# Patient Record
Sex: FEMALE | Race: WHITE | NOT HISPANIC OR LATINO
[De-identification: names, ages, dates, MRNs, and addresses within clinical notes are randomized per-mention and may not be internally consistent; named-entity substitution may affect disease eponyms.]

---

## 2017-12-26 ENCOUNTER — APPOINTMENT (OUTPATIENT)
Dept: RADIOLOGY | Facility: CLINIC | Age: 62
End: 2017-12-26

## 2017-12-26 ENCOUNTER — OUTPATIENT (OUTPATIENT)
Dept: OUTPATIENT SERVICES | Facility: HOSPITAL | Age: 62
LOS: 1 days | End: 2017-12-26
Payer: COMMERCIAL

## 2017-12-26 DIAGNOSIS — Z00.8 ENCOUNTER FOR OTHER GENERAL EXAMINATION: ICD-10-CM

## 2017-12-26 PROCEDURE — 77080 DXA BONE DENSITY AXIAL: CPT

## 2017-12-26 PROCEDURE — 77080 DXA BONE DENSITY AXIAL: CPT | Mod: 26

## 2017-12-28 ENCOUNTER — OUTPATIENT (OUTPATIENT)
Dept: OUTPATIENT SERVICES | Facility: HOSPITAL | Age: 62
LOS: 1 days | End: 2017-12-28
Payer: COMMERCIAL

## 2017-12-28 ENCOUNTER — APPOINTMENT (OUTPATIENT)
Dept: MAMMOGRAPHY | Facility: CLINIC | Age: 62
End: 2017-12-28

## 2017-12-28 DIAGNOSIS — Z00.8 ENCOUNTER FOR OTHER GENERAL EXAMINATION: ICD-10-CM

## 2017-12-28 PROCEDURE — 77067 SCR MAMMO BI INCL CAD: CPT

## 2017-12-28 PROCEDURE — 77063 BREAST TOMOSYNTHESIS BI: CPT

## 2017-12-28 PROCEDURE — 77063 BREAST TOMOSYNTHESIS BI: CPT | Mod: 26

## 2017-12-28 PROCEDURE — G0202: CPT | Mod: 26

## 2021-01-10 ENCOUNTER — TRANSCRIPTION ENCOUNTER (OUTPATIENT)
Age: 66
End: 2021-01-10

## 2021-02-07 ENCOUNTER — TRANSCRIPTION ENCOUNTER (OUTPATIENT)
Age: 66
End: 2021-02-07

## 2024-12-21 VITALS
OXYGEN SATURATION: 100 % | DIASTOLIC BLOOD PRESSURE: 99 MMHG | RESPIRATION RATE: 18 BRPM | SYSTOLIC BLOOD PRESSURE: 144 MMHG | TEMPERATURE: 98 F | WEIGHT: 161.82 LBS | HEART RATE: 99 BPM

## 2024-12-21 LAB
ALBUMIN SERPL ELPH-MCNC: 3.3 G/DL — SIGNIFICANT CHANGE UP (ref 3.3–5)
ALBUMIN SERPL ELPH-MCNC: 3.8 G/DL — SIGNIFICANT CHANGE UP (ref 3.3–5)
ALP SERPL-CCNC: 56 U/L — SIGNIFICANT CHANGE UP (ref 40–120)
ALP SERPL-CCNC: 68 U/L — SIGNIFICANT CHANGE UP (ref 40–120)
ALT FLD-CCNC: 33 U/L — SIGNIFICANT CHANGE UP (ref 12–78)
ALT FLD-CCNC: 35 U/L — SIGNIFICANT CHANGE UP (ref 12–78)
ANION GAP SERPL CALC-SCNC: 4 MMOL/L — LOW (ref 5–17)
ANION GAP SERPL CALC-SCNC: 6 MMOL/L — SIGNIFICANT CHANGE UP (ref 5–17)
APPEARANCE UR: CLEAR — SIGNIFICANT CHANGE UP
APTT BLD: 30.2 SEC — SIGNIFICANT CHANGE UP (ref 24.5–35.6)
AST SERPL-CCNC: 23 U/L — SIGNIFICANT CHANGE UP (ref 15–37)
AST SERPL-CCNC: 24 U/L — SIGNIFICANT CHANGE UP (ref 15–37)
BACTERIA # UR AUTO: NEGATIVE /HPF — SIGNIFICANT CHANGE UP
BASOPHILS # BLD AUTO: 0.04 K/UL — SIGNIFICANT CHANGE UP (ref 0–0.2)
BASOPHILS NFR BLD AUTO: 0.6 % — SIGNIFICANT CHANGE UP (ref 0–2)
BILIRUB SERPL-MCNC: 0.4 MG/DL — SIGNIFICANT CHANGE UP (ref 0.2–1.2)
BILIRUB SERPL-MCNC: 0.4 MG/DL — SIGNIFICANT CHANGE UP (ref 0.2–1.2)
BILIRUB UR-MCNC: NEGATIVE — SIGNIFICANT CHANGE UP
BUN SERPL-MCNC: 38 MG/DL — HIGH (ref 7–23)
BUN SERPL-MCNC: 40 MG/DL — HIGH (ref 7–23)
CALCIUM SERPL-MCNC: 10.6 MG/DL — HIGH (ref 8.5–10.1)
CALCIUM SERPL-MCNC: 11.9 MG/DL — HIGH (ref 8.5–10.1)
CAST: 1 /LPF — SIGNIFICANT CHANGE UP (ref 0–4)
CHLORIDE SERPL-SCNC: 101 MMOL/L — SIGNIFICANT CHANGE UP (ref 96–108)
CHLORIDE SERPL-SCNC: 109 MMOL/L — HIGH (ref 96–108)
CO2 SERPL-SCNC: 26 MMOL/L — SIGNIFICANT CHANGE UP (ref 22–31)
CO2 SERPL-SCNC: 31 MMOL/L — SIGNIFICANT CHANGE UP (ref 22–31)
COLOR SPEC: YELLOW — SIGNIFICANT CHANGE UP
CREAT SERPL-MCNC: 2.25 MG/DL — HIGH (ref 0.5–1.3)
CREAT SERPL-MCNC: 2.61 MG/DL — HIGH (ref 0.5–1.3)
DIFF PNL FLD: NEGATIVE — SIGNIFICANT CHANGE UP
EGFR: 19 ML/MIN/1.73M2 — LOW
EGFR: 23 ML/MIN/1.73M2 — LOW
EOSINOPHIL # BLD AUTO: 0.22 K/UL — SIGNIFICANT CHANGE UP (ref 0–0.5)
EOSINOPHIL NFR BLD AUTO: 3 % — SIGNIFICANT CHANGE UP (ref 0–6)
GLUCOSE SERPL-MCNC: 104 MG/DL — HIGH (ref 70–99)
GLUCOSE SERPL-MCNC: 128 MG/DL — HIGH (ref 70–99)
GLUCOSE UR QL: NEGATIVE MG/DL — SIGNIFICANT CHANGE UP
HCT VFR BLD CALC: 32.3 % — LOW (ref 34.5–45)
HGB BLD-MCNC: 10.5 G/DL — LOW (ref 11.5–15.5)
IMM GRANULOCYTES NFR BLD AUTO: 0.3 % — SIGNIFICANT CHANGE UP (ref 0–0.9)
INR BLD: 0.99 RATIO — SIGNIFICANT CHANGE UP (ref 0.85–1.16)
KETONES UR-MCNC: NEGATIVE MG/DL — SIGNIFICANT CHANGE UP
LACTATE SERPL-SCNC: 1.2 MMOL/L — SIGNIFICANT CHANGE UP (ref 0.7–2)
LEUKOCYTE ESTERASE UR-ACNC: ABNORMAL
LIDOCAIN IGE QN: 49 U/L — SIGNIFICANT CHANGE UP (ref 13–75)
LYMPHOCYTES # BLD AUTO: 1.03 K/UL — SIGNIFICANT CHANGE UP (ref 1–3.3)
LYMPHOCYTES # BLD AUTO: 14.2 % — SIGNIFICANT CHANGE UP (ref 13–44)
MCHC RBC-ENTMCNC: 30.6 PG — SIGNIFICANT CHANGE UP (ref 27–34)
MCHC RBC-ENTMCNC: 32.5 G/DL — SIGNIFICANT CHANGE UP (ref 32–36)
MCV RBC AUTO: 94.2 FL — SIGNIFICANT CHANGE UP (ref 80–100)
MONOCYTES # BLD AUTO: 0.4 K/UL — SIGNIFICANT CHANGE UP (ref 0–0.9)
MONOCYTES NFR BLD AUTO: 5.5 % — SIGNIFICANT CHANGE UP (ref 2–14)
NEUTROPHILS # BLD AUTO: 5.56 K/UL — SIGNIFICANT CHANGE UP (ref 1.8–7.4)
NEUTROPHILS NFR BLD AUTO: 76.4 % — SIGNIFICANT CHANGE UP (ref 43–77)
NITRITE UR-MCNC: NEGATIVE — SIGNIFICANT CHANGE UP
PH UR: 8 — SIGNIFICANT CHANGE UP (ref 5–8)
PLATELET # BLD AUTO: 243 K/UL — SIGNIFICANT CHANGE UP (ref 150–400)
POTASSIUM SERPL-MCNC: 3.5 MMOL/L — SIGNIFICANT CHANGE UP (ref 3.5–5.3)
POTASSIUM SERPL-MCNC: 3.9 MMOL/L — SIGNIFICANT CHANGE UP (ref 3.5–5.3)
POTASSIUM SERPL-SCNC: 3.5 MMOL/L — SIGNIFICANT CHANGE UP (ref 3.5–5.3)
POTASSIUM SERPL-SCNC: 3.9 MMOL/L — SIGNIFICANT CHANGE UP (ref 3.5–5.3)
PROT SERPL-MCNC: 6.7 GM/DL — SIGNIFICANT CHANGE UP (ref 6–8.3)
PROT SERPL-MCNC: 7.9 GM/DL — SIGNIFICANT CHANGE UP (ref 6–8.3)
PROT UR-MCNC: NEGATIVE MG/DL — SIGNIFICANT CHANGE UP
PROTHROM AB SERPL-ACNC: 11.4 SEC — SIGNIFICANT CHANGE UP (ref 9.9–13.4)
RBC # BLD: 3.43 M/UL — LOW (ref 3.8–5.2)
RBC # FLD: 14.2 % — SIGNIFICANT CHANGE UP (ref 10.3–14.5)
RBC CASTS # UR COMP ASSIST: 0 /HPF — SIGNIFICANT CHANGE UP (ref 0–4)
SODIUM SERPL-SCNC: 136 MMOL/L — SIGNIFICANT CHANGE UP (ref 135–145)
SODIUM SERPL-SCNC: 141 MMOL/L — SIGNIFICANT CHANGE UP (ref 135–145)
SP GR SPEC: 1.01 — SIGNIFICANT CHANGE UP (ref 1–1.03)
SQUAMOUS # UR AUTO: 1 /HPF — SIGNIFICANT CHANGE UP (ref 0–5)
UROBILINOGEN FLD QL: 0.2 MG/DL — SIGNIFICANT CHANGE UP (ref 0.2–1)
WBC # BLD: 7.27 K/UL — SIGNIFICANT CHANGE UP (ref 3.8–10.5)
WBC # FLD AUTO: 7.27 K/UL — SIGNIFICANT CHANGE UP (ref 3.8–10.5)
WBC UR QL: 3 /HPF — SIGNIFICANT CHANGE UP (ref 0–5)

## 2024-12-21 PROCEDURE — 74176 CT ABD & PELVIS W/O CONTRAST: CPT | Mod: 26,MC

## 2024-12-21 PROCEDURE — 71045 X-RAY EXAM CHEST 1 VIEW: CPT | Mod: 26

## 2024-12-21 PROCEDURE — 93010 ELECTROCARDIOGRAM REPORT: CPT

## 2024-12-21 PROCEDURE — 99285 EMERGENCY DEPT VISIT HI MDM: CPT | Mod: FS

## 2024-12-21 RX ORDER — SODIUM CHLORIDE 9 MG/ML
1000 INJECTION, SOLUTION INTRAMUSCULAR; INTRAVENOUS; SUBCUTANEOUS ONCE
Refills: 0 | Status: COMPLETED | OUTPATIENT
Start: 2024-12-21 | End: 2024-12-21

## 2024-12-21 RX ADMIN — SODIUM CHLORIDE 1000 MILLILITER(S): 9 INJECTION, SOLUTION INTRAMUSCULAR; INTRAVENOUS; SUBCUTANEOUS at 19:51

## 2024-12-21 RX ADMIN — SODIUM CHLORIDE 1000 MILLILITER(S): 9 INJECTION, SOLUTION INTRAMUSCULAR; INTRAVENOUS; SUBCUTANEOUS at 20:18

## 2024-12-21 NOTE — ED PROVIDER NOTE - CCCP TRG CHIEF CMPLNT
Notified provider that pt became hypotensive and tachy upon standing during orthostatic vital signs  abdominal pain

## 2024-12-21 NOTE — ED PROVIDER NOTE - NSFOLLOWUPINSTRUCTIONS_ED_ALL_ED_FT
PLEASE HOLD PLAVIX FOR 5 DAYS, FOLLOW UP OUTPATIENT WITH DR. HARTMANN.    Hernia    A hernia is when fat or the intestines push through a weak area in the abdominal wall. This can occur around the belly button (umbilical hernia) or where the leg meets the lower abdomen (inguinal hernia). This creates a rounded lump (bulge). Hernias that can be pushed back into the belly are called reducible and those that cannot are called incarcerated. Hernias that are not reducible and lose their blood supply are called strangulated and require emergency surgery. Hernias can be caused or worsened when straining during bowel movements or lifting heavy objects as well as coughing or sneezing. Surgery may be helpful in treating this condition so follow up with a surgeon.    SEEK IMMEDIATE MEDICAL CARE IF YOU HAVE ANY OF THE FOLLOWING SYMPTOMS: worsening abdominal pain, change in color over the hernia, nausea/vomiting, or inability to have a bowel movement or pass gas.

## 2024-12-21 NOTE — CONSULT NOTE ADULT - SUBJECTIVE AND OBJECTIVE BOX
Pt is a 69y female w/ a PMHx of squamous cell CA of the lip, CVA on Plavix and ASA, gout, and a chronic umbilical hernia , apparent in immediate post op period for a laparoscopic hysterectomy for a uterine prolapse 10 years ago, who presents to the ED c/o hernia pain. Patient states the hernia did not bother until noon today when she gently bumped it. Notes there is redness, swelling, and pain. Denies nausea, vomiting, diarrhea, fever, CP, or SOB.  at bedside is a gastroenterologist who looked at her, called Robbie prior to arrival and sent pictures. There was area of discoloration on skin, so pt was sent to ER for further evaluation.  PMH- CVA 10 years ago, HTN, chronic umbilical hernia, gout  PSH- laparoscopic hysterectomy for prolapse, excision of squamous cell ca on lower lip- [facility]  Meds- Plavix, baby aspirin, vasotec, allopurinol  All- NKDA  Soc- neg tobb ETOH  FH- non contributory    Physical exam    VSS afeb  68 yo female WDWN in NAD  skin- warm,dry  HEENT- pupils equal, sclerae anicteric  Neck- trachea midline, no JVD  Lungs- clear bilat  Cor- RRR  Abd- + BS soft non tender. Firm incarcerated umbilical hernia with bruising,echymosis of overlying skin. There is no significant tenderness. There is no warmth to palpation  Ext- no edema  Neuro- A and O X 3, no deficits    CT scan-  No IV contrast due to elevated Cr 2.61 official report pending. Wide mouth umbilical hernia containing fatty tissue. No stranding to suggest strangulation. There appears to be some fluid toward surface of skin within hernia sac.      lactate 1.2, Cr- 2.61, WBC 7.3

## 2024-12-21 NOTE — ED ADULT NURSE NOTE - OBJECTIVE STATEMENT
Pt presents to the ED c/o abdominal tenderness due to an umbilical hernia. Pain worsened tonight. Dr. Avalos aware of pt. A&Ox4 and ambulatory.

## 2024-12-21 NOTE — ED PROVIDER NOTE - PROGRESS NOTE DETAILS
Claudia Barrios for Dr. Hayes: Spoke to Robbie who will come in to see the patient. Requests a CT w/o contrast to view abdominal wall. Patient does not want anything for pain. Informed patient of a creatinine over 2.61 which is new for her. Will give an additional 1L of IVF. Spoke to DR Avalos who reviewed ct scan. Believes pt has hematoma but not ischemia of hernia. Rec await ct official results. If reading is ok from surgical standpoint pt may d/c plavix for five days and return for durgery. Kaylene TAY Spoke to Dr. Avalos. Report states that the umbilical hernia may be inflamed or strangulated. Alis Ramirez is in [place] and suggests calling the on-call general surgeon, but maintains his original recommendations. Claudia Hayes: Spoke to Dr. Avalos. Report states that the umbilical hernia may be inflamed or strangulated. Notes James is in [place] and suggests calling the on-call general surgeon, but maintains his original recommendations. Claudia Hayes: Spoke to Dr. Bailon who recommends to apply an ice pack to the umbilicus and will be down in about an hour. Claudia Hayes: Spoke to Dr. Bailon who recommends to apply an ice pack to the umbilicus and will be down in about an hour. Ice pack placed on the patient's abdomen.

## 2024-12-21 NOTE — ED ADULT NURSE NOTE - NSFALLUNIVINTERV_ED_ALL_ED
Bed/Stretcher in lowest position, wheels locked, appropriate side rails in place/Call bell, personal items and telephone in reach/Instruct patient to call for assistance before getting out of bed/chair/stretcher/Non-slip footwear applied when patient is off stretcher/[place] to call system/Physically safe environment - no spills, clutter or unnecessary equipment/Purposeful proactive rounding/Room/bathroom lighting operational, light cord in reach

## 2024-12-21 NOTE — ED PROVIDER NOTE - OBJECTIVE STATEMENT
Pt is a 69y female w/ a PMHx of squamous cell CA of the lip, CVA on Plavix and ASA, gout, and a chronic umbilical hernia s/p hysterectomy for a uterine prolapse in the distant past who presents to the ED c/o hernia pain. Patient states the hernia did not bother until noon today when she gently bumped it. Notes there is redness, swelling, and pain. Denies nausea, vomiting, diarrhea, fever, CP, or SOB.  at bedside is a gastroenterologist who looked at her, called Robbie prior to arrival and sent pictures, spurring an ED visit. Patient seen in intake, labs and CTs ordered.

## 2024-12-21 NOTE — ED PROVIDER NOTE - CARE PROVIDER_API CALL
James Avalos  Surgery  [address] [place] [id number]  Phone: [phone]  Fax: [phone]  Follow Up Time:

## 2024-12-21 NOTE — ED ADULT TRIAGE NOTE - CHIEF COMPLAINT QUOTE
Patient presents to the ER with complaints of abdominal tenderness. Per spouse, patient has an umbilical hernia.

## 2024-12-21 NOTE — ED PROVIDER NOTE - CLINICAL SUMMARY MEDICAL DECISION MAKING FREE TEXT BOX
69y female w/ incarcerated, ? gangrenous hernia. Plan for labs, CTs, surgical consult, IVF and admission.

## 2024-12-22 ENCOUNTER — OUTPATIENT (OUTPATIENT)
Dept: EMERGENCY DEPT | Facility: HOSPITAL | Age: 69
LOS: 1 days | Discharge: HOME | DRG: 395 | End: 2024-12-22
Payer: MEDICARE

## 2024-12-22 DIAGNOSIS — L90.5 SCAR CONDITIONS AND FIBROSIS OF SKIN: ICD-10-CM

## 2024-12-22 DIAGNOSIS — K43.0 INCISIONAL HERNIA WITH OBSTRUCTION, WITHOUT GANGRENE: ICD-10-CM

## 2024-12-22 DIAGNOSIS — Z90.710 ACQUIRED ABSENCE OF BOTH CERVIX AND UTERUS: Chronic | ICD-10-CM

## 2024-12-22 DIAGNOSIS — M10.9 GOUT, UNSPECIFIED: ICD-10-CM

## 2024-12-22 DIAGNOSIS — Z79.01 LONG TERM (CURRENT) USE OF ANTICOAGULANTS: ICD-10-CM

## 2024-12-22 DIAGNOSIS — Z86.73 PERSONAL HISTORY OF TRANSIENT ISCHEMIC ATTACK (TIA), AND CEREBRAL INFARCTION WITHOUT RESIDUAL DEFICITS: ICD-10-CM

## 2024-12-22 DIAGNOSIS — Z85.828 PERSONAL HISTORY OF OTHER MALIGNANT NEOPLASM OF SKIN: ICD-10-CM

## 2024-12-22 DIAGNOSIS — K42.9 UMBILICAL HERNIA WITHOUT OBSTRUCTION OR GANGRENE: ICD-10-CM

## 2024-12-22 LAB
24R-OH-CALCIDIOL SERPL-MCNC: 63.7 NG/ML — SIGNIFICANT CHANGE UP (ref 30–80)
ADD ON TEST-SPECIMEN IN LAB: SIGNIFICANT CHANGE UP
ANION GAP SERPL CALC-SCNC: 5 MMOL/L — SIGNIFICANT CHANGE UP (ref 5–17)
BASOPHILS # BLD AUTO: 0.03 K/UL — SIGNIFICANT CHANGE UP (ref 0–0.2)
BASOPHILS NFR BLD AUTO: 0.4 % — SIGNIFICANT CHANGE UP (ref 0–2)
BUN SERPL-MCNC: 35 MG/DL — HIGH (ref 7–23)
CALCIUM SERPL-MCNC: 10.8 MG/DL — HIGH (ref 8.5–10.1)
CALCIUM SERPL-MCNC: 10.9 MG/DL — HIGH (ref 8.4–10.5)
CHLORIDE SERPL-SCNC: 107 MMOL/L — SIGNIFICANT CHANGE UP (ref 96–108)
CO2 SERPL-SCNC: 27 MMOL/L — SIGNIFICANT CHANGE UP (ref 22–31)
CREAT SERPL-MCNC: 2.15 MG/DL — HIGH (ref 0.5–1.3)
EGFR: 24 ML/MIN/1.73M2 — LOW
EOSINOPHIL # BLD AUTO: 0.04 K/UL — SIGNIFICANT CHANGE UP (ref 0–0.5)
EOSINOPHIL NFR BLD AUTO: 0.5 % — SIGNIFICANT CHANGE UP (ref 0–6)
FERRITIN SERPL-MCNC: 159 NG/ML — SIGNIFICANT CHANGE UP (ref 13–330)
FOLATE SERPL-MCNC: >20 NG/ML — SIGNIFICANT CHANGE UP
GLUCOSE SERPL-MCNC: 155 MG/DL — HIGH (ref 70–99)
HCT VFR BLD CALC: 28.6 % — LOW (ref 34.5–45)
HGB BLD-MCNC: 9.3 G/DL — LOW (ref 11.5–15.5)
IMM GRANULOCYTES NFR BLD AUTO: 0.6 % — SIGNIFICANT CHANGE UP (ref 0–0.9)
IRON SATN MFR SERPL: 23 % — SIGNIFICANT CHANGE UP (ref 14–50)
IRON SATN MFR SERPL: 62 UG/DL — SIGNIFICANT CHANGE UP (ref 30–160)
LYMPHOCYTES # BLD AUTO: 0.45 K/UL — LOW (ref 1–3.3)
LYMPHOCYTES # BLD AUTO: 5.4 % — LOW (ref 13–44)
MCHC RBC-ENTMCNC: 30.4 PG — SIGNIFICANT CHANGE UP (ref 27–34)
MCHC RBC-ENTMCNC: 32.5 G/DL — SIGNIFICANT CHANGE UP (ref 32–36)
MCV RBC AUTO: 93.5 FL — SIGNIFICANT CHANGE UP (ref 80–100)
MONOCYTES # BLD AUTO: 0.07 K/UL — SIGNIFICANT CHANGE UP (ref 0–0.9)
MONOCYTES NFR BLD AUTO: 0.8 % — LOW (ref 2–14)
NEUTROPHILS # BLD AUTO: 7.62 K/UL — HIGH (ref 1.8–7.4)
NEUTROPHILS NFR BLD AUTO: 92.3 % — HIGH (ref 43–77)
PLATELET # BLD AUTO: 267 K/UL — SIGNIFICANT CHANGE UP (ref 150–400)
POTASSIUM SERPL-MCNC: 3.5 MMOL/L — SIGNIFICANT CHANGE UP (ref 3.5–5.3)
POTASSIUM SERPL-SCNC: 3.5 MMOL/L — SIGNIFICANT CHANGE UP (ref 3.5–5.3)
PTH-INTACT FLD-MCNC: 9 PG/ML — LOW (ref 15–65)
RBC # BLD: 3.06 M/UL — LOW (ref 3.8–5.2)
RBC # FLD: 14.1 % — SIGNIFICANT CHANGE UP (ref 10.3–14.5)
SODIUM SERPL-SCNC: 139 MMOL/L — SIGNIFICANT CHANGE UP (ref 135–145)
TIBC SERPL-MCNC: 267 UG/DL — SIGNIFICANT CHANGE UP (ref 220–430)
UIBC SERPL-MCNC: 206 UG/DL — SIGNIFICANT CHANGE UP (ref 110–370)
VIT B12 SERPL-MCNC: 752 PG/ML — SIGNIFICANT CHANGE UP (ref 232–1245)
WBC # BLD: 8.26 K/UL — SIGNIFICANT CHANGE UP (ref 3.8–10.5)
WBC # FLD AUTO: 8.26 K/UL — SIGNIFICANT CHANGE UP (ref 3.8–10.5)

## 2024-12-22 PROCEDURE — 99223 1ST HOSP IP/OBS HIGH 75: CPT | Mod: 57

## 2024-12-22 PROCEDURE — 15734 MUSCLE-SKIN GRAFT TRUNK: CPT

## 2024-12-22 PROCEDURE — 88305 TISSUE EXAM BY PATHOLOGIST: CPT | Mod: 26

## 2024-12-22 PROCEDURE — 88341 IMHCHEM/IMCYTCHM EA ADD ANTB: CPT | Mod: 26

## 2024-12-22 PROCEDURE — 88342 IMHCHEM/IMCYTCHM 1ST ANTB: CPT | Mod: 26

## 2024-12-22 PROCEDURE — 99222 1ST HOSP IP/OBS MODERATE 55: CPT

## 2024-12-22 RX ORDER — AMPICILLIN AND SULBACTAM 1; .5 G/1; G/1
INJECTION, POWDER, FOR SOLUTION INTRAVENOUS
Refills: 0 | Status: DISCONTINUED | OUTPATIENT
Start: 2024-12-22 | End: 2024-12-23

## 2024-12-22 RX ORDER — ACETAMINOPHEN 500MG 500 MG/1
1000 TABLET, COATED ORAL ONCE
Refills: 0 | Status: COMPLETED | OUTPATIENT
Start: 2024-12-22 | End: 2024-12-22

## 2024-12-22 RX ORDER — AMPICILLIN AND SULBACTAM 1; .5 G/1; G/1
3 INJECTION, POWDER, FOR SOLUTION INTRAVENOUS EVERY 12 HOURS
Refills: 0 | Status: DISCONTINUED | OUTPATIENT
Start: 2024-12-22 | End: 2024-12-23

## 2024-12-22 RX ORDER — AMPICILLIN AND SULBACTAM 1; .5 G/1; G/1
3 INJECTION, POWDER, FOR SOLUTION INTRAVENOUS ONCE
Refills: 0 | Status: COMPLETED | OUTPATIENT
Start: 2024-12-22 | End: 2024-12-22

## 2024-12-22 RX ORDER — SODIUM CHLORIDE 9 MG/ML
1000 INJECTION, SOLUTION INTRAMUSCULAR; INTRAVENOUS; SUBCUTANEOUS
Refills: 0 | Status: DISCONTINUED | OUTPATIENT
Start: 2024-12-22 | End: 2024-12-23

## 2024-12-22 RX ORDER — HYDROMORPHONE HYDROCHLORIDE 2 MG/1
0.2 TABLET ORAL
Refills: 0 | Status: DISCONTINUED | OUTPATIENT
Start: 2024-12-22 | End: 2024-12-22

## 2024-12-22 RX ORDER — ACETAMINOPHEN 500MG 500 MG/1
1000 TABLET, COATED ORAL ONCE
Refills: 0 | Status: DISCONTINUED | OUTPATIENT
Start: 2024-12-22 | End: 2024-12-23

## 2024-12-22 RX ORDER — CEFAZOLIN SODIUM 10 G
VIAL (EA) INJECTION
Refills: 0 | Status: DISCONTINUED | OUTPATIENT
Start: 2024-12-22 | End: 2024-12-22

## 2024-12-22 RX ORDER — ONDANSETRON HYDROCHLORIDE 4 MG/1
4 TABLET, FILM COATED ORAL EVERY 6 HOURS
Refills: 0 | Status: DISCONTINUED | OUTPATIENT
Start: 2024-12-22 | End: 2024-12-23

## 2024-12-22 RX ORDER — ONDANSETRON HYDROCHLORIDE 4 MG/1
4 TABLET, FILM COATED ORAL ONCE
Refills: 0 | Status: DISCONTINUED | OUTPATIENT
Start: 2024-12-22 | End: 2024-12-22

## 2024-12-22 RX ORDER — 0.9 % SODIUM CHLORIDE 0.9 %
1000 INTRAVENOUS SOLUTION INTRAVENOUS
Refills: 0 | Status: DISCONTINUED | OUTPATIENT
Start: 2024-12-22 | End: 2024-12-22

## 2024-12-22 RX ORDER — AMLODIPINE BESYLATE 10 MG/1
5 TABLET ORAL DAILY
Refills: 0 | Status: DISCONTINUED | OUTPATIENT
Start: 2024-12-22 | End: 2024-12-23

## 2024-12-22 RX ORDER — AMITRIPTYLINE HYDROCHLORIDE 50 MG/1
5 TABLET ORAL AT BEDTIME
Refills: 0 | Status: DISCONTINUED | OUTPATIENT
Start: 2024-12-22 | End: 2024-12-23

## 2024-12-22 RX ADMIN — SODIUM CHLORIDE 110 MILLILITER(S): 9 INJECTION, SOLUTION INTRAMUSCULAR; INTRAVENOUS; SUBCUTANEOUS at 03:01

## 2024-12-22 RX ADMIN — ACETAMINOPHEN 500MG 1000 MILLIGRAM(S): 500 TABLET, COATED ORAL at 22:16

## 2024-12-22 RX ADMIN — Medication 40 MILLIGRAM(S): at 21:42

## 2024-12-22 RX ADMIN — AMPICILLIN AND SULBACTAM 200 GRAM(S): 1; .5 INJECTION, POWDER, FOR SOLUTION INTRAVENOUS at 17:58

## 2024-12-22 RX ADMIN — Medication 81 MILLIGRAM(S): at 13:22

## 2024-12-22 RX ADMIN — AMITRIPTYLINE HYDROCHLORIDE 5 MILLIGRAM(S): 50 TABLET ORAL at 21:42

## 2024-12-22 RX ADMIN — ACETAMINOPHEN 500MG 400 MILLIGRAM(S): 500 TABLET, COATED ORAL at 21:42

## 2024-12-22 RX ADMIN — AMPICILLIN AND SULBACTAM 200 GRAM(S): 1; .5 INJECTION, POWDER, FOR SOLUTION INTRAVENOUS at 02:47

## 2024-12-22 RX ADMIN — ACETAMINOPHEN 500MG 400 MILLIGRAM(S): 500 TABLET, COATED ORAL at 13:22

## 2024-12-22 RX ADMIN — ACETAMINOPHEN 500MG 1000 MILLIGRAM(S): 500 TABLET, COATED ORAL at 13:52

## 2024-12-22 RX ADMIN — ACETAMINOPHEN 500MG 400 MILLIGRAM(S): 500 TABLET, COATED ORAL at 05:28

## 2024-12-22 RX ADMIN — SODIUM CHLORIDE 110 MILLILITER(S): 9 INJECTION, SOLUTION INTRAMUSCULAR; INTRAVENOUS; SUBCUTANEOUS at 11:51

## 2024-12-22 RX ADMIN — AMLODIPINE BESYLATE 5 MILLIGRAM(S): 10 TABLET ORAL at 13:22

## 2024-12-22 NOTE — CONSULT NOTE ADULT - SUBJECTIVE AND OBJECTIVE BOX
69y female w/ a PMHx of squamous cell CA of the lip, CVA on Plavix and ASA, gout, and a chronic umbilical hernia s/p hysterectomy for a uterine prolapse in the distant past who presents to the ED c/o hernia pain. Patient states the hernia did not bother until noon today when she gently bumped it. Notes there is redness, swelling, and pain. Denies nausea, vomiting, diarrhea, fever, CP, or SOB.  at bedside is a gastroenterologist who looked at her,

## 2024-12-22 NOTE — H&P ADULT - HISTORY OF PRESENT ILLNESS
69y female w/ a PMHx of squamous cell CA of the lip, CVA on Plavix and ASA, gout, and a chronic umbilical hernia s/p hysterectomy for a uterine prolapse in the distant past who presents to the ED c/o hernia pain. Patient states the hernia did not bother until noon today when she gently bumped it. Notes there is redness, swelling, and pain. Patient not sure how long the hernia became hard, remembers it was soft last week. Denies fever or chills, denies N/V. Patient denies any bowel movement changes, passing gas. Denies other complains at this time.

## 2024-12-22 NOTE — H&P ADULT - ASSESSMENT
69F w/ incarcerated umbilical hernia, no bowel involvement    Mild overlying skin discoloration & induration  Denies bowel habit changes, no n/v/f/c    CT: 3.7 cm sized umbilical hernia containing soft tissue and fat with minimal   stranding concerning for inflamed or strangulated hernia.    on plavix    Plan:  - Admit under Dr. Moreland's service  - Urgent umbilical hernia repair today  - Platelets  - Pain control PRN  - Monitor vitals  - NPO   - Nausea control PRN  - IV abx: Unasyn  - IVF:   - replete electrolytes  - daily labs  - OOB/PT  - Hospitalist consult    Plan will be discussed with Trauma & Surgical critical care surgery attending, Dr. Moreland

## 2024-12-22 NOTE — CONSULT NOTE ADULT - SUBJECTIVE AND OBJECTIVE BOX
Chief Complaint: abdominal pain.    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 Bailon with diagnosis of strangulated umbilical hernia. Patient's  gastroenterologist Dr. Ayad Pleitez sitting at bedside.  Hospitalist consult has been called for co-medical management of her co-morbid medical conditions while in the hospital.  Patient is going to OR tonight for urgent        Patient states the hernia did not bother until noon today when she gently bumped it. Notes there is redness, swelling, and pain. Denies nausea, vomiting, diarrhea, fever, CP, or SOB.  at bedside is a gastroenterologist who looked at her, called Robbie prior to arrival and sent pictures, spurring an ED visit. Patient seen in intake, labs and CTs ordered.   Chief Complaint: abdominal pain.    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 Bailon with diagnosis of strangulated umbilical hernia. Patient's  gastroenterologist Dr. Ayad Pleitez sitting at bedside.  Hospitalist consult has been called for co-medical management of her co-morbid medical conditions while in the hospital.  Patient is going to OR tonight for urgent surgical intervention. Patient was doing well until yesterday morning, but latter on she noted more pain to her umbilical hernia region associated with more swelling with redness and erythema. She denies vomiting, abd distention, fever, chills, chest pain, sob, diarrhea or dysuria. She had her last ASA and Plavix yesterday, and her last diet was around 2.00 pm yesterday.    Sig labs: WBC 7.27, Hb 10.5, Platelets 243k, BUN 40, Cr 2.62 (no prior level for comparison, but was normal before per her ), Ca 11.9, Lactate 1.2, Lipase 49.  UA negative.    Has received NS 2 L bolus and Unasyn IV in ED.    CT abd/pelvis: IMPRESSION: 3.7 cm sized umbilical hernia containing soft tissue and fat with minimal stranding concerning for inflamed or strangulated hernia. No bowel obstruction.        HOME MEDICATIONS/PRESCRIPTIONS:  Include: ASA, Plavix, Amlodipine, Telmisartan, Atorvastatin and Allopurinol per her .      MEDICATIONS  (STANDING):  ampicillin/sulbactam  IVPB      ampicillin/sulbactam  IVPB 3 Gram(s) IV Intermittent every 12 hours  sodium chloride 0.9%. 1000 milliLiter(s) (110 mL/Hr) IV Continuous <Continuous>    MEDICATIONS  (PRN):  acetaminophen   IVPB .. 1000 milliGRAM(s) IV Intermittent Once PRN Temp greater or equal to 38C (100.4F), Mild Pain (1 - 3)  ondansetron Injectable 4 milliGRAM(s) IV Push every 6 hours PRN Nausea      REVIEW OF SYSTEMS:  10 points ROS have been reviewed and are grossly unremarkable except mentioned in HPI above otherwise negative.    PAST MEDICAL & SURGICAL HISTORY:  CVA (cerebrovascular accident) without residual deficits.  HTN  HPL  Gout.  Chronic umbilical hernia.    H/O: Lap hysterectomy for uterine prolapse.  Colonoscopy with polypectomy.      PHYSICAL EXAM:  GENERAL: NAD, lying in bed comfortably  HEAD:  Atraumatic, Normocephalic  EYES: EOMI, PERRLA, conjunctiva and sclera clear  ENT: Moist mucous membranes  NECK: Supple, No JVD  CHEST/LUNG: Clear to auscultation bilaterally; No rales, rhonchi, wheezing, or rubs. Unlabored respirations  HEART: Regular rate and rhythm; No murmurs, rubs, or gallops  ABDOMEN: Bowel sounds present; Soft, about 4cm tender umbilical hernia, ecchymotic and very indurated. No rigidity or distention.  EXTREMITIES:  2+ Peripheral Pulses, brisk capillary refill. No clubbing, cyanosis, or edema  NERVOUS SYSTEM:  Alert & Oriented X3, speech clear. No deficits   MSK: FROM all 4 extremities, full and equal strength  SKIN: No rashes or lesions.         LABS:                        10.5   7.27  )-----------( 243      ( 21 Dec 2024 19:39 )             32.3     12-21    141  |  109[H]  |  38[H]  ----------------------------<  104[H]  3.5   |  26  |  2.25[H]    Ca    10.6[H]      21 Dec 2024 22:43    TPro  6.7  /  Alb  3.3  /  TBili  0.4  /  DBili  x   /  AST  23  /  ALT  33  /  AlkPhos  56  12-21    PT/INR - ( 21 Dec 2024 19:39 )   PT: 11.4 sec;   INR: 0.99 ratio         PTT - ( 21 Dec 2024 19:39 )  PTT:30.2 sec      < from: CT Abdomen and Pelvis w/ Oral Cont (12.21.24 @ 21:12) >  PROCEDURE:  CT of the Abdomen and Pelvis was performed.  Sagittal and coronal reformats were performed.    FINDINGS:  LOWER CHEST: Within normal limits.    LIVER: Within normal limits.  BILE DUCTS: Normal caliber.  GALLBLADDER: Within normal limits.  SPLEEN: Within normal limits.  PANCREAS: Within normal limits.  ADRENALS: Within normal limits.  KIDNEYS/URETERS: Within normal limits.    BLADDER: Within normal limits.  REPRODUCTIVE ORGANS: Hysterectomy.    BOWEL:No bowel obstruction. Appendix is not visualized.  PERITONEUM/RETROPERITONEUM: Within normal limits.  VESSELS: Atherosclerotic changes.  LYMPH NODES: No lymphadenopathy.  ABDOMINAL WALL: 3.7 cm sized umbilical hernia containing soft tissue and   fat.No bowel loops are seen within the hernia sac. There is minimal   stranding.  BONES: Degenerative changes.    IMPRESSION:  3.7 cm sized umbilical hernia containing soft tissue and fat with minimal   stranding concerning for inflamed or strangulated hernia.    No bowel obstruction    --- End of Report ---    < end of copied text >

## 2024-12-22 NOTE — H&P ADULT - NSHPPHYSICALEXAM_GEN_ALL_CORE
ROS: Negative except written above    PHYSICAL EXAM:  - GENERAL: No acute distress.  - EYES: EOMI. Anicteric.  - HENT: Moist mucous membranes. No scleral icterus. No cervical lymphadenopathy.  - LUNGS:  No accessory muscle use.  - CARDIOVASCULAR: Regular rate and rhythm.   - ABDOMEN: Soft, non-tender and non-distended. Umbilical hernia noticed. Skin mildly indurated, mild brownish skin color changes, not tender to touch. incarcerated.   - EXTREMITIES: No edema. Non-tender.  - SKIN: No rashes or lesions. Warm.  - NEUROLOGIC: No focal neurological deficits. CN II-XII grossly intact, but not individually tested.  - PSYCHIATRIC: Cooperative. Appropriate mood and affect. ROS: Negative except written above    PHYSICAL EXAM:  - GENERAL: No acute distress.  - EYES: EOMI. Anicteric.  - HENT: Moist mucous membranes. No scleral icterus. No cervical lymphadenopathy.  - LUNGS:  No accessory muscle use.  - CARDIOVASCULAR: Regular rate and rhythm.   - ABDOMEN: Soft, non-tender and non-distended. Umbilical hernia noticed. Skin mildly indurated, mild brownish skin color changes, not tender to touch. incarcerated ventral hernia.   - EXTREMITIES: No edema. Non-tender.  - SKIN: No rashes or lesions. Warm.  - NEUROLOGIC: No focal neurological deficits. CN II-XII grossly intact, but not individually tested.  - PSYCHIATRIC: Cooperative. Appropriate mood and affect.

## 2024-12-22 NOTE — BRIEF OPERATIVE NOTE - NSICDXBRIEFPROCEDURE_GEN_ALL_CORE_FT
PROCEDURES:  Open repair of incisional hernia of anterior abdominal wall using synthetic mesh and posterior component separation technique 22-Dec-2024 04:46:07  Trevin Moreland

## 2024-12-22 NOTE — PATIENT PROFILE ADULT - FALL HARM RISK - HARM RISK INTERVENTIONS

## 2024-12-22 NOTE — H&P ADULT - ATTENDING COMMENTS
A/P:  Incarcerated r/o strangulated ventral/incisional hernia with fat  IV antibiotics  Platelet transfusion for h/o plavix use for anticoagulation  NPO, IV hydration  GI/DVT prophylaxis  Pain control  Incentive spirometry  Serial abd exams  F/U labs  Pt for surgical intervention   Pt aware of and agrees with all of the above    Pt seen and examined at bedside with chaperone. Pt is AAOx3, pt in no acute distress. Pt has incarcerated r/o strangulated ventral/incisional hernia. Pt explained the surgical procedures in both medical terminology and in lay terms that the patient understood, ventral incisional hernia repair , possible mesh placement. Pt explained in both medical terminology and in lay terms that the patient understood, the benefits and alternatives of surgery including non-operative management. Pt explained at length in both medical terminology and in lay terms that the patient understood, the associated risks of surgery including but not limited to infection, bleeding, nerve/organ injury, post operative pain, poor wound healing, scar formation both internally and externally, risk of seroma/hematoma/abcess formation, risk of hernia development/recurrence, risk of need for further GI/IR/Surgery intervention as required, risk of mesh infection necessitating surgical removal of mesh. Pt explained in both medical terminology and in lay terms that the patient understood, the associated laura and post operative risks of cardiac/cns/respiratory insult or injury, risk of death. Pt understood all of the above. All questions were answered. Pt gave informed consent for surgery.    75 minutes of time spent on pt examination, review of relevant labs and radiologic studies, discussion with relevant services/providers for coordination of pt care and services, time is exclusive of resident and/or physician assistant teaching or supervision time

## 2024-12-22 NOTE — BRIEF OPERATIVE NOTE - NSICDXBRIEFPOSTOP_GEN_ALL_CORE_FT
POST-OP DIAGNOSIS:  Incarcerated incisional hernia 22-Dec-2024 04:46:52  Trevin Moreland  Scar tissue 22-Dec-2024 04:47:02  Trevin Moreland

## 2024-12-22 NOTE — CONSULT NOTE ADULT - SUBJECTIVE AND OBJECTIVE BOX
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 Bailon with diagnosis of strangulated umbilical hernia. Patient's  gastroenterologist Dr. Ayad Pleitez  w renal evaluation of [place]. Patient denies history of renal disease, denies any nsaid use. Maintained on IVF.       PAST MEDICAL & SURGICAL HISTORY:  CVA (cerebrovascular accident)      H/O: hysterectomy          MEDICATIONS  (STANDING):  amLODIPine   Tablet 5 milliGRAM(s) Oral daily  ampicillin/sulbactam  IVPB      ampicillin/sulbactam  IVPB 3 Gram(s) IV Intermittent every 12 hours  atorvastatin 40 milliGRAM(s) Oral at bedtime  sodium chloride 0.9%. 1000 milliLiter(s) (110 mL/Hr) IV Continuous <Continuous>    MEDICATIONS  (PRN):  acetaminophen   IVPB .. 1000 milliGRAM(s) IV Intermittent Once PRN Temp greater or equal to 38C (100.4F), Mild Pain (1 - 3)  ondansetron Injectable 4 milliGRAM(s) IV Push every 6 hours PRN Nausea      Allergies    No Known Allergies    Intolerances        SOCIAL HISTORY:    FAMILY HISTORY:  No pertinent family history in first degree relatives        REVIEW OF SYSTEMS:    CONSTITUTIONAL: stable weakness, no fevers or chills  EYES/ENT: No visual changes;  No vertigo or throat pain   NECK: No pain or stiffness  RESPIRATORY: No cough, wheezing, hemoptysis; No shortness of breath  CARDIOVASCULAR: No chest pain or palpitations  GASTROINTESTINAL: No abdominal or epigastric pain. No nausea, vomiting, or hematemesis; No diarrhea or constipation. No melena or hematochezia.  GENITOURINARY: No dysuria, frequency or hematuria  NEUROLOGICAL: No numbness or weakness  SKIN: No itching, burning, rashes, or lesions   All other review of systems is negative unless indicated above.      T(C): , Max: 37.6 (12-22-24 @ 04:53)  T(F): , Max: 99.7 (12-22-24 @ 04:53)  HR: 87 (12-22-24 @ 08:00)  BP: 138/76 (12-22-24 @ 08:00)  BP(mean): 103 (12-22-24 @ 02:58)  RR: 16 (12-22-24 @ 08:00)  SpO2: 97% (12-22-24 @ 08:00)  Wt(kg): --    12-21 @ 07:01  -  12-22 @ 07:00  --------------------------------------------------------  IN: 1250 mL / OUT: 0 mL / NET: 1250 mL        Weight (kg): 73.4 (12-21 @ 18:50)    PHYSICAL EXAM:    Constitutional: NAD, frail  HEENT: MM  dist  Cardiovascular: S1 and S2   Extremities: No peripheral edema  Neurological: A/O x 3   : No Julian  Skin: No rashes  Access: Not applicable        LABS:                        9.3    8.26  )-----------( 267      ( 22 Dec 2024 07:08 )             28.6     22 Dec 2024 07:08    139    |  107    |  35     ----------------------------<  155    3.5     |  27     |  2.15   21 Dec 2024 22:43    141    |  109    |  38     ----------------------------<  104    3.5     |  26     |  2.25   21 Dec 2024 19:39    136    |  101    |  40     ----------------------------<  128    3.9     |  31     |  2.61     Ca    10.8       22 Dec 2024 07:08  Ca    10.6       21 Dec 2024 22:43  Ca    11.9       21 Dec 2024 19:39    TPro  6.7    /  Alb  3.3    /  TBili  0.4    /  DBili  x      /  AST  23     /  ALT  33     /  AlkPhos  56     21 Dec 2024 22:43  TPro  7.9    /  Alb  3.8    /  TBili  0.4    /  DBili  x      /  AST  24     /  ALT  35     /  AlkPhos  68     21 Dec 2024 19:39          Urine Studies:  Urinalysis Basic - ( 22 Dec 2024 07:08 )    Color: x / Appearance: x / SG: x / pH: x  Gluc: 155 mg/dL / Ketone: x  / Bili: x / Urobili: x   Blood: x / Protein: x / Nitrite: x   Leuk Esterase: x / RBC: x / WBC x   Sq Epi: x / Non Sq Epi: x / Bacteria: x            RADIOLOGY & ADDITIONAL STUDIES:

## 2024-12-22 NOTE — BRIEF OPERATIVE NOTE - OPERATION/FINDINGS
extensive scar tissue noted to periumbilical region, removed ans sent to pathology as hernia contents, large mouth fascial defect approx 4 x 3 cm noted.  Synthethic mesh utilized for hernia repair as fascial defect was too large for primary repair.   Hemostasis assured   Umbilical reattached to fascial plane and empty space from hernia defect obliterated with repair and dressings

## 2024-12-22 NOTE — H&P ADULT - NSHPLABSRESULTS_GEN_ALL_CORE
Chief complaint:      PMHx:      PSHx:      FHx:      Vitals:  T(C): 36.6 (12-21 @ 23:32), Max: 36.8 (12-21 @ 18:50)  HR: 103 (12-21 @ 23:32) (99 - 103)  BP: 155/92 (12-21 @ 23:32) (144/99 - 155/92)  RR: 18 (12-21 @ 18:50) (18 - 18)  SpO2: 96% (12-21 @ 23:32) (96% - 100%)      I&Os    .    Labs:  12-21 @ 22:43                    -  CBC: ->)-------(<-                     -                 141 | 109 | 38    CMP:  ----------------------< 104               3.5 | 26 | 2.25                      Ca:10.6  Phos:-  Mg:-               0.4|      |23        LFTs:  ------|56|-----             -|      |-  12-21 @ 19:39                    10.5  CBC: 7.27>)-------(<243                     32.3                 136 | 101 | 40    CMP:  ----------------------< 128               3.9 | 31 | 2.61                      Ca:11.9  Phos:-  Mg:-               0.4|      |24        LFTs:  ------|68|-----             -|      |-        Cultures:    Urinalysis with Rflx Culture (collected 12-21-24 @ 20:53)          Current Inpatient Medications:  acetaminophen   IVPB .. 1000 milliGRAM(s) IV Intermittent Once PRN  ondansetron Injectable 4 milliGRAM(s) IV Push every 6 hours PRN  sodium chloride 0.9%. 1000 milliLiter(s) (110 mL/Hr) IV Continuous <Continuous>      < from: CT Abdomen and Pelvis w/ Oral Cont (12.21.24 @ 21:12) >    BOWEL:No bowel obstruction. Appendix is not visualized.  PERITONEUM/RETROPERITONEUM: Within normal limits.  VESSELS: Atherosclerotic changes.  LYMPH NODES: No lymphadenopathy.  ABDOMINAL WALL: 3.7 cm sized umbilical hernia containing soft tissue and   fat.No bowel loops are seen within the hernia sac. There is minimal   stranding.  BONES: Degenerative changes.    IMPRESSION:  3.7 cm sized umbilical hernia containing soft tissue and fat with minimal   stranding concerning for inflamed or strangulated hernia.    No bowel obstruction    < end of copied text >

## 2024-12-23 ENCOUNTER — TRANSCRIPTION ENCOUNTER (OUTPATIENT)
Age: 69
End: 2024-12-23

## 2024-12-23 VITALS
TEMPERATURE: 98 F | OXYGEN SATURATION: 96 % | DIASTOLIC BLOOD PRESSURE: 77 MMHG | SYSTOLIC BLOOD PRESSURE: 143 MMHG | HEART RATE: 91 BPM | RESPIRATION RATE: 16 BRPM

## 2024-12-23 LAB
ANION GAP SERPL CALC-SCNC: 6 MMOL/L — SIGNIFICANT CHANGE UP (ref 5–17)
BUN SERPL-MCNC: 35 MG/DL — HIGH (ref 7–23)
CALCIUM SERPL-MCNC: 10 MG/DL — SIGNIFICANT CHANGE UP (ref 8.5–10.1)
CALCIUM SERPL-MCNC: 10.2 MG/DL — SIGNIFICANT CHANGE UP (ref 8.4–10.5)
CHLORIDE SERPL-SCNC: 111 MMOL/L — HIGH (ref 96–108)
CO2 SERPL-SCNC: 26 MMOL/L — SIGNIFICANT CHANGE UP (ref 22–31)
CREAT SERPL-MCNC: 1.73 MG/DL — HIGH (ref 0.5–1.3)
EGFR: 31 ML/MIN/1.73M2 — LOW
GLUCOSE SERPL-MCNC: 118 MG/DL — HIGH (ref 70–99)
HCT VFR BLD CALC: 27.3 % — LOW (ref 34.5–45)
HGB BLD-MCNC: 8.8 G/DL — LOW (ref 11.5–15.5)
KAPPA LC SER QL IFE: 3.41 MG/DL — HIGH (ref 0.33–1.94)
KAPPA/LAMBDA FREE LIGHT CHAIN RATIO, SERUM: 1.46 RATIO — SIGNIFICANT CHANGE UP (ref 0.26–1.65)
LAMBDA LC SER QL IFE: 2.34 MG/DL — SIGNIFICANT CHANGE UP (ref 0.57–2.63)
MAGNESIUM SERPL-MCNC: 2 MG/DL — SIGNIFICANT CHANGE UP (ref 1.6–2.6)
MCHC RBC-ENTMCNC: 30.3 PG — SIGNIFICANT CHANGE UP (ref 27–34)
MCHC RBC-ENTMCNC: 32.2 G/DL — SIGNIFICANT CHANGE UP (ref 32–36)
MCV RBC AUTO: 94.1 FL — SIGNIFICANT CHANGE UP (ref 80–100)
PHOSPHATE SERPL-MCNC: 2.3 MG/DL — LOW (ref 2.5–4.5)
PLATELET # BLD AUTO: 259 K/UL — SIGNIFICANT CHANGE UP (ref 150–400)
POTASSIUM SERPL-MCNC: 3.4 MMOL/L — LOW (ref 3.5–5.3)
POTASSIUM SERPL-SCNC: 3.4 MMOL/L — LOW (ref 3.5–5.3)
PROT ?TM UR-MCNC: 9 MG/DL — SIGNIFICANT CHANGE UP (ref 0–12)
PTH-INTACT FLD-MCNC: 33 PG/ML — SIGNIFICANT CHANGE UP (ref 15–65)
RBC # BLD: 2.9 M/UL — LOW (ref 3.8–5.2)
RBC # FLD: 14.3 % — SIGNIFICANT CHANGE UP (ref 10.3–14.5)
SODIUM SERPL-SCNC: 143 MMOL/L — SIGNIFICANT CHANGE UP (ref 135–145)
WBC # BLD: 7.36 K/UL — SIGNIFICANT CHANGE UP (ref 3.8–10.5)
WBC # FLD AUTO: 7.36 K/UL — SIGNIFICANT CHANGE UP (ref 3.8–10.5)

## 2024-12-23 PROCEDURE — 99232 SBSQ HOSP IP/OBS MODERATE 35: CPT

## 2024-12-23 RX ORDER — AMLODIPINE BESYLATE 10 MG/1
1 TABLET ORAL
Qty: 0 | Refills: 0 | DISCHARGE
Start: 2024-12-23

## 2024-12-23 RX ORDER — AMITRIPTYLINE HYDROCHLORIDE 50 MG/1
1 TABLET ORAL
Refills: 0 | DISCHARGE

## 2024-12-23 RX ORDER — ALLOPURINOL 300 MG/1
1 TABLET ORAL
Refills: 0 | DISCHARGE

## 2024-12-23 RX ORDER — AMITRIPTYLINE HYDROCHLORIDE 50 MG/1
10 TABLET ORAL
Qty: 0 | Refills: 0 | DISCHARGE
Start: 2024-12-23

## 2024-12-23 RX ORDER — TELMISARTAN 20 MG/1
1 TABLET ORAL
Refills: 0 | DISCHARGE

## 2024-12-23 RX ORDER — FAMOTIDINE 20 MG/1
1 TABLET, FILM COATED ORAL
Refills: 0 | DISCHARGE

## 2024-12-23 RX ORDER — CEPHALEXIN 500 MG
1 CAPSULE ORAL
Qty: 20 | Refills: 0
Start: 2024-12-23 | End: 2024-12-27

## 2024-12-23 RX ORDER — SODIUM,POTASSIUM PHOSPHATES 278-250MG
1 POWDER IN PACKET (EA) ORAL ONCE
Refills: 0 | Status: COMPLETED | OUTPATIENT
Start: 2024-12-23 | End: 2024-12-23

## 2024-12-23 RX ORDER — AMLODIPINE BESYLATE 10 MG/1
1 TABLET ORAL
Refills: 0 | DISCHARGE

## 2024-12-23 RX ORDER — FAMOTIDINE 20 MG/1
40 TABLET, FILM COATED ORAL
Qty: 0 | Refills: 0 | DISCHARGE
Start: 2024-12-23

## 2024-12-23 RX ORDER — ACETAMINOPHEN 500MG 500 MG/1
1000 TABLET, COATED ORAL ONCE
Refills: 0 | Status: COMPLETED | OUTPATIENT
Start: 2024-12-23 | End: 2024-12-23

## 2024-12-23 RX ADMIN — ACETAMINOPHEN 500MG 1000 MILLIGRAM(S): 500 TABLET, COATED ORAL at 08:36

## 2024-12-23 RX ADMIN — AMPICILLIN AND SULBACTAM 200 GRAM(S): 1; .5 INJECTION, POWDER, FOR SOLUTION INTRAVENOUS at 06:30

## 2024-12-23 RX ADMIN — Medication 81 MILLIGRAM(S): at 09:47

## 2024-12-23 RX ADMIN — Medication 1 PACKET(S): at 16:05

## 2024-12-23 RX ADMIN — AMLODIPINE BESYLATE 5 MILLIGRAM(S): 10 TABLET ORAL at 09:47

## 2024-12-23 RX ADMIN — ACETAMINOPHEN 500MG 400 MILLIGRAM(S): 500 TABLET, COATED ORAL at 08:06

## 2024-12-23 NOTE — CONSULT NOTE ADULT - SUBJECTIVE AND OBJECTIVE BOX
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 She admitted that the hernia did not bother her until she accidentally bumped it. She admitted that hernia area became firm to touch and painful.     12/13/24: To be seen      PAST MEDICAL & SURGICAL HISTORY:    CVA (cerebrovascular accident)    H/O: hysterectomy        MEDICATIONS  (STANDING):    amitriptyline 5 milliGRAM(s) Oral at bedtime  amLODIPine   Tablet 5 milliGRAM(s) Oral daily  ampicillin/sulbactam  IVPB      ampicillin/sulbactam  IVPB 3 Gram(s) IV Intermittent every 12 hours  aspirin  chewable 81 milliGRAM(s) Oral daily  atorvastatin 40 milliGRAM(s) Oral at bedtime  sodium chloride 0.9%. 1000 milliLiter(s) (110 mL/Hr) IV Continuous <Continuous>      MEDICATIONS  (PRN):    acetaminophen   IVPB .. 1000 milliGRAM(s) IV Intermittent Once PRN Temp greater or equal to 38C (100.4F), Mild Pain (1 - 3)  ondansetron Injectable 4 milliGRAM(s) IV Push every 6 hours PRN Nausea      ALLERGIES:    No Known Allergies        FAMILY HISTORY:    No pertinent family history in first degree relatives      REVIEW OF SYSTEMS:    Constitutional, Eyes, ENT, Cardiovascular, Respiratory, Gastrointestinal, Genitourinary, Musculoskeletal, Integumentary, Neurological, Psychiatric, Endocrine, Heme/Lymph and Allergic/Immunologic review of systems are otherwise negative except as noted in HPI.       VITALS:    T(C): 36.7 (23 Dec 2024 04:00), Max: 36.8 (22 Dec 2024 16:00)  T(F): 98.1 (23 Dec 2024 04:00), Max: 98.2 (22 Dec 2024 16:00)  HR: 87 (23 Dec 2024 04:00) (87 - 91)  BP: 132/69 (23 Dec 2024 04:00) (131/71 - 136/82)  BP(mean): --  RR: 16 (23 Dec 2024 04:00) (16 - 16)  SpO2: 93% (23 Dec 2024 04:00) (92% - 94%)    Parameters below as of 23 Dec 2024 04:00  Patient On (Oxygen Delivery Method): room air      PHYSICAL:    Constitutional:   Eyes: no conjunctival infection, anicteric  ENT: pharynx is unremarkable  Neck: supple without JVD  Pulmonary: clear to auscultation bilaterally  Cardiac: RRR   Vascular: no calf tenderness, venous stasis changes  Abdomen: normoactive bowel sounds, soft and nontender, no hepatosplenomegaly or masses appreciated.   Lymphatic: no peripheral adenopathy appreciated.   Musculoskeletal: full range of motion and no deformities appreciated.   Skin: normal appearance, no rash  Neurology:       LABS:    CBC Full  -  ( 23 Dec 2024 07:53 )  WBC Count : 7.36 K/uL  RBC Count : 2.90 M/uL  Hemoglobin : 8.8 g/dL  Hematocrit : 27.3 %  Platelet Count - Automated : 259 K/uL  Mean Cell Volume : 94.1 fl  Mean Cell Hemoglobin : 30.3 pg  Mean Cell Hemoglobin Concentration : 32.2 g/dL  Auto Neutrophil # : x  Auto Lymphocyte # : x  Auto Monocyte # : x  Auto Eosinophil # : x  Auto Basophil # : x  Auto Neutrophil % : x  Auto Lymphocyte % : x  Auto Monocyte % : x  Auto Eosinophil % : x  Auto Basophil % : x    12-23    143  |  111[H]  |  35[H]  ----------------------------<  118[H]  3.4[L]   |  26  |  1.73[H]    Ca    10.0      23 Dec 2024 07:53  Phos  2.3     12-23  Mg     2.0     12-23    TPro  6.7  /  Alb  3.3  /  TBili  0.4  /  DBili  x   /  AST  23  /  ALT  33  /  AlkPhos  56  12-21    PT/INR - ( 21 Dec 2024 19:39 )   PT: 11.4 sec;   INR: 0.99 ratio         PTT - ( 21 Dec 2024 19:39 )  PTT:30.2 sec  Urinalysis Basic - ( 23 Dec 2024 07:53 )    Color: x / Appearance: x / SG: x / pH: x  Gluc: 118 mg/dL / Ketone: x  / Bili: x / Urobili: x   Blood: x / Protein: x / Nitrite: x   Leuk Esterase: x / RBC: x / WBC x   Sq Epi: x / Non Sq Epi: x / Bacteria: x        RADIOLOGY & ADDITIONAL STUDIES:      CT Abdomen and Pelvis w/ Oral Cont (12.21.24 @ 21:12)  FINDINGS:  LOWER CHEST: Within normal limits.    LIVER: Within normal limits.  BILE DUCTS: Normal caliber.  GALLBLADDER: Within normal limits.  SPLEEN: Within normal limits.  PANCREAS: Within normal limits.  ADRENALS: Within normal limits.  KIDNEYS/URETERS: Within normal limits.    BLADDER: Within normal limits.  REPRODUCTIVE ORGANS: Hysterectomy.    BOWEL:No bowel obstruction. Appendix is not visualized.  PERITONEUM/RETROPERITONEUM: Within normal limits.  VESSELS: Atherosclerotic changes.  LYMPH NODES: No lymphadenopathy.  ABDOMINAL WALL: 3.7 cm sized umbilical hernia containing soft tissue and   fat.No bowel loops are seen within the hernia sac. There is minimal   stranding.  BONES: Degenerative changes.    IMPRESSION:  3.7 cm sized umbilical hernia containing soft tissue and fat with minimal   stranding concerning for inflamed or strangulated hernia.    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 She admitted that the hernia did not bother her until she accidentally bumped it. She admitted that hernia area became firm to touch and painful.     12/23/24: Seen at bedside, no acute distress, has not seen outpatient PCP in a few years with no recent lab work      PAST MEDICAL & SURGICAL HISTORY:    CVA (cerebrovascular accident)    H/O: hysterectomy        MEDICATIONS  (STANDING):    amitriptyline 5 milliGRAM(s) Oral at bedtime  amLODIPine   Tablet 5 milliGRAM(s) Oral daily  ampicillin/sulbactam  IVPB      ampicillin/sulbactam  IVPB 3 Gram(s) IV Intermittent every 12 hours  aspirin  chewable 81 milliGRAM(s) Oral daily  atorvastatin 40 milliGRAM(s) Oral at bedtime  sodium chloride 0.9%. 1000 milliLiter(s) (110 mL/Hr) IV Continuous <Continuous>      MEDICATIONS  (PRN):    acetaminophen   IVPB .. 1000 milliGRAM(s) IV Intermittent Once PRN Temp greater or equal to 38C (100.4F), Mild Pain (1 - 3)  ondansetron Injectable 4 milliGRAM(s) IV Push every 6 hours PRN Nausea      ALLERGIES:    No Known Allergies        FAMILY HISTORY:    No pertinent family history in first degree relatives      REVIEW OF SYSTEMS:    Constitutional, Eyes, ENT, Cardiovascular, Respiratory, Gastrointestinal, Genitourinary, Musculoskeletal, Integumentary, Neurological, Psychiatric, Endocrine, Heme/Lymph and Allergic/Immunologic review of systems are otherwise negative except as noted in HPI.       VITALS:    T(C): 36.7 (23 Dec 2024 04:00), Max: 36.8 (22 Dec 2024 16:00)  T(F): 98.1 (23 Dec 2024 04:00), Max: 98.2 (22 Dec 2024 16:00)  HR: 87 (23 Dec 2024 04:00) (87 - 91)  BP: 132/69 (23 Dec 2024 04:00) (131/71 - 136/82)  BP(mean): --  RR: 16 (23 Dec 2024 04:00) (16 - 16)  SpO2: 93% (23 Dec 2024 04:00) (92% - 94%)    Parameters below as of 23 Dec 2024 04:00  Patient On (Oxygen Delivery Method): room air      PHYSICAL:    Constitutional: no acute distress   Eyes: no conjunctival infection, anicteric  ENT: pharynx is unremarkable  Neck: supple without JVD  Pulmonary: clear to auscultation bilaterally  Cardiac: RRR   Vascular: no calf tenderness, venous stasis changes  Abdomen: normoactive bowel sounds, soft and nontender, no hepatosplenomegaly or masses appreciated.   Lymphatic: no peripheral adenopathy appreciated.   Musculoskeletal: full range of motion and no deformities appreciated.   Skin: normal appearance, no rash  Neurology: awake, alert, oriented       LABS:    CBC Full  -  ( 23 Dec 2024 07:53 )  WBC Count : 7.36 K/uL  RBC Count : 2.90 M/uL  Hemoglobin : 8.8 g/dL  Hematocrit : 27.3 %  Platelet Count - Automated : 259 K/uL  Mean Cell Volume : 94.1 fl  Mean Cell Hemoglobin : 30.3 pg  Mean Cell Hemoglobin Concentration : 32.2 g/dL  Auto Neutrophil # : x  Auto Lymphocyte # : x  Auto Monocyte # : x  Auto Eosinophil # : x  Auto Basophil # : x  Auto Neutrophil % : x  Auto Lymphocyte % : x  Auto Monocyte % : x  Auto Eosinophil % : x  Auto Basophil % : x    12-23    143  |  111[H]  |  35[H]  ----------------------------<  118[H]  3.4[L]   |  26  |  1.73[H]    Ca    10.0      23 Dec 2024 07:53  Phos  2.3     12-23  Mg     2.0     12-23    TPro  6.7  /  Alb  3.3  /  TBili  0.4  /  DBili  x   /  AST  23  /  ALT  33  /  AlkPhos  56  12-21    PT/INR - ( 21 Dec 2024 19:39 )   PT: 11.4 sec;   INR: 0.99 ratio         PTT - ( 21 Dec 2024 19:39 )  PTT:30.2 sec  Urinalysis Basic - ( 23 Dec 2024 07:53 )    Color: x / Appearance: x / SG: x / pH: x  Gluc: 118 mg/dL / Ketone: x  / Bili: x / Urobili: x   Blood: x / Protein: x / Nitrite: x   Leuk Esterase: x / RBC: x / WBC x   Sq Epi: x / Non Sq Epi: x / Bacteria: x        RADIOLOGY & ADDITIONAL STUDIES:      CT Abdomen and Pelvis w/ Oral Cont (12.21.24 @ 21:12)  FINDINGS:  LOWER CHEST: Within normal limits.    LIVER: Within normal limits.  BILE DUCTS: Normal caliber.  GALLBLADDER: Within normal limits.  SPLEEN: Within normal limits.  PANCREAS: Within normal limits.  ADRENALS: Within normal limits.  KIDNEYS/URETERS: Within normal limits.    BLADDER: Within normal limits.  REPRODUCTIVE ORGANS: Hysterectomy.    BOWEL:No bowel obstruction. Appendix is not visualized.  PERITONEUM/RETROPERITONEUM: Within normal limits.  VESSELS: Atherosclerotic changes.  LYMPH NODES: No lymphadenopathy.  ABDOMINAL WALL: 3.7 cm sized umbilical hernia containing soft tissue and   fat.No bowel loops are seen within the hernia sac. There is minimal   stranding.  BONES: Degenerative changes.    IMPRESSION:  3.7 cm sized umbilical hernia containing soft tissue and fat with minimal   stranding concerning for inflamed or strangulated hernia.    No bowel obstruction

## 2024-12-23 NOTE — DISCHARGE NOTE PROVIDER - NSDCCPTREATMENT_GEN_ALL_CORE_FT
PRINCIPAL PROCEDURE  Procedure: Open repair of incisional hernia of anterior abdominal wall using synthetic mesh and posterior component separation technique  Findings and Treatment:

## 2024-12-23 NOTE — PROGRESS NOTE ADULT - SUBJECTIVE AND OBJECTIVE BOX
HOSPITALIST PROGRESS NOTE:  SUBJECTIVE:  PCP:  Chief Complaint: Patient is a 69y old  Female who presents with a chief complaint of Strangulated umbilical hernia. 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 Bailon with diagnosis of strangulated umbilical hernia. Patient's  gastroenterologist Dr. Ayad Pleitez sitting at bedside.  Hospitalist consult has been called for co-medical management of her co-morbid medical conditions while in the hospital.  Patient is going to OR tonight for urgent surgical intervention. Patient was doing well until yesterday morning, but latter on she noted more pain to her umbilical hernia region associated with more swelling with redness and erythema. She denies vomiting, abd distention, fever, chills, chest pain, sob, diarrhea or dysuria. She had her last ASA and Plavix yesterday, and her last diet was around 2.00 pm yesterday.  Sig labs: WBC 7.27, Hb 10.5, Platelets 243k, BUN 40, Cr 2.62 (no prior level for comparison, but was normal before per her ), Ca 11.9, Lactate 1.2, Lipase 49.  UA negative.  Has received NS 2 L bolus and Unasyn IV in ED.    12/22: Above reviewed; Patient has no complaints. passing gas no bowel movement; No CP or dyspnea       Allergies:  No Known Allergies    REVIEW OF SYSTEMS:  See HPI. All other review of systems is negative unless indicated above.     OBJECTIVE  Physical Exam:  Vital Signs:    Weight (kg): 73.4 (12-21 @ 18:50)  Vital Signs Last 24 Hrs  T(C): 36.8 (22 Dec 2024 08:00), Max: 37.6 (22 Dec 2024 04:53)  T(F): 98.2 (22 Dec 2024 08:00), Max: 99.7 (22 Dec 2024 04:53)  HR: 87 (22 Dec 2024 08:00) (87 - 103)  BP: 138/76 (22 Dec 2024 08:00) (116/75 - 163/90)  BP(mean): 103 (22 Dec 2024 02:58) (103 - 119)  RR: 16 (22 Dec 2024 08:00) (14 - 20)  SpO2: 97% (22 Dec 2024 08:00) (96% - 100%)    Parameters below as of 22 Dec 2024 08:00  Patient On (Oxygen Delivery Method): room air      I&O's Summary    21 Dec 2024 07:01  -  22 Dec 2024 07:00  --------------------------------------------------------  IN: 1250 mL / OUT: 0 mL / NET: 1250 mL    Constitutional: NAD, awake and alert  Neurological: AAO x 3, no focal deficits  HEENT: PERRLA, EOMI, MMM  Neck: Soft and supple, No LAD, No JVD  Respiratory: Breath sounds are clear bilaterally, No wheezing, rales or rhonchi  Cardiovascular: S1 and S2, regular rate and rhythm; no Murmurs, gallops or rubs  Gastrointestinal: Bowel Sounds present, soft, nontender, nondistended, no guarding, no rebound tenderness +dressing intact  Back: No CVA tenderness   Extremities: No peripheral edema  Vascular: 2+ peripheral pulses  Musculoskeletal: 5/5 strength b/l upper and lower extremities  Skin: No rashes  Breast: Deferred  Rectal: Deferred    MEDICATIONS  (STANDING):  amLODIPine   Tablet 5 milliGRAM(s) Oral daily  ampicillin/sulbactam  IVPB      ampicillin/sulbactam  IVPB 3 Gram(s) IV Intermittent every 12 hours  aspirin  chewable 81 milliGRAM(s) Oral daily  atorvastatin 40 milliGRAM(s) Oral at bedtime  sodium chloride 0.9%. 1000 milliLiter(s) (110 mL/Hr) IV Continuous <Continuous>      LABS: All Labs Reviewed:                        9.3    8.26  )-----------( 267      ( 22 Dec 2024 07:08 )             28.6     12-22    139  |  107  |  35[H]  ----------------------------<  155[H]  3.5   |  27  |  2.15[H]    Ca    10.8[H]      22 Dec 2024 07:08    TPro  6.7  /  Alb  3.3  /  TBili  0.4  /  DBili  x   /  AST  23  /  ALT  33  /  AlkPhos  56  12-21    PT/INR - ( 21 Dec 2024 19:39 )   PT: 11.4 sec;   INR: 0.99 ratio         PTT - ( 21 Dec 2024 19:39 )  PTT:30.2 sec        Urinalysis Basic - ( 22 Dec 2024 07:08 )    Color: x / Appearance: x / SG: x / pH: x  Gluc: 155 mg/dL / Ketone: x  / Bili: x / Urobili: x   Blood: x / Protein: x / Nitrite: x   Leuk Esterase: x / RBC: x / WBC x   Sq Epi: x / Non Sq Epi: x / Bacteria: x        Blood Culture:       RADIOLOGY/EKG:            
   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 Bailon with diagnosis of strangulated umbilical hernia. Patient's  gastroenterologist Dr. Ayad Pleitez  w renal evaluation of MALLORY. Patient denies history of renal disease, denies any nsaid use. Maintained on IVF.     12/23  Pt feels well no new events  eating drinking  Creat improving, surgical team questioning dc  home    PAST MEDICAL & SURGICAL HISTORY:  CVA (cerebrovascular accident)      H/O: hysterectomy          MEDICATIONS  (STANDING):  amLODIPine   Tablet 5 milliGRAM(s) Oral daily  ampicillin/sulbactam  IVPB      ampicillin/sulbactam  IVPB 3 Gram(s) IV Intermittent every 12 hours  atorvastatin 40 milliGRAM(s) Oral at bedtime  sodium chloride 0.9%. 1000 milliLiter(s) (110 mL/Hr) IV Continuous <Continuous>    MEDICATIONS  (PRN):  acetaminophen   IVPB .. 1000 milliGRAM(s) IV Intermittent Once PRN Temp greater or equal to 38C (100.4F), Mild Pain (1 - 3)  ondansetron Injectable 4 milliGRAM(s) IV Push every 6 hours PRN Nausea      Allergies    No Known Allergies    Intolerances        SOCIAL HISTORY:    FAMILY HISTORY:  No pertinent family history in first degree relatives        REVIEW OF SYSTEMS:    CONSTITUTIONAL: stable weakness, no fevers or chills  EYES/ENT: No visual changes;  No vertigo or throat pain   NECK: No pain or stiffness  RESPIRATORY: No cough, wheezing, hemoptysis; No shortness of breath  CARDIOVASCULAR: No chest pain or palpitations  GASTROINTESTINAL: No abdominal or epigastric pain. No nausea, vomiting, or hematemesis; No diarrhea or constipation. No melena or hematochezia.  GENITOURINARY: No dysuria, frequency or hematuria  NEUROLOGICAL: No numbness or weakness  SKIN: No itching, burning, rashes, or lesions   All other review of systems is negative unless indicated above.      I&O's Detail        Weight (kg): 73.4 (12-21 @ 18:50)    PHYSICAL EXAM:    Constitutional: NAD, frail  HEENT: MM  dist  Cardiovascular: S1 and S2   Extremities: No peripheral edema  Neurological: A/O x 3   : No Julian  Skin: No rashes  Access: Not applicable        LABS:                          8.8    7.36  )-----------( 259      ( 23 Dec 2024 07:53 )             27.3                           9.3    8.26  )-----------( 267      ( 22 Dec 2024 07:08 )             28.6     12-23    143  |  111[H]  |  35[H]  ----------------------------<  118[H]  3.4[L]   |  26  |  1.73[H]    Ca    10.0      23 Dec 2024 07:53  Phos  2.3     12-23  Mg     2.0     12-23    TPro  6.7  /  Alb  3.3  /  TBili  0.4  /  DBili  x   /  AST  23  /  ALT  33  /  AlkPhos  56  12-21      22 Dec 2024 07:08    139    |  107    |  35     ----------------------------<  155    3.5     |  27     |  2.15   21 Dec 2024 22:43    141    |  109    |  38     ----------------------------<  104    3.5     |  26     |  2.25   21 Dec 2024 19:39    136    |  101    |  40     ----------------------------<  128    3.9     |  31     |  2.61     Ca    10.8       22 Dec 2024 07:08  Ca    10.6       21 Dec 2024 22:43  Ca    11.9       21 Dec 2024 19:39    TPro  6.7    /  Alb  3.3    /  TBili  0.4    /  DBili  x      /  AST  23     /  ALT  33     /  AlkPhos  56     21 Dec 2024 22:43  TPro  7.9    /  Alb  3.8    /  TBili  0.4    /  DBili  x      /  AST  24     /  ALT  35     /  AlkPhos  68     21 Dec 2024 19:39          Urine Studies:  Urinalysis Basic - ( 22 Dec 2024 07:08 )    Color: x / Appearance: x / SG: x / pH: x  Gluc: 155 mg/dL / Ketone: x  / Bili: x / Urobili: x   Blood: x / Protein: x / Nitrite: x   Leuk Esterase: x / RBC: x / WBC x   Sq Epi: x / Non Sq Epi: x / Bacteria: x            RADIOLOGY & ADDITIONAL STUDIES:                  
CC:Patient is a 69y old  Female who presents with a chief complaint of abd pain (22 Dec 2024 14:27)      Subjective:  Pt seen and examined at bedside with chaperone. Pt is AAOx3, pt in no acute distress. Pt denied c/o fever, chills, chest pain, SOB, abd pain, N/V/D, extremity pain or dysfunction, hemoptysis, hematemesis, hematuria, hematochexia, headache, diplopia, vertigo, dizzyness. Pt tolerating diet, (+) void, (+) ambulation, (+) bowel function    ROS:  otherwise as abovementioned ROS    Vital Signs Last 24 Hrs  T(C): 36.7 (23 Dec 2024 04:00), Max: 36.8 (22 Dec 2024 16:00)  T(F): 98.1 (23 Dec 2024 04:00), Max: 98.2 (22 Dec 2024 16:00)  HR: 87 (23 Dec 2024 04:00) (87 - 91)  BP: 132/69 (23 Dec 2024 04:00) (131/71 - 136/82)  BP(mean): --  RR: 16 (23 Dec 2024 04:00) (16 - 16)  SpO2: 93% (23 Dec 2024 04:00) (92% - 94%)    Parameters below as of 23 Dec 2024 04:00  Patient On (Oxygen Delivery Method): room air        Labs:                                8.8    7.36  )-----------( 259      ( 23 Dec 2024 07:53 )             27.3     CBC Full  -  ( 23 Dec 2024 07:53 )  WBC Count : 7.36 K/uL  RBC Count : 2.90 M/uL  Hemoglobin : 8.8 g/dL  Hematocrit : 27.3 %  Platelet Count - Automated : 259 K/uL  Mean Cell Volume : 94.1 fl  Mean Cell Hemoglobin : 30.3 pg  Mean Cell Hemoglobin Concentration : 32.2 g/dL  Auto Neutrophil # : x  Auto Lymphocyte # : x  Auto Monocyte # : x  Auto Eosinophil # : x  Auto Basophil # : x  Auto Neutrophil % : x  Auto Lymphocyte % : x  Auto Monocyte % : x  Auto Eosinophil % : x  Auto Basophil % : x    12-23    143  |  111[H]  |  35[H]  ----------------------------<  118[H]  3.4[L]   |  26  |  1.73[H]    Ca    10.0      23 Dec 2024 07:53  Phos  2.3     12-23  Mg     2.0     12-23    TPro  6.7  /  Alb  3.3  /  TBili  0.4  /  DBili  x   /  AST  23  /  ALT  33  /  AlkPhos  56  12-21    LIVER FUNCTIONS - ( 21 Dec 2024 22:43 )  Alb: 3.3 g/dL / Pro: 6.7 gm/dL / ALK PHOS: 56 U/L / ALT: 33 U/L / AST: 23 U/L / GGT: x           PT/INR - ( 21 Dec 2024 19:39 )   PT: 11.4 sec;   INR: 0.99 ratio         PTT - ( 21 Dec 2024 19:39 )  PTT:30.2 sec      Meds:  acetaminophen   IVPB .. 1000 milliGRAM(s) IV Intermittent Once PRN  amitriptyline 5 milliGRAM(s) Oral at bedtime  amLODIPine   Tablet 5 milliGRAM(s) Oral daily  ampicillin/sulbactam  IVPB      ampicillin/sulbactam  IVPB 3 Gram(s) IV Intermittent every 12 hours  aspirin  chewable 81 milliGRAM(s) Oral daily  atorvastatin 40 milliGRAM(s) Oral at bedtime  ondansetron Injectable 4 milliGRAM(s) IV Push every 6 hours PRN  sodium chloride 0.9%. 1000 milliLiter(s) IV Continuous <Continuous>      Radiology:      Physical exam:  Pt is aaox3  Pt in no acute distress  Psych: normal affect  Resp: CTAB  CVS: S1S2(+)  ABD: bowel sounds (+), soft, non distended, no rebound, no guarding, no rigidity, no skin changes to exam. Incision site is clean, dry, intact, no cellulitis, no d/c, no purulence, no fluctuance. (+) appropriate incisional tenderness to exam  EXT: no calf tenderness or edema to exam b/l, on VTE prophylaxis  Skin: no adverse skin changes to exam      
HOSPITALIST PROGRESS NOTE:  SUBJECTIVE:  PCP:  Chief Complaint: Patient is a 69y old  Female who presents with a chief complaint of Strangulated umbilical hernia. 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 Bailon with diagnosis of strangulated umbilical hernia. Patient's  gastroenterologist Dr. Ayad Pleitez sitting at bedside.  Hospitalist consult has been called for co-medical management of her co-morbid medical conditions while in the hospital.  Patient is going to OR tonight for urgent surgical intervention. Patient was doing well until yesterday morning, but latter on she noted more pain to her umbilical hernia region associated with more swelling with redness and erythema. She denies vomiting, abd distention, fever, chills, chest pain, sob, diarrhea or dysuria. She had her last ASA and Plavix yesterday, and her last diet was around 2.00 pm yesterday.  Sig labs: WBC 7.27, Hb 10.5, Platelets 243k, BUN 40, Cr 2.62 (no prior level for comparison, but was normal before per her ), Ca 11.9, Lactate 1.2, Lipase 49.  UA negative.  Has received NS 2 L bolus and Unasyn IV in ED.    12/22: Above reviewed; Patient has no complaints. passing gas no bowel movement; No CP or dyspnea   12/23:  patient eager to go home; denies any CP, dyspnea or abd pain; Drop in H/H ;       Allergies:  No Known Allergies    REVIEW OF SYSTEMS:  See HPI. All other review of systems is negative unless indicated above.     OBJECTIVE  Physical Exam:  Vital Signs Last 24 Hrs  T(C): 36.7 (23 Dec 2024 04:00), Max: 36.8 (22 Dec 2024 16:00)  T(F): 98.1 (23 Dec 2024 04:00), Max: 98.2 (22 Dec 2024 16:00)  HR: 87 (23 Dec 2024 04:00) (87 - 91)  BP: 132/69 (23 Dec 2024 04:00) (131/71 - 136/82)  BP(mean): --  RR: 16 (23 Dec 2024 04:00) (16 - 16)  SpO2: 93% (23 Dec 2024 04:00) (92% - 94%)    Parameters below as of 23 Dec 2024 04:00  Patient On (Oxygen Delivery Method): room air      Constitutional: NAD, awake and alert  Neurological: AAO x 3, no focal deficits  HEENT: PERRLA, EOMI, MMM  Neck: Soft and supple, No LAD, No JVD  Respiratory: Breath sounds are clear bilaterally, No wheezing, rales or rhonchi  Cardiovascular: S1 and S2, regular rate and rhythm; no Murmurs, gallops or rubs  Gastrointestinal: Bowel Sounds present, soft, nontender, nondistended, no guarding, no rebound tenderness +dressing intact  Back: No CVA tenderness   Extremities: No peripheral edema  Vascular: 2+ peripheral pulses  Musculoskeletal: 5/5 strength b/l upper and lower extremities  Skin: No rashes  Breast: Deferred  Rectal: Deferred    MEDICATIONS  (STANDING):  amLODIPine   Tablet 5 milliGRAM(s) Oral daily  ampicillin/sulbactam  IVPB      ampicillin/sulbactam  IVPB 3 Gram(s) IV Intermittent every 12 hours  aspirin  chewable 81 milliGRAM(s) Oral daily  atorvastatin 40 milliGRAM(s) Oral at bedtime  sodium chloride 0.9%. 1000 milliLiter(s) (110 mL/Hr) IV Continuous <Continuous>    Lab Results:  CBC  CBC Full  -  ( 23 Dec 2024 07:53 )  WBC Count : 7.36 K/uL  RBC Count : 2.90 M/uL  Hemoglobin : 8.8 g/dL  Hematocrit : 27.3 %  Platelet Count - Automated : 259 K/uL  Mean Cell Volume : 94.1 fl  Mean Cell Hemoglobin : 30.3 pg  Mean Cell Hemoglobin Concentration : 32.2 g/dL  Auto Neutrophil # : x  Auto Lymphocyte # : x  Auto Monocyte # : x  Auto Eosinophil # : x  Auto Basophil # : x  Auto Neutrophil % : x  Auto Lymphocyte % : x  Auto Monocyte % : x  Auto Eosinophil % : x  Auto Basophil % : x    .		Differential:	[] Automated		[] Manual  Chemistry                        8.8    7.36  )-----------( 259      ( 23 Dec 2024 07:53 )             27.3     12-23    143  |  111[H]  |  35[H]  ----------------------------<  118[H]  3.4[L]   |  26  |  1.73[H]    Ca    10.0      23 Dec 2024 07:53  Phos  2.3     12-23  Mg     2.0     12-23    TPro  6.7  /  Alb  3.3  /  TBili  0.4  /  DBili  x   /  AST  23  /  ALT  33  /  AlkPhos  56  12-21    LIVER FUNCTIONS - ( 21 Dec 2024 22:43 )  Alb: 3.3 g/dL / Pro: 6.7 gm/dL / ALK PHOS: 56 U/L / ALT: 33 U/L / AST: 23 U/L / GGT: x           PT/INR - ( 21 Dec 2024 19:39 )   PT: 11.4 sec;   INR: 0.99 ratio         PTT - ( 21 Dec 2024 19:39 )  PTT:30.2 sec  Urinalysis Basic - ( 23 Dec 2024 07:53 )    Color: x / Appearance: x / SG: x / pH: x  Gluc: 118 mg/dL / Ketone: x  / Bili: x / Urobili: x   Blood: x / Protein: x / Nitrite: x   Leuk Esterase: x / RBC: x / WBC x   Sq Epi: x / Non Sq Epi: x / Bacteria: x      RADIOLOGY/EKG:    < from: CT Abdomen and Pelvis w/ Oral Cont (12.21.24 @ 21:12) >    IMPRESSION:  3.7 cm sized umbilical hernia containing soft tissue and fat with minimal   stranding concerning for inflamed or strangulated hernia.    No bowel obstruction      < end of copied text >  < from: Xray Chest 1 View- PORTABLE-Urgent (Xray Chest 1 View- PORTABLE-Urgent .) (12.21.24 @ 20:54) >    IMPRESSION: No acute parenchymal disease    < end of copied text >

## 2024-12-23 NOTE — DISCHARGE NOTE PROVIDER - CARE PROVIDERS DIRECT ADDRESSES
[email or web address]

## 2024-12-23 NOTE — DISCHARGE NOTE PROVIDER - PROVIDER TOKENS
PROVIDER:[TOKEN:[[id number]],FOLLOWUP:[1 week]],PROVIDER:[TOKEN:[[id number]],FOLLOWUP:[2 weeks]],PROVIDER:[TOKEN:[[id number]],FOLLOWUP:[1 week]]

## 2024-12-23 NOTE — DISCHARGE NOTE NURSING/CASE MANAGEMENT/SOCIAL WORK - NSCORESITESY/N_GEN_A_CORE_RD
Yes
Implemented All Universal Safety Interventions:  [place] to call system. Call bell, personal items and telephone within reach. Instruct patient to call for assistance. Room bathroom lighting operational. Non-slip footwear when patient is off stretcher. Physically safe environment: no spills, clutter or unnecessary equipment. Stretcher in lowest position, wheels locked, appropriate side rails in place.

## 2024-12-23 NOTE — CONSULT NOTE ADULT - ASSESSMENT
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 Bailon with diagnosis of strangulated umbilical hernia. Patient's  gastroenterologist Dr. Ayad Pleitez  w renal evaluation of MALLORY. Patient denies history of renal disease, denies any nsaid use. Maintained on IVF now POD 0 hernia.     MALLORY  -Obtain outpatient labs/baseline  -No nsaids, avoid contrast  -IVF with limited intake  -Trend labs  -No renal obs on imaging    Hypercalcemia  -Check PTH  -CHAPIN/LC for completeness  -IVF    dc with staff  thanks, will follow
68 y/o F with limited PMHx currently admitted s/p developing acute abd / hernia pain, found to have strangulated / incarcerated hernia on CT imaging / physical exam, s/p surgical repair, now with worsening anemia      # Normocytic Anemia, Hypercalcemia, Renal Dysfunction    - Hgb currently <9.0 g/dL  - MCV normal  - Additional labs normal / inconsistent with hemolysis, supplemental deficiency, however, her creatinine is elevated with hypercalcemia as well, patient meeting 3/4 CRAB criteria for possible myeloma  - Available imaging with no obvious evidence of bony lesions  - Patient did have strangulated / incarcerated hernia that required surgical repair 12/22/24 which could be contributory to clinical presentation on admission   - Only available records in [facility] from 2015 were normal; consider retrieving PCP records from more recent lab draws to compare   - Nephro following  - In this setting, suspect acute reactive response to strangulated / incarcerated hernia as renal function and calcium did improve, but would recommend at least checking complete myeloma labs to have documented  - Continue to trend serial CBCs while admitted  - Would only transfuse PRBCs as clinically necessary   - Recommend close outpatient Heme Onc follow up to review labs         Bo Jackson PA-C  Hematology Oncology  [facility]  [phone]
70 yo female with chronically incarcerated umbilical hernia and contusion or echymosis of overlying skin. Hernia has wide mouth and unlikely to have strangulation. Skin changes appear more consistent with hematoma, echymosis. possibly due to inadvertant trauma while on plavix. There is NO evidence of bowel within the hernia sac. There is no evidence of obstruction. Pt appears very comfortable. Her elevated Cr is reportedly new. It may be related to her use of ACE inhibitor. She is not acidotic and her lactate is normal.     Await official reading on CT scan. Pt should hold plavix. Renal evaluation with Dr Sweet regarding new elevation of Cr. Pending official CT read, favor scheduling surgery at end of week once she has been off her plavix for 5 days and has been evaluated by renal. 
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 Bailon with diagnosis of strangulated umbilical hernia. Patient's  gastroenterologist Dr. Ayad Pleitez sitting at bedside.  Hospitalist consult has been called for co-medical management of her co-morbid medical conditions while in the hospital.  Patient is going to OR tonight for urgent surgical intervention. Patient was doing well until yesterday morning, but latter on she noted more pain to her umbilical hernia region associated with more swelling with redness and erythema. She denies vomiting, abd distention, fever, chills, chest pain, sob, diarrhea or dysuria. She had her last ASA and Plavix yesterday, and her last diet was around 2.00 pm yesterday.    # Strangulated umbilical hernia containing soft tissue and fat.  -CT abd/pelvis as mentioned above.  -Lactate normal.  -On Unasyn IV antibiotics.  -Hold ASA and Plavix for now.  -Plan for urgent surgical intervention tonight per primary team.  -Further management per surgical team.    # MALLORY likely due to dehydration.  -Improving after hydration.  -Cr 2.61->2.25.  -Continue IV fluids NS at 125 ml/hr.  -BMP in am.  -Hold her Telmisartan and Allopurinol for now.  -Nephrology consult placed.    # Acute hypercalcemia, again likely due to dehydration.  -She is not on any calcium or Vit D supplements.  -Ca level trending down after hydration, but still elevated.   -Ca 11.9->10.6.  -Will check PTH, PTHrP and Vit D levels.    # Normochromic normocytic anemia.  -Hb 10.5, MCV 94 and RDW 14 (no prior levels for comparison).  -Denies hemoptysis, hematemesis, hematochezia or hematuria.  -Had recent colonoscopy with few polypectomy without bleeding.  -Will get Iron panel, Ferritin, B12 and Folate levels.    # HTN and HPL.  -Current BP is 155/82.  -Continue her Amlodipine 5 mg and Atorvastatin 40 mg daily.  -Hold her Telmisartan for now due to MALLORY.    # H/o CVA without residual deficits.  -Resume her Plavix and ASA when ok with surgery.    # H/o Gout.  -Hold her Allopurinol due to MALLORY.    DVT prophylaxis: SCDs.    Hospitalist service will follow along while in the hospital.    Thanks for allowing the hospitalist service to participate in the care of Ms Lucie Pleitez.

## 2024-12-23 NOTE — DISCHARGE NOTE NURSING/CASE MANAGEMENT/SOCIAL WORK - FINANCIAL ASSISTANCE
[facility] provides services at a reduced cost to those who are determined to be eligible through [facility]’s financial assistance program. Information regarding [facility]’s financial assistance program can be found by going to [email or web address] or by calling [phone].

## 2024-12-23 NOTE — DISCHARGE NOTE PROVIDER - NSDCFUADDINST_GEN_ALL_CORE_FT
May shower once dressing is down; do NOT scrub at operative site ; pat dry   No straining/lifting/pushing pulling or any strenuous activity until stated per MD  Remove dressing 12/24, May shower once dressing is down; do NOT scrub at operative site ; pat dry   No straining/lifting/pushing pulling or any strenuous activity until stated per MD   Take tylenol every 6 hours 975mg ; do NOT exceed 4000 mg daily; avoid NSAIDs until told so by nephrologist

## 2024-12-23 NOTE — DISCHARGE NOTE PROVIDER - HOSPITAL COURSE
70 y/o F with PMHx CVA is POD 1 incarcerated umbilical hernia repair with mesh c/b improving MALLORY, nephrology consulted, and asymptomatic anemia hematology consulted. PAtient is tolerating diet, ambulating, having BM and voiding without difficulty. PATient will be discharge home with Keflex 500mg QID x 5 days. Patient will restart her Plavix POD 2 and remove her dressing POD2. 70 y/o F with PMHx CVA is POD 1 incarcerated umbilical hernia repair with mesh c/b improving MALLORY, nephrology consulted, and asymptomatic anemia hematology consulted. PAtient is tolerating diet, ambulating, having BM and voiding without difficulty. Patient will be discharge home with Keflex 500mg QID x 5 days. Patient will restart her Plavix POD 2 and remove her dressing POD2. Will follow-up with nephrology in 2 weeks ( continue to hold ARB and allopurinol until stated per MD) and follow-up hematology labs work outpatient in 1-2 weeks.

## 2024-12-23 NOTE — CONSULT NOTE ADULT - CONSULT REASON
umbilical hernia
Anemia
incarcerated umbilical hernia
MALLORY
Medical management of medical co-morbidities.

## 2024-12-23 NOTE — DISCHARGE NOTE PROVIDER - NSDCMRMEDTOKEN_GEN_ALL_CORE_FT
allopurinol 300 mg oral tablet: 1 tab(s) orally once a day  clopidogrel 75 mg oral tablet: 1 tab(s) orally once a day  telmisartan 40 mg oral tablet: 1 tab(s) orally once a day   cephalexin 500 mg oral tablet: 1 tab(s) orally every 6 hours  clopidogrel 75 mg oral tablet: 1 tab(s) orally once a day   amitriptyline: 10 milligram(s) orally once a day  amLODIPine 5 mg oral tablet: 1 tab(s) orally once a day  aspirin 81 mg oral tablet, chewable: 1 tab(s) orally once a day  atorvastatin 40 mg oral tablet: 1 tab(s) orally once a day (at bedtime)  cephalexin 500 mg oral tablet: 1 tab(s) orally every 6 hours  clopidogrel 75 mg oral tablet: 1 tab(s) orally once a day  Pepcid 40 mg oral tablet: 40 milligram(s) orally once a day as needed for dyspepsia

## 2024-12-23 NOTE — PROGRESS NOTE ADULT - ASSESSMENT
68 y/o F PMHX CVA POD 1 incarcerated umbilical hernia repair with mesh c/b MALLORY. PAtient doing well overall.    #anemia   # MALLORY  # incarcerated umbilical hernia     #asymptomatic anemia   - hematology consulted  - restart plavix 12/24    # MALLORY  - nephrology following   - MALLORY improving 1.73 ( 2.15)  - continue IVF   - encourage PO fluids intake  - avoid nephrotoxic drugs     # incarcerated umbilical hernia   -pain regimen prn  - continue to monitor vitals   - dressing removed tomorrow AM  - Keflex 500mg QID x 5 days @ dc  - restart plavix POD 2 ( 12/24)  - regular diet  - bowel regimen   - OOBAT  - no heavy lifting/straining/strenuous activity     
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 Bailon with diagnosis of strangulated umbilical hernia. Patient's  gastroenterologist Dr. Ayad Pleitez  w renal evaluation of MALLORY. Patient denies history of renal disease, denies any nsaid use. Maintained on IVF now POD 0 hernia.     MALLORY  -Obtain outpatient labs/baseline  -No nsaids, avoid contrast  -IVF with limited intake  -Trend labs  -No renal obs on imaging    Hypercalcemia  -Check PTH  -CHAPIN/LC for completeness  -IVF    dc with staff  thanks, will follow    12/23  Pt s/p MALLORY ATN due to strangulated umbilical hernia  Creat improving down to 1.73  Correction most likely hindered by ARB still  D/W surgical team may dc patient home and labs in am Called in to [facility] labs  for Renal panel and CBC   D/w Dr YOUSUF Pleitez  Will double up on amlodipine to 10 mg, hold arb abd allopurinol until labs improve  Will check BP at home 
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 Bailon with diagnosis of strangulated umbilical hernia. Patient's  gastroenterologist Dr. Ayad Pleitez sitting at bedside.  Hospitalist consult has been called for co-medical management of her co-morbid medical conditions while in the hospital.  Patient is going to OR tonight for urgent surgical intervention. Patient was doing well until yesterday morning, but latter on she noted more pain to her umbilical hernia region associated with more swelling with redness and erythema. She denies vomiting, abd distention, fever, chills, chest pain, sob, diarrhea or dysuria. She had her last ASA and Plavix yesterday, and her last diet was around 2.00 pm yesterday.    # Incarcerated incisional hernia S/P Open repair of incisional hernia  #Post op Anemia of Blood Loss   -management as per surgery  -CT abd/pelvis as mentioned above.  -Lactate normal.  -On Unasyn IV antibiotics.  -continue ASA and Plavix on hold - discussed with surgical resident to resume when cleared by surgery as patient has hx of stroke x 2  -monitor CBC as outpatient     # MALLORY likely due to dehydration.-Improving after hydration.  -Continue IV fluids   -BMP in am.  -Hold her Telmisartan and Allopurinol for now   -Nephrology consult appreciated     # Acute hypercalcemia, again likely due to dehydration.  -She is not on any calcium or Vit D supplements.  -Ca level trending down after hydration, but still elevated.   -Will check PTH, PTHrP and Vit D levels.  -FU renal as outpatient to monitor cr     # Normochromic normocytic anemia.  -Hb 10.5, MCV 94 and RDW 14 (no prior levels for comparison).  -Denies hemoptysis, hematemesis, hematochezia or hematuria.  -Had recent colonoscopy with few polypectomy without bleeding.  -Will get Iron panel, Ferritin, B12 and Folate levels.    # HTN and HPL.  -Continue her Amlodipine 5 mg and Atorvastatin 40 mg daily.  -Hold her Telmisartan for now due to MALLORY.    # H/o CVA without residual deficits.  -Resume her Plavix and ASA when ok with surgery - discussed with surgical residents     # H/o Gout.  -Hold her Allopurinol due to MALLORY.    DVT prophylaxis: SCDs.    Hospitalist service will follow. patient medically cleared for D/C.  Thanks for allowing the hospitalist service to participate in the care of Ms Lucie Pleitez.  
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 Bailon with diagnosis of strangulated umbilical hernia. Patient's  gastroenterologist Dr. Ayad Pleitez sitting at bedside.  Hospitalist consult has been called for co-medical management of her co-morbid medical conditions while in the hospital.  Patient is going to OR tonight for urgent surgical intervention. Patient was doing well until yesterday morning, but latter on she noted more pain to her umbilical hernia region associated with more swelling with redness and erythema. She denies vomiting, abd distention, fever, chills, chest pain, sob, diarrhea or dysuria. She had her last ASA and Plavix yesterday, and her last diet was around 2.00 pm yesterday.    # Incarcerated incisional hernia S/P Open repair of incisional hernia  -management as per surgery  -CT abd/pelvis as mentioned above.  -Lactate normal.  -On Unasyn IV antibiotics.  -Hold ASA and Plavix - discussed with surgical resident to resume when cleared by surgery as patient has hx of stroke x 2    # MALLORY likely due to dehydration.  -Improving after hydration.  -Continue IV fluids   -BMP in am.  -Hold her Telmisartan and Allopurinol for now.  -Nephrology consult appreciated     # Acute hypercalcemia, again likely due to dehydration.  -She is not on any calcium or Vit D supplements.  -Ca level trending down after hydration, but still elevated.   -Will check PTH, PTHrP and Vit D levels.    # Normochromic normocytic anemia.  -Hb 10.5, MCV 94 and RDW 14 (no prior levels for comparison).  -Denies hemoptysis, hematemesis, hematochezia or hematuria.  -Had recent colonoscopy with few polypectomy without bleeding.  -Will get Iron panel, Ferritin, B12 and Folate levels.    # HTN and HPL.  -Continue her Amlodipine 5 mg and Atorvastatin 40 mg daily.  -Hold her Telmisartan for now due to MALLORY.    # H/o CVA without residual deficits.  -Resume her Plavix and ASA when ok with surgery - discussed with surgical residents     # H/o Gout.  -Hold her Allopurinol due to MALLORY.    DVT prophylaxis: SCDs.    Hospitalist service will follow. Thanks for allowing the hospitalist service to participate in the care of Ms Lucie Pleitez.

## 2024-12-23 NOTE — DISCHARGE NOTE NURSING/CASE MANAGEMENT/SOCIAL WORK - FLU SEASON?
FYI- Requested records from [facility] are ready to be reviewed by Dr. Moctezuma Friday at [facility].  
Yes...

## 2024-12-23 NOTE — DISCHARGE NOTE PROVIDER - NSDCCPCAREPLAN_GEN_ALL_CORE_FT
PRINCIPAL DISCHARGE DIAGNOSIS  Diagnosis: Umbilical hernia  Assessment and Plan of Treatment:       SECONDARY DISCHARGE DIAGNOSES  Diagnosis: MALLORY (acute kidney injury)  Assessment and Plan of Treatment:     Diagnosis: Anemia  Assessment and Plan of Treatment:

## 2024-12-23 NOTE — DISCHARGE NOTE NURSING/CASE MANAGEMENT/SOCIAL WORK - PATIENT PORTAL LINK FT
You can access the FollowMyHealth Patient Portal offered by [facility] by registering at the following website: [email or web address]. By joining [employer]’s FollowMyHealth portal, you will also be able to view your health information using other applications (apps) compatible with our system.

## 2024-12-23 NOTE — DISCHARGE NOTE PROVIDER - CARE PROVIDER_API CALL
Judith Madison  Hematology  [address], [place] [address]  Phone: [phone]  Fax: [phone]  Follow Up Time: 1 week    Ezekiel Sweet  Nephrology  [address], [address] [place] [address]  Phone: [phone]  Fax: [phone]  Follow Up Time: 2 weeks    Trevin Moreland  Surgery  [address] [place] [address]  Phone: [phone]  Fax: [phone]  Follow Up Time: 1 week

## 2024-12-24 PROBLEM — I63.9 CEREBRAL INFARCTION, UNSPECIFIED: Chronic | Status: ACTIVE | Noted: 2024-12-22

## 2024-12-24 LAB
B2 MICROGLOB SERPL-MCNC: 4.9 MG/L — HIGH (ref 0.8–2.2)
IGA FLD-MCNC: 129 MG/DL — SIGNIFICANT CHANGE UP (ref 84–499)
IGG FLD-MCNC: 923 MG/DL — SIGNIFICANT CHANGE UP (ref 610–1660)
IGM SERPL-MCNC: 94 MG/DL — SIGNIFICANT CHANGE UP (ref 35–242)
KAPPA LC SER QL IFE: 3.45 MG/DL — HIGH (ref 0.33–1.94)
KAPPA/LAMBDA FREE LIGHT CHAIN RATIO, SERUM: 1.46 RATIO — SIGNIFICANT CHANGE UP (ref 0.26–1.65)
LAMBDA LC SER QL IFE: 2.37 MG/DL — SIGNIFICANT CHANGE UP (ref 0.57–2.63)
PROT SERPL-MCNC: 6.1 G/DL — SIGNIFICANT CHANGE UP (ref 6–8.3)

## 2024-12-24 RX ORDER — CLOPIDOGREL 75 MG/1
1 TABLET, FILM COATED ORAL
Qty: 0 | Refills: 0 | DISCHARGE
Start: 2024-12-24

## 2024-12-25 LAB
ALBUMIN SERPL ELPH-MCNC: 4.1 G/DL
ANION GAP SERPL CALC-SCNC: 14 MMOL/L
BUN SERPL-MCNC: 28 MG/DL
CALCIUM SERPL-MCNC: 10 MG/DL
CHLORIDE SERPL-SCNC: 101 MMOL/L
CO2 SERPL-SCNC: 25 MMOL/L
CREAT SERPL-MCNC: 1.33 MG/DL
EGFR: 43 ML/MIN/1.73M2
GLUCOSE SERPL-MCNC: 133 MG/DL
HCT VFR BLD CALC: 29.8 %
HGB BLD-MCNC: 10 G/DL
MCHC RBC-ENTMCNC: 31.7 PG
MCHC RBC-ENTMCNC: 33.6 G/DL
MCV RBC AUTO: 94.6 FL
PHOSPHATE SERPL-MCNC: 1.8 MG/DL
PLATELET # BLD AUTO: 293 K/UL
POTASSIUM SERPL-SCNC: 3.4 MMOL/L
RBC # BLD: 3.15 M/UL
RBC # FLD: 14.5 %
SODIUM SERPL-SCNC: 140 MMOL/L
WBC # FLD AUTO: 9.09 K/UL

## 2024-12-26 LAB — M PROTEIN 24H UR ELPH-MRATE: SIGNIFICANT CHANGE UP

## 2024-12-27 LAB — SURGICAL PATHOLOGY STUDY: SIGNIFICANT CHANGE UP

## 2024-12-28 LAB
% ALBUMIN: 53.5 % — SIGNIFICANT CHANGE UP
% ALPHA 1: 5.8 % — SIGNIFICANT CHANGE UP
% ALPHA 2: 14.4 % — SIGNIFICANT CHANGE UP
% BETA: 11.9 % — SIGNIFICANT CHANGE UP
% GAMMA: 14.4 % — SIGNIFICANT CHANGE UP
ALBUMIN SERPL ELPH-MCNC: 3.3 G/DL — LOW (ref 3.6–5.5)
ALBUMIN/GLOB SERPL ELPH: 1.2 RATIO — SIGNIFICANT CHANGE UP
ALPHA1 GLOB SERPL ELPH-MCNC: 0.4 G/DL — SIGNIFICANT CHANGE UP (ref 0.1–0.4)
ALPHA2 GLOB SERPL ELPH-MCNC: 0.9 G/DL — SIGNIFICANT CHANGE UP (ref 0.5–1)
B-GLOBULIN SERPL ELPH-MCNC: 0.7 G/DL — SIGNIFICANT CHANGE UP (ref 0.5–1)
GAMMA GLOBULIN: 0.9 G/DL — SIGNIFICANT CHANGE UP (ref 0.6–1.6)
INTERPRETATION SERPL IFE-IMP: SIGNIFICANT CHANGE UP
PROT PATTERN SERPL ELPH-IMP: SIGNIFICANT CHANGE UP
PROT SERPL-MCNC: 6.1 G/DL — SIGNIFICANT CHANGE UP (ref 6–8.3)

## 2024-12-29 LAB — PTH RELATED PROT SERPL-MCNC: <2 PMOL/L — SIGNIFICANT CHANGE UP

## 2024-12-30 ENCOUNTER — APPOINTMENT (OUTPATIENT)
Dept: SURGERY | Facility: CLINIC | Age: 69
End: 2024-12-30
Payer: MEDICARE

## 2024-12-30 VITALS
DIASTOLIC BLOOD PRESSURE: 80 MMHG | TEMPERATURE: 98.3 F | HEART RATE: 113 BPM | SYSTOLIC BLOOD PRESSURE: 124 MMHG | WEIGHT: 159 LBS | OXYGEN SATURATION: 99 % | BODY MASS INDEX: 28.17 KG/M2 | HEIGHT: 63 IN

## 2024-12-30 DIAGNOSIS — C80.1 MALIGNANT (PRIMARY) NEOPLASM, UNSPECIFIED: ICD-10-CM

## 2024-12-30 DIAGNOSIS — I10 ESSENTIAL (PRIMARY) HYPERTENSION: ICD-10-CM

## 2024-12-30 DIAGNOSIS — Z98.890 OTHER SPECIFIED POSTPROCEDURAL STATES: ICD-10-CM

## 2024-12-30 LAB — PTH RELATED PROT SERPL-MCNC: <2 PMOL/L — SIGNIFICANT CHANGE UP

## 2024-12-30 PROCEDURE — 99024 POSTOP FOLLOW-UP VISIT: CPT

## 2025-01-01 ENCOUNTER — APPOINTMENT (OUTPATIENT)
Dept: THORACIC SURGERY | Facility: HOSPITAL | Age: 70
End: 2025-01-01

## 2025-01-01 ENCOUNTER — RESULT REVIEW (OUTPATIENT)
Age: 70
End: 2025-01-01

## 2025-01-01 ENCOUNTER — INPATIENT (INPATIENT)
Facility: HOSPITAL | Age: 70
LOS: 10 days | DRG: 871 | End: 2025-05-29
Attending: SURGERY | Admitting: SURGERY
Payer: MEDICARE

## 2025-01-01 VITALS
SYSTOLIC BLOOD PRESSURE: 90 MMHG | RESPIRATION RATE: 22 BRPM | OXYGEN SATURATION: 88 % | TEMPERATURE: 98 F | HEART RATE: 66 BPM | DIASTOLIC BLOOD PRESSURE: 75 MMHG

## 2025-01-01 DIAGNOSIS — J18.9 PNEUMONIA, UNSPECIFIED ORGANISM: ICD-10-CM

## 2025-01-01 DIAGNOSIS — C25.9 MALIGNANT NEOPLASM OF PANCREAS, UNSPECIFIED: ICD-10-CM

## 2025-01-01 DIAGNOSIS — Z90.710 ACQUIRED ABSENCE OF BOTH CERVIX AND UTERUS: Chronic | ICD-10-CM

## 2025-01-01 DIAGNOSIS — J90 PLEURAL EFFUSION, NOT ELSEWHERE CLASSIFIED: ICD-10-CM

## 2025-01-01 DIAGNOSIS — J98.11 ATELECTASIS: ICD-10-CM

## 2025-01-01 DIAGNOSIS — Z98.890 OTHER SPECIFIED POSTPROCEDURAL STATES: Chronic | ICD-10-CM

## 2025-01-01 DIAGNOSIS — J96.01 ACUTE RESPIRATORY FAILURE WITH HYPOXIA: ICD-10-CM

## 2025-01-01 LAB
-  AMIKACIN: SIGNIFICANT CHANGE UP
-  AMPICILLIN: SIGNIFICANT CHANGE UP
-  AZTREONAM: SIGNIFICANT CHANGE UP
-  CEFEPIME: SIGNIFICANT CHANGE UP
-  CEFTAZIDIME/AVIBACTAM: SIGNIFICANT CHANGE UP
-  CEFTAZIDIME: SIGNIFICANT CHANGE UP
-  CEFTOLOZANE/TAZOBACTAM: SIGNIFICANT CHANGE UP
-  CIPROFLOXACIN: SIGNIFICANT CHANGE UP
-  GENTAMICIN SYNERGY: SIGNIFICANT CHANGE UP
-  IMIPENEM: SIGNIFICANT CHANGE UP
-  LEVOFLOXACIN: SIGNIFICANT CHANGE UP
-  MEROPENEM: SIGNIFICANT CHANGE UP
-  PIPERACILLIN/TAZOBACTAM: SIGNIFICANT CHANGE UP
-  STREPTOMYCIN SYNERGY: SIGNIFICANT CHANGE UP
-  VANCOMYCIN: SIGNIFICANT CHANGE UP
ADD ON TEST-SPECIMEN IN LAB: SIGNIFICANT CHANGE UP
ALBUMIN FLD-MCNC: 1.3 G/DL — SIGNIFICANT CHANGE UP
ALBUMIN SERPL ELPH-MCNC: 1.4 G/DL — LOW (ref 3.3–5)
ALBUMIN SERPL ELPH-MCNC: 1.5 G/DL — LOW (ref 3.3–5)
ALBUMIN SERPL ELPH-MCNC: 1.7 G/DL — LOW (ref 3.3–5)
ALBUMIN SERPL ELPH-MCNC: 1.7 G/DL — LOW (ref 3.3–5)
ALBUMIN SERPL ELPH-MCNC: 2 G/DL — LOW (ref 3.3–5)
ALBUMIN SERPL ELPH-MCNC: 2.2 G/DL — LOW (ref 3.3–5)
ALBUMIN SERPL ELPH-MCNC: 2.3 G/DL — LOW (ref 3.3–5)
ALBUMIN SERPL ELPH-MCNC: 2.5 G/DL — LOW (ref 3.3–5)
ALBUMIN SERPL ELPH-MCNC: 2.7 G/DL — LOW (ref 3.3–5)
ALP SERPL-CCNC: 102 U/L — SIGNIFICANT CHANGE UP (ref 40–120)
ALP SERPL-CCNC: 105 U/L — SIGNIFICANT CHANGE UP (ref 40–120)
ALP SERPL-CCNC: 107 U/L — SIGNIFICANT CHANGE UP (ref 40–120)
ALP SERPL-CCNC: 57 U/L — SIGNIFICANT CHANGE UP (ref 40–120)
ALP SERPL-CCNC: 74 U/L — SIGNIFICANT CHANGE UP (ref 40–120)
ALP SERPL-CCNC: 74 U/L — SIGNIFICANT CHANGE UP (ref 40–120)
ALP SERPL-CCNC: 86 U/L — SIGNIFICANT CHANGE UP (ref 40–120)
ALP SERPL-CCNC: 92 U/L — SIGNIFICANT CHANGE UP (ref 40–120)
ALP SERPL-CCNC: 93 U/L — SIGNIFICANT CHANGE UP (ref 40–120)
ALT FLD-CCNC: 10 U/L — LOW (ref 12–78)
ALT FLD-CCNC: 11 U/L — LOW (ref 12–78)
ALT FLD-CCNC: 13 U/L — SIGNIFICANT CHANGE UP (ref 12–78)
ALT FLD-CCNC: 8 U/L — LOW (ref 12–78)
ALT FLD-CCNC: 9 U/L — LOW (ref 12–78)
AMMONIA BLD-MCNC: 33 UMOL/L — HIGH (ref 11–32)
ANION GAP SERPL CALC-SCNC: 10 MMOL/L — SIGNIFICANT CHANGE UP (ref 5–17)
ANION GAP SERPL CALC-SCNC: 12 MMOL/L — SIGNIFICANT CHANGE UP (ref 5–17)
ANION GAP SERPL CALC-SCNC: 12 MMOL/L — SIGNIFICANT CHANGE UP (ref 5–17)
ANION GAP SERPL CALC-SCNC: 13 MMOL/L — SIGNIFICANT CHANGE UP (ref 5–17)
ANION GAP SERPL CALC-SCNC: 14 MMOL/L — SIGNIFICANT CHANGE UP (ref 5–17)
ANION GAP SERPL CALC-SCNC: 14 MMOL/L — SIGNIFICANT CHANGE UP (ref 5–17)
ANION GAP SERPL CALC-SCNC: 15 MMOL/L — SIGNIFICANT CHANGE UP (ref 5–17)
ANION GAP SERPL CALC-SCNC: 17 MMOL/L — SIGNIFICANT CHANGE UP (ref 5–17)
ANION GAP SERPL CALC-SCNC: 20 MMOL/L — HIGH (ref 5–17)
APPEARANCE UR: ABNORMAL
APTT BLD: 19.2 SEC — LOW (ref 26.1–36.8)
AST SERPL-CCNC: 10 U/L — LOW (ref 15–37)
AST SERPL-CCNC: 13 U/L — LOW (ref 15–37)
AST SERPL-CCNC: 14 U/L — LOW (ref 15–37)
AST SERPL-CCNC: 15 U/L — SIGNIFICANT CHANGE UP (ref 15–37)
AST SERPL-CCNC: 16 U/L — SIGNIFICANT CHANGE UP (ref 15–37)
AST SERPL-CCNC: 17 U/L — SIGNIFICANT CHANGE UP (ref 15–37)
AST SERPL-CCNC: 22 U/L — SIGNIFICANT CHANGE UP (ref 15–37)
AST SERPL-CCNC: 25 U/L — SIGNIFICANT CHANGE UP (ref 15–37)
AST SERPL-CCNC: 32 U/L — SIGNIFICANT CHANGE UP (ref 15–37)
B PERT IGG+IGM PNL SER: CLEAR — SIGNIFICANT CHANGE UP
BACTERIA # UR AUTO: NEGATIVE /HPF — SIGNIFICANT CHANGE UP
BASE EXCESS BLDA CALC-SCNC: -13.9 MMOL/L — LOW (ref -2–3)
BASE EXCESS BLDA CALC-SCNC: -3.3 MMOL/L — LOW (ref -2–3)
BASE EXCESS BLDA CALC-SCNC: -4.8 MMOL/L — LOW (ref -2–3)
BASE EXCESS BLDA CALC-SCNC: -9.6 MMOL/L — LOW (ref -2–3)
BASE EXCESS BLDA CALC-SCNC: 0.3 MMOL/L — SIGNIFICANT CHANGE UP (ref -2–3)
BASE EXCESS BLDV CALC-SCNC: -19.7 MMOL/L — LOW (ref -2–3)
BASOPHILS # BLD AUTO: 0.03 K/UL — SIGNIFICANT CHANGE UP (ref 0–0.2)
BASOPHILS # BLD AUTO: SIGNIFICANT CHANGE UP (ref 0–0.2)
BASOPHILS NFR BLD AUTO: 0.4 % — SIGNIFICANT CHANGE UP (ref 0–2)
BASOPHILS NFR BLD AUTO: SIGNIFICANT CHANGE UP (ref 0–2)
BILIRUB SERPL-MCNC: 0.2 MG/DL — SIGNIFICANT CHANGE UP (ref 0.2–1.2)
BILIRUB SERPL-MCNC: 0.3 MG/DL — SIGNIFICANT CHANGE UP (ref 0.2–1.2)
BILIRUB SERPL-MCNC: 0.4 MG/DL — SIGNIFICANT CHANGE UP (ref 0.2–1.2)
BILIRUB UR-MCNC: NEGATIVE — SIGNIFICANT CHANGE UP
BLANDM BLD POS QL PROBE: SIGNIFICANT CHANGE UP
BLD GP AB SCN SERPL QL: SIGNIFICANT CHANGE UP
BLOOD GAS COMMENTS ARTERIAL: SIGNIFICANT CHANGE UP
BUN SERPL-MCNC: 55 MG/DL — HIGH (ref 7–23)
BUN SERPL-MCNC: 56 MG/DL — HIGH (ref 7–23)
BUN SERPL-MCNC: 56 MG/DL — HIGH (ref 7–23)
BUN SERPL-MCNC: 57 MG/DL — HIGH (ref 7–23)
BUN SERPL-MCNC: 58 MG/DL — HIGH (ref 7–23)
BUN SERPL-MCNC: 58 MG/DL — HIGH (ref 7–23)
BUN SERPL-MCNC: 60 MG/DL — HIGH (ref 7–23)
BUN SERPL-MCNC: 62 MG/DL — HIGH (ref 7–23)
BUN SERPL-MCNC: 62 MG/DL — HIGH (ref 7–23)
BUN SERPL-MCNC: 64 MG/DL — HIGH (ref 7–23)
CALCIUM SERPL-MCNC: 10.1 MG/DL — SIGNIFICANT CHANGE UP (ref 8.5–10.1)
CALCIUM SERPL-MCNC: 10.9 MG/DL — HIGH (ref 8.5–10.1)
CALCIUM SERPL-MCNC: 8.7 MG/DL — SIGNIFICANT CHANGE UP (ref 8.5–10.1)
CALCIUM SERPL-MCNC: 8.7 MG/DL — SIGNIFICANT CHANGE UP (ref 8.5–10.1)
CALCIUM SERPL-MCNC: 8.8 MG/DL — SIGNIFICANT CHANGE UP (ref 8.5–10.1)
CALCIUM SERPL-MCNC: 8.9 MG/DL — SIGNIFICANT CHANGE UP (ref 8.5–10.1)
CALCIUM SERPL-MCNC: 8.9 MG/DL — SIGNIFICANT CHANGE UP (ref 8.5–10.1)
CALCIUM SERPL-MCNC: 9 MG/DL — SIGNIFICANT CHANGE UP (ref 8.5–10.1)
CALCIUM SERPL-MCNC: 9.1 MG/DL — SIGNIFICANT CHANGE UP (ref 8.5–10.1)
CALCIUM SERPL-MCNC: 9.1 MG/DL — SIGNIFICANT CHANGE UP (ref 8.5–10.1)
CALCIUM SERPL-MCNC: 9.3 MG/DL — SIGNIFICANT CHANGE UP (ref 8.5–10.1)
CALCIUM SERPL-MCNC: 9.6 MG/DL — SIGNIFICANT CHANGE UP (ref 8.5–10.1)
CAST: 5 /LPF — HIGH (ref 0–4)
CHLORIDE SERPL-SCNC: 103 MMOL/L — SIGNIFICANT CHANGE UP (ref 96–108)
CHLORIDE SERPL-SCNC: 106 MMOL/L — SIGNIFICANT CHANGE UP (ref 96–108)
CHLORIDE SERPL-SCNC: 107 MMOL/L — SIGNIFICANT CHANGE UP (ref 96–108)
CHLORIDE SERPL-SCNC: 108 MMOL/L — SIGNIFICANT CHANGE UP (ref 96–108)
CHLORIDE SERPL-SCNC: 109 MMOL/L — HIGH (ref 96–108)
CHLORIDE SERPL-SCNC: 109 MMOL/L — HIGH (ref 96–108)
CHLORIDE SERPL-SCNC: 110 MMOL/L — HIGH (ref 96–108)
CHLORIDE SERPL-SCNC: 112 MMOL/L — HIGH (ref 96–108)
CHLORIDE SERPL-SCNC: 113 MMOL/L — HIGH (ref 96–108)
CO2 SERPL-SCNC: 12 MMOL/L — LOW (ref 22–31)
CO2 SERPL-SCNC: 13 MMOL/L — LOW (ref 22–31)
CO2 SERPL-SCNC: 15 MMOL/L — LOW (ref 22–31)
CO2 SERPL-SCNC: 18 MMOL/L — LOW (ref 22–31)
CO2 SERPL-SCNC: 21 MMOL/L — LOW (ref 22–31)
CO2 SERPL-SCNC: 23 MMOL/L — SIGNIFICANT CHANGE UP (ref 22–31)
CO2 SERPL-SCNC: 23 MMOL/L — SIGNIFICANT CHANGE UP (ref 22–31)
CO2 SERPL-SCNC: 26 MMOL/L — SIGNIFICANT CHANGE UP (ref 22–31)
COLOR FLD: YELLOW — SIGNIFICANT CHANGE UP
COLOR SPEC: YELLOW — SIGNIFICANT CHANGE UP
COMMENT - FLUIDS: SIGNIFICANT CHANGE UP
CREAT SERPL-MCNC: 1.75 MG/DL — HIGH (ref 0.5–1.3)
CREAT SERPL-MCNC: 1.8 MG/DL — HIGH (ref 0.5–1.3)
CREAT SERPL-MCNC: 1.94 MG/DL — HIGH (ref 0.5–1.3)
CREAT SERPL-MCNC: 1.99 MG/DL — HIGH (ref 0.5–1.3)
CREAT SERPL-MCNC: 2.01 MG/DL — HIGH (ref 0.5–1.3)
CREAT SERPL-MCNC: 2.07 MG/DL — HIGH (ref 0.5–1.3)
CREAT SERPL-MCNC: 2.3 MG/DL — HIGH (ref 0.5–1.3)
CREAT SERPL-MCNC: 2.39 MG/DL — HIGH (ref 0.5–1.3)
CREAT SERPL-MCNC: 2.41 MG/DL — HIGH (ref 0.5–1.3)
CREAT SERPL-MCNC: 2.41 MG/DL — HIGH (ref 0.5–1.3)
CREAT SERPL-MCNC: 2.47 MG/DL — HIGH (ref 0.5–1.3)
CREAT SERPL-MCNC: 2.48 MG/DL — HIGH (ref 0.5–1.3)
CULTURE RESULTS: ABNORMAL
CULTURE RESULTS: ABNORMAL
CULTURE RESULTS: SIGNIFICANT CHANGE UP
CULTURE RESULTS: SIGNIFICANT CHANGE UP
DIFF PNL FLD: ABNORMAL
EGFR: 20 ML/MIN/1.73M2 — LOW
EGFR: 21 ML/MIN/1.73M2 — LOW
EGFR: 22 ML/MIN/1.73M2 — LOW
EGFR: 22 ML/MIN/1.73M2 — LOW
EGFR: 25 ML/MIN/1.73M2 — LOW
EGFR: 25 ML/MIN/1.73M2 — LOW
EGFR: 26 ML/MIN/1.73M2 — LOW
EGFR: 26 ML/MIN/1.73M2 — LOW
EGFR: 27 ML/MIN/1.73M2 — LOW
EGFR: 30 ML/MIN/1.73M2 — LOW
EGFR: 30 ML/MIN/1.73M2 — LOW
EGFR: 31 ML/MIN/1.73M2 — LOW
EGFR: 31 ML/MIN/1.73M2 — LOW
EOSINOPHIL # BLD AUTO: 0.01 K/UL — SIGNIFICANT CHANGE UP (ref 0–0.5)
EOSINOPHIL # BLD AUTO: SIGNIFICANT CHANGE UP (ref 0–0.5)
EOSINOPHIL # FLD: 0 % — SIGNIFICANT CHANGE UP
EOSINOPHIL NFR BLD AUTO: 0.1 % — SIGNIFICANT CHANGE UP (ref 0–6)
EOSINOPHIL NFR BLD AUTO: SIGNIFICANT CHANGE UP (ref 0–6)
FLUAV AG NPH QL: SIGNIFICANT CHANGE UP
FLUBV AG NPH QL: SIGNIFICANT CHANGE UP
FLUID INTAKE SUBSTANCE CLASS: SIGNIFICANT CHANGE UP
FOLATE+VIT B12 SERBLD-IMP: 0 % — SIGNIFICANT CHANGE UP
GAS PNL BLDA: SIGNIFICANT CHANGE UP
GAS PNL BLDV: SIGNIFICANT CHANGE UP
GLUCOSE FLD-MCNC: 143 MG/DL — SIGNIFICANT CHANGE UP
GLUCOSE SERPL-MCNC: 102 MG/DL — HIGH (ref 70–99)
GLUCOSE SERPL-MCNC: 105 MG/DL — HIGH (ref 70–99)
GLUCOSE SERPL-MCNC: 111 MG/DL — HIGH (ref 70–99)
GLUCOSE SERPL-MCNC: 117 MG/DL — HIGH (ref 70–99)
GLUCOSE SERPL-MCNC: 123 MG/DL — HIGH (ref 70–99)
GLUCOSE SERPL-MCNC: 129 MG/DL — HIGH (ref 70–99)
GLUCOSE SERPL-MCNC: 137 MG/DL — HIGH (ref 70–99)
GLUCOSE SERPL-MCNC: 143 MG/DL — HIGH (ref 70–99)
GLUCOSE SERPL-MCNC: 151 MG/DL — HIGH (ref 70–99)
GLUCOSE SERPL-MCNC: 166 MG/DL — HIGH (ref 70–99)
GLUCOSE SERPL-MCNC: 170 MG/DL — HIGH (ref 70–99)
GLUCOSE SERPL-MCNC: 92 MG/DL — SIGNIFICANT CHANGE UP (ref 70–99)
GLUCOSE UR QL: NEGATIVE MG/DL — SIGNIFICANT CHANGE UP
GRAM STN FLD: SIGNIFICANT CHANGE UP
HAPTOGLOB SERPL-MCNC: 233 MG/DL — HIGH (ref 34–200)
HCO3 BLDA-SCNC: 13 MMOL/L — LOW (ref 21–28)
HCO3 BLDA-SCNC: 15 MMOL/L — LOW (ref 21–28)
HCO3 BLDA-SCNC: 22 MMOL/L — SIGNIFICANT CHANGE UP (ref 21–28)
HCO3 BLDA-SCNC: 24 MMOL/L — SIGNIFICANT CHANGE UP (ref 21–28)
HCO3 BLDA-SCNC: 26 MMOL/L — SIGNIFICANT CHANGE UP (ref 21–28)
HCO3 BLDV-SCNC: 10 MMOL/L — CRITICAL LOW (ref 22–29)
HCT VFR BLD CALC: 25.7 % — LOW (ref 34.5–45)
HCT VFR BLD CALC: 26.3 % — LOW (ref 34.5–45)
HCT VFR BLD CALC: 26.3 % — LOW (ref 34.5–45)
HCT VFR BLD CALC: 27.3 % — LOW (ref 34.5–45)
HCT VFR BLD CALC: 28 % — LOW (ref 34.5–45)
HCT VFR BLD CALC: 28 % — LOW (ref 34.5–45)
HCT VFR BLD CALC: 31 % — LOW (ref 34.5–45)
HCT VFR BLD CALC: 32.6 % — LOW (ref 34.5–45)
HCT VFR BLD CALC: SIGNIFICANT CHANGE UP (ref 34.5–45)
HEPARIN-PF4 AB RESULT: <0.6 U/ML — SIGNIFICANT CHANGE UP (ref 0–0.9)
HGB BLD-MCNC: 7.4 G/DL — LOW (ref 11.5–15.5)
HGB BLD-MCNC: 7.6 G/DL — LOW (ref 11.5–15.5)
HGB BLD-MCNC: 7.7 G/DL — LOW (ref 11.5–15.5)
HGB BLD-MCNC: 7.8 G/DL — LOW (ref 11.5–15.5)
HGB BLD-MCNC: 8 G/DL — LOW (ref 11.5–15.5)
HGB BLD-MCNC: 8.2 G/DL — LOW (ref 11.5–15.5)
HGB BLD-MCNC: 8.8 G/DL — LOW (ref 11.5–15.5)
HGB BLD-MCNC: 9.5 G/DL — LOW (ref 11.5–15.5)
HGB BLD-MCNC: SIGNIFICANT CHANGE UP (ref 11.5–15.5)
HYALINE CASTS # UR AUTO: PRESENT
IMM GRANULOCYTES # BLD AUTO: 0.08 K/UL — HIGH (ref 0–0.07)
IMM GRANULOCYTES # BLD AUTO: SIGNIFICANT CHANGE UP (ref 0–0.07)
IMM GRANULOCYTES NFR BLD AUTO: 1.1 % — HIGH (ref 0–0.9)
IMM GRANULOCYTES NFR BLD AUTO: SIGNIFICANT CHANGE UP (ref 0–0.9)
IMMATURE PLATELET FRACTION #: 1.8 K/UL — LOW (ref 4.7–11.1)
IMMATURE PLATELET FRACTION #: 2.2 K/UL — LOW (ref 4.7–11.1)
IMMATURE PLATELET FRACTION #: 2.3 K/UL — LOW (ref 4.7–11.1)
IMMATURE PLATELET FRACTION #: 2.6 K/UL — LOW (ref 4.7–11.1)
IMMATURE PLATELET FRACTION #: 2.8 K/UL — LOW (ref 4.7–11.1)
IMMATURE PLATELET FRACTION #: 3.3 K/UL — LOW (ref 4.7–11.1)
IMMATURE PLATELET FRACTION #: 3.4 K/UL — LOW (ref 4.7–11.1)
IMMATURE PLATELET FRACTION %: 3.2 % — SIGNIFICANT CHANGE UP (ref 1.6–4.9)
IMMATURE PLATELET FRACTION %: 5.4 % — HIGH (ref 1.6–4.9)
IMMATURE PLATELET FRACTION %: 5.7 % — HIGH (ref 1.6–4.9)
IMMATURE PLATELET FRACTION %: 6.7 % — HIGH (ref 1.6–4.9)
IMMATURE PLATELET FRACTION %: 7.6 % — HIGH (ref 1.6–4.9)
IMMATURE PLATELET FRACTION %: 7.7 % — HIGH (ref 1.6–4.9)
IMMATURE PLATELET FRACTION %: 9 % — HIGH (ref 1.6–4.9)
IMMATURE RETICULOCYTE FRACTION %: 26.8 % — SIGNIFICANT CHANGE UP
INR BLD: 0.85 RATIO — SIGNIFICANT CHANGE UP (ref 0.85–1.16)
KETONES UR QL: ABNORMAL MG/DL
LACTATE SERPL-SCNC: 2.9 MMOL/L — HIGH (ref 0.7–2)
LACTATE SERPL-SCNC: 3.1 MMOL/L — HIGH (ref 0.7–2)
LACTATE SERPL-SCNC: 4.5 MMOL/L — CRITICAL HIGH (ref 0.7–2)
LACTATE SERPL-SCNC: 6.5 MMOL/L — CRITICAL HIGH (ref 0.7–2)
LDH SERPL L TO P-CCNC: 152 U/L — SIGNIFICANT CHANGE UP
LDH SERPL L TO P-CCNC: 196 U/L — SIGNIFICANT CHANGE UP (ref 84–241)
LDH SERPL L TO P-CCNC: 245 U/L — HIGH (ref 84–241)
LEUKOCYTE ESTERASE UR-ACNC: NEGATIVE — SIGNIFICANT CHANGE UP
LIDOCAIN IGE QN: 22 U/L — SIGNIFICANT CHANGE UP (ref 13–75)
LYMPHOCYTES # BLD AUTO: 0 K/UL — LOW (ref 1–3.3)
LYMPHOCYTES # BLD AUTO: SIGNIFICANT CHANGE UP (ref 1–3.3)
LYMPHOCYTES # FLD: 0 % — SIGNIFICANT CHANGE UP
LYMPHOCYTES NFR BLD AUTO: 0 % — LOW (ref 13–44)
LYMPHOCYTES NFR BLD AUTO: SIGNIFICANT CHANGE UP (ref 13–44)
MAGNESIUM SERPL-MCNC: 1.6 MG/DL — SIGNIFICANT CHANGE UP (ref 1.6–2.6)
MAGNESIUM SERPL-MCNC: 1.7 MG/DL — SIGNIFICANT CHANGE UP (ref 1.6–2.6)
MAGNESIUM SERPL-MCNC: 1.8 MG/DL — SIGNIFICANT CHANGE UP (ref 1.6–2.6)
MAGNESIUM SERPL-MCNC: 2.1 MG/DL — SIGNIFICANT CHANGE UP (ref 1.6–2.6)
MAGNESIUM SERPL-MCNC: 2.2 MG/DL — SIGNIFICANT CHANGE UP (ref 1.6–2.6)
MAGNESIUM SERPL-MCNC: 2.6 MG/DL — SIGNIFICANT CHANGE UP (ref 1.6–2.6)
MAGNESIUM SERPL-MCNC: 2.8 MG/DL — HIGH (ref 1.6–2.6)
MCHC RBC-ENTMCNC: 27.8 PG — SIGNIFICANT CHANGE UP (ref 27–34)
MCHC RBC-ENTMCNC: 27.9 PG — SIGNIFICANT CHANGE UP (ref 27–34)
MCHC RBC-ENTMCNC: 28 PG — SIGNIFICANT CHANGE UP (ref 27–34)
MCHC RBC-ENTMCNC: 28.2 PG — SIGNIFICANT CHANGE UP (ref 27–34)
MCHC RBC-ENTMCNC: 28.3 PG — SIGNIFICANT CHANGE UP (ref 27–34)
MCHC RBC-ENTMCNC: 28.3 PG — SIGNIFICANT CHANGE UP (ref 27–34)
MCHC RBC-ENTMCNC: 28.4 G/DL — LOW (ref 32–36)
MCHC RBC-ENTMCNC: 28.4 PG — SIGNIFICANT CHANGE UP (ref 27–34)
MCHC RBC-ENTMCNC: 28.6 G/DL — LOW (ref 32–36)
MCHC RBC-ENTMCNC: 28.6 G/DL — LOW (ref 32–36)
MCHC RBC-ENTMCNC: 28.8 G/DL — LOW (ref 32–36)
MCHC RBC-ENTMCNC: 28.8 PG — SIGNIFICANT CHANGE UP (ref 27–34)
MCHC RBC-ENTMCNC: 28.9 G/DL — LOW (ref 32–36)
MCHC RBC-ENTMCNC: 29.1 G/DL — LOW (ref 32–36)
MCHC RBC-ENTMCNC: 29.3 G/DL — LOW (ref 32–36)
MCHC RBC-ENTMCNC: 29.3 G/DL — LOW (ref 32–36)
MCHC RBC-ENTMCNC: SIGNIFICANT CHANGE UP (ref 27–34)
MCHC RBC-ENTMCNC: SIGNIFICANT CHANGE UP G/DL (ref 32–36)
MCV RBC AUTO: 96.6 FL — SIGNIFICANT CHANGE UP (ref 80–100)
MCV RBC AUTO: 96.6 FL — SIGNIFICANT CHANGE UP (ref 80–100)
MCV RBC AUTO: 97 FL — SIGNIFICANT CHANGE UP (ref 80–100)
MCV RBC AUTO: 97.5 FL — SIGNIFICANT CHANGE UP (ref 80–100)
MCV RBC AUTO: 97.6 FL — SIGNIFICANT CHANGE UP (ref 80–100)
MCV RBC AUTO: 98.5 FL — SIGNIFICANT CHANGE UP (ref 80–100)
MCV RBC AUTO: 98.6 FL — SIGNIFICANT CHANGE UP (ref 80–100)
MCV RBC AUTO: 99.7 FL — SIGNIFICANT CHANGE UP (ref 80–100)
MCV RBC AUTO: SIGNIFICANT CHANGE UP (ref 80–100)
MESOTHL CELL # FLD: SIGNIFICANT CHANGE UP
METHOD TYPE: SIGNIFICANT CHANGE UP
MONOCYTES # BLD AUTO: 0.13 K/UL — SIGNIFICANT CHANGE UP (ref 0–0.9)
MONOCYTES # BLD AUTO: SIGNIFICANT CHANGE UP (ref 0–0.9)
MONOCYTES NFR BLD AUTO: 1.8 % — LOW (ref 2–14)
MONOCYTES NFR BLD AUTO: SIGNIFICANT CHANGE UP (ref 2–14)
MONOS+MACROS # FLD: 31 % — SIGNIFICANT CHANGE UP
MRSA PCR RESULT.: SIGNIFICANT CHANGE UP
NEUTROPHILS # BLD AUTO: 7.09 K/UL — SIGNIFICANT CHANGE UP (ref 1.8–7.4)
NEUTROPHILS # BLD AUTO: SIGNIFICANT CHANGE UP (ref 1.8–7.4)
NEUTROPHILS NFR BLD AUTO: 96.6 % — HIGH (ref 43–77)
NEUTROPHILS NFR BLD AUTO: SIGNIFICANT CHANGE UP (ref 43–77)
NEUTROPHILS-BODY FLUID: 67 % — SIGNIFICANT CHANGE UP
NITRITE UR-MCNC: NEGATIVE — SIGNIFICANT CHANGE UP
NON-GYNECOLOGICAL CYTOLOGY STUDY: SIGNIFICANT CHANGE UP
NRBC # BLD AUTO: 0.02 K/UL — HIGH (ref 0–0)
NRBC # BLD AUTO: 0.03 K/UL — HIGH (ref 0–0)
NRBC # BLD AUTO: 0.05 K/UL — HIGH (ref 0–0)
NRBC # BLD AUTO: 0.06 K/UL — HIGH (ref 0–0)
NRBC # BLD AUTO: 0.09 K/UL — HIGH (ref 0–0)
NRBC # BLD AUTO: 0.1 K/UL — HIGH (ref 0–0)
NRBC # BLD AUTO: 0.11 K/UL — HIGH (ref 0–0)
NRBC # BLD AUTO: 0.28 K/UL — HIGH (ref 0–0)
NRBC # BLD AUTO: SIGNIFICANT CHANGE UP (ref 0–0)
NRBC # FLD: 0 % — SIGNIFICANT CHANGE UP
NRBC # FLD: 0.02 K/UL — HIGH (ref 0–0)
NRBC # FLD: 0.03 K/UL — HIGH (ref 0–0)
NRBC # FLD: 0.05 K/UL — HIGH (ref 0–0)
NRBC # FLD: 0.06 K/UL — HIGH (ref 0–0)
NRBC # FLD: 0.09 K/UL — HIGH (ref 0–0)
NRBC # FLD: 0.1 K/UL — HIGH (ref 0–0)
NRBC # FLD: 0.11 K/UL — HIGH (ref 0–0)
NRBC # FLD: 0.28 K/UL — HIGH (ref 0–0)
NRBC # FLD: SIGNIFICANT CHANGE UP (ref 0–0)
NRBC BLD AUTO-RTO: 0 /100 WBCS — SIGNIFICANT CHANGE UP (ref 0–0)
NRBC BLD AUTO-RTO: 1 /100 WBCS — HIGH (ref 0–0)
NRBC BLD AUTO-RTO: 4 /100 WBCS — HIGH (ref 0–0)
NRBC BLD AUTO-RTO: SIGNIFICANT CHANGE UP (ref 0–0)
NT-PROBNP SERPL-SCNC: 2436 PG/ML — HIGH (ref 0–125)
ORGANISM # SPEC MICROSCOPIC CNT: ABNORMAL
ORGANISM # SPEC MICROSCOPIC CNT: SIGNIFICANT CHANGE UP
OTHER CELLS FLD MANUAL: 2 % — SIGNIFICANT CHANGE UP
PCO2 BLDA: 27 MMHG — LOW (ref 32–45)
PCO2 BLDA: 32 MMHG — SIGNIFICANT CHANGE UP (ref 32–45)
PCO2 BLDA: 40 MMHG — SIGNIFICANT CHANGE UP (ref 32–45)
PCO2 BLDA: 45 MMHG — SIGNIFICANT CHANGE UP (ref 32–45)
PCO2 BLDA: 58 MMHG — HIGH (ref 32–45)
PCO2 BLDV: 38 MMHG — LOW (ref 39–42)
PF4 HEPARIN CMPLX AB SER-ACNC: NEGATIVE — SIGNIFICANT CHANGE UP
PH BLDA: 7.21 — LOW (ref 7.35–7.45)
PH BLDA: 7.22 — LOW (ref 7.35–7.45)
PH BLDA: 7.34 — LOW (ref 7.35–7.45)
PH BLDA: 7.35 — SIGNIFICANT CHANGE UP (ref 7.35–7.45)
PH BLDA: 7.37 — SIGNIFICANT CHANGE UP (ref 7.35–7.45)
PH BLDV: 7.04 — CRITICAL LOW (ref 7.32–7.43)
PH FLD: 7.8 — SIGNIFICANT CHANGE UP
PH UR: 5.5 — SIGNIFICANT CHANGE UP (ref 5–8)
PHOSPHATE SERPL-MCNC: 4 MG/DL — SIGNIFICANT CHANGE UP (ref 2.5–4.5)
PHOSPHATE SERPL-MCNC: 4.2 MG/DL — SIGNIFICANT CHANGE UP (ref 2.5–4.5)
PHOSPHATE SERPL-MCNC: 4.4 MG/DL — SIGNIFICANT CHANGE UP (ref 2.5–4.5)
PHOSPHATE SERPL-MCNC: 4.4 MG/DL — SIGNIFICANT CHANGE UP (ref 2.5–4.5)
PHOSPHATE SERPL-MCNC: 4.8 MG/DL — HIGH (ref 2.5–4.5)
PHOSPHATE SERPL-MCNC: 4.9 MG/DL — HIGH (ref 2.5–4.5)
PHOSPHATE SERPL-MCNC: 4.9 MG/DL — HIGH (ref 2.5–4.5)
PLATELET # BLD AUTO: 160 K/UL — SIGNIFICANT CHANGE UP (ref 150–400)
PLATELET # BLD AUTO: 24 K/UL — LOW (ref 150–400)
PLATELET # BLD AUTO: 25 K/UL — LOW (ref 150–400)
PLATELET # BLD AUTO: 29 K/UL — LOW (ref 150–400)
PLATELET # BLD AUTO: 46 K/UL — LOW (ref 150–400)
PLATELET # BLD AUTO: 50 K/UL — LOW (ref 150–400)
PLATELET # BLD AUTO: 61 K/UL — LOW (ref 150–400)
PLATELET # BLD AUTO: 89 K/UL — LOW (ref 150–400)
PLATELET # BLD AUTO: SIGNIFICANT CHANGE UP K/UL (ref 150–400)
PMV BLD: 11.2 FL — SIGNIFICANT CHANGE UP (ref 7–13)
PMV BLD: 11.3 FL — SIGNIFICANT CHANGE UP (ref 7–13)
PMV BLD: 11.4 FL — SIGNIFICANT CHANGE UP (ref 7–13)
PMV BLD: 11.7 FL — SIGNIFICANT CHANGE UP (ref 7–13)
PMV BLD: 12.3 FL — SIGNIFICANT CHANGE UP (ref 7–13)
PMV BLD: SIGNIFICANT CHANGE UP FL (ref 7–13)
PO2 BLDA: 112 MMHG — HIGH (ref 83–108)
PO2 BLDA: 114 MMHG — HIGH (ref 83–108)
PO2 BLDA: 200 MMHG — HIGH (ref 83–108)
PO2 BLDA: 253 MMHG — HIGH (ref 83–108)
PO2 BLDA: 96 MMHG — SIGNIFICANT CHANGE UP (ref 83–108)
PO2 BLDV: 51 MMHG — HIGH (ref 25–45)
POTASSIUM SERPL-MCNC: 2.9 MMOL/L — CRITICAL LOW (ref 3.5–5.3)
POTASSIUM SERPL-MCNC: 3.1 MMOL/L — LOW (ref 3.5–5.3)
POTASSIUM SERPL-MCNC: 3.1 MMOL/L — LOW (ref 3.5–5.3)
POTASSIUM SERPL-MCNC: 3.3 MMOL/L — LOW (ref 3.5–5.3)
POTASSIUM SERPL-MCNC: 3.3 MMOL/L — LOW (ref 3.5–5.3)
POTASSIUM SERPL-MCNC: 3.4 MMOL/L — LOW (ref 3.5–5.3)
POTASSIUM SERPL-MCNC: 3.5 MMOL/L — SIGNIFICANT CHANGE UP (ref 3.5–5.3)
POTASSIUM SERPL-MCNC: 3.5 MMOL/L — SIGNIFICANT CHANGE UP (ref 3.5–5.3)
POTASSIUM SERPL-MCNC: 3.6 MMOL/L — SIGNIFICANT CHANGE UP (ref 3.5–5.3)
POTASSIUM SERPL-MCNC: 3.8 MMOL/L — SIGNIFICANT CHANGE UP (ref 3.5–5.3)
POTASSIUM SERPL-MCNC: 4.1 MMOL/L — SIGNIFICANT CHANGE UP (ref 3.5–5.3)
POTASSIUM SERPL-MCNC: 4.5 MMOL/L — SIGNIFICANT CHANGE UP (ref 3.5–5.3)
POTASSIUM SERPL-SCNC: 2.9 MMOL/L — CRITICAL LOW (ref 3.5–5.3)
POTASSIUM SERPL-SCNC: 3.1 MMOL/L — LOW (ref 3.5–5.3)
POTASSIUM SERPL-SCNC: 3.1 MMOL/L — LOW (ref 3.5–5.3)
POTASSIUM SERPL-SCNC: 3.3 MMOL/L — LOW (ref 3.5–5.3)
POTASSIUM SERPL-SCNC: 3.3 MMOL/L — LOW (ref 3.5–5.3)
POTASSIUM SERPL-SCNC: 3.4 MMOL/L — LOW (ref 3.5–5.3)
POTASSIUM SERPL-SCNC: 3.5 MMOL/L — SIGNIFICANT CHANGE UP (ref 3.5–5.3)
POTASSIUM SERPL-SCNC: 3.5 MMOL/L — SIGNIFICANT CHANGE UP (ref 3.5–5.3)
POTASSIUM SERPL-SCNC: 3.6 MMOL/L — SIGNIFICANT CHANGE UP (ref 3.5–5.3)
POTASSIUM SERPL-SCNC: 3.8 MMOL/L — SIGNIFICANT CHANGE UP (ref 3.5–5.3)
POTASSIUM SERPL-SCNC: 4.1 MMOL/L — SIGNIFICANT CHANGE UP (ref 3.5–5.3)
POTASSIUM SERPL-SCNC: 4.5 MMOL/L — SIGNIFICANT CHANGE UP (ref 3.5–5.3)
PROCALCITONIN SERPL-MCNC: 3.26 NG/ML — HIGH (ref 0.02–0.1)
PROT FLD-MCNC: 2.2 G/DL — SIGNIFICANT CHANGE UP
PROT SERPL-MCNC: 4.7 GM/DL — LOW (ref 6–8.3)
PROT SERPL-MCNC: 4.8 GM/DL — LOW (ref 6–8.3)
PROT SERPL-MCNC: 4.8 GM/DL — LOW (ref 6–8.3)
PROT SERPL-MCNC: 4.9 GM/DL — LOW (ref 6–8.3)
PROT SERPL-MCNC: 5.1 GM/DL — LOW (ref 6–8.3)
PROT SERPL-MCNC: 5.2 GM/DL — LOW (ref 6–8.3)
PROT SERPL-MCNC: 5.2 GM/DL — LOW (ref 6–8.3)
PROT SERPL-MCNC: 5.5 GM/DL — LOW (ref 6–8.3)
PROT SERPL-MCNC: 6.5 GM/DL — SIGNIFICANT CHANGE UP (ref 6–8.3)
PROT UR-MCNC: 100 MG/DL
PROTHROM AB SERPL-ACNC: 10 SEC — SIGNIFICANT CHANGE UP (ref 9.9–13.4)
RBC # BLD: 2.66 M/UL — LOW (ref 3.8–5.2)
RBC # BLD: 2.67 M/UL — LOW (ref 3.8–5.2)
RBC # BLD: 2.71 M/UL — LOW (ref 3.8–5.2)
RBC # BLD: 2.8 M/UL — LOW (ref 3.8–5.2)
RBC # BLD: 2.8 M/UL — LOW (ref 3.8–5.2)
RBC # BLD: 2.84 M/UL — LOW (ref 3.8–5.2)
RBC # BLD: 2.9 M/UL — LOW (ref 3.8–5.2)
RBC # BLD: 3.11 M/UL — LOW (ref 3.8–5.2)
RBC # BLD: 3.34 M/UL — LOW (ref 3.8–5.2)
RBC # BLD: SIGNIFICANT CHANGE UP (ref 3.8–5.2)
RBC # FLD: 20.8 % — HIGH (ref 10.3–14.5)
RBC # FLD: 21 % — HIGH (ref 10.3–14.5)
RBC # FLD: 21.3 % — HIGH (ref 10.3–14.5)
RBC # FLD: 21.4 % — HIGH (ref 10.3–14.5)
RBC # FLD: 21.5 % — HIGH (ref 10.3–14.5)
RBC # FLD: 22 % — HIGH (ref 10.3–14.5)
RBC # FLD: 22.1 % — HIGH (ref 10.3–14.5)
RBC # FLD: 22.2 % — HIGH (ref 10.3–14.5)
RBC # FLD: SIGNIFICANT CHANGE UP % (ref 10.3–14.5)
RBC CASTS # UR COMP ASSIST: 1 /HPF — SIGNIFICANT CHANGE UP (ref 0–4)
RCV VOL RI: 1000 CELLS/UL — SIGNIFICANT CHANGE UP
RETICS #: 54 K/UL — SIGNIFICANT CHANGE UP (ref 25–125)
RETICS/RBC NFR: 1.9 % — SIGNIFICANT CHANGE UP (ref 0.5–2.5)
RETICULOCYTE HEMOGLOBIN EQUIVALENT: 29 PG — LOW (ref 30.6–40.7)
RSV RNA NPH QL NAA+NON-PROBE: SIGNIFICANT CHANGE UP
S AUREUS DNA NOSE QL NAA+PROBE: SIGNIFICANT CHANGE UP
SAO2 % BLDA: 100 % — HIGH (ref 94–98)
SAO2 % BLDA: 99 % — HIGH (ref 94–98)
SAO2 % BLDV: 75 % — SIGNIFICANT CHANGE UP (ref 67–88)
SARS-COV-2 RNA SPEC QL NAA+PROBE: SIGNIFICANT CHANGE UP
SODIUM SERPL-SCNC: 139 MMOL/L — SIGNIFICANT CHANGE UP (ref 135–145)
SODIUM SERPL-SCNC: 139 MMOL/L — SIGNIFICANT CHANGE UP (ref 135–145)
SODIUM SERPL-SCNC: 140 MMOL/L — SIGNIFICANT CHANGE UP (ref 135–145)
SODIUM SERPL-SCNC: 140 MMOL/L — SIGNIFICANT CHANGE UP (ref 135–145)
SODIUM SERPL-SCNC: 142 MMOL/L — SIGNIFICANT CHANGE UP (ref 135–145)
SODIUM SERPL-SCNC: 142 MMOL/L — SIGNIFICANT CHANGE UP (ref 135–145)
SODIUM SERPL-SCNC: 143 MMOL/L — SIGNIFICANT CHANGE UP (ref 135–145)
SODIUM SERPL-SCNC: 144 MMOL/L — SIGNIFICANT CHANGE UP (ref 135–145)
SODIUM SERPL-SCNC: 144 MMOL/L — SIGNIFICANT CHANGE UP (ref 135–145)
SOURCE RESPIRATORY: SIGNIFICANT CHANGE UP
SP GR SPEC: 1.02 — SIGNIFICANT CHANGE UP (ref 1–1.03)
SPECIMEN SOURCE: SIGNIFICANT CHANGE UP
SQUAMOUS # UR AUTO: 0 /HPF — SIGNIFICANT CHANGE UP (ref 0–5)
TOTAL NUCLEATED CELL COUNT, BODY FLUID: 432 CELLS/UL — SIGNIFICANT CHANGE UP
TROPONIN I, HIGH SENSITIVITY RESULT: 112.29 NG/L — HIGH
TROPONIN I, HIGH SENSITIVITY RESULT: 79.05 NG/L — HIGH
TROPONIN I, HIGH SENSITIVITY RESULT: 86.9 NG/L — HIGH
TSH SERPL-MCNC: 10.9 UU/ML — HIGH (ref 0.34–4.82)
TUBE TYPE: SIGNIFICANT CHANGE UP
UNFRACTIONATED HEPARIN INTERPRETATION: SIGNIFICANT CHANGE UP
UNFRACTIONATED HEPARIN RESULT: NEGATIVE — SIGNIFICANT CHANGE UP
UNFRACTIONATED HEPARIN-HIGH DOSE: 0 % — SIGNIFICANT CHANGE UP
UNFRACTIONATED HEPARIN-LOW DOSE: 0 % — SIGNIFICANT CHANGE UP
UROBILINOGEN FLD QL: 0.2 MG/DL — SIGNIFICANT CHANGE UP (ref 0.2–1)
WBC # BLD: 4.93 K/UL — SIGNIFICANT CHANGE UP (ref 3.8–10.5)
WBC # BLD: 5.08 K/UL — SIGNIFICANT CHANGE UP (ref 3.8–10.5)
WBC # BLD: 6.76 K/UL — SIGNIFICANT CHANGE UP (ref 3.8–10.5)
WBC # BLD: 7.34 K/UL — SIGNIFICANT CHANGE UP (ref 3.8–10.5)
WBC # BLD: 7.57 K/UL — SIGNIFICANT CHANGE UP (ref 3.8–10.5)
WBC # BLD: 7.74 K/UL — SIGNIFICANT CHANGE UP (ref 3.8–10.5)
WBC # BLD: 8.66 K/UL — SIGNIFICANT CHANGE UP (ref 3.8–10.5)
WBC # BLD: 8.86 K/UL — SIGNIFICANT CHANGE UP (ref 3.8–10.5)
WBC # BLD: SIGNIFICANT CHANGE UP K/UL (ref 3.8–10.5)
WBC # FLD AUTO: 4.93 K/UL — SIGNIFICANT CHANGE UP (ref 3.8–10.5)
WBC # FLD AUTO: 5.08 K/UL — SIGNIFICANT CHANGE UP (ref 3.8–10.5)
WBC # FLD AUTO: 6.76 K/UL — SIGNIFICANT CHANGE UP (ref 3.8–10.5)
WBC # FLD AUTO: 7.34 K/UL — SIGNIFICANT CHANGE UP (ref 3.8–10.5)
WBC # FLD AUTO: 7.57 K/UL — SIGNIFICANT CHANGE UP (ref 3.8–10.5)
WBC # FLD AUTO: 7.74 K/UL — SIGNIFICANT CHANGE UP (ref 3.8–10.5)
WBC # FLD AUTO: 8.66 K/UL — SIGNIFICANT CHANGE UP (ref 3.8–10.5)
WBC # FLD AUTO: 8.86 K/UL — SIGNIFICANT CHANGE UP (ref 3.8–10.5)
WBC # FLD AUTO: SIGNIFICANT CHANGE UP K/UL (ref 3.8–10.5)
WBC COUNT.: 396 CELLS/UL — SIGNIFICANT CHANGE UP
WBC UR QL: 3 /HPF — SIGNIFICANT CHANGE UP (ref 0–5)

## 2025-01-01 PROCEDURE — 87070 CULTURE OTHR SPECIMN AEROBIC: CPT

## 2025-01-01 PROCEDURE — 84145 PROCALCITONIN (PCT): CPT

## 2025-01-01 PROCEDURE — 99232 SBSQ HOSP IP/OBS MODERATE 35: CPT

## 2025-01-01 PROCEDURE — 85025 COMPLETE CBC W/AUTO DIFF WBC: CPT

## 2025-01-01 PROCEDURE — 83986 ASSAY PH BODY FLUID NOS: CPT

## 2025-01-01 PROCEDURE — 83605 ASSAY OF LACTIC ACID: CPT

## 2025-01-01 PROCEDURE — 99231 SBSQ HOSP IP/OBS SF/LOW 25: CPT

## 2025-01-01 PROCEDURE — 36415 COLL VENOUS BLD VENIPUNCTURE: CPT

## 2025-01-01 PROCEDURE — 84484 ASSAY OF TROPONIN QUANT: CPT

## 2025-01-01 PROCEDURE — 71250 CT THORAX DX C-: CPT | Mod: 26

## 2025-01-01 PROCEDURE — 88305 TISSUE EXAM BY PATHOLOGIST: CPT | Mod: 26

## 2025-01-01 PROCEDURE — 74176 CT ABD & PELVIS W/O CONTRAST: CPT

## 2025-01-01 PROCEDURE — 93931 UPPER EXTREMITY STUDY: CPT | Mod: 26,LT

## 2025-01-01 PROCEDURE — 71045 X-RAY EXAM CHEST 1 VIEW: CPT | Mod: 26

## 2025-01-01 PROCEDURE — 99291 CRITICAL CARE FIRST HOUR: CPT

## 2025-01-01 PROCEDURE — P9045: CPT | Mod: JZ

## 2025-01-01 PROCEDURE — 87640 STAPH A DNA AMP PROBE: CPT

## 2025-01-01 PROCEDURE — 36600 WITHDRAWAL OF ARTERIAL BLOOD: CPT

## 2025-01-01 PROCEDURE — 83615 LACTATE (LD) (LDH) ENZYME: CPT

## 2025-01-01 PROCEDURE — 76604 US EXAM CHEST: CPT | Mod: 26

## 2025-01-01 PROCEDURE — 87186 SC STD MICRODIL/AGAR DIL: CPT

## 2025-01-01 PROCEDURE — 87102 FUNGUS ISOLATION CULTURE: CPT

## 2025-01-01 PROCEDURE — 88108 CYTOPATH CONCENTRATE TECH: CPT | Mod: 26

## 2025-01-01 PROCEDURE — 84100 ASSAY OF PHOSPHORUS: CPT

## 2025-01-01 PROCEDURE — 83735 ASSAY OF MAGNESIUM: CPT

## 2025-01-01 PROCEDURE — 94660 CPAP INITIATION&MGMT: CPT

## 2025-01-01 PROCEDURE — 93931 UPPER EXTREMITY STUDY: CPT | Mod: LT

## 2025-01-01 PROCEDURE — 94760 N-INVAS EAR/PLS OXIMETRY 1: CPT

## 2025-01-01 PROCEDURE — 82042 OTHER SOURCE ALBUMIN QUAN EA: CPT

## 2025-01-01 PROCEDURE — 71250 CT THORAX DX C-: CPT

## 2025-01-01 PROCEDURE — 84157 ASSAY OF PROTEIN OTHER: CPT

## 2025-01-01 PROCEDURE — 32555 ASPIRATE PLEURA W/ IMAGING: CPT | Mod: RT

## 2025-01-01 PROCEDURE — 80069 RENAL FUNCTION PANEL: CPT

## 2025-01-01 PROCEDURE — 88108 CYTOPATH CONCENTRATE TECH: CPT | Mod: 26,59

## 2025-01-01 PROCEDURE — 87641 MR-STAPH DNA AMP PROBE: CPT

## 2025-01-01 PROCEDURE — 93306 TTE W/DOPPLER COMPLETE: CPT | Mod: 26

## 2025-01-01 PROCEDURE — 80053 COMPREHEN METABOLIC PANEL: CPT

## 2025-01-01 PROCEDURE — 83010 ASSAY OF HAPTOGLOBIN QUANT: CPT

## 2025-01-01 PROCEDURE — 99222 1ST HOSP IP/OBS MODERATE 55: CPT | Mod: FS

## 2025-01-01 PROCEDURE — 85045 AUTOMATED RETICULOCYTE COUNT: CPT

## 2025-01-01 PROCEDURE — 71045 X-RAY EXAM CHEST 1 VIEW: CPT

## 2025-01-01 PROCEDURE — 85027 COMPLETE CBC AUTOMATED: CPT

## 2025-01-01 PROCEDURE — 88305 TISSUE EXAM BY PATHOLOGIST: CPT

## 2025-01-01 PROCEDURE — 99223 1ST HOSP IP/OBS HIGH 75: CPT | Mod: FS

## 2025-01-01 PROCEDURE — 86022 PLATELET ANTIBODIES: CPT

## 2025-01-01 PROCEDURE — 88108 CYTOPATH CONCENTRATE TECH: CPT

## 2025-01-01 PROCEDURE — 76376 3D RENDER W/INTRP POSTPROCES: CPT | Mod: 26

## 2025-01-01 PROCEDURE — 87086 URINE CULTURE/COLONY COUNT: CPT

## 2025-01-01 PROCEDURE — 82140 ASSAY OF AMMONIA: CPT

## 2025-01-01 PROCEDURE — 74176 CT ABD & PELVIS W/O CONTRAST: CPT | Mod: 26

## 2025-01-01 PROCEDURE — 82803 BLOOD GASES ANY COMBINATION: CPT

## 2025-01-01 PROCEDURE — 87077 CULTURE AEROBIC IDENTIFY: CPT

## 2025-01-01 PROCEDURE — 87075 CULTR BACTERIA EXCEPT BLOOD: CPT

## 2025-01-01 PROCEDURE — 82945 GLUCOSE OTHER FLUID: CPT

## 2025-01-01 PROCEDURE — 87015 SPECIMEN INFECT AGNT CONCNTJ: CPT

## 2025-01-01 PROCEDURE — 99233 SBSQ HOSP IP/OBS HIGH 50: CPT | Mod: FS,25

## 2025-01-01 PROCEDURE — 80048 BASIC METABOLIC PNL TOTAL CA: CPT

## 2025-01-01 PROCEDURE — P9047: CPT | Mod: JZ

## 2025-01-01 PROCEDURE — 89051 BODY FLUID CELL COUNT: CPT

## 2025-01-01 PROCEDURE — 99233 SBSQ HOSP IP/OBS HIGH 50: CPT

## 2025-01-01 RX ORDER — NOREPINEPHRINE BITARTRATE 8 MG
0.05 SOLUTION INTRAVENOUS
Qty: 8 | Refills: 0 | Status: DISCONTINUED | OUTPATIENT
Start: 2025-01-01 | End: 2025-01-01

## 2025-01-01 RX ORDER — TRAMADOL HYDROCHLORIDE 50 MG/1
1 TABLET, FILM COATED ORAL
Refills: 0 | DISCHARGE

## 2025-01-01 RX ORDER — ALBUMIN (HUMAN) 12.5 G/50ML
100 INJECTION, SOLUTION INTRAVENOUS EVERY 6 HOURS
Refills: 0 | Status: COMPLETED | OUTPATIENT
Start: 2025-01-01 | End: 2025-01-01

## 2025-01-01 RX ORDER — ACETAMINOPHEN 500 MG/5ML
1000 LIQUID (ML) ORAL ONCE
Refills: 0 | Status: COMPLETED | OUTPATIENT
Start: 2025-01-01 | End: 2025-01-01

## 2025-01-01 RX ORDER — LOPERAMIDE HCL 1 MG/7.5ML
2 SOLUTION ORAL DAILY
Refills: 0 | Status: DISCONTINUED | OUTPATIENT
Start: 2025-01-01 | End: 2025-01-01

## 2025-01-01 RX ORDER — LOPERAMIDE HCL 1 MG/7.5ML
4 SOLUTION ORAL EVERY 6 HOURS
Refills: 0 | Status: DISCONTINUED | OUTPATIENT
Start: 2025-01-01 | End: 2025-01-01

## 2025-01-01 RX ORDER — LORAZEPAM 4 MG/ML
0.25 VIAL (ML) INJECTION EVERY 4 HOURS
Refills: 0 | Status: DISCONTINUED | OUTPATIENT
Start: 2025-01-01 | End: 2025-01-01

## 2025-01-01 RX ORDER — SODIUM CHLORIDE 9 G/1000ML
250 INJECTION, SOLUTION INTRAVENOUS ONCE
Refills: 0 | Status: COMPLETED | OUTPATIENT
Start: 2025-01-01 | End: 2025-01-01

## 2025-01-01 RX ORDER — VANCOMYCIN HCL IN 5 % DEXTROSE 1.5G/250ML
1000 PLASTIC BAG, INJECTION (ML) INTRAVENOUS ONCE
Refills: 0 | Status: COMPLETED | OUTPATIENT
Start: 2025-01-01 | End: 2025-01-01

## 2025-01-01 RX ORDER — SODIUM CHLORIDE 9 G/1000ML
1000 INJECTION, SOLUTION INTRAVENOUS
Refills: 0 | Status: DISCONTINUED | OUTPATIENT
Start: 2025-01-01 | End: 2025-01-01

## 2025-01-01 RX ORDER — OMEPRAZOLE 20 MG/1
2 CAPSULE, DELAYED RELEASE ORAL
Refills: 0 | DISCHARGE

## 2025-01-01 RX ORDER — HYDROMORPHONE/SOD CHLOR,ISO/PF 2 MG/10 ML
0.5 SYRINGE (ML) INJECTION ONCE
Refills: 0 | Status: DISCONTINUED | OUTPATIENT
Start: 2025-01-01 | End: 2025-01-01

## 2025-01-01 RX ORDER — MEROPENEM 1 G/30ML
500 INJECTION INTRAVENOUS EVERY 12 HOURS
Refills: 0 | Status: DISCONTINUED | OUTPATIENT
Start: 2025-01-01 | End: 2025-01-01

## 2025-01-01 RX ORDER — SODIUM CHLORIDE 9 G/1000ML
1000 INJECTION, SOLUTION INTRAVENOUS ONCE
Refills: 0 | Status: COMPLETED | OUTPATIENT
Start: 2025-01-01 | End: 2025-01-01

## 2025-01-01 RX ORDER — SODIUM BICARBONATE 1 MEQ/ML
3 SYRINGE (ML) INTRAVENOUS
Refills: 0 | DISCHARGE

## 2025-01-01 RX ORDER — PIPERACILLIN-TAZO-DEXTROSE,ISO 3.375G/5
3.38 IV SOLUTION, PIGGYBACK PREMIX FROZEN(ML) INTRAVENOUS EVERY 8 HOURS
Refills: 0 | Status: DISCONTINUED | OUTPATIENT
Start: 2025-01-01 | End: 2025-01-01

## 2025-01-01 RX ORDER — COLLAGENASE CLOSTRIDIUM HIST. 250 UNIT/G
0 OINTMENT (GRAM) TOPICAL
Refills: 0 | DISCHARGE

## 2025-01-01 RX ORDER — SODIUM BICARBONATE 1 MEQ/ML
50 SYRINGE (ML) INTRAVENOUS ONCE
Refills: 0 | Status: COMPLETED | OUTPATIENT
Start: 2025-01-01 | End: 2025-01-01

## 2025-01-01 RX ORDER — LORAZEPAM 4 MG/ML
0.25 VIAL (ML) INJECTION ONCE
Refills: 0 | Status: DISCONTINUED | OUTPATIENT
Start: 2025-01-01 | End: 2025-01-01

## 2025-01-01 RX ORDER — MIDODRINE HYDROCHLORIDE 5 MG/1
10 TABLET ORAL EVERY 8 HOURS
Refills: 0 | Status: DISCONTINUED | OUTPATIENT
Start: 2025-01-01 | End: 2025-01-01

## 2025-01-01 RX ORDER — LOPERAMIDE HCL 1 MG/7.5ML
5 SOLUTION ORAL EVERY 6 HOURS
Refills: 0 | Status: DISCONTINUED | OUTPATIENT
Start: 2025-01-01 | End: 2025-01-01

## 2025-01-01 RX ORDER — LOPERAMIDE HCL 1 MG/7.5ML
2 SOLUTION ORAL
Refills: 0 | DISCHARGE

## 2025-01-01 RX ORDER — LIDOCAINE HYDROCHLORIDE 20 MG/ML
1 JELLY TOPICAL
Refills: 0 | DISCHARGE
Start: 2025-01-01 | End: 2025-01-01

## 2025-01-01 RX ORDER — DEXMEDETOMIDINE HYDROCHLORIDE IN SODIUM CHLORIDE 4 UG/ML
0.5 INJECTION INTRAVENOUS
Qty: 400 | Refills: 0 | Status: DISCONTINUED | OUTPATIENT
Start: 2025-01-01 | End: 2025-01-01

## 2025-01-01 RX ORDER — METOPROLOL SUCCINATE 50 MG/1
1 TABLET, EXTENDED RELEASE ORAL
Refills: 0 | DISCHARGE

## 2025-01-01 RX ORDER — FUROSEMIDE 10 MG/ML
80 INJECTION INTRAMUSCULAR; INTRAVENOUS ONCE
Refills: 0 | Status: COMPLETED | OUTPATIENT
Start: 2025-01-01 | End: 2025-01-01

## 2025-01-01 RX ORDER — ALBUMIN (HUMAN) 12.5 G/50ML
250 INJECTION, SOLUTION INTRAVENOUS EVERY 8 HOURS
Refills: 0 | Status: COMPLETED | OUTPATIENT
Start: 2025-01-01 | End: 2025-01-01

## 2025-01-01 RX ORDER — ONDANSETRON HCL/PF 4 MG/2 ML
4 VIAL (ML) INJECTION ONCE
Refills: 0 | Status: COMPLETED | OUTPATIENT
Start: 2025-01-01 | End: 2025-01-01

## 2025-01-01 RX ORDER — MAGNESIUM SULFATE 500 MG/ML
2 SYRINGE (ML) INJECTION
Refills: 0 | Status: COMPLETED | OUTPATIENT
Start: 2025-01-01 | End: 2025-01-01

## 2025-01-01 RX ORDER — BISACODYL 5 MG
1 TABLET, DELAYED RELEASE (ENTERIC COATED) ORAL
Refills: 0 | DISCHARGE

## 2025-01-01 RX ORDER — ALBUMIN (HUMAN) 12.5 G/50ML
250 INJECTION, SOLUTION INTRAVENOUS EVERY 12 HOURS
Refills: 0 | Status: DISCONTINUED | OUTPATIENT
Start: 2025-01-01 | End: 2025-01-01

## 2025-01-01 RX ORDER — METOCLOPRAMIDE HCL 10 MG
5 TABLET ORAL THREE TIMES A DAY
Refills: 0 | Status: DISCONTINUED | OUTPATIENT
Start: 2025-01-01 | End: 2025-01-01

## 2025-01-01 RX ORDER — ONDANSETRON HCL/PF 4 MG/2 ML
1 VIAL (ML) INJECTION
Refills: 0 | DISCHARGE

## 2025-01-01 RX ORDER — LEVALBUTEROL HYDROCHLORIDE 1.25 MG/3ML
3 SOLUTION RESPIRATORY (INHALATION)
Refills: 0 | DISCHARGE

## 2025-01-01 RX ORDER — CEFEPIME 2 G/20ML
2000 INJECTION, POWDER, FOR SOLUTION INTRAVENOUS ONCE
Refills: 0 | Status: COMPLETED | OUTPATIENT
Start: 2025-01-01 | End: 2025-01-01

## 2025-01-01 RX ORDER — SODIUM BICARBONATE 1 MEQ/ML
0.17 SYRINGE (ML) INTRAVENOUS
Qty: 150 | Refills: 0 | Status: DISCONTINUED | OUTPATIENT
Start: 2025-01-01 | End: 2025-01-01

## 2025-01-01 RX ORDER — LORAZEPAM 4 MG/ML
0.5 VIAL (ML) INJECTION EVERY 6 HOURS
Refills: 0 | Status: DISCONTINUED | OUTPATIENT
Start: 2025-01-01 | End: 2025-01-01

## 2025-01-01 RX ORDER — OMEPRAZOLE 20 MG/1
1 CAPSULE, DELAYED RELEASE ORAL
Refills: 0 | DISCHARGE

## 2025-01-01 RX ORDER — NYSTATIN 100000 [USP'U]/G
1 CREAM TOPICAL
Refills: 0 | Status: DISCONTINUED | OUTPATIENT
Start: 2025-01-01 | End: 2025-01-01

## 2025-01-01 RX ORDER — LOPERAMIDE HCL 1 MG/7.5ML
2 SOLUTION ORAL EVERY 6 HOURS
Refills: 0 | Status: DISCONTINUED | OUTPATIENT
Start: 2025-01-01 | End: 2025-01-01

## 2025-01-01 RX ORDER — FUROSEMIDE 10 MG/ML
40 INJECTION INTRAMUSCULAR; INTRAVENOUS ONCE
Refills: 0 | Status: COMPLETED | OUTPATIENT
Start: 2025-01-01 | End: 2025-01-01

## 2025-01-01 RX ORDER — HEPARIN SODIUM 1000 [USP'U]/ML
5000 INJECTION INTRAVENOUS; SUBCUTANEOUS EVERY 8 HOURS
Refills: 0 | Status: DISCONTINUED | OUTPATIENT
Start: 2025-01-01 | End: 2025-01-01

## 2025-01-01 RX ORDER — CALCIUM CARBONATE 750 MG/1
2 TABLET ORAL
Refills: 0 | DISCHARGE

## 2025-01-01 RX ADMIN — Medication 1 APPLICATION(S): at 05:53

## 2025-01-01 RX ADMIN — Medication 25 GRAM(S): at 14:29

## 2025-01-01 RX ADMIN — NOREPINEPHRINE BITARTRATE 6.32 MICROGRAM(S)/KG/MIN: 8 SOLUTION at 22:23

## 2025-01-01 RX ADMIN — Medication 5 MILLIGRAM(S): at 21:35

## 2025-01-01 RX ADMIN — Medication 250 MILLIGRAM(S): at 11:05

## 2025-01-01 RX ADMIN — Medication 100 MILLIEQUIVALENT(S): at 22:45

## 2025-01-01 RX ADMIN — Medication 400 MILLIGRAM(S): at 17:18

## 2025-01-01 RX ADMIN — ALBUMIN (HUMAN) 50 MILLILITER(S): 12.5 INJECTION, SOLUTION INTRAVENOUS at 23:51

## 2025-01-01 RX ADMIN — Medication 5 MILLIGRAM(S): at 05:50

## 2025-01-01 RX ADMIN — Medication 75 MEQ/KG/HR: at 14:14

## 2025-01-01 RX ADMIN — SODIUM CHLORIDE 75 MILLILITER(S): 9 INJECTION, SOLUTION INTRAVENOUS at 22:07

## 2025-01-01 RX ADMIN — Medication 75 MILLILITER(S): at 23:04

## 2025-01-01 RX ADMIN — MIDODRINE HYDROCHLORIDE 10 MILLIGRAM(S): 5 TABLET ORAL at 05:38

## 2025-01-01 RX ADMIN — Medication 40 MILLIGRAM(S): at 10:17

## 2025-01-01 RX ADMIN — HEPARIN SODIUM 5000 UNIT(S): 1000 INJECTION INTRAVENOUS; SUBCUTANEOUS at 21:34

## 2025-01-01 RX ADMIN — Medication 0.25 MILLIGRAM(S): at 06:10

## 2025-01-01 RX ADMIN — SODIUM CHLORIDE 75 MILLILITER(S): 9 INJECTION, SOLUTION INTRAVENOUS at 21:35

## 2025-01-01 RX ADMIN — Medication 400 MILLIGRAM(S): at 21:13

## 2025-01-01 RX ADMIN — NYSTATIN 1 APPLICATION(S): 100000 CREAM TOPICAL at 22:25

## 2025-01-01 RX ADMIN — Medication 100 MILLIEQUIVALENT(S): at 09:56

## 2025-01-01 RX ADMIN — Medication 25 GRAM(S): at 05:53

## 2025-01-01 RX ADMIN — Medication 1 APPLICATION(S): at 06:18

## 2025-01-01 RX ADMIN — NOREPINEPHRINE BITARTRATE 6.32 MICROGRAM(S)/KG/MIN: 8 SOLUTION at 11:30

## 2025-01-01 RX ADMIN — Medication 100 MILLIEQUIVALENT(S): at 11:24

## 2025-01-01 RX ADMIN — MIDODRINE HYDROCHLORIDE 10 MILLIGRAM(S): 5 TABLET ORAL at 22:03

## 2025-01-01 RX ADMIN — NYSTATIN 1 APPLICATION(S): 100000 CREAM TOPICAL at 09:10

## 2025-01-01 RX ADMIN — Medication 40 MILLIGRAM(S): at 08:50

## 2025-01-01 RX ADMIN — ALBUMIN (HUMAN) 50 MILLILITER(S): 12.5 INJECTION, SOLUTION INTRAVENOUS at 06:13

## 2025-01-01 RX ADMIN — Medication 40 MILLIGRAM(S): at 10:00

## 2025-01-01 RX ADMIN — HEPARIN SODIUM 5000 UNIT(S): 1000 INJECTION INTRAVENOUS; SUBCUTANEOUS at 05:37

## 2025-01-01 RX ADMIN — MEROPENEM 500 MILLIGRAM(S): 1 INJECTION INTRAVENOUS at 08:50

## 2025-01-01 RX ADMIN — Medication 25 GRAM(S): at 05:50

## 2025-01-01 RX ADMIN — Medication 400 MILLIGRAM(S): at 05:36

## 2025-01-01 RX ADMIN — Medication 5 MILLIGRAM(S): at 05:31

## 2025-01-01 RX ADMIN — HEPARIN SODIUM 5000 UNIT(S): 1000 INJECTION INTRAVENOUS; SUBCUTANEOUS at 21:50

## 2025-01-01 RX ADMIN — Medication 0.25 MILLIGRAM(S): at 06:26

## 2025-01-01 RX ADMIN — Medication 1000 MILLIGRAM(S): at 00:25

## 2025-01-01 RX ADMIN — FUROSEMIDE 40 MILLIGRAM(S): 10 INJECTION INTRAMUSCULAR; INTRAVENOUS at 05:36

## 2025-01-01 RX ADMIN — HEPARIN SODIUM 5000 UNIT(S): 1000 INJECTION INTRAVENOUS; SUBCUTANEOUS at 17:43

## 2025-01-01 RX ADMIN — Medication 40 MILLIGRAM(S): at 09:05

## 2025-01-01 RX ADMIN — SODIUM CHLORIDE 125 MILLILITER(S): 9 INJECTION, SOLUTION INTRAVENOUS at 18:51

## 2025-01-01 RX ADMIN — MEROPENEM 500 MILLIGRAM(S): 1 INJECTION INTRAVENOUS at 10:17

## 2025-01-01 RX ADMIN — Medication 400 MILLIGRAM(S): at 21:33

## 2025-01-01 RX ADMIN — Medication 25 GRAM(S): at 06:22

## 2025-01-01 RX ADMIN — Medication 100 MILLIEQUIVALENT(S): at 12:37

## 2025-01-01 RX ADMIN — HEPARIN SODIUM 5000 UNIT(S): 1000 INJECTION INTRAVENOUS; SUBCUTANEOUS at 21:39

## 2025-01-01 RX ADMIN — MEROPENEM 500 MILLIGRAM(S): 1 INJECTION INTRAVENOUS at 09:09

## 2025-01-01 RX ADMIN — Medication 40 MILLIGRAM(S): at 10:43

## 2025-01-01 RX ADMIN — Medication 40 MILLIGRAM(S): at 11:06

## 2025-01-01 RX ADMIN — Medication 4 MILLIGRAM(S): at 14:53

## 2025-01-01 RX ADMIN — Medication 1 APPLICATION(S): at 05:52

## 2025-01-01 RX ADMIN — Medication 1 APPLICATION(S): at 05:38

## 2025-01-01 RX ADMIN — NOREPINEPHRINE BITARTRATE 6.32 MICROGRAM(S)/KG/MIN: 8 SOLUTION at 05:22

## 2025-01-01 RX ADMIN — ALBUMIN (HUMAN) 125 MILLILITER(S): 12.5 INJECTION, SOLUTION INTRAVENOUS at 05:38

## 2025-01-01 RX ADMIN — ALBUMIN (HUMAN) 125 MILLILITER(S): 12.5 INJECTION, SOLUTION INTRAVENOUS at 21:50

## 2025-01-01 RX ADMIN — NYSTATIN 1 APPLICATION(S): 100000 CREAM TOPICAL at 09:00

## 2025-01-01 RX ADMIN — Medication 1000 MILLIGRAM(S): at 04:20

## 2025-01-01 RX ADMIN — Medication 400 MILLIGRAM(S): at 06:31

## 2025-01-01 RX ADMIN — SODIUM CHLORIDE 75 MILLILITER(S): 9 INJECTION, SOLUTION INTRAVENOUS at 11:09

## 2025-01-01 RX ADMIN — SODIUM CHLORIDE 125 MILLILITER(S): 9 INJECTION, SOLUTION INTRAVENOUS at 11:10

## 2025-01-01 RX ADMIN — Medication 1000 MILLIGRAM(S): at 12:35

## 2025-01-01 RX ADMIN — Medication 25 GRAM(S): at 21:50

## 2025-01-01 RX ADMIN — LOPERAMIDE HCL 2 MILLIGRAM(S): 1 SOLUTION ORAL at 05:38

## 2025-01-01 RX ADMIN — Medication 1000 MILLILITER(S): at 11:20

## 2025-01-01 RX ADMIN — Medication 5 MILLIGRAM(S): at 21:24

## 2025-01-01 RX ADMIN — Medication 400 MILLIGRAM(S): at 06:22

## 2025-01-01 RX ADMIN — SODIUM CHLORIDE 75 MILLILITER(S): 9 INJECTION, SOLUTION INTRAVENOUS at 14:31

## 2025-01-01 RX ADMIN — Medication 1 APPLICATION(S): at 05:10

## 2025-01-01 RX ADMIN — Medication 100 MILLIEQUIVALENT(S): at 10:43

## 2025-01-01 RX ADMIN — ALBUMIN (HUMAN) 125 MILLILITER(S): 12.5 INJECTION, SOLUTION INTRAVENOUS at 06:27

## 2025-01-01 RX ADMIN — Medication 40 MILLIGRAM(S): at 17:37

## 2025-01-01 RX ADMIN — Medication 25 GRAM(S): at 21:34

## 2025-01-01 RX ADMIN — NYSTATIN 1 APPLICATION(S): 100000 CREAM TOPICAL at 21:23

## 2025-01-01 RX ADMIN — LOPERAMIDE HCL 4 MILLIGRAM(S): 1 SOLUTION ORAL at 14:32

## 2025-01-01 RX ADMIN — Medication 40 MILLIGRAM(S): at 09:36

## 2025-01-01 RX ADMIN — NYSTATIN 1 APPLICATION(S): 100000 CREAM TOPICAL at 10:18

## 2025-01-01 RX ADMIN — SODIUM CHLORIDE 500 MILLILITER(S): 9 INJECTION, SOLUTION INTRAVENOUS at 18:51

## 2025-01-01 RX ADMIN — Medication 75 MEQ/KG/HR: at 09:52

## 2025-01-01 RX ADMIN — Medication 100 MILLIEQUIVALENT(S): at 21:37

## 2025-01-01 RX ADMIN — NYSTATIN 1 APPLICATION(S): 100000 CREAM TOPICAL at 09:35

## 2025-01-01 RX ADMIN — CEFEPIME 2000 MILLIGRAM(S): 2 INJECTION, POWDER, FOR SOLUTION INTRAVENOUS at 11:00

## 2025-01-01 RX ADMIN — Medication 1000 MILLIGRAM(S): at 09:00

## 2025-01-01 RX ADMIN — Medication 100 MILLIEQUIVALENT(S): at 11:39

## 2025-01-01 RX ADMIN — Medication 400 MILLIGRAM(S): at 22:55

## 2025-01-01 RX ADMIN — FUROSEMIDE 80 MILLIGRAM(S): 10 INJECTION INTRAMUSCULAR; INTRAVENOUS at 06:15

## 2025-01-01 RX ADMIN — Medication 1000 MILLIGRAM(S): at 06:10

## 2025-01-01 RX ADMIN — Medication 400 MILLIGRAM(S): at 23:39

## 2025-01-01 RX ADMIN — Medication 1 APPLICATION(S): at 05:20

## 2025-01-01 RX ADMIN — Medication 0.5 MILLIGRAM(S): at 23:03

## 2025-01-01 RX ADMIN — Medication 40 MILLIGRAM(S): at 09:54

## 2025-01-01 RX ADMIN — Medication 400 MILLIGRAM(S): at 10:41

## 2025-01-01 RX ADMIN — NOREPINEPHRINE BITARTRATE 6.32 MICROGRAM(S)/KG/MIN: 8 SOLUTION at 23:36

## 2025-01-01 RX ADMIN — Medication 25 GRAM(S): at 23:10

## 2025-01-01 RX ADMIN — Medication 1000 MILLIGRAM(S): at 22:00

## 2025-01-01 RX ADMIN — NOREPINEPHRINE BITARTRATE 6.32 MICROGRAM(S)/KG/MIN: 8 SOLUTION at 08:29

## 2025-01-01 RX ADMIN — Medication 1 APPLICATION(S): at 06:17

## 2025-01-01 RX ADMIN — MIDODRINE HYDROCHLORIDE 10 MILLIGRAM(S): 5 TABLET ORAL at 22:56

## 2025-01-01 RX ADMIN — Medication 50 MILLIEQUIVALENT(S): at 11:45

## 2025-01-01 RX ADMIN — SODIUM CHLORIDE 75 MILLILITER(S): 9 INJECTION, SOLUTION INTRAVENOUS at 11:19

## 2025-01-01 RX ADMIN — Medication 25 GRAM(S): at 06:19

## 2025-01-01 RX ADMIN — Medication 0.5 MILLIGRAM(S): at 16:21

## 2025-01-01 RX ADMIN — HEPARIN SODIUM 5000 UNIT(S): 1000 INJECTION INTRAVENOUS; SUBCUTANEOUS at 15:33

## 2025-01-01 RX ADMIN — Medication 25 GRAM(S): at 21:39

## 2025-01-01 RX ADMIN — MEROPENEM 500 MILLIGRAM(S): 1 INJECTION INTRAVENOUS at 09:55

## 2025-01-01 RX ADMIN — Medication 25 GRAM(S): at 05:36

## 2025-01-01 RX ADMIN — SODIUM CHLORIDE 75 MILLILITER(S): 9 INJECTION, SOLUTION INTRAVENOUS at 22:03

## 2025-01-01 RX ADMIN — MEROPENEM 500 MILLIGRAM(S): 1 INJECTION INTRAVENOUS at 21:35

## 2025-01-01 RX ADMIN — SODIUM CHLORIDE 75 MILLILITER(S): 9 INJECTION, SOLUTION INTRAVENOUS at 11:37

## 2025-01-01 RX ADMIN — Medication 100 MILLIEQUIVALENT(S): at 09:05

## 2025-01-01 RX ADMIN — ALBUMIN (HUMAN) 125 MILLILITER(S): 12.5 INJECTION, SOLUTION INTRAVENOUS at 14:53

## 2025-01-01 RX ADMIN — Medication 5 MILLIGRAM(S): at 11:42

## 2025-01-01 RX ADMIN — SODIUM CHLORIDE 1000 MILLILITER(S): 9 INJECTION, SOLUTION INTRAVENOUS at 22:13

## 2025-01-01 RX ADMIN — Medication 400 MILLIGRAM(S): at 12:03

## 2025-01-01 RX ADMIN — Medication 75 MILLILITER(S): at 11:20

## 2025-01-01 RX ADMIN — Medication 100 MILLIEQUIVALENT(S): at 10:50

## 2025-01-01 RX ADMIN — Medication 40 MILLIGRAM(S): at 11:04

## 2025-01-01 RX ADMIN — DEXMEDETOMIDINE HYDROCHLORIDE IN SODIUM CHLORIDE 8.43 MICROGRAM(S)/KG/HR: 4 INJECTION INTRAVENOUS at 16:53

## 2025-01-01 RX ADMIN — ALBUMIN (HUMAN) 125 MILLILITER(S): 12.5 INJECTION, SOLUTION INTRAVENOUS at 11:05

## 2025-01-01 RX ADMIN — HEPARIN SODIUM 5000 UNIT(S): 1000 INJECTION INTRAVENOUS; SUBCUTANEOUS at 14:32

## 2025-01-01 RX ADMIN — LOPERAMIDE HCL 4 MILLIGRAM(S): 1 SOLUTION ORAL at 05:53

## 2025-01-01 RX ADMIN — MEROPENEM 500 MILLIGRAM(S): 1 INJECTION INTRAVENOUS at 22:07

## 2025-01-01 RX ADMIN — NYSTATIN 1 APPLICATION(S): 100000 CREAM TOPICAL at 22:03

## 2025-01-01 RX ADMIN — ALBUMIN (HUMAN) 125 MILLILITER(S): 12.5 INJECTION, SOLUTION INTRAVENOUS at 13:20

## 2025-01-01 RX ADMIN — Medication 1 APPLICATION(S): at 05:39

## 2025-01-01 RX ADMIN — Medication 0.25 MILLIGRAM(S): at 16:50

## 2025-01-01 RX ADMIN — Medication 400 MILLIGRAM(S): at 03:50

## 2025-01-01 RX ADMIN — MEROPENEM 500 MILLIGRAM(S): 1 INJECTION INTRAVENOUS at 13:20

## 2025-01-01 RX ADMIN — Medication 0.5 MILLIGRAM(S): at 11:06

## 2025-01-01 RX ADMIN — MEROPENEM 500 MILLIGRAM(S): 1 INJECTION INTRAVENOUS at 22:56

## 2025-01-01 RX ADMIN — Medication 100 MILLIEQUIVALENT(S): at 10:49

## 2025-01-01 RX ADMIN — Medication 400 MILLIGRAM(S): at 13:50

## 2025-01-01 RX ADMIN — SODIUM CHLORIDE 75 MILLILITER(S): 9 INJECTION, SOLUTION INTRAVENOUS at 09:59

## 2025-01-01 RX ADMIN — MEROPENEM 500 MILLIGRAM(S): 1 INJECTION INTRAVENOUS at 22:03

## 2025-01-01 RX ADMIN — Medication 1 APPLICATION(S): at 05:31

## 2025-01-01 RX ADMIN — MEROPENEM 500 MILLIGRAM(S): 1 INJECTION INTRAVENOUS at 09:36

## 2025-01-01 RX ADMIN — ALBUMIN (HUMAN) 125 MILLILITER(S): 12.5 INJECTION, SOLUTION INTRAVENOUS at 22:02

## 2025-01-01 RX ADMIN — NOREPINEPHRINE BITARTRATE 6.32 MICROGRAM(S)/KG/MIN: 8 SOLUTION at 23:15

## 2025-01-01 RX ADMIN — HEPARIN SODIUM 5000 UNIT(S): 1000 INJECTION INTRAVENOUS; SUBCUTANEOUS at 06:22

## 2025-01-01 RX ADMIN — Medication 100 MILLIEQUIVALENT(S): at 23:45

## 2025-01-01 RX ADMIN — MIDODRINE HYDROCHLORIDE 10 MILLIGRAM(S): 5 TABLET ORAL at 06:25

## 2025-01-01 RX ADMIN — Medication 75 MEQ/KG/HR: at 22:29

## 2025-01-01 RX ADMIN — MEROPENEM 500 MILLIGRAM(S): 1 INJECTION INTRAVENOUS at 10:18

## 2025-01-01 RX ADMIN — Medication 25 GRAM(S): at 16:16

## 2025-01-01 RX ADMIN — NYSTATIN 1 APPLICATION(S): 100000 CREAM TOPICAL at 21:36

## 2025-01-01 RX ADMIN — Medication 1000 MILLIGRAM(S): at 18:00

## 2025-01-01 RX ADMIN — Medication 400 MILLIGRAM(S): at 08:12

## 2025-01-01 RX ADMIN — ALBUMIN (HUMAN) 125 MILLILITER(S): 12.5 INJECTION, SOLUTION INTRAVENOUS at 23:13

## 2025-01-01 RX ADMIN — Medication 400 MILLIGRAM(S): at 13:04

## 2025-01-01 RX ADMIN — Medication 1 APPLICATION(S): at 17:41

## 2025-01-01 RX ADMIN — SODIUM CHLORIDE 150 MILLILITER(S): 9 INJECTION, SOLUTION INTRAVENOUS at 02:37

## 2025-01-01 RX ADMIN — Medication 1 APPLICATION(S): at 06:25

## 2025-01-01 RX ADMIN — ALBUMIN (HUMAN) 125 MILLILITER(S): 12.5 INJECTION, SOLUTION INTRAVENOUS at 00:57

## 2025-01-01 RX ADMIN — ALBUMIN (HUMAN) 50 MILLILITER(S): 12.5 INJECTION, SOLUTION INTRAVENOUS at 13:40

## 2025-01-01 RX ADMIN — Medication 1 APPLICATION(S): at 06:23

## 2025-01-01 RX ADMIN — Medication 0.25 MILLIGRAM(S): at 01:56

## 2025-01-01 RX ADMIN — Medication 25 GRAM(S): at 22:05

## 2025-01-01 RX ADMIN — Medication 400 MILLIGRAM(S): at 23:55

## 2025-01-01 RX ADMIN — Medication 25 GRAM(S): at 22:40

## 2025-01-01 RX ADMIN — MEROPENEM 500 MILLIGRAM(S): 1 INJECTION INTRAVENOUS at 21:24

## 2025-01-01 RX ADMIN — HEPARIN SODIUM 5000 UNIT(S): 1000 INJECTION INTRAVENOUS; SUBCUTANEOUS at 16:06

## 2025-01-01 RX ADMIN — Medication 25 GRAM(S): at 15:32

## 2025-01-01 RX ADMIN — Medication 0.25 MILLIGRAM(S): at 14:17

## 2025-01-01 RX ADMIN — Medication 1000 MILLIGRAM(S): at 07:01

## 2025-01-01 RX ADMIN — Medication 400 MILLIGRAM(S): at 11:21

## 2025-01-01 RX ADMIN — Medication 40 MILLIGRAM(S): at 09:09

## 2025-01-01 RX ADMIN — Medication 100 MILLIEQUIVALENT(S): at 09:31

## 2025-01-01 RX ADMIN — Medication 25 GRAM(S): at 16:07

## 2025-01-01 RX ADMIN — Medication 1000 MILLIGRAM(S): at 11:50

## 2025-01-01 RX ADMIN — MEROPENEM 500 MILLIGRAM(S): 1 INJECTION INTRAVENOUS at 22:25

## 2025-01-01 RX ADMIN — Medication 40 MILLIGRAM(S): at 10:18

## 2025-01-01 RX ADMIN — LOPERAMIDE HCL 4 MILLIGRAM(S): 1 SOLUTION ORAL at 23:03

## 2025-01-01 RX ADMIN — MIDODRINE HYDROCHLORIDE 10 MILLIGRAM(S): 5 TABLET ORAL at 14:30

## 2025-01-01 RX ADMIN — Medication 25 GRAM(S): at 21:35

## 2025-01-03 ENCOUNTER — APPOINTMENT (OUTPATIENT)
Dept: OBGYN | Facility: CLINIC | Age: 70
End: 2025-01-03

## 2025-01-03 VITALS
BODY MASS INDEX: 25.61 KG/M2 | HEART RATE: 102 BPM | HEIGHT: 64 IN | WEIGHT: 150 LBS | SYSTOLIC BLOOD PRESSURE: 145 MMHG | DIASTOLIC BLOOD PRESSURE: 87 MMHG

## 2025-01-03 DIAGNOSIS — Z01.419 ENCOUNTER FOR GYNECOLOGICAL EXAMINATION (GENERAL) (ROUTINE) W/OUT ABNORMAL FINDINGS: ICD-10-CM

## 2025-01-03 DIAGNOSIS — Z12.4 ENCOUNTER FOR SCREENING FOR MALIGNANT NEOPLASM OF CERVIX: ICD-10-CM

## 2025-01-03 DIAGNOSIS — Z12.31 ENCOUNTER FOR SCREENING MAMMOGRAM FOR MALIGNANT NEOPLASM OF BREAST: ICD-10-CM

## 2025-01-03 DIAGNOSIS — Z12.11 ENCOUNTER FOR SCREENING FOR MALIGNANT NEOPLASM OF COLON: ICD-10-CM

## 2025-01-03 DIAGNOSIS — N95.2 POSTMENOPAUSAL ATROPHIC VAGINITIS: ICD-10-CM

## 2025-01-03 PROCEDURE — 82270 OCCULT BLOOD FECES: CPT

## 2025-01-03 PROCEDURE — G0101: CPT

## 2025-01-04 LAB
ALBUMIN SERPL ELPH-MCNC: 4 G/DL
ALP BLD-CCNC: 76 U/L
ALT SERPL-CCNC: 27 U/L
ANION GAP SERPL CALC-SCNC: 13 MMOL/L
APPEARANCE: CLEAR
AST SERPL-CCNC: 25 U/L
BACTERIA: NEGATIVE /HPF
BASOPHILS # BLD AUTO: 0.04 K/UL
BASOPHILS NFR BLD AUTO: 0.6 %
BILIRUB SERPL-MCNC: 0.3 MG/DL
BILIRUBIN URINE: NEGATIVE
BLOOD URINE: NEGATIVE
BUN SERPL-MCNC: 31 MG/DL
CALCIUM SERPL-MCNC: 10.4 MG/DL
CAST: 1 /LPF
CHLORIDE SERPL-SCNC: 105 MMOL/L
CO2 SERPL-SCNC: 23 MMOL/L
COLOR: YELLOW
CREAT SERPL-MCNC: 1.22 MG/DL
EGFR: 48 ML/MIN/1.73M2
EOSINOPHIL # BLD AUTO: 0.25 K/UL
EOSINOPHIL NFR BLD AUTO: 3.5 %
EPITHELIAL CELLS: 3 /HPF
GLUCOSE QUALITATIVE U: NEGATIVE MG/DL
GLUCOSE SERPL-MCNC: 106 MG/DL
HCT VFR BLD CALC: 29.5 %
HGB BLD-MCNC: 9.9 G/DL
IMM GRANULOCYTES NFR BLD AUTO: 0.7 %
KETONES URINE: NEGATIVE MG/DL
LEUKOCYTE ESTERASE URINE: ABNORMAL
LYMPHOCYTES # BLD AUTO: 1.05 K/UL
LYMPHOCYTES NFR BLD AUTO: 14.6 %
MAN DIFF?: NORMAL
MCHC RBC-ENTMCNC: 31.9 PG
MCHC RBC-ENTMCNC: 33.6 G/DL
MCV RBC AUTO: 95.2 FL
MICROSCOPIC-UA: NORMAL
MONOCYTES # BLD AUTO: 0.54 K/UL
MONOCYTES NFR BLD AUTO: 7.5 %
NEUTROPHILS # BLD AUTO: 5.26 K/UL
NEUTROPHILS NFR BLD AUTO: 73.1 %
NITRITE URINE: NEGATIVE
PH URINE: 5.5
PLATELET # BLD AUTO: 275 K/UL
POTASSIUM SERPL-SCNC: 4.3 MMOL/L
PROT SERPL-MCNC: 6.9 G/DL
PROTEIN URINE: 30 MG/DL
RBC # BLD: 3.1 M/UL
RBC # FLD: 14.6 %
RED BLOOD CELLS URINE: 0 /HPF
REVIEW: NORMAL
SODIUM SERPL-SCNC: 141 MMOL/L
SPECIFIC GRAVITY URINE: 1.02
UROBILINOGEN URINE: 0.2 MG/DL
WBC # FLD AUTO: 7.19 K/UL
WHITE BLOOD CELLS URINE: 2 /HPF

## 2025-01-08 LAB — CYTOLOGY CVX/VAG DOC THIN PREP: ABNORMAL

## 2025-01-15 PROBLEM — N81.10 CYSTOCELE, UNSPECIFIED: Status: ACTIVE | Noted: 2025-01-15

## 2025-02-04 NOTE — ED ADULT NURSE NOTE - DOES PATIENT HAVE ADVANCE DIRECTIVE
Arterial Line      Patient reassessed immediately prior to procedure    Start time: 2/4/2025 7:10 AM  Stop Time:2/4/2025 7:14 AM       Line placed for hemodynamic monitoring and ABGs/Labs/ISTAT.  Performed By   Anesthesiologist: Jaye Ny MD   Preanesthetic Checklist  Completed: patient identified, IV checked, site marked, risks and benefits discussed, surgical consent, monitors and equipment checked, pre-op evaluation and timeout performed  Arterial Line Prep    Sterile Tech: gloves, mask and cap  Prep: ChloraPrep  Patient monitoring: blood pressure monitoring, continuous pulse oximetry and EKG  Arterial Line Procedure   Laterality:left  Location:  radial artery  Catheter size: 20 G   Guidance: palpation technique  Number of attempts: 1  Successful placement: yes   Post Assessment   Dressing Type: secured with tape and occlusive dressing applied.   Complications no  Circ/Move/Sens Assessment: normal and unchanged.   Patient Tolerance: patient tolerated the procedure well with no apparent complications             No

## 2025-02-08 ENCOUNTER — INPATIENT (INPATIENT)
Facility: HOSPITAL | Age: 70
LOS: 3 days | Discharge: NURSING, REHAB OR LONG-TERM CARE | DRG: 872 | End: 2025-02-12
Attending: FAMILY MEDICINE | Admitting: STUDENT IN AN ORGANIZED HEALTH CARE EDUCATION/TRAINING PROGRAM
Payer: MEDICARE

## 2025-02-08 VITALS
OXYGEN SATURATION: 98 % | DIASTOLIC BLOOD PRESSURE: 75 MMHG | RESPIRATION RATE: 16 BRPM | SYSTOLIC BLOOD PRESSURE: 123 MMHG | TEMPERATURE: 100 F | HEART RATE: 98 BPM | HEIGHT: 62 IN | WEIGHT: 134.92 LBS

## 2025-02-08 DIAGNOSIS — Z90.710 ACQUIRED ABSENCE OF BOTH CERVIX AND UTERUS: Chronic | ICD-10-CM

## 2025-02-08 LAB
ALBUMIN SERPL ELPH-MCNC: 1.7 G/DL — LOW (ref 3.3–5)
ALP SERPL-CCNC: 62 U/L — SIGNIFICANT CHANGE UP (ref 40–120)
ALT FLD-CCNC: 33 U/L — SIGNIFICANT CHANGE UP (ref 12–78)
ANION GAP SERPL CALC-SCNC: 10 MMOL/L — SIGNIFICANT CHANGE UP (ref 5–17)
AST SERPL-CCNC: 27 U/L — SIGNIFICANT CHANGE UP (ref 15–37)
BILIRUB SERPL-MCNC: 0.3 MG/DL — SIGNIFICANT CHANGE UP (ref 0.2–1.2)
BUN SERPL-MCNC: 32 MG/DL — HIGH (ref 7–23)
CALCIUM SERPL-MCNC: 8.5 MG/DL — SIGNIFICANT CHANGE UP (ref 8.5–10.1)
CHLORIDE SERPL-SCNC: 118 MMOL/L — HIGH (ref 96–108)
CK MB CFR SERPL CALC: <1 NG/ML — SIGNIFICANT CHANGE UP (ref 0–3.6)
CO2 SERPL-SCNC: 16 MMOL/L — LOW (ref 22–31)
CREAT SERPL-MCNC: 1.5 MG/DL — HIGH (ref 0.5–1.3)
EGFR: 37 ML/MIN/1.73M2 — LOW
GLUCOSE SERPL-MCNC: 167 MG/DL — HIGH (ref 70–99)
POTASSIUM SERPL-MCNC: 4.2 MMOL/L — SIGNIFICANT CHANGE UP (ref 3.5–5.3)
POTASSIUM SERPL-SCNC: 4.2 MMOL/L — SIGNIFICANT CHANGE UP (ref 3.5–5.3)
PROT SERPL-MCNC: 4.5 G/DL — LOW (ref 6–8.3)
SODIUM SERPL-SCNC: 144 MMOL/L — SIGNIFICANT CHANGE UP (ref 135–145)
TROPONIN I, HIGH SENSITIVITY RESULT: 41.5 NG/L — SIGNIFICANT CHANGE UP

## 2025-02-08 PROCEDURE — 99285 EMERGENCY DEPT VISIT HI MDM: CPT | Mod: FS

## 2025-02-08 PROCEDURE — 71045 X-RAY EXAM CHEST 1 VIEW: CPT | Mod: 26

## 2025-02-08 PROCEDURE — 93010 ELECTROCARDIOGRAM REPORT: CPT

## 2025-02-08 RX ORDER — ACETAMINOPHEN 160 MG/5ML
1000 SUSPENSION ORAL ONCE
Refills: 0 | Status: COMPLETED | OUTPATIENT
Start: 2025-02-08 | End: 2025-02-08

## 2025-02-08 RX ORDER — BACTERIOSTATIC SODIUM CHLORIDE 0.9 %
1550 VIAL (ML) INJECTION ONCE
Refills: 0 | Status: COMPLETED | OUTPATIENT
Start: 2025-02-08 | End: 2025-02-08

## 2025-02-08 RX ORDER — ONDANSETRON 4 MG/1
4 TABLET, ORALLY DISINTEGRATING ORAL ONCE
Refills: 0 | Status: COMPLETED | OUTPATIENT
Start: 2025-02-08 | End: 2025-02-08

## 2025-02-08 RX ORDER — PIPERACILLIN SODIUM AND TAZOBACTAM SODIUM 2; 250 G/50ML; MG/50ML
3.38 INJECTION, POWDER, FOR SOLUTION INTRAVENOUS ONCE
Refills: 0 | Status: COMPLETED | OUTPATIENT
Start: 2025-02-08 | End: 2025-02-08

## 2025-02-08 RX ADMIN — Medication 1550 MILLILITER(S): at 23:04

## 2025-02-08 RX ADMIN — ONDANSETRON 4 MILLIGRAM(S): 4 TABLET, ORALLY DISINTEGRATING ORAL at 23:05

## 2025-02-08 RX ADMIN — ACETAMINOPHEN 400 MILLIGRAM(S): 160 SUSPENSION ORAL at 23:04

## 2025-02-08 NOTE — ED ADULT TRIAGE NOTE - MODE OF ARRIVAL
Pt informed of uacs;  Awaiting urine culture.  Pt denies any urinary symptoms.  Pt would prefer not to have to take any further antibiotics.  Advised will contact when results available.    Pt asking if ok to have the wine at [facility].  Advised ok to do.  Advised on avoiding any other type of alcohol consumption during the pregnancy.  Pt aware and agrees.   EMS Ambulance

## 2025-02-08 NOTE — ED ADULT TRIAGE NOTE - BMI (KG/M2)
Emergency Department Telephone Encounter    Attempted to contact patient as a matter of routine follow-up to their recent ED visit where she was seen and evaluated for pelvic pain, diagnosed with PID. Cultures have returned positive for chlamydia. She is currently on doxycycline and flagyl BID x14 days, was given IM rocephin in department.  They were unreachable by phone at this time, a voicemail message was left instructing she call back for results.    Signed,  Lisa Springer PA-C     24.7

## 2025-02-08 NOTE — ED ADULT TRIAGE NOTE - CHIEF COMPLAINT QUOTE
Patient brought by ambulance from home as reported having vomiting, diarrhea on chemotherapy IV deena to left AC IV fluids infusing NS as per EMS BP was low at home noat at 123/75 Temp in .4

## 2025-02-08 NOTE — ED PROVIDER NOTE - CONSTITUTIONAL, MLM
ill and pale appearing, awake, alert, oriented to person, place, time/situation and in no apparent distress. dry mucous  membranes lips with lesions normal...

## 2025-02-08 NOTE — ED PROVIDER NOTE - OBJECTIVE STATEMENT
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   states patient goes for IV hydration every couple days last hydration was 2 days ago.  Patient has been receiving around-the-clock nausea meds Zofran and Reglan without relief.  Patient also receiving Imodium without any relief.  Patient denies any worsening pain noted to have a low-grade temperature in triage.  No upper respiratory symptoms no chest pain or shortness of breath no urinary symptoms.    Oncologist [facility]   PCP [place]
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
## 2025-02-08 NOTE — ED PROVIDER NOTE - CLINICAL SUMMARY MEDICAL DECISION MAKING FREE TEXT BOX
69-year-old female with recent diagnosis of pancreatic CA on chemo presents to the ED with complaints of vomiting and diarrhea, febrile in the ED.  Patient's  is a GI declining CT at this time.  Septic labs rule out viral versus bacterial etiology for symptoms.  IV fluids, antiemetics.  Patient ill-appearing in mild distress secondary to nausea and vomiting, dry mucous membranes.  Diffuse abdominal tenderness.  No abdominal distention.  Positive bowel sounds.

## 2025-02-09 DIAGNOSIS — A41.9 SEPSIS, UNSPECIFIED ORGANISM: ICD-10-CM

## 2025-02-09 DIAGNOSIS — Z29.9 ENCOUNTER FOR PROPHYLACTIC MEASURES, UNSPECIFIED: ICD-10-CM

## 2025-02-09 DIAGNOSIS — I63.9 CEREBRAL INFARCTION, UNSPECIFIED: ICD-10-CM

## 2025-02-09 DIAGNOSIS — K21.9 GASTRO-ESOPHAGEAL REFLUX DISEASE WITHOUT ESOPHAGITIS: ICD-10-CM

## 2025-02-09 DIAGNOSIS — Z98.890 OTHER SPECIFIED POSTPROCEDURAL STATES: Chronic | ICD-10-CM

## 2025-02-09 DIAGNOSIS — N17.9 ACUTE KIDNEY FAILURE, UNSPECIFIED: ICD-10-CM

## 2025-02-09 DIAGNOSIS — M10.9 GOUT, UNSPECIFIED: ICD-10-CM

## 2025-02-09 DIAGNOSIS — E78.5 HYPERLIPIDEMIA, UNSPECIFIED: ICD-10-CM

## 2025-02-09 DIAGNOSIS — I10 ESSENTIAL (PRIMARY) HYPERTENSION: ICD-10-CM

## 2025-02-09 DIAGNOSIS — D61.818 OTHER PANCYTOPENIA: ICD-10-CM

## 2025-02-09 DIAGNOSIS — C25.9 MALIGNANT NEOPLASM OF PANCREAS, UNSPECIFIED: ICD-10-CM

## 2025-02-09 DIAGNOSIS — R11.15 CYCLICAL VOMITING SYNDROME UNRELATED TO MIGRAINE: ICD-10-CM

## 2025-02-09 LAB
ALBUMIN SERPL ELPH-MCNC: 1.8 G/DL — LOW (ref 3.3–5)
ALP SERPL-CCNC: 69 U/L — SIGNIFICANT CHANGE UP (ref 40–120)
ALT FLD-CCNC: 33 U/L — SIGNIFICANT CHANGE UP (ref 12–78)
ANION GAP SERPL CALC-SCNC: 9 MMOL/L — SIGNIFICANT CHANGE UP (ref 5–17)
APPEARANCE UR: CLEAR — SIGNIFICANT CHANGE UP
APTT BLD: 20.8 SEC — LOW (ref 24.5–35.6)
AST SERPL-CCNC: 23 U/L — SIGNIFICANT CHANGE UP (ref 15–37)
BASOPHILS # BLD AUTO: 0 K/UL — SIGNIFICANT CHANGE UP (ref 0–0.2)
BASOPHILS # BLD AUTO: 0 K/UL — SIGNIFICANT CHANGE UP (ref 0–0.2)
BASOPHILS NFR BLD AUTO: 0 % — SIGNIFICANT CHANGE UP (ref 0–2)
BASOPHILS NFR BLD AUTO: 0 % — SIGNIFICANT CHANGE UP (ref 0–2)
BILIRUB SERPL-MCNC: 0.4 MG/DL — SIGNIFICANT CHANGE UP (ref 0.2–1.2)
BILIRUB UR-MCNC: NEGATIVE — SIGNIFICANT CHANGE UP
BUN SERPL-MCNC: 29 MG/DL — HIGH (ref 7–23)
C DIFF GDH STL QL: NEGATIVE — SIGNIFICANT CHANGE UP
C DIFF GDH STL QL: SIGNIFICANT CHANGE UP
CALCIUM SERPL-MCNC: 9.2 MG/DL — SIGNIFICANT CHANGE UP (ref 8.5–10.1)
CHLORIDE SERPL-SCNC: 120 MMOL/L — HIGH (ref 96–108)
CO2 SERPL-SCNC: 17 MMOL/L — LOW (ref 22–31)
COLOR SPEC: YELLOW — SIGNIFICANT CHANGE UP
CREAT SERPL-MCNC: 1.4 MG/DL — HIGH (ref 0.5–1.3)
DIFF PNL FLD: NEGATIVE — SIGNIFICANT CHANGE UP
EGFR: 40 ML/MIN/1.73M2 — LOW
EOSINOPHIL # BLD AUTO: 0 K/UL — SIGNIFICANT CHANGE UP (ref 0–0.5)
EOSINOPHIL # BLD AUTO: 0.01 K/UL — SIGNIFICANT CHANGE UP (ref 0–0.5)
EOSINOPHIL NFR BLD AUTO: 0 % — SIGNIFICANT CHANGE UP (ref 0–6)
EOSINOPHIL NFR BLD AUTO: 5.3 % — SIGNIFICANT CHANGE UP (ref 0–6)
FLUAV AG NPH QL: SIGNIFICANT CHANGE UP
FLUBV AG NPH QL: SIGNIFICANT CHANGE UP
GI PCR PANEL: SIGNIFICANT CHANGE UP
GLUCOSE SERPL-MCNC: 125 MG/DL — HIGH (ref 70–99)
GLUCOSE UR QL: 100 MG/DL
GRAM STN FLD: ABNORMAL
GRAM STN FLD: ABNORMAL
HCT VFR BLD CALC: 28.4 % — LOW (ref 34.5–45)
HCT VFR BLD CALC: 31.5 % — LOW (ref 34.5–45)
HGB BLD-MCNC: 10.1 G/DL — LOW (ref 11.5–15.5)
HGB BLD-MCNC: 8.9 G/DL — LOW (ref 11.5–15.5)
IMM GRANULOCYTES NFR BLD AUTO: 0 % — SIGNIFICANT CHANGE UP (ref 0–0.9)
IMM GRANULOCYTES NFR BLD AUTO: 5.3 % — HIGH (ref 0–0.9)
INR BLD: 1.63 RATIO — HIGH (ref 0.85–1.16)
KETONES UR-MCNC: NEGATIVE MG/DL — SIGNIFICANT CHANGE UP
LACTATE SERPL-SCNC: 2.8 MMOL/L — HIGH (ref 0.7–2)
LACTATE SERPL-SCNC: 3.6 MMOL/L — HIGH (ref 0.7–2)
LEUKOCYTE ESTERASE UR-ACNC: ABNORMAL
LYMPHOCYTES # BLD AUTO: 0.13 K/UL — LOW (ref 1–3.3)
LYMPHOCYTES # BLD AUTO: 0.16 K/UL — LOW (ref 1–3.3)
LYMPHOCYTES # BLD AUTO: 72.2 % — HIGH (ref 13–44)
LYMPHOCYTES # BLD AUTO: 84.2 % — HIGH (ref 13–44)
MAGNESIUM SERPL-MCNC: 2.1 MG/DL — SIGNIFICANT CHANGE UP (ref 1.6–2.6)
MCHC RBC-ENTMCNC: 29.4 PG — SIGNIFICANT CHANGE UP (ref 27–34)
MCHC RBC-ENTMCNC: 29.7 PG — SIGNIFICANT CHANGE UP (ref 27–34)
MCHC RBC-ENTMCNC: 31.3 G/DL — LOW (ref 32–36)
MCHC RBC-ENTMCNC: 32.1 G/DL — SIGNIFICANT CHANGE UP (ref 32–36)
MCV RBC AUTO: 92.6 FL — SIGNIFICANT CHANGE UP (ref 80–100)
MCV RBC AUTO: 93.7 FL — SIGNIFICANT CHANGE UP (ref 80–100)
METHOD TYPE: SIGNIFICANT CHANGE UP
MONOCYTES # BLD AUTO: 0 K/UL — SIGNIFICANT CHANGE UP (ref 0–0.9)
MONOCYTES # BLD AUTO: 0 K/UL — SIGNIFICANT CHANGE UP (ref 0–0.9)
MONOCYTES NFR BLD AUTO: 0 % — LOW (ref 2–14)
MONOCYTES NFR BLD AUTO: 0 % — LOW (ref 2–14)
MRSA PCR RESULT.: SIGNIFICANT CHANGE UP
NEUTROPHILS # BLD AUTO: 0.01 K/UL — LOW (ref 1.8–7.4)
NEUTROPHILS # BLD AUTO: 0.05 K/UL — LOW (ref 1.8–7.4)
NEUTROPHILS NFR BLD AUTO: 27.8 % — LOW (ref 43–77)
NEUTROPHILS NFR BLD AUTO: 5.2 % — LOW (ref 43–77)
NITRITE UR-MCNC: NEGATIVE — SIGNIFICANT CHANGE UP
NRBC # BLD: 0 /100 WBCS — SIGNIFICANT CHANGE UP (ref 0–0)
NRBC # BLD: 0 /100 WBCS — SIGNIFICANT CHANGE UP (ref 0–0)
NRBC BLD-RTO: 0 /100 WBCS — SIGNIFICANT CHANGE UP (ref 0–0)
NRBC BLD-RTO: 0 /100 WBCS — SIGNIFICANT CHANGE UP (ref 0–0)
P AERUGINOSA DNA BLD POS NAA+NON-PROBE: SIGNIFICANT CHANGE UP
PH UR: 5 — SIGNIFICANT CHANGE UP (ref 5–8)
PHOSPHATE SERPL-MCNC: 2.9 MG/DL — SIGNIFICANT CHANGE UP (ref 2.5–4.5)
PLATELET # BLD AUTO: 60 K/UL — LOW (ref 150–400)
PLATELET # BLD AUTO: 64 K/UL — LOW (ref 150–400)
POTASSIUM SERPL-MCNC: 4.3 MMOL/L — SIGNIFICANT CHANGE UP (ref 3.5–5.3)
POTASSIUM SERPL-SCNC: 4.3 MMOL/L — SIGNIFICANT CHANGE UP (ref 3.5–5.3)
PROCALCITONIN SERPL-MCNC: 5.99 NG/ML — HIGH
PROT SERPL-MCNC: 5 G/DL — LOW (ref 6–8.3)
PROT UR-MCNC: 30 MG/DL
PROTHROM AB SERPL-ACNC: 19 SEC — HIGH (ref 9.9–13.4)
RBC # BLD: 3.03 M/UL — LOW (ref 3.8–5.2)
RBC # BLD: 3.4 M/UL — LOW (ref 3.8–5.2)
RBC # FLD: 13.1 % — SIGNIFICANT CHANGE UP (ref 10.3–14.5)
RBC # FLD: 13.2 % — SIGNIFICANT CHANGE UP (ref 10.3–14.5)
RSV RNA NPH QL NAA+NON-PROBE: SIGNIFICANT CHANGE UP
S AUREUS DNA NOSE QL NAA+PROBE: SIGNIFICANT CHANGE UP
SARS-COV-2 RNA SPEC QL NAA+PROBE: SIGNIFICANT CHANGE UP
SODIUM SERPL-SCNC: 146 MMOL/L — HIGH (ref 135–145)
SP GR SPEC: 1.02 — SIGNIFICANT CHANGE UP (ref 1–1.03)
SPECIMEN SOURCE: SIGNIFICANT CHANGE UP
SPECIMEN SOURCE: SIGNIFICANT CHANGE UP
UROBILINOGEN FLD QL: 0.2 MG/DL — SIGNIFICANT CHANGE UP (ref 0.2–1)
VANCOMYCIN TROUGH SERPL-MCNC: 20.3 UG/ML — HIGH (ref 10–20)
WBC # BLD: 0.18 K/UL — CRITICAL LOW (ref 3.8–10.5)
WBC # BLD: 0.19 K/UL — CRITICAL LOW (ref 3.8–10.5)
WBC # FLD AUTO: 0.18 K/UL — CRITICAL LOW (ref 3.8–10.5)
WBC # FLD AUTO: 0.19 K/UL — CRITICAL LOW (ref 3.8–10.5)

## 2025-02-09 PROCEDURE — 93010 ELECTROCARDIOGRAM REPORT: CPT

## 2025-02-09 PROCEDURE — 71250 CT THORAX DX C-: CPT | Mod: 26

## 2025-02-09 PROCEDURE — 93306 TTE W/DOPPLER COMPLETE: CPT | Mod: 26

## 2025-02-09 PROCEDURE — 76604 US EXAM CHEST: CPT | Mod: 26

## 2025-02-09 PROCEDURE — 99222 1ST HOSP IP/OBS MODERATE 55: CPT

## 2025-02-09 PROCEDURE — 76705 ECHO EXAM OF ABDOMEN: CPT | Mod: 26

## 2025-02-09 PROCEDURE — 74176 CT ABD & PELVIS W/O CONTRAST: CPT | Mod: 26

## 2025-02-09 PROCEDURE — 99291 CRITICAL CARE FIRST HOUR: CPT

## 2025-02-09 PROCEDURE — 99223 1ST HOSP IP/OBS HIGH 75: CPT | Mod: GC

## 2025-02-09 PROCEDURE — G0545: CPT

## 2025-02-09 PROCEDURE — 93308 TTE F-UP OR LMTD: CPT | Mod: 26

## 2025-02-09 RX ORDER — SODIUM CHLORIDE 9 G/ML
1000 INJECTION, SOLUTION INTRAVENOUS
Refills: 0 | Status: DISCONTINUED | OUTPATIENT
Start: 2025-02-09 | End: 2025-02-09

## 2025-02-09 RX ORDER — SUCRALFATE 1 G/10ML
1 SUSPENSION ORAL
Refills: 0 | Status: DISCONTINUED | OUTPATIENT
Start: 2025-02-09 | End: 2025-02-10

## 2025-02-09 RX ORDER — BACTERIOSTATIC SODIUM CHLORIDE 0.9 %
1000 VIAL (ML) INJECTION ONCE
Refills: 0 | Status: COMPLETED | OUTPATIENT
Start: 2025-02-09 | End: 2025-02-09

## 2025-02-09 RX ORDER — ACETAMINOPHEN 160 MG/5ML
1000 SUSPENSION ORAL ONCE
Refills: 0 | Status: COMPLETED | OUTPATIENT
Start: 2025-02-09 | End: 2025-02-09

## 2025-02-09 RX ORDER — VANCOMYCIN HYDROCHLORIDE 50 MG/ML
750 KIT ORAL EVERY 24 HOURS
Refills: 0 | Status: DISCONTINUED | OUTPATIENT
Start: 2025-02-10 | End: 2025-02-10

## 2025-02-09 RX ORDER — PIPERACILLIN SODIUM AND TAZOBACTAM SODIUM 2; 250 G/50ML; MG/50ML
3.38 INJECTION, POWDER, FOR SOLUTION INTRAVENOUS EVERY 8 HOURS
Refills: 0 | Status: DISCONTINUED | OUTPATIENT
Start: 2025-02-09 | End: 2025-02-09

## 2025-02-09 RX ORDER — ANTISEPTIC SURGICAL SCRUB 0.04 MG/ML
1 SOLUTION TOPICAL
Refills: 0 | Status: DISCONTINUED | OUTPATIENT
Start: 2025-02-09 | End: 2025-02-12

## 2025-02-09 RX ORDER — VANCOMYCIN HYDROCHLORIDE 50 MG/ML
1000 KIT ORAL ONCE
Refills: 0 | Status: COMPLETED | OUTPATIENT
Start: 2025-02-09 | End: 2025-02-09

## 2025-02-09 RX ORDER — SODIUM CHLORIDE 9 G/ML
1000 INJECTION, SOLUTION INTRAVENOUS
Refills: 0 | Status: DISCONTINUED | OUTPATIENT
Start: 2025-02-09 | End: 2025-02-12

## 2025-02-09 RX ORDER — DIPHENOXYLATE HYDROCHLORIDE AND ATROPINE SULFATE 2.5; .025 MG/1; MG/1
2 TABLET ORAL
Refills: 0 | Status: DISCONTINUED | OUTPATIENT
Start: 2025-02-09 | End: 2025-02-11

## 2025-02-09 RX ORDER — SODIUM CHLORIDE 9 G/ML
1000 INJECTION, SOLUTION INTRAVENOUS ONCE
Refills: 0 | Status: COMPLETED | OUTPATIENT
Start: 2025-02-09 | End: 2025-02-09

## 2025-02-09 RX ORDER — FILGRASTIM-SNDZ 300 UG/.5ML
300 INJECTION, SOLUTION INTRAVENOUS; SUBCUTANEOUS DAILY
Refills: 0 | Status: DISCONTINUED | OUTPATIENT
Start: 2025-02-09 | End: 2025-02-09

## 2025-02-09 RX ORDER — FILGRASTIM-SNDZ 300 UG/.5ML
480 INJECTION, SOLUTION INTRAVENOUS; SUBCUTANEOUS DAILY
Refills: 0 | Status: DISCONTINUED | OUTPATIENT
Start: 2025-02-10 | End: 2025-02-12

## 2025-02-09 RX ORDER — NOREPINEPHRINE BITARTRATE 1 MG/ML
0.05 INJECTION, SOLUTION, CONCENTRATE INTRAVENOUS
Qty: 8 | Refills: 0 | Status: DISCONTINUED | OUTPATIENT
Start: 2025-02-09 | End: 2025-02-11

## 2025-02-09 RX ORDER — CEFEPIME HCL 1 G
2000 IV SOLUTION, PIGGYBACK, BOTTLE (EA) INTRAVENOUS EVERY 12 HOURS
Refills: 0 | Status: DISCONTINUED | OUTPATIENT
Start: 2025-02-09 | End: 2025-02-12

## 2025-02-09 RX ORDER — PANTOPRAZOLE 20 MG/1
20 TABLET, DELAYED RELEASE ORAL DAILY
Refills: 0 | Status: DISCONTINUED | OUTPATIENT
Start: 2025-02-09 | End: 2025-02-11

## 2025-02-09 RX ORDER — ASPIRIN AND EXTENDED - RELEASE DIPYRIDAMOLE 25; 200 MG/1; MG/1
1 CAPSULE ORAL
Refills: 0 | Status: DISCONTINUED | OUTPATIENT
Start: 2025-02-09 | End: 2025-02-11

## 2025-02-09 RX ORDER — TRIMETHOBENZAMIDE HCL 250 MG
200 CAPSULE ORAL EVERY 8 HOURS
Refills: 0 | Status: DISCONTINUED | OUTPATIENT
Start: 2025-02-09 | End: 2025-02-12

## 2025-02-09 RX ORDER — ACETAMINOPHEN 160 MG/5ML
650 SUSPENSION ORAL EVERY 6 HOURS
Refills: 0 | Status: DISCONTINUED | OUTPATIENT
Start: 2025-02-09 | End: 2025-02-12

## 2025-02-09 RX ADMIN — DIPHENOXYLATE HYDROCHLORIDE AND ATROPINE SULFATE 2 TABLET(S): 2.5; .025 TABLET ORAL at 17:27

## 2025-02-09 RX ADMIN — PANTOPRAZOLE 20 MILLIGRAM(S): 20 TABLET, DELAYED RELEASE ORAL at 14:41

## 2025-02-09 RX ADMIN — ACETAMINOPHEN 1000 MILLIGRAM(S): 160 SUSPENSION ORAL at 18:30

## 2025-02-09 RX ADMIN — PIPERACILLIN SODIUM AND TAZOBACTAM SODIUM 25 GRAM(S): 2; 250 INJECTION, POWDER, FOR SOLUTION INTRAVENOUS at 05:25

## 2025-02-09 RX ADMIN — SUCRALFATE 1 GRAM(S): 1 SUSPENSION ORAL at 23:59

## 2025-02-09 RX ADMIN — SODIUM CHLORIDE 100 MILLILITER(S): 9 INJECTION, SOLUTION INTRAVENOUS at 13:51

## 2025-02-09 RX ADMIN — NOREPINEPHRINE BITARTRATE 5.74 MICROGRAM(S)/KG/MIN: 1 INJECTION, SOLUTION, CONCENTRATE INTRAVENOUS at 00:45

## 2025-02-09 RX ADMIN — VANCOMYCIN HYDROCHLORIDE 250 MILLIGRAM(S): KIT at 01:44

## 2025-02-09 RX ADMIN — Medication 100 MILLIGRAM(S): at 17:27

## 2025-02-09 RX ADMIN — ASPIRIN AND EXTENDED - RELEASE DIPYRIDAMOLE 1 CAPSULE(S): 25; 200 CAPSULE ORAL at 17:28

## 2025-02-09 RX ADMIN — Medication 1000 MILLILITER(S): at 00:21

## 2025-02-09 RX ADMIN — PIPERACILLIN SODIUM AND TAZOBACTAM SODIUM 200 GRAM(S): 2; 250 INJECTION, POWDER, FOR SOLUTION INTRAVENOUS at 00:08

## 2025-02-09 RX ADMIN — Medication 1000 MILLILITER(S): at 00:46

## 2025-02-09 RX ADMIN — ACETAMINOPHEN 400 MILLIGRAM(S): 160 SUSPENSION ORAL at 17:27

## 2025-02-09 RX ADMIN — ANTISEPTIC SURGICAL SCRUB 1 APPLICATION(S): 0.04 SOLUTION TOPICAL at 06:00

## 2025-02-09 RX ADMIN — SODIUM CHLORIDE 1000 MILLILITER(S): 9 INJECTION, SOLUTION INTRAVENOUS at 11:38

## 2025-02-09 RX ADMIN — FILGRASTIM-SNDZ 300 MICROGRAM(S): 300 INJECTION, SOLUTION INTRAVENOUS; SUBCUTANEOUS at 14:41

## 2025-02-09 RX ADMIN — SODIUM CHLORIDE 1000 MILLILITER(S): 9 INJECTION, SOLUTION INTRAVENOUS at 13:51

## 2025-02-09 NOTE — PROGRESS NOTE ADULT - PROBLEM SELECTOR PLAN 9
Patient with CVA hx (per  patient was originally on plavix but because is now taking [facility] 6236, was told to start Aggrenox instead to avoid DDIs)  - Continue aggrenox  - Plan per ICU

## 2025-02-09 NOTE — DISCHARGE NOTE PROVIDER - HOSPITAL COURSE
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 Per  at bedside, patient had abdominal pain when her abdomen rubbed against a surface, was seen at [facility] which was operated on about 6 weeks ago, had a biopsy showing primary pancreatic adenocardimoma on IV chemotherapy medication and [id number] daily.  Per ED note - last chemo was 4 days ago brought in by EMS for generalized weakness low blood pressure nausea vomiting and diarrhea.   states patient goes for IV hydration every couple days last hydration was 2 days ago.  Patient has been receiving around-the-clock nausea meds Zofran and compazine without relief.  Patient also receiving compazine, Imodium without any relief.  Patient denies any worsening pain. Currently on levophed gtt for low BP. Denies any chest pain, sob. Still with nausea and abd pain.    In the ED:  Vitals: T 100.4F oral  /75--->70s-80s/50s RR 16 98% RA  Given: 1g ofirmev, zofran 4mg IVP, 3550cc NS bolus   Labs: WBC 0.18 H/H 8.9/28.4 PLT 64 PTT 20.8 PT/INR 19/1.63 Cl 118 CO2 16 BUN/Cr 32/1.5 Glu 167 GFR 37 Lac 3.6  UA small LE many bacteria  CXR no acute pulmonary infiltrates on wet read, f/u official read  EKG Sinus tachycardia with short MD Left axis deviation 127bpm qtc 412 (09 Feb 2025 01:39)      ---  HOSPITAL COURSE: Patient admitted to ICU floor for management of hypotensive and in neutropenic sepsis.     Septic Shock 2/2 to neutropenic sepsis, s/p 3.5L of IVF w/out improvement in BP. Started on levo plan to titrate to maintain MAP >65. TTE ordered, showed:    c-diff neg, RVP neg.   Blood and UCX showed:   Procal=   MRSA swab:   CT chest and abd/pelvis noncon (/wife refused contrast due to MALLORY): small pericardial effusion, bibasilar atelectasis, no consolidations noted. Small distention of gallbladder, and 2 kidney lesions (right kidney 4cm, and left kidney 6cm). Consider nephro f/u outpatient.   S/p 1 dose of vanc, trough level elevated, will reschedule maintenance dose. Switched from IV zosyn to cefepime  Dr. Madrid, heme/onc was consulted.   DVT ppx w/ SCD, no chemical AC at this time given pancytopenia.       Pt seen and examined on day of discharge. Patient is medically optimized for discharge to home with close outpatient followup.    PHYSICAL EXAM ON DAY OF DISCHARGE:  The patient was seen and examined on the day of discharge. Please see progress note from day of discharge for further information.         ---  CONSULTANTS:   Dr. Madrid, Reynaldo/onc    ---  TIME SPENT:  I, the attending physician, was physically present for the key portions of the evaluation and management (E/M) service provided. 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 Per  at bedside, patient had abdominal pain when her abdomen rubbed against a surface, was seen at [facility] which was operated on about 6 weeks ago, had a biopsy showing primary pancreatic adenocardimoma on IV chemotherapy medication and [id number] daily.  Per ED note - last chemo was 4 days ago brought in by EMS for generalized weakness low blood pressure nausea vomiting and diarrhea.   states patient goes for IV hydration every couple days last hydration was 2 days ago.  Patient has been receiving around-the-clock nausea meds Zofran and compazine without relief.  Patient also receiving compazine, Imodium without any relief.  Patient denies any worsening pain. Currently on levophed gtt for low BP. Denies any chest pain, sob. Still with nausea and abd pain.    In the ED:  Vitals: T 100.4F oral  /75--->70s-80s/50s RR 16 98% RA  Given: 1g ofirmev, zofran 4mg IVP, 3550cc NS bolus   Labs: WBC 0.18 H/H 8.9/28.4 PLT 64 PTT 20.8 PT/INR 19/1.63 Cl 118 CO2 16 BUN/Cr 32/1.5 Glu 167 GFR 37 Lac 3.6  UA small LE many bacteria  CXR no acute pulmonary infiltrates on wet read, f/u official read  EKG Sinus tachycardia with short IN Left axis deviation 127bpm qtc 412 (09 Feb 2025 01:39)      ---  HOSPITAL COURSE: Patient admitted to ICU floor for management of hypotensive and in neutropenic sepsis. CT chest and abd/pelvis noncon showed small pericardial effusion, bibasilar atelectasis, no consolidations noted. Small distention of gallbladder, and 2 kidney lesions (right kidney 4cm, and left kidney 6cm). RUQ US  showed distended gallbladder with sludge, hepatomegaly, R renal calculus without hydronephrosis. Patient was s/p 3.5L of IVF w/out improvement in BP. Started on levo maintain MAP >65. Levo was able to be d/c and patient was started on Midodrine 5mg q8hrs for BP control. IVF continued. TTE showed EF 73%, mild LVH, trace pericardial effusion. Patient with 1 episode of SVT, whcih resolved with Valsalva techniques. Patient with persistent fevers on presentation. Blood and urine cx grew P. aeruginosa. She was started on Zosyn but changed to Cefepime. MRSA PCR negative to Vanco d/c. Patient with complaints of diarrhea. C. diff and GI PCR negative. Heme/Onc started Zorxio for neutropenia. Patient with MALLORY likely 2/2 urinary retention, as bladder US with 1300cc retained urine. Julian placed with **improvement in Cr**. DVT ppx w/ SCD given pancytopenia.       Pt seen and examined on day of discharge. Patient is medically optimized for discharge to home with close outpatient followup.    PHYSICAL EXAM ON DAY OF DISCHARGE:  The patient was seen and examined on the day of discharge. Please see progress note from day of discharge for further information.         ---  CONSULTANTS:   Dr. Madrid Heme/onc  Dr. Aparicio ID  Dr. Geiger Cardio     ---  TIME SPENT:  I, the attending physician, was physically present for the key portions of the evaluation and management (E/M) service provided. 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 Per  at bedside, patient had abdominal pain when her abdomen rubbed against a surface, was seen at [facility] which was operated on about 6 weeks ago, had a biopsy showing primary pancreatic adenocardimoma on IV chemotherapy medication and [id number] daily.  Per ED note - last chemo was 4 days ago brought in by EMS for generalized weakness low blood pressure nausea vomiting and diarrhea.   states patient goes for IV hydration every couple days last hydration was 2 days ago.  Patient has been receiving around-the-clock nausea meds Zofran and compazine without relief.  Patient also receiving compazine, Imodium without any relief.  Patient denies any worsening pain. Currently on levophed gtt for low BP. Denies any chest pain, sob. Still with nausea and abd pain.    In the ED:  Vitals: T 100.4F oral  /75--->70s-80s/50s RR 16 98% RA  Given: 1g ofirmev, zofran 4mg IVP, 3550cc NS bolus   Labs: WBC 0.18 H/H 8.9/28.4 PLT 64 PTT 20.8 PT/INR 19/1.63 Cl 118 CO2 16 BUN/Cr 32/1.5 Glu 167 GFR 37 Lac 3.6  UA small LE many bacteria  CXR no acute pulmonary infiltrates on wet read, f/u official read  EKG Sinus tachycardia with short MT Left axis deviation 127bpm qtc 412 (09 Feb 2025 01:39)      ---  HOSPITAL COURSE: Patient admitted to ICU floor for management of hypotensive and in neutropenic sepsis. CT chest and abd/pelvis non con showed small pericardial effusion, bibasilar atelectasis, no consolidations noted. Small distention of gallbladder, and 2 kidney lesions (right kidney 4cm, and left kidney 6cm). RUQ US  showed distended gallbladder with sludge, hepatomegaly, R renal calculus without hydronephrosis. Patient was s/p 3.5L of IVF w/out improvement in BP. Started on levo maintain MAP >65. Levo was able to be d/c and patient was started on Midodrine 5mg q8hrs for BP control. IVF continued.   TTE showed EF 73%, mild LVH, trace pericardial effusion. Patient with 1 episode of SVT, whcih resolved with Valsalva techniques. Patient with persistent fevers on presentation.   Blood and urine cx grew P. aeruginosa. She was started on Zosyn but changed to Cefepime. MRSA PCR negative to Vanco d/c. Patient with complaints of diarrhea. C. diff and GI PCR negative. Heme/Onc started Zorxio for neutropenia. Patient with MALLORY likely 2/2 urinary retention, as bladder US with 1300cc retained urine. Julian placed with improvement in Cr DVT ppx w/ SCD given pancytopenia   dc Aggrenox  sec low platlet  .   post  chemo  Neutropenic sepsis shock. likely from UTI  with bacteremia pseudomonas - chemo induce diarrhea  so far infectious cause ruled out  gi pcr neg , cdiff neg . pt  called want to transfer to [facility]  as pt main oncologist dr donald peterson  [phone]  want pt be there for managing chemo management with side effect diarrhea / bacteremia uti  .  Pt seen and examined on day of discharge. Patient is medically optimized for discharge to home with close outpatient followup.  PHYSICAL EXAM ON DAY OF DISCHARGE:  The patient was seen and examined on the day of discharge. Please see progress note from day of discharge for further information.     CONSULTANTS:   Dr. Mercy Jacobs/onc  Dr. Aparicio ID  Dr. Geiger Cardio     ---  TIME SPENT:  I, the attending physician, was physically present for the key portions of the evaluation and management (E/M) service provided. 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 Per  at bedside, patient had abdominal pain when her abdomen rubbed against a surface, was seen at [facility] which was operated on about 6 weeks ago, had a biopsy showing primary pancreatic adenocardimoma on IV chemotherapy medication and [id number] daily.  Per ED note - last chemo was 4 days ago brought in by EMS for generalized weakness low blood pressure nausea vomiting and diarrhea.   states patient goes for IV hydration every couple days last hydration was 2 days ago.  Patient has been receiving around-the-clock nausea meds Zofran and compazine without relief.  Patient also receiving compazine, Imodium without any relief.  Patient denies any worsening pain. Currently on levophed gtt for low BP. Denies any chest pain, sob. Still with nausea and abd pain.    In the ED:  Vitals: T 100.4F oral  /75--->70s-80s/50s RR 16 98% RA  Given: 1g ofirmev, zofran 4mg IVP, 3550cc NS bolus   Labs: WBC 0.18 H/H 8.9/28.4 PLT 64 PTT 20.8 PT/INR 19/1.63 Cl 118 CO2 16 BUN/Cr 32/1.5 Glu 167 GFR 37 Lac 3.6  UA small LE many bacteria  CXR no acute pulmonary infiltrates on wet read, f/u official read  EKG Sinus tachycardia with short MI Left axis deviation 127bpm qtc 412 (09 Feb 2025 01:39)      ---  HOSPITAL COURSE: Patient admitted to ICU floor for management of hypotensive and in neutropenic sepsis  poa cause of septic shock . CT chest and abd/pelvis non con showed small pericardial effusion, bibasilar atelectasis, no consolidations noted. Small distention of gallbladder, and 2 kidney lesions (right kidney 4cm, and left kidney 6cm). RUQ US  showed distended gallbladder with sludge, hepatomegaly, R renal calculus without hydronephrosis. Patient was s/p 3.5L of IVF w/out improvement in BP. Started on levo maintain MAP >65. Levo was able to be d/c and patient was started on Midodrine 5mg q8hrs for BP control. IVF continued.   TTE showed EF 73%, mild LVH, trace pericardial effusion. Patient with 1 episode of SVT, whcih resolved with Valsalva techniques. Patient with persistent fevers on presentation.   Blood and urine cx grew P. aeruginosa. She was started on Zosyn but changed to Cefepime. MRSA PCR negative to Vanco d/c. Patient with complaints of diarrhea. C. diff and GI PCR negative. Heme/Onc started Zorxio for neutropenia. Patient with MALLORY  on ckd2  likely 2/2 urinary retention, as bladder US with 1300cc retained urine. Julian placed with improvement in Cr DVT ppx w/ SCD given pancytopenia   dc Aggrenox  sec low platlet  .   post  chemo  Neutropenic sepsis shock. likely from UTI  with bacteremia pseudomonas - chemo induce diarrhea  so far infectious cause ruled out  gi pcr neg , cdiff neg . pt  called want to transfer to [facility]  as pt main oncologist dr donald peterson  [phone]  want pt be there for managing chemo management with side effect diarrhea / bacteremia uti  .  Pt seen and examined on day of discharge. Patient is medically optimized for discharge to home with close outpatient followup.  PHYSICAL EXAM ON DAY OF DISCHARGE:  The patient was seen and examined on the day of discharge. Please see progress note from day of discharge for further information.     CONSULTANTS:   Dr. Madrid Heme/onc  Dr. Aparicio ID  Dr. Geiger Cardio     ---  TIME SPENT:  I, the attending physician, was physically present for the key portions of the evaluation and management (E/M) service provided. The total amount of time spent reviewing the hospital notes, laboratory values, imaging findings, assessing/counseling the patient, discussing with consultant physicians, social work, nursing staff was 45-- minutes

## 2025-02-09 NOTE — H&P ADULT - NSICDXPASTMEDICALHX_GEN_ALL_CORE_FT
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

## 2025-02-09 NOTE — CONSULT NOTE ADULT - SUBJECTIVE AND OBJECTIVE BOX
Date of service is equal to the date of entry    HPI: 70 y/o F w/ pmh of SCC of the lip, CVA on DAPT, recently found to have primary pancreatic adenocarcinoma w/ tumor in umbilicus operated at , was started on chemo appx 4 weeks ago at [facility], last dose appx 4 days, now presented to [facility] for ongoing n/v and diarrhea since chemo was started. Today pt became weak and hypotensive and was brought to the hospital. In the ED pt was found to be hypotensive and in neutropenic sepsis. Pt was given appx 3.5L of IVF w/out any improvement in BP and was started on levo. ICU consult was requested for neutropenic sepsis w/ unclear etiology at this time.      Review of Systems:  Constitutional: No fever, chills, fatigue  Neuro: No headache, numbness, weakness  Resp: No cough, wheezing, shortness of breath  CVS: No chest pain, palpitations, leg swelling  GI: No abdominal pain, nausea, vomiting, diarrhea   : No dysuria, frequency, incontinence  Skin: No itching, burning, rashes, or lesions   Msk: No joint pain or swelling  Psych: No depression, anxiety, mood swings    T(F): 98.5 (25 @ 00:25), Max: 103.1 (25 @ 22:51)  HR: 85 (25 @ 01:04) (80 - 98)  BP: 93/58 (25 @ 01:04) (78/55 - 123/75)  RR: 18 (25 @ 00:25) (16 - 18)  SpO2: 98% (25 @ 00:25) (98% - 98%)  Wt(kg): --      25 @ 07:01  -  25 @ 01:43  --------------------------------------------------------  IN: 0.5 mL / OUT: 0 mL / NET: 0.5 mL        CAPILLARY BLOOD GLUCOSE          I&O's Summary    2025 07:01  -  2025 01:43  --------------------------------------------------------  IN: 0.5 mL / OUT: 0 mL / NET: 0.5 mL        Physical Exam:   Gen:  Neuro:  HEENT:  Resp:  CVS:  Abd:  Ext:  Skin:    Meds:  vancomycin  IVPB. IV Intermittent    norepinephrine Infusion IV Continuous                      chlorhexidine 2% Cloths Topical                              8.9    0.18  )-----------( 64       ( 2025 23:27 )             28.4       02-08    144  |  118[H]  |  32[H]  ----------------------------<  167[H]  4.2   |  16[L]  |  1.50[H]    Ca    8.5      2025 23:27    TPro  4.5[L]  /  Alb  1.7[L]  /  TBili  0.3  /  DBili  x   /  AST  27  /  ALT  33  /  AlkPhos  62  02-08    Lactate 3.6           02-08 @ 23:27      CARDIAC MARKERS ( 2025 23:27 )  x     / x     / x     / x     / <1.0 ng/mL      PT/INR - ( 2025 23:27 )   PT: 19.0 sec;   INR: 1.63 ratio         PTT - ( 2025 23:27 )  PTT:20.8 sec  Urinalysis Basic - ( 2025 01:25 )    Color: Yellow / Appearance: Clear / S.019 / pH: x  Gluc: x / Ketone: Negative mg/dL  / Bili: Negative / Urobili: 0.2 mg/dL   Blood: x / Protein: 30 mg/dL / Nitrite: Negative   Leuk Esterase: Small / RBC: x / WBC x   Sq Epi: x / Non Sq Epi: x / Bacteria: x      Radiology: ***  Bedside ultrasound: ***    CENTRAL LINE: N/Y          DATE INSERTED:              REMOVE: Y/N  ALEGRIA: N/Y                       DATE INSERTED:              REMOVE: Y/N  A-LINE: N/Y                       DATE INSERTED:              REMOVE: Y/N    GLOBAL ISSUE/BEST PRACTICE:  Analgesia:  Sedation:  CAM-ICU:   HOB elevation: yes  Stress ulcer prophylaxis:  VTE prophylaxis:  Glycemic control:  Nutrition:    CODE STATUS: ***    CRITICAL CARE TIME SPENT:  (Assessing presenting problems of acute illness, which pose high probability of life threatening deterioration or end organ damage/dysfunction, as well as medical decision making including initiating plan of care, reviewing data, reviewing radiologic exams, discussing with multidisciplinary team,  discussing goals of care with patient/family, and writing this note.  Non-inclusive of procedures performed)     Date of service is equal to the date of entry    HPI: 68 y/o F w/ pmh of SCC of the lip, CVA on DAPT, recently found to have primary pancreatic adenocarcinoma w/ tumor in umbilicus operated at , was started on [place] appx 4 [place] [facility], last dose appx 4 days, now presented to [facility] for ongoing n/v and diarrhea since chemo was started. Today pt became weak and hypotensive and was brought to the hospital. In the ED pt was found to be hypotensive and in neutropenic sepsis. Pt was given appx 3.5L of IVF w/out any improvement in BP and was started on levo. ICU consult was requested for neutropenic sepsis w/ unclear etiology at this time.      Review of Systems:  Constitutional: No fever, chills, fatigue  Neuro: No headache, numbness, weakness  Resp: No cough, wheezing, shortness of breath  CVS: No chest pain, palpitations, leg swelling  GI: No abdominal pain, nausea, vomiting, diarrhea   : No dysuria, frequency, incontinence  Skin: No itching, burning, rashes, or lesions   Msk: No joint pain or swelling  Psych: No depression, anxiety, mood swings    T(F): 98.5 (25 @ 00:25), Max: 103.1 (25 @ 22:51)  HR: 85 (25 @ 01:04) (80 - 98)  BP: 93/58 (25 @ 01:04) (78/55 - 123/75)  RR: 18 (25 @ 00:25) (16 - 18)  SpO2: 98% (25 @ 00:25) (98% - 98%)  Wt(kg): --      25 @ 07:01  -  25 @ 01:43  --------------------------------------------------------  IN: 0.5 mL / OUT: 0 mL / NET: 0.5 mL        CAPILLARY BLOOD GLUCOSE          I&O's Summary    2025 07:01  -  2025 01:43  --------------------------------------------------------  IN: 0.5 mL / OUT: 0 mL / NET: 0.5 mL        Physical Exam:   Gen: Comfortable in bed in NAD  Neuro: AAOx3  HEENT: NC/AT  Resp: good air entry b/l  CVS: +RRR  Abd: BSx4, soft, nt/nd  Ext: no edema   Skin: warm/dry    Meds:  vancomycin  IVPB. IV Intermittent    norepinephrine Infusion IV Continuous    chlorhexidine 2% Cloths Topical                          8.9    0.18  )-----------( 64       ( 2025 23:27 )             28.4       02-    144  |  118[H]  |  32[H]  ----------------------------<  167[H]  4.2   |  16[L]  |  1.50[H]    Ca    8.5      2025 23:27    TPro  4.5[L]  /  Alb  1.7[L]  /  TBili  0.3  /  DBili  x   /  AST  27  /  ALT  33  /  AlkPhos  62      Lactate 3.6           02-08 @ 23:27      CARDIAC MARKERS ( 2025 23:27 )  x     / x     / x     / x     / <1.0 ng/mL      PT/INR - ( 2025 23:27 )   PT: 19.0 sec;   INR: 1.63 ratio         PTT - ( 2025 23:27 )  PTT:20.8 sec  Urinalysis Basic - ( 2025 01:25 )    Color: Yellow / Appearance: Clear / S.019 / pH: x  Gluc: x / Ketone: Negative mg/dL  / Bili: Negative / Urobili: 0.2 mg/dL   Blood: x / Protein: 30 mg/dL / Nitrite: Negative   Leuk Esterase: Small / RBC: x / WBC x   Sq Epi: x / Non Sq Epi: x / Bacteria: x      Radiology:     CT chest/abd/pelvis ordered awaiting to be performed and results    CODE STATUS: FULL CODE    CRITICAL CARE TIME SPENT: 60 mins  (Assessing presenting problems of acute illness, which pose high probability of life threatening deterioration or end organ damage/dysfunction, as well as medical decision making including initiating plan of care, reviewing data, reviewing radiologic exams, discussing with multidisciplinary team,  discussing goals of care with patient/family, and writing this note.  Non-inclusive of procedures performed)

## 2025-02-09 NOTE — PATIENT PROFILE ADULT - SURGICAL SITE DESCRIPTION
HX  umbilical tumor  surgery  done 6 weeks ago  from [facility]  found pancreatic   adenocarcinoma  w/ tumor

## 2025-02-09 NOTE — H&P ADULT - PROBLEM SELECTOR PLAN 1
Patient meets sepsis criteria on admission: WBC 0.18, , T >100.4 oral on admission. Neutropenic sepsis  - Hypotensive to SBP 70s, 80s in the ED  - CXR:no acute pulmonary infiltrates on wet read, f/u official read  - Given 1550cc NS bolus in the ED, [facility]  - Continue levophed for hypotension   - Abx per ICU   - IVF per ICU   - Lactate 3.6 in the ED, trend lactate  - Tylenol PRN for fever.  - F/u UCx,  BCx x2  - Cdif neg, f/u GI PCR  - Recommend ID consult  - Plan per ICU Patient meets sepsis criteria on admission: WBC 0.18, , T >100.4 oral on admission. Neutropenic sepsis  - Hypotensive to SBP 70s, 80s in the ED  - CXR:no acute pulmonary infiltrates on wet read, f/u official read  - Given 3550cc [facility] in the ED[facility]  - Continue levophed for hypotension   - Abx per ICU   - IVF per ICU   - Lactate 3.6 in the ED, trend lactate  - Tylenol PRN for fever.  - F/u UCx,  BCx x2  - Cdif neg, f/u GI PCR  - Recommend ID consult  - Plan per ICU Patient meets sepsis criteria on admission: WBC 0.18, , T >100.4 oral on admission. Neutropenic sepsis  - Hypotensive to SBP 70s, 80s in the ED  - CXR:no acute pulmonary infiltrates on wet read, f/u official read  - Given 3550cc NS bolus in the ED, vanc, zosyn, ofirmev for fever.  - Continue levophed for hypotension   - Abx per ICU - continue zosyn  procal, MRSA swab  - IVF per ICU   - Lactate 3.6 in the ED, trend lactate  - Tylenol PRN for fever.  - F/u UCx,  BCx x2  - Cdif neg, f/u GI PCR  - Recommend ID consult  - Plan per ICU Patient meets sepsis criteria on admission: WBC 0.18, , T >100.4 oral on admission. Neutropenic sepsis, etiology unclear   - Hypotensive to SBP 70s, 80s in the ED  - CXR:no acute pulmonary infiltrates on wet read, f/u official read  - Given 3550cc NS bolus in the ED, vanc, zosyn, ofirmev for fever.  - Continue levophed for hypotension   - continue zosyn  - fu procal, MRSA swab  - Lactate 3.6 in the ED, fu repeat   - Tylenol PRN for fever.  - F/u UCx,  BCx x2  - Cdif neg, f/u GI PCR  -  ID consult  - Plan per ICU

## 2025-02-09 NOTE — PROVIDER CONTACT NOTE (EICU) - RECOMMENDATIONS
- POCUS to evaluate fluid responsiveness  - pressors to maintain MAP  - continue broad spectrum abx for neutropenic sepsis - f/u full infectious workup including GI PCR, C diff toxins negative  - awaiting read of CT C/A/P- on my read GB might be distended-  - admit to ICU  Plan discussed w/ ICU IRINA Arellano

## 2025-02-09 NOTE — H&P ADULT - HISTORY OF PRESENT ILLNESS
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 Per  at bedside, patient had abdominal pain when her abdomen rubbed against a surface, was seen at [facility] which was operated on about 6 weeks ago, had a biopsy showing primary pancreatic adenocardimoma on IV chemotherapy medication and RMC-6236 daily.  Per ED note - last chemo was 4 days ago brought in by EMS for generalized weakness low blood pressure nausea vomiting and diarrhea.   states patient goes for IV hydration every couple days last hydration was 2 days ago.  Patient has been receiving around-the-clock nausea meds Zofran and compazine without relief.  Patient also receiving compazine, Imodium without any relief.  Patient denies any worsening pain. Currently on levophed gtt for low BP. Denies any chest pain, sob. Still with nausea and abd pain.    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 Per  at bedside, patient had abdominal pain when her abdomen rubbed against a surface, was seen at [facility] which was operated on about 6 weeks ago, had a biopsy showing primary pancreatic adenocardimoma on IV chemotherapy medication and RMC-6236 daily.  Per ED note - last chemo was 4 days ago brought in by EMS for generalized weakness low blood pressure nausea vomiting and diarrhea.   states patient goes for IV hydration every couple days last hydration was 2 days ago.  Patient has been receiving around-the-clock nausea meds Zofran and compazine without relief.  Patient also receiving compazine, Imodium without any relief.  Patient denies any worsening pain. Currently on levophed gtt for low BP. Denies any chest pain, sob. Still with nausea and abd pain.    In the ED:  Vitals: T 100.4F oral  /75--->70s-80s/50s RR 16 98% RA  Given: 1g ofirmev, zofran 4mg IVP, 3550cc NS bolus   Labs: WBC 0.18 H/H 8.9/28.4 PLT 64 PTT 20.8 PT/INR 19/1.63 Cl 118 CO2 16 BUN/Cr 32/1.5 Glu 167 GFR 37 Lac 3.6  UA small LE many bacteria  CXR no acute pulmonary infiltrates on wet read, f/u official read  EKG Sinus tachycardia with short LA Left axis deviation 127bpm qtc 412

## 2025-02-09 NOTE — DISCHARGE NOTE PROVIDER - CARE PROVIDER_API CALL
Teri Oakley  Medical Oncology  [address]  [place] [address]  Phone: [phone]  Fax: [phone]  Follow Up Time:

## 2025-02-09 NOTE — CONSULT NOTE ADULT - ASSESSMENT
70 y/o F w/ pmh of SCC of the lip, CVA on DAPT, recently found to have primary pancreatic adenocarcinoma w/ tumor in umbilicus operated at , [place] [facility] [place] dose appx 4 days, now presented to [facility] for ongoing n/v and diarrhea since chemo was started. Today pt became weak and hypotensive and was brought to the hospital. In the ED pt was found to be hypotensive and in neutropenic sepsis.     -Neuro: No acute issues, MS stable  -Cardiac: Septic Shock 2/2 to neutropenic sepsis, pt now s/p 3.5L of IVF w/out improvement in BP started on levo plan to titrate to maintain MAP >65, check TTE  -Resp: Resp status stable, maintain o2 >90%  -GI: No acute issues, NPO for now  -Renal: MALLORY w/ lactic acidosis on IVF and pressors, plan for repeat labs  -ID: Neutropenic septic shock unclear source at this time, check blood and UCX, procal, MRSA swap, c-diff neg, RVP neg, check CT chest and abd/pelvis noncon (/wife refused contracts due to MALLORY), cont IV zosyn, given x1 dose of IV vanco now, check troughs in the AM  -Endo: No acute issues, Pancreatic Ca will need hem/onc consult in the AM  -Heme: DVT ppx w/ SCD, no chemical AC at this time given pancytopenia  -Dispo: Transfer to MICU, pt is full code, case d/w eicu dr. cabrales

## 2025-02-09 NOTE — DISCHARGE NOTE PROVIDER - NSDCCPCAREPLAN_GEN_ALL_CORE_FT
PRINCIPAL DISCHARGE DIAGNOSIS  Diagnosis: Sepsis  Assessment and Plan of Treatment:      PRINCIPAL DISCHARGE DIAGNOSIS  Diagnosis: Sepsis  Assessment and Plan of Treatment: You were seen for sepetic shock that has happened from a urinary tract infection. this occurs when an infection triggers a widespread inflammatory response that leads to dangerously low blood pressure and organ damage. You required IV medication to increase your blood presure with improvement. You will continue midodrine for BP support. Your urine cultures and blood cultures were positive for pseudomonas. You will continue IV cefepime.      SECONDARY DISCHARGE DIAGNOSES  Diagnosis: Diarrhea  Assessment and Plan of Treatment: You were found to have continued diarrhea that is likely from chemotherapy and infection. You will continue ocreotide.    Diagnosis: Pancytopenia  Assessment and Plan of Treatment: You have pancytopenia which is a decrease in all three major types of blood cells: red blood cells (erythrocytes), white blood cells (leukocytes), and platelets. This is likely from chemotherapy and sepsis. You were started on XARXIO to increase your neutrophil count.    Diagnosis: CVA (cerebrovascular accident)  Assessment and Plan of Treatment: You have a history of CVA in the past. You will stop taking aggrenox because of your low platelets.    Diagnosis: Primary pancreatic adenocarcinoma  Assessment and Plan of Treatment: You have known primary pancreatic adenocarcinoma. You did not recieve any chemotherapy at this hospital. Please follow up with your oncologist.    Diagnosis: Cyclical vomiting syndrome  Assessment and Plan of Treatment: You will continue zofran as needed.    Diagnosis: Acute kidney injury superimposed on CKD  Assessment and Plan of Treatment: You kidney function is improving. You were retaining urine and have a coelho in place. You will be transferred to [facility].    Diagnosis: HTN (hypertension)  Assessment and Plan of Treatment: You will stop taking your home norvasc because of your hypotension. Please follow up with your PCP.

## 2025-02-09 NOTE — H&P ADULT - PROBLEM SELECTOR PLAN 3
Hx of cyclical vomiting syndrome on amitriptyline   - s/p zofran in the ED  - continue zofran q8h PRN  - consider AM EKG to monitor QTc  - plan per ICU Hx of cyclical vomiting syndrome on amitriptyline   - s/p zofran in the ED  - continue zofran PRN  - consider AM EKG to monitor QTc  - plan per ICU WBC 0.18 H/H 8.9/28.4 PLT 64 on admission  - likely 2/2 to sepsis  - recommend heme/onc consult  - plan per ICU WBC 0.18 H/H 8.9/28.4 PLT 64 on admission  - likely 2/2 to sepsis and chemotherapy   - heme/onc consult  - plan per ICU

## 2025-02-09 NOTE — ED ADULT NURSE NOTE - NSSUHOSCREENINGYN_ED_ALL_ED
Patient remains in [facility] at this time, will continue to monitor for discharge.   
Yes - the patient is able to be screened

## 2025-02-09 NOTE — PROGRESS NOTE ADULT - PROBLEM SELECTOR PLAN 1
Patient meets sepsis criteria on admission: WBC 0.18, , T >100.4 oral on admission. Neutropenic sepsis, etiology unclear   - Hypotensive to SBP 70s, 80s in the ED  - CXR:no acute pulmonary infiltrates on wet read, f/u official read  - Given 3550cc NS bolus in the ED, vanc, zosyn, ofirmev for fever.  - Continue levophed for hypotension   - switched from IV zosyn to cefepime   - procal 5.99  - Lactate 3.6 in the ED, repeat 2.8   - Tylenol PRN for fever.  - F/u UCx,  BCx x2  - Cdif neg, f/u GI PCR  -  ID consult  - Plan per ICU

## 2025-02-09 NOTE — PROGRESS NOTE ADULT - SUBJECTIVE AND OBJECTIVE BOX
SUBJECTIVE: Seen and examined pt at bedside. Patient endorses nausea since starting her chemotherapy but worse over the past two days. Denies any medication has helped in the past. Denies any pain anywhere, or other acute concerns.     Review of Systems: As per hospitals     ICU Vital Signs Last 24 Hrs  T(C): 37.4 (09 Feb 2025 11:58), Max: 39.5 (08 Feb 2025 22:51)  T(F): 99.3 (09 Feb 2025 11:58), Max: 103.1 (08 Feb 2025 22:51)  HR: 111 (09 Feb 2025 12:15) (80 - 139)  BP: 121/64 (09 Feb 2025 12:15) (74/58 - 123/75)  BP(mean): 80 (09 Feb 2025 12:15) (63 - 100)  ABP: --  ABP(mean): --  RR: 22 (09 Feb 2025 12:15) (13 - 33)  SpO2: 100% (09 Feb 2025 12:15) (73% - 100%)    O2 Parameters below as of 09 Feb 2025 08:00  Patient On (Oxygen Delivery Method): nasal cannula  O2 Flow (L/min): 3            02-08-25 @ 07:01  -  02-09-25 @ 07:00  --------------------------------------------------------  IN: 263.2 mL / OUT: 0 mL / NET: 263.2 mL    02-09-25 @ 07:01  -  02-09-25 @ 12:40  --------------------------------------------------------  IN: 1052.6 mL / OUT: 0 mL / NET: 1052.6 mL        CAPILLARY BLOOD GLUCOSE          I&O's Summary    08 Feb 2025 07:01  -  09 Feb 2025 07:00  --------------------------------------------------------  IN: 263.2 mL / OUT: 0 mL / NET: 263.2 mL    09 Feb 2025 07:01  -  09 Feb 2025 12:40  --------------------------------------------------------  IN: 1052.6 mL / OUT: 0 mL / NET: 1052.6 mL        PHYSICAL EXAM:  General: NAD  Neurology: awake and alert  HEENT: NC/AT  Respiratory: CTA b/l, no rales or rhonchi noted  Cardiovascular: RRR, normal S1S2, no M/R/G  Abdomen: soft, NT/ND, +BS, no palpable masses  Extremities: WWP, no clubbing, cyanosis, or edema  Skin: warm/dry      Meds:  piperacillin/tazobactam IVPB.. IV Intermittent    norepinephrine Infusion IV Continuous                  lactated ringers. IV Continuous      chlorhexidine 2% Cloths Topical                              10.1   0.19  )-----------( 60       ( 09 Feb 2025 05:41 )             31.5       02-09    146[H]  |  120[H]  |  29[H]  ----------------------------<  125[H]  4.3   |  17[L]  |  1.40[H]    Ca    9.2      09 Feb 2025 05:41  Phos  2.9     02-09  Mg     2.1     02-09    TPro  5.0[L]  /  Alb  1.8[L]  /  TBili  0.4  /  DBili  x   /  AST  23  /  ALT  33  /  AlkPhos  69  02-09    Lactate 2.8           02-09 @ 05:41    Lactate 3.6           02-08 @ 23:27      CARDIAC MARKERS ( 08 Feb 2025 23:27 )  x     / x     / x     / x     / <1.0 ng/mL      PT/INR - ( 08 Feb 2025 23:27 )   PT: 19.0 sec;   INR: 1.63 ratio         PTT - ( 08 Feb 2025 23:27 )  PTT:20.8 sec  Urinalysis Basic - ( 09 Feb 2025 05:41 )    Color: x / Appearance: x / SG: x / pH: x  Gluc: 125 mg/dL / Ketone: x  / Bili: x / Urobili: x   Blood: x / Protein: x / Nitrite: x   Leuk Esterase: x / RBC: x / WBC x   Sq Epi: x / Non Sq Epi: x / Bacteria: x        Tubes/Lines: central venous cath (from prior for chemo)       GLOBAL ISSUE/BEST PRACTICE:  Analgesia: x  Sedation: x   HOB elevation: Y  Stress ulcer prophylaxis: x  VTE prophylaxis: SCDs due to pancytopenia   Glycemic control: x   Nutrition: clear liquids     CODE STATUS:        SUBJECTIVE: Seen and examined pt at bedside. Patient endorses nausea since starting her chemotherapy but worse over the past two days. Denies any medication has helped in the past. Denies any pain anywhere, or other acute concerns.     Review of Systems: As per [facility]     ICU Vital Signs Last 24 Hrs  T(C): 37.4 (09 Feb 2025 11:58), Max: 39.5 (08 Feb 2025 22:51)  T(F): 99.3 (09 Feb 2025 11:58), Max: 103.1 (08 Feb 2025 22:51)  HR: 111 (09 Feb 2025 12:15) (80 - 139)  BP: 121/64 (09 Feb 2025 12:15) (74/58 - 123/75)  BP(mean): 80 (09 Feb 2025 12:15) (63 - 100)  ABP: --  ABP(mean): --  RR: 22 (09 Feb 2025 12:15) (13 - 33)  SpO2: 100% (09 Feb 2025 12:15) (73% - 100%)    O2 Parameters below as of 09 Feb 2025 08:00  Patient On (Oxygen Delivery Method): nasal cannula  O2 Flow (L/min): 3            02-08-25 @ 07:01  -  02-09-25 @ 07:00  --------------------------------------------------------  IN: 263.2 mL / OUT: 0 mL / NET: 263.2 mL    02-09-25 @ 07:01  -  02-09-25 @ 12:40  --------------------------------------------------------  IN: 1052.6 mL / OUT: 0 mL / NET: 1052.6 mL        CAPILLARY BLOOD GLUCOSE          I&O's Summary    08 Feb 2025 07:01  -  09 Feb 2025 07:00  --------------------------------------------------------  IN: 263.2 mL / OUT: 0 mL / NET: 263.2 mL    09 Feb 2025 07:01  -  09 Feb 2025 12:40  --------------------------------------------------------  IN: 1052.6 mL / OUT: 0 mL / NET: 1052.6 mL        PHYSICAL EXAM:  General: NAD, appears weak  Neurology: awake and alert  HEENT: NC/AT  Respiratory: CTA b/l, no rales or rhonchi noted  Cardiovascular: irregular, no notable murmurs   Abdomen: soft, ND, slight firmness at area of umbilicus (s/p surgery)   Extremities: no clubbing, cyanosis, or edema  Skin: warm/dry      Meds:  piperacillin/tazobactam IVPB.. IV Intermittent    norepinephrine Infusion IV Continuous                  lactated ringers. IV Continuous      chlorhexidine 2% Cloths Topical                              10.1   0.19  )-----------( 60       ( 09 Feb 2025 05:41 )             31.5       02-09    146[H]  |  120[H]  |  29[H]  ----------------------------<  125[H]  4.3   |  17[L]  |  1.40[H]    Ca    9.2      09 Feb 2025 05:41  Phos  2.9     02-09  Mg     2.1     02-09    TPro  5.0[L]  /  Alb  1.8[L]  /  TBili  0.4  /  DBili  x   /  AST  23  /  ALT  33  /  AlkPhos  69  02-09    Lactate 2.8           02-09 @ 05:41    Lactate 3.6           02-08 @ 23:27      CARDIAC MARKERS ( 08 Feb 2025 23:27 )  x     / x     / x     / x     / <1.0 ng/mL      PT/INR - ( 08 Feb 2025 23:27 )   PT: 19.0 sec;   INR: 1.63 ratio         PTT - ( 08 Feb 2025 23:27 )  PTT:20.8 sec  Urinalysis Basic - ( 09 Feb 2025 05:41 )    Color: x / Appearance: x / SG: x / pH: x  Gluc: 125 mg/dL / Ketone: x  / Bili: x / Urobili: x   Blood: x / Protein: x / Nitrite: x   Leuk Esterase: x / RBC: x / WBC x   Sq Epi: x / Non Sq Epi: x / Bacteria: x        Tubes/Lines: central venous cath (from prior for chemo)       GLOBAL ISSUE/BEST PRACTICE:  Analgesia: x  Sedation: x   HOB elevation: Y  Stress ulcer prophylaxis: x  VTE prophylaxis: SCDs due to pancytopenia   Glycemic control: x   Nutrition: clear liquids     CODE STATUS:

## 2025-02-09 NOTE — PROVIDER CONTACT NOTE (EICU) - BACKGROUND
69F w/ SCC of lip, CVA, recent dx of pancreatic adenocarcinoma. [place] [facility] [place]. Presents w/ nausea, vomiting, diarrhea. In ED, Tmax 103.1, hypotensive to 70s. Labs revealed severe neutropenia, thrombocytopenia, MALLORY, lactate 3.6. Pt received 3.5L IVF, vancomycin and zosyn. Started on pressors for refractory hypotension. Imaging pending.

## 2025-02-09 NOTE — H&P ADULT - PROBLEM SELECTOR PLAN 9
Chronic on pepcid  - Recommend protonix  - Plan per ICU Patient with CVA hx (per  patient was originally on plavix but because is now taking [facility] 6236, was told to start Aggrenox instead to avoid DDIs)  - Continue aggrenox  - Plan per ICU

## 2025-02-09 NOTE — PROVIDER CONTACT NOTE (EICU) - ASSESSMENT
Telehealth evaluation precludes physical exam. On camera assessment reveals nontoxic appearing women in no acute distress,

## 2025-02-09 NOTE — H&P ADULT - PROBLEM SELECTOR PLAN 6
Chronic  - continue lipitor  - AM lipid profile  - plan per ICU Chronic on Norvasc  - hold home norvasc  - currently on levophed for hypotension  - plan per ICU

## 2025-02-09 NOTE — H&P ADULT - ATTENDING COMMENTS
68 y/o F w/ pmh of SCC of the lip, CVA on DAPT, recently found to have primary pancreatic adenocarcinoma w/ tumor in umbilicus operated at , [place] [facility] [place] last dose appx 4 days, now presented to [facility] for ongoing n/v and diarrhea since chemo was started. Today pt became weak and hypotensive and was brought to the hospital. In the ED pt was found to be hypotensive and in neutropenic sepsis. Pt was given appx 3.5L of IVF w/out any improvement in BP and was started on levo. Patient admitted to ICU with neutropenic sepsis w/ septic shock of unclear etiology.      #septic shock 2/2 neutropenic sepsis of unclear etiology   # MALLORY on CKD   # nausea/vomiting/diarrhea  # pancreatic adenocarcinoma   # pancytopenia   # lactic acidosis      pt now s/p 3.5L of IVF w/out improvement in BP started on levo plan to titrate to maintain MAP >65. check blood and UCX, procal, MRSA swab, c-diff neg, RVP neg, fu  CT chest and abd/pelvis noncon cont IV zosyn, given x1 dose of IV vanco    MALLORY w/ lactic acidosis on IVF and pressors, fu am labs   heme onc consult in AM   hold chemical dvt ppx in the setting of pancytopenia       Further plan as above. Edited where appropriate   DW ICU

## 2025-02-09 NOTE — PROGRESS NOTE ADULT - ATTENDING COMMENTS
Mrs. Pleitez is a 69 year old woman with history of SCC of lip, CVA on DAPT, new dx primary pancreatic adenocarcinoma w/tumor in umbilicus operated at . On experimental treatment (will find out from  which one). Presents with neutropenic sepsis and septic shock. Possible source is a granuloma/cellulitis on R upper abdomen.  Neuro: Patient not in pain, appears sleepy.  CV: Sinus tachycardia, reactive iso neutropenic sepsis. Septic shock. On levophed. POCUS with apparently preserved EF. Pending form TTE. IVC is virtual. Will give another bolus and start on standing fluids. Wean pressors as tolerated.  Respiratory: mild hypoxic respiratory failure requiring 2L NC. CT chest and POCUS chest negative.  GI: Pt with nausea and diarrhea. Clear liquids as tolerated. Hx pancreatic cancer.   Renal: Creatinine 1.4, will find out baseline. Monitor UOP. Pt has been retaining.   Endocrine: FS q6hrs while with minimal PO intake.   Ethics: Full code  Heme: Severe neutropenia. Heme/onc has seen patient. Awaiting recs. Will connect oncologist and . Daily filgastrim.  Updated  at bedside, Mrs. Pleitez is a 69 year old woman with history of SCC of lip, CVA on DAPT, new dx primary pancreatic adenocarcinoma w/tumor in umbilicus operated at . On experimental treatment (will find out from  which one). Presents with neutropenic sepsis and septic shock. Possible source is a granuloma/cellulitis on R upper abdomen.  Neuro: Patient not in pain, appears sleepy.  CV: Sinus tachycardia, reactive iso neutropenic sepsis. Septic shock. On levophed. POCUS with apparently preserved EF. Pending form TTE. IVC is virtual. Will give 2 more boluses and start on standing fluids 100cc/hr. Wean pressors as tolerated.  Respiratory: mild hypoxic respiratory failure requiring 2L NC. CT chest and POCUS chest negative.  GI: Pt with nausea and diarrhea. Clear liquids as tolerated. Hx pancreatic cancer.   Renal: Creatinine 1.4, baseline with GFR 60s. Monitor UOP. Pt has been retaining.   Endocrine: FS q6hrs while with minimal PO intake.   Heme: Severe neutropenia. Heme/onc has seen patient. Awaiting recs. Will connect oncologist and . Daily filgastrim.  Ethics: Full code  Updated  at bedside, Mrs. Pleitez is a 69 year old woman with history of SCC of lip, CVA on DAPT, new dx primary pancreatic adenocarcinoma w/tumor in umbilicus operated at . On experimental treatment (will find out from  which one). Presents with neutropenic sepsis and septic shock. Possible source is a granuloma/cellulitis on R upper abdomen.  Neuro: Patient not in pain, appears sleepy.  CV: Sinus tachycardia, reactive iso neutropenic sepsis. Septic shock. On levophed. POCUS with apparently preserved EF. Pending form TTE. IVC is virtual. Will give 2 more boluses and start on standing fluids 100cc/hr. Wean pressors as tolerated.  Respiratory: mild hypoxic respiratory failure requiring 2L NC. CT chest and POCUS chest negative.  GI: Pt with nausea and diarrhea. Clear liquids as tolerated. Hx pancreatic cancer.   Renal: Creatinine 1.4, baseline with GFR 60s. Monitor UOP. Pt has been retaining. baseline creatinine ~1  Endocrine: FS q6hrs while with minimal PO intake.   Heme: Severe neutropenia. Heme/onc has seen patient. Daily filgastrim. [facility] [place]. Barr requesting we give 480, which is more than the 5/kg that we usually order. Discussed with both her and our heme/onc team.   Dr. Hannah Barr [phone]  Ethics: Full code  Updated  at bedside, Mrs. Pleitez is a 69 year old woman with history of SCC of lip, CVA on DAPT, new dx primary pancreatic adenocarcinoma w/Sister Deidre Ramirez lesion in umbilicus operated at . On experimental treatment (will find out from  which one). Presents with neutropenic sepsis and septic shock. Possible source is a granuloma/cellulitis on R upper abdomen.  Neuro: Patient not in pain, appears sleepy.  CV: Sinus tachycardia, reactive iso neutropenic sepsis. Septic shock. On levophed. POCUS with apparently preserved EF. Pending form TTE. IVC is virtual. Will give 2 more boluses and start on standing fluids 100cc/hr. Wean pressors as tolerated. Pt takes Aggrenox at home. Continue.   Respiratory: mild hypoxic respiratory failure requiring 2L NC. CT chest and POCUS chest negative.  GI: Pt with nausea and diarrhea. Clear liquids as tolerated. Hx pancreatic cancer.   Renal: Creatinine 1.4, baseline with GFR 60s. Monitor UOP. Pt has been retaining. baseline creatinine ~1  Endocrine: FS q6hrs while with minimal PO intake.   Heme: Severe neutropenia. Pt on experimental treatment for her cancer. Heme/onc has seen patient. [place]gastrim. [facility] [place] Barr requesting we give 480, which is more than the 5/kg that we usually order. Discussed with both her and our heme/onc team.  Dr. Hannah Barr [phone]  ID: neutropenic fever. Potentially from ulcer/wound on R upper abdomen. Is indurated, but not fluctuant with surrounding erythema. On CT and ultrasound, no discreet collection. Possible source? Continue with vancomycin and cefepime. Discussed with ID.  Ethics: Full code    Updated  at bedside,

## 2025-02-09 NOTE — H&P ADULT - PROBLEM SELECTOR PLAN 2
Pt with primary pancreatic adenocarcinoma with IV chemotherapy every 2 weeks and on daily experimental R[facility]-6236 daily.  - Recommend Heme/Onc consult as patient follows with [facility] for chemotherapy.  - Recommend GI consult  - Advised  bring in RMC-6236 drug in the event patient requires  - Plan per ICU Pt with primary pancreatic adenocarcinoma with IV chemotherapy every 2 weeks and on daily experimental [facility]-6236 daily.  - Recommend Heme/Onc consult as patient follows with [facility] for chemotherapy.  - Recommend GI consult  - Advised  bring in [facility]MC-6236 drug in the event patient requires  - f/u CT chest/abd imaging, pending results.  - Plan per ICU Pt with primary pancreatic adenocarcinoma with IV chemotherapy every 2 weeks and on daily experimental [facility]-6236 daily.  - Recommend Heme/Onc consult as patient follows with [facility] for chemotherapy.  - Advised  bring in RMC-6236 drug in the event patient requires  - f/u CT chest/abd imaging, pending results.  - Plan per ICU Pt with primary pancreatic adenocarcinoma with IV chemotherapy every 2 weeks and on daily experimental RMC-6236 daily.  - Heme/Onc consult   - f/u CT chest/abd imaging, pending results.  - Plan per ICU

## 2025-02-09 NOTE — H&P ADULT - PROBLEM SELECTOR PLAN 8
Patient with CVA hx (per  patient was originally on plavix but because is now taking [facility] 6236, was told to start Aggrenox instead to avoid DDIs)  - Continue aggrenox  - Plan per ICU Chronic - was previously on allopurinol but currently off medications.  - plan per ICU

## 2025-02-09 NOTE — PHARMACOTHERAPY INTERVENTION NOTE - COMMENTS
68 yo female on cefepime+vancomycin empirically. Recommended 750 mg vancomycin q 24 with pre-2nd trough to yield therapeutic AUC/REJI of 487. Discussed with Dr Frank and order entered.

## 2025-02-09 NOTE — PROGRESS NOTE ADULT - SUBJECTIVE AND OBJECTIVE BOX
Patient is a 69y old  Female who presents with a chief complaint of Sepsis (09 Feb 2025 12:39)      INTERVAL HPI/OVERNIGHT EVENTS: Patient seen and examined at bedside. Sleeping prior to exam. States her nausea has been worsening and she feels tired and weak. On IV Cefepime and Levo gtt     MEDICATIONS  (STANDING):  cefepime   IVPB 2000 milliGRAM(s) IV Intermittent every 12 hours  chlorhexidine 2% Cloths 1 Application(s) Topical <User Schedule>  filgrastim-sndz (ZARXIO) Injectable 300 MICROGram(s) SubCutaneous daily  lactated ringers. 1000 milliLiter(s) (75 mL/Hr) IV Continuous <Continuous>  norepinephrine Infusion 0.05 MICROgram(s)/kG/Min (5.74 mL/Hr) IV Continuous <Continuous>    MEDICATIONS  (PRN):      Allergies    No Known Allergies    Intolerances        REVIEW OF SYSTEMS:  CONSTITUTIONAL: +weakness  HEENT:  No headache, no sore throat  RESPIRATORY: No cough, wheezing, or shortness of breath  CARDIOVASCULAR: No chest pain, palpitations  GASTROINTESTINAL: +nausea   GENITOURINARY: No dysuria, frequency, or hematuria  NEUROLOGICAL: no focal weakness or dizziness  MUSCULOSKELETAL: no myalgias     Vital Signs Last 24 Hrs  T(C): 37.4 (09 Feb 2025 11:58), Max: 39.5 (08 Feb 2025 22:51)  T(F): 99.3 (09 Feb 2025 11:58), Max: 103.1 (08 Feb 2025 22:51)  HR: 111 (09 Feb 2025 12:15) (80 - 139)  BP: 121/64 (09 Feb 2025 12:15) (74/58 - 123/75)  BP(mean): 80 (09 Feb 2025 12:15) (63 - 100)  RR: 22 (09 Feb 2025 12:15) (13 - 33)  SpO2: 100% (09 Feb 2025 12:15) (73% - 100%)    Parameters below as of 09 Feb 2025 08:00  Patient On (Oxygen Delivery Method): nasal cannula  O2 Flow (L/min): 3      PHYSICAL EXAM:  GENERAL: NAD  HEENT:  anicteric, moist mucous membranes  CHEST/LUNG:  CTA b/l, no rales, wheezes, or rhonchi  HEART:  RRR, S1, S2  ABDOMEN:  BS+, soft, nontender, nondistended  EXTREMITIES: no edema, cyanosis, or calf tenderness  NERVOUS SYSTEM: answers questions and follows commands appropriately    LABS:                        10.1   0.19  )-----------( 60       ( 09 Feb 2025 05:41 )             31.5     CBC Full  -  ( 09 Feb 2025 05:41 )  WBC Count : 0.19 K/uL  Hemoglobin : 10.1 g/dL  Hematocrit : 31.5 %  Platelet Count - Automated : 60 K/uL  Mean Cell Volume : 92.6 fl  Mean Cell Hemoglobin : 29.7 pg  Mean Cell Hemoglobin Concentration : 32.1 g/dL  Auto Neutrophil # : 0.01 K/uL  Auto Lymphocyte # : 0.16 K/uL  Auto Monocyte # : 0.00 K/uL  Auto Eosinophil # : 0.01 K/uL  Auto Basophil # : 0.00 K/uL  Auto Neutrophil % : 5.2 %  Auto Lymphocyte % : 84.2 %  Auto Monocyte % : 0.0 %  Auto Eosinophil % : 5.3 %  Auto Basophil % : 0.0 %    09 Feb 2025 05:41    146    |  120    |  29     ----------------------------<  125    4.3     |  17     |  1.40     Ca    9.2        09 Feb 2025 05:41  Phos  2.9       09 Feb 2025 05:41  Mg     2.1       09 Feb 2025 05:41    TPro  5.0    /  Alb  1.8    /  TBili  0.4    /  DBili  x      /  AST  23     /  ALT  33     /  AlkPhos  69     09 Feb 2025 05:41    PT/INR - ( 08 Feb 2025 23:27 )   PT: 19.0 sec;   INR: 1.63 ratio         PTT - ( 08 Feb 2025 23:27 )  PTT:20.8 sec  Urinalysis Basic - ( 09 Feb 2025 05:41 )    Color: x / Appearance: x / SG: x / pH: x  Gluc: 125 mg/dL / Ketone: x  / Bili: x / Urobili: x   Blood: x / Protein: x / Nitrite: x   Leuk Esterase: x / RBC: x / WBC x   Sq Epi: x / Non Sq Epi: x / Bacteria: x      CAPILLARY BLOOD GLUCOSE            Urinalysis with Rflx Culture (collected 02-09-25 @ 01:25)        RADIOLOGY & ADDITIONAL TESTS:    Personally reviewed.     Consultant(s) Notes Reviewed:  [x] YES  [ ] NO

## 2025-02-09 NOTE — H&P ADULT - PROBLEM SELECTOR PLAN 7
Chronic - was previously on allopurinol but currently off medications.  - plan per ICU Chronic  - continue lipitor  - AM lipid profile  - plan per ICU

## 2025-02-09 NOTE — PROGRESS NOTE ADULT - ATTENDING COMMENTS
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 Currently in ICU on levophed gtt    patient seen at bedside   Reports feeling weak overall  follows with [facility] on clinical trial   still with occasional nausea.   on levophed     A/P:  Neutropenic fever   septic shock   met pancreatic cancer on chemotherapy    - wbc 0.18, hgb 8.9, plt 64, ANC 50     - hematology following. started on zarxio    - ID consulted: cont broad spectrum abx: vanco/cefepime given neutropenia     - cultures pending     - tylenol prn temp     - zofran prn     - possible secondary to cellulitis RUQ     h/o CVA x 2    - on aggrenox outpatient. defer to ICU     MALLORY     - monitor Cr     - recent admission to [place] reviewed. patient had MALLORY secondary to ATN/dehydration. telmisartan/allopurinol was held.     DVT proph: SCDs  management per ICU

## 2025-02-09 NOTE — CONSULT NOTE ADULT - SUBJECTIVE AND OBJECTIVE BOX
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  Patient said last chemo episode was over the past week. She was febrile to 103.1 in the ED.    Patient denies any SOB, cough, dysuria, headache or dizziness. Denies any chest pain or back pain. She has had crusting around the mouth but was previously told it was from chemo. Only noted noted nodule on RUQ abdomen today.        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 1000 milliLiter(s) (75 mL/Hr) IV Continuous <Continuous>  norepinephrine Infusion 0.05 MICROgram(s)/kG/Min (5.74 mL/Hr) IV Continuous <Continuous>  piperacillin/tazobactam IVPB.. 3.375 Gram(s) IV Intermittent every 8 hours    MEDICATIONS  (PRN):       REVIEW OF SYSTEMS:  CONSTITUTIONAL: + fever  HEENT:  No sore throat or runny nose.  CARDIOVASCULAR:  No chest pain or SOB.  RESPIRATORY:  No cough, shortness of breath  GASTROINTESTINAL:  see history  GENITOURINARY:  No dysuria  MUSCULOSKELETAL:  no joint aches, no muscle pain  NEUROLOGIC:  No headache or dizziness  PSYCHIATRIC:  No disorder of thought or mood.    Physical Exam:  Vital Signs Last 24 Hrs  T(C): 37.4 (09 Feb 2025 11:58), Max: 39.5 (08 Feb 2025 22:51)  T(F): 99.3 (09 Feb 2025 11:58), Max: 103.1 (08 Feb 2025 22:51)  HR: 111 (09 Feb 2025 12:15) (80 - 139)  BP: 121/64 (09 Feb 2025 12:15) (74/58 - 123/75)  BP(mean): 80 (09 Feb 2025 12:15) (63 - 100)  RR: 22 (09 Feb 2025 12:15) (13 - 33)  SpO2: 100% (09 Feb 2025 12:15) (73% - 100%)    Parameters below as of 09 Feb 2025 08:00  Patient On (Oxygen Delivery Method): nasal cannula  O2 Flow (L/min): 3    Height (cm): 162.6 (02-09 @ 02:39)  Weight (kg): 66.8 (02-09 @ 02:39)  BMI (kg/m2): 25.3 (02-09 @ 02:39)  BSA (m2): 1.72 (02-09 @ 02:39)    GEN: Appears weak, not in acute distress  HEENT: normocephalic and atraumatic.   NECK: Supple.   CHEST: R chest port--no surrounding erythmea  LUNGS: Normal respiratory effort  HEART: Regular rate and rhythm   ABDOMEN: Soft, nontender, and nondistended.    EXTREMITIES: No leg edema.  NEUROLOGIC: Answering quedtions appropriately  PSYCHIATRIC: Appropriate affect .  SKIN: + tender nodule on RUQ of abdomen, surrounding erythema noted, no drainage    Labs:  02-09    146[H]  |  120[H]  |  29[H]  ----------------------------<  125[H]  4.3   |  17[L]  |  1.40[H]    Ca    9.2      09 Feb 2025 05:41  Phos  2.9     02-09  Mg     2.1     02-09    TPro  5.0[L]  /  Alb  1.8[L]  /  TBili  0.4  /  DBili  x   /  AST  23  /  ALT  33  /  AlkPhos  69  02-09                          10.1   0.19  )-----------( 60       ( 09 Feb 2025 05:41 )             31.5     PT/INR - ( 08 Feb 2025 23:27 )   PT: 19.0 sec;   INR: 1.63 ratio         PTT - ( 08 Feb 2025 23:27 )  PTT:20.8 sec  Urinalysis Basic - ( 09 Feb 2025 05:41 )    Color: x / Appearance: x / SG: x / pH: x  Gluc: 125 mg/dL / Ketone: x  / Bili: x / Urobili: x   Blood: x / Protein: x / Nitrite: x   Leuk Esterase: x / RBC: x / WBC x   Sq Epi: x / Non Sq Epi: x / Bacteria: x      LIVER FUNCTIONS - ( 09 Feb 2025 05:41 )  Alb: 1.8 g/dL / Pro: 5.0 g/dL / ALK PHOS: 69 U/L / ALT: 33 U/L / AST: 23 U/L / GGT: x           CARDIAC MARKERS ( 08 Feb 2025 23:27 )  x     / x     / x     / x     / <1.0 ng/mL        Procalcitonin: 5.99 ng/mL (02-09-25 @ 05:41)        SARS-CoV-2 Result: NotDetec (02-08-25 @ 23:27)      RECENT CULTURES:        All imaging and other data have been reviewed.    < from: CT Chest No Cont (02.09.25 @ 03:02) >    FINDINGS:  Tubes, catheters and devices: Right-sided central venous catheter with tip  cavoatrial junction.    Lungs: Bibasilar atelectasis.  Pleural spaces: Unremarkable. No pneumothorax. No pleural effusion.  Heart: Heart top normal with small pericardial effusion.  Coronary arteries: Coronary artery calcification.  Lymph nodes: Unremarkable. No enlarged lymph nodes.  Vasculature: No aneurysm thoracic aorta.    Bones/joints: Lucency/deformity is noted involving the posterior aspect   of the  left 10 , possibly 9th ribs; minimal fractures of indeterminate age in   these  areas can not be excluded. Spine nonacute.  Soft tissues: Surrounding soft tissue chest unremarkable.    IMPRESSION:  1.   Study somewhat limited due to absence of contrast.  2.   Central venous catheter on right side with tip in cavoatrial   junction.  3.   Bibasilar atelectasis/scarring right-greater-than-left; can not   exclude  trace pleural effusions. No areas of consolidation. No pneumothorax.  4.   Lucency/deformity is noted involving the posterior aspect of the   left 10 ,  possibly 9th ribs; minimal fractures of indeterminate age in these areas   can  not be excluded.    =========================  PROCEDURE INFORMATION:  Exam: CT Abdomen And Pelvis Without Contrast  Exam date and time: 2/9/2025 2:24 AM  Age: 69 years old  Clinical indication: Fever    TECHNIQUE:  Imaging protocol: Computed tomography ofthe abdomen and pelvis without  contrast.    COMPARISON:  CT ABDOMEN AND PELVIS 2/9/2025 12:54 AM    FINDINGS:  Diaphragm: Moderate hiatal hernia.    Liver: Normal. No mass.  Gallbladder and biliary ducts: Some distension of gallbladder could be   further  evaluated with right upper quadrant sonography. No definite biliary   dilatation.  Pancreas: See "Soft tissues"  Spleen: Spleen unremarkable.  Adrenal glands: No adrenal masses.  Kidneys and ureters: Bilateral low-attenuation lesions involving the   kidneys  with the largest upper pole left kidney measuring up to 6 cm and  low-attenuation lesion noted posterior aspect right kidney measures up to   4 cm  could be further evaluated with sonography and if needed MRI.   Nonobstructing 3  mm calculus upper pole right kidney. No hydronephrosis.  Stomach and bowel:  No bowel obstruction or pneumoperitoneum..  Appendix: No evidence of appendicitis.    Intraperitoneal space: Unremarkable. No free air. No significant fluid  collection.  Vasculature: No aneurysm abdominal aorta.  Lymph nodes: No adenopathy.  Urinary bladder: Unremarkable as visualized.  Reproductive:  Clinical correlation for possible partial hysterectomy..  Bones/joints: Unremarkable. No acute fracture.  Soft tissues: Axial images 71-76 of series  301 and concomitant coronal images demonstrate prominent soft tissue  inseparable from the body of the pancreas consistent with pancreatic   neoplasm. Soft tissue prominence in region of umbilicus may represent   postsurgical change. Clinical correlation. Any clinical evidence of any  inflammatory disease. Otherwise the surrounding soft tissue of the pelvis   and abdomen is nonacute.    IMPRESSION:  1.   Study somewhat limited due to absence of contrast.  2.   Axial images 71-76 of series 301 and  concomitant coronal images demonstrate prominent soft tissue inseparable   from  the body of the pancreas consistent with pancreatic neoplasm.  3.   Some distension of gallbladder could be further evaluated with right   upper  quadrant sonography. No definite biliary dilatation.  4.   Other incidental findings as above.    The results of this examination were discussed with Dr. Dangelo at 4:05   a.m.  central standard time on 02/09/2025 by Pavel Morton MD.  They   discussed the mass in the body of the pancreas which  is compatible with patient's known pancreatic neoplasm.    Distended gallbladder could be further evaluated with right upper quadrant  sonography.    Low-attenuation lesions both kidneys nonobstructing calculus right kidney    --- End of Report ---      < end of copied text >

## 2025-02-09 NOTE — ED ADULT NURSE NOTE - OBJECTIVE STATEMENT
Patient complaining on n/v/d and weakness. states did not want to come to hospital but  called EMS. As per EMS, patient hypotensive.

## 2025-02-09 NOTE — CONSULT NOTE ADULT - SUBJECTIVE AND OBJECTIVE BOX
All records reviewed.    HPI:    70 yo woman w hx squamous cell ca of lip, cyclical vomiting syndrome, HTN, HLD, gout, GERD, CVA on Aggrenox, pancreas adenicarcinoma w umbilicus region involvement(Dx'd ~6 wks ago, under care Dr Pedro TERRY in [facility], post 2 doses QOW IV 48hr  chemotherapy w daily po trial med [id number], last dose IV chemo 4dagok last dose po 2 days ago)  Pt reports had had nausea, vomit, diarrhea since start of chemo, also req IV hydration. has abdomen pain w diarrhea  Stool is s/t liquid, mucous.  In the ER:  Vitals: T 100.4F oral  /75--->70s-80s/50s RR 16 98% RA  Labs: WBC 0.18 H/H 8.9/28.4 PLT 64 PTT 20.8 PT/INR 19/1.63 Cl 118 CO2 16 BUN/Cr 32/1.5 Glu 167 GFR 37 Lac 3.6  UA small Leuk, negative nitrite, 5-10 WBC 02-2 RBCbacteria  CT c/a/p no contrast-left 10th/poss 9th rib fx unknown age, , kuldeep low attentuation renal lesions up to 6cm left and 4cm right, 3mm right renal stone. No sig bowel or pulm abnormality described. 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 1000 milliLiter(s) (75 mL/Hr) IV Continuous <Continuous>  norepinephrine Infusion 0.05 MICROgram(s)/kG/Min (5.74 mL/Hr) IV Continuous <Continuous>    MEDICATIONS  (PRN):      Allergies    No Known Allergies    Intolerances        SOCIAL HISTORY:  denies Etoh or tobacco    FAMILY HISTORY:  denies      Vital Signs Last 24 Hrs  T(C): 37.4 (09 Feb 2025 11:58), Max: 39.5 (08 Feb 2025 22:51)  T(F): 99.3 (09 Feb 2025 11:58), Max: 103.1 (08 Feb 2025 22:51)  HR: 111 (09 Feb 2025 12:15) (80 - 139)  BP: 121/64 (09 Feb 2025 12:15) (74/58 - 123/75)  BP(mean): 80 (09 Feb 2025 12:15) (63 - 100)  RR: 22 (09 Feb 2025 12:15) (13 - 33)  SpO2: 100% (09 Feb 2025 12:15) (73% - 100%)    Parameters below as of 09 Feb 2025 08:00  Patient On (Oxygen Delivery Method): nasal cannula  O2 Flow (L/min): 3      PHYSICAL EXAM:      General:ill appearing woman in bed, in no acute distress  Eyes:sclera anicteric, pupils equal and EOMI  ENMT:buccal mucosa moist  Neck:supple, trachea midline  Lungs:clear, no wheeze/rhonchi  Cardiovascular:regular rate and rhythm, S1 S2  Abdomen:soft, nontender, no organomegaly present, bowel sounds normal. Pouchy  Neurological:alert and oriented x3, Cranial Nerves II-XII grossly intact  Skin:no increased ecchymosis/petechiae/purpura  Lymph Nodes:no palpable cervical/supraclavicular lymph nodes enlargements  Extremities:no cyanosis/clubbing/edema        LABS:                        10.1   0.19  )-----------( 60       ( 09 Feb 2025 05:41 )             31.5     02-09 @ 05:41  WBC0.19  RBC3.40 Hgb10.1 Hct31.5  MCV92.6  Plts60  Auto Neutro5.2 Band-- Auto Lymph84.2 Atypical Lymph-- Reactive Lymph-- Auto Mono0.0 Auto Eos5.3 Auto Baso0.0        02-08 @ 23:27  WBC0.18  RBC3.03 Hgb8.9 Hct28.4  MCV93.7  Plts64  Auto Vivftf32.8 Band-- Auto Lymph72.2 Atypical Lymph-- Reactive Lymph-- Auto Mono0.0 Auto Eos0.0 Auto Baso0.0          02-09    146[H]  |  120[H]  |  29[H]  ----------------------------<  125[H]  4.3   |  17[L]  |  1.40[H]    Ca    9.2      09 Feb 2025 05:41  Phos  2.9     02-09  Mg     2.1     02-09    TPro  5.0[L]  /  Alb  1.8[L]  /  TBili  0.4  /  DBili  x   /  AST  23  /  ALT  33  /  AlkPhos  69  02-09 02-08 @ 23:27  PT19.0 INR1.63  PTT20.8    Urinalysis Basic - ( 09 Feb 2025 05:41 )    Color: x / Appearance: x / SG: x / pH: x  Gluc: 125 mg/dL / Ketone: x  / Bili: x / Urobili: x   Blood: x / Protein: x / Nitrite: x   Leuk Esterase: x / RBC: x / WBC x   Sq Epi: x / Non Sq Epi: x / Bacteria: x        PERIPHERAL BLOOD SMEAR REVIEW:      RADIOLOGY & ADDITIONAL STUDIES:  < from: CT Chest No Cont (02.09.25 @ 03:02) >    ACC: [id number] EXAM:  CT ABDOMEN AND PELVIS   ORDERED BY: ROSE COPE     ACC: [id number] EXAM:  CT CHEST   ORDERED BY: LESTER HERNANDEZ     PROCEDURE DATE:  02/09/2025          INTERPRETATION:  PROCEDURE INFORMATION:  Exam: CT Chest Without Contrast; Diagnostic  Exam date and time: 2/9/2025 2:24 AM  Age: 69 years old  Clinical indication: Fever    TECHNIQUE:  Imaging protocol: Diagnostic computed tomography of the chest without   contrast.  3D rendering (Not supervised by radiologist): MIP and/or 3D reconstructed  images were created by the technologist.    COMPARISON:  DX XR CHEST 2/8/2025 10:14 PM    FINDINGS:  Tubes, catheters and devices: Right-sided central venous catheter with tip  cavoatrial junction.    Lungs: Bibasilar atelectasis.  Pleural spaces: Unremarkable. No pneumothorax. No pleural effusion.  Heart: Heart top normal with small pericardial effusion.  Coronary arteries: Coronary artery calcification.  Lymph nodes: Unremarkable. No enlarged lymph nodes.  Vasculature: No aneurysm thoracic aorta.    Bones/joints: Lucency/deformity is noted involving the posterior aspect   of the  left 10 , possibly 9th ribs; minimal fractures of indeterminate age in   these  areas can not be excluded. Spine nonacute.  Soft tissues: Surrounding soft tissue chest unremarkable.    IMPRESSION:  1.   Study somewhat limited due to absence of contrast.  2.   Central venous catheter on right side with tip in cavoatrial   junction.  3.   Bibasilar atelectasis/scarring right-greater-than-left; can not   exclude  trace pleural effusions. No areas of consolidation. No pneumothorax.  4.   Lucency/deformity is noted involving the posterior aspect of the   left 10 ,  possibly 9th ribs; minimal fractures of indeterminate age in these areas   can  not be excluded.    =========================  PROCEDURE INFORMATION:  Exam: CT Abdomen And Pelvis Without Contrast  Exam date and time: 2/9/2025 2:24 AM  Age: 69 years old  Clinical indication: Fever    TECHNIQUE:  Imaging protocol: Computed tomography ofthe abdomen and pelvis without  contrast.    COMPARISON:  CT ABDOMEN AND PELVIS 2/9/2025 12:54 AM    FINDINGS:  Diaphragm: Moderate hiatal hernia.    Liver: Normal. No mass.  Gallbladder and biliary ducts: Some distension of gallbladder could be   further  evaluated with right upper quadrant sonography. No definite biliary   dilatation.  Pancreas: See "Soft tissues"  Spleen: Spleen unremarkable.  Adrenal glands: No adrenal masses.  Kidneys and ureters: Bilateral low-attenuation lesions involving the   kidneys  with the largest upper pole left kidney measuring up to 6 cm and  low-attenuation lesion noted posterior aspect right kidney measures up to   4 cm  could be further evaluated with sonography and if needed MRI.   Nonobstructing 3  mm calculus upper pole right kidney. No hydronephrosis.  Stomach and bowel:  No bowel obstruction or pneumoperitoneum..  Appendix: No evidence of appendicitis.    Intraperitoneal space: Unremarkable. No free air. No significant fluid  collection.  Vasculature: No aneurysm abdominal aorta.  Lymph nodes: No adenopathy.  Urinary bladder: Unremarkable as visualized.  Reproductive:  Clinical correlation for possible partial hysterectomy..  Bones/joints: Unremarkable. No acute fracture.  Soft tissues: Axial images 71-76 of series  301 and concomitant coronal images demonstrate prominent soft tissue  inseparable from the body of the pancreas consistent with pancreatic   neoplasm. Soft tissue prominence in region of umbilicus may represent   postsurgical change. Clinical correlation. Any clinical evidence of any  inflammatory disease. Otherwise the surrounding soft tissue of the pelvis   and abdomen is nonacute.    IMPRESSION:  1.   Study somewhat limited due to absence of contrast.  2.   Axial images 71-76 of series 301 and  concomitant coronal images demonstrate prominent soft tissue inseparable   from  the body of the pancreas consistent with pancreatic neoplasm.  3.   Some distension of gallbladder could be further evaluated with right   upper  quadrant sonography. No definite biliary dilatation.  4.   Other incidental findings as above.    The results of this examination were discussed with Dr. Dangelo at 4:05   a.m.  central standard time on 02/09/2025 by Pavel Morton MD.  They   discussed the mass in the body of the pancreas which  is compatible with patient's known pancreatic neoplasm.    Distended gallbladder could be further evaluated with right upper quadrant  sonography.    Low-attenuation lesions both kidneys nonobstructing calculus right kidney    --- End of Report ---        < end of copied text >

## 2025-02-09 NOTE — PATIENT PROFILE ADULT - FUNCTIONAL ASSESSMENT - DAILY ACTIVITY 2.
Asc Procedure Text (A): After obtaining clear surgical margins the patient was sent to an ASC for surgical repair.  The patient understands they will receive post-surgical care and follow-up from the ASC physician. 2 = A lot of assistance

## 2025-02-09 NOTE — PROGRESS NOTE ADULT - ASSESSMENT
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 Today pt became weak and hypotensive and was brought to the hospital. In the ED pt was found to be hypotensive and in neutropenic sepsis.     Neuro:   -No acute issues, MS stable    Cardiac:   -Septic Shock 2/2 to neutropenic sepsis, pt now s/p 3.5L of IVF w/out improvement in BP  -started on levo plan to titrate to maintain MAP >65  -f/u TTE  -EKG 02/09: Sinus tachycardia with premature atrial complexes, Left axis deviation, Inferior infarct, Anterolateral infarct     Resp:   -Resp status stable, maintain o2 >90%    GI:   -No acute issues, started on clear liquids     Renal:   -MALLORY w/ lactic acidosis on IVF and pressors  -CT abd:  2 kidney lesions (right kidney 4cm, and left kidney 6cm)   -Improving Cr, unknown baseline     ID:   -Neutropenic septic shock unclear source at this time  -fever of 103.1 in ED   -check blood and UCX, procal, MRSA swap, c-diff neg, RVP neg  -CT chest and abd/pelvis noncon (/wife refused contracts due to MALLORY): small pericardial effusion, bibasilar atelectasis, no consolidations noted. Small distention of gallbladder, and 2 kidney lesions (right kidney 4cm, and left kidney 6cm)   -s/p 1 dose of vanc, trough level elevated, will reschedule maintenance dose   -switched from IV zosyn to cefepime    Endo:   -Known pancreatic Ca   -Receives chemo every other week, and on RMC-6236 daily   -consulted Dr. 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 Today pt became weak and hypotensive and was brought to the hospital. In the ED pt was found to be hypotensive and in neutropenic sepsis.     Neuro:   -No acute issues, MS stable  -past history of CVA (was on DAPT now on Aggrenox due to pancreatic Ca)     Cardiac:   -Septic Shock 2/2 to neutropenic sepsis, pt now s/p 3.5L of IVF w/out improvement in BP  -started on levo plan to titrate to maintain MAP >65  -f/u TTE  -EKG 02/09: Sinus tachycardia with premature atrial complexes, Left axis deviation, Inferior infarct, Anterolateral infarct     Resp:   -Resp status stable, maintain o2 >90%    GI:   -No acute issues, started on clear liquids     Renal:   -MALLORY w/ lactic acidosis on IVF and pressors  -CT abd:  2 kidney lesions (right kidney 4cm, and left kidney 6cm)   -Improving Cr, unknown baseline     ID:   -Neutropenic septic shock unclear source at this time  -fever of 103.1 in ED   -check blood and UCX, procal, MRSA swap, c-diff neg, RVP neg  -CT chest and abd/pelvis noncon (/wife refused contracts due to MALLORY): small pericardial effusion, bibasilar atelectasis, no consolidations noted. Small distention of gallbladder, and 2 kidney lesions (right kidney 4cm, and left kidney 6cm)   -s/p 1 dose of vanc, trough level elevated, will reschedule maintenance dose   -switched from IV zosyn to cefepime    Endo:   -Known pancreatic Ca   -Receives chemo every other week, and on RMC-6236 daily   -consulted Dr. 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 Today pt became weak and hypotensive and was brought to the hospital. In the ED pt was found to be hypotensive and in neutropenic sepsis.     Neuro:   -No acute issues, MS stable  -past history of CVA (was on DAPT now on Aggrenox due to pancreatic Ca)     Cardiac:   -Septic Shock 2/2 to neutropenic sepsis, pt now s/p 3.5L of IVF w/out improvement in BP  -started on levo plan to titrate to maintain MAP >65  -f/u TTE  -EKG 02/09: Sinus tachycardia with premature atrial complexes, Left axis deviation, Inferior infarct, Anterolateral infarct     Resp:   -Resp status stable, maintain o2 >90%    GI:   -No acute issues, started on clear liquids     Renal:   -MALLORY w/ lactic acidosis on IVF and pressors  -CT abd:  2 kidney lesions (right kidney 4cm, and left kidney 6cm)   -Improving Cr, unknown baseline     ID:   -Neutropenic septic shock unclear source at this time  -fever of 103.1 in ED   -check blood and UCX, procal, MRSA swab, c-diff neg, RVP neg  -CT chest and abd/pelvis noncon (/wife refused contracts due to MALLORY): small pericardial effusion, bibasilar atelectasis, no consolidations noted. Small distention of gallbladder, and 2 kidney lesions (right kidney 4cm, and left kidney 6cm)   -s/p 1 dose of vanc, trough level elevated, will reschedule maintenance dose   -switched from IV zosyn to cefepime    Endo:   -Known pancreatic Ca   -Receives chemo every other week, and on RMC-6236 daily   -consulted Dr. Madrid, heme/onc     Heme: DVT ppx w/ SCD, no chemical AC at this time given pancytopenia

## 2025-02-09 NOTE — CONSULT NOTE ADULT - ASSESSMENT
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  Patient said last chemo episode was over the past week. She was febrile to 103.1 in the ED.    Found to have fever and hypotension in the setting of neutropenia. Would continue empiric antibiotics pending cultures. CT chest and A/P showed some distension of gallbladder, but no other obvious sign of infection. Unclear eitology of tender lesion on RUQ abdominal wall. No collection seen on noncontrast CT. COVID/FLU/RSV negative. C diff and GI PCR negative.    #Fever  #Neutropenia  #Hypotension  #Abdominal wall lesion?    -suggest vancomycin and cefepime  -suggest MRSA nasal PCR  -blood and urine cultures pending  -monitor WBC, creatinine  -unclear etiology of lesion on abdomen--Dermatology evaluation if no improvement    Thank you for courtesy of this consult.     Will follow.  Discussed with the primary team.     Mandi Aparicio MD  Division of Infectious Diseases   Cell [phone] between 8am and 6pm   After 6pm and weekends please call ID service at [phone].     55 minutes spent on total encounter assessing patient, examination, chart review, counseling and coordinating care by the attending physician/nurse/care manager.  0 (no pain/absence of nonverbal indicators of pain)

## 2025-02-09 NOTE — H&P ADULT - ASSESSMENT
69-year-old female past medical history of  SCC lip,  cyclical vomiting syndrome, htn, hld, gout, gerd, CVA on Aggrenox recent diagnosis of primary pancreatic adenocarcinoma with tumor  in umbilicus admitted for sepsis

## 2025-02-09 NOTE — DISCHARGE NOTE PROVIDER - DETAILS OF MALNUTRITION DIAGNOSIS/DIAGNOSES
This patient has been assessed with a concern for Malnutrition and was treated during this hospitalization for the following Nutrition diagnosis/diagnoses:     -  02/11/2025: Severe protein-calorie malnutrition

## 2025-02-09 NOTE — DISCHARGE NOTE PROVIDER - NSDCACTIVITY_GEN_ALL_CORE
Do not drive or operate machinery No restrictions/Do not drive or operate machinery No restrictions/Do not drive or operate machinery/Activity as tolerated

## 2025-02-09 NOTE — CONSULT NOTE ADULT - ASSESSMENT
68 yo woman w hx squamous cell ca of lip, cyclical vomiting syndrome, HTN, HLD, gout, GERD, CVA on Aggrenox, pancreas adenicarcinoma w umbilicus region involvement(Dx'd ~6 wks ago, under care Dr Pedro TERRY in [facility], post 2 doses QOW IV 48hr  chemotherapy w daily po trial med RMC-6236, last dose IV chemo 4dagok last dose po 2 days ago)  Pt reports had had nausea, vomit, diarrhea since start of chemo, also req IV hydration. has abdomen pain w diarrhea  Stool is s/t liquid, mucous.  In the ER:  Vitals: T 100.4F oral  /75--->70s-80s/50s RR 16 98% RA  Labs: WBC 0.18 H/H 8.9/28.4 PLT 64 PTT 20.8 PT/INR 19/1.63 Cl 118 CO2 16 BUN/Cr 32/1.5 Glu 167 GFR 37 Lac 3.6  UA small Leuk, negative nitrite, 5-10 WBC 02-2 RBCbacteria  CT c/a/p no contrast-left 10th/poss 9th rib fx unknown age, , kuldeep low attentuation renal lesions up to 6cm left and 4cm right, 3mm right renal stone. No sig bowel or pulm abnormality described. Prom soft tissue insep from body of pancreas and sep prom in region of umbilicus  On exam ?area of abdominal wall cellulitis    started on Levophed, post Vaco, on Zosyn    -recent dx of pancreas ca w umbilical soft tissue met, on IV infusional chemo and oral trial medication at [facility] [place], has had N/V/D since start of chemo, adm w worse sx  -neutropenic fever, pancytopenia-suspect chemo effect  -ID on case, already started on antibiotics, cultures in progress  -will start GCSF in view of extreme neutropenia w fever, hypotension  -continue ICU care  -follow serial CBC, chemistry  -continue supportive care from Heme/Onc standpoint  -hold active chemotherapy  -neutropenic precaution, no rectal temp, no fresh flowers, all food well cooked    thank you, will follow w you

## 2025-02-09 NOTE — CHART NOTE - NSCHARTNOTEFT_GEN_A_CORE
Called to bedside because patient went into SVT. BPs stable. Pt bore down and I applied pressure to carotid. Patient broke on her own. Is mounting fever. Likely inciting factor.

## 2025-02-09 NOTE — DISCHARGE NOTE PROVIDER - NSDCFUADDAPPT_GEN_ALL_CORE_FT
transfer [facility]  under  transfer [facility]  under Dr. donald peterson  [phone]

## 2025-02-09 NOTE — H&P ADULT - NSHPPHYSICALEXAM_GEN_ALL_CORE
T(C): 36.9 (02-09-25 @ 00:25), Max: 39.5 (02-08-25 @ 22:51)  HR: 85 (02-09-25 @ 01:04) (80 - 98)  BP: 93/58 (02-09-25 @ 01:04) (78/55 - 123/75)  RR: 18 (02-09-25 @ 00:25) (16 - 18)  SpO2: 98% (02-09-25 @ 00:25) (98% - 98%)    GENERAL: nad, +elderly  EYES: sclera clear  ENMT: moist mucous membranes  LUNGS: good air entry bilaterally, clear to auscultation, symmetric breath sounds, no wheezing or rhonchi appreciated  HEART: soft S1/S2, regular rate and rhythm, no murmurs noted, no lower extremity edema  GASTROINTESTINAL: abdomen is soft, nondistended, mild ttp  INTEGUMENT:  warm skin  MUSCULOSKELETAL: no clubbing or cyanosis, no obvious deformity  NEUROLOGIC: awake, alert and oriented

## 2025-02-09 NOTE — H&P ADULT - PROBLEM SELECTOR PLAN 5
Chronic on Norvasc  - hold home norvasc  - currently on levophed for hypotension  - plan per ICU BUN/Cr 32/1.5 on admission - per HIE Cr 1.2-2.6 during 12/2024 period.  - monitor AM CMP  - s/p IVF  - Avoid nephrotoxic medications  - plan per ICU Hx of cyclical vomiting syndrome on amitriptyline   - s/p zofran in the ED  - continue zofran PRN  - plan per ICU

## 2025-02-09 NOTE — ED ADULT NURSE NOTE - NSFALLHARMRISKINTERV_ED_ALL_ED
Assistance OOB with selected safe patient handling equipment if applicable/Assistance with ambulation/Communicate risk of Fall with Harm to all staff, patient, and family/Monitor gait and stability/Provide visual cue: red socks, yellow wristband, yellow gown, etc/Reinforce activity limits and safety measures with patient and family/Bed in lowest position, wheels locked, appropriate side rails in place/Call bell, personal items and telephone in reach/Instruct patient to call for assistance before getting out of bed/chair/stretcher/Non-slip footwear applied when patient is off stretcher/[place] to call system/Physically safe environment - no spills, clutter or unnecessary equipment/Purposeful Proactive Rounding/Room/bathroom lighting operational, light cord in reach

## 2025-02-09 NOTE — CHART NOTE - NSCHARTNOTEFT_GEN_A_CORE
: KATHIE Frank    INDICATION: hypotension    PROCEDURE:  [ X] LIMITED ECHO  [X ] LIMITED CHEST  [ ] LIMITED RETROPERITONEAL  [X ] LIMITED ABDOMINAL  [ ] LIMITED DVT  [ ] NEEDLE GUIDANCE VASCULAR  [ ] NEEDLE GUIDANCE THORACENTESIS  [ ] NEEDLE GUIDANCE PARACENTESIS  [ ] NEEDLE GUIDANCE PERICARDIOCENTESIS  [ ] OTHER    FINDINGS:  ENlarged GB with negative stephens's No pericholecystic fluid noted.  A line predominant. No basilar consolidations.  RV smaller than LV. No pericardial effusion. LV appears hyperdynamic. IVC is virtual in RUQ view.    INTERPRETATION:  Patient would likely be fluid responsive.      Images uploaded to qpath.

## 2025-02-09 NOTE — PATIENT PROFILE ADULT - FALL HARM RISK - HARM RISK INTERVENTIONS
Assistance with ambulation/Assistance OOB with selected safe patient handling equipment/Communicate Risk of Fall with Harm to all staff/Discuss with provider need for PT consult/Monitor gait and stability/Reinforce activity limits and safety measures with patient and family/Review medications for side effects contributing to fall risk/Sit up slowly, dangle for a short time, stand at bedside before walking/Tailored Fall Risk Interventions/Toileting schedule using arm’s reach rule for commode and bathroom/Visual Cue: Yellow wristband and red socks/Bed in lowest position, wheels locked, appropriate side rails in place/Call bell, personal items and telephone in reach/Instruct patient to call for assistance before getting out of bed or chair/Non-slip footwear when patient is out of bed/[place] to call system/Physically safe environment - no spills, clutter or unnecessary equipment/Purposeful Proactive Rounding/Room/bathroom lighting operational, light cord in reach

## 2025-02-09 NOTE — H&P ADULT - PROBLEM SELECTOR PLAN 4
BUN/Cr 32/1.5 on admission - per HIE Cr 1.2-2.6 during 12/2024 period.  - monitor AM CMP  - s/p ~1.5L IVF  - Avoid nephrotoxic medications  - plan per ICU BUN/Cr 32/1.5 on admission - per HIE Cr 1.2-2.6 during 12/2024 period.  - monitor AM CMP  - s/p IVF  - Avoid nephrotoxic medications  - plan per ICU Hx of cyclical vomiting syndrome on amitriptyline   - s/p zofran in the ED  - continue zofran PRN  - consider AM EKG to monitor QTc  - plan per ICU

## 2025-02-09 NOTE — H&P ADULT - PROBLEM SELECTOR PLAN 10
DVT ppx: heparin  - plan per ICU Chronic on pepcid  - Recommend protonix  - Plan per ICU Chronic on pepcid  - Plan per ICU

## 2025-02-10 DIAGNOSIS — C25.9 MALIGNANT NEOPLASM OF PANCREAS, UNSPECIFIED: ICD-10-CM

## 2025-02-10 LAB
ALBUMIN SERPL ELPH-MCNC: 1.5 G/DL — LOW (ref 3.3–5)
ALP SERPL-CCNC: 74 U/L — SIGNIFICANT CHANGE UP (ref 40–120)
ALT FLD-CCNC: 25 U/L — SIGNIFICANT CHANGE UP (ref 12–78)
ANION GAP SERPL CALC-SCNC: 6 MMOL/L — SIGNIFICANT CHANGE UP (ref 5–17)
AST SERPL-CCNC: 17 U/L — SIGNIFICANT CHANGE UP (ref 15–37)
BASOPHILS # BLD AUTO: 0 K/UL — SIGNIFICANT CHANGE UP (ref 0–0.2)
BASOPHILS NFR BLD AUTO: 0 % — SIGNIFICANT CHANGE UP (ref 0–2)
BILIRUB SERPL-MCNC: 0.5 MG/DL — SIGNIFICANT CHANGE UP (ref 0.2–1.2)
BUN SERPL-MCNC: 28 MG/DL — HIGH (ref 7–23)
CALCIUM SERPL-MCNC: 9.1 MG/DL — SIGNIFICANT CHANGE UP (ref 8.5–10.1)
CHLORIDE SERPL-SCNC: 115 MMOL/L — HIGH (ref 96–108)
CO2 SERPL-SCNC: 20 MMOL/L — LOW (ref 22–31)
CREAT SERPL-MCNC: 1.4 MG/DL — HIGH (ref 0.5–1.3)
EGFR: 40 ML/MIN/1.73M2 — LOW
EOSINOPHIL # BLD AUTO: 0.04 K/UL — SIGNIFICANT CHANGE UP (ref 0–0.5)
EOSINOPHIL NFR BLD AUTO: 14.8 % — HIGH (ref 0–6)
GLUCOSE SERPL-MCNC: 153 MG/DL — HIGH (ref 70–99)
HCT VFR BLD CALC: 29.6 % — LOW (ref 34.5–45)
HGB BLD-MCNC: 9.6 G/DL — LOW (ref 11.5–15.5)
IMM GRANULOCYTES NFR BLD AUTO: 0 % — SIGNIFICANT CHANGE UP (ref 0–0.9)
LYMPHOCYTES # BLD AUTO: 0.22 K/UL — LOW (ref 1–3.3)
LYMPHOCYTES # BLD AUTO: 81.5 % — HIGH (ref 13–44)
MAGNESIUM SERPL-MCNC: 2.1 MG/DL — SIGNIFICANT CHANGE UP (ref 1.6–2.6)
MCHC RBC-ENTMCNC: 29.7 PG — SIGNIFICANT CHANGE UP (ref 27–34)
MCHC RBC-ENTMCNC: 32.4 G/DL — SIGNIFICANT CHANGE UP (ref 32–36)
MCV RBC AUTO: 91.6 FL — SIGNIFICANT CHANGE UP (ref 80–100)
MONOCYTES # BLD AUTO: 0 K/UL — SIGNIFICANT CHANGE UP (ref 0–0.9)
MONOCYTES NFR BLD AUTO: 0 % — LOW (ref 2–14)
NEUTROPHILS # BLD AUTO: 0.01 K/UL — LOW (ref 1.8–7.4)
NEUTROPHILS NFR BLD AUTO: 3.7 % — LOW (ref 43–77)
NRBC # BLD: 0 /100 WBCS — SIGNIFICANT CHANGE UP (ref 0–0)
NRBC BLD-RTO: 0 /100 WBCS — SIGNIFICANT CHANGE UP (ref 0–0)
PHOSPHATE SERPL-MCNC: 2.9 MG/DL — SIGNIFICANT CHANGE UP (ref 2.5–4.5)
PLATELET # BLD AUTO: 25 K/UL — LOW (ref 150–400)
POTASSIUM SERPL-MCNC: 3.3 MMOL/L — LOW (ref 3.5–5.3)
POTASSIUM SERPL-SCNC: 3.3 MMOL/L — LOW (ref 3.5–5.3)
PROT SERPL-MCNC: 5 G/DL — LOW (ref 6–8.3)
RBC # BLD: 3.23 M/UL — LOW (ref 3.8–5.2)
RBC # FLD: 13.2 % — SIGNIFICANT CHANGE UP (ref 10.3–14.5)
SODIUM SERPL-SCNC: 141 MMOL/L — SIGNIFICANT CHANGE UP (ref 135–145)
WBC # BLD: 0.27 K/UL — CRITICAL LOW (ref 3.8–10.5)
WBC # FLD AUTO: 0.27 K/UL — CRITICAL LOW (ref 3.8–10.5)

## 2025-02-10 PROCEDURE — 99233 SBSQ HOSP IP/OBS HIGH 50: CPT

## 2025-02-10 PROCEDURE — 99291 CRITICAL CARE FIRST HOUR: CPT

## 2025-02-10 PROCEDURE — G0545: CPT

## 2025-02-10 PROCEDURE — 99232 SBSQ HOSP IP/OBS MODERATE 35: CPT

## 2025-02-10 PROCEDURE — 99221 1ST HOSP IP/OBS SF/LOW 40: CPT | Mod: 24

## 2025-02-10 RX ORDER — MIDODRINE HYDROCHLORIDE 5 MG/1
5 TABLET ORAL EVERY 8 HOURS
Refills: 0 | Status: DISCONTINUED | OUTPATIENT
Start: 2025-02-10 | End: 2025-02-12

## 2025-02-10 RX ORDER — POTASSIUM CHLORIDE 750 MG/1
40 TABLET, EXTENDED RELEASE ORAL ONCE
Refills: 0 | Status: COMPLETED | OUTPATIENT
Start: 2025-02-10 | End: 2025-02-10

## 2025-02-10 RX ORDER — SUCRALFATE 1 G/10ML
1 SUSPENSION ORAL
Refills: 0 | Status: DISCONTINUED | OUTPATIENT
Start: 2025-02-10 | End: 2025-02-12

## 2025-02-10 RX ORDER — SODIUM CHLORIDE 9 G/ML
1000 INJECTION, SOLUTION INTRAVENOUS ONCE
Refills: 0 | Status: COMPLETED | OUTPATIENT
Start: 2025-02-10 | End: 2025-02-10

## 2025-02-10 RX ADMIN — SODIUM CHLORIDE 150 MILLILITER(S): 9 INJECTION, SOLUTION INTRAVENOUS at 05:32

## 2025-02-10 RX ADMIN — DIPHENOXYLATE HYDROCHLORIDE AND ATROPINE SULFATE 2 TABLET(S): 2.5; .025 TABLET ORAL at 11:15

## 2025-02-10 RX ADMIN — SODIUM CHLORIDE 150 MILLILITER(S): 9 INJECTION, SOLUTION INTRAVENOUS at 22:53

## 2025-02-10 RX ADMIN — PANTOPRAZOLE 20 MILLIGRAM(S): 20 TABLET, DELAYED RELEASE ORAL at 11:15

## 2025-02-10 RX ADMIN — NOREPINEPHRINE BITARTRATE 5.74 MICROGRAM(S)/KG/MIN: 1 INJECTION, SOLUTION, CONCENTRATE INTRAVENOUS at 06:21

## 2025-02-10 RX ADMIN — FILGRASTIM-SNDZ 480 MICROGRAM(S): 300 INJECTION, SOLUTION INTRAVENOUS; SUBCUTANEOUS at 14:27

## 2025-02-10 RX ADMIN — DIPHENOXYLATE HYDROCHLORIDE AND ATROPINE SULFATE 2 TABLET(S): 2.5; .025 TABLET ORAL at 02:35

## 2025-02-10 RX ADMIN — VANCOMYCIN HYDROCHLORIDE 250 MILLIGRAM(S): KIT at 05:31

## 2025-02-10 RX ADMIN — MIDODRINE HYDROCHLORIDE 5 MILLIGRAM(S): 5 TABLET ORAL at 21:41

## 2025-02-10 RX ADMIN — Medication 100 MILLIGRAM(S): at 17:19

## 2025-02-10 RX ADMIN — DIPHENOXYLATE HYDROCHLORIDE AND ATROPINE SULFATE 2 TABLET(S): 2.5; .025 TABLET ORAL at 06:21

## 2025-02-10 RX ADMIN — SUCRALFATE 1 GRAM(S): 1 SUSPENSION ORAL at 08:49

## 2025-02-10 RX ADMIN — ASPIRIN AND EXTENDED - RELEASE DIPYRIDAMOLE 1 CAPSULE(S): 25; 200 CAPSULE ORAL at 08:47

## 2025-02-10 RX ADMIN — POTASSIUM CHLORIDE 40 MILLIEQUIVALENT(S): 750 TABLET, EXTENDED RELEASE ORAL at 13:13

## 2025-02-10 RX ADMIN — DIPHENOXYLATE HYDROCHLORIDE AND ATROPINE SULFATE 2 TABLET(S): 2.5; .025 TABLET ORAL at 23:43

## 2025-02-10 RX ADMIN — MIDODRINE HYDROCHLORIDE 5 MILLIGRAM(S): 5 TABLET ORAL at 06:21

## 2025-02-10 RX ADMIN — SODIUM CHLORIDE 1000 MILLILITER(S): 9 INJECTION, SOLUTION INTRAVENOUS at 15:30

## 2025-02-10 RX ADMIN — ANTISEPTIC SURGICAL SCRUB 1 APPLICATION(S): 0.04 SOLUTION TOPICAL at 06:22

## 2025-02-10 RX ADMIN — SUCRALFATE 1 GRAM(S): 1 SUSPENSION ORAL at 23:43

## 2025-02-10 RX ADMIN — Medication 100 MILLIGRAM(S): at 05:32

## 2025-02-10 RX ADMIN — SUCRALFATE 1 GRAM(S): 1 SUSPENSION ORAL at 05:31

## 2025-02-10 RX ADMIN — MIDODRINE HYDROCHLORIDE 5 MILLIGRAM(S): 5 TABLET ORAL at 04:57

## 2025-02-10 RX ADMIN — SUCRALFATE 1 GRAM(S): 1 SUSPENSION ORAL at 17:16

## 2025-02-10 RX ADMIN — DIPHENOXYLATE HYDROCHLORIDE AND ATROPINE SULFATE 2 TABLET(S): 2.5; .025 TABLET ORAL at 17:16

## 2025-02-10 RX ADMIN — SODIUM CHLORIDE 150 MILLILITER(S): 9 INJECTION, SOLUTION INTRAVENOUS at 17:18

## 2025-02-10 RX ADMIN — ASPIRIN AND EXTENDED - RELEASE DIPYRIDAMOLE 1 CAPSULE(S): 25; 200 CAPSULE ORAL at 17:16

## 2025-02-10 RX ADMIN — MIDODRINE HYDROCHLORIDE 5 MILLIGRAM(S): 5 TABLET ORAL at 13:14

## 2025-02-10 NOTE — PROGRESS NOTE ADULT - ASSESSMENT
69F PMHx SCC of lip, CVA (on DAPT), recent diagnosis of primary pancreatic adenocarcinoma (Sister Deidre Ramirez lesion in umbilicus, operated at , on experimental treatment) admitted to MICU:  Assessment:  1. Septic Shock  2. Cellulitis  3. MALLORY  4. Neutropenia   5. Dehydration     Plan:  NEURO:   -Monitor mental status closely, avoid neurosuppresants.   -Serial neurologic assessments.     CV: M  -Remains in vasopressor-dependent shock state with Levophed Infusion. Actively titrating to maintain goal MAP >65 monitoring end points of organ perfusion. Midodrine 5mg q8h added tonight to offload IV vasopressor requirements and facilitate weaning off.   -Keep K~4 and Mg>2 for optimal arrhythmia suppression.  -Official TTE with LVEF 67%.     PULM:   -Utilize supplemental O2 PRN to maintain goal SpO2 >88%.   -Incentive spirometry, Chest PT/Pulmonary toilet, HOB >30'.     GI:   -CLD.   -Protonix QD.    RENAL:  -Renal function currently with MALLORY, likely pre-renal iso dehydration versus ATN iso shock state.  -trend lytes/Scr daily with BMP  -I's and O's, goal UOP 0.5 cc/kg/hr  -renal dose meds and avoid nephrotoxins   -Gentle mIVF with LR 100cc/h --> increased to 150cc/h tonight at patient's  request due to concern for dehydration.     ENDO:   -Aggressive glycemic control to limit FS glucose to <180mg/dl.     ID:   -Febrile. Cx with GNR, speciation pending. Vancomycin/Cefepime courses.   -ABX use and/or discontinuation based on discussion with ID in conjunction with clinical features, culture data, and judicious procalcitonin monitoring.    HEME:   -Filgrastim for neutropenia.  -VTE ppx with SCDs.      GOC: Full Code.

## 2025-02-10 NOTE — CARE COORDINATION ASSESSMENT. - NSCAREPROVIDERS_GEN_ALL_CORE_FT
CARE PROVIDERS:  Accepting Physician: Stacey Dangelo  Access Services: Ramon Hutchinson  Administration: Laure Mathew  Administration: Maco Stapleton  Admitting: Stacey Dangelo  Attending: Jenn Wolf  Case Management: Zina Henao  Consultant: Deepak Aguilar  Consultant: Erasto Geiger  Consultant: Minerva Wyatt  Consultant: Damir Olivarez  Consultant: Fernando Joseph  Consultant: Umu Madrid  Consultant: Hon Reese  Consultant: Mandi Aparicio  Covering Team: Carmita Sanchez  ED ACP: Samy Dominguez  ED Attending: Lizette Becker  ED Attending2: Mariluz Saeed  ED Nurse: Gladys Coleman  Food & Nutrition Services: Kasey Michelle  HIM/Billing & Coding: Jannet Britton  Nurse: Crystal Morales  Nurse: Lucie Coffman  Nurse: Justine Fisher  Nurse: Nelli Liz  Nurse: Jared Felton  Ordered: ServiceAccount, SCMMLM  Ordered: Doctor, Unknown  PCA/Nursing Assistant: tiffany Mckeon  PCA/Nursing Assistant: Christin Mann  Primary Team: Rose Oliver  Primary Team: Loi Stallings  Primary Team: Alice Martinez  Primary Team: Madalyn Lopze  Primary Team: Chencho Frazier  Primary Team: Rory Leonard  Primary Team: Gregg Atkins  Primary Team: Aysha Munoz  Primary Team: Drew Archibald  Primary Team: Ede Camejo  Primary Team: Stacey Dangelo  Primary Team: Marilyn Hedrick  Primary Team: Ashley Frank  Primary Team: Brandon Castanon  Primary Team: David Garcia  Primary Team: Aries Bennett  Primary Team: Xena Huertas  Registered Dietitian: Reina Eid  Team: [facility], Team

## 2025-02-10 NOTE — CONSULT NOTE ADULT - ASSESSMENT
Assessment: 69-year-old F PMHx SCC lip, cyclical vomiting syndrome, HTN, HLD, gout, GERD, CVA on Aggrenox, recent diagnosis of primary pancreatic adenocarcinoma with tumor in umbilicus presenting with abdominal pain and nausea, found to be hypotensive and placed on Levophed gtt. Cardiology consulted for episode of SVT yesterday afternoon.    Plan:   - Pt s/p episode of SVT yesterday 2/9 that broke with Valsalva and carotid massage, no medical intervention required  - Patient with fever to 101.7 around this time, likely inciting factor for SVT episode  - Continue to monitor on telemetry  - C/w Tylenol PRN for fevers    - Patient without symptoms of angina or heart failure at this time.  - No clear evidence of acute ischemia, trops negative on admission (41.5)  - Monitor closely for the development of anginal symptoms or clinical signs of ischemia.   - No acute changes on EKG compared to previous.    - No meaningful evidence of volume overload.  - TTE (2/9/25): EF: 73%, mild LVH, trace MR, trace pericardial effusion    - BPs elevated this AM, patient off Levophed this AM ~7:15am, monitor routine hemodynamics.  - Patient on midodrine 5mg TID, NS @ 150mL/hr    - Monitor and replete lytes, keep K>4, Mg>2.    - Other cardiovascular workup will depend on clinical course.  - All other workup per primary team (ICU).  - Will continue to follow.   Assessment: 69-year-old F PMHx SCC lip, cyclical vomiting syndrome, HTN, HLD, gout, GERD, CVA on Aggrenox, recent diagnosis of primary pancreatic adenocarcinoma with tumor in umbilicus presenting with abdominal pain and nausea, found to be hypotensive and placed on Levophed gtt. Cardiology consulted for episode of SVT yesterday afternoon.    Plan:   - Pt s/p episode of SVT yesterday (2/9) that broke with Valsalva and carotid massage, no medical intervention required  - Patient with fever to 101.7 around this time, likely inciting factor for SVT episode  - EKG with frequent PACS, likely AV lilian dependent given response to carotid massage  - Continue to monitor on telemetry  - Continue with management of sepsis as per primary team (ICU)    - Patient without symptoms of angina or heart failure at this time.  - No clear evidence of acute ischemia, trops negative on admission (41.5)  - Monitor closely for the development of anginal symptoms or clinical signs of ischemia.     - No meaningful evidence of volume overload.  - TTE (2/9/25): EF: 73%, mild LVH, trace MR, trace pericardial effusion    - BPs elevated this AM -> patient off Levophed this AM ~7:15am  - Patient on midodrine 5mg TID, NS @ 150mL/hr  - Continue to monitor routine hemodynamics    - Monitor and replete lytes, keep K>4, Mg>2.    - Other cardiovascular workup will depend on clinical course.  - All other workup per primary team (ICU).  - Will continue to follow. 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 Cardiology consulted for episode of SVT yesterday afternoon.    - Pt s/p episode of SVT yesterday (2/9) that broke with Valsalva and carotid massage, no medical intervention required  - Patient with fever to 101.7 around this time, likely inciting factor for SVT episode  - EKG with frequent PACS, likely AV lilian dependent given response to carotid massage  - Continue to monitor on telemetry  - Continue with management of sepsis as per primary team (ICU)    - Patient without symptoms of angina or heart failure at this time.  - No clear evidence of acute ischemia, trops negative on admission (41.5)  - Monitor closely for the development of anginal symptoms or clinical signs of ischemia.     - No meaningful evidence of volume overload.  - TTE (2/9/25): EF: 73%, mild LVH, trace MR, trace pericardial effusion    - BPs elevated this AM -> patient off Levophed this AM ~7:15am  - Patient on midodrine 5mg TID, NS @ 150mL/hr  - Continue to monitor routine hemodynamics    - Monitor and replete lytes, keep K>4, Mg>2.    - Other cardiovascular workup will depend on clinical course.  - All other workup per primary team (ICU).  - Will continue to follow.

## 2025-02-10 NOTE — CONSULT NOTE ADULT - SUBJECTIVE AND OBJECTIVE BOX
Chief Complaint: Abdominal wall tumor.     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 1000 milliLiter(s) (150 mL/Hr) IV Continuous <Continuous>  midodrine 5 milliGRAM(s) Oral every 8 hours  norepinephrine Infusion 0.05 MICROgram(s)/kG/Min (5.74 mL/Hr) IV Continuous <Continuous>  pantoprazole  Injectable 20 milliGRAM(s) IV Push daily  potassium chloride   Powder 40 milliEquivalent(s) Oral once  sucralfate suspension 1 Gram(s) Oral four times a day    MEDICATIONS  (PRN):  acetaminophen   Oral Liquid .. 650 milliGRAM(s) Oral every 6 hours PRN Temp greater or equal to 38C (100.4F)  trimethobenzamide Injectable 200 milliGRAM(s) IntraMuscular every 8 hours PRN Nausea and/or Vomiting      FAMILY HISTORY:          ROS:  CONSTITUTIONAL: No fever, weight loss, or fatigue  EYES: No eye pain, visual disturbances, or discharge  ENMT:  No difficulty hearing, tinnitus, vertigo; No sinus or throat pain  NECK: No pain or stiffness  BREASTS: No pain, masses, or nipple discharge  RESPIRATORY: No cough, wheezing, chills or hemoptysis; No shortness of breath  CARDIOVASCULAR: No chest pain, palpitations, dizziness, or leg swelling  GASTROINTESTINAL: No abdominal or epigastric pain. No nausea, vomiting, or hematemesis; No diarrhea or constipation. No melena or hematochezia.  GENITOURINARY: No dysuria, frequency, hematuria, or incontinence  NEUROLOGICAL: No headaches, memory loss, loss of strength, numbness, or tremors  SKIN: No itching, burning, rashes, or lesions   LYMPH NODES: No enlarged glands  ENDOCRINE: No heat or cold intolerance; No hair loss  MUSCULOSKELETAL: No joint pain or swelling; No muscle, back, or extremity pain  PSYCHIATRIC: No depression, anxiety, mood swings, or difficulty sleeping  HEME/LYMPH: No easy bruising, or bleeding gums  ALLERGY AND IMMUNOLOGIC: No hives or eczema    PHYSICAL EXAM-      Vital Signs Last 24 Hrs  T(C): 37.7 (10 Feb 2025 11:50), Max: 38.7 (09 Feb 2025 17:30)  T(F): 99.9 (10 Feb 2025 11:50), Max: 101.7 (09 Feb 2025 17:30)  HR: 108 (10 Feb 2025 10:00) (73 - 190)  BP: 120/89 (10 Feb 2025 10:00) (77/60 - 140/101)  BP(mean): 100 (10 Feb 2025 10:00) (63 - 115)  RR: 23 (10 Feb 2025 10:00) (12 - 27)  SpO2: 100% (10 Feb 2025 09:00) (98% - 100%)    Parameters below as of 10 Feb 2025 08:00  Patient On (Oxygen Delivery Method): nasal cannula  O2 Flow (L/min): 3      Constitutional: well developed, well nourished, no apparent distress, alert, oriented x 3.  Neck: Supple   Pulmonary: no respiratory distress, normal respiratory rhythm and effort, lungs are clear to auscultation/percussion. No CVA tenderness.  Cardiovascular: heart rate normal, normal sinus rhythm; no murmurs, gallops, rubs, heaves or thrills   Abdomen: soft, non-tender, +BS, no guarding/rebound/rigidity.  Vascular: Lower extremities are well perfused.   Extremities: Bilateral edema  Skin: Hard metastatic tumor through the umbilicus, extending in to subcutaneous tissue, small ulcer in umbilicus, no acute infection.                            9.6    0.27  )-----------( 25       ( 10 Feb 2025 08:46 )             29.6     02-10    141  |  115[H]  |  28[H]  ----------------------------<  153[H]  3.3[L]   |  20[L]  |  1.40[H]    Ca    9.1      10 Feb 2025 08:46  Phos  2.9     02-10  Mg     2.1     02-10    TPro  5.0[L]  /  Alb  1.5[L]  /  TBili  0.5  /  DBili  x   /  AST  17  /  ALT  25  /  AlkPhos  74  02-10      Radiology:

## 2025-02-10 NOTE — PROGRESS NOTE ADULT - SUBJECTIVE AND OBJECTIVE BOX
Patient is a 69y old  Female who presents with a chief complaint of Sepsis (10 Feb 2025 13:09)    Interval events: No overnight events. Patient still complaining of diarrhea. She has had 2 episodes of diarrhea this AM. No other complaints. Of note, patient had an episode of SVT yesterday afternoon likely 2/2 fever. She was able to Valsalva and the SVT broke on its own. HR stable. Patient remains afebrile today.    Review of Systems:  Constitutional: no fever, chills, fatigue  Neuro: no headache, numbness, weakness  Resp: no cough, wheezing, shortness of breath  CVS: no chest pain, palpitations, leg swelling  GI: +diarrhea, no abdominal pain, nausea, vomiting  : no dysuria, frequency, incontinence  Skin: no itching, burning, rashes, or lesions   Msk: no joint pain or swelling  Psych: no depression, anxiety    T(F): 99.9 (02-10-25 @ 11:50), Max: 101.7 (02-09-25 @ 17:30)  HR: 108 (02-10-25 @ 10:00) (73 - 190)  BP: 120/89 (02-10-25 @ 10:00) (77/60 - 140/101)  RR: 23 (02-10-25 @ 10:00) (12 - 27)  SpO2: 100% (02-10-25 @ 09:00) (98% - 100%)  Wt(kg): --        CAPILLARY BLOOD GLUCOSE        I&O's Summary    09 Feb 2025 07:01  -  10 Feb 2025 07:00  --------------------------------------------------------  IN: 4382.8 mL / OUT: 450 mL / NET: 3932.8 mL    10 Feb 2025 07:01  -  10 Feb 2025 13:32  --------------------------------------------------------  IN: 450 mL / OUT: 0 mL / NET: 450 mL        Physical Exam:   General: NAD, appears weak  Neurology: awake and alert  HEENT: NC/AT  Respiratory: CTA b/l, no rales or rhonchi noted  Cardiovascular: irregular, no notable murmurs   Abdomen: soft, ND, slight firmness at area of umbilicus (s/p surgery)   Extremities: no clubbing, cyanosis, or edema  Skin: warm/dry      Meds:  MEDICATIONS  (STANDING):  cefepime   IVPB 2000 milliGRAM(s) IV Intermittent every 12 hours  chlorhexidine 2% Cloths 1 Application(s) Topical <User Schedule>  diphenoxylate/atropine 2 Tablet(s) Oral four times a day  dipyridamole 200 mG/aspirin 25 mG 1 Capsule(s) Oral two times a day  filgrastim-sndz (ZARXIO) Injectable 480 MICROGram(s) SubCutaneous daily  lactated ringers. 1000 milliLiter(s) (150 mL/Hr) IV Continuous <Continuous>  midodrine 5 milliGRAM(s) Oral every 8 hours  norepinephrine Infusion 0.05 MICROgram(s)/kG/Min (5.74 mL/Hr) IV Continuous <Continuous>  pantoprazole  Injectable 20 milliGRAM(s) IV Push daily  sucralfate suspension 1 Gram(s) Oral four times a day    MEDICATIONS  (PRN):  acetaminophen   Oral Liquid .. 650 milliGRAM(s) Oral every 6 hours PRN Temp greater or equal to 38C (100.4F)  trimethobenzamide Injectable 200 milliGRAM(s) IntraMuscular every 8 hours PRN Nausea and/or Vomiting                            9.6    0.27  )-----------( 25       ( 10 Feb 2025 08:46 )             29.6       02-10    141  |  115[H]  |  28[H]  ----------------------------<  153[H]  3.3[L]   |  20[L]  |  1.40[H]    Ca    9.1      10 Feb 2025 08:46  Phos  2.9     02-10  Mg     2.1     02-10    TPro  5.0[L]  /  Alb  1.5[L]  /  TBili  0.5  /  DBili  x   /  AST  17  /  ALT  25  /  AlkPhos  74  02-10      CARDIAC MARKERS ( 08 Feb 2025 23:27 )  x     / x     / x     / x     / <1.0 ng/mL      PT/INR - ( 08 Feb 2025 23:27 )   PT: 19.0 sec;   INR: 1.63 ratio         PTT - ( 08 Feb 2025 23:27 )  PTT:20.8 sec  Urinalysis Basic - ( 10 Feb 2025 08:46 )    Color: x / Appearance: x / SG: x / pH: x  Gluc: 153 mg/dL / Ketone: x  / Bili: x / Urobili: x   Blood: x / Protein: x / Nitrite: x   Leuk Esterase: x / RBC: x / WBC x   Sq Epi: x / Non Sq Epi: x / Bacteria: x      Clean Catch   50,000 - 99,000 CFU/mL Gram Negative Rods -- 02-09 @ 01:25  .Blood BLOOD   Growth in aerobic bottle: Pseudomonas aeruginosa  Direct identification is available within approximately 3-5  hours either by Blood Panel Multiplexed PCR or Direct  MALDI-TOF. Details: [email or web address]   Growth in aerobic bottle: Gram Negative Rods 02-08 @ 22:00  .Blood BLOOD   Growth in aerobic bottle: Pseudomonas aeruginosa  See previous culture [id number]   Growth in aerobic bottle: Gram Negative Rods 02-08 @ 21:55            Tubes/Lines: central venous cath (from prior for chemo)       GLOBAL ISSUE/BEST PRACTICE:  Analgesia: x  Sedation: x   HOB elevation: Y  Stress ulcer prophylaxis: x  VTE prophylaxis: SCDs due to pancytopenia   Glycemic control: x   Nutrition: clear liquids     CODE STATUS: Full Code        Patient is a 69y old  Female who presents with a chief complaint of Sepsis (10 Feb 2025 13:09)    Interval events: No overnight events. Patient still complaining of diarrhea. She has had 2 episodes of diarrhea this AM. No other complaints. Of note, patient had an episode of SVT yesterday afternoon likely 2/2 fever. She was able to Valsalva and the SVT broke on its own. Patient mildly tachy in setting of sepsis. Patient remains afebrile today.    Review of Systems:  Constitutional: no fever, chills, fatigue  Neuro: no headache, numbness, weakness  Resp: no cough, wheezing, shortness of breath  CVS: no chest pain, palpitations, leg swelling  GI: +diarrhea, no abdominal pain, nausea, vomiting  : no dysuria, frequency, incontinence  Skin: no itching, burning, rashes, or lesions   Msk: no joint pain or swelling  Psych: no depression, anxiety    T(F): 99.9 (02-10-25 @ 11:50), Max: 101.7 (02-09-25 @ 17:30)  HR: 108 (02-10-25 @ 10:00) (73 - 190)  BP: 120/89 (02-10-25 @ 10:00) (77/60 - 140/101)  RR: 23 (02-10-25 @ 10:00) (12 - 27)  SpO2: 100% (02-10-25 @ 09:00) (98% - 100%)  Wt(kg): --        CAPILLARY BLOOD GLUCOSE        I&O's Summary    09 Feb 2025 07:01  -  10 Feb 2025 07:00  --------------------------------------------------------  IN: 4382.8 mL / OUT: 450 mL / NET: 3932.8 mL    10 Feb 2025 07:01  -  10 Feb 2025 13:32  --------------------------------------------------------  IN: 450 mL / OUT: 0 mL / NET: 450 mL        Physical Exam:   General: NAD, appears weak  Neurology: awake and alert  HEENT: NC/AT  Respiratory: CTA b/l, no rales or rhonchi noted  Cardiovascular: irregular, no notable murmurs   Abdomen: soft, ND, slight firmness at area of umbilicus (s/p surgery)   Extremities: no clubbing, cyanosis, or edema  Skin: warm/dry      Meds:  MEDICATIONS  (STANDING):  cefepime   IVPB 2000 milliGRAM(s) IV Intermittent every 12 hours  chlorhexidine 2% Cloths 1 Application(s) Topical <User Schedule>  diphenoxylate/atropine 2 Tablet(s) Oral four times a day  dipyridamole 200 mG/aspirin 25 mG 1 Capsule(s) Oral two times a day  filgrastim-sndz (ZARXIO) Injectable 480 MICROGram(s) SubCutaneous daily  lactated ringers. 1000 milliLiter(s) (150 mL/Hr) IV Continuous <Continuous>  midodrine 5 milliGRAM(s) Oral every 8 hours  norepinephrine Infusion 0.05 MICROgram(s)/kG/Min (5.74 mL/Hr) IV Continuous <Continuous>  pantoprazole  Injectable 20 milliGRAM(s) IV Push daily  sucralfate suspension 1 Gram(s) Oral four times a day    MEDICATIONS  (PRN):  acetaminophen   Oral Liquid .. 650 milliGRAM(s) Oral every 6 hours PRN Temp greater or equal to 38C (100.4F)  trimethobenzamide Injectable 200 milliGRAM(s) IntraMuscular every 8 hours PRN Nausea and/or Vomiting                            9.6    0.27  )-----------( 25       ( 10 Feb 2025 08:46 )             29.6       02-10    141  |  115[H]  |  28[H]  ----------------------------<  153[H]  3.3[L]   |  20[L]  |  1.40[H]    Ca    9.1      10 Feb 2025 08:46  Phos  2.9     02-10  Mg     2.1     02-10    TPro  5.0[L]  /  Alb  1.5[L]  /  TBili  0.5  /  DBili  x   /  AST  17  /  ALT  25  /  AlkPhos  74  02-10      CARDIAC MARKERS ( 08 Feb 2025 23:27 )  x     / x     / x     / x     / <1.0 ng/mL      PT/INR - ( 08 Feb 2025 23:27 )   PT: 19.0 sec;   INR: 1.63 ratio         PTT - ( 08 Feb 2025 23:27 )  PTT:20.8 sec  Urinalysis Basic - ( 10 Feb 2025 08:46 )    Color: x / Appearance: x / SG: x / pH: x  Gluc: 153 mg/dL / Ketone: x  / Bili: x / Urobili: x   Blood: x / Protein: x / Nitrite: x   Leuk Esterase: x / RBC: x / WBC x   Sq Epi: x / Non Sq Epi: x / Bacteria: x      Clean Catch   50,000 - 99,000 CFU/mL Gram Negative Rods -- 02-09 @ 01:25  .Blood BLOOD   Growth in aerobic bottle: Pseudomonas aeruginosa  Direct identification is available within approximately 3-5  hours either by Blood Panel Multiplexed PCR or Direct  MALDI-TOF. Details: [email or web address]   Growth in aerobic bottle: Gram Negative Rods 02-08 @ 22:00  .Blood BLOOD   Growth in aerobic bottle: Pseudomonas aeruginosa  See previous culture [id number]   Growth in aerobic bottle: Gram Negative Rods 02-08 @ 21:55            Tubes/Lines: central venous cath (from prior for chemo)       GLOBAL ISSUE/BEST PRACTICE:  Analgesia: x  Sedation: x   HOB elevation: Y  Stress ulcer prophylaxis: x  VTE prophylaxis: SCDs due to pancytopenia   Glycemic control: x   Nutrition: clear liquids     CODE STATUS: Full Code

## 2025-02-10 NOTE — PROGRESS NOTE ADULT - ASSESSMENT
70 y/o F w/ pmh of SCC of the lip, CVA on DAPT, recently found to have primary pancreatic adenocarcinoma w/ tumor in umbilicus operated at , was started on chemo appx 4 weeks ago at [facility], last dose appx 4 days, now presented to [facility] for ongoing n/v and diarrhea since chemo was started. P became weak and hypotensive and was brought to the hospital. In the ED pt was found to be hypotensive and in neutropenic sepsis.     Neuro:   -No acute issues, MS stable  -Past history of CVA (was on DAPT now on Aggrenox due to pancreatic Ca)     Cardiac:   - Septic Shock 2/2 to neutropenic sepsis  - EKG 02/09: Sinus tachycardia with premature atrial complexes, Left axis deviation, Inferior infarct, Anterolateral infarct   - TTE: EF 73%, mild LVH, trace pericardial effusion   - Levo held as patient maintaining MAP >65  - Started on Midodrine 5mg q8hrs   - Bedside POCUS shows intravascular depletion likely in setting of diarrhea --> continue IVF and encouraged increased PO intake     Resp:   - Resp status stable, maintain o2 >90%    GI:   - No acute issues, continue on clear liquids   - Continue Protonix   - Encourage PO intake in setting of MALLORY  - RUQ US: distended gallbladder with sludge, hepatomegaly, R renal calculus without hydronephrosis     Renal:   - MALLORY w/ lactic acidosis   - CT abd:  2 kidney lesions (right kidney 4cm, and left kidney 6cm)   - Continue IVF and encourage PO intake   - Improving Cr, baseline Cr 1.1   - F/u bladder US  to assess for retention as patient with decreased urine output     ID:   - Neutropenic septic shock unclear source at this time  - CT chest and abd/pelvis noncon (/wife refused contracts due to MALLORY): small pericardial effusion, bibasilar atelectasis, no consolidations noted. Small distention of gallbladder, and 2 kidney lesions (right kidney 4cm, and left kidney 6cm)   - Blood cx and Ucx positive for P. aeruginosa   - MRSA PCR, C. diff, GI PCR, RVP negative   - Continue IV Cefepime   - D/c Vanco as MRSA PCR negative     Endo:   -Known pancreatic Ca   -Maintain Glucose 120-180     Heme:   - DVT ppx w/ SCD, no chemical AC at this time given pancytopenia  - Continue Zorxio for neutropenia   - Transfuse for platelets <10 or <25 + fever   - Receives chemo every other week, and on [id number] daily --> hold in setting of active infection   - Consulted Dr. Madrid, chavo/onc  68 y/o F w/ pmh of SCC of the lip, CVA on DAPT, recently found to have primary pancreatic adenocarcinoma w/ tumor in umbilicus operated at , was started on chemo appx 4 weeks ago at [facility], last dose appx 4 days, now presented to [facility] for ongoing n/v and diarrhea since chemo was started. P became weak and hypotensive and was brought to the hospital. In the ED pt was found to be hypotensive and in neutropenic sepsis.     Neuro:   -No acute issues, MS stable  -Past history of CVA (was on DAPT now on Aggrenox due to pancreatic Ca)     Cardiac:   - Septic Shock 2/2 to neutropenic sepsis  - EKG 02/09: Sinus tachycardia with premature atrial complexes, Left axis deviation, Inferior infarct, Anterolateral infarct   - TTE: EF 73%, mild LVH, trace pericardial effusion   - Levo held as patient maintaining MAP >65  - Started on Midodrine 5mg q8hrs   - Bedside POCUS shows intravascular depletion likely in setting of diarrhea --> continue IVF and encouraged increased PO intake     Resp:   - Resp status stable, maintain o2 >90%    GI:   - No acute issues, continue on clear liquids   - Continue Protonix   - 1L LR bolus + Encourage PO intake in setting of MALLORY  - RUQ US: distended gallbladder with sludge, hepatomegaly, R renal calculus without hydronephrosis     Renal:   - MALLORY w/ lactic acidosis   - CT abd:  2 kidney lesions (right kidney 4cm, and left kidney 6cm)   - Continue IVF and encourage PO intake   - Improving Cr, baseline Cr 1.1   - Bladder US with 1300cc retained urine --> coelho in place     ID:   - Neutropenic septic shock unclear source at this time  - CT chest and abd/pelvis noncon (/wife refused contracts due to MALLORY): small pericardial effusion, bibasilar atelectasis, no consolidations noted. Small distention of gallbladder, and 2 kidney lesions (right kidney 4cm, and left kidney 6cm)   - Blood cx and Ucx positive for P. aeruginosa   - MRSA PCR, C. diff, GI PCR, RVP negative   - Continue IV Cefepime   - D/c Vanco as MRSA PCR negative     Endo:   -Known pancreatic Ca   -Maintain Glucose 120-180     Heme:   - DVT ppx w/ SCD, no chemical AC at this time given pancytopenia  - Continue Zorxio for neutropenia   - Transfuse for platelets <10 or <25 + fever   - Receives chemo every other week, and on [id number] daily --> hold in setting of active infection   - Consulted Dr. Madrid, heme/onc

## 2025-02-10 NOTE — PROGRESS NOTE ADULT - SUBJECTIVE AND OBJECTIVE BOX
Patient is a 69y old  Female who presents with a chief complaint of Sepsis (10 Feb 2025 12:57)      INTERVAL HPI/OVERNIGHT EVENTS: Overnight pt had one episode of SVT. Pt broke out of it on her own w/ valsalva maneuver and carotid massage likely due to fever, Pt was seen and examined at bedside. Pt states that she is still not feeling well. She states she is able to drink more liquids. She still feels weak and tired.       MEDICATIONS  (STANDING):  cefepime   IVPB 2000 milliGRAM(s) IV Intermittent every 12 hours  chlorhexidine 2% Cloths 1 Application(s) Topical <User Schedule>  diphenoxylate/atropine 2 Tablet(s) Oral four times a day  dipyridamole 200 mG/aspirin 25 mG 1 Capsule(s) Oral two times a day  filgrastim-sndz (ZARXIO) Injectable 480 MICROGram(s) SubCutaneous daily  lactated ringers. 1000 milliLiter(s) (150 mL/Hr) IV Continuous <Continuous>  midodrine 5 milliGRAM(s) Oral every 8 hours  norepinephrine Infusion 0.05 MICROgram(s)/kG/Min (5.74 mL/Hr) IV Continuous <Continuous>  pantoprazole  Injectable 20 milliGRAM(s) IV Push daily  potassium chloride   Powder 40 milliEquivalent(s) Oral once  sucralfate suspension 1 Gram(s) Oral four times a day    MEDICATIONS  (PRN):  acetaminophen   Oral Liquid .. 650 milliGRAM(s) Oral every 6 hours PRN Temp greater or equal to 38C (100.4F)  trimethobenzamide Injectable 200 milliGRAM(s) IntraMuscular every 8 hours PRN Nausea and/or Vomiting      Allergies    No Known Allergies    Intolerances        REVIEW OF SYSTEMS:  CONSTITUTIONAL: + fever. no chills. +weakness   HEENT:  No headache, no sore throat  RESPIRATORY: No cough, wheezing, or shortness of breath  CARDIOVASCULAR: No chest pain, palpitations  GASTROINTESTINAL: +nausea  GENITOURINARY: No dysuria, frequency, or hematuria  NEUROLOGICAL: no focal weakness or dizziness  MUSCULOSKELETAL: no myalgias     Vital Signs Last 24 Hrs  T(C): 37.7 (10 Feb 2025 11:50), Max: 38.7 (09 Feb 2025 17:30)  T(F): 99.9 (10 Feb 2025 11:50), Max: 101.7 (09 Feb 2025 17:30)  HR: 108 (10 Feb 2025 10:00) (73 - 190)  BP: 120/89 (10 Feb 2025 10:00) (77/60 - 140/101)  BP(mean): 100 (10 Feb 2025 10:00) (63 - 115)  RR: 23 (10 Feb 2025 10:00) (12 - 27)  SpO2: 100% (10 Feb 2025 09:00) (98% - 100%)    Parameters below as of 10 Feb 2025 08:00  Patient On (Oxygen Delivery Method): nasal cannula  O2 Flow (L/min): 3      PHYSICAL EXAM:  GENERAL: NAD. chronically ill appearing   HEENT:  anicteric, moist mucous membranes  CHEST/LUNG:  CTA b/l, no rales, wheezes, or rhonchi  HEART:  RRR, S1, S2  ABDOMEN:  BS+, soft, nontender, nondistended  EXTREMITIES: no edema, cyanosis, or calf tenderness  NERVOUS SYSTEM: answers questions and follows commands appropriately    LABS:                        9.6    0.27  )-----------( 25       ( 10 Feb 2025 08:46 )             29.6     CBC Full  -  ( 10 Feb 2025 08:46 )  WBC Count : 0.27 K/uL  Hemoglobin : 9.6 g/dL  Hematocrit : 29.6 %  Platelet Count - Automated : 25 K/uL  Mean Cell Volume : 91.6 fl  Mean Cell Hemoglobin : 29.7 pg  Mean Cell Hemoglobin Concentration : 32.4 g/dL  Auto Neutrophil # : 0.01 K/uL  Auto Lymphocyte # : 0.22 K/uL  Auto Monocyte # : 0.00 K/uL  Auto Eosinophil # : 0.04 K/uL  Auto Basophil # : 0.00 K/uL  Auto Neutrophil % : 3.7 %  Auto Lymphocyte % : 81.5 %  Auto Monocyte % : 0.0 %  Auto Eosinophil % : 14.8 %  Auto Basophil % : 0.0 %    10 Feb 2025 08:46    141    |  115    |  28     ----------------------------<  153    3.3     |  20     |  1.40     Ca    9.1        10 Feb 2025 08:46  Phos  2.9       10 Feb 2025 08:46  Mg     2.1       10 Feb 2025 08:46    TPro  5.0    /  Alb  1.5    /  TBili  0.5    /  DBili  x      /  AST  17     /  ALT  25     /  AlkPhos  74     10 Feb 2025 08:46    PT/INR - ( 08 Feb 2025 23:27 )   PT: 19.0 sec;   INR: 1.63 ratio         PTT - ( 08 Feb 2025 23:27 )  PTT:20.8 sec  Urinalysis Basic - ( 10 Feb 2025 08:46 )    Color: x / Appearance: x / SG: x / pH: x  Gluc: 153 mg/dL / Ketone: x  / Bili: x / Urobili: x   Blood: x / Protein: x / Nitrite: x   Leuk Esterase: x / RBC: x / WBC x   Sq Epi: x / Non Sq Epi: x / Bacteria: x      CAPILLARY BLOOD GLUCOSE            Urinalysis with Rflx Culture (collected 02-09-25 @ 01:25)    Culture - Urine (collected 02-09-25 @ 01:25)  Source: Clean Catch  Preliminary Report (02-10-25 @ 08:01):    50,000 - 99,000 CFU/mL Gram Negative Rods    Culture - Blood (collected 02-08-25 @ 22:00)  Source: .Blood BLOOD  Gram Stain (02-09-25 @ 19:04):    Growth in aerobic bottle: Gram Negative Rods  Preliminary Report (02-10-25 @ 11:53):    Growth in aerobic bottle: Pseudomonas aeruginosa    Direct identification is available within approximately 3-5    hours either by Blood Panel Multiplexed PCR or Direct    MALDI-TOF. Details: [email or web address]  Organism: Blood Culture PCR (02-09-25 @ 20:56)  Organism: Blood Culture PCR (02-09-25 @ 20:56)      Method Type: PCR      -  Pseudomonas aeruginosa: Detec    Culture - Blood (collected 02-08-25 @ 21:55)  Source: .Blood BLOOD  Gram Stain (02-09-25 @ 19:32):    Growth in aerobic bottle: Gram Negative Rods  Preliminary Report (02-10-25 @ 11:54):    Growth in aerobic bottle: Pseudomonas aeruginosa    See previous culture [id number]        RADIOLOGY & ADDITIONAL TESTS: ____    Personally reviewed.     Consultant(s) Notes Reviewed:  [x] YES  [ ] NO

## 2025-02-10 NOTE — PROGRESS NOTE ADULT - ASSESSMENT
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  Patient said last chemo episode was over the past week. She was febrile to 103.1 in the ED.    Found to have fever and sepsis 2/2 pseudomonas bacteremia. Could be urinary source although also came in with GI symptoms. Urinec ulture growing gram negative rods. US abdomen showed distended gallbladder. Unclear etiology of tender lesion on RUQ abdominal wall. No collection seen on noncontrast CT. COVID/FLU/RSV negative. C diff and GI PCR negative.    #Pseudomonas bacteremia  #Sepsis  #Fever  #Neutropenia  #Hypotension  #Abdominal wall lesion?    -continue cefepime  -can discontinue vancomycin  -follow cultures to completion  -monitor WBC  -wound care  -unclear etiology of lesion on abdomen--Dermatology evaluation if no improvement  -discussed with Dr. Maryann Aparicio MD  Division of Infectious Diseases   Cell [phone] between 8am and 6pm   After 6pm and weekends please call ID service at [phone].     35 minutes spent on total encounter assessing patient, examination, chart review, counseling and coordinating care by the attending physician/nurse/care manager.

## 2025-02-10 NOTE — CONSULT NOTE ADULT - SUBJECTIVE AND OBJECTIVE BOX
[facility]         Beatrice Barrios Patel, Keshav, Anival      [phone] (office)    Reason for Consult: SVT    Interval HPI: Patient seen and examined at bedside, resting comfortably eating breakfast. No acute events overnight. Patient endorses symptoms of reflux at this time Patient denies any chest pain, palpitations, shortness of breath at this time. Reported episode of SVT yesterday afternoon with HRs initially hovering in 110s with increase to max recorded: 190. BPs stable at the time. Pt bore down and ICU attending applied pressure to carotid with patient breaking on her own with no medical intervention. Patient noted to have fever to 101.7 around this time.    Cardiologist: PCP Dr. Escobedo  TTE (2/9/25):    1. Technically difficult image quality.   2. Left ventricular systolic function is hyperdynamic with an ejection fraction of 73 % by Hernandez's method of disks.   3. Mild left ventricular hypertrophy.   4. The right ventricle is not well visualized.   5. Tricuspid aortic valve with normal leaflet excursion. There is mild calcification of the aortic valve leaflets.   6. Mitral valve leaflets have focal calcification.   7. Trace mitral regurgitation.   8. Tricuspid valve was not well visualized.   9. The inferior vena cava is normal in size measuring 1.17 cm in diameter, (normal <2.1cm) with normal inspiratory collapse (normal >50%) consistent with normal right atrial pressure (~3, range 0-5mmHg).  10. Trace pericardial effusion.      HPI from admission:  69-year-old female past medical history of  SCC lip,  cyclical vomiting syndrome, htn, hld, gout, gerd, CVA on Aggrenox recent diagnosis of primary pancreatic adenocarcinoma with tumor  in umbilicus. Per  at bedside, patient had abdominal pain when her abdomen rubbed against a surface, was seen at [facility] which was operated on about 6 weeks ago, had a biopsy showing primary pancreatic adenocardimoma on IV chemotherapy medication and [id number] daily.  Per ED note - last chemo was 4 days ago brought in by EMS for generalized weakness low blood pressure nausea vomiting and diarrhea.   states patient goes for IV hydration every couple days last hydration was 2 days ago.  Patient has been receiving around-the-clock nausea meds Zofran and compazine without relief.  Patient also receiving compazine, Imodium without any relief.  Patient denies any worsening pain. Currently on levophed gtt for low BP. Denies any chest pain, sob. Still with nausea and abd pain.    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      GERD (gastroesophageal reflux disease)      Cyclical vomiting syndrome      CVA (cerebrovascular accident)      H/O squamous cell carcinoma excision          SOCIAL HISTORY: No active tobacco, alcohol or illicit drug use    FAMILY HISTORY:      Home Medications:      MEDICATIONS  (STANDING):  cefepime   IVPB 2000 milliGRAM(s) IV Intermittent every 12 hours  chlorhexidine 2% Cloths 1 Application(s) Topical <User Schedule>  diphenoxylate/atropine 2 Tablet(s) Oral four times a day  dipyridamole 200 mG/aspirin 25 mG 1 Capsule(s) Oral two times a day  filgrastim-sndz (ZARXIO) Injectable 480 MICROGram(s) SubCutaneous daily  lactated ringers. 1000 milliLiter(s) (150 mL/Hr) IV Continuous <Continuous>  midodrine 5 milliGRAM(s) Oral every 8 hours  norepinephrine Infusion 0.05 MICROgram(s)/kG/Min (5.74 mL/Hr) IV Continuous <Continuous>  pantoprazole  Injectable 20 milliGRAM(s) IV Push daily  sucralfate suspension 1 Gram(s) Oral four times a day  vancomycin  IVPB 750 milliGRAM(s) IV Intermittent every 24 hours    MEDICATIONS  (PRN):  acetaminophen   Oral Liquid .. 650 milliGRAM(s) Oral every 6 hours PRN Temp greater or equal to 38C (100.4F)  trimethobenzamide Injectable 200 milliGRAM(s) IntraMuscular every 8 hours PRN Nausea and/or Vomiting      Allergies    No Known Allergies    Intolerances        REVIEW OF SYSTEMS: Negative except as per HPI.    VITAL SIGNS:   Vital Signs Last 24 Hrs  T(C): 37 (10 Feb 2025 07:44), Max: 38.7 (09 Feb 2025 17:30)  T(F): 98.6 (10 Feb 2025 07:44), Max: 101.7 (09 Feb 2025 17:30)  HR: 100 (10 Feb 2025 09:00) (73 - 190)  BP: 140/101 (10 Feb 2025 09:00) (77/60 - 140/101)  BP(mean): 115 (10 Feb 2025 09:00) (63 - 115)  RR: 17 (10 Feb 2025 09:00) (13 - 28)  SpO2: 100% (10 Feb 2025 09:00) (98% - 100%)    Parameters below as of 10 Feb 2025 08:00  Patient On (Oxygen Delivery Method): nasal cannula  O2 Flow (L/min): 3      I&O's Summary    09 Feb 2025 07:01  -  10 Feb 2025 07:00  --------------------------------------------------------  IN: 4382.8 mL / OUT: 450 mL / NET: 3932.8 mL        PHYSICAL EXAM:  Constitutional: elderly female in NAD  HEENT: NC/AT, moist mucous membranes  Pulmonary: Non-labored, decreased breath sounds b/l  Cardiovascular: +S1, S2, RRR, no murmur  Gastrointestinal: Soft, nontender, nondistended, normoactive bowel sounds  Extremities: No peripheral edema   Neurological: Alert, strength and sensitivity are grossly intact  Skin: No obvious lesions/rashes  Psych: Mood & affect appropriate    LABS: All Labs Reviewed:                        9.6    0.27  )-----------( x        ( 10 Feb 2025 08:46 )             29.6                         10.1   0.19  )-----------( 60       ( 09 Feb 2025 05:41 )             31.5                         8.9    0.18  )-----------( 64       ( 08 Feb 2025 23:27 )             28.4     10 Feb 2025 08:46    141    |  115    |  28     ----------------------------<  153    3.3     |  20     |  1.40   09 Feb 2025 05:41    146    |  120    |  29     ----------------------------<  125    4.3     |  17     |  1.40   08 Feb 2025 23:27    144    |  118    |  32     ----------------------------<  167    4.2     |  16     |  1.50     Ca    9.1        10 Feb 2025 08:46  Ca    9.2        09 Feb 2025 05:41  Ca    8.5        08 Feb 2025 23:27  Phos  2.9       10 Feb 2025 08:46  Phos  2.9       09 Feb 2025 05:41  Mg     2.1       10 Feb 2025 08:46  Mg     2.1       09 Feb 2025 05:41    TPro  5.0    /  Alb  1.5    /  TBili  0.5    /  DBili  x      /  AST  17     /  ALT  25     /  AlkPhos  74     10 Feb 2025 08:46  TPro  5.0    /  Alb  1.8    /  TBili  0.4    /  DBili  x      /  AST  23     /  ALT  33     /  AlkPhos  69     09 Feb 2025 05:41  TPro  4.5    /  Alb  1.7    /  TBili  0.3    /  DBili  x      /  AST  27     /  ALT  33     /  AlkPhos  62     08 Feb 2025 23:27    PT/INR - ( 08 Feb 2025 23:27 )   PT: 19.0 sec;   INR: 1.63 ratio         PTT - ( 08 Feb 2025 23:27 )  PTT:20.8 sec  CARDIAC MARKERS ( 08 Feb 2025 23:27 )  x     / x     / x     / x     / <1.0 ng/mL      Blood Culture: Organism --  Gram Stain Blood -- Gram Stain --  Specimen Source Clean Catch  Culture-Blood --    Organism Blood Culture PCR  Gram Stain Blood -- Gram Stain   Growth in aerobic bottle: Gram Negative Rods  Specimen Source .Blood BLOOD  Culture-Blood --    Organism --  Gram Stain Blood -- Gram Stain   Growth in aerobic bottle: Gram Negative Rods  Specimen Source .Blood BLOOD  Culture-Blood --            EKG:    RADIOLOGY:  < from: CT Chest No Cont (02.09.25 @ 03:02) >  COMPARISON:  DX XR CHEST 2/8/2025 10:14 PM    FINDINGS:  Tubes, catheters and devices: Right-sided central venous catheter with tip  cavoatrial junction.    Lungs: Bibasilar atelectasis.  Pleural spaces: Unremarkable. No pneumothorax. No pleural effusion.  Heart: Heart top normal with small pericardial effusion.  Coronary arteries: Coronary artery calcification.  Lymph nodes: Unremarkable. No enlarged lymph nodes.  Vasculature: No aneurysm thoracic aorta.    Bones/joints: Lucency/deformity is noted involving the posterior aspect   of the left 10 , possibly 9th ribs; minimal fractures of indeterminate age in   these areas can not be excluded. Spine nonacute.  Soft tissues: Surrounding soft tissue chest unremarkable.    IMPRESSION:  1.   Study somewhat limited due to absence of contrast.  2.   Central venous catheter on right side with tip in cavoatrial   junction.  3.   Bibasilar atelectasis/scarring right-greater-than-left; can not   exclude trace pleural effusions. No areas of consolidation. No pneumothorax.  4.   Lucency/deformity is noted involving the posterior aspect of the   left 10, possibly 9th ribs; minimal fractures of indeterminate age in these areas   cannot be excluded.      CXR:  < from: Xray Chest 1 View AP/PA (02.08.25 @ 22:22) >  COMPARISON: None    Patient is rotated towards the left.    Right chest wall implanted IJ port with catheter tip at the SVC/RA   junction.    The cardiomediastinal silhouette is normal and the maurene are not enlarged.   Eventration right hemidiaphragm. There is no focal lung consolidation or   sizable pleural effusion. No significant osseous abnormality.    IMPRESSION:    Unremarkable frontal chest x ray

## 2025-02-10 NOTE — PROGRESS NOTE ADULT - SUBJECTIVE AND OBJECTIVE BOX
Patient is a 69y old  Female who presents with a chief complaint of Sepsis (09 Feb 2025 15:48)    BRIEF HOSPITAL COURSE:   69F PMHx SCC of lip, CVA (on DAPT), recent diagnosis of primary pancreatic adenocarcinoma (Sister Deidre Ramirez lesion in umbilicus, operated at , on experimental treatment) admitted to MICU with septic shock, neutropenic sepsis, cellulitis, MALLORY, dehydration.     Events last 24 hours:   Remains in vasopressor-dependent shock state with Levophed infusion. Renal indices slightly improved. Febrile Tmax 101.7'F.     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 History was obtained, to the extent possible, from review of the chart and collateral sources of information.     Medications:  cefepime   IVPB 2000 milliGRAM(s) IV Intermittent every 12 hours  norepinephrine Infusion 0.05 MICROgram(s)/kG/Min IV Continuous <Continuous>  acetaminophen   Oral Liquid .. 650 milliGRAM(s) Oral every 6 hours PRN  trimethobenzamide Injectable 200 milliGRAM(s) IntraMuscular every 8 hours PRN  dipyridamole 200 mG/aspirin 25 mG 1 Capsule(s) Oral two times a day  diphenoxylate/atropine 2 Tablet(s) Oral four times a day  pantoprazole  Injectable 20 milliGRAM(s) IV Push daily  sucralfate 1 Gram(s) Oral four times a day  lactated ringers. 1000 milliLiter(s) IV Continuous <Continuous>  chlorhexidine 2% Cloths 1 Application(s) Topical <User Schedule>    ICU Vital Signs Last 24 Hrs  T(C): 36.9 (09 Feb 2025 23:28), Max: 38.7 (09 Feb 2025 17:30)  T(F): 98.4 (09 Feb 2025 23:28), Max: 101.7 (09 Feb 2025 17:30)  HR: 77 (09 Feb 2025 22:30) (77 - 190)  BP: 113/70 (09 Feb 2025 22:30) (74/58 - 128/88)  BP(mean): 83 (09 Feb 2025 22:30) (63 - 101)  ABP: --  ABP(mean): --  RR: 14 (09 Feb 2025 22:30) (13 - 33)  SpO2: 100% (09 Feb 2025 22:30) (73% - 100%)  O2 Parameters below as of 09 Feb 2025 19:00  Patient On (Oxygen Delivery Method): nasal cannula    I&O's Detail  08 Feb 2025 07:01  -  09 Feb 2025 07:00  --------------------------------------------------------  IN:    IV PiggyBack: 250 mL    Norepinephrine: 13.2 mL  Total IN: 263.2 mL  OUT:  Total OUT: 0 mL  Total NET: 263.2 mL    09 Feb 2025 07:01  -  09 Feb 2025 23:54  --------------------------------------------------------  IN:    IV PiggyBack: 100 mL    IV PiggyBack: 50 mL    Lactated Ringers: 600 mL    Lactated Ringers Bolus: 1000 mL    Norepinephrine: 92.8 mL    Oral Fluid: 120 mL  Total IN: 1962.8 mL  OUT:  Total OUT: 0 mL  Total NET: 1962.8 mL    LABS:                      10.1   0.19  )-----------( 60       ( 09 Feb 2025 05:41 )             31.5     02-09  146[H]  |  120[H]  |  29[H]  ----------------------------<  125[H]  4.3   |  17[L]  |  1.40[H]  Ca    9.2      09 Feb 2025 05:41  Phos  2.9     02-09  Mg     2.1     02-09  TPro  5.0[L]  /  Alb  1.8[L]  /  TBili  0.4  /  DBili  x   /  AST  23  /  ALT  33  /  AlkPhos  69  02-09  CARDIAC MARKERS ( 08 Feb 2025 23:27 )  x     / x     / x     / x     / <1.0 ng/mL    CAPILLARY BLOOD GLUCOSE    PT/INR - ( 08 Feb 2025 23:27 )   PT: 19.0 sec;   INR: 1.63 ratio    PTT - ( 08 Feb 2025 23:27 )  PTT:20.8 sec    Urinalysis Basic - ( 09 Feb 2025 05:41 )  Color: x / Appearance: x / SG: x / pH: x  Gluc: 125 mg/dL / Ketone: x  / Bili: x / Urobili: x   Blood: x / Protein: x / Nitrite: x   Leuk Esterase: x / RBC: x / WBC x   Sq Epi: x / Non Sq Epi: x / Bacteria: x    CULTURES:  Culture Results:   Growth in aerobic bottle: Gram Negative Rods  Direct identification is available within approximately 3-5  hours either by Blood Panel Multiplexed PCR or Direct  MALDI-TOF. Details: [email or web address] (02-08-25 @ 22:00)  Culture Results:   Growth in aerobic bottle: Gram Negative Rods (02-08-25 @ 21:55)    Physical Examination:  General: Chronically ill-appearing adult female.   HEENT: PERRL.  NECK: Supple.   PULM: Clear to auscultation bilaterally.  CVS: s1/s2.  ABD: Soft, nondistended, nontender, normoactive bowel sounds.  EXT: No edema, nontender.  SKIN: Warm.    RADIOLOGY:   < from: CT Chest No Cont (02.09.25 @ 03:02) >  ACC: [id number] EXAM:  CT ABDOMEN AND PELVIS   ORDERED BY: ROSE COPE   ACC: [id number] EXAM:  CT CHEST   ORDERED BY: LESTER HERNANDEZ   PROCEDURE DATE:  02/09/2025    IMPRESSION:  1.   Study somewhat limited due to absence of contrast.  2.   Axial images 71-76 of series 301 and  concomitant coronal images demonstrate prominent soft tissue inseparable   from  the body of the pancreas consistent with pancreatic neoplasm.  3.   Some distension of gallbladder could be further evaluated with right   upper  quadrant sonography. No definite biliary dilatation.  4.   Other incidental findings as above.  The results of this examination were discussed with Dr. Dangelo at 4:05   a.m.  central standard time on 02/09/2025 by Pavel Morton MD.  They   discussed the mass in the body of the pancreas which  is compatible with patient's known pancreatic neoplasm.  Distended gallbladder could be further evaluated with right upper quadrant  sonography.  Low-attenuation lesions both kidneys nonobstructing calculus right kidney    CRITICAL CARE TIME SPENT:  38 minutes of critical care time spent providing medical care for patient's acute illness/conditions that impairs at least one vital organ system and/or poses a high risk of imminent or life threatening deterioration in the patient's condition. It includes time spent evaluating and treating the patient's acute illness as well as time spent reviewing labs, radiology, discussing goals of care with patient and/or patient's family, and discussing the case with a multidisciplinary team, in an effort to prevent further life threatening deterioration or end organ damage. This time is independent of any procedures performed.    Date of entry of this note is equal to the date of services rendered.

## 2025-02-10 NOTE — CONSULT NOTE ADULT - CRITICAL CARE ATTENDING COMMENT
Upon my evaluation, this patient is at high risk for imminent or life threatening deterioration due to PSVT hypotension and other active medical issues which require my direct attention, intervention, and personal management.  I have personally spent >30 minutes  of critical care time exclusive of time spent on separate billing procedures. This includes review of laboratory data, radiology results, discussion with primary team\patient, and monitoring for potential decompensation. Interventions were performed as documented above.

## 2025-02-10 NOTE — PROGRESS NOTE ADULT - ASSESSMENT
IMPRESSION    68 yo woman w hx squamous cell ca of lip, cyclical vomiting syndrome, HTN, HLD, gout, GERD, CVA on Aggrenox, pancreas adenicarcinoma w umbilicus region involvement(Dx'd ~6 wks ago, under care Dr Pedro TERRY in [facility], post 2 doses QOW IV 48hr  chemotherapy w daily po trial med [id number], last dose IV chemo 4dagok last dose po 2 days ago)  Pt reports had had nausea, vomit, diarrhea since start of chemo, also req IV hydration. has abdomen pain w diarrhea  Stool is s/t liquid, mucous.  In the ER:  Vitals: T 100.4F oral  /75--->70s-80s/50s RR 16 98% RA  Labs: WBC 0.18 H/H 8.9/28.4 PLT 64 PTT 20.8 PT/INR 19/1.63 Cl 118 CO2 16 BUN/Cr 32/1.5 Glu 167 GFR 37 Lac 3.6  UA small Leuk, negative nitrite, 5-10 WBC 02-2 RBCbacteria  CT c/a/p no contrast-left 10th/poss 9th rib fx unknown age, , kuldeep low attentuation renal lesions up to 6cm left and 4cm right, 3mm right renal stone. No sig bowel or pulm abnormality described. Prom soft tissue insep from body of pancreas and sep prom in region of umbilicus  On exam ?area of abdominal wall cellulitis    S/p Levophed, post Vaco, on Zosyn    Recent dx of pancreas ca w umbilical soft tissue met, on IV infusional chemo and oral trial medication at [place], has had N/V/D since start of chemo, adm w worse sx  -neutropenic fever, pancytopenia-suspect chemo effect  -ID on case, already started on antibiotics, cultures in progress  started on GCSF 300mcg, increased to 480mcg per [facility]    RECOMMENDATION    Neutropenia  Start GCSF in view of extreme neutropenia w fever, hypotension  Continue ICU care  -neutropenic precaution, no rectal temp, no fresh flowers, all food well cooked  Follow oral exam, no thrush    Fever / pseudomonas on cx  Cont cefepime    Follow serial CBC, chemistry  Continue supportive care from Heme/Onc standpoint    pancreatic Cancer  Hold active chemotherapy    d/w Dr Aparicio.     thank you, will follow w you

## 2025-02-10 NOTE — PROVIDER CONTACT NOTE (CRITICAL VALUE NOTIFICATION) - RECOMMENDATIONS
Adan AREVALO notified
continue the same tx and  antibiotics
none to contineu the same tx and antibiotics.
Adan AREVALO notified

## 2025-02-10 NOTE — CONSULT NOTE ADULT - ATTENDING COMMENTS
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 Cardiology consulted for episode of SVT yesterday afternoon.    - Pt now with fever and gram negative sepsis  - cont abx    - noted to go into a PSVT   - on tele appears that had started with APCs.   - broke with carotid massage. likely AV lilian dependent.   - cont to monitor tele closely  - EKG with frequent PACS  - No clear evidence of acute ischemia, trops negative on admission (41.5)  - Monitor closely for the development of anginal symptoms or clinical signs of ischemia.   - TTE (2/9/25): EF: 73%, mild LVH, trace MR, trace pericardial effusion  - No meaningful evidence of volume overload.    - now off of pressors.   - Patient on midodrine 5mg TID   - Continue to monitor routine hemodynamics

## 2025-02-10 NOTE — CARE COORDINATION ASSESSMENT. - OTHER PERTINENT DISCHARGE PLANNING INFORMATION:
CM met with patient at bedside to explain role and transitions of care. Patient lives with spouse in a private house with 1 steps to get in and 2 to the second floor.  Patient was fully independent prior to admission.  No caregiver identified, no home care or DMEs.  Spouse will transport patient home when ready to be discharge. CM explained  home care expectation, process, insurance provision and home safety with good understanding.  CM to make referral as needed. Patient  verbalized understanding of plans after discharge and are in agreement.  Resource folder left at bedside.  All questions answered to the best of my abilities.  CM remains available throughout the hospital stay.

## 2025-02-10 NOTE — CARE COORDINATION ASSESSMENT. - NSPASTMEDSURGHISTORY_GEN_ALL_CORE_FT
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

## 2025-02-10 NOTE — PROGRESS NOTE ADULT - ASSESSMENT
69-year-old female past medical history of  SCC lip,  cyclical vomiting syndrome, htn, hld, gout, gerd, CVA on Aggrenox recent diagnosis of primary pancreatic adenocarcinoma with tumor  in umbilicus admitted for sepsis shock.

## 2025-02-10 NOTE — PROGRESS NOTE ADULT - ATTENDING COMMENTS
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     Neuro: Patient not in pain, appears sleepyNEURO: no aciu  CV: Sinus tachycardia, reactive iso neutropenic sepsis. Septic shock. On levophed. POCUS with apparently preserved EF. Pending form TTE. IVC is virtual. Will give 2 more boluses and start on standing fluids 100cc/hr. Wean pressors as tolerated. Pt takes Aggrenox at home. Continue.   Respiratory: mild hypoxic respiratory failure requiring 2L NC. CT chest and POCUS chest negative.  GI: Pt with nausea and diarrhea. Clear liquids as tolerated. Hx pancreatic cancer.   Renal: Creatinine 1.4, baseline with GFR 60s. Monitor UOP. Pt has been retaining. baseline creatinine ~1  Endocrine: FS q6hrs while with minimal PO intake.   Heme: Severe neutropenia. Pt on experimental treatment for her cancer. Heme/onc has seen patient. Daily filgastrim. [facility] [place] Dr. Barr requesting we give 480, which is more than the 5/kg that we usually order. Discussed with both her and our heme/onc team.  Dr. Hannah Barr [phone]  ID: neutropenic fever. Potentially from ulcer/wound on R upper abdomen. Is indurated, but not fluctuant with surrounding erythema. On CT and ultrasound, no discreet collection. Possible source? Continue with vancomycin and cefepime. Discussed with ID.  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     NEURO: no acute issues, mental status at baseline  CV: reactive sinus tachycardia due to neutropenic septic shock. now improvied. Weaned off levophed today. IVC difficult to visualize on POCUS. Continue IVF @ 150cc/hr until inmproved PO intake and diarrhea resolved. Continue home aggrenox   PULM: supplemental O2 via nasal canula as needed  GI: nausea and diarrhea, continue diet as tolerated    Renal: MALLORY likely from shock/sepsis, possible component of urinary retention as well - coelho placed for retention, can monitor strict I/Os  ID: neutropenic fever with blood cultures positive Pseudomonas, can dc Vanc but continue Cefepime  Endocrine: FS q6hrs while with minimal PO intake.   Heme: Severe neutropenia on chemo, following at [facility]. Continue daily filgastrim.

## 2025-02-10 NOTE — PROGRESS NOTE ADULT - SUBJECTIVE AND OBJECTIVE BOX
[facility]  INFECTIOUS DISEASES - Jaime Smith, [place] [address]  Tel: [phone]     Fax: [phone]  =======================================================    ASHLEE CHAHAL [id number]    Follow up: Tmax of 101.7 overnight. Denies any pain or SOB. Nausea improved.    Allergies:  No Known Allergies      Antibiotics:  acetaminophen   Oral Liquid .. 650 milliGRAM(s) Oral every 6 hours PRN  cefepime   IVPB 2000 milliGRAM(s) IV Intermittent every 12 hours  chlorhexidine 2% Cloths 1 Application(s) Topical <User Schedule>  diphenoxylate/atropine 2 Tablet(s) Oral four times a day  dipyridamole 200 mG/aspirin 25 mG 1 Capsule(s) Oral two times a day  filgrastim-sndz (ZARXIO) Injectable 480 MICROGram(s) SubCutaneous daily  lactated ringers. 1000 milliLiter(s) IV Continuous <Continuous>  midodrine 5 milliGRAM(s) Oral every 8 hours  norepinephrine Infusion 0.05 MICROgram(s)/kG/Min IV Continuous <Continuous>  pantoprazole  Injectable 20 milliGRAM(s) IV Push daily  potassium chloride   Powder 40 milliEquivalent(s) Oral once  sucralfate suspension 1 Gram(s) Oral four times a day  trimethobenzamide Injectable 200 milliGRAM(s) IntraMuscular every 8 hours PRN       REVIEW OF SYSTEMS:  CONSTITUTIONAL: + fever  HEENT:  No sore throat or runny nose.  CARDIOVASCULAR:  No chest pain or SOB.  RESPIRATORY:  No cough, shortness of breath  GASTROINTESTINAL:  see history  GENITOURINARY:  No dysuria  MUSCULOSKELETAL:  no joint aches, no muscle pain  NEUROLOGIC:  No headache or dizziness  PSYCHIATRIC:  No disorder of thought or mood.       Physical Exam:  ICU Vital Signs Last 24 Hrs  T(C): 37.7 (10 Feb 2025 11:50), Max: 38.7 (09 Feb 2025 17:30)  T(F): 99.9 (10 Feb 2025 11:50), Max: 101.7 (09 Feb 2025 17:30)  HR: 108 (10 Feb 2025 10:00) (73 - 190)  BP: 120/89 (10 Feb 2025 10:00) (77/60 - 140/101)  BP(mean): 100 (10 Feb 2025 10:00) (63 - 115)  ABP: --  ABP(mean): --  RR: 23 (10 Feb 2025 10:00) (12 - 27)  SpO2: 100% (10 Feb 2025 09:00) (98% - 100%)    O2 Parameters below as of 10 Feb 2025 08:00  Patient On (Oxygen Delivery Method): nasal cannula  O2 Flow (L/min): 3      GEN: Appears weak, not in acute distress  HEENT: normocephalic and atraumatic.   NECK: Supple.   CHEST: R chest port--no surrounding erythema  LUNGS: Normal respiratory effort  HEART: Regular rate and rhythm   ABDOMEN: Soft, nontender, and nondistended.    EXTREMITIES: No leg edema.  NEUROLOGIC: Answering quedtions appropriately  PSYCHIATRIC: Appropriate affect .  SKIN: + tender lesion on RUQ of abdomen, surrounding erythema noted, no drainage    Labs:  02-10    141  |  115[H]  |  28[H]  ----------------------------<  153[H]  3.3[L]   |  20[L]  |  1.40[H]    Ca    9.1      10 Feb 2025 08:46  Phos  2.9     02-10  Mg     2.1     02-10    TPro  5.0[L]  /  Alb  1.5[L]  /  TBili  0.5  /  DBili  x   /  AST  17  /  ALT  25  /  AlkPhos  74  02-10                          9.6    0.27  )-----------( 25       ( 10 Feb 2025 08:46 )             29.6     PT/INR - ( 08 Feb 2025 23:27 )   PT: 19.0 sec;   INR: 1.63 ratio         PTT - ( 08 Feb 2025 23:27 )  PTT:20.8 sec  Urinalysis Basic - ( 10 Feb 2025 08:46 )    Color: x / Appearance: x / SG: x / pH: x  Gluc: 153 mg/dL / Ketone: x  / Bili: x / Urobili: x   Blood: x / Protein: x / Nitrite: x   Leuk Esterase: x / RBC: x / WBC x   Sq Epi: x / Non Sq Epi: x / Bacteria: x      LIVER FUNCTIONS - ( 10 Feb 2025 08:46 )  Alb: 1.5 g/dL / Pro: 5.0 g/dL / ALK PHOS: 74 U/L / ALT: 25 U/L / AST: 17 U/L / GGT: x             RECENT CULTURES:  02-09 @ 01:25 Clean Catch     50,000 - 99,000 CFU/mL Gram Negative Rods        02-08 @ 22:00 .Blood BLOOD Blood Culture PCR    Growth in aerobic bottle: Pseudomonas aeruginosa  Direct identification is available within approximately 3-5  hours either by Blood Panel Multiplexed PCR or Direct  MALDI-TOF. Details: [email or web address]    Growth in aerobic bottle: Gram Negative Rods      02-08 @ 21:55 .Blood BLOOD     Growth in aerobic bottle: Pseudomonas aeruginosa  See previous culture [id number]    Growth in aerobic bottle: Gram Negative Rods            All imaging and data are reviewed.     < from: US Abdomen Upper Quadrant Right (02.09.25 @ 14:55) >  FINDINGS:  Liver: Hepatomegaly measuring up to 18.3 cm.  Bile ducts: Normal caliber. Common bile duct measures 3 mm.  Gallbladder: Distended gallbladder measuring up to 12.4 cm without   shadowingcalculus. Gallbladder sludge noted. No gallbladder wall   thickening.  Pancreas: Visualized portions are within normal limits.  Right kidney: 9.5 cm. No hydronephrosis. 3.9 x 3.0 x 3.2 cm upper pole   cyst. 8 x 4 x 8 mm midpole shadowing calculus.  Ascites: None.  IVC: Visualized portions are within normal limits.    IMPRESSION:  Distended gallbladder with layering sludge.  8 mm right renal calculus without hydronephrosis.  Hepatomegaly.  Right renal cyst.      < end of copied text >

## 2025-02-10 NOTE — PROGRESS NOTE ADULT - ATTENDING COMMENTS
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 Currently in ICU on levophed gtt    patient seen at bedside   Reports feeling weak overall  follows with [facility]   still with occasional nausea.   off levophed, on midodrine   urine retention noted > 1 L  on bladder scan    episodes of NSVT overnight     A/P:  Neutropenic fever   septic shock   met pancreatic cancer on chemotherapy  Pseudomonas bacteremia/UTI     - wbc 0.27, hgb 9.6, plt 25, ANC 10     - hematology following. on zarxio    - ID following: on cefepime. vanco discontinued.     - cultures +pseudomonas     - tylenol prn temp     - zofran prn     - possible secondary to cellulitis RUQ     - started on midodrine 5mg TID     - cdiff neg. GI PCR neg. MRSA swab neg     h/o CVA x 2    - on aggrenox     MALLORY     - monitor Cr     - recent admission to [facility] reviewed. patient had MALLORY secondary to ATN/dehydration. telmisartan/allopurinol was held.     DVT proph: SCDs  management per ICU .

## 2025-02-10 NOTE — PROGRESS NOTE ADULT - PROBLEM SELECTOR PLAN 1
Patient meets sepsis criteria on admission: WBC 0.18, , T >100.4 oral on admission. Neutropenic sepsis shock. etiology unclear   - Hypotensive to SBP 70s, 80s in the ED  - CXR:no acute pulmonary infiltrates on wet read, f/u official read  - Given 3550cc NS bolus in the ED, vanc, zosyn, ofirmev for fever.  - s/p levophed for hypotension. Now off since AM   - on midodrine    - on IV cefepime and vanco   - procal 5.99 Lactate 3.6 in the ED, repeat 2.8   - Tylenol PRN for fever.  - UCx and BCx pos for gram neg rods   - Cdif neg, f/u GI PCR  -  ID consult  - Plan per ICU

## 2025-02-10 NOTE — PROGRESS NOTE ADULT - SUBJECTIVE AND OBJECTIVE BOX
[INTERVAL HX: ]  Patient seen and examined;  Chart reviewed and events noted;     no CP, no SOB  c/o mouth ulcer.     [MEDICATIONS]  MEDICATIONS  (STANDING):  cefepime   IVPB 2000 milliGRAM(s) IV Intermittent every 12 hours  chlorhexidine 2% Cloths 1 Application(s) Topical <User Schedule>  diphenoxylate/atropine 2 Tablet(s) Oral four times a day  dipyridamole 200 mG/aspirin 25 mG 1 Capsule(s) Oral two times a day  filgrastim-sndz (ZARXIO) Injectable 480 MICROGram(s) SubCutaneous daily  lactated ringers. 1000 milliLiter(s) (150 mL/Hr) IV Continuous <Continuous>  midodrine 5 milliGRAM(s) Oral every 8 hours  norepinephrine Infusion 0.05 MICROgram(s)/kG/Min (5.74 mL/Hr) IV Continuous <Continuous>  pantoprazole  Injectable 20 milliGRAM(s) IV Push daily  sucralfate suspension 1 Gram(s) Oral four times a day    MEDICATIONS  (PRN):  acetaminophen   Oral Liquid .. 650 milliGRAM(s) Oral every 6 hours PRN Temp greater or equal to 38C (100.4F)  trimethobenzamide Injectable 200 milliGRAM(s) IntraMuscular every 8 hours PRN Nausea and/or Vomiting      [VITALS]  Vital Signs Last 24 Hrs  T(C): 37.6 (10 Feb 2025 15:50), Max: 38.7 (2025 17:30)  T(F): 99.6 (10 Feb 2025 15:50), Max: 101.7 (2025 17:30)  HR: 96 (10 Feb 2025 15:00) (73 - 190)  BP: 111/77 (10 Feb 2025 15:00) (77/60 - 140/101)  BP(mean): 88 (10 Feb 2025 15:00) (63 - 115)  RR: 22 (10 Feb 2025 15:00) (12 - 27)  SpO2: 100% (10 Feb 2025 09:00) (98% - 100%)    Parameters below as of 10 Feb 2025 08:00  Patient On (Oxygen Delivery Method): nasal cannula  O2 Flow (L/min): 3    [WT/HT]  Daily     Daily Weight in k.3 (10 Feb 2025 06:51)  [VENT]      [PHYSICAL EXAM]  GEN: NAD  HEENT: normocephalic and atraumatic. EOMI. PERRL.    NECK: Supple.  No lymphadenopathy   LUNGS: Clear to auscultation.  HEART: Regular rate and rhythm,  no MRG  ABDOMEN: Soft, nontender, and nondistended.  Positive bowel sounds.    : No CVA tenderness  EXTREMITIES: Without edema.  NEUROLOGIC: grossly intact.  PSYCHIATRIC: Appropriate affect .  SKIN: No rash     [LABS:]                        9.6    0.27  )-----------( 25       ( 10 Feb 2025 08:46 )             29.6     02-10  141  |  115[H]  |  28[H]  ----------------------------<  153[H]  3.3[L]   |  20[L]  |  1.40[H]  Ca    9.1      10 Feb 2025 08:46  Phos  2.9     02-10  Mg     2.1     02-10    TPro  5.0[L]  /  Alb  1.5[L]  /  TBili  0.5  /  DBili  x   /  AST  17  /  ALT  25  /  AlkPhos  74  02-10  PT/INR - ( 2025 23:27 )   PT: 19.0 sec;   INR: 1.63 ratio    PTT - ( 2025 23:27 )  PTT:20.8 sec      Urinalysis Basic - ( 10 Feb 2025 08:46 )  Color: x / Appearance: x / SG: x / pH: x  Gluc: 153 mg/dL / Ketone: x  / Bili: x / Urobili: x   Blood: x / Protein: x / Nitrite: x   Leuk Esterase: x / RBC: x / WBC x   Sq Epi: x / Non Sq Epi: x / Bacteria: x      Urinalysis with Rflx Culture (collected 2025 01:25)  Culture - Urine (collected 2025 01:25)  Source: Clean Catch  Preliminary Report (10 Feb 2025 14:48):    50,000 - 99,000 CFU/mL Pseudomonas aeruginosa    Culture - Blood (collected 2025 22:00)  Source: .Blood BLOOD  Gram Stain (2025 19:04):    Growth in aerobic bottle: Gram Negative Rods  Preliminary Report (10 Feb 2025 11:53):    Growth in aerobic bottle: Pseudomonas aeruginosa    Direct identification is available within approximately 3-5    hours either by Blood Panel Multiplexed PCR or Direct    MALDI-TOF. Details: [email or web address]  Organism: Blood Culture PCR (2025 20:56)  Organism: Blood Culture PCR (2025 20:56)    Culture - Blood (collected 2025 21:55)  Source: .Blood BLOOD  Gram Stain (2025 19:32):    Growth in aerobic bottle: Gram Negative Rods  Preliminary Report (10 Feb 2025 11:54):    Growth in aerobic bottle: Pseudomonas aeruginosa    See previous culture [id number]            Urinalysis with Rflx Culture (collected 2025 01:25)    Culture - Urine (collected 2025 01:25)  Source: Clean Catch  Preliminary Report (10 Feb 2025 14:48):    50,000 - 99,000 CFU/mL Pseudomonas aeruginosa    Culture - Blood (collected 2025 22:00)  Source: .Blood BLOOD  Gram Stain (2025 19:04):    Growth in aerobic bottle: Gram Negative Rods  Preliminary Report (10 Feb 2025 11:53):    Growth in aerobic bottle: Pseudomonas aeruginosa    Direct identification is available within approximately 3-5    hours either by Blood Panel Multiplexed PCR or Direct    MALDI-TOF. Details: [email or web address]  Organism: Blood Culture PCR (2025 20:56)  Organism: Blood Culture PCR (2025 20:56)    Culture - Blood (collected 2025 21:55)  Source: .Blood BLOOD  Gram Stain (2025 19:32):    Growth in aerobic bottle: Gram Negative Rods  Preliminary Report (10 Feb 2025 11:54):    Growth in aerobic bottle: Pseudomonas aeruginosa    See previous culture [id number]        [RADIOLOGY STUDIES:]

## 2025-02-11 LAB
-  AZTREONAM: SIGNIFICANT CHANGE UP
-  CEFEPIME: SIGNIFICANT CHANGE UP
-  CEFTAZIDIME: SIGNIFICANT CHANGE UP
-  CIPROFLOXACIN: SIGNIFICANT CHANGE UP
-  IMIPENEM: SIGNIFICANT CHANGE UP
-  LEVOFLOXACIN: SIGNIFICANT CHANGE UP
-  MEROPENEM: SIGNIFICANT CHANGE UP
-  PIPERACILLIN/TAZOBACTAM: SIGNIFICANT CHANGE UP
ALBUMIN SERPL ELPH-MCNC: 1.1 G/DL — LOW (ref 3.3–5)
ALP SERPL-CCNC: 68 U/L — SIGNIFICANT CHANGE UP (ref 40–120)
ALT FLD-CCNC: 18 U/L — SIGNIFICANT CHANGE UP (ref 12–78)
ANION GAP SERPL CALC-SCNC: 6 MMOL/L — SIGNIFICANT CHANGE UP (ref 5–17)
AST SERPL-CCNC: 13 U/L — LOW (ref 15–37)
BILIRUB SERPL-MCNC: 0.2 MG/DL — SIGNIFICANT CHANGE UP (ref 0.2–1.2)
BUN SERPL-MCNC: 23 MG/DL — SIGNIFICANT CHANGE UP (ref 7–23)
CALCIUM SERPL-MCNC: 8.7 MG/DL — SIGNIFICANT CHANGE UP (ref 8.5–10.1)
CHLORIDE SERPL-SCNC: 113 MMOL/L — HIGH (ref 96–108)
CO2 SERPL-SCNC: 21 MMOL/L — LOW (ref 22–31)
CREAT SERPL-MCNC: 1.1 MG/DL — SIGNIFICANT CHANGE UP (ref 0.5–1.3)
CULTURE RESULTS: ABNORMAL
CULTURE RESULTS: ABNORMAL
EGFR: 54 ML/MIN/1.73M2 — LOW
GLUCOSE SERPL-MCNC: 108 MG/DL — HIGH (ref 70–99)
MAGNESIUM SERPL-MCNC: 1.8 MG/DL — SIGNIFICANT CHANGE UP (ref 1.6–2.6)
METHOD TYPE: SIGNIFICANT CHANGE UP
ORGANISM # SPEC MICROSCOPIC CNT: ABNORMAL
ORGANISM # SPEC MICROSCOPIC CNT: ABNORMAL
ORGANISM # SPEC MICROSCOPIC CNT: SIGNIFICANT CHANGE UP
PHOSPHATE SERPL-MCNC: 2.3 MG/DL — LOW (ref 2.5–4.5)
POTASSIUM SERPL-MCNC: 3.4 MMOL/L — LOW (ref 3.5–5.3)
POTASSIUM SERPL-SCNC: 3.4 MMOL/L — LOW (ref 3.5–5.3)
PROT SERPL-MCNC: 4.6 G/DL — LOW (ref 6–8.3)
SODIUM SERPL-SCNC: 140 MMOL/L — SIGNIFICANT CHANGE UP (ref 135–145)
SPECIMEN SOURCE: SIGNIFICANT CHANGE UP
SPECIMEN SOURCE: SIGNIFICANT CHANGE UP

## 2025-02-11 PROCEDURE — 99233 SBSQ HOSP IP/OBS HIGH 50: CPT

## 2025-02-11 PROCEDURE — G0545: CPT

## 2025-02-11 PROCEDURE — 99233 SBSQ HOSP IP/OBS HIGH 50: CPT | Mod: GC

## 2025-02-11 PROCEDURE — 99232 SBSQ HOSP IP/OBS MODERATE 35: CPT

## 2025-02-11 RX ORDER — SODIUM PHOSPHATE, DIBASIC, ANHYDROUS, POTASSIUM PHOSPHATE, MONOBASIC, AND SODIUM PHOSPHATE, MONOBASIC, MONOHYDRATE 852; 155; 130 MG/1; MG/1; MG/1
1 TABLET, COATED ORAL ONCE
Refills: 0 | Status: COMPLETED | OUTPATIENT
Start: 2025-02-11 | End: 2025-02-11

## 2025-02-11 RX ORDER — FAMOTIDINE 10 MG/ML
20 INJECTION INTRAVENOUS ONCE
Refills: 0 | Status: DISCONTINUED | OUTPATIENT
Start: 2025-02-11 | End: 2025-02-11

## 2025-02-11 RX ORDER — POTASSIUM CHLORIDE 750 MG/1
40 TABLET, EXTENDED RELEASE ORAL ONCE
Refills: 0 | Status: COMPLETED | OUTPATIENT
Start: 2025-02-11 | End: 2025-02-11

## 2025-02-11 RX ORDER — IRON/FOLIC ACID/C/B6/B12/ZINC 150-1.25MG
15 TABLET ORAL DAILY
Refills: 0 | Status: CANCELLED | OUTPATIENT
Start: 2025-02-11 | End: 2025-02-12

## 2025-02-11 RX ORDER — POTASSIUM PHOSPHATE, MONOBASIC POTASSIUM PHOSPHATE, DIBASIC 236; 224 MG/ML; MG/ML
15 INJECTION, SOLUTION INTRAVENOUS ONCE
Refills: 0 | Status: COMPLETED | OUTPATIENT
Start: 2025-02-11 | End: 2025-02-11

## 2025-02-11 RX ORDER — FAMOTIDINE 10 MG/ML
20 INJECTION INTRAVENOUS ONCE
Refills: 0 | Status: COMPLETED | OUTPATIENT
Start: 2025-02-11 | End: 2025-02-11

## 2025-02-11 RX ORDER — OCTREOTIDE ACETATE 1000 UG/ML
100 INJECTION INTRAVENOUS; SUBCUTANEOUS THREE TIMES A DAY
Refills: 0 | Status: DISCONTINUED | OUTPATIENT
Start: 2025-02-11 | End: 2025-02-12

## 2025-02-11 RX ADMIN — Medication 100 MILLIGRAM(S): at 05:34

## 2025-02-11 RX ADMIN — Medication 100 MILLIGRAM(S): at 18:19

## 2025-02-11 RX ADMIN — MIDODRINE HYDROCHLORIDE 5 MILLIGRAM(S): 5 TABLET ORAL at 22:33

## 2025-02-11 RX ADMIN — ASPIRIN AND EXTENDED - RELEASE DIPYRIDAMOLE 1 CAPSULE(S): 25; 200 CAPSULE ORAL at 18:20

## 2025-02-11 RX ADMIN — OCTREOTIDE ACETATE 100 MICROGRAM(S): 1000 INJECTION INTRAVENOUS; SUBCUTANEOUS at 22:33

## 2025-02-11 RX ADMIN — MIDODRINE HYDROCHLORIDE 5 MILLIGRAM(S): 5 TABLET ORAL at 13:18

## 2025-02-11 RX ADMIN — POTASSIUM PHOSPHATE, MONOBASIC POTASSIUM PHOSPHATE, DIBASIC 62.5 MILLIMOLE(S): 236; 224 INJECTION, SOLUTION INTRAVENOUS at 08:53

## 2025-02-11 RX ADMIN — SODIUM CHLORIDE 150 MILLILITER(S): 9 INJECTION, SOLUTION INTRAVENOUS at 05:30

## 2025-02-11 RX ADMIN — SUCRALFATE 1 GRAM(S): 1 SUSPENSION ORAL at 11:33

## 2025-02-11 RX ADMIN — DIPHENOXYLATE HYDROCHLORIDE AND ATROPINE SULFATE 2 TABLET(S): 2.5; .025 TABLET ORAL at 06:08

## 2025-02-11 RX ADMIN — ANTISEPTIC SURGICAL SCRUB 1 APPLICATION(S): 0.04 SOLUTION TOPICAL at 05:27

## 2025-02-11 RX ADMIN — MIDODRINE HYDROCHLORIDE 5 MILLIGRAM(S): 5 TABLET ORAL at 05:26

## 2025-02-11 RX ADMIN — POTASSIUM CHLORIDE 40 MILLIEQUIVALENT(S): 750 TABLET, EXTENDED RELEASE ORAL at 08:55

## 2025-02-11 RX ADMIN — SUCRALFATE 1 GRAM(S): 1 SUSPENSION ORAL at 18:20

## 2025-02-11 RX ADMIN — ASPIRIN AND EXTENDED - RELEASE DIPYRIDAMOLE 1 CAPSULE(S): 25; 200 CAPSULE ORAL at 05:26

## 2025-02-11 RX ADMIN — SODIUM CHLORIDE 150 MILLILITER(S): 9 INJECTION, SOLUTION INTRAVENOUS at 12:39

## 2025-02-11 RX ADMIN — SUCRALFATE 1 GRAM(S): 1 SUSPENSION ORAL at 05:26

## 2025-02-11 RX ADMIN — OCTREOTIDE ACETATE 100 MICROGRAM(S): 1000 INJECTION INTRAVENOUS; SUBCUTANEOUS at 13:18

## 2025-02-11 RX ADMIN — FILGRASTIM-SNDZ 480 MICROGRAM(S): 300 INJECTION, SOLUTION INTRAVENOUS; SUBCUTANEOUS at 14:12

## 2025-02-11 RX ADMIN — FAMOTIDINE 20 MILLIGRAM(S): 10 INJECTION INTRAVENOUS at 12:24

## 2025-02-11 NOTE — PROGRESS NOTE ADULT - SUBJECTIVE AND OBJECTIVE BOX
[facility] - Sophie Miller, Beatrice, Juanjo, Keshav, Anival Vasquez  Office Number:  [phone]    Patient resting comfortably in bed in NAD.  Laying flat with no respiratory distress.  No complaints of chest pain, dyspnea, palpitations, PND, or orthopnea.    F/U for:  SVT    Telemetry:    SR  MEDICATIONS  (STANDING):  cefepime   IVPB 2000 milliGRAM(s) IV Intermittent every 12 hours  chlorhexidine 2% Cloths 1 Application(s) Topical <User Schedule>  dipyridamole 200 mG/aspirin 25 mG 1 Capsule(s) Oral two times a day  filgrastim-sndz (ZARXIO) Injectable 480 MICROGram(s) SubCutaneous daily  lactated ringers. 1000 milliLiter(s) (150 mL/Hr) IV Continuous <Continuous>  midodrine 5 milliGRAM(s) Oral every 8 hours  octreotide  Injectable 100 MICROGram(s) SubCutaneous three times a day  sucralfate suspension 1 Gram(s) Oral four times a day    MEDICATIONS  (PRN):  acetaminophen   Oral Liquid .. 650 milliGRAM(s) Oral every 6 hours PRN Temp greater or equal to 38C (100.4F)  trimethobenzamide Injectable 200 milliGRAM(s) IntraMuscular every 8 hours PRN Nausea and/or Vomiting      Allergies    No Known Allergies        Vital Signs Last 24 Hrs  T(C): 36.9 (11 Feb 2025 11:54), Max: 37.7 (10 Feb 2025 19:39)  T(F): 98.5 (11 Feb 2025 11:54), Max: 99.8 (10 Feb 2025 19:39)  HR: 82 (11 Feb 2025 14:00) (75 - 109)  BP: 111/97 (11 Feb 2025 14:00) (96/65 - 133/93)  BP(mean): 103 (11 Feb 2025 14:00) (73 - 108)  RR: 13 (11 Feb 2025 14:00) (13 - 38)  SpO2: 100% (11 Feb 2025 14:00) (71% - 100%)    Parameters below as of 11 Feb 2025 14:00  Patient On (Oxygen Delivery Method): room air        I&O's Summary    10 Feb 2025 07:01  -  11 Feb 2025 07:00  --------------------------------------------------------  IN: 5300 mL / OUT: 2300 mL / NET: 3000 mL    11 Feb 2025 07:01  -  11 Feb 2025 14:44  --------------------------------------------------------  IN: 1300 mL / OUT: 305 mL / NET: 995 mL        ON EXAM:    Constitutional: elderly female in NAD  HEENT: NC/AT, moist mucous membranes  Pulmonary: Non-labored, decreased breath sounds b/l  Cardiovascular: +S1, S2, RRR, no murmur  Gastrointestinal: Soft, nontender, nondistended, normoactive bowel sounds  Extremities: No peripheral edema   Neurological: Alert, strength and sensitivity are grossly intact  Skin: No obvious lesions/rashes  Psych: Mood & affect appropriate    LABS: All Labs Reviewed:                        8.9    0.29  )-----------( 13       ( 11 Feb 2025 08:45 )             26.7                         9.6    0.27  )-----------( 25       ( 10 Feb 2025 08:46 )             29.6                         10.1   0.19  )-----------( 60       ( 09 Feb 2025 05:41 )             31.5     11 Feb 2025 06:39    140    |  113    |  23     ----------------------------<  108    3.4     |  21     |  1.10   10 Feb 2025 08:46    141    |  115    |  28     ----------------------------<  153    3.3     |  20     |  1.40   09 Feb 2025 05:41    146    |  120    |  29     ----------------------------<  125    4.3     |  17     |  1.40     Ca    8.7        11 Feb 2025 06:39  Ca    9.1        10 Feb 2025 08:46  Ca    9.2        09 Feb 2025 05:41  Phos  2.3       11 Feb 2025 06:39  Phos  2.9       10 Feb 2025 08:46  Phos  2.9       09 Feb 2025 05:41  Mg     1.8       11 Feb 2025 06:39  Mg     2.1       10 Feb 2025 08:46  Mg     2.1       09 Feb 2025 05:41    TPro  4.6    /  Alb  1.1    /  TBili  0.2    /  DBili  x      /  AST  13     /  ALT  18     /  AlkPhos  68     11 Feb 2025 06:39  TPro  5.0    /  Alb  1.5    /  TBili  0.5    /  DBili  x      /  AST  17     /  ALT  25     /  AlkPhos  74     10 Feb 2025 08:46  TPro  5.0    /  Alb  1.8    /  TBili  0.4    /  DBili  x      /  AST  23     /  ALT  33     /  AlkPhos  69     09 Feb 2025 05:41          Blood Culture: Organism Pseudomonas aeruginosa  Gram Stain Blood -- Gram Stain --  Specimen Source Clean Catch  Culture-Blood --    Organism Blood Culture PCR  Gram Stain Blood -- Gram Stain   Growth in aerobic bottle: Gram Negative Rods  Specimen Source .Blood BLOOD  Culture-Blood --    Organism --  Gram Stain Blood -- Gram Stain   Growth in aerobic bottle: Gram Negative Rods  Specimen Source .Blood BLOOD  Culture-Blood --

## 2025-02-11 NOTE — PROGRESS NOTE ADULT - SUBJECTIVE AND OBJECTIVE BOX
Patient is a 69y old  Female who presents with a chief complaint of Sepsis (10 Feb 2025 16:03)    Interval events:     Review of Systems:  Constitutional: no fever, chills, fatigue  Neuro: no headache, numbness, weakness  Resp: no cough, wheezing, shortness of breath  CVS: no chest pain, palpitations, leg swelling  GI: no abdominal pain, nausea, vomiting, diarrhea   : no dysuria, frequency, incontinence  Skin: no itching, burning, rashes, or lesions   Msk: no joint pain or swelling  Psych: no depression, anxiety    T(F): 98.5 (02-11-25 @ 11:54), Max: 99.8 (02-10-25 @ 19:39)  HR: 80 (02-11-25 @ 12:00) (75 - 109)  BP: 123/80 (02-11-25 @ 12:00) (96/65 - 133/93)  RR: 19 (02-11-25 @ 12:00) (14 - 38)  SpO2: 99% (02-11-25 @ 12:00) (71% - 100%)  Wt(kg): --        CAPILLARY BLOOD GLUCOSE        I&O's Summary    10 Feb 2025 07:01  -  11 Feb 2025 07:00  --------------------------------------------------------  IN: 5300 mL / OUT: 2300 mL / NET: 3000 mL    11 Feb 2025 07:01  -  11 Feb 2025 12:34  --------------------------------------------------------  IN: 1000 mL / OUT: 130 mL / NET: 870 mL        Physical Exam:     Gen:  Neuro:  HEENT:  CV:  Pulm:  GI:  Ext:  Skin:    Meds:  MEDICATIONS  (STANDING):  cefepime   IVPB 2000 milliGRAM(s) IV Intermittent every 12 hours  chlorhexidine 2% Cloths 1 Application(s) Topical <User Schedule>  dipyridamole 200 mG/aspirin 25 mG 1 Capsule(s) Oral two times a day  filgrastim-sndz (ZARXIO) Injectable 480 MICROGram(s) SubCutaneous daily  lactated ringers. 1000 milliLiter(s) (150 mL/Hr) IV Continuous <Continuous>  midodrine 5 milliGRAM(s) Oral every 8 hours  octreotide  Injectable 100 MICROGram(s) SubCutaneous three times a day  sucralfate suspension 1 Gram(s) Oral four times a day    MEDICATIONS  (PRN):  acetaminophen   Oral Liquid .. 650 milliGRAM(s) Oral every 6 hours PRN Temp greater or equal to 38C (100.4F)  trimethobenzamide Injectable 200 milliGRAM(s) IntraMuscular every 8 hours PRN Nausea and/or Vomiting                            8.9    0.29  )-----------( 13       ( 11 Feb 2025 08:45 )             26.7       02-11    140  |  113[H]  |  23  ----------------------------<  108[H]  3.4[L]   |  21[L]  |  1.10    Ca    8.7      11 Feb 2025 06:39  Phos  2.3     02-11  Mg     1.8     02-11    TPro  4.6[L]  /  Alb  1.1[L]  /  TBili  0.2  /  DBili  x   /  AST  13[L]  /  ALT  18  /  AlkPhos  68  02-11            Urinalysis Basic - ( 11 Feb 2025 06:39 )    Color: x / Appearance: x / SG: x / pH: x  Gluc: 108 mg/dL / Ketone: x  / Bili: x / Urobili: x   Blood: x / Protein: x / Nitrite: x   Leuk Esterase: x / RBC: x / WBC x   Sq Epi: x / Non Sq Epi: x / Bacteria: x      Clean Catch   50,000 - 99,000 CFU/mL Pseudomonas aeruginosa -- 02-09 @ 01:25  .Blood BLOOD   Growth in aerobic bottle: Pseudomonas aeruginosa  Direct identification is available within approximately 3-5  hours either by Blood Panel Multiplexed PCR or Direct  MALDI-TOF. Details: [email or web address]   Growth in aerobic bottle: Gram Negative Rods 02-08 @ 22:00  .Blood BLOOD   Growth in aerobic bottle: Pseudomonas aeruginosa  See previous culture [id number]   Growth in aerobic bottle: Gram Negative Rods 02-08 @ 21:55            Radiology: ***  Bedside Lung U/S: ***  Bedside Cardiac U/S: ***    CENTRAL LINE: Y/N   DATE INSERTED:   REMOVE: Y/N  ALEGRIA: Y/N      DATE INSERTED:        REMOVE: Y/N  A-LINE: Y/N     DATE INSERTED:              REMOVE: Y/N    GLOBAL ISSUE/BEST PRACTICE:  Analgesia:  Sedation:  HOB elevation: yes  Stress ulcer prophylaxis:  VTE prophylaxis:  Glycemic control:  Nutrition:    CODE STATUS: ***  [facility] discussion: Y       Patient is a 69y old  Female who presents with a chief complaint of Sepsis (10 Feb 2025 16:03)    Interval events: No overnight events. Patient with persistent diarrhea. Started on Octreotide to help with nausea and diarrhea symptoms.     Review of Systems:  Constitutional: no fever, chills, fatigue  Neuro: no headache, numbness, weakness  Resp: no cough, wheezing, shortness of breath  CVS: no chest pain, palpitations, leg swelling  GI: +diarrhea, no abdominal pain, nausea, vomiting  : no dysuria, frequency, incontinence  Skin: no itching, burning, rashes, or lesions   Msk: no joint pain or swelling  Psych: no depression, anxiety    T(F): 98.5 (02-11-25 @ 11:54), Max: 99.8 (02-10-25 @ 19:39)  HR: 80 (02-11-25 @ 12:00) (75 - 109)  BP: 123/80 (02-11-25 @ 12:00) (96/65 - 133/93)  RR: 19 (02-11-25 @ 12:00) (14 - 38)  SpO2: 99% (02-11-25 @ 12:00) (71% - 100%)  Wt(kg): --        CAPILLARY BLOOD GLUCOSE        I&O's Summary    10 Feb 2025 07:01  -  11 Feb 2025 07:00  --------------------------------------------------------  IN: 5300 mL / OUT: 2300 mL / NET: 3000 mL    11 Feb 2025 07:01  -  11 Feb 2025 12:34  --------------------------------------------------------  IN: 1000 mL / OUT: 130 mL / NET: 870 mL        Physical Exam:   General: NAD, appears weak  Neurology: awake and alert  HEENT: NC/AT  Respiratory: CTA b/l, no rales or rhonchi noted  Cardiovascular: irregular, no notable murmurs   Abdomen: soft, ND, slight firmness at area of umbilicus (s/p surgery)   Extremities: no clubbing, cyanosis, or edema  Skin: warm/dry    Meds:  MEDICATIONS  (STANDING):  cefepime   IVPB 2000 milliGRAM(s) IV Intermittent every 12 hours  chlorhexidine 2% Cloths 1 Application(s) Topical <User Schedule>  dipyridamole 200 mG/aspirin 25 mG 1 Capsule(s) Oral two times a day  filgrastim-sndz (ZARXIO) Injectable 480 MICROGram(s) SubCutaneous daily  lactated ringers. 1000 milliLiter(s) (150 mL/Hr) IV Continuous <Continuous>  midodrine 5 milliGRAM(s) Oral every 8 hours  octreotide  Injectable 100 MICROGram(s) SubCutaneous three times a day  sucralfate suspension 1 Gram(s) Oral four times a day    MEDICATIONS  (PRN):  acetaminophen   Oral Liquid .. 650 milliGRAM(s) Oral every 6 hours PRN Temp greater or equal to 38C (100.4F)  trimethobenzamide Injectable 200 milliGRAM(s) IntraMuscular every 8 hours PRN Nausea and/or Vomiting                            8.9    0.29  )-----------( 13       ( 11 Feb 2025 08:45 )             26.7       02-11    140  |  113[H]  |  23  ----------------------------<  108[H]  3.4[L]   |  21[L]  |  1.10    Ca    8.7      11 Feb 2025 06:39  Phos  2.3     02-11  Mg     1.8     02-11    TPro  4.6[L]  /  Alb  1.1[L]  /  TBili  0.2  /  DBili  x   /  AST  13[L]  /  ALT  18  /  AlkPhos  68  02-11            Urinalysis Basic - ( 11 Feb 2025 06:39 )    Color: x / Appearance: x / SG: x / pH: x  Gluc: 108 mg/dL / Ketone: x  / Bili: x / Urobili: x   Blood: x / Protein: x / Nitrite: x   Leuk Esterase: x / RBC: x / WBC x   Sq Epi: x / Non Sq Epi: x / Bacteria: x      Clean Catch   50,000 - 99,000 CFU/mL Pseudomonas aeruginosa -- 02-09 @ 01:25  .Blood BLOOD   Growth in aerobic bottle: Pseudomonas aeruginosa  Direct identification is available within approximately 3-5  hours either by Blood Panel Multiplexed PCR or Direct  MALDI-TOF. Details: [email or web address]   Growth in aerobic bottle: Gram Negative Rods 02-08 @ 22:00  .Blood BLOOD   Growth in aerobic bottle: Pseudomonas aeruginosa  See previous culture [id number]   Growth in aerobic bottle: Gram Negative Rods 02-08 @ 21:55        Tubes/Lines: central venous cath (from prior for chemo)       GLOBAL ISSUE/BEST PRACTICE:  Analgesia: x  Sedation: x   HOB elevation: Y  Stress ulcer prophylaxis: Y  VTE prophylaxis: SCDs due to pancytopenia   Glycemic control: x   Nutrition: clear liquids     CODE STATUS: Full Code

## 2025-02-11 NOTE — DIETITIAN INITIAL EVALUATION ADULT - SIGNS/SYMPTOMS
as evidenced by consuming <50% est energy needs > 5 days, 8lb(5.16%) weight loss x ~3 weeks as evidenced by inability to tolerate po, persistent nausea/vomitting/diarrhea

## 2025-02-11 NOTE — DIETITIAN INITIAL EVALUATION ADULT - NS FNS REASON FOR WEIGHT CHANG
Pancreatic cancer on chemotherapy; nausea/vomitting/diarrhea/decreased po intake/altered GI function (specify)/other (specify)

## 2025-02-11 NOTE — DIETITIAN INITIAL EVALUATION ADULT - PROBLEM SELECTOR PLAN 2
Pt with primary pancreatic adenocarcinoma with IV chemotherapy every 2 weeks and on daily experimental [id number] daily.  - Heme/Onc consult   - f/u CT chest/abd imaging, pending results.  - Plan per ICU

## 2025-02-11 NOTE — DIETITIAN INITIAL EVALUATION ADULT - PERTINENT LABORATORY DATA
02-11    140  |  113[H]  |  23  ----------------------------<  108[H]  3.4[L]   |  21[L]  |  1.10    Ca    8.7      11 Feb 2025 06:39  Phos  2.3     02-11  Mg     1.8     02-11    TPro  4.6[L]  /  Alb  1.1[L]  /  TBili  0.2  /  DBili  x   /  AST  13[L]  /  ALT  18  /  AlkPhos  68  02-11

## 2025-02-11 NOTE — DIETITIAN INITIAL EVALUATION ADULT - PROBLEM SELECTOR PLAN 4
BUN/Cr 32/1.5 on admission - per HIE Cr 1.2-2.6 during 12/2024 period.  - monitor AM CMP  - s/p IVF  - Avoid nephrotoxic medications  - plan per ICU

## 2025-02-11 NOTE — CHART NOTE - NSCHARTNOTEFT_GEN_A_CORE
Called by RN to speak with , Dr. Ayad Pleitez about the patient current care. Spoke to Dr. Ayad Pleitez (gastroenterologist) extensively about the patients current care via chart review, and about his concern of the patients low platelet count.  requesting to stop aggrenox currently and to place patient on a minced and moist diet.  understands the risks of stopping medications and switching the diet.    - discontinued aggrenox as per  request  - advanced diet to minced and moist as per  request  - RN to call if changes Called by RN to speak with , Dr. Ayad Pleitez about the patient current care. Spoke to Dr. Ayad Pleitez (gastroenterologist) extensively about the patients current care via chart review, and about his concern of the patients low platelet count.  requesting to stop aggrenox currently and to place patient on a minced and moist diet.  understands the risks of stopping medications and switching the diet.    - discontinued aggrenox as per  request  - advanced diet to minced and moist as per  request  - AM team to discuss next steps of patient care with    - RN to call if changes Called by RN to speak with , Dr. Ayad Pleitez about the patient current care. Spoke to Dr. Ayad Pleitez (gastroenterologist) extensively about the patients current care via chart review, and about his concern of the patients low platelet count.  requesting to stop aggrenox currently and to place patient on a minced and moist diet.  understands the risks of stopping medications and switching the diet.      - 1 u of platelet ordered  - Discussed with Dr. Leigh (heme/onc) and is in agreement with giving a unit of platelets  - discontinued aggrenox as per  request  - advanced diet to minced and moist as per  request  - AM team to discuss next steps of patient care with    - RN to call if changes

## 2025-02-11 NOTE — DIETITIAN INITIAL EVALUATION ADULT - ADD RECOMMEND
Recommend advance diet as tolerated from clear liquid to full liquid to low fiber with the emphasis on HBV protein sources at all meals. Recommend banatrol 1 packet TID. Recommend trial of ensure max 8oz po BID upon diet advancement. Recommend MVI with minerals/Fe daily (centrum liquid). Recommend electrolyte replacement prn. Food preferences obtained/honored.

## 2025-02-11 NOTE — PROGRESS NOTE ADULT - ATTENDING COMMENTS
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     NEURO: no acute issues, mental status at baseline  CV: sinus tachycardia resolved, continue to monitor. Continue home aggrenox   PULM: supplemental O2 via nasal canula as needed  GI: nausea and diarrhea, little improvement with lomitil, can trial on octreotide TID   - continue diet as tolerated    Renal: MALLORY likely from shock/sepsis, possible component of urinary retention as well, improved today  - continue coelho for urinary retention until more mobile   - continue IVF @ 150cc/hr until taking better PO  ID: Pseudomonas UTI/bacteremia with surveillance cultures for clearance pending - continue Cefepime for now pending sensitivities  Endocrine: FS q6hrs while with minimal PO intake.   Heme: severe pancytopenia likely from chemo maninder - continue daily filgastrim  - transfuse plt < 10 or < 15 if febrile  - Hb goal > 7    Stable for transfer to floor   updated in person and by phone

## 2025-02-11 NOTE — DIETITIAN INITIAL EVALUATION ADULT - FACTORS AFF FOOD INTAKE
change in sense of smell or taste/persistent diarrhea/persistent lack of appetite/persistent nausea/vomiting

## 2025-02-11 NOTE — PROGRESS NOTE ADULT - PROBLEM SELECTOR PLAN 1
Patient meets sepsis criteria on admission: WBC 0.18, , T >100.4 oral on admission. Neutropenic sepsis shock. likely from UTI   - Hypotensive to SBP 70s, 80s in the ED  - CXR:no acute pulmonary infiltrates on wet read, f/u official read  - Given 3550cc NS bolus in the ED, vanc, zosyn, ofirmev for fever.  - s/p levophed for hypotension. continues to remain off levo   - on midodrine    - on IV cefepime - D/c Vanco as MRSA PCR negative   - procal 5.99 Lactate 3.6 in the ED, repeat 2.8   - Tylenol PRN for fever.  - UCx and BCx pos for pseudomonas   - Cdif neg, GI PCR neg  -  ID following   - Plan per ICU

## 2025-02-11 NOTE — PROGRESS NOTE ADULT - SUBJECTIVE AND OBJECTIVE BOX
[facility]  INFECTIOUS DISEASES - [address], [place] [address]  Tel: [phone]     Fax: [phone]  =======================================================    ASHLEE CHAHAL [id number]    Follow up: No fevers. Denies any pain or SOB. Still with loose stools.     Allergies:  No Known Allergies      Antibiotics:  acetaminophen   Oral Liquid .. 650 milliGRAM(s) Oral every 6 hours PRN  cefepime   IVPB 2000 milliGRAM(s) IV Intermittent every 12 hours  chlorhexidine 2% Cloths 1 Application(s) Topical <User Schedule>  dipyridamole 200 mG/aspirin 25 mG 1 Capsule(s) Oral two times a day  filgrastim-sndz (ZARXIO) Injectable 480 MICROGram(s) SubCutaneous daily  lactated ringers. 1000 milliLiter(s) IV Continuous <Continuous>  midodrine 5 milliGRAM(s) Oral every 8 hours  octreotide  Injectable 100 MICROGram(s) SubCutaneous three times a day  sucralfate suspension 1 Gram(s) Oral four times a day  trimethobenzamide Injectable 200 milliGRAM(s) IntraMuscular every 8 hours PRN       REVIEW OF SYSTEMS:  CONSTITUTIONAL: + fever  HEENT:  No sore throat or runny nose.  CARDIOVASCULAR:  No chest pain or SOB.  RESPIRATORY:  No cough, shortness of breath  GASTROINTESTINAL:  see history  GENITOURINARY:  No dysuria  MUSCULOSKELETAL:  no joint aches, no muscle pain  NEUROLOGIC:  No headache or dizziness  PSYCHIATRIC:  No disorder of thought or mood.       Physical Exam:  ICU Vital Signs Last 24 Hrs  T(C): 36.9 (11 Feb 2025 11:54), Max: 37.7 (10 Feb 2025 19:39)  T(F): 98.5 (11 Feb 2025 11:54), Max: 99.8 (10 Feb 2025 19:39)  HR: 95 (11 Feb 2025 13:00) (75 - 109)  BP: 116/56 (11 Feb 2025 13:00) (96/65 - 133/93)  BP(mean): 74 (11 Feb 2025 13:00) (73 - 108)  ABP: --  ABP(mean): --  RR: 22 (11 Feb 2025 13:00) (14 - 38)  SpO2: 99% (11 Feb 2025 13:00) (71% - 100%)    O2 Parameters below as of 11 Feb 2025 13:00  Patient On (Oxygen Delivery Method): room air        GEN: Appears weak, not in acute distress  HEENT: normocephalic and atraumatic.   NECK: Supple.   CHEST: R chest port--no surrounding erythema  LUNGS: Normal respiratory effort  HEART: Regular rate and rhythm   ABDOMEN: Soft, nontender, and nondistended.    EXTREMITIES: No leg edema.  NEUROLOGIC: Answering quedtions appropriately  PSYCHIATRIC: Appropriate affect .  SKIN: + tender lesion on RUQ of abdomen, surrounding erythema noted, no drainage--appears similar      Labs:  02-11    140  |  113[H]  |  23  ----------------------------<  108[H]  3.4[L]   |  21[L]  |  1.10    Ca    8.7      11 Feb 2025 06:39  Phos  2.3     02-11  Mg     1.8     02-11    TPro  4.6[L]  /  Alb  1.1[L]  /  TBili  0.2  /  DBili  x   /  AST  13[L]  /  ALT  18  /  AlkPhos  68  02-11                          8.9    0.29  )-----------( 13       ( 11 Feb 2025 08:45 )             26.7       Urinalysis Basic - ( 11 Feb 2025 06:39 )    Color: x / Appearance: x / SG: x / pH: x  Gluc: 108 mg/dL / Ketone: x  / Bili: x / Urobili: x   Blood: x / Protein: x / Nitrite: x   Leuk Esterase: x / RBC: x / WBC x   Sq Epi: x / Non Sq Epi: x / Bacteria: x      LIVER FUNCTIONS - ( 11 Feb 2025 06:39 )  Alb: 1.1 g/dL / Pro: 4.6 g/dL / ALK PHOS: 68 U/L / ALT: 18 U/L / AST: 13 U/L / GGT: x             RECENT CULTURES:  02-09 @ 01:25 Clean Catch Pseudomonas aeruginosa    50,000 - 99,000 CFU/mL Pseudomonas aeruginosa        02-08 @ 22:00 .Blood BLOOD Blood Culture PCR  Pseudomonas aeruginosa    Growth in aerobic bottle: Pseudomonas aeruginosa  Direct identification is available within approximately 3-5  hours either by Blood Panel Multiplexed PCR or Direct  MALDI-TOF. Details: [email or web address]    Growth in aerobic bottle: Gram Negative Rods      02-08 @ 21:55 .Blood BLOOD     Growth in aerobic bottle: Pseudomonas aeruginosa  See previous culture [id number]    Growth in aerobic bottle: Gram Negative Rods            All imaging and data are reviewed.

## 2025-02-11 NOTE — PROGRESS NOTE ADULT - ASSESSMENT
st69 yo woman w hx squamous cell ca of lip, cyclical vomiting syndrome, HTN, HLD, gout, GERD, CVA on Aggrenox, pancreas adenicarcinoma w umbilicus region involvement(Dx'd ~6 wks ago, under care Dr Pedro TERRY in [facility], post 2 doses QOW FOLFIRINOX  w daily po trial med [id number], last dose IV chemo 4dagok last dose po 2 days ago)  Pt reports had had nausea, vomit, diarrhea since start of chemo, also req IV hydration. has abdomen pain w diarrhea  Stool is s/t liquid, mucous.  In the ER:  Vitals: T 100.4F oral  /75--->70s-80s/50s RR 16 98% RA  Labs: WBC 0.18 H/H 8.9/28.4 PLT 64 PTT 20.8 PT/INR 19/1.63 Cl 118 CO2 16 BUN/Cr 32/1.5 Glu 167 GFR 37 Lac 3.6  UA small Leuk, negative nitrite, 5-10 WBC 02-2 RBCbacteria  CT c/a/p no contrast-left 10th/poss 9th rib fx unknown age, , kuldeep low attentuation renal lesions up to 6cm left and 4cm right, 3mm right renal stone. No sig bowel or pulm abnormality described. Prom soft tissue insep from body of pancreas and sep prom in region of umbilicus  On exam ?area of abdominal wall/chest cellulitis  started on Levophed, post Vanco, on Zosyn on adm    -recent dx of pancreas ca w umbilical soft tissue met, on [place] [facility] has had N/V/D since start of chemo, adm w worse sx  -found w neutropenic fever, pancytopenia-suspect chemo effect  -started on GCSF since admission  -no sig change in WBC. Anemia and thrombocytopenia continue to worsen. Reflect continued maninder from chemo, +/- exacerbation by acute illness  -continue present care  -follow serial, CBC  -transfuse plts if <10k or symptomatic/as clinically indicated  -transfuse PRBC if Hgb <7 or symptomatic/as clnically indicated  -supportive care from Heme/Onc standpoint    discussed w pt  discussed w ICU team

## 2025-02-11 NOTE — DIETITIAN INITIAL EVALUATION ADULT - OTHER INFO
70 y/o F w/ pmh of SCC of the lip, CVA on DAPT, recently found to have primary pancreatic adenocarcinoma w/ tumor in umbilicus operated at , was started on chemo appx 4 weeks ago at [facility], last dose appx 4 days, now presented to [facility] for ongoing n/v and diarrhea since chemo was started. P became weak and hypotensive and was brought to the hospital. In the ED pt was found to be hypotensive and in neutropenic sepsis.     Pt A+Ox 4 at visit; fatigued/weak. Clear liquid diet rx. Minimal po intake since admission with persistent nausea/diarrhea. Pt recently dx primary pancreatic adenocarcinoma w/ tumor in umbilicus and started on chemo ~4 weeks ago. Per pt initial chemo treatment tolerated well however past 2-3 week have not been tolerated. Persistent N/V and diarrhea. Per pt consuming very little food over past couple week. Ensure trialed however most food/supplements consumed pt vomitted shortly after consuming. Poor protein intake pta. At this time consume few sips clear including ensure clear. Still with intermittent nausea however no vomitting reported since admission. Diarrhea persist. Recommend Banatrol 1 packet TID to aid in management of diarrhea. Pt agreeable. Recommend advancing diet as tolerated with emphasis on HBV protein sources at all meals. Pt agreeable to trial ensure max once diet advanced to full liquids. Pt unsure of UBW; approximately 150-155lbs. Pt reports weight loss over past couple weeks however unable to quantify. Pt with kuldeep hands 1+edema. Fluid retention likely masking some weight loss. Food preferences obtained/honored. Neutropenic precautoins honored- no raw fruits/vegetables,no salad, no garnishes.

## 2025-02-11 NOTE — DIETITIAN INITIAL EVALUATION ADULT - PROBLEM SELECTOR PLAN 9
Occupational Therapy    Visit Type: treatment  SUBJECTIVE  Patient agreed to participate in therapy this date.      Patient participated in all scheduled therapy time this session.  \" I would like to go home tomorrow. I don't want to spend the weekend here \"    OBJECTIVE          Bed Mobility  Functional transfers in ADL apt with CG for safety and balance. Pt with one LOB needing max assist to correct. Education on how to avoid at home   Transfers  - Sit to stand: contact guard/touching/steadying assist  - Stand to sit: contact guard/touching/steadying assist      Activities of Daily Living (ADLs)  Grooming/Oral Hygiene:   - Grooming assist: modified independent       - Oral hygiene assist: modified independent  - Position: sitting at sink  Tub Transfer:   - Assist: minimal assist  - Equipment: grab bar and shower transfer bench  Education on use of leg  or belt to lift right LE into tub. Education on safety and independence with tub transfer with use of ETB           ASSESSMENT  Impairments: abnormal tone, balance, coordination/proprioception, range of motion, activity tolerance, bed mobility, safety awareness, strength, pain and sensation  Functional Limitations: functional mobility, grooming, toileting, community reintegration, dressing, wheelchair mobility, eating, bathing, functional transfers, participating in meaningful/purposeful activities, showering, IADLs and job performance    See second daily note for detailed assessment.            Discharge Recommendations:  Recommendation for Discharge Location: OT WI: Home with outpatient therapy  Recommendation for Discharge Support: OT WI: Intermittent assist daily, Assistance with medication, Assistance with IADLs  PT/OT Mobility Equipment for Discharge: continue to assess; spouse/pt have recently purchased manual WC, transport chair, 2WW, and ramped entry through garage  PT/OT ADL Equipment for Discharge: none  OT Identified Barriers to Discharge:  none      Progress: progressing toward goals    Therapy Participation: This patient participated in all scheduled occupational therapy time this session.    Education:   - Present and ready to learn: patient  Education provided during session:  - See body of note.  - Results of above outlined education: Needs reinforcement    Patient at End of Session:   Location: in wheelchair and in bed (seated edge of bed. family present. )  Safety measures: call light within reach  Handoff to: family/caregiver    PLAN  Suggestions for next session as indicated: Treatment plan for next session, Use of Bioness [place] 2.5 lbs and pulleys 1kg, daily, bilateral UE coordination,  Try wringing out wash cloth.   Additional treatment options:standing balance/tolerance.  velcro weights effective. 1# for left, 1/2# right.  See details for 09/9 session.  velcro mitt for right hand also helpful.   Plan considerations: Last shower 9/8  Outcome measures: Dennis completed 8/27  Family/Care partner training details: [facility] equipment in room:     Interventions: activity tolerance training, bed mobility training, caregiver training, continued evaluation, functional transfer training, neuromuscular reeducation, patient/family training, upper extremity strengthening/ROM, therapeutic activity, ADL retraining, compensatory technique education, coordination, equipment eval/education, sensory reintegration, therapeutic exercise, balance, compensatory techniques, fine motor coordination activities, transfer training, patient education, HEP training, manual techniques, safety training, body mechanics, IADL and use of adaptive equipment Frequency: 5-7 days per week  Frequency Comments: 90 min per day at least 5 days per week   Duration: 9/19/2023      Agreement to plan and goals: patient agrees with goals and treatment plan      GOALS  Review Date: 9/20/2024  Short Term Goals (STGs): to be met 7 days from date established, unless otherwise  stated.  - Patient will complete self-feeding at supervision level with adaptive equipment as deemed appropriate.met  - Patient will complete grooming at supervision level with adaptive equipment as deemed appropriate.met  - Patient will complete bathing at minimum assist level with adaptive equipment as deemed appropriate.met  - Patient will complete upper body dressing at set-up level assist with adaptive equipment as deemed appropriate.met  - Patient will complete lower body dressing at contact guard assist level with adaptive equipment as deemed appropriate.met  - Patient will complete toileting at contact guard assist level with adaptive equipment as deemed appropriate.met  - Patient will complete toilet transfer at minimal assist level with adaptive equipment as deemed appropriate.met  - Patient will complete walk-in tub transfer at minimum assist level with adaptive equipment as deemed appropriate.met  Status: all STGs met  Long Term Goals (LTGs): to be met by discharge from rehab program.  - Patient will complete self-feeding at modified Independent level with adaptive equipment as deemed appropriate.  - Patient will complete grooming at modifed independent with adaptive equipment as deemed appropriate.  - Patient will complete bathing at min assist  level / contact guard assist level with adaptive equipment as deemed appropriate.  - Patient will complete upper body dressing at min assist  with adaptive equipment as deemed appropriate.  - Patient will complete lower body dressing at mod assist level with adaptive equipment as deemed appropriate.  - Patient will complete toileting at modified Independent level with adaptive equipment as deemed appropriate.  - Patient will complete toilet transfer at min assist  level with adaptive equipment as deemed appropriate.  - Patient will complete walk-in shower transfer at min assist level with adaptive equipment as deemed appropriate.  Status: all LTGs  progressing          Therapy procedure time and total treatment time can be found documented on the Time Entry flowsheet   Patient with CVA hx (per  patient was originally on plavix but because is now taking [facility] 6236, was told to start Aggrenox instead to avoid DDIs)  - Continue aggrenox  - Plan per ICU

## 2025-02-11 NOTE — DIETITIAN INITIAL EVALUATION ADULT - PROBLEM SELECTOR PLAN 5
Hx of cyclical vomiting syndrome on amitriptyline   - s/p zofran in the ED  - continue zofran PRN  - plan per ICU

## 2025-02-11 NOTE — PROGRESS NOTE ADULT - ASSESSMENT
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 P became weak and hypotensive and was brought to the hospital. In the ED pt was found to be hypotensive and in neutropenic sepsis.     Neuro:   -No acute issues, MS stable  -Past history of CVA (was on DAPT now on Aggrenox due to pancreatic Ca)     Cardiac:   - Septic Shock 2/2 to neutropenic sepsis  - EKG 02/09: Sinus tachycardia with premature atrial complexes, Left axis deviation, Inferior infarct, Anterolateral infarct   - TTE: EF 73%, mild LVH, trace pericardial effusion   - Levo held as patient maintaining MAP >65  - Started on Midodrine 5mg q8hrs   - Bedside POCUS shows intravascular depletion likely in setting of diarrhea --> continue IVF and encouraged increased PO intake     Resp:   - Resp status stable, maintain o2 >90%    GI:   - No acute issues, continue on clear liquids   - Continue Protonix   - 1L LR bolus + Encourage PO intake in setting of MALLORY  - RUQ US: distended gallbladder with sludge, hepatomegaly, R renal calculus without hydronephrosis     Renal:   - MALLORY w/ lactic acidosis   - CT abd:  2 kidney lesions (right kidney 4cm, and left kidney 6cm)   - Continue IVF and encourage PO intake   - Improving Cr, baseline Cr 1.1   - Bladder US with 1300cc retained urine --> coelho in place     ID:   - Neutropenic septic shock unclear source at this time  - CT chest and abd/pelvis noncon (/wife refused contracts due to MALLORY): small pericardial effusion, bibasilar atelectasis, no consolidations noted. Small distention of gallbladder, and 2 kidney lesions (right kidney 4cm, and left kidney 6cm)   - Blood cx and Ucx positive for P. aeruginosa   - MRSA PCR, C. diff, GI PCR, RVP negative   - Continue IV Cefepime   - D/c Vanco as MRSA PCR negative     Endo:   -Known pancreatic Ca   -Maintain Glucose 120-180     Heme:   - DVT ppx w/ SCD, no chemical AC at this time given pancytopenia  - Continue Zorxio for neutropenia   - Transfuse for platelets <10 or <25 + fever   - Receives chemo every other week, and on RMC-6236 daily --> hold in setting of active infection   - Consulted Dr. 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 for ongoing n/v and diarrhea since chemo was started. P became weak and hypotensive and was brought to the hospital. In the ED pt was found to be hypotensive and in neutropenic sepsis.     Neuro:   -No acute issues, MS stable  -Past history of CVA (was on DAPT now on Aggrenox due to pancreatic Ca)     Cardiac:   - Septic Shock 2/2 to neutropenic sepsis  - EKG 02/09: Sinus tachycardia with premature atrial complexes, Left axis deviation, Inferior infarct, Anterolateral infarct   - TTE: EF 73%, mild LVH, trace pericardial effusion   - Continue Midodrine 5mg q8hrs   - Continue IVF and encouraged increased PO intake     Resp:   - Resp status stable, maintain o2 >90%    GI:   - RUQ US: distended gallbladder with sludge, hepatomegaly, R renal calculus without hydronephrosis   - Continue on clear liquid diet  - Continue Protonix   - Start Octreotide 100mg TID x3 da for persistent diarrhea   - D/c Lomotil     Renal:   - MALLORY w/ lactic acidosis   - CT abd:  2 kidney lesions (right kidney 4cm, and left kidney 6cm)   - Bladder US (2/10) with 1300cc retained urine --> coelho in place   - Continue IVF and encourage PO intake   - Improving Cr, baseline Cr 1.1   - Replete lytes PRN     ID:   - Neutropenic septic shock, likely 2/2 UTI   - CT chest and abd/pelvis noncon (/wife refused contracts due to MALLORY): small pericardial effusion, bibasilar atelectasis, no consolidations noted. Small distention of gallbladder, and 2 kidney lesions (right kidney 4cm, and left kidney 6cm)   - Blood cx and Ucx positive for P. aeruginosa   - MRSA PCR, C. diff, GI PCR, RVP negative   - Continue IV Cefepime   - D/c Vanco as MRSA PCR negative     Endo:   -Known pancreatic Ca   -Maintain Glucose 120-180     Heme:   - DVT ppx w/ SCD, no chemical AC at this time given pancytopenia  - Continue neutropenia precautions   - No sig change in WBC --> continue Zorxio for neutropenia   - Anemia and thrombocytopenia continue to worsen, which reflect continued maninder from chemo  - Transfuse for platelets <10, or <25 + fever   - Transfuse for Hgb <7  - Receives chemo every other week, and on RMC-6236 daily --> hold in setting of active infection   - Consulted Dr. Madrid, heme/onc

## 2025-02-11 NOTE — DIETITIAN INITIAL EVALUATION ADULT - PROBLEM SELECTOR PLAN 1
Patient meets sepsis criteria on admission: WBC 0.18, , T >100.4 oral on admission. Neutropenic sepsis, etiology unclear   - Hypotensive to SBP 70s, 80s in the ED  - CXR:no acute pulmonary infiltrates on wet read, f/u official read  - Given 3550cc NS bolus in the ED, vanc, zosyn, ofirmev for fever.  - Continue levophed for hypotension   - continue zosyn  - fu procal, MRSA swab  - Lactate 3.6 in the ED, fu repeat   - Tylenol PRN for fever.  - F/u UCx,  BCx x2  - Cdif neg, f/u GI PCR  -  ID consult  - Plan per ICU

## 2025-02-11 NOTE — PROGRESS NOTE ADULT - ASSESSMENT
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 Cardiology consulted for episode of SVT    - Pt s/p episode of SVT (2/9) that broke with Valsalva and carotid massage, no medical intervention required  - Patient with fever to 101.7 around this time, likely inciting factor for SVT episode  - EKG with frequent PACS, likely AV lilian dependent given response to carotid massage  - Continue to monitor on telemetry  - Continue with management of sepsis as per primary team (ICU)    - Patient without symptoms of angina or heart failure at this time.  - No clear evidence of acute ischemia, trops negative on admission (41.5)  - Monitor closely for the development of anginal symptoms or clinical signs of ischemia.     - No meaningful evidence of volume overload.  - TTE (2/9/25): EF: 73%, mild LVH, trace MR, trace pericardial effusion    - Remains off of Levo and on midodrine.   - Continue to monitor routine hemodynamics    - Monitor and replete lytes, keep K>4, Mg>2.    - Other cardiovascular workup will depend on clinical course.  - All other workup per primary team (ICU).  - Will continue to follow.

## 2025-02-11 NOTE — DIETITIAN INITIAL EVALUATION ADULT - PROBLEM/PLAN-9
Emilia Pablo is a 75 y.o. female with PMH of osteoporosis- on Prolia, HTN, and Abreu-Stickler Syndrome. She has a surgical hx of 2001 cervical fusion and 2008 lumbosacral fusion - EJ - did well from surgery, chronic foot drop on left since surgery. She was evaluated by Dr. Aminta FARRIS one month ago for weakness, gait imbalance and LLE weakness and the patient was referred to Dr. Chand for further evaluation. She was not able to complete the outpatient workup due to new neuro deficits. Reports a fall trying to transfer from the chair to the toilet. She denies hitting her head/back or LOC. Reports since then she noticed worsening weakness RLE>LLE and some bowel incontinence. She feels the urge to have a BM but reports episodes of incontinence. Also notes urinary urgency x 2 weeks. Prakash placed in ED due to retention, bladder scan 660cc without the urge to void. She has nondermatomal patchy numbness in the BLE in the feet and thighs. Denies saddle anesthesia, UE weakness or numbness. NSGY consulted for evaluation.    DISPLAY PLAN FREE TEXT

## 2025-02-11 NOTE — PROGRESS NOTE ADULT - SUBJECTIVE AND OBJECTIVE BOX
All interim records and events noted.    nausea better  remains weak      MEDICATIONS  (STANDING):  cefepime   IVPB 2000 milliGRAM(s) IV Intermittent every 12 hours  chlorhexidine 2% Cloths 1 Application(s) Topical <User Schedule>  dipyridamole 200 mG/aspirin 25 mG 1 Capsule(s) Oral two times a day  famotidine Injectable 20 milliGRAM(s) IV Push once  filgrastim-sndz (ZARXIO) Injectable 480 MICROGram(s) SubCutaneous daily  lactated ringers. 1000 milliLiter(s) (150 mL/Hr) IV Continuous <Continuous>  midodrine 5 milliGRAM(s) Oral every 8 hours  octreotide  Injectable 100 MICROGram(s) SubCutaneous three times a day  sucralfate suspension 1 Gram(s) Oral four times a day    MEDICATIONS  (PRN):  acetaminophen   Oral Liquid .. 650 milliGRAM(s) Oral every 6 hours PRN Temp greater or equal to 38C (100.4F)  trimethobenzamide Injectable 200 milliGRAM(s) IntraMuscular every 8 hours PRN Nausea and/or Vomiting      Vital Signs Last 24 Hrs  T(C): 36.9 (11 Feb 2025 11:54), Max: 37.7 (10 Feb 2025 19:39)  T(F): 98.5 (11 Feb 2025 11:54), Max: 99.8 (10 Feb 2025 19:39)  HR: 80 (11 Feb 2025 12:00) (75 - 109)  BP: 123/80 (11 Feb 2025 12:00) (96/65 - 133/93)  BP(mean): 91 (11 Feb 2025 12:00) (73 - 108)  RR: 19 (11 Feb 2025 12:00) (14 - 38)  SpO2: 99% (11 Feb 2025 12:00) (71% - 100%)    Parameters below as of 11 Feb 2025 12:00  Patient On (Oxygen Delivery Method): room air        PHYSICAL EXAM  General:frail woman awake in bed in no acute distress  Head: atraumatic, normocephalic  ENT: sclera anicteric, buccal mucosa moist  Neck: supple, trachea midline  CV: S1 S2, regular rate and rhythm  Lungs: clear to auscultation, no wheezes/rhonchi  Abdomen: soft, nontender, bowel sounds present, no palpable masses. Pouchy  Extrem: no clubbing/cyanosis/edema  Skin: no significant increased ecchymosis/petechiae  Neuro: alert and oriented X3,  no focal deficits      LABS:             8.9    0.29  )-----------( 13       ( 02-11 @ 08:45 )             26.7                9.6    0.27  )-----------( 25       ( 02-10 @ 08:46 )             29.6                10.1   0.19  )-----------( 60       ( 02-09 @ 05:41 )             31.5                8.9    0.18  )-----------( 64       ( 02-08 @ 23:27 )             28.4       02-11    140  |  113[H]  |  23  ----------------------------<  108[H]  3.4[L]   |  21[L]  |  1.10    Ca    8.7      11 Feb 2025 06:39  Phos  2.3     02-11  Mg     1.8     02-11    TPro  4.6[L]  /  Alb  1.1[L]  /  TBili  0.2  /  DBili  x   /  AST  13[L]  /  ALT  18  /  AlkPhos  68  02-11 02-08 @ 23:27  PT19.0 INR1.63  PTT20.8      RADIOLOGY & ADDITIONAL STUDIES:    IMPRESSION/RECOMMENDATIONS:

## 2025-02-11 NOTE — DIETITIAN INITIAL EVALUATION ADULT - PERTINENT MEDS FT
MEDICATIONS  (STANDING):  cefepime   IVPB 2000 milliGRAM(s) IV Intermittent every 12 hours  chlorhexidine 2% Cloths 1 Application(s) Topical <User Schedule>  dipyridamole 200 mG/aspirin 25 mG 1 Capsule(s) Oral two times a day  filgrastim-sndz (ZARXIO) Injectable 480 MICROGram(s) SubCutaneous daily  lactated ringers. 1000 milliLiter(s) (150 mL/Hr) IV Continuous <Continuous>  midodrine 5 milliGRAM(s) Oral every 8 hours  octreotide  Injectable 100 MICROGram(s) SubCutaneous three times a day  sucralfate suspension 1 Gram(s) Oral four times a day    MEDICATIONS  (PRN):  acetaminophen   Oral Liquid .. 650 milliGRAM(s) Oral every 6 hours PRN Temp greater or equal to 38C (100.4F)  trimethobenzamide Injectable 200 milliGRAM(s) IntraMuscular every 8 hours PRN Nausea and/or Vomiting

## 2025-02-11 NOTE — DIETITIAN INITIAL EVALUATION ADULT - PROBLEM SELECTOR PLAN 3
WBC 0.18 H/H 8.9/28.4 PLT 64 on admission  - likely 2/2 to sepsis and chemotherapy   - heme/onc consult  - plan per ICU

## 2025-02-11 NOTE — PROGRESS NOTE ADULT - ASSESSMENT
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  Patient said last chemo episode was over the past week. She was febrile to 103.1 in the ED.    Found to have fever and sepsis 2/2 pseudomonas bacteremia. Sensitivities reviewed. Suspect urinary source, urine culture also grew pseudomonas. Unclear etiology of tender lesion on RUQ abdominal wall--possibly ecythema gangrenosum given underlying pseudomonas infection. No collection seen on noncontrast CT. COVID/FLU/RSV negative. C diff and GI PCR negative. Still neutropenic but fever curve improved.    #Pseudomonas bacteremia  #Sepsis  #Fever  #Neutropenia  #Hypotension  #Abdominal wall lesion?    -continue cefepime 2g IV q12h (renally dosed)  -repeat blood cultures pending  -monitor WBC  -wound care  -unclear etiology of lesion on abdomen--? ecthyema gangrenosum, if worse suggest Surgical evaluation  -discussed with Dr. Maryann Aparicio MD  Division of Infectious Diseases   Cell [phone] between 8am and 6pm   After 6pm and weekends please call ID service at [phone].     35 minutes spent on total encounter assessing patient, examination, chart review, counseling and coordinating care by the attending physician/nurse/care manager.

## 2025-02-11 NOTE — PROGRESS NOTE ADULT - SUBJECTIVE AND OBJECTIVE BOX
Patient is a 69y old  Female who presents with a chief complaint of Sepsis (11 Feb 2025 13:14)      INTERVAL HPI/OVERNIGHT EVENTS: No acute overnight events. Pt was seen and examined at bedside. Pt states that she feels weak. She states she still has some nausea but no vomiting.   Pt denies headache, dizziness, lightheadedness, fever, chills, body aches, CP, SOB, palpitations, abdominal pain, numbness/tingling.  No other complaints at this time.     MEDICATIONS  (STANDING):  cefepime   IVPB 2000 milliGRAM(s) IV Intermittent every 12 hours  chlorhexidine 2% Cloths 1 Application(s) Topical <User Schedule>  dipyridamole 200 mG/aspirin 25 mG 1 Capsule(s) Oral two times a day  filgrastim-sndz (ZARXIO) Injectable 480 MICROGram(s) SubCutaneous daily  lactated ringers. 1000 milliLiter(s) (150 mL/Hr) IV Continuous <Continuous>  midodrine 5 milliGRAM(s) Oral every 8 hours  octreotide  Injectable 100 MICROGram(s) SubCutaneous three times a day  sucralfate suspension 1 Gram(s) Oral four times a day    MEDICATIONS  (PRN):  acetaminophen   Oral Liquid .. 650 milliGRAM(s) Oral every 6 hours PRN Temp greater or equal to 38C (100.4F)  trimethobenzamide Injectable 200 milliGRAM(s) IntraMuscular every 8 hours PRN Nausea and/or Vomiting      Allergies    No Known Allergies    Intolerances        REVIEW OF SYSTEMS:  CONSTITUTIONAL: No fever or chills  HEENT:  No headache, no sore throat  RESPIRATORY: No cough, wheezing, or shortness of breath  CARDIOVASCULAR: No chest pain, palpitations  GASTROINTESTINAL: No abd pain, +nausea, vomiting, or diarrhea  GENITOURINARY: No dysuria, frequency, or hematuria  NEUROLOGICAL: no focal weakness or dizziness  MUSCULOSKELETAL: no myalgias     Vital Signs Last 24 Hrs  T(C): 36.9 (11 Feb 2025 11:54), Max: 37.7 (10 Feb 2025 19:39)  T(F): 98.5 (11 Feb 2025 11:54), Max: 99.8 (10 Feb 2025 19:39)  HR: 95 (11 Feb 2025 13:00) (75 - 109)  BP: 116/56 (11 Feb 2025 13:00) (96/65 - 133/93)  BP(mean): 74 (11 Feb 2025 13:00) (73 - 108)  RR: 22 (11 Feb 2025 13:00) (14 - 38)  SpO2: 99% (11 Feb 2025 13:00) (71% - 100%)    Parameters below as of 11 Feb 2025 13:00  Patient On (Oxygen Delivery Method): room air        PHYSICAL EXAM:  GENERAL: NAD. chronically ill appearing   HEENT:  anicteric, moist mucous membranes  CHEST/LUNG:  CTA b/l, no rales, wheezes, or rhonchi  HEART:  RRR, S1, S2  ABDOMEN:  BS+, soft, nontender, nondistended  EXTREMITIES: no edema, cyanosis, or calf tenderness  NERVOUS SYSTEM: answers questions and follows commands appropriately    LABS:                        8.9    0.29  )-----------( 13       ( 11 Feb 2025 08:45 )             26.7     CBC Full  -  ( 11 Feb 2025 08:45 )  WBC Count : 0.29 K/uL  Hemoglobin : 8.9 g/dL  Hematocrit : 26.7 %  Platelet Count - Automated : 13 K/uL  Mean Cell Volume : 89.9 fl  Mean Cell Hemoglobin : 30.0 pg  Mean Cell Hemoglobin Concentration : 33.3 g/dL  Auto Neutrophil # : x  Auto Lymphocyte # : x  Auto Monocyte # : x  Auto Eosinophil # : x  Auto Basophil # : x  Auto Neutrophil % : x  Auto Lymphocyte % : x  Auto Monocyte % : x  Auto Eosinophil % : x  Auto Basophil % : x    11 Feb 2025 06:39    140    |  113    |  23     ----------------------------<  108    3.4     |  21     |  1.10     Ca    8.7        11 Feb 2025 06:39  Phos  2.3       11 Feb 2025 06:39  Mg     1.8       11 Feb 2025 06:39    TPro  4.6    /  Alb  1.1    /  TBili  0.2    /  DBili  x      /  AST  13     /  ALT  18     /  AlkPhos  68     11 Feb 2025 06:39      Urinalysis Basic - ( 11 Feb 2025 06:39 )    Color: x / Appearance: x / SG: x / pH: x  Gluc: 108 mg/dL / Ketone: x  / Bili: x / Urobili: x   Blood: x / Protein: x / Nitrite: x   Leuk Esterase: x / RBC: x / WBC x   Sq Epi: x / Non Sq Epi: x / Bacteria: x      CAPILLARY BLOOD GLUCOSE            Urinalysis with Rflx Culture (collected 02-09-25 @ 01:25)    Culture - Urine (collected 02-09-25 @ 01:25)  Source: Clean Catch  Final Report (02-11-25 @ 08:39):    50,000 - 99,000 CFU/mL Pseudomonas aeruginosa  Organism: Pseudomonas aeruginosa (02-11-25 @ 08:39)  Organism: Pseudomonas aeruginosa (02-11-25 @ 08:39)      Method Type: REJI      -  Amikacin: S <=16      -  Aztreonam: S <=4      -  Cefepime: S <=2      -  Ceftazidime: S <=1      -  Ciprofloxacin: S <=0.25      -  Imipenem: S 2      -  Levofloxacin: S <=0.5      -  Meropenem: S <=1      -  Piperacillin/Tazobactam: S <=8    Culture - Blood (collected 02-08-25 @ 22:00)  Source: .Blood BLOOD  Gram Stain (02-09-25 @ 19:04):    Growth in aerobic bottle: Gram Negative Rods  Final Report (02-11-25 @ 07:53):    Growth in aerobic bottle: Pseudomonas aeruginosa    Direct identification is available within approximately 3-5    hours either by Blood Panel Multiplexed PCR or Direct    MALDI-TOF. Details: [email or web address]  Organism: Blood Culture PCR  Pseudomonas aeruginosa (02-11-25 @ 07:53)  Organism: Pseudomonas aeruginosa (02-11-25 @ 07:53)      Method Type: REJI      -  Aztreonam: S <=4      -  Cefepime: S <=2      -  Ceftazidime: S <=1      -  Ciprofloxacin: S <=0.25      -  Imipenem: S <=1      -  Levofloxacin: S <=0.5      -  Meropenem: S <=1      -  Piperacillin/Tazobactam: S <=8  Organism: Blood Culture PCR (02-11-25 @ 07:53)      Method Type: PCR      -  Pseudomonas aeruginosa: Detec    Culture - Blood (collected 02-08-25 @ 21:55)  Source: .Blood BLOOD  Gram Stain (02-09-25 @ 19:32):    Growth in aerobic bottle: Gram Negative Rods  Final Report (02-11-25 @ 07:54):    Growth in aerobic bottle: Pseudomonas aeruginosa    See previous culture [id number]        RADIOLOGY & ADDITIONAL TESTS: ____    Personally reviewed.     Consultant(s) Notes Reviewed:  [x] YES  [ ] NO

## 2025-02-11 NOTE — PROGRESS NOTE ADULT - ATTENDING COMMENTS
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 Currently in ICU on levophed gtt    transferred out of ICU    A/P:  Neutropenic fever   septic shock   met pancreatic cancer on chemotherapy  Pseudomonas bacteremia/UTI     - wbc 0.29, hgb 8.9, plt 13, ANC 10     - hematology following. on zarxio    - ID following: on cefepime. vanco discontinued.     - cultures +pseudomonas. repeat cultures pending     - tylenol prn temp     - zofran prn     - possible secondary to cellulitis RUQ     - on midodrine 5mg TID. wean as tolerated.     - cdiff neg. GI PCR neg. MRSA swab neg     - transfuse if plts <10k, hgb <7    h/o CVA x 2    - on aggrenox     MALLORY   urinary retention    - monitor Cr     - recent admission to [place] reviewed. patient had MALLORY secondary to ATN/dehydration. telmisartan/allopurinol was held.     - bladder scan with > 1 L retention. 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 Currently in ICU on levophed gtt    transferred out of ICU  + coelho    A/P:  Neutropenic fever   septic shock   met pancreatic cancer on chemotherapy  Pseudomonas bacteremia/UTI     - wbc 0.29, hgb 8.9, plt 13, ANC 10     - hematology following. on zarxio    - ID following: on cefepime. vanco discontinued.     - cultures +pseudomonas. repeat cultures pending     - tylenol prn temp     - zofran prn     - possible secondary to cellulitis RUQ     - on midodrine 5mg TID. wean as tolerated.     - cdiff neg. GI PCR neg. MRSA swab neg     - transfuse if plts <10k, hgb <7    h/o CVA x 2    - on aggrenox     MALLORY   urinary retention    - monitor Cr     - recent admission to [place] reviewed. patient had MALLORY secondary to ATN/dehydration. telmisartan/allopurinol was held.     - bladder scan with > 1 L retention. Coelho inserted 2/10    DVT proph: SCDs

## 2025-02-12 ENCOUNTER — TRANSCRIPTION ENCOUNTER (OUTPATIENT)
Age: 70
End: 2025-02-12

## 2025-02-12 ENCOUNTER — INPATIENT (INPATIENT)
Facility: HOSPITAL | Age: 70
LOS: 34 days | Discharge: NURSING, REHAB OR LONG-TERM CARE | DRG: 437 | End: 2025-03-19
Attending: FAMILY MEDICINE | Admitting: INTERNAL MEDICINE
Payer: MEDICARE

## 2025-02-12 VITALS
RESPIRATION RATE: 16 BRPM | HEART RATE: 92 BPM | DIASTOLIC BLOOD PRESSURE: 77 MMHG | TEMPERATURE: 98 F | OXYGEN SATURATION: 91 % | SYSTOLIC BLOOD PRESSURE: 124 MMHG

## 2025-02-12 VITALS
DIASTOLIC BLOOD PRESSURE: 71 MMHG | TEMPERATURE: 98 F | SYSTOLIC BLOOD PRESSURE: 132 MMHG | OXYGEN SATURATION: 96 % | RESPIRATION RATE: 17 BRPM | HEART RATE: 96 BPM

## 2025-02-12 DIAGNOSIS — C25.9 MALIGNANT NEOPLASM OF PANCREAS, UNSPECIFIED: ICD-10-CM

## 2025-02-12 DIAGNOSIS — C25.0 MALIGNANT NEOPLASM OF HEAD OF PANCREAS: ICD-10-CM

## 2025-02-12 DIAGNOSIS — Z90.710 ACQUIRED ABSENCE OF BOTH CERVIX AND UTERUS: Chronic | ICD-10-CM

## 2025-02-12 DIAGNOSIS — Z98.890 OTHER SPECIFIED POSTPROCEDURAL STATES: Chronic | ICD-10-CM

## 2025-02-12 LAB
ALBUMIN SERPL ELPH-MCNC: 1.2 G/DL — LOW (ref 3.3–5)
ALP SERPL-CCNC: 78 U/L — SIGNIFICANT CHANGE UP (ref 40–120)
ALT FLD-CCNC: 17 U/L — SIGNIFICANT CHANGE UP (ref 12–78)
ANION GAP SERPL CALC-SCNC: 9 MMOL/L — SIGNIFICANT CHANGE UP (ref 5–17)
AST SERPL-CCNC: 8 U/L — LOW (ref 15–37)
BASOPHILS # BLD AUTO: 0 K/UL — SIGNIFICANT CHANGE UP (ref 0–0.2)
BASOPHILS NFR BLD AUTO: 0 % — SIGNIFICANT CHANGE UP (ref 0–2)
BILIRUB SERPL-MCNC: 0.7 MG/DL — SIGNIFICANT CHANGE UP (ref 0.2–1.2)
BUN SERPL-MCNC: 22 MG/DL — SIGNIFICANT CHANGE UP (ref 7–23)
CALCIUM SERPL-MCNC: 8.9 MG/DL — SIGNIFICANT CHANGE UP (ref 8.5–10.1)
CHLORIDE SERPL-SCNC: 112 MMOL/L — HIGH (ref 96–108)
CO2 SERPL-SCNC: 19 MMOL/L — LOW (ref 22–31)
CREAT SERPL-MCNC: 0.99 MG/DL — SIGNIFICANT CHANGE UP (ref 0.5–1.3)
EGFR: 61 ML/MIN/1.73M2 — SIGNIFICANT CHANGE UP
EOSINOPHIL # BLD AUTO: 0.05 K/UL — SIGNIFICANT CHANGE UP (ref 0–0.5)
EOSINOPHIL NFR BLD AUTO: 12 % — HIGH (ref 0–6)
GLUCOSE SERPL-MCNC: 137 MG/DL — HIGH (ref 70–99)
HCT VFR BLD CALC: 23.6 % — LOW (ref 34.5–45)
HGB BLD-MCNC: 7.8 G/DL — LOW (ref 11.5–15.5)
LYMPHOCYTES # BLD AUTO: 0.37 K/UL — LOW (ref 1–3.3)
LYMPHOCYTES # BLD AUTO: 82 % — HIGH (ref 13–44)
MAGNESIUM SERPL-MCNC: 1.9 MG/DL — SIGNIFICANT CHANGE UP (ref 1.6–2.6)
MCHC RBC-ENTMCNC: 29.9 PG — SIGNIFICANT CHANGE UP (ref 27–34)
MCHC RBC-ENTMCNC: 33.1 G/DL — SIGNIFICANT CHANGE UP (ref 32–36)
MCV RBC AUTO: 90.4 FL — SIGNIFICANT CHANGE UP (ref 80–100)
MONOCYTES # BLD AUTO: 0.02 K/UL — SIGNIFICANT CHANGE UP (ref 0–0.9)
MONOCYTES NFR BLD AUTO: 4 % — SIGNIFICANT CHANGE UP (ref 2–14)
NEUTROPHILS # BLD AUTO: 0 K/UL — LOW (ref 1.8–7.4)
NEUTROPHILS NFR BLD AUTO: 0 % — LOW (ref 43–77)
NRBC BLD AUTO-RTO: SIGNIFICANT CHANGE UP /100 WBCS (ref 0–0)
PHOSPHATE SERPL-MCNC: 2.4 MG/DL — LOW (ref 2.5–4.5)
PLATELET # BLD AUTO: 59 K/UL — LOW (ref 150–400)
POTASSIUM SERPL-MCNC: 3.5 MMOL/L — SIGNIFICANT CHANGE UP (ref 3.5–5.3)
POTASSIUM SERPL-SCNC: 3.5 MMOL/L — SIGNIFICANT CHANGE UP (ref 3.5–5.3)
PROT SERPL-MCNC: 4.6 G/DL — LOW (ref 6–8.3)
RBC # BLD: 2.61 M/UL — LOW (ref 3.8–5.2)
RBC # FLD: 13.3 % — SIGNIFICANT CHANGE UP (ref 10.3–14.5)
SODIUM SERPL-SCNC: 140 MMOL/L — SIGNIFICANT CHANGE UP (ref 135–145)
WBC # BLD: 0.45 K/UL — CRITICAL LOW (ref 3.8–10.5)
WBC # FLD AUTO: 0.45 K/UL — CRITICAL LOW (ref 3.8–10.5)

## 2025-02-12 PROCEDURE — 85730 THROMBOPLASTIN TIME PARTIAL: CPT

## 2025-02-12 PROCEDURE — 84550 ASSAY OF BLOOD/URIC ACID: CPT

## 2025-02-12 PROCEDURE — 86923 COMPATIBILITY TEST ELECTRIC: CPT

## 2025-02-12 PROCEDURE — 96374 THER/PROPH/DIAG INJ IV PUSH: CPT

## 2025-02-12 PROCEDURE — 83735 ASSAY OF MAGNESIUM: CPT

## 2025-02-12 PROCEDURE — P9037: CPT

## 2025-02-12 PROCEDURE — 87040 BLOOD CULTURE FOR BACTERIA: CPT

## 2025-02-12 PROCEDURE — 83540 ASSAY OF IRON: CPT

## 2025-02-12 PROCEDURE — 80053 COMPREHEN METABOLIC PANEL: CPT

## 2025-02-12 PROCEDURE — 87086 URINE CULTURE/COLONY COUNT: CPT

## 2025-02-12 PROCEDURE — 81232 DPYD GENE COMMON VARIANTS: CPT

## 2025-02-12 PROCEDURE — 87070 CULTURE OTHR SPECIMN AEROBIC: CPT

## 2025-02-12 PROCEDURE — 97530 THERAPEUTIC ACTIVITIES: CPT | Mod: GP

## 2025-02-12 PROCEDURE — 86850 RBC ANTIBODY SCREEN: CPT

## 2025-02-12 PROCEDURE — 83615 LACTATE (LD) (LDH) ENZYME: CPT

## 2025-02-12 PROCEDURE — 36415 COLL VENOUS BLD VENIPUNCTURE: CPT

## 2025-02-12 PROCEDURE — 87529 HSV DNA AMP PROBE: CPT

## 2025-02-12 PROCEDURE — 86901 BLOOD TYPING SEROLOGIC RH(D): CPT

## 2025-02-12 PROCEDURE — 87150 DNA/RNA AMPLIFIED PROBE: CPT

## 2025-02-12 PROCEDURE — 76705 ECHO EXAM OF ABDOMEN: CPT

## 2025-02-12 PROCEDURE — P9100: CPT

## 2025-02-12 PROCEDURE — 84481 FREE ASSAY (FT-3): CPT

## 2025-02-12 PROCEDURE — 86900 BLOOD TYPING SEROLOGIC ABO: CPT

## 2025-02-12 PROCEDURE — 82553 CREATINE MB FRACTION: CPT

## 2025-02-12 PROCEDURE — 80076 HEPATIC FUNCTION PANEL: CPT

## 2025-02-12 PROCEDURE — C1769: CPT

## 2025-02-12 PROCEDURE — 84100 ASSAY OF PHOSPHORUS: CPT

## 2025-02-12 PROCEDURE — 80048 BASIC METABOLIC PNL TOTAL CA: CPT

## 2025-02-12 PROCEDURE — 85610 PROTHROMBIN TIME: CPT

## 2025-02-12 PROCEDURE — 71045 X-RAY EXAM CHEST 1 VIEW: CPT

## 2025-02-12 PROCEDURE — 84484 ASSAY OF TROPONIN QUANT: CPT

## 2025-02-12 PROCEDURE — 84478 ASSAY OF TRIGLYCERIDES: CPT

## 2025-02-12 PROCEDURE — G0545: CPT

## 2025-02-12 PROCEDURE — C1880: CPT

## 2025-02-12 PROCEDURE — 85027 COMPLETE CBC AUTOMATED: CPT

## 2025-02-12 PROCEDURE — 82728 ASSAY OF FERRITIN: CPT

## 2025-02-12 PROCEDURE — 82550 ASSAY OF CK (CPK): CPT

## 2025-02-12 PROCEDURE — 93306 TTE W/DOPPLER COMPLETE: CPT

## 2025-02-12 PROCEDURE — 74176 CT ABD & PELVIS W/O CONTRAST: CPT | Mod: MC

## 2025-02-12 PROCEDURE — C1887: CPT

## 2025-02-12 PROCEDURE — 0241U: CPT

## 2025-02-12 PROCEDURE — 86645 CMV ANTIBODY IGM: CPT

## 2025-02-12 PROCEDURE — 87077 CULTURE AEROBIC IDENTIFY: CPT

## 2025-02-12 PROCEDURE — 87640 STAPH A DNA AMP PROBE: CPT

## 2025-02-12 PROCEDURE — 87186 SC STD MICRODIL/AGAR DIL: CPT

## 2025-02-12 PROCEDURE — 83935 ASSAY OF URINE OSMOLALITY: CPT

## 2025-02-12 PROCEDURE — 81001 URINALYSIS AUTO W/SCOPE: CPT

## 2025-02-12 PROCEDURE — 86644 CMV ANTIBODY: CPT

## 2025-02-12 PROCEDURE — 93005 ELECTROCARDIOGRAM TRACING: CPT

## 2025-02-12 PROCEDURE — 82746 ASSAY OF FOLIC ACID SERUM: CPT

## 2025-02-12 PROCEDURE — 83550 IRON BINDING TEST: CPT

## 2025-02-12 PROCEDURE — 84145 PROCALCITONIN (PCT): CPT

## 2025-02-12 PROCEDURE — 88305 TISSUE EXAM BY PATHOLOGIST: CPT

## 2025-02-12 PROCEDURE — 82803 BLOOD GASES ANY COMBINATION: CPT

## 2025-02-12 PROCEDURE — 94640 AIRWAY INHALATION TREATMENT: CPT

## 2025-02-12 PROCEDURE — 82653 EL-1 FECAL QUANTITATIVE: CPT

## 2025-02-12 PROCEDURE — 82607 VITAMIN B-12: CPT

## 2025-02-12 PROCEDURE — 93970 EXTREMITY STUDY: CPT

## 2025-02-12 PROCEDURE — 87637 SARSCOV2&INF A&B&RSV AMP PRB: CPT

## 2025-02-12 PROCEDURE — 83880 ASSAY OF NATRIURETIC PEPTIDE: CPT

## 2025-02-12 PROCEDURE — 87449 NOS EACH ORGANISM AG IA: CPT

## 2025-02-12 PROCEDURE — C1894: CPT

## 2025-02-12 PROCEDURE — 84134 ASSAY OF PREALBUMIN: CPT

## 2025-02-12 PROCEDURE — 99233 SBSQ HOSP IP/OBS HIGH 50: CPT | Mod: GC

## 2025-02-12 PROCEDURE — 71250 CT THORAX DX C-: CPT | Mod: MC

## 2025-02-12 PROCEDURE — 87324 CLOSTRIDIUM AG IA: CPT

## 2025-02-12 PROCEDURE — 82962 GLUCOSE BLOOD TEST: CPT

## 2025-02-12 PROCEDURE — 82239 BILE ACIDS TOTAL: CPT

## 2025-02-12 PROCEDURE — 94660 CPAP INITIATION&MGMT: CPT

## 2025-02-12 PROCEDURE — 86695 HERPES SIMPLEX TYPE 1 TEST: CPT

## 2025-02-12 PROCEDURE — 82570 ASSAY OF URINE CREATININE: CPT

## 2025-02-12 PROCEDURE — 99232 SBSQ HOSP IP/OBS MODERATE 35: CPT

## 2025-02-12 PROCEDURE — 97162 PT EVAL MOD COMPLEX 30 MIN: CPT | Mod: GP

## 2025-02-12 PROCEDURE — 71275 CT ANGIOGRAPHY CHEST: CPT | Mod: MC

## 2025-02-12 PROCEDURE — 99285 EMERGENCY DEPT VISIT HI MDM: CPT

## 2025-02-12 PROCEDURE — P9047: CPT | Mod: JZ

## 2025-02-12 PROCEDURE — 99223 1ST HOSP IP/OBS HIGH 75: CPT

## 2025-02-12 PROCEDURE — 86696 HERPES SIMPLEX TYPE 2 TEST: CPT

## 2025-02-12 PROCEDURE — 88312 SPECIAL STAINS GROUP 1: CPT

## 2025-02-12 PROCEDURE — 74018 RADEX ABDOMEN 1 VIEW: CPT

## 2025-02-12 PROCEDURE — 83036 HEMOGLOBIN GLYCOSYLATED A1C: CPT

## 2025-02-12 PROCEDURE — 97110 THERAPEUTIC EXERCISES: CPT | Mod: GP

## 2025-02-12 PROCEDURE — 83605 ASSAY OF LACTIC ACID: CPT

## 2025-02-12 PROCEDURE — 83010 ASSAY OF HAPTOGLOBIN QUANT: CPT

## 2025-02-12 PROCEDURE — P9016: CPT

## 2025-02-12 PROCEDURE — 87641 MR-STAPH DNA AMP PROBE: CPT

## 2025-02-12 PROCEDURE — 74177 CT ABD & PELVIS W/CONTRAST: CPT | Mod: MC

## 2025-02-12 PROCEDURE — 82306 VITAMIN D 25 HYDROXY: CPT

## 2025-02-12 PROCEDURE — 82150 ASSAY OF AMYLASE: CPT

## 2025-02-12 PROCEDURE — 93308 TTE F-UP OR LMTD: CPT

## 2025-02-12 PROCEDURE — 97116 GAIT TRAINING THERAPY: CPT | Mod: GP

## 2025-02-12 PROCEDURE — 86140 C-REACTIVE PROTEIN: CPT

## 2025-02-12 PROCEDURE — 36430 TRANSFUSION BLD/BLD COMPNT: CPT

## 2025-02-12 PROCEDURE — 78580 LUNG PERFUSION IMAGING: CPT | Mod: MC

## 2025-02-12 PROCEDURE — 85025 COMPLETE CBC W/AUTO DIFF WBC: CPT

## 2025-02-12 PROCEDURE — 81404 MOPATH PROCEDURE LEVEL 5: CPT

## 2025-02-12 PROCEDURE — 87507 IADNA-DNA/RNA PROBE TQ 12-25: CPT

## 2025-02-12 PROCEDURE — 80074 ACUTE HEPATITIS PANEL: CPT

## 2025-02-12 PROCEDURE — 80202 ASSAY OF VANCOMYCIN: CPT

## 2025-02-12 PROCEDURE — 84105 ASSAY OF URINE PHOSPHORUS: CPT

## 2025-02-12 PROCEDURE — 84443 ASSAY THYROID STIM HORMONE: CPT

## 2025-02-12 PROCEDURE — 84300 ASSAY OF URINE SODIUM: CPT

## 2025-02-12 PROCEDURE — 83690 ASSAY OF LIPASE: CPT

## 2025-02-12 PROCEDURE — 84439 ASSAY OF FREE THYROXINE: CPT

## 2025-02-12 PROCEDURE — 96375 TX/PRO/DX INJ NEW DRUG ADDON: CPT

## 2025-02-12 PROCEDURE — 82977 ASSAY OF GGT: CPT

## 2025-02-12 PROCEDURE — A9540: CPT

## 2025-02-12 PROCEDURE — 36600 WITHDRAWAL OF ARTERIAL BLOOD: CPT

## 2025-02-12 PROCEDURE — 70450 CT HEAD/BRAIN W/O DYE: CPT | Mod: MC

## 2025-02-12 PROCEDURE — 37191 INS ENDOVAS VENA CAVA FILTR: CPT

## 2025-02-12 RX ORDER — LOPERAMIDE HYDROCHLORIDE 2 MG/1
1 CAPSULE ORAL
Qty: 0 | Refills: 0 | DISCHARGE
Start: 2025-02-12

## 2025-02-12 RX ORDER — FILGRASTIM-SNDZ 300 UG/.5ML
480 INJECTION, SOLUTION INTRAVENOUS; SUBCUTANEOUS
Qty: 0 | Refills: 0 | DISCHARGE
Start: 2025-02-12

## 2025-02-12 RX ORDER — OCTREOTIDE ACETATE 1000 UG/ML
0.04 INJECTION INTRAVENOUS; SUBCUTANEOUS
Qty: 0 | Refills: 0 | DISCHARGE
Start: 2025-02-12

## 2025-02-12 RX ORDER — LOPERAMIDE HYDROCHLORIDE 2 MG/1
2 CAPSULE ORAL ONCE
Refills: 0 | Status: COMPLETED | OUTPATIENT
Start: 2025-02-12 | End: 2025-02-12

## 2025-02-12 RX ORDER — OCTREOTIDE ACETATE 500 UG/ML
150 INJECTION, SOLUTION INTRAVENOUS; SUBCUTANEOUS EVERY 8 HOURS
Refills: 0 | Status: DISCONTINUED | OUTPATIENT
Start: 2025-02-12 | End: 2025-02-15

## 2025-02-12 RX ORDER — DIPHENHYDRAMINE HCL 25 MG
25 CAPSULE ORAL ONCE
Refills: 0 | Status: COMPLETED | OUTPATIENT
Start: 2025-02-12 | End: 2025-02-12

## 2025-02-12 RX ORDER — MIDODRINE HYDROCHLORIDE 5 MG/1
1 TABLET ORAL
Qty: 0 | Refills: 0 | DISCHARGE
Start: 2025-02-12

## 2025-02-12 RX ORDER — MIDODRINE HYDROCHLORIDE 5 MG/1
5 TABLET ORAL EVERY 8 HOURS
Refills: 0 | Status: DISCONTINUED | OUTPATIENT
Start: 2025-02-12 | End: 2025-02-16

## 2025-02-12 RX ORDER — ONDANSETRON HCL/PF 4 MG/2 ML
4 VIAL (ML) INJECTION EVERY 8 HOURS
Refills: 0 | Status: DISCONTINUED | OUTPATIENT
Start: 2025-02-12 | End: 2025-03-19

## 2025-02-12 RX ORDER — ACETAMINOPHEN 160 MG/5ML
325 SUSPENSION ORAL ONCE
Refills: 0 | Status: COMPLETED | OUTPATIENT
Start: 2025-02-12 | End: 2025-02-12

## 2025-02-12 RX ORDER — CLOTRIMAZOLE 10 MG/G
1 CREAM TOPICAL
Refills: 0 | Status: DISCONTINUED | OUTPATIENT
Start: 2025-02-12 | End: 2025-02-12

## 2025-02-12 RX ORDER — SUCRALFATE 1 G/10ML
10 SUSPENSION ORAL
Qty: 0 | Refills: 0 | DISCHARGE
Start: 2025-02-12

## 2025-02-12 RX ORDER — SODIUM CHLORIDE 9 G/ML
150 INJECTION, SOLUTION INTRAVENOUS
Qty: 0 | Refills: 0 | DISCHARGE
Start: 2025-02-12

## 2025-02-12 RX ORDER — ACETAMINOPHEN 500 MG/5ML
650 LIQUID (ML) ORAL EVERY 6 HOURS
Refills: 0 | Status: DISCONTINUED | OUTPATIENT
Start: 2025-02-12 | End: 2025-03-19

## 2025-02-12 RX ORDER — FILGRASTIM 300 UG/.5ML
480 INJECTION, SOLUTION INTRAVENOUS; SUBCUTANEOUS DAILY
Refills: 0 | Status: DISCONTINUED | OUTPATIENT
Start: 2025-02-13 | End: 2025-02-13

## 2025-02-12 RX ORDER — MAGNESIUM, ALUMINUM HYDROXIDE 200-200 MG
30 TABLET,CHEWABLE ORAL EVERY 4 HOURS
Refills: 0 | Status: DISCONTINUED | OUTPATIENT
Start: 2025-02-12 | End: 2025-03-19

## 2025-02-12 RX ORDER — MELATONIN 5 MG
3 TABLET ORAL AT BEDTIME
Refills: 0 | Status: DISCONTINUED | OUTPATIENT
Start: 2025-02-12 | End: 2025-03-01

## 2025-02-12 RX ORDER — TRIMETHOBENZAMIDE HCL 250 MG
2 CAPSULE ORAL
Qty: 0 | Refills: 0 | DISCHARGE
Start: 2025-02-12

## 2025-02-12 RX ORDER — IRON/FOLIC ACID/C/B6/B12/ZINC 150-1.25MG
1 TABLET ORAL
Qty: 0 | Refills: 0 | DISCHARGE
Start: 2025-02-12

## 2025-02-12 RX ORDER — CYST/ALA/Q10/PHOS.SER/DHA/BROC 100-20-50
15 POWDER (GRAM) ORAL DAILY
Refills: 0 | Status: DISCONTINUED | OUTPATIENT
Start: 2025-02-13 | End: 2025-02-13

## 2025-02-12 RX ORDER — METRONIDAZOLE 500 MG
500 TABLET ORAL ONCE
Refills: 0 | Status: COMPLETED | OUTPATIENT
Start: 2025-02-12 | End: 2025-02-12

## 2025-02-12 RX ORDER — LOPERAMIDE HCL 1 MG/7.5ML
2 SOLUTION ORAL EVERY 8 HOURS
Refills: 0 | Status: DISCONTINUED | OUTPATIENT
Start: 2025-02-12 | End: 2025-02-12

## 2025-02-12 RX ORDER — SODIUM CHLORIDE 9 G/1000ML
1000 INJECTION, SOLUTION INTRAVENOUS
Refills: 0 | Status: DISCONTINUED | OUTPATIENT
Start: 2025-02-12 | End: 2025-02-13

## 2025-02-12 RX ORDER — SODIUM CHLORIDE 9 G/1000ML
1000 INJECTION, SOLUTION INTRAVENOUS
Refills: 0 | Status: DISCONTINUED | OUTPATIENT
Start: 2025-02-12 | End: 2025-02-12

## 2025-02-12 RX ORDER — CEFEPIME 2 G/20ML
2000 INJECTION, POWDER, FOR SOLUTION INTRAVENOUS EVERY 12 HOURS
Refills: 0 | Status: DISCONTINUED | OUTPATIENT
Start: 2025-02-12 | End: 2025-02-12

## 2025-02-12 RX ORDER — METRONIDAZOLE 500 MG
TABLET ORAL
Refills: 0 | Status: DISCONTINUED | OUTPATIENT
Start: 2025-02-12 | End: 2025-02-12

## 2025-02-12 RX ORDER — LOPERAMIDE HCL 1 MG/7.5ML
2 SOLUTION ORAL EVERY 6 HOURS
Refills: 0 | Status: COMPLETED | OUTPATIENT
Start: 2025-02-12 | End: 2025-02-14

## 2025-02-12 RX ORDER — SUCRALFATE 1 G
1 TABLET ORAL
Refills: 0 | Status: DISCONTINUED | OUTPATIENT
Start: 2025-02-12 | End: 2025-03-10

## 2025-02-12 RX ORDER — OCTREOTIDE ACETATE 500 UG/ML
100 INJECTION, SOLUTION INTRAVENOUS; SUBCUTANEOUS THREE TIMES A DAY
Refills: 0 | Status: DISCONTINUED | OUTPATIENT
Start: 2025-02-12 | End: 2025-02-12

## 2025-02-12 RX ORDER — CEFEPIME HCL 1 G
2 IV SOLUTION, PIGGYBACK, BOTTLE (EA) INTRAVENOUS
Qty: 0 | Refills: 0 | DISCHARGE
Start: 2025-02-12

## 2025-02-12 RX ORDER — CEFEPIME 2 G/20ML
2000 INJECTION, POWDER, FOR SOLUTION INTRAVENOUS EVERY 12 HOURS
Refills: 0 | Status: DISCONTINUED | OUTPATIENT
Start: 2025-02-12 | End: 2025-02-13

## 2025-02-12 RX ORDER — METRONIDAZOLE 500 MG
500 TABLET ORAL EVERY 12 HOURS
Refills: 0 | Status: DISCONTINUED | OUTPATIENT
Start: 2025-02-13 | End: 2025-02-12

## 2025-02-12 RX ORDER — SODIUM PHOSPHATE, DIBASIC, ANHYDROUS, POTASSIUM PHOSPHATE, MONOBASIC, AND SODIUM PHOSPHATE, MONOBASIC, MONOHYDRATE 852; 155; 130 MG/1; MG/1; MG/1
1 TABLET, COATED ORAL ONCE
Refills: 0 | Status: COMPLETED | OUTPATIENT
Start: 2025-02-12 | End: 2025-02-12

## 2025-02-12 RX ADMIN — SUCRALFATE 1 GRAM(S): 1 SUSPENSION ORAL at 11:56

## 2025-02-12 RX ADMIN — MIDODRINE HYDROCHLORIDE 5 MILLIGRAM(S): 5 TABLET ORAL at 22:50

## 2025-02-12 RX ADMIN — Medication 100 MILLIGRAM(S): at 05:37

## 2025-02-12 RX ADMIN — MIDODRINE HYDROCHLORIDE 5 MILLIGRAM(S): 5 TABLET ORAL at 05:38

## 2025-02-12 RX ADMIN — Medication 1 GRAM(S): at 22:51

## 2025-02-12 RX ADMIN — FILGRASTIM-SNDZ 480 MICROGRAM(S): 300 INJECTION, SOLUTION INTRAVENOUS; SUBCUTANEOUS at 15:17

## 2025-02-12 RX ADMIN — OCTREOTIDE ACETATE 150 MICROGRAM(S): 500 INJECTION, SOLUTION INTRAVENOUS; SUBCUTANEOUS at 22:51

## 2025-02-12 RX ADMIN — LOPERAMIDE HYDROCHLORIDE 2 MILLIGRAM(S): 2 CAPSULE ORAL at 15:18

## 2025-02-12 RX ADMIN — SODIUM CHLORIDE 125 MILLILITER(S): 9 INJECTION, SOLUTION INTRAVENOUS at 21:30

## 2025-02-12 RX ADMIN — SODIUM CHLORIDE 150 MILLILITER(S): 9 INJECTION, SOLUTION INTRAVENOUS at 03:48

## 2025-02-12 RX ADMIN — OCTREOTIDE ACETATE 100 MICROGRAM(S): 1000 INJECTION INTRAVENOUS; SUBCUTANEOUS at 05:38

## 2025-02-12 RX ADMIN — ANTISEPTIC SURGICAL SCRUB 1 APPLICATION(S): 0.04 SOLUTION TOPICAL at 05:37

## 2025-02-12 RX ADMIN — LOPERAMIDE HYDROCHLORIDE 2 MILLIGRAM(S): 2 CAPSULE ORAL at 13:29

## 2025-02-12 RX ADMIN — CEFEPIME 2000 MILLIGRAM(S): 2 INJECTION, POWDER, FOR SOLUTION INTRAVENOUS at 22:59

## 2025-02-12 RX ADMIN — MIDODRINE HYDROCHLORIDE 5 MILLIGRAM(S): 5 TABLET ORAL at 13:35

## 2025-02-12 RX ADMIN — SUCRALFATE 1 GRAM(S): 1 SUSPENSION ORAL at 05:38

## 2025-02-12 RX ADMIN — LOPERAMIDE HCL 2 MILLIGRAM(S): 1 SOLUTION ORAL at 22:50

## 2025-02-12 RX ADMIN — OCTREOTIDE ACETATE 100 MICROGRAM(S): 1000 INJECTION INTRAVENOUS; SUBCUTANEOUS at 14:58

## 2025-02-12 NOTE — PROVIDER CONTACT NOTE (CRITICAL VALUE NOTIFICATION) - NAME OF MD/NP/PA/DO NOTIFIED:
CCPA Aries Bennett   [facility]
IRINA Geiger
CCPA Adan
Dr Griffin resident
CATARINA Liang
Adan AREVALO
Adan AREVALO

## 2025-02-12 NOTE — PROGRESS NOTE ADULT - ASSESSMENT
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 Cardiology consulted for episode of SVT    - Pt s/p episode of SVT (2/9) that broke with Valsalva and carotid massage, no medical intervention required  - Patient with fever to 101.7 around this time, likely inciting factor for SVT episode  - EKG with frequent PACS, likely AV lilian dependent given response to carotid massage  - Continue to monitor on telemetry  - Continue with management of sepsis as per primary team (ICU)    - Patient without symptoms of angina or heart failure at this time.  - No clear evidence of acute ischemia, trops negative on admission (41.5)  - Monitor closely for the development of anginal symptoms or clinical signs of ischemia.     - No meaningful evidence of volume overload.  - TTE (2/9/25): EF: 73%, mild LVH, trace MR, trace pericardial effusion    - Remains off of Levo and on midodrine now downgraded from ICU   - Continue to monitor routine hemodynamics    Anemia per primary     - Monitor and replete lytes, keep K>4, Mg>2.    - Other cardiovascular workup will depend on clinical course.  - All other workup per primary team (ICU).  - Will continue to follow. 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 Cardiology consulted for episode of SVT    - Pt s/p episode of SVT (2/9) that broke with Valsalva and carotid massage, no medical intervention required  - Patient with fever to 101.7 around this time, likely inciting factor for SVT episode  - EKG with frequent PACS, likely AV lilian dependent given response to carotid massage  - Continue to monitor on telemetry, currently SR   - Continue with management of sepsis as per primary team     - Patient without symptoms of angina or heart failure at this time.  - No clear evidence of acute ischemia, trops negative on admission (41.5)  - Monitor closely for the development of anginal symptoms or clinical signs of ischemia.     - No meaningful evidence of volume overload remains on room air   - TTE (2/9/25): EF: 73%, mild LVH, trace MR, trace pericardial effusion    - Remains off of Levo and on midodrine now downgraded from ICU   - Continue to monitor routine hemodynamics    Anemia per primary sp PRBC     - Monitor and replete lytes, keep K>4, Mg>2.    - Other cardiovascular workup will depend on clinical course.  - All other workup per primary team (ICU).  - Will continue to follow.

## 2025-02-12 NOTE — PROGRESS NOTE ADULT - PROBLEM SELECTOR PLAN 6
Chronic on Norvasc  - hold home norvasc  - off levophed now on oral midodrine   - plan per ICU
Chronic on Norvasc  - hold home norvasc  - off levophed now on oral midodrine
Chronic on Norvasc  - hold home norvasc  - currently on levophed for hypotension  - plan per ICU
Chronic on Norvasc  - hold home norvasc  - off levophed now on oral midodrine   - plan per ICU

## 2025-02-12 NOTE — PROGRESS NOTE ADULT - PROBLEM SELECTOR PROBLEM 2
Primary pancreatic adenocarcinoma

## 2025-02-12 NOTE — PROGRESS NOTE ADULT - PROBLEM SELECTOR PLAN 9
Patient with CVA hx (per  patient was originally on plavix but because is now taking [facility] 6236, was told to start Aggrenox instead to avoid DDIs)  - overnight off aggrenox as per  request Patient with CVA hx (per  patient was originally on plavix but because is now taking [facility] 6236, was told to start Aggrenox instead to avoid DDIs)  - overnight off aggrenox as per  request will stop as kateryna low

## 2025-02-12 NOTE — CHART NOTE - NSCHARTNOTEFT_GEN_A_CORE
Called by RN to get consent for transfusion    Risks, benefits, and alternatives of blood transfusion provided including:    Risks:  •	Infections including: HIV, Hepatitis C and Hepatitis B, and West Nile. Risks are about 1/800,000 of a percent  •	Bacterial Infections  •	Transfusion Associated Circulation Overload   •	Transfusion Associated Lung Injury due to foreign antibodies attacking the patients lung causing possibility of long term intubation.  •	Fever, seizures, and worsening anemia and jaundice including anaphylaxis and death.    Benefits:  •	Improved blood supply to the major organs of the body including:, brain, heart, kidney and lungs.  •	Risk of not receiving blood including:  worsening fatigue, shortness of breath, stroke, cardiac arrest  •	Also explained that each blood product is triple screened to avoid such risks.    After providing the above, patient has consented to a blood transfusion. Form signed, witnessed by RN and placed in chart.

## 2025-02-12 NOTE — PROGRESS NOTE ADULT - SUBJECTIVE AND OBJECTIVE BOX
[INTERVAL HX: ]  Patient seen and examined;  Chart reviewed and events noted;     no CP, no SOB  no HA  has face rash    s/p SDP yest night  d/w  overnight          [MEDICATIONS]  MEDICATIONS  (STANDING):  cefepime   IVPB 2000 milliGRAM(s) IV Intermittent every 12 hours  chlorhexidine 2% Cloths 1 Application(s) Topical <User Schedule>  filgrastim-sndz (ZARXIO) Injectable 480 MICROGram(s) SubCutaneous daily  lactated ringers. 1000 milliLiter(s) (150 mL/Hr) IV Continuous <Continuous>  midodrine 5 milliGRAM(s) Oral every 8 hours  octreotide  Injectable 100 MICROGram(s) SubCutaneous three times a day  sucralfate suspension 1 Gram(s) Oral four times a day    MEDICATIONS  (PRN):  acetaminophen   Oral Liquid .. 650 milliGRAM(s) Oral every 6 hours PRN Temp greater or equal to 38C (100.4F)  trimethobenzamide Injectable 200 milliGRAM(s) IntraMuscular every 8 hours PRN Nausea and/or Vomiting      [VITALS]  Vital Signs Last 24 Hrs  T(C): 36.7 (2025 12:05), Max: 36.9 (2025 00:43)  T(F): 98.1 (2025 12:05), Max: 98.5 (2025 00:43)  HR: 92 (2025 12:05) (92 - 96)  BP: 124/77 (2025 12:05) (108/68 - 124/77)  BP(mean): --  RR: 16 (2025 12:05) (16 - 18)  SpO2: 91% (2025 12:05) (91% - 95%)    Parameters below as of 2025 12:05  Patient On (Oxygen Delivery Method): room air      [WT/HT]  Daily     Daily Weight in k.3 (2025 05:33)  [VENT]      [PHYSICAL EXAM]  GEN: NAD  HEENT: normocephalic and atraumatic. EOMI. PERRL.    NECK: Supple.  No lymphadenopathy   LUNGS: Clear to auscultation.  HEART: Regular rate and rhythm,  no MRG  ABDOMEN: Soft, nontender, and nondistended.  Positive bowel sounds.    : No CVA tenderness  EXTREMITIES: Without edema.  NEUROLOGIC: grossly intact.  PSYCHIATRIC: Appropriate affect .  SKIN: No rash     [LABS:]                        7.8    0.45  )-----------( 59       ( 2025 09:15 )             23.6     02-12    140  |  112[H]  |  22  ----------------------------<  137[H]  3.5   |  19[L]  |  0.99    Ca    8.9      2025 09:15  Phos  2.4       Mg     1.9         TPro  4.6[L]  /  Alb  1.2[L]  /  TBili  0.7  /  DBili  x   /  AST  8[L]  /  ALT  17  /  AlkPhos  78  12            Urinalysis Basic - ( 2025 09:15 )    Color: x / Appearance: x / SG: x / pH: x  Gluc: 137 mg/dL / Ketone: x  / Bili: x / Urobili: x   Blood: x / Protein: x / Nitrite: x   Leuk Esterase: x / RBC: x / WBC x   Sq Epi: x / Non Sq Epi: x / Bacteria: x        Culture - Blood (collected 2025 09:00)  Source: .Blood BLOOD  Preliminary Report (2025 13:01):    No growth at 24 hours    Culture - Blood (collected 2025 08:50)  Source: .Blood BLOOD  Preliminary Report (:):    No growth at 24 hours            Culture - Blood (collected 2025 09:00)  Source: .Blood BLOOD  Preliminary Report (2025 13:):    No growth at 24 hours    Culture - Blood (collected 2025 08:50)  Source: .Blood BLOOD  Preliminary Report (2025 13:):    No growth at 24 hours        [RADIOLOGY STUDIES:]

## 2025-02-12 NOTE — H&P ADULT - HISTORY OF PRESENT ILLNESS
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  History of Present Illness:    HPI: Patient is a 69-year-old female recent diagnosis of primary pancreatic adenocarcinoma of head with tumor in umbilicus, chemo-induced pancytopenia and significant PMH of SCC lip, cyclical vomiting syndrome, HTN, HPL, Gout, GERD, CVA on Aggrenox and others had been admitted to [facility] with septic shock on 02/09/2025, nausea, vomiting and diarrhea requiring Levophed drip, was transferred out of ICU to regular floor last night.  Patient was being followed by GI, ID, Cardiology and Onc there.  Patient has been transferred to  today on patient's  (GI Dr. Pleitez) request.  At this time, patient c/o non-bloody diarrhea x 3 in last 24 hours.  She denies any abdominal pain, nausea, but has some dry heaves.  She denies any fever, chills, chest pain, palpitation, constipation or dysuria. Initially, she was started on Vancomycin and Zosyn, but now she has been on Cefepime as her blood culture and urine cultures are positive for Pseudomonas aeruginosa. Currently, patient is on Cefepime.  ID had also recommended Flagyl, but patient had refused it, as it causes nausea to her.    Sig labs: WBC 0.45k, N 82%, Hb 7.8, Platelets 59k, Bicarb 19, AG 9, , Cr 0.99, TP 4.6, Alb 1.2, LFTs wnl.  Mg 1.9, PO4 2.4.  Prior C.diff negative.

## 2025-02-12 NOTE — CONSULT NOTE ADULT - SUBJECTIVE AND OBJECTIVE BOX
[facility]    Basil Farfan NP    [address]   [place], [place] [id number] [phone]      Chief Complaint:  Patient is a 69y old  Female who presents with a chief complaint of Sepsis     HPI:  68 y/o F w/ pmh of SCC of the lip, CVA on DAPT, recently found to have primary pancreatic adenocarcinoma w/ tumor in umbilicus operated at , was started on chemo appx 4 weeks ago at [facility], last dose appx 4 days, now presented to [facility] for ongoing n/v and diarrhea since chemo was started.  Per ED note - last chemo was 4 days ago brought in by EMS for generalized weakness low blood pressure nausea vomiting and diarrhea.   states patient goes for IV hydration every couple days last hydration was 2 days ago.  Patient has been receiving around-the-clock nausea meds Zofran and compazine without relief. Patient also receiving compazine, Imodium without any relief. Today pt became weak and hypotensive and was brought to the hospital. In the ED pt was found to be hypotensive and in neutropenic sepsis. Pt was given appx 3.5L of IVF w/out any improvement in BP and was started on levo. ICU consult was requested for neutropenic sepsis w/ unclear etiology at this time.     Interval HPI:  Patient seen and examined at bedside  Discussed and confirmed history above  She states that diarrhea started immediately after initiating chemotherapy about 3 weeks ago. She reports having multiple loose stools daily. She was recommended imodium by oncologist @ [facility], was taking 6-8 pills/daily, however, it did not help. Today, she had one large yellow/light brown liquid/mucous stool. During admission received Lomotil 2 tabs Q.I.D. x2 days 2/9-2/11, now on octreotide 100mcg subQ TID. Endorses lack of appetite but tolerating food. Denies fever, chills, hematochezia or abdominal pain. No recent travel or sick contacts. Had colonoscopies in the past which were all reportedly normal.       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      GERD (gastroesophageal reflux disease)      Cyclical vomiting syndrome      CVA (cerebrovascular accident)      H/O squamous cell carcinoma excision          REVIEW OF SYSTEMS:   General: Negative  HEENT: Negative  CV: Negative  Respiratory: Negative  GI: See HPI  : Negative  MSK: Negative  Hematologic: Negative  Skin: Negative    MEDICATIONS:   MEDICATIONS  (STANDING):  cefepime   IVPB 2000 milliGRAM(s) IV Intermittent every 12 hours  chlorhexidine 2% Cloths 1 Application(s) Topical <User Schedule>  filgrastim-sndz (ZARXIO) Injectable 480 MICROGram(s) SubCutaneous daily  lactated ringers. 1000 milliLiter(s) (150 mL/Hr) IV Continuous <Continuous>  midodrine 5 milliGRAM(s) Oral every 8 hours  octreotide  Injectable 100 MICROGram(s) SubCutaneous three times a day  sucralfate suspension 1 Gram(s) Oral four times a day    MEDICATIONS  (PRN):  acetaminophen   Oral Liquid .. 650 milliGRAM(s) Oral every 6 hours PRN Temp greater or equal to 38C (100.4F)  trimethobenzamide Injectable 200 milliGRAM(s) IntraMuscular every 8 hours PRN Nausea and/or Vomiting          DIET:  Diet, DASH/TLC:   Sodium & Cholesterol Restricted  Minced and Moist (MINCEDMOIST) (02-11-25 @ 21:32) [Active]          ALLERGIES:   Allergies    No Known Allergies    Intolerances        VITAL SIGNS:   Vital Signs Last 24 Hrs  T(C): 36.7 (12 Feb 2025 12:05), Max: 36.9 (12 Feb 2025 00:43)  T(F): 98.1 (12 Feb 2025 12:05), Max: 98.5 (12 Feb 2025 00:43)  HR: 92 (12 Feb 2025 12:05) (82 - 96)  BP: 124/77 (12 Feb 2025 12:05) (108/68 - 124/77)  BP(mean): 103 (11 Feb 2025 14:00) (74 - 103)  RR: 16 (12 Feb 2025 12:05) (13 - 22)  SpO2: 91% (12 Feb 2025 12:05) (91% - 100%)    Parameters below as of 12 Feb 2025 12:05  Patient On (Oxygen Delivery Method): room air      I&O's Summary    11 Feb 2025 07:01  -  12 Feb 2025 07:00  --------------------------------------------------------  IN: 1900 mL / OUT: 1265 mL / NET: 635 mL    12 Feb 2025 07:01  -  12 Feb 2025 12:45  --------------------------------------------------------  IN: 320 mL / OUT: 0 mL / NET: 320 mL        PHYSICAL EXAM:   GENERAL:  No acute distress  HEENT:  NC/AT  CHEST:  No increased effort  HEART:  Regular rate  ABDOMEN:  Soft, non-tender, non-distended  EXTREMITIES: No cyanosis  SKIN:  Warm, dry  NEURO:  Calm, cooperative    LABS:                        7.8    0.45  )-----------( 59       ( 12 Feb 2025 09:15 )             23.6     Hemoglobin: 7.8 g/dL (02-12-25 @ 09:15)  Hemoglobin: 8.9 g/dL (02-11-25 @ 08:45)  Hemoglobin: 9.6 g/dL (02-10-25 @ 08:46)    02-12    140  |  112[H]  |  22  ----------------------------<  137[H]  3.5   |  19[L]  |  0.99    Ca    8.9      12 Feb 2025 09:15  Phos  2.4     02-12  Mg     1.9     02-12    TPro  4.6[L]  /  Alb  1.2[L]  /  TBili  0.7  /  DBili  x   /  AST  8[L]  /  ALT  17  /  AlkPhos  78  02-12    LIVER FUNCTIONS - ( 12 Feb 2025 09:15 )  Alb: 1.2 g/dL / Pro: 4.6 g/dL / ALK PHOS: 78 U/L / ALT: 17 U/L / AST: 8 U/L / GGT: x                                                   RADIOLOGY & ADDITIONAL STUDIES:      -- -- --   ACC: [id number] EXAM:  US ABDOMEN RT UPR QUADRANT   ORDERED BY:  BEENA CHRISTOPHER     PROCEDURE DATE:  02/09/2025          INTERPRETATION:  CLINICAL INFORMATION: 2/9/2025 CT.    COMPARISON: Right upper quadrant pain. Enlarged gallbladder.    TECHNIQUE: Sonography of the right upper quadrant.    FINDINGS:  Liver: Hepatomegaly measuring up to 18.3 cm.  Bile ducts: Normal caliber. Common bile duct measures 3 mm.  Gallbladder: Distended gallbladder measuring up to 12.4 cm without   shadowingcalculus. Gallbladder sludge noted. No gallbladder wall   thickening.  Pancreas: Visualized portions are within normal limits.  Right kidney: 9.5 cm. No hydronephrosis. 3.9 x 3.0 x 3.2 cm upper pole   cyst. 8 x 4 x 8 mm midpole shadowing calculus.  Ascites: None.  IVC: Visualized portions are within normal limits.    IMPRESSION:  Distended gallbladder with layering sludge.  8 mm right renal calculus without hydronephrosis.  Hepatomegaly.  Right renal cyst.        --- End of Report ---            DELISA KEMP MD; Attending Radiologist  This document has been electronically signed. Feb 10 2025  7:57AM

## 2025-02-12 NOTE — CONSULT NOTE ADULT - ASSESSMENT
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 Admitted for neutropenic sepsis. Since initiating chemotherapy 3 weeks ago, patient with multiple loose stools daily despite anti-diarrheals.     #Diarrhea  #Nausea and vomiting  #Neutropenic sepsis    CT AP (2/9): prominent soft tissue inseparable from the body of the pancreas consistent with pancreatic neoplasm. Some distension of gallbladder. No definite biliary dilatation.    RUQ US (2/9): Hepatomegaly. Distended gallbladder with layering sludge. CBD 3mm.     Recommendations:  - CT AP & RUQ US noted & d/w patient   - GI PCR & C. Diff negative   - LFTs WNL  - Pain management  - Tigan IM PRN for nausea, is not responding to Zofran   - Diet as tolerated, would add Banatrol flakes  - UCx and BCx +pseudomonas aeruginosa   - Abx as per ID   - Continue octreotide 100mcg subQ TID for now, will discuss with attending regarding adding other anti-diarrheal agents   - To follow     I reviewed the overnight course of events on the unit, re-confirming the patient history. I discussed the care with the patient  Differential diagnosis and plan of care discussed with patient after the evaluation  35 minutes spent on total encounter of which more than fifty percent of the encounter was spent counseling and/or coordinating care by the attending physician. 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 Admitted for neutropenic sepsis. Since initiating chemotherapy 3 weeks ago, patient with multiple loose stools daily despite anti-diarrheals.     #Diarrhea  #Nausea and vomiting  #Neutropenic sepsis    CT AP (2/9): prominent soft tissue inseparable from the body of the pancreas consistent with pancreatic neoplasm. Some distension of gallbladder. No definite biliary dilatation.    RUQ US (2/9): Hepatomegaly. Distended gallbladder with layering sludge. CBD 3mm.     Recommendations:  - CT AP & RUQ US noted & d/w patient   - GI PCR & C. Diff negative   - LFTs WNL and denies abdominal pain currently  - Pain management  - Tigan IM PRN for nausea, is not responding to Zofran   - Diet as tolerated, would add Banatrol flakes  - UCx and BCx +pseudomonas aeruginosa   - Abx as per ID   - Continue octreotide 100mcg subQ TID for now, will discuss with attending regarding adding other anti-diarrheal agents   - To follow     I reviewed the overnight course of events on the unit, re-confirming the patient history. I discussed the care with the patient  Differential diagnosis and plan of care discussed with patient after the evaluation  35 minutes spent on total encounter of which more than fifty percent of the encounter was spent counseling and/or coordinating care by the attending physician.

## 2025-02-12 NOTE — PROGRESS NOTE ADULT - PROBLEM SELECTOR PLAN 11
DVT ppx: scds given pancytopenia
DVT ppx: scds given pancytopenia   - plan per ICU

## 2025-02-12 NOTE — CONSULT NOTE ADULT - NSCONSULTADDITIONALINFOA_GEN_ALL_CORE
r/o neutropenic enterocolitis  cont cefepime  add iv flagyl  repeat CT with IV contrast discussed with , would like to defer given resolving MALLORY and improving wbc ct  pending transfer to

## 2025-02-12 NOTE — PROGRESS NOTE ADULT - NS ATTEND AMEND GEN_ALL_CORE FT
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 Cardiology consulted for episode of SVT    - Pt s/p episode of SVT (2/9) that broke with Valsalva and carotid massage, no medical intervention required  - Patient with fever to 101.7 around this time, likely inciting factor for SVT episode  - Continue to monitor on telemetry, currently SR   - Remains off of Levo and on midodrine now downgraded from ICU   - Anemia per primary sp PRBC

## 2025-02-12 NOTE — DISCHARGE NOTE NURSING/CASE MANAGEMENT/SOCIAL WORK - FINANCIAL ASSISTANCE
[facility] provides services at a reduced cost to those who are determined to be eligible through [facility]’s financial assistance program. Information regarding [facility]’s financial assistance program can be found by going to [email or web address] or by calling [phone].

## 2025-02-12 NOTE — H&P ADULT - ASSESSMENT
HPI: Patient is a 69-year-old female recent diagnosis of primary pancreatic adenocarcinoma of head with tumor in umbilicus, chemo-induced pancytopenia and significant PMH of SCC lip, cyclical vomiting syndrome, HTN, HPL, Gout, GERD, CVA on Aggrenox and others had been admitted to [facility] with septic shock on 02/09/2025, nausea, vomiting and diarrhea requiring Levophed drip, was transferred out of ICU to regular floor last night.  Patient was being followed by GI, ID, Cardiology and Onc there.  Patient has been transferred to  today on patient's  (GI Dr. Pleitez) request.  At this time, patient c/o non-bloody diarrhea x 3 in last 24 hours.  She denies any abdominal pain, nausea, but has some dry heaves.  She denies any fever, chills, chest pain, palpitation, constipation or dysuria. Initially, she was started on Vancomycin and Zosyn, but now she has been on Cefepime as her blood culture and urine cultures are positive for Pseudomonas aeruginosa. Currently, patient is on Cefepime.  ID had also recommended Flagyl, but patient had refused it, as it causes nausea to her.    # Pseudomonas bacteremia and UTI.  -Blood culture and urine cultures are positive for Pseudomonas aeruginosa. Repeat blood culture (02/11), negative so far.  -Continue Cefepime for now.  Patient had refused Flagyl, and refusing now also.  -Tylenol prn.  -ID consult placed.    # Nausea, vomiting and diarrhea.  # Metabolic acidosis with NAG due to GI loss.  -Likely chemo-induced diarrhea and vomiting. Better than before.  -Prior C. diff neg, GI PCR neg.  Bicarb 19 and AG 9  -C. diff PCR re-ordered per her .  -Continue Zofran.  -Octreotide increased from 100 mcg to 150 mcg SC q8h per her  Dr Pleitez's request.  -Imodium 2 mg q6h for 2 days and hold for constipation.  -GI consult placed.    # Cutaneous lesion on RUQ area below right breast, unclear etiology, ? Ecthyma gangrenosum.  -If worse/no improvement, then consider evaluation for biopsy per ID.    # Primary pancreatic adenocarcinoma with ?mets to umbilicus.  # Chemo-induced pancytopenia.  -Last chemo about 10 days ago, and on daily experimental RMC-6236 daily: off these medications for now.  -WBC 0.45k, N 82%, Hb 7.8, Platelets 59k,  -C/w ZARXIO 480 mcg SC daily.  -Transfuse PRBC if Hb < 7 gm% and Platelet if Platelet <10k.  -CT chest/abd shows small pericardial effusion, bibasilar atelectasis, no consolidations noted. Small distention of gallbladder, and 2 kidney lesions (right kidney 4cm, and left kidney 6cm)  -Hem/Onc consult placed.     # B/L renal lesions 4 cm right kidney and 6 cm left kidney.  -Incidental finding on CT abd/pelvis.  -Consult urology in am or it can be done as outpatient.    # MALLORY due to dehydration and sepsis.  -Resolved. BUN/Cr 32/1.5 on admission, and now Cr 0.99.  -C/w IV fluid LR at 125 cc/hr.    # HTN and HPL.  # SVT on 02/09/2025: resolved after Valsalva and Carotid massage.  -Norvasc and Lipitor on hold to septic shock.  -BP stable.  137/71.  -Off levophed; now on oral midodrine.  -Cardiology consult placed with Dr. Clement (per ).    # Gout.  -Was previously on allopurinol but currently off her Allopurinol.    # H/o CVA (cerebrovascular accident).   -Was originally on Plavix per  but because is now taking RMC 6236, was told to start Aggrenox instead to avoid DDIs)    # GERD.  -On Sucralfate.    # DVT prophylaxis: SCDs due to severe thrombocytopenia.    Plan of care d/w the patient at bedside and  Dr. Pleitez over phone in detail, and they verbalized understanding.

## 2025-02-12 NOTE — H&P ADULT - NSHPLABSRESULTS_GEN_ALL_CORE
LABS:                        7.8    0.45  )-----------( 59       ( 12 Feb 2025 09:15 )             23.6     02-12    140  |  112[H]  |  22  ----------------------------<  137[H]  3.5   |  19[L]  |  0.99    Ca    8.9      12 Feb 2025 09:15  Phos  2.4     02-12  Mg     1.9     02-12    TPro  4.6[L]  /  Alb  1.2[L]  /  TBili  0.7  /  DBili  x   /  AST  8[L]  /  ALT  17  /  AlkPhos  78  02-12        < from: US Abdomen Upper Quadrant Right (02.09.25 @ 14:55) >      IMPRESSION:  Distended gallbladder with layering sludge.  8 mm right renal calculus without hydronephrosis.  Hepatomegaly.  Right renal cyst.        --- End of Report ---    < end of copied text >          < from: CT Chest No Cont (02.09.25 @ 03:02) >      IMPRESSION:  1.   Study somewhat limited due to absence of contrast.  2.   Central venous catheter on right side with tip in cavoatrial   junction.  3.   Bibasilar atelectasis/scarring right-greater-than-left; can not   exclude  trace pleural effusions. No areas of consolidation. No pneumothorax.  4.   Lucency/deformity is noted involving the posterior aspect of the   left 10 ,  possibly 9th ribs; minimal fractures of indeterminate age in these areas   can  not be excluded.    < end of copied text >        < from: CT Abdomen and Pelvis No Cont (02.09.25 @ 03:01) >        IMPRESSION:  1.   Study somewhat limited due to absence of contrast.  2.   Central venous catheter on right side with tip in cavoatrial   junction.  3.   Bibasilar atelectasis/scarring right-greater-than-left; can not   exclude  trace pleural effusions. No areas of consolidation. No pneumothorax.  4.   Lucency/deformity is noted involving the posterior aspect of the   left 10 ,  possibly 9th ribs; minimal fractures of indeterminate age in these areas   can  not be excluded.      < end of copied text >

## 2025-02-12 NOTE — PROVIDER CONTACT NOTE (CRITICAL VALUE NOTIFICATION) - TEST AND RESULT REPORTED:
blood c/s x 2 growth in anaerobic bottle gram negative  rods
lactate 2.8
wbc- 0.27
Lactate 2.8 and WBC 0.19
WBC 0.29
WBC 0.45
WBC 0.19

## 2025-02-12 NOTE — PROGRESS NOTE ADULT - PROBLEM SELECTOR PLAN 8
Chronic - was previously on allopurinol but currently off medications.  - plan per ICU
Chronic - was previously on allopurinol but currently off medications.  - plan per ICU
Chronic - was previously on allopurinol but currently off medications.
Chronic - was previously on allopurinol but currently off medications.  - plan per ICU

## 2025-02-12 NOTE — PROGRESS NOTE ADULT - PROVIDER SPECIALTY LIST ADULT
Critical Care
Heme/Onc
Infectious Disease
Cardiology
Critical Care
Heme/Onc
Cardiology
Critical Care
Hospitalist
Infectious Disease
Critical Care
Heme/Onc
Infectious Disease
Hospitalist

## 2025-02-12 NOTE — PROGRESS NOTE ADULT - PROBLEM SELECTOR PLAN 3
WBC 0.18 H/H 8.9/28.4 PLT 64 on admission  - likely 2/2 to sepsis and chemotherapy   - heme/onc consult  - plan per ICU
WBC 0.18 H/H 8.9/28.4 PLT 64 on admission  - likely 2/2 to sepsis and chemotherapy   - overnight s/p 1U plts   - heme/onc consult
WBC 0.18 H/H 8.9/28.4 PLT 64 on admission  - likely 2/2 to sepsis and chemotherapy   - heme/onc consult  - plan per ICU
WBC 0.18 H/H 8.9/28.4 PLT 64 on admission  - likely 2/2 to sepsis and chemotherapy   - heme/onc consult  - plan per ICU

## 2025-02-12 NOTE — PROGRESS NOTE ADULT - PROBLEM SELECTOR PLAN 4
BUN/Cr 32/1.5 on admission - per HIE Cr 1.2-2.6 during 12/2024 period.  - monitor AM CMP  - s/p IVF  - Avoid nephrotoxic medications  - plan per ICU
BUN/Cr 32/1.5 on admission - per HIE Cr 1.2-2.6 during 12/2024 period.  - monitor AM CMP  - s/p IVF  - Avoid nephrotoxic medications  - plan per ICU
BUN/Cr 32/1.5 on admission - per HIE Cr 1.2-2.6 during 12/2024 period.  - Improving Cr, baseline Cr 1.1   - Bladder US (2/10) with 1300cc retained urine --> coelho in place   - on IVF and encourage PO intake   - Replete lytes PRN   - Avoid nephrotoxic medications
BUN/Cr 32/1.5 on admission - per HIE Cr 1.2-2.6 during 12/2024 period.  - Improving Cr, baseline Cr 1.1   - Bladder US (2/10) with 1300cc retained urine --> coelho in place   - on IVF and encourage PO intake   - Replete lytes PRN   - Avoid nephrotoxic medications  - plan per ICU

## 2025-02-12 NOTE — PROGRESS NOTE ADULT - PROBLEM SELECTOR PLAN 2
Pt with primary pancreatic adenocarcinoma with IV chemotherapy every 2 weeks and on daily experimental RMC-6236 daily.  - Dr. Mercy Jacobs/Onc consult - started on zarxio to increase neutrophil count   - CT chest/abd shows small pericardial effusion, bibasilar atelectasis, no consolidations noted. Small distention of gallbladder, and 2 kidney lesions (right kidney 4cm, and left kidney 6cm) ; family refused IV contrast
Pt with primary pancreatic adenocarcinoma with IV chemotherapy every 2 weeks and on daily experimental RMC-6236 daily.  - Dr. Mercy Jacobs/Onc consult - started on zarxio increase neutrophil count   - CT chest/abd shows small pericardial effusion, bibasilar atelectasis, no consolidations noted. Small distention of gallbladder, and 2 kidney lesions (right kidney 4cm, and left kidney 6cm) ; family refused IV contrast   - Plan per ICU
Pt with primary pancreatic adenocarcinoma with IV chemotherapy every 2 weeks and on daily experimental RMC-6236 daily.  - Dr. Mercy Jacobs/Onc consult - started on zarxio to increase neutrophil count   - CT chest/abd shows small pericardial effusion, bibasilar atelectasis, no consolidations noted. Small distention of gallbladder, and 2 kidney lesions (right kidney 4cm, and left kidney 6cm) ; family refused IV contrast   - Plan per ICU
Pt with primary pancreatic adenocarcinoma with IV chemotherapy every 2 weeks and on daily experimental RMC-6236 daily.  - Dr. Madrid Heme/Onc consult   - CT chest/abd shows small pericardial effusion, bibasilar atelectasis, no consolidations noted. Small distention of gallbladder, and 2 kidney lesions (right kidney 4cm, and left kidney 6cm) ; family refused IV contrast   - Plan per ICU

## 2025-02-12 NOTE — PROGRESS NOTE ADULT - TIME-BASED
C/o new onset fever of 104 0 tympanic, at 0240, with no additional symptoms  Directed to administer Tylenol per recommended dose  Mother reports patient spitting up come of the tylenol along with milk  Informed of not re-administering tylenol  Care advice given r/t fever  Mother reports  is COVID positive  No changes in appetite, urination/BM  Baseline behavior, alert and reactive  >6 wet diapers/day  Informed to call back if worsening symptoms  Verbalized understanding and agreeable with disposition  No further questions  45

## 2025-02-12 NOTE — PROGRESS NOTE ADULT - PROBLEM SELECTOR PLAN 1
Patient meets sepsis criteria on admission: WBC 0.18, , T >100.4 oral on admission. Neutropenic sepsis shock. likely from UTI   - Hypotensive to SBP 70s, 80s in the ED  - CXR:no acute pulmonary infiltrates on wet read, f/u official read  - Given 3550cc NS bolus in the ED, vanc, zosyn, ofirmev for fever.  - s/p levophed for hypotension. continues to remain off levo   - on midodrine    - on IV cefepime - D/c Vanco as MRSA PCR negative   - procal 5.99 Lactate 3.6 in the ED, repeat 2.8   - Tylenol PRN for fever.  - UCx and BCx pos for pseudomonas   - Cdif neg, GI PCR neg  -  ID following Patient meets sepsis criteria on admission: WBC 0.18, , T >100.4 oral on admission. Neutropenic sepsis shock. likely from UTI  / pseudomonas bacteremia   - Hypotensive to SBP 70s, 80s in the ED  - CXR:no acute pulmonary infiltrates on wet read, f/u official read  - Given 3550cc NS bolus in the ED, vanc, zosyn, ofirmev for fever.  - s/p levophed for hypotension. continues to remain off levo   - on midodrine    - on IV cefepime - D/c Vanco as MRSA PCR negative   - procal 5.99 Lactate 3.6 in the ED, repeat 2.8   - Tylenol PRN for fever.  - UCx and BCx pos for pseudomonas   - Cdif neg, GI PCR neg  -  ID following cho

## 2025-02-12 NOTE — PROGRESS NOTE ADULT - PROBLEM SELECTOR PROBLEM 5
Cyclical vomiting syndrome

## 2025-02-12 NOTE — PROVIDER CONTACT NOTE (CRITICAL VALUE NOTIFICATION) - ACTION/TREATMENT ORDERED:
No intervention at this time
continue plan of care
provider notified
no new orders
continue plan of care

## 2025-02-12 NOTE — PROGRESS NOTE ADULT - PROBLEM SELECTOR PLAN 5
Hx of cyclical vomiting syndrome on amitriptyline   - s/p zofran in the ED  - continue zofran PRN  - plan per ICU
Hx of cyclical vomiting syndrome on amitriptyline   - s/p zofran in the ED  - continue zofran PRN

## 2025-02-12 NOTE — H&P ADULT - NSHPPHYSICALEXAM_GEN_ALL_CORE
PHYSICAL EXAM:  GENERAL: NAD, lying in bed comfortably  HEAD:  Atraumatic, Normocephalic  EYES: EOMI, PERRLA, conjunctiva and sclera clear  ENT: Moist mucous membranes  NECK: Supple, No JVD  CHEST/LUNG: Clear to auscultation bilaterally; No rales, rhonchi, wheezing, or rubs. Unlabored respirations  HEART: Regular rate and rhythm; No murmurs, rubs, or gallops  ABDOMEN: Bowel sounds present; Soft, Nontender, Nondistended. No hepatomegaly  EXTREMITIES:  2+ Peripheral Pulses, brisk capillary refill. No clubbing, cyanosis.  LE edema present.  NERVOUS SYSTEM:  Alert & Oriented X3, speech clear. No deficits   MSK: FROM all 4 extremities, full and equal strength  SKIN: No rashes or lesions

## 2025-02-12 NOTE — CASE MANAGEMENT PROGRESS NOTE - NSCMPROGRESSNOTE_GEN_ALL_CORE
Discussed pt on rounds, pt remains acute,  iv Cefepime, sp 1 u platelets, coelho. CM will continue to collaborate with interdisciplinary team and remain available to assist.

## 2025-02-12 NOTE — PROGRESS NOTE ADULT - NUTRITIONAL ASSESSMENT
This patient has been assessed with a concern for Malnutrition and has been determined to have a diagnosis/diagnoses of Severe protein-calorie malnutrition.    This patient is being managed with:   Diet DASH/TLC-  Sodium & Cholesterol Restricted  Minced and Moist (YONMOIST)  Entered: Feb 11 2025  9:32PM

## 2025-02-12 NOTE — PROGRESS NOTE ADULT - PROBLEM SELECTOR PLAN 10
Chronic on pepcid  -on Clear liquid diet   -on Octreotide 100mg TID x3 da for persistent diarrhea  - Plan per ICU
Chronic on pepcid  -on moist minced diet as per  request   -on Octreotide 100mg TID x3 da for persistent diarrhea
Chronic on pepcid  - Plan per ICU
Chronic on pepcid  - Plan per ICU

## 2025-02-12 NOTE — PROGRESS NOTE ADULT - SUBJECTIVE AND OBJECTIVE BOX
[facility]  INFECTIOUS DISEASES - Jaime Smith, [address], [place] [address]  Tel: [phone]     Fax: [phone]  =======================================================    ASHLEE CHAHAL [id number]    Follow up: No fevers.  Dr. Chahal at bedside, says patient with increased diarrhea last night. Has some abdominal discomfort when she has BM, but denies any pain elsewhere.    Allergies:  No Known Allergies      Antibiotics:  acetaminophen   Oral Liquid .. 650 milliGRAM(s) Oral every 6 hours PRN  cefepime   IVPB 2000 milliGRAM(s) IV Intermittent every 12 hours  chlorhexidine 2% Cloths 1 Application(s) Topical <User Schedule>  filgrastim-sndz (ZARXIO) Injectable 480 MICROGram(s) SubCutaneous daily  lactated ringers. 1000 milliLiter(s) IV Continuous <Continuous>  midodrine 5 milliGRAM(s) Oral every 8 hours  octreotide  Injectable 100 MICROGram(s) SubCutaneous three times a day  sucralfate suspension 1 Gram(s) Oral four times a day  trimethobenzamide Injectable 200 milliGRAM(s) IntraMuscular every 8 hours PRN       REVIEW OF SYSTEMS:  CONSTITUTIONAL: No fever  HEENT:  No sore throat or runny nose.  CARDIOVASCULAR:  No chest pain or SOB.  RESPIRATORY:  No cough, shortness of breath  GASTROINTESTINAL:  see history  GENITOURINARY:  + urinary retention  MUSCULOSKELETAL:  no joint aches, no muscle pain  NEUROLOGIC:  No headache or dizziness  PSYCHIATRIC:  No disorder of thought or mood.       Physical Exam:  ICU Vital Signs Last 24 Hrs  T(C): 36.7 (12 Feb 2025 12:05), Max: 36.9 (12 Feb 2025 00:43)  T(F): 98.1 (12 Feb 2025 12:05), Max: 98.5 (12 Feb 2025 00:43)  HR: 92 (12 Feb 2025 12:05) (92 - 96)  BP: 124/77 (12 Feb 2025 12:05) (108/68 - 124/77)  BP(mean): --  ABP: --  ABP(mean): --  RR: 16 (12 Feb 2025 12:05) (16 - 18)  SpO2: 91% (12 Feb 2025 12:05) (91% - 95%)    O2 Parameters below as of 12 Feb 2025 12:05  Patient On (Oxygen Delivery Method): room air           GEN: Appears weak, not in acute distress  HEENT: normocephalic and atraumatic.   NECK: Supple.   CHEST: R chest port  LUNGS: Normal respiratory effort  HEART: Regular rate and rhythm   ABDOMEN: Soft, nontender, and nondistended.    EXTREMITIES: No leg edema.  NEUROLOGIC: Answering quedtions appropriately  PSYCHIATRIC: Appropriate affect .  SKIN: + tender lesion on RUQ of abdomen, surrounding erythema noted, no drainage--appears similar    Labs:  02-12    140  |  112[H]  |  22  ----------------------------<  137[H]  3.5   |  19[L]  |  0.99    Ca    8.9      12 Feb 2025 09:15  Phos  2.4     02-12  Mg     1.9     02-12    TPro  4.6[L]  /  Alb  1.2[L]  /  TBili  0.7  /  DBili  x   /  AST  8[L]  /  ALT  17  /  AlkPhos  78  02-12                          7.8    0.45  )-----------( 59       ( 12 Feb 2025 09:15 )             23.6       Urinalysis Basic - ( 12 Feb 2025 09:15 )    Color: x / Appearance: x / SG: x / pH: x  Gluc: 137 mg/dL / Ketone: x  / Bili: x / Urobili: x   Blood: x / Protein: x / Nitrite: x   Leuk Esterase: x / RBC: x / WBC x   Sq Epi: x / Non Sq Epi: x / Bacteria: x      LIVER FUNCTIONS - ( 12 Feb 2025 09:15 )  Alb: 1.2 g/dL / Pro: 4.6 g/dL / ALK PHOS: 78 U/L / ALT: 17 U/L / AST: 8 U/L / GGT: x             RECENT CULTURES:  02-11 @ 09:00 .Blood BLOOD     No growth at 24 hours        02-11 @ 08:50 .Blood BLOOD     No growth at 24 hours        02-09 @ 01:25 Clean Catch Pseudomonas aeruginosa    50,000 - 99,000 CFU/mL Pseudomonas aeruginosa        02-08 @ 22:00 .Blood BLOOD Blood Culture PCR  Pseudomonas aeruginosa    Growth in aerobic bottle: Pseudomonas aeruginosa  Direct identification is available within approximately 3-5  hours either by Blood Panel Multiplexed PCR or Direct  MALDI-TOF. Details: [email or web address]    Growth in aerobic bottle: Gram Negative Rods      02-08 @ 21:55 .Blood BLOOD     Growth in aerobic bottle: Pseudomonas aeruginosa  See previous culture [id number]    Growth in aerobic bottle: Gram Negative Rods            All imaging and data are reviewed.      [facility]  INFECTIOUS DISEASES - Jaime Smith, [address], [place] [address]  Tel: [phone]     Fax: [phone]  =======================================================    ASHLEE CHAHAL [id number]    Follow up: No fevers.  Dr. Chahal at bedside, says patient with increased diarrhea last night. Has some abdominal discomfort when she has BM, but denies any pain elsewhere.    Allergies:  No Known Allergies      Antibiotics:  acetaminophen   Oral Liquid .. 650 milliGRAM(s) Oral every 6 hours PRN  cefepime   IVPB 2000 milliGRAM(s) IV Intermittent every 12 hours  chlorhexidine 2% Cloths 1 Application(s) Topical <User Schedule>  filgrastim-sndz (ZARXIO) Injectable 480 MICROGram(s) SubCutaneous daily  lactated ringers. 1000 milliLiter(s) IV Continuous <Continuous>  midodrine 5 milliGRAM(s) Oral every 8 hours  octreotide  Injectable 100 MICROGram(s) SubCutaneous three times a day  sucralfate suspension 1 Gram(s) Oral four times a day  trimethobenzamide Injectable 200 milliGRAM(s) IntraMuscular every 8 hours PRN       REVIEW OF SYSTEMS:  CONSTITUTIONAL: No fever  HEENT:  No sore throat or runny nose.  CARDIOVASCULAR:  No chest pain or SOB.  RESPIRATORY:  No cough, shortness of breath  GASTROINTESTINAL:  see history  GENITOURINARY:  + urinary retention  MUSCULOSKELETAL:  no joint aches, no muscle pain  NEUROLOGIC:  No headache or dizziness  PSYCHIATRIC:  No disorder of thought or mood.       Physical Exam:  ICU Vital Signs Last 24 Hrs  T(C): 36.7 (12 Feb 2025 12:05), Max: 36.9 (12 Feb 2025 00:43)  T(F): 98.1 (12 Feb 2025 12:05), Max: 98.5 (12 Feb 2025 00:43)  HR: 92 (12 Feb 2025 12:05) (92 - 96)  BP: 124/77 (12 Feb 2025 12:05) (108/68 - 124/77)  BP(mean): --  ABP: --  ABP(mean): --  RR: 16 (12 Feb 2025 12:05) (16 - 18)  SpO2: 91% (12 Feb 2025 12:05) (91% - 95%)    O2 Parameters below as of 12 Feb 2025 12:05  Patient On (Oxygen Delivery Method): room air           GEN: Appears weak, not in acute distress  HEENT: normocephalic and atraumatic.   NECK: Supple.   CHEST: R chest port  LUNGS: Normal respiratory effort  HEART: Regular rate and rhythm   ABDOMEN: Soft, nontender, and nondistended.    EXTREMITIES: + generalized edema  NEUROLOGIC: Answering quedtions appropriately  PSYCHIATRIC: Appropriate affect .  SKIN: + tender lesion on RUQ of abdomen, surrounding erythema noted, no drainage--appears similar    Labs:  02-12    140  |  112[H]  |  22  ----------------------------<  137[H]  3.5   |  19[L]  |  0.99    Ca    8.9      12 Feb 2025 09:15  Phos  2.4     02-12  Mg     1.9     02-12    TPro  4.6[L]  /  Alb  1.2[L]  /  TBili  0.7  /  DBili  x   /  AST  8[L]  /  ALT  17  /  AlkPhos  78  02-12                          7.8    0.45  )-----------( 59       ( 12 Feb 2025 09:15 )             23.6       Urinalysis Basic - ( 12 Feb 2025 09:15 )    Color: x / Appearance: x / SG: x / pH: x  Gluc: 137 mg/dL / Ketone: x  / Bili: x / Urobili: x   Blood: x / Protein: x / Nitrite: x   Leuk Esterase: x / RBC: x / WBC x   Sq Epi: x / Non Sq Epi: x / Bacteria: x      LIVER FUNCTIONS - ( 12 Feb 2025 09:15 )  Alb: 1.2 g/dL / Pro: 4.6 g/dL / ALK PHOS: 78 U/L / ALT: 17 U/L / AST: 8 U/L / GGT: x             RECENT CULTURES:  02-11 @ 09:00 .Blood BLOOD     No growth at 24 hours        02-11 @ 08:50 .Blood BLOOD     No growth at 24 hours        02-09 @ 01:25 Clean Catch Pseudomonas aeruginosa    50,000 - 99,000 CFU/mL Pseudomonas aeruginosa        02-08 @ 22:00 .Blood BLOOD Blood Culture PCR  Pseudomonas aeruginosa    Growth in aerobic bottle: Pseudomonas aeruginosa  Direct identification is available within approximately 3-5  hours either by Blood Panel Multiplexed PCR or Direct  MALDI-TOF. Details: [email or web address]    Growth in aerobic bottle: Gram Negative Rods      02-08 @ 21:55 .Blood BLOOD     Growth in aerobic bottle: Pseudomonas aeruginosa  See previous culture [id number]    Growth in aerobic bottle: Gram Negative Rods            All imaging and data are reviewed.

## 2025-02-12 NOTE — DISCHARGE NOTE NURSING/CASE MANAGEMENT/SOCIAL WORK - NSDCPEFALRISK_GEN_ALL_CORE
For information on Fall & Injury Prevention, visit: [email or web address] OR  [email or web address] OR  https://www.cdc.gov/steadi/patient.html

## 2025-02-12 NOTE — PROGRESS NOTE ADULT - SUBJECTIVE AND OBJECTIVE BOX
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 1000 milliLiter(s) (150 mL/Hr) IV Continuous <Continuous>  midodrine 5 milliGRAM(s) Oral every 8 hours  octreotide  Injectable 100 MICROGram(s) SubCutaneous three times a day  sucralfate suspension 1 Gram(s) Oral four times a day    MEDICATIONS  (PRN):  acetaminophen   Oral Liquid .. 650 milliGRAM(s) Oral every 6 hours PRN Temp greater or equal to 38C (100.4F)  trimethobenzamide Injectable 200 milliGRAM(s) IntraMuscular every 8 hours PRN Nausea and/or Vomiting      Allergies    No Known Allergies    Intolerances        Vital Signs Last 24 Hrs  T(C): 36.8 (2025 05:33), Max: 36.9 (2025 07:58)  T(F): 98.3 (2025 05:33), Max: 98.5 (2025 11:54)  HR: 96 (2025 05:33) (75 - 97)  BP: 120/79 (2025 05:33) (108/68 - 133/93)  BP(mean): 103 (2025 14:00) (74 - 107)  RR: 18 (2025 05:33) (13 - 25)  SpO2: 92% (2025 05:33) (92% - 100%)    Parameters below as of 2025 05:33  Patient On (Oxygen Delivery Method): room air        I&O's Summary    10 Feb 2025 07:01  -  2025 07:00  --------------------------------------------------------  IN: 5300 mL / OUT: 2300 mL / NET: 3000 mL    2025 07:01  -  2025 05:51  --------------------------------------------------------  IN: 1900 mL / OUT: 665 mL / NET: 1235 mL          PHYSICAL EXAM:  TELE:   Constitutional: NAD, awake and alert, well-developed  HEENT: Moist Mucous Membranes, Anicteric  Pulmonary: Non-labored, breath sounds are clear bilaterally, No wheezing, crackles or rhonchi  Cardiovascular: Regular, S1 and S2 nl, No murmurs, rubs, gallops or clicks  Gastrointestinal: Bowel Sounds present, soft, nontender.   Lymph: No lymphadenopathy. No peripheral edema.  Skin: No visible rashes or ulcers.  Psych:  Mood & affect appropriate    LABS: All Labs Reviewed:                        8.9    0.29  )-----------( 13       ( 2025 08:45 )             26.7                         9.6    0.27  )-----------( 25       ( 10 Feb 2025 08:46 )             29.6     2025 06:39    140    |  113    |  23     ----------------------------<  108    3.4     |  21     |  1.10   10 Feb 2025 08:46    141    |  115    |  28     ----------------------------<  153    3.3     |  20     |  1.40     Ca    8.7        2025 06:39  Ca    9.1        10 Feb 2025 08:46  Phos  2.3       2025 06:39  Phos  2.9       10 2025 08:46  Mg     1.8       2025 06:39  Mg     2.1       10 2025 08:46    TPro  4.6    /  Alb  1.1    /  TBili  0.2    /  DBili  x      /  AST  13     /  ALT  18     /  AlkPhos  68     2025 06:39  TPro  5.0    /  Alb  1.5    /  TBili  0.5    /  DBili  x      /  AST  17     /  ALT  25     /  AlkPhos  74     10 2025 08:46             EC Lead ECG:   Ventricular Rate 116 BPM    Atrial Rate 116 BPM    P-R Interval 132 ms    QRS Duration 68 ms    Q-T Interval 286 ms    QTC Calculation(Bazett) 397 ms    P Axis 17 degrees    R Axis -38 degrees    T Axis 112 degrees    Diagnosis Line *** Poor data quality, interpretation may be adversely affected  Sinus tachycardia with premature atrial complexes  Left axis deviation  Low voltage QRS  Inferior infarct (cited on or before 2025)  Anterolateral infarct (cited on or before 2025)  Abnormal ECG  When compared with ECG of 2025 16:51, (Unconfirmed)  premature atrial complexes are now present  Confirmed by HAYLEY GEIGER ([id number]) on 2/10/2025 10:25:52 PM (25 @ 16:52)      TRANSTHORACIC ECHOCARDIOGRAM REPORT  ________________________________________________________________________________                                      _______       Pt. Name:       ASHLEE CHAHAL Study Date:    2025  MRN:            [id number]       YOB: 1955  Accession #:    [id number]       Age:           69 years  Account#:       [id number]      Gender:        F  Heart Rate:                     Height:        62.20 in (158.00 cm)  Rhythm:                         Weight:       147.71 lb (67.00 kg)  Blood Pressure: 110/73 mmHg     BSA/BMI:       1.68 m² / 26.84 kg/m²  ________________________________________________________________________________________  Referring Physician:    Adan Geiger  Interpreting Physician: aMndi Vasquez MD  Primary Sonographer:    Sanket Atkinson    CPT:               ECHO TTE WO CON COMP W DOPP - 52251.m  Indication(s):     Cardiomyopathy, unspecified - I42.9  Procedure:         Transthoracic echocardiogram with 2-D, M-mode and complete                     spectral and color flow Doppler.  Ordering Location: [facility]  Admission Status:  Inpatient  Study Information: Image quality for this study is technically difficult.                     Technically challenging study due to less than ideal echo                     windows.    _______________________________________________________________________________________     CONCLUSIONS:      1. Technically difficult image quality.   2. Left ventricular systolic function is hyperdynamicwith an ejection fraction of 73 % by Hernandez's method of disks.   3. Mild left ventricular hypertrophy.   4. The right ventricle is not well visualized.   5. Tricuspid aortic valve with normal leaflet excursion. There is mild calcification of the aortic valve leaflets.   6. Mitral valve leaflets have focal calcification.   7. Trace mitral regurgitation.   8. Tricuspid valve was not well visualized.   9. The inferior vena cava is normal in size measuring 1.17 cm in diameter, (normal <2.1cm) with normal inspiratory collapse (normal >50%) consistent with normal right atrial pressure (~3, range 0-5mmHg).  10. Trace pericardial effusion.    ________________________________________________________________________________________  FINDINGS:     LeftVentricle:  Left ventricular systolic function is hyperdynamic with a calculated ejection fraction of 73 % by the Hernandez's biplane method of disks. Mild left ventricular hypertrophy.     Right Ventricle:  The right ventricle is not well visualized.     Left Atrium:  The left atrium is normal in size with an indexed volume of 17.45 ml/m².     Right Atrium:  The right atrium was not well visualized.     Interatrial Septum:  The interatrial septum was not well visualized.     Aortic Valve:  The aortic valve is tricuspid with normal leaflet excursion. There is mild calcification of the aortic valve leaflets.     Mitral Valve:  Mitral valve leaflets have focal calcification. There is trace mitral regurgitation.     Tricuspid Valve:  The tricuspidvalve was not well visualized.     Pulmonic Valve:  The pulmonic valve was not well visualized. There is trace pulmonic regurgitation.     Aorta:  The aortic root at the sinuses of Valsalva is normal in size, measuring 3.30 cm (indexed 1.96 cm/m²). The ascending aorta is normal in size, measuring 2.80 cm (indexed 1.66 cm/m²).     Pericardium:  There is a trace pericardial effusion.     Systemic Veins:  The inferior vena cava is normal in size measuring 1.17 cm in diameter, (normal <2.1cm) with normal inspiratory collapse (normal >50%) consistent with normal right atrial pressure (~3, range 0-5mmHg).  ____________________________________________________________________  QUANTITATIVE DATA:  Left Ventricle Measurements: (Indexed to BSA)     IVSd(2D):   1.3 cm  LVPWd (2D):  1.2 cm  LVIDd (2D):  4.0 cm  LVIDs (2D):  2.5 cm  LV Mass:     173 g  102.9 g/m²  LV Vol d, MOD A2C: 35.5 ml 21.07 ml/m²  LV Vol d, MOD A4C: 47.3 ml 28.08 ml/m²  LV Vol d, MOD BP:  44.7 ml 26.52 ml/m²  LV Vol s, MOD A2C: 10.5 ml 6.23 ml/m²  LV Vol s, MOD A4C: 13.8 ml 8.19 ml/m²  LV Vol s, MOD BP:  12.1 ml 7.18 ml/m²  LVOT SV MOD BP:    32.6 ml  LV EF% MOD BP:     73 %     MV E Vmax:    0.61 m/s  MV A Vmax:    1.09 m/s  MV E/A:       0.56  e' lateral:   14.60 cm/s  e' medial:    8.16 cm/s  E/e' lateral: 4.16  E/e' medial:  7.45  E/e' Average: 5.34  MV DT:        175 msec    Aorta Measurements: (Normal range) (Indexed to BSA)     Ao Root d     3.30 cm (2.7 - 3.3 cm) 1.96 cm/m²  Ao Asc d, 2D: 2.80  Ao Asc prox:  2.80cm                1.66 cm/m²            Left Atrium Measurements: (Indexed to BSA)  LA Diam 2D: 3.60 cm            LVOT / RVOT/ Qp/Qs Data: (Indexed to BSA)  LVOT Diameter,s: 2.10 cm  LVOT Area:       3.46 cm²    Mitral Valve Measurements:     MV E Vmax: 0.6 m/s  MV A Vmax: 1.1 m/s  MV E/A:    0.6       Tricuspid Valve Measurements:     RA Pressure: 3 mmHg    ________________________________________________________________________________________  Electronically signed on 2025 at 11:35:17 PM by Mandi Vasquez MD         *** Final ***      Radiology:         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 1000 milliLiter(s) (150 mL/Hr) IV Continuous <Continuous>  midodrine 5 milliGRAM(s) Oral every 8 hours  octreotide  Injectable 100 MICROGram(s) SubCutaneous three times a day  sucralfate suspension 1 Gram(s) Oral four times a day    MEDICATIONS  (PRN):  acetaminophen   Oral Liquid .. 650 milliGRAM(s) Oral every 6 hours PRN Temp greater or equal to 38C (100.4F)  trimethobenzamide Injectable 200 milliGRAM(s) IntraMuscular every 8 hours PRN Nausea and/or Vomiting      Allergies    No Known Allergies    Intolerances        Vital Signs Last 24 Hrs  T(C): 36.8 (2025 05:33), Max: 36.9 (2025 07:58)  T(F): 98.3 (2025 05:33), Max: 98.5 (2025 11:54)  HR: 96 (2025 05:33) (75 - 97)  BP: 120/79 (2025 05:33) (108/68 - 133/93)  BP(mean): 103 (2025 14:00) (74 - 107)  RR: 18 (2025 05:33) (13 - 25)  SpO2: 92% (2025 05:33) (92% - 100%)    Parameters below as of 2025 05:33  Patient On (Oxygen Delivery Method): room air        I&O's Summary    10 Feb 2025 07:01  -  2025 07:00  --------------------------------------------------------  IN: 5300 mL / OUT: 2300 mL / NET: 3000 mL    2025 07:01  -  2025 05:51  --------------------------------------------------------  IN: 1900 mL / OUT: 665 mL / NET: 1235 mL          PHYSICAL EXAM:  TELE:   Constitutional: NAD, awake and alert, well-developed  HEENT: Moist Mucous Membranes, Anicteric, mouth sores   Pulmonary: Non-labored, breath sounds are DIM  Cardiovascular: Regular, S1 and S2 nl, No murmurs, rubs, gallops or clicks  Gastrointestinal: Bowel Sounds present, soft, nontender.   Lymph+ peripheral edema.  Skin: No visible rashes or ulcers.  Psych:  Mood & affect appropriate    LABS: All Labs Reviewed:                        8.9    0.29  )-----------( 13       ( 2025 08:45 )             26.7                         9.6    0.27  )-----------( 25       ( 10 Feb 2025 08:46 )             29.6     2025 06:39    140    |  113    |  23     ----------------------------<  108    3.4     |  21     |  1.10   10 Feb 2025 08:46    141    |  115    |  28     ----------------------------<  153    3.3     |  20     |  1.40     Ca    8.7        2025 06:39  Ca    9.1        10 Feb 2025 08:46  Phos  2.3       2025 06:39  Phos  2.9       10 2025 08:46  Mg     1.8       2025 06:39  Mg     2.1       10 2025 08:46    TPro  4.6    /  Alb  1.1    /  TBili  0.2    /  DBili  x      /  AST  13     /  ALT  18     /  AlkPhos  68     2025 06:39  TPro  5.0    /  Alb  1.5    /  TBili  0.5    /  DBili  x      /  AST  17     /  ALT  25     /  AlkPhos  74     10 2025 08:46             EC Lead ECG:   Ventricular Rate 116 BPM    Atrial Rate 116 BPM    P-R Interval 132 ms    QRS Duration 68 ms    Q-T Interval 286 ms    QTC Calculation(Bazett) 397 ms    P Axis 17 degrees    R Axis -38 degrees    T Axis 112 degrees    Diagnosis Line *** Poor data quality, interpretation may be adversely affected  Sinus tachycardia with premature atrial complexes  Left axis deviation  Low voltage QRS  Inferior infarct (cited on or before 2025)  Anterolateral infarct (cited on or before 2025)  Abnormal ECG  When compared with ECG of 2025 16:51, (Unconfirmed)  premature atrial complexes are now present  Confirmed by HAYLEY GEIGER ([id number]) on 2/10/2025 10:25:52 PM (25 @ 16:52)      TRANSTHORACIC ECHOCARDIOGRAM REPORT  ________________________________________________________________________________                                      _______       Pt. Name:       ASHLEE CHAHAL Study Date:    2025  MRN:            [id number]       YOB: 1955  Accession #:    [id number]       Age:           69 years  Account#:       [id number]      Gender:        F  Heart Rate:                     Height:        62.20 in (158.00 cm)  Rhythm:                         Weight:       147.71 lb (67.00 kg)  Blood Pressure: 110/73 mmHg     BSA/BMI:       1.68 m² / 26.84 kg/m²  ________________________________________________________________________________________  Referring Physician:    Adan Geiger  Interpreting Physician: Mandi Vasquez MD  Primary Sonographer:    Sanket Atkinson    CPT:               ECHO TTE WO CON COMP W DOPP - 65563.m  Indication(s):     Cardiomyopathy, unspecified - I42.9  Procedure:         Transthoracic echocardiogram with 2-D, M-mode and complete                     spectral and color flow Doppler.  Ordering Location: [facility]  Admission Status:  Inpatient  Study Information: Image quality for this study is technically difficult.                     Technically challenging study due to less than ideal echo                     windows.    _______________________________________________________________________________________     CONCLUSIONS:      1. Technically difficult image quality.   2. Left ventricular systolic function is hyperdynamicwith an ejection fraction of 73 % by Hernandez's method of disks.   3. Mild left ventricular hypertrophy.   4. The right ventricle is not well visualized.   5. Tricuspid aortic valve with normal leaflet excursion. There is mild calcification of the aortic valve leaflets.   6. Mitral valve leaflets have focal calcification.   7. Trace mitral regurgitation.   8. Tricuspid valve was not well visualized.   9. The inferior vena cava is normal in size measuring 1.17 cm in diameter, (normal <2.1cm) with normal inspiratory collapse (normal >50%) consistent with normal right atrial pressure (~3, range 0-5mmHg).  10. Trace pericardial effusion.    ________________________________________________________________________________________  FINDINGS:     LeftVentricle:  Left ventricular systolic function is hyperdynamic with a calculated ejection fraction of 73 % by the Hernandez's biplane method of disks. Mild left ventricular hypertrophy.     Right Ventricle:  The right ventricle is not well visualized.     Left Atrium:  The left atrium is normal in size with an indexed volume of 17.45 ml/m².     Right Atrium:  The right atrium was not well visualized.     Interatrial Septum:  The interatrial septum was not well visualized.     Aortic Valve:  The aortic valve is tricuspid with normal leaflet excursion. There is mild calcification of the aortic valve leaflets.     Mitral Valve:  Mitral valve leaflets have focal calcification. There is trace mitral regurgitation.     Tricuspid Valve:  The tricuspidvalve was not well visualized.     Pulmonic Valve:  The pulmonic valve was not well visualized. There is trace pulmonic regurgitation.     Aorta:  The aortic root at the sinuses of Valsalva is normal in size, measuring 3.30 cm (indexed 1.96 cm/m²). The ascending aorta is normal in size, measuring 2.80 cm (indexed 1.66 cm/m²).     Pericardium:  There is a trace pericardial effusion.     Systemic Veins:  The inferior vena cava is normal in size measuring 1.17 cm in diameter, (normal <2.1cm) with normal inspiratory collapse (normal >50%) consistent with normal right atrial pressure (~3, range 0-5mmHg).  ____________________________________________________________________  QUANTITATIVE DATA:  Left Ventricle Measurements: (Indexed to BSA)     IVSd(2D):   1.3 cm  LVPWd (2D):  1.2 cm  LVIDd (2D):  4.0 cm  LVIDs (2D):  2.5 cm  LV Mass:     173 g  102.9 g/m²  LV Vol d, MOD A2C: 35.5 ml 21.07 ml/m²  LV Vol d, MOD A4C: 47.3 ml 28.08 ml/m²  LV Vol d, MOD BP:  44.7 ml 26.52 ml/m²  LV Vol s, MOD A2C: 10.5 ml 6.23 ml/m²  LV Vol s, MOD A4C: 13.8 ml 8.19 ml/m²  LV Vol s, MOD BP:  12.1 ml 7.18 ml/m²  LVOT SV MOD BP:    32.6 ml  LV EF% MOD BP:     73 %     MV E Vmax:    0.61 m/s  MV A Vmax:    1.09 m/s  MV E/A:       0.56  e' lateral:   14.60 cm/s  e' medial:    8.16 cm/s  E/e' lateral: 4.16  E/e' medial:  7.45  E/e' Average: 5.34  MV DT:        175 msec    Aorta Measurements: (Normal range) (Indexed to BSA)     Ao Root d     3.30 cm (2.7 - 3.3 cm) 1.96 cm/m²  Ao Asc d, 2D: 2.80  Ao Asc prox:  2.80cm                1.66 cm/m²            Left Atrium Measurements: (Indexed to BSA)  LA Diam 2D: 3.60 cm            LVOT / RVOT/ Qp/Qs Data: (Indexed to BSA)  LVOT Diameter,s: 2.10 cm  LVOT Area:       3.46 cm²    Mitral Valve Measurements:     MV E Vmax: 0.6 m/s  MV A Vmax: 1.1 m/s  MV E/A:    0.6       Tricuspid Valve Measurements:     RA Pressure: 3 mmHg    ________________________________________________________________________________________  Electronically signed on 2025 at 11:35:17 PM by Mandi Vasquez MD         *** Final ***      Radiology:

## 2025-02-12 NOTE — PROGRESS NOTE ADULT - TIME BILLING
pt seen and examine today see above plan-  post  chemo  Neutropenic sepsis shock. likely from UTI  with bacteremia pseudomonas - chemo induce diarrhea  so far infectious cause ruled out  gi pcr neg , cdiff neg . pt  called want to transfer to [facility]  as pt main oncologist dr donald peterson  [phone]  want pt be there for managing chemo management with side effect diarrhea / bacteremia uti  .

## 2025-02-12 NOTE — PROVIDER CONTACT NOTE (CRITICAL VALUE NOTIFICATION) - SITUATION
critical blood c/s x 2  bottle growth in anaerobic  bottle gram negative rods
critical lactate level 2.8 and WBC 0.19
lactate 2.8
WBC 0.19
 called with reults of WBC 0.45

## 2025-02-12 NOTE — DISCHARGE NOTE NURSING/CASE MANAGEMENT/SOCIAL WORK - PATIENT PORTAL LINK FT
You can access the FollowMyHealth Patient Portal offered by [facility] by registering at the following website: [email or web address]. By joining [employer]’s FollowMyHealth portal, you will also be able to view your health information using other applications (apps) compatible with our system.

## 2025-02-12 NOTE — PROGRESS NOTE ADULT - ASSESSMENT
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  Patient said last chemo episode was over the past week. She was febrile to 103.1 in the ED.    Found to have fever and sepsis 2/2 pseudomonas bacteremia. Sensitivities reviewed. Suspect urinary source, she had urinary retention and urine culture also grew pseudomonas. C diff and GI PCR negative. Will add anaerobic coverage if cannot rule out neutropenic enteorocolitis given initial CT was non contrast. Neutropenic but otherwise fever resolved. Repeat blood cultures currentlly no growth.    Unclear etiology of tender lesion on RUQ abdominal wall--possibly ecythema gangrenosum given underlying pseudomonas infection but would suggest biopsy if no improvement. No collection seen on noncontrast CT.     #Pseudomonas bacteremia  #? Colitis  #Sepsis  #Fever  #Neutropenia  #Hypotension  #Abdominal wall lesion    -continue cefepime 2g IV q12h (renally dosed)  -add Flagyl IV  -follow cultures to completion  -monitor WBC  -wound care  -unclear etiology of lesion on abdomen--? ecthyema gangrenosum, if worse/no improvement consider evaluation for biopsy  -discussed with  Dr. Pleitez at bedside  -discussed with Dr. Griffin  -I will be away from 2/13-2/23/25. I will be covered by Dr. Smith and Dr. David Aparicio MD  Division of Infectious Diseases   Cell [phone] between 8am and 6pm   After 6pm and weekends please call ID service at [phone].     35 minutes spent on total encounter assessing patient, examination, chart review, counseling and coordinating care by the attending physician/nurse/care manager.      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  Patient said last chemo episode was over the past week. She was febrile to 103.1 in the ED.    Found to have fever and sepsis 2/2 pseudomonas bacteremia. Sensitivities reviewed. Suspect urinary source, she had urinary retention and urine culture also grew pseudomonas. C diff and GI PCR negative. Will add anaerobic coverage if cannot rule out neutropenic enteorocolitis. Remains neutropenic but otherwise fever resolved. Repeat blood cultures currently no growth.    Unclear etiology of tender lesion on RUQ abdominal wall--possibly ecythema gangrenosum given underlying pseudomonas infection but would suggest biopsy if no improvement. No collection seen on noncontrast CT.     #Pseudomonas bacteremia  #? Colitis  #Sepsis  #Fever  #Neutropenia  #Hypotension  #Abdominal wall lesion    -continue cefepime 2g IV q12h (renally dosed)  -add Flagyl IV  -follow cultures to completion  -monitor WBC  -wound care  -unclear etiology of lesion on abdomen--? ecthyema gangrenosum, if worse/no improvement consider evaluation for biopsy  -discussed with  Dr. Pleitez at bedside  -discussed with Dr. Griffin and Dr. Miramontes  -I will be away from 2/13-2/23/25. I will be covered by Dr. Smith and Dr. David Aparicio MD  Division of Infectious Diseases   Cell [phone] between 8am and 6pm   After 6pm and weekends please call ID service at [phone].     35 minutes spent on total encounter assessing patient, examination, chart review, counseling and coordinating care by the attending physician/nurse/care manager.      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  Patient said last chemo episode was over the past week. She was febrile to 103.1 in the ED.    Found to have fever and sepsis 2/2 pseudomonas bacteremia. Sensitivities reviewed. Suspect urinary source, she had urinary retention and urine culture also grew pseudomonas. C diff and GI PCR negative. Will add anaerobic coverage if cannot rule out neutropenic enteorocolitis. Remains neutropenic but otherwise fever resolved. Creatinine improving. Repeat blood cultures currently no growth.    Unclear etiology of tender lesion on RUQ abdominal wall--possibly ecythema gangrenosum given underlying pseudomonas infection but would suggest biopsy if no improvement. No collection seen on noncontrast CT.     #Pseudomonas bacteremia  #? Colitis  #Sepsis  #Fever  #Neutropenia  #Hypotension  #Abdominal wall lesion    -continue cefepime 2g IV q12h (renally dosed)  -add Flagyl IV  -follow cultures to completion  -monitor WBC  -wound care  -unclear etiology of lesion on abdomen--? ecthyema gangrenosum, if worse/no improvement consider evaluation for biopsy  -discussed with  Dr. Pleitez at bedside  -discussed with Dr. Griffin and Dr. Miramontes  -I will be away from 2/13-2/23/25. I will be covered by Dr. Smith and Dr. David Aparicio MD  Division of Infectious Diseases   Cell [phone] between 8am and 6pm   After 6pm and weekends please call ID service at [phone].     35 minutes spent on total encounter assessing patient, examination, chart review, counseling and coordinating care by the attending physician/nurse/care manager.

## 2025-02-12 NOTE — PROGRESS NOTE ADULT - SUBJECTIVE AND OBJECTIVE BOX
Patient is a 69y old  Female who presents with a chief complaint of Sepsis (12 Feb 2025 05:50)      INTERVAL HPI/OVERNIGHT EVENTS: No acute overnight events. Pt was seen and examined at bedside. Pt states that  Pt denies headache, dizziness, lightheadedness, fever, chills, body aches, CP, SOB, palpitations, abdominal pain, n/v, numbness/tingling.  No other complaints at this time.     MEDICATIONS  (STANDING):  cefepime   IVPB 2000 milliGRAM(s) IV Intermittent every 12 hours  chlorhexidine 2% Cloths 1 Application(s) Topical <User Schedule>  filgrastim-sndz (ZARXIO) Injectable 480 MICROGram(s) SubCutaneous daily  lactated ringers. 1000 milliLiter(s) (150 mL/Hr) IV Continuous <Continuous>  midodrine 5 milliGRAM(s) Oral every 8 hours  octreotide  Injectable 100 MICROGram(s) SubCutaneous three times a day  sucralfate suspension 1 Gram(s) Oral four times a day    MEDICATIONS  (PRN):  acetaminophen   Oral Liquid .. 650 milliGRAM(s) Oral every 6 hours PRN Temp greater or equal to 38C (100.4F)  trimethobenzamide Injectable 200 milliGRAM(s) IntraMuscular every 8 hours PRN Nausea and/or Vomiting      Allergies    No Known Allergies    Intolerances        REVIEW OF SYSTEMS:  CONSTITUTIONAL: No fever or chills  HEENT:  No headache, no sore throat  RESPIRATORY: No cough, wheezing, or shortness of breath  CARDIOVASCULAR: No chest pain, palpitations  GASTROINTESTINAL: No abd pain, nausea, vomiting, or diarrhea  GENITOURINARY: No dysuria, frequency, or hematuria  NEUROLOGICAL: no focal weakness or dizziness  MUSCULOSKELETAL: no myalgias     Vital Signs Last 24 Hrs  T(C): 36.8 (12 Feb 2025 05:33), Max: 36.9 (11 Feb 2025 07:58)  T(F): 98.3 (12 Feb 2025 05:33), Max: 98.5 (11 Feb 2025 11:54)  HR: 96 (12 Feb 2025 05:33) (75 - 97)  BP: 120/79 (12 Feb 2025 05:33) (108/68 - 125/86)  BP(mean): 103 (11 Feb 2025 14:00) (74 - 103)  RR: 18 (12 Feb 2025 05:33) (13 - 25)  SpO2: 92% (12 Feb 2025 05:33) (92% - 100%)    Parameters below as of 12 Feb 2025 05:33  Patient On (Oxygen Delivery Method): room air        PHYSICAL EXAM:  GENERAL: NAD  HEENT:  anicteric, moist mucous membranes  CHEST/LUNG:  CTA b/l, no rales, wheezes, or rhonchi  HEART:  RRR, S1, S2  ABDOMEN:  BS+, soft, nontender, nondistended  EXTREMITIES: no edema, cyanosis, or calf tenderness  NERVOUS SYSTEM: answers questions and follows commands appropriately    LABS:                        8.9    0.29  )-----------( 13       ( 11 Feb 2025 08:45 )             26.7     CBC Full  -  ( 11 Feb 2025 08:45 )  WBC Count : 0.29 K/uL  Hemoglobin : 8.9 g/dL  Hematocrit : 26.7 %  Platelet Count - Automated : 13 K/uL  Mean Cell Volume : 89.9 fl  Mean Cell Hemoglobin : 30.0 pg  Mean Cell Hemoglobin Concentration : 33.3 g/dL  Auto Neutrophil # : x  Auto Lymphocyte # : x  Auto Monocyte # : x  Auto Eosinophil # : x  Auto Basophil # : x  Auto Neutrophil % : x  Auto Lymphocyte % : x  Auto Monocyte % : x  Auto Eosinophil % : x  Auto Basophil % : x      Ca    8.7        11 Feb 2025 06:39        Urinalysis Basic - ( 11 Feb 2025 06:39 )    Color: x / Appearance: x / SG: x / pH: x  Gluc: 108 mg/dL / Ketone: x  / Bili: x / Urobili: x   Blood: x / Protein: x / Nitrite: x   Leuk Esterase: x / RBC: x / WBC x   Sq Epi: x / Non Sq Epi: x / Bacteria: x      CAPILLARY BLOOD GLUCOSE            Urinalysis with Rflx Culture (collected 02-09-25 @ 01:25)    Culture - Urine (collected 02-09-25 @ 01:25)  Source: Clean Catch  Final Report (02-11-25 @ 08:39):    50,000 - 99,000 CFU/mL Pseudomonas aeruginosa  Organism: Pseudomonas aeruginosa (02-11-25 @ 08:39)  Organism: Pseudomonas aeruginosa (02-11-25 @ 08:39)      Method Type: REJI      -  Amikacin: S <=16      -  Aztreonam: S <=4      -  Cefepime: S <=2      -  Ceftazidime: S <=1      -  Ciprofloxacin: S <=0.25      -  Imipenem: S 2      -  Levofloxacin: S <=0.5      -  Meropenem: S <=1      -  Piperacillin/Tazobactam: S <=8    Culture - Blood (collected 02-08-25 @ 22:00)  Source: .Blood BLOOD  Gram Stain (02-09-25 @ 19:04):    Growth in aerobic bottle: Gram Negative Rods  Final Report (02-11-25 @ 07:53):    Growth in aerobic bottle: Pseudomonas aeruginosa    Direct identification is available within approximately 3-5    hours either by Blood Panel Multiplexed PCR or Direct    MALDI-TOF. Details: [email or web address]  Organism: Blood Culture PCR  Pseudomonas aeruginosa (02-11-25 @ 07:53)  Organism: Pseudomonas aeruginosa (02-11-25 @ 07:53)      Method Type: REJI      -  Aztreonam: S <=4      -  Cefepime: S <=2      -  Ceftazidime: S <=1      -  Ciprofloxacin: S <=0.25      -  Imipenem: S <=1      -  Levofloxacin: S <=0.5      -  Meropenem: S <=1      -  Piperacillin/Tazobactam: S <=8  Organism: Blood Culture PCR (02-11-25 @ 07:53)      Method Type: PCR      -  Pseudomonas aeruginosa: Detec    Culture - Blood (collected 02-08-25 @ 21:55)  Source: .Blood BLOOD  Gram Stain (02-09-25 @ 19:32):    Growth in aerobic bottle: Gram Negative Rods  Final Report (02-11-25 @ 07:54):    Growth in aerobic bottle: Pseudomonas aeruginosa    See previous culture [id number]        RADIOLOGY & ADDITIONAL TESTS: ____    Personally reviewed.     Consultant(s) Notes Reviewed:  [x] YES  [ ] NO     Patient is a 69y old  Female who presents with a chief complaint of Sepsis (12 Feb 2025 05:50)      INTERVAL HPI/OVERNIGHT EVENTS: Overnight pt was transferred to floors from ICU.  requesting to stop aggrenox currently and to place patient on a minced and moist diet. Pt received one 1 u of platelet ordered. Pt was seen and examined at bedside. Pt states that she still feels weak. She states shes not sure if she is going to be able to tolerate the food since she continues to have diarrhea    Pt denies headache, dizziness, lightheadedness, fever, chills, body aches, CP, SOB, palpitations, abdominal pain, n/v, numbness/tingling.  No other complaints at this time.     MEDICATIONS  (STANDING):  cefepime   IVPB 2000 milliGRAM(s) IV Intermittent every 12 hours  chlorhexidine 2% Cloths 1 Application(s) Topical <User Schedule>  filgrastim-sndz (ZARXIO) Injectable 480 MICROGram(s) SubCutaneous daily  lactated ringers. 1000 milliLiter(s) (150 mL/Hr) IV Continuous <Continuous>  midodrine 5 milliGRAM(s) Oral every 8 hours  octreotide  Injectable 100 MICROGram(s) SubCutaneous three times a day  sucralfate suspension 1 Gram(s) Oral four times a day    MEDICATIONS  (PRN):  acetaminophen   Oral Liquid .. 650 milliGRAM(s) Oral every 6 hours PRN Temp greater or equal to 38C (100.4F)  trimethobenzamide Injectable 200 milliGRAM(s) IntraMuscular every 8 hours PRN Nausea and/or Vomiting      Allergies    No Known Allergies    Intolerances        REVIEW OF SYSTEMS:  CONSTITUTIONAL: No fever or chills  HEENT:  No headache, no sore throat  RESPIRATORY: No cough, wheezing, or shortness of breath  CARDIOVASCULAR: No chest pain, palpitations  GASTROINTESTINAL: No abd pain, nausea, vomiting, +diarrhea  GENITOURINARY: No dysuria, frequency, or hematuria  NEUROLOGICAL: no focal weakness or dizziness  MUSCULOSKELETAL: no myalgias     Vital Signs Last 24 Hrs  T(C): 36.8 (12 Feb 2025 05:33), Max: 36.9 (11 Feb 2025 07:58)  T(F): 98.3 (12 Feb 2025 05:33), Max: 98.5 (11 Feb 2025 11:54)  HR: 96 (12 Feb 2025 05:33) (75 - 97)  BP: 120/79 (12 Feb 2025 05:33) (108/68 - 125/86)  BP(mean): 103 (11 Feb 2025 14:00) (74 - 103)  RR: 18 (12 Feb 2025 05:33) (13 - 25)  SpO2: 92% (12 Feb 2025 05:33) (92% - 100%)    Parameters below as of 12 Feb 2025 05:33  Patient On (Oxygen Delivery Method): room air        PHYSICAL EXAM:  GENERAL: NAD  HEENT:  anicteric, moist mucous membranes  CHEST/LUNG:  CTA b/l, no rales, wheezes, or rhonchi  HEART:  RRR, S1, S2  ABDOMEN:  BS+, soft, nontender, nondistended  EXTREMITIES: no edema, cyanosis, or calf tenderness  NERVOUS SYSTEM: answers questions and follows commands appropriately    LABS:                        8.9    0.29  )-----------( 13       ( 11 Feb 2025 08:45 )             26.7     CBC Full  -  ( 11 Feb 2025 08:45 )  WBC Count : 0.29 K/uL  Hemoglobin : 8.9 g/dL  Hematocrit : 26.7 %  Platelet Count - Automated : 13 K/uL  Mean Cell Volume : 89.9 fl  Mean Cell Hemoglobin : 30.0 pg  Mean Cell Hemoglobin Concentration : 33.3 g/dL  Auto Neutrophil # : x  Auto Lymphocyte # : x  Auto Monocyte # : x  Auto Eosinophil # : x  Auto Basophil # : x  Auto Neutrophil % : x  Auto Lymphocyte % : x  Auto Monocyte % : x  Auto Eosinophil % : x  Auto Basophil % : x      Ca    8.7        11 Feb 2025 06:39        Urinalysis Basic - ( 11 Feb 2025 06:39 )    Color: x / Appearance: x / SG: x / pH: x  Gluc: 108 mg/dL / Ketone: x  / Bili: x / Urobili: x   Blood: x / Protein: x / Nitrite: x   Leuk Esterase: x / RBC: x / WBC x   Sq Epi: x / Non Sq Epi: x / Bacteria: x      CAPILLARY BLOOD GLUCOSE            Urinalysis with Rflx Culture (collected 02-09-25 @ 01:25)    Culture - Urine (collected 02-09-25 @ 01:25)  Source: Clean Catch  Final Report (02-11-25 @ 08:39):    50,000 - 99,000 CFU/mL Pseudomonas aeruginosa  Organism: Pseudomonas aeruginosa (02-11-25 @ 08:39)  Organism: Pseudomonas aeruginosa (02-11-25 @ 08:39)      Method Type: REJI      -  Amikacin: S <=16      -  Aztreonam: S <=4      -  Cefepime: S <=2      -  Ceftazidime: S <=1      -  Ciprofloxacin: S <=0.25      -  Imipenem: S 2      -  Levofloxacin: S <=0.5      -  Meropenem: S <=1      -  Piperacillin/Tazobactam: S <=8    Culture - Blood (collected 02-08-25 @ 22:00)  Source: .Blood BLOOD  Gram Stain (02-09-25 @ 19:04):    Growth in aerobic bottle: Gram Negative Rods  Final Report (02-11-25 @ 07:53):    Growth in aerobic bottle: Pseudomonas aeruginosa    Direct identification is available within approximately 3-5    hours either by Blood Panel Multiplexed PCR or Direct    MALDI-TOF. Details: [email or web address]  Organism: Blood Culture PCR  Pseudomonas aeruginosa (02-11-25 @ 07:53)  Organism: Pseudomonas aeruginosa (02-11-25 @ 07:53)      Method Type: REJI      -  Aztreonam: S <=4      -  Cefepime: S <=2      -  Ceftazidime: S <=1      -  Ciprofloxacin: S <=0.25      -  Imipenem: S <=1      -  Levofloxacin: S <=0.5      -  Meropenem: S <=1      -  Piperacillin/Tazobactam: S <=8  Organism: Blood Culture PCR (02-11-25 @ 07:53)      Method Type: PCR      -  Pseudomonas aeruginosa: Detec    Culture - Blood (collected 02-08-25 @ 21:55)  Source: .Blood BLOOD  Gram Stain (02-09-25 @ 19:32):    Growth in aerobic bottle: Gram Negative Rods  Final Report (02-11-25 @ 07:54):    Growth in aerobic bottle: Pseudomonas aeruginosa    See previous culture [id number]        RADIOLOGY & ADDITIONAL TESTS: ____    Personally reviewed.     Consultant(s) Notes Reviewed:  [x] YES  [ ] NO     Patient is a 69y old  Female who presents with a chief complaint of Sepsis (12 Feb 2025 05:50)      INTERVAL HPI/OVERNIGHT EVENTS: Overnight pt was transferred to floors from ICU.  requesting to stop aggrenox currently and to place patient on a minced and moist diet. Pt received one 1 u of platelet ordered. Pt was seen and examined at bedside. Pt states that she still feels weak. She states shes not sure if she is going to be able to tolerate the food since she continues to have diarrhea    Pt denies headache, dizziness, lightheadedness, fever, chills, body aches, CP, SOB, palpitations, abdominal pain, n/v, numbness/tingling.  No other complaints at this time.     MEDICATIONS  (STANDING):  cefepime   IVPB 2000 milliGRAM(s) IV Intermittent every 12 hours  chlorhexidine 2% Cloths 1 Application(s) Topical <User Schedule>  filgrastim-sndz (ZARXIO) Injectable 480 MICROGram(s) SubCutaneous daily  lactated ringers. 1000 milliLiter(s) (150 mL/Hr) IV Continuous <Continuous>  midodrine 5 milliGRAM(s) Oral every 8 hours  octreotide  Injectable 100 MICROGram(s) SubCutaneous three times a day  sucralfate suspension 1 Gram(s) Oral four times a day    MEDICATIONS  (PRN):  acetaminophen   Oral Liquid .. 650 milliGRAM(s) Oral every 6 hours PRN Temp greater or equal to 38C (100.4F)  trimethobenzamide Injectable 200 milliGRAM(s) IntraMuscular every 8 hours PRN Nausea and/or Vomiting      Allergies    No Known Allergies    Intolerances        REVIEW OF SYSTEMS:  CONSTITUTIONAL: No fever or chills  HEENT:  No headache, no sore throat  RESPIRATORY: No cough, wheezing, or shortness of breath  CARDIOVASCULAR: No chest pain, palpitations  GASTROINTESTINAL: No abd pain, nausea, vomiting, +diarrhea  GENITOURINARY: No dysuria, frequency, or hematuria  NEUROLOGICAL: no focal weakness or dizziness  MUSCULOSKELETAL: no myalgias     Vital Signs Last 24 Hrs  T(C): 36.8 (12 Feb 2025 05:33), Max: 36.9 (11 Feb 2025 07:58)  T(F): 98.3 (12 Feb 2025 05:33), Max: 98.5 (11 Feb 2025 11:54)  HR: 96 (12 Feb 2025 05:33) (75 - 97)  BP: 120/79 (12 Feb 2025 05:33) (108/68 - 125/86)  BP(mean): 103 (11 Feb 2025 14:00) (74 - 103)  RR: 18 (12 Feb 2025 05:33) (13 - 25)  SpO2: 92% (12 Feb 2025 05:33) (92% - 100%)    Parameters below as of 12 Feb 2025 05:33  Patient On (Oxygen Delivery Method): room air        PHYSICAL EXAM:  GENERAL: NAD  HEENT:  anicteric, moist mucous membranes  CHEST/LUNG:  CTA b/l, no rales, wheezes, or rhonchi  HEART:  RRR, S1, S2 , no tachy   ABDOMEN:  BS+, soft, nontender, nondistended  EXTREMITIES: no edema, cyanosis, or calf tenderness  NERVOUS SYSTEM: answers questions and follows commands appropriately  gu intact   LABS:                        8.9    0.29  )-----------( 13       ( 11 Feb 2025 08:45 )             26.7     CBC Full  -  ( 11 Feb 2025 08:45 )  WBC Count : 0.29 K/uL  Hemoglobin : 8.9 g/dL  Hematocrit : 26.7 %  Platelet Count - Automated : 13 K/uL  Mean Cell Volume : 89.9 fl  Mean Cell Hemoglobin : 30.0 pg  Mean Cell Hemoglobin Concentration : 33.3 g/dL  Auto Neutrophil # : x  Auto Lymphocyte # : x  Auto Monocyte # : x  Auto Eosinophil # : x  Auto Basophil # : x  Auto Neutrophil % : x  Auto Lymphocyte % : x  Auto Monocyte % : x  Auto Eosinophil % : x  Auto Basophil % : x      Ca    8.7        11 Feb 2025 06:39        Urinalysis Basic - ( 11 Feb 2025 06:39 )    Color: x / Appearance: x / SG: x / pH: x  Gluc: 108 mg/dL / Ketone: x  / Bili: x / Urobili: x   Blood: x / Protein: x / Nitrite: x   Leuk Esterase: x / RBC: x / WBC x   Sq Epi: x / Non Sq Epi: x / Bacteria: x      CAPILLARY BLOOD GLUCOSE            Urinalysis with Rflx Culture (collected 02-09-25 @ 01:25)    Culture - Urine (collected 02-09-25 @ 01:25)  Source: Clean Catch  Final Report (02-11-25 @ 08:39):    50,000 - 99,000 CFU/mL Pseudomonas aeruginosa  Organism: Pseudomonas aeruginosa (02-11-25 @ 08:39)  Organism: Pseudomonas aeruginosa (02-11-25 @ 08:39)      Method Type: REJI      -  Amikacin: S <=16      -  Aztreonam: S <=4      -  Cefepime: S <=2      -  Ceftazidime: S <=1      -  Ciprofloxacin: S <=0.25      -  Imipenem: S 2      -  Levofloxacin: S <=0.5      -  Meropenem: S <=1      -  Piperacillin/Tazobactam: S <=8    Culture - Blood (collected 02-08-25 @ 22:00)  Source: .Blood BLOOD  Gram Stain (02-09-25 @ 19:04):    Growth in aerobic bottle: Gram Negative Rods  Final Report (02-11-25 @ 07:53):    Growth in aerobic bottle: Pseudomonas aeruginosa    Direct identification is available within approximately 3-5    hours either by Blood Panel Multiplexed PCR or Direct    MALDI-TOF. Details: [email or web address]  Organism: Blood Culture PCR  Pseudomonas aeruginosa (02-11-25 @ 07:53)  Organism: Pseudomonas aeruginosa (02-11-25 @ 07:53)      Method Type: REJI      -  Aztreonam: S <=4      -  Cefepime: S <=2      -  Ceftazidime: S <=1      -  Ciprofloxacin: S <=0.25      -  Imipenem: S <=1      -  Levofloxacin: S <=0.5      -  Meropenem: S <=1      -  Piperacillin/Tazobactam: S <=8  Organism: Blood Culture PCR (02-11-25 @ 07:53)      Method Type: PCR      -  Pseudomonas aeruginosa: Detec    Culture - Blood (collected 02-08-25 @ 21:55)  Source: .Blood BLOOD  Gram Stain (02-09-25 @ 19:32):    Growth in aerobic bottle: Gram Negative Rods  Final Report (02-11-25 @ 07:54):    Growth in aerobic bottle: Pseudomonas aeruginosa    See previous culture [id number]        RADIOLOGY & ADDITIONAL TESTS: ____    Personally reviewed.     Consultant(s) Notes Reviewed:  [x] YES  [ ] NO

## 2025-02-12 NOTE — CONSULT NOTE ADULT - CONSULT REQUESTED DATE/TIME
09-Feb-2025 12:55
10-Feb-2025 12:31
09-Feb-2025 01:43
09-Feb-2025 13:12
10-Feb-2025 09:24
12-Feb-2025 09:32

## 2025-02-12 NOTE — CONSULT NOTE ADULT - CONSULT REASON
SVT
Septic Shock
diarrhea
fever
pancreas ca, on chemo, pancytopenia, neutropenic fever
Abdominal wall tumor.

## 2025-02-12 NOTE — PROGRESS NOTE ADULT - ASSESSMENT
IMPRESSION  68 yo woman w hx squamous cell ca of lip, cyclical vomiting syndrome, HTN, HLD, gout, GERD, CVA on Aggrenox, pancreas adenocarcinoma w umbilicus region involvement (Dx'd ~6 wks ago, under care Dr Pedro TERRY in [facility], post 2 doses QOW FOLFIRINOX  w daily po trial med [facility] [id number], last dose IV chemo 4dagok last dose po 2 days ago)  Pt reports had had nausea, vomit, diarrhea since start of chemo, also req IV hydration. has abdomen pain w diarrhea  Stool is s/t liquid, mucous.  In the ER:  Vitals: T 100.4F oral  /75--->70s-80s/50s RR 16 98% RA  Labs: WBC 0.18 H/H 8.9/28.4 PLT 64 PTT 20.8 PT/INR 19/1.63 Cl 118 CO2 16 BUN/Cr 32/1.5 Glu 167 GFR 37 Lac 3.6  UA small Leuk, negative nitrite, 5-10 WBC 02-2 RBC, bacteria  CT c/a/p no contrast-left 10th/poss 9th rib fx unknown age, , kuldeep low attenuation renal lesions up to 6cm left and 4cm right, 3mm right renal stone. No sig bowel or pulm abnormality described. Prom soft tissue insep from body of pancreas and sep prom in region of umbilicus  On exam ?area of abdominal wall/chest cellulitis  started on Levophed, post Vanco, on Zosyn on adm    -recent dx of pancreas ca w umbilical soft tissue met, on IV FOLFIRNIOX w [place] [facility] [place], has had N/V/D since start of chemo, adm w worse sx    -found w neutropenic fever, pancytopenia-suspect chemo effect  -started on GCSF since admission  -no sig change in WBC. Anemia and thrombocytopenia continue to worsen. Reflect continued maninder from chemo, +/- exacerbation by acute illness  -continue present care  -follow serial, CBC    -transfuse plts if <10k or symptomatic/as clinically indicated  -transfuse PRBC if Hgb <7 or symptomatic/as clnically indicated  -supportive care from Heme/Onc standpoint      RECOMEMNDATIONS    Pancreatic Cancer  chemo on hold    Neutropenic Fever  Cont Zarxio  follow cultures  on IV abx per infectious diseases    Thrombocytopenia  s/p SDP txfusion overnight  follow plts  transfuse as indicated    Face Rash  trial clotrimazole topically    Hx CVA  hold aggrenox.  Resume when plts better.     risk and benefits per medicine    discussed w pt  discussed w ICU team IMPRESSION  68 yo woman w hx squamous cell ca of lip, cyclical vomiting syndrome, HTN, HLD, gout, GERD, CVA on Aggrenox, pancreas adenocarcinoma w umbilicus region involvement (Dx'd ~6 wks ago, under care Dr Pedro TERRY in [facility], post 2 doses QOW FOLFIRINOX  w daily po trial med [facility] [place]-[id number], last dose IV chemo 4dagok last dose po 2 days ago)  Pt reports had had nausea, vomit, diarrhea since start of chemo, also req IV hydration. has abdomen pain w diarrhea  Stool is s/t liquid, mucous.  In the ER:  Vitals: T 100.4F oral  /75--->70s-80s/50s RR 16 98% RA  Labs: WBC 0.18 H/H 8.9/28.4 PLT 64 PTT 20.8 PT/INR 19/1.63 Cl 118 CO2 16 BUN/Cr 32/1.5 Glu 167 GFR 37 Lac 3.6  UA small Leuk, negative nitrite, 5-10 WBC 02-2 RBC, bacteria  CT c/a/p no contrast-left 10th/poss 9th rib fx unknown age, , kuldeep low attenuation renal lesions up to 6cm left and 4cm right, 3mm right renal stone. No sig bowel or pulm abnormality described. Prom soft tissue insep from body of pancreas and sep prom in region of umbilicus  On exam ?area of abdominal wall/chest cellulitis  started on Levophed, post Vanco, on Zosyn on adm    -recent dx of pancreas ca w umbilical soft tissue met, [place] [facility] [place] N/V/D since start of chemo, adm w worse sx    -found w neutropenic fever, pancytopenia-suspect chemo effect  -started on GCSF since admission  -no sig change in WBC. Anemia and thrombocytopenia continue to worsen. Reflect continued maninder from chemo, +/- exacerbation by acute illness  -continue present care  -follow serial, CBC    -transfuse plts if <10k or symptomatic/as clinically indicated  -transfuse PRBC if Hgb <7 or symptomatic/as clnically indicated  -supportive care from Heme/Onc standpoint      RECOMEMNDATIONS    Pancreatic Cancer  chemo on hold    Neutropenic Fever / Pseudomonas  Cont Zarxio  follow cultures  on IV abx per infectious diseases    Thrombocytopenia  s/p SDP txfusion overnight  follow plts  transfuse as indicated    Face Rash  trial clotrimazole topically    Hx CVA  hold aggrenox.  Resume when plts better.     risk and benefits per medicine    discussed w pt  d/w Dr Aparicio, ID IMPRESSION  70 yo woman w hx squamous cell ca of lip, cyclical vomiting syndrome, HTN, HLD, gout, GERD, CVA on Aggrenox, pancreas adenocarcinoma w umbilicus region involvement (Dx'd ~6 wks ago, under care Dr Pedro TERRY in [facility], post 2 doses QOW FOLFIRINOX  w daily po trial med [facility] [id number], last dose IV chemo 4dagok last dose po 2 days ago)  Pt reports had had nausea, vomit, diarrhea since start of chemo, also req IV hydration. has abdomen pain w diarrhea  Stool is s/t liquid, mucous.  In the ER:  Vitals: T 100.4F oral  /75--->70s-80s/50s RR 16 98% RA  Labs: WBC 0.18 H/H 8.9/28.4 PLT 64 PTT 20.8 PT/INR 19/1.63 Cl 118 CO2 16 BUN/Cr 32/1.5 Glu 167 GFR 37 Lac 3.6  UA small Leuk, negative nitrite, 5-10 WBC 02-2 RBC, bacteria  CT c/a/p no contrast-left 10th/poss 9th rib fx unknown age, , kuldeep low attenuation renal lesions up to 6cm left and 4cm right, 3mm right renal stone. No sig bowel or pulm abnormality described. Prom soft tissue insep from body of pancreas and sep prom in region of umbilicus  On exam ?area of abdominal wall/chest cellulitis  started on Levophed, post Vanco, on Zosyn on adm    -recent dx of pancreas ca w umbilical soft tissue met, [place] [facility] [place] had N/V/D since start of chemo, adm w worse sx    -found w neutropenic fever, pancytopenia-suspect chemo effect  -started on GCSF since admission  -no sig change in WBC. Anemia and thrombocytopenia continue to worsen. Reflect continued maninder from chemo, +/- exacerbation by acute illness  -continue present care  -follow serial, CBC    -transfuse plts if <10k or symptomatic/as clinically indicated  -transfuse PRBC if Hgb <7 or symptomatic/as clnically indicated  -supportive care from Heme/Onc standpoint      RECOMEMNDATIONS    Pancreatic Cancer  chemo on hold    Neutropenic Fever / Pseudomonas  Cont Zarxio  follow cultures  on IV abx per infectious diseases    Thrombocytopenia  s/p SDP txfusion overnight  follow plts  transfuse as indicated    Face Rash  trial clotrimazole topically    Hx CVA  hold aggrenox.  Resume when plts better.     risk and benefits per medicine    discussed w pt  d/w Dr Aparicio, ID    d/w Dr Sheikh LYLES    d/w pt  over tele Dr Pleitez  agreed with txfusion PRBC

## 2025-02-13 LAB
ADD ON TEST-SPECIMEN IN LAB: SIGNIFICANT CHANGE UP
ALBUMIN SERPL ELPH-MCNC: 1.1 G/DL — LOW (ref 3.3–5)
ALP SERPL-CCNC: 94 U/L — SIGNIFICANT CHANGE UP (ref 40–120)
ALT FLD-CCNC: 16 U/L — SIGNIFICANT CHANGE UP (ref 12–78)
ANION GAP SERPL CALC-SCNC: 10 MMOL/L — SIGNIFICANT CHANGE UP (ref 5–17)
ANION GAP SERPL CALC-SCNC: 8 MMOL/L — SIGNIFICANT CHANGE UP (ref 5–17)
AST SERPL-CCNC: 11 U/L — LOW (ref 15–37)
BILIRUB SERPL-MCNC: 0.7 MG/DL — SIGNIFICANT CHANGE UP (ref 0.2–1.2)
BUN SERPL-MCNC: 20 MG/DL — SIGNIFICANT CHANGE UP (ref 7–23)
BUN SERPL-MCNC: 21 MG/DL — SIGNIFICANT CHANGE UP (ref 7–23)
C DIFF GDH STL QL: NEGATIVE — SIGNIFICANT CHANGE UP
C DIFF GDH STL QL: SIGNIFICANT CHANGE UP
CALCIUM SERPL-MCNC: 8.4 MG/DL — LOW (ref 8.5–10.1)
CALCIUM SERPL-MCNC: 8.7 MG/DL — SIGNIFICANT CHANGE UP (ref 8.5–10.1)
CHLORIDE SERPL-SCNC: 108 MMOL/L — SIGNIFICANT CHANGE UP (ref 96–108)
CHLORIDE SERPL-SCNC: 109 MMOL/L — HIGH (ref 96–108)
CO2 SERPL-SCNC: 18 MMOL/L — LOW (ref 22–31)
CO2 SERPL-SCNC: 19 MMOL/L — LOW (ref 22–31)
CREAT SERPL-MCNC: 1.02 MG/DL — SIGNIFICANT CHANGE UP (ref 0.5–1.3)
CREAT SERPL-MCNC: 1.04 MG/DL — SIGNIFICANT CHANGE UP (ref 0.5–1.3)
EGFR: 58 ML/MIN/1.73M2 — LOW
EGFR: 58 ML/MIN/1.73M2 — LOW
EGFR: 59 ML/MIN/1.73M2 — LOW
EGFR: 59 ML/MIN/1.73M2 — LOW
GLUCOSE SERPL-MCNC: 117 MG/DL — HIGH (ref 70–99)
GLUCOSE SERPL-MCNC: 144 MG/DL — HIGH (ref 70–99)
HCT VFR BLD CALC: 25.7 % — LOW (ref 34.5–45)
HGB BLD-MCNC: 8.7 G/DL — LOW (ref 11.5–15.5)
IMMATURE PLATELET FRACTION #: 0.7 K/UL — LOW (ref 4.7–11.1)
IMMATURE PLATELET FRACTION %: 2.1 % — SIGNIFICANT CHANGE UP (ref 1.6–4.9)
LACTATE SERPL-SCNC: 1.3 MMOL/L — SIGNIFICANT CHANGE UP (ref 0.7–2)
LDH SERPL L TO P-CCNC: 151 U/L — SIGNIFICANT CHANGE UP (ref 84–241)
MAGNESIUM SERPL-MCNC: 1.6 MG/DL — SIGNIFICANT CHANGE UP (ref 1.6–2.6)
MAGNESIUM SERPL-MCNC: 2.1 MG/DL — SIGNIFICANT CHANGE UP (ref 1.6–2.6)
MCHC RBC-ENTMCNC: 29.3 PG — SIGNIFICANT CHANGE UP (ref 27–34)
MCHC RBC-ENTMCNC: 33.9 G/DL — SIGNIFICANT CHANGE UP (ref 32–36)
MCV RBC AUTO: 86.5 FL — SIGNIFICANT CHANGE UP (ref 80–100)
NRBC # BLD AUTO: 0 K/UL — SIGNIFICANT CHANGE UP (ref 0–0)
NRBC # FLD: 0 K/UL — SIGNIFICANT CHANGE UP (ref 0–0)
PHOSPHATE SERPL-MCNC: 2.3 MG/DL — LOW (ref 2.5–4.5)
PHOSPHATE SERPL-MCNC: 3.5 MG/DL — SIGNIFICANT CHANGE UP (ref 2.5–4.5)
PLATELET # BLD AUTO: 33 K/UL — LOW (ref 150–400)
PMV BLD: 10.9 FL — SIGNIFICANT CHANGE UP (ref 7–13)
POTASSIUM SERPL-MCNC: 2.6 MMOL/L — CRITICAL LOW (ref 3.5–5.3)
POTASSIUM SERPL-MCNC: 2.9 MMOL/L — CRITICAL LOW (ref 3.5–5.3)
POTASSIUM SERPL-SCNC: 2.6 MMOL/L — CRITICAL LOW (ref 3.5–5.3)
POTASSIUM SERPL-SCNC: 2.9 MMOL/L — CRITICAL LOW (ref 3.5–5.3)
PROT SERPL-MCNC: 4.4 GM/DL — LOW (ref 6–8.3)
RBC # BLD: 2.97 M/UL — LOW (ref 3.8–5.2)
RBC # FLD: 13.1 % — SIGNIFICANT CHANGE UP (ref 10.3–14.5)
SODIUM SERPL-SCNC: 137 MMOL/L — SIGNIFICANT CHANGE UP (ref 135–145)
WBC # FLD AUTO: 0.19 K/UL — CRITICAL LOW (ref 3.8–10.5)

## 2025-02-13 PROCEDURE — 99233 SBSQ HOSP IP/OBS HIGH 50: CPT

## 2025-02-13 PROCEDURE — 93970 EXTREMITY STUDY: CPT | Mod: 26

## 2025-02-13 RX ORDER — POTASSIUM CHLORIDE, DEXTROSE MONOHYDRATE AND SODIUM CHLORIDE 150; 5; 900 MG/100ML; G/100ML; MG/100ML
1000 INJECTION, SOLUTION INTRAVENOUS
Refills: 0 | Status: DISCONTINUED | OUTPATIENT
Start: 2025-02-13 | End: 2025-02-15

## 2025-02-13 RX ORDER — MAGNESIUM SULFATE 500 MG/ML
1 SYRINGE (ML) INJECTION ONCE
Refills: 0 | Status: COMPLETED | OUTPATIENT
Start: 2025-02-13 | End: 2025-02-13

## 2025-02-13 RX ORDER — DIPHENHYDRAMINE HYDROCHLORIDE AND LIDOCAINE HYDROCHLORIDE AND ALUMINUM HYDROXIDE AND MAGNESIUM HYDRO
5 KIT
Refills: 0 | Status: DISCONTINUED | OUTPATIENT
Start: 2025-02-13 | End: 2025-02-14

## 2025-02-13 RX ORDER — POTASSIUM PHOSPHATE, MONOBASIC POTASSIUM PHOSPHATE, DIBASIC INJECTION, 236; 224 MG/ML; MG/ML
15 SOLUTION, CONCENTRATE INTRAVENOUS ONCE
Refills: 0 | Status: COMPLETED | OUTPATIENT
Start: 2025-02-13 | End: 2025-02-13

## 2025-02-13 RX ORDER — SODIUM CHLORIDE 9 G/1000ML
1000 INJECTION, SOLUTION INTRAVENOUS
Refills: 0 | Status: COMPLETED | OUTPATIENT
Start: 2025-02-13 | End: 2025-02-13

## 2025-02-13 RX ORDER — DIPHENOXYLATE HYDROCHLORIDE AND ATROPINE SULFATE .025; 2.5 MG/1; MG/1
1 TABLET ORAL
Refills: 0 | Status: DISCONTINUED | OUTPATIENT
Start: 2025-02-13 | End: 2025-02-16

## 2025-02-13 RX ORDER — FILGRASTIM 300 UG/.5ML
480 INJECTION, SOLUTION INTRAVENOUS; SUBCUTANEOUS DAILY
Refills: 0 | Status: COMPLETED | OUTPATIENT
Start: 2025-02-13 | End: 2025-02-15

## 2025-02-13 RX ORDER — ALBUMIN (HUMAN) 12.5 G/50ML
100 INJECTION, SOLUTION INTRAVENOUS EVERY 12 HOURS
Refills: 0 | Status: COMPLETED | OUTPATIENT
Start: 2025-02-13 | End: 2025-02-15

## 2025-02-13 RX ORDER — CEFEPIME 2 G/20ML
2000 INJECTION, POWDER, FOR SOLUTION INTRAVENOUS EVERY 8 HOURS
Refills: 0 | Status: DISCONTINUED | OUTPATIENT
Start: 2025-02-13 | End: 2025-02-15

## 2025-02-13 RX ADMIN — MIDODRINE HYDROCHLORIDE 5 MILLIGRAM(S): 5 TABLET ORAL at 20:59

## 2025-02-13 RX ADMIN — Medication 30 MILLILITER(S): at 09:51

## 2025-02-13 RX ADMIN — OCTREOTIDE ACETATE 150 MICROGRAM(S): 500 INJECTION, SOLUTION INTRAVENOUS; SUBCUTANEOUS at 06:34

## 2025-02-13 RX ADMIN — SODIUM CHLORIDE 125 MILLILITER(S): 9 INJECTION, SOLUTION INTRAVENOUS at 06:39

## 2025-02-13 RX ADMIN — LOPERAMIDE HCL 2 MILLIGRAM(S): 1 SOLUTION ORAL at 12:30

## 2025-02-13 RX ADMIN — LOPERAMIDE HCL 2 MILLIGRAM(S): 1 SOLUTION ORAL at 18:50

## 2025-02-13 RX ADMIN — MIDODRINE HYDROCHLORIDE 5 MILLIGRAM(S): 5 TABLET ORAL at 15:25

## 2025-02-13 RX ADMIN — CEFEPIME 2000 MILLIGRAM(S): 2 INJECTION, POWDER, FOR SOLUTION INTRAVENOUS at 15:25

## 2025-02-13 RX ADMIN — Medication 10 MILLIEQUIVALENT(S): at 12:30

## 2025-02-13 RX ADMIN — Medication 10 MILLIEQUIVALENT(S): at 20:55

## 2025-02-13 RX ADMIN — OCTREOTIDE ACETATE 150 MICROGRAM(S): 500 INJECTION, SOLUTION INTRAVENOUS; SUBCUTANEOUS at 15:25

## 2025-02-13 RX ADMIN — Medication 10 MILLIEQUIVALENT(S): at 09:22

## 2025-02-13 RX ADMIN — Medication 20 MILLIGRAM(S): at 20:55

## 2025-02-13 RX ADMIN — Medication 10 MILLIEQUIVALENT(S): at 15:25

## 2025-02-13 RX ADMIN — Medication 40 MILLIGRAM(S): at 09:22

## 2025-02-13 RX ADMIN — DIPHENOXYLATE HYDROCHLORIDE AND ATROPINE SULFATE 1 TABLET(S): .025; 2.5 TABLET ORAL at 18:50

## 2025-02-13 RX ADMIN — Medication 1 GRAM(S): at 06:35

## 2025-02-13 RX ADMIN — CEFEPIME 2000 MILLIGRAM(S): 2 INJECTION, POWDER, FOR SOLUTION INTRAVENOUS at 20:54

## 2025-02-13 RX ADMIN — Medication 100 GRAM(S): at 09:22

## 2025-02-13 RX ADMIN — Medication 10 MILLIEQUIVALENT(S): at 18:50

## 2025-02-13 RX ADMIN — Medication 10 MILLIEQUIVALENT(S): at 22:52

## 2025-02-13 RX ADMIN — DIPHENOXYLATE HYDROCHLORIDE AND ATROPINE SULFATE 1 TABLET(S): .025; 2.5 TABLET ORAL at 12:30

## 2025-02-13 RX ADMIN — Medication 15 MILLILITER(S): at 09:22

## 2025-02-13 RX ADMIN — POTASSIUM PHOSPHATE, MONOBASIC POTASSIUM PHOSPHATE, DIBASIC INJECTION, 62.5 MILLIMOLE(S): 236; 224 SOLUTION, CONCENTRATE INTRAVENOUS at 15:25

## 2025-02-13 RX ADMIN — OCTREOTIDE ACETATE 150 MICROGRAM(S): 500 INJECTION, SOLUTION INTRAVENOUS; SUBCUTANEOUS at 20:56

## 2025-02-13 RX ADMIN — SODIUM CHLORIDE 100 MILLILITER(S): 9 INJECTION, SOLUTION INTRAVENOUS at 12:30

## 2025-02-13 RX ADMIN — FILGRASTIM 480 MICROGRAM(S): 300 INJECTION, SOLUTION INTRAVENOUS; SUBCUTANEOUS at 12:30

## 2025-02-13 RX ADMIN — Medication 100 MILLIEQUIVALENT(S): at 22:51

## 2025-02-13 RX ADMIN — MIDODRINE HYDROCHLORIDE 5 MILLIGRAM(S): 5 TABLET ORAL at 06:35

## 2025-02-13 RX ADMIN — DIPHENHYDRAMINE HYDROCHLORIDE AND LIDOCAINE HYDROCHLORIDE AND ALUMINUM HYDROXIDE AND MAGNESIUM HYDRO 5 MILLILITER(S): KIT at 18:53

## 2025-02-13 RX ADMIN — POTASSIUM CHLORIDE, DEXTROSE MONOHYDRATE AND SODIUM CHLORIDE 100 MILLILITER(S): 150; 5; 900 INJECTION, SOLUTION INTRAVENOUS at 09:22

## 2025-02-13 RX ADMIN — POTASSIUM CHLORIDE, DEXTROSE MONOHYDRATE AND SODIUM CHLORIDE 100 MILLILITER(S): 150; 5; 900 INJECTION, SOLUTION INTRAVENOUS at 21:36

## 2025-02-13 RX ADMIN — Medication 100 MILLIEQUIVALENT(S): at 21:29

## 2025-02-13 RX ADMIN — LOPERAMIDE HCL 2 MILLIGRAM(S): 1 SOLUTION ORAL at 06:35

## 2025-02-13 RX ADMIN — Medication 1 GRAM(S): at 18:50

## 2025-02-13 RX ADMIN — Medication 1 GRAM(S): at 12:30

## 2025-02-13 NOTE — INPATIENT CERTIFICATION FOR MEDICARE PATIENTS - NS ICMP TWO DAYS INPATIENT
January 9, 2019        Manish Anderson MD  [address] [facility] [place] [address]             [facility] - Neurology  [address]  [place] [address]  Phone: [phone]  Fax: [phone]   Patient: Louisa Quarles   MR Number: [id number]   YOB: 1957   Date of Visit: 1/8/2019       Dear Dr. Anderson:     Louisa Quarles was seen in Neurology clinic for evaluation. Attached you will find relevant portions of my assessment and plan of care.    If you have questions, please do not hesitate to call me. I look forward to following Louisa Quarles along with you.    Sincerely,      Marleni Chappell MD        CC  No Recipients    Enclosure         
Yes
Admission

## 2025-02-13 NOTE — CONSULT NOTE ADULT - SUBJECTIVE AND OBJECTIVE BOX
Patient is a 69y old  Female who presents with a chief complaint of sepsis    HPI:  69-year-old female with h/o recent diagnosis of primary head of pancreatic adenocarcinoma with tumor in umbilicus, chemo-induced pancytopenia, SCC lip, cyclical vomiting syndrome, HTN, HPL, Gout, GERD, CVA on Aggrenox was transferred on 2/12 from [facility] for further care. She was admitted to [facility] with septic shock on 02/09/2025, nausea, vomiting and diarrhea requiring Levophed drip. Patient was being followed by GI, ID, Cardiology and Onc there. Patient has been transferred to  on patient's  (GI Dr. Pleitez) request. She was reported with non-bloody diarrhea x 3 in last 24 hours PTA. She denied abdominal pain, nausea, but has dry heaves. At [place] she was started on Vancomycin and Zosyn, then changed to Cefepime as her blood culture and urine cultures showed Pseudomonas aeruginosa.     PMH: as above  PSH: as above  Meds: per reconciliation sheet, noted below  MEDICATIONS  (STANDING):  cefepime  Injectable. 2000 milliGRAM(s) IV Push every 12 hours  filgrastim-sndz (ZARXIO) Injectable 480 MICROGram(s) SubCutaneous daily  loperamide 2 milliGRAM(s) Oral every 6 hours  magnesium sulfate  IVPB 1 Gram(s) IV Intermittent once  midodrine 5 milliGRAM(s) Oral every 8 hours  multivitamin/minerals/iron Oral Solution (CENTRUM) 15 milliLiter(s) Oral daily  octreotide  Injectable 150 MICROGram(s) SubCutaneous every 8 hours  potassium chloride    Tablet ER 10 milliEquivalent(s) Oral every 2 hours  sodium chloride 0.45% with potassium chloride 20 mEq/L 1000 milliLiter(s) (100 mL/Hr) IV Continuous <Continuous>  sucralfate suspension 1 Gram(s) Oral four times a day    MEDICATIONS  (PRN):  acetaminophen     Tablet .. 650 milliGRAM(s) Oral every 6 hours PRN Temp greater or equal to 38C (100.4F), Mild Pain (1 - 3)  aluminum hydroxide/magnesium hydroxide/simethicone Suspension 30 milliLiter(s) Oral every 4 hours PRN Dyspepsia  melatonin 3 milliGRAM(s) Oral at bedtime PRN Insomnia  ondansetron Injectable 4 milliGRAM(s) IV Push every 8 hours PRN Nausea and/or Vomiting    Allergies    No Known Allergies    Intolerances      Social: no smoking, no alcohol, no illegal drugs; no recent travel, no exposure to TB  FAMILY HISTORY:    no history of premature cardiovascular disease in first degree relatives    ROS: the patient denies fever, no chills, no HA, no seizures, no dizziness, no sore throat, no nasal congestion, no blurry vision, no CP, no palpitations, no SOB, no cough, no abdominal pain, has diarrhea, had N/V, no dysuria, no leg pain, no claudication, no rash, no joint aches, no rectal pain or bleeding, no night sweats  All other systems reviewed and are negative    Vital Signs Last 24 Hrs  T(C): 36.9 (13 Feb 2025 07:47), Max: 37.7 (13 Feb 2025 00:02)  T(F): 98.4 (13 Feb 2025 07:47), Max: 99.8 (13 Feb 2025 00:02)  HR: 98 (13 Feb 2025 07:47) (90 - 108)  BP: 117/68 (13 Feb 2025 07:47) (115/67 - 132/71)  BP(mean): 87 (12 Feb 2025 21:33) (87 - 87)  RR: 16 (13 Feb 2025 07:47) (16 - 18)  SpO2: 100% (13 Feb 2025 07:47) (91% - 100%)    Parameters below as of 13 Feb 2025 07:47  Patient On (Oxygen Delivery Method): room air    PE:    Constitutional:  No acute distress  HEENT: NC/AT, EOMI, PERRLA, conjunctivae clear; ears and nose atraumatic; pharynx benign  Neck: supple; thyroid not palpable  Back: no tenderness  Respiratory: respiratory effort normal; clear to auscultation  Cardiovascular: S1S2 regular, no murmurs  Abdomen: soft, mild periombilical tender, not distended, positive BS; no liver or spleen organomegaly  Genitourinary: no suprapubic tenderness  Lymphatic: no LN palpable  Musculoskeletal: no muscle tenderness, no joint swelling or tenderness  Extremities: no pedal edema  Neurological/ Psychiatric: AxOx3, judgement and insight normal; moving all extremities  Skin: no rashes; no palpable lesions    Labs: all available labs reviewed                        8.7    0.19  )-----------( 33       ( 13 Feb 2025 06:52 )             25.7     02-13    137  |  109[H]  |  20  ----------------------------<  117[H]  2.6[LL]   |  18[L]  |  1.02    Ca    8.7      13 Feb 2025 06:52  Phos  2.3     02-13  Mg     1.6     02-13    TPro  4.4[L]  /  Alb  1.1[L]  /  TBili  0.7  /  DBili  x   /  AST  11[L]  /  ALT  16  /  AlkPhos  94  02-13     LIVER FUNCTIONS - ( 13 Feb 2025 06:52 )  Alb: 1.1 g/dL / Pro: 4.4 gm/dL / ALK PHOS: 94 U/L / ALT: 16 U/L / AST: 11 U/L / GGT: x           Culture - Blood (collected 11 Feb 2025 09:00)  Source: .Blood BLOOD  Preliminary Report (12 Feb 2025 13:01):    No growth at 24 hours    Culture - Blood (collected 11 Feb 2025 08:50)  Source: .Blood BLOOD  Preliminary Report (12 Feb 2025 13:01):    No growth at 24 hours    Urinalysis with Rflx Culture (collected 09 Feb 2025 01:25)    Culture - Urine (collected 09 Feb 2025 01:25)  Source: Clean Catch  Final Report (11 Feb 2025 08:39):    50,000 - 99,000 CFU/mL Pseudomonas aeruginosa  Organism: Pseudomonas aeruginosa (11 Feb 2025 08:39)  Organism: Pseudomonas aeruginosa (11 Feb 2025 08:39)      Method Type: REJI      -  Amikacin: S <=16      -  Aztreonam: S <=4      -  Cefepime: S <=2      -  Ceftazidime: S <=1      -  Ciprofloxacin: S <=0.25      -  Imipenem: S 2      -  Levofloxacin: S <=0.5      -  Meropenem: S <=1      -  Piperacillin/Tazobactam: S <=8    Culture - Blood (collected 08 Feb 2025 22:00)  Source: .Blood BLOOD  Gram Stain (09 Feb 2025 19:04):    Growth in aerobic bottle: Gram Negative Rods  Final Report (11 Feb 2025 07:53):    Growth in aerobic bottle: Pseudomonas aeruginosa    Direct identification is available within approximately 3-5    hours either by Blood Panel Multiplexed PCR or Direct    MALDI-TOF. Details: [email or web address]  Organism: Blood Culture PCR  Pseudomonas aeruginosa (11 Feb 2025 07:53)  Organism: Pseudomonas aeruginosa (11 Feb 2025 07:53)      Method Type: REJI      -  Aztreonam: S <=4      -  Cefepime: S <=2      -  Ceftazidime: S <=1      -  Ciprofloxacin: S <=0.25      -  Imipenem: S <=1      -  Levofloxacin: S <=0.5      -  Meropenem: S <=1      -  Piperacillin/Tazobactam: S <=8  Organism: Blood Culture PCR (11 Feb 2025 07:53)      Method Type: PCR      -  Pseudomonas aeruginosa: Detec    Culture - Blood (collected 08 Feb 2025 21:55)  Source: .Blood BLOOD  Gram Stain (09 Feb 2025 19:32):    Growth in aerobic bottle: Gram Negative Rods  Final Report (11 Feb 2025 07:54):    Growth in aerobic bottle: Pseudomonas aeruginosa    See previous culture [id number]    Radiology: all available radiological tests reviewed    < from: US Abdomen Upper Quadrant Right (02.09.25 @ 14:55) >  Distended gallbladder with layering sludge.  8 mm right renal calculus without hydronephrosis.  Hepatomegaly.  Right renal cyst.  < end of copied text >    < from: CT Abdomen and Pelvis No Cont (02.09.25 @ 03:01) >  1.   Study somewhat limited due to absence of contrast.  2.   Axial images 71-76 of series 301 and concomitant coronal images demonstrate prominent soft tissue inseparable from the body of the pancreas consistent with pancreatic neoplasm.  3.   Some distension of gallbladder could be further evaluated with right upper quadrant sonography. No definite biliary dilatation.  < end of copied text >    Advanced directives addressed: full resuscitation Patient is a 69y old  Female who presents with a chief complaint of sepsis    HPI:  69-year-old female with h/o recent diagnosis of primary head of pancreatic adenocarcinoma with tumor in umbilicus, chemo-induced pancytopenia, SCC lip, cyclical vomiting syndrome, HTN, HPL, Gout, GERD, CVA on Aggrenox was transferred on 2/12 from [facility] for further care. She was admitted to [facility] with septic shock on 02/09/2025, nausea, vomiting and diarrhea requiring Levophed drip. Patient was being followed by GI, ID, Cardiology and Onc there. Patient has been transferred to  on patient's  (GI Dr. Pleitez) request. She was reported with non-bloody diarrhea x 3 in last 24 hours PTA. She denied abdominal pain, nausea, but has dry heaves. At [place] she was started on Vancomycin and Zosyn, then changed to Cefepime as her blood culture and urine cultures showed Pseudomonas aeruginosa.     PMH: as above  PSH: as above  Meds: per reconciliation sheet, noted below  MEDICATIONS  (STANDING):  cefepime  Injectable. 2000 milliGRAM(s) IV Push every 12 hours  filgrastim-sndz (ZARXIO) Injectable 480 MICROGram(s) SubCutaneous daily  loperamide 2 milliGRAM(s) Oral every 6 hours  magnesium sulfate  IVPB 1 Gram(s) IV Intermittent once  midodrine 5 milliGRAM(s) Oral every 8 hours  multivitamin/minerals/iron Oral Solution (CENTRUM) 15 milliLiter(s) Oral daily  octreotide  Injectable 150 MICROGram(s) SubCutaneous every 8 hours  potassium chloride    Tablet ER 10 milliEquivalent(s) Oral every 2 hours  sodium chloride 0.45% with potassium chloride 20 mEq/L 1000 milliLiter(s) (100 mL/Hr) IV Continuous <Continuous>  sucralfate suspension 1 Gram(s) Oral four times a day    MEDICATIONS  (PRN):  acetaminophen     Tablet .. 650 milliGRAM(s) Oral every 6 hours PRN Temp greater or equal to 38C (100.4F), Mild Pain (1 - 3)  aluminum hydroxide/magnesium hydroxide/simethicone Suspension 30 milliLiter(s) Oral every 4 hours PRN Dyspepsia  melatonin 3 milliGRAM(s) Oral at bedtime PRN Insomnia  ondansetron Injectable 4 milliGRAM(s) IV Push every 8 hours PRN Nausea and/or Vomiting    Allergies    No Known Allergies    Intolerances      Social: no smoking, no alcohol, no illegal drugs; no recent travel, no exposure to TB  FAMILY HISTORY:    no history of premature cardiovascular disease in first degree relatives    ROS: the patient denies fever, no chills, no HA, no seizures, no dizziness, no sore throat, no nasal congestion, no blurry vision, no CP, no palpitations, no SOB, no cough, no abdominal pain, has diarrhea, had N/V, no dysuria, no leg pain, no claudication, no rash, no joint aches, no rectal pain or bleeding, no night sweats  All other systems reviewed and are negative    Vital Signs Last 24 Hrs  T(C): 36.9 (13 Feb 2025 07:47), Max: 37.7 (13 Feb 2025 00:02)  T(F): 98.4 (13 Feb 2025 07:47), Max: 99.8 (13 Feb 2025 00:02)  HR: 98 (13 Feb 2025 07:47) (90 - 108)  BP: 117/68 (13 Feb 2025 07:47) (115/67 - 132/71)  BP(mean): 87 (12 Feb 2025 21:33) (87 - 87)  RR: 16 (13 Feb 2025 07:47) (16 - 18)  SpO2: 100% (13 Feb 2025 07:47) (91% - 100%)    Parameters below as of 13 Feb 2025 07:47  Patient On (Oxygen Delivery Method): room air    PE:    Constitutional:  No acute distress  HEENT: NC/AT, EOMI, PERRLA, conjunctivae clear; ears and nose atraumatic; pharynx benign  Neck: supple; thyroid not palpable  Back: no tenderness  Respiratory: respiratory effort normal; clear to auscultation  Cardiovascular: S1S2 regular, no murmurs  Abdomen: soft, mild periombilical tender, not distended, positive BS; no liver or spleen organomegaly  Genitourinary: no suprapubic tenderness  Lymphatic: no LN palpable  Musculoskeletal: no muscle tenderness, no joint swelling or tenderness  Extremities: no pedal edema  Neurological/ Psychiatric: AxOx3, judgement and insight normal; moving all extremities  Skin: no rashes; right lower chest wall dry, scabbed ulcer; no discharge     Labs: all available labs reviewed                        8.7    0.19  )-----------( 33       ( 13 Feb 2025 06:52 )             25.7     02-13    137  |  109[H]  |  20  ----------------------------<  117[H]  2.6[LL]   |  18[L]  |  1.02    Ca    8.7      13 Feb 2025 06:52  Phos  2.3     02-13  Mg     1.6     02-13    TPro  4.4[L]  /  Alb  1.1[L]  /  TBili  0.7  /  DBili  x   /  AST  11[L]  /  ALT  16  /  AlkPhos  94  02-13     LIVER FUNCTIONS - ( 13 Feb 2025 06:52 )  Alb: 1.1 g/dL / Pro: 4.4 gm/dL / ALK PHOS: 94 U/L / ALT: 16 U/L / AST: 11 U/L / GGT: x           Culture - Blood (collected 11 Feb 2025 09:00)  Source: .Blood BLOOD  Preliminary Report (12 Feb 2025 13:01):    No growth at 24 hours    Culture - Blood (collected 11 Feb 2025 08:50)  Source: .Blood BLOOD  Preliminary Report (12 Feb 2025 13:01):    No growth at 24 hours    Urinalysis with Rflx Culture (collected 09 Feb 2025 01:25)    Culture - Urine (collected 09 Feb 2025 01:25)  Source: Clean Catch  Final Report (11 Feb 2025 08:39):    50,000 - 99,000 CFU/mL Pseudomonas aeruginosa  Organism: Pseudomonas aeruginosa (11 Feb 2025 08:39)  Organism: Pseudomonas aeruginosa (11 Feb 2025 08:39)      Method Type: REJI      -  Amikacin: S <=16      -  Aztreonam: S <=4      -  Cefepime: S <=2      -  Ceftazidime: S <=1      -  Ciprofloxacin: S <=0.25      -  Imipenem: S 2      -  Levofloxacin: S <=0.5      -  Meropenem: S <=1      -  Piperacillin/Tazobactam: S <=8    Culture - Blood (collected 08 Feb 2025 22:00)  Source: .Blood BLOOD  Gram Stain (09 Feb 2025 19:04):    Growth in aerobic bottle: Gram Negative Rods  Final Report (11 Feb 2025 07:53):    Growth in aerobic bottle: Pseudomonas aeruginosa    Direct identification is available within approximately 3-5    hours either by Blood Panel Multiplexed PCR or Direct    MALDI-TOF. Details: [email or web address]  Organism: Blood Culture PCR  Pseudomonas aeruginosa (11 Feb 2025 07:53)  Organism: Pseudomonas aeruginosa (11 Feb 2025 07:53)      Method Type: REJI      -  Aztreonam: S <=4      -  Cefepime: S <=2      -  Ceftazidime: S <=1      -  Ciprofloxacin: S <=0.25      -  Imipenem: S <=1      -  Levofloxacin: S <=0.5      -  Meropenem: S <=1      -  Piperacillin/Tazobactam: S <=8  Organism: Blood Culture PCR (11 Feb 2025 07:53)      Method Type: PCR      -  Pseudomonas aeruginosa: Detec    Culture - Blood (collected 08 Feb 2025 21:55)  Source: .Blood BLOOD  Gram Stain (09 Feb 2025 19:32):    Growth in aerobic bottle: Gram Negative Rods  Final Report (11 Feb 2025 07:54):    Growth in aerobic bottle: Pseudomonas aeruginosa    See previous culture [id number]    Radiology: all available radiological tests reviewed    < from: US Abdomen Upper Quadrant Right (02.09.25 @ 14:55) >  Distended gallbladder with layering sludge.  8 mm right renal calculus without hydronephrosis.  Hepatomegaly.  Right renal cyst.  < end of copied text >    < from: CT Abdomen and Pelvis No Cont (02.09.25 @ 03:01) >  1.   Study somewhat limited due to absence of contrast.  2.   Axial images 71-76 of series 301 and concomitant coronal images demonstrate prominent soft tissue inseparable from the body of the pancreas consistent with pancreatic neoplasm.  3.   Some distension of gallbladder could be further evaluated with right upper quadrant sonography. No definite biliary dilatation.  < end of copied text >    Advanced directives addressed: full resuscitation

## 2025-02-13 NOTE — CONSULT NOTE ADULT - SUBJECTIVE AND OBJECTIVE BOX
HPI:   History of Present Illness:    HPI: Patient is a 69-year-old female recent diagnosis of primary pancreatic adenocarcinoma of head with tumor in umbilicus, chemo-induced pancytopenia and significant PMH of SCC lip, cyclical vomiting syndrome, HTN, HPL, Gout, GERD, CVA on Aggrenox and others had been admitted to [facility] with septic shock on 02/09/2025, nausea, vomiting and diarrhea requiring Levophed drip, was transferred out of ICU to regular floor last night.  Patient was being followed by GI, ID, Cardiology and Onc there.  Patient has been transferred to  today on patient's  (GI Dr. Pleitez) request.  At this time, patient c/o non-bloody diarrhea x 3 in last 24 hours.  She denies any abdominal pain, nausea, but has some dry heaves.  She denies any fever, chills, chest pain, palpitation, constipation or dysuria. Initially, she was started on Vancomycin and Zosyn, but now she has been on Cefepime as her blood culture and urine cultures are positive for Pseudomonas aeruginosa. Currently, patient is on Cefepime.  ID had also recommended Flagyl, but patient had refused it, as it causes nausea to her.    Sig labs: WBC 0.45k, N 82%, Hb 7.8, Platelets 59k, Bicarb 19, AG 9, , Cr 0.99, TP 4.6, Alb 1.2, LFTs wnl.  Mg 1.9, PO4 2.4.  Prior C.diff negative.     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      GERD (gastroesophageal reflux disease)      Cyclical vomiting syndrome      CVA (cerebrovascular accident)      H/O squamous cell carcinoma excision          Home Medications:  cefepime 2 g intravenous injection: 2 gram(s) intravenous every 12 hours (12 Feb 2025 12:59)  filgrastim: 480 microgram(s) subcutaneous once a day (12 Feb 2025 12:59)  Lactated Ringers Injection intravenous solution: 150 milligram(s) intravenous every 1 to 2 hours (12 Feb 2025 12:59)  loperamide 2 mg oral capsule: 1 cap(s) orally once (12 Feb 2025 12:59)  midodrine 5 mg oral tablet: 1 tab(s) orally every 8 hours (12 Feb 2025 12:59)  Multiple Vitamins with Minerals oral liquid: 1 orally once a day (12 Feb 2025 12:59)  octreotide 2500 mcg/mL subcutaneous solution: 0.04 milliliter(s) subcutaneous 3 times a day (12 Feb 2025 12:59)  sucralfate 1 g/10 mL oral suspension: 10 milliliter(s) orally 4 times a day (12 Feb 2025 12:59)  trimethobenzamide 100 mg/mL intramuscular solution: 2 milliliter(s) intramuscular every 8 hours As needed Nausea and/or Vomiting (12 Feb 2025 12:59)      MEDICATIONS  (STANDING):  cefepime  Injectable. 2000 milliGRAM(s) IV Push every 8 hours  diphenoxylate/atropine 1 Tablet(s) Oral four times a day  filgrastim-sndz (ZARXIO) Injectable 480 MICROGram(s) SubCutaneous daily  loperamide 2 milliGRAM(s) Oral every 6 hours  magnesium sulfate  IVPB 1 Gram(s) IV Intermittent once  midodrine 5 milliGRAM(s) Oral every 8 hours  multivitamin/minerals/iron Oral Solution (CENTRUM) 15 milliLiter(s) Oral daily  octreotide  Injectable 150 MICROGram(s) SubCutaneous every 8 hours  potassium chloride    Tablet ER 10 milliEquivalent(s) Oral every 2 hours  sodium chloride 0.45% with potassium chloride 20 mEq/L 1000 milliLiter(s) (100 mL/Hr) IV Continuous <Continuous>  sucralfate suspension 1 Gram(s) Oral four times a day    MEDICATIONS  (PRN):  acetaminophen     Tablet .. 650 milliGRAM(s) Oral every 6 hours PRN Temp greater or equal to 38C (100.4F), Mild Pain (1 - 3)  aluminum hydroxide/magnesium hydroxide/simethicone Suspension 30 milliLiter(s) Oral every 4 hours PRN Dyspepsia  melatonin 3 milliGRAM(s) Oral at bedtime PRN Insomnia  ondansetron Injectable 4 milliGRAM(s) IV Push every 8 hours PRN Nausea and/or Vomiting      Allergies    No Known Allergies    Intolerances        SOCIAL HISTORY:    FAMILY HISTORY:      ROS  As above  Otherwise unremarkable    Vital Signs Last 24 Hrs  T(C): 36.9 (13 Feb 2025 07:47), Max: 37.7 (13 Feb 2025 00:02)  T(F): 98.4 (13 Feb 2025 07:47), Max: 99.8 (13 Feb 2025 00:02)  HR: 98 (13 Feb 2025 07:47) (90 - 108)  BP: 117/68 (13 Feb 2025 07:47) (115/67 - 132/71)  BP(mean): 87 (12 Feb 2025 21:33) (87 - 87)  RR: 16 (13 Feb 2025 07:47) (16 - 18)  SpO2: 100% (13 Feb 2025 07:47) (91% - 100%)    Parameters below as of 13 Feb 2025 07:47  Patient On (Oxygen Delivery Method): room air        Constitutional: NAD, well-developed  Respiratory: CTAB  Cardiovascular: S1 and S2, RRR  Gastrointestinal: BS+, soft, NT/ND  Extremities: No peripheral edema  Psychiatric: Normal mood, normal affect  Skin: No rashes    LABS:                        8.7    0.19  )-----------( 33       ( 13 Feb 2025 06:52 )             25.7     02-13    137  |  109[H]  |  20  ----------------------------<  117[H]  2.6[LL]   |  18[L]  |  1.02    Ca    8.7      13 Feb 2025 06:52  Phos  2.3     02-13  Mg     1.6     02-13    TPro  4.4[L]  /  Alb  1.1[L]  /  TBili  0.7  /  DBili  x   /  AST  11[L]  /  ALT  16  /  AlkPhos  94  02-13      LIVER FUNCTIONS - ( 13 Feb 2025 06:52 )  Alb: 1.1 g/dL / Pro: 4.4 gm/dL / ALK PHOS: 94 U/L / ALT: 16 U/L / AST: 11 U/L / GGT: x             RADIOLOGY & ADDITIONAL STUDIES:

## 2025-02-13 NOTE — CONSULT NOTE ADULT - ASSESSMENT
69 year old woman with the above PMH including a recent diagnosis of pancreatic cancer on chemotherapy admitted with pseudomonas sepsis, N&V, diarrhea.  At the present time her cardiac status is stable on midodrine.  Will continue.  To be seen by Dr Arias.  Antibiotics as per ID.  Continue medications for the above GI symptomatology.  Will follow

## 2025-02-13 NOTE — CONSULT NOTE ADULT - ASSESSMENT
Imp:  Diarrhea in setting of chemo and pseudomonas UTI/bacteremia  N/V has resolved  Favor the diarrhea is primarily just chemo induced    Rec:  Will review chemo regimen with Dr. Oakley  Cont octreotide and imodium  Add lomotil 1 qid  Will review plan with Dr. Pleitez as well

## 2025-02-13 NOTE — PROGRESS NOTE ADULT - SUBJECTIVE AND OBJECTIVE BOX
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  Patient was being followed by GI, ID, Cardiology and Onc there.  Patient has been transferred to  today on patient's  (GI Dr. Pleitez) request on 2/12/25   At this time, patient c/o non-bloody diarrhea x 3 in last 24 hours.  She denies any abdominal pain, nausea, but has some dry heaves.  She denies any fever, chills, chest pain, palpitation, constipation or dysuria. Initially, she was started on Vancomycin and Zosyn, but now she has been on Cefepime as her blood culture and urine cultures are positive for Pseudomonas aeruginosa. Currently, patient is on Cefepime.  ID had also recommended Flagyl, but patient had refused it, as it causes nausea to her.  Sig labs: WBC 0.45k, N 82%, Hb 7.8, Platelets 59k, Bicarb 19, AG 9, , Cr 0.99, TP 4.6, Alb 1.2, LFTs wnl. Mg 1.9, PO4 2.4. Prior C.diff negative.    2/13 -  Patient seen and examined at bedside earlier today, poor po intake, + upper extremities edema, + mucosal oral irritation, denies abdominal pain, afebrile, plan discussed    Review of system- Rest of the review of system are negative except mentioned in HPI     Vital sings reviewed for last 24 h  T(C): 37 (02-13-25 @ 15:17), Max: 37.7 (02-13-25 @ 00:02)  HR: 65 (02-13-25 @ 15:17) (65 - 108)  BP: 111/67 (02-13-25 @ 15:17) (111/67 - 132/71)  RR: 18 (02-13-25 @ 15:17) (16 - 18)  SpO2: 98% (02-13-25 @ 15:17) (95% - 100%)      Physical exam:   General : NAD, appear to be of stated age , well groomed   NERVOUS SYSTEM:  Alert & Oriented X3, non- focal exam, Motor Strength 5/5 B/L upper and lower extremities; DTRs 2+ intact and symmetric  HEAD:  Atraumatic, Normocephalic  EYES: EOMI, PERRLA, conjunctiva and sclera clear  HEENT: Moist mucous membranes, Supple neck , No JVD  CHEST: Clear to auscultation bilaterally; No rales, no rhonchi, no wheezing  HEART: Regular rate and rhythm; No murmurs, no rubs or gallops  ABDOMEN: Soft, Non-tender, Non-distended; Bowel sounds present, no guarding , no peritoneal irritation , + periumbilical scan healing  GENITOURINARY- Voiding, no suprapubic tenderness  EXTREMITIES:  2+ Peripheral Pulses, No clubbing, cyanosis,  + LE   edema  MUSCULOSKELETAL:- No muscle tenderness, Muscle tone normal, No joint tenderness, no Joint swelling,  Joint ROM –normal   SKIN-no rash,  + right chest wall wound around 6-7 cm in diameter with erythema around     Labs radiologic and other test : all reviewed and interpreted :                         8.7    0.19  )-----------( 33       ( 13 Feb 2025 06:52 )             25.7     02-13    137  |  109[H]  |  20  ----------------------------<  117[H]  2.6[LL]   |  18[L]  |  1.02    Ca    8.7      13 Feb 2025 06:52  Phos  2.3     02-13  Mg     1.6     02-13    TPro  4.4[L]  /  Alb  1.1[L]  /  TBili  0.7  /  DBili  x   /  AST  11[L]  /  ALT  16  /  AlkPhos  94  02-13        < from: US Duplex Venous Upper Ext Complete, Bilateral (02.13.25 @ 17:35) >  RIGHT:  The right internal jugular, subclavian, axillary and brachial veins are   patent and compressible where applicable.  The basilic vein (superficial   vein) is patent and without thrombus.  The cephalic vein (superficial   vein) is patent and without thrombus.    LEFT:  The left internal jugular, subclavian, axillary and brachial veins are   patent and compressible where applicable.  The basilic vein (superficial   vein) is patent and without thrombus.  The cephalic vein (superficial   vein) is thrombosed at the antecubital fossa.    Doppler examination shows normal spontaneous and phasic flow.    IMPRESSION:  No evidence of deep venous thrombosis in either upper extremity.  Left cephalic vein thrombosis.    < end of copied text >      < from: US Abdomen Upper Quadrant Right (02.09.25 @ 14:55) >  FINDINGS:  Liver: Hepatomegaly measuring up to 18.3 cm.  Bile ducts: Normal caliber. Common bile duct measures 3 mm.  Gallbladder: Distended gallbladder measuring up to 12.4 cm without   shadowingcalculus. Gallbladder sludge noted. No gallbladder wall   thickening.  Pancreas: Visualized portions are within normal limits.  Right kidney: 9.5 cm. No hydronephrosis. 3.9 x 3.0 x 3.2 cm upper pole   cyst. 8 x 4 x 8 mm midpole shadowing calculus.  Ascites: None.  IVC: Visualized portions are within normal limits.    IMPRESSION:  Distended gallbladder with layering sludge.  8 mm right renal calculus without hydronephrosis.  Hepatomegaly.  Right renal cyst.    < end of copied text >      < from: CT Chest No Cont (02.09.25 @ 03:02) >    IMPRESSION:  1.   Study somewhat limited due to absence of contrast.  2.   Central venous catheter on right side with tip in cavoatrial   junction.  3.   Bibasilar atelectasis/scarring right-greater-than-left; can not   exclude  trace pleural effusions. No areas of consolidation. No pneumothorax.  4.   Lucency/deformity is noted involving the posterior aspect of the   left 10 ,  possibly 9th ribs; minimal fractures of indeterminate age in these areas   can  not be excluded.    < end of copied text >  < from: Xray Chest 1 View AP/PA (02.08.25 @ 22:22) >  Patient is rotated towards the left.    Right chest wall implanted IJ port with catheter tip at the SVC/RA   junction.    The cardiomediastinal silhouette is normal and the maureen are not enlarged.   Eventration right hemidiaphragm. There is no focal lung consolidation or   sizable pleural effusion. No significant osseous abnormality.    IMPRESSION:    Unremarkable frontal chest x ray  < from: CT Abdomen and Pelvis No Cont (02.09.25 @ 03:01) >  IMPRESSION:  1.   Study somewhat limited due to absence of contrast.  2.   Axial images 71-76 of series 301 and  concomitant coronal images demonstrate prominent soft tissue inseparable   from  the body of the pancreas consistent with pancreatic neoplasm.  3.   Some distension of gallbladder could be further evaluated with right   upper  quadrant sonography. No definite biliary dilatation.  4.   Other incidental findings as above.    The results of this examination were discussed with Dr. Dangelo at 4:05   a.m.  central standard time on 02/09/2025 by Pavel Morton MD.  They   discussed the mass in the body of the pancreas which  is compatible with patient's known pancreatic neoplasm.    Distended gallbladder could be further evaluated with right upper quadrant  sonography.    Low-attenuation lesions both kidneys nonobstructing calculus right kidney    < end of copied text >    < end of copied text >    RECENT CULTURES:      Cardiac testing : reviewed   EKG <     12 Lead ECG (02.09.25 @ 16:52) >  Ventricular Rate 116 BPM  QTC Calculation(Bazett) 397 ms  * Poor data quality, interpretation may be adversely affected  Sinus tachycardia with premature atrial complexes  Left axis deviation  Low voltage QRS  Inferior infarct (cited on or before 08-FEB-2025)  Anterolateral infarct (cited on or before 08-FEB-2025)  Abnormal ECG  When compared with ECG of 09-FEB-2025 16:51, (Unconfirmed)  premature atrial complexes are now present    < end of copied text >      < from: TTE W or WO Ultrasound Enhancing Agent (02.09.25 @ 16:07) >   1. Technically difficult image quality.   2. Left ventricular systolic function is hyperdynamicwith an ejection fraction of 73 % by Hernandez's method of disks.   3. Mild left ventricular hypertrophy.   4. The right ventricle is not well visualized.   5. Tricuspid aortic valve with normal leaflet excursion. There is mild calcification of the aortic valve leaflets.   6. Mitral valve leaflets have focal calcification.   7. Trace mitral regurgitation.   8. Tricuspid valve was not well visualized.   9. The inferior vena cava is normal in size measuring 1.17 cm in diameter, (normal <2.1cm) with normal inspiratory collapse (normal >50%) consistent with normal right atrial pressure (~3, range 0-5mmHg).  10. Trace pericardial effusion.      < end of copied text >  Procedures :     Devices:     Current medications:  acetaminophen     Tablet .. 650 milliGRAM(s) Oral every 6 hours PRN  aluminum hydroxide/magnesium hydroxide/simethicone Suspension 30 milliLiter(s) Oral every 4 hours PRN  cefepime  Injectable. 2000 milliGRAM(s) IV Push every 8 hours  diphenoxylate/atropine 1 Tablet(s) Oral four times a day  famotidine Injectable 20 milliGRAM(s) IV Push every 12 hours  filgrastim-sndz (ZARXIO) Injectable 480 MICROGram(s) SubCutaneous daily  FIRST- Mouthwash  BLM 5 milliLiter(s) Swish and Spit four times a day PRN  loperamide 2 milliGRAM(s) Oral every 6 hours  melatonin 3 milliGRAM(s) Oral at bedtime PRN  midodrine 5 milliGRAM(s) Oral every 8 hours  octreotide  Injectable 150 MICROGram(s) SubCutaneous every 8 hours  ondansetron Injectable 4 milliGRAM(s) IV Push every 8 hours PRN  pantoprazole  Injectable 40 milliGRAM(s) IV Push daily  potassium chloride    Tablet ER 10 milliEquivalent(s) Oral every 2 hours  sodium chloride 0.45% with potassium chloride 20 mEq/L 1000 milliLiter(s) IV Continuous <Continuous>  sucralfate suspension 1 Gram(s) Oral four times a day

## 2025-02-13 NOTE — PATIENT PROFILE ADULT - FALL HARM RISK - HARM RISK INTERVENTIONS

## 2025-02-13 NOTE — CONSULT NOTE ADULT - ASSESSMENT
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 She [place] Sauced[place] [facility] [place] mFOLFIRINOX. She states her last treatment was approx 1-2 weeks ago. Initially, she was started on Vancomycin and Zosyn, but now she has been on Cefepime as her blood culture and urine cultures are positive for Pseudomonas aeruginosa. Currently, patient is on Cefepime.  She is now admitted to [facility] with continued diarrhea, decreased PO intake, and electrolyte abnormalities.    1) Stage IV pancreatic adenocarcinoma  on trial drug + mFOLFIRINOX [place] [facility] [place] Sauceda  I discussed case with Dr Gerber and Zane, they will consult on patient starting tomorrow  [place] [facility] [place], chemotherapy currently on hold    2) Severe neutropenia  Will start neupogen 480 mcg daily today    3) Electrolyte abnormalities  Will need aggressive electrolyte repletion  ICU consult for closer monitoring given severity of electrolyte depletion    4) Continued diarrhea  consult both GI and ID given recent + urine/blood cx      Thank you for the courtesy of this consultation and we will continue to follow.    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 She [place]ced[place] [facility] [place] plus mFOLFIRINOX. She states her last treatment was approx 1-2 weeks ago. Initially, she was started on Vancomycin and Zosyn, but now she has been on Cefepime as her blood culture and urine cultures are positive for Pseudomonas aeruginosa. Currently, patient is on Cefepime.  She is now admitted to [facility] with continued diarrhea, decreased PO intake, and electrolyte abnormalities.    1) Stage IV pancreatic adenocarcinoma  on [place] drug [place] [facility] [place]  I discussed case with Dr Gerber and Zane, they will consult on patient starting tomorrow  [place] [facility] [place], chemotherapy currently on hold    2) Severe neutropenia  Will start neupogen 480 mcg daily today    3) Electrolyte abnormalities  Will need aggressive electrolyte repletion  ICU consult for closer monitoring given severity of electrolyte depletion    4) Continued diarrhea  consult both GI and ID given recent + urine/blood cx  ? DPD deficiency or ? UGT1A1 deficiency- will need testing for this after dc      Thank you for the courtesy of this consultation and we will continue to follow.    Matty Hernandez MD  [facility]  Cell: [phone]

## 2025-02-13 NOTE — PROGRESS NOTE ADULT - ASSESSMENT
68 y/o female with PMH of CVA on Aggrenox ,  primary pancreatic adenocarcinoma of head with tumor in umbilicus, chemo-induced pancytopenia and significant PMH of SCC lip, cyclical vomiting syndrome, HTN, HPL, Gout, GERD, and others had been admitted to [facility] with septic shock on 02/09/2025, nausea, vomiting and diarrhea requiring Levophed drip, was transferred out of ICU to regular floor last night.  Patient was being followed by GI, ID, Cardiology and Onc there.  Patient has been transferred to  today on patient's  (GI Dr. Pleitez) request on 2/12/25 .  At this time, patient c/o non-bloody diarrhea x 3 in last 24 hours.  She denies any abdominal pain, nausea, but has some dry heaves.  She denies any fever, chills, chest pain, palpitation, constipation or dysuria. Initially, she was started on Vancomycin and Zosyn, but now she has been on Cefepime as her blood culture and urine cultures are positive for Pseudomonas aeruginosa. Currently, patient is on Cefepime.  ID had also recommended Flagyl, but patient had refused it, as it causes nausea to her.    # Recent septic shock due to Pseudomonas bacteremia and UTI.   -Blood culture and urine cultures are positive for Pseudomonas aeruginosa.   - Repeat blood culture (02/11), negative so far.   -Continue Cefepime for now.  Patient had refused Flagyl, and refusing now also.  -Tylenol prn.  -ID consult placed.    # Nausea, vomiting and diarrhea.  # Metabolic acidosis with NAG due to GI loss.  -Likely chemo-induced diarrhea and vomiting. Better than before.  -Prior C. diff neg, GI PCR neg.  Bicarb 19 and AG 9  -C. diff PCR re-ordered per her .  -Continue Zofran.  -Octreotide increased from 100 mcg to 150 mcg SC q8h per her  Dr Pleitez's request.  -Imodium 2 mg q6h for 2 days and hold for constipation.  -GI consult placed.  - 2/13 - added lomotil     # Mucositis, GERD  - magic mouth wash  - carafate   - c/w PPI , add pepcid IV bid    # Cutaneous lesion on RUQ area below right breast, unclear etiology, ? Ecthyma gangrenosum.  -If worse/no improvement, then consider evaluation for biopsy per ID.  - Ecthyma gangrenosum. life-threatening skin infection caused by Pseudomonas aeruginosa bacteria.   - dermatology consult   - wound care     # Primary pancreatic adenocarcinoma with ?mets to umbilicus.  # Chemo-induced pancytopenia.  # Normocytic anemia  # Severe thrombocytopenia  # Neutropenia  -Last chemo about 10 days ago, and on daily experimental RMC-6236 daily: off these medications for now.  -WBC 0.45k, N 82%, Hb 7.8, Platelets 59k,  -C/w ZARXIO 480 mcg SC daily.  -Transfuse PRBC if Hb < 7 gm% and Platelet if Platelet <10k.  -CT chest/abd shows small pericardial effusion, bibasilar atelectasis, no consolidations noted. Small distention of gallbladder, and 2 kidney lesions (right kidney 4cm, and left kidney 6cm)  -Hem/Onc consult placed.     # B/L renal lesions 4 cm right kidney and 6 cm left kidney.  -Incidental finding on CT abd/pelvis.  -Consult urology in am or it can be done as outpatient.    # MALLORY due to dehydration and sepsis.  -Resolved. BUN/Cr 32/1.5 on admission, and now Cr 0.99.  -C/w IV fluid  - Creatinine Trend: 1.02<--, 0.99<--, 1.10<--, 1.40<--, 1.40<--, 1.50<--    # HTN and HPL.  # SVT on 02/09/2025: resolved after Valsalva and Carotid massage.  -Norvasc and Lipitor on hold to septic shock.  -BP stable.  137/71.  -Off levophed; now on oral midodrine.  -Cardiology consult placed with Dr. Clement (per ).    # Gout.  -Was previously on allopurinol but currently off her Allopurinol.    # H/o CVA (cerebrovascular accident).   - Was originally on Plavix per  but because is now taking RMC 6236,  - was told to start Aggrenox instead to avoid DDIs)  - platelets 33     # GERD. -On Sucralfate.    # DVT prophylaxis: SCDs due to severe thrombocytopenia.    Plan of care d/w the patient at bedside and  Dr. Pleitez over phone in detail, and they verbalized understanding.    Dispo - IV abx, IV fluids, C diff, dermatology, wound care consult, PT     68 y/o female with PMH of CVA on Aggrenox ,  primary pancreatic adenocarcinoma of head with tumor in umbilicus, chemo-induced pancytopenia and significant PMH of SCC lip, cyclical vomiting syndrome, HTN, HPL, Gout, GERD, and others had been admitted to [facility] with septic shock on 02/09/2025, nausea, vomiting and diarrhea requiring Levophed drip, was transferred out of ICU to regular floor last night.  Patient was being followed by GI, ID, Cardiology and Onc there.  Patient has been transferred to  today on patient's  (GI Dr. Pleitez) request on 2/12/25 .  At this time, patient c/o non-bloody diarrhea x 3 in last 24 hours.  She denies any abdominal pain, nausea, but has some dry heaves.  She denies any fever, chills, chest pain, palpitation, constipation or dysuria. Initially, she was started on Vancomycin and Zosyn, but now she has been on Cefepime as her blood culture and urine cultures are positive for Pseudomonas aeruginosa. Currently, patient is on Cefepime.  ID had also recommended Flagyl, but patient had refused it, as it causes nausea to her.    # Recent septic shock due to Pseudomonas bacteremia and UTI.   -Blood culture and urine cultures are positive for Pseudomonas aeruginosa.   - Repeat blood culture (02/11), negative so far.   -Continue Cefepime for now.  Patient had refused Flagyl, and refusing now also.  -Tylenol prn.  -ID consult placed.    # Nausea, vomiting and diarrhea.  # Metabolic acidosis with NAG due to GI loss.  -Likely chemo-induced diarrhea and vomiting. Better than before.  -Prior C. diff neg, GI PCR neg.  Bicarb 19 and AG 9  -C. diff PCR re-ordered per her .  -Continue Zofran.  -Octreotide increased from 100 mcg to 150 mcg SC q8h per her  Dr Pleitez's request.  -Imodium 2 mg q6h for 2 days and hold for constipation.  -GI consult placed.  - 2/13 - added lomotil     # Mucositis, GERD  # Severe malnutrition, Hypoalbuminemia 1.1  - magic mouth wash  - carafate   - c/w PPI , add pepcid IV bid  - nutritional consult to start TPN via port     # Cutaneous lesion on RUQ area below right breast, unclear etiology, ? Ecthyma gangrenosum.  -If worse/no improvement, then consider evaluation for biopsy per ID.  - Ecthyma gangrenosum. life-threatening skin infection caused by Pseudomonas aeruginosa bacteria.   - dermatology consult   - wound care     # Primary pancreatic adenocarcinoma with ?mets to umbilicus.  # Chemo-induced pancytopenia.  # Normocytic anemia  # Severe thrombocytopenia  # Neutropenia  -Last chemo about 10 days ago, and on daily experimental [id number] daily: off these medications for now.  -WBC 0.45k, N 82%, Hb 7.8, Platelets 59k,  -C/w ZARXIO 480 mcg SC daily.  -Transfuse PRBC if Hb < 7 gm% and Platelet if Platelet <10k.  -CT chest/abd shows small pericardial effusion, bibasilar atelectasis, no consolidations noted. Small distention of gallbladder, and 2 kidney lesions (right kidney 4cm, and left kidney 6cm)  -Hem/Onc consult placed.     # B/L renal lesions 4 cm right kidney and 6 cm left kidney.  -Incidental finding on CT abd/pelvis.  -Consult urology in am or it can be done as outpatient.    # MALLORY due to dehydration and sepsis.  -Resolved. BUN/Cr 32/1.5 on admission, and now Cr 0.99.  -C/w IV fluid  - Creatinine Trend: 1.02<--, 0.99<--, 1.10<--, 1.40<--, 1.40<--, 1.50<--    # HTN and HPL.  # SVT on 02/09/2025: resolved after Valsalva and Carotid massage.  -Norvasc and Lipitor on hold to septic shock.  -BP stable.  137/71.  -Off levophed; now on oral midodrine.  -Cardiology consult placed with Dr. Clement (per ).    # Gout.  -Was previously on allopurinol but currently off her Allopurinol.    # H/o CVA (cerebrovascular accident).   - Was originally on Plavix per  but because is now taking RMC 6236,  - was told to start Aggrenox instead to avoid DDIs)  - platelets 33     # GERD. -On Sucralfate.    # DVT prophylaxis: SCDs due to severe thrombocytopenia.    Plan of care d/w the patient at bedside and  Dr. Pleitez over phone in detail, and they verbalized understanding.    Dispo - IV abx, IV fluids, C diff, dermatology, wound care consult, PT,  nutritional consult to start TPN via port

## 2025-02-13 NOTE — CONSULT NOTE ADULT - SUBJECTIVE AND OBJECTIVE BOX
Reason for consult: Pancreatic cancer    HPI:       HPI: Patient is a 69-year-old female pmhx of GOUT, HPL, previous CVA, HTN, lip cancer 2021 s/p resection, recent dx of stage 4 pancreas adenocarcinoma presents with diarrhea. Her last PET from 12/31/24 showed hypermetabolic pancreatic body mass abutting/inseparable from posterior wall of belkis stomach and residual hypermetabolic peritoneal mets extending in to the umbilicus. She [place] Sauceda [place] [facility] [place] mFOLFIRINOX. She states her last treatment was approx 1-2 weeks ago. SHe has had uncontrollable diarrhea over the last few days but wrosened in the last 24 hrs. She has also been unable to take anything by PO as it has caused burning in her stomach.  Initially, she was started on Vancomycin and Zosyn, but now she has been on Cefepime as her blood culture and urine cultures are positive for Pseudomonas aeruginosa. Currently, patient is on Cefepime.      She is now admitted to [facility] with continued diarrhea, decreased PO intake, and electrolyte abnormalities.    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 2000 milliGRAM(s) IV Push every 8 hours  diphenoxylate/atropine 1 Tablet(s) Oral four times a day  filgrastim-sndz (ZARXIO) Injectable 480 MICROGram(s) SubCutaneous daily  filgrastim-sndz (ZARXIO) Injectable 480 MICROGram(s) SubCutaneous daily  loperamide 2 milliGRAM(s) Oral every 6 hours  midodrine 5 milliGRAM(s) Oral every 8 hours  octreotide  Injectable 150 MICROGram(s) SubCutaneous every 8 hours  pantoprazole  Injectable 40 milliGRAM(s) IV Push daily  potassium chloride    Tablet ER 10 milliEquivalent(s) Oral every 2 hours  sodium chloride 0.45% with potassium chloride 20 mEq/L 1000 milliLiter(s) (100 mL/Hr) IV Continuous <Continuous>  sodium chloride 0.9% 1000 milliLiter(s) (100 mL/Hr) IV Continuous <Continuous>  sucralfate suspension 1 Gram(s) Oral four times a day    MEDICATIONS  (PRN):  acetaminophen     Tablet .. 650 milliGRAM(s) Oral every 6 hours PRN Temp greater or equal to 38C (100.4F), Mild Pain (1 - 3)  aluminum hydroxide/magnesium hydroxide/simethicone Suspension 30 milliLiter(s) Oral every 4 hours PRN Dyspepsia  FIRST- Mouthwash  BLM 5 milliLiter(s) Swish and Spit four times a day PRN Mouth Care  melatonin 3 milliGRAM(s) Oral at bedtime PRN Insomnia  ondansetron Injectable 4 milliGRAM(s) IV Push every 8 hours PRN Nausea and/or Vomiting      ROS      + nausea  + diarrhea    T(C): 36.9 (02-13-25 @ 07:47), Max: 37.7 (02-13-25 @ 00:02)  HR: 98 (02-13-25 @ 07:47) (90 - 108)  BP: 117/68 (02-13-25 @ 07:47) (115/67 - 132/71)  RR: 16 (02-13-25 @ 07:47) (16 - 18)  SpO2: 100% (02-13-25 @ 07:47) (95% - 100%)  Wt(kg): --    PE  NAD  awake, + lip lesion  abd + umbilical lesion  No rash grossly  FROM                          8.7    0.19  )-----------( 33       ( 13 Feb 2025 06:52 )             25.7       02-13    137  |  109[H]  |  20  ----------------------------<  117[H]  2.6[LL]   |  18[L]  |  1.02    Ca    8.7      13 Feb 2025 06:52  Phos  2.3     02-13  Mg     1.6     02-13    TPro  4.4[L]  /  Alb  1.1[L]  /  TBili  0.7  /  DBili  x   /  AST  11[L]  /  ALT  16  /  AlkPhos  94  02-13

## 2025-02-13 NOTE — CONSULT NOTE ADULT - SUBJECTIVE AND OBJECTIVE BOX
CHIEF COMPLAINT: Patient is a 69y old  Female who presents with a chief complaint of     HPI: HPI:   History of Present Illness:    HPI: Patient is a 69-year-old female recent diagnosis of primary pancreatic adenocarcinoma of head with tumor in umbilicus, chemo-induced pancytopenia and significant PMH of SCC lip, cyclical vomiting syndrome, HTN, HPL, Gout, GERD, CVA on Aggrenox and others had been admitted to [facility] with septic shock on 02/09/2025, nausea, vomiting and diarrhea requiring Levophed drip, was transferred out of ICU to regular floor last night.  Patient was being followed by GI, ID, Cardiology and Onc there.  Patient has been transferred to  today on patient's  (GI Dr. Pleitez) request.  At this time, patient c/o non-bloody diarrhea x 3 in last 24 hours.  She denies any abdominal pain, nausea, but has some dry heaves.  She denies any fever, chills, chest pain, palpitation, constipation or dysuria. Initially, she was started on Vancomycin and Zosyn, but now she has been on Cefepime as her blood culture and urine cultures are positive for Pseudomonas aeruginosa. Currently, patient is on Cefepime.  ID had also recommended Flagyl, but patient had refused it, as it causes nausea to her.    Sig labs: WBC 0.45k, N 82%, Hb 7.8, Platelets 59k, Bicarb 19, AG 9, , Cr 0.99, TP 4.6, Alb 1.2, LFTs wnl.  Mg 1.9, PO4 2.4.  Prior C.diff negative.     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      GERD (gastroesophageal reflux disease)      Cyclical vomiting syndrome      CVA (cerebrovascular accident)      H/O squamous cell carcinoma excision            Soc Hx:     FAMILY HISTORY:      Allergies: Allergies    No Known Allergies    Intolerances          REVIEW OF SYSTEMS:    As above  No chest pain  No lightheadeness or syncope  No leg swelling  No palpitations  No claudication-like symptoms    Vital Signs Last 24 Hrs  T(C): 36.9 (13 Feb 2025 05:40), Max: 37.7 (13 Feb 2025 00:02)  T(F): 98.4 (13 Feb 2025 05:40), Max: 99.8 (13 Feb 2025 00:02)  HR: 93 (13 Feb 2025 05:40) (90 - 108)  BP: 126/69 (13 Feb 2025 05:40) (115/67 - 132/71)  BP(mean): 87 (12 Feb 2025 21:33) (87 - 87)  RR: 18 (12 Feb 2025 21:33) (16 - 18)  SpO2: 95% (13 Feb 2025 05:40) (91% - 97%)    Parameters below as of 13 Feb 2025 05:40  Patient On (Oxygen Delivery Method): room air        I&O's Summary    12 Feb 2025 07:01  -  13 Feb 2025 07:00  --------------------------------------------------------  IN: 0 mL / OUT: 900 mL / NET: -900 mL          PHYSICAL EXAM:   Patient in NAD  Neck: No JVD; Carotids:  2+ without bruits  Respiratory:  Clear to A&P  Cardiovascular: S1 and S2, regular rate and rhythm, no Murmurs, gallops or rubs        MEDICATIONS:  MEDICATIONS  (STANDING):  cefepime  Injectable. 2000 milliGRAM(s) IV Push every 12 hours  filgrastim-sndz (ZARXIO) Injectable 480 MICROGram(s) SubCutaneous daily  lactated ringers. 1000 milliLiter(s) (125 mL/Hr) IV Continuous <Continuous>  loperamide 2 milliGRAM(s) Oral every 6 hours  midodrine 5 milliGRAM(s) Oral every 8 hours  multivitamin/minerals/iron Oral Solution (CENTRUM) 15 milliLiter(s) Oral daily  octreotide  Injectable 150 MICROGram(s) SubCutaneous every 8 hours  sucralfate suspension 1 Gram(s) Oral four times a day      LABS: All Labs Reviewed:  Blood Culture: Organism --  Gram Stain Blood -- Gram Stain --  Specimen Source .Blood BLOOD  Culture-Blood --    Organism --  Gram Stain Blood -- Gram Stain --  Specimen Source .Blood BLOOD  Culture-Blood --    Organism Pseudomonas aeruginosa  Gram Stain Blood -- Gram Stain --  Specimen Source Clean Catch  Culture-Blood --    Organism Blood Culture PCR  Gram Stain Blood -- Gram Stain   Growth in aerobic bottle: Gram Negative Rods  Specimen Source .Blood BLOOD  Culture-Blood --    Organism --  Gram Stain Blood -- Gram Stain   Growth in aerobic bottle: Gram Negative Rods  Specimen Source .Blood BLOOD  Culture-Blood --      BNP   CBC             WBC Count: 0.45 K/uL (02-12 @ 09:15)              Hemoglobin: 7.8 g/dL (02-12 @ 09:15)  Hemoglobin: 8.9 g/dL (02-11 @ 08:45)  Hemoglobin: 9.6 g/dL (02-10 @ 08:46)  Hemoglobin: 10.1 g/dL (02-09 @ 05:41)  Hemoglobin: 8.9 g/dL (02-08 @ 23:27)              Hematocrit: 23.6 % (02-12 @ 09:15)  Hematocrit: 26.7 % (02-11 @ 08:45)  Hematocrit: 29.6 % (02-10 @ 08:46)  Hematocrit: 31.5 % (02-09 @ 05:41)  Hematocrit: 28.4 % (02-08 @ 23:27)              Mean Cell Volume: 90.4 fl (02-12 @ 09:15)  Mean Cell Volume: 89.9 fl (02-11 @ 08:45)  Mean Cell Volume: 91.6 fl (02-10 @ 08:46)  Mean Cell Volume: 92.6 fl (02-09 @ 05:41)  Mean Cell Volume: 93.7 fl (02-08 @ 23:27)              Platelet Count - Automated: 59 K/uL (02-12 @ 09:15)  Platelet Count - Automated: 13 K/uL (02-11 @ 08:45)  Platelet Count - Automated: 25 K/uL (02-10 @ 08:46)  Platelet Count - Automated: 60 K/uL (02-09 @ 05:41)  Platelet Count - Automated: 64 K/uL (02-08 @ 23:27)                            Cardiac markers   Troponin I, High Sensitivity (02.08.25 @ 23:27) Troponin I, High Sensitivity Result: 41.5                                       Chems        Sodium: 140 mmol/L (02-12 @ 09:15)  Sodium: 140 mmol/L (02-11 @ 06:39)  Sodium: 141 mmol/L (02-10 @ 08:46)  Sodium: 146 mmol/L (02-09 @ 05:41)  Sodium: 144 mmol/L (02-08 @ 23:27)          Potassium: 3.5 mmol/L (02-12 @ 09:15)  Potassium: 3.4 mmol/L (02-11 @ 06:39)  Potassium: 3.3 mmol/L (02-10 @ 08:46)  Potassium: 4.3 mmol/L (02-09 @ 05:41)  Potassium: 4.2 mmol/L (02-08 @ 23:27)          Blood Urea Nitrogen: 22 mg/dL (02-12 @ 09:15)  Blood Urea Nitrogen: 23 mg/dL (02-11 @ 06:39)  Blood Urea Nitrogen: 28 mg/dL (02-10 @ 08:46)  Blood Urea Nitrogen: 29 mg/dL (02-09 @ 05:41)  Blood Urea Nitrogen: 32 mg/dL (02-08 @ 23:27)          Creatinine 0.99  Creatinine 1.10  Creatinine 1.40  Creatinine 1.40  Creatinine 1.50          Magnesium: 1.9 mg/dL (02-12 @ 09:15)  Magnesium: 1.8 mg/dL (02-11 @ 06:39)  Magnesium: 2.1 mg/dL (02-10 @ 08:46)  Magnesium: 2.1 mg/dL (02-09 @ 05:41)          Protein Total: 4.6 g/dL (02-12 @ 09:15)  Protein Total: 4.6 g/dL (02-11 @ 06:39)  Protein Total: 5.0 g/dL (02-10 @ 08:46)  Protein Total: 5.0 g/dL (02-09 @ 05:41)  Protein Total: 4.5 g/dL (02-08 @ 23:27)                  Calcium: 8.9 mg/dL (02-12 @ 09:15)  Calcium: 8.7 mg/dL (02-11 @ 06:39)  Calcium: 9.1 mg/dL (02-10 @ 08:46)  Calcium: 9.2 mg/dL (02-09 @ 05:41)  Calcium: 8.5 mg/dL (02-08 @ 23:27)          Phosphorus: 2.4 mg/dL (02-12 @ 09:15)  Phosphorus: 2.3 mg/dL (02-11 @ 06:39)  Phosphorus: 2.9 mg/dL (02-10 @ 08:46)  Phosphorus: 2.9 mg/dL (02-09 @ 05:41)          Bilirubin Total: 0.7 mg/dL (02-12 @ 09:15)  Bilirubin Total: 0.2 mg/dL (02-11 @ 06:39)  Bilirubin Total: 0.5 mg/dL (02-10 @ 08:46)  Bilirubin Total: 0.4 mg/dL (02-09 @ 05:41)  Bilirubin Total: 0.3 mg/dL (02-08 @ 23:27)          Alanine Aminotransferase (ALT/SGPT): 17 U/L (02-12 @ 09:15)  Alanine Aminotransferase (ALT/SGPT): 18 U/L (02-11 @ 06:39)  Alanine Aminotransferase (ALT/SGPT): 25 U/L (02-10 @ 08:46)  Alanine Aminotransferase (ALT/SGPT): 33 U/L (02-09 @ 05:41)  Alanine Aminotransferase (ALT/SGPT): 33 U/L (02-08 @ 23:27)          Aspartate Aminotransferase (AST/SGOT): 8 U/L (02-12 @ 09:15)  Aspartate Aminotransferase (AST/SGOT): 13 U/L (02-11 @ 06:39)  Aspartate Aminotransferase (AST/SGOT): 17 U/L (02-10 @ 08:46)  Aspartate Aminotransferase (AST/SGOT): 23 U/L (02-09 @ 05:41)  Aspartate Aminotransferase (AST/SGOT): 27 U/L (02-08 @ 23:27)                 INR: 1.63 ratio (02-08 @ 23:27)             RADIOLOGY:< from: CT Abdomen and Pelvis No Cont (02.09.25 @ 03:01) >  IMPRESSION:  1.   Study somewhat limited due to absence of contrast.  2.   Central venous catheter on right side with tip in cavoatrial   junction.  3.   Bibasilar atelectasis/scarring right-greater-than-left; can not   exclude  trace pleural effusions. No areas of consolidation. No pneumothorax.  4.   Lucency/deformity is noted involving the posterior aspect of the   left 10 ,  possibly 9th ribs; minimal fractures of indeterminate age in these areas   can  not be excluded.    < end of copied text >  < from: CT Abdomen and Pelvis No Cont (02.09.25 @ 03:01) >  IMPRESSION:  1.   Study somewhat limited due to absence of contrast.  2.   Axial images 71-76 of series 301 and  concomitant coronal images demonstrate prominent soft tissue inseparable   from  the body of the pancreas consistent with pancreatic neoplasm.  3.   Some distension of gallbladder could be further evaluated with right   upper  quadrant sonography. No definite biliary dilatation.  4.   Other incidental findings as above.    < end of copied text >      EKG:  < from: 12 Lead ECG (02.09.25 @ 16:52) >  Diagnosis Line *** Poor data quality, interpretation may be adversely affected  Sinus tachycardia with premature atrial complexes  Left axis deviation  Low voltage QRS  Inferior infarct (cited on or before 08-FEB-2025)  Anterolateral infarct (cited on or before 08-FEB-2025)  Abnormal ECG  When compared with ECG of 09-FEB-2025 16:51, (Unconfirmed)  premature atrial complexes are now present  Confirmed by HAYLEY LANTIGUA ([id number]) on 2/10/2025 10:25:52 PM    < end of copied text >          ECHO:

## 2025-02-13 NOTE — CONSULT NOTE ADULT - ASSESSMENT
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 She was admitted to [facility] with septic shock on 02/09/2025, nausea, vomiting and diarrhea requiring Levophed drip. Patient was being followed by GI, ID, Cardiology and Onc there. Patient has been transferred to  on patient's  (GI Dr. Pleitez) request. She was reported with non-bloody diarrhea x 3 in last 24 hours PTA. She denied abdominal pain, nausea, but has dry heaves. At [place] she was started on Vancomycin and Zosyn, then changed to Cefepime as her blood culture and urine cultures showed Pseudomonas aeruginosa.    #Sepsis with PSAE with initial hypotension.  #Head of pancreas adenocarcinoma on chemotherapy  #Low grade fever  #Absolute neutropenia  #Immunocompromised host  #UTI with PSAE  #Kidney stones  -cultures reviewed  -source of sepsis is likely intraabdominal and/or urinary  -start cefepime 2 gm IV q8h  -reason for abx use and side effects reviewed with patient; monitor BMP   -repeat BC x 2  -oncology evaluation appreciated  -monitor abdomen  -old chart reviewed to assess prior cultures  -monitor temps  -f/u CBC  -supportive care  2. 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 She was admitted to [facility] with septic shock on 02/09/2025, nausea, vomiting and diarrhea requiring Levophed drip. Patient was being followed by GI, ID, Cardiology and Onc there. Patient has been transferred to  on patient's  (GI Dr. Pleitez) request. She was reported with non-bloody diarrhea x 3 in last 24 hours PTA. She denied abdominal pain, nausea, but has dry heaves. At [place] she was started on Vancomycin and Zosyn, then changed to Cefepime as her blood culture and urine cultures showed Pseudomonas aeruginosa.    #Sepsis with PSAE with initial hypotension.  #Head of pancreas adenocarcinoma on chemotherapy  #Low grade fever  #Absolute neutropenia  #Immunocompromised host  #UTI with PSAE  #Kidney stones  #Right lower chest wall dry superficial ulcer ?cause  -cultures reviewed  -stool for CDT is negative   -source of sepsis is likely intraabdominal and/or urinary  -start cefepime 2 gm IV q8h  -reason for abx use and side effects reviewed with patient; monitor BMP   -repeat BC x 2  -oncology evaluation appreciated  -monitor abdomen  -old chart reviewed to assess prior cultures  -monitor temps  -f/u CBC  -supportive care  2. 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 She was admitted to [facility] with septic shock on 02/09/2025, nausea, vomiting and diarrhea requiring Levophed drip. Patient was being followed by GI, ID, Cardiology and Onc there. Patient has been transferred to  on patient's  (GI Dr. Pleitez) request. She was reported with non-bloody diarrhea x 3 in last 24 hours PTA. She denied abdominal pain, nausea, but has dry heaves. At [place] she was started on Vancomycin and Zosyn, then changed to Cefepime as her blood culture and urine cultures showed Pseudomonas aeruginosa.    #Sepsis with PSAE with initial hypotension.  #Head of pancreas adenocarcinoma on chemotherapy  #Low grade fever  #Absolute neutropenia  #Immunocompromised host  #UTI with PSAE  #Kidney stones  #Right lower chest wall dry superficial ulcer ?cause  -cultures reviewed  -has loose stools  -stool testing performed on 2/8 for CDT is negative   -source of sepsis is likely intraabdominal and/or urinary  -concern for infection with multiresistant organisms was raised. Prior cultures reviewed. An epidemiologic assessment was performed. There is a significant risk for resistant microorganisms to spread to family members, and/or healthcare staff. The patient will be placed on neutropenic isolation according to infection control policy. Will reconsider isolation measures based on new culture results and other clinical data as appropriate. Appropriate cultures collected and an appropriate broad spectrum abx therapy was started.  -start cefepime 2 gm IV q8h  -reason for abx use and side effects reviewed with patient; monitor BMP   -GI evaluation  -repeat BC x 2  -oncology evaluation appreciated  -monitor abdomen  -old chart reviewed to assess prior cultures  -monitor temps  -f/u CBC  -supportive care  2. Other issues:   -care per medicine

## 2025-02-14 DIAGNOSIS — R21 RASH AND OTHER NONSPECIFIC SKIN ERUPTION: ICD-10-CM

## 2025-02-14 LAB
ALBUMIN SERPL ELPH-MCNC: 1.5 G/DL — LOW (ref 3.3–5)
ALP SERPL-CCNC: 104 U/L — SIGNIFICANT CHANGE UP (ref 40–120)
ALT FLD-CCNC: 14 U/L — SIGNIFICANT CHANGE UP (ref 12–78)
ANION GAP SERPL CALC-SCNC: 10 MMOL/L — SIGNIFICANT CHANGE UP (ref 5–17)
AST SERPL-CCNC: 8 U/L — LOW (ref 15–37)
BASOPHILS # BLD AUTO: 0 K/UL — SIGNIFICANT CHANGE UP (ref 0–0.2)
BASOPHILS NFR BLD AUTO: 0 % — SIGNIFICANT CHANGE UP (ref 0–2)
BILIRUB DIRECT SERPL-MCNC: 0.4 MG/DL — HIGH (ref 0–0.3)
BILIRUB INDIRECT FLD-MCNC: 0.4 MG/DL — SIGNIFICANT CHANGE UP (ref 0.2–1)
BILIRUB SERPL-MCNC: 0.8 MG/DL — SIGNIFICANT CHANGE UP (ref 0.2–1.2)
BUN SERPL-MCNC: 21 MG/DL — SIGNIFICANT CHANGE UP (ref 7–23)
BUN SERPL-MCNC: 21 MG/DL — SIGNIFICANT CHANGE UP (ref 7–23)
CALCIUM SERPL-MCNC: 8 MG/DL — LOW (ref 8.5–10.1)
CALCIUM SERPL-MCNC: 8.5 MG/DL — SIGNIFICANT CHANGE UP (ref 8.5–10.1)
CHLORIDE SERPL-SCNC: 108 MMOL/L — SIGNIFICANT CHANGE UP (ref 96–108)
CHLORIDE SERPL-SCNC: 111 MMOL/L — HIGH (ref 96–108)
CK SERPL-CCNC: 22 U/L — LOW (ref 26–192)
CO2 SERPL-SCNC: 16 MMOL/L — LOW (ref 22–31)
CO2 SERPL-SCNC: 16 MMOL/L — LOW (ref 22–31)
CREAT SERPL-MCNC: 1.03 MG/DL — SIGNIFICANT CHANGE UP (ref 0.5–1.3)
CREAT SERPL-MCNC: 1.16 MG/DL — SIGNIFICANT CHANGE UP (ref 0.5–1.3)
CRP SERPL-MCNC: 233 MG/ML — HIGH (ref 0–5)
EGFR: 51 ML/MIN/1.73M2 — LOW
EGFR: 51 ML/MIN/1.73M2 — LOW
EGFR: 58 ML/MIN/1.73M2 — LOW
EGFR: 58 ML/MIN/1.73M2 — LOW
EOSINOPHIL # BLD AUTO: 0.04 K/UL — SIGNIFICANT CHANGE UP (ref 0–0.5)
EOSINOPHIL NFR BLD AUTO: 17.4 % — HIGH (ref 0–6)
FERRITIN SERPL-MCNC: 1798 NG/ML — HIGH (ref 13–330)
FOLATE SERPL-MCNC: >20 NG/ML — SIGNIFICANT CHANGE UP
GLUCOSE BLDC GLUCOMTR-MCNC: 133 MG/DL — HIGH (ref 70–99)
GLUCOSE SERPL-MCNC: 134 MG/DL — HIGH (ref 70–99)
GLUCOSE SERPL-MCNC: 141 MG/DL — HIGH (ref 70–99)
HAPTOGLOB SERPL-MCNC: 477 MG/DL — HIGH (ref 34–200)
HCT VFR BLD CALC: 24.2 % — LOW (ref 34.5–45)
HGB BLD-MCNC: 8 G/DL — LOW (ref 11.5–15.5)
IMM GRANULOCYTES # BLD AUTO: 0.01 K/UL — SIGNIFICANT CHANGE UP (ref 0–0.07)
IMM GRANULOCYTES NFR BLD AUTO: 4.3 % — HIGH (ref 0–0.9)
IMMATURE PLATELET FRACTION #: 0.9 K/UL — LOW (ref 4.7–11.1)
IMMATURE PLATELET FRACTION %: 4.5 % — SIGNIFICANT CHANGE UP (ref 1.6–4.9)
IRON SATN MFR SERPL: 29 % — SIGNIFICANT CHANGE UP (ref 14–50)
IRON SATN MFR SERPL: 34 UG/DL — SIGNIFICANT CHANGE UP (ref 30–160)
LACTATE SERPL-SCNC: 1.3 MMOL/L — SIGNIFICANT CHANGE UP (ref 0.7–2)
LG PLATELETS BLD QL AUTO: SLIGHT — SIGNIFICANT CHANGE UP
LYMPHOCYTES # BLD AUTO: 0.15 K/UL — LOW (ref 1–3.3)
LYMPHOCYTES NFR BLD AUTO: 65.2 % — HIGH (ref 13–44)
MAGNESIUM SERPL-MCNC: 2.6 MG/DL — SIGNIFICANT CHANGE UP (ref 1.6–2.6)
MANUAL SMEAR VERIFICATION: SIGNIFICANT CHANGE UP
MCHC RBC-ENTMCNC: 29.2 PG — SIGNIFICANT CHANGE UP (ref 27–34)
MCV RBC AUTO: 88.3 FL — SIGNIFICANT CHANGE UP (ref 80–100)
MONOCYTES # BLD AUTO: 0.01 K/UL — SIGNIFICANT CHANGE UP (ref 0–0.9)
MONOCYTES NFR BLD AUTO: 4.3 % — SIGNIFICANT CHANGE UP (ref 2–14)
NEUTROPHILS # BLD AUTO: 0.02 K/UL — LOW (ref 1.8–7.4)
NEUTROPHILS NFR BLD AUTO: 8.8 % — LOW (ref 43–77)
NRBC # BLD AUTO: 0 K/UL — SIGNIFICANT CHANGE UP (ref 0–0)
NRBC # FLD: 0 K/UL — SIGNIFICANT CHANGE UP (ref 0–0)
NRBC BLD AUTO-RTO: 0 /100 WBCS — SIGNIFICANT CHANGE UP (ref 0–0)
NT-PROBNP SERPL-SCNC: 200 PG/ML — HIGH (ref 0–125)
PHOSPHATE SERPL-MCNC: 3.1 MG/DL — SIGNIFICANT CHANGE UP (ref 2.5–4.5)
PLAT MORPH BLD: ABNORMAL
PLATELET # BLD AUTO: 20 K/UL — CRITICAL LOW (ref 150–400)
PMV BLD: 11.6 FL — SIGNIFICANT CHANGE UP (ref 7–13)
POTASSIUM SERPL-MCNC: 3.3 MMOL/L — LOW (ref 3.5–5.3)
POTASSIUM SERPL-MCNC: 3.4 MMOL/L — LOW (ref 3.5–5.3)
POTASSIUM SERPL-SCNC: 3.3 MMOL/L — LOW (ref 3.5–5.3)
POTASSIUM SERPL-SCNC: 3.4 MMOL/L — LOW (ref 3.5–5.3)
PREALB SERPL-MCNC: 4 MG/DL — LOW (ref 20–40)
PROCALCITONIN SERPL-MCNC: 3.22 NG/ML — HIGH (ref 0.02–0.1)
RBC # BLD: 2.74 M/UL — LOW (ref 3.8–5.2)
RBC # FLD: 13.4 % — SIGNIFICANT CHANGE UP (ref 10.3–14.5)
RBC BLD AUTO: NORMAL — SIGNIFICANT CHANGE UP
SODIUM SERPL-SCNC: 134 MMOL/L — LOW (ref 135–145)
TIBC SERPL-MCNC: 117 UG/DL — LOW (ref 220–430)
UIBC SERPL-MCNC: 83 UG/DL — LOW (ref 110–370)
URATE SERPL-MCNC: 4 MG/DL — SIGNIFICANT CHANGE UP (ref 2.5–7)
VIT B12 SERPL-MCNC: 1457 PG/ML — HIGH (ref 232–1245)
WBC # FLD AUTO: 0.23 K/UL — CRITICAL LOW (ref 3.8–10.5)
WBC MORPHOLOGY: NORMAL — SIGNIFICANT CHANGE UP

## 2025-02-14 PROCEDURE — 99233 SBSQ HOSP IP/OBS HIGH 50: CPT

## 2025-02-14 PROCEDURE — 99222 1ST HOSP IP/OBS MODERATE 55: CPT

## 2025-02-14 RX ORDER — SODIUM/POT/MAG/CALC/CHLOR/ACET 35-20-5MEQ
1 VIAL (ML) INTRAVENOUS
Refills: 0 | Status: DISCONTINUED | OUTPATIENT
Start: 2025-02-14 | End: 2025-02-14

## 2025-02-14 RX ORDER — DIPHENHYDRAMINE HYDROCHLORIDE AND LIDOCAINE HYDROCHLORIDE AND ALUMINUM HYDROXIDE AND MAGNESIUM HYDRO
10 KIT
Refills: 0 | Status: DISCONTINUED | OUTPATIENT
Start: 2025-02-14 | End: 2025-02-17

## 2025-02-14 RX ADMIN — DIPHENOXYLATE HYDROCHLORIDE AND ATROPINE SULFATE 1 TABLET(S): .025; 2.5 TABLET ORAL at 00:15

## 2025-02-14 RX ADMIN — DIPHENOXYLATE HYDROCHLORIDE AND ATROPINE SULFATE 1 TABLET(S): .025; 2.5 TABLET ORAL at 05:59

## 2025-02-14 RX ADMIN — LOPERAMIDE HCL 2 MILLIGRAM(S): 1 SOLUTION ORAL at 11:59

## 2025-02-14 RX ADMIN — Medication 1 EACH: at 23:38

## 2025-02-14 RX ADMIN — LOPERAMIDE HCL 2 MILLIGRAM(S): 1 SOLUTION ORAL at 18:43

## 2025-02-14 RX ADMIN — Medication 20 MILLIGRAM(S): at 22:54

## 2025-02-14 RX ADMIN — DIPHENOXYLATE HYDROCHLORIDE AND ATROPINE SULFATE 1 TABLET(S): .025; 2.5 TABLET ORAL at 18:43

## 2025-02-14 RX ADMIN — CEFEPIME 2000 MILLIGRAM(S): 2 INJECTION, POWDER, FOR SOLUTION INTRAVENOUS at 16:03

## 2025-02-14 RX ADMIN — ALBUMIN (HUMAN) 50 MILLILITER(S): 12.5 INJECTION, SOLUTION INTRAVENOUS at 23:35

## 2025-02-14 RX ADMIN — MIDODRINE HYDROCHLORIDE 5 MILLIGRAM(S): 5 TABLET ORAL at 22:43

## 2025-02-14 RX ADMIN — CEFEPIME 2000 MILLIGRAM(S): 2 INJECTION, POWDER, FOR SOLUTION INTRAVENOUS at 22:43

## 2025-02-14 RX ADMIN — Medication 1 GRAM(S): at 11:59

## 2025-02-14 RX ADMIN — Medication 1 GRAM(S): at 18:43

## 2025-02-14 RX ADMIN — DIPHENOXYLATE HYDROCHLORIDE AND ATROPINE SULFATE 1 TABLET(S): .025; 2.5 TABLET ORAL at 11:59

## 2025-02-14 RX ADMIN — FILGRASTIM 480 MICROGRAM(S): 300 INJECTION, SOLUTION INTRAVENOUS; SUBCUTANEOUS at 11:59

## 2025-02-14 RX ADMIN — OCTREOTIDE ACETATE 150 MICROGRAM(S): 500 INJECTION, SOLUTION INTRAVENOUS; SUBCUTANEOUS at 05:59

## 2025-02-14 RX ADMIN — ALBUMIN (HUMAN) 50 MILLILITER(S): 12.5 INJECTION, SOLUTION INTRAVENOUS at 00:44

## 2025-02-14 RX ADMIN — Medication 40 MILLIGRAM(S): at 11:58

## 2025-02-14 RX ADMIN — Medication 1 GRAM(S): at 23:33

## 2025-02-14 RX ADMIN — OCTREOTIDE ACETATE 150 MICROGRAM(S): 500 INJECTION, SOLUTION INTRAVENOUS; SUBCUTANEOUS at 22:43

## 2025-02-14 RX ADMIN — Medication 20 MILLIGRAM(S): at 11:58

## 2025-02-14 RX ADMIN — Medication 100 MILLIEQUIVALENT(S): at 00:16

## 2025-02-14 RX ADMIN — POTASSIUM CHLORIDE, DEXTROSE MONOHYDRATE AND SODIUM CHLORIDE 100 MILLILITER(S): 150; 5; 900 INJECTION, SOLUTION INTRAVENOUS at 06:11

## 2025-02-14 RX ADMIN — LOPERAMIDE HCL 2 MILLIGRAM(S): 1 SOLUTION ORAL at 06:00

## 2025-02-14 RX ADMIN — LOPERAMIDE HCL 2 MILLIGRAM(S): 1 SOLUTION ORAL at 00:15

## 2025-02-14 RX ADMIN — Medication 100 MILLIEQUIVALENT(S): at 12:05

## 2025-02-14 RX ADMIN — CEFEPIME 2000 MILLIGRAM(S): 2 INJECTION, POWDER, FOR SOLUTION INTRAVENOUS at 05:59

## 2025-02-14 RX ADMIN — OCTREOTIDE ACETATE 150 MICROGRAM(S): 500 INJECTION, SOLUTION INTRAVENOUS; SUBCUTANEOUS at 16:03

## 2025-02-14 RX ADMIN — Medication 100 MILLIEQUIVALENT(S): at 12:42

## 2025-02-14 RX ADMIN — Medication 1 GRAM(S): at 05:59

## 2025-02-14 RX ADMIN — Medication 100 MILLIEQUIVALENT(S): at 15:49

## 2025-02-14 RX ADMIN — ALBUMIN (HUMAN) 50 MILLILITER(S): 12.5 INJECTION, SOLUTION INTRAVENOUS at 12:14

## 2025-02-14 RX ADMIN — MIDODRINE HYDROCHLORIDE 5 MILLIGRAM(S): 5 TABLET ORAL at 06:00

## 2025-02-14 RX ADMIN — Medication 4 GRAM(S): at 18:43

## 2025-02-14 RX ADMIN — DIPHENOXYLATE HYDROCHLORIDE AND ATROPINE SULFATE 1 TABLET(S): .025; 2.5 TABLET ORAL at 23:33

## 2025-02-14 RX ADMIN — Medication 1 GRAM(S): at 00:15

## 2025-02-14 NOTE — PHYSICAL THERAPY INITIAL EVALUATION ADULT - PERTINENT HX OF CURRENT PROBLEM, REHAB EVAL
70 y/o female with PMH of CVA on Aggrenox ,  primary pancreatic adenocarcinoma of head with tumor in umbilicus, chemo-induced pancytopenia and significant PMH of SCC lip, cyclical vomiting syndrome, HTN, HPL, Gout, GERD, and others had been admitted to [facility] with septic shock on 02/09/2025, nausea, vomiting and diarrhea requiring Levophed drip, was transferred out of ICU to regular floor last night.  Patient was being followed by GI, ID, Cardiology and Onc there.  Patient has been transferred to  today on patient's  (GI Dr. Pleitez) request on 2/12/25 .  At this time, patient c/o non-bloody diarrhea x 3 in last 24 hours.  She denies any abdominal pain, nausea, but has some dry heaves.  She denies any fever, chills, chest pain, palpitation, constipation or dysuria. Initially, she was started on Vancomycin and Zosyn, but now she has been on Cefepime as her blood culture and urine cultures are positive for Pseudomonas aeruginosa. Currently, patient is on Cefepime.  ID had also recommended Flagyl, but patient had refused it, as it causes nausea to her.

## 2025-02-14 NOTE — DIETITIAN INITIAL EVALUATION ADULT - ALTERNATE MEANS OF NUTRITION
TPN Recommendations: via implanted infusion port  Total Volume: 1800 mL x 24 hours  125 g  Amino Acids  100 g Dextrose  0 g Lipids 20% pending lipid panel  125 mEq Sodium Chloride  14 mEq Sodium Acetate  0 mmol Sodium Phosphate  11 mEq Potassium Chloride  0 mEq Potassium Acetate  20 mmol Potassium Phosphate  0 mEq Calcium Gluconate (CAPS out of PN solution)   0 mEq Magnesium Sulfate  200 mg Thiamine  1 ml Trace Elements  10 ml MVI    Total Calories   840     (Meets   42 %  of  Estimated Energy needs  and   100 %  Protein needs)

## 2025-02-14 NOTE — DIETITIAN INITIAL EVALUATION ADULT - OTHER INFO
70 y/o F w/ recent diagnosis of primary pancreatic adenocarcinoma of head with tumor in umbilicus, chemo-induced pancytopenia and significant PMH of SCC lip, cyclical vomiting syndrome, HTN, HPL, Gout, GERD, CVA on Aggrenox and others had been admitted to [facility] with septic shock on 02/09/2025, nausea, vomiting and diarrhea requiring Levophed drip, was transferred out of ICU to regular floor last night.  Patient was being followed by GI, ID, Cardiology and Onc there.  Patient has been transferred to  today on patient's  (GI Dr. Pleitez) request.  At this time, patient c/o non-bloody diarrhea x 3 in last 24 hours.  She denies any abdominal pain, nausea, but has some dry heaves.  She denies any fever, chills, chest pain, palpitation, constipation or dysuria. Initially, she was started on Vancomycin and Zosyn, but now she has been on Cefepime as her blood culture and urine cultures are positive for Pseudomonas aeruginosa. Currently, patient is on Cefepime.  ID had also recommended Flagyl, but patient had refused it, as it causes nausea to her. Noted with Metabolic acidosis with NAG due to GI loss. Likely chemo-induced diarrhea and vomiting. Better than before. Cutaneous lesion on RUQ area below right breast, unclear etiology, ? Ecthyma gangrenosum.    Pt transfer from [facility]; seen by RD and met criteria for PCM. Pt reports only being able to tolerate some sips of water and tea at this time. Reports tried eating small bite of mashed banana and felt burning sensation down her throat. Reports #; RD unable to obtain bedscale wt d/t bedscale not working. Admit wt per # note with 1+ and 2+ edema skewing wt. Pt appears overweight, NFPE reveals mild- mod muscle/fat wasting at this time. Recommend to continue with PO diet and encourage intake as tolerated. RD consulted to initiate PN d/t PO intolerance. Will initiate TPN through port. Hyponateremia, hypokalemia noted. Phos WNL. Will start Mg on high end of normal.   147 lb 70 y/o F w/ recent diagnosis of primary pancreatic adenocarcinoma of head with tumor in umbilicus, chemo-induced pancytopenia and significant PMH of SCC lip, cyclical vomiting syndrome, HTN, HPL, Gout, GERD, CVA on Aggrenox and others had been admitted to [facility] with septic shock on 02/09/2025, nausea, vomiting and diarrhea requiring Levophed drip, was transferred out of ICU to regular floor last night.  Patient was being followed by GI, ID, Cardiology and Onc there.  Patient has been transferred to  today on patient's  (GI Dr. Pleitez) request.  At this time, patient c/o non-bloody diarrhea x 3 in last 24 hours.  She denies any abdominal pain, nausea, but has some dry heaves.  She denies any fever, chills, chest pain, palpitation, constipation or dysuria. Initially, she was started on Vancomycin and Zosyn, but now she has been on Cefepime as her blood culture and urine cultures are positive for Pseudomonas aeruginosa. Currently, patient is on Cefepime.  ID had also recommended Flagyl, but patient had refused it, as it causes nausea to her. Noted with Metabolic acidosis with NAG due to GI loss. Likely chemo-induced diarrhea and vomiting. Better than before. Cutaneous lesion on RUQ area below right breast, unclear etiology, ? Ecthyma gangrenosum.    Pt transfer from [facility]; seen by RD and met criteria for PCM. Pt reports only being able to tolerate some sips of water and tea at this time. Reports tried eating small bite of mashed banana and felt burning sensation down her throat. Reports #; RD unable to obtain bedscale wt d/t bedscale not working. Admit wt per # note with 1+ and 2+ edema skewing wt. Pt appears overweight, NFPE reveals mild- mod muscle/fat wasting at this time. Recommend to continue with PO diet and encourage intake as tolerated. RD consulted to initiate PN d/t PO intolerance. Will initiate TPN through port. Hyponateremia, hypokalemia noted. Phos WNL. Will start lytes at standard values and adjust based on tomorrows labs. Mg on high end of normal limits, will keep out of PN bag today and if down trends can add back into PN bag tomorrow. Monitor closely for refeeding syndrome - please obtain BMP, Mg, and Phos levels. Monitor and replete K, Phos, Mg prn if begins to downtrend, especially if IV dextrose started. When nutrition support is initiated, recommend to check refeeding labs BID x 2-3 days upon initiation and then will reevaluate. Consider KPhos suppl BID in effort to supplement low lytes prior to initiating nutrition support. T bili WNL will add trace elements to PN bag. corrected Ca elevated, DO NOT supplement calcium outside of PN bag at this time. Will add MVI and thiamine to the PN bag. No POCT noted, as per PN protocol, POCT required q6 hours to monitor glycemic control; should be maintained between 140- 180 mg/dL. If TG WNL (<400) will add 50g of lipids daily. 125g amino acids, will start at 100g dextrose and increase 50-75g daily until goal of 300g dextrose (GIR 2.85 g/kg/min). Confirm goals of care regarding long term nutrition support.

## 2025-02-14 NOTE — DIETITIAN INITIAL EVALUATION ADULT - NSFNSGIIOFT_GEN_A_CORE
I&O's Detail    13 Feb 2025 07:01  -  14 Feb 2025 07:00  --------------------------------------------------------  IN:  Total IN: 0 mL    OUT:    Indwelling Catheter - Urethral (mL): 550 mL  Total OUT: 550 mL    Total NET: -550 mL

## 2025-02-14 NOTE — PROGRESS NOTE ADULT - SUBJECTIVE AND OBJECTIVE BOX
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 2000 milliGRAM(s) IV Push every 8 hours  diphenoxylate/atropine 1 Tablet(s) Oral four times a day  famotidine Injectable 20 milliGRAM(s) IV Push every 12 hours  filgrastim-sndz (ZARXIO) Injectable 480 MICROGram(s) SubCutaneous daily  loperamide 2 milliGRAM(s) Oral every 6 hours  midodrine 5 milliGRAM(s) Oral every 8 hours  octreotide  Injectable 150 MICROGram(s) SubCutaneous every 8 hours  pantoprazole  Injectable 40 milliGRAM(s) IV Push daily  sodium chloride 0.45% with potassium chloride 20 mEq/L 1000 milliLiter(s) (100 mL/Hr) IV Continuous <Continuous>  sucralfate suspension 1 Gram(s) Oral four times a day    MEDICATIONS  (PRN):  acetaminophen     Tablet .. 650 milliGRAM(s) Oral every 6 hours PRN Temp greater or equal to 38C (100.4F), Mild Pain (1 - 3)  aluminum hydroxide/magnesium hydroxide/simethicone Suspension 30 milliLiter(s) Oral every 4 hours PRN Dyspepsia  FIRST- Mouthwash  BLM 5 milliLiter(s) Swish and Spit four times a day PRN Mouth Care  melatonin 3 milliGRAM(s) Oral at bedtime PRN Insomnia  ondansetron Injectable 4 milliGRAM(s) IV Push every 8 hours PRN Nausea and/or Vomiting      REVIEW OF SYSTEMS:    RESPIRATORY: No shortness of breath  CARDIOVASCULAR: No chest pain  All other review of systems is negative unless indicated above.    Vital Signs Last 24 Hrs  T(C): 36.8 (14 Feb 2025 05:46), Max: 37 (13 Feb 2025 15:17)  T(F): 98.3 (14 Feb 2025 05:46), Max: 98.6 (13 Feb 2025 15:17)  HR: 78 (14 Feb 2025 05:46) (65 - 99)  BP: 121/60 (14 Feb 2025 05:46) (111/67 - 125/72)  BP(mean): --  RR: 17 (14 Feb 2025 05:46) (16 - 18)  SpO2: 95% (14 Feb 2025 05:46) (95% - 100%)    Parameters below as of 14 Feb 2025 05:46  Patient On (Oxygen Delivery Method): room air        PHYSICAL EXAM:    Constitutional: NAD, no thrush  Respiratory: CTAB  Cardiovascular: S1 and S2, RRR  Gastrointestinal: BS+, soft, NT/ND  Extremities: No peripheral edema  Psychiatric: Normal mood, normal affect    LABS:                        8.7    0.19  )-----------( 33       ( 13 Feb 2025 06:52 )             25.7     02-13    135  |  108  |  21  ----------------------------<  144[H]  2.9[LL]   |  19[L]  |  1.04    Ca    8.4[L]      13 Feb 2025 18:46  Phos  3.5     02-13  Mg     2.1     02-13    TPro  4.4[L]  /  Alb  1.1[L]  /  TBili  0.7  /  DBili  x   /  AST  11[L]  /  ALT  16  /  AlkPhos  94  02-13      LIVER FUNCTIONS - ( 13 Feb 2025 06:52 )  Alb: 1.1 g/dL / Pro: 4.4 gm/dL / ALK PHOS: 94 U/L / ALT: 16 U/L / AST: 11 U/L / GGT: x             RADIOLOGY & ADDITIONAL STUDIES:

## 2025-02-14 NOTE — PROGRESS NOTE ADULT - ASSESSMENT
Imp:  I think her symptoms are all primarily related to chemo scott with neutropenia  Suspect she'll feel much better as counts recover    Rec:  Cont current antidiarrheal regimen  TPN to start tonight

## 2025-02-14 NOTE — PROGRESS NOTE ADULT - SUBJECTIVE AND OBJECTIVE BOX
Date of service: 02-14-25 @ 08:28    Lying in bed in NAD  Weak looking  Fever is down  Poor PO intake  Has loose stools    ROS: no fever or chills; denies dizziness, no HA, no SOB or cough, no abdominal pain, no dysuria, no legs pain, no rashes    MEDICATIONS  (STANDING):  albumin human 25% IVPB 100 milliLiter(s) IV Intermittent every 12 hours  cefepime  Injectable. 2000 milliGRAM(s) IV Push every 8 hours  diphenoxylate/atropine 1 Tablet(s) Oral four times a day  famotidine Injectable 20 milliGRAM(s) IV Push every 12 hours  filgrastim-sndz (ZARXIO) Injectable 480 MICROGram(s) SubCutaneous daily  loperamide 2 milliGRAM(s) Oral every 6 hours  midodrine 5 milliGRAM(s) Oral every 8 hours  octreotide  Injectable 150 MICROGram(s) SubCutaneous every 8 hours  pantoprazole  Injectable 40 milliGRAM(s) IV Push daily  potassium chloride  10 mEq/100 mL IVPB 10 milliEquivalent(s) IV Intermittent every 1 hour  sodium chloride 0.45% with potassium chloride 20 mEq/L 1000 milliLiter(s) (100 mL/Hr) IV Continuous <Continuous>  sucralfate suspension 1 Gram(s) Oral four times a day    Vital Signs Last 24 Hrs  T(C): 36.8 (14 Feb 2025 05:46), Max: 37 (13 Feb 2025 15:17)  T(F): 98.3 (14 Feb 2025 05:46), Max: 98.6 (13 Feb 2025 15:17)  HR: 78 (14 Feb 2025 05:46) (65 - 99)  BP: 121/60 (14 Feb 2025 05:46) (111/67 - 125/72)  BP(mean): --  RR: 17 (14 Feb 2025 05:46) (16 - 18)  SpO2: 95% (14 Feb 2025 05:46) (95% - 98%)    Parameters below as of 14 Feb 2025 05:46  Patient On (Oxygen Delivery Method): room air     Physical exam:    Constitutional:  No acute distress  HEENT: NC/AT, EOMI, PERRLA, conjunctivae clear; ears and nose atraumatic; pharynx benign  Neck: supple; thyroid not palpable  Back: no tenderness  Respiratory: respiratory effort normal; clear to auscultation  Cardiovascular: S1S2 regular, no murmurs  Abdomen: soft, mild periombilical tender, not distended, positive BS; no liver or spleen organomegaly  Genitourinary: no suprapubic tenderness  Lymphatic: no LN palpable  Musculoskeletal: no muscle tenderness, no joint swelling or tenderness  Extremities: no pedal edema  Neurological/ Psychiatric: AxOx3, judgement and insight normal; moving all extremities  Skin: no rashes; right lower chest wall dry, scabbed ulcer; no discharge     Labs: reviewed                        8.0    0.23  )-----------( 20       ( 14 Feb 2025 06:50 )             24.2     02-14    134[L]  |  108  |  21  ----------------------------<  141[H]  3.3[L]   |  16[L]  |  1.03    Ca    8.5      14 Feb 2025 06:50  Phos  3.1     02-14  Mg     2.6     02-14    TPro  4.5[L]  /  Alb  1.5[L]  /  TBili  0.8  /  DBili  0.4[H]  /  AST  8[L]  /  ALT  14  /  AlkPhos  104  02-14    C-Reactive Protein: 233.0 mg/mL (02-14-25 @ 06:50)  Ferritin: 1798 ng/mL (02-13-25 @ 18:46)                        8.7    0.19  )-----------( 33       ( 13 Feb 2025 06:52 )             25.7     02-13    137  |  109[H]  |  20  ----------------------------<  117[H]  2.6[LL]   |  18[L]  |  1.02    Ca    8.7      13 Feb 2025 06:52  Phos  2.3     02-13  Mg     1.6     02-13    TPro  4.4[L]  /  Alb  1.1[L]  /  TBili  0.7  /  DBili  x   /  AST  11[L]  /  ALT  16  /  AlkPhos  94  02-13     LIVER FUNCTIONS - ( 13 Feb 2025 06:52 )  Alb: 1.1 g/dL / Pro: 4.4 gm/dL / ALK PHOS: 94 U/L / ALT: 16 U/L / AST: 11 U/L / GGT: x           Culture - Blood (collected 11 Feb 2025 09:00)  Source: .Blood BLOOD  Preliminary Report (12 Feb 2025 13:01):    No growth at 24 hours    Culture - Blood (collected 11 Feb 2025 08:50)  Source: .Blood BLOOD  Preliminary Report (12 Feb 2025 13:01):    No growth at 24 hours    Urinalysis with Rflx Culture (collected 09 Feb 2025 01:25)    Culture - Urine (collected 09 Feb 2025 01:25)  Source: Clean Catch  Final Report (11 Feb 2025 08:39):    50,000 - 99,000 CFU/mL Pseudomonas aeruginosa  Organism: Pseudomonas aeruginosa (11 Feb 2025 08:39)  Organism: Pseudomonas aeruginosa (11 Feb 2025 08:39)      Method Type: REJI      -  Amikacin: S <=16      -  Aztreonam: S <=4      -  Cefepime: S <=2      -  Ceftazidime: S <=1      -  Ciprofloxacin: S <=0.25      -  Imipenem: S 2      -  Levofloxacin: S <=0.5      -  Meropenem: S <=1      -  Piperacillin/Tazobactam: S <=8    Culture - Blood (collected 08 Feb 2025 22:00)  Source: .Blood BLOOD  Gram Stain (09 Feb 2025 19:04):    Growth in aerobic bottle: Gram Negative Rods  Final Report (11 Feb 2025 07:53):    Growth in aerobic bottle: Pseudomonas aeruginosa    Direct identification is available within approximately 3-5    hours either by Blood Panel Multiplexed PCR or Direct    MALDI-TOF. Details: [email or web address]  Organism: Blood Culture PCR  Pseudomonas aeruginosa (11 Feb 2025 07:53)  Organism: Pseudomonas aeruginosa (11 Feb 2025 07:53)      Method Type: REJI      -  Aztreonam: S <=4      -  Cefepime: S <=2      -  Ceftazidime: S <=1      -  Ciprofloxacin: S <=0.25      -  Imipenem: S <=1      -  Levofloxacin: S <=0.5      -  Meropenem: S <=1      -  Piperacillin/Tazobactam: S <=8  Organism: Blood Culture PCR (11 Feb 2025 07:53)      Method Type: PCR      -  Pseudomonas aeruginosa: Detec    Culture - Blood (collected 08 Feb 2025 21:55)  Source: .Blood BLOOD  Gram Stain (09 Feb 2025 19:32):    Growth in aerobic bottle: Gram Negative Rods  Final Report (11 Feb 2025 07:54):    Growth in aerobic bottle: Pseudomonas aeruginosa    See previous culture [id number]    Radiology: all available radiological tests reviewed    < from: US Abdomen Upper Quadrant Right (02.09.25 @ 14:55) >  Distended gallbladder with layering sludge.  8 mm right renal calculus without hydronephrosis.  Hepatomegaly.  Right renal cyst.  < end of copied text >    < from: CT Abdomen and Pelvis No Cont (02.09.25 @ 03:01) >  1.   Study somewhat limited due to absence of contrast.  2.   Axial images 71-76 of series 301 and concomitant coronal images demonstrate prominent soft tissue inseparable from the body of the pancreas consistent with pancreatic neoplasm.  3.   Some distension of gallbladder could be further evaluated with right upper quadrant sonography. No definite biliary dilatation.  < end of copied text >    Advanced directives addressed: full resuscitation

## 2025-02-14 NOTE — DIETITIAN INITIAL EVALUATION ADULT - PERTINENT LABORATORY DATA
02-14    134[L]  |  108  |  21  ----------------------------<  141[H]  3.3[L]   |  16[L]  |  1.03    Ca    8.5      14 Feb 2025 06:50  Phos  3.1     02-14  Mg     2.6     02-14    TPro  4.5[L]  /  Alb  1.5[L]  /  TBili  0.8  /  DBili  0.4[H]  /  AST  8[L]  /  ALT  14  /  AlkPhos  104  02-14

## 2025-02-14 NOTE — DIETITIAN INITIAL EVALUATION ADULT - PERTINENT MEDS FT
MEDICATIONS  (STANDING):  albumin human 25% IVPB 100 milliLiter(s) IV Intermittent every 12 hours  cefepime  Injectable. 2000 milliGRAM(s) IV Push every 8 hours  diphenoxylate/atropine 1 Tablet(s) Oral four times a day  famotidine Injectable 20 milliGRAM(s) IV Push every 12 hours  filgrastim-sndz (ZARXIO) Injectable 480 MICROGram(s) SubCutaneous daily  loperamide 2 milliGRAM(s) Oral every 6 hours  midodrine 5 milliGRAM(s) Oral every 8 hours  octreotide  Injectable 150 MICROGram(s) SubCutaneous every 8 hours  pantoprazole  Injectable 40 milliGRAM(s) IV Push daily  potassium chloride  10 mEq/100 mL IVPB 10 milliEquivalent(s) IV Intermittent every 1 hour  sodium chloride 0.45% with potassium chloride 20 mEq/L 1000 milliLiter(s) (100 mL/Hr) IV Continuous <Continuous>  sucralfate suspension 1 Gram(s) Oral four times a day    MEDICATIONS  (PRN):  acetaminophen     Tablet .. 650 milliGRAM(s) Oral every 6 hours PRN Temp greater or equal to 38C (100.4F), Mild Pain (1 - 3)  aluminum hydroxide/magnesium hydroxide/simethicone Suspension 30 milliLiter(s) Oral every 4 hours PRN Dyspepsia  FIRST- Mouthwash  BLM 5 milliLiter(s) Swish and Spit four times a day PRN Mouth Care  melatonin 3 milliGRAM(s) Oral at bedtime PRN Insomnia  ondansetron Injectable 4 milliGRAM(s) IV Push every 8 hours PRN Nausea and/or Vomiting

## 2025-02-14 NOTE — CONSULT NOTE ADULT - ASSESSMENT
cutaneous lesion- apparently new according to scans;  clinically consistent with ecthyma gangrenosum, which typically is caused by pseudomonas sepsis in immunocompromised patients.    This lesion appears to be resolving, most skin changes appear post inflammatory.  As long as the clinical course continues, treatment with iv antibiotics will be sufficient to treat this lesion.    If the surface ulcerates, cover with telfa/ vaseline/ paper tape;    Please recall if the lesion does not continue to slowly resolve over the next 2-3 weeks.

## 2025-02-14 NOTE — PHYSICAL THERAPY INITIAL EVALUATION ADULT - ACTIVE RANGE OF MOTION EXAMINATION, REHAB EVAL
LE AROM Limited due to weakness in LEs/bilateral upper extremity Active ROM was WFL (within functional limits)

## 2025-02-14 NOTE — PROGRESS NOTE ADULT - ASSESSMENT
68 y/o female with PMH of CVA on Aggrenox ,  primary pancreatic adenocarcinoma of head with tumor in umbilicus, chemo-induced pancytopenia and significant PMH of SCC lip, cyclical vomiting syndrome, HTN, HPL, Gout, GERD, and others had been admitted to [facility] with septic shock on 02/09/2025, nausea, vomiting and diarrhea requiring Levophed drip, was transferred out of ICU to regular floor last night.  Patient was being followed by GI, ID, Cardiology and Onc there.  Patient has been transferred to  today on patient's  (GI Dr. Pleitez) request on 2/12/25 .  At this time, patient c/o non-bloody diarrhea x 3 in last 24 hours.  She denies any abdominal pain, nausea, but has some dry heaves.  She denies any fever, chills, chest pain, palpitation, constipation or dysuria. Initially, she was started on Vancomycin and Zosyn, but now she has been on Cefepime as her blood culture and urine cultures are positive for Pseudomonas aeruginosa. Currently, patient is on Cefepime.  ID had also recommended Flagyl, but patient had refused it, as it causes nausea to her.    # Recent septic shock due to Pseudomonas bacteremia and UTI.   -Blood culture and urine cultures are positive for Pseudomonas aeruginosa.   - Repeat blood culture (02/11), negative so far.   -Continue Cefepime for now.  Patient had refused Flagyl, and refusing now also.  -Tylenol prn.  -ID consult placed.    # Nausea, vomiting and diarrhea.  # Metabolic acidosis with NAG due to GI loss.  -Likely chemo-induced diarrhea and vomiting. Better than before.  -Prior C. diff neg, GI PCR neg.  Bicarb 19 and AG 9  -C. diff PCR re-ordered per her .  -Continue Zofran.  -Octreotide increased from 100 mcg to 150 mcg SC q8h per her  Dr Pleitez's request.  -Imodium 2 mg q6h for 2 days and hold for constipation.  -GI consult placed.  - 2/13 - added lomotil     # Mucositis, GERD  # Severe malnutrition, Hypoalbuminemia 1.1  - magic mouth wash  - carafate   - c/w PPI , add pepcid IV bid  - 2/14 start  TPN via port   - 2/14 - stop magic mouth wash ,  start biotene prn    # Cutaneous lesion on RUQ area below right breast, unclear etiology  # Ecthyma gangrenosum.  -If worse/no improvement, then consider evaluation for biopsy per ID.  - Ecthyma gangrenosum. life-threatening skin infection caused by Pseudomonas aeruginosa bacteria.   - dermatology consult  -  appears to be resolving, most skin changes appear post inflammatory.  - Please recall if the lesion does not continue to slowly resolve over the next 2-3 weeks.   - wound care - If the surface ulcerates, cover with telfa/ vaseline/ paper tape;    # Primary pancreatic adenocarcinoma with ?mets to umbilicus.  # Chemo-induced pancytopenia.  # Normocytic anemia  # Severe thrombocytopenia  # Neutropenia  -Last chemo about 10 days ago, and on daily experimental [id number] daily: off these medications for now.  -WBC 0.45k, N 82%, Hb 7.8, Platelets 59k,  -C/w ZARXIO 480 mcg SC daily.  -Transfuse PRBC if Hb < 7 gm% and Platelet if Platelet <10k.  -CT chest/abd shows small pericardial effusion, bibasilar atelectasis, no consolidations noted. Small distention of gallbladder, and 2 kidney lesions (right kidney 4cm, and left kidney 6cm)   -Hem/Onc consult placed.     # B/L renal lesions 4 cm right kidney and 6 cm left kidney.  -Incidental finding on CT abd/pelvis.  -Consult urology in am or it can be done as outpatient.    # MALLORY due to dehydration and sepsis.  # Hypokalemia   - Resolved. BUN/Cr 32/1.5 on admission, and now Cr 0.99.  - C/w IV fluid  - Creatinine Trend: 1.02<--, 0.99<--, 1.10<--, 1.40<--, 1.40<--, 1.50<--  - replace K     # HTN and HPL.  # SVT on 02/09/2025: resolved after Valsalva and Carotid massage.  -Norvasc and Lipitor on hold to septic shock.  -BP stable.  137/71.  -Off levophed; now on oral midodrine.  -Cardiology consult placed with Dr. Clement (per ).    # Gout.  -Was previously on allopurinol but currently off her Allopurinol.    # H/o CVA (cerebrovascular accident).   - Was originally on Plavix per  but because is now taking [facility] 6236,  - was told to start Aggrenox instead to avoid DDIs)  - platelets 33     # GERD. -On Sucralfate.    # DVT prophylaxis: SCDs due to severe thrombocytopenia.    Plan of care d/w the patient at bedside and  Dr. Pleitez over phone in detail, and they verbalized understanding.    Dispo - IV abx, IV fluids, daily PT,  nutritional consult to start TPN via port

## 2025-02-14 NOTE — DIETITIAN INITIAL EVALUATION ADULT - ORAL INTAKE PTA/DIET HISTORY
Pt lives at home with her ; would shop and cook for the household before she got sick and started chemo. Minimal PO intake x 1 week d/t N/V and diarrhea. Reports was drinking ensure shakes at home but has not be able to tolerate them x ~1 wk. Pt meeting <50% ENN x 1 week

## 2025-02-14 NOTE — DIETITIAN INITIAL EVALUATION ADULT - ADD RECOMMEND
1) Daily weights  2) Strict I & O's - consider dc'ing IVF when PN initiated  3) Daily lyte checks including magnesium and phos - monitor for RFS  4) Weekly triglycerides/LFT checks  5) POCT q6hrs; maintain 140-180mg/dL  6) if on PN > 6 days, consider placing PICC line to meet 100% of nutr needs  7) C/w regular diet and encourage PO intake as tolerated  8) Confirm goals of care regarding long-term nutrition support. Would recommend PEG placement if long-term nutrition support is required as GI is functional.   *will continue to monitor and adjust PN prn*

## 2025-02-14 NOTE — CONSULT NOTE ADULT - SUBJECTIVE AND OBJECTIVE BOX
69-year-old female with h/o recent diagnosis of primary head of pancreatic adenocarcinoma with tumor in umbilicus, chemo-induced pancytopenia, SCC lip, cyclical vomiting syndrome, HTN, HPL, Gout, GERD, CVA on Aggrenox was transferred on 2/12 from [facility] for further care. She was admitted to [facility] with septic shock on 02/09/2025, nausea, vomiting and diarrhea requiring Levophed drip. Patient was being followed by GI, ID, Cardiology and Onc there. Patient has been transferred to  on patient's  (GI Dr. Pleitez) request. She was reported with non-bloody diarrhea x 3 in last 24 hours PTA. She denied abdominal pain, nausea, but has dry heaves. At [place] she was started on Vancomycin and Zosyn, then changed to Cefepime as her blood culture and urine cultures showed Pseudomonas aeruginosa.  Now with new skin lesion under right breast;  according to the patient, mildly painful, not limiting mobility;  lesion is new,         Patient in bed, alert, cooperative with exam;   Right inframammary:  + 4.0 cm subcutaneous nodule, mildly tender, with drying ulceration of skin surface;  not acutely inflamed    Reviewed labs showing pancytopenia, WBC 0.2, blood culture ++ for pseudomonas    CT and sono of abdomen from 2/9 do not show lesion in this area

## 2025-02-14 NOTE — PROGRESS NOTE ADULT - ASSESSMENT
69-year-old female with h/o recent diagnosis of primary head of pancreatic adenocarcinoma with tumor in umbilicus, chemo-induced pancytopenia, SCC lip, cyclical vomiting syndrome, HTN, HPL, Gout, GERD, CVA on Aggrenox was transferred on 2/12 from [facility] for further care. She was admitted to [facility] with septic shock on 02/09/2025, nausea, vomiting and diarrhea requiring Levophed drip. Patient was being followed by GI, ID, Cardiology and Onc there. Patient has been transferred to  on patient's  (GI Dr. Pleitez) request. She was reported with non-bloody diarrhea x 3 in last 24 hours PTA. She denied abdominal pain, nausea, but has dry heaves. At [place] she was started on Vancomycin and Zosyn, then changed to Cefepime as her blood culture and urine cultures showed Pseudomonas aeruginosa.    #Sepsis with PSAE.  #Head of pancreas adenocarcinoma on chemotherapy  #Low grade fever  #Absolute neutropenia  #Immunocompromised host  #UTI with PSAE  #Kidney stones  #Right lower chest wall dry superficial ulcer ?cause  -cultures reviewed  -has loose stools  -stool testing performed on 2/8 for CDT is negative   -repeat stool for CDT is negative   -source of sepsis is likely intraabdominal and/or urinary  -on cefepime 2 gm IV q8h # 2  -tolerating abx well so far; no side effects noted  -GI evaluation appreciated  -repeat BC x 2 collected  -oncology evaluation appreciated  -monitor abdomen  -continue abx coverage  -f/u cultures  -monitor temps  -f/u CBC  -supportive care  2. Other issues:   -care per medicine

## 2025-02-14 NOTE — PROGRESS NOTE ADULT - SUBJECTIVE AND OBJECTIVE BOX
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 with septic shock on 02/09/2025, nausea, vomiting and diarrhea requiring Levophed drip, was transferred out of ICU to regular floor last night.  Patient was being followed by GI, ID, Cardiology and Onc there.  Patient has been transferred to  today on patient's  (GI Dr. Pleitez) request on 2/12/25   At this time, patient c/o non-bloody diarrhea x 3 in last 24 hours.  She denies any abdominal pain, nausea, but has some dry heaves.  She denies any fever, chills, chest pain, palpitation, constipation or dysuria. Initially, she was started on Vancomycin and Zosyn, but now she has been on Cefepime as her blood culture and urine cultures are positive for Pseudomonas aeruginosa. Currently, patient is on Cefepime.  ID had also recommended Flagyl, but patient had refused it, as it causes nausea to her.  Sig labs: WBC 0.45k, N 82%, Hb 7.8, Platelets 59k, Bicarb 19, AG 9, , Cr 0.99, TP 4.6, Alb 1.2, LFTs wnl. Mg 1.9, PO4 2.4. Prior C.diff negative.    2/13 -  Patient seen and examined at bedside earlier today, poor po intake, + upper extremities edema, + mucosal oral irritation, denies abdominal pain, afebrile, plan discussed  2/14 - pt seen and examined in am, denies cp, dyspnea, abdominal pain, + arm swelling persist, + mucosal pain better, diarrhea 5 times in 24 h , afebrile, plan discussed     Review of system- Rest of the review of system are negative except mentioned in HPI    Vital sings reviewed for last 24 h  T(C): 36.4 (02-14-25 @ 15:56), Max: 36.9 (02-13-25 @ 18:57)  T(F): 97.5 (02-14-25 @ 15:56), Max: 98.5 (02-13-25 @ 18:57)  HR: 96 (02-14-25 @ 15:56) (78 - 99)  BP: 137/72 (02-14-25 @ 15:56) (121/60 - 137/72)  RR: 18 (02-14-25 @ 15:56) (17 - 18)  SpO2: 98% (02-14-25 @ 15:56) (95% - 98%)      Physical exam:   General : NAD, appear to be of stated age , well groomed   NERVOUS SYSTEM:  Alert & Oriented X3, non- focal exam, Motor Strength 5/5 B/L upper and lower extremities; DTRs 2+ intact and symmetric  HEAD:  Atraumatic, Normocephalic  EYES: EOMI, PERRLA, conjunctiva and sclera clear  HEENT: Moist mucous membranes, Supple neck , No JVD  CHEST: Clear to auscultation bilaterally; No rales, no rhonchi, no wheezing  HEART: Regular rate and rhythm; No murmurs, no rubs or gallops  ABDOMEN: Soft, Non-tender, Non-distended; Bowel sounds present, no guarding , no peritoneal irritation , + periumbilical scan healing  GENITOURINARY- Voiding, no suprapubic tenderness  EXTREMITIES:  2+ Peripheral Pulses, No clubbing, cyanosis,  + LE   edema  MUSCULOSKELETAL:- No muscle tenderness, Muscle tone normal, No joint tenderness, no Joint swelling,  Joint ROM –normal   SKIN-no rash,  + right chest wall wound around 6-7 cm in diameter with erythema around     Labs radiologic and other test : all reviewed and interpreted :                           8.0    0.23  )-----------( 20       ( 14 Feb 2025 06:50 )             24.2     02-14    134[L]  |  108  |  21  ----------------------------<  141[H]  3.3[L]   |  16[L]  |  1.03    Ca    8.5      14 Feb 2025 06:50  Phos  3.1     02-14  Mg     2.6     02-14    TPro  4.5[L]  /  Alb  1.5[L]  /  TBili  0.8  /  DBili  0.4[H]  /  AST  8[L]  /  ALT  14  /  AlkPhos  104  02-14        LIVER FUNCTIONS - ( 14 Feb 2025 06:50 )  Alb: 1.5 g/dL / Pro: 4.5 gm/dL / ALK PHOS: 104 U/L / ALT: 14 U/L / AST: 8 U/L / GGT: x                    US Duplex Venous Upper Ext Complete, Bilateral (02.13.25 @ 17:35) >  RIGHT:  The right internal jugular, subclavian, axillary and brachial veins are   patent and compressible where applicable.  The basilic vein (superficial   vein) is patent and without thrombus.  The cephalic vein (superficial   vein) is patent and without thrombus.    LEFT:  The left internal jugular, subclavian, axillary and brachial veins are   patent and compressible where applicable.  The basilic vein (superficial   vein) is patent and without thrombus.  The cephalic vein (superficial   vein) is thrombosed at the antecubital fossa.    Doppler examination shows normal spontaneous and phasic flow.    IMPRESSION:  No evidence of deep venous thrombosis in either upper extremity.  Left cephalic vein thrombosis.    < end of copied text >      < from: US Abdomen Upper Quadrant Right (02.09.25 @ 14:55) >  FINDINGS:  Liver: Hepatomegaly measuring up to 18.3 cm.  Bile ducts: Normal caliber. Common bile duct measures 3 mm.  Gallbladder: Distended gallbladder measuring up to 12.4 cm without   shadowingcalculus. Gallbladder sludge noted. No gallbladder wall   thickening.  Pancreas: Visualized portions are within normal limits.  Right kidney: 9.5 cm. No hydronephrosis. 3.9 x 3.0 x 3.2 cm upper pole   cyst. 8 x 4 x 8 mm midpole shadowing calculus.  Ascites: None.  IVC: Visualized portions are within normal limits.    IMPRESSION:  Distended gallbladder with layering sludge.  8 mm right renal calculus without hydronephrosis.  Hepatomegaly.  Right renal cyst.    < end of copied text >      < from: CT Chest No Cont (02.09.25 @ 03:02) >    IMPRESSION:  1.   Study somewhat limited due to absence of contrast.  2.   Central venous catheter on right side with tip in cavoatrial   junction.  3.   Bibasilar atelectasis/scarring right-greater-than-left; can not   exclude  trace pleural effusions. No areas of consolidation. No pneumothorax.  4.   Lucency/deformity is noted involving the posterior aspect of the   left 10 ,  possibly 9th ribs; minimal fractures of indeterminate age in these areas   can  not be excluded.    < end of copied text >  < from: Xray Chest 1 View AP/PA (02.08.25 @ 22:22) >  Patient is rotated towards the left.    Right chest wall implanted IJ port with catheter tip at the SVC/RA   junction.    The cardiomediastinal silhouette is normal and the maureen are not enlarged.   Eventration right hemidiaphragm. There is no focal lung consolidation or   sizable pleural effusion. No significant osseous abnormality.    IMPRESSION:    Unremarkable frontal chest x ray  < from: CT Abdomen and Pelvis No Cont (02.09.25 @ 03:01) >  IMPRESSION:  1.   Study somewhat limited due to absence of contrast.  2.   Axial images 71-76 of series 301 and  concomitant coronal images demonstrate prominent soft tissue inseparable   from  the body of the pancreas consistent with pancreatic neoplasm.  3.   Some distension of gallbladder could be further evaluated with right   upper  quadrant sonography. No definite biliary dilatation.  4.   Other incidental findings as above.    The results of this examination were discussed with Dr. Dangelo at 4:05   a.m.  central standard time on 02/09/2025 by Pavel Morton MD.  They   discussed the mass in the body of the pancreas which  is compatible with patient's known pancreatic neoplasm.    Distended gallbladder could be further evaluated with right upper quadrant  sonography.    Low-attenuation lesions both kidneys nonobstructing calculus right kidney    < end of copied text >    < end of copied text >    RECENT CULTURES:    Culture - Blood (02.11.25 @ 09:00)    Specimen Source: .Blood BLOOD   Culture Results:   No growth at 72 Hours    Culture - Urine (02.09.25 @ 01:25)    -  Levofloxacin: S <=0.5   -  Meropenem: S <=1   -  Piperacillin/Tazobactam: S <=8   -  Amikacin: S <=16   -  Aztreonam: S <=4   -  Cefepime: S <=2   -  Ceftazidime: S <=1   -  Ciprofloxacin: S <=0.25   -  Imipenem: S 2   Specimen Source: Clean Catch   Culture Results:   50,000 - 99,000 CFU/mL Pseudomonas aeruginosa   Organism Identification: Pseudomonas aeruginosa   Organism: Pseudomonas aeruginosa   Method Type: REJI          Cardiac testing : reviewed   EKG <     12 Lead ECG (02.09.25 @ 16:52) >  Ventricular Rate 116 BPM  QTC Calculation(Bazett) 397 ms  * Poor data quality, interpretation may be adversely affected  Sinus tachycardia with premature atrial complexes  Left axis deviation  Low voltage QRS  Inferior infarct (cited on or before 08-FEB-2025)  Anterolateral infarct (cited on or before 08-FEB-2025)  Abnormal ECG  When compared with ECG of 09-FEB-2025 16:51, (Unconfirmed)  premature atrial complexes are now present    < end of copied text >      < from: TTE W or WO Ultrasound Enhancing Agent (02.09.25 @ 16:07) >   1. Technically difficult image quality.   2. Left ventricular systolic function is hyperdynamicwith an ejection fraction of 73 % by Hernandez's method of disks.   3. Mild left ventricular hypertrophy.   4. The right ventricle is not well visualized.   5. Tricuspid aortic valve with normal leaflet excursion. There is mild calcification of the aortic valve leaflets.   6. Mitral valve leaflets have focal calcification.   7. Trace mitral regurgitation.   8. Tricuspid valve was not well visualized.   9. The inferior vena cava is normal in size measuring 1.17 cm in diameter, (normal <2.1cm) with normal inspiratory collapse (normal >50%) consistent with normal right atrial pressure (~3, range 0-5mmHg).  10. Trace pericardial effusion.      < end of copied text >  Procedures :     Devices:     Current medications:  acetaminophen     Tablet .. 650 milliGRAM(s) Oral every 6 hours PRN  aluminum hydroxide/magnesium hydroxide/simethicone Suspension 30 milliLiter(s) Oral every 4 hours PRN  cefepime  Injectable. 2000 milliGRAM(s) IV Push every 8 hours  diphenoxylate/atropine 1 Tablet(s) Oral four times a day  famotidine Injectable 20 milliGRAM(s) IV Push every 12 hours  filgrastim-sndz (ZARXIO) Injectable 480 MICROGram(s) SubCutaneous daily  FIRST- Mouthwash  BLM 5 milliLiter(s) Swish and Spit four times a day PRN  loperamide 2 milliGRAM(s) Oral every 6 hours  melatonin 3 milliGRAM(s) Oral at bedtime PRN  midodrine 5 milliGRAM(s) Oral every 8 hours  octreotide  Injectable 150 MICROGram(s) SubCutaneous every 8 hours  ondansetron Injectable 4 milliGRAM(s) IV Push every 8 hours PRN  pantoprazole  Injectable 40 milliGRAM(s) IV Push daily  potassium chloride    Tablet ER 10 milliEquivalent(s) Oral every 2 hours  sodium chloride 0.45% with potassium chloride 20 mEq/L 1000 milliLiter(s) IV Continuous <Continuous>  sucralfate suspension 1 Gram(s) Oral four times a day

## 2025-02-14 NOTE — DIETITIAN INITIAL EVALUATION ADULT - NSFNSPHYEXAMSKINFT_GEN_A_CORE
Pressure Injury 1: Left:, buttoclk, Stage I  Pressure Injury 2: Right:, Stage I  Pressure Injury 3: none, none  Pressure Injury 4: none, none  Pressure Injury 5: none, none  Pressure Injury 6: none, none  Pressure Injury 7: none, none  Pressure Injury 8: none, none  Pressure Injury 9: none, none  Pressure Injury 10: none, none  Pressure Injury 11: none, none Brian score = 14; ecchymotic/dry   Pressure Injury 1: Left:, buttock, Stage I  Pressure Injury 2: Right:, Stage I

## 2025-02-14 NOTE — PHYSICAL THERAPY INITIAL EVALUATION ADULT - GENERAL OBSERVATIONS, REHAB EVAL
Pt found in supine in bed on 1N in NAD, +IV, +Julian, +HM, +Pulse Ox, agreeable to participate in PT evaluation. Pt able to complete bedside therex. Pt denied OOB due to fatigue despite encouragement from PT and MD. Pt left in bed with call bell and phone in reach, bed alarm on, RN Evelina is aware. Pt found in supine in bed on 1N in NAD, +IV, +Julian, +HM, +Pulse Ox, agreeable to participate in PT evaluation. Pt able to complete bedside therex. Pt out of bed or functional assessment  OOB due to c/o weakness and  fatigue despite encouragement from PT and MD. Pt left in bed with call bell and phone in reach, bed alarm on, RN Evelina is aware.

## 2025-02-14 NOTE — DIETITIAN INITIAL EVALUATION ADULT - ETIOLOGY
r/t decreased ability to meet increased nutrient needs 2/2 PO intolerance, N/V and diarrhea, on chemo for pancreatic ca

## 2025-02-15 LAB
ALBUMIN SERPL ELPH-MCNC: 1.7 G/DL — LOW (ref 3.3–5)
ALBUMIN SERPL ELPH-MCNC: 2 G/DL — LOW (ref 3.3–5)
ALP SERPL-CCNC: 105 U/L — SIGNIFICANT CHANGE UP (ref 40–120)
ALT FLD-CCNC: 12 U/L — SIGNIFICANT CHANGE UP (ref 12–78)
ALT FLD-CCNC: 17 U/L — SIGNIFICANT CHANGE UP (ref 12–78)
ANION GAP SERPL CALC-SCNC: 7 MMOL/L — SIGNIFICANT CHANGE UP (ref 5–17)
ANION GAP SERPL CALC-SCNC: 8 MMOL/L — SIGNIFICANT CHANGE UP (ref 5–17)
AST SERPL-CCNC: 11 U/L — LOW (ref 15–37)
AST SERPL-CCNC: 6 U/L — LOW (ref 15–37)
BASE EXCESS BLDV CALC-SCNC: -7.2 MMOL/L — LOW (ref -2–3)
BASOPHILS NFR BLD AUTO: 0 % — SIGNIFICANT CHANGE UP (ref 0–2)
BILIRUB DIRECT SERPL-MCNC: 0.4 MG/DL — HIGH (ref 0–0.3)
BILIRUB INDIRECT FLD-MCNC: 0.2 MG/DL — SIGNIFICANT CHANGE UP (ref 0.2–1)
BILIRUB SERPL-MCNC: 0.6 MG/DL — SIGNIFICANT CHANGE UP (ref 0.2–1.2)
BLD GP AB SCN SERPL QL: SIGNIFICANT CHANGE UP
BUN SERPL-MCNC: 27 MG/DL — HIGH (ref 7–23)
BUN SERPL-MCNC: 40 MG/DL — HIGH (ref 7–23)
CALCIUM SERPL-MCNC: 8.1 MG/DL — LOW (ref 8.5–10.1)
CHLORIDE SERPL-SCNC: 108 MMOL/L — SIGNIFICANT CHANGE UP (ref 96–108)
CHLORIDE SERPL-SCNC: 108 MMOL/L — SIGNIFICANT CHANGE UP (ref 96–108)
CO2 SERPL-SCNC: 17 MMOL/L — LOW (ref 22–31)
CO2 SERPL-SCNC: 18 MMOL/L — LOW (ref 22–31)
CREAT SERPL-MCNC: 1.04 MG/DL — SIGNIFICANT CHANGE UP (ref 0.5–1.3)
CREAT SERPL-MCNC: 1.26 MG/DL — SIGNIFICANT CHANGE UP (ref 0.5–1.3)
CRP SERPL-MCNC: 209 MG/ML — HIGH (ref 0–5)
EGFR: 46 ML/MIN/1.73M2 — LOW
EGFR: 46 ML/MIN/1.73M2 — LOW
EGFR: 58 ML/MIN/1.73M2 — LOW
EGFR: 58 ML/MIN/1.73M2 — LOW
ELASTASE PANC STL-MCNT: <1 CD:794062645 — LOW
EOSINOPHIL NFR BLD AUTO: 10 % — HIGH (ref 0–6)
GLUCOSE BLDC GLUCOMTR-MCNC: 170 MG/DL — HIGH (ref 70–99)
GLUCOSE BLDC GLUCOMTR-MCNC: 171 MG/DL — HIGH (ref 70–99)
GLUCOSE BLDC GLUCOMTR-MCNC: 179 MG/DL — HIGH (ref 70–99)
GLUCOSE BLDC GLUCOMTR-MCNC: 186 MG/DL — HIGH (ref 70–99)
GLUCOSE SERPL-MCNC: 172 MG/DL — HIGH (ref 70–99)
GLUCOSE SERPL-MCNC: 183 MG/DL — HIGH (ref 70–99)
HCO3 BLDV-SCNC: 15 MMOL/L — LOW (ref 22–29)
HCT VFR BLD CALC: 20.3 % — CRITICAL LOW (ref 34.5–45)
HCT VFR BLD CALC: 22.1 % — LOW (ref 34.5–45)
HGB BLD-MCNC: 6.8 G/DL — CRITICAL LOW (ref 11.5–15.5)
HGB BLD-MCNC: 7.5 G/DL — LOW (ref 11.5–15.5)
IMM GRANULOCYTES # BLD AUTO: 0 K/UL — SIGNIFICANT CHANGE UP (ref 0–0.07)
IMM GRANULOCYTES NFR BLD AUTO: 0 % — SIGNIFICANT CHANGE UP (ref 0–0.9)
IMMATURE PLATELET FRACTION #: 1.2 K/UL — LOW (ref 4.7–11.1)
IMMATURE PLATELET FRACTION #: 1.3 K/UL — LOW (ref 4.7–11.1)
IMMATURE PLATELET FRACTION %: 4.2 % — SIGNIFICANT CHANGE UP (ref 1.6–4.9)
IMMATURE PLATELET FRACTION %: 7.5 % — HIGH (ref 1.6–4.9)
LACTATE SERPL-SCNC: 3 MMOL/L — HIGH (ref 0.7–2)
LYMPHOCYTES NFR BLD AUTO: 32.5 % — SIGNIFICANT CHANGE UP (ref 13–44)
MAGNESIUM SERPL-MCNC: 2.1 MG/DL — SIGNIFICANT CHANGE UP (ref 1.6–2.6)
MAGNESIUM SERPL-MCNC: 2.3 MG/DL — SIGNIFICANT CHANGE UP (ref 1.6–2.6)
MCHC RBC-ENTMCNC: 29.2 PG — SIGNIFICANT CHANGE UP (ref 27–34)
MCHC RBC-ENTMCNC: 29.8 PG — SIGNIFICANT CHANGE UP (ref 27–34)
MCHC RBC-ENTMCNC: 33.5 G/DL — SIGNIFICANT CHANGE UP (ref 32–36)
MCHC RBC-ENTMCNC: 33.9 G/DL — SIGNIFICANT CHANGE UP (ref 32–36)
MCV RBC AUTO: 87.1 FL — SIGNIFICANT CHANGE UP (ref 80–100)
MCV RBC AUTO: 87.7 FL — SIGNIFICANT CHANGE UP (ref 80–100)
MONOCYTES # BLD AUTO: 0.02 K/UL — SIGNIFICANT CHANGE UP (ref 0–0.9)
MONOCYTES NFR BLD AUTO: 5 % — SIGNIFICANT CHANGE UP (ref 2–14)
NEUTROPHILS NFR BLD AUTO: 52.5 % — SIGNIFICANT CHANGE UP (ref 43–77)
NRBC # BLD AUTO: 0 K/UL — SIGNIFICANT CHANGE UP (ref 0–0)
NRBC # BLD AUTO: 0 K/UL — SIGNIFICANT CHANGE UP (ref 0–0)
NRBC # FLD: 0 K/UL — SIGNIFICANT CHANGE UP (ref 0–0)
NRBC # FLD: 0 K/UL — SIGNIFICANT CHANGE UP (ref 0–0)
NRBC BLD AUTO-RTO: 0 /100 WBCS — SIGNIFICANT CHANGE UP (ref 0–0)
NT-PROBNP SERPL-SCNC: 226 PG/ML — HIGH (ref 0–125)
PCO2 BLDV: 21 MMHG — LOW (ref 39–42)
PHOSPHATE SERPL-MCNC: 1.2 MG/DL — LOW (ref 2.5–4.5)
PHOSPHATE SERPL-MCNC: 2 MG/DL — LOW (ref 2.5–4.5)
PLAT MORPH BLD: SIGNIFICANT CHANGE UP
PLATELET # BLD AUTO: 17 K/UL — CRITICAL LOW (ref 150–400)
PLATELET # BLD AUTO: 28 K/UL — LOW (ref 150–400)
PMV BLD: SIGNIFICANT CHANGE UP (ref 7–13)
PO2 BLDV: 106 MMHG — HIGH (ref 25–45)
POTASSIUM SERPL-MCNC: 2.9 MMOL/L — CRITICAL LOW (ref 3.5–5.3)
POTASSIUM SERPL-MCNC: 3.3 MMOL/L — LOW (ref 3.5–5.3)
POTASSIUM SERPL-SCNC: 2.9 MMOL/L — CRITICAL LOW (ref 3.5–5.3)
PROT SERPL-MCNC: 4.2 GM/DL — LOW (ref 6–8.3)
PROT SERPL-MCNC: 4.6 GM/DL — LOW (ref 6–8.3)
RBC # BLD: 2.33 M/UL — LOW (ref 3.8–5.2)
RBC # BLD: 2.52 M/UL — LOW (ref 3.8–5.2)
RBC # FLD: 13.5 % — SIGNIFICANT CHANGE UP (ref 10.3–14.5)
RBC # FLD: 13.7 % — SIGNIFICANT CHANGE UP (ref 10.3–14.5)
RBC BLD AUTO: SIGNIFICANT CHANGE UP
SAO2 % BLDV: 99 % — HIGH (ref 67–88)
SODIUM SERPL-SCNC: 133 MMOL/L — LOW (ref 135–145)
SODIUM SERPL-SCNC: 133 MMOL/L — LOW (ref 135–145)
TRIGL SERPL-MCNC: 120 MG/DL — SIGNIFICANT CHANGE UP
TROPONIN I, HIGH SENSITIVITY RESULT: 17.26 NG/L — SIGNIFICANT CHANGE UP
WBC # BLD: 0.4 K/UL — CRITICAL LOW (ref 3.8–10.5)
WBC # BLD: 0.42 K/UL — CRITICAL LOW (ref 3.8–10.5)
WBC # FLD AUTO: 0.4 K/UL — CRITICAL LOW (ref 3.8–10.5)
WBC # FLD AUTO: 0.42 K/UL — CRITICAL LOW (ref 3.8–10.5)

## 2025-02-15 PROCEDURE — 71250 CT THORAX DX C-: CPT | Mod: 26

## 2025-02-15 PROCEDURE — 71045 X-RAY EXAM CHEST 1 VIEW: CPT | Mod: 26

## 2025-02-15 PROCEDURE — 93010 ELECTROCARDIOGRAM REPORT: CPT

## 2025-02-15 PROCEDURE — 99233 SBSQ HOSP IP/OBS HIGH 50: CPT

## 2025-02-15 RX ORDER — FUROSEMIDE 10 MG/ML
40 INJECTION INTRAMUSCULAR; INTRAVENOUS ONCE
Refills: 0 | Status: COMPLETED | OUTPATIENT
Start: 2025-02-15 | End: 2025-02-15

## 2025-02-15 RX ORDER — FLUCONAZOLE 150 MG
TABLET ORAL
Refills: 0 | Status: DISCONTINUED | OUTPATIENT
Start: 2025-02-15 | End: 2025-03-02

## 2025-02-15 RX ORDER — DIPHENHYDRAMINE HCL 12.5MG/5ML
25 ELIXIR ORAL ONCE
Refills: 0 | Status: COMPLETED | OUTPATIENT
Start: 2025-02-15 | End: 2025-02-15

## 2025-02-15 RX ORDER — POTASSIUM PHOSPHATE, MONOBASIC POTASSIUM PHOSPHATE, DIBASIC INJECTION, 236; 224 MG/ML; MG/ML
30 SOLUTION, CONCENTRATE INTRAVENOUS ONCE
Refills: 0 | Status: COMPLETED | OUTPATIENT
Start: 2025-02-15 | End: 2025-02-15

## 2025-02-15 RX ORDER — ACETAMINOPHEN 500 MG/5ML
1000 LIQUID (ML) ORAL ONCE
Refills: 0 | Status: COMPLETED | OUTPATIENT
Start: 2025-02-15 | End: 2025-02-15

## 2025-02-15 RX ORDER — FLUCONAZOLE 150 MG
200 TABLET ORAL EVERY 24 HOURS
Refills: 0 | Status: DISCONTINUED | OUTPATIENT
Start: 2025-02-16 | End: 2025-03-02

## 2025-02-15 RX ORDER — SODIUM/POT/MAG/CALC/CHLOR/ACET 35-20-5MEQ
1 VIAL (ML) INTRAVENOUS
Refills: 0 | Status: DISCONTINUED | OUTPATIENT
Start: 2025-02-15 | End: 2025-02-15

## 2025-02-15 RX ORDER — MEROPENEM 1 G/30ML
INJECTION INTRAVENOUS
Refills: 0 | Status: DISCONTINUED | OUTPATIENT
Start: 2025-02-15 | End: 2025-02-17

## 2025-02-15 RX ORDER — VANCOMYCIN HCL IN 5 % DEXTROSE 1.5G/250ML
1000 PLASTIC BAG, INJECTION (ML) INTRAVENOUS EVERY 24 HOURS
Refills: 0 | Status: DISCONTINUED | OUTPATIENT
Start: 2025-02-15 | End: 2025-02-15

## 2025-02-15 RX ORDER — MEROPENEM 1 G/30ML
1000 INJECTION INTRAVENOUS EVERY 8 HOURS
Refills: 0 | Status: DISCONTINUED | OUTPATIENT
Start: 2025-02-16 | End: 2025-02-17

## 2025-02-15 RX ORDER — ALBUMIN (HUMAN) 12.5 G/50ML
100 INJECTION, SOLUTION INTRAVENOUS EVERY 6 HOURS
Refills: 0 | Status: COMPLETED | OUTPATIENT
Start: 2025-02-15 | End: 2025-02-16

## 2025-02-15 RX ORDER — OCTREOTIDE ACETATE 500 UG/ML
100 INJECTION, SOLUTION INTRAVENOUS; SUBCUTANEOUS EVERY 8 HOURS
Refills: 0 | Status: DISCONTINUED | OUTPATIENT
Start: 2025-02-15 | End: 2025-02-16

## 2025-02-15 RX ORDER — MEROPENEM 1 G/30ML
INJECTION INTRAVENOUS
Refills: 0 | Status: DISCONTINUED | OUTPATIENT
Start: 2025-02-15 | End: 2025-02-15

## 2025-02-15 RX ORDER — FLUCONAZOLE 150 MG
200 TABLET ORAL ONCE
Refills: 0 | Status: COMPLETED | OUTPATIENT
Start: 2025-02-15 | End: 2025-02-15

## 2025-02-15 RX ORDER — ACETAMINOPHEN 500 MG/5ML
650 LIQUID (ML) ORAL ONCE
Refills: 0 | Status: COMPLETED | OUTPATIENT
Start: 2025-02-15 | End: 2025-02-15

## 2025-02-15 RX ORDER — FUROSEMIDE 10 MG/ML
10 INJECTION INTRAMUSCULAR; INTRAVENOUS ONCE
Refills: 0 | Status: COMPLETED | OUTPATIENT
Start: 2025-02-15 | End: 2025-02-15

## 2025-02-15 RX ORDER — VANCOMYCIN HCL IN 5 % DEXTROSE 1.5G/250ML
1000 PLASTIC BAG, INJECTION (ML) INTRAVENOUS EVERY 24 HOURS
Refills: 0 | Status: DISCONTINUED | OUTPATIENT
Start: 2025-02-16 | End: 2025-02-16

## 2025-02-15 RX ORDER — VANCOMYCIN HCL IN 5 % DEXTROSE 1.5G/250ML
1000 PLASTIC BAG, INJECTION (ML) INTRAVENOUS ONCE
Refills: 0 | Status: COMPLETED | OUTPATIENT
Start: 2025-02-15 | End: 2025-02-15

## 2025-02-15 RX ORDER — MEROPENEM 1 G/30ML
1000 INJECTION INTRAVENOUS ONCE
Refills: 0 | Status: COMPLETED | OUTPATIENT
Start: 2025-02-15 | End: 2025-02-15

## 2025-02-15 RX ADMIN — Medication 400 MILLIGRAM(S): at 23:31

## 2025-02-15 RX ADMIN — Medication 1 GRAM(S): at 06:30

## 2025-02-15 RX ADMIN — Medication 200 MILLIGRAM(S): at 16:55

## 2025-02-15 RX ADMIN — Medication 1 GRAM(S): at 18:06

## 2025-02-15 RX ADMIN — FILGRASTIM 480 MICROGRAM(S): 300 INJECTION, SOLUTION INTRAVENOUS; SUBCUTANEOUS at 09:48

## 2025-02-15 RX ADMIN — DIPHENOXYLATE HYDROCHLORIDE AND ATROPINE SULFATE 1 TABLET(S): .025; 2.5 TABLET ORAL at 13:38

## 2025-02-15 RX ADMIN — ALBUMIN (HUMAN) 50 MILLILITER(S): 12.5 INJECTION, SOLUTION INTRAVENOUS at 22:53

## 2025-02-15 RX ADMIN — Medication 1 GRAM(S): at 13:38

## 2025-02-15 RX ADMIN — POTASSIUM CHLORIDE, DEXTROSE MONOHYDRATE AND SODIUM CHLORIDE 100 MILLILITER(S): 150; 5; 900 INJECTION, SOLUTION INTRAVENOUS at 09:51

## 2025-02-15 RX ADMIN — Medication 2000 UNIT(S): at 21:13

## 2025-02-15 RX ADMIN — Medication 650 MILLIGRAM(S): at 19:35

## 2025-02-15 RX ADMIN — Medication 40 MILLIGRAM(S): at 09:48

## 2025-02-15 RX ADMIN — Medication 250 MILLIGRAM(S): at 23:36

## 2025-02-15 RX ADMIN — OCTREOTIDE ACETATE 100 MICROGRAM(S): 500 INJECTION, SOLUTION INTRAVENOUS; SUBCUTANEOUS at 14:58

## 2025-02-15 RX ADMIN — OCTREOTIDE ACETATE 100 MICROGRAM(S): 500 INJECTION, SOLUTION INTRAVENOUS; SUBCUTANEOUS at 21:12

## 2025-02-15 RX ADMIN — MIDODRINE HYDROCHLORIDE 5 MILLIGRAM(S): 5 TABLET ORAL at 06:30

## 2025-02-15 RX ADMIN — FUROSEMIDE 40 MILLIGRAM(S): 10 INJECTION INTRAMUSCULAR; INTRAVENOUS at 20:33

## 2025-02-15 RX ADMIN — CEFEPIME 2000 MILLIGRAM(S): 2 INJECTION, POWDER, FOR SOLUTION INTRAVENOUS at 13:37

## 2025-02-15 RX ADMIN — CEFEPIME 2000 MILLIGRAM(S): 2 INJECTION, POWDER, FOR SOLUTION INTRAVENOUS at 06:30

## 2025-02-15 RX ADMIN — Medication 4 GRAM(S): at 09:48

## 2025-02-15 RX ADMIN — OCTREOTIDE ACETATE 150 MICROGRAM(S): 500 INJECTION, SOLUTION INTRAVENOUS; SUBCUTANEOUS at 06:30

## 2025-02-15 RX ADMIN — Medication 1 EACH: at 23:08

## 2025-02-15 RX ADMIN — Medication 20 MILLIGRAM(S): at 09:48

## 2025-02-15 RX ADMIN — MEROPENEM 1000 MILLIGRAM(S): 1 INJECTION INTRAVENOUS at 23:35

## 2025-02-15 RX ADMIN — MIDODRINE HYDROCHLORIDE 5 MILLIGRAM(S): 5 TABLET ORAL at 21:13

## 2025-02-15 RX ADMIN — FUROSEMIDE 10 MILLIGRAM(S): 10 INJECTION INTRAMUSCULAR; INTRAVENOUS at 18:48

## 2025-02-15 RX ADMIN — Medication 25 MILLIGRAM(S): at 19:34

## 2025-02-15 RX ADMIN — FUROSEMIDE 40 MILLIGRAM(S): 10 INJECTION INTRAMUSCULAR; INTRAVENOUS at 20:21

## 2025-02-15 RX ADMIN — MIDODRINE HYDROCHLORIDE 5 MILLIGRAM(S): 5 TABLET ORAL at 13:39

## 2025-02-15 RX ADMIN — ALBUMIN (HUMAN) 50 MILLILITER(S): 12.5 INJECTION, SOLUTION INTRAVENOUS at 13:38

## 2025-02-15 RX ADMIN — DIPHENOXYLATE HYDROCHLORIDE AND ATROPINE SULFATE 1 TABLET(S): .025; 2.5 TABLET ORAL at 06:26

## 2025-02-15 RX ADMIN — Medication 20 MILLIGRAM(S): at 21:12

## 2025-02-15 NOTE — CONSULT NOTE ADULT - ASSESSMENT
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, chemo-induced pancytopenia, SCC lip, cyclical vomiting syndrome, HTN, HLD, Gout, GERD, CVA on Aggrenox, with:    # Severe sepsis  # Lactic acidosis  # Acute hypoxemic respiratory failure  # Bilateral pleural effusions/atelectasis  # Pancytopenia  # Pseudomonas bacteremia/UTI  # Hypokalemia  # Hypophosphatemia    Transfer to SICU.    Concern for new infection, less suspicious for acute blood product reaction. Possible pneumonia within new bibasilar lung consolidations? Repeat blood cultures from 2/14 are negative for growth x 24 hours. Check serum procalcitonin, RVP, MRSA PCR, sputum culture. Escalate antibiotics to meropenem and vancomycin for now. Continue fluconazole.    Lactate 3.0. Will consider giving back crystalloid, however will start with PRBC as the Hgb has drifted down to 6.8. Continue midodrine for added vascular tone. Monitor hemodynamics closely, maintain a MAP > 65.    SBP plummeted s/p IV diuresis. Clinical picture more representative of malignancy/protein-calorie malnutrition/hypoalbuminemia causing diffuse third spacing rather than acute heart failure/volume overload. Pro-BNP only 222. Will start intermittent colloid resuscitation with albumin.    Rapid shallow breathing secondary to pleural effusions and atelectasis. Probable component of compensatory effort for metabolic acidosis. Minimal O2 requirement (goal SpO2 > 92%). VBG indicates respiratory alkalosis with underlying metabolic acidosis (7.45/21/106/15). Start BiPAP to ease work of breathing and promote deeper breathing.    Platelets 17K --> 28K s/p 1u. Maintain counts > 10K or > 50K if there is evidence of active bleeding.    Electrolytes derangements s/p IV loop diuretic. Will replete potassium and phosphorous aggressively.      Case discussed with the patient and her , Dr. Pleitez, at the bedside. Diagnosis, prognosis, and management plan outlined. All questions answered and concerns addressed.    Case discussed with ICU physician, Dr. Fortune.

## 2025-02-15 NOTE — PROGRESS NOTE ADULT - ASSESSMENT
69-year-old female with h/o recent diagnosis of primary head of pancreatic adenocarcinoma with tumor in umbilicus, chemo-induced pancytopenia, SCC lip, cyclical vomiting syndrome, HTN, HPL, Gout, GERD, CVA on Aggrenox was transferred on 2/12 from [facility] for further care. She was admitted to [facility] with septic shock on 02/09/2025, nausea, vomiting and diarrhea requiring Levophed drip. Patient was being followed by GI, ID, Cardiology and Onc there. Patient has been transferred to  on patient's  (GI Dr. Pleitez) request. She was reported with non-bloody diarrhea x 3 in last 24 hours PTA. She denied abdominal pain, nausea, but has dry heaves. At [place] she was started on Vancomycin and Zosyn, then changed to Cefepime as her blood culture and urine cultures showed Pseudomonas aeruginosa.    #Sepsis with PSAE improving  #Head of pancreas adenocarcinoma on chemotherapy  #Low grade fever  #Absolute neutropenia  #Immunocompromised host  #UTI with PSAE  #Kidney stones  #Right lower chest wall dry superficial ulcer ?cause  -neutropenia is persistent   -cultures reviewed  -has loose stools  -stool testing performed on 2/8 for CDT is negative   -repeat stool for CDT is negative   -source of sepsis is likely intraabdominal and/or urinary  -on cefepime 2 gm IV q8h # 3  -tolerating abx well so far; no side effects noted  -GI evaluation appreciated  -neutropenic precautions  -repeat BC x 2 noted  -oncology evaluation appreciated  -monitor abdomen  -continue abx coverage  -f/u cultures  -monitor temps  -f/u CBC  -supportive care  2. Other issues:   -care per medicine    d/w medicine team

## 2025-02-15 NOTE — CONSULT NOTE ADULT - ASSESSMENT
HPI:  69-year-old female with recently diagnosed stage IV pancreatic adenocarcinoma, currently under the care of Dr. Tapia and Dr. Lasha Gerber at [facility]. Patient was enrolled in a clinical trial combining modified FOLFIRINOX with MC-6236, an oral EMILY protein inhibitor.  Disease was initially diagnosed in December 2024 with imaging showing pancreatic mass invading into the stomach, pelvic nodule, and concerning peritoneal disease in the umbilical site sac.  Patient initiated treatment approximately 5 weeks ago and has completed two cycles of modified FOLFIRINOX. The oral investigational agent MC-6236 was discontinued one week ago due to significant toxicities including severe weakness and diarrhea. admitted to [facility] with septic shock on 02/09/2025, nausea, vomiting and diarrhea requiring Levophed drip, was transferred out of ICU to regular floor Patient presented to hospital on February 13, 2025 as a transfer from [facility], with neutropenic fever. Initial labs showed severe pancytopenia with WBC 0.19, Hgb 8.7, Hct 25.7, and platelets 33, 000. Most recent labs show continued severe neutropenia with WBC 0.42, Hgb 7.5, and platelets 17, 000. Additional labs show Cr 1.04, Ca 8.1, iron saturation 29%, ferritin 1798, and TIBC 117.  Since admission, patient has been maintained on cefepime and periclid, and has remained afebrile. Patient reports significant GI toxicities including severe diarrhea and vomiting 'around the clock' since starting therapy. Currently on TPN due to inability to tolerate oral intake, with significant oral pain and burning sensation with attempted feeding.    Past Medical History :  1. Stage IV pancreatic adenocarcinoma  2. Left cephalic vein thrombosis    Past Surgical history :  Port placement      Review of Systems :  Constitutional: Significant weakness, fatigue  GI: Severe diarrhea, nausea, vomiting, oral pain with attempted feeding  MSK: Generalized weakness  Skin: No bleeding or bruising noted  All other systems negative or not documented    Physical exam :  Vital Signs: T 98.1, /69, HR 87, RR 17, O2 sat 96% on RA  General: Appears weak  HEENT: Dry mucous membranes  Chest: Clear to auscultation  CVS: Regular rate and rhythm  Abdomen: Non-tender on examination  Extremities: Trace edema in legs, right hand swelling noted  Port site: Clean, no signs of infection    Relevant pathology results:  C. difficile testing: Negative  Pancreatic elastase: Previously assessed (results not specified)  Labs 2/15/25:  - WBC: 0.42  - Hgb: 7.5  - Plt: 17, 000  - Cr: 1.04  - Ca: 8.1  Iron studies:  - Iron saturation: 29%  - Ferritin: 1798  - TIBC: 117    Relevant imaging results:  Initial CT A/P (December 2024):  - Pancreatic mass with gastric invasion  - Small pelvic nodule  - Peritoneal disease in umbilical site sac  - No intrathoracic disease  PET/CT:  - FDG-avid pancreatic mass  - FDG-avid periumbilical mass  - No additional distant metastases  Bilateral Upper Extremity US:  - No evidence of DVT    Assessment:  69-year-old female with stage IV pancreatic adenocarcinoma presenting with neutropenic fever and severe treatment-related toxicities from modified FOLFIRINOX and investigational EMILY inhibitor [id number].  1. Neutropenic Fever:  Patient presents with grade 4 neutropenia (ANC < 500) with initial febrile episode. Currently on appropriate broad-spectrum coverage with cefepime. Has remained afebrile on current regimen with slight improvement in WBC from 0.19 to 0.42. Started on filgrastim 480mcg daily for count recovery.  2. Severe Thrombocytopenia:  Platelet count critically low at 17, 000 and trending down from admission value of 33, 000. High risk for bleeding complications, especially in setting of ongoing GI symptoms. Will require platelet transfusion support.  3. Treatment-Related GI Toxicity:  Severe diarrhea and inability to tolerate PO intake, likely multifactorial from both FOLFIRINOX (particularly irinotecan component) and study drug [id number]. Consider DPD deficiency given severity of symptoms. Currently managed with cholestyramine and lomotil with some improvement. Requiring TPN support.  4. Nutritional Status:  Significant decline in oral intake due to severe mucositis and GI symptoms. Currently on TPN support. Will require careful nutritional monitoring and support during recovery.  5. DVT Prophylaxis:  History of left cephalic vein thrombosis. Currently on appropriate DVT prophylaxis given high-risk status (active malignancy, hospitalization).  Overall, patient experiencing significant toxicities from current treatment regimen requiring dose modifications and possibly alternative treatment strategies moving forward. [place] close [place] [facility] [place].    Plan:  #Neutropenic Fever  - Continue cefepime and periclid  - Continue filgrastim 480mcg daily  - Daily CBC monitoring  - Maintain strict neutropenic precautions    #Thrombocytopenia  - Transfuse 1 unit platelets  - Obtain consent for blood products -   - Monitor daily platelet counts    #GI Toxicity  - Continue cholestyramine and lomotil  - Consider DPD testing, and risk of irinotecan toxicity increases with genetic variants associated with reduced UGT enzyme activity, such as [id number]  - Continue TPN support  - Oral care protocol    #Care Coordination  -will continue [place]  [facility] [place]  - Review clinical trial protocol regarding toxicity management  - Consider dose modifications for future therapy

## 2025-02-15 NOTE — PROGRESS NOTE ADULT - SUBJECTIVE AND OBJECTIVE BOX
Subjective:  Chief complain : diarrhea , mouth pain     HPI: 70 y/o female  with PMH of CVA on Aggrenox,   primary pancreatic adenocarcinoma of head with tumor in umbilicus, chemo-induced pancytopenia and significant PMH of SCC lip, cyclical vomiting syndrome, HTN, HPL, Gout, GERD,   had been admitted to [facility] with septic shock on 2025, nausea, vomiting and diarrhea requiring Levophed drip, was transferred out of ICU to regular floor last night.  Patient was being followed by GI, ID, Cardiology and Onc there.  Patient has been transferred to  today on patient's  (GI Dr. Pleitez) request on 25   At this time, patient c/o non-bloody diarrhea x 3 in last 24 hours.  She denies any abdominal pain, nausea, but has some dry heaves.  She denies any fever, chills, chest pain, palpitation, constipation or dysuria. Initially, she was started on Vancomycin and Zosyn, but now she has been on Cefepime as her blood culture and urine cultures are positive for Pseudomonas aeruginosa. Currently, patient is on Cefepime.  ID had also recommended Flagyl, but patient had refused it, as it causes nausea to her.  Sig labs: WBC 0.45k, N 82%, Hb 7.8, Platelets 59k, Bicarb 19, AG 9, , Cr 0.99, TP 4.6, Alb 1.2, LFTs wnl. Mg 1.9, PO4 2.4. Prior C.diff negative.     -  Patient seen and examined at bedside earlier today, poor po intake, + upper extremities edema, + mucosal oral irritation, denies abdominal pain, afebrile, plan discussed   - pt seen and examined in am, denies cp, dyspnea, abdominal pain, + arm swelling persist, + mucosal pain better, diarrhea 5 times in 24 h , afebrile, plan discussed   2/15 - + mouth pain, drinks only water and tea, denies cp, dyspnea, abdominal pain, diarrhea slowing down, plan discussed     Review of system- Rest of the review of system are negative except mentioned in HPI    Vital sings reviewed for last 24 h  T(C): 36.4 (02-15-25 @ 11:02), Max: 36.7 (02-15-25 @ 06:28)  T(F): 97.6 (02-15-25 @ 11:02), Max: 98.1 (02-15-25 @ 06:28)  HR: 96 (02-15-25 @ 11:02) (87 - 96)  BP: 120/65 (02-15-25 @ 11:02) (111/65 - 137/72)  RR: 16 (02-15-25 @ 11:02) (16 - 18)  SpO2: 96% (02-15-25 @ 11:02) (96% - 98%)  Wt(kg): --  Daily     Daily Weight in k.6 (15 Feb 2025 06:17)  CAPILLARY BLOOD GLUCOSE      POCT Blood Glucose.: 170 mg/dL (15 Feb 2025 12:08)  POCT Blood Glucose.: 171 mg/dL (15 Feb 2025 07:01)  POCT Blood Glucose.: 133 mg/dL (2025 22:14)        Physical exam:   General : NAD, appear to be of stated age , well groomed   NERVOUS SYSTEM:  Alert & Oriented X3, non- focal exam, Motor Strength 5/5 B/L upper and lower extremities; DTRs 2+ intact and symmetric  HEAD:  Atraumatic, Normocephalic  EYES: EOMI, PERRLA, conjunctiva and sclera clear  HEENT: Moist mucous membranes, Supple neck , No JVD  CHEST: Clear to auscultation bilaterally; No rales, no rhonchi, no wheezing  HEART: Regular rate and rhythm; No murmurs, no rubs or gallops  ABDOMEN: Soft, Non-tender, Non-distended; Bowel sounds present, no guarding , no peritoneal irritation , + periumbilical scan healing  GENITOURINARY- Voiding, no suprapubic tenderness  EXTREMITIES:  2+ Peripheral Pulses, No clubbing, cyanosis,  + LE   edema  MUSCULOSKELETAL:- No muscle tenderness, Muscle tone normal, No joint tenderness, no Joint swelling,  Joint ROM –normal   SKIN-no rash,  + right chest wall wound around 6-7 cm in diameter with erythema around     Labs radiologic and other test : all reviewed and interpreted :                           7.5    0.42  )-----------( 17       ( 15 Feb 2025 05:17 )             22.1     02-15    133[L]  |  108  |  27[H]  ----------------------------<  183[H]  3.3[L]   |  18[L]  |  1.04    Ca    8.1[L]      15 Feb 2025 05:17  Phos  2.0     02-15  Mg     2.3     02-15    TPro  4.2[L]  /  Alb  1.7[L]  /  TBili  0.6  /  DBili  0.4[H]  /  AST  6[L]  /  ALT  12  /  AlkPhos  98  02-15        LIVER FUNCTIONS - ( 15 Feb 2025 05:17 )  Alb: 1.7 g/dL / Pro: 4.2 gm/dL / ALK PHOS: 98 U/L / ALT: 12 U/L / AST: 6 U/L / GGT: x                              US Duplex Venous Upper Ext Complete, Bilateral (25 @ 17:35) >  RIGHT:  The right internal jugular, subclavian, axillary and brachial veins are   patent and compressible where applicable.  The basilic vein (superficial   vein) is patent and without thrombus.  The cephalic vein (superficial   vein) is patent and without thrombus.    LEFT:  The left internal jugular, subclavian, axillary and brachial veins are   patent and compressible where applicable.  The basilic vein (superficial   vein) is patent and without thrombus.  The cephalic vein (superficial   vein) is thrombosed at the antecubital fossa.    Doppler examination shows normal spontaneous and phasic flow.    IMPRESSION:  No evidence of deep venous thrombosis in either upper extremity.  Left cephalic vein thrombosis.    < end of copied text >      < from: US Abdomen Upper Quadrant Right (25 @ 14:55) >  FINDINGS:  Liver: Hepatomegaly measuring up to 18.3 cm.  Bile ducts: Normal caliber. Common bile duct measures 3 mm.  Gallbladder: Distended gallbladder measuring up to 12.4 cm without   shadowingcalculus. Gallbladder sludge noted. No gallbladder wall   thickening.  Pancreas: Visualized portions are within normal limits.  Right kidney: 9.5 cm. No hydronephrosis. 3.9 x 3.0 x 3.2 cm upper pole   cyst. 8 x 4 x 8 mm midpole shadowing calculus.  Ascites: None.  IVC: Visualized portions are within normal limits.    IMPRESSION:  Distended gallbladder with layering sludge.  8 mm right renal calculus without hydronephrosis.  Hepatomegaly.  Right renal cyst.    < end of copied text >      < from: CT Chest No Cont (25 @ 03:02) >    IMPRESSION:  1.   Study somewhat limited due to absence of contrast.  2.   Central venous catheter on right side with tip in cavoatrial   junction.  3.   Bibasilar atelectasis/scarring right-greater-than-left; can not   exclude  trace pleural effusions. No areas of consolidation. No pneumothorax.  4.   Lucency/deformity is noted involving the posterior aspect of the   left 10 ,  possibly 9th ribs; minimal fractures of indeterminate age in these areas   can  not be excluded.    < end of copied text >  < from: Xray Chest 1 View AP/PA (25 @ 22:22) >  Patient is rotated towards the left.    Right chest wall implanted IJ port with catheter tip at the SVC/RA   junction.    The cardiomediastinal silhouette is normal and the maureen are not enlarged.   Eventration right hemidiaphragm. There is no focal lung consolidation or   sizable pleural effusion. No significant osseous abnormality.    IMPRESSION:    Unremarkable frontal chest x ray  < from: CT Abdomen and Pelvis No Cont (25 @ 03:01) >  IMPRESSION:  1.   Study somewhat limited due to absence of contrast.  2.   Axial images 71-76 of series 301 and  concomitant coronal images demonstrate prominent soft tissue inseparable   from  the body of the pancreas consistent with pancreatic neoplasm.  3.   Some distension of gallbladder could be further evaluated with right   upper  quadrant sonography. No definite biliary dilatation.  4.   Other incidental findings as above.    The results of this examination were discussed with Dr. Dangelo at 4:05   a.m.  central standard time on 2025 by Pavel Morton MD.  They   discussed the mass in the body of the pancreas which  is compatible with patient's known pancreatic neoplasm.    Distended gallbladder could be further evaluated with right upper quadrant  sonography.    Low-attenuation lesions both kidneys nonobstructing calculus right kidney    < end of copied text >    < end of copied text >    RECENT CULTURES:    Culture - Blood (25 @ 09:00)    Specimen Source: .Blood BLOOD   Culture Results:   No growth at 72 Hours    Culture - Urine (25 @ 01:25)    -  Levofloxacin: S <=0.5   -  Meropenem: S <=1   -  Piperacillin/Tazobactam: S <=8   -  Amikacin: S <=16   -  Aztreonam: S <=4   -  Cefepime: S <=2   -  Ceftazidime: S <=1   -  Ciprofloxacin: S <=0.25   -  Imipenem: S 2   Specimen Source: Clean Catch   Culture Results:   50,000 - 99,000 CFU/mL Pseudomonas aeruginosa   Organism Identification: Pseudomonas aeruginosa   Organism: Pseudomonas aeruginosa   Method Type: REJI          Cardiac testing : reviewed   EKG <     12 Lead ECG (25 @ 16:52) >  Ventricular Rate 116 BPM  QTC Calculation(Bazett) 397 ms  * Poor data quality, interpretation may be adversely affected  Sinus tachycardia with premature atrial complexes  Left axis deviation  Low voltage QRS  Inferior infarct (cited on or before 2025)  Anterolateral infarct (cited on or before 2025)  Abnormal ECG  When compared with ECG of 2025 16:51, (Unconfirmed)  premature atrial complexes are now present    < end of copied text >      < from: TTE W or WO Ultrasound Enhancing Agent (25 @ 16:07) >   1. Technically difficult image quality.   2. Left ventricular systolic function is hyperdynamicwith an ejection fraction of 73 % by Hernandez's method of disks.   3. Mild left ventricular hypertrophy.   4. The right ventricle is not well visualized.   5. Tricuspid aortic valve with normal leaflet excursion. There is mild calcification of the aortic valve leaflets.   6. Mitral valve leaflets have focal calcification.   7. Trace mitral regurgitation.   8. Tricuspid valve was not well visualized.   9. The inferior vena cava is normal in size measuring 1.17 cm in diameter, (normal <2.1cm) with normal inspiratory collapse (normal >50%) consistent with normal right atrial pressure (~3, range 0-5mmHg).  10. Trace pericardial effusion.      < end of copied text >  Procedures :     Devices:     Current medications:  acetaminophen     Tablet .. 650 milliGRAM(s) Oral every 6 hours PRN  aluminum hydroxide/magnesium hydroxide/simethicone Suspension 30 milliLiter(s) Oral every 4 hours PRN  cefepime  Injectable. 2000 milliGRAM(s) IV Push every 8 hours  diphenoxylate/atropine 1 Tablet(s) Oral four times a day  famotidine Injectable 20 milliGRAM(s) IV Push every 12 hours  filgrastim-sndz (ZARXIO) Injectable 480 MICROGram(s) SubCutaneous daily  FIRST- Mouthwash  BLM 5 milliLiter(s) Swish and Spit four times a day PRN  loperamide 2 milliGRAM(s) Oral every 6 hours  melatonin 3 milliGRAM(s) Oral at bedtime PRN  midodrine 5 milliGRAM(s) Oral every 8 hours  octreotide  Injectable 150 MICROGram(s) SubCutaneous every 8 hours  ondansetron Injectable 4 milliGRAM(s) IV Push every 8 hours PRN  pantoprazole  Injectable 40 milliGRAM(s) IV Push daily  potassium chloride    Tablet ER 10 milliEquivalent(s) Oral every 2 hours  sodium chloride 0.45% with potassium chloride 20 mEq/L 1000 milliLiter(s) IV Continuous <Continuous>  sucralfate suspension 1 Gram(s) Oral four times a day             Subjective:  Chief complain : diarrhea , mouth pain     HPI: 68 y/o female  with PMH of CVA on Aggrenox,   primary pancreatic adenocarcinoma of head with 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  Patient was being followed by GI, ID, Cardiology and Onc there.  Patient has been transferred to  today on patient's  (GI Dr. Pleitez) request on 25   At this time, patient c/o non-bloody diarrhea x 3 in last 24 hours.  She denies any abdominal pain, nausea, but has some dry heaves.  She denies any fever, chills, chest pain, palpitation, constipation or dysuria. Initially, she was started on Vancomycin and Zosyn, but now she has been on Cefepime as her blood culture and urine cultures are positive for Pseudomonas aeruginosa. Currently, patient is on Cefepime.  ID had also recommended Flagyl, but patient had refused it, as it causes nausea to her.  Sig labs: WBC 0.45k, N 82%, Hb 7.8, Platelets 59k, Bicarb 19, AG 9, , Cr 0.99, TP 4.6, Alb 1.2, LFTs wnl. Mg 1.9, PO4 2.4. Prior C.diff negative.     -  Patient seen and examined at bedside earlier today, poor po intake, + upper extremities edema, + mucosal oral irritation, denies abdominal pain, afebrile, plan discussed   - pt seen and examined in am, denies cp, dyspnea, abdominal pain, + arm swelling persist, + mucosal pain better, diarrhea 5 times in 24 h , afebrile, plan discussed   2/15 - + mouth pain, drinks only water and tea, denies cp, dyspnea, abdominal pain, diarrhea slowing down, plan discussed     Review of system- Rest of the review of system are negative except mentioned in HPI    Vital sings reviewed for last 24 h  T(C): 36.4 (02-15-25 @ 11:02), Max: 36.7 (02-15-25 @ 06:28)  T(F): 97.6 (02-15-25 @ 11:02), Max: 98.1 (02-15-25 @ 06:28)  HR: 96 (02-15-25 @ 11:02) (87 - 96)  BP: 120/65 (02-15-25 @ 11:02) (111/65 - 137/72)  RR: 16 (02-15-25 @ 11:02) (16 - 18)  SpO2: 96% (02-15-25 @ 11:02) (96% - 98%)  Wt(kg): --  Daily     Daily Weight in k.6 (15 Feb 2025 06:17)  CAPILLARY BLOOD GLUCOSE      POCT Blood Glucose.: 170 mg/dL (15 Feb 2025 12:08)  POCT Blood Glucose.: 171 mg/dL (15 Feb 2025 07:01)  POCT Blood Glucose.: 133 mg/dL (2025 22:14)        Physical exam:   General : NAD, appear to be of stated age , well groomed   NERVOUS SYSTEM:  Alert & Oriented X3, non- focal exam, Motor Strength 5/5 B/L upper and lower extremities; DTRs 2+ intact and symmetric  HEAD:  Atraumatic, Normocephalic  EYES: EOMI, PERRLA, conjunctiva and sclera clear  HEENT: Moist mucous membranes, Supple neck , No JVD  CHEST: Clear to auscultation bilaterally; No rales, no rhonchi, no wheezing  HEART: Regular rate and rhythm; No murmurs, no rubs or gallops  ABDOMEN: Soft, Non-tender, Non-distended; Bowel sounds present, no guarding , no peritoneal irritation , + periumbilical scan healing, + Julian with clear urine  GENITOURINARY- Voiding, no suprapubic tenderness  EXTREMITIES:  2+ Peripheral Pulses, No clubbing, cyanosis,  + LE   edema  MUSCULOSKELETAL:- No muscle tenderness, Muscle tone normal, No joint tenderness, no Joint swelling,  Joint ROM –normal   SKIN-no rash,  + right chest wall wound around 6-7 cm in diameter with erythema around     Labs radiologic and other test : all reviewed and interpreted :                           7.5    0.42  )-----------( 17       ( 15 Feb 2025 05:17 )             22.1     02-15    133[L]  |  108  |  27[H]  ----------------------------<  183[H]  3.3[L]   |  18[L]  |  1.04    Ca    8.1[L]      15 Feb 2025 05:17  Phos  2.0     02-15  Mg     2.3     02-15    TPro  4.2[L]  /  Alb  1.7[L]  /  TBili  0.6  /  DBili  0.4[H]  /  AST  6[L]  /  ALT  12  /  AlkPhos  98  -15        LIVER FUNCTIONS - ( 15 Feb 2025 05:17 )  Alb: 1.7 g/dL / Pro: 4.2 gm/dL / ALK PHOS: 98 U/L / ALT: 12 U/L / AST: 6 U/L / GGT: x                              US Duplex Venous Upper Ext Complete, Bilateral (25 @ 17:35) >  RIGHT:  The right internal jugular, subclavian, axillary and brachial veins are   patent and compressible where applicable.  The basilic vein (superficial   vein) is patent and without thrombus.  The cephalic vein (superficial   vein) is patent and without thrombus.    LEFT:  The left internal jugular, subclavian, axillary and brachial veins are   patent and compressible where applicable.  The basilic vein (superficial   vein) is patent and without thrombus.  The cephalic vein (superficial   vein) is thrombosed at the antecubital fossa.    Doppler examination shows normal spontaneous and phasic flow.    IMPRESSION:  No evidence of deep venous thrombosis in either upper extremity.  Left cephalic vein thrombosis.    < end of copied text >      < from: US Abdomen Upper Quadrant Right (25 @ 14:55) >  FINDINGS:  Liver: Hepatomegaly measuring up to 18.3 cm.  Bile ducts: Normal caliber. Common bile duct measures 3 mm.  Gallbladder: Distended gallbladder measuring up to 12.4 cm without   shadowingcalculus. Gallbladder sludge noted. No gallbladder wall   thickening.  Pancreas: Visualized portions are within normal limits.  Right kidney: 9.5 cm. No hydronephrosis. 3.9 x 3.0 x 3.2 cm upper pole   cyst. 8 x 4 x 8 mm midpole shadowing calculus.  Ascites: None.  IVC: Visualized portions are within normal limits.    IMPRESSION:  Distended gallbladder with layering sludge.  8 mm right renal calculus without hydronephrosis.  Hepatomegaly.  Right renal cyst.    < end of copied text >      < from: CT Chest No Cont (25 @ 03:02) >    IMPRESSION:  1.   Study somewhat limited due to absence of contrast.  2.   Central venous catheter on right side with tip in cavoatrial   junction.  3.   Bibasilar atelectasis/scarring right-greater-than-left; can not   exclude  trace pleural effusions. No areas of consolidation. No pneumothorax.  4.   Lucency/deformity is noted involving the posterior aspect of the   left 10 ,  possibly 9th ribs; minimal fractures of indeterminate age in these areas   can  not be excluded.    < end of copied text >  < from: Xray Chest 1 View AP/PA (25 @ 22:22) >  Patient is rotated towards the left.    Right chest wall implanted IJ port with catheter tip at the SVC/RA   junction.    The cardiomediastinal silhouette is normal and the maureen are not enlarged.   Eventration right hemidiaphragm. There is no focal lung consolidation or   sizable pleural effusion. No significant osseous abnormality.    IMPRESSION:    Unremarkable frontal chest x ray  < from: CT Abdomen and Pelvis No Cont (25 @ 03:01) >  IMPRESSION:  1.   Study somewhat limited due to absence of contrast.  2.   Axial images 71-76 of series 301 and  concomitant coronal images demonstrate prominent soft tissue inseparable   from  the body of the pancreas consistent with pancreatic neoplasm.  3.   Some distension of gallbladder could be further evaluated with right   upper  quadrant sonography. No definite biliary dilatation.  4.   Other incidental findings as above.    The results of this examination were discussed with Dr. Dangelo at 4:05   a.m.  central standard time on 2025 by Pavel Morton MD.  They   discussed the mass in the body of the pancreas which  is compatible with patient's known pancreatic neoplasm.    Distended gallbladder could be further evaluated with right upper quadrant  sonography.    Low-attenuation lesions both kidneys nonobstructing calculus right kidney    < end of copied text >    < end of copied text >    RECENT CULTURES:    Culture - Blood (25 @ 09:00)    Specimen Source: .Blood BLOOD   Culture Results:   No growth at 72 Hours    Culture - Urine (25 @ 01:25)    -  Levofloxacin: S <=0.5   -  Meropenem: S <=1   -  Piperacillin/Tazobactam: S <=8   -  Amikacin: S <=16   -  Aztreonam: S <=4   -  Cefepime: S <=2   -  Ceftazidime: S <=1   -  Ciprofloxacin: S <=0.25   -  Imipenem: S 2   Specimen Source: Clean Catch   Culture Results:   50,000 - 99,000 CFU/mL Pseudomonas aeruginosa   Organism Identification: Pseudomonas aeruginosa   Organism: Pseudomonas aeruginosa   Method Type: REJI          Cardiac testing : reviewed   EKG <     12 Lead ECG (25 @ 16:52) >  Ventricular Rate 116 BPM  QTC Calculation(Bazett) 397 ms  * Poor data quality, interpretation may be adversely affected  Sinus tachycardia with premature atrial complexes  Left axis deviation  Low voltage QRS  Inferior infarct (cited on or before 2025)  Anterolateral infarct (cited on or before 2025)  Abnormal ECG  When compared with ECG of 2025 16:51, (Unconfirmed)  premature atrial complexes are now present    < end of copied text >      < from: TTE W or WO Ultrasound Enhancing Agent (25 @ 16:07) >   1. Technically difficult image quality.   2. Left ventricular systolic function is hyperdynamicwith an ejection fraction of 73 % by Hernandez's method of disks.   3. Mild left ventricular hypertrophy.   4. The right ventricle is not well visualized.   5. Tricuspid aortic valve with normal leaflet excursion. There is mild calcification of the aortic valve leaflets.   6. Mitral valve leaflets have focal calcification.   7. Trace mitral regurgitation.   8. Tricuspid valve was not well visualized.   9. The inferior vena cava is normal in size measuring 1.17 cm in diameter, (normal <2.1cm) with normal inspiratory collapse (normal >50%) consistent with normal right atrial pressure (~3, range 0-5mmHg).  10. Trace pericardial effusion.      < end of copied text >  Procedures :     Devices:     Current medications:  acetaminophen     Tablet .. 650 milliGRAM(s) Oral every 6 hours PRN  aluminum hydroxide/magnesium hydroxide/simethicone Suspension 30 milliLiter(s) Oral every 4 hours PRN  cefepime  Injectable. 2000 milliGRAM(s) IV Push every 8 hours  diphenoxylate/atropine 1 Tablet(s) Oral four times a day  famotidine Injectable 20 milliGRAM(s) IV Push every 12 hours  filgrastim-sndz (ZARXIO) Injectable 480 MICROGram(s) SubCutaneous daily  FIRST- Mouthwash  BLM 5 milliLiter(s) Swish and Spit four times a day PRN  loperamide 2 milliGRAM(s) Oral every 6 hours  melatonin 3 milliGRAM(s) Oral at bedtime PRN  midodrine 5 milliGRAM(s) Oral every 8 hours  octreotide  Injectable 150 MICROGram(s) SubCutaneous every 8 hours  ondansetron Injectable 4 milliGRAM(s) IV Push every 8 hours PRN  pantoprazole  Injectable 40 milliGRAM(s) IV Push daily  potassium chloride    Tablet ER 10 milliEquivalent(s) Oral every 2 hours  sodium chloride 0.45% with potassium chloride 20 mEq/L 1000 milliLiter(s) IV Continuous <Continuous>  sucralfate suspension 1 Gram(s) Oral four times a day

## 2025-02-15 NOTE — CHART NOTE - NSCHARTNOTEFT_GEN_A_CORE
Patient had a rapid response called  around 7.30 pm  she started to have rigors and became tachycardiac 10 minutes after platelet infusion, s/p bendryl 25 mg  ICU evaluated and did not take patient to ICU  When I evaluated patient patient  and BP systolic was 177/89, she was tacypenic and sat 93, NC put on  a stat CXR showed worsening of pleural effusion, a stat dose of lasix 40 mg given    Dr Glanzmann at bedside , called Dr Jeyson harding is patient's cardiologist who recommended another 40 of lasix given and echo  Patient's tacypnea improved and HR came to around 112, but BP dropped with systolic to around 90, she did have urine out put of > 500 ml in 30 minuties  ICU PA came and patient's oncologist Dr Degroot also called, we discussed if CTA needed to r/o PE was needed, both oncology and ICU felt at time time CTA was not needed and doppler LE would be  more appropriated and if respiratory status worsen a CTA woyld be considered  Patient was accepted by ICU step down and patient transferred to ICU Stepdown

## 2025-02-15 NOTE — CONSULT NOTE ADULT - SUBJECTIVE AND OBJECTIVE BOX
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 Pleitez) request. Hospital course complicated by recurrent diarrhea.      Events last 24 hours: ICU evaluation requested as patient developed rigors with tachypnea/rapid shallow breathing and tachycardia s/p 1u platelet transfusion. CT chest from earlier in the day revealed new bilateral pleural effusions with bibasilar consolidated lung and ascites. CXR now shows low lung volumes and bibasilar opacities. She was given 80mg IV furosemide by the medicine team with significant diuresis, however she experienced precipitous drop in her BP. Started on BiPAP for increased work of breathing. Hgb has drifted down to 6.8. Now spiking low grade fever approx 4 hours later.        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 No nausea, vomiting, or hematemesis; No diarrhea or constipation. No melena or hematochezia.  GENITOURINARY: No dysuria, frequency, hematuria, or incontinence  NEUROLOGICAL: No headaches, memory loss, loss of strength, numbness, or tremors  SKIN: No itching, burning, rashes, or lesions   MUSCULOSKELETAL: No joint pain or swelling; No muscle, back, or extremity pain  PSYCHIATRIC: No depression, anxiety, mood swings, or difficulty sleeping      Medications:  fluconAZOLE IVPB      meropenem Injectable      meropenem Injectable 1000 milliGRAM(s) IV Push once  vancomycin  IVPB 1000 milliGRAM(s) IV Intermittent once    midodrine 5 milliGRAM(s) Oral every 8 hours      acetaminophen     Tablet .. 650 milliGRAM(s) Oral every 6 hours PRN  melatonin 3 milliGRAM(s) Oral at bedtime PRN  ondansetron Injectable 4 milliGRAM(s) IV Push every 8 hours PRN        aluminum hydroxide/magnesium hydroxide/simethicone Suspension 30 milliLiter(s) Oral every 4 hours PRN  diphenoxylate/atropine 1 Tablet(s) Oral four times a day  famotidine Injectable 20 milliGRAM(s) IV Push every 12 hours  pantoprazole  Injectable 40 milliGRAM(s) IV Push daily  sucralfate suspension 1 Gram(s) Oral four times a day      octreotide  Injectable 100 MICROGram(s) SubCutaneous every 8 hours    albumin human 25% IVPB 100 milliLiter(s) IV Intermittent every 6 hours  cholecalciferol 2000 Unit(s) Oral at bedtime  Parenteral Nutrition - Adult 1 Each TPN Continuous <Continuous>  Parenteral Nutrition - Adult 1 Each TPN Continuous <Continuous>  potassium chloride  10 mEq/100 mL IVPB 10 milliEquivalent(s) IV Intermittent every 1 hour  potassium phosphate IVPB 30 milliMole(s) IV Intermittent once      Biotene Dry Mouth Oral Rinse 15 milliLiter(s) Swish and Spit five times a day PRN  chlorhexidine 2% Cloths 1 Application(s) Topical <User Schedule>  FIRST- Mouthwash  BLM 10 milliLiter(s) Swish and Swallow five times a day PRN            ICU Vital Signs Last 24 Hrs  T(C): 37 (15 Feb 2025 21:11), Max: 37.1 (15 Feb 2025 18:30)  T(F): 98.6 (15 Feb 2025 21:11), Max: 98.7 (15 Feb 2025 18:30)  HR: 137 (15 Feb 2025 22:30) (87 - 137)  BP: 104/60 (15 Feb 2025 21:11) (96/64 - 177/89)  BP(mean): --  ABP: --  ABP(mean): --  RR: 18 (15 Feb 2025 21:11) (16 - 20)  SpO2: 95% (15 Feb 2025 22:30) (92% - 96%)    O2 Parameters below as of 15 Feb 2025 21:11  Patient On (Oxygen Delivery Method): mask, Venturi                I&O's Detail    14 Feb 2025 07:01  -  15 Feb 2025 07:00  --------------------------------------------------------  IN:  Total IN: 0 mL    OUT:    Indwelling Catheter - Urethral (mL): 1200 mL  Total OUT: 1200 mL    Total NET: -1200 mL      15 Feb 2025 07:01  -  15 Feb 2025 22:56  --------------------------------------------------------  IN:    Platelets - Single Donor: 212 mL  Total IN: 212 mL    OUT:    Indwelling Catheter - Urethral (mL): 1950 mL  Total OUT: 1950 mL    Total NET: -1738 mL            LABS:                        6.8    0.40  )-----------( 28       ( 15 Feb 2025 21:25 )             20.3     02-15    133[L]  |  108  |  40[H]  ----------------------------<  172[H]  2.9[LL]   |  17[L]  |  1.26    Ca    8.0[L]      15 Feb 2025 21:25  Phos  1.2     02-15  Mg     2.1     02-15    TPro  4.6[L]  /  Alb  2.0[L]  /  TBili  1.0  /  DBili  x   /  AST  11[L]  /  ALT  17  /  AlkPhos  105  02-15          CAPILLARY BLOOD GLUCOSE      POCT Blood Glucose.: 186 mg/dL (15 Feb 2025 19:37)      Urinalysis Basic - ( 15 Feb 2025 21:25 )    Color: x / Appearance: x / SG: x / pH: x  Gluc: 172 mg/dL / Ketone: x  / Bili: x / Urobili: x   Blood: x / Protein: x / Nitrite: x   Leuk Esterase: x / RBC: x / WBC x   Sq Epi: x / Non Sq Epi: x / Bacteria: x      CULTURES:  Culture Results:   No growth at 24 hours (02-14-25 @ 07:33)  Culture Results:   No growth at 24 hours (02-14-25 @ 06:50)  Culture Results:   No growth at 4 days (02-11-25 @ 09:00)  Culture Results:   No growth at 4 days (02-11-25 @ 08:50)  Culture Results:   50,000 - 99,000 CFU/mL Pseudomonas aeruginosa (02-09-25 @ 01:25)        Physical Examination:    General: moderate acute resp distress, tachypneic,  Alert, oriented, interactive, nonfocal    HEENT: Pupils equal, reactive to light.  Symmetric. small scabbing perioral wounds    PULM: Clear to auscultation bilaterally, however diminished in bilateral lung bases, no significant sputum production    CVS: tachycardic, reg rhythm, no murmurs, rubs, or gallops    ABD: Soft, nondistended, nontender, normoactive bowel sounds, no masses    EXT: mild lower extremity edema, nontender    SKIN: Warm and well perfused, RUQ wound with scab    NEURO: A&Ox3, generalized weakness, cranial nerves grossly intact, no focal deficits        RADIOLOGY:     < from: CT Chest No Cont (02.15.25 @ 15:55) >  FINDINGS:    Support devices:. There is a right chest wall Port-A-Cath, with the   catheter tip in the distal SVC region.    LUNGS : There are new bilateral pleural effusions with adjacent   compressive atelectasis, right greater than left. No pneumothorax.  VESSELS: Aortic calcifications.  HEART: Normal size. Small pericardial effusion is seen. There are   coronary artery calcifications.  MEDIASTINUM AND MARTA: esophagus is dilated and fluid-filled.  CHEST WALL AND LOWER NECK: Within normal limits.  VISUALIZED UPPER ABDOMEN: Gallbladder is distended, similar to the prior.   There is new onset ascites around the liver and spleen. 4.1 cm pancreatic   tail mass, unchanged..  BONES: Nondisplaced fractures of the ninth and 10th ribs posteriorly on   the left.    IMPRESSION:  Nondisplaced fractures of the left ninth and 10th ribs, similar to the   prior study.    Pancreatic mass, unchanged    No onset ascites and bilateral pleural effusions with compressive   atelectasis, right greater than left.    Dilated gallbladder, similar to the prior study.    < end of copied text >          CRITICAL CARE TIME SPENT: 95 mins  Time spent evaluating/treating patient with medical issues that pose a high risk for life threatening deterioration and/or end-organ damage, reviewing data/labs/imaging, discussing case with multidisciplinary team, discussing plan/goals of care with patient/family. Non-inclusive of procedure time. The date of entry of this note reflects the date of services rendered.

## 2025-02-15 NOTE — PROVIDER CONTACT NOTE (OTHER) - SITUATION
Raised area noted to mid sternum next to port. RCW port intact.- no swelling noted, no redness. Port flushed without resistance. + blood return noted. Assessed with IV team Amy.

## 2025-02-15 NOTE — CHART NOTE - NSCHARTNOTEFT_GEN_A_CORE
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  Patient was being followed by GI, ID, Cardiology and Onc there.  Patient has been transferred to  today on patient's  (GI Dr. Pleitez) request.  At this time, patient c/o non-bloody diarrhea x 3 in last 24 hours.  She denies any abdominal pain, nausea, but has some dry heaves.  She denies any fever, chills, chest pain, palpitation, constipation or dysuria. Initially, she was started on Vancomycin and Zosyn, but now she has been on Cefepime as her blood culture and urine cultures are positive for Pseudomonas aeruginosa. Currently, patient is on Cefepime.  ID had also recommended Flagyl, but patient had refused it, as it causes nausea to her. Noted with Metabolic acidosis with NAG due to GI loss. Likely chemo-induced diarrhea and vomiting. Better than before. Cutaneous lesion on RUQ area below right breast, unclear etiology, ? Ecthyma gangrenosum. Admitting diagnosis:      *current status: Remains on low fiber diet with minimal PO intake. Still w/ multiple episodes of diarrhea, if persists can consider adding 5mg zinc into PN bag. No other acute events overnight. D/w Dr. Joyner     *new pertinent meds: Abx, lomotil, pepcid, imodium, proamatine, statin, protonix, KCl (30 mEq x 24 hours), Albumin 25% IVPB, NaCl 0.45% w/ KCl 20 mEq IVF    *labs reviewed; Hyponatremia, hypokalemia, and hypophosphatemia noted. Will increase all in PN bag today. Pt likely in RFS, continue to monitor and replete lytes outside of PN bag. Mg downtrending; if continues to downtrend can add 5 mEq into PN bag tomorrow. T bili WNL will c/w trace elements. C/w MVI w/ minerals and thiamine in PN bag. corrected Ca high end of normal limits, DO NOT supplement calcium outside of PN bag at this time. Only TWO POCT noted WNL, as per PN protocol, POCT required q6 hours to monitor glycemic control; should be maintained between 140- 180 mg/dL. If TG WNL (<400) will add 50g of lipids daily.  Titrate dextrose up 50-75g/day until goal rate of  300g dextrose reached (GIR 2.85 g/kg/min).    02-15    133[L]  |  108  |  27[H]  ----------------------------<  183[H]  3.3[L]   |  18[L]  |  1.04    Ca    8.1[L]      15 Feb 2025 05:17  Phos  2.0     02-15  Mg     2.3     02-15    TPro  4.2[L]  /  Alb  1.7[L]  /  TBili  0.6  /  DBili  0.4[H]  /  AST  6[L]  /  ALT  12  /  AlkPhos  98  02-15    BMI: BMI (kg/m2): 27.8 (25 @ 05:46)  Glucose: POCT Blood Glucose.: 171 mg/dL (02-15-25 @ 07:01)    BP: 123/69 (02-15-25 @ 06:28) (111/65 - 137/72)Vital Signs Last 24 Hrs  T(C): 36.7 (02-15-25 @ 06:28), Max: 36.7 (02-15-25 @ 06:28)  T(F): 98.1 (02-15-25 @ 06:28), Max: 98.1 (02-15-25 @ 06:28)  HR: 87 (02-15-25 @ :28) (87 - 96)  BP: 123/69 (02-15-25 @ 06:28) (111/65 - 137/72)  RR: 17 (02-15-25 @ :28) (17 - 18)  SpO2: 96% (02-15-25 @ 06:28) (96% - 98%)    Lipid Panel:   POCT Blood Glucose.: 171 mg/dL (02-15-25 @ 07:01)  POCT Blood Glucose.: 133 mg/dL (25 @ 22:14)    Iron Total: 34 ug/dL (25 @ 18:46)  Folate, Serum: >20.0 ng/mL (25 @ 18:46)  Vitamin B12, Serum: 1457 pg/mL (25 @ 18:46)  Vitamin D, 25-Hydroxy: 23.0 ng/mL (25 @ 18:46)    *I&O's Detail    2025 07:01  -  15 Feb 2025 07:00  --------------------------------------------------------  IN:  Total IN: 0 mL    OUT:    Indwelling Catheter - Urethral (mL): 1200 mL  Total OUT: 1200 mL    Total NET: -1200 mL  * fluid status: negative; however no INPUT (PPN/IVF) doc'd **Strict I&O's are rec'd when pt is on PN as per protocol   *diarrhea x2 .  pt on bowel regimen.  *edema: 2+ generalized, 3+ L/R arm and leg  *PI: L Buttock S1, R Buttock S1    *malnutrition: Pt continues to meet criteria for severe protein calorie malnutrition in context of acute on chronic illness r/t decreased ability to meet increased nutrient needs 2/2 PO intolerance, N/V and diarrhea, on chemo for pancreatic ca AEB meeting <50% ENN > 5 days, mild-mod muscle/fat wasting    Estimated Needs: based on 73.4 Kg (wt from EMR admit wt )  Calories: 1812 - 2175 Kcal (25 - 30 Kcal/Kg)  Protein: 108 - 145 g (1.5 - 2.0 g/Kg)  Fluids: 1450 - 1812 mL (20 - 25 mL/Kg)    Diet, Low Fiber:   Kosher (25 @ 09:00) [Active]  Parenteral Nutrition - Adult 1 Each (75 mL/Hr) TPN Continuous <Continuous>, 25 @ 22:00, 22:00, Stop order after: 1 Days    Weight History:  Daily Weight in k.6 (15 Feb 2025 06:17)  Height (cm): 162.6 (25 @ 02:39)  Weight (kg): 73.4 (25 @ 05:46), 66.8 (25 @ 02:39)  BMI (kg/m2): 27.8 (25 @ 05:46), 25.3 (25 @ 02:39)  BSA (m2): 1.79 (25 @ 05:46), 1.72 (25 @ 02:39)    TPN Recommendations: via implanted infusion port  Total Volume: 1800 mL x 24 hours  125 g  Amino Acids  150 g Dextrose (dex goal 300g)  0 g Lipids 20% pending lipid panel  125 mEq Sodium Chloride  16 mEq Sodium Acetate  10 mmol Sodium Phosphate  21 mEq Potassium Chloride  0 mEq Potassium Acetate  20 mmol Potassium Phosphate  0 mEq Calcium Gluconate (CAPS out of PN solution)   0 mEq Magnesium Sulfate  200 mg Thiamine  1 ml Trace Elements  10 ml MVI    Total Calories    1010     (Meets  51 %  of  Estimated Energy needs  and  100  %  Protein needs)      Additional Recommendations:    1) Daily weights  2) Strict I & O's - consider dc'ing IVF when PN initiated  3) Daily lyte checks including magnesium and phos - monitor for RFS  4) Weekly triglycerides/LFT checks  5) POCT q6hrs; maintain 140-180mg/dL  6) C/w regular diet and encourage PO intake as tolerated  7) Confirm goals of care regarding long-term nutrition support. Would recommend PEG placement if long-term nutrition support is required as GI is functional and would be the preferred route for nutrition.     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  Patient was being followed by GI, ID, Cardiology and Onc there.  Patient has been transferred to  today on patient's  (GI Dr. Pleitez) request.  At this time, patient c/o non-bloody diarrhea x 3 in last 24 hours.  She denies any abdominal pain, nausea, but has some dry heaves.  She denies any fever, chills, chest pain, palpitation, constipation or dysuria. Initially, she was started on Vancomycin and Zosyn, but now she has been on Cefepime as her blood culture and urine cultures are positive for Pseudomonas aeruginosa. Currently, patient is on Cefepime.  ID had also recommended Flagyl, but patient had refused it, as it causes nausea to her. Noted with Metabolic acidosis with NAG due to GI loss. Likely chemo-induced diarrhea and vomiting. Better than before. Cutaneous lesion on RUQ area below right breast, unclear etiology, ? Ecthyma gangrenosum. Admitting diagnosis:      *current status: Remains on low fiber diet with minimal PO intake. Still w/ multiple episodes of diarrhea, if persists can consider adding 5mg zinc into PN bag. No other acute events overnight. D/w Dr. Joyner will c/w TPN today.     *new pertinent meds: Abx, lomotil, pepcid, imodium, proamatine, statin, protonix, KCl (30 mEq x 24 hours), Albumin 25% IVPB, NaCl 0.45% w/ KCl 20 mEq IVF    *labs reviewed; Hyponatremia, hypokalemia, and hypophosphatemia noted. Will increase all in PN bag today. May need to consider decreasing total volume if hypernatremia persists. Pt likely in RFS, continue to monitor and replete lytes outside of PN bag. Mg downtrending; if continues to downtrend can add 5 mEq into PN bag tomorrow. T bili WNL will c/w trace elements. C/w MVI w/ minerals and thiamine in PN bag. corrected Ca high end of normal limits, DO NOT supplement calcium outside of PN bag at this time. Only TWO POCT noted WNL, as per PN protocol, POCT required q6 hours to monitor glycemic control; should be maintained between 140- 180 mg/dL. If TG WNL (<400) will add 50g of lipids daily.  Titrate dextrose up 50-75g/day until goal rate of 325g dextrose reached (GIR 3.07 g/kg/min).    02-15    133[L]  |  108  |  27[H]  ----------------------------<  183[H]  3.3[L]   |  18[L]  |  1.04    Ca    8.1[L]      15 Feb 2025 05:17  Phos  2.0     02-15  Mg     2.3     02-15    TPro  4.2[L]  /  Alb  1.7[L]  /  TBili  0.6  /  DBili  0.4[H]  /  AST  6[L]  /  ALT  12  /  AlkPhos  98  02-15    BMI: BMI (kg/m2): 27.8 (25 @ 05:46)  Glucose: POCT Blood Glucose.: 171 mg/dL (02-15-25 @ 07:01)    BP: 123/69 (02-15-25 @ 06:28) (111/65 - 137/72)Vital Signs Last 24 Hrs  T(C): 36.7 (02-15-25 @ 06:28), Max: 36.7 (02-15-25 @ 06:28)  T(F): 98.1 (02-15-25 @ 06:28), Max: 98.1 (02-15-25 @ 06:28)  HR: 87 (02-15-25 @ :28) (87 - 96)  BP: 123/69 (02-15-25 @ 06:28) (111/65 - 137/72)  RR: 17 (02-15-25 @ 06:28) (17 - 18)  SpO2: 96% (02-15-25 @ 06:28) (96% - 98%)    Lipid Panel:   POCT Blood Glucose.: 171 mg/dL (02-15-25 @ 07:01)  POCT Blood Glucose.: 133 mg/dL (25 @ 22:14)    Iron Total: 34 ug/dL (25 @ 18:46)  Folate, Serum: >20.0 ng/mL (25 @ 18:46)  Vitamin B12, Serum: 1457 pg/mL (25 @ 18:46)  Vitamin D, 25-Hydroxy: 23.0 ng/mL (25 @ 18:46) ** c/w deficiency    *I&O's Detail    2025 07:01  -  15 Feb 2025 07:00  --------------------------------------------------------  IN:  Total IN: 0 mL    OUT:    Indwelling Catheter - Urethral (mL): 1200 mL  Total OUT: 1200 mL    Total NET: -1200 mL  * fluid status: negative; however no INPUT (PPN/IVF) doc'd **Strict I&O's are rec'd when pt is on PN as per protocol   *diarrhea x2 .  pt on bowel regimen.  *edema: 2+ generalized, 3+ L/R arm and leg  *PI: L Buttock S1, R Buttock S1    *malnutrition: Pt continues to meet criteria for severe protein calorie malnutrition in context of acute on chronic illness r/t decreased ability to meet increased nutrient needs 2/2 PO intolerance, N/V and diarrhea, on chemo for pancreatic ca AEB meeting <50% ENN > 5 days, mild-mod muscle/fat wasting    Estimated Needs: based on 73.4 Kg (wt from EMR admit wt )  Calories: 1812 - 2175 Kcal (25 - 30 Kcal/Kg)  Protein: 108 - 145 g (1.5 - 2.0 g/Kg)  Fluids: 1450 - 1812 mL (20 - 25 mL/Kg)    Diet, Low Fiber:   Kosher (25 @ 09:00) [Active]  Parenteral Nutrition - Adult 1 Each (75 mL/Hr) TPN Continuous <Continuous>, 25 @ 22:00, 22:00, Stop order after: 1 Days    Weight History:  Daily Weight in k.6 (15 Feb 2025 06:17)  Height (cm): 162.6 (25 @ 02:39)  Weight (kg): 73.4 (25 @ 05:46), 66.8 (25 @ 02:39)  BMI (kg/m2): 27.8 (25 @ 05:46), 25.3 (25 @ 02:39)  BSA (m2): 1.79 (25 @ 05:46), 1.72 (25 @ 02:39)    TPN Recommendations: via implanted infusion port  Total Volume: 1800 mL x 24 hours  125 g  Amino Acids  150 g Dextrose (dex goal 325g)  0 g Lipids 20% pending lipid panel  125 mEq Sodium Chloride  16 mEq Sodium Acetate  10 mmol Sodium Phosphate  21 mEq Potassium Chloride  0 mEq Potassium Acetate  20 mmol Potassium Phosphate  0 mEq Calcium Gluconate (CAPS out of PN solution)   0 mEq Magnesium Sulfate  200 mg Thiamine  1 ml Trace Elements  10 ml MVI    Total Calories    1010     (Meets  51 %  of  Estimated Energy needs  and  100  %  Protein needs)      Additional Recommendations:    1) Daily weights  2) Strict I & O's - consider dc'ing IVF when PN initiated  3) Daily lyte checks including magnesium and phos - monitor for RFS  4) Weekly triglycerides/LFT checks  5) POCT q6hrs; maintain 140-180mg/dL  6) C/w low fiber diet and encourage PO intake as tolerated  7) Continue to titrate dextrose 50-75g per day until goal of 325g reached  7) Confirm goals of care regarding long-term nutrition support. Would recommend PEG placement if long-term nutrition support is required as GI is functional and would be the preferred route for nutrition.     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  Patient was being followed by GI, ID, Cardiology and Onc there.  Patient has been transferred to  today on patient's  (GI Dr. Pleitez) request.  At this time, patient c/o non-bloody diarrhea x 3 in last 24 hours.  She denies any abdominal pain, nausea, but has some dry heaves.  She denies any fever, chills, chest pain, palpitation, constipation or dysuria. Initially, she was started on Vancomycin and Zosyn, but now she has been on Cefepime as her blood culture and urine cultures are positive for Pseudomonas aeruginosa. Currently, patient is on Cefepime.  ID had also recommended Flagyl, but patient had refused it, as it causes nausea to her. Noted with Metabolic acidosis with NAG due to GI loss. Likely chemo-induced diarrhea and vomiting. Better than before. Cutaneous lesion on RUQ area below right breast, unclear etiology, ? Ecthyma gangrenosum. Admitting diagnosis: pancreatic ca    *TPN initiated 2/2 PO intolerance, + Mucositis, possible candidal esophagitis    *current status: Remains on low fiber diet with minimal PO intake. Still w/ multiple episodes of diarrhea, if persists can consider adding 5mg zinc into PN bag. No other acute events overnight. D/w Dr. Joyner will c/w TPN today.     *new pertinent meds: Abx, lomotil, pepcid, imodium, proamatine, statin, protonix, KCl (30 mEq x 24 hours), Albumin 25% IVPB, NaCl 0.45% w/ KCl 20 mEq IVF    *labs reviewed; Hyponatremia, hypokalemia, and hypophosphatemia noted. Will increase all in PN bag today. May need to consider decreasing total volume if hypernatremia persists. Pt likely in RFS, continue to monitor and replete lytes outside of PN bag. Mg downtrending; if continues to downtrend can add 5 mEq into PN bag tomorrow. T bili WNL will c/w trace elements. C/w MVI w/ minerals and thiamine in PN bag. corrected Ca high end of normal limits, DO NOT supplement calcium outside of PN bag at this time. Only TWO POCT noted WNL, as per PN protocol, POCT required q6 hours to monitor glycemic control; should be maintained between 140- 180 mg/dL. If TG WNL (<400) will add 50g of lipids daily.  Titrate dextrose up 50-75g/day until goal rate of 325g dextrose reached (GIR 3.07 g/kg/min).    02-15    133[L]  |  108  |  27[H]  ----------------------------<  183[H]  3.3[L]   |  18[L]  |  1.04    Ca    8.1[L]      15 Feb 2025 05:17  Phos  2.0     02-15  Mg     2.3     02-15    TPro  4.2[L]  /  Alb  1.7[L]  /  TBili  0.6  /  DBili  0.4[H]  /  AST  6[L]  /  ALT  12  /  AlkPhos  98  02-15    BMI: BMI (kg/m2): 27.8 (25 @ 05:46)  Glucose: POCT Blood Glucose.: 171 mg/dL (02-15-25 @ 07:01)    BP: 123/69 (02-15-25 @ 06:28) (111/65 - 137/72)Vital Signs Last 24 Hrs  T(C): 36.7 (02-15-25 @ 06:28), Max: 36.7 (02-15-25 @ 06:28)  T(F): 98.1 (02-15-25 @ 06:28), Max: 98.1 (02-15-25 @ 06:28)  HR: 87 (02-15-25 @ :28) (87 - 96)  BP: 123/69 (02-15-25 @ 06:28) (111/65 - 137/72)  RR: 17 (02-15-25 @ 06:28) (17 - 18)  SpO2: 96% (02-15-25 @ 06:28) (96% - 98%)    Lipid Panel:   POCT Blood Glucose.: 171 mg/dL (02-15-25 @ 07:01)  POCT Blood Glucose.: 133 mg/dL (25 @ 22:14)    Iron Total: 34 ug/dL (25 @ 18:46)  Folate, Serum: >20.0 ng/mL (25 @ 18:46)  Vitamin B12, Serum: 1457 pg/mL (25 @ 18:46)  Vitamin D, 25-Hydroxy: 23.0 ng/mL (25 @ 18:46) ** c/w deficiency    *I&O's Detail    2025 07:01  -  15 Feb 2025 07:00  --------------------------------------------------------  IN:  Total IN: 0 mL    OUT:    Indwelling Catheter - Urethral (mL): 1200 mL  Total OUT: 1200 mL    Total NET: -1200 mL  * fluid status: negative; however no INPUT (PPN/IVF) doc'd **Strict I&O's are rec'd when pt is on PN as per protocol   *diarrhea x2 .  pt on bowel regimen.  *edema: 2+ generalized, 3+ L/R arm and leg  *PI: L Buttock S1, R Buttock S1    *malnutrition: Pt continues to meet criteria for severe protein calorie malnutrition in context of acute on chronic illness r/t decreased ability to meet increased nutrient needs 2/2 PO intolerance, N/V and diarrhea, on chemo for pancreatic ca AEB meeting <50% ENN > 5 days, mild-mod muscle/fat wasting    Estimated Needs: based on 73.4 Kg (wt from EMR admit wt )  Calories: 1812 - 2175 Kcal (25 - 30 Kcal/Kg)  Protein: 108 - 145 g (1.5 - 2.0 g/Kg)  Fluids: 1450 - 1812 mL (20 - 25 mL/Kg)    Diet, Low Fiber:   Kosher (25 @ 09:00) [Active]  Parenteral Nutrition - Adult 1 Each (75 mL/Hr) TPN Continuous <Continuous>, 25 @ 22:00, 22:00, Stop order after: 1 Days    Weight History:  Daily Weight in k.6 (15 Feb 2025 06:17)  Height (cm): 162.6 (25 @ 02:39)  Weight (kg): 73.4 (25 @ 05:46), 66.8 (25 @ 02:39)  BMI (kg/m2): 27.8 (25 @ 05:46), 25.3 (25 @ 02:39)  BSA (m2): 1.79 (25 @ 05:46), 1.72 (25 @ 02:39)    TPN Recommendations: via implanted infusion port  Total Volume: 1800 mL x 24 hours  125 g  Amino Acids  150 g Dextrose (dex goal 325g)  0 g Lipids 20% pending lipid panel  125 mEq Sodium Chloride  16 mEq Sodium Acetate  10 mmol Sodium Phosphate  21 mEq Potassium Chloride  0 mEq Potassium Acetate  20 mmol Potassium Phosphate  0 mEq Calcium Gluconate (CAPS out of PN solution)   0 mEq Magnesium Sulfate  200 mg Thiamine  1 ml Trace Elements  10 ml MVI    Total Calories    1010     (Meets  51 %  of  Estimated Energy needs  and  100  %  Protein needs)      Additional Recommendations:    1) Daily weights  2) Strict I & O's - consider dc'ing IVF when PN initiated  3) Daily lyte checks including magnesium and phos - monitor for RFS  4) Weekly triglycerides/LFT checks  5) POCT q6hrs; maintain 140-180mg/dL  6) C/w low fiber diet and encourage PO intake as tolerated  7) Continue to titrate dextrose 50-75g per day until goal of 325g reached  7) Confirm goals of care regarding long-term nutrition support. Would recommend PEG placement if long-term nutrition support is required as GI is functional and would be the preferred route for nutrition.     *will continue to monitor and adjust PN prn*  Abida Gonsales, PhD, MS, RDN, [phone]

## 2025-02-15 NOTE — PROVIDER CONTACT NOTE (CRITICAL VALUE NOTIFICATION) - SITUATION
pt w/ critical WBC 0.4 and h/h 6.8/20.3 pt w/ critical   WBC 0.4 and h/h 6.8/20.3  K 2.9  Lactate 2.9

## 2025-02-15 NOTE — PROGRESS NOTE ADULT - SUBJECTIVE AND OBJECTIVE BOX
Date of service: 02-15-25 @ 08:38    Lying in bed in NAD  Weak looking  Has loose BM's  Dose not seem in pain    ROS: no fever or chills; denies dizziness, no HA, no SOB or cough, no abdominal pain, no dysuria, no legs pain, no rashes    MEDICATIONS  (STANDING):  albumin human 25% IVPB 100 milliLiter(s) IV Intermittent every 12 hours  cefepime  Injectable. 2000 milliGRAM(s) IV Push every 8 hours  cholestyramine Powder (Sugar-Free) 4 Gram(s) Oral daily  diphenoxylate/atropine 1 Tablet(s) Oral four times a day  famotidine Injectable 20 milliGRAM(s) IV Push every 12 hours  filgrastim-sndz (ZARXIO) Injectable 480 MICROGram(s) SubCutaneous daily  midodrine 5 milliGRAM(s) Oral every 8 hours  octreotide  Injectable 150 MICROGram(s) SubCutaneous every 8 hours  pantoprazole  Injectable 40 milliGRAM(s) IV Push daily  Parenteral Nutrition - Adult 1 Each (75 mL/Hr) TPN Continuous <Continuous>  sodium chloride 0.45% with potassium chloride 20 mEq/L 1000 milliLiter(s) (100 mL/Hr) IV Continuous <Continuous>  sucralfate suspension 1 Gram(s) Oral four times a day    Vital Signs Last 24 Hrs  T(C): 36.7 (15 Feb 2025 06:28), Max: 36.7 (15 Feb 2025 06:28)  T(F): 98.1 (15 Feb 2025 06:28), Max: 98.1 (15 Feb 2025 06:28)  HR: 87 (15 Feb 2025 06:28) (87 - 96)  BP: 123/69 (15 Feb 2025 06:28) (111/65 - 137/72)  BP(mean): --  RR: 17 (15 Feb 2025 06:28) (17 - 18)  SpO2: 96% (15 Feb 2025 06:28) (96% - 98%)    Parameters below as of 15 Feb 2025 06:28  Patient On (Oxygen Delivery Method): room air     Physical exam:    Constitutional:  No acute distress  HEENT: NC/AT, EOMI, PERRLA, conjunctivae clear; ears and nose atraumatic; pharynx benign  Neck: supple; thyroid not palpable  Back: no tenderness  Respiratory: respiratory effort normal; clear to auscultation  Cardiovascular: S1S2 regular, no murmurs  Abdomen: soft, mild periombilical tender, not distended, positive BS; no liver or spleen organomegaly  Genitourinary: no suprapubic tenderness  Lymphatic: no LN palpable  Musculoskeletal: no muscle tenderness, no joint swelling or tenderness  Extremities: no pedal edema  Neurological/ Psychiatric: AxOx3, judgement and insight normal; moving all extremities  Skin: no rashes; right lower chest wall dry, scabbed ulcer; no discharge     Labs: reviewed                        7.5    0.42  )-----------( 17       ( 15 Feb 2025 05:17 )             22.1     02-15    133[L]  |  108  |  27[H]  ----------------------------<  183[H]  3.3[L]   |  18[L]  |  1.04    Ca    8.1[L]      15 Feb 2025 05:17  Phos  2.0     02-15  Mg     2.3     02-15    TPro  4.2[L]  /  Alb  1.7[L]  /  TBili  0.6  /  DBili  0.4[H]  /  AST  6[L]  /  ALT  12  /  AlkPhos  98  02-15    C-Reactive Protein: 209.0 mg/mL (02-15-25 @ 05:17)  C-Reactive Protein: 233.0 mg/mL (02-14-25 @ 06:50)  Ferritin: 1798 ng/mL (02-13-25 @ 18:46)                        8.0    0.23  )-----------( 20       ( 14 Feb 2025 06:50 )             24.2     02-14    134[L]  |  108  |  21  ----------------------------<  141[H]  3.3[L]   |  16[L]  |  1.03    Ca    8.5      14 Feb 2025 06:50  Phos  3.1     02-14  Mg     2.6     02-14    TPro  4.5[L]  /  Alb  1.5[L]  /  TBili  0.8  /  DBili  0.4[H]  /  AST  8[L]  /  ALT  14  /  AlkPhos  104  02-14    C-Reactive Protein: 233.0 mg/mL (02-14-25 @ 06:50)  Ferritin: 1798 ng/mL (02-13-25 @ 18:46)                        8.7    0.19  )-----------( 33       ( 13 Feb 2025 06:52 )             25.7     02-13    137  |  109[H]  |  20  ----------------------------<  117[H]  2.6[LL]   |  18[L]  |  1.02    Ca    8.7      13 Feb 2025 06:52  Phos  2.3     02-13  Mg     1.6     02-13    TPro  4.4[L]  /  Alb  1.1[L]  /  TBili  0.7  /  DBili  x   /  AST  11[L]  /  ALT  16  /  AlkPhos  94  02-13     LIVER FUNCTIONS - ( 13 Feb 2025 06:52 )  Alb: 1.1 g/dL / Pro: 4.4 gm/dL / ALK PHOS: 94 U/L / ALT: 16 U/L / AST: 11 U/L / GGT: x           Culture - Blood (collected 11 Feb 2025 09:00)  Source: .Blood BLOOD  Preliminary Report (12 Feb 2025 13:01):    No growth at 24 hours    Culture - Blood (collected 11 Feb 2025 08:50)  Source: .Blood BLOOD  Preliminary Report (12 Feb 2025 13:01):    No growth at 24 hours    Urinalysis with Rflx Culture (collected 09 Feb 2025 01:25)    Culture - Urine (collected 09 Feb 2025 01:25)  Source: Clean Catch  Final Report (11 Feb 2025 08:39):    50,000 - 99,000 CFU/mL Pseudomonas aeruginosa  Organism: Pseudomonas aeruginosa (11 Feb 2025 08:39)  Organism: Pseudomonas aeruginosa (11 Feb 2025 08:39)      Method Type: REJI      -  Amikacin: S <=16      -  Aztreonam: S <=4      -  Cefepime: S <=2      -  Ceftazidime: S <=1      -  Ciprofloxacin: S <=0.25      -  Imipenem: S 2      -  Levofloxacin: S <=0.5      -  Meropenem: S <=1      -  Piperacillin/Tazobactam: S <=8    Culture - Blood (collected 08 Feb 2025 22:00)  Source: .Blood BLOOD  Gram Stain (09 Feb 2025 19:04):    Growth in aerobic bottle: Gram Negative Rods  Final Report (11 Feb 2025 07:53):    Growth in aerobic bottle: Pseudomonas aeruginosa    Direct identification is available within approximately 3-5    hours either by Blood Panel Multiplexed PCR or Direct    MALDI-TOF. Details: [email or web address]  Organism: Blood Culture PCR  Pseudomonas aeruginosa (11 Feb 2025 07:53)  Organism: Pseudomonas aeruginosa (11 Feb 2025 07:53)      Method Type: REJI      -  Aztreonam: S <=4      -  Cefepime: S <=2      -  Ceftazidime: S <=1      -  Ciprofloxacin: S <=0.25      -  Imipenem: S <=1      -  Levofloxacin: S <=0.5      -  Meropenem: S <=1      -  Piperacillin/Tazobactam: S <=8  Organism: Blood Culture PCR (11 Feb 2025 07:53)      Method Type: PCR      -  Pseudomonas aeruginosa: Detec    Culture - Blood (collected 08 Feb 2025 21:55)  Source: .Blood BLOOD  Gram Stain (09 Feb 2025 19:32):    Growth in aerobic bottle: Gram Negative Rods  Final Report (11 Feb 2025 07:54):    Growth in aerobic bottle: Pseudomonas aeruginosa    See previous culture [id number]    Radiology: all available radiological tests reviewed    < from: US Abdomen Upper Quadrant Right (02.09.25 @ 14:55) >  Distended gallbladder with layering sludge.  8 mm right renal calculus without hydronephrosis.  Hepatomegaly.  Right renal cyst.  < end of copied text >    < from: CT Abdomen and Pelvis No Cont (02.09.25 @ 03:01) >  1.   Study somewhat limited due to absence of contrast.  2.   Axial images 71-76 of series 301 and concomitant coronal images demonstrate prominent soft tissue inseparable from the body of the pancreas consistent with pancreatic neoplasm.  3.   Some distension of gallbladder could be further evaluated with right upper quadrant sonography. No definite biliary dilatation.  < end of copied text >    Advanced directives addressed: full resuscitation

## 2025-02-15 NOTE — CONSULT NOTE ADULT - SUBJECTIVE AND OBJECTIVE BOX
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 with septic shock on 02/09/2025, nausea, vomiting and diarrhea requiring Levophed drip, was transferred out of ICU to regular floor last night.  Patient was being followed by GI, ID, Cardiology and Onc there.  Patient has been transferred to  today on patient's  (GI Dr. Pleitez) request.  At this time, patient c/o non-bloody diarrhea x 3 in last 24 hours.  She denies any abdominal pain, nausea, but has some dry heaves.  She denies any fever, chills, chest pain, palpitation, constipation or dysuria. Initially, she was started on Vancomycin and Zosyn, but now she has been on Cefepime as her blood culture and urine cultures are positive for Pseudomonas aeruginosa. Currently, patient is on Cefepime.  ID had also recommended Flagyl, but patient had refused it, as it causes nausea to her.    Sig labs: WBC 0.45k, N 82%, Hb 7.8, Platelets 59k, Bicarb 19, AG 9, , Cr 0.99, TP 4.6, Alb 1.2, LFTs wnl.  Mg 1.9, PO4 2.4.  Prior C.diff negative.     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 2000 milliGRAM(s) IV Push every 8 hours  cholestyramine Powder (Sugar-Free) 4 Gram(s) Oral daily  diphenoxylate/atropine 1 Tablet(s) Oral four times a day  famotidine Injectable 20 milliGRAM(s) IV Push every 12 hours  filgrastim-sndz (ZARXIO) Injectable 480 MICROGram(s) SubCutaneous daily  midodrine 5 milliGRAM(s) Oral every 8 hours  octreotide  Injectable 150 MICROGram(s) SubCutaneous every 8 hours  pantoprazole  Injectable 40 milliGRAM(s) IV Push daily  Parenteral Nutrition - Adult 1 Each (75 mL/Hr) TPN Continuous <Continuous>  sodium chloride 0.45% with potassium chloride 20 mEq/L 1000 milliLiter(s) (100 mL/Hr) IV Continuous <Continuous>  sucralfate suspension 1 Gram(s) Oral four times a day    MEDICATIONS  (PRN):  acetaminophen     Tablet .. 650 milliGRAM(s) Oral every 6 hours PRN Temp greater or equal to 38C (100.4F), Mild Pain (1 - 3)  aluminum hydroxide/magnesium hydroxide/simethicone Suspension 30 milliLiter(s) Oral every 4 hours PRN Dyspepsia  Biotene Dry Mouth Oral Rinse 15 milliLiter(s) Swish and Spit five times a day PRN Mouth Care  FIRST- Mouthwash  BLM 10 milliLiter(s) Swish and Swallow five times a day PRN Mouth Care  melatonin 3 milliGRAM(s) Oral at bedtime PRN Insomnia  ondansetron Injectable 4 milliGRAM(s) IV Push every 8 hours PRN Nausea and/or Vomiting      FAMILY HISTORY:      SOCIAL HISTORY: No EtOH, no tobacco    REVIEW OF SYSTEMS:    CONSTITUTIONAL: No weakness, fevers or chills  EYES/ENT: No visual changes;  No vertigo or throat pain   NECK: No pain or stiffness  RESPIRATORY: No cough, wheezing, hemoptysis; No shortness of breath  CARDIOVASCULAR: No chest pain or palpitations  GASTROINTESTINAL: No abdominal or epigastric pain. No nausea, vomiting, or hematemesis; No diarrhea or constipation. No melena or hematochezia.  GENITOURINARY: No dysuria, frequency or hematuria  NEUROLOGICAL: No numbness or weakness  SKIN: No itching, burning, rashes, or lesions   All other review of systems is negative unless indicated above.        T(F): 98.1 (02-15-25 @ 06:28), Max: 98.1 (02-15-25 @ 06:28)  HR: 87 (02-15-25 @ 06:28)  BP: 123/69 (02-15-25 @ 06:28)  RR: 17 (02-15-25 @ 06:28)  SpO2: 96% (02-15-25 @ 06:28)  Wt(kg): --    GENERAL: NAD, well-developed  HEAD:  Atraumatic, Normocephalic  EYES: EOMI, PERRLA, conjunctiva and sclera clear  NECK: Supple, No JVD  CHEST/LUNG: Clear to auscultation bilaterally; No wheeze  HEART: Regular rate and rhythm; No murmurs, rubs, or gallops  ABDOMEN: Soft, Nontender, Nondistended; Bowel sounds present  EXTREMITIES:  2+ Peripheral Pulses, No clubbing, cyanosis, or edema  NEUROLOGY: non-focal  SKIN: No rashes or lesions                          7.5    0.42  )-----------( 17       ( 15 Feb 2025 05:17 )             22.1       02-15    133[L]  |  108  |  27[H]  ----------------------------<  183[H]  3.3[L]   |  18[L]  |  1.04    Ca    8.1[L]      15 Feb 2025 05:17  Phos  2.0     02-15  Mg     2.3     02-15    TPro  4.2[L]  /  Alb  1.7[L]  /  TBili  0.6  /  DBili  0.4[H]  /  AST  6[L]  /  ALT  12  /  AlkPhos  98  02-15      Magnesium: 2.3 mg/dL (02-15 @ 05:17)  Phosphorus: 2.0 mg/dL (02-15 @ 05:17)          .Blood BLOOD  02-11 @ 09:00   No growth at 72 Hours  --  --      .Blood BLOOD  02-11 @ 08:50   No growth at 72 Hours  --  --      Clean Catch  02-09 @ 01:25   50,000 - 99,000 CFU/mL Pseudomonas aeruginosa  --  Pseudomonas aeruginosa      .Blood BLOOD  02-08 @ 22:00   Growth in aerobic bottle: Pseudomonas aeruginosa  Direct identification is available within approximately 3-5  hours either by Blood Panel Multiplexed PCR or Direct  MALDI-TOF. Details: [email or web address]  --  Blood Culture PCR  Pseudomonas aeruginosa      .Blood BLOOD  02-08 @ 21:55   Growth in aerobic bottle: Pseudomonas aeruginosa  See previous culture [id number]  --    Growth in aerobic bottle: Gram Negative Rods

## 2025-02-15 NOTE — PROGRESS NOTE ADULT - ASSESSMENT
68 y/o female with PMH of CVA on Aggrenox ,  primary pancreatic adenocarcinoma of head with tumor in umbilicus, chemo-induced pancytopenia and significant PMH of SCC lip, cyclical vomiting syndrome, HTN, HPL, Gout, GERD, and others had been admitted to [facility] with septic shock on 02/09/2025, nausea, vomiting and diarrhea requiring Levophed drip, was transferred out of ICU to regular floor last night.  Patient was being followed by GI, ID, Cardiology and Onc there.  Patient has been transferred to  today on patient's  (GI Dr. Pleitez) request on 2/12/25 .  At this time, patient c/o non-bloody diarrhea x 3 in last 24 hours.  She denies any abdominal pain, nausea, but has some dry heaves.  She denies any fever, chills, chest pain, palpitation, constipation or dysuria. Initially, she was started on Vancomycin and Zosyn, but now she has been on Cefepime as her blood culture and urine cultures are positive for Pseudomonas aeruginosa. Currently, patient is on Cefepime.  ID had also recommended Flagyl, but patient had refused it, as it causes nausea to her.    # Recent septic shock due to Pseudomonas bacteremia and UTI.   -Blood culture and urine cultures are positive for Pseudomonas aeruginosa.   - Repeat blood culture (02/11), negative so far.   -Continue Cefepime for now.  Patient had refused Flagyl, and refusing now also.  -Tylenol prn.  -ID consult placed.    # Nausea, vomiting and diarrhea.  # Metabolic acidosis with NAG due to GI loss.  -Likely chemo-induced diarrhea and vomiting. Better than before.  -Prior C. diff neg, GI PCR neg.  Bicarb 19 and AG 9  -C. diff PCR re-ordered per her .  -Continue Zofran.  -Octreotide increased from 100 mcg to 150 mcg SC q8h per her  Dr Pleitez's request.  -Imodium 2 mg q6h for 2 days and hold for constipation.  -GI consult placed.  - 2/13 - added lomotil     # Mucositis, GERD, possible candidal esophagitis  # Severe malnutrition, Hypoalbuminemia 1.1  - magic mouth wash  - carafate   - c/w PPI , add pepcid IV bid  - 2/14 start  TPN via port   - 2/14 - stop magic mouth wash ,  start biotene prn  - 2/15 start fluconasole 200 qd for 21 days d/w Dr. Degroot oncology    # Cutaneous lesion on RUQ area below right breast, unclear etiology  # Ecthyma gangrenosum.  -If worse/no improvement, then consider evaluation for biopsy per ID.  - Ecthyma gangrenosum. life-threatening skin infection caused by Pseudomonas aeruginosa bacteria.   - dermatology consult  -  appears to be resolving, most skin changes appear post inflammatory.  - Please recall if the lesion does not continue to slowly resolve over the next 2-3 weeks.   - wound care - If the surface ulcerates, cover with telfa/ vaseline/ paper tape;    # Primary pancreatic adenocarcinoma with ?mets to umbilicus.  # Chemo-induced pancytopenia.  # Normocytic anemia  # Severe thrombocytopenia  # Neutropenia  -Last chemo about 10 days ago, and on daily experimental RMC-6236 daily: off these medications for now.  -WBC 0.45k, N 82%, Hb 7.8, Platelets 59k,  -C/w ZARXIO 480 mcg SC daily.  -Transfuse PRBC if Hb < 7 gm% and Platelet if Platelet <10k.  -CT chest/abd shows small pericardial effusion, bibasilar atelectasis, no consolidations noted. Small distention of gallbladder, and 2 kidney lesions (right kidney 4cm, and left kidney 6cm)   -Hem/Onc consult placed.     # B/L renal lesions 4 cm right kidney and 6 cm left kidney.  -Incidental finding on CT abd/pelvis.  -Consult urology in am or it can be done as outpatient.    # MALLORY due to dehydration and sepsis.  # Hypokalemia   # Hypocalcemia due to vit D deficiency   - Resolved. BUN/Cr 32/1.5 on admission, and now Cr 0.99.  - s/p  IV fluid will stop   - Creatinine Trend: 1.0<--1.02<--, 0.99<--, 1.10<--, 1.40<--, 1.40<--, 1.50<--  - replace K   - replace vitamin D   - adjust electrolytes in TPN    # HTN and HPL.  # SVT on 02/09/2025: resolved after Valsalva and Carotid massage.  -Norvasc and Lipitor on hold to septic shock.  -BP stable.  137/71.  -Off levophed; now on oral midodrine.  -Cardiology consult placed with Dr. Clement (per ).    # Gout.  -Was previously on allopurinol but currently off her Allopurinol.    # H/o CVA (cerebrovascular accident).   - Was originally on Plavix per  but because is now taking RMC 6236,  - was told to start Aggrenox instead to avoid DDIs)  - platelets 33     # GERD. -On Sucralfate.    # DVT prophylaxis: SCDs due to severe thrombocytopenia.    Plan of care d/w the patient at bedside and  Dr. Pleitez over phone in detail, and they verbalized understanding.    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  Patient was being followed by GI, ID, Cardiology and Onc there.  Patient has been transferred to  today on patient's  (GI Dr. Pleitez) request on 2/12/25 .  At this time, patient c/o non-bloody diarrhea x 3 in last 24 hours.  She denies any abdominal pain, nausea, but has some dry heaves.  She denies any fever, chills, chest pain, palpitation, constipation or dysuria. Initially, she was started on Vancomycin and Zosyn, but now she has been on Cefepime as her blood culture and urine cultures are positive for Pseudomonas aeruginosa. Currently, patient is on Cefepime.  ID had also recommended Flagyl, but patient had refused it, as it causes nausea to her.    # Recent septic shock due to Pseudomonas bacteremia and UTI.   -Blood culture and urine cultures are positive for Pseudomonas aeruginosa.   - Repeat blood culture (02/11), negative so far.   -Continue Cefepime for now.  Patient had refused Flagyl, and refusing now also.  -Tylenol prn.  -ID consult placed.    # Nausea, vomiting and diarrhea.  # Metabolic acidosis with NAG due to GI loss.  -Likely chemo-induced diarrhea and vomiting. Better than before.  -Prior C. diff neg, GI PCR neg.  Bicarb 19 and AG 9  -C. diff PCR re-ordered per her .  -Continue Zofran.  -Octreotide increased from 100 mcg to 150 mcg SC q8h per her  Dr Pleitez's request.  -Imodium 2 mg q6h for 2 days and hold for constipation.  -GI consult placed.  - 2/13 - added lomotil     # Mucositis, GERD, possible candidal esophagitis  # Severe malnutrition, Hypoalbuminemia 1.1  - magic mouth wash ,  carafate   - c/w PPI , add pepcid IV bid  - 2/14 start  TPN via port   - 2/14 -  start biotene prn  - 2/15 start fluconazole 200 qd for 21 days d/w Dr. Degroot oncology  - check Fungitell    # Cutaneous lesion on RUQ area below right breast, unclear etiology  # Ecthyma gangrenosum.  -If worse/no improvement, then consider evaluation for biopsy per ID.  - Ecthyma gangrenosum. life-threatening skin infection caused by Pseudomonas aeruginosa bacteria.   - dermatology consult  -  appears to be resolving, most skin changes appear post inflammatory.  - Please recall if the lesion does not continue to slowly resolve over the next 2-3 weeks.   - wound care - If the surface ulcerates, cover with telfa/ vaseline/ paper tape;    # Primary pancreatic adenocarcinoma with ?mets to umbilicus.  # Chemo-induced pancytopenia.  # Normocytic anemia  # Severe thrombocytopenia  # Neutropenia  -Last chemo about 10 days ago, and on daily experimental [id number] daily: off these medications for now.  -WBC 0.45k, N 82%, Hb 7.8, Platelets 59k,  -C/w ZARXIO 480 mcg SC daily.  -Transfuse PRBC if Hb < 7 gm% and Platelet if Platelet <10k.  -CT chest/abd shows small pericardial effusion, bibasilar atelectasis, no consolidations noted. Small distention of gallbladder, and 2 kidney lesions (right kidney 4cm, and left kidney 6cm)   -Hem/Onc consult placed.     # B/L renal lesions 4 cm right kidney and 6 cm left kidney.  -Incidental finding on CT abd/pelvis.  -Consult urology in am or it can be done as outpatient.    # MALLORY due to dehydration and sepsis.  # Hypokalemia   # Hypocalcemia due to vit D deficiency   - Resolved. BUN/Cr 32/1.5 on admission, and now Cr 0.99.  - s/p  IV fluid will stop   - Creatinine Trend: 1.0<--1.02<--, 0.99<--, 1.10<--, 1.40<--, 1.40<--, 1.50<--  - replace K   - replace vitamin D   - adjust electrolytes in TPN    # HTN and HPL.  # SVT on 02/09/2025: resolved after Valsalva and Carotid massage.  -Norvasc and Lipitor on hold to septic shock.  -BP stable.  137/71.  -Off levophed; now on oral midodrine.  -Cardiology consult placed with Dr. Clement (per ).    # Gout.  -Was previously on allopurinol but currently off her Allopurinol.    # H/o CVA (cerebrovascular accident).   - Was originally on Plavix per  but because is now taking RMC 6236,  - was told to start Aggrenox instead to avoid DDIs)  - platelets 33     # GERD. -On Sucralfate.    # DVT prophylaxis: SCDs due to severe thrombocytopenia.    Plan of care d/w the patient at bedside and  Dr. Pleitez over phone in detail, and they verbalized understanding.    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  Patient was being followed by GI, ID, Cardiology and Onc there.  Patient has been transferred to  today on patient's  (GI Dr. Pleitez) request on 2/12/25 .  At this time, patient c/o non-bloody diarrhea x 3 in last 24 hours.  She denies any abdominal pain, nausea, but has some dry heaves.  She denies any fever, chills, chest pain, palpitation, constipation or dysuria. Initially, she was started on Vancomycin and Zosyn, but now she has been on Cefepime as her blood culture and urine cultures are positive for Pseudomonas aeruginosa. Currently, patient is on Cefepime.  ID had also recommended Flagyl, but patient had refused it, as it causes nausea to her.    # Recent septic shock due to Pseudomonas bacteremia and UTI.   # Urinary retention s/p Julian in [facility]   -Blood culture and urine cultures are positive for Pseudomonas aeruginosa.   - Repeat blood culture (02/11), negative so far.   -Continue Cefepime for now.  Patient had refused Flagyl, and refusing now also.  -Tylenol prn.  -ID consult placed.  - Julian care, consider voiding trial once more ambulatory    # Nausea, vomiting and diarrhea.  # Metabolic acidosis with NAG due to GI loss.  -Likely chemo-induced diarrhea and vomiting. Better than before.  -Prior C. diff neg, GI PCR neg.  Bicarb 19 and AG 9  -C. diff PCR re-ordered per her .  -Continue Zofran.  -Octreotide increased from 100 mcg to 150 mcg SC q8h per her  Dr Pleitez's request.  -Imodium 2 mg q6h for 2 days and hold for constipation.  -GI consult placed.  - 2/13 - added lomotil     # Mucositis, GERD, possible candidal esophagitis  # Severe malnutrition, Hypoalbuminemia 1.1  - magic mouth wash ,  carafate   - c/w PPI , add pepcid IV bid  - 2/14 start  TPN via port   - 2/14 -  start biotene prn  - 2/15 start fluconazole 200 qd for 21 days d/w Dr. Degroot oncology  - check Fungitell    # Cutaneous lesion on RUQ area below right breast, unclear etiology  # Ecthyma gangrenosum.  -If worse/no improvement, then consider evaluation for biopsy per ID.  - Ecthyma gangrenosum. life-threatening skin infection caused by Pseudomonas aeruginosa bacteria.   - dermatology consult  -  appears to be resolving, most skin changes appear post inflammatory.  - Please recall if the lesion does not continue to slowly resolve over the next 2-3 weeks.   - wound care - If the surface ulcerates, cover with telfa/ vaseline/ paper tape;    # Primary pancreatic adenocarcinoma with ?mets to umbilicus.  # Chemo-induced pancytopenia.  # Normocytic anemia  # Severe thrombocytopenia  # Neutropenia  -Last chemo about 10 days ago, and on daily experimental RMC-6236 daily: off these medications for now.  -WBC 0.45k, N 82%, Hb 7.8, Platelets 59k,  -C/w ZARXIO 480 mcg SC daily.  -Transfuse PRBC if Hb < 7 gm% and Platelet if Platelet <10k.  -CT chest/abd shows small pericardial effusion, bibasilar atelectasis, no consolidations noted. Small distention of gallbladder, and 2 kidney lesions (right kidney 4cm, and left kidney 6cm)   -Hem/Onc consult placed.     # B/L renal lesions 4 cm right kidney and 6 cm left kidney.  -Incidental finding on CT abd/pelvis.  -Consult urology in am or it can be done as outpatient.    # MALLORY due to dehydration and sepsis.  # Hypokalemia   # Hypocalcemia due to vit D deficiency   - Resolved. BUN/Cr 32/1.5 on admission, and now Cr 0.99.  - s/p  IV fluid will stop   - Creatinine Trend: 1.0<--1.02<--, 0.99<--, 1.10<--, 1.40<--, 1.40<--, 1.50<--  - replace K   - replace vitamin D   - adjust electrolytes in TPN    # HTN and HPL.  # SVT on 02/09/2025: resolved after Valsalva and Carotid massage.  -Norvasc and Lipitor on hold to septic shock.  -BP stable.  137/71.  -Off levophed; now on oral midodrine.  -Cardiology consult placed with Dr. Clement (per ).    # Gout.  -Was previously on allopurinol but currently off her Allopurinol.    # H/o CVA (cerebrovascular accident).   - Was originally on Plavix per  but because is now taking RMC 6236,  - was told to start Aggrenox instead to avoid DDIs)  - platelets 33     # GERD. -On Sucralfate.    # DVT prophylaxis: SCDs due to severe thrombocytopenia.    Plan of care d/w the patient at bedside and  Dr. Pleitez over phone in detail, and they verbalized understanding.    Dispo - IV abx, IV  Fluconazole , TPN , daily PT,   monitor for improvement

## 2025-02-16 LAB
ALBUMIN SERPL ELPH-MCNC: 2.9 G/DL — LOW (ref 3.3–5)
ALP SERPL-CCNC: 91 U/L — SIGNIFICANT CHANGE UP (ref 40–120)
ALT FLD-CCNC: 19 U/L — SIGNIFICANT CHANGE UP (ref 12–78)
ANION GAP SERPL CALC-SCNC: 11 MMOL/L — SIGNIFICANT CHANGE UP (ref 5–17)
AST SERPL-CCNC: 5 U/L — LOW (ref 15–37)
BILIRUB SERPL-MCNC: 1.1 MG/DL — SIGNIFICANT CHANGE UP (ref 0.2–1.2)
BUN SERPL-MCNC: 47 MG/DL — HIGH (ref 7–23)
CHLORIDE SERPL-SCNC: 108 MMOL/L — SIGNIFICANT CHANGE UP (ref 96–108)
CO2 SERPL-SCNC: 17 MMOL/L — LOW (ref 22–31)
CULTURE RESULTS: SIGNIFICANT CHANGE UP
CULTURE RESULTS: SIGNIFICANT CHANGE UP
EGFR: 36 ML/MIN/1.73M2 — LOW
EGFR: 36 ML/MIN/1.73M2 — LOW
FLUAV AG NPH QL: SIGNIFICANT CHANGE UP
GLUCOSE SERPL-MCNC: 172 MG/DL — HIGH (ref 70–99)
HCT VFR BLD CALC: 26.8 % — LOW (ref 34.5–45)
HGB BLD-MCNC: 9.2 G/DL — LOW (ref 11.5–15.5)
IMMATURE PLATELET FRACTION #: 1.7 K/UL — LOW (ref 4.7–11.1)
IMMATURE PLATELET FRACTION %: 6.7 % — HIGH (ref 1.6–4.9)
LACTATE SERPL-SCNC: 2.2 MMOL/L — HIGH (ref 0.7–2)
MAGNESIUM SERPL-MCNC: 1.8 MG/DL — SIGNIFICANT CHANGE UP (ref 1.6–2.6)
MANUAL SMEAR VERIFICATION: SIGNIFICANT CHANGE UP
MCHC RBC-ENTMCNC: 29.1 PG — SIGNIFICANT CHANGE UP (ref 27–34)
MCHC RBC-ENTMCNC: 34.3 G/DL — SIGNIFICANT CHANGE UP (ref 32–36)
MCV RBC AUTO: 84.8 FL — SIGNIFICANT CHANGE UP (ref 80–100)
NRBC # BLD AUTO: 0.02 K/UL — HIGH (ref 0–0)
NRBC # FLD: 0.02 K/UL — HIGH (ref 0–0)
NRBC BLD AUTO-RTO: 3 /100 WBCS — HIGH (ref 0–0)
NT-PROBNP SERPL-SCNC: 417 PG/ML — HIGH (ref 0–125)
PHOSPHATE SERPL-MCNC: 3.1 MG/DL — SIGNIFICANT CHANGE UP (ref 2.5–4.5)
PLATELET # BLD AUTO: 26 K/UL — LOW (ref 150–400)
PMV BLD: SIGNIFICANT CHANGE UP (ref 7–13)
POTASSIUM SERPL-MCNC: 3.4 MMOL/L — LOW (ref 3.5–5.3)
POTASSIUM SERPL-SCNC: 3.4 MMOL/L — LOW (ref 3.5–5.3)
PROCALCITONIN SERPL-MCNC: 5.45 NG/ML — HIGH (ref 0.02–0.1)
PROT SERPL-MCNC: 5.1 GM/DL — LOW (ref 6–8.3)
RBC # BLD: 3.16 M/UL — LOW (ref 3.8–5.2)
RBC # FLD: 13.5 % — SIGNIFICANT CHANGE UP (ref 10.3–14.5)
RSV RNA NPH QL NAA+NON-PROBE: SIGNIFICANT CHANGE UP
S AUREUS DNA NOSE QL NAA+PROBE: SIGNIFICANT CHANGE UP
SARS-COV-2 RNA SPEC QL NAA+PROBE: SIGNIFICANT CHANGE UP
SODIUM SERPL-SCNC: 136 MMOL/L — SIGNIFICANT CHANGE UP (ref 135–145)
SPECIMEN SOURCE: SIGNIFICANT CHANGE UP
SPECIMEN SOURCE: SIGNIFICANT CHANGE UP
WBC # BLD: 0.71 K/UL — CRITICAL LOW (ref 3.8–10.5)
WBC # FLD AUTO: 0.71 K/UL — CRITICAL LOW (ref 3.8–10.5)
WBC MORPHOLOGY: NORMAL — SIGNIFICANT CHANGE UP

## 2025-02-16 PROCEDURE — 93970 EXTREMITY STUDY: CPT | Mod: 26

## 2025-02-16 PROCEDURE — 99291 CRITICAL CARE FIRST HOUR: CPT

## 2025-02-16 RX ORDER — ALBUMIN (HUMAN) 12.5 G/50ML
100 INJECTION, SOLUTION INTRAVENOUS EVERY 8 HOURS
Refills: 0 | Status: COMPLETED | OUTPATIENT
Start: 2025-02-16 | End: 2025-02-17

## 2025-02-16 RX ORDER — SODIUM CHLORIDE 9 G/1000ML
1000 INJECTION, SOLUTION INTRAVENOUS
Refills: 0 | Status: DISCONTINUED | OUTPATIENT
Start: 2025-02-16 | End: 2025-02-20

## 2025-02-16 RX ORDER — SODIUM/POT/MAG/CALC/CHLOR/ACET 35-20-5MEQ
1 VIAL (ML) INTRAVENOUS
Refills: 0 | Status: DISCONTINUED | OUTPATIENT
Start: 2025-02-16 | End: 2025-02-16

## 2025-02-16 RX ORDER — FILGRASTIM 300 UG/.5ML
480 INJECTION, SOLUTION INTRAVENOUS; SUBCUTANEOUS DAILY
Refills: 0 | Status: DISCONTINUED | OUTPATIENT
Start: 2025-02-16 | End: 2025-02-18

## 2025-02-16 RX ORDER — SODIUM CHLORIDE 0.65 %
1 AEROSOL, SPRAY (ML) NASAL
Refills: 0 | Status: DISCONTINUED | OUTPATIENT
Start: 2025-02-16 | End: 2025-03-19

## 2025-02-16 RX ORDER — I.V. FAT EMULSION 20 G/100ML
0.68 EMULSION INTRAVENOUS
Qty: 50 | Refills: 0 | Status: DISCONTINUED | OUTPATIENT
Start: 2025-02-16 | End: 2025-02-16

## 2025-02-16 RX ORDER — SODIUM CHLORIDE 9 G/1000ML
1000 INJECTION, SOLUTION INTRAVENOUS
Refills: 0 | Status: DISCONTINUED | OUTPATIENT
Start: 2025-02-16 | End: 2025-02-16

## 2025-02-16 RX ADMIN — OCTREOTIDE ACETATE 100 MICROGRAM(S): 500 INJECTION, SOLUTION INTRAVENOUS; SUBCUTANEOUS at 06:49

## 2025-02-16 RX ADMIN — ALBUMIN (HUMAN) 50 MILLILITER(S): 12.5 INJECTION, SOLUTION INTRAVENOUS at 05:11

## 2025-02-16 RX ADMIN — MEROPENEM 1000 MILLIGRAM(S): 1 INJECTION INTRAVENOUS at 14:38

## 2025-02-16 RX ADMIN — Medication 100 MILLIEQUIVALENT(S): at 04:50

## 2025-02-16 RX ADMIN — Medication 20 MILLIGRAM(S): at 10:29

## 2025-02-16 RX ADMIN — Medication 100 MILLIEQUIVALENT(S): at 02:20

## 2025-02-16 RX ADMIN — Medication 1 GRAM(S): at 12:22

## 2025-02-16 RX ADMIN — Medication 1 GRAM(S): at 19:01

## 2025-02-16 RX ADMIN — Medication 2000 UNIT(S): at 21:21

## 2025-02-16 RX ADMIN — Medication 650 MILLIGRAM(S): at 19:39

## 2025-02-16 RX ADMIN — Medication 650 MILLIGRAM(S): at 19:01

## 2025-02-16 RX ADMIN — I.V. FAT EMULSION 20.8 GM/KG/DAY: 20 EMULSION INTRAVENOUS at 22:44

## 2025-02-16 RX ADMIN — Medication 40 MILLIGRAM(S): at 10:29

## 2025-02-16 RX ADMIN — Medication 20 MILLIGRAM(S): at 21:20

## 2025-02-16 RX ADMIN — Medication 1 EACH: at 22:43

## 2025-02-16 RX ADMIN — ALBUMIN (HUMAN) 50 MILLILITER(S): 12.5 INJECTION, SOLUTION INTRAVENOUS at 21:19

## 2025-02-16 RX ADMIN — ALBUMIN (HUMAN) 50 MILLILITER(S): 12.5 INJECTION, SOLUTION INTRAVENOUS at 12:29

## 2025-02-16 RX ADMIN — Medication 1 GRAM(S): at 06:49

## 2025-02-16 RX ADMIN — Medication 100 MILLIEQUIVALENT(S): at 03:57

## 2025-02-16 RX ADMIN — MIDODRINE HYDROCHLORIDE 5 MILLIGRAM(S): 5 TABLET ORAL at 06:49

## 2025-02-16 RX ADMIN — Medication 1 GRAM(S): at 23:49

## 2025-02-16 RX ADMIN — Medication 100 MILLIGRAM(S): at 10:34

## 2025-02-16 RX ADMIN — MEROPENEM 1000 MILLIGRAM(S): 1 INJECTION INTRAVENOUS at 21:21

## 2025-02-16 RX ADMIN — FILGRASTIM 480 MICROGRAM(S): 300 INJECTION, SOLUTION INTRAVENOUS; SUBCUTANEOUS at 14:39

## 2025-02-16 RX ADMIN — POTASSIUM PHOSPHATE, MONOBASIC POTASSIUM PHOSPHATE, DIBASIC INJECTION, 83.33 MILLIMOLE(S): 236; 224 SOLUTION, CONCENTRATE INTRAVENOUS at 04:20

## 2025-02-16 RX ADMIN — Medication 1000 MILLIGRAM(S): at 00:01

## 2025-02-16 RX ADMIN — DIPHENOXYLATE HYDROCHLORIDE AND ATROPINE SULFATE 1 TABLET(S): .025; 2.5 TABLET ORAL at 07:03

## 2025-02-16 RX ADMIN — OCTREOTIDE ACETATE 100 MICROGRAM(S): 500 INJECTION, SOLUTION INTRAVENOUS; SUBCUTANEOUS at 14:39

## 2025-02-16 RX ADMIN — ALBUMIN (HUMAN) 50 MILLILITER(S): 12.5 INJECTION, SOLUTION INTRAVENOUS at 18:09

## 2025-02-16 RX ADMIN — DIPHENOXYLATE HYDROCHLORIDE AND ATROPINE SULFATE 1 TABLET(S): .025; 2.5 TABLET ORAL at 12:21

## 2025-02-16 RX ADMIN — MEROPENEM 1000 MILLIGRAM(S): 1 INJECTION INTRAVENOUS at 05:13

## 2025-02-16 RX ADMIN — SODIUM CHLORIDE 50 MILLILITER(S): 9 INJECTION, SOLUTION INTRAVENOUS at 22:39

## 2025-02-16 NOTE — PROGRESS NOTE ADULT - SUBJECTIVE AND OBJECTIVE BOX
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 on Aggrenox She  had been admitted to [facility] with septic shock on 02/09/2025 bacteremia with pseudomonas, was transferred  to  at request of family.   Patient was on floor on PM of 2/15 was getting a platelet transfusion developed rigors and sob,    was transferred to ICU  CT chest showed bibailar effusions  last blood culture 2/11, 2/14 are negative blood culture from [place] positive for pseudomonas  was diuresed overnight breathing improved  patient ahs been unable to eat due to mucositis and is on TPN  has port     PMH: as above     MEDICATIONS  (STANDING):  albumin human 25% IVPB 100 milliLiter(s) IV Intermittent every 6 hours  chlorhexidine 2% Cloths 1 Application(s) Topical <User Schedule>  cholecalciferol 2000 Unit(s) Oral at bedtime  diphenoxylate/atropine 1 Tablet(s) Oral four times a day  famotidine Injectable 20 milliGRAM(s) IV Push every 12 hours  filgrastim-sndz (ZARXIO) Injectable 480 MICROGram(s) SubCutaneous daily  fluconAZOLE IVPB      fluconAZOLE IVPB 200 milliGRAM(s) IV Intermittent every 24 hours  lipid, fat emulsion (Fish Oil and Plant Based) 20% Infusion 0.6812 Gm/kG/Day (20.8 mL/Hr) IV Continuous <Continuous>  meropenem Injectable      meropenem Injectable 1000 milliGRAM(s) IV Push every 8 hours  midodrine 5 milliGRAM(s) Oral every 8 hours  octreotide  Injectable 100 MICROGram(s) SubCutaneous every 8 hours  pantoprazole  Injectable 40 milliGRAM(s) IV Push daily  Parenteral Nutrition - Adult 1 Each (75 mL/Hr) TPN Continuous <Continuous>  Parenteral Nutrition - Adult 1 Each (75 mL/Hr) TPN Continuous <Continuous>  sucralfate suspension 1 Gram(s) Oral four times a day    MEDICATIONS  (PRN):  acetaminophen     Tablet .. 650 milliGRAM(s) Oral every 6 hours PRN Temp greater or equal to 38C (100.4F), Mild Pain (1 - 3)  aluminum hydroxide/magnesium hydroxide/simethicone Suspension 30 milliLiter(s) Oral every 4 hours PRN Dyspepsia  Biotene Dry Mouth Oral Rinse 15 milliLiter(s) Swish and Spit five times a day PRN Mouth Care  FIRST- Mouthwash  BLM 10 milliLiter(s) Swish and Swallow five times a day PRN Mouth Care  melatonin 3 milliGRAM(s) Oral at bedtime PRN Insomnia  ondansetron Injectable 4 milliGRAM(s) IV Push every 8 hours PRN Nausea and/or Vomiting    ICU Vital Signs Last 24 Hrs  T(C): 36.4 (16 Feb 2025 09:29), Max: 38.1 (15 Feb 2025 23:00)  T(F): 97.5 (16 Feb 2025 09:29), Max: 100.5 (15 Feb 2025 23:00)  HR: 78 (16 Feb 2025 12:00) (70 - 137)  BP: 127/70 (16 Feb 2025 12:00) (96/64 - 177/89)  BP(mean): 86 (16 Feb 2025 12:00) (70 - 93)  ABP: --  ABP(mean): --  RR: 25 (16 Feb 2025 12:00) (17 - 29)  SpO2: 94% (16 Feb 2025 12:00) (92% - 100%)    O2 Parameters below as of 16 Feb 2025 12:00  Patient On (Oxygen Delivery Method): room air    Physical exam  general - frail weak  HEENT sores on lips and in mouth  Neck supple  chest right sided port  lungs dec bs at bases on room air  abdomen soft, non tender  gu normal u/0 4500 last 24 hours  neuro awake alert appropriate    I&O's Summary    15 Feb 2025 07:01  -  16 Feb 2025 07:00  --------------------------------------------------------  IN: 2640 mL / OUT: 4600 mL / NET: -1960 mL                        9.2    0.71  )-----------( 26       ( 16 Feb 2025 06:12 )             26.8       02-16    136  |  108  |  47[H]  ----------------------------<  172[H]  3.4[L]   |  17[L]  |  1.56[H]    Ca    8.0[L]      16 Feb 2025 06:12  Phos  3.1     02-16  Mg     1.8     02-16    TPro  5.1[L]  /  Alb  2.9[L]  /  TBili  1.1  /  DBili  x   /  AST  5[L]  /  ALT  19  /  AlkPhos  91  02-16    Urinalysis Basic - ( 16 Feb 2025 06:12 )    Color: x / Appearance: x / SG: x / pH: x  Gluc: 172 mg/dL / Ketone: x  / Bili: x / Urobili: x   Blood: x / Protein: x / Nitrite: x   Leuk Esterase: x / RBC: x / WBC x   Sq Epi: x / Non Sq Epi: x / Bacteria: x    I personally reviewed the CXR: bibasilar effusion right > left    DVT Prophylaxis:  held for thrombocytopenia                                                               Advanced Directives: Full Code     Sepsis Criteria Met -     Labs, Notes, Orders, radiologic studies reviewed and care coordinated with multidisciplinary team

## 2025-02-16 NOTE — PROGRESS NOTE ADULT - ASSESSMENT
69-year-old female with stage IV pancreatic adenocarcinoma presenting with neutropenic fever and severe treatment-related toxicities from modified FOLFIRINOX and investigational EMILY inhibitor MC-6236.    1. Neutropenic Fever:  Patient presents with grade 4 neutropenia (ANC < 500) with initial febrile episode. Currently on appropriate broad-spectrum coverage with cefepime. Has remained afebrile on current regimen with slight improvement in WBC from 0.19 to 0.71. Started on filgrastim 480mcg daily for count recovery.  2. Severe Thrombocytopenia:  Platelet count critically low at 26k.  High risk for bleeding complications, especially in setting of ongoing GI symptoms. Will require platelet transfusion support.  3. Treatment-Related GI Toxicity:  Severe diarrhea and inability to tolerate PO intake, likely multifactorial from both FOLFIRINOX (particularly irinotecan component) and study drug MC-6236. Consider DPD deficiency given severity of symptoms. Currently managed with cholestyramine and lomotil with some improvement. Requiring TPN support.  4. Nutritional Status:  Significant decline in oral intake due to severe mucositis and GI symptoms. Currently on TPN support. Will require careful nutritional monitoring and support during recovery.  5. DVT Prophylaxis:  History of left cephalic vein thrombosis. Currently on appropriate DVT prophylaxis given high-risk status (active malignancy, hospitalization).  Overall, patient experiencing significant toxicities from current treatment regimen requiring dose modifications and possibly alternative treatment strategies moving forward. Will require close coordination with primary oncology team at [facility] regarding future treatment plans.  - will need to continue with SCDs   - severe thrombocytopenia prohibits chemical prophylaxis   6. Acute hypoxic episode - 2/15 - likely secondary to volume overload - responsive to diuretics 2/16    Plan:  #Neutropenic Septic shock   - hemodynamically stable   - Continue meropenem started on 2/15- previously on CEFEPIME   - Continue filgrastim 480mcg daily - new orders placed today   - Daily CBC monitoring  - Maintain strict neutropenic precautions    #Acute hypoxic respiratory failure likely secondary to hypervolemia   - response to diuretics   - now on RA doing well   - will need to monitor I/Os especially while on PPN     # EMPIRIC TREATMENT FOR ESOPHAGEAL CANDIDIASIS   - continue with DIFLUCAN     # Volume status   - recommmend I?/Os     # possible transfusion reaction   - would favor against transfusion reaction as it appeared to more closely be secondary to volume status and given response to diuresis    #Anemia   - received two units of pRBCs last evening     #Thrombocytopenia  -Transfuse for plts < 15   - Monitor daily platelet counts    #GI Toxicity- acute diarrhea   - largely improved.   - Continue cholestyramine and lomotil  - Consider DPD testing, and risk of irinotecan toxicity increases with genetic variants associated with reduced UGT enzyme activity, such as [id number]  - Continue TPN support  - Oral care protocol    #Care Coordination  -will continue with ongoing communication with  [facility]  - Review clinical trial protocol regarding toxicity management  - Consider dose modifications for future therapy  - discussed with Spouse

## 2025-02-16 NOTE — CHART NOTE - NSCHARTNOTEFT_GEN_A_CORE
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  Patient was being followed by GI, ID, Cardiology and Onc there.  Patient has been transferred to  today on patient's  (GI Dr. Pleitez) request.  At this time, patient c/o non-bloody diarrhea x 3 in last 24 hours.  She denies any abdominal pain, nausea, but has some dry heaves.  She denies any fever, chills, chest pain, palpitation, constipation or dysuria. Initially, she was started on Vancomycin and Zosyn, but now she has been on Cefepime as her blood culture and urine cultures are positive for Pseudomonas aeruginosa. Currently, patient is on Cefepime.  ID had also recommended Flagyl, but patient had refused it, as it causes nausea to her. Noted with Metabolic acidosis with NAG due to GI loss. Likely chemo-induced diarrhea and vomiting. Better than before. Cutaneous lesion on RUQ area below right breast, unclear etiology, ? Ecthyma gangrenosum. Admitting diagnosis: pancreatic ca  *: Pt transfer from [facility]; seen by RD and met criteria for PCM. Pt reports only being able to tolerate some sips of water and tea at this time. Reports tried eating small bite of mashed banana and felt burning sensation down her throat. Reports #; RD unable to obtain bedscale wt d/t bedscale not working. Admit wt per # note with 1+ and 2+ edema skewing wt. Pt appears overweight, NFPE reveals mild- mod muscle/fat wasting at this time. Recommend to continue with PO diet and encourage intake as tolerated. RD consulted to initiate PN d/t PO intolerance. Will initiate TPN through port.  *2/15: Remains on low fiber diet with minimal PO intake. Still w/ multiple episodes of diarrhea, if persists can consider adding 5mg zinc into PN bag. No other acute events overnight. D/w Dr. Joyner will c/w TPN today.     *TPN initiated 2/2 PO intolerance, + Mucositis, possible candidal esophagitis    *current status: Raised area noted to mid sternum. RCW port intact.- no swelling noted, no redness. Port flushed without resistance. + blood return noted. Assessed with IV team Amy. Per pt no Pain on palpation. CT Chest. TPN on hold until CT read. CT chest from earlier in the day revealed new bilateral pleural effusions with bibasilar consolidated lung and ascites. CXR now shows low lung volumes and bibasilar opacities. Rigors post transfusion - Rapid response called;  Dr. Pleitez requesting ICU consult- concerned for pt.  requesting assessment of patient STAT. Pt transferred to SICU. ? third spacing, and possible PNA. Per hospitalist pt drinking water and tea as well. D/w Dr. Fortune    *new pertinent meds: Abx, acetaminophen, benadryl, pepcid, cholecalciferol, proamatine, statin, protonix, KCl (30 mEq x 24 hours), lasix, KPhos (30 mmol x 24 hours), Albumin 25% IVPB, PBRCs, platelets    *labs reviewed; Na WNL, however on low end; possible third spacing - will lower total volume in PN bag today (1600mL). Hypokalemia noted will increase in PN bag today. Mg WNL continued with downtrending will add 5 mEq into PN bag today. Phos WNL. Pt likely in RFS continue to monitor and replete lytes outside of PN bag.   T bili WNL will c/w trace elements. C/w MVI w/ minerals and thiamine in PN bag. corrected Ca high end of normal limits, DO NOT supplement calcium outside of PN bag at this time. POCT variable (one slightly elevated), as per PN protocol, POCT required q6 hours to monitor glycemic control; should be maintained between 140- 180 mg/dL. TG WNL (120) will add 50g of lipids daily.  Will continue to titrate dextrose up 50-75g/day until goal rate of 325g dextrose reached (GIR 3.07 g/kg/min).        136  |  108  |  47[H]  ----------------------------<  172[H]  3.4[L]   |  17[L]  |  1.56[H]    Ca    8.0[L]      2025 06:12  Phos  3.1       Mg     1.8         TPro  5.1[L]  /  Alb  2.9[L]  /  TBili  1.1  /  DBili  x   /  AST  5[L]  /  ALT  19  /  AlkPhos  91      BMI: BMI (kg/m2): 27.8 (25 @ 05:46)    BP: 122/71 (25 @ 05:00) (96/64 - 177/89)Vital Signs Last 24 Hrs  T(C): 36.7 (25 @ 05:00), Max: 38.1 (02-15-25 @ 23:00)  T(F): 98 (25 @ 05:00), Max: 100.5 (02-15-25 @ 23:00)  HR: 76 (25 @ 05:00) (76 - 137)  BP: 122/71 (25 @ 05:00) (96/64 - 177/89)  BP(mean): 87 (25 @ 05:00) (70 - 93)  RR: 29 (25 @ 05:00) (16 - 29)  SpO2: 95% (25 @ 05:00) (92% - 100%)    Lipid Panel: Date/Time: 02-15-25 @ 05:17  Triglycerides, Serum: 120    POCT Blood Glucose.: 186 mg/dL (15 Feb 2025 19:37)  POCT Blood Glucose.: 179 mg/dL (15 Feb 2025 17:59)  POCT Blood Glucose.: 170 mg/dL (15 Feb 2025 12:08)    Iron Total: 34 ug/dL (25 @ 18:46)  Folate, Serum: >20.0 ng/mL (25 @ 18:46)  Vitamin B12, Serum: 1457 pg/mL (25 @ 18:46)  Vitamin D, 25-Hydroxy: 23.0 ng/mL (25 @ 18:46) ** c/w deficiency    *I&O's Detail    15 Feb 2025 07:01  -  2025 07:00  --------------------------------------------------------  IN:    IV PiggyBack: 1250 mL    Platelets - Single Donor: 212 mL    PRBCs (Packed Red Blood Cells): 653 mL  Total IN: 2115 mL    OUT:    Indwelling Catheter - Urethral (mL): 4600 mL  Total OUT: 4600 mL    Total NET: -2485 mL  * fluid status: negative; large UO, however PPN not doc'd (was on hold) **Strict I&O's are rec'd when pt is on PN as per protocol   *diarrhea x4 .  pt on bowel regimen (did not receive x 24 hours).  *edema: 2+ generalized, 3+ L/R ankle  *PI: L Buttock S1, R Buttock S1    *malnutrition: Pt continues to meet criteria for severe protein calorie malnutrition in context of acute on chronic illness r/t decreased ability to meet increased nutrient needs 2/2 PO intolerance, N/V and diarrhea, on chemo for pancreatic ca AEB meeting <50% ENN > 5 days, mild-mod muscle/fat wasting    Estimated Needs: based on 73.4 Kg (wt from EMR admit wt )  Calories: 1812 - 2175 Kcal (25 - 30 Kcal/Kg)  Protein: 108 - 145 g (1.5 - 2.0 g/Kg)  Fluids: 1450 - 1812 mL (20 - 25 mL/Kg)    Diet, Low Fiber:   1500mL Fluid Restriction ([id number])  Kosher  Supplement Feeding Modality:  Oral  Ensure Plant-Based Cans or Servings Per Day:  3       Frequency:  Daily (02-15-25 @ 20:36) [Active]  Parenteral Nutrition - Adult 1 Each (75 mL/Hr) TPN Continuous <Continuous>, 02-15-25 @ 22:00, 22:00, Stop order after: 1 Days    Weight History:  Daily Weight in k.6 (15 Feb 2025 06:17)  Height (cm): 162.6 (25 @ 02:39)  Weight (kg): 73.4 (25 @ 05:46), 66.8 (25 @ 02:39)  BMI (kg/m2): 27.8 (25 @ 05:46), 25.3 (25 @ 02:39)  BSA (m2): 1.79 (25 @ 05:46), 1.72 (25 @ 02:39)    TPN Recommendations: via implanted infusion port  Total Volume:  mL x 24 hours  125 g  Amino Acids  200 g Dextrose (dex goal 325g)  50 g Lipids 20% pending lipid panel  125 mEq Sodium Chloride  16 mEq Sodium Acetate  10 mmol Sodium Phosphate  21 mEq Potassium Chloride  0 mEq Potassium Acetate  20 mmol Potassium Phosphate  0 mEq Calcium Gluconate (CAPS out of PN solution)   5 mEq Magnesium Sulfate  200 mg Thiamine  1 ml Trace Elements  10 ml MVI    Total Calories    1680     (Meets  84 %  of  Estimated Energy needs  and  100  %  Protein needs)      Additional Recommendations:    1) Daily weights  2) Strict I & O's - consider dc'ing IVF while on PN - monitor PO fluid intake  3) Daily lyte checks including magnesium and phos - MONITOR FOR RFS  4) Weekly triglycerides/LFT checks  5) POCT q6hrs; maintain 140-180mg/dL  6) C/w low fiber diet and encourage PO intake as tolerated  7) Continue to titrate dextrose 50-75g per day until goal of 325g reached  8) Confirm goals of care regarding long-term nutrition support. Would recommend PEG placement if long-term nutrition support is required as GI is functional and would be the preferred route for nutrition.     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  Patient was being followed by GI, ID, Cardiology and Onc there.  Patient has been transferred to  today on patient's  (GI Dr. Pleitez) request.  At this time, patient c/o non-bloody diarrhea x 3 in last 24 hours.  She denies any abdominal pain, nausea, but has some dry heaves.  She denies any fever, chills, chest pain, palpitation, constipation or dysuria. Initially, she was started on Vancomycin and Zosyn, but now she has been on Cefepime as her blood culture and urine cultures are positive for Pseudomonas aeruginosa. Currently, patient is on Cefepime.  ID had also recommended Flagyl, but patient had refused it, as it causes nausea to her. Noted with Metabolic acidosis with NAG due to GI loss. Likely chemo-induced diarrhea and vomiting. Better than before. Cutaneous lesion on RUQ area below right breast, unclear etiology, ? Ecthyma gangrenosum. Admitting diagnosis: pancreatic ca  *: Pt transfer from [facility]; seen by RD and met criteria for PCM. Pt reports only being able to tolerate some sips of water and tea at this time. Reports tried eating small bite of mashed banana and felt burning sensation down her throat. Reports #; RD unable to obtain bedscale wt d/t bedscale not working. Admit wt per # note with 1+ and 2+ edema skewing wt. Pt appears overweight, NFPE reveals mild- mod muscle/fat wasting at this time. Recommend to continue with PO diet and encourage intake as tolerated. RD consulted to initiate PN d/t PO intolerance. Will initiate TPN through port.  *2/15: Remains on low fiber diet with minimal PO intake. Still w/ multiple episodes of diarrhea, if persists can consider adding 5mg zinc into PN bag. No other acute events overnight. D/w Dr. Joyner will c/w TPN today.     *TPN initiated 2/2 PO intolerance, + Mucositis, possible candidal esophagitis    *current status: Raised area noted to mid sternum. RCW port intact.- no swelling noted, no redness. Port flushed without resistance. + blood return noted. Assessed with IV team Amy. Per pt no Pain on palpation. CT Chest. TPN on hold until CT read. CT chest from earlier in the day revealed new bilateral pleural effusions with bibasilar consolidated lung and ascites. CXR now shows low lung volumes and bibasilar opacities. Rigors post transfusion - Rapid response called;  Dr. Pleitez requesting ICU consult- concerned for pt.  requesting assessment of patient STAT. Pt transferred to SICU. ? third spacing, and possible PNA. Per pt still drinking small sips of water and tea. D/w Dr. Fortune will keep total volume same in PN bag today, and will c/w TPN.     *new pertinent meds: Abx, acetaminophen, benadryl, pepcid, cholecalciferol, proamatine, statin, protonix, KCl (30 mEq x 24 hours), lasix, KPhos (30 mmol x 24 hours), Albumin 25% IVPB, PBRCs, platelets    *labs reviewed; Na WNL, however on low end of normal limits; may need to consider decreasing total volume in PN bag tomorrow. Hypokalemia noted (received 30 mEq repletion) will increase in PN bag today. Mg WNL continued with downtrending will add 5 mEq back into PN bag today. Phos WNL (received 30 mmol repletion). Pt likely in RFS continue to monitor and replete lytes outside of PN bag. T bili WNL will c/w trace elements. C/w MVI w/ minerals and thiamine in PN bag. corrected Ca high end of normal limits, DO NOT supplement calcium outside of PN bag at this time. POCT WNL; as per PN protocol, POCT required q6 hours to monitor glycemic control; should be maintained between 140- 180 mg/dL. TG WNL (120) will add 50g of lipids daily.  Will continue to titrate dextrose up 50-75g/day until goal rate of 325g dextrose reached (GIR 3.07 g/kg/min).        136  |  108  |  47[H]  ----------------------------<  172[H]  3.4[L]   |  17[L]  |  1.56[H]    Ca    8.0[L]      2025 06:12  Phos  3.1       Mg     1.8         TPro  5.1[L]  /  Alb  2.9[L]  /  TBili  1.1  /  DBili  x   /  AST  5[L]  /  ALT  19  /  AlkPhos  91      BMI: BMI (kg/m2): 27.8 (25 @ 05:46)    BP: 122/71 (25 @ 05:00) (96/64 - 177/89)Vital Signs Last 24 Hrs  T(C): 36.7 (25 @ 05:00), Max: 38.1 (02-15-25 @ 23:00)  T(F): 98 (25 @ 05:00), Max: 100.5 (02-15-25 @ 23:00)  HR: 76 (25 @ 05:00) (76 - 137)  BP: 122/71 (25 @ 05:00) (96/64 - 177/89)  BP(mean): 87 (25 @ 05:00) (70 - 93)  RR: 29 (25 @ 05:00) (16 - 29)  SpO2: 95% (25 @ 05:00) (92% - 100%)    Lipid Panel: Date/Time: 02-15-25 @ 05:17  Triglycerides, Serum: 120    POCT Blood Glucose.: 186 mg/dL (15 Feb 2025 19:37)  POCT Blood Glucose.: 179 mg/dL (15 Feb 2025 17:59)  POCT Blood Glucose.: 170 mg/dL (15 Feb 2025 12:08)    Iron Total: 34 ug/dL (25 @ 18:46)  Folate, Serum: >20.0 ng/mL (25 @ 18:46)  Vitamin B12, Serum: 1457 pg/mL (25 @ 18:46)  Vitamin D, 25-Hydroxy: 23.0 ng/mL (25 @ 18:46) ** c/w deficiency    *I&O's Detail    15 Feb 2025 07:01  -  2025 07:00  --------------------------------------------------------  IN:    IV PiggyBack: 1250 mL    Platelets - Single Donor: 212 mL    PRBCs (Packed Red Blood Cells): 653 mL  Total IN: 2115 mL    OUT:    Indwelling Catheter - Urethral (mL): 4600 mL  Total OUT: 4600 mL    Total NET: -2485 mL  * fluid status: negative; large UO, however PPN not doc'd (was on hold) **Strict I&O's are rec'd when pt is on PN as per protocol   *diarrhea x4 .  pt on bowel regimen (did not receive x 24 hours).  *edema: 2+ generalized, 3+ L/R ankle  *PI: L Buttock S1, R Buttock S1    *malnutrition: Pt continues to meet criteria for severe protein calorie malnutrition in context of acute on chronic illness r/t decreased ability to meet increased nutrient needs 2/2 PO intolerance, N/V and diarrhea, on chemo for pancreatic ca AEB meeting <50% ENN > 5 days, mild-mod muscle/fat wasting    Estimated Needs: based on 73.4 Kg (wt from EMR admit wt )  Calories: 1812 - 2175 Kcal (25 - 30 Kcal/Kg)  Protein: 108 - 145 g (1.5 - 2.0 g/Kg)  Fluids: 1450 - 1812 mL (20 - 25 mL/Kg)    Diet, Low Fiber:   1500mL Fluid Restriction ([id number])  Kosher  Supplement Feeding Modality:  Oral  Ensure Plant-Based Cans or Servings Per Day:  3       Frequency:  Daily (02-15-25 @ 20:36) [Active]  Parenteral Nutrition - Adult 1 Each (75 mL/Hr) TPN Continuous <Continuous>, 02-15-25 @ 22:00, 22:00, Stop order after: 1 Days    Weight History:  Daily Weight in k.6 (15 Feb 2025 06:17)  Height (cm): 162.6 (25 @ 02:39)  Weight (kg): 73.4 (25 @ 05:46), 66.8 (25 @ 02:39)  BMI (kg/m2): 27.8 (25 @ 05:46), 25.3 (25 @ 02:39)  BSA (m2): 1.79 (25 @ 05:46), 1.72 (25 @ 02:39)    TPN Recommendations: via implanted infusion port  Total Volume: 1800 mL x 24 hours  125 g  Amino Acids  200 g Dextrose (dex goal 325g)  50 g Lipids 20% pending lipid panel  125 mEq Sodium Chloride  16 mEq Sodium Acetate  10 mmol Sodium Phosphate  21 mEq Potassium Chloride  10 mEq Potassium Acetate  20 mmol Potassium Phosphate  0 mEq Calcium Gluconate (CAPS out of PN solution)   5 mEq Magnesium Sulfate  200 mg Thiamine  1 ml Trace Elements  10 ml MVI    Total Calories    1680     (Meets  84 %  of  Estimated Energy needs  and  100  %  Protein needs)      Additional Recommendations:    1) Daily weights  2) Strict I & O's - consider dc'ing IVF while on PN - monitor PO fluid intake  3) Daily lyte checks including magnesium and phos - MONITOR FOR RFS  4) Weekly triglycerides/LFT checks  5) POCT q6hrs; maintain 140-180mg/dL  6) C/w low fiber diet and encourage PO intake as tolerated  7) Continue to titrate dextrose 50-75g per day until goal of 325g reached  8) Confirm goals of care regarding long-term nutrition support. Would recommend PEG placement if long-term nutrition support is required as GI is functional and would be the preferred route for nutrition.     *will continue to monitor and adjust PN prn*  Abida Gonsales, PhD, MS, RDN, [phone]

## 2025-02-16 NOTE — PROGRESS NOTE ADULT - SUBJECTIVE AND OBJECTIVE BOX
INTERVAL HPI/OVERNIGHT EVENTS:  Patient S&E at bedside.  Following platelet administration, the patient developed acute dyspnea and volume overload, requiring aggressive diuretic therapy and escalation of care to the step-down unit. Her respiratory status has since improved, transitioning from Venti mask to now  room air with oxygen saturation of 95%.    The patient is currently NPO and receiving PPN due to poor oral intake. There is notable concern regarding excessive diarrhea, raising consideration for DPD polymorphism/mutation vs. UGT1A gene polymorphism, which may explain increased sensitivity to irinotecan toxicity.    Of note, the patient has remained afebrile over the past 24 hours, with antibiotic coverage broadened to include vancomycin. Diarrhea has improved with only one episode in the past 24 hours.    VITAL SIGNS:  T(F): 97.5 (02-16-25 @ 09:29)  HR: 78 (02-16-25 @ 12:00)  BP: 127/70 (02-16-25 @ 12:00)  RR: 25 (02-16-25 @ 12:00)  SpO2: 94% (02-16-25 @ 12:00)  Wt(kg): --    PHYSICAL EXAM:    Constitutional: NAD  Eyes: EOMI, sclera non-icteric  ORAL cavity- Dry   Respiratory: CTA b/l, good air entry b/l  Cardiovascular: RRR, no M/R/G  Gastrointestinal: umbilical palpable mass   Extremities: no c/c/e  Neurological: AAOx3      MEDICATIONS  (STANDING):  albumin human 25% IVPB 100 milliLiter(s) IV Intermittent every 6 hours  chlorhexidine 2% Cloths 1 Application(s) Topical <User Schedule>  cholecalciferol 2000 Unit(s) Oral at bedtime  diphenoxylate/atropine 1 Tablet(s) Oral four times a day  famotidine Injectable 20 milliGRAM(s) IV Push every 12 hours  fluconAZOLE IVPB      fluconAZOLE IVPB 200 milliGRAM(s) IV Intermittent every 24 hours  lipid, fat emulsion (Fish Oil and Plant Based) 20% Infusion 0.6812 Gm/kG/Day (20.8 mL/Hr) IV Continuous <Continuous>  meropenem Injectable 1000 milliGRAM(s) IV Push every 8 hours  meropenem Injectable      midodrine 5 milliGRAM(s) Oral every 8 hours  octreotide  Injectable 100 MICROGram(s) SubCutaneous every 8 hours  pantoprazole  Injectable 40 milliGRAM(s) IV Push daily  Parenteral Nutrition - Adult 1 Each (75 mL/Hr) TPN Continuous <Continuous>  Parenteral Nutrition - Adult 1 Each (75 mL/Hr) TPN Continuous <Continuous>  sucralfate suspension 1 Gram(s) Oral four times a day    MEDICATIONS  (PRN):  acetaminophen     Tablet .. 650 milliGRAM(s) Oral every 6 hours PRN Temp greater or equal to 38C (100.4F), Mild Pain (1 - 3)  aluminum hydroxide/magnesium hydroxide/simethicone Suspension 30 milliLiter(s) Oral every 4 hours PRN Dyspepsia  Biotene Dry Mouth Oral Rinse 15 milliLiter(s) Swish and Spit five times a day PRN Mouth Care  FIRST- Mouthwash  BLM 10 milliLiter(s) Swish and Swallow five times a day PRN Mouth Care  melatonin 3 milliGRAM(s) Oral at bedtime PRN Insomnia  ondansetron Injectable 4 milliGRAM(s) IV Push every 8 hours PRN Nausea and/or Vomiting      Allergies    No Known Allergies    Intolerances        LABS:                        9.2    0.71  )-----------( 26       ( 16 Feb 2025 06:12 )             26.8     02-16    136  |  108  |  47[H]  ----------------------------<  172[H]  3.4[L]   |  17[L]  |  1.56[H]    Ca    8.0[L]      16 Feb 2025 06:12  Phos  3.1     02-16  Mg     1.8     02-16    TPro  5.1[L]  /  Alb  2.9[L]  /  TBili  1.1  /  DBili  x   /  AST  5[L]  /  ALT  19  /  AlkPhos  91  02-16      Urinalysis Basic - ( 16 Feb 2025 06:12 )    Color: x / Appearance: x / SG: x / pH: x  Gluc: 172 mg/dL / Ketone: x  / Bili: x / Urobili: x   Blood: x / Protein: x / Nitrite: x   Leuk Esterase: x / RBC: x / WBC x   Sq Epi: x / Non Sq Epi: x / Bacteria: x        RADIOLOGY & ADDITIONAL TESTS:  Studies reviewed.    ASSESSMENT & PLAN:

## 2025-02-16 NOTE — PROGRESS NOTE ADULT - ASSESSMENT
70 y/o F with a h/o recent diagnosis of primary pancreatic head adenocarcinoma with tumor in umbilicus, chemo-induced pancytopenia, SCC lip, cyclical vomiting syndrome, HTN, HLD, Gout, GERD, CVA on Aggrenox, with:    # Severe sepsis  # Lactic acidosis  # Acute hypoxemic respiratory failure  # Bilateral pleural effusions/atelectasis  # Pancytopenia  # Pseudomonas bacteremia/UTI  # MALLORY    - will add LR @ 50cc/hr on top of TPN @ 75cc/hr as she experienced robust diuresis and now has new pre-renal MALLORY + tachycardia  - malignancy/severe protein-calorie malnutrition/hypoalbuminemia causing diffuse third spacing, caution with further diuresis as this is not truly a volume overload/acute CHF issue  - start 25g albumin Q 8 hours x 6 doses   - Hgb 6.8 --> 9.2 s/p 2u PRBC  - repeat blood cultures negative for growth thus far  - serum procalcitonin has increased to 5.45  - continue empiric meropenem  - vancomycin discontinued as per ID  - continue fluconazole  - monitor hemodynamics closely, maintain a MAP > 65  - rapid shallow breathing secondary to pleural effusions and atelectasis, restart BiPAP to ease work of breathing and promote deeper breathing  - consider therapeutic thoracentesis  - f/u CXR      Case discussed with the patient, her  and son. Diagnosis, prognosis, and management plan outlined. All questions answered and concerns addressed.    55 minutes accounts for the total time spent on this patient encounter, which includes assessing and addressing the problems and their associated risks as mentioned above, reviewing the medical record/laboratory data/imaging studies, interviewing and physically examining the patient, as well as coordinating care with the multidisciplinary team. The date of entry of this note reflects the date of services rendered.

## 2025-02-16 NOTE — PROGRESS NOTE ADULT - SUBJECTIVE AND OBJECTIVE BOX
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 Pleitez) request. Hospital course complicated by recurrent diarrhea. Transfused 1u platelets on 2/15. Transferred to SICU after developing rigors with tachypnea/rapid shallow breathing and tachycardia. CT chest from earlier in the day revealed new bilateral pleural effusions with bibasilar consolidated lung and ascites. Became hypotensive s/p IV diuresis. Started on BiPAP for increased work of breathing. Transfused 2u PRBC after Hgb has drifted down to 6.8.      Events last 24 hours: She remains tachypneic. Placed back on BiPAP to ease work of breathing. Afebrile.        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 No nausea, vomiting, or hematemesis; No diarrhea or constipation. No melena or hematochezia.  GENITOURINARY: No dysuria, frequency, hematuria, or incontinence  NEUROLOGICAL: No headaches, memory loss, loss of strength, numbness, or tremors  SKIN: No itching, burning, rashes, or lesions   MUSCULOSKELETAL: No joint pain or swelling; No muscle, back, or extremity pain  PSYCHIATRIC: No depression, anxiety, mood swings, or difficulty sleeping      Medications:  fluconAZOLE IVPB      fluconAZOLE IVPB 200 milliGRAM(s) IV Intermittent every 24 hours  meropenem Injectable      meropenem Injectable 1000 milliGRAM(s) IV Push every 8 hours        acetaminophen     Tablet .. 650 milliGRAM(s) Oral every 6 hours PRN  melatonin 3 milliGRAM(s) Oral at bedtime PRN  ondansetron Injectable 4 milliGRAM(s) IV Push every 8 hours PRN        aluminum hydroxide/magnesium hydroxide/simethicone Suspension 30 milliLiter(s) Oral every 4 hours PRN  famotidine Injectable 20 milliGRAM(s) IV Push every 12 hours  pantoprazole  Injectable 40 milliGRAM(s) IV Push daily  sucralfate suspension 1 Gram(s) Oral four times a day        albumin human 25% IVPB 100 milliLiter(s) IV Intermittent every 8 hours  cholecalciferol 2000 Unit(s) Oral at bedtime  lactated ringers. 1000 milliLiter(s) IV Continuous <Continuous>  lipid, fat emulsion (Fish Oil and Plant Based) 20% Infusion 0.6812 Gm/kG/Day IV Continuous <Continuous>  Parenteral Nutrition - Adult 1 Each TPN Continuous <Continuous>    filgrastim-sndz (ZARXIO) Injectable 480 MICROGram(s) SubCutaneous daily    Biotene Dry Mouth Oral Rinse 15 milliLiter(s) Swish and Spit five times a day PRN  chlorhexidine 2% Cloths 1 Application(s) Topical <User Schedule>  FIRST- Mouthwash  BLM 10 milliLiter(s) Swish and Swallow five times a day PRN  sodium chloride 0.65% Nasal 1 Spray(s) Both Nostrils two times a day PRN            ICU Vital Signs Last 24 Hrs  T(C): 36.7 (16 Feb 2025 18:00), Max: 36.7 (16 Feb 2025 18:00)  T(F): 98 (16 Feb 2025 18:00), Max: 98 (16 Feb 2025 18:00)  HR: 90 (17 Feb 2025 02:00) (70 - 103)  BP: 131/82 (17 Feb 2025 02:00) (122/70 - 141/82)  BP(mean): 97 (17 Feb 2025 02:00) (80 - 97)  ABP: --  ABP(mean): --  RR: 22 (17 Feb 2025 02:00) (19 - 29)  SpO2: 99% (17 Feb 2025 02:00) (92% - 99%)    O2 Parameters below as of 16 Feb 2025 18:00  Patient On (Oxygen Delivery Method): room air                I&O's Detail    15 Feb 2025 07:01  -  16 Feb 2025 07:00  --------------------------------------------------------  IN:    IV PiggyBack: 1250 mL    Platelets - Single Donor: 212 mL    PRBCs (Packed Red Blood Cells): 653 mL    TPN (Total Parenteral Nutrition): 525 mL  Total IN: 2640 mL    OUT:    Indwelling Catheter - Urethral (mL): 4600 mL  Total OUT: 4600 mL    Total NET: -1960 mL      16 Feb 2025 07:01  -  17 Feb 2025 05:52  --------------------------------------------------------  IN:  Total IN: 0 mL    OUT:    Indwelling Catheter - Urethral (mL): 2200 mL  Total OUT: 2200 mL    Total NET: -2200 mL            LABS:                        9.2    0.71  )-----------( 26       ( 16 Feb 2025 06:12 )             26.8     02-16    136  |  108  |  47[H]  ----------------------------<  172[H]  3.4[L]   |  17[L]  |  1.56[H]    Ca    8.0[L]      16 Feb 2025 06:12  Phos  3.1     02-16  Mg     1.8     02-16    TPro  5.1[L]  /  Alb  2.9[L]  /  TBili  1.1  /  DBili  x   /  AST  5[L]  /  ALT  19  /  AlkPhos  91  02-16          CAPILLARY BLOOD GLUCOSE      POCT Blood Glucose.: 186 mg/dL (15 Feb 2025 19:37)      Urinalysis Basic - ( 16 Feb 2025 06:12 )    Color: x / Appearance: x / SG: x / pH: x  Gluc: 172 mg/dL / Ketone: x  / Bili: x / Urobili: x   Blood: x / Protein: x / Nitrite: x   Leuk Esterase: x / RBC: x / WBC x   Sq Epi: x / Non Sq Epi: x / Bacteria: x      CULTURES:  Culture Results:   No growth at 48 Hours (02-14-25 @ 07:33)  Culture Results:   No growth at 48 Hours (02-14-25 @ 06:50)  Culture Results:   No growth at 5 days (02-11-25 @ 09:00)  Culture Results:   No growth at 5 days (02-11-25 @ 08:50)        Physical Examination:    General: no distress, tachypneic,  Alert, oriented, interactive, nonfocal    HEENT: Pupils equal, reactive to light.  Symmetric. small scabbing perioral wounds    PULM: Clear to auscultation bilaterally, however diminished in bilateral lung bases, no significant sputum production    CVS: tachycardic, reg rhythm, no murmurs, rubs, or gallops    ABD: Soft, nondistended, nontender, normoactive bowel sounds, no masses    EXT: mild lower extremity edema, nontender    SKIN: Warm and well perfused, RUQ wound with scab    NEURO: A&Ox3, generalized weakness, cranial nerves grossly intact, no focal deficits        RADIOLOGY:     < from: Xray Chest 1 View-PORTABLE IMMEDIATE (Xray Chest 1 View-PORTABLE IMMEDIATE .) (02.15.25 @ 19:40) >  Findings:    Right port in place with the tip projecting over the cavoatrial junction.   Poor inspiratory effort. The lungs are grossly clear. There are no   pleural effusions. The cardiomediastinal silhouette is normal. Bones are   grossly normal.    IMPRESSION: No acute cardiopulmonary disease.    < end of copied text >      < from: CT Chest No Cont (02.15.25 @ 15:55) >  FINDINGS:    Support devices:. There is a right chest wall Port-A-Cath, with the   catheter tip in the distal SVC region.    LUNGS : There are new bilateral pleural effusions with adjacent   compressive atelectasis, right greater than left. No pneumothorax.  VESSELS: Aortic calcifications.  HEART: Normal size. Small pericardial effusion is seen. There are   coronary artery calcifications.  MEDIASTINUM AND MARTA: esophagus is dilated and fluid-filled.  CHEST WALL AND LOWER NECK: Within normal limits.  VISUALIZED UPPER ABDOMEN: Gallbladder is distended, similar to the prior.   There is new onset ascites around the liver and spleen. 4.1 cm pancreatic   tail mass, unchanged..  BONES: Nondisplaced fractures of the ninth and 10th ribs posteriorly on   the left.    IMPRESSION:  Nondisplaced fractures of the left ninth and 10th ribs, similar to the   prior study.    Pancreatic mass, unchanged    No onset ascites and bilateral pleural effusions with compressive   atelectasis, right greater than left.    Dilated gallbladder, similar to the prior study.    < end of copied text >

## 2025-02-16 NOTE — PROGRESS NOTE ADULT - ASSESSMENT
Patient presented with umbilical mass of metastatic pancreatic CA few weeks ago,  found to have pancreatic body mass   Got Chemotherapy and developed Pseudomonas sepsis    1. septic shock/neutropenic sepsis  2. pseudomonas bacteremia  3. has episode of sob from platelet transfusion  4. pleural effusion  5. MALLORY  6. Pancytopenia  7. Mucositis    Plan  1. continue abx - meropenem for pseudomonas, bacteremia, also on diflucan for mucositis and possible esophageal candidiasis      ID input appreciated  2. TPN cant take PO due to mucositis  3. octreatide for diarrhea  4. filgrastin for neutropenia  5. octreotide for diarrhea likley from chemo  6. MALLORY may be from over diuresis, has  normal ef, fluid from TPN should be enough , monitor had 4500 cc of urine from diuresis overnight  7. on room air would not touch pleural effusions at this time, monitor  8. acute hypoxic respiratory failure likley from TRALI vs TACO      Patient in SICU critically ill with neutropenic sepsis and severe leukopenia

## 2025-02-16 NOTE — PROGRESS NOTE ADULT - ASSESSMENT
69-year-old female with h/o recent diagnosis of primary head of pancreatic adenocarcinoma with tumor in umbilicus, chemo-induced pancytopenia, SCC lip, cyclical vomiting syndrome, HTN, HPL, Gout, GERD, CVA on Aggrenox was transferred on 2/12 from [facility] for further care. She was admitted to [facility] with septic shock on 02/09/2025, nausea, vomiting and diarrhea requiring Levophed drip. Patient was being followed by GI, ID, Cardiology and Onc there. Patient has been transferred to  on patient's  (GI Dr. Pleitez) request. She was reported with non-bloody diarrhea x 3 in last 24 hours PTA. She denied abdominal pain, nausea, but has dry heaves. At [place] she was started on Vancomycin and Zosyn, then changed to Cefepime as her blood culture and urine cultures showed Pseudomonas aeruginosa.    #Sepsis with PSAE resolving  #Episode of hypotension/ chills s/p Platelets ?cause ?new sepsis ?transfusion reaction.  #Head of pancreas adenocarcinoma on chemotherapy  #Low grade fever  #Absolute neutropenia  #Immunocompromised host  #UTI with PSAE  #Kidney stones  #Right lower chest wall dry superficial ulcer   #ARF   -neutropenia is persistent   -renal function is worse  -cultures reviewed  -repeat stool for CDT is negative   -source of sepsis is likely intraabdominal and/or urinary  -s/p cefepime 2 gm IV q8h # 3  -concern for infection with multiresistant organisms was raised. Prior cultures reviewed. An epidemiologic assessment was performed. There is a significant risk for resistant microorganisms to spread to family members, and/or healthcare staff. The patient will be placed on neutropenic isolation according to infection control policy. Will reconsider isolation measures based on new culture results and other clinical data as appropriate. Appropriate cultures collected and an appropriate broad spectrum abx therapy was started.  -abx upgraded to meropenem 1 gm IV q8h  -also started on vancomycin IV, but her renal function is worse; will hold off further vancomycin IV at this time  -GI evaluation appreciated  -neutropenic precautions  -repeat BC x 2 noted  -oncology evaluation appreciated  -monitor abdomen  -continue abx coverage  -f/u cultures  -monitor temps  -f/u CBC and BP  -supportive care  2. Other issues:   -care per medicine    d/w medicine team and Dr. Fortune

## 2025-02-16 NOTE — PROGRESS NOTE ADULT - SUBJECTIVE AND OBJECTIVE BOX
Date of service: 02-16-25 @ 12:21    Lying in bed in NAD  Events noted  Had platelets transfusion - felt chilly and became hypotensive   Concern for septic episode was raised  Has low grade fever  Transferred to ICU    ROS: denies dizziness, no HA, no SOB or cough, no abdominal pain, no diarrhea or constipation; no dysuria, no legs pain, no rashes    MEDICATIONS  (STANDING):  albumin human 25% IVPB 100 milliLiter(s) IV Intermittent every 6 hours  chlorhexidine 2% Cloths 1 Application(s) Topical <User Schedule>  cholecalciferol 2000 Unit(s) Oral at bedtime  diphenoxylate/atropine 1 Tablet(s) Oral four times a day  famotidine Injectable 20 milliGRAM(s) IV Push every 12 hours  fluconAZOLE IVPB      fluconAZOLE IVPB 200 milliGRAM(s) IV Intermittent every 24 hours  lipid, fat emulsion (Fish Oil and Plant Based) 20% Infusion 0.6812 Gm/kG/Day (20.8 mL/Hr) IV Continuous <Continuous>  meropenem Injectable 1000 milliGRAM(s) IV Push every 8 hours  meropenem Injectable      midodrine 5 milliGRAM(s) Oral every 8 hours  octreotide  Injectable 100 MICROGram(s) SubCutaneous every 8 hours  pantoprazole  Injectable 40 milliGRAM(s) IV Push daily  Parenteral Nutrition - Adult 1 Each (75 mL/Hr) TPN Continuous <Continuous>  Parenteral Nutrition - Adult 1 Each (75 mL/Hr) TPN Continuous <Continuous>  sucralfate suspension 1 Gram(s) Oral four times a day    Vital Signs Last 24 Hrs  T(C): 36.4 (16 Feb 2025 09:29), Max: 38.1 (15 Feb 2025 23:00)  T(F): 97.5 (16 Feb 2025 09:29), Max: 100.5 (15 Feb 2025 23:00)  HR: 70 (16 Feb 2025 11:30) (70 - 137)  BP: 128/70 (16 Feb 2025 11:30) (96/64 - 177/89)  BP(mean): 87 (16 Feb 2025 11:30) (70 - 93)  RR: 24 (16 Feb 2025 11:30) (17 - 29)  SpO2: 95% (16 Feb 2025 11:30) (92% - 100%)    Parameters below as of 16 Feb 2025 11:30  Patient On (Oxygen Delivery Method): room air     Physical exam:    Constitutional:  No acute distress  HEENT: NC/AT, EOMI, PERRLA, conjunctivae clear; ears and nose atraumatic; pharynx benign  Neck: supple; thyroid not palpable  Back: no tenderness  Respiratory: respiratory effort normal; clear to auscultation  Cardiovascular: S1S2 regular, no murmurs  Abdomen: soft, mild periombilical tender, not distended, positive BS; no liver or spleen organomegaly  Genitourinary: no suprapubic tenderness  Lymphatic: no LN palpable  Musculoskeletal: no muscle tenderness, no joint swelling or tenderness  Extremities: no pedal edema  Neurological/ Psychiatric: AxOx3, judgement and insight normal; moving all extremities  Skin: no rashes; right lower chest wall dry, scabbed ulcer; no discharge     Labs: reviewed                        9.2    0.71  )-----------( 26       ( 16 Feb 2025 06:12 )             26.8     02-16    136  |  108  |  47[H]  ----------------------------<  172[H]  3.4[L]   |  17[L]  |  1.56[H]    Ca    8.0[L]      16 Feb 2025 06:12  Phos  3.1     02-16  Mg     1.8     02-16    TPro  5.1[L]  /  Alb  2.9[L]  /  TBili  1.1  /  DBili  x   /  AST  5[L]  /  ALT  19  /  AlkPhos  91  02-16    C-Reactive Protein: 209.0 mg/mL (02-15-25 @ 05:17)  C-Reactive Protein: 233.0 mg/mL (02-14-25 @ 06:50)  Ferritin: 1798 ng/mL (02-13-25 @ 18:46)                        7.5    0.42  )-----------( 17       ( 15 Feb 2025 05:17 )             22.1     02-15    133[L]  |  108  |  27[H]  ----------------------------<  183[H]  3.3[L]   |  18[L]  |  1.04    Ca    8.1[L]      15 Feb 2025 05:17  Phos  2.0     02-15  Mg     2.3     02-15    TPro  4.2[L]  /  Alb  1.7[L]  /  TBili  0.6  /  DBili  0.4[H]  /  AST  6[L]  /  ALT  12  /  AlkPhos  98  02-15    C-Reactive Protein: 209.0 mg/mL (02-15-25 @ 05:17)  C-Reactive Protein: 233.0 mg/mL (02-14-25 @ 06:50)  Ferritin: 1798 ng/mL (02-13-25 @ 18:46)                        8.0    0.23  )-----------( 20       ( 14 Feb 2025 06:50 )             24.2     02-14    134[L]  |  108  |  21  ----------------------------<  141[H]  3.3[L]   |  16[L]  |  1.03    Ca    8.5      14 Feb 2025 06:50  Phos  3.1     02-14  Mg     2.6     02-14    TPro  4.5[L]  /  Alb  1.5[L]  /  TBili  0.8  /  DBili  0.4[H]  /  AST  8[L]  /  ALT  14  /  AlkPhos  104  02-14    C-Reactive Protein: 233.0 mg/mL (02-14-25 @ 06:50)  Ferritin: 1798 ng/mL (02-13-25 @ 18:46)                        8.7    0.19  )-----------( 33       ( 13 Feb 2025 06:52 )             25.7     02-13    137  |  109[H]  |  20  ----------------------------<  117[H]  2.6[LL]   |  18[L]  |  1.02    Ca    8.7      13 Feb 2025 06:52  Phos  2.3     02-13  Mg     1.6     02-13    TPro  4.4[L]  /  Alb  1.1[L]  /  TBili  0.7  /  DBili  x   /  AST  11[L]  /  ALT  16  /  AlkPhos  94  02-13     LIVER FUNCTIONS - ( 13 Feb 2025 06:52 )  Alb: 1.1 g/dL / Pro: 4.4 gm/dL / ALK PHOS: 94 U/L / ALT: 16 U/L / AST: 11 U/L / GGT: x           Culture - Blood (collected 11 Feb 2025 09:00)  Source: .Blood BLOOD  Preliminary Report (12 Feb 2025 13:01):    No growth at 24 hours    Culture - Blood (collected 11 Feb 2025 08:50)  Source: .Blood BLOOD  Preliminary Report (12 Feb 2025 13:01):    No growth at 24 hours    Urinalysis with Rflx Culture (collected 09 Feb 2025 01:25)    Culture - Urine (collected 09 Feb 2025 01:25)  Source: Clean Catch  Final Report (11 Feb 2025 08:39):    50,000 - 99,000 CFU/mL Pseudomonas aeruginosa  Organism: Pseudomonas aeruginosa (11 Feb 2025 08:39)  Organism: Pseudomonas aeruginosa (11 Feb 2025 08:39)      Method Type: REJI      -  Amikacin: S <=16      -  Aztreonam: S <=4      -  Cefepime: S <=2      -  Ceftazidime: S <=1      -  Ciprofloxacin: S <=0.25      -  Imipenem: S 2      -  Levofloxacin: S <=0.5      -  Meropenem: S <=1      -  Piperacillin/Tazobactam: S <=8    Culture - Blood (collected 08 Feb 2025 22:00)  Source: .Blood BLOOD  Gram Stain (09 Feb 2025 19:04):    Growth in aerobic bottle: Gram Negative Rods  Final Report (11 Feb 2025 07:53):    Growth in aerobic bottle: Pseudomonas aeruginosa    Direct identification is available within approximately 3-5    hours either by Blood Panel Multiplexed PCR or Direct    MALDI-TOF. Details: [email or web address]  Organism: Blood Culture PCR  Pseudomonas aeruginosa (11 Feb 2025 07:53)  Organism: Pseudomonas aeruginosa (11 Feb 2025 07:53)      Method Type: REJI      -  Aztreonam: S <=4      -  Cefepime: S <=2      -  Ceftazidime: S <=1      -  Ciprofloxacin: S <=0.25      -  Imipenem: S <=1      -  Levofloxacin: S <=0.5      -  Meropenem: S <=1      -  Piperacillin/Tazobactam: S <=8  Organism: Blood Culture PCR (11 Feb 2025 07:53)      Method Type: PCR      -  Pseudomonas aeruginosa: Detec    Culture - Blood (collected 08 Feb 2025 21:55)  Source: .Blood BLOOD  Gram Stain (09 Feb 2025 19:32):    Growth in aerobic bottle: Gram Negative Rods  Final Report (11 Feb 2025 07:54):    Growth in aerobic bottle: Pseudomonas aeruginosa    See previous culture [id number]    Radiology: all available radiological tests reviewed    < from: US Abdomen Upper Quadrant Right (02.09.25 @ 14:55) >  Distended gallbladder with layering sludge.  8 mm right renal calculus without hydronephrosis.  Hepatomegaly.  Right renal cyst.  < end of copied text >    < from: CT Abdomen and Pelvis No Cont (02.09.25 @ 03:01) >  1.   Study somewhat limited due to absence of contrast.  2.   Axial images 71-76 of series 301 and concomitant coronal images demonstrate prominent soft tissue inseparable from the body of the pancreas consistent with pancreatic neoplasm.  3.   Some distension of gallbladder could be further evaluated with right upper quadrant sonography. No definite biliary dilatation.  < end of copied text >    Advanced directives addressed: full resuscitation

## 2025-02-17 ENCOUNTER — RESULT REVIEW (OUTPATIENT)
Age: 70
End: 2025-02-17

## 2025-02-17 LAB
ALBUMIN SERPL ELPH-MCNC: 2.7 G/DL — LOW (ref 3.3–5)
ALBUMIN SERPL ELPH-MCNC: 2.7 G/DL — LOW (ref 3.3–5)
ALP SERPL-CCNC: 101 U/L — SIGNIFICANT CHANGE UP (ref 40–120)
ALP SERPL-CCNC: 114 U/L — SIGNIFICANT CHANGE UP (ref 40–120)
ALT FLD-CCNC: 18 U/L — SIGNIFICANT CHANGE UP (ref 12–78)
ALT FLD-CCNC: 19 U/L — SIGNIFICANT CHANGE UP (ref 12–78)
ANION GAP SERPL CALC-SCNC: 10 MMOL/L — SIGNIFICANT CHANGE UP (ref 5–17)
ANION GAP SERPL CALC-SCNC: 13 MMOL/L — SIGNIFICANT CHANGE UP (ref 5–17)
ANION GAP SERPL CALC-SCNC: 8 MMOL/L — SIGNIFICANT CHANGE UP (ref 5–17)
AST SERPL-CCNC: 15 U/L — SIGNIFICANT CHANGE UP (ref 15–37)
AST SERPL-CCNC: 24 U/L — SIGNIFICANT CHANGE UP (ref 15–37)
BILIRUB SERPL-MCNC: 0.7 MG/DL — SIGNIFICANT CHANGE UP (ref 0.2–1.2)
BILIRUB SERPL-MCNC: 0.8 MG/DL — SIGNIFICANT CHANGE UP (ref 0.2–1.2)
BUN SERPL-MCNC: 68 MG/DL — HIGH (ref 7–23)
BUN SERPL-MCNC: 70 MG/DL — HIGH (ref 7–23)
BUN SERPL-MCNC: 74 MG/DL — HIGH (ref 7–23)
CALCIUM SERPL-MCNC: 8.3 MG/DL — LOW (ref 8.5–10.1)
CALCIUM SERPL-MCNC: 8.4 MG/DL — LOW (ref 8.5–10.1)
CALCIUM SERPL-MCNC: 8.5 MG/DL — SIGNIFICANT CHANGE UP (ref 8.5–10.1)
CHLORIDE SERPL-SCNC: 109 MMOL/L — HIGH (ref 96–108)
CHLORIDE SERPL-SCNC: 110 MMOL/L — HIGH (ref 96–108)
CHLORIDE SERPL-SCNC: 113 MMOL/L — HIGH (ref 96–108)
CO2 SERPL-SCNC: 17 MMOL/L — LOW (ref 22–31)
CO2 SERPL-SCNC: 18 MMOL/L — LOW (ref 22–31)
CO2 SERPL-SCNC: 20 MMOL/L — LOW (ref 22–31)
CREAT SERPL-MCNC: 1.55 MG/DL — HIGH (ref 0.5–1.3)
CREAT SERPL-MCNC: 1.57 MG/DL — HIGH (ref 0.5–1.3)
EGFR: 31 ML/MIN/1.73M2 — LOW
EGFR: 31 ML/MIN/1.73M2 — LOW
EGFR: 35 ML/MIN/1.73M2 — LOW
EGFR: 35 ML/MIN/1.73M2 — LOW
EGFR: 36 ML/MIN/1.73M2 — LOW
EGFR: 36 ML/MIN/1.73M2 — LOW
GLUCOSE SERPL-MCNC: 223 MG/DL — HIGH (ref 70–99)
GLUCOSE SERPL-MCNC: 227 MG/DL — HIGH (ref 70–99)
GLUCOSE SERPL-MCNC: 234 MG/DL — HIGH (ref 70–99)
IMMATURE PLATELET FRACTION #: 3.8 K/UL — LOW (ref 4.7–11.1)
IMMATURE PLATELET FRACTION %: 10.4 % — HIGH (ref 1.6–4.9)
MAGNESIUM SERPL-MCNC: 1.5 MG/DL — LOW (ref 1.6–2.6)
MAGNESIUM SERPL-MCNC: 2.4 MG/DL — SIGNIFICANT CHANGE UP (ref 1.6–2.6)
MAGNESIUM SERPL-MCNC: 2.9 MG/DL — HIGH (ref 1.6–2.6)
MCHC RBC-ENTMCNC: 29.8 PG — SIGNIFICANT CHANGE UP (ref 27–34)
MCHC RBC-ENTMCNC: 35.4 G/DL — SIGNIFICANT CHANGE UP (ref 32–36)
MCV RBC AUTO: 84.1 FL — SIGNIFICANT CHANGE UP (ref 80–100)
NRBC # BLD AUTO: 0 K/UL — SIGNIFICANT CHANGE UP (ref 0–0)
NRBC # FLD: 0 K/UL — SIGNIFICANT CHANGE UP (ref 0–0)
NRBC BLD AUTO-RTO: 0 /100 WBCS — SIGNIFICANT CHANGE UP (ref 0–0)
PHOSPHATE SERPL-MCNC: 2.3 MG/DL — LOW (ref 2.5–4.5)
PHOSPHATE SERPL-MCNC: 2.5 MG/DL — SIGNIFICANT CHANGE UP (ref 2.5–4.5)
PHOSPHATE SERPL-MCNC: 3.2 MG/DL — SIGNIFICANT CHANGE UP (ref 2.5–4.5)
PLATELET # BLD AUTO: 37 K/UL — LOW (ref 150–400)
PMV BLD: 13.4 FL — HIGH (ref 7–13)
POTASSIUM SERPL-MCNC: 2.3 MMOL/L — CRITICAL LOW (ref 3.5–5.3)
POTASSIUM SERPL-MCNC: 3.4 MMOL/L — LOW (ref 3.5–5.3)
POTASSIUM SERPL-MCNC: 3.4 MMOL/L — LOW (ref 3.5–5.3)
POTASSIUM SERPL-SCNC: 2.3 MMOL/L — CRITICAL LOW (ref 3.5–5.3)
POTASSIUM SERPL-SCNC: 3.4 MMOL/L — LOW (ref 3.5–5.3)
POTASSIUM SERPL-SCNC: 3.4 MMOL/L — LOW (ref 3.5–5.3)
PROT SERPL-MCNC: 5.1 GM/DL — LOW (ref 6–8.3)
PROT SERPL-MCNC: 5.2 GM/DL — LOW (ref 6–8.3)
RBC # FLD: 14.5 % — SIGNIFICANT CHANGE UP (ref 10.3–14.5)
SODIUM SERPL-SCNC: 137 MMOL/L — SIGNIFICANT CHANGE UP (ref 135–145)
SODIUM SERPL-SCNC: 140 MMOL/L — SIGNIFICANT CHANGE UP (ref 135–145)
SODIUM SERPL-SCNC: 141 MMOL/L — SIGNIFICANT CHANGE UP (ref 135–145)
WBC # BLD: 1.3 K/UL — LOW (ref 3.8–10.5)
WBC # FLD AUTO: 1.3 K/UL — LOW (ref 3.8–10.5)

## 2025-02-17 PROCEDURE — 93308 TTE F-UP OR LMTD: CPT | Mod: 26

## 2025-02-17 PROCEDURE — 71045 X-RAY EXAM CHEST 1 VIEW: CPT | Mod: 26

## 2025-02-17 PROCEDURE — 93010 ELECTROCARDIOGRAM REPORT: CPT

## 2025-02-17 PROCEDURE — 99233 SBSQ HOSP IP/OBS HIGH 50: CPT

## 2025-02-17 RX ORDER — MAGNESIUM SULFATE 500 MG/ML
2 SYRINGE (ML) INJECTION
Refills: 0 | Status: COMPLETED | OUTPATIENT
Start: 2025-02-17 | End: 2025-02-17

## 2025-02-17 RX ORDER — SODIUM/POT/MAG/CALC/CHLOR/ACET 35-20-5MEQ
1 VIAL (ML) INTRAVENOUS
Refills: 0 | Status: DISCONTINUED | OUTPATIENT
Start: 2025-02-17 | End: 2025-02-17

## 2025-02-17 RX ORDER — MEROPENEM 1 G/30ML
1000 INJECTION INTRAVENOUS EVERY 12 HOURS
Refills: 0 | Status: COMPLETED | OUTPATIENT
Start: 2025-02-17 | End: 2025-02-24

## 2025-02-17 RX ORDER — SOD PHOS DI, MONO/K PHOS MONO 250 MG
1 TABLET ORAL ONCE
Refills: 0 | Status: COMPLETED | OUTPATIENT
Start: 2025-02-17 | End: 2025-02-17

## 2025-02-17 RX ORDER — MEROPENEM 1 G/30ML
1000 INJECTION INTRAVENOUS EVERY 12 HOURS
Refills: 0 | Status: DISCONTINUED | OUTPATIENT
Start: 2025-02-17 | End: 2025-02-17

## 2025-02-17 RX ORDER — AMLODIPINE BESYLATE 10 MG/1
10 TABLET ORAL DAILY
Refills: 0 | Status: DISCONTINUED | OUTPATIENT
Start: 2025-02-17 | End: 2025-02-23

## 2025-02-17 RX ORDER — I.V. FAT EMULSION 20 G/100ML
0.68 EMULSION INTRAVENOUS
Qty: 50 | Refills: 0 | Status: DISCONTINUED | OUTPATIENT
Start: 2025-02-17 | End: 2025-02-17

## 2025-02-17 RX ORDER — VANCOMYCIN HCL IN 5 % DEXTROSE 1.5G/250ML
125 PLASTIC BAG, INJECTION (ML) INTRAVENOUS EVERY 6 HOURS
Refills: 0 | Status: DISCONTINUED | OUTPATIENT
Start: 2025-02-17 | End: 2025-03-16

## 2025-02-17 RX ORDER — DIPHENOXYLATE HYDROCHLORIDE AND ATROPINE SULFATE .025; 2.5 MG/1; MG/1
1 TABLET ORAL EVERY 6 HOURS
Refills: 0 | Status: DISCONTINUED | OUTPATIENT
Start: 2025-02-17 | End: 2025-02-19

## 2025-02-17 RX ORDER — SODIUM CHLORIDE 9 G/1000ML
1000 INJECTION, SOLUTION INTRAVENOUS ONCE
Refills: 0 | Status: COMPLETED | OUTPATIENT
Start: 2025-02-17 | End: 2025-02-17

## 2025-02-17 RX ADMIN — Medication 40 MILLIEQUIVALENT(S): at 12:52

## 2025-02-17 RX ADMIN — Medication 12.5 GRAM(S): at 08:38

## 2025-02-17 RX ADMIN — Medication 100 MILLIEQUIVALENT(S): at 14:57

## 2025-02-17 RX ADMIN — Medication 40 MILLIGRAM(S): at 10:39

## 2025-02-17 RX ADMIN — AMLODIPINE BESYLATE 10 MILLIGRAM(S): 10 TABLET ORAL at 16:49

## 2025-02-17 RX ADMIN — DIPHENOXYLATE HYDROCHLORIDE AND ATROPINE SULFATE 1 TABLET(S): .025; 2.5 TABLET ORAL at 13:25

## 2025-02-17 RX ADMIN — Medication 100 MILLIEQUIVALENT(S): at 08:11

## 2025-02-17 RX ADMIN — ALBUMIN (HUMAN) 50 MILLILITER(S): 12.5 INJECTION, SOLUTION INTRAVENOUS at 05:15

## 2025-02-17 RX ADMIN — MEROPENEM 1000 MILLIGRAM(S): 1 INJECTION INTRAVENOUS at 05:15

## 2025-02-17 RX ADMIN — Medication 1 EACH: at 22:07

## 2025-02-17 RX ADMIN — Medication 1 GRAM(S): at 12:52

## 2025-02-17 RX ADMIN — SODIUM CHLORIDE 50 MILLILITER(S): 9 INJECTION, SOLUTION INTRAVENOUS at 08:38

## 2025-02-17 RX ADMIN — Medication 20 MILLIGRAM(S): at 21:17

## 2025-02-17 RX ADMIN — Medication 100 MILLIEQUIVALENT(S): at 09:22

## 2025-02-17 RX ADMIN — Medication 125 MILLIGRAM(S): at 18:32

## 2025-02-17 RX ADMIN — FILGRASTIM 480 MICROGRAM(S): 300 INJECTION, SOLUTION INTRAVENOUS; SUBCUTANEOUS at 10:39

## 2025-02-17 RX ADMIN — Medication 100 MILLIGRAM(S): at 11:39

## 2025-02-17 RX ADMIN — Medication 1 APPLICATION(S): at 10:02

## 2025-02-17 RX ADMIN — SODIUM CHLORIDE 1000 MILLILITER(S): 9 INJECTION, SOLUTION INTRAVENOUS at 12:52

## 2025-02-17 RX ADMIN — Medication 1 GRAM(S): at 05:19

## 2025-02-17 RX ADMIN — Medication 40 MILLIEQUIVALENT(S): at 16:49

## 2025-02-17 RX ADMIN — Medication 20 MILLIGRAM(S): at 10:39

## 2025-02-17 RX ADMIN — Medication 100 MILLIEQUIVALENT(S): at 14:04

## 2025-02-17 RX ADMIN — Medication 40 MILLIEQUIVALENT(S): at 08:11

## 2025-02-17 RX ADMIN — MEROPENEM 1000 MILLIGRAM(S): 1 INJECTION INTRAVENOUS at 21:17

## 2025-02-17 RX ADMIN — Medication 2000 UNIT(S): at 21:18

## 2025-02-17 RX ADMIN — I.V. FAT EMULSION 20.8 GM/KG/DAY: 20 EMULSION INTRAVENOUS at 22:09

## 2025-02-17 RX ADMIN — Medication 100 MILLIEQUIVALENT(S): at 13:17

## 2025-02-17 RX ADMIN — Medication 1 GRAM(S): at 23:14

## 2025-02-17 RX ADMIN — SODIUM CHLORIDE 125 MILLILITER(S): 9 INJECTION, SOLUTION INTRAVENOUS at 12:52

## 2025-02-17 RX ADMIN — Medication 125 MILLIGRAM(S): at 23:16

## 2025-02-17 RX ADMIN — Medication 1 PACKET(S): at 16:49

## 2025-02-17 RX ADMIN — Medication 12.5 GRAM(S): at 10:38

## 2025-02-17 RX ADMIN — Medication 100 MILLIEQUIVALENT(S): at 11:39

## 2025-02-17 RX ADMIN — Medication 100 MILLIEQUIVALENT(S): at 23:14

## 2025-02-17 RX ADMIN — ALBUMIN (HUMAN) 50 MILLILITER(S): 12.5 INJECTION, SOLUTION INTRAVENOUS at 14:48

## 2025-02-17 RX ADMIN — Medication 1 GRAM(S): at 18:32

## 2025-02-17 NOTE — PROGRESS NOTE ADULT - ASSESSMENT
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, chemo-induced pancytopenia, SCC lip, cyclical vomiting syndrome, HTN, HLD, Gout, GERD, CVA on Aggrenox, with:    # Severe sepsis  # Lactic acidosis  # Acute hypoxemic respiratory failure  # Bilateral pleural effusions/atelectasis  # Pancytopenia  # Pseudomonas bacteremia/UTI  # MALLORY    - robust UOP continues, 9+ liters overall net negative with pre-renal MALLORY  - crystalloid resuscitation ongoing (LR @ 125cc/hr)  - malignancy/severe protein-calorie malnutrition/hypoalbuminemia causing diffuse third spacing  - serum albumin up to 2.7, continue to supplement as indicated to improve intravascular oncotic pressure  - Hgb stable without further need for PRBC transfusions  - repeat blood cultures negative for growth thus far  - continue empiric meropenem  - continue fluconazole  - monitor hemodynamics closely, maintain a MAP > 65  - rapid shallow breathing secondary to pleural effusions and atelectasis, restart BiPAP to ease work of breathing and promote deeper breathing  - consider therapeutic thoracentesis if platelet counts improve      Case discussed with the patient at the bedside. Diagnosis, prognosis, and management plan outlined. All questions answered and concerns addressed.    52 minutes accounts for the total time spent on this patient encounter, which includes assessing and addressing the problems and their associated risks as mentioned above, reviewing the medical record/laboratory data/imaging studies, interviewing and physically examining the patient, as well as coordinating care with the multidisciplinary team. The date of entry of this note reflects the date of services rendered.

## 2025-02-17 NOTE — PROGRESS NOTE ADULT - SUBJECTIVE AND OBJECTIVE BOX
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 No nausea, vomiting, or hematemesis; No diarrhea or constipation. No melena or hematochezia.  GENITOURINARY: No dysuria, frequency, hematuria, or incontinence  NEUROLOGICAL: No headaches, memory loss, loss of strength, numbness, or tremors  SKIN: No itching, burning, rashes, or lesions   MUSCULOSKELETAL: No joint pain or swelling; No muscle, back, or extremity pain  PSYCHIATRIC: No depression, anxiety, mood swings, or difficulty sleeping      Medications:  fluconAZOLE IVPB      fluconAZOLE IVPB 200 milliGRAM(s) IV Intermittent every 24 hours  meropenem Injectable 1000 milliGRAM(s) IV Push every 12 hours  vancomycin    Solution 125 milliGRAM(s) Oral every 6 hours    amLODIPine   Tablet 10 milliGRAM(s) Oral daily      acetaminophen     Tablet .. 650 milliGRAM(s) Oral every 6 hours PRN  melatonin 3 milliGRAM(s) Oral at bedtime PRN  ondansetron Injectable 4 milliGRAM(s) IV Push every 8 hours PRN        aluminum hydroxide/magnesium hydroxide/simethicone Suspension 30 milliLiter(s) Oral every 4 hours PRN  diphenoxylate/atropine 1 Tablet(s) Oral every 6 hours PRN  famotidine Injectable 20 milliGRAM(s) IV Push every 12 hours  pantoprazole  Injectable 40 milliGRAM(s) IV Push daily  sucralfate suspension 1 Gram(s) Oral four times a day        cholecalciferol 2000 Unit(s) Oral at bedtime  lactated ringers. 1000 milliLiter(s) IV Continuous <Continuous>  lipid, fat emulsion (Fish Oil and Plant Based) 20% Infusion 0.681 Gm/kG/Day IV Continuous <Continuous>  Parenteral Nutrition - Adult 1 Each TPN Continuous <Continuous>  Parenteral Nutrition - Adult 1 Each TPN Continuous <Continuous>  potassium chloride  10 mEq/100 mL IVPB 10 milliEquivalent(s) IV Intermittent every 1 hour    filgrastim-sndz (ZARXIO) Injectable 480 MICROGram(s) SubCutaneous daily    Biotene Dry Mouth Oral Rinse 15 milliLiter(s) Swish and Spit five times a day PRN  chlorhexidine 2% Cloths 1 Application(s) Topical <User Schedule>  sodium chloride 0.65% Nasal 1 Spray(s) Both Nostrils two times a day PRN            ICU Vital Signs Last 24 Hrs  T(C): 36.7 (17 Feb 2025 21:08), Max: 37.1 (17 Feb 2025 12:42)  T(F): 98.1 (17 Feb 2025 21:08), Max: 98.7 (17 Feb 2025 12:42)  HR: 100 (17 Feb 2025 22:00) (86 - 124)  BP: 129/81 (17 Feb 2025 22:00) (122/71 - 152/140)  BP(mean): 95 (17 Feb 2025 22:00) (82 - 146)  ABP: --  ABP(mean): --  RR: 28 (17 Feb 2025 22:00) (19 - 30)  SpO2: 100% (17 Feb 2025 22:00) (95% - 100%)    O2 Parameters below as of 17 Feb 2025 18:46  Patient On (Oxygen Delivery Method): nasal cannula  O2 Flow (L/min): 2              I&O's Detail    16 Feb 2025 07:01  -  17 Feb 2025 07:00  --------------------------------------------------------  IN:    Fat Emulsion (Fish Oil &amp; Plant Based) 20% Infusion: 175 mL    IV PiggyBack: 100 mL    Lactated Ringers: 500 mL    TPN (Total Parenteral Nutrition): 865 mL  Total IN: 1640 mL    OUT:    Indwelling Catheter - Urethral (mL): 4600 mL  Total OUT: 4600 mL    Total NET: -2960 mL      17 Feb 2025 07:01  -  17 Feb 2025 23:40  --------------------------------------------------------  IN:  Total IN: 0 mL    OUT:    Indwelling Catheter - Urethral (mL): 2125 mL  Total OUT: 2125 mL    Total NET: -2125 mL            LABS:                        10.1   1.30  )-----------( 37       ( 17 Feb 2025 05:59 )             28.5     02-17    141  |  113[H]  |  68[H]  ----------------------------<  223[H]  3.4[L]   |  18[L]  |  1.55[H]    Ca    8.3[L]      17 Feb 2025 21:16  Phos  2.5     02-17  Mg     2.4     02-17    TPro  5.1[L]  /  Alb  2.7[L]  /  TBili  0.7  /  DBili  x   /  AST  24  /  ALT  18  /  AlkPhos  114  02-17          CAPILLARY BLOOD GLUCOSE          Urinalysis Basic - ( 17 Feb 2025 21:16 )    Color: x / Appearance: x / SG: x / pH: x  Gluc: 223 mg/dL / Ketone: x  / Bili: x / Urobili: x   Blood: x / Protein: x / Nitrite: x   Leuk Esterase: x / RBC: x / WBC x   Sq Epi: x / Non Sq Epi: x / Bacteria: x      CULTURES:  Culture Results:   No growth at 72 Hours (02-14-25 @ 07:33)  Culture Results:   No growth at 72 Hours (02-14-25 @ 06:50)  Culture Results:   No growth at 5 days (02-11-25 @ 09:00)  Culture Results:   No growth at 5 days (02-11-25 @ 08:50)        Physical Examination:    General: No acute distress.  Alert, oriented, interactive, nonfocal    HEENT: Pupils equal, reactive to light.  Symmetric.    PULM: Clear to auscultation bilaterally, no significant sputum production    CVS: Regular rate and rhythm, no murmurs, rubs, or gallops    ABD: Soft, nondistended, nontender, normoactive bowel sounds, no masses    EXT: No edema, nontender    SKIN: Warm and well perfused, no rashes noted.    NEURO: A&Ox3, strength 5/5 all extremities, cranial nerves grossly intact, no focal deficits        RADIOLOGY: ***        CRITICAL CARE TIME SPENT: ***  Time spent evaluating/treating patient with medical issues that pose a high risk for life threatening deterioration and/or end-organ damage, reviewing data/labs/imaging, discussing case with multidisciplinary team, discussing plan/goals of care with patient/family. Non-inclusive of procedure time. The date of entry of this note reflects the date of services rendered.   Patient is a 69y old  Female who presents with a chief complaint of pancreatic CA (17 Feb 2025 19:03)      BRIEF HOSPITAL COURSE: 70 y/o F with a h/o recent diagnosis of primary pancreatic head adenocarcinoma with tumor in umbilicus, chemo-induced pancytopenia, SCC lip, cyclical vomiting syndrome, HTN, HLD, Gout, GERD, CVA on Aggrenox, initially admitted to [facility] on 02/09/2025 with septic shock secondary to pseudomonas urosepsis/septic shock, subsequently transferred to  on 2/12 at patient's 's (GI Dr. Pleitez) request. Hospital course complicated by recurrent diarrhea. Transfused 1u platelets on 2/15. Transferred to SICU after developing rigors with tachypnea/rapid shallow breathing and tachycardia. CT chest from earlier in the day revealed new bilateral pleural effusions with bibasilar consolidated lung and ascites. Became hypotensive s/p IV diuresis. Started on BiPAP for increased work of breathing. Transfused 2u PRBC after Hgb has drifted down to 6.8.      Events last 24 hours: Back on BiPAP tonight for persistent rapid shallow breathing. Afebrile.        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 No nausea, vomiting, or hematemesis; No diarrhea or constipation. No melena or hematochezia.  GENITOURINARY: No dysuria, frequency, hematuria, or incontinence  NEUROLOGICAL: No headaches, memory loss, loss of strength, numbness, or tremors  SKIN: No itching, burning, rashes, or lesions   MUSCULOSKELETAL: No joint pain or swelling; No muscle, back, or extremity pain  PSYCHIATRIC: No depression, anxiety, mood swings, or difficulty sleeping      Medications:  fluconAZOLE IVPB      fluconAZOLE IVPB 200 milliGRAM(s) IV Intermittent every 24 hours  meropenem Injectable 1000 milliGRAM(s) IV Push every 12 hours  vancomycin    Solution 125 milliGRAM(s) Oral every 6 hours    amLODIPine   Tablet 10 milliGRAM(s) Oral daily      acetaminophen     Tablet .. 650 milliGRAM(s) Oral every 6 hours PRN  melatonin 3 milliGRAM(s) Oral at bedtime PRN  ondansetron Injectable 4 milliGRAM(s) IV Push every 8 hours PRN        aluminum hydroxide/magnesium hydroxide/simethicone Suspension 30 milliLiter(s) Oral every 4 hours PRN  diphenoxylate/atropine 1 Tablet(s) Oral every 6 hours PRN  famotidine Injectable 20 milliGRAM(s) IV Push every 12 hours  pantoprazole  Injectable 40 milliGRAM(s) IV Push daily  sucralfate suspension 1 Gram(s) Oral four times a day        cholecalciferol 2000 Unit(s) Oral at bedtime  lactated ringers. 1000 milliLiter(s) IV Continuous <Continuous>  lipid, fat emulsion (Fish Oil and Plant Based) 20% Infusion 0.681 Gm/kG/Day IV Continuous <Continuous>  Parenteral Nutrition - Adult 1 Each TPN Continuous <Continuous>  Parenteral Nutrition - Adult 1 Each TPN Continuous <Continuous>  potassium chloride  10 mEq/100 mL IVPB 10 milliEquivalent(s) IV Intermittent every 1 hour    filgrastim-sndz (ZARXIO) Injectable 480 MICROGram(s) SubCutaneous daily    Biotene Dry Mouth Oral Rinse 15 milliLiter(s) Swish and Spit five times a day PRN  chlorhexidine 2% Cloths 1 Application(s) Topical <User Schedule>  sodium chloride 0.65% Nasal 1 Spray(s) Both Nostrils two times a day PRN            ICU Vital Signs Last 24 Hrs  T(C): 36.7 (17 Feb 2025 21:08), Max: 37.1 (17 Feb 2025 12:42)  T(F): 98.1 (17 Feb 2025 21:08), Max: 98.7 (17 Feb 2025 12:42)  HR: 100 (17 Feb 2025 22:00) (86 - 124)  BP: 129/81 (17 Feb 2025 22:00) (122/71 - 152/140)  BP(mean): 95 (17 Feb 2025 22:00) (82 - 146)  ABP: --  ABP(mean): --  RR: 28 (17 Feb 2025 22:00) (19 - 30)  SpO2: 100% (17 Feb 2025 22:00) (95% - 100%)    O2 Parameters below as of 17 Feb 2025 18:46  Patient On (Oxygen Delivery Method): nasal cannula  O2 Flow (L/min): 2              I&O's Detail    16 Feb 2025 07:01  -  17 Feb 2025 07:00  --------------------------------------------------------  IN:    Fat Emulsion (Fish Oil &amp; Plant Based) 20% Infusion: 175 mL    IV PiggyBack: 100 mL    Lactated Ringers: 500 mL    TPN (Total Parenteral Nutrition): 865 mL  Total IN: 1640 mL    OUT:    Indwelling Catheter - Urethral (mL): 4600 mL  Total OUT: 4600 mL    Total NET: -2960 mL      17 Feb 2025 07:01  -  17 Feb 2025 23:40  --------------------------------------------------------  IN:  Total IN: 0 mL    OUT:    Indwelling Catheter - Urethral (mL): 2125 mL  Total OUT: 2125 mL    Total NET: -2125 mL            LABS:                        10.1   1.30  )-----------( 37       ( 17 Feb 2025 05:59 )             28.5     02-17    141  |  113[H]  |  68[H]  ----------------------------<  223[H]  3.4[L]   |  18[L]  |  1.55[H]    Ca    8.3[L]      17 Feb 2025 21:16  Phos  2.5     02-17  Mg     2.4     02-17    TPro  5.1[L]  /  Alb  2.7[L]  /  TBili  0.7  /  DBili  x   /  AST  24  /  ALT  18  /  AlkPhos  114  02-17          CAPILLARY BLOOD GLUCOSE          Urinalysis Basic - ( 17 Feb 2025 21:16 )    Color: x / Appearance: x / SG: x / pH: x  Gluc: 223 mg/dL / Ketone: x  / Bili: x / Urobili: x   Blood: x / Protein: x / Nitrite: x   Leuk Esterase: x / RBC: x / WBC x   Sq Epi: x / Non Sq Epi: x / Bacteria: x      CULTURES:  Culture Results:   No growth at 72 Hours (02-14-25 @ 07:33)  Culture Results:   No growth at 72 Hours (02-14-25 @ 06:50)  Culture Results:   No growth at 5 days (02-11-25 @ 09:00)  Culture Results:   No growth at 5 days (02-11-25 @ 08:50)        Physical Examination:    General: no distress, tachypneic,  Alert, oriented, interactive, nonfocal    HEENT: Pupils equal, reactive to light.  Symmetric. small scabbing perioral wounds    PULM: Clear to auscultation bilaterally, however diminished in bilateral lung bases, no significant sputum production    CVS: tachycardic, reg rhythm, no murmurs, rubs, or gallops    ABD: Soft, nondistended, nontender, normoactive bowel sounds, no masses    EXT: mild lower extremity edema, nontender    SKIN: Warm and well perfused, RUQ wound with scab    NEURO: A&Ox3, generalized weakness, cranial nerves grossly intact, no focal deficits        RADIOLOGY:     < from: Xray Chest 1 View-PORTABLE IMMEDIATE (Xray Chest 1 View-PORTABLE IMMEDIATE .) (02.15.25 @ 19:40) >  Findings:    Right port in place with the tip projecting over the cavoatrial junction.   Poor inspiratory effort. The lungs are grossly clear. There are no   pleural effusions. The cardiomediastinal silhouette is normal. Bones are   grossly normal.    IMPRESSION: No acute cardiopulmonary disease.    < end of copied text >      < from: CT Chest No Cont (02.15.25 @ 15:55) >  FINDINGS:    Support devices:. There is a right chest wall Port-A-Cath, with the   catheter tip in the distal SVC region.    LUNGS : There are new bilateral pleural effusions with adjacent   compressive atelectasis, right greater than left. No pneumothorax.  VESSELS: Aortic calcifications.  HEART: Normal size. Small pericardial effusion is seen. There are   coronary artery calcifications.  MEDIASTINUM AND MARTA: esophagus is dilated and fluid-filled.  CHEST WALL AND LOWER NECK: Within normal limits.  VISUALIZED UPPER ABDOMEN: Gallbladder is distended, similar to the prior.   There is new onset ascites around the liver and spleen. 4.1 cm pancreatic   tail mass, unchanged..  BONES: Nondisplaced fractures of the ninth and 10th ribs posteriorly on   the left.    IMPRESSION:  Nondisplaced fractures of the left ninth and 10th ribs, similar to the   prior study.    Pancreatic mass, unchanged    No onset ascites and bilateral pleural effusions with compressive   atelectasis, right greater than left.    Dilated gallbladder, similar to the prior study.    < end of copied text >

## 2025-02-17 NOTE — PROGRESS NOTE ADULT - ASSESSMENT
69 year old woman with the above PMH including a recent diagnosis of pancreatic cancer on chemotherapy admitted with pseudomonas sepsis, N&V, diarrhea.  At the present time her cardiac status is stable on midodrine.  Will continue.  To be seen by Dr Arias.  Antibiotics as per ID.  Continue medications for the above GI symptomatology.  Will follow     02/18/25-Patient appears improved.  She is still showing signs of intravascular depletion and hypokalemia-needs fluids and potassium.  Cardiac status stable with stable BP.  Heart rates still variable but I suspect this will also stabilize as we treat her sepsis and fluid/electrolyte status

## 2025-02-17 NOTE — PROGRESS NOTE ADULT - SUBJECTIVE AND OBJECTIVE BOX
CHIEF COMPLAINT: Patient is a 69y old  Female who presents with a chief complaint of     HPI: HPI:   History of Present Illness:    HPI: Patient is a 69-year-old female recent diagnosis of primary pancreatic adenocarcinoma of head with tumor in umbilicus, chemo-induced pancytopenia and significant PMH of SCC lip, cyclical vomiting syndrome, HTN, HPL, Gout, GERD, CVA on Aggrenox and others had been admitted to [facility] with septic shock on 02/09/2025, nausea, vomiting and diarrhea requiring Levophed drip, was transferred out of ICU to regular floor last night.  Patient was being followed by GI, ID, Cardiology and Onc there.  Patient has been transferred to  today on patient's  (GI Dr. Pleitez) request.  At this time, patient c/o non-bloody diarrhea x 3 in last 24 hours.  She denies any abdominal pain, nausea, but has some dry heaves.  She denies any fever, chills, chest pain, palpitation, constipation or dysuria. Initially, she was started on Vancomycin and Zosyn, but now she has been on Cefepime as her blood culture and urine cultures are positive for Pseudomonas aeruginosa. Currently, patient is on Cefepime.  ID had also recommended Flagyl, but patient had refused it, as it causes nausea to her.    Sig labs: WBC 0.45k, N 82%, Hb 7.8, Platelets 59k, Bicarb 19, AG 9, , Cr 0.99, TP 4.6, Alb 1.2, LFTs wnl.  Mg 1.9, PO4 2.4.  Prior C.diff negative.     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  Her antibiotics were changes, electrolytes repleted and was given 3 units of blood.  She was on bipap overnight but is now comfortable on nasal O2 with good sats.            Soc Hx:     FAMILY HISTORY:      Allergies: Allergies    No Known Allergies    Intolerances          REVIEW OF SYSTEMS:    Patient feels better    ICU Vital Signs Last 24 Hrs  T(C): 36.8 (17 Feb 2025 05:00), Max: 36.8 (17 Feb 2025 05:00)  T(F): 98.3 (17 Feb 2025 05:00), Max: 98.3 (17 Feb 2025 05:00)  HR: 124 (17 Feb 2025 06:00) (70 - 124)  BP: 144/82 (17 Feb 2025 06:00) (122/70 - 146/80)  BP(mean): 96 (17 Feb 2025 06:00) (80 - 98)  ABP: --  ABP(mean): --  RR: 23 (17 Feb 2025 06:00) (19 - 29)  SpO2: 97% (17 Feb 2025 05:00) (92% - 100%)    O2 Parameters below as of 16 Feb 2025 18:00  Patient On (Oxygen Delivery Method): room air            12 Feb 2025 07:01  -  13 Feb 2025 07:00  --------------------------------------------------------  IN: 0 mL / OUT: 900 mL / NET: -900 mL          PHYSICAL EXAM:   Patient in NAD  Neck: No JVD; Carotids:  2+ without bruits  Respiratory:  Clear to A&P  Cardiovascular: S1 and S2, regular rate and rhythm, no Murmurs, gallops or rubs        MEDICATIONS  (STANDING):  albumin human 25% IVPB 100 milliLiter(s) IV Intermittent every 8 hours  chlorhexidine 2% Cloths 1 Application(s) Topical <User Schedule>  cholecalciferol 2000 Unit(s) Oral at bedtime  famotidine Injectable 20 milliGRAM(s) IV Push every 12 hours  filgrastim-sndz (ZARXIO) Injectable 480 MICROGram(s) SubCutaneous daily  fluconAZOLE IVPB      fluconAZOLE IVPB 200 milliGRAM(s) IV Intermittent every 24 hours  lactated ringers. 1000 milliLiter(s) (50 mL/Hr) IV Continuous <Continuous>  lipid, fat emulsion (Fish Oil and Plant Based) 20% Infusion 0.6812 Gm/kG/Day (20.8 mL/Hr) IV Continuous <Continuous>  magnesium sulfate  IVPB 2 Gram(s) IV Intermittent every 1 hour  meropenem Injectable      meropenem Injectable 1000 milliGRAM(s) IV Push every 8 hours  pantoprazole  Injectable 40 milliGRAM(s) IV Push daily  Parenteral Nutrition - Adult 1 Each (75 mL/Hr) TPN Continuous <Continuous>  potassium chloride    Tablet ER 40 milliEquivalent(s) Oral every 4 hours  potassium chloride  10 mEq/100 mL IVPB 10 milliEquivalent(s) IV Intermittent every 1 hour  sucralfate suspension 1 Gram(s) Oral four times a day      LABS: All Labs Reviewed:  Blood Culture: Organism --  Gram Stain Blood -- Gram Stain --  Specimen Source .Blood BLOOD  Culture-Blood --    Organism --  Gram Stain Blood -- Gram Stain --  Specimen Source .Blood BLOOD  Culture-Blood --    Organism Pseudomonas aeruginosa  Gram Stain Blood -- Gram Stain --  Specimen Source Clean Catch  Culture-Blood --    Organism Blood Culture PCR  Gram Stain Blood -- Gram Stain   Growth in aerobic bottle: Gram Negative Rods  Specimen Source .Blood BLOOD  Culture-Blood --    Organism --  Gram Stain Blood -- Gram Stain   Growth in aerobic bottle: Gram Negative Rods  Specimen Source .Blood BLOOD  Culture-Blood --      BNP   CBC             WBC Count: 0.45 K/uL (02-12 @ 09:15)              Hemoglobin: 7.8 g/dL (02-12 @ 09:15)  Hemoglobin: 8.9 g/dL (02-11 @ 08:45)  Hemoglobin: 9.6 g/dL (02-10 @ 08:46)  Hemoglobin: 10.1 g/dL (02-09 @ 05:41)  Hemoglobin: 8.9 g/dL (02-08 @ 23:27)              Hematocrit: 23.6 % (02-12 @ 09:15)  Hematocrit: 26.7 % (02-11 @ 08:45)  Hematocrit: 29.6 % (02-10 @ 08:46)  Hematocrit: 31.5 % (02-09 @ 05:41)  Hematocrit: 28.4 % (02-08 @ 23:27)              Mean Cell Volume: 90.4 fl (02-12 @ 09:15)  Mean Cell Volume: 89.9 fl (02-11 @ 08:45)  Mean Cell Volume: 91.6 fl (02-10 @ 08:46)  Mean Cell Volume: 92.6 fl (02-09 @ 05:41)  Mean Cell Volume: 93.7 fl (02-08 @ 23:27)              Platelet Count - Automated: 59 K/uL (02-12 @ 09:15)  Platelet Count - Automated: 13 K/uL (02-11 @ 08:45)  Platelet Count - Automated: 25 K/uL (02-10 @ 08:46)  Platelet Count - Automated: 60 K/uL (02-09 @ 05:41)  Platelet Count - Automated: 64 K/uL (02-08 @ 23:27)                        10.1   1.30  )-----------( 37       ( 17 Feb 2025 05:59 )             28.5                               Cardiac markers   Troponin I, High Sensitivity (02.08.25 @ 23:27) Troponin I, High Sensitivity Result: 41.5                                       Chems        Sodium: 140 mmol/L (02-12 @ 09:15)  Sodium: 140 mmol/L (02-11 @ 06:39)  Sodium: 141 mmol/L (02-10 @ 08:46)  Sodium: 146 mmol/L (02-09 @ 05:41)  Sodium: 144 mmol/L (02-08 @ 23:27)          Potassium: 3.5 mmol/L (02-12 @ 09:15)  Potassium: 3.4 mmol/L (02-11 @ 06:39)  Potassium: 3.3 mmol/L (02-10 @ 08:46)  Potassium: 4.3 mmol/L (02-09 @ 05:41)  Potassium: 4.2 mmol/L (02-08 @ 23:27)          Blood Urea Nitrogen: 22 mg/dL (02-12 @ 09:15)  Blood Urea Nitrogen: 23 mg/dL (02-11 @ 06:39)  Blood Urea Nitrogen: 28 mg/dL (02-10 @ 08:46)  Blood Urea Nitrogen: 29 mg/dL (02-09 @ 05:41)  Blood Urea Nitrogen: 32 mg/dL (02-08 @ 23:27)          Creatinine 0.99  Creatinine 1.10  Creatinine 1.40  Creatinine 1.40  Creatinine 1.50          Magnesium: 1.9 mg/dL (02-12 @ 09:15)  Magnesium: 1.8 mg/dL (02-11 @ 06:39)  Magnesium: 2.1 mg/dL (02-10 @ 08:46)  Magnesium: 2.1 mg/dL (02-09 @ 05:41)          Protein Total: 4.6 g/dL (02-12 @ 09:15)  Protein Total: 4.6 g/dL (02-11 @ 06:39)  Protein Total: 5.0 g/dL (02-10 @ 08:46)  Protein Total: 5.0 g/dL (02-09 @ 05:41)  Protein Total: 4.5 g/dL (02-08 @ 23:27)                  Calcium: 8.9 mg/dL (02-12 @ 09:15)  Calcium: 8.7 mg/dL (02-11 @ 06:39)  Calcium: 9.1 mg/dL (02-10 @ 08:46)  Calcium: 9.2 mg/dL (02-09 @ 05:41)  Calcium: 8.5 mg/dL (02-08 @ 23:27)          Phosphorus: 2.4 mg/dL (02-12 @ 09:15)  Phosphorus: 2.3 mg/dL (02-11 @ 06:39)  Phosphorus: 2.9 mg/dL (02-10 @ 08:46)  Phosphorus: 2.9 mg/dL (02-09 @ 05:41)          Bilirubin Total: 0.7 mg/dL (02-12 @ 09:15)  Bilirubin Total: 0.2 mg/dL (02-11 @ 06:39)  Bilirubin Total: 0.5 mg/dL (02-10 @ 08:46)  Bilirubin Total: 0.4 mg/dL (02-09 @ 05:41)  Bilirubin Total: 0.3 mg/dL (02-08 @ 23:27)          Alanine Aminotransferase (ALT/SGPT): 17 U/L (02-12 @ 09:15)  Alanine Aminotransferase (ALT/SGPT): 18 U/L (02-11 @ 06:39)  Alanine Aminotransferase (ALT/SGPT): 25 U/L (02-10 @ 08:46)  Alanine Aminotransferase (ALT/SGPT): 33 U/L (02-09 @ 05:41)  Alanine Aminotransferase (ALT/SGPT): 33 U/L (02-08 @ 23:27)          Aspartate Aminotransferase (AST/SGOT): 8 U/L (02-12 @ 09:15)  Aspartate Aminotransferase (AST/SGOT): 13 U/L (02-11 @ 06:39)  Aspartate Aminotransferase (AST/SGOT): 17 U/L (02-10 @ 08:46)  Aspartate Aminotransferase (AST/SGOT): 23 U/L (02-09 @ 05:41)  Aspartate Aminotransferase (AST/SGOT): 27 U/L (02-08 @ 23:27)    02-17    140  |  110[H]  |  70[H]  ----------------------------<  227[H]  2.3[LL]   |  17[L]  |  1.73[H]    Ca    8.4[L]      17 Feb 2025 05:59  Phos  3.2     02-17  Mg     1.5     02-17    TPro  5.2[L]  /  Alb  2.7[L]  /  TBili  0.8  /  DBili  x   /  AST  15  /  ALT  19  /  AlkPhos  101  02-17                               RADIOLOGY:< from: CT Abdomen and Pelvis No Cont (02.09.25 @ 03:01) >  IMPRESSION:  1.   Study somewhat limited due to absence of contrast.  2.   Central venous catheter on right side with tip in cavoatrial   junction.  3.   Bibasilar atelectasis/scarring right-greater-than-left; can not   exclude  trace pleural effusions. No areas of consolidation. No pneumothorax.  4.   Lucency/deformity is noted involving the posterior aspect of the   left 10 ,  possibly 9th ribs; minimal fractures of indeterminate age in these areas   can  not be excluded.    < end of copied text >  < from: CT Abdomen and Pelvis No Cont (02.09.25 @ 03:01) >  IMPRESSION:  1.   Study somewhat limited due to absence of contrast.  2.   Axial images 71-76 of series 301 and  concomitant coronal images demonstrate prominent soft tissue inseparable   from  the body of the pancreas consistent with pancreatic neoplasm.  3.   Some distension of gallbladder could be further evaluated with right   upper  quadrant sonography. No definite biliary dilatation.  4.   Other incidental findings as above.    < end of copied text >      EKG:  < from: 12 Lead ECG (02.09.25 @ 16:52) >  Diagnosis Line *** Poor data quality, interpretation may be adversely affected  Sinus tachycardia with premature atrial complexes  Left axis deviation  Low voltage QRS  Inferior infarct (cited on or before 08-FEB-2025)  Anterolateral infarct (cited on or before 08-FEB-2025)  Abnormal ECG  When compared with ECG of 09-FEB-2025 16:51, (Unconfirmed)  premature atrial complexes are now present  Confirmed by HAYLEY LANTIGUA ([id number]) on 2/10/2025 10:25:52 PM    < end of copied text >          ECHO:  My reading-Normal LV systolic function; trace MR/mild MAC; mild aortic sclerosis; no significant pulmonary hypertension; trace pericardial effusion

## 2025-02-17 NOTE — PROGRESS NOTE ADULT - ASSESSMENT
69-year-old female with stage IV pancreatic adenocarcinoma presenting with neutropenic fever and severe treatment-related toxicities from modified FOLFIRINOX and investigational EMILY inhibitor MC-6236.    1. Neutropenic Fever:  Patient presents with grade 4 neutropenia (ANC < 500) with initial febrile episode. Currently on appropriate broad-spectrum coverage with cefepime. Has remained afebrile on current regimen with slight improvement in WBC  Started on filgrastim 480mcg daily for count recovery  2. Severe Thrombocytopenia:  Platelet count critically low at 26k.  High risk for bleeding complications, especially in setting of ongoing GI symptoms. Will require platelet transfusion support.  3. Treatment-Related GI Toxicity:  Severe diarrhea and inability to tolerate PO intake, likely multifactorial from both FOLFIRINOX (particularly irinotecan component) and study drug [id number]. Consider DPD deficiency given severity of symptoms. Currently managed with cholestyramine and lomotil with some improvement. Requiring TPN support.  4. Nutritional Status:  Significant decline in oral intake due to severe mucositis and GI symptoms. Currently on TPN support. Will require careful nutritional monitoring and support during recovery.  5. DVT Prophylaxis:  History of left cephalic vein thrombosis. Currently on appropriate DVT prophylaxis given high-risk status (active malignancy, hospitalization).  Overall, patient experiencing significant toxicities from current treatment regimen requiring dose modifications and possibly alternative treatment strategies moving forward. Will require close coordination with primary [place]ology [place] at [facility] [place] future treatment plans.  - will need to continue with SCDs   - severe thrombocytopenia prohibits chemical prophylaxis   6. Acute hypoxic episode - 2/15 - likely secondary to volume overload - responsive to diuretics 2/16    Plan:  #Neutropenic Septic shock   - hemodynamically stable   - Continue meropenem started on 2/15- previously on CEFEPIME   - Continue filgrastim 480mcg daily - Today w/ improvement in counts- WBC 1.3, Hb 10.1, plt 37, cmp with k 2.3 and repleted, Cr 1.73.   - Daily CBC monitoring  - Maintain strict neutropenic precautions    #Acute hypoxic respiratory failure likely secondary to hypervolemia   - response to diuretics - will need to monitor renal function given slight rise in CR to 1.73 today   - now on RA doing well   - will need to monitor I/Os especially while on PPN     # EMPIRIC TREATMENT FOR ESOPHAGEAL CANDIDIASIS   - continue with DIFLUCAN     # Volume status   - recommmend I/Os     #Anemia   - received two units of pRBCs t date   - Hb 10.1 today - will continue to monitor, keep type and screen active     #Thrombocytopenia  -Transfuse for plts < 15   - Monitor daily platelet counts    #GI Toxicity- acute diarrhea   - largely improved.   - Continue cholestyramine and lomotil  - Consider DPD testing, and risk of irinotecan toxicity increases with genetic variants associated with reduced UGT enzyme activity, such as [id number]  - Continue TPN support  - Oral care protocol    #Care Coordination  -will continue with ongoing communication with  [facility]  - Review clinical trial protocol regarding toxicity management  - Consider dose modifications for future therapy    will follow and communicate with [facility]

## 2025-02-17 NOTE — PROGRESS NOTE ADULT - ASSESSMENT
69-year-old female with h/o recent diagnosis of primary head of pancreatic adenocarcinoma with tumor in umbilicus, chemo-induced pancytopenia, SCC lip, cyclical vomiting syndrome, HTN, HPL, Gout, GERD, CVA on Aggrenox was transferred on 2/12 from [facility] for further care. She was admitted to [facility] with septic shock on 02/09/2025, nausea, vomiting and diarrhea requiring Levophed drip. Patient was being followed by GI, ID, Cardiology and Onc there. Patient has been transferred to  on patient's  (GI Dr. Pleitez) request. She was reported with non-bloody diarrhea x 3 in last 24 hours PTA. She denied abdominal pain, nausea, but has dry heaves. At [place] she was started on Vancomycin and Zosyn, then changed to Cefepime as her blood culture and urine cultures showed Pseudomonas aeruginosa.    #Sepsis with PSAE resolving  #Episode of hypotension/ chills s/p Platelets ?cause ?new sepsis ?transfusion reaction.  #Head of pancreas adenocarcinoma on chemotherapy  #Low grade fever  #Absolute neutropenia  #Immunocompromised host  #UTI with PSAE  #Kidney stones  #Right lower chest wall dry superficial ulcer   #ARF   -neutropenia is persistent   -renal function is worse  -cultures reviewed  -repeat stool for CDT is negative   -source of sepsis is likely intraabdominal and/or urinary  -s/p cefepime 2 gm IV q8h # 3  -on meropenem 1 gm IV q8h # 2 and fluconazole 200 mg IV qd  -tolerating abx well so far; no side effects noted  -add vancomycin 125 mg PO q6h for CDAD prophylaxis  -GI evaluation appreciated  -neutropenic precautions  -repeat BC x 2 noted  -oncology evaluation appreciated  -monitor abdomen  -continue abx coverage  -f/u cultures  -monitor temps  -f/u CBC and BP  -supportive care  2. Other issues:   -care per medicine    d/w medicine team and Dr. Pleitez

## 2025-02-17 NOTE — CONSULT NOTE ADULT - ASSESSMENT
69-year-old female with stage IV pancreatic adenocarcinoma presenting with neutropenic fever and severe treatment-related toxicities from modified FOLFIRINOX and investigational EMILY inhibitor [id number].    PROBLEMS:    Acute hypoxic respiratory failure likely secondary to hypervolemia   Bilateral pleural effusions with compressive atelectasis, right greater than left  Neutropenic Fever/Neutropenic Septic shock  Severe Thrombocytopenia-Platelet count critically low at 26k.   Severe diarrhea and inability to tolerate PO intake, likely multifactorial from both FOLFIRINOX (particularly irinotecan component) and study drug [id number].   History of left cephalic vein thrombosis.  MALLORY      PLAN:    Neutropenic Septic shock- IV meropenem started on 2/15- previously on CEFEPIME/Continue filgrastim 480mcg daily - Today w/ improvement in counts- WBC 1.3, Hb 10.1, plt 37, cmp with k 2.3 and repleted, Cr 1.73.   Daily CBC monitoring  Maintain strict neutropenic precautions  IV diuretics-now on RA doing well   EMPIRIC TREATMENT FOR ESOPHAGEAL CANDIDIASIS-continue with DIFLUCAN   Anemia-received two units of pRBCs Hb 10.1 today   Thrombocytopenia-Transfuse for plts < 15

## 2025-02-17 NOTE — PROGRESS NOTE ADULT - SUBJECTIVE AND OBJECTIVE BOX
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 1000 milliLiter(s) (125 mL/Hr) IV Continuous <Continuous>  lipid, fat emulsion (Fish Oil and Plant Based) 20% Infusion 0.681 Gm/kG/Day (20.8 mL/Hr) IV Continuous <Continuous>  meropenem Injectable 1000 milliGRAM(s) IV Push every 12 hours  pantoprazole  Injectable 40 milliGRAM(s) IV Push daily  Parenteral Nutrition - Adult 1 Each (75 mL/Hr) TPN Continuous <Continuous>  Parenteral Nutrition - Adult 1 Each (75 mL/Hr) TPN Continuous <Continuous>  potassium chloride    Tablet ER 40 milliEquivalent(s) Oral every 4 hours  potassium phosphate / sodium phosphate Powder (PHOS-NaK) 1 Packet(s) Oral once  sucralfate suspension 1 Gram(s) Oral four times a day  vancomycin    Solution 125 milliGRAM(s) Oral every 6 hours    MEDICATIONS  (PRN):  acetaminophen     Tablet .. 650 milliGRAM(s) Oral every 6 hours PRN Temp greater or equal to 38C (100.4F), Mild Pain (1 - 3)  aluminum hydroxide/magnesium hydroxide/simethicone Suspension 30 milliLiter(s) Oral every 4 hours PRN Dyspepsia  Biotene Dry Mouth Oral Rinse 15 milliLiter(s) Swish and Spit five times a day PRN Mouth Care  diphenoxylate/atropine 1 Tablet(s) Oral every 6 hours PRN Diarrhea  melatonin 3 milliGRAM(s) Oral at bedtime PRN Insomnia  ondansetron Injectable 4 milliGRAM(s) IV Push every 8 hours PRN Nausea and/or Vomiting  sodium chloride 0.65% Nasal 1 Spray(s) Both Nostrils two times a day PRN Nasal Congestion      REVIEW OF SYSTEMS:    RESPIRATORY: No shortness of breath  CARDIOVASCULAR: No chest pain  All other review of systems is negative unless indicated above.    Vital Signs Last 24 Hrs  T(C): 37.1 (17 Feb 2025 12:42), Max: 37.1 (17 Feb 2025 12:42)  T(F): 98.7 (17 Feb 2025 12:42), Max: 98.7 (17 Feb 2025 12:42)  HR: 101 (17 Feb 2025 15:39) (89 - 124)  BP: 140/69 (17 Feb 2025 15:39) (122/71 - 146/80)  BP(mean): 89 (17 Feb 2025 15:39) (82 - 98)  RR: 26 (17 Feb 2025 15:39) (19 - 29)  SpO2: 97% (17 Feb 2025 15:39) (93% - 100%)    Parameters below as of 17 Feb 2025 15:39  Patient On (Oxygen Delivery Method): room air        PHYSICAL EXAM:    Constitutional: NAD,  Respiratory: CTAB  Cardiovascular: S1 and S2, RRR  Gastrointestinal: BS+, soft, NT/ND  Psychiatric: Normal mood, normal affect    LABS:                        10.1   1.30  )-----------( 37       ( 17 Feb 2025 05:59 )             28.5     02-17    137  |  109[H]  |  74[H]  ----------------------------<  234[H]  3.4[L]   |  20[L]  |  1.57[H]    Ca    8.5      17 Feb 2025 14:46  Phos  2.3     02-17  Mg     2.9     02-17    TPro  5.2[L]  /  Alb  2.7[L]  /  TBili  0.8  /  DBili  x   /  AST  15  /  ALT  19  /  AlkPhos  101  02-17      LIVER FUNCTIONS - ( 17 Feb 2025 05:59 )  Alb: 2.7 g/dL / Pro: 5.2 gm/dL / ALK PHOS: 101 U/L / ALT: 19 U/L / AST: 15 U/L / GGT: x             RADIOLOGY & ADDITIONAL STUDIES:

## 2025-02-17 NOTE — CONSULT NOTE ADULT - ASSESSMENT
69-year-old female recent diagnosis of primary pancreatic adenocarcinoma , pancytopenia and significant PMH of SCC lip, cyclical vomiting syndrome, HTN, HPL, Gout, GERD, CVA w renal evaluation of MALLORY and hypokalemia. Per documents was recently at  [facility] with septic shock on 02/09/2025, nausea, vomiting and diarrhea requiring Levophed drip. Per documents, patient  transferred to  for continued care. Patient with + diuresis w lasix 80 IV, increaserd UOP > 4L.   Treated w IV and po k repletion.     MALLORY  -LIkely pre renal w insensible losses and diuresis, signficant UOP  -IVF to maintain even to positive today  -Trend labs, lytes. Repeat this afternoon  -Monitor UOP closely  -Avoid nsaids/contrast  -Renally dosed abx    Hypokalemia  -K repletion iv/po  -TPN    Sepsis/Pseduomonas  -ID/abx  -FU cultures    d/c with RN staff, Dr Tong, Dr Henry, Dr Maik Sweet to resume care in AM

## 2025-02-17 NOTE — CONSULT NOTE ADULT - SUBJECTIVE AND OBJECTIVE BOX
69-year-old female recent diagnosis of primary pancreatic adenocarcinoma , pancytopenia and significant PMH of SCC lip, cyclical vomiting syndrome, HTN, HPL, Gout, GERD, CVA w renal evaluation of MALLORY and hypokalemia. Per documents was recently at  [facility] with septic shock on 02/09/2025, nausea, vomiting and diarrhea requiring Levophed drip. Per documents, patient  transferred to  for continued care. Patient with + diuresis w lasix 80 IV, increaserd UOP > 4L.   Treated w IV and po k repletion.       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 1000 milliLiter(s) (125 mL/Hr) IV Continuous <Continuous>  lipid, fat emulsion (Fish Oil and Plant Based) 20% Infusion 0.681 Gm/kG/Day (20.8 mL/Hr) IV Continuous <Continuous>  meropenem Injectable 1000 milliGRAM(s) IV Push every 12 hours  pantoprazole  Injectable 40 milliGRAM(s) IV Push daily  Parenteral Nutrition - Adult 1 Each (75 mL/Hr) TPN Continuous <Continuous>  Parenteral Nutrition - Adult 1 Each (75 mL/Hr) TPN Continuous <Continuous>  sucralfate suspension 1 Gram(s) Oral four times a day  vancomycin    Solution 125 milliGRAM(s) Oral every 6 hours    MEDICATIONS  (PRN):  acetaminophen     Tablet .. 650 milliGRAM(s) Oral every 6 hours PRN Temp greater or equal to 38C (100.4F), Mild Pain (1 - 3)  aluminum hydroxide/magnesium hydroxide/simethicone Suspension 30 milliLiter(s) Oral every 4 hours PRN Dyspepsia  Biotene Dry Mouth Oral Rinse 15 milliLiter(s) Swish and Spit five times a day PRN Mouth Care  diphenoxylate/atropine 1 Tablet(s) Oral every 6 hours PRN Diarrhea  melatonin 3 milliGRAM(s) Oral at bedtime PRN Insomnia  ondansetron Injectable 4 milliGRAM(s) IV Push every 8 hours PRN Nausea and/or Vomiting  sodium chloride 0.65% Nasal 1 Spray(s) Both Nostrils two times a day PRN Nasal Congestion      Allergies    No Known Allergies    Intolerances        SOCIAL HISTORY:    FAMILY HISTORY:      REVIEW OF SYSTEMS:      T(C): , Max: 37.1 (02-17-25 @ 12:42)  T(F): , Max: 98.7 (02-17-25 @ 12:42)  HR: 101 (02-17-25 @ 15:39)  BP: 140/69 (02-17-25 @ 15:39)  BP(mean): 89 (02-17-25 @ 15:39)  RR: 26 (02-17-25 @ 15:39)  SpO2: 97% (02-17-25 @ 15:39)  Wt(kg): --    02-16 @ 07:01  -  02-17 @ 07:00  --------------------------------------------------------  IN: 1640 mL / OUT: 4600 mL / NET: -2960 mL    02-17 @ 07:01  -  02-17 @ 17:17  --------------------------------------------------------  IN: 0 mL / OUT: 1200 mL / NET: -1200 mL          PHYSICAL EXAM:    Constitutional: NAD, well-groomed, well-developed  HEENT: PERRLA, EOMI,  MMM  Neck: No LAD, No JVD  Respiratory: CTAB  Cardiovascular: S1 and S2, RRR  Gastrointestinal: BS+, soft, NT/ND  Extremities: No peripheral edema  Neurological: A/O x 3, no focal deficits  Psychiatric: Normal mood, normal affect  : No Julian  Skin: No rashes  Access: Not applicable        LABS:                        10.1   1.30  )-----------( 37       ( 17 Feb 2025 05:59 )             28.5     17 Feb 2025 14:46    137    |  109    |  74     ----------------------------<  234    3.4     |  20     |  1.57   17 Feb 2025 05:59    140    |  110    |  70     ----------------------------<  227    2.3     |  17     |  1.73   16 Feb 2025 06:12    136    |  108    |  47     ----------------------------<  172    3.4     |  17     |  1.56   15 Feb 2025 21:25    133    |  108    |  40     ----------------------------<  172    2.9     |  17     |  1.26   15 Feb 2025 05:17    133    |  108    |  27     ----------------------------<  183    3.3     |  18     |  1.04     Ca    8.5        17 Feb 2025 14:46  Ca    8.4        17 Feb 2025 05:59  Ca    8.0        16 Feb 2025 06:12  Ca    8.0        15 Feb 2025 21:25  Ca    8.1        15 Feb 2025 05:17  Phos  2.3       17 Feb 2025 14:46  Phos  3.2       17 Feb 2025 05:59  Phos  3.1       16 Feb 2025 06:12  Phos  1.2       15 Feb 2025 21:25  Phos  2.0       15 Feb 2025 05:17  Mg     2.9       17 Feb 2025 14:46  Mg     1.5       17 Feb 2025 05:59  Mg     1.8       16 Feb 2025 06:12  Mg     2.1       15 Feb 2025 21:25  Mg     2.3       15 Feb 2025 05:17    TPro  5.2    /  Alb  2.7    /  TBili  0.8    /  DBili  x      /  AST  15     /  ALT  19     /  AlkPhos  101    17 Feb 2025 05:59  TPro  5.1    /  Alb  2.9    /  TBili  1.1    /  DBili  x      /  AST  5      /  ALT  19     /  AlkPhos  91     16 Feb 2025 06:12  TPro  4.6    /  Alb  2.0    /  TBili  1.0    /  DBili  x      /  AST  11     /  ALT  17     /  AlkPhos  105    15 Feb 2025 21:25  TPro  4.2    /  Alb  1.7    /  TBili  0.6    /  DBili  0.4    /  AST  6      /  ALT  12     /  AlkPhos  98     15 Feb 2025 05:17  TPro  4.5    /  Alb  1.5    /  TBili  0.8    /  DBili  0.4    /  AST  8      /  ALT  14     /  AlkPhos  104    14 Feb 2025 06:50          Urine Studies:  Urinalysis Basic - ( 17 Feb 2025 14:46 )    Color: x / Appearance: x / SG: x / pH: x  Gluc: 234 mg/dL / Ketone: x  / Bili: x / Urobili: x   Blood: x / Protein: x / Nitrite: x   Leuk Esterase: x / RBC: x / WBC x   Sq Epi: x / Non Sq Epi: x / Bacteria: x            RADIOLOGY & ADDITIONAL STUDIES:

## 2025-02-17 NOTE — PROGRESS NOTE ADULT - SUBJECTIVE AND OBJECTIVE BOX
OVERNIGHT EVENTS / SUBJECTIVE: Patient seen and examined at bedside.     OBJECTIVE:    VITAL SIGNS:  ICU Vital Signs Last 24 Hrs  T(C): 36.8 (17 Feb 2025 05:00), Max: 36.8 (17 Feb 2025 05:00)  T(F): 98.3 (17 Feb 2025 05:00), Max: 98.3 (17 Feb 2025 05:00)  HR: 124 (17 Feb 2025 06:00) (70 - 124)  BP: 144/82 (17 Feb 2025 06:00) (122/70 - 146/80)  BP(mean): 96 (17 Feb 2025 06:00) (80 - 98)  ABP: --  ABP(mean): --  RR: 23 (17 Feb 2025 06:00) (19 - 29)  SpO2: 97% (17 Feb 2025 05:00) (92% - 100%)    O2 Parameters below as of 16 Feb 2025 18:00  Patient On (Oxygen Delivery Method): room air              02-16 @ 07:01  -  02-17 @ 07:00  --------------------------------------------------------  IN: 1640 mL / OUT: 4600 mL / NET: -2960 mL      CAPILLARY BLOOD GLUCOSE      POCT Blood Glucose.: 186 mg/dL (15 Feb 2025 19:37)      PHYSICAL EXAM:    General: NAD  HEENT: NC/AT; PERRL, clear conjunctiva  Neck: supple  Respiratory: CTA b/l  Cardiovascular: +S1/S2; RRR  Abdomen: soft, NT/ND; +BS x4  Extremities: WWP, 2+ peripheral pulses b/l; no LE edema  Skin: normal color and turgor; no rash  Neurological:    MEDICATIONS:  MEDICATIONS  (STANDING):  albumin human 25% IVPB 100 milliLiter(s) IV Intermittent every 8 hours  chlorhexidine 2% Cloths 1 Application(s) Topical <User Schedule>  cholecalciferol 2000 Unit(s) Oral at bedtime  famotidine Injectable 20 milliGRAM(s) IV Push every 12 hours  filgrastim-sndz (ZARXIO) Injectable 480 MICROGram(s) SubCutaneous daily  fluconAZOLE IVPB      fluconAZOLE IVPB 200 milliGRAM(s) IV Intermittent every 24 hours  lactated ringers. 1000 milliLiter(s) (50 mL/Hr) IV Continuous <Continuous>  lipid, fat emulsion (Fish Oil and Plant Based) 20% Infusion 0.6812 Gm/kG/Day (20.8 mL/Hr) IV Continuous <Continuous>  magnesium sulfate  IVPB 2 Gram(s) IV Intermittent every 1 hour  meropenem Injectable      meropenem Injectable 1000 milliGRAM(s) IV Push every 8 hours  pantoprazole  Injectable 40 milliGRAM(s) IV Push daily  Parenteral Nutrition - Adult 1 Each (75 mL/Hr) TPN Continuous <Continuous>  potassium chloride    Tablet ER 40 milliEquivalent(s) Oral every 4 hours  potassium chloride  10 mEq/100 mL IVPB 10 milliEquivalent(s) IV Intermittent every 1 hour  sucralfate suspension 1 Gram(s) Oral four times a day    MEDICATIONS  (PRN):  acetaminophen     Tablet .. 650 milliGRAM(s) Oral every 6 hours PRN Temp greater or equal to 38C (100.4F), Mild Pain (1 - 3)  aluminum hydroxide/magnesium hydroxide/simethicone Suspension 30 milliLiter(s) Oral every 4 hours PRN Dyspepsia  Biotene Dry Mouth Oral Rinse 15 milliLiter(s) Swish and Spit five times a day PRN Mouth Care  melatonin 3 milliGRAM(s) Oral at bedtime PRN Insomnia  ondansetron Injectable 4 milliGRAM(s) IV Push every 8 hours PRN Nausea and/or Vomiting  sodium chloride 0.65% Nasal 1 Spray(s) Both Nostrils two times a day PRN Nasal Congestion      ALLERGIES:  Allergies    No Known Allergies    Intolerances        LABS:                        10.1   1.30  )-----------( 37       ( 17 Feb 2025 05:59 )             28.5     Hemoglobin: 10.1 g/dL (02-17 @ 05:59)  Hemoglobin: 9.2 g/dL (02-16 @ 06:12)  Hemoglobin: 6.8 g/dL (02-15 @ 21:25)  Hemoglobin: 7.5 g/dL (02-15 @ 05:17)  Hemoglobin: 8.0 g/dL (02-14 @ 06:50)    CBC Full  -  ( 17 Feb 2025 05:59 )  WBC Count : 1.30 K/uL  RBC Count : 3.39 M/uL  Hemoglobin : 10.1 g/dL  Hematocrit : 28.5 %  Platelet Count - Automated : 37 K/uL  Mean Cell Volume : 84.1 fl  Mean Cell Hemoglobin : 29.8 pg  Mean Cell Hemoglobin Concentration : 35.4 g/dL  Auto Neutrophil # : x  Auto Lymphocyte # : x  Auto Monocyte # : x  Auto Eosinophil # : x  Auto Basophil # : x  Auto Neutrophil % : x  Auto Lymphocyte % : x  Auto Monocyte % : x  Auto Eosinophil % : x  Auto Basophil % : x    02-17    140  |  110[H]  |  70[H]  ----------------------------<  227[H]  2.3[LL]   |  17[L]  |  1.73[H]    Ca    8.4[L]      17 Feb 2025 05:59  Phos  3.2     02-17  Mg     1.5     02-17    TPro  5.2[L]  /  Alb  2.7[L]  /  TBili  0.8  /  DBili  x   /  AST  15  /  ALT  19  /  AlkPhos  101  02-17    Creatinine Trend: 1.73<--, 1.56<--, 1.26<--, 1.04<--, 1.16<--, 1.03<--  LIVER FUNCTIONS - ( 17 Feb 2025 05:59 )  Alb: 2.7 g/dL / Pro: 5.2 gm/dL / ALK PHOS: 101 U/L / ALT: 19 U/L / AST: 15 U/L / GGT: x               hs Troponin:            Urinalysis Basic - ( 17 Feb 2025 05:59 )    Color: x / Appearance: x / SG: x / pH: x  Gluc: 227 mg/dL / Ketone: x  / Bili: x / Urobili: x   Blood: x / Protein: x / Nitrite: x   Leuk Esterase: x / RBC: x / WBC x   Sq Epi: x / Non Sq Epi: x / Bacteria: x      CSF:                      EKG:   MICROBIOLOGY:    IMAGING:      Labs, imaging, EKG personally reviewed    RADIOLOGY & ADDITIONAL TESTS: Reviewed. OVERNIGHT EVENTS / SUBJECTIVE: Patient seen and examined at bedside.     Pt received additional IVF and lyte repletions overnight after significant diuresis with lasix. Bipap restarted overnight for increased WOB able to be titrated off this AM to room air but remains mildly tachypneic. Pt reports SOB feels improved. Hemodynamically stable.    OBJECTIVE:    VITAL SIGNS:  ICU Vital Signs Last 24 Hrs  T(C): 36.8 (17 Feb 2025 05:00), Max: 36.8 (17 Feb 2025 05:00)  T(F): 98.3 (17 Feb 2025 05:00), Max: 98.3 (17 Feb 2025 05:00)  HR: 124 (17 Feb 2025 06:00) (70 - 124)  BP: 144/82 (17 Feb 2025 06:00) (122/70 - 146/80)  BP(mean): 96 (17 Feb 2025 06:00) (80 - 98)  ABP: --  ABP(mean): --  RR: 23 (17 Feb 2025 06:00) (19 - 29)  SpO2: 97% (17 Feb 2025 05:00) (92% - 100%)    O2 Parameters below as of 16 Feb 2025 18:00  Patient On (Oxygen Delivery Method): room air              02-16 @ 07:01  -  02-17 @ 07:00  --------------------------------------------------------  IN: 1640 mL / OUT: 4600 mL / NET: -2960 mL      CAPILLARY BLOOD GLUCOSE      POCT Blood Glucose.: 186 mg/dL (15 Feb 2025 19:37)      PHYSICAL EXAM:    General: NAD, frail-appearing  HEENT: NC/AT; PERRL, clear conjunctiva  Neck: supple  Respiratory: Decreased BS at bases b/l  Cardiovascular: +S1/S2; RRR  Abdomen: soft, NT/ND  Extremities: Warm, +1 LE edema  Skin: normal color and turgor; no rash  Neurological: A&Ox3, no focal deficit    MEDICATIONS:  MEDICATIONS  (STANDING):  albumin human 25% IVPB 100 milliLiter(s) IV Intermittent every 8 hours  chlorhexidine 2% Cloths 1 Application(s) Topical <User Schedule>  cholecalciferol 2000 Unit(s) Oral at bedtime  famotidine Injectable 20 milliGRAM(s) IV Push every 12 hours  filgrastim-sndz (ZARXIO) Injectable 480 MICROGram(s) SubCutaneous daily  fluconAZOLE IVPB      fluconAZOLE IVPB 200 milliGRAM(s) IV Intermittent every 24 hours  lactated ringers. 1000 milliLiter(s) (50 mL/Hr) IV Continuous <Continuous>  lipid, fat emulsion (Fish Oil and Plant Based) 20% Infusion 0.6812 Gm/kG/Day (20.8 mL/Hr) IV Continuous <Continuous>  magnesium sulfate  IVPB 2 Gram(s) IV Intermittent every 1 hour  meropenem Injectable      meropenem Injectable 1000 milliGRAM(s) IV Push every 8 hours  pantoprazole  Injectable 40 milliGRAM(s) IV Push daily  Parenteral Nutrition - Adult 1 Each (75 mL/Hr) TPN Continuous <Continuous>  potassium chloride    Tablet ER 40 milliEquivalent(s) Oral every 4 hours  potassium chloride  10 mEq/100 mL IVPB 10 milliEquivalent(s) IV Intermittent every 1 hour  sucralfate suspension 1 Gram(s) Oral four times a day    MEDICATIONS  (PRN):  acetaminophen     Tablet .. 650 milliGRAM(s) Oral every 6 hours PRN Temp greater or equal to 38C (100.4F), Mild Pain (1 - 3)  aluminum hydroxide/magnesium hydroxide/simethicone Suspension 30 milliLiter(s) Oral every 4 hours PRN Dyspepsia  Biotene Dry Mouth Oral Rinse 15 milliLiter(s) Swish and Spit five times a day PRN Mouth Care  melatonin 3 milliGRAM(s) Oral at bedtime PRN Insomnia  ondansetron Injectable 4 milliGRAM(s) IV Push every 8 hours PRN Nausea and/or Vomiting  sodium chloride 0.65% Nasal 1 Spray(s) Both Nostrils two times a day PRN Nasal Congestion      ALLERGIES:  Allergies    No Known Allergies    Intolerances        LABS:                        10.1   1.30  )-----------( 37       ( 17 Feb 2025 05:59 )             28.5     Hemoglobin: 10.1 g/dL (02-17 @ 05:59)  Hemoglobin: 9.2 g/dL (02-16 @ 06:12)  Hemoglobin: 6.8 g/dL (02-15 @ 21:25)  Hemoglobin: 7.5 g/dL (02-15 @ 05:17)  Hemoglobin: 8.0 g/dL (02-14 @ 06:50)    CBC Full  -  ( 17 Feb 2025 05:59 )  WBC Count : 1.30 K/uL  RBC Count : 3.39 M/uL  Hemoglobin : 10.1 g/dL  Hematocrit : 28.5 %  Platelet Count - Automated : 37 K/uL  Mean Cell Volume : 84.1 fl  Mean Cell Hemoglobin : 29.8 pg  Mean Cell Hemoglobin Concentration : 35.4 g/dL  Auto Neutrophil # : x  Auto Lymphocyte # : x  Auto Monocyte # : x  Auto Eosinophil # : x  Auto Basophil # : x  Auto Neutrophil % : x  Auto Lymphocyte % : x  Auto Monocyte % : x  Auto Eosinophil % : x  Auto Basophil % : x    02-17    140  |  110[H]  |  70[H]  ----------------------------<  227[H]  2.3[LL]   |  17[L]  |  1.73[H]    Ca    8.4[L]      17 Feb 2025 05:59  Phos  3.2     02-17  Mg     1.5     02-17    TPro  5.2[L]  /  Alb  2.7[L]  /  TBili  0.8  /  DBili  x   /  AST  15  /  ALT  19  /  AlkPhos  101  02-17    Creatinine Trend: 1.73<--, 1.56<--, 1.26<--, 1.04<--, 1.16<--, 1.03<--  LIVER FUNCTIONS - ( 17 Feb 2025 05:59 )  Alb: 2.7 g/dL / Pro: 5.2 gm/dL / ALK PHOS: 101 U/L / ALT: 19 U/L / AST: 15 U/L / GGT: x               hs Troponin:            Urinalysis Basic - ( 17 Feb 2025 05:59 )    Color: x / Appearance: x / SG: x / pH: x  Gluc: 227 mg/dL / Ketone: x  / Bili: x / Urobili: x   Blood: x / Protein: x / Nitrite: x   Leuk Esterase: x / RBC: x / WBC x   Sq Epi: x / Non Sq Epi: x / Bacteria: x      CSF:                      EKG:   MICROBIOLOGY:    IMAGING:      Labs, imaging, EKG personally reviewed    RADIOLOGY & ADDITIONAL TESTS: Reviewed.

## 2025-02-17 NOTE — PROGRESS NOTE ADULT - ASSESSMENT
Imp:  There is slow improving as counts recover    Rec:  Cont supportive care  I don't think EGD would be necessary but could be considered to r/o CMV/HSV/candida esophagitis (although on empiric fluc now)

## 2025-02-17 NOTE — CONSULT NOTE ADULT - SUBJECTIVE AND OBJECTIVE BOX
HPI:    69-year-old female recent diagnosis of primary pancreatic adenocarcinoma of head with tumor in umbilicus, chemo-induced pancytopenia and significant PMH of SCC lip, cyclical vomiting syndrome, HTN, HPL, Gout, GERD, CVA on Aggrenox and others had been admitted to [facility] with septic shock on 02/09/2025, nausea, vomiting and diarrhea requiring Levophed drip, was transferred out of ICU to regular floor last night.  Patient was being followed by GI, ID, Cardiology and Onc there.  Patient has been transferred to  today on patient's  (GI Dr. Pleitez) request.  At this time, patient c/o non-bloody diarrhea x 3 in last 24 hours.  She denies any abdominal pain, nausea, but has some dry heaves.  She denies any fever, chills, chest pain, palpitation, constipation or dysuria. Initially, she was started on Vancomycin and Zosyn, but now she has been on Cefepime as her blood culture and urine cultures are positive for Pseudomonas aeruginosa. Currently, patient is on Cefepime.  ID had also recommended Flagyl, but patient had refused it, as it causes nausea to her. Prior C.diff negative. Hospital course complicated by recurrent diarrhea. Transfused 1u platelets on 2/15. Transferred to SICU after developing rigors with tachypnea/rapid shallow breathing and tachycardia. CT chest from earlier in the day revealed new bilateral pleural effusions with bibasilar consolidated lung and ascites. Became hypotensive s/p IV diuresis. Started on BiPAP for increased work of breathing. Transfused 2u PRBC after Hgb was drifted down to 6.8. Events last 24 hours: She remains tachypneic. Placed back on BiPAP to ease work of breathing. Afebrile. pat now on NC 2lpm sat 99%, lying in bed,  @ bedside. pt remains on zarxio. Also given 1u pRBC on 2/16 for Hb 6.8. Yesterday after 1u, WBC 0.71, Hb 9.2, plt 26, Cr trending up 1.56,  pt remains on meropenem, fluconazole, LE doppler negative. Today w/ improvement in counts- WBC 1.3, Hb 10.1, plt 37, cmp with k 2.3 and repleted, Cr 1.73. HR 120s this am. 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      GERD (gastroesophageal reflux disease)      Cyclical vomiting syndrome      CVA (cerebrovascular accident)      H/O squamous cell carcinoma excision          Home Medications:  cefepime 2 g intravenous injection: 2 gram(s) intravenous every 12 hours (12 Feb 2025 12:59)  filgrastim: 480 microgram(s) subcutaneous once a day (12 Feb 2025 12:59)  Lactated Ringers Injection intravenous solution: 150 milligram(s) intravenous every 1 to 2 hours (12 Feb 2025 12:59)  loperamide 2 mg oral capsule: 1 cap(s) orally once (12 Feb 2025 12:59)  midodrine 5 mg oral tablet: 1 tab(s) orally every 8 hours (12 Feb 2025 12:59)  Multiple Vitamins with Minerals oral liquid: 1 orally once a day (12 Feb 2025 12:59)  octreotide 2500 mcg/mL subcutaneous solution: 0.04 milliliter(s) subcutaneous 3 times a day (12 Feb 2025 12:59)  sucralfate 1 g/10 mL oral suspension: 10 milliliter(s) orally 4 times a day (12 Feb 2025 12:59)  trimethobenzamide 100 mg/mL intramuscular solution: 2 milliliter(s) intramuscular every 8 hours As needed Nausea and/or Vomiting (12 Feb 2025 12:59)      MEDICATIONS  (STANDING):  amLODIPine   Tablet 10 milliGRAM(s) Oral daily  chlorhexidine 2% Cloths 1 Application(s) Topical <User Schedule>  cholecalciferol 2000 Unit(s) Oral at bedtime  famotidine Injectable 20 milliGRAM(s) IV Push every 12 hours  filgrastim-sndz (ZARXIO) Injectable 480 MICROGram(s) SubCutaneous daily  fluconAZOLE IVPB      fluconAZOLE IVPB 200 milliGRAM(s) IV Intermittent every 24 hours  lactated ringers. 1000 milliLiter(s) (125 mL/Hr) IV Continuous <Continuous>  lipid, fat emulsion (Fish Oil and Plant Based) 20% Infusion 0.681 Gm/kG/Day (20.8 mL/Hr) IV Continuous <Continuous>  meropenem Injectable 1000 milliGRAM(s) IV Push every 12 hours  pantoprazole  Injectable 40 milliGRAM(s) IV Push daily  Parenteral Nutrition - Adult 1 Each (75 mL/Hr) TPN Continuous <Continuous>  Parenteral Nutrition - Adult 1 Each (75 mL/Hr) TPN Continuous <Continuous>  sucralfate suspension 1 Gram(s) Oral four times a day  vancomycin    Solution 125 milliGRAM(s) Oral every 6 hours    MEDICATIONS  (PRN):  acetaminophen     Tablet .. 650 milliGRAM(s) Oral every 6 hours PRN Temp greater or equal to 38C (100.4F), Mild Pain (1 - 3)  aluminum hydroxide/magnesium hydroxide/simethicone Suspension 30 milliLiter(s) Oral every 4 hours PRN Dyspepsia  Biotene Dry Mouth Oral Rinse 15 milliLiter(s) Swish and Spit five times a day PRN Mouth Care  diphenoxylate/atropine 1 Tablet(s) Oral every 6 hours PRN Diarrhea  melatonin 3 milliGRAM(s) Oral at bedtime PRN Insomnia  ondansetron Injectable 4 milliGRAM(s) IV Push every 8 hours PRN Nausea and/or Vomiting  sodium chloride 0.65% Nasal 1 Spray(s) Both Nostrils two times a day PRN Nasal Congestion      Allergies    No Known Allergies    Intolerances        SOCIAL HISTORY: Denies tobacco, etoh abuse or illicit drug use    FAMILY HISTORY:      Vital Signs Last 24 Hrs  T(C): 36.9 (17 Feb 2025 17:38), Max: 37.1 (17 Feb 2025 12:42)  T(F): 98.4 (17 Feb 2025 17:38), Max: 98.7 (17 Feb 2025 12:42)  HR: 107 (17 Feb 2025 18:46) (89 - 124)  BP: 135/94 (17 Feb 2025 18:46) (122/71 - 152/140)  BP(mean): 106 (17 Feb 2025 18:46) (82 - 146)  RR: 30 (17 Feb 2025 18:46) (19 - 30)  SpO2: 99% (17 Feb 2025 18:46) (93% - 100%)    Parameters below as of 17 Feb 2025 18:46  Patient On (Oxygen Delivery Method): nasal cannula  O2 Flow (L/min): 2          REVIEW OF SYSTEMS:    CONSTITUTIONAL:  As per HPI.  HEENT:  Eyes:  No diplopia or blurred vision. ENT:  No earache, sore throat or runny nose.  CARDIOVASCULAR:  No pressure, squeezing, tightness, heaviness or aching about the chest, neck, axilla or epigastrium.  RESPIRATORY:  No cough, +shortness of breath, PND or orthopnea.  GASTROINTESTINAL:  No nausea, vomiting or diarrhea.  GENITOURINARY:  No dysuria, frequency or urgency.  MUSCULOSKELETAL:  As per HPI.  SKIN:  No change in skin, hair or nails.  NEUROLOGIC:  No paresthesias, fasciculations, seizures or weakness.  PSYCHIATRIC:  No disorder of thought or mood.  ENDOCRINE:  No heat or cold intolerance, polyuria or polydipsia.  HEMATOLOGICAL:  No easy bruising or bleedings:  .     PHYSICAL EXAMINATION:    GENERAL APPEARANCE:  Pt. is not currently dyspneic, in no distress. Pt. is alert, oriented, and pleasant.  HEENT:  Pupils are normal and react normally. No icterus. Mucous membranes well colored.  NECK:  Supple. No lymphadenopathy. Jugular venous pressure not elevated. Carotids equal.   HEART:   The cardiac impulse has a normal quality. Regular. Normal S1 and S2. There are no murmurs, rubs or gallops noted  CHEST:  Chest rhonchi to auscultation. Normal respiratory effort.  ABDOMEN:  Soft and nontender.   EXTREMITIES:  There is no cyanosis, clubbing or edema.   SKIN:  No rash or significant lesions are noted.    LABS:                        10.1   1.30  )-----------( 37       ( 17 Feb 2025 05:59 )             28.5     02-17    137  |  109[H]  |  74[H]  ----------------------------<  234[H]  3.4[L]   |  20[L]  |  1.57[H]    Ca    8.5      17 Feb 2025 14:46  Phos  2.3     02-17  Mg     2.9     02-17    TPro  5.2[L]  /  Alb  2.7[L]  /  TBili  0.8  /  DBili  x   /  AST  15  /  ALT  19  /  AlkPhos  101  02-17    LIVER FUNCTIONS - ( 17 Feb 2025 05:59 )  Alb: 2.7 g/dL / Pro: 5.2 gm/dL / ALK PHOS: 101 U/L / ALT: 19 U/L / AST: 15 U/L / GGT: x                 Urinalysis Basic - ( 17 Feb 2025 14:46 )    Color: x / Appearance: x / SG: x / pH: x  Gluc: 234 mg/dL / Ketone: x  / Bili: x / Urobili: x   Blood: x / Protein: x / Nitrite: x   Leuk Esterase: x / RBC: x / WBC x   Sq Epi: x / Non Sq Epi: x / Bacteria: x            RADIOLOGY & ADDITIONAL STUDIES:     CT Chest No Cont (02.15.25 @ 15:55) >  LUNGS : There are new bilateral pleural effusions with adjacent   compressive atelectasis, right greater than left. No pneumothorax.  VESSELS: Aortic calcifications.  HEART: Normal size. Small pericardial effusion is seen. There are   coronary artery calcifications.  MEDIASTINUM AND MARTA: esophagus is dilated and fluid-filled.  CHEST WALL AND LOWER NECK: Within normal limits.  VISUALIZED UPPER ABDOMEN: Gallbladder is distended, similar to the prior.   There is new onset ascites around the liver and spleen. 4.1 cm pancreatic   tail mass, unchanged..  BONES: Nondisplaced fractures of the ninth and 10th ribs posteriorly on   the left.    IMPRESSION:  Nondisplaced fractures of the left ninth and 10th ribs, similar to the   prior study.    Pancreatic mass, unchanged    No onset ascites and bilateral pleural effusions with compressive   atelectasis, right greater than left.    Dilated gallbladder, similar to the prior study.    US Duplex Venous Lower Ext Complete, Bilateral (02.16.25 @ 11:14) >  IMPRESSION:    Limited evaluation of the left popliteal vein and left calf veins with no   evidence of deep venous thrombosis in the visualized bilateral lower   extremity veins.

## 2025-02-17 NOTE — CHART NOTE - NSCHARTNOTEFT_GEN_A_CORE
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  Patient was being followed by GI, ID, Cardiology and Onc there.  Patient has been transferred to  today on patient's  (GI Dr. Pleitez) request.  At this time, patient c/o non-bloody diarrhea x 3 in last 24 hours.  She denies any abdominal pain, nausea, but has some dry heaves.  She denies any fever, chills, chest pain, palpitation, constipation or dysuria. Initially, she was started on Vancomycin and Zosyn, but now she has been on Cefepime as her blood culture and urine cultures are positive for Pseudomonas aeruginosa. Currently, patient is on Cefepime.  ID had also recommended Flagyl, but patient had refused it, as it causes nausea to her. Noted with Metabolic acidosis with NAG due to GI loss. Likely chemo-induced diarrhea and vomiting. Better than before. Cutaneous lesion on RUQ area below right breast, unclear etiology, ? Ecthyma gangrenosum. Admitting diagnosis: pancreatic ca  *: Pt transfer from [facility]; seen by RD and met criteria for PCM. Pt reports only being able to tolerate some sips of water and tea at this time. Reports tried eating small bite of mashed banana and felt burning sensation down her throat. Reports #; RD unable to obtain bedscale wt d/t bedscale not working. Admit wt per # note with 1+ and 2+ edema skewing wt. Pt appears overweight, NFPE reveals mild- mod muscle/fat wasting at this time. Recommend to continue with PO diet and encourage intake as tolerated. RD consulted to initiate PN d/t PO intolerance. Will initiate TPN through port.  *2/15: Remains on low fiber diet with minimal PO intake. Still w/ multiple episodes of diarrhea, if persists can consider adding 5mg zinc into PN bag. No other acute events overnight. D/w Dr. Joyner will c/w TPN today.   *: Raised area noted to mid sternum. RCW port intact.- no swelling noted, no redness. Port flushed without resistance. + blood return noted. Assessed with IV team Amy. Per pt no Pain on palpation. CT Chest. TPN on hold until CT read. CT chest from earlier in the day revealed new bilateral pleural effusions with bibasilar consolidated lung and ascites. CXR now shows low lung volumes and bibasilar opacities. Rigors post transfusion - Rapid response called;  Dr. Pleitez requesting ICU consult- concerned for pt.  requesting assessment of patient STAT. Pt transferred to SICU. ? third spacing, and possible PNA. Per pt still drinking small sips of water and tea. D/w Dr. Fortune will keep total volume same in PN bag today, and will c/w TPN.     *TPN initiated 2/2 PO intolerance, + Mucositis, possible candidal esophagitis    *current status: tx SICU, po remains minimal. On bipap. On additional LR d/t diuresis per critical care PA: "malignancy/severe protein-calorie malnutrition/hypoalbuminemia causing diffuse third spacing, caution with further diuresis as this is not truly a volume overload/acute CHF issue" also on IV albumin  c/w TPN  STRONGLY suggest to Confirm goals of care regarding LONG-TERM nutrition support - However, LONG-TERM Nutrition support is not recommended with advanced cancer as studies show that there is no improvement of dehydration symptoms, quality of life, or survival. It also increases the risk for peripheral edema, ascites, and pleural effusions. Will continue to provide nutr within C though    *new pertinent IV albumin, cholecalciferol 2000 units, LR, famotidine, 2g Mg Sulfate x 24 hrs, abx, 70 mEq KCl given x 24 hrs (oral and IV)    *labs reviewed; Na WNL, now on additional IVF + TPN, suggest to continue. Persistent Hypokalemia noted despite repletion outside bag, will increase in PN bag again today. Mg LOW - will increase to 8 mEq in bag today. Phos WNL. Pt likely experiencing RFS continue to monitor and replete lytes outside of PN bag prn. T bili WNL will c/w trace elements. C/w MVI and thiamine in PN bag. Last POCT WNL; maintain between 140- 180 mg/DL - none taken x 24 hrs. Per PN policy please obtain q6 hrs while receiving PN.  TG WNL will c/w 50g of lipids daily.  Will continue to titrate dextrose up 50-75g/day until goal rate of 325g dextrose reached (GIR ~3.1 g/kg/min).  Also of note, Cl elevated, CO2 depressed. Will add more acetate vs Cl into TPN bag - 207 vs 100, respectively.         140  |  110[H]  |  70[H]  ----------------------------<  227[H]  2.3[LL]   |  17[L]  |  1.73[H]    Ca    8.4[L]      2025 05:59  Phos  3.2       Mg     1.5         TPro  5.2[L]  /  Alb  2.7[L]  /  TBili  0.8  /  DBili  x   /  AST  15  /  ALT  19  /  AlkPhos  101      BMI: BMI (kg/m2): 27.8 (25 @ 05:46)    BP: 122/71 (25 @ 05:00) (96/64 - 177/89)Vital Signs Last 24 Hrs  T(C): 36.7 (25 @ 05:00), Max: 38.1 (02-15-25 @ 23:00)  T(F): 98 (25 @ 05:00), Max: 100.5 (02-15-25 @ 23:00)  HR: 76 (25 @ 05:00) (76 - 137)  BP: 122/71 (25 @ 05:00) (96/64 - 177/89)  BP(mean): 87 (25 @ 05:00) (70 - 93)  RR: 29 (25 @ 05:00) (16 - 29)  SpO2: 95% (25 @ 05:00) (92% - 100%)    Lipid Panel: Date/Time: 02-15-25 @ 05:17  Triglycerides, Serum: 120    **no POCT taken x 24 hrs    Iron Total: 34 ug/dL (25 @ 18:46)  Folate, Serum: >20.0 ng/mL (25 @ 18:46)  Vitamin B12, Serum: 1457 pg/mL (25 @ 18:46)  Vitamin D, 25-Hydroxy: 23.0 ng/mL (25 @ 18:46) ** c/w deficiency    *I&O's Detail    2025 07:01  -  2025 07:00  --------------------------------------------------------  IN:    Fat Emulsion (Fish Oil &amp; Plant Based) 20% Infusion: 175 mL    IV PiggyBack: 100 mL    Lactated Ringers: 500 mL    TPN (Total Parenteral Nutrition): 865 mL  Total IN: 1640 mL    OUT:    Indwelling Catheter - Urethral (mL): 4600 mL  Total OUT: 4600 mL    Total NET: -2960 mL  * fluid status: negative; large UO, full TPN volume not received x 24 hrs; LR added. Will continue to monitor/adjust prn to maintain fluid balance along with MD   *last (+) BM doc'd 2/14 x 5 - diarrhea.  pt not on standing bowel regimen.  *edema: 2+ generalized, 3+ L/R ankle  *PI: L Buttock S1, R Buttock S1    *malnutrition: Pt continues to meet criteria for severe protein calorie malnutrition in context of acute on chronic illness r/t decreased ability to meet increased nutrient needs 2/2 PO intolerance, N/V and diarrhea, on chemo for pancreatic ca AEB meeting <50% ENN > 5 days, mild-mod muscle/fat wasting    Estimated Needs: based on 73.4 Kg (wt from EMR admit wt )  Calories: 1812 - 2175 Kcal (25 - 30 Kcal/Kg)  Protein: 108 - 145 g (1.5 - 2.0 g/Kg)  Fluids: 1450 - 1812 mL (20 - 25 mL/Kg)    Diet, Low Fiber:   1500mL Fluid Restriction ([id number])  Kosher  Supplement Feeding Modality:  Oral  Ensure Plant-Based Cans or Servings Per Day:  3       Frequency:  Daily (02-15-25 @ 20:36) [Active]  Parenteral Nutrition - Adult 1 Each (75 mL/Hr) TPN Continuous <Continuous>, 02-15-25 @ 22:00, 22:00, Stop order after: 1 Days    Weight History:  Daily Weight in k.6 (15 Feb 2025 06:17)  Height (cm): 162.6 (25 @ 02:39)  Weight (kg): 73.4 (25 @ 05:46), 66.8 (25 @ 02:39)  BMI (kg/m2): 27.8 (25 @ 05:46), 25.3 (25 @ 02:39)  BSA (m2): 1.79 (25 @ 05:46), 1.72 (25 @ 02:39)    TPN Recommendations: via implanted infusion port  Total Volume: 1800 mL x 24 hours  125 g  Amino Acids  250 g Dextrose (dex goal 325g)  50 g Lipids 20% pending lipid panel  100 mEq Sodium Chloride  41 mEq Sodium Acetate  10 mmol Sodium Phosphate  0 mEq Potassium Chloride  41 mEq Potassium Acetate  20 mmol Potassium Phosphate  0 mEq Calcium Gluconate (CAPS out of PN solution)   8 mEq Magnesium Sulfate  200 mg Thiamine  1 ml Trace Elements  10 ml MVI    Total Calories    1850    (Meets  100%  of  Estimated Energy needs  and  100%  Protein needs)      Additional Recommendations:    1) Daily weights  2) Strict I & O's **pt third spacing  3) Daily lyte checks including magnesium and phos - MONITOR FOR RFS and replete K, Phos, Mg outside PN bag prn  4) Weekly triglycerides/LFT checks  5) POCT q6hrs; maintain 140-180mg/dL***  6) C/w low fiber diet and encourage PO intake as tolerated  7) Continue to titrate dextrose 50-75g per day until goal of 325g reached  8) Confirm goals of care regarding long-term nutrition support. Would recommend PEG placement if long-term nutrition support is required as GI is functional and would be the preferred route for nutrition. However, long-term nutrition support is not recommended with advanced cancer as studies show that there is no improvement of dehydration symptoms, quality of life, or survival. It also increases the risk for peripheral edema, ascites, and pleural effusions.     *will continue to monitor and adjust PN prn*  Marce Mitchell MS, RDN, CNSC, [phone]  Certified Nutrition

## 2025-02-17 NOTE — PROGRESS NOTE ADULT - SUBJECTIVE AND OBJECTIVE BOX
Date of service: 02-17-25 @ 12:38    Lying in bed in NAD  Had an episode of loose stools  No fever     ROS: no fever or chills; denies dizziness, no HA, no SOB or cough, no abdominal pain, no dysuria, no legs pain, no rashes    MEDICATIONS  (STANDING):  albumin human 25% IVPB 100 milliLiter(s) IV Intermittent every 8 hours  chlorhexidine 2% Cloths 1 Application(s) Topical <User Schedule>  cholecalciferol 2000 Unit(s) Oral at bedtime  famotidine Injectable 20 milliGRAM(s) IV Push every 12 hours  filgrastim-sndz (ZARXIO) Injectable 480 MICROGram(s) SubCutaneous daily  fluconAZOLE IVPB      fluconAZOLE IVPB 200 milliGRAM(s) IV Intermittent every 24 hours  lactated ringers Bolus 1000 milliLiter(s) IV Bolus once  lactated ringers. 1000 milliLiter(s) (125 mL/Hr) IV Continuous <Continuous>  lipid, fat emulsion (Fish Oil and Plant Based) 20% Infusion 0.6812 Gm/kG/Day (20.8 mL/Hr) IV Continuous <Continuous>  lipid, fat emulsion (Fish Oil and Plant Based) 20% Infusion 0.681 Gm/kG/Day (20.8 mL/Hr) IV Continuous <Continuous>  meropenem Injectable 1000 milliGRAM(s) IV Push every 12 hours  pantoprazole  Injectable 40 milliGRAM(s) IV Push daily  Parenteral Nutrition - Adult 1 Each (75 mL/Hr) TPN Continuous <Continuous>  Parenteral Nutrition - Adult 1 Each (75 mL/Hr) TPN Continuous <Continuous>  potassium chloride    Tablet ER 40 milliEquivalent(s) Oral every 4 hours  potassium chloride  10 mEq/100 mL IVPB 10 milliEquivalent(s) IV Intermittent every 1 hour  sucralfate suspension 1 Gram(s) Oral four times a day    Vital Signs Last 24 Hrs  T(C): 36.8 (17 Feb 2025 05:00), Max: 36.8 (17 Feb 2025 05:00)  T(F): 98.3 (17 Feb 2025 05:00), Max: 98.3 (17 Feb 2025 05:00)  HR: 124 (17 Feb 2025 08:00) (86 - 124)  BP: 139/82 (17 Feb 2025 08:00) (123/69 - 146/80)  BP(mean): 98 (17 Feb 2025 08:00) (80 - 98)  RR: 29 (17 Feb 2025 08:00) (19 - 29)  SpO2: 96% (17 Feb 2025 08:00) (92% - 100%)    Parameters below as of 17 Feb 2025 08:00  Patient On (Oxygen Delivery Method): room air     Physical exam:    Constitutional:  No acute distress  HEENT: NC/AT, EOMI, PERRLA, conjunctivae clear; ears and nose atraumatic; pharynx benign  Neck: supple; thyroid not palpable  Back: no tenderness  Respiratory: respiratory effort normal; clear to auscultation  Cardiovascular: S1S2 regular, no murmurs  Abdomen: soft, mild periombilical tender, not distended, positive BS; no liver or spleen organomegaly  Genitourinary: no suprapubic tenderness  Lymphatic: no LN palpable  Musculoskeletal: no muscle tenderness, no joint swelling or tenderness  Extremities: no pedal edema  Neurological/ Psychiatric: AxOx3, judgement and insight normal; moving all extremities  Skin: no rashes; right lower chest wall dry, scabbed ulcer; no discharge     Labs: reviewed                        10.1   1.30  )-----------( 37       ( 17 Feb 2025 05:59 )             28.5     02-17    140  |  110[H]  |  70[H]  ----------------------------<  227[H]  2.3[LL]   |  17[L]  |  1.73[H]    Ca    8.4[L]      17 Feb 2025 05:59  Phos  3.2     02-17  Mg     1.5     02-17    TPro  5.2[L]  /  Alb  2.7[L]  /  TBili  0.8  /  DBili  x   /  AST  15  /  ALT  19  /  AlkPhos  101  02-17    C-Reactive Protein: 209.0 mg/mL (02-15-25 @ 05:17)  C-Reactive Protein: 233.0 mg/mL (02-14-25 @ 06:50)  Ferritin: 1798 ng/mL (02-13-25 @ 18:46)                        9.2    0.71  )-----------( 26       ( 16 Feb 2025 06:12 )             26.8     02-16    136  |  108  |  47[H]  ----------------------------<  172[H]  3.4[L]   |  17[L]  |  1.56[H]    Ca    8.0[L]      16 Feb 2025 06:12  Phos  3.1     02-16  Mg     1.8     02-16    TPro  5.1[L]  /  Alb  2.9[L]  /  TBili  1.1  /  DBili  x   /  AST  5[L]  /  ALT  19  /  AlkPhos  91  02-16    C-Reactive Protein: 209.0 mg/mL (02-15-25 @ 05:17)  C-Reactive Protein: 233.0 mg/mL (02-14-25 @ 06:50)  Ferritin: 1798 ng/mL (02-13-25 @ 18:46)                        7.5    0.42  )-----------( 17       ( 15 Feb 2025 05:17 )             22.1     02-15    133[L]  |  108  |  27[H]  ----------------------------<  183[H]  3.3[L]   |  18[L]  |  1.04    Ca    8.1[L]      15 Feb 2025 05:17  Phos  2.0     02-15  Mg     2.3     02-15    TPro  4.2[L]  /  Alb  1.7[L]  /  TBili  0.6  /  DBili  0.4[H]  /  AST  6[L]  /  ALT  12  /  AlkPhos  98  02-15    C-Reactive Protein: 209.0 mg/mL (02-15-25 @ 05:17)  C-Reactive Protein: 233.0 mg/mL (02-14-25 @ 06:50)  Ferritin: 1798 ng/mL (02-13-25 @ 18:46)                        8.0    0.23  )-----------( 20       ( 14 Feb 2025 06:50 )             24.2     02-14    134[L]  |  108  |  21  ----------------------------<  141[H]  3.3[L]   |  16[L]  |  1.03    Ca    8.5      14 Feb 2025 06:50  Phos  3.1     02-14  Mg     2.6     02-14    TPro  4.5[L]  /  Alb  1.5[L]  /  TBili  0.8  /  DBili  0.4[H]  /  AST  8[L]  /  ALT  14  /  AlkPhos  104  02-14    C-Reactive Protein: 233.0 mg/mL (02-14-25 @ 06:50)  Ferritin: 1798 ng/mL (02-13-25 @ 18:46)                        8.7    0.19  )-----------( 33       ( 13 Feb 2025 06:52 )             25.7     02-13    137  |  109[H]  |  20  ----------------------------<  117[H]  2.6[LL]   |  18[L]  |  1.02    Ca    8.7      13 Feb 2025 06:52  Phos  2.3     02-13  Mg     1.6     02-13    TPro  4.4[L]  /  Alb  1.1[L]  /  TBili  0.7  /  DBili  x   /  AST  11[L]  /  ALT  16  /  AlkPhos  94  02-13     LIVER FUNCTIONS - ( 13 Feb 2025 06:52 )  Alb: 1.1 g/dL / Pro: 4.4 gm/dL / ALK PHOS: 94 U/L / ALT: 16 U/L / AST: 11 U/L / GGT: x           Culture - Blood (collected 11 Feb 2025 09:00)  Source: .Blood BLOOD  Preliminary Report (12 Feb 2025 13:01):    No growth at 24 hours    Culture - Blood (collected 11 Feb 2025 08:50)  Source: .Blood BLOOD  Preliminary Report (12 Feb 2025 13:01):    No growth at 24 hours    Urinalysis with Rflx Culture (collected 09 Feb 2025 01:25)    Culture - Urine (collected 09 Feb 2025 01:25)  Source: Clean Catch  Final Report (11 Feb 2025 08:39):    50,000 - 99,000 CFU/mL Pseudomonas aeruginosa  Organism: Pseudomonas aeruginosa (11 Feb 2025 08:39)  Organism: Pseudomonas aeruginosa (11 Feb 2025 08:39)      Method Type: REJI      -  Amikacin: S <=16      -  Aztreonam: S <=4      -  Cefepime: S <=2      -  Ceftazidime: S <=1      -  Ciprofloxacin: S <=0.25      -  Imipenem: S 2      -  Levofloxacin: S <=0.5      -  Meropenem: S <=1      -  Piperacillin/Tazobactam: S <=8    Culture - Blood (collected 08 Feb 2025 22:00)  Source: .Blood BLOOD  Gram Stain (09 Feb 2025 19:04):    Growth in aerobic bottle: Gram Negative Rods  Final Report (11 Feb 2025 07:53):    Growth in aerobic bottle: Pseudomonas aeruginosa    Direct identification is available within approximately 3-5    hours either by Blood Panel Multiplexed PCR or Direct    MALDI-TOF. Details: [email or web address]  Organism: Blood Culture PCR  Pseudomonas aeruginosa (11 Feb 2025 07:53)  Organism: Pseudomonas aeruginosa (11 Feb 2025 07:53)      Method Type: REJI      -  Aztreonam: S <=4      -  Cefepime: S <=2      -  Ceftazidime: S <=1      -  Ciprofloxacin: S <=0.25      -  Imipenem: S <=1      -  Levofloxacin: S <=0.5      -  Meropenem: S <=1      -  Piperacillin/Tazobactam: S <=8  Organism: Blood Culture PCR (11 Feb 2025 07:53)      Method Type: PCR      -  Pseudomonas aeruginosa: Detec    Culture - Blood (collected 08 Feb 2025 21:55)  Source: .Blood BLOOD  Gram Stain (09 Feb 2025 19:32):    Growth in aerobic bottle: Gram Negative Rods  Final Report (11 Feb 2025 07:54):    Growth in aerobic bottle: Pseudomonas aeruginosa    See previous culture [id number]    Radiology: all available radiological tests reviewed    < from: US Abdomen Upper Quadrant Right (02.09.25 @ 14:55) >  Distended gallbladder with layering sludge.  8 mm right renal calculus without hydronephrosis.  Hepatomegaly.  Right renal cyst.  < end of copied text >    < from: CT Abdomen and Pelvis No Cont (02.09.25 @ 03:01) >  1.   Study somewhat limited due to absence of contrast.  2.   Axial images 71-76 of series 301 and concomitant coronal images demonstrate prominent soft tissue inseparable from the body of the pancreas consistent with pancreatic neoplasm.  3.   Some distension of gallbladder could be further evaluated with right upper quadrant sonography. No definite biliary dilatation.  < end of copied text >    Advanced directives addressed: full resuscitation

## 2025-02-17 NOTE — PROGRESS NOTE ADULT - SUBJECTIVE AND OBJECTIVE BOX
INTERVAL HPI/OVERNIGHT EVENTS:  Patient S&E at bedside.  pt remains on zarxio.   Also given 1u pRBC on 2/16 for Hb 6.8. Yesterday after 1u, WBC 0.71, Hb 9.2, plt 26, Cr trending up 1.56   - pt remains on meropenem, fluconazole, LE doppler negative.   Today w/ improvement in counts- WBC 1.3, Hb 10.1, plt 37, cmp with k 2.3 and repleted, Cr 1.73.   HR 120s this am. 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      GERD (gastroesophageal reflux disease)      Cyclical vomiting syndrome      CVA (cerebrovascular accident)      H/O squamous cell carcinoma excision          FAMILY HISTORY:      VITAL SIGNS:  T(F): 98.3 (02-17-25 @ 05:00)  HR: 124 (02-17-25 @ 08:00)  BP: 139/82 (02-17-25 @ 08:00)  RR: 29 (02-17-25 @ 08:00)  SpO2: 96% (02-17-25 @ 08:00)  Wt(kg): --    PHYSICAL EXAM:    Constitutional: NAD  Eyes: EOMI,   Respiratory: on nc, mild tachypnea  Cardiovascular: tachycardic   Gastrointestinal: soft, NTND  Extremities: no c/c/e  Neurological: AAOx3      MEDICATIONS  (STANDING):  albumin human 25% IVPB 100 milliLiter(s) IV Intermittent every 8 hours  chlorhexidine 2% Cloths 1 Application(s) Topical <User Schedule>  cholecalciferol 2000 Unit(s) Oral at bedtime  famotidine Injectable 20 milliGRAM(s) IV Push every 12 hours  filgrastim-sndz (ZARXIO) Injectable 480 MICROGram(s) SubCutaneous daily  fluconAZOLE IVPB      fluconAZOLE IVPB 200 milliGRAM(s) IV Intermittent every 24 hours  lactated ringers. 1000 milliLiter(s) (50 mL/Hr) IV Continuous <Continuous>  lipid, fat emulsion (Fish Oil and Plant Based) 20% Infusion 0.6812 Gm/kG/Day (20.8 mL/Hr) IV Continuous <Continuous>  lipid, fat emulsion (Fish Oil and Plant Based) 20% Infusion 0.681 Gm/kG/Day (20.8 mL/Hr) IV Continuous <Continuous>  meropenem Injectable      meropenem Injectable 1000 milliGRAM(s) IV Push every 8 hours  pantoprazole  Injectable 40 milliGRAM(s) IV Push daily  Parenteral Nutrition - Adult 1 Each (75 mL/Hr) TPN Continuous <Continuous>  Parenteral Nutrition - Adult 1 Each (75 mL/Hr) TPN Continuous <Continuous>  potassium chloride    Tablet ER 40 milliEquivalent(s) Oral every 4 hours  potassium chloride  10 mEq/100 mL IVPB 10 milliEquivalent(s) IV Intermittent every 1 hour  sucralfate suspension 1 Gram(s) Oral four times a day    MEDICATIONS  (PRN):  acetaminophen     Tablet .. 650 milliGRAM(s) Oral every 6 hours PRN Temp greater or equal to 38C (100.4F), Mild Pain (1 - 3)  aluminum hydroxide/magnesium hydroxide/simethicone Suspension 30 milliLiter(s) Oral every 4 hours PRN Dyspepsia  Biotene Dry Mouth Oral Rinse 15 milliLiter(s) Swish and Spit five times a day PRN Mouth Care  melatonin 3 milliGRAM(s) Oral at bedtime PRN Insomnia  ondansetron Injectable 4 milliGRAM(s) IV Push every 8 hours PRN Nausea and/or Vomiting  sodium chloride 0.65% Nasal 1 Spray(s) Both Nostrils two times a day PRN Nasal Congestion      Allergies    No Known Allergies    Intolerances        LABS:                        10.1   1.30  )-----------( 37       ( 17 Feb 2025 05:59 )             28.5     02-17    140  |  110[H]  |  70[H]  ----------------------------<  227[H]  2.3[LL]   |  17[L]  |  1.73[H]    Ca    8.4[L]      17 Feb 2025 05:59  Phos  3.2     02-17  Mg     1.5     02-17    TPro  5.2[L]  /  Alb  2.7[L]  /  TBili  0.8  /  DBili  x   /  AST  15  /  ALT  19  /  AlkPhos  101  02-17      Urinalysis Basic - ( 17 Feb 2025 05:59 )    Color: x / Appearance: x / SG: x / pH: x  Gluc: 227 mg/dL / Ketone: x  / Bili: x / Urobili: x   Blood: x / Protein: x / Nitrite: x   Leuk Esterase: x / RBC: x / WBC x   Sq Epi: x / Non Sq Epi: x / Bacteria: x        RADIOLOGY & ADDITIONAL TESTS:  Studies reviewed.

## 2025-02-18 LAB
ADD ON TEST-SPECIMEN IN LAB: SIGNIFICANT CHANGE UP
ALBUMIN SERPL ELPH-MCNC: 2.2 G/DL — LOW (ref 3.3–5)
ALBUMIN SERPL ELPH-MCNC: 2.4 G/DL — LOW (ref 3.3–5)
ALP SERPL-CCNC: 131 U/L — HIGH (ref 40–120)
ALP SERPL-CCNC: 163 U/L — HIGH (ref 40–120)
ALT FLD-CCNC: 19 U/L — SIGNIFICANT CHANGE UP (ref 12–78)
ALT FLD-CCNC: 21 U/L — SIGNIFICANT CHANGE UP (ref 12–78)
ANION GAP SERPL CALC-SCNC: 10 MMOL/L — SIGNIFICANT CHANGE UP (ref 5–17)
ANION GAP SERPL CALC-SCNC: 7 MMOL/L — SIGNIFICANT CHANGE UP (ref 5–17)
ANION GAP SERPL CALC-SCNC: 9 MMOL/L — SIGNIFICANT CHANGE UP (ref 5–17)
APPEARANCE UR: CLEAR — SIGNIFICANT CHANGE UP
AST SERPL-CCNC: 27 U/L — SIGNIFICANT CHANGE UP (ref 15–37)
AST SERPL-CCNC: 33 U/L — SIGNIFICANT CHANGE UP (ref 15–37)
BACTERIA # UR AUTO: NEGATIVE /HPF — SIGNIFICANT CHANGE UP
BASE EXCESS BLDA CALC-SCNC: -6.1 MMOL/L — LOW (ref -2–3)
BASOPHILS # BLD AUTO: 0.06 K/UL — SIGNIFICANT CHANGE UP (ref 0–0.2)
BASOPHILS NFR BLD AUTO: 1.2 % — SIGNIFICANT CHANGE UP (ref 0–2)
BILIRUB SERPL-MCNC: 0.6 MG/DL — SIGNIFICANT CHANGE UP (ref 0.2–1.2)
BILIRUB SERPL-MCNC: 0.6 MG/DL — SIGNIFICANT CHANGE UP (ref 0.2–1.2)
BILIRUB UR-MCNC: NEGATIVE — SIGNIFICANT CHANGE UP
BLOOD GAS COMMENTS ARTERIAL: SIGNIFICANT CHANGE UP
BUN SERPL-MCNC: 68 MG/DL — HIGH (ref 7–23)
BUN SERPL-MCNC: 69 MG/DL — HIGH (ref 7–23)
BUN SERPL-MCNC: 70 MG/DL — HIGH (ref 7–23)
CALCIUM SERPL-MCNC: 8.4 MG/DL — LOW (ref 8.5–10.1)
CALCIUM SERPL-MCNC: 8.4 MG/DL — LOW (ref 8.5–10.1)
CALCIUM SERPL-MCNC: 8.6 MG/DL — SIGNIFICANT CHANGE UP (ref 8.5–10.1)
CAST: 2 /LPF — SIGNIFICANT CHANGE UP (ref 0–4)
CHLORIDE SERPL-SCNC: 112 MMOL/L — HIGH (ref 96–108)
CHLORIDE SERPL-SCNC: 113 MMOL/L — HIGH (ref 96–108)
CHLORIDE SERPL-SCNC: 113 MMOL/L — HIGH (ref 96–108)
CMV IGG FLD QL: <0.2 U/ML — SIGNIFICANT CHANGE UP
CMV IGG SERPL-IMP: NEGATIVE — SIGNIFICANT CHANGE UP
CMV IGM FLD-ACNC: <8 AU/ML — SIGNIFICANT CHANGE UP
CMV IGM SERPL QL: NEGATIVE — SIGNIFICANT CHANGE UP
CO2 SERPL-SCNC: 16 MMOL/L — LOW (ref 22–31)
CO2 SERPL-SCNC: 18 MMOL/L — LOW (ref 22–31)
CO2 SERPL-SCNC: 18 MMOL/L — LOW (ref 22–31)
COLOR SPEC: YELLOW — SIGNIFICANT CHANGE UP
CREAT SERPL-MCNC: 1.42 MG/DL — HIGH (ref 0.5–1.3)
CREAT SERPL-MCNC: 1.44 MG/DL — HIGH (ref 0.5–1.3)
CREAT SERPL-MCNC: 1.54 MG/DL — HIGH (ref 0.5–1.3)
DIFF PNL FLD: ABNORMAL
EGFR: 36 ML/MIN/1.73M2 — LOW
EGFR: 36 ML/MIN/1.73M2 — LOW
EGFR: 39 ML/MIN/1.73M2 — LOW
EGFR: 39 ML/MIN/1.73M2 — LOW
EGFR: 40 ML/MIN/1.73M2 — LOW
EGFR: 40 ML/MIN/1.73M2 — LOW
EOSINOPHIL # BLD AUTO: 0.04 K/UL — SIGNIFICANT CHANGE UP (ref 0–0.5)
EOSINOPHIL NFR BLD AUTO: 0.8 % — SIGNIFICANT CHANGE UP (ref 0–6)
FUNGITELL: 478 PG/ML — HIGH
GAS PNL BLDA: SIGNIFICANT CHANGE UP
GLUCOSE BLDC GLUCOMTR-MCNC: 206 MG/DL — HIGH (ref 70–99)
GLUCOSE SERPL-MCNC: 212 MG/DL — HIGH (ref 70–99)
GLUCOSE SERPL-MCNC: 223 MG/DL — HIGH (ref 70–99)
GLUCOSE SERPL-MCNC: 229 MG/DL — HIGH (ref 70–99)
GLUCOSE UR QL: 250 MG/DL
HCO3 BLDA-SCNC: 17 MMOL/L — LOW (ref 21–28)
HCT VFR BLD CALC: 29.4 % — LOW (ref 34.5–45)
HCT VFR BLD CALC: 32.1 % — LOW (ref 34.5–45)
HCT VFR BLD CALC: 32.3 % — LOW (ref 34.5–45)
HGB BLD-MCNC: 11 G/DL — LOW (ref 11.5–15.5)
HGB BLD-MCNC: 11.3 G/DL — LOW (ref 11.5–15.5)
HSV 1/2 SOURCE: SIGNIFICANT CHANGE UP
HSV1 IGG SER-ACNC: 13.6 INDEX — HIGH
HSV1 IGG SERPL QL IA: POSITIVE
HSV2 DNA BLD QL NAA+PROBE: SIGNIFICANT CHANGE UP
HSV2 IGG FLD-ACNC: 0.24 INDEX — SIGNIFICANT CHANGE UP
HSV2 IGG SERPL QL IA: NEGATIVE — SIGNIFICANT CHANGE UP
IMM GRANULOCYTES # BLD AUTO: 0.36 K/UL — HIGH (ref 0–0.07)
IMM GRANULOCYTES NFR BLD AUTO: 7.2 % — HIGH (ref 0–0.9)
IMMATURE PLATELET FRACTION #: 6.8 K/UL — SIGNIFICANT CHANGE UP (ref 4.7–11.1)
IMMATURE PLATELET FRACTION #: 6.8 K/UL — SIGNIFICANT CHANGE UP (ref 4.7–11.1)
IMMATURE PLATELET FRACTION #: 8.6 K/UL — SIGNIFICANT CHANGE UP (ref 4.7–11.1)
IMMATURE PLATELET FRACTION %: 14.5 % — HIGH (ref 1.6–4.9)
IMMATURE PLATELET FRACTION %: 15.2 % — HIGH (ref 1.6–4.9)
IMMATURE PLATELET FRACTION %: 15.9 % — HIGH (ref 1.6–4.9)
KETONES UR-MCNC: NEGATIVE MG/DL — SIGNIFICANT CHANGE UP
LACTATE SERPL-SCNC: 2.7 MMOL/L — HIGH (ref 0.7–2)
LEUKOCYTE ESTERASE UR-ACNC: NEGATIVE — SIGNIFICANT CHANGE UP
LYMPHOCYTES # BLD AUTO: 0.26 K/UL — LOW (ref 1–3.3)
LYMPHOCYTES NFR BLD AUTO: 5.2 % — LOW (ref 13–44)
MAGNESIUM SERPL-MCNC: 2.3 MG/DL — SIGNIFICANT CHANGE UP (ref 1.6–2.6)
MCHC RBC-ENTMCNC: 29.1 PG — SIGNIFICANT CHANGE UP (ref 27–34)
MCHC RBC-ENTMCNC: 29.5 PG — SIGNIFICANT CHANGE UP (ref 27–34)
MCHC RBC-ENTMCNC: 29.7 PG — SIGNIFICANT CHANGE UP (ref 27–34)
MCHC RBC-ENTMCNC: 34.1 G/DL — SIGNIFICANT CHANGE UP (ref 32–36)
MCHC RBC-ENTMCNC: 35.2 G/DL — SIGNIFICANT CHANGE UP (ref 32–36)
MCHC RBC-ENTMCNC: 35.4 G/DL — SIGNIFICANT CHANGE UP (ref 32–36)
MCV RBC AUTO: 83.8 FL — SIGNIFICANT CHANGE UP (ref 80–100)
MCV RBC AUTO: 84 FL — SIGNIFICANT CHANGE UP (ref 80–100)
MCV RBC AUTO: 85.4 FL — SIGNIFICANT CHANGE UP (ref 80–100)
MONOCYTES # BLD AUTO: 0.54 K/UL — SIGNIFICANT CHANGE UP (ref 0–0.9)
MONOCYTES NFR BLD AUTO: 10.8 % — SIGNIFICANT CHANGE UP (ref 2–14)
NEUTROPHILS # BLD AUTO: 3.76 K/UL — SIGNIFICANT CHANGE UP (ref 1.8–7.4)
NEUTROPHILS NFR BLD AUTO: 74.8 % — SIGNIFICANT CHANGE UP (ref 43–77)
NITRITE UR-MCNC: NEGATIVE — SIGNIFICANT CHANGE UP
NRBC # BLD AUTO: 0.02 K/UL — HIGH (ref 0–0)
NRBC # BLD AUTO: 0.03 K/UL — HIGH (ref 0–0)
NRBC # BLD AUTO: 0.05 K/UL — HIGH (ref 0–0)
NRBC # FLD: 0.02 K/UL — HIGH (ref 0–0)
NRBC # FLD: 0.03 K/UL — HIGH (ref 0–0)
NRBC # FLD: 0.05 K/UL — HIGH (ref 0–0)
NRBC BLD AUTO-RTO: 0 /100 WBCS — SIGNIFICANT CHANGE UP (ref 0–0)
PCO2 BLDA: 28 MMHG — LOW (ref 32–45)
PH BLDA: 7.4 — SIGNIFICANT CHANGE UP (ref 7.35–7.45)
PH UR: 5.5 — SIGNIFICANT CHANGE UP (ref 5–8)
PHOSPHATE SERPL-MCNC: 2 MG/DL — LOW (ref 2.5–4.5)
PHOSPHATE SERPL-MCNC: 2.1 MG/DL — LOW (ref 2.5–4.5)
PHOSPHATE SERPL-MCNC: 2.1 MG/DL — LOW (ref 2.5–4.5)
PLATELET # BLD AUTO: 45 K/UL — LOW (ref 150–400)
PLATELET # BLD AUTO: 47 K/UL — LOW (ref 150–400)
PLATELET # BLD AUTO: 54 K/UL — LOW (ref 150–400)
PMV BLD: SIGNIFICANT CHANGE UP (ref 7–13)
PO2 BLDA: 89 MMHG — SIGNIFICANT CHANGE UP (ref 83–108)
POTASSIUM SERPL-MCNC: 3.4 MMOL/L — LOW (ref 3.5–5.3)
POTASSIUM SERPL-MCNC: 4.2 MMOL/L — SIGNIFICANT CHANGE UP (ref 3.5–5.3)
POTASSIUM SERPL-SCNC: 3.4 MMOL/L — LOW (ref 3.5–5.3)
POTASSIUM SERPL-SCNC: 4.1 MMOL/L — SIGNIFICANT CHANGE UP (ref 3.5–5.3)
POTASSIUM SERPL-SCNC: 4.2 MMOL/L — SIGNIFICANT CHANGE UP (ref 3.5–5.3)
PROT SERPL-MCNC: 5 GM/DL — LOW (ref 6–8.3)
PROT SERPL-MCNC: 5.1 GM/DL — LOW (ref 6–8.3)
PROT UR-MCNC: 100 MG/DL
RBC # BLD: 3.5 M/UL — LOW (ref 3.8–5.2)
RBC # BLD: 3.78 M/UL — LOW (ref 3.8–5.2)
RBC # BLD: 3.83 M/UL — SIGNIFICANT CHANGE UP (ref 3.8–5.2)
RBC # FLD: 14.8 % — HIGH (ref 10.3–14.5)
RBC # FLD: 14.9 % — HIGH (ref 10.3–14.5)
RBC # FLD: 15.3 % — HIGH (ref 10.3–14.5)
RBC CASTS # UR COMP ASSIST: 5 /HPF — HIGH (ref 0–4)
SAO2 % BLDA: 98 % — SIGNIFICANT CHANGE UP (ref 94–98)
SODIUM SERPL-SCNC: 138 MMOL/L — SIGNIFICANT CHANGE UP (ref 135–145)
SODIUM SERPL-SCNC: 138 MMOL/L — SIGNIFICANT CHANGE UP (ref 135–145)
SODIUM SERPL-SCNC: 140 MMOL/L — SIGNIFICANT CHANGE UP (ref 135–145)
SP GR SPEC: 1.01 — SIGNIFICANT CHANGE UP (ref 1–1.03)
SQUAMOUS # UR AUTO: 1 /HPF — SIGNIFICANT CHANGE UP (ref 0–5)
URATE SERPL-MCNC: 3.3 MG/DL — SIGNIFICANT CHANGE UP (ref 2.5–7)
UROBILINOGEN FLD QL: 0.2 MG/DL — SIGNIFICANT CHANGE UP (ref 0.2–1)
WBC # BLD: 5.43 K/UL — SIGNIFICANT CHANGE UP (ref 3.8–10.5)
WBC # BLD: 8.47 K/UL — SIGNIFICANT CHANGE UP (ref 3.8–10.5)
WBC # FLD AUTO: 5.43 K/UL — SIGNIFICANT CHANGE UP (ref 3.8–10.5)
WBC # FLD AUTO: 8.47 K/UL — SIGNIFICANT CHANGE UP (ref 3.8–10.5)
WBC UR QL: 1 /HPF — SIGNIFICANT CHANGE UP (ref 0–5)

## 2025-02-18 PROCEDURE — 76705 ECHO EXAM OF ABDOMEN: CPT | Mod: 26

## 2025-02-18 PROCEDURE — 78580 LUNG PERFUSION IMAGING: CPT | Mod: 26

## 2025-02-18 PROCEDURE — 71045 X-RAY EXAM CHEST 1 VIEW: CPT | Mod: 26

## 2025-02-18 PROCEDURE — 99233 SBSQ HOSP IP/OBS HIGH 50: CPT

## 2025-02-18 RX ORDER — DEXTROSE 50 % IN WATER 50 %
25 SYRINGE (ML) INTRAVENOUS ONCE
Refills: 0 | Status: DISCONTINUED | OUTPATIENT
Start: 2025-02-18 | End: 2025-03-02

## 2025-02-18 RX ORDER — SOD PHOS DI, MONO/K PHOS MONO 250 MG
1 TABLET ORAL ONCE
Refills: 0 | Status: COMPLETED | OUTPATIENT
Start: 2025-02-18 | End: 2025-02-18

## 2025-02-18 RX ORDER — SODIUM BICARBONATE 1 MEQ/ML
325 SYRINGE (ML) INTRAVENOUS EVERY 8 HOURS
Refills: 0 | Status: DISCONTINUED | OUTPATIENT
Start: 2025-02-18 | End: 2025-02-20

## 2025-02-18 RX ORDER — DEXTROSE 50 % IN WATER 50 %
12.5 SYRINGE (ML) INTRAVENOUS ONCE
Refills: 0 | Status: DISCONTINUED | OUTPATIENT
Start: 2025-02-18 | End: 2025-03-02

## 2025-02-18 RX ORDER — DEXTROSE 50 % IN WATER 50 %
15 SYRINGE (ML) INTRAVENOUS ONCE
Refills: 0 | Status: DISCONTINUED | OUTPATIENT
Start: 2025-02-18 | End: 2025-03-02

## 2025-02-18 RX ORDER — LOPERAMIDE HCL 1 MG/7.5ML
2 SOLUTION ORAL
Refills: 0 | Status: DISCONTINUED | OUTPATIENT
Start: 2025-02-18 | End: 2025-02-19

## 2025-02-18 RX ORDER — SODIUM CHLORIDE 9 G/1000ML
1000 INJECTION, SOLUTION INTRAVENOUS
Refills: 0 | Status: DISCONTINUED | OUTPATIENT
Start: 2025-02-18 | End: 2025-03-02

## 2025-02-18 RX ORDER — I.V. FAT EMULSION 20 G/100ML
0.68 EMULSION INTRAVENOUS
Qty: 50 | Refills: 0 | Status: DISCONTINUED | OUTPATIENT
Start: 2025-02-18 | End: 2025-02-18

## 2025-02-18 RX ORDER — INSULIN LISPRO 100 U/ML
INJECTION, SOLUTION INTRAVENOUS; SUBCUTANEOUS EVERY 6 HOURS
Refills: 0 | Status: DISCONTINUED | OUTPATIENT
Start: 2025-02-18 | End: 2025-02-20

## 2025-02-18 RX ORDER — SODIUM/POT/MAG/CALC/CHLOR/ACET 35-20-5MEQ
1 VIAL (ML) INTRAVENOUS
Refills: 0 | Status: DISCONTINUED | OUTPATIENT
Start: 2025-02-18 | End: 2025-02-18

## 2025-02-18 RX ORDER — GLUCAGON 3 MG/1
1 POWDER NASAL ONCE
Refills: 0 | Status: DISCONTINUED | OUTPATIENT
Start: 2025-02-18 | End: 2025-03-02

## 2025-02-18 RX ADMIN — Medication 1 PACKET(S): at 18:22

## 2025-02-18 RX ADMIN — Medication 325 MILLIGRAM(S): at 22:22

## 2025-02-18 RX ADMIN — Medication 125 MILLIGRAM(S): at 11:28

## 2025-02-18 RX ADMIN — SODIUM CHLORIDE 125 MILLILITER(S): 9 INJECTION, SOLUTION INTRAVENOUS at 09:08

## 2025-02-18 RX ADMIN — MEROPENEM 1000 MILLIGRAM(S): 1 INJECTION INTRAVENOUS at 09:09

## 2025-02-18 RX ADMIN — Medication 2000 UNIT(S): at 22:03

## 2025-02-18 RX ADMIN — LOPERAMIDE HCL 2 MILLIGRAM(S): 1 SOLUTION ORAL at 18:23

## 2025-02-18 RX ADMIN — Medication 100 MILLIEQUIVALENT(S): at 01:03

## 2025-02-18 RX ADMIN — Medication 1 APPLICATION(S): at 08:49

## 2025-02-18 RX ADMIN — Medication 100 MILLIGRAM(S): at 11:28

## 2025-02-18 RX ADMIN — DIPHENOXYLATE HYDROCHLORIDE AND ATROPINE SULFATE 1 TABLET(S): .025; 2.5 TABLET ORAL at 09:08

## 2025-02-18 RX ADMIN — Medication 1 GRAM(S): at 11:28

## 2025-02-18 RX ADMIN — INSULIN LISPRO 2: 100 INJECTION, SOLUTION INTRAVENOUS; SUBCUTANEOUS at 18:22

## 2025-02-18 RX ADMIN — Medication 125 MILLIGRAM(S): at 05:40

## 2025-02-18 RX ADMIN — Medication 1 GRAM(S): at 05:40

## 2025-02-18 RX ADMIN — AMLODIPINE BESYLATE 10 MILLIGRAM(S): 10 TABLET ORAL at 09:08

## 2025-02-18 RX ADMIN — FILGRASTIM 480 MICROGRAM(S): 300 INJECTION, SOLUTION INTRAVENOUS; SUBCUTANEOUS at 09:09

## 2025-02-18 RX ADMIN — I.V. FAT EMULSION 20.83 GM/KG/DAY: 20 EMULSION INTRAVENOUS at 22:02

## 2025-02-18 RX ADMIN — Medication 125 MILLIGRAM(S): at 18:23

## 2025-02-18 RX ADMIN — Medication 1 GRAM(S): at 18:23

## 2025-02-18 RX ADMIN — SODIUM CHLORIDE 50 MILLILITER(S): 9 INJECTION, SOLUTION INTRAVENOUS at 11:28

## 2025-02-18 RX ADMIN — Medication 1 GRAM(S): at 22:25

## 2025-02-18 RX ADMIN — Medication 40 MILLIGRAM(S): at 09:08

## 2025-02-18 RX ADMIN — Medication 1 EACH: at 22:02

## 2025-02-18 RX ADMIN — Medication 20 MILLIGRAM(S): at 22:02

## 2025-02-18 RX ADMIN — MEROPENEM 1000 MILLIGRAM(S): 1 INJECTION INTRAVENOUS at 22:03

## 2025-02-18 RX ADMIN — Medication 100 MILLIEQUIVALENT(S): at 00:08

## 2025-02-18 RX ADMIN — Medication 125 MILLIGRAM(S): at 22:25

## 2025-02-18 NOTE — PROGRESS NOTE ADULT - ASSESSMENT
69 year old woman with the above PMH including a recent diagnosis of pancreatic cancer on chemotherapy admitted with pseudomonas sepsis, N&V, diarrhea.  At the present time her cardiac status is stable on midodrine.  Will continue.  To be seen by Dr Arias.  Antibiotics as per ID.  Continue medications for the above GI symptomatology.  Will follow     02/18/25-Patient appears improved.  She is still showing signs of intravascular depletion and hypokalemia-needs fluids and potassium.  Cardiac status stable with stable BP.  Heart rates still variable but I suspect this will also stabilize as we treat her sepsis and fluid/electrolyte status    2/19/25-Cardiac status stable.  Awaiting today's labs.  Continue present management

## 2025-02-18 NOTE — PROGRESS NOTE ADULT - ASSESSMENT
69-year-old female with stage IV pancreatic adenocarcinoma presenting with neutropenic fever and severe treatment-related toxicities from modified FOLFIRINOX and investigational EMILY inhibitor [id number].    1. Neutropenic Fever:  Patient presents with grade 4 neutropenia (ANC < 500) with initial febrile episode. Currently on appropriate broad-spectrum coverage with cefepime. Has remained afebrile on current regimen with slight improvement in WBC  Started on filgrastim 480mcg daily for count recovery- WBC has now recovered and can dc neupogen - today wbc 5.43, Hb 10.4, plt 45k     2. Severe Thrombocytopenia:  Platelet count critically low at 26k.  High risk for bleeding complications, especially in setting of ongoing GI symptoms. Will require platelet transfusion support.  improved to 45k today     3. Treatment-Related GI Toxicity:  Severe diarrhea and inability to tolerate PO intake, likely multifactorial from both FOLFIRINOX (particularly irinotecan component) and study drug [id number]. Consider DPD deficiency given severity of symptoms. Currently managed with cholestyramine and lomotil with some improvement. Requiring TPN support.  ppx po vancomycin started- GI following     4. Nutritional Status:  Significant decline in oral intake due to severe mucositis and GI symptoms. Currently on TPN support. Will require careful nutritional monitoring and support during recovery.    5. DVT Prophylaxis:  History of left cephalic vein thrombosis. Currently on appropriate DVT prophylaxis given high-risk status (active malignancy, hospitalization).    Overall, patient experiencing significant toxicities from current treatment regimen requiring dose modifications and possibly alternative treatment strategies moving forward. Will require close coordination with primary oncology team at [facility] [place] regarding future treatment plans.  - will need to continue with SCDs   - severe thrombocytopenia prohibits chemical prophylaxis     6. Acute hypoxic episode - 2/15 - likely secondary to volume overload - responsive to diuretics 2/16    Plan:  #Neutropenic Septic shock   - hemodynamically stable   - Continue meropenem started on 2/15 as well as fluconazole and po vanc  - Today w/ improvement in counts- WBC has now recovered and can dc neupogen - today wbc 5.43, Hb 10.4, plt 45k   - Daily CBC monitoring    #Acute hypoxic respiratory failure likely secondary to hypervolemia   - response to diuretics - will need to monitor renal function given slight rise in CR to 1.73 today   - now on RA doing well   - will need to monitor I/Os especially while on PPN     # EMPIRIC TREATMENT FOR ESOPHAGEAL CANDIDIASIS   - continue with DIFLUCAN     # Volume status   - recommmend I/Os     #Anemia   - received two units of pRBCs t date   - Hb 10.4 today - will continue to monitor, keep type and screen active     #Thrombocytopenia  -Transfuse for plts < 15   - Monitor daily platelet counts    #GI Toxicity- acute diarrhea   - largely improved.   - Continue cholestyramine and lomotil  - Consider DPD testing, and risk of irinotecan toxicity increases with genetic variants associated with reduced UGT enzyme activity, such as [id number]  - Continue TPN support  - Oral care protocol  - GI following     #Care Coordination  -will continue with ongoing communication with  [facility]  - Review clinical trial protocol regarding toxicity management  - Consider dose modifications for future therapy    will follow and communicate with [facility]

## 2025-02-18 NOTE — PROGRESS NOTE ADULT - SUBJECTIVE AND OBJECTIVE BOX
Date of service: 02-18-25 @ 11:37    Sitting in bed in NAD  Has dysphagia --> concern for CMV esophagitis was raised  No further fever    ROS: no fever or chills; denies dizziness, no HA, no SOB or cough, no abdominal pain, no diarrhea or constipation; no dysuria, no legs pain, no rashes    MEDICATIONS  (STANDING):  amLODIPine   Tablet 10 milliGRAM(s) Oral daily  chlorhexidine 2% Cloths 1 Application(s) Topical <User Schedule>  cholecalciferol 2000 Unit(s) Oral at bedtime  dextrose 5%. 1000 milliLiter(s) (100 mL/Hr) IV Continuous <Continuous>  dextrose 5%. 1000 milliLiter(s) (50 mL/Hr) IV Continuous <Continuous>  dextrose 50% Injectable 25 Gram(s) IV Push once  dextrose 50% Injectable 12.5 Gram(s) IV Push once  dextrose 50% Injectable 25 Gram(s) IV Push once  famotidine Injectable 20 milliGRAM(s) IV Push daily  fluconAZOLE IVPB      fluconAZOLE IVPB 200 milliGRAM(s) IV Intermittent every 24 hours  glucagon  Injectable 1 milliGRAM(s) IntraMuscular once  insulin lispro (ADMELOG) corrective regimen sliding scale   SubCutaneous every 6 hours  lactated ringers. 1000 milliLiter(s) (50 mL/Hr) IV Continuous <Continuous>  lipid, fat emulsion (Fish Oil and Plant Based) 20% Infusion 0.681 Gm/kG/Day (20.8 mL/Hr) IV Continuous <Continuous>  loperamide 2 milliGRAM(s) Oral four times a day  meropenem Injectable 1000 milliGRAM(s) IV Push every 12 hours  pantoprazole  Injectable 40 milliGRAM(s) IV Push daily  Parenteral Nutrition - Adult 1 Each (75 mL/Hr) TPN Continuous <Continuous>  sucralfate suspension 1 Gram(s) Oral four times a day  vancomycin    Solution 125 milliGRAM(s) Oral every 6 hours    Vital Signs Last 24 Hrs  T(C): 36.7 (18 Feb 2025 09:07), Max: 37.1 (17 Feb 2025 12:42)  T(F): 98.1 (18 Feb 2025 09:07), Max: 98.7 (17 Feb 2025 12:42)  HR: 109 (18 Feb 2025 10:00) (86 - 116)  BP: 131/77 (18 Feb 2025 10:00) (122/71 - 152/140)  BP(mean): 93 (18 Feb 2025 10:00) (85 - 146)  RR: 23 (18 Feb 2025 10:00) (23 - 30)  SpO2: 97% (18 Feb 2025 09:00) (97% - 100%)    Parameters below as of 18 Feb 2025 09:00  Patient On (Oxygen Delivery Method): nasal cannula  O2 Flow (L/min): 3     Physical exam:    Constitutional:  No acute distress  HEENT: NC/AT, EOMI, PERRLA, conjunctivae clear; ears and nose atraumatic; pharynx benign  Neck: supple; thyroid not palpable  Back: no tenderness  Respiratory: respiratory effort normal; clear to auscultation  Cardiovascular: S1S2 regular, no murmurs  Abdomen: soft, mild periombilical tender, not distended, positive BS; no liver or spleen organomegaly  Genitourinary: no suprapubic tenderness  Lymphatic: no LN palpable  Musculoskeletal: no muscle tenderness, no joint swelling or tenderness  Extremities: no pedal edema  Neurological/ Psychiatric: AxOx3, judgement and insight normal; moving all extremities  Skin: no rashes; right lower chest wall dry, scabbed ulcer; no discharge     Labs: reviewed                        10.4   5.43  )-----------( 45       ( 18 Feb 2025 08:42 )             29.4     02-18    138  |  112[H]  |  69[H]  ----------------------------<  223[H]  4.1   |  16[L]  |  1.44[H]    Ca    8.6      18 Feb 2025 08:42  Phos  2.0     02-18  Mg     2.2     02-18    TPro  5.1[L]  /  Alb  2.4[L]  /  TBili  0.6  /  DBili  x   /  AST  27  /  ALT  21  /  AlkPhos  131[H]  02-18    C-Reactive Protein: 209.0 mg/mL (02-15-25 @ 05:17)  C-Reactive Protein: 233.0 mg/mL (02-14-25 @ 06:50)  Ferritin: 1798 ng/mL (02-13-25 @ 18:46)                        10.1   1.30  )-----------( 37       ( 17 Feb 2025 05:59 )             28.5     02-17    140  |  110[H]  |  70[H]  ----------------------------<  227[H]  2.3[LL]   |  17[L]  |  1.73[H]    Ca    8.4[L]      17 Feb 2025 05:59  Phos  3.2     02-17  Mg     1.5     02-17    TPro  5.2[L]  /  Alb  2.7[L]  /  TBili  0.8  /  DBili  x   /  AST  15  /  ALT  19  /  AlkPhos  101  02-17    C-Reactive Protein: 209.0 mg/mL (02-15-25 @ 05:17)  C-Reactive Protein: 233.0 mg/mL (02-14-25 @ 06:50)  Ferritin: 1798 ng/mL (02-13-25 @ 18:46)                        9.2    0.71  )-----------( 26       ( 16 Feb 2025 06:12 )             26.8     02-16    136  |  108  |  47[H]  ----------------------------<  172[H]  3.4[L]   |  17[L]  |  1.56[H]    Ca    8.0[L]      16 Feb 2025 06:12  Phos  3.1     02-16  Mg     1.8     02-16    TPro  5.1[L]  /  Alb  2.9[L]  /  TBili  1.1  /  DBili  x   /  AST  5[L]  /  ALT  19  /  AlkPhos  91  02-16    C-Reactive Protein: 209.0 mg/mL (02-15-25 @ 05:17)  C-Reactive Protein: 233.0 mg/mL (02-14-25 @ 06:50)  Ferritin: 1798 ng/mL (02-13-25 @ 18:46)                        8.0    0.23  )-----------( 20       ( 14 Feb 2025 06:50 )             24.2     02-14    134[L]  |  108  |  21  ----------------------------<  141[H]  3.3[L]   |  16[L]  |  1.03    Ca    8.5      14 Feb 2025 06:50  Phos  3.1     02-14  Mg     2.6     02-14    TPro  4.5[L]  /  Alb  1.5[L]  /  TBili  0.8  /  DBili  0.4[H]  /  AST  8[L]  /  ALT  14  /  AlkPhos  104  02-14    C-Reactive Protein: 233.0 mg/mL (02-14-25 @ 06:50)  Ferritin: 1798 ng/mL (02-13-25 @ 18:46)                        8.7    0.19  )-----------( 33       ( 13 Feb 2025 06:52 )             25.7     02-13    137  |  109[H]  |  20  ----------------------------<  117[H]  2.6[LL]   |  18[L]  |  1.02    Ca    8.7      13 Feb 2025 06:52  Phos  2.3     02-13  Mg     1.6     02-13    TPro  4.4[L]  /  Alb  1.1[L]  /  TBili  0.7  /  DBili  x   /  AST  11[L]  /  ALT  16  /  AlkPhos  94  02-13     LIVER FUNCTIONS - ( 13 Feb 2025 06:52 )  Alb: 1.1 g/dL / Pro: 4.4 gm/dL / ALK PHOS: 94 U/L / ALT: 16 U/L / AST: 11 U/L / GGT: x           Culture - Blood (collected 11 Feb 2025 09:00)  Source: .Blood BLOOD  Preliminary Report (12 Feb 2025 13:01):    No growth at 24 hours    Culture - Blood (collected 11 Feb 2025 08:50)  Source: .Blood BLOOD  Preliminary Report (12 Feb 2025 13:01):    No growth at 24 hours    Urinalysis with Rflx Culture (collected 09 Feb 2025 01:25)    Culture - Urine (collected 09 Feb 2025 01:25)  Source: Clean Catch  Final Report (11 Feb 2025 08:39):    50,000 - 99,000 CFU/mL Pseudomonas aeruginosa  Organism: Pseudomonas aeruginosa (11 Feb 2025 08:39)  Organism: Pseudomonas aeruginosa (11 Feb 2025 08:39)      Method Type: REJI      -  Amikacin: S <=16      -  Aztreonam: S <=4      -  Cefepime: S <=2      -  Ceftazidime: S <=1      -  Ciprofloxacin: S <=0.25      -  Imipenem: S 2      -  Levofloxacin: S <=0.5      -  Meropenem: S <=1      -  Piperacillin/Tazobactam: S <=8    Culture - Blood (collected 08 Feb 2025 22:00)  Source: .Blood BLOOD  Gram Stain (09 Feb 2025 19:04):    Growth in aerobic bottle: Gram Negative Rods  Final Report (11 Feb 2025 07:53):    Growth in aerobic bottle: Pseudomonas aeruginosa    Direct identification is available within approximately 3-5    hours either by Blood Panel Multiplexed PCR or Direct    MALDI-TOF. Details: [email or web address]  Organism: Blood Culture PCR  Pseudomonas aeruginosa (11 Feb 2025 07:53)  Organism: Pseudomonas aeruginosa (11 Feb 2025 07:53)      Method Type: REJI      -  Aztreonam: S <=4      -  Cefepime: S <=2      -  Ceftazidime: S <=1      -  Ciprofloxacin: S <=0.25      -  Imipenem: S <=1      -  Levofloxacin: S <=0.5      -  Meropenem: S <=1      -  Piperacillin/Tazobactam: S <=8  Organism: Blood Culture PCR (11 Feb 2025 07:53)      Method Type: PCR      -  Pseudomonas aeruginosa: Detec    Culture - Blood (collected 08 Feb 2025 21:55)  Source: .Blood BLOOD  Gram Stain (09 Feb 2025 19:32):    Growth in aerobic bottle: Gram Negative Rods  Final Report (11 Feb 2025 07:54):    Growth in aerobic bottle: Pseudomonas aeruginosa    See previous culture [id number]    Radiology: all available radiological tests reviewed    < from: US Abdomen Upper Quadrant Right (02.09.25 @ 14:55) >  Distended gallbladder with layering sludge.  8 mm right renal calculus without hydronephrosis.  Hepatomegaly.  Right renal cyst.  < end of copied text >    < from: CT Abdomen and Pelvis No Cont (02.09.25 @ 03:01) >  1.   Study somewhat limited due to absence of contrast.  2.   Axial images 71-76 of series 301 and concomitant coronal images demonstrate prominent soft tissue inseparable from the body of the pancreas consistent with pancreatic neoplasm.  3.   Some distension of gallbladder could be further evaluated with right upper quadrant sonography. No definite biliary dilatation.  < end of copied text >    Advanced directives addressed: full resuscitation

## 2025-02-18 NOTE — CHART NOTE - NSCHARTNOTEFT_GEN_A_CORE
Clinical Nutrition PN Follow Up Note    *70 y/o F w/ recent diagnosis of primary pancreatic adenocarcinoma of head with tumor in umbilicus, chemo-induced pancytopenia and significant PMH of SCC lip, cyclical vomiting syndrome, HTN, HPL, Gout, GERD, CVA on Aggrenox and others had been admitted to [facility] with septic shock on 2025, nausea, vomiting and diarrhea requiring Levophed drip, was transferred out of ICU to regular floor last night.  Patient was being followed by GI, ID, Cardiology and Onc there.  Patient has been transferred to  today on patient's  (GI Dr. Pleitez) request.  At this time, patient c/o non-bloody diarrhea x 3 in last 24 hours.  She denies any abdominal pain, nausea, but has some dry heaves.  She denies any fever, chills, chest pain, palpitation, constipation or dysuria. Initially, she was started on Vancomycin and Zosyn, but now she has been on Cefepime as her blood culture and urine cultures are positive for Pseudomonas aeruginosa. Currently, patient is on Cefepime.  ID had also recommended Flagyl, but patient had refused it, as it causes nausea to her. Noted with Metabolic acidosis with NAG due to GI loss. Likely chemo-induced diarrhea and vomiting. Better than before. Cutaneous lesion on RUQ area below right breast, unclear etiology, ? Ecthyma gangrenosum. Admitting diagnosis: pancreatic ca  *: Pt transfer from [facility]; seen by RD and met criteria for PCM. Pt reports only being able to tolerate some sips of water and tea at this time. Reports tried eating small bite of mashed banana and felt burning sensation down her throat. Reports #; RD unable to obtain bedscale wt d/t bedscale not working. Admit wt per # note with 1+ and 2+ edema skewing wt. Pt appears overweight, NFPE reveals mild- mod muscle/fat wasting at this time. Recommend to continue with PO diet and encourage intake as tolerated. RD consulted to initiate PN d/t PO intolerance. Will initiate TPN through port.  *2/15: Remains on low fiber diet with minimal PO intake. Still w/ multiple episodes of diarrhea, if persists can consider adding 5mg zinc into PN bag. No other acute events overnight. D/w Dr. Joyner will c/w TPN today.   *: Raised area noted to mid sternum. RCW port intact.- no swelling noted, no redness. Port flushed without resistance. + blood return noted. Assessed with IV team Amy. Per pt no Pain on palpation. CT Chest. TPN on hold until CT read. CT chest from earlier in the day revealed new bilateral pleural effusions with bibasilar consolidated lung and ascites. CXR now shows low lung volumes and bibasilar opacities. Rigors post transfusion - Rapid response called;  Dr. Pleitez requesting ICU consult- concerned for pt.  requesting assessment of patient STAT. Pt transferred to SICU. ? third spacing, and possible PNA. Per pt still drinking small sips of water and tea. D/w Dr. Fortune will keep total volume same in PN bag today, and will c/w TPN.     *TPN initiated 2/2 PO intolerance, + Mucositis, possible candidal esophagitis    *current status: tx SICU, po remains minimal. On bipap. On additional LR d/t diuresis per critical care PA: "malignancy/severe protein-calorie malnutrition/hypoalbuminemia causing diffuse third spacing, caution with further diuresis as this is not truly a volume overload/acute CHF issue" also on IV albumin  c/w TPN  STRONGLY suggest to Confirm goals of care regarding LONG-TERM nutrition support - However, LONG-TERM Nutrition support is not recommended with advanced cancer as studies show that there is no improvement of dehydration symptoms, quality of life, or survival. It also increases the risk for peripheral edema, ascites, and pleural effusions. Will continue to provide nutr within C though    *new pertinent IV albumin, cholecalciferol 2000 units, LR, famotidine, 2g Mg Sulfate x 24 hrs, abx, 70 mEq KCl given x 24 hrs (oral and IV)    *labs reviewed; Na WNL, now on additional IVF + TPN, suggest to continue. Persistent Hypokalemia noted despite repletion outside bag, will increase in PN bag again today. Mg LOW - will increase to 8 mEq in bag today. Phos WNL. Pt likely experiencing RFS continue to monitor and replete lytes outside of PN bag prn. T bili WNL will c/w trace elements. C/w MVI and thiamine in PN bag. Last POCT WNL; maintain between 140- 180 mg/DL - none taken x 24 hrs. Per PN policy please obtain q6 hrs while receiving PN.  TG WNL will c/w 50g of lipids daily.  Will continue to titrate dextrose up 50-75g/day until goal rate of 325g dextrose reached (GIR ~3.1 g/kg/min).  Also of note, Cl elevated, CO2 depressed. Will add more acetate vs Cl into TPN bag - 207 vs 100, respectively.         140  |  110[H]  |  70[H]  ----------------------------<  227[H]  2.3[LL]   |  17[L]  |  1.73[H]    Ca    8.4[L]      2025 05:59  Phos  3.2       Mg     1.5         TPro  5.2[L]  /  Alb  2.7[L]  /  TBili  0.8  /  DBili  x   /  AST  15  /  ALT  19  /  AlkPhos  101      BMI: BMI (kg/m2): 27.8 (25 @ 05:46)    BP: 122/71 (25 @ 05:00) (96/64 - 177/89)Vital Signs Last 24 Hrs  T(C): 36.7 (25 @ 05:00), Max: 38.1 (02-15-25 @ 23:00)  T(F): 98 (25 @ 05:00), Max: 100.5 (02-15-25 @ 23:00)  HR: 76 (25 @ 05:00) (76 - 137)  BP: 122/71 (25 @ 05:00) (96/64 - 177/89)  BP(mean): 87 (25 @ 05:00) (70 - 93)  RR: 29 (25 @ 05:00) (16 - 29)  SpO2: 95% (25 @ 05:00) (92% - 100%)    Lipid Panel: Date/Time: 02-15-25 @ 05:17  Triglycerides, Serum: 120    **no POCT taken x 24 hrs    Iron Total: 34 ug/dL (25 @ 18:46)  Folate, Serum: >20.0 ng/mL (25 @ 18:46)  Vitamin B12, Serum: 1457 pg/mL (25 @ 18:46)  Vitamin D, 25-Hydroxy: 23.0 ng/mL (25 @ 18:46) ** c/w deficiency    *I&O's Detail    2025 07:01  -  2025 07:00  --------------------------------------------------------  IN:    Fat Emulsion (Fish Oil &amp; Plant Based) 20% Infusion: 175 mL    IV PiggyBack: 100 mL    Lactated Ringers: 500 mL    TPN (Total Parenteral Nutrition): 865 mL  Total IN: 1640 mL    OUT:    Indwelling Catheter - Urethral (mL): 4600 mL  Total OUT: 4600 mL    Total NET: -2960 mL  * fluid status: negative; large UO, full TPN volume not received x 24 hrs; LR added. Will continue to monitor/adjust prn to maintain fluid balance along with MD   *last (+) BM doc'd 2/14 x 5 - diarrhea.  pt not on standing bowel regimen.  *edema: 2+ generalized, 3+ L/R ankle  *PI: L Buttock S1, R Buttock S1    *malnutrition: Pt continues to meet criteria for severe protein calorie malnutrition in context of acute on chronic illness r/t decreased ability to meet increased nutrient needs 2/2 PO intolerance, N/V and diarrhea, on chemo for pancreatic ca AEB meeting <50% ENN > 5 days, mild-mod muscle/fat wasting    Estimated Needs: based on 73.4 Kg (wt from EMR admit wt )  Calories: 1812 - 2175 Kcal (25 - 30 Kcal/Kg)  Protein: 108 - 145 g (1.5 - 2.0 g/Kg)  Fluids: 1450 - 1812 mL (20 - 25 mL/Kg)    Diet, Low Fiber:   1500mL Fluid Restriction ([id number])  Kosher  Supplement Feeding Modality:  Oral  Ensure Plant-Based Cans or Servings Per Day:  3       Frequency:  Daily (02-15-25 @ 20:36) [Active]  Parenteral Nutrition - Adult 1 Each (75 mL/Hr) TPN Continuous <Continuous>, 02-15-25 @ 22:00, 22:00, Stop order after: 1 Days    Weight History:  Daily Weight in k.6 (15 Feb 2025 06:17)  Height (cm): 162.6 (25 @ 02:39)  Weight (kg): 73.4 (25 @ 05:46), 66.8 (25 @ 02:39)  BMI (kg/m2): 27.8 (25 @ 05:46), 25.3 (25 @ 02:39)  BSA (m2): 1.79 (25 @ 05:46), 1.72 (25 @ 02:39)    TPN Recommendations: via implanted infusion port  Total Volume: 1800 mL x 24 hours  125 g  Amino Acids  300 g Dextrose (dex goal 325g)  50 g Lipids 20% pending lipid panel  100 mEq Sodium Chloride  41 mEq Sodium Acetate  10 mmol Sodium Phosphate  0 mEq Potassium Chloride  26 mEq Potassium Acetate  30 mmol Potassium Phosphate  0 mEq Calcium Gluconate (CAPS out of PN solution)   8 mEq Magnesium Sulfate  200 mg Thiamine  1 ml Trace Elements  10 ml MVI    Total Calories        (Meets  100%  of  Estimated Energy needs  and  100%  Protein needs)      Additional Recommendations:    1) Daily weights  2) Strict I & O's **pt third spacing  3) Daily lyte checks including magnesium and phos - MONITOR FOR RFS and replete K, Phos, Mg outside PN bag prn  4) Weekly triglycerides/LFT checks  5) POCT q6hrs; maintain 140-180mg/dL***  6) C/w low fiber diet and encourage PO intake as tolerated  7) Continue to titrate dextrose 50-75g per day until goal of 325g reached  8) Confirm goals of care regarding long-term nutrition support. Would recommend PEG placement if long-term nutrition support is required as GI is functional and would be the preferred route for nutrition. However, long-term nutrition support is not recommended with advanced cancer as studies show that there is no improvement of dehydration symptoms, quality of life, or survival. It also increases the risk for peripheral edema, ascites, and pleural effusions.     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  Patient was being followed by GI, ID, Cardiology and Onc there.  Patient has been transferred to  today on patient's  (GI Dr. Pleitez) request.  At this time, patient c/o non-bloody diarrhea x 3 in last 24 hours.  She denies any abdominal pain, nausea, but has some dry heaves.  She denies any fever, chills, chest pain, palpitation, constipation or dysuria. Initially, she was started on Vancomycin and Zosyn, but now she has been on Cefepime as her blood culture and urine cultures are positive for Pseudomonas aeruginosa. Currently, patient is on Cefepime.  ID had also recommended Flagyl, but patient had refused it, as it causes nausea to her. Noted with Metabolic acidosis with NAG due to GI loss. Likely chemo-induced diarrhea and vomiting. Better than before. Cutaneous lesion on RUQ area below right breast, unclear etiology, ? Ecthyma gangrenosum. Admitting diagnosis: pancreatic ca  *: Pt transfer from [facility]; seen by RD and met criteria for PCM. Pt reports only being able to tolerate some sips of water and tea at this time. Reports tried eating small bite of mashed banana and felt burning sensation down her throat. Reports #; RD unable to obtain bedscale wt d/t bedscale not working. Admit wt per # note with 1+ and 2+ edema skewing wt. Pt appears overweight, NFPE reveals mild- mod muscle/fat wasting at this time. Recommend to continue with PO diet and encourage intake as tolerated. RD consulted to initiate PN d/t PO intolerance. Will initiate TPN through port.  *2/15: Remains on low fiber diet with minimal PO intake. Still w/ multiple episodes of diarrhea, if persists can consider adding 5mg zinc into PN bag. No other acute events overnight. D/w Dr. Joyner will c/w TPN today.   *: Raised area noted to mid sternum. RCW port intact.- no swelling noted, no redness. Port flushed without resistance. + blood return noted. Assessed with IV team Amy. Per pt no Pain on palpation. CT Chest. TPN on hold until CT read. CT chest from earlier in the day revealed new bilateral pleural effusions with bibasilar consolidated lung and ascites. CXR now shows low lung volumes and bibasilar opacities. Rigors post transfusion - Rapid response called;  Dr. Pleitez requesting ICU consult- concerned for pt.  requesting assessment of patient STAT. Pt transferred to SICU. ? third spacing, and possible PNA. Per pt still drinking small sips of water and tea. D/w Dr. Fortune will keep total volume same in PN bag today, and will c/w TPN.   *:  tx SICU, po remains minimal. On bipap. On additional LR d/t diuresis per critical care PA: "malignancy/severe protein-calorie malnutrition/hypoalbuminemia causing diffuse third spacing, caution with further diuresis as this is not truly a volume overload/acute CHF issue" also on IV albumin  c/w TPN  STRONGLY suggest to Confirm goals of care regarding LONG-TERM nutrition support - However, LONG-TERM Nutrition support is not recommended with advanced cancer as studies show that there is no improvement of dehydration symptoms, quality of life, or survival. It also increases the risk for peripheral edema, ascites, and pleural effusions. Will continue to provide nutr within GOC though    *TPN initiated 2/2 PO intolerance, + Mucositis, possible candidal esophagitis    *current status: plan to c/w TPN for now. RD discussed w/ MD Alycia and SICU PA Ethel Jurado need to confirm long term GOC regarding nutr support. Per GI - Esophageal pain is 2/2 esophagitis but unclear what type -- reflux vs. candida vs CMV or HSV etc. Plan to add imodium standing. ? EGD     *new pertinent meds: famotidine, cholecalciferol, abx, ADMELOG (0 units given x 24 hrs), LR, loperamide, Protonix; biotene oral rinse, diphenoxylate    *labs reviewed; Na WNL, on additional IVF + TPN, suggest to continue. K and Mg WNL, s/p repletion. Phos LOW - will increase in bag. Pt likely experiencing RFS continue to monitor and replete lytes outside of PN bag prn. T bili WNL will c/w trace elements. C/w MVI and thiamine in PN bag. No POCT taken x 24 hrs. Per PN policy please obtain q6 hrs while receiving PN.  TG WNL will c/w 50g of lipids daily.  Will continue to titrate dextrose up 50-75g/day until goal rate of 325g dextrose reached (GIR ~3.1 g/kg/min).  Also of note, Cl elevated, CO2 depressed. Will add more acetate vs Cl into TPN bag - 207 vs 100, respectively.         138  |  112[H]  |  69[H]  ----------------------------<  223[H]  4.1   |  16[L]  |  1.44[H]    Ca    8.6      2025 08:42  Phos  2.0       Mg     2.2         TPro  5.1[L]  /  Alb  2.4[L]  /  TBili  0.6  /  DBili  x   /  AST  27  /  ALT  21  /  AlkPhos  131[H]      BMI: BMI (kg/m2): 27.8 (25 @ 05:46)    BP: 122/71 (25 @ 05:00) (96/64 - 177/89)Vital Signs Last 24 Hrs  T(C): 36.7 (25 @ 05:00), Max: 38.1 (02-15-25 @ 23:00)  T(F): 98 (25 @ 05:00), Max: 100.5 (02-15-25 @ 23:00)  HR: 76 (25 @ 05:00) (76 - 137)  BP: 122/71 (25 @ 05:00) (96/64 - 177/89)  BP(mean): 87 (25 @ 05:00) (70 - 93)  RR: 29 (25 @ 05:00) (16 - 29)  SpO2: 95% (25 @ 05:00) (92% - 100%)    Lipid Panel: Date/Time: 02-15-25 @ 05:17  Triglycerides, Serum: 120    **no POCT taken x 24 hrs    Iron Total: 34 ug/dL (25 @ 18:46)  Folate, Serum: >20.0 ng/mL (25 @ 18:46)  Vitamin B12, Serum: 1457 pg/mL (25 @ 18:46)  Vitamin D, 25-Hydroxy: 23.0 ng/mL (25 @ 18:46) ** c/w deficiency    *I&O's Detail    2025 07:01  -  2025 07:00  --------------------------------------------------------  IN:    Fat Emulsion (Fish Oil &amp; Plant Based) 20% Infusion: 153 mL    IV PiggyBack: 300 mL    Lactated Ringers: 1354 mL    TPN (Total Parenteral Nutrition): 814 mL  Total IN: 2621 mL    OUT:    Indwelling Catheter - Urethral (mL): 3500 mL  Total OUT: 3500 mL    Total NET: -879 mL  * fluid status: negative; large UO, full TPN volume not received x 24 hrs; LR added. Will continue to monitor/adjust prn to maintain fluid balance along with MD   *last (+) BM doc'd 2/14 x 5 - diarrhea.  pt not on standing bowel regimen.  *edema: 1+ generalized, 2+ L/R ankle  *PI: L Buttock S1, R Buttock S1    *malnutrition: Pt continues to meet criteria for severe protein calorie malnutrition in context of acute on chronic illness r/t decreased ability to meet increased nutrient needs 2/2 PO intolerance, N/V and diarrhea, on chemo for pancreatic ca AEB meeting <50% ENN > 5 days, mild-mod muscle/fat wasting    Estimated Needs: based on 73.4 Kg (wt from EMR admit wt )  Calories: 1812 - 2175 Kcal (25 - 30 Kcal/Kg)  Protein: 108 - 145 g (1.5 - 2.0 g/Kg)  Fluids: 1450 - 1812 mL (20 - 25 mL/Kg)    Diet, Low Fiber:   1500mL Fluid Restriction ([id number])  Kosher  Supplement Feeding Modality:  Oral  Ensure Plant-Based Cans or Servings Per Day:  3       Frequency:  Daily (02-15-25 @ 20:36) [Active]  Parenteral Nutrition - Adult 1 Each (75 mL/Hr) TPN Continuous <Continuous>, 02-15-25 @ 22:00, 22:00, Stop order after: 1 Days    Weight History:  Daily Weight in k.4 (2025 05:00)  Daily Weight in k.6 (15 Feb 2025 06:17)  Height (cm): 162.6 (25 @ 02:39)  Weight (kg): 73.4 (25 @ 05:46), 66.8 (25 @ 02:39)  BMI (kg/m2): 27.8 (25 @ 05:46), 25.3 (25 @ 02:39)  BSA (m2): 1.79 (25 @ 05:46), 1.72 (25 @ 02:39)    TPN Recommendations: via implanted infusion port  Total Volume: 1800 mL x 24 hours  125 g  Amino Acids  300 g Dextrose (dex goal 325g)  50 g Lipids 20% pending lipid panel  100 mEq Sodium Chloride  41 mEq Sodium Acetate  10 mmol Sodium Phosphate  0 mEq Potassium Chloride  26 mEq Potassium Acetate  30 mmol Potassium Phosphate  0 mEq Calcium Gluconate (CAPS out of PN solution)   8 mEq Magnesium Sulfate  200 mg Thiamine  1 ml Trace Elements  10 ml MVI    Total Calories        (Meets  100%  of  Estimated Energy needs  and  100%  Protein needs)      Additional Recommendations:    1) Daily weights  2) Strict I & O's **pt third spacing  3) Daily lyte checks including magnesium and phos - MONITOR FOR RFS and replete K, Phos, Mg outside PN bag prn  4) Weekly triglycerides/LFT checks  5) POCT q6hrs; maintain 140-180mg/dL***  6) C/w low fiber diet and encourage PO intake as tolerated  7) Continue to titrate dextrose 50-75g per day until goal of 325g reached  8) Confirm goals of care regarding long-term nutrition support. Would recommend PEG placement if long-term nutrition support is required as GI is functional and would be the preferred route for nutrition. However, long-term nutrition support is not recommended with advanced cancer as studies show that there is no improvement of dehydration symptoms, quality of life, or survival. It also increases the risk for peripheral edema, ascites, and pleural effusions.     *will continue to monitor and adjust PN prn*  Marce Mitchell, MS, RDN, CNSC, [phone]  Certified Nutrition

## 2025-02-18 NOTE — PROGRESS NOTE ADULT - SUBJECTIVE AND OBJECTIVE BOX
CHIEF COMPLAINT: Patient is a 69y old  Female who presents with a chief complaint of     HPI: HPI:   History of Present Illness:    HPI: Patient is a 69-year-old female recent diagnosis of primary pancreatic adenocarcinoma of head with tumor in umbilicus, chemo-induced pancytopenia and significant PMH of SCC lip, cyclical vomiting syndrome, HTN, HPL, Gout, GERD, CVA on Aggrenox and others had been admitted to [facility] with septic shock on 02/09/2025, nausea, vomiting and diarrhea requiring Levophed drip, was transferred out of ICU to regular floor last night.  Patient was being followed by GI, ID, Cardiology and Onc there.  Patient has been transferred to  today on patient's  (GI Dr. Pleitez) request.  At this time, patient c/o non-bloody diarrhea x 3 in last 24 hours.  She denies any abdominal pain, nausea, but has some dry heaves.  She denies any fever, chills, chest pain, palpitation, constipation or dysuria. Initially, she was started on Vancomycin and Zosyn, but now she has been on Cefepime as her blood culture and urine cultures are positive for Pseudomonas aeruginosa. Currently, patient is on Cefepime.  ID had also recommended Flagyl, but patient had refused it, as it causes nausea to her.    Sig labs: WBC 0.45k, N 82%, Hb 7.8, Platelets 59k, Bicarb 19, AG 9, , Cr 0.99, TP 4.6, Alb 1.2, LFTs wnl.  Mg 1.9, PO4 2.4.  Prior C.diff negative.     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  Her antibiotics were changes, electrolytes repleted and was given 3 units of blood.  She was on bipap overnight but is now comfortable on nasal O2 with good sats.    2/19/25-no significant events over night.  Patient was on Bipap.  Now off.            Soc Hx:     FAMILY HISTORY:      Allergies: Allergies    No Known Allergies    Intolerances          REVIEW OF SYSTEMS:    Patient feels better    ICU Vital Signs Last 24 Hrs  T(C): 36.8 (18 Feb 2025 05:00), Max: 37.1 (17 Feb 2025 12:42)  T(F): 98.2 (18 Feb 2025 05:00), Max: 98.7 (17 Feb 2025 12:42)  HR: 106 (18 Feb 2025 06:00) (86 - 110)  BP: 139/82 (18 Feb 2025 06:00) (122/71 - 152/140)  BP(mean): 99 (18 Feb 2025 06:00) (85 - 146)  ABP: --  ABP(mean): --  RR: 24 (18 Feb 2025 06:00) (24 - 30)  SpO2: 99% (18 Feb 2025 06:00) (95% - 100%)    O2 Parameters below as of 18 Feb 2025 06:00  Patient On (Oxygen Delivery Method): nasal cannula                    12 Feb 2025 07:01  -  13 Feb 2025 07:00  --------------------------------------------------------  IN: 0 mL / OUT: 900 mL / NET: -900 mL          PHYSICAL EXAM:   Patient in NAD  Neck: No JVD; Carotids:  2+ without bruits  Respiratory:  Clear to A&P  Cardiovascular: S1 and S2, regular rate and rhythm, no Murmurs, gallops or rubs        MEDICATIONS  (STANDING):  amLODIPine   Tablet 10 milliGRAM(s) Oral daily  chlorhexidine 2% Cloths 1 Application(s) Topical <User Schedule>  cholecalciferol 2000 Unit(s) Oral at bedtime  famotidine Injectable 20 milliGRAM(s) IV Push every 12 hours  filgrastim-sndz (ZARXIO) Injectable 480 MICROGram(s) SubCutaneous daily  fluconAZOLE IVPB      fluconAZOLE IVPB 200 milliGRAM(s) IV Intermittent every 24 hours  lactated ringers. 1000 milliLiter(s) (125 mL/Hr) IV Continuous <Continuous>  lipid, fat emulsion (Fish Oil and Plant Based) 20% Infusion 0.681 Gm/kG/Day (20.8 mL/Hr) IV Continuous <Continuous>  loperamide 2 milliGRAM(s) Oral four times a day  meropenem Injectable 1000 milliGRAM(s) IV Push every 12 hours  pantoprazole  Injectable 40 milliGRAM(s) IV Push daily  Parenteral Nutrition - Adult 1 Each (75 mL/Hr) TPN Continuous <Continuous>  sucralfate suspension 1 Gram(s) Oral four times a day  vancomycin    Solution 125 milliGRAM(s) Oral every 6 hours        LABS: All Labs Reviewed:  Blood Culture: Organism --  Gram Stain Blood -- Gram Stain --  Specimen Source .Blood BLOOD  Culture-Blood --    Organism --  Gram Stain Blood -- Gram Stain --  Specimen Source .Blood BLOOD  Culture-Blood --    Organism Pseudomonas aeruginosa  Gram Stain Blood -- Gram Stain --  Specimen Source Clean Catch  Culture-Blood --    Organism Blood Culture PCR  Gram Stain Blood -- Gram Stain   Growth in aerobic bottle: Gram Negative Rods  Specimen Source .Blood BLOOD  Culture-Blood --    Organism --  Gram Stain Blood -- Gram Stain   Growth in aerobic bottle: Gram Negative Rods  Specimen Source .Blood BLOOD  Culture-Blood --      BNP   CBC             WBC Count: 0.45 K/uL (02-12 @ 09:15)              Hemoglobin: 7.8 g/dL (02-12 @ 09:15)  Hemoglobin: 8.9 g/dL (02-11 @ 08:45)  Hemoglobin: 9.6 g/dL (02-10 @ 08:46)  Hemoglobin: 10.1 g/dL (02-09 @ 05:41)  Hemoglobin: 8.9 g/dL (02-08 @ 23:27)              Hematocrit: 23.6 % (02-12 @ 09:15)  Hematocrit: 26.7 % (02-11 @ 08:45)  Hematocrit: 29.6 % (02-10 @ 08:46)  Hematocrit: 31.5 % (02-09 @ 05:41)  Hematocrit: 28.4 % (02-08 @ 23:27)              Mean Cell Volume: 90.4 fl (02-12 @ 09:15)  Mean Cell Volume: 89.9 fl (02-11 @ 08:45)  Mean Cell Volume: 91.6 fl (02-10 @ 08:46)  Mean Cell Volume: 92.6 fl (02-09 @ 05:41)  Mean Cell Volume: 93.7 fl (02-08 @ 23:27)              Platelet Count - Automated: 59 K/uL (02-12 @ 09:15)  Platelet Count - Automated: 13 K/uL (02-11 @ 08:45)  Platelet Count - Automated: 25 K/uL (02-10 @ 08:46)  Platelet Count - Automated: 60 K/uL (02-09 @ 05:41)  Platelet Count - Automated: 64 K/uL (02-08 @ 23:27)                        10.1   1.30  )-----------( 37       ( 17 Feb 2025 05:59 )             28.5                                      Cardiac markers   Troponin I, High Sensitivity (02.08.25 @ 23:27) Troponin I, High Sensitivity Result: 41.5                                       Chems        Sodium: 140 mmol/L (02-12 @ 09:15)  Sodium: 140 mmol/L (02-11 @ 06:39)  Sodium: 141 mmol/L (02-10 @ 08:46)  Sodium: 146 mmol/L (02-09 @ 05:41)  Sodium: 144 mmol/L (02-08 @ 23:27)          Potassium: 3.5 mmol/L (02-12 @ 09:15)  Potassium: 3.4 mmol/L (02-11 @ 06:39)  Potassium: 3.3 mmol/L (02-10 @ 08:46)  Potassium: 4.3 mmol/L (02-09 @ 05:41)  Potassium: 4.2 mmol/L (02-08 @ 23:27)          Blood Urea Nitrogen: 22 mg/dL (02-12 @ 09:15)  Blood Urea Nitrogen: 23 mg/dL (02-11 @ 06:39)  Blood Urea Nitrogen: 28 mg/dL (02-10 @ 08:46)  Blood Urea Nitrogen: 29 mg/dL (02-09 @ 05:41)  Blood Urea Nitrogen: 32 mg/dL (02-08 @ 23:27)          Creatinine 0.99  Creatinine 1.10  Creatinine 1.40  Creatinine 1.40  Creatinine 1.50          Magnesium: 1.9 mg/dL (02-12 @ 09:15)  Magnesium: 1.8 mg/dL (02-11 @ 06:39)  Magnesium: 2.1 mg/dL (02-10 @ 08:46)  Magnesium: 2.1 mg/dL (02-09 @ 05:41)          Protein Total: 4.6 g/dL (02-12 @ 09:15)  Protein Total: 4.6 g/dL (02-11 @ 06:39)  Protein Total: 5.0 g/dL (02-10 @ 08:46)  Protein Total: 5.0 g/dL (02-09 @ 05:41)  Protein Total: 4.5 g/dL (02-08 @ 23:27)                  Calcium: 8.9 mg/dL (02-12 @ 09:15)  Calcium: 8.7 mg/dL (02-11 @ 06:39)  Calcium: 9.1 mg/dL (02-10 @ 08:46)  Calcium: 9.2 mg/dL (02-09 @ 05:41)  Calcium: 8.5 mg/dL (02-08 @ 23:27)          Phosphorus: 2.4 mg/dL (02-12 @ 09:15)  Phosphorus: 2.3 mg/dL (02-11 @ 06:39)  Phosphorus: 2.9 mg/dL (02-10 @ 08:46)  Phosphorus: 2.9 mg/dL (02-09 @ 05:41)          Bilirubin Total: 0.7 mg/dL (02-12 @ 09:15)  Bilirubin Total: 0.2 mg/dL (02-11 @ 06:39)  Bilirubin Total: 0.5 mg/dL (02-10 @ 08:46)  Bilirubin Total: 0.4 mg/dL (02-09 @ 05:41)  Bilirubin Total: 0.3 mg/dL (02-08 @ 23:27)          Alanine Aminotransferase (ALT/SGPT): 17 U/L (02-12 @ 09:15)  Alanine Aminotransferase (ALT/SGPT): 18 U/L (02-11 @ 06:39)  Alanine Aminotransferase (ALT/SGPT): 25 U/L (02-10 @ 08:46)  Alanine Aminotransferase (ALT/SGPT): 33 U/L (02-09 @ 05:41)  Alanine Aminotransferase (ALT/SGPT): 33 U/L (02-08 @ 23:27)          Aspartate Aminotransferase (AST/SGOT): 8 U/L (02-12 @ 09:15)  Aspartate Aminotransferase (AST/SGOT): 13 U/L (02-11 @ 06:39)  Aspartate Aminotransferase (AST/SGOT): 17 U/L (02-10 @ 08:46)  Aspartate Aminotransferase (AST/SGOT): 23 U/L (02-09 @ 05:41)  Aspartate Aminotransferase (AST/SGOT): 27 U/L (02-08 @ 23:27)    02-17    140  |  110[H]  |  70[H]  ----------------------------<  227[H]  2.3[LL]   |  17[L]  |  1.73[H]    Ca    8.4[L]      17 Feb 2025 05:59  Phos  3.2     02-17  Mg     1.5     02-17    TPro  5.2[L]  /  Alb  2.7[L]  /  TBili  0.8  /  DBili  x   /  AST  15  /  ALT  19  /  AlkPhos  101  02-17                               RADIOLOGY:< from: CT Abdomen and Pelvis No Cont (02.09.25 @ 03:01) >  IMPRESSION:  1.   Study somewhat limited due to absence of contrast.  2.   Central venous catheter on right side with tip in cavoatrial   junction.  3.   Bibasilar atelectasis/scarring right-greater-than-left; can not   exclude  trace pleural effusions. No areas of consolidation. No pneumothorax.  4.   Lucency/deformity is noted involving the posterior aspect of the   left 10 ,  possibly 9th ribs; minimal fractures of indeterminate age in these areas   can  not be excluded.    < end of copied text >  < from: CT Abdomen and Pelvis No Cont (02.09.25 @ 03:01) >  IMPRESSION:  1.   Study somewhat limited due to absence of contrast.  2.   Axial images 71-76 of series 301 and  concomitant coronal images demonstrate prominent soft tissue inseparable   from  the body of the pancreas consistent with pancreatic neoplasm.  3.   Some distension of gallbladder could be further evaluated with right   upper  quadrant sonography. No definite biliary dilatation.  4.   Other incidental findings as above.    < end of copied text >      EKG:  < from: 12 Lead ECG (02.09.25 @ 16:52) >  Diagnosis Line *** Poor data quality, interpretation may be adversely affected  Sinus tachycardia with premature atrial complexes  Left axis deviation  Low voltage QRS  Inferior infarct (cited on or before 08-FEB-2025)  Anterolateral infarct (cited on or before 08-FEB-2025)  Abnormal ECG  When compared with ECG of 09-FEB-2025 16:51, (Unconfirmed)  premature atrial complexes are now present  Confirmed by HAYLEY LANTIGUA ([id number]) on 2/10/2025 10:25:52 PM    < end of copied text >          ECHO:  My reading-Normal LV systolic function; trace MR/mild MAC; mild aortic sclerosis; no significant pulmonary hypertension; trace pericardial effusion  Telem:  ST

## 2025-02-18 NOTE — PROGRESS NOTE ADULT - ASSESSMENT
69-year-old female with h/o recent diagnosis of primary head of pancreatic adenocarcinoma with tumor in umbilicus, chemo-induced pancytopenia, SCC lip, cyclical vomiting syndrome, HTN, HPL, Gout, GERD, CVA on Aggrenox was transferred on 2/12 from [facility] for further care. She was admitted to [facility] with septic shock on 02/09/2025, nausea, vomiting and diarrhea requiring Levophed drip. Patient was being followed by GI, ID, Cardiology and Onc there. Patient has been transferred to  on patient's  (GI Dr. Pleitez) request. She was reported with non-bloody diarrhea x 3 in last 24 hours PTA. She denied abdominal pain, nausea, but has dry heaves. At [place] she was started on Vancomycin and Zosyn, then changed to Cefepime as her blood culture and urine cultures showed Pseudomonas aeruginosa.    #Dysphagia  Possible ?Candida esophagitis ?CMV esophagitis  #Sepsis with PSAE resolving  #Episode of hypotension/ chills resolved  #Head of pancreas adenocarcinoma on chemotherapy  #Absolute neutropenia improving  #Immunocompromised host  #UTI with PSAE  #Kidney stones  #Right lower chest wall dry superficial ulcer   #ARF   -neutropenia is improving  -renal function is poor  -dysphagia -- obtain CMV serology and PCR  -cultures reviewed  -repeat stool for CDT is negative   -source of sepsis is likely intraabdominal and/or urinary  -s/p cefepime 2 gm IV q8h # 3  -on meropenem 1 gm IV q8h # 3 and fluconazole 200 mg IV qd # 2  -on vancomycin 125 mg PO q6h for CDAD prophylaxis  -tolerating abx well so far; no side effects noted  -GI evaluation appreciated - concern for CMV was raised due to her immunocompromised state   -neutropenic precautions  -repeat BC x 2 noted  -oncology evaluation appreciated  -monitor abdomen  -continue abx coverage  -f/u cultures  -monitor temps  -f/u CBC and BNP  -supportive care  2. Other issues:   -care per medicine    d/w medicine team and Dr. Hernandez

## 2025-02-18 NOTE — PROGRESS NOTE ADULT - SUBJECTIVE AND OBJECTIVE BOX
INTERVAL HPI/OVERNIGHT EVENTS:  Patient S&E at bedside. 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      GERD (gastroesophageal reflux disease)      Cyclical vomiting syndrome      CVA (cerebrovascular accident)      H/O squamous cell carcinoma excision          FAMILY HISTORY:      VITAL SIGNS:  T(F): 98.1 (02-18-25 @ 09:07)  HR: 116 (02-18-25 @ 09:00)  BP: 137/87 (02-18-25 @ 09:00)  RR: 27 (02-18-25 @ 09:00)  SpO2: 97% (02-18-25 @ 09:00)  Wt(kg): --    PHYSICAL EXAM:    Constitutional: NAD  Eyes: EOMI,   Respiratory: on nc, mild tachypnea  Cardiovascular: tachycardic   Gastrointestinal: soft, NTND  Extremities: no c/c/e  Neurological: AAOx3      MEDICATIONS  (STANDING):  amLODIPine   Tablet 10 milliGRAM(s) Oral daily  chlorhexidine 2% Cloths 1 Application(s) Topical <User Schedule>  cholecalciferol 2000 Unit(s) Oral at bedtime  dextrose 5%. 1000 milliLiter(s) (50 mL/Hr) IV Continuous <Continuous>  dextrose 5%. 1000 milliLiter(s) (100 mL/Hr) IV Continuous <Continuous>  dextrose 50% Injectable 25 Gram(s) IV Push once  dextrose 50% Injectable 12.5 Gram(s) IV Push once  dextrose 50% Injectable 25 Gram(s) IV Push once  famotidine Injectable 20 milliGRAM(s) IV Push every 12 hours  filgrastim-sndz (ZARXIO) Injectable 480 MICROGram(s) SubCutaneous daily  fluconAZOLE IVPB      fluconAZOLE IVPB 200 milliGRAM(s) IV Intermittent every 24 hours  glucagon  Injectable 1 milliGRAM(s) IntraMuscular once  insulin lispro (ADMELOG) corrective regimen sliding scale   SubCutaneous every 6 hours  lactated ringers. 1000 milliLiter(s) (50 mL/Hr) IV Continuous <Continuous>  lipid, fat emulsion (Fish Oil and Plant Based) 20% Infusion 0.681 Gm/kG/Day (20.8 mL/Hr) IV Continuous <Continuous>  loperamide 2 milliGRAM(s) Oral four times a day  meropenem Injectable 1000 milliGRAM(s) IV Push every 12 hours  pantoprazole  Injectable 40 milliGRAM(s) IV Push daily  Parenteral Nutrition - Adult 1 Each (75 mL/Hr) TPN Continuous <Continuous>  sucralfate suspension 1 Gram(s) Oral four times a day  vancomycin    Solution 125 milliGRAM(s) Oral every 6 hours    MEDICATIONS  (PRN):  acetaminophen     Tablet .. 650 milliGRAM(s) Oral every 6 hours PRN Temp greater or equal to 38C (100.4F), Mild Pain (1 - 3)  aluminum hydroxide/magnesium hydroxide/simethicone Suspension 30 milliLiter(s) Oral every 4 hours PRN Dyspepsia  Biotene Dry Mouth Oral Rinse 15 milliLiter(s) Swish and Spit five times a day PRN Mouth Care  dextrose Oral Gel 15 Gram(s) Oral once PRN Blood Glucose LESS THAN 70 milliGRAM(s)/deciliter  diphenoxylate/atropine 1 Tablet(s) Oral every 6 hours PRN Diarrhea  melatonin 3 milliGRAM(s) Oral at bedtime PRN Insomnia  ondansetron Injectable 4 milliGRAM(s) IV Push every 8 hours PRN Nausea and/or Vomiting  sodium chloride 0.65% Nasal 1 Spray(s) Both Nostrils two times a day PRN Nasal Congestion      Allergies    No Known Allergies    Intolerances        LABS:                        10.4   5.43  )-----------( 45       ( 18 Feb 2025 08:42 )             29.4     02-18    138  |  112[H]  |  69[H]  ----------------------------<  223[H]  4.1   |  16[L]  |  1.44[H]    Ca    8.6      18 Feb 2025 08:42  Phos  2.0     02-18  Mg     2.2     02-18    TPro  5.1[L]  /  Alb  2.4[L]  /  TBili  0.6  /  DBili  x   /  AST  27  /  ALT  21  /  AlkPhos  131[H]  02-18      Urinalysis Basic - ( 18 Feb 2025 08:42 )    Color: x / Appearance: x / SG: x / pH: x  Gluc: 223 mg/dL / Ketone: x  / Bili: x / Urobili: x   Blood: x / Protein: x / Nitrite: x   Leuk Esterase: x / RBC: x / WBC x   Sq Epi: x / Non Sq Epi: x / Bacteria: x        RADIOLOGY & ADDITIONAL TESTS:  Studies reviewed.

## 2025-02-18 NOTE — PROGRESS NOTE ADULT - SUBJECTIVE AND OBJECTIVE BOX
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 1000 milliLiter(s) (125 mL/Hr) IV Continuous <Continuous>  lipid, fat emulsion (Fish Oil and Plant Based) 20% Infusion 0.681 Gm/kG/Day (20.8 mL/Hr) IV Continuous <Continuous>  loperamide 2 milliGRAM(s) Oral four times a day  meropenem Injectable 1000 milliGRAM(s) IV Push every 12 hours  pantoprazole  Injectable 40 milliGRAM(s) IV Push daily  Parenteral Nutrition - Adult 1 Each (75 mL/Hr) TPN Continuous <Continuous>  sucralfate suspension 1 Gram(s) Oral four times a day  vancomycin    Solution 125 milliGRAM(s) Oral every 6 hours    MEDICATIONS  (PRN):  acetaminophen     Tablet .. 650 milliGRAM(s) Oral every 6 hours PRN Temp greater or equal to 38C (100.4F), Mild Pain (1 - 3)  aluminum hydroxide/magnesium hydroxide/simethicone Suspension 30 milliLiter(s) Oral every 4 hours PRN Dyspepsia  Biotene Dry Mouth Oral Rinse 15 milliLiter(s) Swish and Spit five times a day PRN Mouth Care  diphenoxylate/atropine 1 Tablet(s) Oral every 6 hours PRN Diarrhea  melatonin 3 milliGRAM(s) Oral at bedtime PRN Insomnia  ondansetron Injectable 4 milliGRAM(s) IV Push every 8 hours PRN Nausea and/or Vomiting  sodium chloride 0.65% Nasal 1 Spray(s) Both Nostrils two times a day PRN Nasal Congestion      REVIEW OF SYSTEMS:    RESPIRATORY: No shortness of breath  CARDIOVASCULAR: No chest pain  All other review of systems is negative unless indicated above.    Vital Signs Last 24 Hrs  T(C): 36.8 (18 Feb 2025 05:00), Max: 37.1 (17 Feb 2025 12:42)  T(F): 98.2 (18 Feb 2025 05:00), Max: 98.7 (17 Feb 2025 12:42)  HR: 106 (18 Feb 2025 06:00) (86 - 124)  BP: 139/82 (18 Feb 2025 06:00) (122/71 - 152/140)  BP(mean): 99 (18 Feb 2025 06:00) (85 - 146)  RR: 24 (18 Feb 2025 06:00) (24 - 30)  SpO2: 99% (18 Feb 2025 06:00) (95% - 100%)    Parameters below as of 18 Feb 2025 06:00  Patient On (Oxygen Delivery Method): nasal cannula        PHYSICAL EXAM:    Constitutional: NAD  Respiratory: CTAB  Cardiovascular: S1 and S2, RRR  Gastrointestinal: BS+, soft, NT/ND  Psychiatric: Normal mood, normal affect    LABS:                        10.1   1.30  )-----------( 37       ( 17 Feb 2025 05:59 )             28.5     02-18    138  |  113[H]  |  70[H]  ----------------------------<  229[H]  4.2   |  18[L]  |  1.54[H]    Ca    8.4[L]      18 Feb 2025 06:05  Phos  2.1     02-18  Mg     2.3     02-18    TPro  5.1[L]  /  Alb  2.4[L]  /  TBili  0.6  /  DBili  x   /  AST  27  /  ALT  21  /  AlkPhos  131[H]  02-18      LIVER FUNCTIONS - ( 18 Feb 2025 06:05 )  Alb: 2.4 g/dL / Pro: 5.1 gm/dL / ALK PHOS: 131 U/L / ALT: 21 U/L / AST: 27 U/L / GGT: x             RADIOLOGY & ADDITIONAL STUDIES:

## 2025-02-18 NOTE — PROGRESS NOTE ADULT - ASSESSMENT
ASSESSMENT  68yo female with PMHx recent diagnosis of primary pancreatic adenocarcinoma of head with tumor in umbilicus, chemo-induced pancytopenia, SCC lip, cyclical vomiting syndrome, HTN, HPL, Gout, GERD, CVA on Aggrenox    Initially admitted to [facility] with septic shock 2/2 + pseudomonas bacteremia likely due to UTI vs colitis on 02/09/2025 Was having nausea, vomiting and diarrhea requiring Levophed drip, was transferred out of ICU to regular floor.    Then transferred to  as per request of .  Was getting platelet transfusion and developed tachypnea hypoxia, rigors  Was diuresed and became hypotensive  Upgraded to SICU     Here with   1. Sepsis (POA)  2. Pseudomonal bacteremia suspect from UTI   3. Mucositis suspected esophageal candidiasis? r/o CMV  4. Acute hypoxic respiratory failure 2/2 pulm edema   5. MALLORY     PLAN    Neuro: AAOx 3. pain control prn. IV tylenol .avoid nsaids  CV: BP stable. in sinus rhythm. cont amlodipine 10mg qd.   Pulm: on bipap overnight, now on 3-4L nc sating 96-97%. CXR today with inc pulm vasc congestion and low lung volume. encourage incentive spirometer.  GI: PO diet as tolerated. on TPN. IV PPI.   Nephro: MALLORY improving slightly. Cr 1.55 -> 1.44. monitor I & Os. Trend renal fxn. will lower LR to 50cc/hr due to edema. suspect 3rd spacing. receiving TPN @ 75/hr. hypophosphatemia - replete Phos  Endo: add BGMs q6hr.  ISS. while on PN  ID: cont IV meropenem 1gm q8hr #4 cont IV diflucan 200mg qd #2. s/p IV cefepime.  on PO vanco qhr for Cdiff ppx as per ID. trend WBC. monitor temps. blood cultures here neg to date. f/u CMV, HSV serum PCR.  Heme: hgb stable. held DVT ppx due to thrombocytopenia . leukopenia improving. pt was on filgastrim, now dcd as per hemeonc.   PT eval if tolerated.     Dispo: SICU. lower IV fluids. TPN. IV abx. Trend Cr.     Will discuss with Dr. Tong, d/w ID, d/w GI

## 2025-02-18 NOTE — PROGRESS NOTE ADULT - ASSESSMENT
69-year-old female with stage IV pancreatic adenocarcinoma presenting with neutropenic fever and severe treatment-related toxicities from modified FOLFIRINOX and investigational EMILY inhibitor [id number].    PROBLEMS:    Acute hypoxic respiratory failure likely secondary to hypervolemia   Bilateral pleural effusions with compressive atelectasis, right greater than left  Neutropenic Fever/Neutropenic Septic shock  Severe Thrombocytopenia-Platelet count critically low at 26k.   Severe diarrhea and inability to tolerate PO intake, likely multifactorial from both FOLFIRINOX (particularly irinotecan component) and study drug [id number].   History of left cephalic vein thrombosis.  MALLORY      PLAN:    Pulmonary tachpnea may be RTA with low bicarbonate because of GI cause with diarrhea but improving renal fx  Neutropenic Septic shock- IV meropenem started on 2/15- previously on CEFEPIME/Continue filgrastim 480mcg daily - Today w/ improvement in counts- WBC 1.3, Hb 10.1, plt 37, cmp with k 2.3 and Repleted, Cr 1.44.   Daily CBC monitoring  Maintain strict neutropenic precautions  IV diuretics-now on RA doing well   EMPIRIC TREATMENT FOR ESOPHAGEAL CANDIDIASIS-continue with DIFLUCAN   Anemia-received two units of pRBCs Hb 10.1 today   Thrombocytopenia-Transfuse for plts < 15   D/w staff &

## 2025-02-18 NOTE — PROGRESS NOTE ADULT - SUBJECTIVE AND OBJECTIVE BOX
69-year-old female recent diagnosis of primary pancreatic adenocarcinoma , pancytopenia and significant PMH of SCC lip, cyclical vomiting syndrome, HTN, HPL, Gout, GERD, CVA w renal evaluation of MALLORY and hypokalemia. Per documents was recently at  [facility] with septic shock on 02/09/2025, nausea, vomiting and diarrhea requiring Levophed drip. Per documents, patient  transferred to  for continued care. Patient with + diuresis w lasix 80 IV, increaserd UOP > 4L.   Treated w IV and po k repletion.     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 1000 milliLiter(s) (100 mL/Hr) IV Continuous <Continuous>  dextrose 5%. 1000 milliLiter(s) (50 mL/Hr) IV Continuous <Continuous>  dextrose 50% Injectable 25 Gram(s) IV Push once  dextrose 50% Injectable 12.5 Gram(s) IV Push once  dextrose 50% Injectable 25 Gram(s) IV Push once  famotidine Injectable 20 milliGRAM(s) IV Push daily  fluconAZOLE IVPB      fluconAZOLE IVPB 200 milliGRAM(s) IV Intermittent every 24 hours  glucagon  Injectable 1 milliGRAM(s) IntraMuscular once  insulin lispro (ADMELOG) corrective regimen sliding scale   SubCutaneous every 6 hours  lactated ringers. 1000 milliLiter(s) (50 mL/Hr) IV Continuous <Continuous>  lipid, fat emulsion (Fish Oil and Plant Based) 20% Infusion 0.681 Gm/kG/Day (20.83 mL/Hr) IV Continuous <Continuous>  loperamide 2 milliGRAM(s) Oral four times a day  meropenem Injectable 1000 milliGRAM(s) IV Push every 12 hours  pantoprazole  Injectable 40 milliGRAM(s) IV Push daily  Parenteral Nutrition - Adult 1 Each (75 mL/Hr) TPN Continuous <Continuous>  Parenteral Nutrition - Adult 1 Each (75 mL/Hr) TPN Continuous <Continuous>  potassium phosphate / sodium phosphate Powder (PHOS-NaK) 1 Packet(s) Oral once  sodium bicarbonate 325 milliGRAM(s) Oral every 8 hours  sucralfate suspension 1 Gram(s) Oral four times a day  vancomycin    Solution 125 milliGRAM(s) Oral every 6 hours    MEDICATIONS  (PRN):  acetaminophen     Tablet .. 650 milliGRAM(s) Oral every 6 hours PRN Temp greater or equal to 38C (100.4F), Mild Pain (1 - 3)  aluminum hydroxide/magnesium hydroxide/simethicone Suspension 30 milliLiter(s) Oral every 4 hours PRN Dyspepsia  Biotene Dry Mouth Oral Rinse 15 milliLiter(s) Swish and Spit five times a day PRN Mouth Care  dextrose Oral Gel 15 Gram(s) Oral once PRN Blood Glucose LESS THAN 70 milliGRAM(s)/deciliter  diphenoxylate/atropine 1 Tablet(s) Oral every 6 hours PRN Diarrhea  melatonin 3 milliGRAM(s) Oral at bedtime PRN Insomnia  ondansetron Injectable 4 milliGRAM(s) IV Push every 8 hours PRN Nausea and/or Vomiting  sodium chloride 0.65% Nasal 1 Spray(s) Both Nostrils two times a day PRN Nasal Congestion    Allergies    No Known Allergies    Intolerances        SOCIAL HISTORY:    FAMILY HISTORY:      REVIEW OF SYSTEMS:      Vital Signs Last 24 Hrs  T(C): 36.5 (18 Feb 2025 12:53), Max: 36.9 (17 Feb 2025 17:38)  T(F): 97.7 (18 Feb 2025 12:53), Max: 98.4 (17 Feb 2025 17:38)  HR: 117 (18 Feb 2025 14:03) (86 - 117)  BP: 136/87 (18 Feb 2025 14:03) (129/81 - 139/91)  BP(mean): 102 (18 Feb 2025 14:03) (93 - 106)  RR: 35 (18 Feb 2025 14:03) (18 - 35)  SpO2: 98% (18 Feb 2025 14:03) (97% - 100%)    Parameters below as of 18 Feb 2025 14:03  Patient On (Oxygen Delivery Method): nasal cannula  O2 Flow (L/min): 2        PHYSICAL EXAM:    Constitutional: NAD, well-groomed, well-developed  HEENT: PERRLA, EOMI,  MMM  Neck: No LAD, No JVD  Respiratory: CTAB  Cardiovascular: S1 and S2, RRR  Gastrointestinal: ditant bowel sounds, non tender  Extremities: No peripheral edema  Neurological: A/O x 3, no focal deficits  Psychiatric: Normal mood, normal affect  : Julian clear yellow urine  Access: Not applicable        LABS:                          10.4   5.43  )-----------( 45       ( 18 Feb 2025 08:42 )             29.4                           10.1   1.30  )-----------( 37       ( 17 Feb 2025 05:59 )             28.5     02-18    138  |  112[H]  |  69[H]  ----------------------------<  223[H]  4.1   |  16[L]  |  1.44[H]    Ca    8.6      18 Feb 2025 08:42  Phos  2.0     02-18  Mg     2.2     02-18    TPro  5.1[L]  /  Alb  2.4[L]  /  TBili  0.6  /  DBili  x   /  AST  27  /  ALT  21  /  AlkPhos  131[H]  02-18      17 Feb 2025 14:46    137    |  109    |  74     ----------------------------<  234    3.4     |  20     |  1.57   17 Feb 2025 05:59    140    |  110    |  70     ----------------------------<  227    2.3     |  17     |  1.73   16 Feb 2025 06:12    136    |  108    |  47     ----------------------------<  172    3.4     |  17     |  1.56   15 Feb 2025 21:25    133    |  108    |  40     ----------------------------<  172    2.9     |  17     |  1.26   15 Feb 2025 05:17    133    |  108    |  27     ----------------------------<  183    3.3     |  18     |  1.04     Ca    8.5        17 Feb 2025 14:46  Ca    8.4        17 Feb 2025 05:59  Ca    8.0        16 Feb 2025 06:12  Ca    8.0        15 Feb 2025 21:25  Ca    8.1        15 Feb 2025 05:17  Phos  2.3       17 Feb 2025 14:46  Phos  3.2       17 Feb 2025 05:59  Phos  3.1       16 Feb 2025 06:12  Phos  1.2       15 Feb 2025 21:25  Phos  2.0       15 Feb 2025 05:17  Mg     2.9       17 Feb 2025 14:46  Mg     1.5       17 Feb 2025 05:59  Mg     1.8       16 Feb 2025 06:12  Mg     2.1       15 Feb 2025 21:25  Mg     2.3       15 Feb 2025 05:17    TPro  5.2    /  Alb  2.7    /  TBili  0.8    /  DBili  x      /  AST  15     /  ALT  19     /  AlkPhos  101    17 Feb 2025 05:59  TPro  5.1    /  Alb  2.9    /  TBili  1.1    /  DBili  x      /  AST  5      /  ALT  19     /  AlkPhos  91     16 Feb 2025 06:12  TPro  4.6    /  Alb  2.0    /  TBili  1.0    /  DBili  x      /  AST  11     /  ALT  17     /  AlkPhos  105    15 Feb 2025 21:25  TPro  4.2    /  Alb  1.7    /  TBili  0.6    /  DBili  0.4    /  AST  6      /  ALT  12     /  AlkPhos  98     15 Feb 2025 05:17  TPro  4.5    /  Alb  1.5    /  TBili  0.8    /  DBili  0.4    /  AST  8      /  ALT  14     /  AlkPhos  104    14 Feb 2025 06:50          Urine Studies:  Urinalysis Basic - ( 17 Feb 2025 14:46 )    Color: x / Appearance: x / SG: x / pH: x  Gluc: 234 mg/dL / Ketone: x  / Bili: x / Urobili: x   Blood: x / Protein: x / Nitrite: x   Leuk Esterase: x / RBC: x / WBC x   Sq Epi: x / Non Sq Epi: x / Bacteria: x            RADIOLOGY & ADDITIONAL STUDIES:                    69-year-old female recent diagnosis of primary pancreatic adenocarcinoma , pancytopenia and significant PMH of SCC lip, cyclical vomiting syndrome, HTN, HPL, Gout, GERD, CVA w renal evaluation of MALLORY and hypokalemia. Per documents was recently at  [facility] with septic shock on 02/09/2025, nausea, vomiting and diarrhea requiring Levophed drip. Per documents, patient  transferred to  for continued care. Patient with + diuresis w lasix 80 IV, increased UOP > 4L.   Treated w IV and po k repletion.     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 1000 milliLiter(s) (100 mL/Hr) IV Continuous <Continuous>  dextrose 5%. 1000 milliLiter(s) (50 mL/Hr) IV Continuous <Continuous>  dextrose 50% Injectable 25 Gram(s) IV Push once  dextrose 50% Injectable 12.5 Gram(s) IV Push once  dextrose 50% Injectable 25 Gram(s) IV Push once  famotidine Injectable 20 milliGRAM(s) IV Push daily  fluconAZOLE IVPB      fluconAZOLE IVPB 200 milliGRAM(s) IV Intermittent every 24 hours  glucagon  Injectable 1 milliGRAM(s) IntraMuscular once  insulin lispro (ADMELOG) corrective regimen sliding scale   SubCutaneous every 6 hours  lactated ringers. 1000 milliLiter(s) (50 mL/Hr) IV Continuous <Continuous>  lipid, fat emulsion (Fish Oil and Plant Based) 20% Infusion 0.681 Gm/kG/Day (20.83 mL/Hr) IV Continuous <Continuous>  loperamide 2 milliGRAM(s) Oral four times a day  meropenem Injectable 1000 milliGRAM(s) IV Push every 12 hours  pantoprazole  Injectable 40 milliGRAM(s) IV Push daily  Parenteral Nutrition - Adult 1 Each (75 mL/Hr) TPN Continuous <Continuous>  Parenteral Nutrition - Adult 1 Each (75 mL/Hr) TPN Continuous <Continuous>  potassium phosphate / sodium phosphate Powder (PHOS-NaK) 1 Packet(s) Oral once  sodium bicarbonate 325 milliGRAM(s) Oral every 8 hours  sucralfate suspension 1 Gram(s) Oral four times a day  vancomycin    Solution 125 milliGRAM(s) Oral every 6 hours    MEDICATIONS  (PRN):  acetaminophen     Tablet .. 650 milliGRAM(s) Oral every 6 hours PRN Temp greater or equal to 38C (100.4F), Mild Pain (1 - 3)  aluminum hydroxide/magnesium hydroxide/simethicone Suspension 30 milliLiter(s) Oral every 4 hours PRN Dyspepsia  Biotene Dry Mouth Oral Rinse 15 milliLiter(s) Swish and Spit five times a day PRN Mouth Care  dextrose Oral Gel 15 Gram(s) Oral once PRN Blood Glucose LESS THAN 70 milliGRAM(s)/deciliter  diphenoxylate/atropine 1 Tablet(s) Oral every 6 hours PRN Diarrhea  melatonin 3 milliGRAM(s) Oral at bedtime PRN Insomnia  ondansetron Injectable 4 milliGRAM(s) IV Push every 8 hours PRN Nausea and/or Vomiting  sodium chloride 0.65% Nasal 1 Spray(s) Both Nostrils two times a day PRN Nasal Congestion    Allergies    No Known Allergies    Intolerances        SOCIAL HISTORY:    FAMILY HISTORY:      REVIEW OF SYSTEMS:      Vital Signs Last 24 Hrs  T(C): 36.5 (18 Feb 2025 12:53), Max: 36.9 (17 Feb 2025 17:38)  T(F): 97.7 (18 Feb 2025 12:53), Max: 98.4 (17 Feb 2025 17:38)  HR: 117 (18 Feb 2025 14:03) (86 - 117)  BP: 136/87 (18 Feb 2025 14:03) (129/81 - 139/91)  BP(mean): 102 (18 Feb 2025 14:03) (93 - 106)  RR: 35 (18 Feb 2025 14:03) (18 - 35)  SpO2: 98% (18 Feb 2025 14:03) (97% - 100%)    Parameters below as of 18 Feb 2025 14:03  Patient On (Oxygen Delivery Method): nasal cannula  O2 Flow (L/min): 2        PHYSICAL EXAM:    Constitutional: NAD, well-groomed, well-developed  HEENT: PERRLA, EOMI,  MMM  Neck: No LAD, No JVD  Respiratory: CTAB  Cardiovascular: S1 and S2, RRR  Gastrointestinal: ditant bowel sounds, non tender  Extremities: No peripheral edema  Neurological: A/O x 3, no focal deficits  Psychiatric: Normal mood, normal affect  : Julian clear yellow urine  Access: Not applicable        LABS:                          10.4   5.43  )-----------( 45       ( 18 Feb 2025 08:42 )             29.4                           10.1   1.30  )-----------( 37       ( 17 Feb 2025 05:59 )             28.5     02-18    138  |  112[H]  |  69[H]  ----------------------------<  223[H]  4.1   |  16[L]  |  1.44[H]    Ca    8.6      18 Feb 2025 08:42  Phos  2.0     02-18  Mg     2.2     02-18    TPro  5.1[L]  /  Alb  2.4[L]  /  TBili  0.6  /  DBili  x   /  AST  27  /  ALT  21  /  AlkPhos  131[H]  02-18      17 Feb 2025 14:46    137    |  109    |  74     ----------------------------<  234    3.4     |  20     |  1.57   17 Feb 2025 05:59    140    |  110    |  70     ----------------------------<  227    2.3     |  17     |  1.73   16 Feb 2025 06:12    136    |  108    |  47     ----------------------------<  172    3.4     |  17     |  1.56   15 Feb 2025 21:25    133    |  108    |  40     ----------------------------<  172    2.9     |  17     |  1.26   15 Feb 2025 05:17    133    |  108    |  27     ----------------------------<  183    3.3     |  18     |  1.04     Ca    8.5        17 Feb 2025 14:46  Ca    8.4        17 Feb 2025 05:59  Ca    8.0        16 Feb 2025 06:12  Ca    8.0        15 Feb 2025 21:25  Ca    8.1        15 Feb 2025 05:17  Phos  2.3       17 Feb 2025 14:46  Phos  3.2       17 Feb 2025 05:59  Phos  3.1       16 Feb 2025 06:12  Phos  1.2       15 Feb 2025 21:25  Phos  2.0       15 Feb 2025 05:17  Mg     2.9       17 Feb 2025 14:46  Mg     1.5       17 Feb 2025 05:59  Mg     1.8       16 Feb 2025 06:12  Mg     2.1       15 Feb 2025 21:25  Mg     2.3       15 Feb 2025 05:17    TPro  5.2    /  Alb  2.7    /  TBili  0.8    /  DBili  x      /  AST  15     /  ALT  19     /  AlkPhos  101    17 Feb 2025 05:59  TPro  5.1    /  Alb  2.9    /  TBili  1.1    /  DBili  x      /  AST  5      /  ALT  19     /  AlkPhos  91     16 Feb 2025 06:12  TPro  4.6    /  Alb  2.0    /  TBili  1.0    /  DBili  x      /  AST  11     /  ALT  17     /  AlkPhos  105    15 Feb 2025 21:25  TPro  4.2    /  Alb  1.7    /  TBili  0.6    /  DBili  0.4    /  AST  6      /  ALT  12     /  AlkPhos  98     15 Feb 2025 05:17  TPro  4.5    /  Alb  1.5    /  TBili  0.8    /  DBili  0.4    /  AST  8      /  ALT  14     /  AlkPhos  104    14 Feb 2025 06:50          Urine Studies:  Urinalysis Basic - ( 17 Feb 2025 14:46 )    Color: x / Appearance: x / SG: x / pH: x  Gluc: 234 mg/dL / Ketone: x  / Bili: x / Urobili: x   Blood: x / Protein: x / Nitrite: x   Leuk Esterase: x / RBC: x / WBC x   Sq Epi: x / Non Sq Epi: x / Bacteria: x            RADIOLOGY & ADDITIONAL STUDIES:

## 2025-02-18 NOTE — PROGRESS NOTE ADULT - ASSESSMENT
Imp:  There is slow improving as counts recover. Esophageal pain is 2/2 esophagitsi but unclear what type -- reflux vs. candida vs CMV or HSV etc.    Rec:  Cont supportive care  Add imodium standing back  Check CMV/HSV -- if pos would encourage EGD  Cont Fluc

## 2025-02-18 NOTE — PROGRESS NOTE ADULT - SUBJECTIVE AND OBJECTIVE BOX
Subjective:    pat lying in bed, still somewhat tachpneic, & some diarrhea with odynophagia, sat 2lpm nc 97%, no respiratory distress, lying in bed. CXR poor inspiratory distress.    Home Medications:  cefepime 2 g intravenous injection: 2 gram(s) intravenous every 12 hours (12 Feb 2025 12:59)  filgrastim: 480 microgram(s) subcutaneous once a day (12 Feb 2025 12:59)  Lactated Ringers Injection intravenous solution: 150 milligram(s) intravenous every 1 to 2 hours (12 Feb 2025 12:59)  loperamide 2 mg oral capsule: 1 cap(s) orally once (12 Feb 2025 12:59)  midodrine 5 mg oral tablet: 1 tab(s) orally every 8 hours (12 Feb 2025 12:59)  Multiple Vitamins with Minerals oral liquid: 1 orally once a day (12 Feb 2025 12:59)  octreotide 2500 mcg/mL subcutaneous solution: 0.04 milliliter(s) subcutaneous 3 times a day (12 Feb 2025 12:59)  sucralfate 1 g/10 mL oral suspension: 10 milliliter(s) orally 4 times a day (12 Feb 2025 12:59)  trimethobenzamide 100 mg/mL intramuscular solution: 2 milliliter(s) intramuscular every 8 hours As needed Nausea and/or Vomiting (12 Feb 2025 12:59)    MEDICATIONS  (STANDING):  amLODIPine   Tablet 10 milliGRAM(s) Oral daily  chlorhexidine 2% Cloths 1 Application(s) Topical <User Schedule>  cholecalciferol 2000 Unit(s) Oral at bedtime  dextrose 5%. 1000 milliLiter(s) (100 mL/Hr) IV Continuous <Continuous>  dextrose 5%. 1000 milliLiter(s) (50 mL/Hr) IV Continuous <Continuous>  dextrose 50% Injectable 25 Gram(s) IV Push once  dextrose 50% Injectable 12.5 Gram(s) IV Push once  dextrose 50% Injectable 25 Gram(s) IV Push once  famotidine Injectable 20 milliGRAM(s) IV Push daily  fluconAZOLE IVPB      fluconAZOLE IVPB 200 milliGRAM(s) IV Intermittent every 24 hours  glucagon  Injectable 1 milliGRAM(s) IntraMuscular once  insulin lispro (ADMELOG) corrective regimen sliding scale   SubCutaneous every 6 hours  lactated ringers. 1000 milliLiter(s) (50 mL/Hr) IV Continuous <Continuous>  lipid, fat emulsion (Fish Oil and Plant Based) 20% Infusion 0.681 Gm/kG/Day (20.83 mL/Hr) IV Continuous <Continuous>  lipid, fat emulsion (Fish Oil and Plant Based) 20% Infusion 0.681 Gm/kG/Day (20.8 mL/Hr) IV Continuous <Continuous>  loperamide 2 milliGRAM(s) Oral four times a day  meropenem Injectable 1000 milliGRAM(s) IV Push every 12 hours  pantoprazole  Injectable 40 milliGRAM(s) IV Push daily  Parenteral Nutrition - Adult 1 Each (75 mL/Hr) TPN Continuous <Continuous>  Parenteral Nutrition - Adult 1 Each (75 mL/Hr) TPN Continuous <Continuous>  sucralfate suspension 1 Gram(s) Oral four times a day  vancomycin    Solution 125 milliGRAM(s) Oral every 6 hours    MEDICATIONS  (PRN):  acetaminophen     Tablet .. 650 milliGRAM(s) Oral every 6 hours PRN Temp greater or equal to 38C (100.4F), Mild Pain (1 - 3)  aluminum hydroxide/magnesium hydroxide/simethicone Suspension 30 milliLiter(s) Oral every 4 hours PRN Dyspepsia  Biotene Dry Mouth Oral Rinse 15 milliLiter(s) Swish and Spit five times a day PRN Mouth Care  dextrose Oral Gel 15 Gram(s) Oral once PRN Blood Glucose LESS THAN 70 milliGRAM(s)/deciliter  diphenoxylate/atropine 1 Tablet(s) Oral every 6 hours PRN Diarrhea  melatonin 3 milliGRAM(s) Oral at bedtime PRN Insomnia  ondansetron Injectable 4 milliGRAM(s) IV Push every 8 hours PRN Nausea and/or Vomiting  sodium chloride 0.65% Nasal 1 Spray(s) Both Nostrils two times a day PRN Nasal Congestion      Allergies    No Known Allergies    Intolerances        Vital Signs Last 24 Hrs  T(C): 36.7 (18 Feb 2025 09:07), Max: 37.1 (17 Feb 2025 12:42)  T(F): 98.1 (18 Feb 2025 09:07), Max: 98.7 (17 Feb 2025 12:42)  HR: 105 (18 Feb 2025 12:00) (86 - 116)  BP: 132/81 (18 Feb 2025 12:00) (129/81 - 152/140)  BP(mean): 95 (18 Feb 2025 12:00) (89 - 146)  RR: 33 (18 Feb 2025 12:00) (23 - 33)  SpO2: 97% (18 Feb 2025 12:00) (97% - 100%)    Parameters below as of 18 Feb 2025 12:00  Patient On (Oxygen Delivery Method): nasal cannula  O2 Flow (L/min): 3        PHYSICAL EXAMINATION:    NECK:  Supple. No lymphadenopathy. Jugular venous pressure not elevated. Carotids equal.   HEART:   The cardiac impulse has a normal quality. Reg., Nl S1 and S2.  There are no murmurs, rubs or gallops noted  CHEST:  Chest crackles to auscultation. Normal respiratory effort.  ABDOMEN:  Soft and nontender.   EXTREMITIES:  There is no edema.       LABS:                        10.4   5.43  )-----------( 45       ( 18 Feb 2025 08:42 )             29.4     02-18    138  |  112[H]  |  69[H]  ----------------------------<  223[H]  4.1   |  16[L]  |  1.44[H]    Ca    8.6      18 Feb 2025 08:42  Phos  2.0     02-18  Mg     2.2     02-18    TPro  5.1[L]  /  Alb  2.4[L]  /  TBili  0.6  /  DBili  x   /  AST  27  /  ALT  21  /  AlkPhos  131[H]  02-18      Urinalysis Basic - ( 18 Feb 2025 08:42 )    Color: x / Appearance: x / SG: x / pH: x  Gluc: 223 mg/dL / Ketone: x  / Bili: x / Urobili: x   Blood: x / Protein: x / Nitrite: x   Leuk Esterase: x / RBC: x / WBC x   Sq Epi: x / Non Sq Epi: x / Bacteria: x

## 2025-02-18 NOTE — PROGRESS NOTE ADULT - ASSESSMENT
69-year-old female recent diagnosis of primary pancreatic adenocarcinoma , pancytopenia and significant PMH of SCC lip, cyclical vomiting syndrome, HTN, HPL, Gout, GERD, CVA w renal evaluation of MALLORY and hypokalemia. Per documents was recently at  [facility] with septic shock on 02/09/2025, nausea, vomiting and diarrhea requiring Levophed drip. Per documents, patient  transferred to  for continued care. Patient with + diuresis w lasix 80 IV, increaserd UOP > 4L.   Treated w IV and po k repletion.     MALLORY  -LIkely pre renal w insensible losses and diuresis, signficant UOP  -IVF to maintain even to positive today  -Trend labs, lytes. Repeat this afternoon  -Monitor UOP closely  -Avoid nsaids/contrast  -Renally dosed abx    Hypokalemia  -K repletion iv/po  -TPN    Sepsis/Pseduomonas  -ID/abx  -FU cultures    d/c with RN staff, Dr Tong, Dr Henry, Dr Maik Sweet to resume care in AM    2/18  MALLORY resolving  Azotemia, responded to IVF resuscitation  Urine output improved  hypoalbuminemia- on spa q 8 h for oncotic pressure and glomerular filtration  NAGMA- hyperchloremia may be due to diarrhea and GI losses  On Meropenem- very rarely associated w Fanconi syndrome  - will check urinary PO4, serum uric acid, repeat PO4 level, low this am  ABG  Lactate  CBC  VQ results pending  d/w Dr YOUSUF Pleitez   69-year-old female recent diagnosis of primary pancreatic adenocarcinoma , pancytopenia and significant PMH of SCC lip, cyclical vomiting syndrome, HTN, HPL, Gout, GERD, CVA w renal evaluation of MALLORY and hypokalemia. Per documents was recently at  [facility] with septic shock on 02/09/2025, nausea, vomiting and diarrhea requiring Levophed drip. Per documents, patient  transferred to  for continued care. Patient with + diuresis w lasix 80 IV, increaserd UOP > 4L.   Treated w IV and po k repletion.     MALLORY  -LIkely pre renal w insensible losses and diuresis, signficant UOP  -IVF to maintain even to positive today  -Trend labs, lytes. Repeat this afternoon  -Monitor UOP closely  -Avoid nsaids/contrast  -Renally dosed abx    Hypokalemia  -K repletion iv/po  -TPN    Sepsis/Pseduomonas  -ID/abx  -FU cultures    d/c with RN staff, Dr Tong, Dr Henry, Dr Maik Sweet to resume care in AM    2/18  MALLORY resolving  Azotemia, responded to IVF resuscitation  Urine output improved  hypoalbuminemia- on spa q 8 h for oncotic pressure and glomerular filtration  NAGMA- hyperchloremia may be due to diarrhea and GI losses  On Meropenem- very rarely associated w Fanconi syndrome  - will check urinary PO4, serum uric acid, repeat PO4 level, low this am  ABG  Lactate  CBC  VQ results pending  d/w Dr YOUSUF Pleitez    Addendum  Repeat labs reviewed d/w intensivist  Lactate 2.7  ABG c/w primary respiratory alkalosis with metabolic acidosis and NAGMA

## 2025-02-18 NOTE — PROGRESS NOTE ADULT - SUBJECTIVE AND OBJECTIVE BOX
Patient is a 69y old  Female who presents with a chief complaint of sepsis (18 Feb 2025 11:37)    BRIEF HOSPITAL COURSE: 68yo female with PMHx recent diagnosis of primary pancreatic adenocarcinoma of head with tumor in umbilicus, chemo-induced pancytopenia, SCC lip, cyclical vomiting syndrome, HTN, HPL, Gout, GERD, CVA on Aggrenox and others had been admitted to [facility] with septic shock 2/2 + pseudomonas bacteremia on 02/09/2025, nausea, vomiting and diarrhea requiring Levophed drip, was transferred out of ICU to regular floor last night.  Patient was being followed by GI, ID, Cardiology and Onc there.  Patient has been transferred to  on patient's  (GI Dr. Pleitez) request.      2/18 pt seen this am, denies chest pain, denies feeling sob but appears slightly tachypneic. states she stills has difficulty swallowing due to pain. feeling weak overall. 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 650 milliGRAM(s) Oral every 6 hours PRN  melatonin 3 milliGRAM(s) Oral at bedtime PRN  ondansetron Injectable 4 milliGRAM(s) IV Push every 8 hours PRN  aluminum hydroxide/magnesium hydroxide/simethicone Suspension 30 milliLiter(s) Oral every 4 hours PRN  diphenoxylate/atropine 1 Tablet(s) Oral every 6 hours PRN  famotidine Injectable 20 milliGRAM(s) IV Push daily  loperamide 2 milliGRAM(s) Oral four times a day  pantoprazole  Injectable 40 milliGRAM(s) IV Push daily  sucralfate suspension 1 Gram(s) Oral four times a day  dextrose 50% Injectable 25 Gram(s) IV Push once  dextrose 50% Injectable 12.5 Gram(s) IV Push once  dextrose 50% Injectable 25 Gram(s) IV Push once  dextrose Oral Gel 15 Gram(s) Oral once PRN  glucagon  Injectable 1 milliGRAM(s) IntraMuscular once  insulin lispro (ADMELOG) corrective regimen sliding scale   SubCutaneous every 6 hours  cholecalciferol 2000 Unit(s) Oral at bedtime  dextrose 5%. 1000 milliLiter(s) IV Continuous <Continuous>  dextrose 5%. 1000 milliLiter(s) IV Continuous <Continuous>  lactated ringers. 1000 milliLiter(s) IV Continuous <Continuous>  lipid, fat emulsion (Fish Oil and Plant Based) 20% Infusion 0.681 Gm/kG/Day IV Continuous <Continuous>  lipid, fat emulsion (Fish Oil and Plant Based) 20% Infusion 0.681 Gm/kG/Day IV Continuous <Continuous>  Parenteral Nutrition - Adult 1 Each TPN Continuous <Continuous>  Parenteral Nutrition - Adult 1 Each TPN Continuous <Continuous>  potassium phosphate / sodium phosphate Powder (PHOS-NaK) 1 Packet(s) Oral once  Biotene Dry Mouth Oral Rinse 15 milliLiter(s) Swish and Spit five times a day PRN  chlorhexidine 2% Cloths 1 Application(s) Topical <User Schedule>  sodium chloride 0.65% Nasal 1 Spray(s) Both Nostrils two times a day PRN    ICU Vital Signs Last 24 Hrs  T(C): 36.5 (18 Feb 2025 12:53), Max: 36.9 (17 Feb 2025 17:38)  T(F): 97.7 (18 Feb 2025 12:53), Max: 98.4 (17 Feb 2025 17:38)  HR: 117 (18 Feb 2025 14:03) (86 - 117)  BP: 136/87 (18 Feb 2025 14:03) (129/81 - 152/140)  BP(mean): 102 (18 Feb 2025 14:03) (89 - 146)  ABP: --  ABP(mean): --  RR: 35 (18 Feb 2025 14:03) (23 - 35)  SpO2: 98% (18 Feb 2025 14:03) (97% - 100%)    O2 Parameters below as of 18 Feb 2025 14:03  Patient On (Oxygen Delivery Method): nasal cannula  O2 Flow (L/min): 2    I&O's Detail    17 Feb 2025 07:01  -  18 Feb 2025 07:00  --------------------------------------------------------  IN:    Fat Emulsion (Fish Oil &amp; Plant Based) 20% Infusion: 153 mL    IV PiggyBack: 300 mL    Lactated Ringers: 1354 mL    TPN (Total Parenteral Nutrition): 814 mL  Total IN: 2621 mL    OUT:    Indwelling Catheter - Urethral (mL): 3500 mL  Total OUT: 3500 mL    Total NET: -879 mL      18 Feb 2025 07:01  -  18 Feb 2025 14:10  --------------------------------------------------------  IN:  Total IN: 0 mL    OUT:    Indwelling Catheter - Urethral (mL): 1400 mL  Total OUT: 1400 mL    Total NET: -1400 mL    LABS:                        10.4   5.43  )-----------( 45       ( 18 Feb 2025 08:42 )             29.4     02-18    138  |  112[H]  |  69[H]  ----------------------------<  223[H]  4.1   |  16[L]  |  1.44[H]    Ca    8.6      18 Feb 2025 08:42  Phos  2.0     02-18  Mg     2.2     02-18    TPro  5.1[L]  /  Alb  2.4[L]  /  TBili  0.6  /  DBili  x   /  AST  27  /  ALT  21  /  AlkPhos  131[H]  02-18    CAPILLARY BLOOD GLUCOSE    Urinalysis Basic - ( 18 Feb 2025 08:42 )    Color: x / Appearance: x / SG: x / pH: x  Gluc: 223 mg/dL / Ketone: x  / Bili: x / Urobili: x   Blood: x / Protein: x / Nitrite: x   Leuk Esterase: x / RBC: x / WBC x   Sq Epi: x / Non Sq Epi: x / Bacteria: x    CULTURES:  Culture Results:   No growth at 4 days (02-14 @ 07:33)  Culture Results:   No growth at 4 days (02-14 @ 06:50)    Physical Examination:    General: No acute distress.    HEENT: Pupils equal, reactive to light.  Symmetric.  PULM: decreased at bases b/l.   CVS: Regular rate and rhythm, no murmurs  ABD: Soft, nondistended, nontender,   EXT: 2+ pitting edema in LE b/l extending to knees.   SKIN: Warm and well perfused, no rashes noted.  NEURO: Alert, oriented, interactive    DEVICES:     RADIOLOGY:   < from: CT Chest No Cont (02.15.25 @ 15:55) >  IMPRESSION:  Nondisplaced fractures of the left ninth and 10th ribs, similar to the   prior study.    Pancreatic mass, unchanged    No onset ascites and bilateral pleural effusions with compressive   atelectasis, right greater than left.    Dilated gallbladder, similar to the prior study.    < end of copied text >

## 2025-02-19 ENCOUNTER — RESULT REVIEW (OUTPATIENT)
Age: 70
End: 2025-02-19

## 2025-02-19 LAB
A1C WITH ESTIMATED AVERAGE GLUCOSE RESULT: 6.6 % — HIGH (ref 4–5.6)
ALBUMIN SERPL ELPH-MCNC: 2 G/DL — LOW (ref 3.3–5)
ALP SERPL-CCNC: 196 U/L — HIGH (ref 40–120)
ALT FLD-CCNC: 26 U/L — SIGNIFICANT CHANGE UP (ref 12–78)
APTT BLD: 103.6 SEC — HIGH (ref 24.5–35.6)
APTT BLD: 34.2 SEC — SIGNIFICANT CHANGE UP (ref 24.5–35.6)
AST SERPL-CCNC: 40 U/L — HIGH (ref 15–37)
BILIRUB SERPL-MCNC: 0.6 MG/DL — SIGNIFICANT CHANGE UP (ref 0.2–1.2)
BUN SERPL-MCNC: 66 MG/DL — HIGH (ref 7–23)
CALCIUM SERPL-MCNC: 8 MG/DL — LOW (ref 8.5–10.1)
CHLORIDE SERPL-SCNC: 113 MMOL/L — HIGH (ref 96–108)
CMV DNA CSF QL NAA+PROBE: SIGNIFICANT CHANGE UP IU/ML
CMV DNA SPEC NAA+PROBE-LOG#: SIGNIFICANT CHANGE UP LOG10IU/ML
CO2 SERPL-SCNC: 21 MMOL/L — LOW (ref 22–31)
CREAT ?TM UR-MCNC: 15 MG/DL — SIGNIFICANT CHANGE UP
CREAT SERPL-MCNC: 1.42 MG/DL — HIGH (ref 0.5–1.3)
CULTURE RESULTS: SIGNIFICANT CHANGE UP
CULTURE RESULTS: SIGNIFICANT CHANGE UP
EGFR: 40 ML/MIN/1.73M2 — LOW
EGFR: 40 ML/MIN/1.73M2 — LOW
ESTIMATED AVERAGE GLUCOSE: 143 MG/DL — HIGH (ref 68–114)
GLUCOSE BLDC GLUCOMTR-MCNC: 176 MG/DL — HIGH (ref 70–99)
GLUCOSE BLDC GLUCOMTR-MCNC: 194 MG/DL — HIGH (ref 70–99)
GLUCOSE BLDC GLUCOMTR-MCNC: 201 MG/DL — HIGH (ref 70–99)
GLUCOSE BLDC GLUCOMTR-MCNC: 219 MG/DL — HIGH (ref 70–99)
GLUCOSE SERPL-MCNC: 205 MG/DL — HIGH (ref 70–99)
HCT VFR BLD CALC: 30.5 % — LOW (ref 34.5–45)
HCT VFR BLD CALC: 30.8 % — LOW (ref 34.5–45)
HGB BLD-MCNC: 10.6 G/DL — LOW (ref 11.5–15.5)
HGB BLD-MCNC: 10.7 G/DL — LOW (ref 11.5–15.5)
IMMATURE PLATELET FRACTION #: 11.4 K/UL — HIGH (ref 4.7–11.1)
IMMATURE PLATELET FRACTION #: 12.7 K/UL — HIGH (ref 4.7–11.1)
IMMATURE PLATELET FRACTION %: 16.1 % — HIGH (ref 1.6–4.9)
IMMATURE PLATELET FRACTION %: 16.7 % — HIGH (ref 1.6–4.9)
LACTATE SERPL-SCNC: 2.6 MMOL/L — HIGH (ref 0.7–2)
MAGNESIUM SERPL-MCNC: 1.7 MG/DL — SIGNIFICANT CHANGE UP (ref 1.6–2.6)
MCHC RBC-ENTMCNC: 29 PG — SIGNIFICANT CHANGE UP (ref 27–34)
MCHC RBC-ENTMCNC: 29.8 PG — SIGNIFICANT CHANGE UP (ref 27–34)
MCHC RBC-ENTMCNC: 34.4 G/DL — SIGNIFICANT CHANGE UP (ref 32–36)
MCHC RBC-ENTMCNC: 35.1 G/DL — SIGNIFICANT CHANGE UP (ref 32–36)
MCV RBC AUTO: 84.2 FL — SIGNIFICANT CHANGE UP (ref 80–100)
MCV RBC AUTO: 85 FL — SIGNIFICANT CHANGE UP (ref 80–100)
NRBC # BLD AUTO: 0.05 K/UL — HIGH (ref 0–0)
NRBC # BLD AUTO: 0.05 K/UL — HIGH (ref 0–0)
NRBC # FLD: 0.05 K/UL — HIGH (ref 0–0)
NRBC # FLD: 0.05 K/UL — HIGH (ref 0–0)
NRBC BLD AUTO-RTO: 0 /100 WBCS — SIGNIFICANT CHANGE UP (ref 0–0)
NRBC BLD AUTO-RTO: 0 /100 WBCS — SIGNIFICANT CHANGE UP (ref 0–0)
OSMOLALITY UR: 395 MOSM/KG — SIGNIFICANT CHANGE UP (ref 50–1200)
PHOSPHATE 24H UR-MCNC: 12.5 MG/DL — SIGNIFICANT CHANGE UP
PHOSPHATE SERPL-MCNC: 2.1 MG/DL — LOW (ref 2.5–4.5)
PMV BLD: SIGNIFICANT CHANGE UP (ref 7–13)
PMV BLD: SIGNIFICANT CHANGE UP (ref 7–13)
POTASSIUM SERPL-MCNC: 3.5 MMOL/L — SIGNIFICANT CHANGE UP (ref 3.5–5.3)
POTASSIUM SERPL-SCNC: 3.5 MMOL/L — SIGNIFICANT CHANGE UP (ref 3.5–5.3)
PROT SERPL-MCNC: 4.7 GM/DL — LOW (ref 6–8.3)
RBC # BLD: 3.59 M/UL — LOW (ref 3.8–5.2)
RBC # BLD: 3.66 M/UL — LOW (ref 3.8–5.2)
RBC # FLD: 15.2 % — HIGH (ref 10.3–14.5)
RBC # FLD: 15.5 % — HIGH (ref 10.3–14.5)
SODIUM SERPL-SCNC: 143 MMOL/L — SIGNIFICANT CHANGE UP (ref 135–145)
SODIUM UR-SCNC: 77 MMOL/L — SIGNIFICANT CHANGE UP
SPECIMEN SOURCE: SIGNIFICANT CHANGE UP
SPECIMEN SOURCE: SIGNIFICANT CHANGE UP
WBC # BLD: 13.14 K/UL — HIGH (ref 3.8–10.5)
WBC # BLD: 22.26 K/UL — HIGH (ref 3.8–10.5)
WBC # FLD AUTO: 13.14 K/UL — HIGH (ref 3.8–10.5)
WBC # FLD AUTO: 22.26 K/UL — HIGH (ref 3.8–10.5)

## 2025-02-19 PROCEDURE — 71275 CT ANGIOGRAPHY CHEST: CPT | Mod: 26

## 2025-02-19 PROCEDURE — 93306 TTE W/DOPPLER COMPLETE: CPT | Mod: 26

## 2025-02-19 PROCEDURE — 74177 CT ABD & PELVIS W/CONTRAST: CPT | Mod: 26

## 2025-02-19 PROCEDURE — 71045 X-RAY EXAM CHEST 1 VIEW: CPT | Mod: 26

## 2025-02-19 PROCEDURE — 99233 SBSQ HOSP IP/OBS HIGH 50: CPT

## 2025-02-19 RX ORDER — SODIUM/POT/MAG/CALC/CHLOR/ACET 35-20-5MEQ
1 VIAL (ML) INTRAVENOUS
Refills: 0 | Status: DISCONTINUED | OUTPATIENT
Start: 2025-02-19 | End: 2025-02-19

## 2025-02-19 RX ORDER — SOD PHOS DI, MONO/K PHOS MONO 250 MG
2 TABLET ORAL
Refills: 0 | Status: COMPLETED | OUTPATIENT
Start: 2025-02-19 | End: 2025-02-19

## 2025-02-19 RX ORDER — HEPARIN SODIUM 1000 [USP'U]/ML
INJECTION INTRAVENOUS; SUBCUTANEOUS
Qty: 25000 | Refills: 0 | Status: DISCONTINUED | OUTPATIENT
Start: 2025-02-19 | End: 2025-02-23

## 2025-02-19 RX ORDER — METHYLPREDNISOLONE ACETATE 80 MG/ML
40 INJECTION, SUSPENSION INTRA-ARTICULAR; INTRALESIONAL; INTRAMUSCULAR; SOFT TISSUE ONCE
Refills: 0 | Status: DISCONTINUED | OUTPATIENT
Start: 2025-02-19 | End: 2025-02-19

## 2025-02-19 RX ORDER — HEPARIN SODIUM 1000 [USP'U]/ML
6000 INJECTION INTRAVENOUS; SUBCUTANEOUS EVERY 6 HOURS
Refills: 0 | Status: DISCONTINUED | OUTPATIENT
Start: 2025-02-19 | End: 2025-02-23

## 2025-02-19 RX ORDER — METOPROLOL SUCCINATE 50 MG/1
2.5 TABLET, EXTENDED RELEASE ORAL EVERY 6 HOURS
Refills: 0 | Status: DISCONTINUED | OUTPATIENT
Start: 2025-02-19 | End: 2025-02-24

## 2025-02-19 RX ORDER — HEPARIN SODIUM 1000 [USP'U]/ML
3000 INJECTION INTRAVENOUS; SUBCUTANEOUS EVERY 6 HOURS
Refills: 0 | Status: DISCONTINUED | OUTPATIENT
Start: 2025-02-19 | End: 2025-02-23

## 2025-02-19 RX ORDER — I.V. FAT EMULSION 20 G/100ML
0.68 EMULSION INTRAVENOUS
Qty: 50 | Refills: 0 | Status: DISCONTINUED | OUTPATIENT
Start: 2025-02-19 | End: 2025-02-19

## 2025-02-19 RX ORDER — METHYLPREDNISOLONE ACETATE 80 MG/ML
60 INJECTION, SUSPENSION INTRA-ARTICULAR; INTRALESIONAL; INTRAMUSCULAR; SOFT TISSUE EVERY 12 HOURS
Refills: 0 | Status: DISCONTINUED | OUTPATIENT
Start: 2025-02-19 | End: 2025-02-20

## 2025-02-19 RX ADMIN — INSULIN LISPRO 2: 100 INJECTION, SOLUTION INTRAVENOUS; SUBCUTANEOUS at 17:52

## 2025-02-19 RX ADMIN — Medication 125 MILLIGRAM(S): at 13:59

## 2025-02-19 RX ADMIN — Medication 1 EACH: at 22:26

## 2025-02-19 RX ADMIN — Medication 1 GRAM(S): at 17:49

## 2025-02-19 RX ADMIN — SODIUM CHLORIDE 50 MILLILITER(S): 9 INJECTION, SOLUTION INTRAVENOUS at 10:13

## 2025-02-19 RX ADMIN — METOPROLOL SUCCINATE 2.5 MILLIGRAM(S): 50 TABLET, EXTENDED RELEASE ORAL at 23:28

## 2025-02-19 RX ADMIN — HEPARIN SODIUM 1100 UNIT(S)/HR: 1000 INJECTION INTRAVENOUS; SUBCUTANEOUS at 23:50

## 2025-02-19 RX ADMIN — I.V. FAT EMULSION 20.8 GM/KG/DAY: 20 EMULSION INTRAVENOUS at 22:26

## 2025-02-19 RX ADMIN — Medication 325 MILLIGRAM(S): at 14:14

## 2025-02-19 RX ADMIN — Medication 2 PACKET(S): at 10:03

## 2025-02-19 RX ADMIN — METHYLPREDNISOLONE ACETATE 60 MILLIGRAM(S): 80 INJECTION, SUSPENSION INTRA-ARTICULAR; INTRALESIONAL; INTRAMUSCULAR; SOFT TISSUE at 21:17

## 2025-02-19 RX ADMIN — SODIUM CHLORIDE 50 MILLILITER(S): 9 INJECTION, SOLUTION INTRAVENOUS at 17:48

## 2025-02-19 RX ADMIN — MEROPENEM 1000 MILLIGRAM(S): 1 INJECTION INTRAVENOUS at 10:03

## 2025-02-19 RX ADMIN — Medication 2000 UNIT(S): at 21:02

## 2025-02-19 RX ADMIN — SODIUM CHLORIDE 50 MILLILITER(S): 9 INJECTION, SOLUTION INTRAVENOUS at 05:24

## 2025-02-19 RX ADMIN — METOPROLOL SUCCINATE 2.5 MILLIGRAM(S): 50 TABLET, EXTENDED RELEASE ORAL at 17:50

## 2025-02-19 RX ADMIN — AMLODIPINE BESYLATE 10 MILLIGRAM(S): 10 TABLET ORAL at 10:04

## 2025-02-19 RX ADMIN — Medication 100 MILLIGRAM(S): at 10:13

## 2025-02-19 RX ADMIN — Medication 125 MILLIGRAM(S): at 05:24

## 2025-02-19 RX ADMIN — Medication 40 MILLIGRAM(S): at 10:03

## 2025-02-19 RX ADMIN — Medication 325 MILLIGRAM(S): at 21:03

## 2025-02-19 RX ADMIN — Medication 20 MILLIGRAM(S): at 22:27

## 2025-02-19 RX ADMIN — Medication 1 GRAM(S): at 13:58

## 2025-02-19 RX ADMIN — INSULIN LISPRO 1: 100 INJECTION, SOLUTION INTRAVENOUS; SUBCUTANEOUS at 06:53

## 2025-02-19 RX ADMIN — Medication 1 GRAM(S): at 23:26

## 2025-02-19 RX ADMIN — HEPARIN SODIUM 1300 UNIT(S)/HR: 1000 INJECTION INTRAVENOUS; SUBCUTANEOUS at 19:10

## 2025-02-19 RX ADMIN — Medication 1 APPLICATION(S): at 06:00

## 2025-02-19 RX ADMIN — INSULIN LISPRO 2: 100 INJECTION, SOLUTION INTRAVENOUS; SUBCUTANEOUS at 00:46

## 2025-02-19 RX ADMIN — MEROPENEM 1000 MILLIGRAM(S): 1 INJECTION INTRAVENOUS at 21:03

## 2025-02-19 RX ADMIN — Medication 125 MILLIGRAM(S): at 23:26

## 2025-02-19 RX ADMIN — Medication 325 MILLIGRAM(S): at 05:25

## 2025-02-19 RX ADMIN — LOPERAMIDE HCL 2 MILLIGRAM(S): 1 SOLUTION ORAL at 05:25

## 2025-02-19 RX ADMIN — Medication 1 GRAM(S): at 05:25

## 2025-02-19 RX ADMIN — Medication 125 MILLIGRAM(S): at 17:48

## 2025-02-19 RX ADMIN — INSULIN LISPRO 2: 100 INJECTION, SOLUTION INTRAVENOUS; SUBCUTANEOUS at 23:49

## 2025-02-19 RX ADMIN — LOPERAMIDE HCL 2 MILLIGRAM(S): 1 SOLUTION ORAL at 13:58

## 2025-02-19 RX ADMIN — HEPARIN SODIUM 1300 UNIT(S)/HR: 1000 INJECTION INTRAVENOUS; SUBCUTANEOUS at 16:23

## 2025-02-19 RX ADMIN — METHYLPREDNISOLONE ACETATE 60 MILLIGRAM(S): 80 INJECTION, SUSPENSION INTRA-ARTICULAR; INTRALESIONAL; INTRAMUSCULAR; SOFT TISSUE at 15:43

## 2025-02-19 NOTE — PROGRESS NOTE ADULT - ASSESSMENT
69-year-old female with stage IV pancreatic adenocarcinoma presenting with neutropenic fever and severe treatment-related toxicities from modified FOLFIRINOX and investigational EMILY inhibitor [id number].    1. Neutropenic Fever:  Patient presents with grade 4 neutropenia (ANC < 500) with initial febrile episode. Currently on appropriate broad-spectrum coverage with cefepime. Has remained afebrile on current regimen with slight improvement in WBC  Started on filgrastim 480mcg daily for count recovery- WBC has now recovered and can dc neupogen - today wbc 5.43, Hb 10.4, plt 45k     2. Severe Thrombocytopenia:  Platelet count critically low at 26k.  High risk for bleeding complications, especially in setting of ongoing GI symptoms. Will require platelet transfusion support.  improved to 45k today     3. Treatment-Related GI Toxicity:  Severe diarrhea and inability to tolerate PO intake, likely multifactorial from both FOLFIRINOX (particularly irinotecan component) and study drug [id number]. Consider DPD deficiency given severity of symptoms. Currently managed with cholestyramine and lomotil with some improvement. Requiring TPN support.  ppx po vancomycin started- GI following     4. Nutritional Status:  Significant decline in oral intake due to severe mucositis and GI symptoms. Currently on TPN support. Will require careful nutritional monitoring and support during recovery.    5. DVT Prophylaxis:  History of left cephalic vein thrombosis. Currently on appropriate DVT prophylaxis given high-risk status (active malignancy, hospitalization).    Overall, patient experiencing significant toxicities from current treatment regimen requiring dose modifications and possibly alternative treatment strategies moving forward. [place] close [place] [facility] [place].  - will need to continue with SCDs   - severe thrombocytopenia prohibits chemical prophylaxis     6. Acute hypoxic episode - 2/15 - likely secondary to volume overload - responsive to diuretics 2/16    Plan:  #Neutropenic Septic shock   - hemodynamically stable   - Continue meropenem started on 2/15 as well as fluconazole and po vanc  - Today w/ improvement in counts- WBC has now recovered and s/p neupogen - today wbc 13k wtih neutrophilia 2/2 neupogen     #Acute hypoxic respiratory failure likely secondary to hypervolemia   - response to diuretics - will need to monitor renal function given slight rise in CR to 1.42 today and trending down   - now on NC and bipap at night  - v/q scan w indeterminate prob of PE, linear defect in RUL posterior segment   - cxr with basilar haziness    # EMPIRIC TREATMENT FOR ESOPHAGEAL CANDIDIASIS   - continue with DIFLUCAN     #Anemia   - received two units of pRBCs t date   - Hb 10.7 today - will continue to monitor, keep type and screen active     #Thrombocytopenia  -Transfuse for plts < 15   - Monitor daily platelet counts- plt 68k today     #GI Toxicity- acute diarrhea   - largely improved.   - Continue cholestyramine and lomotil  - Consider DPD testing, and risk of irinotecan toxicity increases with genetic variants associated with reduced UGT enzyme activity, such as [id number]  - Continue TPN support  - Oral care protocol  - GI following     #Care Coordination  -will continue with ongoing [place]  [facility] [place]  - Review clinical trial protocol regarding toxicity management  - Consider dose modifications for future therapy    will follow and communicate with [facility]

## 2025-02-19 NOTE — PROGRESS NOTE ADULT - SUBJECTIVE AND OBJECTIVE BOX
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 1000 milliLiter(s) (50 mL/Hr) IV Continuous <Continuous>  dextrose 5%. 1000 milliLiter(s) (100 mL/Hr) IV Continuous <Continuous>  dextrose 50% Injectable 25 Gram(s) IV Push once  dextrose 50% Injectable 12.5 Gram(s) IV Push once  dextrose 50% Injectable 25 Gram(s) IV Push once  famotidine Injectable 20 milliGRAM(s) IV Push daily  fluconAZOLE IVPB      fluconAZOLE IVPB 200 milliGRAM(s) IV Intermittent every 24 hours  glucagon  Injectable 1 milliGRAM(s) IntraMuscular once  insulin lispro (ADMELOG) corrective regimen sliding scale   SubCutaneous every 6 hours  lactated ringers. 1000 milliLiter(s) (50 mL/Hr) IV Continuous <Continuous>  lipid, fat emulsion (Fish Oil and Plant Based) 20% Infusion 0.681 Gm/kG/Day (20.83 mL/Hr) IV Continuous <Continuous>  loperamide 2 milliGRAM(s) Oral four times a day  meropenem Injectable 1000 milliGRAM(s) IV Push every 12 hours  pantoprazole  Injectable 40 milliGRAM(s) IV Push daily  Parenteral Nutrition - Adult 1 Each (75 mL/Hr) TPN Continuous <Continuous>  sodium bicarbonate 325 milliGRAM(s) Oral every 8 hours  sucralfate suspension 1 Gram(s) Oral four times a day  vancomycin    Solution 125 milliGRAM(s) Oral every 6 hours    MEDICATIONS  (PRN):  acetaminophen     Tablet .. 650 milliGRAM(s) Oral every 6 hours PRN Temp greater or equal to 38C (100.4F), Mild Pain (1 - 3)  aluminum hydroxide/magnesium hydroxide/simethicone Suspension 30 milliLiter(s) Oral every 4 hours PRN Dyspepsia  Biotene Dry Mouth Oral Rinse 15 milliLiter(s) Swish and Spit five times a day PRN Mouth Care  dextrose Oral Gel 15 Gram(s) Oral once PRN Blood Glucose LESS THAN 70 milliGRAM(s)/deciliter  diphenoxylate/atropine 1 Tablet(s) Oral every 6 hours PRN Diarrhea  melatonin 3 milliGRAM(s) Oral at bedtime PRN Insomnia  ondansetron Injectable 4 milliGRAM(s) IV Push every 8 hours PRN Nausea and/or Vomiting  sodium chloride 0.65% Nasal 1 Spray(s) Both Nostrils two times a day PRN Nasal Congestion      REVIEW OF SYSTEMS:    RESPIRATORY: No shortness of breath  CARDIOVASCULAR: No chest pain  All other review of systems is negative unless indicated above.    Vital Signs Last 24 Hrs  T(C): 37.4 (19 Feb 2025 06:00), Max: 37.4 (19 Feb 2025 06:00)  T(F): 99.4 (19 Feb 2025 06:00), Max: 99.4 (19 Feb 2025 06:00)  HR: 111 (19 Feb 2025 06:00) (97 - 119)  BP: 135/90 (19 Feb 2025 06:00) (125/70 - 137/87)  BP(mean): 103 (19 Feb 2025 06:00) (84 - 107)  RR: 25 (19 Feb 2025 06:00) (18 - 35)  SpO2: 97% (19 Feb 2025 06:00) (97% - 100%)    Parameters below as of 19 Feb 2025 06:00  Patient On (Oxygen Delivery Method): nasal cannula  O2 Flow (L/min): 4      PHYSICAL EXAM:    Constitutional: NAD  Respiratory: CTAB  Cardiovascular: S1 and S2, RRR  Gastrointestinal: BS+, soft, NT/ND      LABS:                        10.7   13.14 )-----------( 68       ( 19 Feb 2025 05:35 )             30.5     02-19    143  |  113[H]  |  66[H]  ----------------------------<  205[H]  3.5   |  21[L]  |  1.42[H]    Ca    8.0[L]      19 Feb 2025 05:35  Phos  2.1     02-19  Mg     1.7     02-19    TPro  4.7[L]  /  Alb  2.0[L]  /  TBili  0.6  /  DBili  x   /  AST  40[H]  /  ALT  26  /  AlkPhos  196[H]  02-19      LIVER FUNCTIONS - ( 19 Feb 2025 05:35 )  Alb: 2.0 g/dL / Pro: 4.7 gm/dL / ALK PHOS: 196 U/L / ALT: 26 U/L / AST: 40 U/L / GGT: x             RADIOLOGY & ADDITIONAL STUDIES:

## 2025-02-19 NOTE — PROGRESS NOTE ADULT - ASSESSMENT
Imp:  It looks like CVM and HSV will be negative which argues that esophagitis is reflux or fungal related    Rec:  Cont current medications  I can arrange an EGD if there is no improvement but I think risk-benefit still favors waiting for today

## 2025-02-19 NOTE — PROGRESS NOTE ADULT - SUBJECTIVE AND OBJECTIVE BOX
69-year-old female recent diagnosis of primary pancreatic adenocarcinoma , pancytopenia and significant PMH of SCC lip, cyclical vomiting syndrome, HTN, HPL, Gout, GERD, CVA w renal evaluation of MALLORY and hypokalemia. Per documents was recently at  [facility] with septic shock on 02/09/2025, nausea, vomiting and diarrhea requiring Levophed drip. Per documents, patient  transferred to  for continued care. Patient with + diuresis w lasix 80 IV, increased UOP > 4L.   Treated w IV and po k repletion.     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 1000 milliLiter(s) (100 mL/Hr) IV Continuous <Continuous>  dextrose 5%. 1000 milliLiter(s) (50 mL/Hr) IV Continuous <Continuous>  dextrose 50% Injectable 25 Gram(s) IV Push once  dextrose 50% Injectable 12.5 Gram(s) IV Push once  dextrose 50% Injectable 25 Gram(s) IV Push once  famotidine Injectable 20 milliGRAM(s) IV Push daily  fluconAZOLE IVPB      fluconAZOLE IVPB 200 milliGRAM(s) IV Intermittent every 24 hours  glucagon  Injectable 1 milliGRAM(s) IntraMuscular once  insulin lispro (ADMELOG) corrective regimen sliding scale   SubCutaneous every 6 hours  lactated ringers. 1000 milliLiter(s) (50 mL/Hr) IV Continuous <Continuous>  lipid, fat emulsion (Fish Oil and Plant Based) 20% Infusion 0.681 Gm/kG/Day (20.83 mL/Hr) IV Continuous <Continuous>  loperamide 2 milliGRAM(s) Oral four times a day  meropenem Injectable 1000 milliGRAM(s) IV Push every 12 hours  pantoprazole  Injectable 40 milliGRAM(s) IV Push daily  Parenteral Nutrition - Adult 1 Each (75 mL/Hr) TPN Continuous <Continuous>  Parenteral Nutrition - Adult 1 Each (75 mL/Hr) TPN Continuous <Continuous>  potassium phosphate / sodium phosphate Powder (PHOS-NaK) 1 Packet(s) Oral once  sodium bicarbonate 325 milliGRAM(s) Oral every 8 hours  sucralfate suspension 1 Gram(s) Oral four times a day  vancomycin    Solution 125 milliGRAM(s) Oral every 6 hours    MEDICATIONS  (PRN):  acetaminophen     Tablet .. 650 milliGRAM(s) Oral every 6 hours PRN Temp greater or equal to 38C (100.4F), Mild Pain (1 - 3)  aluminum hydroxide/magnesium hydroxide/simethicone Suspension 30 milliLiter(s) Oral every 4 hours PRN Dyspepsia  Biotene Dry Mouth Oral Rinse 15 milliLiter(s) Swish and Spit five times a day PRN Mouth Care  dextrose Oral Gel 15 Gram(s) Oral once PRN Blood Glucose LESS THAN 70 milliGRAM(s)/deciliter  diphenoxylate/atropine 1 Tablet(s) Oral every 6 hours PRN Diarrhea  melatonin 3 milliGRAM(s) Oral at bedtime PRN Insomnia  ondansetron Injectable 4 milliGRAM(s) IV Push every 8 hours PRN Nausea and/or Vomiting  sodium chloride 0.65% Nasal 1 Spray(s) Both Nostrils two times a day PRN Nasal Congestion    Allergies    No Known Allergies    Intolerances        SOCIAL HISTORY:    FAMILY HISTORY:      REVIEW OF SYSTEMS:      Vital Signs Last 24 Hrs  T(C): 36.5 (18 Feb 2025 12:53), Max: 36.9 (17 Feb 2025 17:38)  T(F): 97.7 (18 Feb 2025 12:53), Max: 98.4 (17 Feb 2025 17:38)  HR: 117 (18 Feb 2025 14:03) (86 - 117)  BP: 136/87 (18 Feb 2025 14:03) (129/81 - 139/91)  BP(mean): 102 (18 Feb 2025 14:03) (93 - 106)  RR: 35 (18 Feb 2025 14:03) (18 - 35)  SpO2: 98% (18 Feb 2025 14:03) (97% - 100%)    Parameters below as of 18 Feb 2025 14:03  Patient On (Oxygen Delivery Method): nasal cannula  O2 Flow (L/min): 2        PHYSICAL EXAM:    Constitutional: NAD, well-groomed, well-developed  HEENT: PERRLA, EOMI,  MMM  Neck: No LAD, No JVD  Respiratory: CTAB  Cardiovascular: S1 and S2, RRR  Gastrointestinal: ditant bowel sounds, non tender  Extremities: No peripheral edema  Neurological: A/O x 3, no focal deficits  Psychiatric: Normal mood, normal affect  : Julian clear yellow urine  Access: Not applicable        LABS:                          10.4   5.43  )-----------( 45       ( 18 Feb 2025 08:42 )             29.4                           10.1   1.30  )-----------( 37       ( 17 Feb 2025 05:59 )             28.5     02-18    138  |  112[H]  |  69[H]  ----------------------------<  223[H]  4.1   |  16[L]  |  1.44[H]    Ca    8.6      18 Feb 2025 08:42  Phos  2.0     02-18  Mg     2.2     02-18    TPro  5.1[L]  /  Alb  2.4[L]  /  TBili  0.6  /  DBili  x   /  AST  27  /  ALT  21  /  AlkPhos  131[H]  02-18      17 Feb 2025 14:46    137    |  109    |  74     ----------------------------<  234    3.4     |  20     |  1.57   17 Feb 2025 05:59    140    |  110    |  70     ----------------------------<  227    2.3     |  17     |  1.73   16 Feb 2025 06:12    136    |  108    |  47     ----------------------------<  172    3.4     |  17     |  1.56   15 Feb 2025 21:25    133    |  108    |  40     ----------------------------<  172    2.9     |  17     |  1.26   15 Feb 2025 05:17    133    |  108    |  27     ----------------------------<  183    3.3     |  18     |  1.04     Ca    8.5        17 Feb 2025 14:46  Ca    8.4        17 Feb 2025 05:59  Ca    8.0        16 Feb 2025 06:12  Ca    8.0        15 Feb 2025 21:25  Ca    8.1        15 Feb 2025 05:17  Phos  2.3       17 Feb 2025 14:46  Phos  3.2       17 Feb 2025 05:59  Phos  3.1       16 Feb 2025 06:12  Phos  1.2       15 Feb 2025 21:25  Phos  2.0       15 Feb 2025 05:17  Mg     2.9       17 Feb 2025 14:46  Mg     1.5       17 Feb 2025 05:59  Mg     1.8       16 Feb 2025 06:12  Mg     2.1       15 Feb 2025 21:25  Mg     2.3       15 Feb 2025 05:17    TPro  5.2    /  Alb  2.7    /  TBili  0.8    /  DBili  x      /  AST  15     /  ALT  19     /  AlkPhos  101    17 Feb 2025 05:59  TPro  5.1    /  Alb  2.9    /  TBili  1.1    /  DBili  x      /  AST  5      /  ALT  19     /  AlkPhos  91     16 Feb 2025 06:12  TPro  4.6    /  Alb  2.0    /  TBili  1.0    /  DBili  x      /  AST  11     /  ALT  17     /  AlkPhos  105    15 Feb 2025 21:25  TPro  4.2    /  Alb  1.7    /  TBili  0.6    /  DBili  0.4    /  AST  6      /  ALT  12     /  AlkPhos  98     15 Feb 2025 05:17  TPro  4.5    /  Alb  1.5    /  TBili  0.8    /  DBili  0.4    /  AST  8      /  ALT  14     /  AlkPhos  104    14 Feb 2025 06:50          Urine Studies:  Urinalysis Basic - ( 17 Feb 2025 14:46 )    Color: x / Appearance: x / SG: x / pH: x  Gluc: 234 mg/dL / Ketone: x  / Bili: x / Urobili: x   Blood: x / Protein: x / Nitrite: x   Leuk Esterase: x / RBC: x / WBC x   Sq Epi: x / Non Sq Epi: x / Bacteria: x            RADIOLOGY & ADDITIONAL STUDIES:                    69-year-old female recent diagnosis of primary pancreatic adenocarcinoma , pancytopenia and significant PMH of SCC lip, cyclical vomiting syndrome, HTN, HPL, Gout, GERD, CVA w renal evaluation of MALLORY and hypokalemia. Per documents was recently at  [facility] with septic shock on 02/09/2025, nausea, vomiting and diarrhea requiring Levophed drip. Per documents, patient  transferred to  for continued care. Patient with + diuresis w lasix 80 IV, increased UOP > 4L.   Treated w IV and po k repletion.     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 1000 milliLiter(s) (100 mL/Hr) IV Continuous <Continuous>  dextrose 5%. 1000 milliLiter(s) (50 mL/Hr) IV Continuous <Continuous>  dextrose 50% Injectable 25 Gram(s) IV Push once  dextrose 50% Injectable 12.5 Gram(s) IV Push once  dextrose 50% Injectable 25 Gram(s) IV Push once  famotidine Injectable 20 milliGRAM(s) IV Push daily  fluconAZOLE IVPB      fluconAZOLE IVPB 200 milliGRAM(s) IV Intermittent every 24 hours  glucagon  Injectable 1 milliGRAM(s) IntraMuscular once  insulin lispro (ADMELOG) corrective regimen sliding scale   SubCutaneous every 6 hours  lactated ringers. 1000 milliLiter(s) (50 mL/Hr) IV Continuous <Continuous>  lipid, fat emulsion (Fish Oil and Plant Based) 20% Infusion 0.681 Gm/kG/Day (20.83 mL/Hr) IV Continuous <Continuous>  lipid, fat emulsion (Fish Oil and Plant Based) 20% Infusion 0.681 Gm/kG/Day (20.8 mL/Hr) IV Continuous <Continuous>  loperamide 2 milliGRAM(s) Oral four times a day  meropenem Injectable 1000 milliGRAM(s) IV Push every 12 hours  metoprolol tartrate Injectable 2.5 milliGRAM(s) IV Push every 6 hours  pantoprazole  Injectable 40 milliGRAM(s) IV Push daily  Parenteral Nutrition - Adult 1 Each (75 mL/Hr) TPN Continuous <Continuous>  Parenteral Nutrition - Adult 1 Each (75 mL/Hr) TPN Continuous <Continuous>  potassium phosphate / sodium phosphate Powder (PHOS-NaK) 2 Packet(s) Oral two times a day  sodium bicarbonate 325 milliGRAM(s) Oral every 8 hours  sucralfate suspension 1 Gram(s) Oral four times a day  vancomycin    Solution 125 milliGRAM(s) Oral every 6 hours    MEDICATIONS  (PRN):  acetaminophen     Tablet .. 650 milliGRAM(s) Oral every 6 hours PRN Temp greater or equal to 38C (100.4F), Mild Pain (1 - 3)  aluminum hydroxide/magnesium hydroxide/simethicone Suspension 30 milliLiter(s) Oral every 4 hours PRN Dyspepsia  Biotene Dry Mouth Oral Rinse 15 milliLiter(s) Swish and Spit five times a day PRN Mouth Care  dextrose Oral Gel 15 Gram(s) Oral once PRN Blood Glucose LESS THAN 70 milliGRAM(s)/deciliter  diphenoxylate/atropine 1 Tablet(s) Oral every 6 hours PRN Diarrhea  melatonin 3 milliGRAM(s) Oral at bedtime PRN Insomnia  ondansetron Injectable 4 milliGRAM(s) IV Push every 8 hours PRN Nausea and/or Vomiting  sodium chloride 0.65% Nasal 1 Spray(s) Both Nostrils two times a day PRN Nasal Congestion    Allergies    No Known Allergies    Intolerances      I&O's Detail    18 Feb 2025 07:01  -  19 Feb 2025 07:00  --------------------------------------------------------  IN:    Fat Emulsion (Fish Oil &amp; Plant Based) 20% Infusion: 160 mL    Fat Emulsion (Fish Oil &amp; Plant Based) 20% Infusion: 200 mL    Lactated Ringers: 1492 mL    Oral Fluid: 150 mL    TPN (Total Parenteral Nutrition): 1952 mL  Total IN: 3954 mL    OUT:    Indwelling Catheter - Urethral (mL): 3600 mL  Total OUT: 3600 mL    Total NET: 354 mL            Vital Signs Last 24 Hrs  T(C): 36.5 (18 Feb 2025 12:53), Max: 36.9 (17 Feb 2025 17:38)  T(F): 97.7 (18 Feb 2025 12:53), Max: 98.4 (17 Feb 2025 17:38)  HR: 117 (18 Feb 2025 14:03) (86 - 117)  BP: 136/87 (18 Feb 2025 14:03) (129/81 - 139/91)  BP(mean): 102 (18 Feb 2025 14:03) (93 - 106)  RR: 35 (18 Feb 2025 14:03) (18 - 35)  SpO2: 98% (18 Feb 2025 14:03) (97% - 100%)    Parameters below as of 18 Feb 2025 14:03  Patient On (Oxygen Delivery Method): nasal cannula  O2 Flow (L/min): 2        PHYSICAL EXAM:    Constitutional: NAD, well-groomed, well-developed  HEENT: PERRLA, EOMI,  MMM  Neck: No LAD, No JVD  Respiratory: CTAB  Cardiovascular: S1 and S2, RRR  Gastrointestinal: ditant bowel sounds, non tender  Extremities: No peripheral edema  Neurological: A/O x 3, no focal deficits  Psychiatric: Normal mood, normal affect  Access: Not applicable        LABS:                                   10.7   13.14 )-----------( 68       ( 19 Feb 2025 05:35 )             30.5                10.4   5.43  )-----------( 45       ( 18 Feb 2025 08:42 )             29.4                           10.1   1.30  )-----------( 37       ( 17 Feb 2025 05:59 )             28.5     02-19    143  |  113[H]  |  66[H]  ----------------------------<  205[H]  3.5   |  21[L]  |  1.42[H]    Ca    8.0[L]      19 Feb 2025 05:35  Phos  2.1     02-19  Mg     1.7     02-19    TPro  4.7[L]  /  Alb  2.0[L]  /  TBili  0.6  /  DBili  x   /  AST  40[H]  /  ALT  26  /  AlkPhos  196[H]  02-19 02-18    138  |  112[H]  |  69[H]  ----------------------------<  223[H]  4.1   |  16[L]  |  1.44[H]    Ca    8.6      18 Feb 2025 08:42  Phos  2.0     02-18  Mg     2.2     02-18    TPro  5.1[L]  /  Alb  2.4[L]  /  TBili  0.6  /  DBili  x   /  AST  27  /  ALT  21  /  AlkPhos  131[H]  02-18      17 Feb 2025 14:46    137    |  109    |  74     ----------------------------<  234    3.4     |  20     |  1.57   17 Feb 2025 05:59    140    |  110    |  70     ----------------------------<  227    2.3     |  17     |  1.73   16 Feb 2025 06:12    136    |  108    |  47     ----------------------------<  172    3.4     |  17     |  1.56   15 Feb 2025 21:25    133    |  108    |  40     ----------------------------<  172    2.9     |  17     |  1.26   15 Feb 2025 05:17    133    |  108    |  27     ----------------------------<  183    3.3     |  18     |  1.04     Ca    8.5        17 Feb 2025 14:46  Ca    8.4        17 Feb 2025 05:59  Ca    8.0        16 Feb 2025 06:12  Ca    8.0        15 Feb 2025 21:25  Ca    8.1        15 Feb 2025 05:17  Phos  2.3       17 Feb 2025 14:46  Phos  3.2       17 Feb 2025 05:59  Phos  3.1       16 Feb 2025 06:12  Phos  1.2       15 Feb 2025 21:25  Phos  2.0       15 Feb 2025 05:17  Mg     2.9       17 Feb 2025 14:46  Mg     1.5       17 Feb 2025 05:59  Mg     1.8       16 Feb 2025 06:12  Mg     2.1       15 Feb 2025 21:25  Mg     2.3       15 Feb 2025 05:17    TPro  5.2    /  Alb  2.7    /  TBili  0.8    /  DBili  x      /  AST  15     /  ALT  19     /  AlkPhos  101    17 Feb 2025 05:59  TPro  5.1    /  Alb  2.9    /  TBili  1.1    /  DBili  x      /  AST  5      /  ALT  19     /  AlkPhos  91     16 Feb 2025 06:12  TPro  4.6    /  Alb  2.0    /  TBili  1.0    /  DBili  x      /  AST  11     /  ALT  17     /  AlkPhos  105    15 Feb 2025 21:25  TPro  4.2    /  Alb  1.7    /  TBili  0.6    /  DBili  0.4    /  AST  6      /  ALT  12     /  AlkPhos  98     15 Feb 2025 05:17  TPro  4.5    /  Alb  1.5    /  TBili  0.8    /  DBili  0.4    /  AST  8      /  ALT  14     /  AlkPhos  104    14 Feb 2025 06:50          Urine Studies:  Urinalysis Basic - ( 17 Feb 2025 14:46 )    Color: x / Appearance: x / SG: x / pH: x  Gluc: 234 mg/dL / Ketone: x  / Bili: x / Urobili: x   Blood: x / Protein: x / Nitrite: x   Leuk Esterase: x / RBC: x / WBC x   Sq Epi: x / Non Sq Epi: x / Bacteria: x            RADIOLOGY & ADDITIONAL STUDIES:

## 2025-02-19 NOTE — CHART NOTE - NSCHARTNOTEFT_GEN_A_CORE
Clinical Nutrition PN Follow Up Note    *70 y/o F w/ recent diagnosis of primary pancreatic adenocarcinoma of head with tumor in umbilicus, chemo-induced pancytopenia and significant PMH of SCC lip, cyclical vomiting syndrome, HTN, HPL, Gout, GERD, CVA on Aggrenox and others had been admitted to [facility] with septic shock on 2025, nausea, vomiting and diarrhea requiring Levophed drip, was transferred out of ICU to regular floor last night.  Patient was being followed by GI, ID, Cardiology and Onc there.  Patient has been transferred to  today on patient's  (GI Dr. Pleitez) request.  At this time, patient c/o non-bloody diarrhea x 3 in last 24 hours.  She denies any abdominal pain, nausea, but has some dry heaves.  She denies any fever, chills, chest pain, palpitation, constipation or dysuria. Initially, she was started on Vancomycin and Zosyn, but now she has been on Cefepime as her blood culture and urine cultures are positive for Pseudomonas aeruginosa. Currently, patient is on Cefepime.  ID had also recommended Flagyl, but patient had refused it, as it causes nausea to her. Noted with Metabolic acidosis with NAG due to GI loss. Likely chemo-induced diarrhea and vomiting. Better than before. Cutaneous lesion on RUQ area below right breast, unclear etiology, ? Ecthyma gangrenosum. Admitting diagnosis: pancreatic ca  *: Pt transfer from [facility]; seen by RD and met criteria for PCM. Pt reports only being able to tolerate some sips of water and tea at this time. Reports tried eating small bite of mashed banana and felt burning sensation down her throat. Reports #; RD unable to obtain bedscale wt d/t bedscale not working. Admit wt per # note with 1+ and 2+ edema skewing wt. Pt appears overweight, NFPE reveals mild- mod muscle/fat wasting at this time. Recommend to continue with PO diet and encourage intake as tolerated. RD consulted to initiate PN d/t PO intolerance. Will initiate TPN through port.  *2/15: Remains on low fiber diet with minimal PO intake. Still w/ multiple episodes of diarrhea, if persists can consider adding 5mg zinc into PN bag. No other acute events overnight. D/w Dr. Joyner will c/w TPN today.   *: Raised area noted to mid sternum. RCW port intact.- no swelling noted, no redness. Port flushed without resistance. + blood return noted. Assessed with IV team Amy. Per pt no Pain on palpation. CT Chest. TPN on hold until CT read. CT chest from earlier in the day revealed new bilateral pleural effusions with bibasilar consolidated lung and ascites. CXR now shows low lung volumes and bibasilar opacities. Rigors post transfusion - Rapid response called;  Dr. Pleitez requesting ICU consult- concerned for pt.  requesting assessment of patient STAT. Pt transferred to SICU. ? third spacing, and possible PNA. Per pt still drinking small sips of water and tea. D/w Dr. Fortune will keep total volume same in PN bag today, and will c/w TPN.   *:  tx SICU, po remains minimal. On bipap. On additional LR d/t diuresis per critical care PA: "malignancy/severe protein-calorie malnutrition/hypoalbuminemia causing diffuse third spacing, caution with further diuresis as this is not truly a volume overload/acute CHF issue" also on IV albumin  c/w TPN  STRONGLY suggest to Confirm goals of care regarding LONG-TERM nutrition support - However, LONG-TERM Nutrition support is not recommended with advanced cancer as studies show that there is no improvement of dehydration symptoms, quality of life, or survival. It also increases the risk for peripheral edema, ascites, and pleural effusions. Will continue to provide nutr within GOC though  *: plan to c/w TPN for now. RD discussed w/ MD Tong and SICU PA Ethel Jurado need to confirm long term GOC regarding nutr support. Per GI - Esophageal pain is 2/2 esophagitis but unclear what type -- reflux vs. candida vs CMV or HSV etc. Plan to add imodium standing. ? EGD     *TPN initiated 2/2 PO intolerance, + Mucositis, possible candidal esophagitis    *current status: reports she stills has difficulty swallowing due to pain. feeling weak overall. still having multiple episodes of diarrhea, poor PO intake. Plan to c/w TPN    *new pertinent meds: cholecalciferol, famotidine, LR, loperamide, abx, protonix, PHOS-NaK packet, ADMELOG (5 units given x 24 hrs); biotene oral rinse prn    *labs reviewed; Na WNL, on additional IVF + TPN, suggest to continue. K, Phos, and Mg all LOW or low-end of normal - will increase all in bag. Pt likely experiencing RFS continue to monitor and replete lytes outside of PN bag prn. T bili WNL will c/w trace elements. C/w MVI and thiamine in PN bag. No POCT slightly elevated, will hold dextrose and titrate up tomorrow to goal (325g daily; GIR ~3.1 WNL) - consider additional RISS, may need insulin added into PN bag if uptrends and continues to be elevated.  TG WNL will c/w 50g of lipids daily.  Also of note, Cl elevated, CO2 depressed. Will add more acetate vs Cl into TPN bag - 207 vs 100, respectively.         143  |  113[H]  |  66[H]  ----------------------------<  205[H]  3.5   |  21[L]  |  1.42[H]    Ca    8.0[L]      2025 05:35  Phos  2.1       Mg     1.7         TPro  4.7[L]  /  Alb  2.0[L]  /  TBili  0.6  /  DBili  x   /  AST  40[H]  /  ALT  26  /  AlkPhos  196[H]      BMI: BMI (kg/m2): 27.8 (25 @ 05:46)    BP: 122/71 (25 @ 05:00) (96/64 - 177/89)Vital Signs Last 24 Hrs  T(C): 36.7 (25 @ 05:00), Max: 38.1 (02-15-25 @ 23:00)  T(F): 98 (25 @ 05:00), Max: 100.5 (02-15-25 @ 23:00)  HR: 76 (25 @ 05:00) (76 - 137)  BP: 122/71 (25 @ 05:00) (96/64 - 177/89)  BP(mean): 87 (25 @ 05:00) (70 - 93)  RR: 29 (25 @ 05:00) (16 - 29)  SpO2: 95% (25 @ 05:00) (92% - 100%)    Lipid Panel: Date/Time: 02-15-25 @ 05:17  Triglycerides, Serum: 120    POCT Blood Glucose.: 194 mg/dL (2025 06:49)  POCT Blood Glucose.: 201 mg/dL (2025 00:44)  POCT Blood Glucose.: 206 mg/dL (2025 18:19)    Iron Total: 34 ug/dL (25 @ 18:46)  Folate, Serum: >20.0 ng/mL (25 @ 18:46)  Vitamin B12, Serum: 1457 pg/mL (25 @ 18:46)  Vitamin D, 25-Hydroxy: 23.0 ng/mL (25 @ 18:46) ** c/w deficiency    *I&O's Detail    2025 07:01  -  2025 07:00  --------------------------------------------------------  IN:    Fat Emulsion (Fish Oil &amp; Plant Based) 20% Infusion: 160 mL    Fat Emulsion (Fish Oil &amp; Plant Based) 20% Infusion: 200 mL    Lactated Ringers: 1492 mL    Oral Fluid: 150 mL    TPN (Total Parenteral Nutrition): 1952 mL  Total IN: 3954 mL    OUT:    Indwelling Catheter - Urethral (mL): 3600 mL  Total OUT: 3600 mL    Total NET: 354 mL  * fluid status: positive; large UO noted. Will continue to monitor/adjust prn to maintain fluid balance along with MD   *last (+) BM doc'd 2/18 x 5 - liquid, diarrhea.  pt on standing bowel regimen (imodium).  *edema: 2+ generalized  *PI: L Buttock S1, R Buttock S1    *malnutrition: Pt continues to meet criteria for severe protein calorie malnutrition in context of acute on chronic illness r/t decreased ability to meet increased nutrient needs 2/2 PO intolerance, N/V and diarrhea, on chemo for pancreatic ca AEB meeting <50% ENN > 5 days, mild-mod muscle/fat wasting    Estimated Needs: based on 73.4 Kg (wt from EMR admit wt )  Calories: 1812 - 2175 Kcal (25 - 30 Kcal/Kg)  Protein: 108 - 145 g (1.5 - 2.0 g/Kg)  Fluids: 1450 - 1812 mL (20 - 25 mL/Kg)    Diet, Low Fiber:   1500mL Fluid Restriction ([id number])  Kosher  Supplement Feeding Modality:  Oral  Ensure Plant-Based Cans or Servings Per Day:  3       Frequency:  Daily (02-15-25 @ 20:36) [Active]  Parenteral Nutrition - Adult 1 Each (75 mL/Hr) TPN Continuous <Continuous>, 02-15-25 @ 22:00, 22:00, Stop order after: 1 Days    Weight History:  Daily Weight in k.4 (2025 05:00)  Daily Weight in k.6 (15 Feb 2025 06:17)  Height (cm): 162.6 (25 @ 02:39)  Weight (kg): 73.4 (25 @ 05:46), 66.8 (25 @ 02:39)  BMI (kg/m2): 27.8 (25 @ 05:46), 25.3 (25 @ 02:39)  BSA (m2): 1.79 (25 @ 05:46), 1.72 (25 @ 02:39)    TPN Recommendations: via implanted infusion port  Total Volume: 1800 mL x 24 hours  125 g  Amino Acids  300 g Dextrose (goal 325g)  50 g Lipids 20% pending lipid panel  100 mEq Sodium Chloride  41 mEq Sodium Acetate  10 mmol Sodium Phosphate  0 mEq Potassium Chloride  21 mEq Potassium Acetate  40 mmol Potassium Phosphate  0 mEq Calcium Gluconate (CAPS out of PN solution)   10 mEq Magnesium Sulfate  200 mg Thiamine  1 ml Trace Elements  10 ml MVI    Total Calories        (Meets  100%  of  Estimated Energy needs  and  100%  Protein needs)      Additional Recommendations:    1) Daily weights  2) Strict I & O's **pt third spacing  3) Daily lyte checks including magnesium and phos - MONITOR FOR RFS and replete K, Phos, Mg outside PN bag prn  4) Weekly triglycerides/LFT checks  5) POCT q6hrs; maintain 140-180mg/dL***  6) C/w low fiber diet and encourage PO intake as tolerated  7) Continue to titrate dextrose 50-75g per day until goal of 325g reached  8) Confirm goals of care regarding long-term nutrition support. Would recommend PEG placement if long-term nutrition support is required as GI is functional and would be the preferred route for nutrition. However, long-term nutrition support is not recommended with advanced cancer as studies show that there is no improvement of dehydration symptoms, quality of life, or survival. It also increases the risk for peripheral edema, ascites, and pleural effusions.     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  Patient was being followed by GI, ID, Cardiology and Onc there.  Patient has been transferred to  today on patient's  (GI Dr. Pleitez) request.  At this time, patient c/o non-bloody diarrhea x 3 in last 24 hours.  She denies any abdominal pain, nausea, but has some dry heaves.  She denies any fever, chills, chest pain, palpitation, constipation or dysuria. Initially, she was started on Vancomycin and Zosyn, but now she has been on Cefepime as her blood culture and urine cultures are positive for Pseudomonas aeruginosa. Currently, patient is on Cefepime.  ID had also recommended Flagyl, but patient had refused it, as it causes nausea to her. Noted with Metabolic acidosis with NAG due to GI loss. Likely chemo-induced diarrhea and vomiting. Better than before. Cutaneous lesion on RUQ area below right breast, unclear etiology, ? Ecthyma gangrenosum. Admitting diagnosis: pancreatic ca  *: Pt transfer from [facility]; seen by RD and met criteria for PCM. Pt reports only being able to tolerate some sips of water and tea at this time. Reports tried eating small bite of mashed banana and felt burning sensation down her throat. Reports #; RD unable to obtain bedscale wt d/t bedscale not working. Admit wt per # note with 1+ and 2+ edema skewing wt. Pt appears overweight, NFPE reveals mild- mod muscle/fat wasting at this time. Recommend to continue with PO diet and encourage intake as tolerated. RD consulted to initiate PN d/t PO intolerance. Will initiate TPN through port.  *2/15: Remains on low fiber diet with minimal PO intake. Still w/ multiple episodes of diarrhea, if persists can consider adding 5mg zinc into PN bag. No other acute events overnight. D/w Dr. Joyner will c/w TPN today.   *: Raised area noted to mid sternum. RCW port intact.- no swelling noted, no redness. Port flushed without resistance. + blood return noted. Assessed with IV team Amy. Per pt no Pain on palpation. CT Chest. TPN on hold until CT read. CT chest from earlier in the day revealed new bilateral pleural effusions with bibasilar consolidated lung and ascites. CXR now shows low lung volumes and bibasilar opacities. Rigors post transfusion - Rapid response called;  Dr. Pleitez requesting ICU consult- concerned for pt.  requesting assessment of patient STAT. Pt transferred to SICU. ? third spacing, and possible PNA. Per pt still drinking small sips of water and tea. D/w Dr. Fortune will keep total volume same in PN bag today, and will c/w TPN.   *:  tx SICU, po remains minimal. On bipap. On additional LR d/t diuresis per critical care PA: "malignancy/severe protein-calorie malnutrition/hypoalbuminemia causing diffuse third spacing, caution with further diuresis as this is not truly a volume overload/acute CHF issue" also on IV albumin  c/w TPN  STRONGLY suggest to Confirm goals of care regarding LONG-TERM nutrition support - However, LONG-TERM Nutrition support is not recommended with advanced cancer as studies show that there is no improvement of dehydration symptoms, quality of life, or survival. It also increases the risk for peripheral edema, ascites, and pleural effusions. Will continue to provide nutr within GOC though  *: plan to c/w TPN for now. RD discussed w/ MD Tong and SICU IRINA Jurado need to confirm long term GOC regarding nutr support. Per GI - Esophageal pain is 2/2 esophagitis but unclear what type -- reflux vs. candida vs CMV or HSV etc. Plan to add imodium standing. ? EGD     *TPN initiated 2/2 PO intolerance, + Mucositis, possible candidal esophagitis    *current status: reports she stills has difficulty swallowing due to pain. feeling weak overall. still having multiple episodes of diarrhea, poor PO intake. Plan to c/w TPN - per GI: "CVM and HSV will be negative which argues that esophagitis is reflux or fungal related"  d/w IRINA Jurado long-term plan regarding nutr support TBD, pending discussion w/ family. ? fluid overload which TPN may be contributing to as some studies show with advanced cancer that nutrition support increases the risk for peripheral edema, ascites, and pleural effusions.     *new pertinent meds: cholecalciferol, famotidine, LR, loperamide, abx, protonix, PHOS-NaK packet, ADMELOG (5 units given x 24 hrs); biotene oral rinse prn    *labs reviewed; Na WNL, on additional IVF + TPN, suggest to continue. K, Phos, and Mg all LOW or low-end of normal - will increase all in bag. Pt likely experiencing RFS continue to monitor and replete lytes outside of PN bag prn. T bili WNL will c/w trace elements. C/w MVI and thiamine in PN bag. No POCT slightly elevated, will hold dextrose and titrate up tomorrow to goal (325g daily; GIR ~3.1 WNL) - consider additional RISS, may need insulin added into PN bag if uptrends and continues to be elevated.  TG WNL will c/w 50g of lipids daily.  Also of note, Cl elevated, CO2 depressed. Will add more acetate vs Cl into TPN bag - 207 vs 100, respectively.         143  |  113[H]  |  66[H]  ----------------------------<  205[H]  3.5   |  21[L]  |  1.42[H]    Ca    8.0[L]      2025 05:35  Phos  2.1       Mg     1.7         TPro  4.7[L]  /  Alb  2.0[L]  /  TBili  0.6  /  DBili  x   /  AST  40[H]  /  ALT  26  /  AlkPhos  196[H]      BMI: BMI (kg/m2): 27.8 (25 @ 05:46)    BP: 122/71 (25 @ 05:00) (96/64 - 177/89)Vital Signs Last 24 Hrs  T(C): 36.7 (25 @ 05:00), Max: 38.1 (02-15-25 @ 23:00)  T(F): 98 (25 @ 05:00), Max: 100.5 (02-15-25 @ 23:00)  HR: 76 (25 @ 05:00) (76 - 137)  BP: 122/71 (25 @ 05:00) (96/64 - 177/89)  BP(mean): 87 (25 @ 05:00) (70 - 93)  RR: 29 (25 @ 05:00) (16 - 29)  SpO2: 95% (25 @ 05:00) (92% - 100%)    Lipid Panel: Date/Time: 02-15-25 @ 05:17  Triglycerides, Serum: 120    POCT Blood Glucose.: 194 mg/dL (2025 06:49)  POCT Blood Glucose.: 201 mg/dL (2025 00:44)  POCT Blood Glucose.: 206 mg/dL (2025 18:19)    Iron Total: 34 ug/dL (25 @ 18:46)  Folate, Serum: >20.0 ng/mL (25 @ 18:46)  Vitamin B12, Serum: 1457 pg/mL (25 @ 18:46)  Vitamin D, 25-Hydroxy: 23.0 ng/mL (25 @ 18:46) ** c/w deficiency    *I&O's Detail    2025 07:01  -  2025 07:00  --------------------------------------------------------  IN:    Fat Emulsion (Fish Oil &amp; Plant Based) 20% Infusion: 160 mL    Fat Emulsion (Fish Oil &amp; Plant Based) 20% Infusion: 200 mL    Lactated Ringers: 1492 mL    Oral Fluid: 150 mL    TPN (Total Parenteral Nutrition): 1952 mL  Total IN: 3954 mL    OUT:    Indwelling Catheter - Urethral (mL): 3600 mL  Total OUT: 3600 mL    Total NET: 354 mL  * fluid status: positive; large UO noted. Will continue to monitor/adjust prn to maintain fluid balance along with MD   *last (+) BM doc'd 2/18 x 5 - liquid, diarrhea.  pt on standing bowel regimen (imodium).  *edema: 2+ generalized  *PI: L Buttock S1, R Buttock S1    *malnutrition: Pt continues to meet criteria for severe protein calorie malnutrition in context of acute on chronic illness r/t decreased ability to meet increased nutrient needs 2/2 PO intolerance, N/V and diarrhea, on chemo for pancreatic ca AEB meeting <50% ENN > 5 days, mild-mod muscle/fat wasting    Estimated Needs: based on 73.4 Kg (wt from EMR admit wt )  Calories: 1812 - 2175 Kcal (25 - 30 Kcal/Kg)  Protein: 108 - 145 g (1.5 - 2.0 g/Kg)  Fluids: 1450 - 1812 mL (20 - 25 mL/Kg)    Diet, Low Fiber:   1500mL Fluid Restriction ([id number])  Kosher  Supplement Feeding Modality:  Oral  Ensure Plant-Based Cans or Servings Per Day:  3       Frequency:  Daily (02-15-25 @ 20:36) [Active]  Parenteral Nutrition - Adult 1 Each (75 mL/Hr) TPN Continuous <Continuous>, 02-15-25 @ 22:00, 22:00, Stop order after: 1 Days    Weight History:  Daily Weight in k.4 (2025 05:00)  Daily Weight in k.6 (15 Feb 2025 06:17)  Height (cm): 162.6 (25 @ 02:39)  Weight (kg): 73.4 (25 @ 05:46), 66.8 (25 @ 02:39)  BMI (kg/m2): 27.8 (25 @ 05:46), 25.3 (25 @ 02:39)  BSA (m2): 1.79 (25 @ 05:46), 1.72 (25 @ 02:39)    TPN Recommendations: via implanted infusion port  Total Volume: 1800 mL x 24 hours  125 g  Amino Acids  300 g Dextrose (goal 325g)  50 g Lipids 20% pending lipid panel  100 mEq Sodium Chloride  41 mEq Sodium Acetate  10 mmol Sodium Phosphate  0 mEq Potassium Chloride  21 mEq Potassium Acetate  40 mmol Potassium Phosphate  0 mEq Calcium Gluconate (CAPS out of PN solution)   10 mEq Magnesium Sulfate  200 mg Thiamine  1 ml Trace Elements  10 ml MVI    Total Calories        (Meets  100%  of  Estimated Energy needs  and  100%  Protein needs)      Additional Recommendations:    1) Daily weights  2) Strict I & O's **pt third spacing  3) Daily lyte checks including magnesium and phos - MONITOR FOR RFS and replete K, Phos, Mg outside PN bag prn  4) Weekly triglycerides/LFT checks  5) POCT q6hrs; maintain 140-180mg/dL***  6) C/w low fiber diet and encourage PO intake as tolerated  7) Continue to titrate dextrose 50-75g per day until goal of 325g reached  8) Confirm goals of care regarding long-term nutrition support. Would recommend PEG placement if long-term nutrition support is required as GI is functional and would be the preferred route for nutrition. However, long-term nutrition support is not recommended with advanced cancer as studies show that there is no improvement of dehydration symptoms, quality of life, or survival. It also increases the risk for peripheral edema, ascites, and pleural effusions.     *will continue to monitor and adjust PN prn*  Marce Mitchell, MS, RDN, CNSC, [phone]  Certified Nutrition

## 2025-02-19 NOTE — PROGRESS NOTE ADULT - ASSESSMENT
69-year-old female with stage IV pancreatic adenocarcinoma presenting with neutropenic fever and severe treatment-related toxicities from modified FOLFIRINOX and investigational EMILY inhibitor [id number].    PROBLEMS:    Acute hypoxic respiratory failure likely secondary to hypervolemia   Bilateral pleural effusions with compressive atelectasis, right greater than left  Neutropenic Fever/Neutropenic Septic shock  Severe Thrombocytopenia-Platelet count critically low at 26k.   Severe diarrhea and inability to tolerate PO intake, likely multifactorial from both FOLFIRINOX (particularly irinotecan component) and study drug [id number].   History of left cephalic vein thrombosis.  MALLORY  69-year-old female with stage IV pancreatic adenocarcinoma presenting with neutropenic fever and severe treatment-related toxicities from modified FOLFIRINOX and investigational EIMLY inhibitor [id number].  CT Angio Chest PE Protocol w/ IV Cont (02.19.25 @ 14:16) >  CHEST:  *  Multiple pulmonary emboli within the segmental branches of the left   lower and right lower lobe pulmonary arteries.  *  There are two right middle lobe nodules, new since prior. Exact   etiology is unclear. Primary differential diagnostic consideration   includes infection.      PLAN:    CTA chest + small PEs in RLL and LLL- IV heparin, no bolus   IV solumedrol 60mg q12hr for suspected TRALI from plt transfusion  Pulmonary tachpnea may be RTA with low bicarbonate because of GI cause with diarrhea but improving renal fx  Neutropenic Septic shock- IV meropenem started on 2/15- previously on CEFEPIME/Continue filgrastim 480mcg daily - Today w/ improvement in counts- WBC 1.3, Hb 10.1, plt 37, cmp with k 2.3 and Repleted, Cr 1.44.   Daily CBC monitoring  Maintain strict neutropenic precautions  IV diuretics-now on RA doing well   EMPIRIC TREATMENT FOR ESOPHAGEAL CANDIDIASIS-continue with DIFLUCAN   Anemia-received two units of pRBCs Hb 10.1 today   Thrombocytopenia-Transfuse for plts < 15   D/w staff & /haematology & ICU in detail

## 2025-02-19 NOTE — PROGRESS NOTE ADULT - ASSESSMENT
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    Then transferred to  as per request of .  Was getting platelet transfusion and developed tachypnea hypoxia, rigors  Was diuresed and became hypotensive  Upgraded to SICU     Here with   1. Sepsis (POA)  2. Pseudomonal bacteremia suspect from UTI   3. Mucositis suspected esophageal candidiasis? r/o CMV  4. Pancytopenia  5. Acute hypoxic respiratory failure 2/2 pulm edema   6. MALLORY     PLAN    Neuro: AAOx 3. pain control prn. IV tylenol .avoid nsaids  CV: BP stable. persistently sinus tachycardia 110s-120s.. cont amlodipine 10mg qd. lopressor added by cardio  Pulm: on bipap overnight, now on 4L nc sating 96-97%. CXR today appears to have inc pulm vasc congestion and low lung volumes. V/Q scan indeterminant. CTA chest to r/o PE  GI: PO diet as tolerated. on TPN. IV PPI. CTAP with IV contrast  Nephro: MALLORY improving slightly improved Cr 1.42.. monitor I & Os. Trend renal fxn. cont LR@ 50cc/hr, suspect 3rd spacing. receiving TPN @ 75/hr. hypophosphatemia - replete Phos again  Endo: BGMs q6hr.  ISS. while on PN  ID: cont IV meropenem 1gm q8hr #5 cont IV diflucan 200mg qd #3. s/p IV cefepime.  on PO vanco qhr for Cdiff ppx as per ID. trend WBC. monitor temps. blood cultures here neg to date. CMV neg. HSV pcr neg. check lactate  Heme: hgb stable. Leukopenia and thrombocytopenia improving. held DVT ppx due to thrombocytopenia. pt was on filgastrim, now dcd on 2/18 as per hemeonc.   PT eval if tolerated.     Dispo: SICU. gentle IV fluids. TPN. IV abx. Trend Cr. CTA chest abd pelvis    Will discuss with Dr. Tong, d/w nephrology, d/w pulm, d/w  Dr. 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    Then transferred to  as per request of .  Was getting platelet transfusion and developed tachypnea hypoxia, rigors  Was diuresed and became hypotensive  Upgraded to SICU     Here with   1. Sepsis (POA)  2. Pseudomonal bacteremia suspect from UTI   3. Mucositis suspected esophageal candidiasis? r/o CMV  4. Pancytopenia  5. Acute hypoxic respiratory failure 2/2 pulm edema   6. MALLORY     PLAN    Neuro: AAOx 3. pain control prn. IV tylenol .avoid nsaids  CV: BP stable. persistently sinus tachycardia 110s-120s.. cont amlodipine 10mg qd. lopressor added by cardio  Pulm: on bipap overnight, now on 4L nc sating 96-97%. CXR today appears to have inc pulm vasc congestion and low lung volumes. V/Q scan indeterminant. CTA chest to r/o PE  GI: PO diet as tolerated. on TPN. IV PPI. CTAP with IV contrast  Nephro: MALLORY improving slightly improved Cr 1.42.. monitor I & Os. Trend renal fxn. cont LR@ 50cc/hr, suspect 3rd spacing. receiving TPN @ 75/hr. hypophosphatemia - replete Phos again  Endo: BGMs q6hr.  ISS. while on PN  ID: cont IV meropenem 1gm q8hr #5 cont IV diflucan 200mg qd #3. s/p IV cefepime.  on PO vanco qhr for Cdiff ppx as per ID. trend WBC. monitor temps. blood cultures here neg to date. CMV neg. HSV pcr neg. check lactate  Heme: hgb stable. Leukopenia and thrombocytopenia improving. held DVT ppx due to thrombocytopenia. pt was on filgastrim, now dcd on 2/18 as per hemeonc.   PT eval if tolerated.     Dispo: SICU. gentle IV fluids. TPN. IV abx. Trend Cr. CTA chest abd pelvis    Will discuss with Dr. Tong, d/w nephrology, d/w pulm, d/w Dr. Hannah Morrison (outpatient [facility] onc), d/w  Dr. 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    Then transferred to  as per request of .  Was getting platelet transfusion and developed tachypnea hypoxia, rigors  Was diuresed and became hypotensive  Upgraded to SICU     Here with   1. Sepsis (POA)  2. Pseudomonal bacteremia suspect from UTI   3. Mucositis suspected esophageal candidiasis? r/o CMV  4. Pancytopenia  5. Acute hypoxic respiratory failure 2/2 pulm edema   6. MALLORY     PLAN    Neuro: AAOx 3. pain control prn. IV tylenol .avoid nsaids  CV: BP stable. persistently sinus tachycardia 110s-120s.. cont amlodipine 10mg qd. lopressor added by cardio  Pulm: on bipap overnight, now on 4L nc sating 96-97%. CXR today appears to have inc pulm vasc congestion and low lung volumes. V/Q scan indeterminant. CTA chest to r/o PE  GI: PO diet as tolerated. on TPN. IV PPI. CTAP with IV contrast  Nephro: MALLORY improving slightly improved Cr 1.42.. monitor I & Os. Trend renal fxn. cont LR@ 50cc/hr, suspect 3rd spacing. receiving TPN @ 75/hr. hypophosphatemia - replete Phos again  Endo: BGMs q6hr.  ISS. while on PN  ID: cont IV meropenem 1gm q8hr #5 cont IV diflucan 200mg qd #3. s/p IV cefepime.  on PO vanco qhr for Cdiff ppx as per ID. trend WBC. monitor temps. blood cultures here neg to date. CMV neg. HSV pcr neg. check lactate 2.6 - suspect due to work of breathing. unable to give further  IV fluids due to pulmonary edema.  Heme: hgb stable. Leukopenia and thrombocytopenia improving. held DVT ppx due to thrombocytopenia. pt was on filgastrim, now dcd on 2/18 as per hemeonc.   PT eval if tolerated.     Dispo: SICU. gentle IV fluids. TPN. IV abx. Trend Cr. CTA chest abd pelvis    Will discuss with Dr. Tong, d/w nephrology, d/w pulm, d/w Dr. Hannah Morrison (outpatient [facility] onc), d/w  Dr. Pleitez    ADDENDUM  CTA chest + small PEs in RLL and LLL  Will start IV heparin, no bolus and IV solumedrol 60mg q12hr for suspected TRALI from plt transfusion    d/w with Dr. Tong, Dr. Cohen and Dr. Ivy and  at bedside

## 2025-02-19 NOTE — PROGRESS NOTE ADULT - SUBJECTIVE AND OBJECTIVE BOX
Subjective:    pat still tachpnea with RR 33 & tachycardic,   CT Angio Chest PE Protocol w/ IV Cont (02.19.25 @ 14:16) >  IMPRESSION:    CHEST:  *  Multiple pulmonary emboli within the segmental branches of the left   lower and right lower lobe pulmonary arteries.  *  There are two right middle lobe nodules, new since prior. Exact   etiology is unclear. Primary differential diagnostic consideration   includes infection.    ABDOMEN:  *  Large bowel obstruction with transition point at the distal transverse   colon. There is also narrowing at the level of the terminal ileum.  *  Diffuse ascites.  *  Redemonstrated pancreatic lesion, consistent with history of   pancreatic adenocarcinoma.  *  There is an umbilical lesion with adjacent peritoneal implants.      Home Medications:  cefepime 2 g intravenous injection: 2 gram(s) intravenous every 12 hours (12 Feb 2025 12:59)  filgrastim: 480 microgram(s) subcutaneous once a day (12 Feb 2025 12:59)  Lactated Ringers Injection intravenous solution: 150 milligram(s) intravenous every 1 to 2 hours (12 Feb 2025 12:59)  loperamide 2 mg oral capsule: 1 cap(s) orally once (12 Feb 2025 12:59)  midodrine 5 mg oral tablet: 1 tab(s) orally every 8 hours (12 Feb 2025 12:59)  Multiple Vitamins with Minerals oral liquid: 1 orally once a day (12 Feb 2025 12:59)  octreotide 2500 mcg/mL subcutaneous solution: 0.04 milliliter(s) subcutaneous 3 times a day (12 Feb 2025 12:59)  sucralfate 1 g/10 mL oral suspension: 10 milliliter(s) orally 4 times a day (12 Feb 2025 12:59)  trimethobenzamide 100 mg/mL intramuscular solution: 2 milliliter(s) intramuscular every 8 hours As needed Nausea and/or Vomiting (12 Feb 2025 12:59)    MEDICATIONS  (STANDING):  amLODIPine   Tablet 10 milliGRAM(s) Oral daily  chlorhexidine 2% Cloths 1 Application(s) Topical <User Schedule>  cholecalciferol 2000 Unit(s) Oral at bedtime  dextrose 5%. 1000 milliLiter(s) (100 mL/Hr) IV Continuous <Continuous>  dextrose 5%. 1000 milliLiter(s) (50 mL/Hr) IV Continuous <Continuous>  dextrose 50% Injectable 25 Gram(s) IV Push once  dextrose 50% Injectable 12.5 Gram(s) IV Push once  dextrose 50% Injectable 25 Gram(s) IV Push once  famotidine Injectable 20 milliGRAM(s) IV Push daily  fluconAZOLE IVPB      fluconAZOLE IVPB 200 milliGRAM(s) IV Intermittent every 24 hours  glucagon  Injectable 1 milliGRAM(s) IntraMuscular once  heparin  Infusion.  Unit(s)/Hr (13 mL/Hr) IV Continuous <Continuous>  insulin lispro (ADMELOG) corrective regimen sliding scale   SubCutaneous every 6 hours  lactated ringers. 1000 milliLiter(s) (50 mL/Hr) IV Continuous <Continuous>  lipid, fat emulsion (Fish Oil and Plant Based) 20% Infusion 0.681 Gm/kG/Day (20.8 mL/Hr) IV Continuous <Continuous>  meropenem Injectable 1000 milliGRAM(s) IV Push every 12 hours  methylPREDNISolone sodium succinate Injectable 60 milliGRAM(s) IV Push every 12 hours  metoprolol tartrate Injectable 2.5 milliGRAM(s) IV Push every 6 hours  pantoprazole  Injectable 40 milliGRAM(s) IV Push daily  Parenteral Nutrition - Adult 1 Each (75 mL/Hr) TPN Continuous <Continuous>  Parenteral Nutrition - Adult 1 Each (75 mL/Hr) TPN Continuous <Continuous>  potassium phosphate / sodium phosphate Powder (PHOS-NaK) 2 Packet(s) Oral two times a day  sodium bicarbonate 325 milliGRAM(s) Oral every 8 hours  sucralfate suspension 1 Gram(s) Oral four times a day  vancomycin    Solution 125 milliGRAM(s) Oral every 6 hours    MEDICATIONS  (PRN):  acetaminophen     Tablet .. 650 milliGRAM(s) Oral every 6 hours PRN Temp greater or equal to 38C (100.4F), Mild Pain (1 - 3)  aluminum hydroxide/magnesium hydroxide/simethicone Suspension 30 milliLiter(s) Oral every 4 hours PRN Dyspepsia  Biotene Dry Mouth Oral Rinse 15 milliLiter(s) Swish and Spit five times a day PRN Mouth Care  dextrose Oral Gel 15 Gram(s) Oral once PRN Blood Glucose LESS THAN 70 milliGRAM(s)/deciliter  heparin   Injectable 6000 Unit(s) IV Push every 6 hours PRN For aPTT less than 40  heparin   Injectable 3000 Unit(s) IV Push every 6 hours PRN For aPTT between 40 - 57  melatonin 3 milliGRAM(s) Oral at bedtime PRN Insomnia  ondansetron Injectable 4 milliGRAM(s) IV Push every 8 hours PRN Nausea and/or Vomiting  sodium chloride 0.65% Nasal 1 Spray(s) Both Nostrils two times a day PRN Nasal Congestion      Allergies    No Known Allergies    Intolerances        Vital Signs Last 24 Hrs  T(C): 37 (19 Feb 2025 12:18), Max: 37.4 (19 Feb 2025 06:00)  T(F): 98.6 (19 Feb 2025 12:18), Max: 99.4 (19 Feb 2025 06:00)  HR: 110 (19 Feb 2025 19:00) (97 - 119)  BP: 140/91 (19 Feb 2025 19:00) (117/94 - 142/92)  BP(mean): 103 (19 Feb 2025 19:00) (84 - 107)  RR: 34 (19 Feb 2025 19:00) (22 - 46)  SpO2: 98% (19 Feb 2025 19:00) (92% - 100%)    Parameters below as of 19 Feb 2025 19:00  Patient On (Oxygen Delivery Method): nasal cannula, high flow  O2 Flow (L/min): 30  O2 Concentration (%): 60      PHYSICAL EXAMINATION:    NECK:  Supple. No lymphadenopathy. Jugular venous pressure not elevated. Carotids equal.   HEART:   The cardiac impulse has a normal quality. Reg., Nl S1 and S2.  There are no murmurs, rubs or gallops noted  CHEST:  Chest is clear to auscultation. Normal respiratory effort.  ABDOMEN:  Soft and nontender.   EXTREMITIES:  There is no edema.       LABS:                        10.7   13.14 )-----------( 68       ( 19 Feb 2025 05:35 )             30.5     02-19    143  |  113[H]  |  66[H]  ----------------------------<  205[H]  3.5   |  21[L]  |  1.42[H]    Ca    8.0[L]      19 Feb 2025 05:35  Phos  2.1     02-19  Mg     1.7     02-19    TPro  4.7[L]  /  Alb  2.0[L]  /  TBili  0.6  /  DBili  x   /  AST  40[H]  /  ALT  26  /  AlkPhos  196[H]  02-19    PTT - ( 19 Feb 2025 12:13 )  PTT:34.2 sec  Urinalysis Basic - ( 19 Feb 2025 05:35 )    Color: x / Appearance: x / SG: x / pH: x  Gluc: 205 mg/dL / Ketone: x  / Bili: x / Urobili: x   Blood: x / Protein: x / Nitrite: x   Leuk Esterase: x / RBC: x / WBC x   Sq Epi: x / Non Sq Epi: x / Bacteria: x

## 2025-02-19 NOTE — PROGRESS NOTE ADULT - ASSESSMENT
69-year-old female recent diagnosis of primary pancreatic adenocarcinoma , pancytopenia and significant PMH of SCC lip, cyclical vomiting syndrome, HTN, HPL, Gout, GERD, CVA w renal evaluation of MALLORY and hypokalemia. Per documents was recently at  [facility] with septic shock on 02/09/2025, nausea, vomiting and diarrhea requiring Levophed drip. Per documents, patient  transferred to  for continued care. Patient with + diuresis w lasix 80 IV, increaserd UOP > 4L.   Treated w IV and po k repletion.     MALLORY  -LIkely pre renal w insensible losses and diuresis, signficant UOP  -IVF to maintain even to positive today  -Trend labs, lytes. Repeat this afternoon  -Monitor UOP closely  -Avoid nsaids/contrast  -Renally dosed abx    Hypokalemia  -K repletion iv/po  -TPN    Sepsis/Pseduomonas  -ID/abx  -FU cultures    d/c with RN staff, Dr Tong, Dr Henry, Dr Maik Sweet to resume care in AM    2/18  MALLORY resolving  Azotemia, responded to IVF resuscitation  Urine output improved  hypoalbuminemia- on spa q 8 h for oncotic pressure and glomerular filtration  NAGMA- hyperchloremia may be due to diarrhea and GI losses  On Meropenem- very rarely associated w Fanconi syndrome  - will check urinary PO4, serum uric acid, repeat PO4 level, low this am  ABG  Lactate  CBC  VQ results pending  d/w Dr YOUSUF Pleitez    Addendum  Repeat labs reviewed d/w intensivist  Lactate 2.7  ABG c/w primary respiratory alkalosis with metabolic acidosis and NAGMA   69-year-old female recent diagnosis of primary pancreatic adenocarcinoma , pancytopenia and significant PMH of SCC lip, cyclical vomiting syndrome, HTN, HPL, Gout, GERD, CVA w renal evaluation of MALLORY and hypokalemia. Per documents was recently at  [facility] with septic shock on 02/09/2025, nausea, vomiting and diarrhea requiring Levophed drip. Per documents, patient  transferred to  for continued care. Patient with + diuresis w lasix 80 IV, increaserd UOP > 4L.   Treated w IV and po k repletion.     MALLORY  -LIkely pre renal w insensible losses and diuresis, signficant UOP  -IVF to maintain even to positive today  -Trend labs, lytes. Repeat this afternoon  -Monitor UOP closely  -Avoid nsaids/contrast  -Renally dosed abx    Hypokalemia  -K repletion iv/po  -TPN    Sepsis/Pseduomonas  -ID/abx  -FU cultures    d/c with RN staff, Dr Tong, Dr Hnery, Dr Maik Sweet to resume care in AM    2/18  MALLORY resolving  Azotemia, responded to IVF resuscitation  Urine output improved  hypoalbuminemia- on spa q 8 h for oncotic pressure and glomerular filtration  NAGMA- hyperchloremia may be due to diarrhea and GI losses  On Meropenem- very rarely associated w Fanconi syndrome  - will check urinary PO4, serum uric acid, repeat PO4 level, low this am  ABG  Lactate  CBC  VQ results pending  d/w Dr YOUSUF Pleitez    Addendum  Repeat labs reviewed d/w intensivist  Lactate 2.7  ABG c/w primary respiratory alkalosis with metabolic acidosis and NAGMA    2/19  Pancreatic Adenoca  MALLORY prerenal, correcting  Primary Hyperventilation, metab acidosis and non anion acidosis  VQ indeterminate  on 2/18 for PE  For CTA today, low risk MAXIMO due to adequate hydration status, UO 3600 ml / last 24 hrs  D/W Dr Tong and Dr Pleitez

## 2025-02-19 NOTE — PROGRESS NOTE ADULT - SUBJECTIVE AND OBJECTIVE BOX
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  Patient was being followed by GI, ID, Cardiology and Onc there.  Patient has been transferred to  on patient's  (GI Dr. Pleitez) request.      2/18 pt seen this am, denies chest pain, denies feeling sob but appears slightly tachypneic. states she stills has difficulty swallowing due to pain. feeling weak overall. still having multiple episodes of diarrhea  2/19 pt seen this am, still tachypneic, speaks in a whisper, denies having any pain. states she still has trouble swallowing so appetite is poor. denies pain in throat. 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 1000 milliLiter(s) (100 mL/Hr) IV Continuous <Continuous>  dextrose 5%. 1000 milliLiter(s) (50 mL/Hr) IV Continuous <Continuous>  dextrose 50% Injectable 25 Gram(s) IV Push once  dextrose 50% Injectable 12.5 Gram(s) IV Push once  dextrose 50% Injectable 25 Gram(s) IV Push once  famotidine Injectable 20 milliGRAM(s) IV Push daily  fluconAZOLE IVPB      fluconAZOLE IVPB 200 milliGRAM(s) IV Intermittent every 24 hours  glucagon  Injectable 1 milliGRAM(s) IntraMuscular once  insulin lispro (ADMELOG) corrective regimen sliding scale   SubCutaneous every 6 hours  lactated ringers. 1000 milliLiter(s) (50 mL/Hr) IV Continuous <Continuous>  lipid, fat emulsion (Fish Oil and Plant Based) 20% Infusion 0.681 Gm/kG/Day (20.83 mL/Hr) IV Continuous <Continuous>  lipid, fat emulsion (Fish Oil and Plant Based) 20% Infusion 0.681 Gm/kG/Day (20.8 mL/Hr) IV Continuous <Continuous>  loperamide 2 milliGRAM(s) Oral four times a day  meropenem Injectable 1000 milliGRAM(s) IV Push every 12 hours  metoprolol tartrate Injectable 2.5 milliGRAM(s) IV Push every 6 hours  pantoprazole  Injectable 40 milliGRAM(s) IV Push daily  Parenteral Nutrition - Adult 1 Each (75 mL/Hr) TPN Continuous <Continuous>  Parenteral Nutrition - Adult 1 Each (75 mL/Hr) TPN Continuous <Continuous>  potassium phosphate / sodium phosphate Powder (PHOS-NaK) 2 Packet(s) Oral two times a day  sodium bicarbonate 325 milliGRAM(s) Oral every 8 hours  sucralfate suspension 1 Gram(s) Oral four times a day  vancomycin    Solution 125 milliGRAM(s) Oral every 6 hours    Vital Signs Last 24 Hrs  T(C): 37 (19 Feb 2025 12:18), Max: 37.4 (19 Feb 2025 06:00)  T(F): 98.6 (19 Feb 2025 12:18), Max: 99.4 (19 Feb 2025 06:00)  HR: 119 (19 Feb 2025 12:00) (97 - 119)  BP: 142/92 (19 Feb 2025 12:00) (125/70 - 142/92)  BP(mean): 107 (19 Feb 2025 12:00) (84 - 107)  RR: 46 (19 Feb 2025 12:00) (25 - 46)  SpO2: 92% (19 Feb 2025 12:00) (92% - 100%)    Parameters below as of 19 Feb 2025 11:00  Patient On (Oxygen Delivery Method): nasal cannula  O2 Flow (L/min): 4      I&O's Detail    18 Feb 2025 07:01  -  19 Feb 2025 07:00  --------------------------------------------------------  IN:    Fat Emulsion (Fish Oil &amp; Plant Based) 20% Infusion: 160 mL    Fat Emulsion (Fish Oil &amp; Plant Based) 20% Infusion: 200 mL    Lactated Ringers: 1492 mL    Oral Fluid: 150 mL    TPN (Total Parenteral Nutrition): 1952 mL  Total IN: 3954 mL    OUT:    Indwelling Catheter - Urethral (mL): 3600 mL  Total OUT: 3600 mL    Total NET: 354 mL    LABS:                                  10.7   13.14 )-----------( 68       ( 19 Feb 2025 05:35 )             30.5     02-19    143  |  113[H]  |  66[H]  ----------------------------<  205[H]  3.5   |  21[L]  |  1.42[H]    Ca    8.0[L]      19 Feb 2025 05:35  Phos  2.1     02-19  Mg     1.7     02-19    TPro  4.7[L]  /  Alb  2.0[L]  /  TBili  0.6  /  DBili  x   /  AST  40[H]  /  ALT  26  /  AlkPhos  196[H]  02-19    LIVER FUNCTIONS - ( 19 Feb 2025 05:35 )  Alb: 2.0 g/dL / Pro: 4.7 gm/dL / ALK PHOS: 196 U/L / ALT: 26 U/L / AST: 40 U/L / GGT: x           PTT - ( 19 Feb 2025 12:13 )  PTT:34.2 sec  Urinalysis Basic - ( 19 Feb 2025 05:35 )    Color: x / Appearance: x / SG: x / pH: x  Gluc: 205 mg/dL / Ketone: x  / Bili: x / Urobili: x   Blood: x / Protein: x / Nitrite: x   Leuk Esterase: x / RBC: x / WBC x   Sq Epi: x / Non Sq Epi: x / Bacteria: x    ABG - ( 18 Feb 2025 18:35 )  pH, Arterial: 7.40  pH, Blood: x     /  pCO2: 28    /  pO2: 89    / HCO3: 17    / Base Excess: -6.1  /  SaO2: 98        Lactate, Blood: 2.6 mmol/L (02-19 @ 12:13)  Lactate, Blood: 2.7 mmol/L (02-18 @ 19:18)      CULTURES:  Culture Results:   No growth at 4 days (02-14 @ 07:33)  Culture Results:   No growth at 4 days (02-14 @ 06:50)    Physical Examination:    General: Mod respiratory distress  HEENT: Pupils equal, reactive to light.  Symmetric.   PULM: decreased at bases b/l.   CVS: Regular rate and rhythm, no murmurs  ABD: Soft, nondistended, nontender, redness by umbilical area, non tender  EXT: 2+ pitting edema in LE b/l extending to knees.   SKIN: Warm and well perfused, no rashes noted.  NEURO: Alert, oriented, interactive    DEVICES:   coelho    RADIOLOGY:   < from: CT Chest No Cont (02.15.25 @ 15:55) >  IMPRESSION:  Nondisplaced fractures of the left ninth and 10th ribs, similar to the   prior study.    Pancreatic mass, unchanged    No onset ascites and bilateral pleural effusions with compressive   atelectasis, right greater than left.    Dilated gallbladder, similar to the prior study.    < end of copied text >

## 2025-02-19 NOTE — PROGRESS NOTE ADULT - SUBJECTIVE AND OBJECTIVE BOX
INTERVAL HPI/OVERNIGHT EVENTS:  Patient S&E at bedside.   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      GERD (gastroesophageal reflux disease)      Cyclical vomiting syndrome      CVA (cerebrovascular accident)      H/O squamous cell carcinoma excision          FAMILY HISTORY:      VITAL SIGNS:  T(F): 98.2 (02-19-25 @ 09:24)  HR: 106 (02-19-25 @ 08:00)  BP: 131/86 (02-19-25 @ 08:00)  RR: 29 (02-19-25 @ 08:00)  SpO2: 97% (02-19-25 @ 08:00)  Wt(kg): --    PHYSICAL EXAM:    Constitutional: NAD  Ent: EOMI, dry mouth   Respiratory: on nc, tachypnea  Cardiovascular: tachycardic   Gastrointestinal: soft, NTND  Extremities: no c/c/e  Neurological: AAOx3      MEDICATIONS  (STANDING):  amLODIPine   Tablet 10 milliGRAM(s) Oral daily  chlorhexidine 2% Cloths 1 Application(s) Topical <User Schedule>  cholecalciferol 2000 Unit(s) Oral at bedtime  dextrose 5%. 1000 milliLiter(s) (50 mL/Hr) IV Continuous <Continuous>  dextrose 5%. 1000 milliLiter(s) (100 mL/Hr) IV Continuous <Continuous>  dextrose 50% Injectable 25 Gram(s) IV Push once  dextrose 50% Injectable 12.5 Gram(s) IV Push once  dextrose 50% Injectable 25 Gram(s) IV Push once  famotidine Injectable 20 milliGRAM(s) IV Push daily  fluconAZOLE IVPB      fluconAZOLE IVPB 200 milliGRAM(s) IV Intermittent every 24 hours  glucagon  Injectable 1 milliGRAM(s) IntraMuscular once  insulin lispro (ADMELOG) corrective regimen sliding scale   SubCutaneous every 6 hours  lactated ringers. 1000 milliLiter(s) (50 mL/Hr) IV Continuous <Continuous>  lipid, fat emulsion (Fish Oil and Plant Based) 20% Infusion 0.681 Gm/kG/Day (20.83 mL/Hr) IV Continuous <Continuous>  loperamide 2 milliGRAM(s) Oral four times a day  meropenem Injectable 1000 milliGRAM(s) IV Push every 12 hours  pantoprazole  Injectable 40 milliGRAM(s) IV Push daily  Parenteral Nutrition - Adult 1 Each (75 mL/Hr) TPN Continuous <Continuous>  potassium phosphate / sodium phosphate Powder (PHOS-NaK) 2 Packet(s) Oral two times a day  sodium bicarbonate 325 milliGRAM(s) Oral every 8 hours  sucralfate suspension 1 Gram(s) Oral four times a day  vancomycin    Solution 125 milliGRAM(s) Oral every 6 hours    MEDICATIONS  (PRN):  acetaminophen     Tablet .. 650 milliGRAM(s) Oral every 6 hours PRN Temp greater or equal to 38C (100.4F), Mild Pain (1 - 3)  aluminum hydroxide/magnesium hydroxide/simethicone Suspension 30 milliLiter(s) Oral every 4 hours PRN Dyspepsia  Biotene Dry Mouth Oral Rinse 15 milliLiter(s) Swish and Spit five times a day PRN Mouth Care  dextrose Oral Gel 15 Gram(s) Oral once PRN Blood Glucose LESS THAN 70 milliGRAM(s)/deciliter  diphenoxylate/atropine 1 Tablet(s) Oral every 6 hours PRN Diarrhea  melatonin 3 milliGRAM(s) Oral at bedtime PRN Insomnia  ondansetron Injectable 4 milliGRAM(s) IV Push every 8 hours PRN Nausea and/or Vomiting  sodium chloride 0.65% Nasal 1 Spray(s) Both Nostrils two times a day PRN Nasal Congestion      Allergies    No Known Allergies    Intolerances        LABS:                        10.7   13.14 )-----------( 68       ( 19 Feb 2025 05:35 )             30.5     02-19    143  |  113[H]  |  66[H]  ----------------------------<  205[H]  3.5   |  21[L]  |  1.42[H]    Ca    8.0[L]      19 Feb 2025 05:35  Phos  2.1     02-19  Mg     1.7     02-19    TPro  4.7[L]  /  Alb  2.0[L]  /  TBili  0.6  /  DBili  x   /  AST  40[H]  /  ALT  26  /  AlkPhos  196[H]  02-19      Urinalysis Basic - ( 19 Feb 2025 05:35 )    Color: x / Appearance: x / SG: x / pH: x  Gluc: 205 mg/dL / Ketone: x  / Bili: x / Urobili: x   Blood: x / Protein: x / Nitrite: x   Leuk Esterase: x / RBC: x / WBC x   Sq Epi: x / Non Sq Epi: x / Bacteria: x        RADIOLOGY & ADDITIONAL TESTS:  Studies reviewed.

## 2025-02-20 DIAGNOSIS — N17.9 ACUTE KIDNEY FAILURE, UNSPECIFIED: ICD-10-CM

## 2025-02-20 DIAGNOSIS — C25.9 MALIGNANT NEOPLASM OF PANCREAS, UNSPECIFIED: ICD-10-CM

## 2025-02-20 DIAGNOSIS — E88.09 OTHER DISORDERS OF PLASMA-PROTEIN METABOLISM, NOT ELSEWHERE CLASSIFIED: ICD-10-CM

## 2025-02-20 DIAGNOSIS — A41.52 SEPSIS DUE TO PSEUDOMONAS: ICD-10-CM

## 2025-02-20 DIAGNOSIS — J96.01 ACUTE RESPIRATORY FAILURE WITH HYPOXIA: ICD-10-CM

## 2025-02-20 DIAGNOSIS — J90 PLEURAL EFFUSION, NOT ELSEWHERE CLASSIFIED: ICD-10-CM

## 2025-02-20 DIAGNOSIS — K12.30 ORAL MUCOSITIS (ULCERATIVE), UNSPECIFIED: ICD-10-CM

## 2025-02-20 DIAGNOSIS — I26.99 OTHER PULMONARY EMBOLISM WITHOUT ACUTE COR PULMONALE: ICD-10-CM

## 2025-02-20 DIAGNOSIS — J98.11 ATELECTASIS: ICD-10-CM

## 2025-02-20 LAB
ALBUMIN SERPL ELPH-MCNC: 2 G/DL — LOW (ref 3.3–5)
ALP SERPL-CCNC: 311 U/L — HIGH (ref 40–120)
ALT FLD-CCNC: 29 U/L — SIGNIFICANT CHANGE UP (ref 12–78)
ANION GAP SERPL CALC-SCNC: 10 MMOL/L — SIGNIFICANT CHANGE UP (ref 5–17)
ANION GAP SERPL CALC-SCNC: 8 MMOL/L — SIGNIFICANT CHANGE UP (ref 5–17)
APTT BLD: 91.8 SEC — HIGH (ref 24.5–35.6)
APTT BLD: 92.4 SEC — HIGH (ref 24.5–35.6)
AST SERPL-CCNC: 51 U/L — HIGH (ref 15–37)
BILIRUB SERPL-MCNC: 0.6 MG/DL — SIGNIFICANT CHANGE UP (ref 0.2–1.2)
BUN SERPL-MCNC: 64 MG/DL — HIGH (ref 7–23)
BUN SERPL-MCNC: 64 MG/DL — HIGH (ref 7–23)
CALCIUM SERPL-MCNC: 8.1 MG/DL — LOW (ref 8.5–10.1)
CHLORIDE SERPL-SCNC: 109 MMOL/L — HIGH (ref 96–108)
CHLORIDE SERPL-SCNC: 111 MMOL/L — HIGH (ref 96–108)
CO2 SERPL-SCNC: 23 MMOL/L — SIGNIFICANT CHANGE UP (ref 22–31)
CO2 SERPL-SCNC: 23 MMOL/L — SIGNIFICANT CHANGE UP (ref 22–31)
CREAT SERPL-MCNC: 1.4 MG/DL — HIGH (ref 0.5–1.3)
CREAT SERPL-MCNC: 1.43 MG/DL — HIGH (ref 0.5–1.3)
EGFR: 39 ML/MIN/1.73M2 — LOW
EGFR: 39 ML/MIN/1.73M2 — LOW
EGFR: 40 ML/MIN/1.73M2 — LOW
EGFR: 40 ML/MIN/1.73M2 — LOW
FULL GENE SEQUENCE RESULT: SIGNIFICANT CHANGE UP
GLUCOSE BLDC GLUCOMTR-MCNC: 238 MG/DL — HIGH (ref 70–99)
GLUCOSE BLDC GLUCOMTR-MCNC: 267 MG/DL — HIGH (ref 70–99)
GLUCOSE BLDC GLUCOMTR-MCNC: 268 MG/DL — HIGH (ref 70–99)
GLUCOSE BLDC GLUCOMTR-MCNC: 290 MG/DL — HIGH (ref 70–99)
GLUCOSE SERPL-MCNC: 326 MG/DL — HIGH (ref 70–99)
HCT VFR BLD CALC: 31.4 % — LOW (ref 34.5–45)
HGB BLD-MCNC: 10.8 G/DL — LOW (ref 11.5–15.5)
LACTATE SERPL-SCNC: 2.9 MMOL/L — HIGH (ref 0.7–2)
MAGNESIUM SERPL-MCNC: 1.6 MG/DL — SIGNIFICANT CHANGE UP (ref 1.6–2.6)
MCHC RBC-ENTMCNC: 29.1 PG — SIGNIFICANT CHANGE UP (ref 27–34)
MCHC RBC-ENTMCNC: 34.4 G/DL — SIGNIFICANT CHANGE UP (ref 32–36)
MCV RBC AUTO: 84.6 FL — SIGNIFICANT CHANGE UP (ref 80–100)
METHOD:: SIGNIFICANT CHANGE UP
MOL DX INTERP BLD/T QL: SIGNIFICANT CHANGE UP
NRBC # BLD AUTO: 0.05 K/UL — HIGH (ref 0–0)
NRBC # FLD: 0.05 K/UL — HIGH (ref 0–0)
NRBC BLD AUTO-RTO: 0 /100 WBCS — SIGNIFICANT CHANGE UP (ref 0–0)
PHOSPHATE SERPL-MCNC: 2.8 MG/DL — SIGNIFICANT CHANGE UP (ref 2.5–4.5)
PHOSPHATE SERPL-MCNC: 3.1 MG/DL — SIGNIFICANT CHANGE UP (ref 2.5–4.5)
PLATELET # BLD AUTO: 90 K/UL — LOW (ref 150–400)
PMV BLD: SIGNIFICANT CHANGE UP (ref 7–13)
POTASSIUM SERPL-MCNC: 3.5 MMOL/L — SIGNIFICANT CHANGE UP (ref 3.5–5.3)
POTASSIUM SERPL-MCNC: 3.6 MMOL/L — SIGNIFICANT CHANGE UP (ref 3.5–5.3)
POTASSIUM SERPL-SCNC: 3.5 MMOL/L — SIGNIFICANT CHANGE UP (ref 3.5–5.3)
POTASSIUM SERPL-SCNC: 3.6 MMOL/L — SIGNIFICANT CHANGE UP (ref 3.5–5.3)
PROT SERPL-MCNC: 5.3 GM/DL — LOW (ref 6–8.3)
RBC # BLD: 3.71 M/UL — LOW (ref 3.8–5.2)
RBC # FLD: 15.6 % — HIGH (ref 10.3–14.5)
REVIEWED BY: SIGNIFICANT CHANGE UP
SODIUM SERPL-SCNC: 140 MMOL/L — SIGNIFICANT CHANGE UP (ref 135–145)
SODIUM SERPL-SCNC: 144 MMOL/L — SIGNIFICANT CHANGE UP (ref 135–145)
TA REPEAT RESULT: ABNORMAL
TEST PERFORMANCE INFO SPEC: SIGNIFICANT CHANGE UP
TRANSFUSION REACTION PATHOLOGIST COMMENTS: SIGNIFICANT CHANGE UP
TRANSFUSION REACTION PATHOLOGIST INTERPRETATION: SIGNIFICANT CHANGE UP
TRIGL SERPL-MCNC: 281 MG/DL — HIGH
UGT1A1 GENE MUT ANL BLD/T: SIGNIFICANT CHANGE UP
WBC # BLD: 26.65 K/UL — HIGH (ref 3.8–10.5)
WBC # FLD AUTO: 26.65 K/UL — HIGH (ref 3.8–10.5)

## 2025-02-20 PROCEDURE — 93010 ELECTROCARDIOGRAM REPORT: CPT

## 2025-02-20 PROCEDURE — 99233 SBSQ HOSP IP/OBS HIGH 50: CPT

## 2025-02-20 PROCEDURE — 99223 1ST HOSP IP/OBS HIGH 75: CPT

## 2025-02-20 PROCEDURE — 71045 X-RAY EXAM CHEST 1 VIEW: CPT | Mod: 26

## 2025-02-20 PROCEDURE — 99232 SBSQ HOSP IP/OBS MODERATE 35: CPT

## 2025-02-20 RX ORDER — METHYLPREDNISOLONE ACETATE 80 MG/ML
40 INJECTION, SUSPENSION INTRA-ARTICULAR; INTRALESIONAL; INTRAMUSCULAR; SOFT TISSUE DAILY
Refills: 0 | Status: DISCONTINUED | OUTPATIENT
Start: 2025-02-20 | End: 2025-02-22

## 2025-02-20 RX ORDER — INSULIN LISPRO 100 U/ML
INJECTION, SOLUTION INTRAVENOUS; SUBCUTANEOUS EVERY 6 HOURS
Refills: 0 | Status: DISCONTINUED | OUTPATIENT
Start: 2025-02-20 | End: 2025-03-02

## 2025-02-20 RX ORDER — ACETAMINOPHEN 500 MG/5ML
1000 LIQUID (ML) ORAL ONCE
Refills: 0 | Status: COMPLETED | OUTPATIENT
Start: 2025-02-20 | End: 2025-02-25

## 2025-02-20 RX ORDER — I.V. FAT EMULSION 20 G/100ML
0.68 EMULSION INTRAVENOUS
Qty: 50 | Refills: 0 | Status: DISCONTINUED | OUTPATIENT
Start: 2025-02-20 | End: 2025-02-20

## 2025-02-20 RX ORDER — INSULIN GLARGINE-YFGN 100 [IU]/ML
10 INJECTION, SOLUTION SUBCUTANEOUS ONCE
Refills: 0 | Status: DISCONTINUED | OUTPATIENT
Start: 2025-02-20 | End: 2025-02-20

## 2025-02-20 RX ORDER — MAGNESIUM SULFATE 500 MG/ML
2 SYRINGE (ML) INJECTION ONCE
Refills: 0 | Status: COMPLETED | OUTPATIENT
Start: 2025-02-20 | End: 2025-02-20

## 2025-02-20 RX ORDER — IPRATROPIUM BROMIDE AND ALBUTEROL SULFATE .5; 2.5 MG/3ML; MG/3ML
3 SOLUTION RESPIRATORY (INHALATION) ONCE
Refills: 0 | Status: COMPLETED | OUTPATIENT
Start: 2025-02-20 | End: 2025-02-20

## 2025-02-20 RX ORDER — ACETAMINOPHEN 500 MG/5ML
1000 LIQUID (ML) ORAL ONCE
Refills: 0 | Status: COMPLETED | OUTPATIENT
Start: 2025-02-20 | End: 2026-01-19

## 2025-02-20 RX ORDER — SODIUM/POT/MAG/CALC/CHLOR/ACET 35-20-5MEQ
1 VIAL (ML) INTRAVENOUS
Refills: 0 | Status: DISCONTINUED | OUTPATIENT
Start: 2025-02-20 | End: 2025-02-20

## 2025-02-20 RX ORDER — INSULIN GLARGINE-YFGN 100 [IU]/ML
5 INJECTION, SOLUTION SUBCUTANEOUS AT BEDTIME
Refills: 0 | Status: DISCONTINUED | OUTPATIENT
Start: 2025-02-20 | End: 2025-02-21

## 2025-02-20 RX ORDER — METOPROLOL SUCCINATE 50 MG/1
2.5 TABLET, EXTENDED RELEASE ORAL ONCE
Refills: 0 | Status: COMPLETED | OUTPATIENT
Start: 2025-02-20 | End: 2025-02-20

## 2025-02-20 RX ORDER — INSULIN GLARGINE-YFGN 100 [IU]/ML
8 INJECTION, SOLUTION SUBCUTANEOUS ONCE
Refills: 0 | Status: COMPLETED | OUTPATIENT
Start: 2025-02-20 | End: 2025-02-20

## 2025-02-20 RX ADMIN — INSULIN LISPRO 3: 100 INJECTION, SOLUTION INTRAVENOUS; SUBCUTANEOUS at 05:39

## 2025-02-20 RX ADMIN — METOPROLOL SUCCINATE 2.5 MILLIGRAM(S): 50 TABLET, EXTENDED RELEASE ORAL at 23:05

## 2025-02-20 RX ADMIN — Medication 1 APPLICATION(S): at 06:12

## 2025-02-20 RX ADMIN — Medication 100 MILLIEQUIVALENT(S): at 17:22

## 2025-02-20 RX ADMIN — Medication 1 GRAM(S): at 19:06

## 2025-02-20 RX ADMIN — METOPROLOL SUCCINATE 2.5 MILLIGRAM(S): 50 TABLET, EXTENDED RELEASE ORAL at 05:27

## 2025-02-20 RX ADMIN — Medication 25 GRAM(S): at 01:56

## 2025-02-20 RX ADMIN — METOPROLOL SUCCINATE 2.5 MILLIGRAM(S): 50 TABLET, EXTENDED RELEASE ORAL at 12:58

## 2025-02-20 RX ADMIN — Medication 20 MILLIGRAM(S): at 22:27

## 2025-02-20 RX ADMIN — Medication 125 MILLIGRAM(S): at 19:05

## 2025-02-20 RX ADMIN — Medication 1 EACH: at 22:28

## 2025-02-20 RX ADMIN — MEROPENEM 1000 MILLIGRAM(S): 1 INJECTION INTRAVENOUS at 11:03

## 2025-02-20 RX ADMIN — HEPARIN SODIUM 0 UNIT(S)/HR: 1000 INJECTION INTRAVENOUS; SUBCUTANEOUS at 06:31

## 2025-02-20 RX ADMIN — INSULIN GLARGINE-YFGN 5 UNIT(S): 100 INJECTION, SOLUTION SUBCUTANEOUS at 22:33

## 2025-02-20 RX ADMIN — METHYLPREDNISOLONE ACETATE 40 MILLIGRAM(S): 80 INJECTION, SUSPENSION INTRA-ARTICULAR; INTRALESIONAL; INTRAMUSCULAR; SOFT TISSUE at 11:07

## 2025-02-20 RX ADMIN — INSULIN LISPRO 4: 100 INJECTION, SOLUTION INTRAVENOUS; SUBCUTANEOUS at 23:04

## 2025-02-20 RX ADMIN — Medication 125 MILLIGRAM(S): at 05:27

## 2025-02-20 RX ADMIN — INSULIN LISPRO 6: 100 INJECTION, SOLUTION INTRAVENOUS; SUBCUTANEOUS at 13:01

## 2025-02-20 RX ADMIN — Medication 100 MILLIEQUIVALENT(S): at 15:23

## 2025-02-20 RX ADMIN — Medication 325 MILLIGRAM(S): at 05:27

## 2025-02-20 RX ADMIN — Medication 100 MILLIEQUIVALENT(S): at 14:02

## 2025-02-20 RX ADMIN — HEPARIN SODIUM 900 UNIT(S)/HR: 1000 INJECTION INTRAVENOUS; SUBCUTANEOUS at 22:30

## 2025-02-20 RX ADMIN — Medication 1 GRAM(S): at 23:04

## 2025-02-20 RX ADMIN — I.V. FAT EMULSION 20.8 GM/KG/DAY: 20 EMULSION INTRAVENOUS at 22:31

## 2025-02-20 RX ADMIN — HEPARIN SODIUM 900 UNIT(S)/HR: 1000 INJECTION INTRAVENOUS; SUBCUTANEOUS at 08:10

## 2025-02-20 RX ADMIN — METOPROLOL SUCCINATE 2.5 MILLIGRAM(S): 50 TABLET, EXTENDED RELEASE ORAL at 19:08

## 2025-02-20 RX ADMIN — METOPROLOL SUCCINATE 2.5 MILLIGRAM(S): 50 TABLET, EXTENDED RELEASE ORAL at 01:42

## 2025-02-20 RX ADMIN — Medication 325 MILLIGRAM(S): at 14:02

## 2025-02-20 RX ADMIN — Medication 125 MILLIGRAM(S): at 23:05

## 2025-02-20 RX ADMIN — Medication 2000 UNIT(S): at 22:26

## 2025-02-20 RX ADMIN — Medication 1 GRAM(S): at 05:26

## 2025-02-20 RX ADMIN — HEPARIN SODIUM 900 UNIT(S)/HR: 1000 INJECTION INTRAVENOUS; SUBCUTANEOUS at 15:24

## 2025-02-20 RX ADMIN — INSULIN GLARGINE-YFGN 8 UNIT(S): 100 INJECTION, SOLUTION SUBCUTANEOUS at 08:41

## 2025-02-20 RX ADMIN — Medication 40 MILLIGRAM(S): at 11:07

## 2025-02-20 RX ADMIN — Medication 100 MILLIGRAM(S): at 11:58

## 2025-02-20 RX ADMIN — HEPARIN SODIUM 900 UNIT(S)/HR: 1000 INJECTION INTRAVENOUS; SUBCUTANEOUS at 20:02

## 2025-02-20 RX ADMIN — INSULIN LISPRO 6: 100 INJECTION, SOLUTION INTRAVENOUS; SUBCUTANEOUS at 17:52

## 2025-02-20 RX ADMIN — MEROPENEM 1000 MILLIGRAM(S): 1 INJECTION INTRAVENOUS at 22:27

## 2025-02-20 RX ADMIN — HEPARIN SODIUM 900 UNIT(S)/HR: 1000 INJECTION INTRAVENOUS; SUBCUTANEOUS at 15:01

## 2025-02-20 RX ADMIN — HEPARIN SODIUM 0 UNIT(S)/HR: 1000 INJECTION INTRAVENOUS; SUBCUTANEOUS at 07:25

## 2025-02-20 NOTE — PROGRESS NOTE ADULT - SUBJECTIVE AND OBJECTIVE BOX
Patient is a 69y old  Female who presents with a chief complaint of sepsis (18 Feb 2025 11:37)    BRIEF HOSPITAL COURSE: 68yo female with PMHx recent diagnosis of primary pancreatic adenocarcinoma of head with tumor in umbilicus, chemo-induced pancytopenia, SCC lip, cyclical vomiting syndrome, HTN, HPL, Gout, GERD, CVA on Aggrenox and others had been admitted to [facility] with septic shock 2/2 + pseudomonas bacteremia on 02/09/2025, nausea, vomiting and diarrhea requiring Levophed drip, was transferred out of ICU to regular floor last night.  Patient was being followed by GI, ID, Cardiology and Onc there.  Patient has been transferred to  on patient's  (GI Dr. Pleitez) request.      2/18 pt seen this am, denies chest pain, denies feeling sob but appears slightly tachypneic. states she stills has difficulty swallowing due to pain. feeling weak overall. still having multiple episodes of diarrhea  2/19 pt seen this am, still tachypneic, speaks in a whisper, denies having any pain. states she still has trouble swallowing so appetite is poor. denies pain in throat. wearing bipap overnight  2/20 pt seen this am, appears to be breathing more comfortably but states she still feels SOB, tried to eat soft foods but states its too painful and now with abd pain. states shes hasnt had a BM overnight or this am.    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 1000 milliGRAM(s) IV Intermittent once  acetaminophen   IVPB .. 1000 milliGRAM(s) IV Intermittent once  amLODIPine   Tablet 10 milliGRAM(s) Oral daily  chlorhexidine 2% Cloths 1 Application(s) Topical <User Schedule>  cholecalciferol 2000 Unit(s) Oral at bedtime  dextrose 5%. 1000 milliLiter(s) (50 mL/Hr) IV Continuous <Continuous>  dextrose 5%. 1000 milliLiter(s) (100 mL/Hr) IV Continuous <Continuous>  dextrose 50% Injectable 25 Gram(s) IV Push once  dextrose 50% Injectable 12.5 Gram(s) IV Push once  dextrose 50% Injectable 25 Gram(s) IV Push once  famotidine Injectable 20 milliGRAM(s) IV Push daily  fluconAZOLE IVPB      fluconAZOLE IVPB 200 milliGRAM(s) IV Intermittent every 24 hours  glucagon  Injectable 1 milliGRAM(s) IntraMuscular once  heparin  Infusion.  Unit(s)/Hr (13 mL/Hr) IV Continuous <Continuous>  insulin glargine Injectable (LANTUS) 5 Unit(s) SubCutaneous at bedtime  insulin lispro (ADMELOG) corrective regimen sliding scale   SubCutaneous every 6 hours  lipid, fat emulsion (Fish Oil and Plant Based) 20% Infusion 0.681 Gm/kG/Day (20.8 mL/Hr) IV Continuous <Continuous>  lipid, fat emulsion (Fish Oil and Plant Based) 20% Infusion 0.681 Gm/kG/Day (20.8 mL/Hr) IV Continuous <Continuous>  meropenem Injectable 1000 milliGRAM(s) IV Push every 12 hours  methylPREDNISolone sodium succinate Injectable 40 milliGRAM(s) IV Push daily  metoprolol tartrate Injectable 2.5 milliGRAM(s) IV Push every 6 hours  pantoprazole  Injectable 40 milliGRAM(s) IV Push daily  Parenteral Nutrition - Adult 1 Each (75 mL/Hr) TPN Continuous <Continuous>  Parenteral Nutrition - Adult 1 Each (75 mL/Hr) TPN Continuous <Continuous>  potassium chloride  10 mEq/100 mL IVPB 10 milliEquivalent(s) IV Intermittent every 1 hour  sodium bicarbonate 325 milliGRAM(s) Oral every 8 hours  sucralfate suspension 1 Gram(s) Oral four times a day  vancomycin    Solution 125 milliGRAM(s) Oral every 6 hours    Vital Signs Last 24 Hrs  T(C): 36.7 (20 Feb 2025 13:30), Max: 36.8 (19 Feb 2025 20:45)  T(F): 98.1 (20 Feb 2025 13:30), Max: 98.2 (19 Feb 2025 20:45)  HR: 104 (20 Feb 2025 13:00) (96 - 117)  BP: 145/78 (20 Feb 2025 13:00) (114/82 - 145/78)  BP(mean): 98 (20 Feb 2025 13:00) (88 - 103)  RR: 34 (20 Feb 2025 13:00) (22 - 40)  SpO2: 95% (20 Feb 2025 13:00) (91% - 99%)    Parameters below as of 20 Feb 2025 09:48  Patient On (Oxygen Delivery Method): nasal cannula, high flow  O2 Flow (L/min): 35  O2 Concentration (%): 50    I&O's Detail    19 Feb 2025 07:01  -  20 Feb 2025 07:00  --------------------------------------------------------  IN:    Fat Emulsion (Fish Oil &amp; Plant Based) 20% Infusion: 252 mL    Heparin Infusion: 195 mL    IV PiggyBack: 50 mL    Lactated Ringers: 1059 mL    TPN (Total Parenteral Nutrition): 1559 mL  Total IN: 3115 mL    OUT:    Indwelling Catheter - Urethral (mL): 925 mL  Total OUT: 925 mL    Total NET: 2190 mL      LABS:                            10.8   26.65 )-----------( 90       ( 20 Feb 2025 05:31 )             31.4     02-20    144  |  111[H]  |  64[H]  ----------------------------<  326[H]  3.5   |  23  |  1.40[H]    Ca    8.1[L]      20 Feb 2025 05:31  Phos  3.1     02-20  Mg     2.1     02-20    TPro  5.3[L]  /  Alb  2.0[L]  /  TBili  0.6  /  DBili  x   /  AST  51[H]  /  ALT  29  /  AlkPhos  311[H]  02-20        LIVER FUNCTIONS - ( 20 Feb 2025 05:31 )  Alb: 2.0 g/dL / Pro: 5.3 gm/dL / ALK PHOS: 311 U/L / ALT: 29 U/L / AST: 51 U/L / GGT: x           PTT - ( 20 Feb 2025 05:31 )  PTT:137.9 sec  Urinalysis Basic - ( 20 Feb 2025 05:31 )    Color: x / Appearance: x / SG: x / pH: x  Gluc: 326 mg/dL / Ketone: x  / Bili: x / Urobili: x   Blood: x / Protein: x / Nitrite: x   Leuk Esterase: x / RBC: x / WBC x   Sq Epi: x / Non Sq Epi: x / Bacteria: x      ABG - ( 18 Feb 2025 18:35 )  pH, Arterial: 7.40  pH, Blood: x     /  pCO2: 28    /  pO2: 89    / HCO3: 17    / Base Excess: -6.1  /  SaO2: 98        Lactate, Blood: 2.9 mmol/L (02-20 @ 05:31)    CULTURES:  Culture Results:   No growth at 4 days (02-14 @ 07:33)  Culture Results:   No growth at 4 days (02-14 @ 06:50)    Physical Examination:    General: Mod respiratory distress  HEENT: Pupils equal, reactive to light.  Symmetric.   PULM: decreased at bases b/l.   CVS: Regular rate and rhythm, no murmurs  ABD: Soft, nondistended, mild diffuse tenderness, redness by umbilical area,  EXT: 2+ pitting edema in LE b/l extending to knees.   SKIN: Warm and well perfused, no rashes noted.  NEURO: Alert, oriented, interactive    DEVICES:   coelho    RADIOLOGY:   < from: CT Chest No Cont (02.15.25 @ 15:55) >  IMPRESSION:  Nondisplaced fractures of the left ninth and 10th ribs, similar to the   prior study.    Pancreatic mass, unchanged    No onset ascites and bilateral pleural effusions with compressive   atelectasis, right greater than left.    Dilated gallbladder, similar to the prior study.    < end of copied text >

## 2025-02-20 NOTE — PROGRESS NOTE ADULT - ASSESSMENT
69-year-old female with h/o recent diagnosis of primary head of pancreatic adenocarcinoma with tumor in umbilicus, chemo-induced pancytopenia, SCC lip, cyclical vomiting syndrome, HTN, HPL, Gout, GERD, CVA on Aggrenox was transferred on 2/12 from [facility] for further care. She was admitted to [facility] with septic shock on 02/09/2025, nausea, vomiting and diarrhea requiring Levophed drip. Patient was being followed by GI, ID, Cardiology and Onc there. Patient has been transferred to  on patient's  (GI Dr. Pleitez) request. She was reported with non-bloody diarrhea x 3 in last 24 hours PTA. She denied abdominal pain, nausea, but has dry heaves. At [place] she was started on Vancomycin and Zosyn, then changed to Cefepime as her blood culture and urine cultures showed Pseudomonas aeruginosa.    #Dysphagia  Possible ?Candida esophagitis ?CMV esophagitis  #Sepsis with PSAE resolved  #Episode of hypotension/ chills resolved  #Head of pancreas adenocarcinoma on chemotherapy  #Absolute neutropenia improving  #Immunocompromised host  #UTI with PSAE  #Kidney stones  #Right lower chest wall dry superficial ulcer   #ARF   -neutropenia resolved, leukocytosis at present  -CMV serology shows no evidence of CMV disease  -renal function is slightly improving  -dysphagia -- obtain CMV serology and PCR  -cultures reviewed  -repeat stool for CDT is negative   -source of sepsis is likely intraabdominal and/or urinary  -s/p cefepime 2 gm IV q8h # 3  -on meropenem 1 gm IV q8h # 5 and fluconazole 200 mg IV qd # 4  -on vancomycin 125 mg PO q6h for CDAD prophylaxis  -tolerating abx well so far; no side effects noted  -GI evaluation appreciated   -discontinue neutropenic precautions   -repeat BC x 2 noted  -oncology evaluation appreciated  -monitor abdomen  -continue abx coverage  -f/u cultures  -monitor temps  -f/u CBC and BNP  -supportive care  2. Other issues:   -care per medicine    d/w medicine team and Dr. Pleitez

## 2025-02-20 NOTE — CHART NOTE - NSCHARTNOTEFT_GEN_A_CORE
Clinical Nutrition PN Follow Up Note    *68 y/o F w/ recent diagnosis of primary pancreatic adenocarcinoma of head with tumor in umbilicus, chemo-induced pancytopenia and significant PMH of SCC lip, cyclical vomiting syndrome, HTN, HPL, Gout, GERD, CVA on Aggrenox and others had been admitted to [facility] with septic shock on 2025, nausea, vomiting and diarrhea requiring Levophed drip, was transferred out of ICU to regular floor last night.  Patient was being followed by GI, ID, Cardiology and Onc there.  Patient has been transferred to  today on patient's  (GI Dr. Pleitez) request.  At this time, patient c/o non-bloody diarrhea x 3 in last 24 hours.  She denies any abdominal pain, nausea, but has some dry heaves.  She denies any fever, chills, chest pain, palpitation, constipation or dysuria. Initially, she was started on Vancomycin and Zosyn, but now she has been on Cefepime as her blood culture and urine cultures are positive for Pseudomonas aeruginosa. Currently, patient is on Cefepime.  ID had also recommended Flagyl, but patient had refused it, as it causes nausea to her. Noted with Metabolic acidosis with NAG due to GI loss. Likely chemo-induced diarrhea and vomiting. Better than before. Cutaneous lesion on RUQ area below right breast, unclear etiology, ? Ecthyma gangrenosum. Admitting diagnosis: pancreatic ca  *: Pt transfer from [facility]; seen by RD and met criteria for PCM. Pt reports only being able to tolerate some sips of water and tea at this time. Reports tried eating small bite of mashed banana and felt burning sensation down her throat. Reports #; RD unable to obtain bedscale wt d/t bedscale not working. Admit wt per # note with 1+ and 2+ edema skewing wt. Pt appears overweight, NFPE reveals mild- mod muscle/fat wasting at this time. Recommend to continue with PO diet and encourage intake as tolerated. RD consulted to initiate PN d/t PO intolerance. Will initiate TPN through port.  *2/15: Remains on low fiber diet with minimal PO intake. Still w/ multiple episodes of diarrhea, if persists can consider adding 5mg zinc into PN bag. No other acute events overnight. D/w Dr. Joyner will c/w TPN today.   *: Raised area noted to mid sternum. RCW port intact.- no swelling noted, no redness. Port flushed without resistance. + blood return noted. Assessed with IV team Amy. Per pt no Pain on palpation. CT Chest. TPN on hold until CT read. CT chest from earlier in the day revealed new bilateral pleural effusions with bibasilar consolidated lung and ascites. CXR now shows low lung volumes and bibasilar opacities. Rigors post transfusion - Rapid response called;  Dr. Pleitez requesting ICU consult- concerned for pt.  requesting assessment of patient STAT. Pt transferred to SICU. ? third spacing, and possible PNA. Per pt still drinking small sips of water and tea. D/w Dr. Fortune will keep total volume same in PN bag today, and will c/w TPN.   *:  tx SICU, po remains minimal. On bipap. On additional LR d/t diuresis per critical care PA: "malignancy/severe protein-calorie malnutrition/hypoalbuminemia causing diffuse third spacing, caution with further diuresis as this is not truly a volume overload/acute CHF issue" also on IV albumin  c/w TPN  STRONGLY suggest to Confirm goals of care regarding LONG-TERM nutrition support - However, LONG-TERM Nutrition support is not recommended with advanced cancer as studies show that there is no improvement of dehydration symptoms, quality of life, or survival. It also increases the risk for peripheral edema, ascites, and pleural effusions. Will continue to provide nutr within GOC though  *: plan to c/w TPN for now. RD discussed w/ MD Tong and SICU IRINA Jurado need to confirm long term GOC regarding nutr support. Per GI - Esophageal pain is 2/2 esophagitis but unclear what type -- reflux vs. candida vs CMV or HSV etc. Plan to add imodium standing. ? EGD   *: reports she stills has difficulty swallowing due to pain. feeling weak overall. still having multiple episodes of diarrhea, poor PO intake. Plan to c/w TPN - per GI: "CVM and HSV will be negative which argues that esophagitis is reflux or fungal related"  d/w IRINA Jurado long-term plan regarding nutr support TBD, pending discussion w/ family. ? fluid overload which TPN may be contributing to as some studies show with advanced cancer that nutrition support increases the risk for peripheral edema, ascites, and pleural effusions.     *TPN initiated 2/2 PO intolerance, + Mucositis, possible candidal esophagitis    *current status: PO slighly improving, plan to c/w TPN.  POCT uptrending, d/w PA Ethel Jurado, steroids to be dc and will hold off in insulin PN bag - will manage w/ insulin outside bag.  Per GI: "Imaging suggests ileus but I personally think that putting the imaging together with history and clinical findings that she just has severe diarrhea illness 2/2 chemo, given that both large and small bowel loops are fluid filled and she has no abd pain or vomiting"    *new pertinent meds: famotidine, cholecalciferol, abx, ADMELOG (8 units given x 24 hrs), methylprednisolone, metoprolol, protonix, 40 mEq KCl x 24 hrs, sucralfate, lantus 8 units + lantus 5 units HS, 2g MgSuflate x 24 hrs, 2 pckt PHOS-NaK x 24 hrs    *labs reviewed; Lytes WNL except K at low-end of normal. With significant lyte repletion outside of bag yesterday. As per [facility] Guidelines, maintenance potassium concentration in PN is 1 – 2 mEq/kg/day. However, [facility] PN Policy indicates a maximum of 120 mEq should be added into the PN bag. Currently at 90mEq in PN bag.  T bili WNL will c/w trace elements. C/w MVI and thiamine in PN bag. POCT still elevated, will hold dextrose and titrate up tomorrow to goal if POCT improved (325g daily; GIR ~3.1 WNL) - may need insulin added into PN bag if uptrends and continues to be elevated. D/w MD/ PA.  TG WNL will c/w 50g of lipids daily.  Also of note, Cl still elevated, will add more acetate vs Cl into TPN bag - 197 vs 100, respectively.         144  |  111[H]  |  64[H]  ----------------------------<  326[H]  3.5   |  23  |  1.40[H]    Ca    8.1[L]      2025 05:31  Phos  3.1       Mg     2.1         TPro  5.3[L]  /  Alb  2.0[L]  /  TBili  0.6  /  DBili  x   /  AST  51[H]  /  ALT  29  /  AlkPhos  311[H]      BMI: BMI (kg/m2): 27.8 (25 @ 05:46)    BP: 122/71 (25 @ 05:00) (96/64 - 177/89)Vital Signs Last 24 Hrs  T(C): 36.7 (25 @ 05:00), Max: 38.1 (02-15-25 @ 23:00)  T(F): 98 (25 @ 05:00), Max: 100.5 (02-15-25 @ 23:00)  HR: 76 (25 05:00) (76 - 137)  BP: 122/71 (25 @ 05:00) (96/64 - 177/89)  BP(mean): 87 (25 @ 05:00) (70 - 93)  RR: 29 (25 @ 05:00) (16 - 29)  SpO2: 95% (25 @ 05:00) (92% - 100%)    Lipid Panel: Date/Time: 02-15-25 @ 05:17  Triglycerides, Serum: 120    POCT Blood Glucose.: 268 mg/dL (2025 05:24)  POCT Blood Glucose.: 242 mg/dL (2025 23:36)  POCT Blood Glucose.: 219 mg/dL (2025 17:46)  POCT Blood Glucose.: 176 mg/dL (2025 13:28)    Iron Total: 34 ug/dL (25 @ 18:46)  Folate, Serum: >20.0 ng/mL (25 @ 18:46)  Vitamin B12, Serum: 1457 pg/mL (25 @ 18:46)  Vitamin D, 25-Hydroxy: 23.0 ng/mL (25 @ 18:46) ** c/w deficiency    *I&O's Detail    2025 07:01  -  2025 07:00  --------------------------------------------------------  IN:    Fat Emulsion (Fish Oil &amp; Plant Based) 20% Infusion: 252 mL    Heparin Infusion: 195 mL    IV PiggyBack: 50 mL    Lactated Ringers: 1059 mL    TPN (Total Parenteral Nutrition): 1559 mL  Total IN: 3115 mL    OUT:    Indwelling Catheter - Urethral (mL): 925 mL  Total OUT: 925 mL    Total NET: 2190 mL  * fluid status: positive. Will continue to monitor/adjust prn to maintain fluid balance   *last (+) BM doc'd 2/18 x 5 - liquid, diarrhea.  pt on standing bowel regimen (imodium).  *edema: 2+ generalized  *PI: L Buttock S1, R Buttock S1, R ischium S2    *malnutrition: Pt continues to meet criteria for severe protein calorie malnutrition in context of acute on chronic illness r/t decreased ability to meet increased nutrient needs 2/2 PO intolerance, N/V and diarrhea, on chemo for pancreatic ca AEB meeting <50% ENN > 5 days, mild-mod muscle/fat wasting    Estimated Needs: based on 73.4 Kg (wt from EMR admit wt )  Calories: 1812 - 2175 Kcal (25 - 30 Kcal/Kg)  Protein: 108 - 145 g (1.5 - 2.0 g/Kg)  Fluids: 1450 - 1812 mL (20 - 25 mL/Kg)    Diet, Low Fiber:   1500mL Fluid Restriction ([id number])  Kosher  Supplement Feeding Modality:  Oral  Ensure Plant-Based Cans or Servings Per Day:  3       Frequency:  Daily (02-15-25 @ 20:36) [Active]  Parenteral Nutrition - Adult 1 Each (75 mL/Hr) TPN Continuous <Continuous>, 02-15-25 @ 22:00, 22:00, Stop order after: 1 Days    Weight History:  Daily Weight in k.4 (2025 05:00)  Daily Weight in k.6 (15 Feb 2025 06:17)  Height (cm): 162.6 (25 @ 02:39)  Weight (kg): 73.4 (25 @ 05:46), 66.8 (25 @ 02:39)  BMI (kg/m2): 27.8 (25 @ 05:46), 25.3 (25 @ 02:39)  BSA (m2): 1.79 (25 @ 05:46), 1.72 (25 @ 02:39)    TPN Recommendations: via implanted infusion port  Total Volume: 1800 mL x 24 hours  125 g  Amino Acids  300 g Dextrose (goal 325g)  50 g Lipids 20% pending lipid panel  100 mEq Sodium Chloride  41 mEq Sodium Acetate  10 mmol Sodium Phosphate  0 mEq Potassium Chloride  31 mEq Potassium Acetate  40 mmol Potassium Phosphate  0 mEq Calcium Gluconate (CAPS out of PN solution)   10 mEq Magnesium Sulfate  200 mg Thiamine  1 ml Trace Elements  10 ml MVI    Total Calories        (Meets  100%  of  Estimated Energy needs  and  100%  Protein needs)      Additional Recommendations:    1) Daily weights  2) Strict I & O's **pt third spacing  3) Daily lyte checks including magnesium and phos - MONITOR FOR RFS and replete K, Phos, Mg outside PN bag prn  4) Weekly triglycerides/LFT checks  5) POCT q6hrs; maintain 140-180mg/dL***  6) C/w low fiber diet and encourage PO intake as tolerated  7) Continue to titrate dextrose 50-75g per day until goal of 325g reached  8) Confirm goals of care regarding long-term nutrition support. Would recommend PEG placement if long-term nutrition support is required as GI is functional and would be the preferred route for nutrition. However, long-term nutrition support is not recommended with advanced cancer as studies show that there is no improvement of dehydration symptoms, quality of life, or survival. It also increases the risk for peripheral edema, ascites, and pleural effusions.     *will continue to monitor and adjust PN prn*  Marce Mitchell, MS, RDN, CNSC, [phone]  Certified Nutrition

## 2025-02-20 NOTE — PROGRESS NOTE ADULT - SUBJECTIVE AND OBJECTIVE BOX
69-year-old female recent diagnosis of primary pancreatic adenocarcinoma , pancytopenia and significant PMH of SCC lip, cyclical vomiting syndrome, HTN, HPL, Gout, GERD, CVA w renal evaluation of MALLORY and hypokalemia. Per documents was recently at  [facility] with septic shock on 02/09/2025, nausea, vomiting and diarrhea requiring Levophed drip. Per documents, patient  transferred to  for continued care. Patient with + diuresis w lasix 80 IV, increased UOP > 4L.   Treated w IV and po k repletion.     2/18  Case d/e Dr Pleitez  Pt returned from VQ scan r/o PE, results pending  Pt is tachypneic RR 30 BPM and etiologies are investigated  NO specific complaints  Labs reviewed  Chart reviewed  Recent reemergence of diarrhea/ metabolic acidosis  now on parenteral nutrition and LR  D/W Ethel Jurado    2/19  Evaluated at bedside , d/w Dr otng and Dr Pleitez  Pt still tachypneic and tachycardic    2/20  Case d/w Dr Maik Tong and Ethel Jurado  pt overall feels better  more engaging    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 1000 milliGRAM(s) IV Intermittent once  acetaminophen   IVPB .. 1000 milliGRAM(s) IV Intermittent once  amLODIPine   Tablet 10 milliGRAM(s) Oral daily  chlorhexidine 2% Cloths 1 Application(s) Topical <User Schedule>  cholecalciferol 2000 Unit(s) Oral at bedtime  dextrose 5%. 1000 milliLiter(s) (50 mL/Hr) IV Continuous <Continuous>  dextrose 5%. 1000 milliLiter(s) (100 mL/Hr) IV Continuous <Continuous>  dextrose 50% Injectable 25 Gram(s) IV Push once  dextrose 50% Injectable 12.5 Gram(s) IV Push once  dextrose 50% Injectable 25 Gram(s) IV Push once  famotidine Injectable 20 milliGRAM(s) IV Push daily  fluconAZOLE IVPB      fluconAZOLE IVPB 200 milliGRAM(s) IV Intermittent every 24 hours  glucagon  Injectable 1 milliGRAM(s) IntraMuscular once  heparin  Infusion.  Unit(s)/Hr (13 mL/Hr) IV Continuous <Continuous>  insulin glargine Injectable (LANTUS) 5 Unit(s) SubCutaneous at bedtime  insulin lispro (ADMELOG) corrective regimen sliding scale   SubCutaneous every 6 hours  lipid, fat emulsion (Fish Oil and Plant Based) 20% Infusion 0.681 Gm/kG/Day (20.8 mL/Hr) IV Continuous <Continuous>  meropenem Injectable 1000 milliGRAM(s) IV Push every 12 hours  methylPREDNISolone sodium succinate Injectable 40 milliGRAM(s) IV Push daily  metoprolol tartrate Injectable 2.5 milliGRAM(s) IV Push every 6 hours  pantoprazole  Injectable 40 milliGRAM(s) IV Push daily  Parenteral Nutrition - Adult 1 Each (75 mL/Hr) TPN Continuous <Continuous>  Parenteral Nutrition - Adult 1 Each (75 mL/Hr) TPN Continuous <Continuous>  sucralfate suspension 1 Gram(s) Oral four times a day  vancomycin    Solution 125 milliGRAM(s) Oral every 6 hours    MEDICATIONS  (PRN):  acetaminophen     Tablet .. 650 milliGRAM(s) Oral every 6 hours PRN Temp greater or equal to 38C (100.4F), Mild Pain (1 - 3)  aluminum hydroxide/magnesium hydroxide/simethicone Suspension 30 milliLiter(s) Oral every 4 hours PRN Dyspepsia  Biotene Dry Mouth Oral Rinse 15 milliLiter(s) Swish and Spit five times a day PRN Mouth Care  dextrose Oral Gel 15 Gram(s) Oral once PRN Blood Glucose LESS THAN 70 milliGRAM(s)/deciliter  heparin   Injectable 6000 Unit(s) IV Push every 6 hours PRN For aPTT less than 40  heparin   Injectable 3000 Unit(s) IV Push every 6 hours PRN For aPTT between 40 - 57  melatonin 3 milliGRAM(s) Oral at bedtime PRN Insomnia  ondansetron Injectable 4 milliGRAM(s) IV Push every 8 hours PRN Nausea and/or Vomiting  sodium chloride 0.65% Nasal 1 Spray(s) Both Nostrils two times a day PRN Nasal Congestion        Allergies    No Known Allergies    Intolerances      I&O's Detail  I&O's Detail    19 Feb 2025 07:01  -  20 Feb 2025 07:00  --------------------------------------------------------  IN:    Fat Emulsion (Fish Oil &amp; Plant Based) 20% Infusion: 252 mL    Heparin Infusion: 195 mL    IV PiggyBack: 50 mL    Lactated Ringers: 1059 mL    TPN (Total Parenteral Nutrition): 1559 mL  Total IN: 3115 mL    OUT:    Indwelling Catheter - Urethral (mL): 925 mL  Total OUT: 925 mL    Total NET: 2190 mL      20 Feb 2025 07:01  -  20 Feb 2025 20:58  --------------------------------------------------------  IN:    Fat Emulsion (Fish Oil &amp; Plant Based) 20% Infusion: 320 mL    Heparin Infusion: 89 mL    IV PiggyBack: 400 mL    TPN (Total Parenteral Nutrition): 777 mL  Total IN: 1586 mL    OUT:    Indwelling Catheter - Urethral (mL): 975 mL  Total OUT: 975 mL    Total NET: 611 mL  Vital Signs Last 24 Hrs  T(C): 36.6 (20 Feb 2025 20:09), Max: 36.7 (20 Feb 2025 08:20)  T(F): 97.9 (20 Feb 2025 20:09), Max: 98.1 (20 Feb 2025 13:30)  HR: 100 (20 Feb 2025 20:00) (96 - 117)  BP: 138/86 (20 Feb 2025 20:00) (114/82 - 145/78)  BP(mean): 100 (20 Feb 2025 20:00) (91 - 100)  RR: 26 (20 Feb 2025 20:00) (26 - 39)  SpO2: 94% (20 Feb 2025 20:00) (91% - 99%)    Parameters below as of 20 Feb 2025 20:00  Patient On (Oxygen Delivery Method): room air        PHYSICAL EXAM:    Constitutional: NAD,   HEENT: PERRLA, EOMI,  MMM  Neck: No LAD, No JVD  Respiratory: CTAB  Cardiovascular: S1 and S2, RRR  Gastrointestinal: ditant bowel sounds, non tender  Extremities: No peripheral edema  Neurological: A/O x 3, no focal deficits  Psychiatric: Normal mood, normal affect  Access: Not applicable        LABS:                                   10.8   26.65 )-----------( 90       ( 20 Feb 2025 05:31 )             31.4                           10.7   13.14 )-----------( 68       ( 19 Feb 2025 05:35 )             30.5                10.4   5.43  )-----------( 45       ( 18 Feb 2025 08:42 )             29.4                           10.1   1.30  )-----------( 37       ( 17 Feb 2025 05:59 )             28.5     02-20    144  |  111[H]  |  64[H]  ----------------------------<  326[H]  3.5   |  23  |  1.40[H]    Ca    8.1[L]      20 Feb 2025 05:31  Phos  3.1     02-20  Mg     2.1     02-20    TPro  5.3[L]  /  Alb  2.0[L]  /  TBili  0.6  /  DBili  x   /  AST  51[H]  /  ALT  29  /  AlkPhos  311[H]  02-20 02-19    143  |  113[H]  |  66[H]  ----------------------------<  205[H]  3.5   |  21[L]  |  1.42[H]    Ca    8.0[L]      19 Feb 2025 05:35  Phos  2.1     02-19  Mg     1.7     02-19    TPro  4.7[L]  /  Alb  2.0[L]  /  TBili  0.6  /  DBili  x   /  AST  40[H]  /  ALT  26  /  AlkPhos  196[H]  02-19 02-18    138  |  112[H]  |  69[H]  ----------------------------<  223[H]  4.1   |  16[L]  |  1.44[H]    Ca    8.6      18 Feb 2025 08:42  Phos  2.0     02-18  Mg     2.2     02-18    TPro  5.1[L]  /  Alb  2.4[L]  /  TBili  0.6  /  DBili  x   /  AST  27  /  ALT  21  /  AlkPhos  131[H]  02-18      17 Feb 2025 14:46    137    |  109    |  74     ----------------------------<  234    3.4     |  20     |  1.57   17 Feb 2025 05:59    140    |  110    |  70     ----------------------------<  227    2.3     |  17     |  1.73   16 Feb 2025 06:12    136    |  108    |  47     ----------------------------<  172    3.4     |  17     |  1.56   15 Feb 2025 21:25    133    |  108    |  40     ----------------------------<  172    2.9     |  17     |  1.26   15 Feb 2025 05:17    133    |  108    |  27     ----------------------------<  183    3.3     |  18     |  1.04     Ca    8.5        17 Feb 2025 14:46  Ca    8.4        17 Feb 2025 05:59  Ca    8.0        16 Feb 2025 06:12  Ca    8.0        15 Feb 2025 21:25  Ca    8.1        15 Feb 2025 05:17  Phos  2.3       17 Feb 2025 14:46  Phos  3.2       17 Feb 2025 05:59  Phos  3.1       16 Feb 2025 06:12  Phos  1.2       15 Feb 2025 21:25  Phos  2.0       15 Feb 2025 05:17  Mg     2.9       17 Feb 2025 14:46  Mg     1.5       17 Feb 2025 05:59  Mg     1.8       16 Feb 2025 06:12  Mg     2.1       15 Feb 2025 21:25  Mg     2.3       15 Feb 2025 05:17    TPro  5.2    /  Alb  2.7    /  TBili  0.8    /  DBili  x      /  AST  15     /  ALT  19     /  AlkPhos  101    17 Feb 2025 05:59  TPro  5.1    /  Alb  2.9    /  TBili  1.1    /  DBili  x      /  AST  5      /  ALT  19     /  AlkPhos  91     16 Feb 2025 06:12  TPro  4.6    /  Alb  2.0    /  TBili  1.0    /  DBili  x      /  AST  11     /  ALT  17     /  AlkPhos  105    15 Feb 2025 21:25  TPro  4.2    /  Alb  1.7    /  TBili  0.6    /  DBili  0.4    /  AST  6      /  ALT  12     /  AlkPhos  98     15 Feb 2025 05:17  TPro  4.5    /  Alb  1.5    /  TBili  0.8    /  DBili  0.4    /  AST  8      /  ALT  14     /  AlkPhos  104    14 Feb 2025 06:50          Urine Studies:  Urinalysis Basic - ( 17 Feb 2025 14:46 )    Color: x / Appearance: x / SG: x / pH: x  Gluc: 234 mg/dL / Ketone: x  / Bili: x / Urobili: x   Blood: x / Protein: x / Nitrite: x   Leuk Esterase: x / RBC: x / WBC x   Sq Epi: x / Non Sq Epi: x / Bacteria: x            RADIOLOGY & ADDITIONAL STUDIES:

## 2025-02-20 NOTE — PROGRESS NOTE ADULT - SUBJECTIVE AND OBJECTIVE BOX
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 1000 milliLiter(s) (50 mL/Hr) IV Continuous <Continuous>  dextrose 5%. 1000 milliLiter(s) (100 mL/Hr) IV Continuous <Continuous>  dextrose 50% Injectable 25 Gram(s) IV Push once  dextrose 50% Injectable 12.5 Gram(s) IV Push once  dextrose 50% Injectable 25 Gram(s) IV Push once  famotidine Injectable 20 milliGRAM(s) IV Push daily  fluconAZOLE IVPB      fluconAZOLE IVPB 200 milliGRAM(s) IV Intermittent every 24 hours  glucagon  Injectable 1 milliGRAM(s) IntraMuscular once  heparin  Infusion.  Unit(s)/Hr (13 mL/Hr) IV Continuous <Continuous>  insulin glargine Injectable (LANTUS) 5 Unit(s) SubCutaneous at bedtime  insulin glargine Injectable (LANTUS) 8 Unit(s) SubCutaneous once  insulin lispro (ADMELOG) corrective regimen sliding scale   SubCutaneous every 6 hours  lipid, fat emulsion (Fish Oil and Plant Based) 20% Infusion 0.681 Gm/kG/Day (20.8 mL/Hr) IV Continuous <Continuous>  meropenem Injectable 1000 milliGRAM(s) IV Push every 12 hours  methylPREDNISolone sodium succinate Injectable 60 milliGRAM(s) IV Push every 12 hours  metoprolol tartrate Injectable 2.5 milliGRAM(s) IV Push every 6 hours  pantoprazole  Injectable 40 milliGRAM(s) IV Push daily  Parenteral Nutrition - Adult 1 Each (75 mL/Hr) TPN Continuous <Continuous>  sodium bicarbonate 325 milliGRAM(s) Oral every 8 hours  sucralfate suspension 1 Gram(s) Oral four times a day  vancomycin    Solution 125 milliGRAM(s) Oral every 6 hours    MEDICATIONS  (PRN):  acetaminophen     Tablet .. 650 milliGRAM(s) Oral every 6 hours PRN Temp greater or equal to 38C (100.4F), Mild Pain (1 - 3)  aluminum hydroxide/magnesium hydroxide/simethicone Suspension 30 milliLiter(s) Oral every 4 hours PRN Dyspepsia  Biotene Dry Mouth Oral Rinse 15 milliLiter(s) Swish and Spit five times a day PRN Mouth Care  dextrose Oral Gel 15 Gram(s) Oral once PRN Blood Glucose LESS THAN 70 milliGRAM(s)/deciliter  heparin   Injectable 6000 Unit(s) IV Push every 6 hours PRN For aPTT less than 40  heparin   Injectable 3000 Unit(s) IV Push every 6 hours PRN For aPTT between 40 - 57  melatonin 3 milliGRAM(s) Oral at bedtime PRN Insomnia  ondansetron Injectable 4 milliGRAM(s) IV Push every 8 hours PRN Nausea and/or Vomiting  sodium chloride 0.65% Nasal 1 Spray(s) Both Nostrils two times a day PRN Nasal Congestion        Vital Signs Last 24 Hrs  T(C): 36.6 (20 Feb 2025 06:00), Max: 37 (19 Feb 2025 12:18)  T(F): 97.9 (20 Feb 2025 06:00), Max: 98.6 (19 Feb 2025 12:18)  HR: 97 (20 Feb 2025 06:00) (96 - 119)  BP: 134/83 (20 Feb 2025 06:00) (114/82 - 142/92)  BP(mean): 99 (20 Feb 2025 06:00) (88 - 107)  RR: 35 (20 Feb 2025 06:00) (22 - 46)  SpO2: 99% (20 Feb 2025 06:00) (92% - 99%)    Parameters below as of 19 Feb 2025 19:00  Patient On (Oxygen Delivery Method): nasal cannula, high flow  O2 Flow (L/min): 30  O2 Concentration (%): 60    PHYSICAL EXAM:    Constitutional: NAD, chronically ill appearing  Respiratory: CTAB  Cardiovascular: S1 and S2, RRR  Gastrointestinal: BS+, soft,   Psychiatric: Normal mood, normal affect    LABS:                        10.8   26.65 )-----------( 90       ( 20 Feb 2025 05:31 )             31.4     02-20    144  |  111[H]  |  64[H]  ----------------------------<  326[H]  3.5   |  23  |  1.40[H]    Ca    8.1[L]      20 Feb 2025 05:31  Phos  3.1     02-20  Mg     2.1     02-20    TPro  5.3[L]  /  Alb  2.0[L]  /  TBili  0.6  /  DBili  x   /  AST  51[H]  /  ALT  29  /  AlkPhos  311[H]  02-20    PTT - ( 20 Feb 2025 05:31 )  PTT:137.9 sec  LIVER FUNCTIONS - ( 20 Feb 2025 05:31 )  Alb: 2.0 g/dL / Pro: 5.3 gm/dL / ALK PHOS: 311 U/L / ALT: 29 U/L / AST: 51 U/L / GGT: x             RADIOLOGY & ADDITIONAL STUDIES:

## 2025-02-20 NOTE — PROGRESS NOTE ADULT - ASSESSMENT
Imp:  Imaging suggests ileus but I personally think that putting the imaging together with history and clinical findings that she just has severe diarrhea illness 2/2 chemo, given that both large and small bowel loops are fluid filled and she has no abd pain or vomiting    Rec:  Cont supportive care with TPN

## 2025-02-20 NOTE — PROGRESS NOTE ADULT - ASSESSMENT
69-year-old female with stage IV pancreatic adenocarcinoma presenting with neutropenic fever and severe treatment-related toxicities from modified FOLFIRINOX and investigational EMILY inhibitor [id number].    PROBLEMS:    Acute hypoxic respiratory failure likely secondary to hypervolemia   Bilateral pleural effusions with compressive atelectasis, right greater than left  Neutropenic Fever/Neutropenic Septic shock  Severe Thrombocytopenia-Platelet count critically low at 26k.   Severe diarrhea and inability to tolerate PO intake, likely multifactorial from both FOLFIRINOX (particularly irinotecan component) and study drug [id number].   History of left cephalic vein thrombosis.  MALLORY  69-year-old female with stage IV pancreatic adenocarcinoma presenting with neutropenic fever and severe treatment-related toxicities from modified FOLFIRINOX and investigational EMILY inhibitor [id number].  CT Angio Chest PE Protocol w/ IV Cont (02.19.25 @ 14:16) >  CHEST:  Multiple pulmonary emboli within the segmental branches of the left lower and right lower lobe pulmonary arteries.  There are two right middle lobe nodules, new since prior. Exact   etiology is unclear. Primary differential diagnostic consideration   includes infection.      PLAN:    CTA chest + small PEs in RLL and LLL- IV heparin, no bolus   Taper IV solumedrol 40mg qdaily for suspected TRALI from plt transfusion  Pulmonary tachpnea may be RTA with low bicarbonate because of GI cause with diarrhea but improving renal fx  Neutropenic Septic shock- IV meropenem started on 2/15- previously on CEFEPIME/Continue filgrastim 480mcg daily-Today w/ improvement in counts- WBC 1.3, Hb 10.1, plt 37, cmp with k 2.3 and Repleted, Cr 1.40.   Daily CBC monitoring  Maintain strict neutropenic precautions  IV diuretics-now on RA doing well   EMPIRIC TREATMENT FOR ESOPHAGEAL CANDIDIASIS-continue with DIFLUCAN   Anemia-received two units of pRBCs Hb 10.1 today   Thrombocytopenia-Transfuse for plts < 15   D/w staff & /haematology & ICU in detail

## 2025-02-20 NOTE — PROGRESS NOTE ADULT - ASSESSMENT
69-year-old female recent diagnosis of primary pancreatic adenocarcinoma , pancytopenia and significant PMH of SCC lip, cyclical vomiting syndrome, HTN, HPL, Gout, GERD, CVA w renal evaluation of MALLORY and hypokalemia. Per documents was recently at  [facility] with septic shock on 02/09/2025, nausea, vomiting and diarrhea requiring Levophed drip. Per documents, patient  transferred to  for continued care. Patient with + diuresis w lasix 80 IV, increaserd UOP > 4L.   Treated w IV and po k repletion.     MALLORY  -LIkely pre renal w insensible losses and diuresis, signficant UOP  -IVF to maintain even to positive today  -Trend labs, lytes. Repeat this afternoon  -Monitor UOP closely  -Avoid nsaids/contrast  -Renally dosed abx    Hypokalemia  -K repletion iv/po  -TPN    Sepsis/Pseduomonas  -ID/abx  -FU cultures    d/c with RN staff, Dr Tong, Dr Henry, Dr Maik Sweet to resume care in AM    2/18  MALLORY resolving  Azotemia, responded to IVF resuscitation  Urine output improved  hypoalbuminemia- on spa q 8 h for oncotic pressure and glomerular filtration  NAGMA- hyperchloremia may be due to diarrhea and GI losses  On Meropenem- very rarely associated w Fanconi syndrome  - will check urinary PO4, serum uric acid, repeat PO4 level, low this am  ABG  Lactate  CBC  VQ results pending  d/w Dr YOUSUF Pleitez    Addendum  Repeat labs reviewed d/w intensivist  Lactate 2.7  ABG c/w primary respiratory alkalosis with metabolic acidosis and NAGMA    2/19  Pancreatic Adenoca  MALLORY prerenal, correcting  Primary Hyperventilation, metab acidosis and non anion acidosis  VQ indeterminate  on 2/18 for PE  For CTA today, low risk MAXIMO due to adequate hydration status, UO 3600 ml / last 24 hrs  D/W Dr Tnog and Dr Pleitez    2/20  Pancreatic AdenoCa  MALLORY slowly correcting  Bilateral pulm emboli  Creat stable post IVContrast but will monitor for 48 hrs  Abd distension less  hypokalemia being replaced

## 2025-02-20 NOTE — PROGRESS NOTE ADULT - ASSESSMENT
69-year-old female with stage IV pancreatic adenocarcinoma presenting with neutropenic fever and severe treatment-related toxicities from modified FOLFIRINOX and investigational EMILY inhibitor [id number].    1. Neutropenic Fever:  Patient presents with grade 4 neutropenia (ANC < 500) with initial febrile episode. Currently on appropriate broad-spectrum coverage with cefepime. Has remained afebrile on current regimen with slight improvement in WBC   FILGRASTIM DISCONTINUED   WBC elevated now secondary to GCSG     2. Severe Thrombocytopenia:  improved to 90k today     3. Treatment-Related GI Toxicity:  Severe diarrhea and inability to tolerate PO intake, likely multifactorial from both FOLFIRINOX (particularly irinotecan component) and study drug [id number]. Consider DPD deficiency given severity of symptoms. Currently managed with cholestyramine and lomotil with some improvement. Requiring TPN support.  ppx po vancomycin started- GI following     4. Nutritional Status:  Significant decline in oral intake due to severe mucositis and GI symptoms. Currently on TPN support. Will require careful nutritional monitoring and support during recovery.    5. DVT Prophylaxis:  History of left cephalic vein thrombosis. Currently on appropriate DVT prophylaxis given high-risk status (active malignancy, hospitalization).    Overall, patient experiencing significant toxicities from current treatment regimen requiring dose modifications and possibly alternative treatment strategies moving forward. Will require close coordination with primary oncology team at [facility] regarding future treatment plans.  - will need to continue with SCDs   - severe thrombocytopenia prohibits chemical prophylaxis     6. Acute hypoxic episode - 2/15 - likely secondary to volume overload - responsive to diuretics 2/16    Plan:  #Neutropenic Septic shock   - hemodynamically stable   - Continue meropenem started on 2/15 as well as fluconazole and po vanc  - LEUKOCYTOSIS secondary to GCSF     #Acute hypoxic respiratory failure likely secondary to hypervolemia   - response to diuretics - will need to monitor renal function given slight rise in CR to 1.42 today and trending down   - now on NC and bipap at night  - v/q scan w indeterminate prob of PE, linear defect in RUL posterior segment   - cxr with basilar haziness    # EMPIRIC TREATMENT FOR ESOPHAGEAL CANDIDIASIS   - continue with DIFLUCAN     #Anemia   - received two units of pRBCs t date   - Hb 10.8 today - will continue to monitor, keep type and screen active     #Thrombocytopenia  -Transfuse for plts < 15   - Monitor daily platelet counts- plt 90k today     #GI Toxicity- acute diarrhea   - largely improved.   - Continue cholestyramine and lomotil  - Consider DPD testing, and risk of irinotecan toxicity increases with genetic variants associated with reduced UGT enzyme activity, such as [id number]  - Continue TPN support  - Oral care protocol  - GI following     # HYPOXIA   -secondary PE   - patient also on steroids and discussed with pulmonary and planned for tapering of steroids.   now on 50% HIGH FLOW   - now on Heparin ggt - no bolus administered due to bleeding risk       #Care Coordination  -will continue with ongoing communication with  [facility]  - Review clinical trial protocol regarding toxicity management  - Consider dose modifications for future therapy    will follow and communicate with [facility]

## 2025-02-20 NOTE — CONSULT NOTE ADULT - SUBJECTIVE AND OBJECTIVE BOX
HPI:   History of Present Illness:    HPI: Patient is a 69-year-old female recent diagnosis of primary pancreatic adenocarcinoma of head with tumor in umbilicus, chemo-induced pancytopenia and significant PMH of SCC lip, cyclical vomiting syndrome, HTN, HPL, Gout, GERD, CVA on Aggrenox and others had been admitted to [facility] with septic shock on 02/09/2025, nausea, vomiting and diarrhea requiring Levophed drip, was transferred out of ICU to regular floor last night.  Patient was being followed by GI, ID, Cardiology and Onc there.  Patient has been transferred to  today on patient's  (GI Dr. Pleitez) request.  At this time, patient c/o non-bloody diarrhea x 3 in last 24 hours.  She denies any abdominal pain, nausea, but has some dry heaves.  She denies any fever, chills, chest pain, palpitation, constipation or dysuria. Initially, she was started on Vancomycin and Zosyn, but now she has been on Cefepime as her blood culture and urine cultures are positive for Pseudomonas aeruginosa. Currently, patient is on Cefepime.  ID had also recommended Flagyl, but patient had refused it, as it causes nausea to her.    The patient developed acute hypoxemic respiratory failure, which is felt to be multifactorial-pulmonary embolic disease, compressive atelectasis, ?TRALI. She is currently being treated with solumedrol in tapering doses for possible acute lung injury. There was concern about possible metabolic acidosis, which may have been contributing to her tachypnea. CT imaging of the chest does not support durg induced lung injury, as there is a lack of interstitial changes.     The patient's pulmonary history is negative for nicotine abuse, PNA, asthma, TB or TB exposure. She denies toxic or occupational exposure. At present, there is no cough, pleuritic chest pain, hemoptysis or sputum production.         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 1000 milliGRAM(s) IV Intermittent once  acetaminophen   IVPB .. 1000 milliGRAM(s) IV Intermittent once  amLODIPine   Tablet 10 milliGRAM(s) Oral daily  chlorhexidine 2% Cloths 1 Application(s) Topical <User Schedule>  cholecalciferol 2000 Unit(s) Oral at bedtime  dextrose 5%. 1000 milliLiter(s) (50 mL/Hr) IV Continuous <Continuous>  dextrose 5%. 1000 milliLiter(s) (100 mL/Hr) IV Continuous <Continuous>  dextrose 50% Injectable 25 Gram(s) IV Push once  dextrose 50% Injectable 12.5 Gram(s) IV Push once  dextrose 50% Injectable 25 Gram(s) IV Push once  famotidine Injectable 20 milliGRAM(s) IV Push daily  fluconAZOLE IVPB      fluconAZOLE IVPB 200 milliGRAM(s) IV Intermittent every 24 hours  glucagon  Injectable 1 milliGRAM(s) IntraMuscular once  heparin  Infusion.  Unit(s)/Hr (13 mL/Hr) IV Continuous <Continuous>  insulin glargine Injectable (LANTUS) 5 Unit(s) SubCutaneous at bedtime  insulin lispro (ADMELOG) corrective regimen sliding scale   SubCutaneous every 6 hours  lipid, fat emulsion (Fish Oil and Plant Based) 20% Infusion 0.681 Gm/kG/Day (20.8 mL/Hr) IV Continuous <Continuous>  meropenem Injectable 1000 milliGRAM(s) IV Push every 12 hours  methylPREDNISolone sodium succinate Injectable 40 milliGRAM(s) IV Push daily  metoprolol tartrate Injectable 2.5 milliGRAM(s) IV Push every 6 hours  pantoprazole  Injectable 40 milliGRAM(s) IV Push daily  Parenteral Nutrition - Adult 1 Each (75 mL/Hr) TPN Continuous <Continuous>  Parenteral Nutrition - Adult 1 Each (75 mL/Hr) TPN Continuous <Continuous>  sucralfate suspension 1 Gram(s) Oral four times a day  vancomycin    Solution 125 milliGRAM(s) Oral every 6 hours    MEDICATIONS  (PRN):  acetaminophen     Tablet .. 650 milliGRAM(s) Oral every 6 hours PRN Temp greater or equal to 38C (100.4F), Mild Pain (1 - 3)  aluminum hydroxide/magnesium hydroxide/simethicone Suspension 30 milliLiter(s) Oral every 4 hours PRN Dyspepsia  Biotene Dry Mouth Oral Rinse 15 milliLiter(s) Swish and Spit five times a day PRN Mouth Care  dextrose Oral Gel 15 Gram(s) Oral once PRN Blood Glucose LESS THAN 70 milliGRAM(s)/deciliter  heparin   Injectable 6000 Unit(s) IV Push every 6 hours PRN For aPTT less than 40  heparin   Injectable 3000 Unit(s) IV Push every 6 hours PRN For aPTT between 40 - 57  melatonin 3 milliGRAM(s) Oral at bedtime PRN Insomnia  ondansetron Injectable 4 milliGRAM(s) IV Push every 8 hours PRN Nausea and/or Vomiting  sodium chloride 0.65% Nasal 1 Spray(s) Both Nostrils two times a day PRN Nasal Congestion      Allergies    No Known Allergies    Intolerances        SOCIAL HISTORY: Denies tobacco, etoh abuse or illicit drug use    FAMILY HISTORY:      Vital Signs Last 24 Hrs  T(C): 36.6 (20 Feb 2025 20:09), Max: 36.7 (20 Feb 2025 08:20)  T(F): 97.9 (20 Feb 2025 20:09), Max: 98.1 (20 Feb 2025 13:30)  HR: 100 (20 Feb 2025 20:00) (96 - 117)  BP: 138/86 (20 Feb 2025 20:00) (114/82 - 145/78)  BP(mean): 100 (20 Feb 2025 20:00) (91 - 100)  RR: 26 (20 Feb 2025 20:00) (26 - 39)  SpO2: 94% (20 Feb 2025 20:00) (91% - 99%)    Parameters below as of 20 Feb 2025 20:00  Patient On (Oxygen Delivery Method): room air        REVIEW OF SYSTEMS:    CONSTITUTIONAL:  As per HPI.  HEENT:  Eyes:  No diplopia or blurred vision. ENT:  No earache, sore throat or runny nose.  CARDIOVASCULAR:  No pressure, squeezing, tightness, heaviness or aching about the chest, neck, axilla or epigastrium.  RESPIRATORY:  See HPI  GASTROINTESTINAL:  See HPI  GENITOURINARY:  No dysuria, frequency or urgency.  MUSCULOSKELETAL:  As per HPI.  SKIN:  No change in skin, hair or nails.  NEUROLOGIC:  No paresthesias, fasciculations, seizures or weakness.  PSYCHIATRIC:  No disorder of thought or mood.  ENDOCRINE:  No heat or cold intolerance, polyuria or polydipsia.  HEMATOLOGICAL:  See HPI  .     PHYSICAL EXAMINATION:    GENERAL APPEARANCE:  Pt. is ill-appearing female, in mild distress. Pt. is alert, oriented, and pleasant.  HEENT:  Pupils are normal and react normally. No icterus. Mucous membranes inflamed.  NECK:  Supple. No lymphadenopathy. Jugular venous pressure not elevated.   HEART:   The cardiac impulse has a normal quality. Regular. Normal S1 and S2. There are no murmurs, rubs or gallops noted  CHEST:  Chest is clear to auscultation anteriorly. Decreased BS at bases with dullness. Increased respiratory effort.  ABDOMEN:  Soft and nontender.   EXTREMITIES:  There is no cyanosis, clubbing. Tr-1+ edema.   SKIN:  No rash or significant lesions are noted.    LABS:                        10.8   26.65 )-----------( 90       ( 20 Feb 2025 05:31 )             31.4     02-20    144  |  111[H]  |  64[H]  ----------------------------<  326[H]  3.5   |  23  |  1.40[H]    Ca    8.1[L]      20 Feb 2025 05:31  Phos  3.1     02-20  Mg     2.1     02-20    TPro  5.3[L]  /  Alb  2.0[L]  /  TBili  0.6  /  DBili  x   /  AST  51[H]  /  ALT  29  /  AlkPhos  311[H]  02-20    LIVER FUNCTIONS - ( 20 Feb 2025 05:31 )  Alb: 2.0 g/dL / Pro: 5.3 gm/dL / ALK PHOS: 311 U/L / ALT: 29 U/L / AST: 51 U/L / GGT: x           PTT - ( 20 Feb 2025 13:59 )  PTT:92.4 sec      Urinalysis Basic - ( 20 Feb 2025 05:31 )    Color: x / Appearance: x / SG: x / pH: x  Gluc: 326 mg/dL / Ketone: x  / Bili: x / Urobili: x   Blood: x / Protein: x / Nitrite: x   Leuk Esterase: x / RBC: x / WBC x   Sq Epi: x / Non Sq Epi: x / Bacteria: x          RADIOLOGY & ADDITIONAL STUDIES: < from: CT Angio Chest PE Protocol w/ IV Cont (02.19.25 @ 14:16) >  ACC: [id number] EXAM:  CT ABDOMEN AND PELVIS IC   ORDERED BY: NESTOR CARRANZA     ACC: [id number] EXAM:  CT ANGIO CHEST PULM ART [facility]   ORDERED BY: NESTOR CARRANZA     PROCEDURE DATE:  02/19/2025          INTERPRETATION:  CLINICAL INFORMATION: Shortness of breath. Tachycardia.    COMPARISON: CT chest 2/15/2025. CT chest, abdomen and pelvis 2/9/2025.    CONTRAST/COMPLICATIONS:  IV Contrast: Omnipaque 350 90 cc administered   0 cc discarded.  Oral Contrast: NONE.    PROCEDURE:  CT Angiogram of the chest was obtained with intravenous contrast. Three   dimensional maximum intensity projection (MIP) images were generated.    FINDINGS:    CHEST:  AIRWAYS/LUNGS/PLEURA: Patent central airways. Bibasilar atelectasis.   Bilateral small pleural effusions, right greater than left. There are 2   right middle lobe nodules there are new new since prior, infection is   favored.  MEDIASTINUM AND MARTA: No lymphadenopathy.  PULMONARY ANGIOGRAM: Pulmonary arteries are normal in caliber. Filling   defects noted within the segmental branches of both lower lobe pulmonary   arteries. No evidence of heart strain.  VESSELS: Aortic calcifications. Coronary artery calcifications.  HEART: Cardiomegaly. Trace pericardial effusion.  CHEST WALL AND BONES: Right-sided Mediport catheter in place.    ABDOMEN AND PELVIS:    LIVER: Within normal limits.  BILE DUCTS: Normal caliber.  GALLBLADDER: Distended, similar to prior.  SPLEEN: Within normal limits.  PANCREAS: Hypoattenuating mass on the body of the pancreas is again seen.  ADRENALS: Both adrenals are nodular, unchanged.  KIDNEYS/URETERS: No renal stones or hydronephrosis. Bilateral renal   cysts. Multiple bilateral subcentimeter hypodensities too small to   characterize.    BLADDER: Julian catheter.  REPRODUCTIVE ORGANS: Uterus and adnexa within normal limits.    BOWEL: There are dilated loops of small bowel with narrowing at the   terminal ileum. There is also circumferential thickening of the distal   terminal ileum. The large bowel is dilated until the level of the distal   transverse colon where there is long segment narrowing just before the   splenic flexure of the colon followed by collapse of the descending   colon. Appendix is not visualized.  PERITONEUM/RETROPERITONEUM: Diffuse ascites.  LYMPH NODES: No lymphadenopathy.  ABDOMINAL WALL: There is a lesion at the umbilicus with adjacent   peritoneal implants.  BONES: Degenerative changes.    IMPRESSION:    CHEST:  *  Multiple pulmonary emboli within the segmental branches of the left   lower and right lower lobe pulmonary arteries.  *  There are two right middle lobe nodules, new since prior. Exact   etiology is unclear. Primary differential diagnostic consideration   includes infection.    ABDOMEN:  *  Large bowel obstruction with transition point at the distal transverse   colon. There is also narrowing at the level of the terminal ileum.  *  Diffuse ascites.  *  Redemonstrated pancreatic lesion, consistent with history of   pancreatic adenocarcinoma.  *  There is an umbilical lesion with adjacent peritoneal implants.      Findings were discussed with Dr. NESTOR CARRANZA [phone] 2/19/2025 3:51 PM   by Dr. Rajiv Bell with read back confirmation.      RAJIV CHAUDHRY MD; Resident Radiologist  This document has been electronically signed.  HEAVEN OCAMPO MD

## 2025-02-20 NOTE — PROGRESS NOTE ADULT - SUBJECTIVE AND OBJECTIVE BOX
Date of service: 02-20-25 @ 12:06    Sitting in dale n NAD  Has nausea  Loose stools  No fever    ROS: no fever or chills; denies dizziness, no HA, no SOB or cough, no abdominal pain, no dysuria, no legs pain, no rashes    MEDICATIONS  (STANDING):  acetaminophen   IVPB .. 1000 milliGRAM(s) IV Intermittent once  acetaminophen   IVPB .. 1000 milliGRAM(s) IV Intermittent once  amLODIPine   Tablet 10 milliGRAM(s) Oral daily  chlorhexidine 2% Cloths 1 Application(s) Topical <User Schedule>  cholecalciferol 2000 Unit(s) Oral at bedtime  dextrose 5%. 1000 milliLiter(s) (50 mL/Hr) IV Continuous <Continuous>  dextrose 5%. 1000 milliLiter(s) (100 mL/Hr) IV Continuous <Continuous>  dextrose 50% Injectable 25 Gram(s) IV Push once  dextrose 50% Injectable 12.5 Gram(s) IV Push once  dextrose 50% Injectable 25 Gram(s) IV Push once  famotidine Injectable 20 milliGRAM(s) IV Push daily  fluconAZOLE IVPB      fluconAZOLE IVPB 200 milliGRAM(s) IV Intermittent every 24 hours  glucagon  Injectable 1 milliGRAM(s) IntraMuscular once  heparin  Infusion.  Unit(s)/Hr (13 mL/Hr) IV Continuous <Continuous>  insulin glargine Injectable (LANTUS) 5 Unit(s) SubCutaneous at bedtime  insulin lispro (ADMELOG) corrective regimen sliding scale   SubCutaneous every 6 hours  lipid, fat emulsion (Fish Oil and Plant Based) 20% Infusion 0.681 Gm/kG/Day (20.8 mL/Hr) IV Continuous <Continuous>  lipid, fat emulsion (Fish Oil and Plant Based) 20% Infusion 0.681 Gm/kG/Day (20.8 mL/Hr) IV Continuous <Continuous>  meropenem Injectable 1000 milliGRAM(s) IV Push every 12 hours  methylPREDNISolone sodium succinate Injectable 40 milliGRAM(s) IV Push daily  metoprolol tartrate Injectable 2.5 milliGRAM(s) IV Push every 6 hours  pantoprazole  Injectable 40 milliGRAM(s) IV Push daily  Parenteral Nutrition - Adult 1 Each (75 mL/Hr) TPN Continuous <Continuous>  Parenteral Nutrition - Adult 1 Each (75 mL/Hr) TPN Continuous <Continuous>  sodium bicarbonate 325 milliGRAM(s) Oral every 8 hours  sucralfate suspension 1 Gram(s) Oral four times a day  vancomycin    Solution 125 milliGRAM(s) Oral every 6 hours    Vital Signs Last 24 Hrs  T(C): 36.7 (20 Feb 2025 08:20), Max: 37 (19 Feb 2025 12:18)  T(F): 98 (20 Feb 2025 08:20), Max: 98.6 (19 Feb 2025 12:18)  HR: 111 (20 Feb 2025 09:00) (96 - 117)  BP: 130/76 (20 Feb 2025 09:00) (114/82 - 140/91)  BP(mean): 91 (20 Feb 2025 09:00) (88 - 103)  RR: 33 (20 Feb 2025 09:48) (22 - 40)  SpO2: 96% (20 Feb 2025 09:48) (91% - 99%)    Parameters below as of 20 Feb 2025 09:48  Patient On (Oxygen Delivery Method): nasal cannula, high flow  O2 Flow (L/min): 35  O2 Concentration (%): 50     Physical exam:    Constitutional:  No acute distress  HEENT: NC/AT, EOMI, PERRLA, conjunctivae clear; ears and nose atraumatic; pharynx benign  Neck: supple; thyroid not palpable  Back: no tenderness  Respiratory: respiratory effort normal; clear to auscultation  Cardiovascular: S1S2 regular, no murmurs  Abdomen: soft, mild periombilical tender, not distended, positive BS; no liver or spleen organomegaly  Genitourinary: no suprapubic tenderness  Lymphatic: no LN palpable  Musculoskeletal: no muscle tenderness, no joint swelling or tenderness  Extremities: no pedal edema  Neurological/ Psychiatric: AxOx3, judgement and insight normal; moving all extremities  Skin: no rashes; right lower chest wall dry, scabbed ulcer; no discharge     Labs: reviewed                        10.8   26.65 )-----------( 90       ( 20 Feb 2025 05:31 )             31.4     02-20    144  |  111[H]  |  64[H]  ----------------------------<  326[H]  3.5   |  23  |  1.40[H]    Ca    8.1[L]      20 Feb 2025 05:31  Phos  3.1     02-20  Mg     2.1     02-20    TPro  5.3[L]  /  Alb  2.0[L]  /  TBili  0.6  /  DBili  x   /  AST  51[H]  /  ALT  29  /  AlkPhos  311[H]  02-20    C-Reactive Protein: 209.0 mg/mL (02-15-25 @ 05:17)  C-Reactive Protein: 233.0 mg/mL (02-14-25 @ 06:50)  Ferritin: 1798 ng/mL (02-13-25 @ 18:46)                        10.4   5.43  )-----------( 45       ( 18 Feb 2025 08:42 )             29.4     02-18    138  |  112[H]  |  69[H]  ----------------------------<  223[H]  4.1   |  16[L]  |  1.44[H]    Ca    8.6      18 Feb 2025 08:42  Phos  2.0     02-18  Mg     2.2     02-18    TPro  5.1[L]  /  Alb  2.4[L]  /  TBili  0.6  /  DBili  x   /  AST  27  /  ALT  21  /  AlkPhos  131[H]  02-18    C-Reactive Protein: 209.0 mg/mL (02-15-25 @ 05:17)  C-Reactive Protein: 233.0 mg/mL (02-14-25 @ 06:50)  Ferritin: 1798 ng/mL (02-13-25 @ 18:46)                        10.1   1.30  )-----------( 37       ( 17 Feb 2025 05:59 )             28.5     02-17    140  |  110[H]  |  70[H]  ----------------------------<  227[H]  2.3[LL]   |  17[L]  |  1.73[H]    Ca    8.4[L]      17 Feb 2025 05:59  Phos  3.2     02-17  Mg     1.5     02-17    TPro  5.2[L]  /  Alb  2.7[L]  /  TBili  0.8  /  DBili  x   /  AST  15  /  ALT  19  /  AlkPhos  101  02-17    C-Reactive Protein: 209.0 mg/mL (02-15-25 @ 05:17)  C-Reactive Protein: 233.0 mg/mL (02-14-25 @ 06:50)  Ferritin: 1798 ng/mL (02-13-25 @ 18:46)                        9.2    0.71  )-----------( 26       ( 16 Feb 2025 06:12 )             26.8     02-16    136  |  108  |  47[H]  ----------------------------<  172[H]  3.4[L]   |  17[L]  |  1.56[H]    Ca    8.0[L]      16 Feb 2025 06:12  Phos  3.1     02-16  Mg     1.8     02-16    TPro  5.1[L]  /  Alb  2.9[L]  /  TBili  1.1  /  DBili  x   /  AST  5[L]  /  ALT  19  /  AlkPhos  91  02-16    C-Reactive Protein: 209.0 mg/mL (02-15-25 @ 05:17)  C-Reactive Protein: 233.0 mg/mL (02-14-25 @ 06:50)  Ferritin: 1798 ng/mL (02-13-25 @ 18:46)                        8.0    0.23  )-----------( 20       ( 14 Feb 2025 06:50 )             24.2     02-14    134[L]  |  108  |  21  ----------------------------<  141[H]  3.3[L]   |  16[L]  |  1.03    Ca    8.5      14 Feb 2025 06:50  Phos  3.1     02-14  Mg     2.6     02-14    TPro  4.5[L]  /  Alb  1.5[L]  /  TBili  0.8  /  DBili  0.4[H]  /  AST  8[L]  /  ALT  14  /  AlkPhos  104  02-14    C-Reactive Protein: 233.0 mg/mL (02-14-25 @ 06:50)  Ferritin: 1798 ng/mL (02-13-25 @ 18:46)                        8.7    0.19  )-----------( 33       ( 13 Feb 2025 06:52 )             25.7     02-13    137  |  109[H]  |  20  ----------------------------<  117[H]  2.6[LL]   |  18[L]  |  1.02    Ca    8.7      13 Feb 2025 06:52  Phos  2.3     02-13  Mg     1.6     02-13    TPro  4.4[L]  /  Alb  1.1[L]  /  TBili  0.7  /  DBili  x   /  AST  11[L]  /  ALT  16  /  AlkPhos  94  02-13     LIVER FUNCTIONS - ( 13 Feb 2025 06:52 )  Alb: 1.1 g/dL / Pro: 4.4 gm/dL / ALK PHOS: 94 U/L / ALT: 16 U/L / AST: 11 U/L / GGT: x           Culture - Blood (collected 11 Feb 2025 09:00)  Source: .Blood BLOOD  Preliminary Report (12 Feb 2025 13:01):    No growth at 24 hours    Culture - Blood (collected 11 Feb 2025 08:50)  Source: .Blood BLOOD  Preliminary Report (12 Feb 2025 13:01):    No growth at 24 hours    Urinalysis with Rflx Culture (collected 09 Feb 2025 01:25)    Culture - Urine (collected 09 Feb 2025 01:25)  Source: Clean Catch  Final Report (11 Feb 2025 08:39):    50,000 - 99,000 CFU/mL Pseudomonas aeruginosa  Organism: Pseudomonas aeruginosa (11 Feb 2025 08:39)  Organism: Pseudomonas aeruginosa (11 Feb 2025 08:39)      Method Type: REJI      -  Amikacin: S <=16      -  Aztreonam: S <=4      -  Cefepime: S <=2      -  Ceftazidime: S <=1      -  Ciprofloxacin: S <=0.25      -  Imipenem: S 2      -  Levofloxacin: S <=0.5      -  Meropenem: S <=1      -  Piperacillin/Tazobactam: S <=8    Culture - Blood (collected 08 Feb 2025 22:00)  Source: .Blood BLOOD  Gram Stain (09 Feb 2025 19:04):    Growth in aerobic bottle: Gram Negative Rods  Final Report (11 Feb 2025 07:53):    Growth in aerobic bottle: Pseudomonas aeruginosa    Direct identification is available within approximately 3-5    hours either by Blood Panel Multiplexed PCR or Direct    MALDI-TOF. Details: [email or web address]  Organism: Blood Culture PCR  Pseudomonas aeruginosa (11 Feb 2025 07:53)  Organism: Pseudomonas aeruginosa (11 Feb 2025 07:53)      Method Type: REJI      -  Aztreonam: S <=4      -  Cefepime: S <=2      -  Ceftazidime: S <=1      -  Ciprofloxacin: S <=0.25      -  Imipenem: S <=1      -  Levofloxacin: S <=0.5      -  Meropenem: S <=1      -  Piperacillin/Tazobactam: S <=8  Organism: Blood Culture PCR (11 Feb 2025 07:53)      Method Type: PCR      -  Pseudomonas aeruginosa: Detec    Culture - Blood (collected 08 Feb 2025 21:55)  Source: .Blood BLOOD  Gram Stain (09 Feb 2025 19:32):    Growth in aerobic bottle: Gram Negative Rods  Final Report (11 Feb 2025 07:54):    Growth in aerobic bottle: Pseudomonas aeruginosa    See previous culture [id number]    Radiology: all available radiological tests reviewed    < from: US Abdomen Upper Quadrant Right (02.09.25 @ 14:55) >  Distended gallbladder with layering sludge.  8 mm right renal calculus without hydronephrosis.  Hepatomegaly.  Right renal cyst.  < end of copied text >    < from: CT Abdomen and Pelvis No Cont (02.09.25 @ 03:01) >  1.   Study somewhat limited due to absence of contrast.  2.   Axial images 71-76 of series 301 and concomitant coronal images demonstrate prominent soft tissue inseparable from the body of the pancreas consistent with pancreatic neoplasm.  3.   Some distension of gallbladder could be further evaluated with right upper quadrant sonography. No definite biliary dilatation.  < end of copied text >    Advanced directives addressed: full resuscitation

## 2025-02-20 NOTE — PROGRESS NOTE ADULT - SUBJECTIVE AND OBJECTIVE BOX
Subjective:    pat on HFNC 40/55, episode of afib last night, still tachpneic, lying in bed, wbc 22.    Home Medications:  cefepime 2 g intravenous injection: 2 gram(s) intravenous every 12 hours (12 Feb 2025 12:59)  filgrastim: 480 microgram(s) subcutaneous once a day (12 Feb 2025 12:59)  Lactated Ringers Injection intravenous solution: 150 milligram(s) intravenous every 1 to 2 hours (12 Feb 2025 12:59)  loperamide 2 mg oral capsule: 1 cap(s) orally once (12 Feb 2025 12:59)  midodrine 5 mg oral tablet: 1 tab(s) orally every 8 hours (12 Feb 2025 12:59)  Multiple Vitamins with Minerals oral liquid: 1 orally once a day (12 Feb 2025 12:59)  octreotide 2500 mcg/mL subcutaneous solution: 0.04 milliliter(s) subcutaneous 3 times a day (12 Feb 2025 12:59)  sucralfate 1 g/10 mL oral suspension: 10 milliliter(s) orally 4 times a day (12 Feb 2025 12:59)  trimethobenzamide 100 mg/mL intramuscular solution: 2 milliliter(s) intramuscular every 8 hours As needed Nausea and/or Vomiting (12 Feb 2025 12:59)    MEDICATIONS  (STANDING):  acetaminophen   IVPB .. 1000 milliGRAM(s) IV Intermittent once  acetaminophen   IVPB .. 1000 milliGRAM(s) IV Intermittent once  amLODIPine   Tablet 10 milliGRAM(s) Oral daily  chlorhexidine 2% Cloths 1 Application(s) Topical <User Schedule>  cholecalciferol 2000 Unit(s) Oral at bedtime  dextrose 5%. 1000 milliLiter(s) (50 mL/Hr) IV Continuous <Continuous>  dextrose 5%. 1000 milliLiter(s) (100 mL/Hr) IV Continuous <Continuous>  dextrose 50% Injectable 25 Gram(s) IV Push once  dextrose 50% Injectable 12.5 Gram(s) IV Push once  dextrose 50% Injectable 25 Gram(s) IV Push once  famotidine Injectable 20 milliGRAM(s) IV Push daily  fluconAZOLE IVPB      fluconAZOLE IVPB 200 milliGRAM(s) IV Intermittent every 24 hours  glucagon  Injectable 1 milliGRAM(s) IntraMuscular once  heparin  Infusion.  Unit(s)/Hr (13 mL/Hr) IV Continuous <Continuous>  insulin glargine Injectable (LANTUS) 5 Unit(s) SubCutaneous at bedtime  insulin lispro (ADMELOG) corrective regimen sliding scale   SubCutaneous every 6 hours  lipid, fat emulsion (Fish Oil and Plant Based) 20% Infusion 0.681 Gm/kG/Day (20.8 mL/Hr) IV Continuous <Continuous>  meropenem Injectable 1000 milliGRAM(s) IV Push every 12 hours  methylPREDNISolone sodium succinate Injectable 40 milliGRAM(s) IV Push daily  metoprolol tartrate Injectable 2.5 milliGRAM(s) IV Push every 6 hours  pantoprazole  Injectable 40 milliGRAM(s) IV Push daily  Parenteral Nutrition - Adult 1 Each (75 mL/Hr) TPN Continuous <Continuous>  Parenteral Nutrition - Adult 1 Each (75 mL/Hr) TPN Continuous <Continuous>  sodium bicarbonate 325 milliGRAM(s) Oral every 8 hours  sucralfate suspension 1 Gram(s) Oral four times a day  vancomycin    Solution 125 milliGRAM(s) Oral every 6 hours    MEDICATIONS  (PRN):  acetaminophen     Tablet .. 650 milliGRAM(s) Oral every 6 hours PRN Temp greater or equal to 38C (100.4F), Mild Pain (1 - 3)  aluminum hydroxide/magnesium hydroxide/simethicone Suspension 30 milliLiter(s) Oral every 4 hours PRN Dyspepsia  Biotene Dry Mouth Oral Rinse 15 milliLiter(s) Swish and Spit five times a day PRN Mouth Care  dextrose Oral Gel 15 Gram(s) Oral once PRN Blood Glucose LESS THAN 70 milliGRAM(s)/deciliter  heparin   Injectable 6000 Unit(s) IV Push every 6 hours PRN For aPTT less than 40  heparin   Injectable 3000 Unit(s) IV Push every 6 hours PRN For aPTT between 40 - 57  melatonin 3 milliGRAM(s) Oral at bedtime PRN Insomnia  ondansetron Injectable 4 milliGRAM(s) IV Push every 8 hours PRN Nausea and/or Vomiting  sodium chloride 0.65% Nasal 1 Spray(s) Both Nostrils two times a day PRN Nasal Congestion      Allergies    No Known Allergies    Intolerances        Vital Signs Last 24 Hrs  T(C): 36.7 (20 Feb 2025 13:30), Max: 36.8 (19 Feb 2025 20:45)  T(F): 98.1 (20 Feb 2025 13:30), Max: 98.2 (19 Feb 2025 20:45)  HR: 104 (20 Feb 2025 17:00) (96 - 117)  BP: 129/83 (20 Feb 2025 17:00) (114/82 - 145/78)  BP(mean): 96 (20 Feb 2025 17:00) (91 - 103)  RR: 30 (20 Feb 2025 17:00) (26 - 39)  SpO2: 94% (20 Feb 2025 17:00) (91% - 99%)    Parameters below as of 20 Feb 2025 16:00  Patient On (Oxygen Delivery Method): nasal cannula w/ humidification  O2 Flow (L/min): 4        PHYSICAL EXAMINATION:    NECK:  Supple. No lymphadenopathy. Jugular venous pressure not elevated. Carotids equal.   HEART:   The cardiac impulse has a normal quality. Reg., Nl S1 and S2.  There are no murmurs, rubs or gallops noted  CHEST:  Chest is clear to auscultation. Normal respiratory effort.  ABDOMEN:  Soft and nontender.   EXTREMITIES:  There is no edema.       LABS:                        10.8   26.65 )-----------( 90       ( 20 Feb 2025 05:31 )             31.4     02-20    144  |  111[H]  |  64[H]  ----------------------------<  326[H]  3.5   |  23  |  1.40[H]    Ca    8.1[L]      20 Feb 2025 05:31  Phos  3.1     02-20  Mg     2.1     02-20    TPro  5.3[L]  /  Alb  2.0[L]  /  TBili  0.6  /  DBili  x   /  AST  51[H]  /  ALT  29  /  AlkPhos  311[H]  02-20    PTT - ( 20 Feb 2025 13:59 )  PTT:92.4 sec  Urinalysis Basic - ( 20 Feb 2025 05:31 )    Color: x / Appearance: x / SG: x / pH: x  Gluc: 326 mg/dL / Ketone: x  / Bili: x / Urobili: x   Blood: x / Protein: x / Nitrite: x   Leuk Esterase: x / RBC: x / WBC x   Sq Epi: x / Non Sq Epi: x / Bacteria: x

## 2025-02-20 NOTE — CHART NOTE - NSCHARTNOTEFT_GEN_A_CORE
Called by nursing for pts SOB again today. Currently on HFNC.  sats 100% on 50%.  remains with some tachypnea.  CT chest earlier no gross infiltrates, bilateral effusion R>L with atelectasis   CTA bilateral PEs no strain  on IV heparin  stat duoneb  Offered NIPPV to pt for WOB, she is currently refusing to go on NIPPV. Pt AO x4. Called by nursing for pts SOB again today. Currently on HFNC.  sats 100% on 50%  remains with some tachypnea.  CT chest earlier no gross infiltrates, bilateral effusion R>L with atelectasis   CTA bilateral PEs no strain  on IV heparin  stat duoneb  Offered NIPPV to pt for WOB, she is currently refusing to go on NIPPV. Pt AO x4.  increased flow rate on HFNC to 45L for PEEP effect to enhance alveolar recruitment.  Reassess following therapy/current changes.   Dr Pleitez requested consultation/call to Dr Ivy. Called placed to service, message left for call back. Called by nursing for pts SOB again today. Currently on HFNC.  sats 100% on 50%  remains with some tachypnea.  CT chest earlier no gross infiltrates, bilateral effusion R>L with atelectasis   CTA bilateral PEs no strain  on IV heparin  stat duoneb  Offered NIPPV to pt for WOB, she is currently refusing to go on NIPPV. Pt AO x4.  increased flow rate on HFNC to 45L for PEEP effect to enhance alveolar recruitment.  Reassess following therapy/current changes.  as per nursing  Dr Pleitez requested consultation/call to Dr Ivy. Called placed to service by nursing, message left for call back. Called by nursing for pts SOB again today. Currently on HFNC.  sats 100% on 50%  remains with some tachypnea.  CT chest earlier no gross infiltrates, bilateral effusion R>L with atelectasis   CTA small bilateral PEs no strain  on IV heparin  stat duoneb  Offered NIPPV to pt for WOB, she is currently refusing to go on NIPPV. Pt AO x4.  increased flow rate on HFNC to 45L for PEEP effect to enhance alveolar recruitment.  Reassess following therapy/current changes.  as per nursing  Dr Pleitez requested consultation/call to Dr Ivy. Called placed to service by nursing, message left for call back.

## 2025-02-20 NOTE — PROGRESS NOTE ADULT - ASSESSMENT
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    Then transferred to  as per request of .  Was getting platelet transfusion and developed tachypnea hypoxia, rigors  Was diuresed and became hypotensive  Upgraded to SICU     Here with   1. Sepsis (POA)  2. Pseudomonal bacteremia suspect from UTI   3. Mucositis suspected esophageal candidiasis? r/o CMV  4. Pancytopenia  5. Acute hypoxic respiratory failure 2/2 pulm edema   6. MALLORY     CTA chest abd pelvis 2/19 revealing multiple small PE b/l and LBO?    PLAN    Neuro: AAOx 3. pain control prn. IV tylenol .avoid nsaids  CV: BP stable. went into Afib with RVR overnight, s/p lopressor now back in sinus rhythm. cont IV lopressor 2.5mg q6hr  Pulm: on HFNC overnight now on 4L nc. CTA chest + b/l small PEs. on hep gtt  GI: PO diet as tolerated. on TPN. IV PPI.   Nephro: MALLORY improving slightly improved Cr 1.42.. monitor I & Os. Trend renal fxn. cont LR@ 50cc/hr, suspect 3rd spacing. receiving TPN @ 75/hr. hypophosphatemia - replete Phos again  Endo: BGMs q6hr.  ISS. while on PN  ID: cont IV meropenem 1gm q8hr #5 cont IV diflucan 200mg qd #3. s/p IV cefepime.  on PO vanco qhr for Cdiff ppx as per ID. trend WBC. monitor temps. blood cultures here neg to date. CMV neg. HSV pcr neg. check lactate 2.6 - suspect due to work of breathing. unable to give further  IV fluids due to pulmonary edema.  Heme: hgb stable. Leukopenia and thrombocytopenia improving. held DVT ppx due to thrombocytopenia. pt was on filgastrim, now dcd on 2/18 as per hemeonc.   PT eval if tolerated.     Dispo: SICU. gentle IV fluids. TPN. IV abx. Trend Cr. CTA chest abd pelvis    Will discuss with Dr. Tong, d/w nephrology, d/w pulm, d/w Dr. Hannah Morrison (outpatient [facility] onc), d/w  Dr. Pleitez    ADDENDUM  CTA chest + small PEs in RLL and LLL  Will start IV heparin, no bolus and IV solumedrol 60mg q12hr for suspected TRALI from plt transfusion    d/w with Dr. Tong, Dr. Cohen and Dr. 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    Then transferred to  as per request of .  Was getting platelet transfusion and developed tachypnea hypoxia, rigors  Was diuresed and became hypotensive  Upgraded to SICU     Here with   1. Sepsis (POA)  2. Pseudomonal bacteremia suspect from UTI   3. Mucositis suspected esophageal candidiasis? r/o CMV  4. Pancytopenia  5. Acute hypoxic respiratory failure 2/2 pulm edema   6. MALLORY     CTA chest abd pelvis 2/19 revealing multiple small PE b/l and LBO?    PLAN    Neuro: AAOx 3. pain control prn. IV tylenol .avoid nsaids  CV: BP stable. went into Afib with RVR overnight, s/p lopressor now back in sinus rhythm. cont IV lopressor 2.5mg q6hr  Pulm: on HFNC overnight now on 4L nc. CTA chest + b/l small PEs. on hep gtt. TTE no RV strain. taper IV solumedrol [id number] -> 40mg qd for suspected TRALI? pulm consult  GI: PO diet as tolerated. on TPN. IV PPI. LBO on CTAP? scans reviewed with GI and Dr. Ziegler, low suspicion for LBO, abd soft on exam. will trial reglan. pt  wants to defer NGT.  Nephro: MALLORY improving slightly improved Cr 1.42 -> 1.40. monitor I & Os. Trend renal fxn.    Endo: Hyperglyemic suspect due to TPN and steroids. stat lantus 8units this AM, and lantus 5units qHS. BGMs q6hr. ISS. while on PN  ID: cont IV meropenem 1gm q8hr #6 (started 2/15) cont IV diflucan 200mg qd #4 (started 2/17). s/p IV cefepime.  blood cultures here neg to date. CMV neg. HSV pcr neg. elevated lactate suspect due to work of breathing. unable to give further  IV fluids due to pulmonary edema.  Heme: hgb stable. leukocytosis suspect due to steroids. thrombocytopenia improving.  pt was on filgastrim, now dcd on 2/18 as per hemeonc.   PT eval if tolerated.     Dispo: SICU. TPN. IV abx. Trend Cr. Taper IV steroids.     Will discuss with Dr. Tong,, d/w pulm, d/w  Dr. Pleitez

## 2025-02-20 NOTE — CONSULT NOTE ADULT - ASSESSMENT
Assessment/Plan    - Continue IV steroids for possible acute lung injury/TRALI, with taper as tolerated  - Elevate HOB to decrease compressive atelectasis from pleural effusions  - Aspiration precautions  - Incentive spirometry  - Follow "new" lung nodules with serial CT scans-?Infectious vs inflammatory vs Tumor emboli. No radiographic findings that would be c/w lymphangitic spread of tumor  - Broad spectrum Abx as per ID  - Consider diagnostic/therapeutic thorocentesis, if respiratory sx's (tachypnea) persist.   - Nutrition with TPN to improve intravascular oncotic pressure and hopefully minimize third spacing of fluids  - Monitor renal Fxn, and O2 Sats

## 2025-02-20 NOTE — PROGRESS NOTE ADULT - SUBJECTIVE AND OBJECTIVE BOX
INTERVAL HPI/OVERNIGHT EVENTS:  Patient S&E at bedside. No o/n events, patient resting comfortably.  patient resting in bed , seen with spouse at bedside     VITAL SIGNS:  T(F): 97.9 (02-20-25 @ 20:09)  HR: 100 (02-20-25 @ 20:00)  BP: 138/86 (02-20-25 @ 20:00)  RR: 26 (02-20-25 @ 20:00)  SpO2: 94% (02-20-25 @ 20:00)  Wt(kg): --    PHYSICAL EXAM:    Constitutional: NAD  Eyes: EOMI, sclera non-icteric, on high flow   Neck: supple, no masses, no JVD  Respiratory: CTA b/l, good air entry b/l  Cardiovascular: RRR, no M/R/G  Gastrointestinal: soft, NTND, no masses palpable, + BS, no hepatosplenomegaly  Extremities: no c/c/e  Neurological: AAOx3      MEDICATIONS  (STANDING):  acetaminophen   IVPB .. 1000 milliGRAM(s) IV Intermittent once  acetaminophen   IVPB .. 1000 milliGRAM(s) IV Intermittent once  amLODIPine   Tablet 10 milliGRAM(s) Oral daily  chlorhexidine 2% Cloths 1 Application(s) Topical <User Schedule>  cholecalciferol 2000 Unit(s) Oral at bedtime  dextrose 5%. 1000 milliLiter(s) (50 mL/Hr) IV Continuous <Continuous>  dextrose 5%. 1000 milliLiter(s) (100 mL/Hr) IV Continuous <Continuous>  dextrose 50% Injectable 25 Gram(s) IV Push once  dextrose 50% Injectable 12.5 Gram(s) IV Push once  dextrose 50% Injectable 25 Gram(s) IV Push once  famotidine Injectable 20 milliGRAM(s) IV Push daily  fluconAZOLE IVPB      fluconAZOLE IVPB 200 milliGRAM(s) IV Intermittent every 24 hours  glucagon  Injectable 1 milliGRAM(s) IntraMuscular once  heparin  Infusion.  Unit(s)/Hr (13 mL/Hr) IV Continuous <Continuous>  insulin glargine Injectable (LANTUS) 5 Unit(s) SubCutaneous at bedtime  insulin lispro (ADMELOG) corrective regimen sliding scale   SubCutaneous every 6 hours  lipid, fat emulsion (Fish Oil and Plant Based) 20% Infusion 0.681 Gm/kG/Day (20.8 mL/Hr) IV Continuous <Continuous>  meropenem Injectable 1000 milliGRAM(s) IV Push every 12 hours  methylPREDNISolone sodium succinate Injectable 40 milliGRAM(s) IV Push daily  metoprolol tartrate Injectable 2.5 milliGRAM(s) IV Push every 6 hours  pantoprazole  Injectable 40 milliGRAM(s) IV Push daily  Parenteral Nutrition - Adult 1 Each (75 mL/Hr) TPN Continuous <Continuous>  Parenteral Nutrition - Adult 1 Each (75 mL/Hr) TPN Continuous <Continuous>  sucralfate suspension 1 Gram(s) Oral four times a day  vancomycin    Solution 125 milliGRAM(s) Oral every 6 hours    MEDICATIONS  (PRN):  acetaminophen     Tablet .. 650 milliGRAM(s) Oral every 6 hours PRN Temp greater or equal to 38C (100.4F), Mild Pain (1 - 3)  aluminum hydroxide/magnesium hydroxide/simethicone Suspension 30 milliLiter(s) Oral every 4 hours PRN Dyspepsia  Biotene Dry Mouth Oral Rinse 15 milliLiter(s) Swish and Spit five times a day PRN Mouth Care  dextrose Oral Gel 15 Gram(s) Oral once PRN Blood Glucose LESS THAN 70 milliGRAM(s)/deciliter  heparin   Injectable 6000 Unit(s) IV Push every 6 hours PRN For aPTT less than 40  heparin   Injectable 3000 Unit(s) IV Push every 6 hours PRN For aPTT between 40 - 57  melatonin 3 milliGRAM(s) Oral at bedtime PRN Insomnia  ondansetron Injectable 4 milliGRAM(s) IV Push every 8 hours PRN Nausea and/or Vomiting  sodium chloride 0.65% Nasal 1 Spray(s) Both Nostrils two times a day PRN Nasal Congestion      Allergies    No Known Allergies    Intolerances        LABS:                        10.8   26.65 )-----------( 90       ( 20 Feb 2025 05:31 )             31.4     02-20    144  |  111[H]  |  64[H]  ----------------------------<  326[H]  3.5   |  23  |  1.40[H]    Ca    8.1[L]      20 Feb 2025 05:31  Phos  3.1     02-20  Mg     2.1     02-20    TPro  5.3[L]  /  Alb  2.0[L]  /  TBili  0.6  /  DBili  x   /  AST  51[H]  /  ALT  29  /  AlkPhos  311[H]  02-20    PTT - ( 20 Feb 2025 20:41 )  PTT:91.8 sec  Urinalysis Basic - ( 20 Feb 2025 05:31 )    Color: x / Appearance: x / SG: x / pH: x  Gluc: 326 mg/dL / Ketone: x  / Bili: x / Urobili: x   Blood: x / Protein: x / Nitrite: x   Leuk Esterase: x / RBC: x / WBC x   Sq Epi: x / Non Sq Epi: x / Bacteria: x        RADIOLOGY & ADDITIONAL TESTS:  Studies reviewed.    ASSESSMENT & PLAN:

## 2025-02-21 LAB
ALBUMIN SERPL ELPH-MCNC: 2.1 G/DL — LOW (ref 3.3–5)
ALP SERPL-CCNC: 329 U/L — HIGH (ref 40–120)
ALT FLD-CCNC: 33 U/L — SIGNIFICANT CHANGE UP (ref 12–78)
ANION GAP SERPL CALC-SCNC: 10 MMOL/L — SIGNIFICANT CHANGE UP (ref 5–17)
APTT BLD: 96.6 SEC — HIGH (ref 24.5–35.6)
AST SERPL-CCNC: 56 U/L — HIGH (ref 15–37)
BILIRUB SERPL-MCNC: 0.6 MG/DL — SIGNIFICANT CHANGE UP (ref 0.2–1.2)
CALCIUM SERPL-MCNC: 8.2 MG/DL — LOW (ref 8.5–10.1)
CHLORIDE SERPL-SCNC: 105 MMOL/L — SIGNIFICANT CHANGE UP (ref 96–108)
CO2 SERPL-SCNC: 26 MMOL/L — SIGNIFICANT CHANGE UP (ref 22–31)
CREAT SERPL-MCNC: 1.33 MG/DL — HIGH (ref 0.5–1.3)
GLUCOSE BLDC GLUCOMTR-MCNC: 288 MG/DL — HIGH (ref 70–99)
GLUCOSE BLDC GLUCOMTR-MCNC: 291 MG/DL — HIGH (ref 70–99)
GLUCOSE BLDC GLUCOMTR-MCNC: 345 MG/DL — HIGH (ref 70–99)
GLUCOSE BLDC GLUCOMTR-MCNC: 390 MG/DL — HIGH (ref 70–99)
GLUCOSE SERPL-MCNC: 358 MG/DL — HIGH (ref 70–99)
HCT VFR BLD CALC: 31 % — LOW (ref 34.5–45)
MAGNESIUM SERPL-MCNC: 2 MG/DL — SIGNIFICANT CHANGE UP (ref 1.6–2.6)
MCHC RBC-ENTMCNC: 29 PG — SIGNIFICANT CHANGE UP (ref 27–34)
MCHC RBC-ENTMCNC: 33.9 G/DL — SIGNIFICANT CHANGE UP (ref 32–36)
MCV RBC AUTO: 85.6 FL — SIGNIFICANT CHANGE UP (ref 80–100)
NRBC # BLD AUTO: 0.12 K/UL — HIGH (ref 0–0)
NRBC # FLD: 0.12 K/UL — HIGH (ref 0–0)
NRBC BLD AUTO-RTO: 0 /100 WBCS — SIGNIFICANT CHANGE UP (ref 0–0)
PLATELET # BLD AUTO: 132 K/UL — LOW (ref 150–400)
PMV BLD: SIGNIFICANT CHANGE UP (ref 7–13)
POTASSIUM SERPL-MCNC: 3.2 MMOL/L — LOW (ref 3.5–5.3)
POTASSIUM SERPL-SCNC: 3.2 MMOL/L — LOW (ref 3.5–5.3)
PROT SERPL-MCNC: 5.3 GM/DL — LOW (ref 6–8.3)
RBC # BLD: 3.62 M/UL — LOW (ref 3.8–5.2)
RBC # FLD: 15.6 % — HIGH (ref 10.3–14.5)
SODIUM SERPL-SCNC: 141 MMOL/L — SIGNIFICANT CHANGE UP (ref 135–145)
WBC # BLD: 35.65 K/UL — HIGH (ref 3.8–10.5)
WBC # FLD AUTO: 35.65 K/UL — HIGH (ref 3.8–10.5)

## 2025-02-21 PROCEDURE — 74018 RADEX ABDOMEN 1 VIEW: CPT | Mod: 26

## 2025-02-21 PROCEDURE — 99233 SBSQ HOSP IP/OBS HIGH 50: CPT

## 2025-02-21 PROCEDURE — 70450 CT HEAD/BRAIN W/O DYE: CPT | Mod: 26

## 2025-02-21 PROCEDURE — 99232 SBSQ HOSP IP/OBS MODERATE 35: CPT

## 2025-02-21 RX ORDER — SODIUM/POT/MAG/CALC/CHLOR/ACET 35-20-5MEQ
1 VIAL (ML) INTRAVENOUS
Refills: 0 | Status: DISCONTINUED | OUTPATIENT
Start: 2025-02-21 | End: 2025-02-21

## 2025-02-21 RX ORDER — INSULIN GLARGINE-YFGN 100 [IU]/ML
10 INJECTION, SOLUTION SUBCUTANEOUS AT BEDTIME
Refills: 0 | Status: DISCONTINUED | OUTPATIENT
Start: 2025-02-21 | End: 2025-02-22

## 2025-02-21 RX ORDER — I.V. FAT EMULSION 20 G/100ML
0.68 EMULSION INTRAVENOUS
Qty: 50 | Refills: 0 | Status: DISCONTINUED | OUTPATIENT
Start: 2025-02-21 | End: 2025-02-21

## 2025-02-21 RX ORDER — INSULIN GLARGINE-YFGN 100 [IU]/ML
12 INJECTION, SOLUTION SUBCUTANEOUS ONCE
Refills: 0 | Status: COMPLETED | OUTPATIENT
Start: 2025-02-21 | End: 2025-02-21

## 2025-02-21 RX ADMIN — INSULIN GLARGINE-YFGN 12 UNIT(S): 100 INJECTION, SOLUTION SUBCUTANEOUS at 09:44

## 2025-02-21 RX ADMIN — METHYLPREDNISOLONE ACETATE 40 MILLIGRAM(S): 80 INJECTION, SUSPENSION INTRA-ARTICULAR; INTRALESIONAL; INTRAMUSCULAR; SOFT TISSUE at 09:43

## 2025-02-21 RX ADMIN — Medication 2000 UNIT(S): at 23:01

## 2025-02-21 RX ADMIN — Medication 1 GRAM(S): at 18:39

## 2025-02-21 RX ADMIN — Medication 125 MILLIGRAM(S): at 23:01

## 2025-02-21 RX ADMIN — Medication 1 GRAM(S): at 05:20

## 2025-02-21 RX ADMIN — Medication 100 MILLIEQUIVALENT(S): at 15:56

## 2025-02-21 RX ADMIN — Medication 40 MILLIGRAM(S): at 09:42

## 2025-02-21 RX ADMIN — Medication 125 MILLIGRAM(S): at 18:39

## 2025-02-21 RX ADMIN — Medication 100 MILLIEQUIVALENT(S): at 18:11

## 2025-02-21 RX ADMIN — MEROPENEM 1000 MILLIGRAM(S): 1 INJECTION INTRAVENOUS at 23:00

## 2025-02-21 RX ADMIN — Medication 20 MILLIGRAM(S): at 23:01

## 2025-02-21 RX ADMIN — HEPARIN SODIUM 900 UNIT(S)/HR: 1000 INJECTION INTRAVENOUS; SUBCUTANEOUS at 07:32

## 2025-02-21 RX ADMIN — METOPROLOL SUCCINATE 2.5 MILLIGRAM(S): 50 TABLET, EXTENDED RELEASE ORAL at 18:39

## 2025-02-21 RX ADMIN — Medication 1 EACH: at 23:19

## 2025-02-21 RX ADMIN — I.V. FAT EMULSION 20.8 GM/KG/DAY: 20 EMULSION INTRAVENOUS at 23:21

## 2025-02-21 RX ADMIN — Medication 100 MILLIEQUIVALENT(S): at 10:26

## 2025-02-21 RX ADMIN — Medication 100 MILLIGRAM(S): at 12:14

## 2025-02-21 RX ADMIN — AMLODIPINE BESYLATE 10 MILLIGRAM(S): 10 TABLET ORAL at 09:44

## 2025-02-21 RX ADMIN — INSULIN LISPRO 8: 100 INJECTION, SOLUTION INTRAVENOUS; SUBCUTANEOUS at 05:41

## 2025-02-21 RX ADMIN — Medication 1 GRAM(S): at 23:01

## 2025-02-21 RX ADMIN — Medication 1 GRAM(S): at 12:55

## 2025-02-21 RX ADMIN — HEPARIN SODIUM 900 UNIT(S)/HR: 1000 INJECTION INTRAVENOUS; SUBCUTANEOUS at 18:10

## 2025-02-21 RX ADMIN — Medication 100 MILLIEQUIVALENT(S): at 11:23

## 2025-02-21 RX ADMIN — HEPARIN SODIUM 900 UNIT(S)/HR: 1000 INJECTION INTRAVENOUS; SUBCUTANEOUS at 20:20

## 2025-02-21 RX ADMIN — INSULIN GLARGINE-YFGN 10 UNIT(S): 100 INJECTION, SOLUTION SUBCUTANEOUS at 23:06

## 2025-02-21 RX ADMIN — INSULIN LISPRO 6: 100 INJECTION, SOLUTION INTRAVENOUS; SUBCUTANEOUS at 23:07

## 2025-02-21 RX ADMIN — Medication 100 MILLIEQUIVALENT(S): at 09:44

## 2025-02-21 RX ADMIN — METOPROLOL SUCCINATE 2.5 MILLIGRAM(S): 50 TABLET, EXTENDED RELEASE ORAL at 23:01

## 2025-02-21 RX ADMIN — INSULIN LISPRO 6: 100 INJECTION, SOLUTION INTRAVENOUS; SUBCUTANEOUS at 18:39

## 2025-02-21 RX ADMIN — METOPROLOL SUCCINATE 2.5 MILLIGRAM(S): 50 TABLET, EXTENDED RELEASE ORAL at 05:20

## 2025-02-21 RX ADMIN — METOPROLOL SUCCINATE 2.5 MILLIGRAM(S): 50 TABLET, EXTENDED RELEASE ORAL at 12:55

## 2025-02-21 RX ADMIN — Medication 100 MILLIEQUIVALENT(S): at 17:17

## 2025-02-21 RX ADMIN — MEROPENEM 1000 MILLIGRAM(S): 1 INJECTION INTRAVENOUS at 09:43

## 2025-02-21 RX ADMIN — INSULIN LISPRO 10: 100 INJECTION, SOLUTION INTRAVENOUS; SUBCUTANEOUS at 12:55

## 2025-02-21 RX ADMIN — Medication 125 MILLIGRAM(S): at 12:55

## 2025-02-21 RX ADMIN — Medication 125 MILLIGRAM(S): at 05:20

## 2025-02-21 NOTE — PROGRESS NOTE ADULT - SUBJECTIVE AND OBJECTIVE BOX
69-year-old female recent diagnosis of primary pancreatic adenocarcinoma , pancytopenia and significant PMH of SCC lip, cyclical vomiting syndrome, HTN, HPL, Gout, GERD, CVA w renal evaluation of MALLORY and hypokalemia. Per documents was recently at  [facility] with septic shock on 02/09/2025, nausea, vomiting and diarrhea requiring Levophed drip. Per documents, patient  transferred to  for continued care. Patient with + diuresis w lasix 80 IV, increased UOP > 4L.   Treated w IV and po k repletion.     2/18  Case d/e Dr Pleitez  Pt returned from VQ scan r/o PE, results pending  Pt is tachypneic RR 30 BPM and etiologies are investigated  NO specific complaints  Labs reviewed  Chart reviewed  Recent reemergence of diarrhea/ metabolic acidosis  now on parenteral nutrition and LR  D/W Ethel Jurado    2/19  Evaluated at bedside , d/w Dr tong and Dr Pleitez  Pt still tachypneic and tachycardic    2/20  Case d/w Dr Maik Tong and Ethel Jurado  pt overall feels better  more engaging    2/21  Pt in good spirits  Cont to be tachycardic  rate ~ 110  Tachypneic rate ~ 30    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 1000 milliGRAM(s) IV Intermittent once  acetaminophen   IVPB .. 1000 milliGRAM(s) IV Intermittent once  amLODIPine   Tablet 10 milliGRAM(s) Oral daily  chlorhexidine 2% Cloths 1 Application(s) Topical <User Schedule>  cholecalciferol 2000 Unit(s) Oral at bedtime  dextrose 5%. 1000 milliLiter(s) (50 mL/Hr) IV Continuous <Continuous>  dextrose 5%. 1000 milliLiter(s) (100 mL/Hr) IV Continuous <Continuous>  dextrose 50% Injectable 25 Gram(s) IV Push once  dextrose 50% Injectable 12.5 Gram(s) IV Push once  dextrose 50% Injectable 25 Gram(s) IV Push once  famotidine Injectable 20 milliGRAM(s) IV Push daily  fluconAZOLE IVPB      fluconAZOLE IVPB 200 milliGRAM(s) IV Intermittent every 24 hours  glucagon  Injectable 1 milliGRAM(s) IntraMuscular once  heparin  Infusion.  Unit(s)/Hr (13 mL/Hr) IV Continuous <Continuous>  insulin glargine Injectable (LANTUS) 10 Unit(s) SubCutaneous at bedtime  insulin lispro (ADMELOG) corrective regimen sliding scale   SubCutaneous every 6 hours  lipid, fat emulsion (Fish Oil and Plant Based) 20% Infusion 0.681 Gm/kG/Day (20.8 mL/Hr) IV Continuous <Continuous>  lipid, fat emulsion (Fish Oil and Plant Based) 20% Infusion 0.6812 Gm/kG/Day (20.8 mL/Hr) IV Continuous <Continuous>  meropenem Injectable 1000 milliGRAM(s) IV Push every 12 hours  methylPREDNISolone sodium succinate Injectable 40 milliGRAM(s) IV Push daily  metoprolol tartrate Injectable 2.5 milliGRAM(s) IV Push every 6 hours  pantoprazole  Injectable 40 milliGRAM(s) IV Push daily  Parenteral Nutrition - Adult 1 Each (75 mL/Hr) TPN Continuous <Continuous>  Parenteral Nutrition - Adult 1 Each (75 mL/Hr) TPN Continuous <Continuous>  sucralfate suspension 1 Gram(s) Oral four times a day  vancomycin    Solution 125 milliGRAM(s) Oral every 6 hours    MEDICATIONS  (PRN):  acetaminophen     Tablet .. 650 milliGRAM(s) Oral every 6 hours PRN Temp greater or equal to 38C (100.4F), Mild Pain (1 - 3)  aluminum hydroxide/magnesium hydroxide/simethicone Suspension 30 milliLiter(s) Oral every 4 hours PRN Dyspepsia  Biotene Dry Mouth Oral Rinse 15 milliLiter(s) Swish and Spit five times a day PRN Mouth Care  dextrose Oral Gel 15 Gram(s) Oral once PRN Blood Glucose LESS THAN 70 milliGRAM(s)/deciliter  heparin   Injectable 6000 Unit(s) IV Push every 6 hours PRN For aPTT less than 40  heparin   Injectable 3000 Unit(s) IV Push every 6 hours PRN For aPTT between 40 - 57  melatonin 3 milliGRAM(s) Oral at bedtime PRN Insomnia  ondansetron Injectable 4 milliGRAM(s) IV Push every 8 hours PRN Nausea and/or Vomiting  sodium chloride 0.65% Nasal 1 Spray(s) Both Nostrils two times a day PRN Nasal Congestion        Allergies    No Known Allergies    Intolerances      I&O's Detail    20 Feb 2025 07:01  -  21 Feb 2025 07:00  --------------------------------------------------------  IN:    Fat Emulsion (Fish Oil &amp; Plant Based) 20% Infusion: 320 mL    Fat Emulsion (Fish Oil &amp; Plant Based) 20% Infusion: 208 mL    Heparin Infusion: 206 mL    IV PiggyBack: 400 mL    TPN (Total Parenteral Nutrition): 1677 mL  Total IN: 2811 mL    OUT:    Indwelling Catheter - Urethral (mL): 2925 mL  Total OUT: 2925 mL    Total NET: -114 mL    Vital Signs Last 24 Hrs  T(C): 36.7 (21 Feb 2025 10:31), Max: 37.1 (21 Feb 2025 06:00)  T(F): 98.1 (21 Feb 2025 10:31), Max: 98.8 (21 Feb 2025 06:00)  HR: 101 (21 Feb 2025 11:28) (98 - 110)  BP: 134/87 (21 Feb 2025 11:28) (129/83 - 140/86)  BP(mean): 102 (21 Feb 2025 11:28) (93 - 104)  RR: 24 (21 Feb 2025 11:28) (24 - 36)  SpO2: 92% (21 Feb 2025 11:28) (88% - 95%)    Parameters below as of 21 Feb 2025 08:00  Patient On (Oxygen Delivery Method): room air      PHYSICAL EXAM:    Constitutional: NAD,   HEENT: PERRLA, EOMI,  MMM  Neck: No LAD, No JVD  Respiratory: CTAB  Cardiovascular: S1 and S2, RRR  Gastrointestinal: distant bowel sounds, non tender  Extremities: + peripheral edema  Neurological: A/O x 3, no focal deficits  Psychiatric: Normal mood, normal affect  Access: Not applicable        LABS:                                 10.5   35.65 )-----------( 132      ( 21 Feb 2025 05:18 )             31.0                         10.8   26.65 )-----------( 90       ( 20 Feb 2025 05:31 )             31.4                         10.6   22.26 )-----------( 79       ( 19 Feb 2025 22:15 )             30.8       141    |  105    |  66     ----------------------------<  358       21 Feb 2025 05:18  3.2     |  26     |  1.33     144    |  111    |  64     ----------------------------<  326       20 Feb 2025 05:31  3.5     |  23     |  1.40     140    |  109    |  64     ----------------------------<  267       20 Feb 2025 02:16  3.6     |  23     |  1.43     Ca    8.2        21 Feb 2025 05:18  Ca    8.1        20 Feb 2025 05:31  Ca    7.9        20 Feb 2025 02:16    Phos  3.6       21 Feb 2025 05:18  Phos  3.1       20 Feb 2025 05:31  Phos  2.8       20 Feb 2025 02:16    Mg     2.0       21 Feb 2025 05:18  Mg     2.1       20 Feb 2025 05:31  Mg     1.6       20 Feb 2025 02:16               Urine Studies:  Urinalysis Basic - ( 17 Feb 2025 14:46 )    Color: x / Appearance: x / SG: x / pH: x  Gluc: 234 mg/dL / Ketone: x  / Bili: x / Urobili: x   Blood: x / Protein: x / Nitrite: x   Leuk Esterase: x / RBC: x / WBC x   Sq Epi: x / Non Sq Epi: x / Bacteria: x            RADIOLOGY & ADDITIONAL STUDIES:

## 2025-02-21 NOTE — PROGRESS NOTE ADULT - SUBJECTIVE AND OBJECTIVE BOX
Subjective:    pat better, RA 92%, RR 25-30, lying in bed more comfortably.    Home Medications:  cefepime 2 g intravenous injection: 2 gram(s) intravenous every 12 hours (12 Feb 2025 12:59)  filgrastim: 480 microgram(s) subcutaneous once a day (12 Feb 2025 12:59)  Lactated Ringers Injection intravenous solution: 150 milligram(s) intravenous every 1 to 2 hours (12 Feb 2025 12:59)  loperamide 2 mg oral capsule: 1 cap(s) orally once (12 Feb 2025 12:59)  midodrine 5 mg oral tablet: 1 tab(s) orally every 8 hours (12 Feb 2025 12:59)  Multiple Vitamins with Minerals oral liquid: 1 orally once a day (12 Feb 2025 12:59)  octreotide 2500 mcg/mL subcutaneous solution: 0.04 milliliter(s) subcutaneous 3 times a day (12 Feb 2025 12:59)  sucralfate 1 g/10 mL oral suspension: 10 milliliter(s) orally 4 times a day (12 Feb 2025 12:59)  trimethobenzamide 100 mg/mL intramuscular solution: 2 milliliter(s) intramuscular every 8 hours As needed Nausea and/or Vomiting (12 Feb 2025 12:59)    MEDICATIONS  (STANDING):  acetaminophen   IVPB .. 1000 milliGRAM(s) IV Intermittent once  acetaminophen   IVPB .. 1000 milliGRAM(s) IV Intermittent once  amLODIPine   Tablet 10 milliGRAM(s) Oral daily  chlorhexidine 2% Cloths 1 Application(s) Topical <User Schedule>  cholecalciferol 2000 Unit(s) Oral at bedtime  dextrose 5%. 1000 milliLiter(s) (50 mL/Hr) IV Continuous <Continuous>  dextrose 5%. 1000 milliLiter(s) (100 mL/Hr) IV Continuous <Continuous>  dextrose 50% Injectable 25 Gram(s) IV Push once  dextrose 50% Injectable 12.5 Gram(s) IV Push once  dextrose 50% Injectable 25 Gram(s) IV Push once  famotidine Injectable 20 milliGRAM(s) IV Push daily  fluconAZOLE IVPB      fluconAZOLE IVPB 200 milliGRAM(s) IV Intermittent every 24 hours  glucagon  Injectable 1 milliGRAM(s) IntraMuscular once  heparin  Infusion.  Unit(s)/Hr (13 mL/Hr) IV Continuous <Continuous>  insulin glargine Injectable (LANTUS) 10 Unit(s) SubCutaneous at bedtime  insulin lispro (ADMELOG) corrective regimen sliding scale   SubCutaneous every 6 hours  lipid, fat emulsion (Fish Oil and Plant Based) 20% Infusion 0.6812 Gm/kG/Day (20.8 mL/Hr) IV Continuous <Continuous>  meropenem Injectable 1000 milliGRAM(s) IV Push every 12 hours  methylPREDNISolone sodium succinate Injectable 40 milliGRAM(s) IV Push daily  metoprolol tartrate Injectable 2.5 milliGRAM(s) IV Push every 6 hours  pantoprazole  Injectable 40 milliGRAM(s) IV Push daily  Parenteral Nutrition - Adult 1 Each (75 mL/Hr) TPN Continuous <Continuous>  Parenteral Nutrition - Adult 1 Each (75 mL/Hr) TPN Continuous <Continuous>  sucralfate suspension 1 Gram(s) Oral four times a day  vancomycin    Solution 125 milliGRAM(s) Oral every 6 hours    MEDICATIONS  (PRN):  acetaminophen     Tablet .. 650 milliGRAM(s) Oral every 6 hours PRN Temp greater or equal to 38C (100.4F), Mild Pain (1 - 3)  aluminum hydroxide/magnesium hydroxide/simethicone Suspension 30 milliLiter(s) Oral every 4 hours PRN Dyspepsia  Biotene Dry Mouth Oral Rinse 15 milliLiter(s) Swish and Spit five times a day PRN Mouth Care  dextrose Oral Gel 15 Gram(s) Oral once PRN Blood Glucose LESS THAN 70 milliGRAM(s)/deciliter  heparin   Injectable 6000 Unit(s) IV Push every 6 hours PRN For aPTT less than 40  heparin   Injectable 3000 Unit(s) IV Push every 6 hours PRN For aPTT between 40 - 57  melatonin 3 milliGRAM(s) Oral at bedtime PRN Insomnia  ondansetron Injectable 4 milliGRAM(s) IV Push every 8 hours PRN Nausea and/or Vomiting  sodium chloride 0.65% Nasal 1 Spray(s) Both Nostrils two times a day PRN Nasal Congestion      Allergies    No Known Allergies    Intolerances        Vital Signs Last 24 Hrs  T(C): 36.3 (21 Feb 2025 17:03), Max: 37.1 (21 Feb 2025 06:00)  T(F): 97.4 (21 Feb 2025 17:03), Max: 98.8 (21 Feb 2025 06:00)  HR: 108 (21 Feb 2025 18:00) (98 - 111)  BP: 130/85 (21 Feb 2025 18:00) (129/87 - 148/94)  BP(mean): 100 (21 Feb 2025 18:00) (93 - 110)  RR: 27 (21 Feb 2025 18:00) (19 - 36)  SpO2: 90% (21 Feb 2025 18:00) (88% - 96%)    Parameters below as of 21 Feb 2025 18:00  Patient On (Oxygen Delivery Method): room air          PHYSICAL EXAMINATION:    NECK:  Supple. No lymphadenopathy. Jugular venous pressure not elevated. Carotids equal.   HEART:   The cardiac impulse has a normal quality. Reg., Nl S1 and S2.  There are no murmurs, rubs or gallops noted  CHEST:  Chest crackles to auscultation. Normal respiratory effort.  ABDOMEN:  Soft and nontender.   EXTREMITIES:  There is no edema.       LABS:                        10.5   35.65 )-----------( 132      ( 21 Feb 2025 05:18 )             31.0     02-21    141  |  105  |  66[H]  ----------------------------<  358[H]  3.2[L]   |  26  |  1.33[H]    Ca    8.2[L]      21 Feb 2025 05:18  Phos  3.6     02-21  Mg     2.0     02-21    TPro  5.3[L]  /  Alb  2.1[L]  /  TBili  0.6  /  DBili  x   /  AST  56[H]  /  ALT  33  /  AlkPhos  329[H]  02-21    PTT - ( 21 Feb 2025 05:18 )  PTT:96.6 sec  Urinalysis Basic - ( 21 Feb 2025 05:18 )    Color: x / Appearance: x / SG: x / pH: x  Gluc: 358 mg/dL / Ketone: x  / Bili: x / Urobili: x   Blood: x / Protein: x / Nitrite: x   Leuk Esterase: x / RBC: x / WBC x   Sq Epi: x / Non Sq Epi: x / Bacteria: x        CT Head No Cont (02.21.25 @ 15:16) >  IMPRESSION: Age-appropriate involutional and ischemic gliotic changes. No   hemorrhage or mass.

## 2025-02-21 NOTE — PROGRESS NOTE ADULT - SUBJECTIVE AND OBJECTIVE BOX
INTERVAL HPI/OVERNIGHT EVENTS:  Patient S&E at bedside.    CT chest, a/p- multiple PE w/in segmental left lower and right lower, 2 right middle lobe nodules, Large bowel obstruction with transition point at the distal transverse colon. There is also narrowing at the level of the terminal ileum. Diffuse ascites. Redemonstrated pancreatic lesion, consistent with history of pancreatic adenocarcinoma. There is an umbilical lesion with adjacent peritoneal implants.   Pt remains on heparin gtt.   remains on ericka, fluconazole, po vancomycin, solumedrol.   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      GERD (gastroesophageal reflux disease)      Cyclical vomiting syndrome      CVA (cerebrovascular accident)      H/O squamous cell carcinoma excision          FAMILY HISTORY:      VITAL SIGNS:  T(F): 98.8 (02-21-25 @ 06:00)  HR: 108 (02-21-25 @ 09:00)  BP: 138/84 (02-21-25 @ 08:00)  RR: 27 (02-21-25 @ 09:00)  SpO2: 91% (02-21-25 @ 09:00)  Wt(kg): --    PHYSICAL EXAM:    Constitutional: NAD  Ent- bleeding from lower lip   Respiratory: on ra, tachypenic   Cardiovascular: RRR,   Gastrointestinal: mild tenderness, mild distension   Extremities: +LE edema   Neurological: AAOx3      MEDICATIONS  (STANDING):  acetaminophen   IVPB .. 1000 milliGRAM(s) IV Intermittent once  acetaminophen   IVPB .. 1000 milliGRAM(s) IV Intermittent once  amLODIPine   Tablet 10 milliGRAM(s) Oral daily  chlorhexidine 2% Cloths 1 Application(s) Topical <User Schedule>  cholecalciferol 2000 Unit(s) Oral at bedtime  dextrose 5%. 1000 milliLiter(s) (50 mL/Hr) IV Continuous <Continuous>  dextrose 5%. 1000 milliLiter(s) (100 mL/Hr) IV Continuous <Continuous>  dextrose 50% Injectable 25 Gram(s) IV Push once  dextrose 50% Injectable 12.5 Gram(s) IV Push once  dextrose 50% Injectable 25 Gram(s) IV Push once  famotidine Injectable 20 milliGRAM(s) IV Push daily  fluconAZOLE IVPB      fluconAZOLE IVPB 200 milliGRAM(s) IV Intermittent every 24 hours  glucagon  Injectable 1 milliGRAM(s) IntraMuscular once  heparin  Infusion.  Unit(s)/Hr (13 mL/Hr) IV Continuous <Continuous>  insulin glargine Injectable (LANTUS) 10 Unit(s) SubCutaneous at bedtime  insulin glargine Injectable (LANTUS) 12 Unit(s) SubCutaneous once  insulin lispro (ADMELOG) corrective regimen sliding scale   SubCutaneous every 6 hours  lipid, fat emulsion (Fish Oil and Plant Based) 20% Infusion 0.6812 Gm/kG/Day (20.8 mL/Hr) IV Continuous <Continuous>  lipid, fat emulsion (Fish Oil and Plant Based) 20% Infusion 0.681 Gm/kG/Day (20.8 mL/Hr) IV Continuous <Continuous>  meropenem Injectable 1000 milliGRAM(s) IV Push every 12 hours  methylPREDNISolone sodium succinate Injectable 40 milliGRAM(s) IV Push daily  metoprolol tartrate Injectable 2.5 milliGRAM(s) IV Push every 6 hours  pantoprazole  Injectable 40 milliGRAM(s) IV Push daily  Parenteral Nutrition - Adult 1 Each (75 mL/Hr) TPN Continuous <Continuous>  Parenteral Nutrition - Adult 1 Each (75 mL/Hr) TPN Continuous <Continuous>  potassium chloride  10 mEq/100 mL IVPB 10 milliEquivalent(s) IV Intermittent every 1 hour  sucralfate suspension 1 Gram(s) Oral four times a day  vancomycin    Solution 125 milliGRAM(s) Oral every 6 hours    MEDICATIONS  (PRN):  acetaminophen     Tablet .. 650 milliGRAM(s) Oral every 6 hours PRN Temp greater or equal to 38C (100.4F), Mild Pain (1 - 3)  aluminum hydroxide/magnesium hydroxide/simethicone Suspension 30 milliLiter(s) Oral every 4 hours PRN Dyspepsia  Biotene Dry Mouth Oral Rinse 15 milliLiter(s) Swish and Spit five times a day PRN Mouth Care  dextrose Oral Gel 15 Gram(s) Oral once PRN Blood Glucose LESS THAN 70 milliGRAM(s)/deciliter  heparin   Injectable 6000 Unit(s) IV Push every 6 hours PRN For aPTT less than 40  heparin   Injectable 3000 Unit(s) IV Push every 6 hours PRN For aPTT between 40 - 57  melatonin 3 milliGRAM(s) Oral at bedtime PRN Insomnia  ondansetron Injectable 4 milliGRAM(s) IV Push every 8 hours PRN Nausea and/or Vomiting  sodium chloride 0.65% Nasal 1 Spray(s) Both Nostrils two times a day PRN Nasal Congestion      Allergies    No Known Allergies    Intolerances        LABS:                        10.5   35.65 )-----------( 132      ( 21 Feb 2025 05:18 )             31.0     02-21    141  |  105  |  66[H]  ----------------------------<  358[H]  3.2[L]   |  26  |  1.33[H]    Ca    8.2[L]      21 Feb 2025 05:18  Phos  3.6     02-21  Mg     2.0     02-21    TPro  5.3[L]  /  Alb  2.1[L]  /  TBili  0.6  /  DBili  x   /  AST  56[H]  /  ALT  33  /  AlkPhos  329[H]  02-21    PTT - ( 21 Feb 2025 05:18 )  PTT:96.6 sec  Urinalysis Basic - ( 21 Feb 2025 05:18 )    Color: x / Appearance: x / SG: x / pH: x  Gluc: 358 mg/dL / Ketone: x  / Bili: x / Urobili: x   Blood: x / Protein: x / Nitrite: x   Leuk Esterase: x / RBC: x / WBC x   Sq Epi: x / Non Sq Epi: x / Bacteria: x        RADIOLOGY & ADDITIONAL TESTS:  Studies reviewed.

## 2025-02-21 NOTE — PROGRESS NOTE ADULT - ASSESSMENT
69-year-old female with stage IV pancreatic adenocarcinoma presenting with neutropenic fever and severe treatment-related toxicities from modified FOLFIRINOX and investigational EMILY inhibitor [id number].    PROBLEMS:    Acute hypoxic respiratory failure likely secondary to hypervolemia   Bilateral pleural effusions with compressive atelectasis, right greater than left  Neutropenic Fever/Neutropenic Septic shock  Severe Thrombocytopenia-Platelet count critically low at 26k.   Severe diarrhea and inability to tolerate PO intake, likely multifactorial from both FOLFIRINOX (particularly irinotecan component) and study drug [id number].   History of left cephalic vein thrombosis.  MALLORY  69-year-old female with stage IV pancreatic adenocarcinoma presenting with neutropenic fever and severe treatment-related toxicities from modified FOLFIRINOX and investigational EMILY inhibitor [id number].  CT Angio Chest PE Protocol w/ IV Cont (02.19.25 @ 14:16) >  CHEST:  Multiple pulmonary emboli within the segmental branches of the left lower and right lower lobe pulmonary arteries.  There are two right middle lobe nodules, new since prior. Exact   etiology is unclear. Primary differential diagnostic consideration   includes infection.      PLAN:    CTA chest + small PEs in RLL and LLL- IV heparin, no bolus   Taper IV solumedrol 40mg qdaily for suspected TRALI from plt transfusion  Taper steroids causing hyperglycemai & High WBC  Pulmonary tachpnea may be RTA with low bicarbonate because of GI cause with diarrhea but improving renal fx  Neutropenic Septic shock- IV meropenem started on 2/15- previously on CEFEPIME/Continue filgrastim 480mcg daily-Today w/ improvement in counts- WBC 1.3, Hb 10.1, plt 37, cmp with k 2.3 and Repleted, Cr 1.40.   Daily CBC monitoring  Maintain strict neutropenic precautions  IV diuretics-now on RA doing well   EMPIRIC TREATMENT FOR ESOPHAGEAL CANDIDIASIS-continue with DIFLUCAN   Anemia-received two units of pRBCs Hb 10.1 today   Thrombocytopenia-Transfuse for plts < 15   D/w staff & /haematology & ICU in detail

## 2025-02-21 NOTE — PROGRESS NOTE ADULT - SUBJECTIVE AND OBJECTIVE BOX
Date of service: 02-21-25 @ 10:23    Lying in bed in NAD  Abdomen feel distended  Has loose stools  No fever  On PPN    ROS: no fever or chills; denies dizziness, no HA, no SOB or cough, no abdominal pain, no diarrhea or constipation; no dysuria, no legs pain, no rashes    MEDICATIONS  (STANDING):  acetaminophen   IVPB .. 1000 milliGRAM(s) IV Intermittent once  acetaminophen   IVPB .. 1000 milliGRAM(s) IV Intermittent once  amLODIPine   Tablet 10 milliGRAM(s) Oral daily  chlorhexidine 2% Cloths 1 Application(s) Topical <User Schedule>  cholecalciferol 2000 Unit(s) Oral at bedtime  dextrose 5%. 1000 milliLiter(s) (50 mL/Hr) IV Continuous <Continuous>  dextrose 5%. 1000 milliLiter(s) (100 mL/Hr) IV Continuous <Continuous>  dextrose 50% Injectable 25 Gram(s) IV Push once  dextrose 50% Injectable 12.5 Gram(s) IV Push once  dextrose 50% Injectable 25 Gram(s) IV Push once  famotidine Injectable 20 milliGRAM(s) IV Push daily  fluconAZOLE IVPB      fluconAZOLE IVPB 200 milliGRAM(s) IV Intermittent every 24 hours  glucagon  Injectable 1 milliGRAM(s) IntraMuscular once  heparin  Infusion.  Unit(s)/Hr (13 mL/Hr) IV Continuous <Continuous>  insulin glargine Injectable (LANTUS) 10 Unit(s) SubCutaneous at bedtime  insulin lispro (ADMELOG) corrective regimen sliding scale   SubCutaneous every 6 hours  lipid, fat emulsion (Fish Oil and Plant Based) 20% Infusion 0.681 Gm/kG/Day (20.8 mL/Hr) IV Continuous <Continuous>  lipid, fat emulsion (Fish Oil and Plant Based) 20% Infusion 0.6812 Gm/kG/Day (20.8 mL/Hr) IV Continuous <Continuous>  meropenem Injectable 1000 milliGRAM(s) IV Push every 12 hours  methylPREDNISolone sodium succinate Injectable 40 milliGRAM(s) IV Push daily  metoprolol tartrate Injectable 2.5 milliGRAM(s) IV Push every 6 hours  pantoprazole  Injectable 40 milliGRAM(s) IV Push daily  Parenteral Nutrition - Adult 1 Each (75 mL/Hr) TPN Continuous <Continuous>  Parenteral Nutrition - Adult 1 Each (75 mL/Hr) TPN Continuous <Continuous>  potassium chloride  10 mEq/100 mL IVPB 10 milliEquivalent(s) IV Intermittent every 1 hour  sucralfate suspension 1 Gram(s) Oral four times a day  vancomycin    Solution 125 milliGRAM(s) Oral every 6 hours    Vital Signs Last 24 Hrs  T(C): 37.1 (21 Feb 2025 06:00), Max: 37.1 (21 Feb 2025 06:00)  T(F): 98.8 (21 Feb 2025 06:00), Max: 98.8 (21 Feb 2025 06:00)  HR: 105 (21 Feb 2025 10:00) (98 - 110)  BP: 138/84 (21 Feb 2025 08:00) (125/81 - 145/78)  BP(mean): 99 (21 Feb 2025 08:00) (93 - 104)  RR: 36 (21 Feb 2025 10:00) (25 - 36)  SpO2: 88% (21 Feb 2025 10:00) (88% - 95%)    Parameters below as of 21 Feb 2025 08:00  Patient On (Oxygen Delivery Method): room air     Physical exam:    Constitutional:  No acute distress  HEENT: NC/AT, EOMI, PERRLA, conjunctivae clear; ears and nose atraumatic; pharynx benign  Neck: supple; thyroid not palpable  Back: no tenderness  Respiratory: respiratory effort normal; clear to auscultation  Cardiovascular: S1S2 regular, no murmurs  Abdomen: soft, mild periombilical tender, not distended, positive BS; no liver or spleen organomegaly  Genitourinary: no suprapubic tenderness  Lymphatic: no LN palpable  Musculoskeletal: no muscle tenderness, no joint swelling or tenderness  Extremities: no pedal edema  Neurological/ Psychiatric: AxOx3, judgement and insight normal; moving all extremities  Skin: no rashes; right lower chest wall dry, scabbed ulcer; no discharge     Labs: reviewed                        10.5   35.65 )-----------( 132      ( 21 Feb 2025 05:18 )             31.0     02-21    141  |  105  |  66[H]  ----------------------------<  358[H]  3.2[L]   |  26  |  1.33[H]    Ca    8.2[L]      21 Feb 2025 05:18  Phos  3.6     02-21  Mg     2.0     02-21    TPro  5.3[L]  /  Alb  2.1[L]  /  TBili  0.6  /  DBili  x   /  AST  56[H]  /  ALT  33  /  AlkPhos  329[H]  02-21    C-Reactive Protein: 209.0 mg/mL (02-15-25 @ 05:17)  C-Reactive Protein: 233.0 mg/mL (02-14-25 @ 06:50)  Ferritin: 1798 ng/mL (02-13-25 @ 18:46)                        10.8   26.65 )-----------( 90       ( 20 Feb 2025 05:31 )             31.4     02-20    144  |  111[H]  |  64[H]  ----------------------------<  326[H]  3.5   |  23  |  1.40[H]    Ca    8.1[L]      20 Feb 2025 05:31  Phos  3.1     02-20  Mg     2.1     02-20    TPro  5.3[L]  /  Alb  2.0[L]  /  TBili  0.6  /  DBili  x   /  AST  51[H]  /  ALT  29  /  AlkPhos  311[H]  02-20    C-Reactive Protein: 209.0 mg/mL (02-15-25 @ 05:17)  C-Reactive Protein: 233.0 mg/mL (02-14-25 @ 06:50)  Ferritin: 1798 ng/mL (02-13-25 @ 18:46)                        10.4   5.43  )-----------( 45       ( 18 Feb 2025 08:42 )             29.4     02-18    138  |  112[H]  |  69[H]  ----------------------------<  223[H]  4.1   |  16[L]  |  1.44[H]    Ca    8.6      18 Feb 2025 08:42  Phos  2.0     02-18  Mg     2.2     02-18    TPro  5.1[L]  /  Alb  2.4[L]  /  TBili  0.6  /  DBili  x   /  AST  27  /  ALT  21  /  AlkPhos  131[H]  02-18    C-Reactive Protein: 209.0 mg/mL (02-15-25 @ 05:17)  C-Reactive Protein: 233.0 mg/mL (02-14-25 @ 06:50)  Ferritin: 1798 ng/mL (02-13-25 @ 18:46)                        10.1   1.30  )-----------( 37       ( 17 Feb 2025 05:59 )             28.5     02-17    140  |  110[H]  |  70[H]  ----------------------------<  227[H]  2.3[LL]   |  17[L]  |  1.73[H]    Ca    8.4[L]      17 Feb 2025 05:59  Phos  3.2     02-17  Mg     1.5     02-17    TPro  5.2[L]  /  Alb  2.7[L]  /  TBili  0.8  /  DBili  x   /  AST  15  /  ALT  19  /  AlkPhos  101  02-17    C-Reactive Protein: 209.0 mg/mL (02-15-25 @ 05:17)  C-Reactive Protein: 233.0 mg/mL (02-14-25 @ 06:50)  Ferritin: 1798 ng/mL (02-13-25 @ 18:46)                        9.2    0.71  )-----------( 26       ( 16 Feb 2025 06:12 )             26.8     02-16    136  |  108  |  47[H]  ----------------------------<  172[H]  3.4[L]   |  17[L]  |  1.56[H]    Ca    8.0[L]      16 Feb 2025 06:12  Phos  3.1     02-16  Mg     1.8     02-16    TPro  5.1[L]  /  Alb  2.9[L]  /  TBili  1.1  /  DBili  x   /  AST  5[L]  /  ALT  19  /  AlkPhos  91  02-16    C-Reactive Protein: 209.0 mg/mL (02-15-25 @ 05:17)  C-Reactive Protein: 233.0 mg/mL (02-14-25 @ 06:50)  Ferritin: 1798 ng/mL (02-13-25 @ 18:46)                        8.0    0.23  )-----------( 20       ( 14 Feb 2025 06:50 )             24.2     02-14    134[L]  |  108  |  21  ----------------------------<  141[H]  3.3[L]   |  16[L]  |  1.03    Ca    8.5      14 Feb 2025 06:50  Phos  3.1     02-14  Mg     2.6     02-14    TPro  4.5[L]  /  Alb  1.5[L]  /  TBili  0.8  /  DBili  0.4[H]  /  AST  8[L]  /  ALT  14  /  AlkPhos  104  02-14    C-Reactive Protein: 233.0 mg/mL (02-14-25 @ 06:50)  Ferritin: 1798 ng/mL (02-13-25 @ 18:46)                        8.7    0.19  )-----------( 33       ( 13 Feb 2025 06:52 )             25.7     02-13    137  |  109[H]  |  20  ----------------------------<  117[H]  2.6[LL]   |  18[L]  |  1.02    Ca    8.7      13 Feb 2025 06:52  Phos  2.3     02-13  Mg     1.6     02-13    TPro  4.4[L]  /  Alb  1.1[L]  /  TBili  0.7  /  DBili  x   /  AST  11[L]  /  ALT  16  /  AlkPhos  94  02-13     LIVER FUNCTIONS - ( 13 Feb 2025 06:52 )  Alb: 1.1 g/dL / Pro: 4.4 gm/dL / ALK PHOS: 94 U/L / ALT: 16 U/L / AST: 11 U/L / GGT: x           Culture - Blood (collected 11 Feb 2025 09:00)  Source: .Blood BLOOD  Preliminary Report (12 Feb 2025 13:01):    No growth at 24 hours    Culture - Blood (collected 11 Feb 2025 08:50)  Source: .Blood BLOOD  Preliminary Report (12 Feb 2025 13:01):    No growth at 24 hours    Urinalysis with Rflx Culture (collected 09 Feb 2025 01:25)    Culture - Urine (collected 09 Feb 2025 01:25)  Source: Clean Catch  Final Report (11 Feb 2025 08:39):    50,000 - 99,000 CFU/mL Pseudomonas aeruginosa  Organism: Pseudomonas aeruginosa (11 Feb 2025 08:39)  Organism: Pseudomonas aeruginosa (11 Feb 2025 08:39)      Method Type: REJI      -  Amikacin: S <=16      -  Aztreonam: S <=4      -  Cefepime: S <=2      -  Ceftazidime: S <=1      -  Ciprofloxacin: S <=0.25      -  Imipenem: S 2      -  Levofloxacin: S <=0.5      -  Meropenem: S <=1      -  Piperacillin/Tazobactam: S <=8    Culture - Blood (collected 08 Feb 2025 22:00)  Source: .Blood BLOOD  Gram Stain (09 Feb 2025 19:04):    Growth in aerobic bottle: Gram Negative Rods  Final Report (11 Feb 2025 07:53):    Growth in aerobic bottle: Pseudomonas aeruginosa    Direct identification is available within approximately 3-5    hours either by Blood Panel Multiplexed PCR or Direct    MALDI-TOF. Details: [email or web address]  Organism: Blood Culture PCR  Pseudomonas aeruginosa (11 Feb 2025 07:53)  Organism: Pseudomonas aeruginosa (11 Feb 2025 07:53)      Method Type: REJI      -  Aztreonam: S <=4      -  Cefepime: S <=2      -  Ceftazidime: S <=1      -  Ciprofloxacin: S <=0.25      -  Imipenem: S <=1      -  Levofloxacin: S <=0.5      -  Meropenem: S <=1      -  Piperacillin/Tazobactam: S <=8  Organism: Blood Culture PCR (11 Feb 2025 07:53)      Method Type: PCR      -  Pseudomonas aeruginosa: Detec    Culture - Blood (collected 08 Feb 2025 21:55)  Source: .Blood BLOOD  Gram Stain (09 Feb 2025 19:32):    Growth in aerobic bottle: Gram Negative Rods  Final Report (11 Feb 2025 07:54):    Growth in aerobic bottle: Pseudomonas aeruginosa    See previous culture [id number]    Radiology: all available radiological tests reviewed    < from: US Abdomen Upper Quadrant Right (02.09.25 @ 14:55) >  Distended gallbladder with layering sludge.  8 mm right renal calculus without hydronephrosis.  Hepatomegaly.  Right renal cyst.  < end of copied text >    < from: CT Abdomen and Pelvis No Cont (02.09.25 @ 03:01) >  1.   Study somewhat limited due to absence of contrast.  2.   Axial images 71-76 of series 301 and concomitant coronal images demonstrate prominent soft tissue inseparable from the body of the pancreas consistent with pancreatic neoplasm.  3.   Some distension of gallbladder could be further evaluated with right upper quadrant sonography. No definite biliary dilatation.  < end of copied text >    Advanced directives addressed: full resuscitation

## 2025-02-21 NOTE — PROGRESS NOTE ADULT - ASSESSMENT
69-year-old female with stage IV pancreatic adenocarcinoma presenting with neutropenic fever and severe treatment-related toxicities from modified FOLFIRINOX and investigational EMILY inhibitor [id number] found to have PE and LBO.     # Neutropenic Septic shock   - pt previously on neupogen- WBC has now recovered and elevated likely 2/2 GCSF support - wbc 35, Hb 10.5, plt 132  - hemodynamically stable   - Continue meropenem started on 2/15 as well as fluconazole and po vanc    # PE   - v/q scan w indeterminate prob of PE, linear defect in RUL posterior segment   - cxr with basilar haziness  - response to diuretics - will need to monitor renal function - stable CR to 1.33 today  - nowon RA  - CT chest- multiple PE w/in segmental left lower and right lower, 2 right middle lobe nodules,   -  Pt remains on heparin gtt  - breathing improved today     # large bowel obstruction  - CT a/p Large bowel obstruction with transition point at the distal transverse colon. There is also narrowing at the level of the terminal ileum. Diffuse ascites. Redemonstrated pancreatic lesion, consistent with history of pancreatic adenocarcinoma. There is an umbilical lesion with adjacent peritoneal implants.  - previously was having diarrhea  - GI following and reviewed imaging with radiology       # EMPIRIC TREATMENT FOR ESOPHAGEAL CANDIDIASIS   - continue with DIFLUCAN     #Anemia   - received two units of pRBCs to date- Hb 10.5 todya    - will continue to monitor, keep type and screen active     #Care Coordination  -will continue with ongoing communication with  [facility]  - Review clinical trial protocol regarding toxicity management  - Consider dose modifications for future therapy    will follow and communicate with [facility]

## 2025-02-21 NOTE — PROGRESS NOTE ADULT - ASSESSMENT
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 She was admitted to [facility] with septic shock on 02/09/2025, nausea, vomiting and diarrhea requiring Levophed drip. Patient was being followed by GI, ID, Cardiology and Onc there. Patient has been transferred to  on patient's  (GI Dr. Pleitez) request. She was reported with non-bloody diarrhea x 3 in last 24 hours PTA. She denied abdominal pain, nausea, but has dry heaves. At [place] she was started on Vancomycin and Zosyn, then changed to Cefepime as her blood culture and urine cultures showed Pseudomonas aeruginosa.    #Dysphagia  Possible candida esophagitis  #Acute mucositis with diarrhea due to chemotherapy   #Sepsis with PSAE resolved  #Head of pancreas adenocarcinoma on chemotherapy  #Leukocytosis likely due to neupogen  #Immunocompromised host  #UTI with PSAE  #Kidney stones  #Right lower chest wall dry superficial ulcer   #ARF   -neutropenia resolved, leukocytosis at present  -CMV serology shows no evidence of CMV disease  -renal function is slightly improving  -dysphagia with poor PO intake  -cultures reviewed  -repeat stool for CDT is negative   -source of sepsis is likely intraabdominal and/or urinary  -s/p cefepime 2 gm IV q8h # 3  -on meropenem 1 gm IV q8h # 6 and fluconazole 200 mg IV qd # 5  -on vancomycin 125 mg PO q6h for CDAD prophylaxis  -tolerating abx well so far; no side effects noted  -GI evaluation appreciated   -repeat BC x 2 noted  -oncology evaluation appreciated  -monitor abdomen  -continue abx coverage  -f/u cultures  -monitor temps  -f/u CBC and BNP  -supportive care  2. Other issues:   -care per medicine    d/w medicine team

## 2025-02-21 NOTE — PROGRESS NOTE ADULT - ASSESSMENT
ASSESSMENT  68yo female with PMHx recent diagnosis of primary pancreatic adenocarcinoma of head with tumor in umbilicus, chemo-induced pancytopenia, SCC lip, cyclical vomiting syndrome, HTN, HPL, Gout, GERD, CVA on Aggrenox    Initially admitted to [facility] with septic shock 2/2 + pseudomonas bacteremia likely due to UTI vs colitis on 02/09/2025 Was having nausea, vomiting and diarrhea requiring Levophed drip, was transferred out of ICU to regular floor.    Then transferred to  as per request of .  Was getting platelet transfusion and developed tachypnea hypoxia, rigors  Was diuresed and became hypotensive  Upgraded to SICU     Here with   1. Sepsis (POA)  2. Pseudomonal bacteremia suspect from UTI   3. Mucositis suspected esophageal candidiasis? r/o CMV  4. Pancytopenia  5. Acute hypoxic respiratory failure 2/2 pulm edema   6. MALLORY     CTA chest abd pelvis 2/19 revealing multiple small PE b/l and LBO?    PLAN    Neuro: AAOx 3. pain control prn. IV tylenol .avoid nsaids. CTH to eval for mets?  CV: BP stable has been 130s/80s. in sinus rhythm 90-100s. cont IV lopressor 2.5mg q6hr  Pulm: on room air today sating 92-93%, work of breathing improving.  CTA chest + b/l small PEs. on hep gtt. TTE no RV strain. cont IV solumedrol 40mg qd for suspected TRALI?   GI: PO diet as tolerated. on TPN. IV PPI. LBO on CTAP? scans reviewed with GI and Dr. Ziegler, low suspicion for LBO, abd soft on exam. will trial reglan if pt develops abd pain.   Nephro: MALLORY improving Cr 1.40 -> 1.33. monitor I & Os. Trend renal fxn.    Endo: Hyperglycemic still suspect due to TPN and steroids. lantus 12units this am, inc bedtime lantus 5-> 10units. BGMs q6hr. ISS. while on PN  ID: cont IV meropenem 1gm q8hr #7 (started 2/15) cont IV diflucan 200mg qd #5 (started 2/17). s/p IV cefepime.  blood cultures here neg to date. CMV neg. HSV pcr neg. elevated lactate suspect due to work of breathing. unable to give further  IV fluids due to pulmonary edema.  Heme: hgb stable. leukocytosis suspect due to steroids,. thrombocytopenia improving.  pt was on filgastrim, dcd on 2/18 as per hemeonc.   PT eval, OOB to chair as tolerated.    Dispo: SICU. TPN. IV abx. Trend Cr. cont steroids. hep gtt    Will discuss with Dr. Tong,, d/w pulm

## 2025-02-21 NOTE — PROGRESS NOTE ADULT - SUBJECTIVE AND OBJECTIVE BOX
Patient is a 69y old  Female who presents with a chief complaint of sepsis (18 Feb 2025 11:37)    BRIEF HOSPITAL COURSE: 70yo female with PMHx recent diagnosis of primary pancreatic adenocarcinoma of head with tumor in umbilicus, chemo-induced pancytopenia, SCC lip, cyclical vomiting syndrome, HTN, HPL, Gout, GERD, CVA on Aggrenox and others had been admitted to [facility] with septic shock 2/2 + pseudomonas bacteremia on 02/09/2025, nausea, vomiting and diarrhea requiring Levophed drip, was transferred out of ICU to regular floor last night.  Patient was being followed by GI, ID, Cardiology and Onc there.  Patient has been transferred to  on patient's  (GI Dr. Pleitez) request.      2/18 pt seen this am, denies chest pain, denies feeling sob but appears slightly tachypneic. states she stills has difficulty swallowing due to pain. feeling weak overall. still having multiple episodes of diarrhea  2/19 pt seen this am, still tachypneic, speaks in a whisper, denies having any pain. states she still has trouble swallowing so appetite is poor. denies pain in throat. wearing bipap overnight  2/20 pt seen this am, appears to be breathing more comfortably but states she still feels SOB, tried to eat soft foods but states its too painful and now with abd pain. states shes hasnt had a BM overnight or this am.    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 1000 milliGRAM(s) IV Intermittent once  acetaminophen   IVPB .. 1000 milliGRAM(s) IV Intermittent once  amLODIPine   Tablet 10 milliGRAM(s) Oral daily  chlorhexidine 2% Cloths 1 Application(s) Topical <User Schedule>  cholecalciferol 2000 Unit(s) Oral at bedtime  dextrose 5%. 1000 milliLiter(s) (50 mL/Hr) IV Continuous <Continuous>  dextrose 5%. 1000 milliLiter(s) (100 mL/Hr) IV Continuous <Continuous>  dextrose 50% Injectable 25 Gram(s) IV Push once  dextrose 50% Injectable 12.5 Gram(s) IV Push once  dextrose 50% Injectable 25 Gram(s) IV Push once  famotidine Injectable 20 milliGRAM(s) IV Push daily  fluconAZOLE IVPB      fluconAZOLE IVPB 200 milliGRAM(s) IV Intermittent every 24 hours  glucagon  Injectable 1 milliGRAM(s) IntraMuscular once  heparin  Infusion.  Unit(s)/Hr (13 mL/Hr) IV Continuous <Continuous>  insulin glargine Injectable (LANTUS) 5 Unit(s) SubCutaneous at bedtime  insulin lispro (ADMELOG) corrective regimen sliding scale   SubCutaneous every 6 hours  lipid, fat emulsion (Fish Oil and Plant Based) 20% Infusion 0.681 Gm/kG/Day (20.8 mL/Hr) IV Continuous <Continuous>  lipid, fat emulsion (Fish Oil and Plant Based) 20% Infusion 0.681 Gm/kG/Day (20.8 mL/Hr) IV Continuous <Continuous>  meropenem Injectable 1000 milliGRAM(s) IV Push every 12 hours  methylPREDNISolone sodium succinate Injectable 40 milliGRAM(s) IV Push daily  metoprolol tartrate Injectable 2.5 milliGRAM(s) IV Push every 6 hours  pantoprazole  Injectable 40 milliGRAM(s) IV Push daily  Parenteral Nutrition - Adult 1 Each (75 mL/Hr) TPN Continuous <Continuous>  Parenteral Nutrition - Adult 1 Each (75 mL/Hr) TPN Continuous <Continuous>  potassium chloride  10 mEq/100 mL IVPB 10 milliEquivalent(s) IV Intermittent every 1 hour  sodium bicarbonate 325 milliGRAM(s) Oral every 8 hours  sucralfate suspension 1 Gram(s) Oral four times a day  vancomycin    Solution 125 milliGRAM(s) Oral every 6 hours    Vital Signs Last 24 Hrs  T(C): 36.7 (20 Feb 2025 13:30), Max: 36.8 (19 Feb 2025 20:45)  T(F): 98.1 (20 Feb 2025 13:30), Max: 98.2 (19 Feb 2025 20:45)  HR: 104 (20 Feb 2025 13:00) (96 - 117)  BP: 145/78 (20 Feb 2025 13:00) (114/82 - 145/78)  BP(mean): 98 (20 Feb 2025 13:00) (88 - 103)  RR: 34 (20 Feb 2025 13:00) (22 - 40)  SpO2: 95% (20 Feb 2025 13:00) (91% - 99%)    Parameters below as of 20 Feb 2025 09:48  Patient On (Oxygen Delivery Method): nasal cannula, high flow  O2 Flow (L/min): 35  O2 Concentration (%): 50    I&O's Detail    19 Feb 2025 07:01  -  20 Feb 2025 07:00  --------------------------------------------------------  IN:    Fat Emulsion (Fish Oil &amp; Plant Based) 20% Infusion: 252 mL    Heparin Infusion: 195 mL    IV PiggyBack: 50 mL    Lactated Ringers: 1059 mL    TPN (Total Parenteral Nutrition): 1559 mL  Total IN: 3115 mL    OUT:    Indwelling Catheter - Urethral (mL): 925 mL  Total OUT: 925 mL    Total NET: 2190 mL      LABS:                            10.8   26.65 )-----------( 90       ( 20 Feb 2025 05:31 )             31.4     02-20    144  |  111[H]  |  64[H]  ----------------------------<  326[H]  3.5   |  23  |  1.40[H]    Ca    8.1[L]      20 Feb 2025 05:31  Phos  3.1     02-20  Mg     2.1     02-20    TPro  5.3[L]  /  Alb  2.0[L]  /  TBili  0.6  /  DBili  x   /  AST  51[H]  /  ALT  29  /  AlkPhos  311[H]  02-20        LIVER FUNCTIONS - ( 20 Feb 2025 05:31 )  Alb: 2.0 g/dL / Pro: 5.3 gm/dL / ALK PHOS: 311 U/L / ALT: 29 U/L / AST: 51 U/L / GGT: x           PTT - ( 20 Feb 2025 05:31 )  PTT:137.9 sec  Urinalysis Basic - ( 20 Feb 2025 05:31 )    Color: x / Appearance: x / SG: x / pH: x  Gluc: 326 mg/dL / Ketone: x  / Bili: x / Urobili: x   Blood: x / Protein: x / Nitrite: x   Leuk Esterase: x / RBC: x / WBC x   Sq Epi: x / Non Sq Epi: x / Bacteria: x      ABG - ( 18 Feb 2025 18:35 )  pH, Arterial: 7.40  pH, Blood: x     /  pCO2: 28    /  pO2: 89    / HCO3: 17    / Base Excess: -6.1  /  SaO2: 98        Lactate, Blood: 2.9 mmol/L (02-20 @ 05:31)    CULTURES:  Culture Results:   No growth at 4 days (02-14 @ 07:33)  Culture Results:   No growth at 4 days (02-14 @ 06:50)    Physical Examination:    General: Mod respiratory distress  HEENT: Pupils equal, reactive to light.  Symmetric.   PULM: decreased at bases b/l.   CVS: Regular rate and rhythm, no murmurs  ABD: Soft, nondistended, mild diffuse tenderness, redness by umbilical area,  EXT: 2+ pitting edema in LE b/l extending to knees.   SKIN: Warm and well perfused, no rashes noted.  NEURO: Alert, oriented, interactive    DEVICES:   coelho    RADIOLOGY:   < from: CT Chest No Cont (02.15.25 @ 15:55) >  IMPRESSION:  Nondisplaced fractures of the left ninth and 10th ribs, similar to the   prior study.    Pancreatic mass, unchanged    No onset ascites and bilateral pleural effusions with compressive   atelectasis, right greater than left.    Dilated gallbladder, similar to the prior study.    < end of copied text >

## 2025-02-21 NOTE — PROGRESS NOTE ADULT - ASSESSMENT
69-year-old female recent diagnosis of primary pancreatic adenocarcinoma , pancytopenia and significant PMH of SCC lip, cyclical vomiting syndrome, HTN, HPL, Gout, GERD, CVA w renal evaluation of MALLORY and hypokalemia. Per documents was recently at  [facility] with septic shock on 02/09/2025, nausea, vomiting and diarrhea requiring Levophed drip. Per documents, patient  transferred to  for continued care. Patient with + diuresis w lasix 80 IV, increaserd UOP > 4L.   Treated w IV and po k repletion.     MALLORY  -LIkely pre renal w insensible losses and diuresis, signficant UOP  -IVF to maintain even to positive today  -Trend labs, lytes. Repeat this afternoon  -Monitor UOP closely  -Avoid nsaids/contrast  -Renally dosed abx    Hypokalemia  -K repletion iv/po  -TPN    Sepsis/Pseduomonas  -ID/abx  -FU cultures    d/c with RN staff, Dr Tong, Dr Henry, Dr Maik Sweet to resume care in AM    2/18  MALLORY resolving  Azotemia, responded to IVF resuscitation  Urine output improved  hypoalbuminemia- on spa q 8 h for oncotic pressure and glomerular filtration  NAGMA- hyperchloremia may be due to diarrhea and GI losses  On Meropenem- very rarely associated w Fanconi syndrome  - will check urinary PO4, serum uric acid, repeat PO4 level, low this am  ABG  Lactate  CBC  VQ results pending  d/w Dr YOUSUF Pleitez    Addendum  Repeat labs reviewed d/w intensivist  Lactate 2.7  ABG c/w primary respiratory alkalosis with metabolic acidosis and NAGMA    2/19  Pancreatic Adenoca  MALLORY prerenal, correcting  Primary Hyperventilation, metab acidosis and non anion acidosis  VQ indeterminate  on 2/18 for PE  For CTA today, low risk MAXIMO due to adequate hydration status, UO 3600 ml / last 24 hrs  D/W Dr Tong and Dr Pleitez    2/20  Pancreatic AdenoCa  MALLORY slowly correcting  Bilateral pulm emboli  Creat stable post IV Contrast but will monitor for 48 hrs  Abd distension less  hypokalemia being replaced    2/20  Pancreatic AdenoCa  MALLORY slowly correcting, s/p IV Contrast  Bilateral pulm emboli on IV heparin  Creat stable post IV Contrast but will monitor for 48 hrs  Abd distension improved but still diminished BS  hypokalemia being replaced by ICU team

## 2025-02-21 NOTE — CHART NOTE - NSCHARTNOTEFT_GEN_A_CORE
Clinical Nutrition PN Follow Up Note    *68 y/o F w/ recent diagnosis of primary pancreatic adenocarcinoma of head with tumor in umbilicus, chemo-induced pancytopenia and significant PMH of SCC lip, cyclical vomiting syndrome, HTN, HPL, Gout, GERD, CVA on Aggrenox and others had been admitted to [facility] with septic shock on 2025, nausea, vomiting and diarrhea requiring Levophed drip, was transferred out of ICU to regular floor last night.  Patient was being followed by GI, ID, Cardiology and Onc there.  Patient has been transferred to  today on patient's  (GI Dr. Pleitez) request.  At this time, patient c/o non-bloody diarrhea x 3 in last 24 hours.  She denies any abdominal pain, nausea, but has some dry heaves.  She denies any fever, chills, chest pain, palpitation, constipation or dysuria. Initially, she was started on Vancomycin and Zosyn, but now she has been on Cefepime as her blood culture and urine cultures are positive for Pseudomonas aeruginosa. Currently, patient is on Cefepime.  ID had also recommended Flagyl, but patient had refused it, as it causes nausea to her. Noted with Metabolic acidosis with NAG due to GI loss. Likely chemo-induced diarrhea and vomiting. Better than before. Cutaneous lesion on RUQ area below right breast, unclear etiology, ? Ecthyma gangrenosum. Admitting diagnosis: pancreatic ca  *: Pt transfer from [facility]; seen by RD and met criteria for PCM. Pt reports only being able to tolerate some sips of water and tea at this time. Reports tried eating small bite of mashed banana and felt burning sensation down her throat. Reports #; RD unable to obtain bedscale wt d/t bedscale not working. Admit wt per # note with 1+ and 2+ edema skewing wt. Pt appears overweight, NFPE reveals mild- mod muscle/fat wasting at this time. Recommend to continue with PO diet and encourage intake as tolerated. RD consulted to initiate PN d/t PO intolerance. Will initiate TPN through port.  *2/15: Remains on low fiber diet with minimal PO intake. Still w/ multiple episodes of diarrhea, if persists can consider adding 5mg zinc into PN bag. No other acute events overnight. D/w Dr. Joyner will c/w TPN today.   *: Raised area noted to mid sternum. RCW port intact.- no swelling noted, no redness. Port flushed without resistance. + blood return noted. Assessed with IV team Amy. Per pt no Pain on palpation. CT Chest. TPN on hold until CT read. CT chest from earlier in the day revealed new bilateral pleural effusions with bibasilar consolidated lung and ascites. CXR now shows low lung volumes and bibasilar opacities. Rigors post transfusion - Rapid response called;  Dr. Pleitez requesting ICU consult- concerned for pt.  requesting assessment of patient STAT. Pt transferred to SICU. ? third spacing, and possible PNA. Per pt still drinking small sips of water and tea. D/w Dr. Fortune will keep total volume same in PN bag today, and will c/w TPN.   *:  tx SICU, po remains minimal. On bipap. On additional LR d/t diuresis per critical care PA: "malignancy/severe protein-calorie malnutrition/hypoalbuminemia causing diffuse third spacing, caution with further diuresis as this is not truly a volume overload/acute CHF issue" also on IV albumin  c/w TPN  STRONGLY suggest to Confirm goals of care regarding LONG-TERM nutrition support - However, LONG-TERM Nutrition support is not recommended with advanced cancer as studies show that there is no improvement of dehydration symptoms, quality of life, or survival. It also increases the risk for peripheral edema, ascites, and pleural effusions. Will continue to provide nutr within GOC though  *: plan to c/w TPN for now. RD discussed w/ MD Tong and SICU IRINA Jurado need to confirm long term GOC regarding nutr support. Per GI - Esophageal pain is 2/2 esophagitis but unclear what type -- reflux vs. candida vs CMV or HSV etc. Plan to add imodium standing. ? EGD   *: reports she stills has difficulty swallowing due to pain. feeling weak overall. still having multiple episodes of diarrhea, poor PO intake. Plan to c/w TPN - per GI: "CVM and HSV will be negative which argues that esophagitis is reflux or fungal related"  d/w IRINA Jurado long-term plan regarding nutr support TBD, pending discussion w/ family. ? fluid overload which TPN may be contributing to as some studies show with advanced cancer that nutrition support increases the risk for peripheral edema, ascites, and pleural effusions.   *: PO slighly improving, plan to c/w TPN.  POCT uptrending, d/w IRINA Jurado, steroids to be dc and will hold off in insulin PN bag - will manage w/ insulin outside bag.  Per GI: "Imaging suggests ileus but I personally think that putting the imaging together with history and clinical findings that she just has severe diarrhea illness 2/2 chemo, given that both large and small bowel loops are fluid filled and she has no abd pain or vomiting"    *TPN initiated 2/2 PO intolerance, + Mucositis, possible candidal esophagitis    *current status: PO intake continues to slightly improve, plan to c/w TPN. Hyperglycemia worsening, d/w IRINA Jurado, plan to add regular insulin into PN bag. Will also decrease dextrose to 250g to see if this helps w/ hyperglycemia and can increase again when POCTs better controlled.     *new pertinent meds: Lantus (8 units once & 5 units HS), Vitamin D3 (2000IU), Pepcid, Abx, Admelog (4 units x 24 hours), Solu-Medrol, Metoprolol, Protonix, KCl (30 mEq), Sodium Bicarbonate, Carafate    *labs reviewed; Lytes WNL except K at low-end of normal. With significant lyte repletion outside of bag yesterday. As per ASPEN Guidelines, maintenance potassium concentration in PN is 1 – 2 mEq/kg/day. However, [facility] PN Policy indicates a maximum of 120 mEq should be added into the PN bag. Currently at 100mEq in PN bag.  T bili WNL will c/w trace elements. C/w MVI and thiamine in PN bag. POCT still elevated, will decrease dextrose and titrate up tomorrow if POCT improved (325g daily; GIR ~3.1 WNL) - will add insulin to PN bag.  TG WNL, however increasing will c/w 50g of lipids daily.  Also of note, Cl still elevated, will add more acetate vs Cl into TPN bag - 197 vs 100, respectively.       141  |  105  |  66[H]  ----------------------------<  358[H]  3.2[L]   |  26  |  1.33[H]    Ca    8.2[L]      2025 05:18  Phos  3.6       Mg     2.0         TPro  5.3[L]  /  Alb  2.1[L]  /  TBili  0.6  /  DBili  x   /  AST  56[H]  /  ALT  33  /  AlkPhos  329[H]      BMI: BMI (kg/m2): 27.8 (25 @ 05:46)  HbA1c: A1C with Estimated Average Glucose Result: 6.6 % (25 @ 05:35)    Glucose: POCT Blood Glucose.: 345 mg/dL (25 @ 05:34)    BP: 136/90 (25 @ 06:00) (114/82 - 145/78)Vital Signs Last 24 Hrs  T(C): 37.1 (25 @ 06:00), Max: 37.1 (25 @ 06:00)  T(F): 98.8 (25 @ 06:00), Max: 98.8 (25 @ 06:00)  HR: 98 (25 @ 06:00) (98 - 111)  BP: 136/90 (25 @ 06:00) (125/81 - 145/78)  BP(mean): 104 (25 @ 06:00) (91 - 104)  RR: 27 (25 @ 06:00) (25 - 37)  SpO2: 90% (25 @ 06:00) (90% - 99%)    Lipid Panel: Date/Time: 25 @ 05:31  Triglycerides, Serum: 281    POCT Blood Glucose.: 345 mg/dL (2025 05:34)  POCT Blood Glucose.: 238 mg/dL (2025 22:25)  POCT Blood Glucose.: 267 mg/dL (2025 17:45)  POCT Blood Glucose.: 290 mg/dL (2025 12:26)    Iron Total: 34 ug/dL (25 @ 18:46)  Folate, Serum: >20.0 ng/mL (25 @ 18:46)  Vitamin B12, Serum: 1457 pg/mL (25 @ 18:46)  Vitamin D, 25-Hydroxy: 23.0 ng/mL (25 @ 18:46) ** c/w deficiency    *I&O's Detail    2025 07:01  -  2025 07:00  --------------------------------------------------------  IN:    Fat Emulsion (Fish Oil &amp; Plant Based) 20% Infusion: 320 mL    Fat Emulsion (Fish Oil &amp; Plant Based) 20% Infusion: 208 mL    Heparin Infusion: 206 mL    IV PiggyBack: 400 mL    TPN (Total Parenteral Nutrition): 1677 mL  Total IN: 2811 mL    OUT:    Indwelling Catheter - Urethral (mL): 2925 mL  Total OUT: 2925 mL    Total NET: -114 mL  * fluid status: Negative, however full TPN volume not doc'd (1800mL). Will continue to monitor/adjust prn to maintain fluid balance   *last (+) BM doc'd 2/18 x 5 - liquid, diarrhea.  pt on standing bowel regimen (imodium).  *edema: 2+ generalized  *PI: L/R Buttocks (stage 2)    *malnutrition: Pt continues to meet criteria for severe protein calorie malnutrition in context of acute on chronic illness r/t decreased ability to meet increased nutrient needs 2/2 PO intolerance, N/V and diarrhea, on chemo for pancreatic ca AEB meeting <50% ENN > 5 days, mild-mod muscle/fat wasting    Estimated Needs: based on 73.4 Kg (wt from EMR admit wt )  Calories: 1812 - 2175 Kcal (25 - 30 Kcal/Kg)  Protein: 108 - 145 g (1.5 - 2.0 g/Kg)  Fluids: 1450 - 1812 mL (20 - 25 mL/Kg)    Diet, Low Fiber:   Kosher  Supplement Feeding Modality:  Oral  Ensure Plant-Based Cans or Servings Per Day:  3       Frequency:  Daily (25 @ 14:53) [Active]    Weight History:  Daily Weight in k.3 (2025 06:00) *2+ edema  Daily Weight in k.4 (2025 05:00)  Daily Weight in k.6 (15 Feb 2025 06:17)  Height (cm): 162.6 (25 @ 02:39)  Weight (kg): 73.4 (25 @ 05:46), 66.8 (25 @ 02:39)  BMI (kg/m2): 27.8 (25 @ 05:46), 25.3 (25 @ 02:39)  BSA (m2): 1.79 (25 @ 05:46), 1.72 (25 @ 02:39)    TPN Recommendations: via implanted infusion port  Total Volume: 1800 mL x 24 hours  125 g  Amino Acids  250 g Dextrose (goal 325g)  50 g Lipids 20% pending lipid panel  100 mEq Sodium Chloride  41 mEq Sodium Acetate  10 mmol Sodium Phosphate  0 mEq Potassium Chloride  31 mEq Potassium Acetate  40 mmol Potassium Phosphate  0 mEq Calcium Gluconate (CAPS out of PN solution)   10 mEq Magnesium Sulfate  200 mg Thiamine  1 ml Trace Elements  10 ml MVI  25 units Regular Insulin    Total Calories        (Meets  100%  of  Estimated Energy needs  and  100%  Protein needs)      Additional Recommendations:    1) Daily weights  2) Strict I & O's **pt third spacing  3) Daily lyte checks including magnesium and phos - MONITOR FOR RFS and replete K, Phos, Mg outside PN bag prn  4) Weekly triglycerides/LFT checks  5) POCT q6hrs; maintain 140-180mg/dL***  6) C/w low fiber diet and encourage PO intake as tolerated  7) Continue to titrate dextrose 50-75g per day until goal of 325g reached  8) Confirm goals of care regarding long-term nutrition support. Would recommend PEG placement if long-term nutrition support is required as GI is functional and would be the preferred route for nutrition. However, long-term nutrition support is not recommended with advanced cancer as studies show that there is no improvement of dehydration symptoms, quality of life, or survival. It also increases the risk for peripheral edema, ascites, and pleural effusions.     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  Patient was being followed by GI, ID, Cardiology and Onc there.  Patient has been transferred to  today on patient's  (GI Dr. Pleitez) request.  At this time, patient c/o non-bloody diarrhea x 3 in last 24 hours.  She denies any abdominal pain, nausea, but has some dry heaves.  She denies any fever, chills, chest pain, palpitation, constipation or dysuria. Initially, she was started on Vancomycin and Zosyn, but now she has been on Cefepime as her blood culture and urine cultures are positive for Pseudomonas aeruginosa. Currently, patient is on Cefepime.  ID had also recommended Flagyl, but patient had refused it, as it causes nausea to her. Noted with Metabolic acidosis with NAG due to GI loss. Likely chemo-induced diarrhea and vomiting. Better than before. Cutaneous lesion on RUQ area below right breast, unclear etiology, ? Ecthyma gangrenosum. Admitting diagnosis: pancreatic ca  *: Pt transfer from [facility]; seen by RD and met criteria for PCM. Pt reports only being able to tolerate some sips of water and tea at this time. Reports tried eating small bite of mashed banana and felt burning sensation down her throat. Reports #; RD unable to obtain bedscale wt d/t bedscale not working. Admit wt per # note with 1+ and 2+ edema skewing wt. Pt appears overweight, NFPE reveals mild- mod muscle/fat wasting at this time. Recommend to continue with PO diet and encourage intake as tolerated. RD consulted to initiate PN d/t PO intolerance. Will initiate TPN through port.  *2/15: Remains on low fiber diet with minimal PO intake. Still w/ multiple episodes of diarrhea, if persists can consider adding 5mg zinc into PN bag. No other acute events overnight. D/w Dr. Joyner will c/w TPN today.   *: Raised area noted to mid sternum. RCW port intact.- no swelling noted, no redness. Port flushed without resistance. + blood return noted. Assessed with IV team Amy. Per pt no Pain on palpation. CT Chest. TPN on hold until CT read. CT chest from earlier in the day revealed new bilateral pleural effusions with bibasilar consolidated lung and ascites. CXR now shows low lung volumes and bibasilar opacities. Rigors post transfusion - Rapid response called;  Dr. Pleitez requesting ICU consult- concerned for pt.  requesting assessment of patient STAT. Pt transferred to SICU. ? third spacing, and possible PNA. Per pt still drinking small sips of water and tea. D/w Dr. Fortune will keep total volume same in PN bag today, and will c/w TPN.   *:  tx SICU, po remains minimal. On bipap. On additional LR d/t diuresis per critical care PA: "malignancy/severe protein-calorie malnutrition/hypoalbuminemia causing diffuse third spacing, caution with further diuresis as this is not truly a volume overload/acute CHF issue" also on IV albumin  c/w TPN  STRONGLY suggest to Confirm goals of care regarding LONG-TERM nutrition support - However, LONG-TERM Nutrition support is not recommended with advanced cancer as studies show that there is no improvement of dehydration symptoms, quality of life, or survival. It also increases the risk for peripheral edema, ascites, and pleural effusions. Will continue to provide nutr within GOC though  *: plan to c/w TPN for now. RD discussed w/ MD Tong and SICU IRINA Jurado need to confirm long term GOC regarding nutr support. Per GI - Esophageal pain is 2/2 esophagitis but unclear what type -- reflux vs. candida vs CMV or HSV etc. Plan to add imodium standing. ? EGD   *: reports she stills has difficulty swallowing due to pain. feeling weak overall. still having multiple episodes of diarrhea, poor PO intake. Plan to c/w TPN - per GI: "CVM and HSV will be negative which argues that esophagitis is reflux or fungal related"  d/w IRINA Jurado long-term plan regarding nutr support TBD, pending discussion w/ family. ? fluid overload which TPN may be contributing to as some studies show with advanced cancer that nutrition support increases the risk for peripheral edema, ascites, and pleural effusions.   *: PO slighly improving, plan to c/w TPN.  POCT uptrending, d/w IRINA Jurado, steroids to be dc and will hold off in insulin PN bag - will manage w/ insulin outside bag.  Per GI: "Imaging suggests ileus but I personally think that putting the imaging together with history and clinical findings that she just has severe diarrhea illness 2/2 chemo, given that both large and small bowel loops are fluid filled and she has no abd pain or vomiting"    *TPN initiated 2/2 PO intolerance, + Mucositis, possible candidal esophagitis    *current status: PO intake continues to slightly improve, plan to c/w TPN. Hyperglycemia worsening, d/w IRINA Jurado, plan to add regular insulin into PN bag. Will also decrease dextrose to 250g to see if this helps w/ hyperglycemia and can increase again when POCTs better controlled.     *new pertinent meds: Lantus (8 units once & 5 units HS), Vitamin D3 (2000IU), Pepcid, Abx, Admelog (4 units x 24 hours), Solu-Medrol, Metoprolol, Protonix, KCl (30 mEq), Sodium Bicarbonate, Carafate    *labs reviewed; Lytes WNL except K at low-end of normal. With significant lyte repletion outside of bag yesterday. As per ASPEN Guidelines, maintenance potassium concentration in PN is 1 – 2 mEq/kg/day. However, [facility] PN Policy indicates a maximum of 120 mEq should be added into the PN bag. Currently at 100mEq in PN bag.  T bili WNL will c/w trace elements. C/w MVI and thiamine in PN bag. POCT still elevated, will decrease dextrose and titrate up tomorrow if POCT improved (325g daily; GIR ~3.1 WNL) - will add 15 units of regular insulin to PN bag.  TG WNL, however increasing will c/w 50g of lipids daily.  Also of note, Cl still elevated, will add more acetate vs Cl into TPN bag - 197 vs 100, respectively.       141  |  105  |  66[H]  ----------------------------<  358[H]  3.2[L]   |  26  |  1.33[H]    Ca    8.2[L]      2025 05:18  Phos  3.6       Mg     2.0         TPro  5.3[L]  /  Alb  2.1[L]  /  TBili  0.6  /  DBili  x   /  AST  56[H]  /  ALT  33  /  AlkPhos  329[H]      BMI: BMI (kg/m2): 27.8 (25 @ 05:46)  HbA1c: A1C with Estimated Average Glucose Result: 6.6 % (25 @ 05:35)    Glucose: POCT Blood Glucose.: 345 mg/dL (25 @ 05:34)    BP: 136/90 (25 @ 06:00) (114/82 - 145/78)Vital Signs Last 24 Hrs  T(C): 37.1 (25 @ 06:00), Max: 37.1 (25 @ 06:00)  T(F): 98.8 (25 @ 06:00), Max: 98.8 (25 @ 06:00)  HR: 98 (25 @ 06:00) (98 - 111)  BP: 136/90 (25 @ 06:00) (125/81 - 145/78)  BP(mean): 104 (25 @ 06:00) (91 - 104)  RR: 27 (25 @ 06:00) (25 - 37)  SpO2: 90% (25 @ 06:00) (90% - 99%)    Lipid Panel: Date/Time: 25 @ 05:31  Triglycerides, Serum: 281    POCT Blood Glucose.: 345 mg/dL (2025 05:34)  POCT Blood Glucose.: 238 mg/dL (2025 22:25)  POCT Blood Glucose.: 267 mg/dL (2025 17:45)  POCT Blood Glucose.: 290 mg/dL (2025 12:26)    Iron Total: 34 ug/dL (25 @ 18:46)  Folate, Serum: >20.0 ng/mL (25 @ 18:46)  Vitamin B12, Serum: 1457 pg/mL (25 @ 18:46)  Vitamin D, 25-Hydroxy: 23.0 ng/mL (25 @ 18:46) ** c/w deficiency    *I&O's Detail    2025 07:01  -  2025 07:00  --------------------------------------------------------  IN:    Fat Emulsion (Fish Oil &amp; Plant Based) 20% Infusion: 320 mL    Fat Emulsion (Fish Oil &amp; Plant Based) 20% Infusion: 208 mL    Heparin Infusion: 206 mL    IV PiggyBack: 400 mL    TPN (Total Parenteral Nutrition): 1677 mL  Total IN: 2811 mL    OUT:    Indwelling Catheter - Urethral (mL): 2925 mL  Total OUT: 2925 mL    Total NET: -114 mL  * fluid status: Negative, however full TPN volume not doc'd (1800mL). Will continue to monitor/adjust prn to maintain fluid balance   *last (+) BM doc'd 2/18 x 5 - liquid, diarrhea.  pt on standing bowel regimen (imodium).  *edema: 2+ generalized  *PI: L/R Buttocks (stage 2)    *malnutrition: Pt continues to meet criteria for severe protein calorie malnutrition in context of acute on chronic illness r/t decreased ability to meet increased nutrient needs 2/2 PO intolerance, N/V and diarrhea, on chemo for pancreatic ca AEB meeting <50% ENN > 5 days, mild-mod muscle/fat wasting    Estimated Needs: based on 73.4 Kg (wt from EMR admit wt )  Calories: 1812 - 2175 Kcal (25 - 30 Kcal/Kg)  Protein: 108 - 145 g (1.5 - 2.0 g/Kg)  Fluids: 1450 - 1812 mL (20 - 25 mL/Kg)    Diet, Low Fiber:   Kosher  Supplement Feeding Modality:  Oral  Ensure Plant-Based Cans or Servings Per Day:  3       Frequency:  Daily (25 @ 14:53) [Active]    Weight History:  Daily Weight in k.3 (2025 06:00) *2+ edema  Daily Weight in k.4 (2025 05:00)  Daily Weight in k.6 (15 Feb 2025 06:17)  Height (cm): 162.6 (25 @ 02:39)  Weight (kg): 73.4 (25 @ 05:46), 66.8 (25 @ 02:39)  BMI (kg/m2): 27.8 (25 @ 05:46), 25.3 (25 @ 02:39)  BSA (m2): 1.79 (25 @ 05:46), 1.72 (25 @ 02:39)    TPN Recommendations: via implanted infusion port  Total Volume: 1800 mL x 24 hours  125 g  Amino Acids  250 g Dextrose (goal 325g)  50 g Lipids 20% pending lipid panel  100 mEq Sodium Chloride  41 mEq Sodium Acetate  10 mmol Sodium Phosphate  0 mEq Potassium Chloride  41 mEq Potassium Acetate  40 mmol Potassium Phosphate  0 mEq Calcium Gluconate (CAPS out of PN solution)   10 mEq Magnesium Sulfate  200 mg Thiamine  1 ml Trace Elements  10 ml MVI  15 units Regular Insulin    Total Calories        (Meets  100%  of  Estimated Energy needs  and  100%  Protein needs)      Additional Recommendations:    1) Daily weights  2) Strict I & O's **pt third spacing  3) Daily lyte checks including magnesium and phos  4) Weekly triglycerides/LFT checks  5) POCT q6hrs; maintain 140-180mg/dL***  6) C/w low fiber diet and encourage PO intake as tolerated  7) Continue to titrate dextrose 50-75g per day until goal of 325g reached  8) Confirm goals of care regarding long-term nutrition support. Would recommend PEG placement if long-term nutrition support is required as GI is functional and would be the preferred route for nutrition. However, long-term nutrition support is not recommended with advanced cancer as studies show that there is no improvement of dehydration symptoms, quality of life, or survival. It also increases the risk for peripheral edema, ascites, and pleural effusions.     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  Patient was being followed by GI, ID, Cardiology and Onc there.  Patient has been transferred to  today on patient's  (GI Dr. Pleitez) request.  At this time, patient c/o non-bloody diarrhea x 3 in last 24 hours.  She denies any abdominal pain, nausea, but has some dry heaves.  She denies any fever, chills, chest pain, palpitation, constipation or dysuria. Initially, she was started on Vancomycin and Zosyn, but now she has been on Cefepime as her blood culture and urine cultures are positive for Pseudomonas aeruginosa. Currently, patient is on Cefepime.  ID had also recommended Flagyl, but patient had refused it, as it causes nausea to her. Noted with Metabolic acidosis with NAG due to GI loss. Likely chemo-induced diarrhea and vomiting. Better than before. Cutaneous lesion on RUQ area below right breast, unclear etiology, ? Ecthyma gangrenosum. Admitting diagnosis: pancreatic ca  *: Pt transfer from [facility]; seen by RD and met criteria for PCM. Pt reports only being able to tolerate some sips of water and tea at this time. Reports tried eating small bite of mashed banana and felt burning sensation down her throat. Reports #; RD unable to obtain bedscale wt d/t bedscale not working. Admit wt per # note with 1+ and 2+ edema skewing wt. Pt appears overweight, NFPE reveals mild- mod muscle/fat wasting at this time. Recommend to continue with PO diet and encourage intake as tolerated. RD consulted to initiate PN d/t PO intolerance. Will initiate TPN through port.  *2/15: Remains on low fiber diet with minimal PO intake. Still w/ multiple episodes of diarrhea, if persists can consider adding 5mg zinc into PN bag. No other acute events overnight. D/w Dr. Joyner will c/w TPN today.   *: Raised area noted to mid sternum. RCW port intact.- no swelling noted, no redness. Port flushed without resistance. + blood return noted. Assessed with IV team Amy. Per pt no Pain on palpation. CT Chest. TPN on hold until CT read. CT chest from earlier in the day revealed new bilateral pleural effusions with bibasilar consolidated lung and ascites. CXR now shows low lung volumes and bibasilar opacities. Rigors post transfusion - Rapid response called;  Dr. Pleitez requesting ICU consult- concerned for pt.  requesting assessment of patient STAT. Pt transferred to SICU. ? third spacing, and possible PNA. Per pt still drinking small sips of water and tea. D/w Dr. Fortune will keep total volume same in PN bag today, and will c/w TPN.   *:  tx SICU, po remains minimal. On bipap. On additional LR d/t diuresis per critical care PA: "malignancy/severe protein-calorie malnutrition/hypoalbuminemia causing diffuse third spacing, caution with further diuresis as this is not truly a volume overload/acute CHF issue" also on IV albumin  c/w TPN  STRONGLY suggest to Confirm goals of care regarding LONG-TERM nutrition support - However, LONG-TERM Nutrition support is not recommended with advanced cancer as studies show that there is no improvement of dehydration symptoms, quality of life, or survival. It also increases the risk for peripheral edema, ascites, and pleural effusions. Will continue to provide nutr within GOC though  *: plan to c/w TPN for now. RD discussed w/ MD Tong and SICU IRINA Jurado need to confirm long term GOC regarding nutr support. Per GI - Esophageal pain is 2/2 esophagitis but unclear what type -- reflux vs. candida vs CMV or HSV etc. Plan to add imodium standing. ? EGD   *: reports she stills has difficulty swallowing due to pain. feeling weak overall. still having multiple episodes of diarrhea, poor PO intake. Plan to c/w TPN - per GI: "CVM and HSV will be negative which argues that esophagitis is reflux or fungal related"  d/w IRINA Jurado long-term plan regarding nutr support TBD, pending discussion w/ family. ? fluid overload which TPN may be contributing to as some studies show with advanced cancer that nutrition support increases the risk for peripheral edema, ascites, and pleural effusions.   *: PO slighly improving, plan to c/w TPN.  POCT uptrending, d/w IRINA Jurado, steroids to be dc and will hold off in insulin PN bag - will manage w/ insulin outside bag.  Per GI: "Imaging suggests ileus but I personally think that putting the imaging together with history and clinical findings that she just has severe diarrhea illness 2/2 chemo, given that both large and small bowel loops are fluid filled and she has no abd pain or vomiting"    *TPN initiated 2/2 PO intolerance, + Mucositis, possible candidal esophagitis    *current status: PO intake continues to slightly improve, plan to c/w TPN. Hyperglycemia worsening, d/w IRINA Jurado, plan to add regular insulin into PN bag. Will also decrease dextrose to 250g to see if this helps w/ hyperglycemia and can increase again when POCTs better controlled.     *new pertinent meds: Lantus (8 units once & 5 units HS), Vitamin D3 (2000IU), Pepcid, Abx, Admelog (4 units x 24 hours), Solu-Medrol, Metoprolol, Protonix, KCl (30 mEq), Sodium Bicarbonate, Carafate    *labs reviewed; Lytes WNL except K at low-end of normal. With significant lyte repletion outside of bag yesterday. As per [facility] Guidelines, maintenance potassium concentration in PN is 1 – 2 mEq/kg/day. However, [facility] PN Policy indicates a maximum of 120 mEq should be added into the PN bag. Currently at 100mEq in PN bag.  T bili WNL will c/w trace elements. C/w MVI and thiamine in PN bag. POCT still elevated, will decrease dextrose and titrate up tomorrow if POCT improved (325g daily; GIR ~3.1 WNL) - will add 15 units of regular insulin to PN bag.  TG WNL, however increasing will c/w 50g of lipids daily.  Also of note, Cl still elevated, will add more acetate vs Cl into TPN bag - 207 vs 100, respectively.       141  |  105  |  66[H]  ----------------------------<  358[H]  3.2[L]   |  26  |  1.33[H]    Ca    8.2[L]      2025 05:18  Phos  3.6       Mg     2.0         TPro  5.3[L]  /  Alb  2.1[L]  /  TBili  0.6  /  DBili  x   /  AST  56[H]  /  ALT  33  /  AlkPhos  329[H]      BMI: BMI (kg/m2): 27.8 (25 @ 05:46)  HbA1c: A1C with Estimated Average Glucose Result: 6.6 % (25 @ 05:35)    Glucose: POCT Blood Glucose.: 345 mg/dL (25 @ 05:34)    BP: 136/90 (25 @ 06:00) (114/82 - 145/78)Vital Signs Last 24 Hrs  T(C): 37.1 (25 @ 06:00), Max: 37.1 (25 @ 06:00)  T(F): 98.8 (25 @ 06:00), Max: 98.8 (25 @ 06:00)  HR: 98 (25 @ 06:00) (98 - 111)  BP: 136/90 (25 @ 06:00) (125/81 - 145/78)  BP(mean): 104 (25 @ 06:00) (91 - 104)  RR: 27 (25 @ 06:00) (25 - 37)  SpO2: 90% (25 @ 06:00) (90% - 99%)    Lipid Panel: Date/Time: 25 @ 05:31  Triglycerides, Serum: 281    POCT Blood Glucose.: 345 mg/dL (2025 05:34)  POCT Blood Glucose.: 238 mg/dL (2025 22:25)  POCT Blood Glucose.: 267 mg/dL (2025 17:45)  POCT Blood Glucose.: 290 mg/dL (2025 12:26)    Iron Total: 34 ug/dL (25 @ 18:46)  Folate, Serum: >20.0 ng/mL (25 @ 18:46)  Vitamin B12, Serum: 1457 pg/mL (25 @ 18:46)  Vitamin D, 25-Hydroxy: 23.0 ng/mL (25 @ 18:46) ** c/w deficiency    *I&O's Detail    2025 07:01  -  2025 07:00  --------------------------------------------------------  IN:    Fat Emulsion (Fish Oil &amp; Plant Based) 20% Infusion: 320 mL    Fat Emulsion (Fish Oil &amp; Plant Based) 20% Infusion: 208 mL    Heparin Infusion: 206 mL    IV PiggyBack: 400 mL    TPN (Total Parenteral Nutrition): 1677 mL  Total IN: 2811 mL    OUT:    Indwelling Catheter - Urethral (mL): 2925 mL  Total OUT: 2925 mL    Total NET: -114 mL  * fluid status: Negative, however full TPN volume not doc'd (1800mL). Will continue to monitor/adjust prn to maintain fluid balance   *last (+) BM doc'd 2/18 x 5 - liquid, diarrhea.  pt on standing bowel regimen (imodium).  *edema: 2+ generalized  *PI: L/R Buttocks (stage 2)    *malnutrition: Pt continues to meet criteria for severe protein calorie malnutrition in context of acute on chronic illness r/t decreased ability to meet increased nutrient needs 2/2 PO intolerance, N/V and diarrhea, on chemo for pancreatic ca AEB meeting <50% ENN > 5 days, mild-mod muscle/fat wasting    Estimated Needs: based on 73.4 Kg (wt from EMR admit wt )  Calories: 1812 - 2175 Kcal (25 - 30 Kcal/Kg)  Protein: 108 - 145 g (1.5 - 2.0 g/Kg)  Fluids: 1450 - 1812 mL (20 - 25 mL/Kg)    Diet, Low Fiber:   Kosher  Supplement Feeding Modality:  Oral  Ensure Plant-Based Cans or Servings Per Day:  3       Frequency:  Daily (25 @ 14:53) [Active]    Weight History:  Daily Weight in k.3 (2025 06:00) *2+ edema  Daily Weight in k.4 (2025 05:00)  Daily Weight in k.6 (15 Feb 2025 06:17)  Height (cm): 162.6 (25 @ 02:39)  Weight (kg): 73.4 (25 @ 05:46), 66.8 (25 @ 02:39)  BMI (kg/m2): 27.8 (25 @ 05:46), 25.3 (25 @ 02:39)  BSA (m2): 1.79 (25 @ 05:46), 1.72 (25 @ 02:39)    TPN Recommendations: via implanted infusion port  Total Volume: 1800 mL x 24 hours  125 g  Amino Acids  250 g Dextrose (goal 325g)  50 g Lipids 20%   100 mEq Sodium Chloride  41 mEq Sodium Acetate  10 mmol Sodium Phosphate  0 mEq Potassium Chloride  41 mEq Potassium Acetate  40 mmol Potassium Phosphate  0 mEq Calcium Gluconate (CAPS out of PN solution)   10 mEq Magnesium Sulfate  200 mg Thiamine  1 ml Trace Elements  10 ml MVI  15 units Regular Insulin    Total Calories    1850   (Meets  100%  of lower end of Estimated Energy needs  and  100%  Protein needs)      Additional Recommendations:    1) Daily weights  2) Strict I & O's **pt third spacing  3) Daily lyte checks including magnesium and phos  4) Weekly triglycerides/LFT checks  5) POCT q6hrs; maintain 140-180mg/dL***  6) C/w low fiber diet and encourage PO intake as tolerated  7) Continue to titrate dextrose 50-75g per day until goal of 325g reached  8) Confirm goals of care regarding long-term nutrition support. Would recommend PEG placement if long-term nutrition support is required as GI is functional and would be the preferred route for nutrition. However, long-term nutrition support is not recommended with advanced cancer as studies show that there is no improvement of dehydration symptoms, quality of life, or survival. It also increases the risk for peripheral edema, ascites, and pleural effusions.     *will continue to monitor and adjust PN prn*  Sheri Seo, MS, RDN, CDN, [facility] [phone]  Certified Nutrition

## 2025-02-22 LAB
ALBUMIN SERPL ELPH-MCNC: 1.8 G/DL — LOW (ref 3.3–5)
ALP SERPL-CCNC: 326 U/L — HIGH (ref 40–120)
ALT FLD-CCNC: 46 U/L — SIGNIFICANT CHANGE UP (ref 12–78)
ANION GAP SERPL CALC-SCNC: 6 MMOL/L — SIGNIFICANT CHANGE UP (ref 5–17)
APTT BLD: 61.7 SEC — HIGH (ref 24.5–35.6)
AST SERPL-CCNC: 65 U/L — HIGH (ref 15–37)
BUN SERPL-MCNC: 70 MG/DL — HIGH (ref 7–23)
CHLORIDE SERPL-SCNC: 109 MMOL/L — HIGH (ref 96–108)
CREAT SERPL-MCNC: 1.25 MG/DL — SIGNIFICANT CHANGE UP (ref 0.5–1.3)
EGFR: 46 ML/MIN/1.73M2 — LOW
EGFR: 46 ML/MIN/1.73M2 — LOW
GGT SERPL-CCNC: 146 U/L — HIGH (ref 8–40)
GLUCOSE BLDC GLUCOMTR-MCNC: 220 MG/DL — HIGH (ref 70–99)
GLUCOSE BLDC GLUCOMTR-MCNC: 266 MG/DL — HIGH (ref 70–99)
GLUCOSE BLDC GLUCOMTR-MCNC: 267 MG/DL — HIGH (ref 70–99)
GLUCOSE BLDC GLUCOMTR-MCNC: 302 MG/DL — HIGH (ref 70–99)
GLUCOSE SERPL-MCNC: 274 MG/DL — HIGH (ref 70–99)
HCT VFR BLD CALC: 30.7 % — LOW (ref 34.5–45)
HGB BLD-MCNC: 10.4 G/DL — LOW (ref 11.5–15.5)
MAGNESIUM SERPL-MCNC: 1.8 MG/DL — SIGNIFICANT CHANGE UP (ref 1.6–2.6)
MCHC RBC-ENTMCNC: 29.2 PG — SIGNIFICANT CHANGE UP (ref 27–34)
MCHC RBC-ENTMCNC: 33.9 G/DL — SIGNIFICANT CHANGE UP (ref 32–36)
NRBC # BLD AUTO: 0.74 K/UL — HIGH (ref 0–0)
NRBC # FLD: 0.74 K/UL — HIGH (ref 0–0)
NRBC BLD AUTO-RTO: 2 /100 WBCS — HIGH (ref 0–0)
PHOSPHATE SERPL-MCNC: 4.2 MG/DL — SIGNIFICANT CHANGE UP (ref 2.5–4.5)
PLATELET # BLD AUTO: 160 K/UL — SIGNIFICANT CHANGE UP (ref 150–400)
PMV BLD: SIGNIFICANT CHANGE UP (ref 7–13)
POTASSIUM SERPL-MCNC: 4.2 MMOL/L — SIGNIFICANT CHANGE UP (ref 3.5–5.3)
POTASSIUM SERPL-SCNC: 4.2 MMOL/L — SIGNIFICANT CHANGE UP (ref 3.5–5.3)
PROT SERPL-MCNC: 4.8 GM/DL — LOW (ref 6–8.3)
RBC # BLD: 3.56 M/UL — LOW (ref 3.8–5.2)
RBC # FLD: 15.7 % — HIGH (ref 10.3–14.5)
SODIUM SERPL-SCNC: 141 MMOL/L — SIGNIFICANT CHANGE UP (ref 135–145)
WBC # BLD: 38.25 K/UL — HIGH (ref 3.8–10.5)
WBC # FLD AUTO: 38.25 K/UL — HIGH (ref 3.8–10.5)

## 2025-02-22 PROCEDURE — 99232 SBSQ HOSP IP/OBS MODERATE 35: CPT

## 2025-02-22 PROCEDURE — 71045 X-RAY EXAM CHEST 1 VIEW: CPT | Mod: 26

## 2025-02-22 PROCEDURE — 74018 RADEX ABDOMEN 1 VIEW: CPT | Mod: 26

## 2025-02-22 PROCEDURE — 74176 CT ABD & PELVIS W/O CONTRAST: CPT | Mod: 26

## 2025-02-22 PROCEDURE — 99233 SBSQ HOSP IP/OBS HIGH 50: CPT

## 2025-02-22 RX ORDER — INSULIN GLARGINE-YFGN 100 [IU]/ML
15 INJECTION, SOLUTION SUBCUTANEOUS AT BEDTIME
Refills: 0 | Status: DISCONTINUED | OUTPATIENT
Start: 2025-02-22 | End: 2025-02-24

## 2025-02-22 RX ORDER — FUROSEMIDE 10 MG/ML
20 INJECTION INTRAMUSCULAR; INTRAVENOUS ONCE
Refills: 0 | Status: COMPLETED | OUTPATIENT
Start: 2025-02-22 | End: 2025-02-22

## 2025-02-22 RX ORDER — I.V. FAT EMULSION 20 G/100ML
0.68 EMULSION INTRAVENOUS
Qty: 50 | Refills: 0 | Status: DISCONTINUED | OUTPATIENT
Start: 2025-02-22 | End: 2025-02-22

## 2025-02-22 RX ORDER — METHYLPREDNISOLONE ACETATE 80 MG/ML
20 INJECTION, SUSPENSION INTRA-ARTICULAR; INTRALESIONAL; INTRAMUSCULAR; SOFT TISSUE DAILY
Refills: 0 | Status: DISCONTINUED | OUTPATIENT
Start: 2025-02-22 | End: 2025-02-23

## 2025-02-22 RX ORDER — METOCLOPRAMIDE HCL 10 MG
5 TABLET ORAL EVERY 6 HOURS
Refills: 0 | Status: DISCONTINUED | OUTPATIENT
Start: 2025-02-22 | End: 2025-02-24

## 2025-02-22 RX ORDER — SODIUM/POT/MAG/CALC/CHLOR/ACET 35-20-5MEQ
1 VIAL (ML) INTRAVENOUS
Refills: 0 | Status: DISCONTINUED | OUTPATIENT
Start: 2025-02-22 | End: 2025-02-22

## 2025-02-22 RX ORDER — ALBUMIN (HUMAN) 12.5 G/50ML
100 INJECTION, SOLUTION INTRAVENOUS EVERY 6 HOURS
Refills: 0 | Status: COMPLETED | OUTPATIENT
Start: 2025-02-22 | End: 2025-02-23

## 2025-02-22 RX ADMIN — METOPROLOL SUCCINATE 2.5 MILLIGRAM(S): 50 TABLET, EXTENDED RELEASE ORAL at 05:22

## 2025-02-22 RX ADMIN — Medication 5 MILLIGRAM(S): at 17:50

## 2025-02-22 RX ADMIN — HEPARIN SODIUM 700 UNIT(S)/HR: 1000 INJECTION INTRAVENOUS; SUBCUTANEOUS at 14:20

## 2025-02-22 RX ADMIN — HEPARIN SODIUM 700 UNIT(S)/HR: 1000 INJECTION INTRAVENOUS; SUBCUTANEOUS at 19:24

## 2025-02-22 RX ADMIN — INSULIN GLARGINE-YFGN 15 UNIT(S): 100 INJECTION, SOLUTION SUBCUTANEOUS at 21:37

## 2025-02-22 RX ADMIN — INSULIN LISPRO 6: 100 INJECTION, SOLUTION INTRAVENOUS; SUBCUTANEOUS at 05:22

## 2025-02-22 RX ADMIN — Medication 5 MILLIGRAM(S): at 23:21

## 2025-02-22 RX ADMIN — METOPROLOL SUCCINATE 2.5 MILLIGRAM(S): 50 TABLET, EXTENDED RELEASE ORAL at 23:21

## 2025-02-22 RX ADMIN — INSULIN LISPRO 8: 100 INJECTION, SOLUTION INTRAVENOUS; SUBCUTANEOUS at 18:26

## 2025-02-22 RX ADMIN — METHYLPREDNISOLONE ACETATE 40 MILLIGRAM(S): 80 INJECTION, SUSPENSION INTRA-ARTICULAR; INTRALESIONAL; INTRAMUSCULAR; SOFT TISSUE at 10:16

## 2025-02-22 RX ADMIN — Medication 20 MILLIGRAM(S): at 21:22

## 2025-02-22 RX ADMIN — Medication 125 MILLIGRAM(S): at 05:22

## 2025-02-22 RX ADMIN — AMLODIPINE BESYLATE 10 MILLIGRAM(S): 10 TABLET ORAL at 11:06

## 2025-02-22 RX ADMIN — HEPARIN SODIUM 900 UNIT(S)/HR: 1000 INJECTION INTRAVENOUS; SUBCUTANEOUS at 07:08

## 2025-02-22 RX ADMIN — FUROSEMIDE 20 MILLIGRAM(S): 10 INJECTION INTRAMUSCULAR; INTRAVENOUS at 17:49

## 2025-02-22 RX ADMIN — METOPROLOL SUCCINATE 2.5 MILLIGRAM(S): 50 TABLET, EXTENDED RELEASE ORAL at 11:05

## 2025-02-22 RX ADMIN — I.V. FAT EMULSION 20.83 GM/KG/DAY: 20 EMULSION INTRAVENOUS at 22:20

## 2025-02-22 RX ADMIN — Medication 1 EACH: at 22:20

## 2025-02-22 RX ADMIN — Medication 125 MILLIGRAM(S): at 11:05

## 2025-02-22 RX ADMIN — INSULIN LISPRO 6: 100 INJECTION, SOLUTION INTRAVENOUS; SUBCUTANEOUS at 23:20

## 2025-02-22 RX ADMIN — MEROPENEM 1000 MILLIGRAM(S): 1 INJECTION INTRAVENOUS at 10:16

## 2025-02-22 RX ADMIN — Medication 40 MILLIGRAM(S): at 10:15

## 2025-02-22 RX ADMIN — Medication 1 GRAM(S): at 05:23

## 2025-02-22 RX ADMIN — Medication 2000 UNIT(S): at 21:20

## 2025-02-22 RX ADMIN — Medication 1 GRAM(S): at 17:50

## 2025-02-22 RX ADMIN — METOPROLOL SUCCINATE 2.5 MILLIGRAM(S): 50 TABLET, EXTENDED RELEASE ORAL at 17:49

## 2025-02-22 RX ADMIN — Medication 1 GRAM(S): at 11:05

## 2025-02-22 RX ADMIN — ALBUMIN (HUMAN) 50 MILLILITER(S): 12.5 INJECTION, SOLUTION INTRAVENOUS at 21:20

## 2025-02-22 RX ADMIN — Medication 100 MILLIGRAM(S): at 10:15

## 2025-02-22 RX ADMIN — MEROPENEM 1000 MILLIGRAM(S): 1 INJECTION INTRAVENOUS at 21:22

## 2025-02-22 RX ADMIN — Medication 125 MILLIGRAM(S): at 17:49

## 2025-02-22 NOTE — PROGRESS NOTE ADULT - SUBJECTIVE AND OBJECTIVE BOX
OVERNIGHT EVENTS / SUBJECTIVE: Patient seen and examined at bedside.     OBJECTIVE:    VITAL SIGNS:  ICU Vital Signs Last 24 Hrs  T(C): 36.7 (22 Feb 2025 05:53), Max: 36.7 (21 Feb 2025 10:31)  T(F): 98 (22 Feb 2025 05:53), Max: 98.1 (21 Feb 2025 10:31)  HR: 119 (22 Feb 2025 08:34) (101 - 121)  BP: 125/90 (22 Feb 2025 06:00) (118/90 - 148/94)  BP(mean): 100 (22 Feb 2025 06:00) (99 - 110)  ABP: --  ABP(mean): --  RR: 33 (22 Feb 2025 08:34) (19 - 36)  SpO2: 93% (22 Feb 2025 08:34) (88% - 99%)    O2 Parameters below as of 22 Feb 2025 08:34  Patient On (Oxygen Delivery Method): room air              02-21 @ 07:01  -  02-22 @ 07:00  --------------------------------------------------------  IN: 1021 mL / OUT: 3225 mL / NET: -2204 mL      CAPILLARY BLOOD GLUCOSE      POCT Blood Glucose.: 266 mg/dL (22 Feb 2025 05:13)      PHYSICAL EXAM:    General: NAD  HEENT: NC/AT; PERRL, clear conjunctiva  Neck: supple  Respiratory: CTA b/l  Cardiovascular: +S1/S2; RRR  Abdomen: soft, NT/ND; +BS x4  Extremities: WWP, 2+ peripheral pulses b/l; no LE edema  Skin: normal color and turgor; no rash  Neurological:    MEDICATIONS:  MEDICATIONS  (STANDING):  acetaminophen   IVPB .. 1000 milliGRAM(s) IV Intermittent once  acetaminophen   IVPB .. 1000 milliGRAM(s) IV Intermittent once  amLODIPine   Tablet 10 milliGRAM(s) Oral daily  chlorhexidine 2% Cloths 1 Application(s) Topical <User Schedule>  cholecalciferol 2000 Unit(s) Oral at bedtime  dextrose 5%. 1000 milliLiter(s) (50 mL/Hr) IV Continuous <Continuous>  dextrose 5%. 1000 milliLiter(s) (100 mL/Hr) IV Continuous <Continuous>  dextrose 50% Injectable 25 Gram(s) IV Push once  dextrose 50% Injectable 12.5 Gram(s) IV Push once  dextrose 50% Injectable 25 Gram(s) IV Push once  famotidine Injectable 20 milliGRAM(s) IV Push daily  fluconAZOLE IVPB 200 milliGRAM(s) IV Intermittent every 24 hours  fluconAZOLE IVPB      glucagon  Injectable 1 milliGRAM(s) IntraMuscular once  heparin  Infusion.  Unit(s)/Hr (13 mL/Hr) IV Continuous <Continuous>  insulin glargine Injectable (LANTUS) 10 Unit(s) SubCutaneous at bedtime  insulin lispro (ADMELOG) corrective regimen sliding scale   SubCutaneous every 6 hours  lipid, fat emulsion (Fish Oil and Plant Based) 20% Infusion 0.6812 Gm/kG/Day (20.8 mL/Hr) IV Continuous <Continuous>  meropenem Injectable 1000 milliGRAM(s) IV Push every 12 hours  methylPREDNISolone sodium succinate Injectable 40 milliGRAM(s) IV Push daily  metoprolol tartrate Injectable 2.5 milliGRAM(s) IV Push every 6 hours  pantoprazole  Injectable 40 milliGRAM(s) IV Push daily  Parenteral Nutrition - Adult 1 Each (75 mL/Hr) TPN Continuous <Continuous>  sucralfate suspension 1 Gram(s) Oral four times a day  vancomycin    Solution 125 milliGRAM(s) Oral every 6 hours    MEDICATIONS  (PRN):  acetaminophen     Tablet .. 650 milliGRAM(s) Oral every 6 hours PRN Temp greater or equal to 38C (100.4F), Mild Pain (1 - 3)  aluminum hydroxide/magnesium hydroxide/simethicone Suspension 30 milliLiter(s) Oral every 4 hours PRN Dyspepsia  Biotene Dry Mouth Oral Rinse 15 milliLiter(s) Swish and Spit five times a day PRN Mouth Care  dextrose Oral Gel 15 Gram(s) Oral once PRN Blood Glucose LESS THAN 70 milliGRAM(s)/deciliter  heparin   Injectable 6000 Unit(s) IV Push every 6 hours PRN For aPTT less than 40  heparin   Injectable 3000 Unit(s) IV Push every 6 hours PRN For aPTT between 40 - 57  melatonin 3 milliGRAM(s) Oral at bedtime PRN Insomnia  ondansetron Injectable 4 milliGRAM(s) IV Push every 8 hours PRN Nausea and/or Vomiting  sodium chloride 0.65% Nasal 1 Spray(s) Both Nostrils two times a day PRN Nasal Congestion      ALLERGIES:  Allergies    No Known Allergies    Intolerances        LABS:                        10.4   38.25 )-----------( 160      ( 22 Feb 2025 05:34 )             30.7     Hemoglobin: 10.4 g/dL (02-22 @ 05:34)  Hemoglobin: 10.5 g/dL (02-21 @ 05:18)  Hemoglobin: 10.8 g/dL (02-20 @ 05:31)  Hemoglobin: 10.6 g/dL (02-19 @ 22:15)  Hemoglobin: 10.7 g/dL (02-19 @ 05:35)    CBC Full  -  ( 22 Feb 2025 05:34 )  WBC Count : 38.25 K/uL  RBC Count : 3.56 M/uL  Hemoglobin : 10.4 g/dL  Hematocrit : 30.7 %  Platelet Count - Automated : 160 K/uL  Mean Cell Volume : 86.2 fl  Mean Cell Hemoglobin : 29.2 pg  Mean Cell Hemoglobin Concentration : 33.9 g/dL  Auto Neutrophil # : x  Auto Lymphocyte # : x  Auto Monocyte # : x  Auto Eosinophil # : x  Auto Basophil # : x  Auto Neutrophil % : x  Auto Lymphocyte % : x  Auto Monocyte % : x  Auto Eosinophil % : x  Auto Basophil % : x    02-22    141  |  109[H]  |  70[H]  ----------------------------<  274[H]  4.2   |  26  |  1.25    Ca    7.7[L]      22 Feb 2025 05:32  Phos  4.2     02-22  Mg     1.8     02-22    TPro  4.8[L]  /  Alb  1.8[L]  /  TBili  0.6  /  DBili  x   /  AST  65[H]  /  ALT  46  /  AlkPhos  326[H]  02-22    Creatinine Trend: 1.25<--, 1.33<--, 1.40<--, 1.43<--, 1.42<--, 1.42<--  LIVER FUNCTIONS - ( 22 Feb 2025 05:32 )  Alb: 1.8 g/dL / Pro: 4.8 gm/dL / ALK PHOS: 326 U/L / ALT: 46 U/L / AST: 65 U/L / GGT: x           PTT - ( 21 Feb 2025 05:18 )  PTT:96.6 sec    hs Troponin:            Urinalysis Basic - ( 22 Feb 2025 05:32 )    Color: x / Appearance: x / SG: x / pH: x  Gluc: 274 mg/dL / Ketone: x  / Bili: x / Urobili: x   Blood: x / Protein: x / Nitrite: x   Leuk Esterase: x / RBC: x / WBC x   Sq Epi: x / Non Sq Epi: x / Bacteria: x      CSF:                      EKG:   MICROBIOLOGY:    IMAGING:      Labs, imaging, EKG personally reviewed    RADIOLOGY & ADDITIONAL TESTS: Reviewed. OVERNIGHT EVENTS / SUBJECTIVE: Patient seen and examined at bedside.     No acute events overnight. Pt remains tachypneic this AM, O2 sat stable on room air. WBC rising to 38, on steroid, afebrile.    OBJECTIVE:    VITAL SIGNS:  ICU Vital Signs Last 24 Hrs  T(C): 36.7 (22 Feb 2025 05:53), Max: 36.7 (21 Feb 2025 10:31)  T(F): 98 (22 Feb 2025 05:53), Max: 98.1 (21 Feb 2025 10:31)  HR: 119 (22 Feb 2025 08:34) (101 - 121)  BP: 125/90 (22 Feb 2025 06:00) (118/90 - 148/94)  BP(mean): 100 (22 Feb 2025 06:00) (99 - 110)  ABP: --  ABP(mean): --  RR: 33 (22 Feb 2025 08:34) (19 - 36)  SpO2: 93% (22 Feb 2025 08:34) (88% - 99%)    O2 Parameters below as of 22 Feb 2025 08:34  Patient On (Oxygen Delivery Method): room air              02-21 @ 07:01  -  02-22 @ 07:00  --------------------------------------------------------  IN: 1021 mL / OUT: 3225 mL / NET: -2204 mL      CAPILLARY BLOOD GLUCOSE      POCT Blood Glucose.: 266 mg/dL (22 Feb 2025 05:13)      PHYSICAL EXAM:    General: NAD  HEENT: NC/AT; PERRL, clear conjunctiva  Neck: supple  Respiratory: CTA b/l  Cardiovascular: +S1/S2; RRR  Abdomen: soft, NT/ND; +BS x4  Extremities: WWP, 2+ peripheral pulses b/l; no LE edema  Skin: normal color and turgor; no rash  Neurological:    MEDICATIONS:  MEDICATIONS  (STANDING):  acetaminophen   IVPB .. 1000 milliGRAM(s) IV Intermittent once  acetaminophen   IVPB .. 1000 milliGRAM(s) IV Intermittent once  amLODIPine   Tablet 10 milliGRAM(s) Oral daily  chlorhexidine 2% Cloths 1 Application(s) Topical <User Schedule>  cholecalciferol 2000 Unit(s) Oral at bedtime  dextrose 5%. 1000 milliLiter(s) (50 mL/Hr) IV Continuous <Continuous>  dextrose 5%. 1000 milliLiter(s) (100 mL/Hr) IV Continuous <Continuous>  dextrose 50% Injectable 25 Gram(s) IV Push once  dextrose 50% Injectable 12.5 Gram(s) IV Push once  dextrose 50% Injectable 25 Gram(s) IV Push once  famotidine Injectable 20 milliGRAM(s) IV Push daily  fluconAZOLE IVPB 200 milliGRAM(s) IV Intermittent every 24 hours  fluconAZOLE IVPB      glucagon  Injectable 1 milliGRAM(s) IntraMuscular once  heparin  Infusion.  Unit(s)/Hr (13 mL/Hr) IV Continuous <Continuous>  insulin glargine Injectable (LANTUS) 10 Unit(s) SubCutaneous at bedtime  insulin lispro (ADMELOG) corrective regimen sliding scale   SubCutaneous every 6 hours  lipid, fat emulsion (Fish Oil and Plant Based) 20% Infusion 0.6812 Gm/kG/Day (20.8 mL/Hr) IV Continuous <Continuous>  meropenem Injectable 1000 milliGRAM(s) IV Push every 12 hours  methylPREDNISolone sodium succinate Injectable 40 milliGRAM(s) IV Push daily  metoprolol tartrate Injectable 2.5 milliGRAM(s) IV Push every 6 hours  pantoprazole  Injectable 40 milliGRAM(s) IV Push daily  Parenteral Nutrition - Adult 1 Each (75 mL/Hr) TPN Continuous <Continuous>  sucralfate suspension 1 Gram(s) Oral four times a day  vancomycin    Solution 125 milliGRAM(s) Oral every 6 hours    MEDICATIONS  (PRN):  acetaminophen     Tablet .. 650 milliGRAM(s) Oral every 6 hours PRN Temp greater or equal to 38C (100.4F), Mild Pain (1 - 3)  aluminum hydroxide/magnesium hydroxide/simethicone Suspension 30 milliLiter(s) Oral every 4 hours PRN Dyspepsia  Biotene Dry Mouth Oral Rinse 15 milliLiter(s) Swish and Spit five times a day PRN Mouth Care  dextrose Oral Gel 15 Gram(s) Oral once PRN Blood Glucose LESS THAN 70 milliGRAM(s)/deciliter  heparin   Injectable 6000 Unit(s) IV Push every 6 hours PRN For aPTT less than 40  heparin   Injectable 3000 Unit(s) IV Push every 6 hours PRN For aPTT between 40 - 57  melatonin 3 milliGRAM(s) Oral at bedtime PRN Insomnia  ondansetron Injectable 4 milliGRAM(s) IV Push every 8 hours PRN Nausea and/or Vomiting  sodium chloride 0.65% Nasal 1 Spray(s) Both Nostrils two times a day PRN Nasal Congestion      ALLERGIES:  Allergies    No Known Allergies    Intolerances        LABS:                        10.4   38.25 )-----------( 160      ( 22 Feb 2025 05:34 )             30.7     Hemoglobin: 10.4 g/dL (02-22 @ 05:34)  Hemoglobin: 10.5 g/dL (02-21 @ 05:18)  Hemoglobin: 10.8 g/dL (02-20 @ 05:31)  Hemoglobin: 10.6 g/dL (02-19 @ 22:15)  Hemoglobin: 10.7 g/dL (02-19 @ 05:35)    CBC Full  -  ( 22 Feb 2025 05:34 )  WBC Count : 38.25 K/uL  RBC Count : 3.56 M/uL  Hemoglobin : 10.4 g/dL  Hematocrit : 30.7 %  Platelet Count - Automated : 160 K/uL  Mean Cell Volume : 86.2 fl  Mean Cell Hemoglobin : 29.2 pg  Mean Cell Hemoglobin Concentration : 33.9 g/dL  Auto Neutrophil # : x  Auto Lymphocyte # : x  Auto Monocyte # : x  Auto Eosinophil # : x  Auto Basophil # : x  Auto Neutrophil % : x  Auto Lymphocyte % : x  Auto Monocyte % : x  Auto Eosinophil % : x  Auto Basophil % : x    02-22    141  |  109[H]  |  70[H]  ----------------------------<  274[H]  4.2   |  26  |  1.25    Ca    7.7[L]      22 Feb 2025 05:32  Phos  4.2     02-22  Mg     1.8     02-22    TPro  4.8[L]  /  Alb  1.8[L]  /  TBili  0.6  /  DBili  x   /  AST  65[H]  /  ALT  46  /  AlkPhos  326[H]  02-22    Creatinine Trend: 1.25<--, 1.33<--, 1.40<--, 1.43<--, 1.42<--, 1.42<--  LIVER FUNCTIONS - ( 22 Feb 2025 05:32 )  Alb: 1.8 g/dL / Pro: 4.8 gm/dL / ALK PHOS: 326 U/L / ALT: 46 U/L / AST: 65 U/L / GGT: x           PTT - ( 21 Feb 2025 05:18 )  PTT:96.6 sec    hs Troponin:            Urinalysis Basic - ( 22 Feb 2025 05:32 )    Color: x / Appearance: x / SG: x / pH: x  Gluc: 274 mg/dL / Ketone: x  / Bili: x / Urobili: x   Blood: x / Protein: x / Nitrite: x   Leuk Esterase: x / RBC: x / WBC x   Sq Epi: x / Non Sq Epi: x / Bacteria: x      CSF:                      EKG:   MICROBIOLOGY:    IMAGING:      Labs, imaging, EKG personally reviewed    RADIOLOGY & ADDITIONAL TESTS: Reviewed. OVERNIGHT EVENTS / SUBJECTIVE: Patient seen and examined at bedside.     No acute events overnight. Pt remains tachypneic this AM, O2 sat stable on room air. WBC rising to 38, on steroid, afebrile. Cr improving.     OBJECTIVE:    VITAL SIGNS:  ICU Vital Signs Last 24 Hrs  T(C): 36.7 (22 Feb 2025 05:53), Max: 36.7 (21 Feb 2025 10:31)  T(F): 98 (22 Feb 2025 05:53), Max: 98.1 (21 Feb 2025 10:31)  HR: 119 (22 Feb 2025 08:34) (101 - 121)  BP: 125/90 (22 Feb 2025 06:00) (118/90 - 148/94)  BP(mean): 100 (22 Feb 2025 06:00) (99 - 110)  ABP: --  ABP(mean): --  RR: 33 (22 Feb 2025 08:34) (19 - 36)  SpO2: 93% (22 Feb 2025 08:34) (88% - 99%)    O2 Parameters below as of 22 Feb 2025 08:34  Patient On (Oxygen Delivery Method): room air              02-21 @ 07:01  -  02-22 @ 07:00  --------------------------------------------------------  IN: 1021 mL / OUT: 3225 mL / NET: -2204 mL      CAPILLARY BLOOD GLUCOSE      POCT Blood Glucose.: 266 mg/dL (22 Feb 2025 05:13)      PHYSICAL EXAM:    General: Mildly tachpneic, in no pain  HEENT: NC/AT; PERRL, clear conjunctiva  Neck: supple  Respiratory: Shallow breaths, clear lungs, decreased at bases  Cardiovascular: +S1/S2; RRR  Abdomen: soft, distended and firm, nontender  Extremities: Warm, 2+ LE edema  Skin: normal color and turgor; no rash  Neurological: A&Ox3, generalized weakness    MEDICATIONS:  MEDICATIONS  (STANDING):  acetaminophen   IVPB .. 1000 milliGRAM(s) IV Intermittent once  acetaminophen   IVPB .. 1000 milliGRAM(s) IV Intermittent once  amLODIPine   Tablet 10 milliGRAM(s) Oral daily  chlorhexidine 2% Cloths 1 Application(s) Topical <User Schedule>  cholecalciferol 2000 Unit(s) Oral at bedtime  dextrose 5%. 1000 milliLiter(s) (50 mL/Hr) IV Continuous <Continuous>  dextrose 5%. 1000 milliLiter(s) (100 mL/Hr) IV Continuous <Continuous>  dextrose 50% Injectable 25 Gram(s) IV Push once  dextrose 50% Injectable 12.5 Gram(s) IV Push once  dextrose 50% Injectable 25 Gram(s) IV Push once  famotidine Injectable 20 milliGRAM(s) IV Push daily  fluconAZOLE IVPB 200 milliGRAM(s) IV Intermittent every 24 hours  fluconAZOLE IVPB      glucagon  Injectable 1 milliGRAM(s) IntraMuscular once  heparin  Infusion.  Unit(s)/Hr (13 mL/Hr) IV Continuous <Continuous>  insulin glargine Injectable (LANTUS) 10 Unit(s) SubCutaneous at bedtime  insulin lispro (ADMELOG) corrective regimen sliding scale   SubCutaneous every 6 hours  lipid, fat emulsion (Fish Oil and Plant Based) 20% Infusion 0.6812 Gm/kG/Day (20.8 mL/Hr) IV Continuous <Continuous>  meropenem Injectable 1000 milliGRAM(s) IV Push every 12 hours  methylPREDNISolone sodium succinate Injectable 40 milliGRAM(s) IV Push daily  metoprolol tartrate Injectable 2.5 milliGRAM(s) IV Push every 6 hours  pantoprazole  Injectable 40 milliGRAM(s) IV Push daily  Parenteral Nutrition - Adult 1 Each (75 mL/Hr) TPN Continuous <Continuous>  sucralfate suspension 1 Gram(s) Oral four times a day  vancomycin    Solution 125 milliGRAM(s) Oral every 6 hours    MEDICATIONS  (PRN):  acetaminophen     Tablet .. 650 milliGRAM(s) Oral every 6 hours PRN Temp greater or equal to 38C (100.4F), Mild Pain (1 - 3)  aluminum hydroxide/magnesium hydroxide/simethicone Suspension 30 milliLiter(s) Oral every 4 hours PRN Dyspepsia  Biotene Dry Mouth Oral Rinse 15 milliLiter(s) Swish and Spit five times a day PRN Mouth Care  dextrose Oral Gel 15 Gram(s) Oral once PRN Blood Glucose LESS THAN 70 milliGRAM(s)/deciliter  heparin   Injectable 6000 Unit(s) IV Push every 6 hours PRN For aPTT less than 40  heparin   Injectable 3000 Unit(s) IV Push every 6 hours PRN For aPTT between 40 - 57  melatonin 3 milliGRAM(s) Oral at bedtime PRN Insomnia  ondansetron Injectable 4 milliGRAM(s) IV Push every 8 hours PRN Nausea and/or Vomiting  sodium chloride 0.65% Nasal 1 Spray(s) Both Nostrils two times a day PRN Nasal Congestion      ALLERGIES:  Allergies    No Known Allergies    Intolerances        LABS:                        10.4   38.25 )-----------( 160      ( 22 Feb 2025 05:34 )             30.7     Hemoglobin: 10.4 g/dL (02-22 @ 05:34)  Hemoglobin: 10.5 g/dL (02-21 @ 05:18)  Hemoglobin: 10.8 g/dL (02-20 @ 05:31)  Hemoglobin: 10.6 g/dL (02-19 @ 22:15)  Hemoglobin: 10.7 g/dL (02-19 @ 05:35)    CBC Full  -  ( 22 Feb 2025 05:34 )  WBC Count : 38.25 K/uL  RBC Count : 3.56 M/uL  Hemoglobin : 10.4 g/dL  Hematocrit : 30.7 %  Platelet Count - Automated : 160 K/uL  Mean Cell Volume : 86.2 fl  Mean Cell Hemoglobin : 29.2 pg  Mean Cell Hemoglobin Concentration : 33.9 g/dL  Auto Neutrophil # : x  Auto Lymphocyte # : x  Auto Monocyte # : x  Auto Eosinophil # : x  Auto Basophil # : x  Auto Neutrophil % : x  Auto Lymphocyte % : x  Auto Monocyte % : x  Auto Eosinophil % : x  Auto Basophil % : x    02-22    141  |  109[H]  |  70[H]  ----------------------------<  274[H]  4.2   |  26  |  1.25    Ca    7.7[L]      22 Feb 2025 05:32  Phos  4.2     02-22  Mg     1.8     02-22    TPro  4.8[L]  /  Alb  1.8[L]  /  TBili  0.6  /  DBili  x   /  AST  65[H]  /  ALT  46  /  AlkPhos  326[H]  02-22    Creatinine Trend: 1.25<--, 1.33<--, 1.40<--, 1.43<--, 1.42<--, 1.42<--  LIVER FUNCTIONS - ( 22 Feb 2025 05:32 )  Alb: 1.8 g/dL / Pro: 4.8 gm/dL / ALK PHOS: 326 U/L / ALT: 46 U/L / AST: 65 U/L / GGT: x           PTT - ( 21 Feb 2025 05:18 )  PTT:96.6 sec    hs Troponin:            Urinalysis Basic - ( 22 Feb 2025 05:32 )    Color: x / Appearance: x / SG: x / pH: x  Gluc: 274 mg/dL / Ketone: x  / Bili: x / Urobili: x   Blood: x / Protein: x / Nitrite: x   Leuk Esterase: x / RBC: x / WBC x   Sq Epi: x / Non Sq Epi: x / Bacteria: x      CSF:                      EKG:   MICROBIOLOGY:    IMAGING:      Labs, imaging, EKG personally reviewed    RADIOLOGY & ADDITIONAL TESTS: Reviewed.

## 2025-02-22 NOTE — PROGRESS NOTE ADULT - ASSESSMENT
69-year-old female recent diagnosis of primary pancreatic adenocarcinoma , pancytopenia and significant PMH of SCC lip, cyclical vomiting syndrome, HTN, HPL, Gout, GERD, CVA w renal evaluation of MALLORY and hypokalemia. Per documents was recently at  [facility] with septic shock on 02/09/2025, nausea, vomiting and diarrhea requiring Levophed drip. Per documents, patient  transferred to  for continued care. Patient with + diuresis w lasix 80 IV, increaserd UOP > 4L.   Treated w IV and po k repletion.     MALLORY  -LIkely pre renal w insensible losses and diuresis, signficant UOP  -IVF to maintain even to positive today  -Trend labs, lytes. Repeat this afternoon  -Monitor UOP closely  -Avoid nsaids/contrast  -Renally dosed abx    Hypokalemia  -K repletion iv/po  -TPN    Sepsis/Pseduomonas  -ID/abx  -FU cultures    d/c with RN staff, Dr Tong, Dr Henry, Dr Maik Sweet to resume care in AM    2/18  MALLORY resolving  Azotemia, responded to IVF resuscitation  Urine output improved  hypoalbuminemia- on spa q 8 h for oncotic pressure and glomerular filtration  NAGMA- hyperchloremia may be due to diarrhea and GI losses  On Meropenem- very rarely associated w Fanconi syndrome  - will check urinary PO4, serum uric acid, repeat PO4 level, low this am  ABG  Lactate  CBC  VQ results pending  d/w Dr YOUSUF Pleitez    Addendum  Repeat labs reviewed d/w intensivist  Lactate 2.7  ABG c/w primary respiratory alkalosis with metabolic acidosis and NAGMA    2/19  Pancreatic Adenoca  MALLORY prerenal, correcting  Primary Hyperventilation, metab acidosis and non anion acidosis  VQ indeterminate  on 2/18 for PE  For CTA today, low risk MAXIMO due to adequate hydration status, UO 3600 ml / last 24 hrs  D/W Dr Tong and Dr Pleitez    2/20  Pancreatic AdenoCa  MALLORY slowly correcting  Bilateral pulm emboli  Creat stable post IV Contrast but will monitor for 48 hrs  Abd distension less  hypokalemia being replaced    2/21  Pancreatic AdenoCa  MALLORY slowly correcting, s/p IV Contrast  Bilateral pulm emboli on IV heparin  Creat stable post IV Contrast but will monitor for 48 hrs  Abd distension improved but still diminished BS  hypokalemia being replaced by ICU team    2/22  Pancreatic AdenoCa with METS  MALLORY slowly correcting, s/p IV Contrast  Bilateral pulm emboli on IV heparin, small bowel and large bowel distention  Creat stable post IV Contras  Abd distension improved but still diminished BS  hypokalemia being replaced by ICU team- need to keep   potassium in normal levels as it may contribute to ileus  LR dcd   CO2 improved

## 2025-02-22 NOTE — PROGRESS NOTE ADULT - ASSESSMENT
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  Her hospital course has been complicated by episodes of diarrhea and mucositis.  Her diarrhea has been thought to be secondary to her chemotherapy.  Imaging studies did reveal initial concern for large bowel obstruction, however after we review of imaging this was not thought to be the case.  Patient had been tolerating diet along with TPN for nutrition, however later this afternoon when evaluated it appeared to be more distended and now with worsening respiratory status requiring high flow.    #Abdominal distention  #Prior abnormal CT imaging  #Pancreatic cancer   –Recommend stat CT without contrast for further evaluation  –Depending on imaging results can determine further plan of action  –Would hold any p.o. intake at this time  –Continue TPN    Case discussed with patient's  and ICU intensivist

## 2025-02-22 NOTE — PROGRESS NOTE ADULT - SUBJECTIVE AND OBJECTIVE BOX
Patient is a 69y old  Female who presents with a chief complaint of sob (22 Feb 2025 11:04)    Pt seen this morning and was working on eating some yogurt and toast  distended abdomen which was non-tender to palpation  no bowel mvoements  no nausea or vomiting    MEDICATIONS  (STANDING):  acetaminophen   IVPB .. 1000 milliGRAM(s) IV Intermittent once  acetaminophen   IVPB .. 1000 milliGRAM(s) IV Intermittent once  amLODIPine   Tablet 10 milliGRAM(s) Oral daily  chlorhexidine 2% Cloths 1 Application(s) Topical <User Schedule>  cholecalciferol 2000 Unit(s) Oral at bedtime  dextrose 5%. 1000 milliLiter(s) (50 mL/Hr) IV Continuous <Continuous>  dextrose 5%. 1000 milliLiter(s) (100 mL/Hr) IV Continuous <Continuous>  dextrose 50% Injectable 25 Gram(s) IV Push once  dextrose 50% Injectable 12.5 Gram(s) IV Push once  dextrose 50% Injectable 25 Gram(s) IV Push once  famotidine Injectable 20 milliGRAM(s) IV Push daily  fluconAZOLE IVPB 200 milliGRAM(s) IV Intermittent every 24 hours  fluconAZOLE IVPB      glucagon  Injectable 1 milliGRAM(s) IntraMuscular once  heparin  Infusion.  Unit(s)/Hr (13 mL/Hr) IV Continuous <Continuous>  insulin glargine Injectable (LANTUS) 10 Unit(s) SubCutaneous at bedtime  insulin lispro (ADMELOG) corrective regimen sliding scale   SubCutaneous every 6 hours  lipid, fat emulsion (Fish Oil and Plant Based) 20% Infusion 0.6812 Gm/kG/Day (20.8 mL/Hr) IV Continuous <Continuous>  lipid, fat emulsion (Fish Oil and Plant Based) 20% Infusion 0.6812 Gm/kG/Day (20.83 mL/Hr) IV Continuous <Continuous>  meropenem Injectable 1000 milliGRAM(s) IV Push every 12 hours  methylPREDNISolone sodium succinate Injectable 20 milliGRAM(s) IV Push daily  metoprolol tartrate Injectable 2.5 milliGRAM(s) IV Push every 6 hours  pantoprazole  Injectable 40 milliGRAM(s) IV Push daily  Parenteral Nutrition - Adult 1 Each (75 mL/Hr) TPN Continuous <Continuous>  Parenteral Nutrition - Adult 1 Each (75 mL/Hr) TPN Continuous <Continuous>  sucralfate suspension 1 Gram(s) Oral four times a day  vancomycin    Solution 125 milliGRAM(s) Oral every 6 hours    MEDICATIONS  (PRN):  acetaminophen     Tablet .. 650 milliGRAM(s) Oral every 6 hours PRN Temp greater or equal to 38C (100.4F), Mild Pain (1 - 3)  aluminum hydroxide/magnesium hydroxide/simethicone Suspension 30 milliLiter(s) Oral every 4 hours PRN Dyspepsia  Biotene Dry Mouth Oral Rinse 15 milliLiter(s) Swish and Spit five times a day PRN Mouth Care  dextrose Oral Gel 15 Gram(s) Oral once PRN Blood Glucose LESS THAN 70 milliGRAM(s)/deciliter  heparin   Injectable 6000 Unit(s) IV Push every 6 hours PRN For aPTT less than 40  heparin   Injectable 3000 Unit(s) IV Push every 6 hours PRN For aPTT between 40 - 57  melatonin 3 milliGRAM(s) Oral at bedtime PRN Insomnia  ondansetron Injectable 4 milliGRAM(s) IV Push every 8 hours PRN Nausea and/or Vomiting  sodium chloride 0.65% Nasal 1 Spray(s) Both Nostrils two times a day PRN Nasal Congestion      Vital Signs Last 24 Hrs  T(C): 36.7 (22 Feb 2025 05:53), Max: 36.7 (22 Feb 2025 05:53)  T(F): 98 (22 Feb 2025 05:53), Max: 98 (22 Feb 2025 05:53)  HR: 120 (22 Feb 2025 14:00) (104 - 123)  BP: 129/86 (22 Feb 2025 14:00) (118/90 - 136/88)  BP(mean): 100 (22 Feb 2025 14:00) (99 - 105)  RR: 30 (22 Feb 2025 14:00) (24 - 36)  SpO2: 95% (22 Feb 2025 14:00) (90% - 99%)    Parameters below as of 22 Feb 2025 14:00  Patient On (Oxygen Delivery Method): nasal cannula, high flow  O2 Flow (L/min): 35  O2 Concentration (%): 50    PHYSICAL EXAM:    Constitutional: No acute distress, lethargic  HEENT: masked, good phonation, not icteric  Respiratory: course breath sounds bilaterally  Cardiovascular: S1 and S2, regular rate and rhythm   Gastrointestinal: soft, non-tender, distended, tympanic to percussion, diminished bowel sounds  Extremities: +peripheral edema  Neurological: awake and alert, lethargic  Skin: No rashes, not jaundiced    LABS:                        10.4   38.25 )-----------( 160      ( 22 Feb 2025 05:34 )             30.7     02-22    141  |  109[H]  |  70[H]  ----------------------------<  274[H]  4.2   |  26  |  1.25    Ca    7.7[L]      22 Feb 2025 05:32  Phos  4.2     02-22  Mg     1.8     02-22    TPro  4.8[L]  /  Alb  1.8[L]  /  TBili  0.6  /  DBili  x   /  AST  65[H]  /  ALT  46  /  AlkPhos  326[H]  02-22    PTT - ( 22 Feb 2025 11:44 )  PTT:111.3 sec  LIVER FUNCTIONS - ( 22 Feb 2025 05:32 )  Alb: 1.8 g/dL / Pro: 4.8 gm/dL / ALK PHOS: 326 U/L / ALT: 46 U/L / AST: 65 U/L / GGT: 146 U/L         RADIOLOGY & ADDITIONAL STUDIES:

## 2025-02-22 NOTE — PROGRESS NOTE ADULT - ASSESSMENT
70 y/o F with PMHx recent diagnosis of primary pancreatic adenocarcinoma of head with tumor in umbilicus, chemo-induced pancytopenia, SCC lip, cyclical vomiting syndrome, HTN, HPL, Gout, GERD, CVA on Aggrenox    Initially admitted to [facility] with septic shock 2/2 + pseudomonas bacteremia likely due to UTI vs colitis on 02/09/2025 Was having nausea, vomiting and diarrhea requiring Levophed drip, was transferred out of ICU to regular floor.    Then transferred to  as per request of .  Was getting platelet transfusion and developed tachypnea hypoxia, rigors  Was diuresed and became hypotensive  Upgraded to SICU     Here with   1. Sepsis (POA)  2. Pseudomonal bacteremia suspect from UTI   3. Mucositis suspected esophageal candidiasis  4. Pancytopenia  5. Acute hypoxic respiratory failure   6. MALLORY     CTA chest abd pelvis 2/19 revealing multiple small PE b/l and ?LBO    PLAN    Neuro: AAOx 3. pain control prn. IV tylenol .avoid nsaids. CTH 2/21 no acute findings  CV: BP stable, HR 100s-120s sinus tach. cont IV lopressor 2.5mg q6hr  Pulm: on room air today sating 92-93%, work of breathing worse today.  CTA chest 2/19 + b/l small PEs. on hep gtt. TTE no RV strain. cont IV solumedrol 40mg qd for suspected TRALI? Suspect some decreased respiratory capacity 2/2 abdominal distension and may be cause of tachypnea.  GI: PO diet as tolerated. on TPN. IV PPI. LBO on CTAP? scans reviewed with GI and Dr. Ziegler, low suspicion for LBO, abd soft on exam. will trial reglan if pt develops abd pain.   Nephro: MALLORY improving Cr 1.40 -> 1.33. monitor I & Os. Trend renal fxn.    Endo: Hyperglycemic still suspect due to TPN and steroids. lantus 12units this am, inc bedtime lantus 5-> 10units. BGMs q6hr. ISS. while on PN  ID: cont IV meropenem 1gm q8hr #7 (started 2/15) cont IV diflucan 200mg qd #5 (started 2/17). s/p IV cefepime.  blood cultures here neg to date. CMV neg. HSV pcr neg. elevated lactate suspect due to work of breathing. unable to give further  IV fluids due to pulmonary edema.  Heme: hgb stable. leukocytosis suspect due to steroids,. thrombocytopenia improving.  pt was on filgastrim, dcd on 2/18 as per hemeonc.   PT eval, OOB to chair as tolerated.    Dispo: SICU. TPN. IV abx. Trend Cr. cont steroids. 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    Then transferred to  as per request of .  Was getting platelet transfusion and developed tachypnea hypoxia, rigors  Was diuresed and became hypotensive  Upgraded to SICU     Here with   1. Sepsis (POA)  2. Pseudomonal bacteremia suspect from UTI   3. Mucositis suspected esophageal candidiasis  4. Pancytopenia  5. Acute hypoxic respiratory failure   6. MALLORY     CTA chest abd pelvis 2/19 revealing multiple small PE b/l and ?LBO    PLAN    Neuro: AAOx 3. pain control prn. IV tylenol .avoid nsaids. CTH 2/21 no acute findings  CV: BP stable, HR 100s-120s sinus tach. cont IV lopressor 2.5mg q6hr  Pulm: on room air today sating 92-93%, work of breathing worse today.  CTA chest 2/19 + b/l small PEs. on hep gtt. TTE no RV strain. cont IV solumedrol 40mg qd for suspected TRALI? Suspect some decreased respiratory capacity 2/2 abdominal distension and may be cause of tachypnea. Repeat CXR today unchanged  GI: Hold PO intake today abd discomfort worsening. on TPN. IV PPI. LBO on CTAP? scans reviewed with GI and Dr. Ziegler, low suspicion for LBO. Abd more distended and discomfort today will repeat CT A/P w/o contrast and determine further plan based on result  Nephro: MALLORY improving. monitor I & Os. Trend renal fxn. Hold IVF in setting of tenuous respiratory status and hypoalbuminemia  Endo: Hyperglycemia improving, likely due to TPN and steroids. Increase lantus 10 => 15u qhs. BGMs q6hr. ISS. while on PN  ID: cont IV meropenem 1gm q8hr #8 (started 2/15) cont IV diflucan 200mg qd #6 (started 2/17). s/p IV cefepime.  blood cultures here neg to date. CMV neg. HSV pcr neg.  Heme: hgb stable. leukocytosis suspect due to steroids,. thrombocytopenia improving.  pt was on filgastrim, dcd on 2/18 as per hemeonc.   PT eval, OOB to chair as tolerated.    Dispo: SICU. CT A/P today. TPN. IV abx. Trend Cr. cont steroids. hep gtt

## 2025-02-22 NOTE — PROGRESS NOTE ADULT - SUBJECTIVE AND OBJECTIVE BOX
Subjective:    pat better, on RA sat 95%, lying in bed, less tachpneic, no new complaint.    Home Medications:  cefepime 2 g intravenous injection: 2 gram(s) intravenous every 12 hours (12 Feb 2025 12:59)  filgrastim: 480 microgram(s) subcutaneous once a day (12 Feb 2025 12:59)  Lactated Ringers Injection intravenous solution: 150 milligram(s) intravenous every 1 to 2 hours (12 Feb 2025 12:59)  loperamide 2 mg oral capsule: 1 cap(s) orally once (12 Feb 2025 12:59)  midodrine 5 mg oral tablet: 1 tab(s) orally every 8 hours (12 Feb 2025 12:59)  Multiple Vitamins with Minerals oral liquid: 1 orally once a day (12 Feb 2025 12:59)  octreotide 2500 mcg/mL subcutaneous solution: 0.04 milliliter(s) subcutaneous 3 times a day (12 Feb 2025 12:59)  sucralfate 1 g/10 mL oral suspension: 10 milliliter(s) orally 4 times a day (12 Feb 2025 12:59)  trimethobenzamide 100 mg/mL intramuscular solution: 2 milliliter(s) intramuscular every 8 hours As needed Nausea and/or Vomiting (12 Feb 2025 12:59)    MEDICATIONS  (STANDING):  acetaminophen   IVPB .. 1000 milliGRAM(s) IV Intermittent once  acetaminophen   IVPB .. 1000 milliGRAM(s) IV Intermittent once  amLODIPine   Tablet 10 milliGRAM(s) Oral daily  chlorhexidine 2% Cloths 1 Application(s) Topical <User Schedule>  cholecalciferol 2000 Unit(s) Oral at bedtime  dextrose 5%. 1000 milliLiter(s) (50 mL/Hr) IV Continuous <Continuous>  dextrose 5%. 1000 milliLiter(s) (100 mL/Hr) IV Continuous <Continuous>  dextrose 50% Injectable 25 Gram(s) IV Push once  dextrose 50% Injectable 12.5 Gram(s) IV Push once  dextrose 50% Injectable 25 Gram(s) IV Push once  famotidine Injectable 20 milliGRAM(s) IV Push daily  fluconAZOLE IVPB 200 milliGRAM(s) IV Intermittent every 24 hours  fluconAZOLE IVPB      glucagon  Injectable 1 milliGRAM(s) IntraMuscular once  heparin  Infusion.  Unit(s)/Hr (13 mL/Hr) IV Continuous <Continuous>  insulin glargine Injectable (LANTUS) 10 Unit(s) SubCutaneous at bedtime  insulin lispro (ADMELOG) corrective regimen sliding scale   SubCutaneous every 6 hours  lipid, fat emulsion (Fish Oil and Plant Based) 20% Infusion 0.6812 Gm/kG/Day (20.8 mL/Hr) IV Continuous <Continuous>  lipid, fat emulsion (Fish Oil and Plant Based) 20% Infusion 0.6812 Gm/kG/Day (20.83 mL/Hr) IV Continuous <Continuous>  meropenem Injectable 1000 milliGRAM(s) IV Push every 12 hours  methylPREDNISolone sodium succinate Injectable 40 milliGRAM(s) IV Push daily  metoprolol tartrate Injectable 2.5 milliGRAM(s) IV Push every 6 hours  pantoprazole  Injectable 40 milliGRAM(s) IV Push daily  Parenteral Nutrition - Adult 1 Each (75 mL/Hr) TPN Continuous <Continuous>  Parenteral Nutrition - Adult 1 Each (75 mL/Hr) TPN Continuous <Continuous>  sucralfate suspension 1 Gram(s) Oral four times a day  vancomycin    Solution 125 milliGRAM(s) Oral every 6 hours    MEDICATIONS  (PRN):  acetaminophen     Tablet .. 650 milliGRAM(s) Oral every 6 hours PRN Temp greater or equal to 38C (100.4F), Mild Pain (1 - 3)  aluminum hydroxide/magnesium hydroxide/simethicone Suspension 30 milliLiter(s) Oral every 4 hours PRN Dyspepsia  Biotene Dry Mouth Oral Rinse 15 milliLiter(s) Swish and Spit five times a day PRN Mouth Care  dextrose Oral Gel 15 Gram(s) Oral once PRN Blood Glucose LESS THAN 70 milliGRAM(s)/deciliter  heparin   Injectable 6000 Unit(s) IV Push every 6 hours PRN For aPTT less than 40  heparin   Injectable 3000 Unit(s) IV Push every 6 hours PRN For aPTT between 40 - 57  melatonin 3 milliGRAM(s) Oral at bedtime PRN Insomnia  ondansetron Injectable 4 milliGRAM(s) IV Push every 8 hours PRN Nausea and/or Vomiting  sodium chloride 0.65% Nasal 1 Spray(s) Both Nostrils two times a day PRN Nasal Congestion      Allergies    No Known Allergies    Intolerances        Vital Signs Last 24 Hrs  T(C): 36.7 (22 Feb 2025 05:53), Max: 36.7 (22 Feb 2025 05:53)  T(F): 98 (22 Feb 2025 05:53), Max: 98 (22 Feb 2025 05:53)  HR: 119 (22 Feb 2025 12:00) (104 - 123)  BP: 125/91 (22 Feb 2025 12:00) (118/90 - 142/89)  BP(mean): 102 (22 Feb 2025 12:00) (99 - 105)  RR: 24 (22 Feb 2025 12:00) (24 - 36)  SpO2: 93% (22 Feb 2025 12:00) (90% - 99%)    Parameters below as of 22 Feb 2025 11:00  Patient On (Oxygen Delivery Method): room air          PHYSICAL EXAMINATION:    NECK:  Supple. No lymphadenopathy. Jugular venous pressure not elevated. Carotids equal.   HEART:   The cardiac impulse has a normal quality. Reg., Nl S1 and S2.  There are no murmurs, rubs or gallops noted  CHEST:  Chest crackles to auscultation. Normal respiratory effort.  ABDOMEN:  Soft and nontender.   EXTREMITIES:  There is no edema.       LABS:                        10.4   38.25 )-----------( 160      ( 22 Feb 2025 05:34 )             30.7     02-22    141  |  109[H]  |  70[H]  ----------------------------<  274[H]  4.2   |  26  |  1.25    Ca    7.7[L]      22 Feb 2025 05:32  Phos  4.2     02-22  Mg     1.8     02-22    TPro  4.8[L]  /  Alb  1.8[L]  /  TBili  0.6  /  DBili  x   /  AST  65[H]  /  ALT  46  /  AlkPhos  326[H]  02-22    PTT - ( 22 Feb 2025 11:44 )  PTT:111.3 sec  Urinalysis Basic - ( 22 Feb 2025 05:32 )    Color: x / Appearance: x / SG: x / pH: x  Gluc: 274 mg/dL / Ketone: x  / Bili: x / Urobili: x   Blood: x / Protein: x / Nitrite: x   Leuk Esterase: x / RBC: x / WBC x   Sq Epi: x / Non Sq Epi: x / Bacteria: x

## 2025-02-22 NOTE — PROGRESS NOTE ADULT - ASSESSMENT
69-year-old female with stage IV pancreatic adenocarcinoma presenting with neutropenic fever and severe treatment-related toxicities from modified FOLFIRINOX and investigational EMILY inhibitor [id number].    PROBLEMS:    Acute hypoxic respiratory failure likely secondary to hypervolemia   Bilateral pleural effusions with compressive atelectasis, right greater than left  Neutropenic Fever/Neutropenic Septic shock  Severe Thrombocytopenia-Platelet count critically low at 26k.   Severe diarrhea and inability to tolerate PO intake, likely multifactorial from both FOLFIRINOX (particularly irinotecan component) and study drug [id number].   History of left cephalic vein thrombosis.  MALLORY  69-year-old female with stage IV pancreatic adenocarcinoma presenting with neutropenic fever and severe treatment-related toxicities from modified FOLFIRINOX and investigational EMILY inhibitor [id number].  CT Angio Chest PE Protocol w/ IV Cont (02.19.25 @ 14:16) >  CHEST:  Multiple pulmonary emboli within the segmental branches of the left lower and right lower lobe pulmonary arteries.  There are two right middle lobe nodules, new since prior. Exact   etiology is unclear. Primary differential diagnostic consideration   includes infection.      PLAN:    CTA chest + small PEs in RLL and LLL- IV heparin, no bolus   Taper IV solumedrol 20mg qdaily for suspected TRALI from plt transfusion  Taper steroids causing hyperglycemai & High WBC-trend  Pulmonary tachpnea may be RTA with low bicarbonate because of GI cause with diarrhea but improving renal fx  Neutropenic Septic shock- IV meropenem started on 2/15- previously on CEFEPIME/Continue filgrastim 480mcg daily-Today w/ improvement in counts- WBC 1.3, Hb 10.1, plt 37, cmp with k 2.3 and Repleted, Cr 1.40.   Daily CBC monitoring  Maintain strict neutropenic precautions  IV diuretics-now on RA doing well   EMPIRIC TREATMENT FOR ESOPHAGEAL CANDIDIASIS-continue with DIFLUCAN   Anemia-received two units of pRBCs Hb 10.1 today   Thrombocytopenia-Transfuse for plts < 15   D/w staff & /haematology & ICU in detail

## 2025-02-22 NOTE — CHART NOTE - NSCHARTNOTEFT_GEN_A_CORE
Clinical Nutrition PN Follow Up Note    *68 y/o F w/ recent diagnosis of primary pancreatic adenocarcinoma of head with tumor in umbilicus, chemo-induced pancytopenia and significant PMH of SCC lip, cyclical vomiting syndrome, HTN, HPL, Gout, GERD, CVA on Aggrenox and others had been admitted to [facility] with septic shock on 2025, nausea, vomiting and diarrhea requiring Levophed drip, was transferred out of ICU to regular floor last night.  Patient was being followed by GI, ID, Cardiology and Onc there.  Patient has been transferred to  today on patient's  (GI Dr. Pleitez) request.  At this time, patient c/o non-bloody diarrhea x 3 in last 24 hours.  She denies any abdominal pain, nausea, but has some dry heaves.  She denies any fever, chills, chest pain, palpitation, constipation or dysuria. Initially, she was started on Vancomycin and Zosyn, but now she has been on Cefepime as her blood culture and urine cultures are positive for Pseudomonas aeruginosa. Currently, patient is on Cefepime.  ID had also recommended Flagyl, but patient had refused it, as it causes nausea to her. Noted with Metabolic acidosis with NAG due to GI loss. Likely chemo-induced diarrhea and vomiting. Better than before. Cutaneous lesion on RUQ area below right breast, unclear etiology, ? Ecthyma gangrenosum. Admitting diagnosis: pancreatic ca  *: Pt transfer from [facility]; seen by RD and met criteria for PCM. Pt reports only being able to tolerate some sips of water and tea at this time. Reports tried eating small bite of mashed banana and felt burning sensation down her throat. Reports #; RD unable to obtain bedscale wt d/t bedscale not working. Admit wt per # note with 1+ and 2+ edema skewing wt. Pt appears overweight, NFPE reveals mild- mod muscle/fat wasting at this time. Recommend to continue with PO diet and encourage intake as tolerated. RD consulted to initiate PN d/t PO intolerance. Will initiate TPN through port.  *2/15: Remains on low fiber diet with minimal PO intake. Still w/ multiple episodes of diarrhea, if persists can consider adding 5mg zinc into PN bag. No other acute events overnight. D/w Dr. Joyner will c/w TPN today.   *: Raised area noted to mid sternum. RCW port intact.- no swelling noted, no redness. Port flushed without resistance. + blood return noted. Assessed with IV team Amy. Per pt no Pain on palpation. CT Chest. TPN on hold until CT read. CT chest from earlier in the day revealed new bilateral pleural effusions with bibasilar consolidated lung and ascites. CXR now shows low lung volumes and bibasilar opacities. Rigors post transfusion - Rapid response called;  Dr. Pleitez requesting ICU consult- concerned for pt.  requesting assessment of patient STAT. Pt transferred to SICU. ? third spacing, and possible PNA. Per pt still drinking small sips of water and tea. D/w Dr. Fortune will keep total volume same in PN bag today, and will c/w TPN.   *:  tx SICU, po remains minimal. On bipap. On additional LR d/t diuresis per critical care PA: "malignancy/severe protein-calorie malnutrition/hypoalbuminemia causing diffuse third spacing, caution with further diuresis as this is not truly a volume overload/acute CHF issue" also on IV albumin  c/w TPN  STRONGLY suggest to Confirm goals of care regarding LONG-TERM nutrition support - However, LONG-TERM Nutrition support is not recommended with advanced cancer as studies show that there is no improvement of dehydration symptoms, quality of life, or survival. It also increases the risk for peripheral edema, ascites, and pleural effusions. Will continue to provide nutr within GOC though  *: plan to c/w TPN for now. RD discussed w/ MD Tong and SICU IRINA Jurado need to confirm long term GOC regarding nutr support. Per GI - Esophageal pain is 2/2 esophagitis but unclear what type -- reflux vs. candida vs CMV or HSV etc. Plan to add imodium standing. ? EGD   *: reports she stills has difficulty swallowing due to pain. feeling weak overall. still having multiple episodes of diarrhea, poor PO intake. Plan to c/w TPN - per GI: "CVM and HSV will be negative which argues that esophagitis is reflux or fungal related"  d/w IRINA Jurado long-term plan regarding nutr support TBD, pending discussion w/ family. ? fluid overload which TPN may be contributing to as some studies show with advanced cancer that nutrition support increases the risk for peripheral edema, ascites, and pleural effusions.   *: PO slighly improving, plan to c/w TPN.  POCT uptrending, d/w IRINA Jurado, steroids to be dc and will hold off in insulin PN bag - will manage w/ insulin outside bag.  Per GI: "Imaging suggests ileus but I personally think that putting the imaging together with history and clinical findings that she just has severe diarrhea illness 2/2 chemo, given that both large and small bowel loops are fluid filled and she has no abd pain or vomiting"  *: PO intake continues to slightly improve, plan to c/w TPN. Hyperglycemia worsening, d/w IRINA Jurado, plan to add regular insulin into PN bag. Will also decrease dextrose to 250g to see if this helps w/ hyperglycemia and can increase again when POCTs better controlled.     *TPN initiated 2/2 PO intolerance, + Mucositis, possible candidal esophagitis    *current status: C/o pain when eating soft foods. Slight improvement in POCTs however remain elevated, d/w intensivist - plan to c/w current insulin in PN bag and will adjust Lantus dosage, will hold off on increasing dextrose today. C/w TPN.    *pertinent meds: Lantus (12 units once & 10 units HS), Vitamin D3 (2000IU), Pepcid, Abx, Admelog (28 units x 24 hours), Solu-Medrol, Metoprolol, Protonix, KCl IVPB (total 60 mEq), Carafate    *labs reviewed; All Lytes including corrected Na for hyperglycemia WNL. S/p significant K repletion outside of bag yesterday. As per ASPEN Guidelines, maintenance potassium concentration in PN is 1 – 2 mEq/kg/day. However, [facility] PN Policy indicates a maximum of 120 mEq should be added into the PN bag. Currently at 100mEq in PN bag.  T bili WNL will c/w trace elements. C/w MVI and thiamine in PN bag. POCTs w/ slight improvement however still elevated x 24 hrs, will c/w current dextrose (250g) and will titrate up tomorrow if POCT improved (goal 325g daily; GIR ~3.1 WNL) - will c/w 15 units of regular insulin to PN bag as d/w intensivist.  TG WNL, however increasing will c/w 50g of lipids daily.  Also of note, Cl still elevated, will add more acetate vs Cl into TPN bag (207 vs 100, respectively).       141  |  109[H]  |  70[H]  ----------------------------<  274[H]  4.2   |  26  |  1.25    Ca    7.7[L]      2025 05:32  Phos  4.2       Mg     1.8         TPro  4.8[L]  /  Alb  1.8[L]  /  TBili  0.6  /  DBili  x   /  AST  65[H]  /  ALT  46  /  AlkPhos  326[H]      BMI: BMI (kg/m2): 27.8 (25 @ 05:46)  HbA1c: A1C with Estimated Average Glucose Result: 6.6 % (25 @ 05:35)    Glucose: POCT Blood Glucose.: 220 mg/dL (2025 09:22)    BP: 136/90 (25 @ 06:00) (114/82 - 145/78)Vital Signs Last 24 Hrs  T(C): 37.1 (25 @ 06:00), Max: 37.1 (25 @ 06:00)  T(F): 98.8 (25 @ 06:00), Max: 98.8 (25 @ 06:00)  HR: 98 (25 @ 06:00) (98 - 111)  BP: 136/90 (25 @ 06:00) (125/81 - 145/78)  BP(mean): 104 (25 @ 06:00) (91 - 104)  RR: 27 (25 @ 06:00) (25 - 37)  SpO2: 90% (25 @ 06:00) (90% - 99%)    Lipid Panel: Date/Time: 25 @ 05:31  Triglycerides, Serum: 281    POCT Blood Glucose.: 220 mg/dL (2025 09:22)  POCT Blood Glucose.: 266 mg/dL (2025 05:13)  POCT Blood Glucose.: 288 mg/dL (2025 23:04)  POCT Blood Glucose.: 291 mg/dL (2025 18:34)  POCT Blood Glucose.: 390 mg/dL (2025 12:52)    Iron Total: 34 ug/dL (25 @ 18:46)  Folate, Serum: >20.0 ng/mL (25 @ 18:46)  Vitamin B12, Serum: 1457 pg/mL (25 @ 18:46)  Vitamin D, 25-Hydroxy: 23.0 ng/mL (25 @ 18:46) ** c/w deficiency    *I&O's Detail    2025 07:01  -  2025 07:00  --------------------------------------------------------  IN:    Fat Emulsion (Fish Oil &amp; Plant Based) 20% Infusion: 147 mL    Heparin Infusion: 94 mL    TPN (Total Parenteral Nutrition): 780 mL  Total IN: 1021 mL    OUT:    Indwelling Catheter - Urethral (mL): 2475 mL    Intermittent Catheterization - Urethral (mL): 750 mL  Total OUT: 3225 mL    Total NET: -2204 mL  * fluid status: Negative, however full TPN volume not doc'd (1800mL). Will continue to monitor/adjust prn to maintain fluid balance   *last (+) BM doc'd 2/18 x 5 - liquid, diarrhea.  pt on standing bowel regimen (imodium).  *edema: 3+ L/R leg  *PI: L/R Buttocks (stage 2)    *malnutrition: Pt continues to meet criteria for severe protein calorie malnutrition in context of acute on chronic illness r/t decreased ability to meet increased nutrient needs 2/2 PO intolerance, N/V and diarrhea, on chemo for pancreatic ca AEB meeting <50% ENN > 5 days, mild-mod muscle/fat wasting    Estimated Needs: based on 73.4 Kg (wt from EMR admit wt )  Calories: 1812 - 2175 Kcal (25 - 30 Kcal/Kg)  Protein: 108 - 145 g (1.5 - 2.0 g/Kg)  Fluids: 1450 - 1812 mL (20 - 25 mL/Kg)    Diet, Low Fiber:   Kosher  Supplement Feeding Modality:  Oral  Ensure Plant-Based Cans or Servings Per Day:  3       Frequency:  Daily (25 @ 16:47) [Active]  Parenteral Nutrition - Adult 1 Each (75 mL/Hr) TPN Continuous <Continuous>, 25 @ 22:00, 22:00, Stop order after: 1 Days    Weight History:  Daily Weight in k.3 (2025 06:00) *2+ edema  Daily Weight in k.4 (2025 05:00)  Daily Weight in k.6 (15 Feb 2025 06:17)  Height (cm): 162.6 (25 @ 02:39)  Weight (kg): 73.4 (25 @ 05:46), 66.8 (25 @ 02:39)  BMI (kg/m2): 27.8 (25 @ 05:46), 25.3 (25 @ 02:39)  BSA (m2): 1.79 (25 @ 05:46), 1.72 (25 @ 02:39)    TPN Recommendations: via implanted infusion port  Total Volume: 1800 mL x 24 hours  125 g  Amino Acids  250 g Dextrose (goal 325g)  50 g Lipids 20%   100 mEq Sodium Chloride  41 mEq Sodium Acetate  10 mmol Sodium Phosphate  0 mEq Potassium Chloride  41 mEq Potassium Acetate  40 mmol Potassium Phosphate  0 mEq Calcium Gluconate (CAPS out of PN solution)   10 mEq Magnesium Sulfate  200 mg Thiamine  1 ml Trace Elements  10 ml MVI  15 units Regular Insulin    Total Calories    1850   (Meets  100%  of lower end of Estimated Energy needs  and  100%  Protein needs)      Additional Recommendations:    1) Daily weights  2) Strict I & O's **pt third spacing  3) Daily lyte checks including magnesium and phos  4) Weekly triglycerides/LFT checks  5) POCT q6hrs; maintain 140-180mg/dL***  6) C/w low fiber diet and encourage PO intake as tolerated  7) Continue to titrate dextrose 50-75g per day until goal of 325g reached  8) Confirm goals of care regarding long-term nutrition support. Would recommend PEG placement if long-term nutrition support is required as GI is functional and would be the preferred route for nutrition. However, long-term nutrition support is not recommended with advanced cancer as studies show that there is no improvement of dehydration symptoms, quality of life, or survival. It also increases the risk for peripheral edema, ascites, and pleural effusions.     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  Patient was being followed by GI, ID, Cardiology and Onc there.  Patient has been transferred to  today on patient's  (GI Dr. Pleitez) request.  At this time, patient c/o non-bloody diarrhea x 3 in last 24 hours.  She denies any abdominal pain, nausea, but has some dry heaves.  She denies any fever, chills, chest pain, palpitation, constipation or dysuria. Initially, she was started on Vancomycin and Zosyn, but now she has been on Cefepime as her blood culture and urine cultures are positive for Pseudomonas aeruginosa. Currently, patient is on Cefepime.  ID had also recommended Flagyl, but patient had refused it, as it causes nausea to her. Noted with Metabolic acidosis with NAG due to GI loss. Likely chemo-induced diarrhea and vomiting. Better than before. Cutaneous lesion on RUQ area below right breast, unclear etiology, ? Ecthyma gangrenosum. Admitting diagnosis: pancreatic ca  *: Pt transfer from [facility]; seen by RD and met criteria for PCM. Pt reports only being able to tolerate some sips of water and tea at this time. Reports tried eating small bite of mashed banana and felt burning sensation down her throat. Reports #; RD unable to obtain bedscale wt d/t bedscale not working. Admit wt per # note with 1+ and 2+ edema skewing wt. Pt appears overweight, NFPE reveals mild- mod muscle/fat wasting at this time. Recommend to continue with PO diet and encourage intake as tolerated. RD consulted to initiate PN d/t PO intolerance. Will initiate TPN through port.  *2/15: Remains on low fiber diet with minimal PO intake. Still w/ multiple episodes of diarrhea, if persists can consider adding 5mg zinc into PN bag. No other acute events overnight. D/w Dr. Joyner will c/w TPN today.   *: Raised area noted to mid sternum. RCW port intact.- no swelling noted, no redness. Port flushed without resistance. + blood return noted. Assessed with IV team Amy. Per pt no Pain on palpation. CT Chest. TPN on hold until CT read. CT chest from earlier in the day revealed new bilateral pleural effusions with bibasilar consolidated lung and ascites. CXR now shows low lung volumes and bibasilar opacities. Rigors post transfusion - Rapid response called;  Dr. Pleitez requesting ICU consult- concerned for pt.  requesting assessment of patient STAT. Pt transferred to SICU. ? third spacing, and possible PNA. Per pt still drinking small sips of water and tea. D/w Dr. Fortune will keep total volume same in PN bag today, and will c/w TPN.   *:  tx SICU, po remains minimal. On bipap. On additional LR d/t diuresis per critical care PA: "malignancy/severe protein-calorie malnutrition/hypoalbuminemia causing diffuse third spacing, caution with further diuresis as this is not truly a volume overload/acute CHF issue" also on IV albumin  c/w TPN  STRONGLY suggest to Confirm goals of care regarding LONG-TERM nutrition support - However, LONG-TERM Nutrition support is not recommended with advanced cancer as studies show that there is no improvement of dehydration symptoms, quality of life, or survival. It also increases the risk for peripheral edema, ascites, and pleural effusions. Will continue to provide nutr within GOC though  *: plan to c/w TPN for now. RD discussed w/ MD Tong and SICU IRINA Jurado need to confirm long term GOC regarding nutr support. Per GI - Esophageal pain is 2/2 esophagitis but unclear what type -- reflux vs. candida vs CMV or HSV etc. Plan to add imodium standing. ? EGD   *: reports she stills has difficulty swallowing due to pain. feeling weak overall. still having multiple episodes of diarrhea, poor PO intake. Plan to c/w TPN - per GI: "CVM and HSV will be negative which argues that esophagitis is reflux or fungal related"  d/w IRINA Jurado long-term plan regarding nutr support TBD, pending discussion w/ family. ? fluid overload which TPN may be contributing to as some studies show with advanced cancer that nutrition support increases the risk for peripheral edema, ascites, and pleural effusions.   *: PO slighly improving, plan to c/w TPN.  POCT uptrending, d/w IRINA Jurado, steroids to be dc and will hold off in insulin PN bag - will manage w/ insulin outside bag.  Per GI: "Imaging suggests ileus but I personally think that putting the imaging together with history and clinical findings that she just has severe diarrhea illness 2/2 chemo, given that both large and small bowel loops are fluid filled and she has no abd pain or vomiting"  *: PO intake continues to slightly improve, plan to c/w TPN. Hyperglycemia worsening, d/w IRINA Jurado, plan to add regular insulin into PN bag. Will also decrease dextrose to 250g to see if this helps w/ hyperglycemia and can increase again when POCTs better controlled.     *TPN initiated 2/2 PO intolerance, + Mucositis, possible candidal esophagitis    *current status: C/o pain when eating soft foods. Slight improvement in POCTs however remain elevated, d/w intensivist - plan to c/w current insulin in PN bag and will adjust Lantus dosage, will hold off on increasing dextrose today. C/w TPN.    *pertinent meds: Lantus (12 units once & 10 units HS), Vitamin D3 (2000IU), Pepcid, Abx, Admelog (28 units x 24 hours), Solu-Medrol, Metoprolol, Protonix, KCl IVPB (total 60 mEq), Carafate    *labs reviewed; All Lytes including corrected Na for hyperglycemia WNL. Phos WNL however uptrending, will closely monitor and will adjust prn per labs. S/p significant K repletion outside of bag yesterday. As per ASPEN Guidelines, maintenance potassium concentration in PN is 1 – 2 mEq/kg/day. However, [facility] PN Policy indicates a maximum of 120 mEq should be added into the PN bag. Currently at 100mEq in PN bag.  T bili WNL will c/w trace elements. C/w MVI and thiamine in PN bag. POCTs w/ slight improvement however still elevated x 24 hrs, will c/w current dextrose (250g) and will titrate up tomorrow if POCT improved (goal 325g daily; GIR ~3.1 WNL) - will c/w 15 units of regular insulin to PN bag as d/w intensivist.  TG WNL, however increasing will c/w 50g of lipids daily.  Also of note, Cl still elevated, will add more acetate vs Cl into TPN bag (207 vs 100, respectively).       141  |  109[H]  |  70[H]  ----------------------------<  274[H]  4.2   |  26  |  1.25    Ca    7.7[L]      2025 05:32  Phos  4.2       Mg     1.8         TPro  4.8[L]  /  Alb  1.8[L]  /  TBili  0.6  /  DBili  x   /  AST  65[H]  /  ALT  46  /  AlkPhos  326[H]      BMI: BMI (kg/m2): 27.8 (25 @ 05:46)  HbA1c: A1C with Estimated Average Glucose Result: 6.6 % (25 @ 05:35)    Glucose: POCT Blood Glucose.: 220 mg/dL (2025 09:22)    BP: 136/90 (25 @ 06:00) (114/82 - 145/78)Vital Signs Last 24 Hrs  T(C): 37.1 (25 @ 06:00), Max: 37.1 (25 @ 06:00)  T(F): 98.8 (25 @ 06:00), Max: 98.8 (25 @ 06:00)  HR: 98 (25 @ 06:00) (98 - 111)  BP: 136/90 (25 @ 06:00) (125/81 - 145/78)  BP(mean): 104 (25 @ 06:00) (91 - 104)  RR: 27 (25 @ 06:00) (25 - 37)  SpO2: 90% (25 @ 06:00) (90% - 99%)    Lipid Panel: Date/Time: 25 @ 05:31  Triglycerides, Serum: 281    POCT Blood Glucose.: 220 mg/dL (2025 09:22)  POCT Blood Glucose.: 266 mg/dL (2025 05:13)  POCT Blood Glucose.: 288 mg/dL (2025 23:04)  POCT Blood Glucose.: 291 mg/dL (2025 18:34)  POCT Blood Glucose.: 390 mg/dL (2025 12:52)    Iron Total: 34 ug/dL (25 @ 18:46)  Folate, Serum: >20.0 ng/mL (25 @ 18:46)  Vitamin B12, Serum: 1457 pg/mL (25 @ 18:46)  Vitamin D, 25-Hydroxy: 23.0 ng/mL (25 @ 18:46) ** c/w deficiency    *I&O's Detail    2025 07:01  -  2025 07:00  --------------------------------------------------------  IN:    Fat Emulsion (Fish Oil &amp; Plant Based) 20% Infusion: 147 mL    Heparin Infusion: 94 mL    TPN (Total Parenteral Nutrition): 780 mL  Total IN: 1021 mL    OUT:    Indwelling Catheter - Urethral (mL): 2475 mL    Intermittent Catheterization - Urethral (mL): 750 mL  Total OUT: 3225 mL    Total NET: -2204 mL  * fluid status: Negative, however full TPN volume not doc'd (1800mL). Will continue to monitor/adjust prn to maintain fluid balance   *last (+) BM doc'd 2/18 x 5 - liquid, diarrhea.  pt on standing bowel regimen (imodium).  *edema: 3+ L/R leg  *PI: L/R Buttocks (stage 2)    *malnutrition: Pt continues to meet criteria for severe protein calorie malnutrition in context of acute on chronic illness r/t decreased ability to meet increased nutrient needs 2/2 PO intolerance, N/V and diarrhea, on chemo for pancreatic ca AEB meeting <50% ENN > 5 days, mild-mod muscle/fat wasting    Estimated Needs: based on 73.4 Kg (wt from EMR admit wt )  Calories: 1812 - 2175 Kcal (25 - 30 Kcal/Kg)  Protein: 108 - 145 g (1.5 - 2.0 g/Kg)  Fluids: 1450 - 1812 mL (20 - 25 mL/Kg)    Diet, Low Fiber:   Kosher  Supplement Feeding Modality:  Oral  Ensure Plant-Based Cans or Servings Per Day:  3       Frequency:  Daily (25 @ 16:47) [Active]  Parenteral Nutrition - Adult 1 Each (75 mL/Hr) TPN Continuous <Continuous>, 25 @ 22:00, 22:00, Stop order after: 1 Days    Weight History:  Daily Weight in k.3 (2025 06:00) *2+ edema  Daily Weight in k.4 (2025 05:00)  Daily Weight in k.6 (15 Feb 2025 06:17)  Height (cm): 162.6 (25 @ 02:39)  Weight (kg): 73.4 (25 @ 05:46), 66.8 (25 @ 02:39)  BMI (kg/m2): 27.8 (25 @ 05:46), 25.3 (25 @ 02:39)  BSA (m2): 1.79 (25 @ 05:46), 1.72 (25 @ 02:39)    TPN Recommendations: via implanted infusion port  Total Volume: 1800 mL x 24 hours  125 g  Amino Acids  250 g Dextrose (goal 325g)  50 g Lipids 20%   100 mEq Sodium Chloride  41 mEq Sodium Acetate  10 mmol Sodium Phosphate  0 mEq Potassium Chloride  41 mEq Potassium Acetate  40 mmol Potassium Phosphate  0 mEq Calcium Gluconate (CAPS out of PN solution)   10 mEq Magnesium Sulfate  200 mg Thiamine  1 ml Trace Elements  10 ml MVI  15 units Regular Insulin    Total Calories    1850   (Meets  100%  of lower end of Estimated Energy needs  and  100%  Protein needs)      Additional Recommendations:    1) Daily weights  2) Strict I & O's **pt third spacing  3) Daily lyte checks including magnesium and phos  4) Weekly triglycerides/LFT checks  5) POCT q6hrs; maintain 140-180mg/dL***  6) C/w low fiber diet and encourage PO intake as tolerated  7) Continue to titrate dextrose 50-75g per day until goal of 325g reached  8) Confirm goals of care regarding long-term nutrition support. Would recommend PEG placement if long-term nutrition support is required as GI is functional and would be the preferred route for nutrition. However, long-term nutrition support is not recommended with advanced cancer as studies show that there is no improvement of dehydration symptoms, quality of life, or survival. It also increases the risk for peripheral edema, ascites, and pleural effusions.     *will continue to monitor and adjust PN prn*  Johana Perrin MS, RDN, CDN [phone]

## 2025-02-22 NOTE — PROGRESS NOTE ADULT - ASSESSMENT
69-year-old female with stage IV pancreatic adenocarcinoma presenting with neutropenic fever and severe treatment-related toxicities from modified FOLFIRINOX and investigational EMILY inhibitor [id number] found to have PE and LBO.     # pancreatic ca   - remains on clinical trial  - have been in contact with [facility]   - CTH negative and discussed w/ pt   - supportive care while in hospital     # Neutropenic Septic shock   - pt previously on neupogen- WBC has now recovered and elevated likely 2/2 GCSF support   - WBC 38, HB 10.4, plt 160 with recovery  - hemodynamically stable   - remains on meropenem started on 2/15 as well as fluconazole and po vanc    # PE   - v/q scan w indeterminate prob of PE, linear defect in RUL posterior segment   - cxr with basilar haziness  - response to diuretics - will need to monitor renal function - stable CR to 1.33 today  - nowon RA  - CT chest- multiple PE w/in segmental left lower and right lower, 2 right middle lobe nodules,   -  Pt remains on heparin gtt  - breathing improved on RA but remains tachypneic     # large bowel obstruction  - CT a/p Large bowel obstruction with transition point at the distal transverse colon. There is also narrowing at the level of the terminal ileum. Diffuse ascites. Redemonstrated pancreatic lesion, consistent with history of pancreatic adenocarcinoma. There is an umbilical lesion with adjacent peritoneal implants.  - previously was having diarrhea  - GI following and reviewed imaging with radiology       # EMPIRIC TREATMENT FOR ESOPHAGEAL CANDIDIASIS   - continue with DIFLUCAN     #Anemia   - received two units of pRBCs to date- Hb 10.5 todya    - will continue to monitor, keep type and screen active     #Care Coordination  -will continue with ongoing communication with  [facility] [place]  - Review clinical trial protocol regarding toxicity management  - Consider dose modifications for future therapy    will follow and communicate with [facility]

## 2025-02-22 NOTE — PROGRESS NOTE ADULT - SUBJECTIVE AND OBJECTIVE BOX
69-year-old female recent diagnosis of primary pancreatic adenocarcinoma , pancytopenia and significant PMH of SCC lip, cyclical vomiting syndrome, HTN, HPL, Gout, GERD, CVA w renal evaluation of MALLORY and hypokalemia. Per documents was recently at  [facility] with septic shock on 02/09/2025, nausea, vomiting and diarrhea requiring Levophed drip. Per documents, patient  transferred to  for continued care. Patient with + diuresis w lasix 80 IV, increased UOP > 4L.   Treated w IV and po k repletion.     2/18  Case d/e Dr Pleitez  Pt returned from VQ scan r/o PE, results pending  Pt is tachypneic RR 30 BPM and etiologies are investigated  NO specific complaints  Labs reviewed  Chart reviewed  Recent reemergence of diarrhea/ metabolic acidosis  now on parenteral nutrition and LR  D/W Ethel Jurado    2/19  Evaluated at bedside , d/w Dr tong and Dr Pleitez  Pt still tachypneic and tachycardic    2/20  Case d/w Dr Maik Tong and Ethel Jurado  pt overall feels better  more engaging    2/21  Pt in good spirits  Cont to be tachycardic  rate ~ 110  Tachypneic rate ~ 30    2/22  Stii tachypic  Difficulty eating due to ileus    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 1000 milliGRAM(s) IV Intermittent once  acetaminophen   IVPB .. 1000 milliGRAM(s) IV Intermittent once  amLODIPine   Tablet 10 milliGRAM(s) Oral daily  chlorhexidine 2% Cloths 1 Application(s) Topical <User Schedule>  cholecalciferol 2000 Unit(s) Oral at bedtime  dextrose 5%. 1000 milliLiter(s) (50 mL/Hr) IV Continuous <Continuous>  dextrose 5%. 1000 milliLiter(s) (100 mL/Hr) IV Continuous <Continuous>  dextrose 50% Injectable 25 Gram(s) IV Push once  dextrose 50% Injectable 12.5 Gram(s) IV Push once  dextrose 50% Injectable 25 Gram(s) IV Push once  famotidine Injectable 20 milliGRAM(s) IV Push daily  fluconAZOLE IVPB 200 milliGRAM(s) IV Intermittent every 24 hours  fluconAZOLE IVPB      glucagon  Injectable 1 milliGRAM(s) IntraMuscular once  heparin  Infusion.  Unit(s)/Hr (13 mL/Hr) IV Continuous <Continuous>  insulin glargine Injectable (LANTUS) 10 Unit(s) SubCutaneous at bedtime  insulin lispro (ADMELOG) corrective regimen sliding scale   SubCutaneous every 6 hours  lipid, fat emulsion (Fish Oil and Plant Based) 20% Infusion 0.6812 Gm/kG/Day (20.8 mL/Hr) IV Continuous <Continuous>  lipid, fat emulsion (Fish Oil and Plant Based) 20% Infusion 0.6812 Gm/kG/Day (20.83 mL/Hr) IV Continuous <Continuous>  meropenem Injectable 1000 milliGRAM(s) IV Push every 12 hours  methylPREDNISolone sodium succinate Injectable 40 milliGRAM(s) IV Push daily  metoprolol tartrate Injectable 2.5 milliGRAM(s) IV Push every 6 hours  pantoprazole  Injectable 40 milliGRAM(s) IV Push daily  Parenteral Nutrition - Adult 1 Each (75 mL/Hr) TPN Continuous <Continuous>  Parenteral Nutrition - Adult 1 Each (75 mL/Hr) TPN Continuous <Continuous>  sucralfate suspension 1 Gram(s) Oral four times a day  vancomycin    Solution 125 milliGRAM(s) Oral every 6 hours    MEDICATIONS  (PRN):  acetaminophen     Tablet .. 650 milliGRAM(s) Oral every 6 hours PRN Temp greater or equal to 38C (100.4F), Mild Pain (1 - 3)  aluminum hydroxide/magnesium hydroxide/simethicone Suspension 30 milliLiter(s) Oral every 4 hours PRN Dyspepsia  Biotene Dry Mouth Oral Rinse 15 milliLiter(s) Swish and Spit five times a day PRN Mouth Care  dextrose Oral Gel 15 Gram(s) Oral once PRN Blood Glucose LESS THAN 70 milliGRAM(s)/deciliter  heparin   Injectable 6000 Unit(s) IV Push every 6 hours PRN For aPTT less than 40  heparin   Injectable 3000 Unit(s) IV Push every 6 hours PRN For aPTT between 40 - 57  melatonin 3 milliGRAM(s) Oral at bedtime PRN Insomnia  ondansetron Injectable 4 milliGRAM(s) IV Push every 8 hours PRN Nausea and/or Vomiting  sodium chloride 0.65% Nasal 1 Spray(s) Both Nostrils two times a day PRN Nasal Congestion      I&O's Detail    21 Feb 2025 07:01  -  22 Feb 2025 07:00  --------------------------------------------------------  IN:    Fat Emulsion (Fish Oil &amp; Plant Based) 20% Infusion: 147 mL    Heparin Infusion: 94 mL    TPN (Total Parenteral Nutrition): 780 mL  Total IN: 1021 mL    OUT:    Indwelling Catheter - Urethral (mL): 2475 mL    Intermittent Catheterization - Urethral (mL): 750 mL  Total OUT: 3225 mL    Total NET: -2204 mL      Vital Signs Last 24 Hrs  T(C): 36.7 (22 Feb 2025 05:53), Max: 36.7 (22 Feb 2025 05:53)  T(F): 98 (22 Feb 2025 05:53), Max: 98 (22 Feb 2025 05:53)  HR: 123 (22 Feb 2025 11:00) (104 - 123)  BP: 126/96 (22 Feb 2025 10:00) (118/90 - 142/89)  BP(mean): 105 (22 Feb 2025 10:00) (99 - 105)  RR: 35 (22 Feb 2025 11:00) (24 - 36)  SpO2: 94% (22 Feb 2025 11:00) (90% - 99%)    Parameters below as of 22 Feb 2025 11:00  Patient On (Oxygen Delivery Method): room air          PHYSICAL EXAM:    Constitutional: NAD,   HEENT: PERRLA, EOMI,  MMM  Neck: No LAD, No JVD  Respiratory: CTAB  Cardiovascular: S1 and S2, RRR  Gastrointestinal: distant bowel sounds, non tender  Extremities: + peripheral edema  Neurological: A/O x 3, no focal deficits  Psychiatric: Normal mood, normal affect  Access: Not applicable  Iesha bain d/ needed straight cath        LABS:                            10.4   38.25 )-----------( 160      ( 22 Feb 2025 05:34 )             30.7                                  10.5   35.65 )-----------( 132      ( 21 Feb 2025 05:18 )             31.0                         10.8   26.65 )-----------( 90       ( 20 Feb 2025 05:31 )             31.4                         10.6   22.26 )-----------( 79       ( 19 Feb 2025 22:15 )             30.8     02-22    141  |  109[H]  |  70[H]  ----------------------------<  274[H]  4.2   |  26  |  1.25    Ca    7.7[L]      22 Feb 2025 05:32  Phos  4.2     02-22  Mg     1.8     02-22    TPro  4.8[L]  /  Alb  1.8[L]  /  TBili  0.6  /  DBili  x   /  AST  65[H]  /  ALT  46  /  AlkPhos  326[H]  02-22        141    |  105    |  66     ----------------------------<  358       21 Feb 2025 05:18  3.2     |  26     |  1.33     144    |  111    |  64     ----------------------------<  326       20 Feb 2025 05:31  3.5     |  23     |  1.40     140    |  109    |  64     ----------------------------<  267       20 Feb 2025 02:16  3.6     |  23     |  1.43     Ca    8.2        21 Feb 2025 05:18  Ca    8.1        20 Feb 2025 05:31  Ca    7.9        20 Feb 2025 02:16    Phos  3.6       21 Feb 2025 05:18  Phos  3.1       20 Feb 2025 05:31  Phos  2.8       20 Feb 2025 02:16    Mg     2.0       21 Feb 2025 05:18  Mg     2.1       20 Feb 2025 05:31  Mg     1.6       20 Feb 2025 02:16               Urine Studies:  Urinalysis Basic - ( 17 Feb 2025 14:46 )    Color: x / Appearance: x / SG: x / pH: x  Gluc: 234 mg/dL / Ketone: x  / Bili: x / Urobili: x   Blood: x / Protein: x / Nitrite: x   Leuk Esterase: x / RBC: x / WBC x   Sq Epi: x / Non Sq Epi: x / Bacteria: x

## 2025-02-22 NOTE — PROGRESS NOTE ADULT - SUBJECTIVE AND OBJECTIVE BOX
INTERVAL HPI/OVERNIGHT EVENTS:  Patient S&E at bedside.   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      GERD (gastroesophageal reflux disease)      Cyclical vomiting syndrome      CVA (cerebrovascular accident)      H/O squamous cell carcinoma excision          FAMILY HISTORY:      VITAL SIGNS:  T(F): 98 (02-22-25 @ 05:53)  HR: 123 (02-22-25 @ 10:00)  BP: 126/96 (02-22-25 @ 10:00)  RR: 29 (02-22-25 @ 10:00)  SpO2: 93% (02-22-25 @ 10:00)  Wt(kg): --    PHYSICAL EXAM:    Constitutional: NAD  Eyes: EOMI, sclera non-icteric  Neck: supple, no masses, no JVD  Respiratory: CTA b/l, good air entry b/l  Cardiovascular: RRR, no M/R/G  Gastrointestinal: soft, NTND, no masses palpable, + BS, no hepatosplenomegaly  Extremities: no c/c/e  Neurological: AAOx3      MEDICATIONS  (STANDING):  acetaminophen   IVPB .. 1000 milliGRAM(s) IV Intermittent once  acetaminophen   IVPB .. 1000 milliGRAM(s) IV Intermittent once  amLODIPine   Tablet 10 milliGRAM(s) Oral daily  chlorhexidine 2% Cloths 1 Application(s) Topical <User Schedule>  cholecalciferol 2000 Unit(s) Oral at bedtime  dextrose 5%. 1000 milliLiter(s) (50 mL/Hr) IV Continuous <Continuous>  dextrose 5%. 1000 milliLiter(s) (100 mL/Hr) IV Continuous <Continuous>  dextrose 50% Injectable 25 Gram(s) IV Push once  dextrose 50% Injectable 12.5 Gram(s) IV Push once  dextrose 50% Injectable 25 Gram(s) IV Push once  famotidine Injectable 20 milliGRAM(s) IV Push daily  fluconAZOLE IVPB 200 milliGRAM(s) IV Intermittent every 24 hours  fluconAZOLE IVPB      glucagon  Injectable 1 milliGRAM(s) IntraMuscular once  heparin  Infusion.  Unit(s)/Hr (13 mL/Hr) IV Continuous <Continuous>  insulin glargine Injectable (LANTUS) 10 Unit(s) SubCutaneous at bedtime  insulin lispro (ADMELOG) corrective regimen sliding scale   SubCutaneous every 6 hours  lipid, fat emulsion (Fish Oil and Plant Based) 20% Infusion 0.6812 Gm/kG/Day (20.8 mL/Hr) IV Continuous <Continuous>  lipid, fat emulsion (Fish Oil and Plant Based) 20% Infusion 0.6812 Gm/kG/Day (20.83 mL/Hr) IV Continuous <Continuous>  meropenem Injectable 1000 milliGRAM(s) IV Push every 12 hours  methylPREDNISolone sodium succinate Injectable 40 milliGRAM(s) IV Push daily  metoprolol tartrate Injectable 2.5 milliGRAM(s) IV Push every 6 hours  pantoprazole  Injectable 40 milliGRAM(s) IV Push daily  Parenteral Nutrition - Adult 1 Each (75 mL/Hr) TPN Continuous <Continuous>  Parenteral Nutrition - Adult 1 Each (75 mL/Hr) TPN Continuous <Continuous>  sucralfate suspension 1 Gram(s) Oral four times a day  vancomycin    Solution 125 milliGRAM(s) Oral every 6 hours    MEDICATIONS  (PRN):  acetaminophen     Tablet .. 650 milliGRAM(s) Oral every 6 hours PRN Temp greater or equal to 38C (100.4F), Mild Pain (1 - 3)  aluminum hydroxide/magnesium hydroxide/simethicone Suspension 30 milliLiter(s) Oral every 4 hours PRN Dyspepsia  Biotene Dry Mouth Oral Rinse 15 milliLiter(s) Swish and Spit five times a day PRN Mouth Care  dextrose Oral Gel 15 Gram(s) Oral once PRN Blood Glucose LESS THAN 70 milliGRAM(s)/deciliter  heparin   Injectable 6000 Unit(s) IV Push every 6 hours PRN For aPTT less than 40  heparin   Injectable 3000 Unit(s) IV Push every 6 hours PRN For aPTT between 40 - 57  melatonin 3 milliGRAM(s) Oral at bedtime PRN Insomnia  ondansetron Injectable 4 milliGRAM(s) IV Push every 8 hours PRN Nausea and/or Vomiting  sodium chloride 0.65% Nasal 1 Spray(s) Both Nostrils two times a day PRN Nasal Congestion      Allergies    No Known Allergies    Intolerances        LABS:                        10.4   38.25 )-----------( 160      ( 22 Feb 2025 05:34 )             30.7     02-22    141  |  109[H]  |  70[H]  ----------------------------<  274[H]  4.2   |  26  |  1.25    Ca    7.7[L]      22 Feb 2025 05:32  Phos  4.2     02-22  Mg     1.8     02-22    TPro  4.8[L]  /  Alb  1.8[L]  /  TBili  0.6  /  DBili  x   /  AST  65[H]  /  ALT  46  /  AlkPhos  326[H]  02-22    PTT - ( 21 Feb 2025 05:18 )  PTT:96.6 sec  Urinalysis Basic - ( 22 Feb 2025 05:32 )    Color: x / Appearance: x / SG: x / pH: x  Gluc: 274 mg/dL / Ketone: x  / Bili: x / Urobili: x   Blood: x / Protein: x / Nitrite: x   Leuk Esterase: x / RBC: x / WBC x   Sq Epi: x / Non Sq Epi: x / Bacteria: x        RADIOLOGY & ADDITIONAL TESTS:  Studies reviewed.

## 2025-02-23 LAB
ALBUMIN SERPL ELPH-MCNC: 2.9 G/DL — LOW (ref 3.3–5)
ALP SERPL-CCNC: 243 U/L — HIGH (ref 40–120)
ALT FLD-CCNC: 49 U/L — SIGNIFICANT CHANGE UP (ref 12–78)
ANION GAP SERPL CALC-SCNC: 10 MMOL/L — SIGNIFICANT CHANGE UP (ref 5–17)
ANION GAP SERPL CALC-SCNC: 6 MMOL/L — SIGNIFICANT CHANGE UP (ref 5–17)
ANION GAP SERPL CALC-SCNC: 8 MMOL/L — SIGNIFICANT CHANGE UP (ref 5–17)
APTT BLD: 72.7 SEC — HIGH (ref 24.5–35.6)
AST SERPL-CCNC: 61 U/L — HIGH (ref 15–37)
BASOPHILS # BLD AUTO: 0.05 K/UL — SIGNIFICANT CHANGE UP (ref 0–0.2)
BASOPHILS # BLD MANUAL: 0 K/UL — SIGNIFICANT CHANGE UP (ref 0–0.2)
BASOPHILS NFR BLD AUTO: 0.2 % — SIGNIFICANT CHANGE UP (ref 0–2)
BASOPHILS NFR BLD MANUAL: 0 % — SIGNIFICANT CHANGE UP (ref 0–2)
BILIRUB SERPL-MCNC: 0.8 MG/DL — SIGNIFICANT CHANGE UP (ref 0.2–1.2)
BLD GP AB SCN SERPL QL: SIGNIFICANT CHANGE UP
BUN SERPL-MCNC: 76 MG/DL — HIGH (ref 7–23)
BUN SERPL-MCNC: 84 MG/DL — HIGH (ref 7–23)
CALCIUM SERPL-MCNC: 7.8 MG/DL — LOW (ref 8.5–10.1)
CALCIUM SERPL-MCNC: 8.3 MG/DL — LOW (ref 8.5–10.1)
CALCIUM SERPL-MCNC: 8.4 MG/DL — LOW (ref 8.5–10.1)
CHLORIDE SERPL-SCNC: 110 MMOL/L — HIGH (ref 96–108)
CHLORIDE SERPL-SCNC: 110 MMOL/L — HIGH (ref 96–108)
CHLORIDE SERPL-SCNC: 114 MMOL/L — HIGH (ref 96–108)
CO2 SERPL-SCNC: 27 MMOL/L — SIGNIFICANT CHANGE UP (ref 22–31)
CO2 SERPL-SCNC: 28 MMOL/L — SIGNIFICANT CHANGE UP (ref 22–31)
CO2 SERPL-SCNC: 28 MMOL/L — SIGNIFICANT CHANGE UP (ref 22–31)
CREAT SERPL-MCNC: 1.38 MG/DL — HIGH (ref 0.5–1.3)
CREAT SERPL-MCNC: 1.51 MG/DL — HIGH (ref 0.5–1.3)
CREAT SERPL-MCNC: 1.55 MG/DL — HIGH (ref 0.5–1.3)
DACRYOCYTES BLD QL SMEAR: SLIGHT — SIGNIFICANT CHANGE UP
EGFR: 36 ML/MIN/1.73M2 — LOW
EGFR: 36 ML/MIN/1.73M2 — LOW
EGFR: 37 ML/MIN/1.73M2 — LOW
EGFR: 37 ML/MIN/1.73M2 — LOW
EGFR: 41 ML/MIN/1.73M2 — LOW
EGFR: 41 ML/MIN/1.73M2 — LOW
EOSINOPHIL # BLD MANUAL: 0 K/UL — SIGNIFICANT CHANGE UP (ref 0–0.5)
EOSINOPHIL NFR BLD AUTO: 0 % — SIGNIFICANT CHANGE UP (ref 0–6)
GLUCOSE BLDC GLUCOMTR-MCNC: 231 MG/DL — HIGH (ref 70–99)
GLUCOSE BLDC GLUCOMTR-MCNC: 239 MG/DL — HIGH (ref 70–99)
GLUCOSE BLDC GLUCOMTR-MCNC: 348 MG/DL — HIGH (ref 70–99)
GLUCOSE SERPL-MCNC: 220 MG/DL — HIGH (ref 70–99)
GLUCOSE SERPL-MCNC: 231 MG/DL — HIGH (ref 70–99)
GLUCOSE SERPL-MCNC: 276 MG/DL — HIGH (ref 70–99)
HAV IGM SER-ACNC: SIGNIFICANT CHANGE UP
HBV CORE IGM SER-ACNC: SIGNIFICANT CHANGE UP
HBV SURFACE AG SER-ACNC: SIGNIFICANT CHANGE UP
HCT VFR BLD CALC: 18.7 % — CRITICAL LOW (ref 34.5–45)
HCT VFR BLD CALC: 21.8 % — LOW (ref 34.5–45)
HCT VFR BLD CALC: 28.9 % — LOW (ref 34.5–45)
HCV AB S/CO SERPL IA: 0.39 S/CO — SIGNIFICANT CHANGE UP (ref 0–0.79)
HCV AB SERPL-IMP: SIGNIFICANT CHANGE UP
HGB BLD-MCNC: 6.5 G/DL — CRITICAL LOW (ref 11.5–15.5)
HGB BLD-MCNC: 7.5 G/DL — LOW (ref 11.5–15.5)
HGB BLD-MCNC: 9.9 G/DL — LOW (ref 11.5–15.5)
IMM GRANULOCYTES # BLD AUTO: 7.78 K/UL — HIGH (ref 0–0.07)
IMM GRANULOCYTES NFR BLD AUTO: 27.1 % — HIGH (ref 0–0.9)
LYMPHOCYTES # BLD AUTO: 1.26 K/UL — SIGNIFICANT CHANGE UP (ref 1–3.3)
LYMPHOCYTES # BLD MANUAL: 1.15 K/UL — SIGNIFICANT CHANGE UP (ref 1–3.3)
LYMPHOCYTES NFR BLD AUTO: 4.4 % — LOW (ref 13–44)
LYMPHOCYTES NFR BLD MANUAL: 4 % — LOW (ref 13–44)
MAGNESIUM SERPL-MCNC: 1.6 MG/DL — SIGNIFICANT CHANGE UP (ref 1.6–2.6)
MANUAL METAMYELOCYTE #: 2.59 K/UL — HIGH (ref 0–0)
MANUAL MYELOCYTE #: 2.59 K/UL — HIGH (ref 0–0)
MANUAL NEUTROPHIL BANDS #: 2.3 K/UL — HIGH (ref 0–0.84)
MANUAL NRBC #: 1.15 K/UL — HIGH (ref 0–0)
MANUAL PROMYELOCYTE #: 0.58 K/UL — HIGH (ref 0–0)
MANUAL SMEAR VERIFICATION: SIGNIFICANT CHANGE UP
MCHC RBC-ENTMCNC: 30.5 PG — SIGNIFICANT CHANGE UP (ref 27–34)
MCHC RBC-ENTMCNC: 30.5 PG — SIGNIFICANT CHANGE UP (ref 27–34)
MCHC RBC-ENTMCNC: 34.3 G/DL — SIGNIFICANT CHANGE UP (ref 32–36)
MCHC RBC-ENTMCNC: 34.4 G/DL — SIGNIFICANT CHANGE UP (ref 32–36)
MCHC RBC-ENTMCNC: 34.8 G/DL — SIGNIFICANT CHANGE UP (ref 32–36)
MCV RBC AUTO: 84.5 FL — SIGNIFICANT CHANGE UP (ref 80–100)
MCV RBC AUTO: 87.8 FL — SIGNIFICANT CHANGE UP (ref 80–100)
MCV RBC AUTO: 88.6 FL — SIGNIFICANT CHANGE UP (ref 80–100)
METAMYELOCYTES # FLD: 9 % — HIGH (ref 0–0)
METAMYELOCYTES NFR BLD: 9 % — HIGH (ref 0–0)
MONOCYTES # BLD AUTO: 2.7 K/UL — HIGH (ref 0–0.9)
MONOCYTES # BLD MANUAL: 1.44 K/UL — HIGH (ref 0–0.9)
MONOCYTES NFR BLD AUTO: 9.4 % — SIGNIFICANT CHANGE UP (ref 2–14)
MONOCYTES NFR BLD MANUAL: 5 % — SIGNIFICANT CHANGE UP (ref 2–14)
MYELOCYTES NFR BLD: 9 % — HIGH (ref 0–0)
NEUTROPHILS # BLD AUTO: 16.97 K/UL — HIGH (ref 1.8–7.4)
NEUTROPHILS # BLD MANUAL: 18.12 K/UL — HIGH (ref 1.8–7.4)
NEUTROPHILS NFR BLD AUTO: 58.9 % — SIGNIFICANT CHANGE UP (ref 43–77)
NEUTROPHILS NFR BLD MANUAL: 63 % — SIGNIFICANT CHANGE UP (ref 43–77)
NEUTS BAND # BLD: 8 % — SIGNIFICANT CHANGE UP (ref 0–8)
NEUTS BAND NFR BLD: 8 % — SIGNIFICANT CHANGE UP (ref 0–8)
NRBC # BLD AUTO: 1.21 K/UL — HIGH (ref 0–0)
NRBC # BLD AUTO: 1.22 K/UL — HIGH (ref 0–0)
NRBC # BLD AUTO: 1.23 K/UL — HIGH (ref 0–0)
NRBC # BLD: 4 /100 WBCS — HIGH (ref 0–0)
NRBC # FLD: 1.21 K/UL — HIGH (ref 0–0)
NRBC # FLD: 1.22 K/UL — HIGH (ref 0–0)
NRBC # FLD: 1.23 K/UL — HIGH (ref 0–0)
NRBC BLD AUTO-RTO: 4 /100 WBCS — HIGH (ref 0–0)
NRBC BLD AUTO-RTO: 4 /100 WBCS — HIGH (ref 0–0)
NRBC BLD-RTO: 4 /100 WBCS — HIGH (ref 0–0)
OVALOCYTES BLD QL SMEAR: SLIGHT — SIGNIFICANT CHANGE UP
PHOSPHATE SERPL-MCNC: 4.6 MG/DL — HIGH (ref 2.5–4.5)
PHOSPHATE SERPL-MCNC: 4.7 MG/DL — HIGH (ref 2.5–4.5)
PHOSPHATE SERPL-MCNC: 4.8 MG/DL — HIGH (ref 2.5–4.5)
PLAT MORPH BLD: NORMAL — SIGNIFICANT CHANGE UP
PLATELET # BLD AUTO: 132 K/UL — LOW (ref 150–400)
PLATELET # BLD AUTO: 143 K/UL — LOW (ref 150–400)
PMV BLD: SIGNIFICANT CHANGE UP (ref 7–13)
POLYCHROMASIA BLD QL SMEAR: SLIGHT — SIGNIFICANT CHANGE UP
POTASSIUM SERPL-MCNC: 3.9 MMOL/L — SIGNIFICANT CHANGE UP (ref 3.5–5.3)
POTASSIUM SERPL-MCNC: 4.1 MMOL/L — SIGNIFICANT CHANGE UP (ref 3.5–5.3)
POTASSIUM SERPL-MCNC: 4.2 MMOL/L — SIGNIFICANT CHANGE UP (ref 3.5–5.3)
POTASSIUM SERPL-SCNC: 3.9 MMOL/L — SIGNIFICANT CHANGE UP (ref 3.5–5.3)
POTASSIUM SERPL-SCNC: 4.1 MMOL/L — SIGNIFICANT CHANGE UP (ref 3.5–5.3)
POTASSIUM SERPL-SCNC: 4.2 MMOL/L — SIGNIFICANT CHANGE UP (ref 3.5–5.3)
PROMYELOCYTES # FLD: 2 % — HIGH (ref 0–0)
PROMYELOCYTES NFR BLD: 2 % — HIGH (ref 0–0)
PROT SERPL-MCNC: 5.3 GM/DL — LOW (ref 6–8.3)
RBC # BLD: 2.13 M/UL — LOW (ref 3.8–5.2)
RBC # BLD: 2.46 M/UL — LOW (ref 3.8–5.2)
RBC # BLD: 3.42 M/UL — LOW (ref 3.8–5.2)
RBC # FLD: 14.6 % — HIGH (ref 10.3–14.5)
RBC # FLD: 15.8 % — HIGH (ref 10.3–14.5)
RBC # FLD: 15.8 % — HIGH (ref 10.3–14.5)
RBC BLD AUTO: ABNORMAL
SODIUM SERPL-SCNC: 144 MMOL/L — SIGNIFICANT CHANGE UP (ref 135–145)
SODIUM SERPL-SCNC: 147 MMOL/L — HIGH (ref 135–145)
SODIUM SERPL-SCNC: 150 MMOL/L — HIGH (ref 135–145)
WBC # BLD: 24.28 K/UL — HIGH (ref 3.8–10.5)
WBC # BLD: 28.2 K/UL — HIGH (ref 3.8–10.5)
WBC # BLD: 28.76 K/UL — HIGH (ref 3.8–10.5)
WBC # FLD AUTO: 24.28 K/UL — HIGH (ref 3.8–10.5)
WBC # FLD AUTO: 28.2 K/UL — HIGH (ref 3.8–10.5)
WBC # FLD AUTO: 28.76 K/UL — HIGH (ref 3.8–10.5)
WBC MORPHOLOGY: ABNORMAL

## 2025-02-23 PROCEDURE — 71045 X-RAY EXAM CHEST 1 VIEW: CPT | Mod: 26

## 2025-02-23 PROCEDURE — 99233 SBSQ HOSP IP/OBS HIGH 50: CPT

## 2025-02-23 RX ORDER — I.V. FAT EMULSION 20 G/100ML
0.68 EMULSION INTRAVENOUS
Qty: 50 | Refills: 0 | Status: DISCONTINUED | OUTPATIENT
Start: 2025-02-23 | End: 2025-02-23

## 2025-02-23 RX ORDER — SODIUM CHLORIDE 9 G/1000ML
1000 INJECTION, SOLUTION INTRAVENOUS
Refills: 0 | Status: DISCONTINUED | OUTPATIENT
Start: 2025-02-23 | End: 2025-02-25

## 2025-02-23 RX ORDER — FUROSEMIDE 10 MG/ML
20 INJECTION INTRAMUSCULAR; INTRAVENOUS ONCE
Refills: 0 | Status: COMPLETED | OUTPATIENT
Start: 2025-02-23 | End: 2025-02-23

## 2025-02-23 RX ORDER — SODIUM/POT/MAG/CALC/CHLOR/ACET 35-20-5MEQ
1 VIAL (ML) INTRAVENOUS
Refills: 0 | Status: DISCONTINUED | OUTPATIENT
Start: 2025-02-23 | End: 2025-02-23

## 2025-02-23 RX ADMIN — I.V. FAT EMULSION 20.83 GM/KG/DAY: 20 EMULSION INTRAVENOUS at 22:44

## 2025-02-23 RX ADMIN — METOPROLOL SUCCINATE 2.5 MILLIGRAM(S): 50 TABLET, EXTENDED RELEASE ORAL at 05:11

## 2025-02-23 RX ADMIN — Medication 10 MG/HR: at 09:49

## 2025-02-23 RX ADMIN — Medication 1 GRAM(S): at 05:09

## 2025-02-23 RX ADMIN — METHYLPREDNISOLONE ACETATE 20 MILLIGRAM(S): 80 INJECTION, SUSPENSION INTRA-ARTICULAR; INTRALESIONAL; INTRAMUSCULAR; SOFT TISSUE at 09:24

## 2025-02-23 RX ADMIN — Medication 5 MILLIGRAM(S): at 05:11

## 2025-02-23 RX ADMIN — Medication 1 GRAM(S): at 16:02

## 2025-02-23 RX ADMIN — MEROPENEM 1000 MILLIGRAM(S): 1 INJECTION INTRAVENOUS at 21:15

## 2025-02-23 RX ADMIN — Medication 2000 UNIT(S): at 21:16

## 2025-02-23 RX ADMIN — ALBUMIN (HUMAN) 50 MILLILITER(S): 12.5 INJECTION, SOLUTION INTRAVENOUS at 02:36

## 2025-02-23 RX ADMIN — Medication 5 MILLIGRAM(S): at 23:04

## 2025-02-23 RX ADMIN — Medication 125 MILLIGRAM(S): at 03:58

## 2025-02-23 RX ADMIN — Medication 1 GRAM(S): at 23:03

## 2025-02-23 RX ADMIN — INSULIN LISPRO 8: 100 INJECTION, SOLUTION INTRAVENOUS; SUBCUTANEOUS at 16:03

## 2025-02-23 RX ADMIN — HEPARIN SODIUM 700 UNIT(S)/HR: 1000 INJECTION INTRAVENOUS; SUBCUTANEOUS at 07:06

## 2025-02-23 RX ADMIN — INSULIN LISPRO 4: 100 INJECTION, SOLUTION INTRAVENOUS; SUBCUTANEOUS at 07:01

## 2025-02-23 RX ADMIN — Medication 80 MILLIGRAM(S): at 09:14

## 2025-02-23 RX ADMIN — ALBUMIN (HUMAN) 50 MILLILITER(S): 12.5 INJECTION, SOLUTION INTRAVENOUS at 13:53

## 2025-02-23 RX ADMIN — Medication 125 MILLIGRAM(S): at 06:44

## 2025-02-23 RX ADMIN — METOPROLOL SUCCINATE 2.5 MILLIGRAM(S): 50 TABLET, EXTENDED RELEASE ORAL at 23:04

## 2025-02-23 RX ADMIN — Medication 1 GRAM(S): at 03:58

## 2025-02-23 RX ADMIN — Medication 125 MILLIGRAM(S): at 16:02

## 2025-02-23 RX ADMIN — ALBUMIN (HUMAN) 50 MILLILITER(S): 12.5 INJECTION, SOLUTION INTRAVENOUS at 05:10

## 2025-02-23 RX ADMIN — Medication 10 MG/HR: at 16:01

## 2025-02-23 RX ADMIN — HEPARIN SODIUM 700 UNIT(S)/HR: 1000 INJECTION INTRAVENOUS; SUBCUTANEOUS at 00:07

## 2025-02-23 RX ADMIN — Medication 100 MILLIGRAM(S): at 09:24

## 2025-02-23 RX ADMIN — INSULIN LISPRO 4: 100 INJECTION, SOLUTION INTRAVENOUS; SUBCUTANEOUS at 23:12

## 2025-02-23 RX ADMIN — Medication 20 MILLIGRAM(S): at 21:15

## 2025-02-23 RX ADMIN — INSULIN GLARGINE-YFGN 15 UNIT(S): 100 INJECTION, SOLUTION SUBCUTANEOUS at 21:15

## 2025-02-23 RX ADMIN — Medication 125 MILLIGRAM(S): at 23:03

## 2025-02-23 RX ADMIN — FUROSEMIDE 20 MILLIGRAM(S): 10 INJECTION INTRAMUSCULAR; INTRAVENOUS at 10:59

## 2025-02-23 RX ADMIN — MEROPENEM 1000 MILLIGRAM(S): 1 INJECTION INTRAVENOUS at 09:25

## 2025-02-23 RX ADMIN — Medication 1 EACH: at 22:44

## 2025-02-23 NOTE — CHART NOTE - NSCHARTNOTEFT_GEN_A_CORE
Clinical Nutrition PN Follow Up Note    *68 y/o F w/ recent diagnosis of primary pancreatic adenocarcinoma of head with tumor in umbilicus, chemo-induced pancytopenia and significant PMH of SCC lip, cyclical vomiting syndrome, HTN, HPL, Gout, GERD, CVA on Aggrenox and others had been admitted to [facility] with septic shock on 2025, nausea, vomiting and diarrhea requiring Levophed drip, was transferred out of ICU to regular floor last night.  Patient was being followed by GI, ID, Cardiology and Onc there.  Patient has been transferred to  today on patient's  (GI Dr. Pleitez) request.  At this time, patient c/o non-bloody diarrhea x 3 in last 24 hours.  She denies any abdominal pain, nausea, but has some dry heaves.  She denies any fever, chills, chest pain, palpitation, constipation or dysuria. Initially, she was started on Vancomycin and Zosyn, but now she has been on Cefepime as her blood culture and urine cultures are positive for Pseudomonas aeruginosa. Currently, patient is on Cefepime.  ID had also recommended Flagyl, but patient had refused it, as it causes nausea to her. Noted with Metabolic acidosis with NAG due to GI loss. Likely chemo-induced diarrhea and vomiting. Better than before. Cutaneous lesion on RUQ area below right breast, unclear etiology, ? Ecthyma gangrenosum. Admitting diagnosis: pancreatic ca  *: Pt transfer from [facility]; seen by RD and met criteria for PCM. Pt reports only being able to tolerate some sips of water and tea at this time. Reports tried eating small bite of mashed banana and felt burning sensation down her throat. Reports #; RD unable to obtain bedscale wt d/t bedscale not working. Admit wt per # note with 1+ and 2+ edema skewing wt. Pt appears overweight, NFPE reveals mild- mod muscle/fat wasting at this time. Recommend to continue with PO diet and encourage intake as tolerated. RD consulted to initiate PN d/t PO intolerance. Will initiate TPN through port.  *2/15: Remains on low fiber diet with minimal PO intake. Still w/ multiple episodes of diarrhea, if persists can consider adding 5mg zinc into PN bag. No other acute events overnight. D/w Dr. Joyner will c/w TPN today.   *: Raised area noted to mid sternum. RCW port intact.- no swelling noted, no redness. Port flushed without resistance. + blood return noted. Assessed with IV team Amy. Per pt no Pain on palpation. CT Chest. TPN on hold until CT read. CT chest from earlier in the day revealed new bilateral pleural effusions with bibasilar consolidated lung and ascites. CXR now shows low lung volumes and bibasilar opacities. Rigors post transfusion - Rapid response called;  Dr. Pleitez requesting ICU consult- concerned for pt.  requesting assessment of patient STAT. Pt transferred to SICU. ? third spacing, and possible PNA. Per pt still drinking small sips of water and tea. D/w Dr. Fortune will keep total volume same in PN bag today, and will c/w TPN.   *:  tx SICU, po remains minimal. On bipap. On additional LR d/t diuresis per critical care PA: "malignancy/severe protein-calorie malnutrition/hypoalbuminemia causing diffuse third spacing, caution with further diuresis as this is not truly a volume overload/acute CHF issue" also on IV albumin  c/w TPN  STRONGLY suggest to Confirm goals of care regarding LONG-TERM nutrition support - However, LONG-TERM Nutrition support is not recommended with advanced cancer as studies show that there is no improvement of dehydration symptoms, quality of life, or survival. It also increases the risk for peripheral edema, ascites, and pleural effusions. Will continue to provide nutr within GOC though  *: plan to c/w TPN for now. RD discussed w/ MD Tong and SICU IRINA Jurado need to confirm long term GOC regarding nutr support. Per GI - Esophageal pain is 2/2 esophagitis but unclear what type -- reflux vs. candida vs CMV or HSV etc. Plan to add imodium standing. ? EGD   *: reports she stills has difficulty swallowing due to pain. feeling weak overall. still having multiple episodes of diarrhea, poor PO intake. Plan to c/w TPN - per GI: "CVM and HSV will be negative which argues that esophagitis is reflux or fungal related"  d/w IRINA Jurado long-term plan regarding nutr support TBD, pending discussion w/ family. ? fluid overload which TPN may be contributing to as some studies show with advanced cancer that nutrition support increases the risk for peripheral edema, ascites, and pleural effusions.   *: PO slighly improving, plan to c/w TPN.  POCT uptrending, d/w IRINA Jurado, steroids to be dc and will hold off in insulin PN bag - will manage w/ insulin outside bag.  Per GI: "Imaging suggests ileus but I personally think that putting the imaging together with history and clinical findings that she just has severe diarrhea illness 2/2 chemo, given that both large and small bowel loops are fluid filled and she has no abd pain or vomiting"  *: PO intake continues to slightly improve, plan to c/w TPN. Hyperglycemia worsening, d/w IRINA Jurado, plan to add regular insulin into PN bag. Will also decrease dextrose to 250g to see if this helps w/ hyperglycemia and can increase again when POCTs better controlled.     *TPN initiated 2/2 PO intolerance, + Mucositis, possible candidal esophagitis    *current status: Abdomen distended, non-tender to palpation - GI rec stat CT w/o contrast for further evaluation and to hold PO intake at this time. POCT remain elevated, per RN  this morning - Lantus was not given last night by RN since insulin was already in PN bag (?). Will hold off on increasing dextrose and will c/w current insulin in PN bag, d/w intensivist and plan to adjust Lantus outside PN bag.     *pertinent meds: Lasix, Lantus (15 units HS), Vitamin D3 (2000IU), Pepcid, Abx, Admelog (18 units x 24 hours), Solu-Medrol, Reglan, Metoprolol, Protonix, Carafate, Albumin Human 25% IVPB    *labs reviewed; Na WNL; corrected Na for hyperglycemia elevated (146), will closely monitor and will adjust Na and total volume prn per labs. K and Mg WNL; As per [facility] Guidelines, maintenance potassium concentration in PN is 1 – 2 mEq/kg/day. However, [facility] PN Policy indicates a maximum of 120 mEq should be added into the PN bag. Currently at 100mEq in PN bag. Mg remains on lower end, will increase in PN bag. Phos now elevated, will remove from PN bag and will add back once level downtrends. T bili WNL will c/w trace elements. C/w MVI and thiamine in PN bag. POCTs w/ slight improvement however still elevated x 24 hrs, will c/w current dextrose (250g) and will titrate up tomorrow if POCT improved (goal 325g daily; GIR ~3.1 WNL) - will c/w 15 units of regular insulin to PN bag as d/w intensivist.  TG WNL, however increasing will c/w 50g of lipids daily.  Also of note, Cl still elevated, will add more acetate vs Cl into TPN bag (239 vs 140, respectively).       144  |  110[H]  |  76[H]  ----------------------------<  220[H]  4.1   |  28  |  1.38[H]    Ca    7.8[L]      2025 06:16  Phos  4.6       Mg     1.8         TPro  5.3[L]  /  Alb  2.9[L]  /  TBili  0.8  /  DBili  x   /  AST  61[H]  /  ALT  49  /  AlkPhos  243[H]      BMI: BMI (kg/m2): 27.8 (25 @ 05:46)  HbA1c: A1C with Estimated Average Glucose Result: 6.6 % (25 @ 05:35)    Glucose: POCT Blood Glucose.: 220 mg/dL (2025 09:22)    BP: 136/90 (25 @ 06:00) (114/82 - 145/78)Vital Signs Last 24 Hrs  T(C): 37.1 (25 @ 06:00), Max: 37.1 (25 @ 06:00)  T(F): 98.8 (25 @ 06:00), Max: 98.8 (25 @ 06:00)  HR: 98 (25 @ 06:00) (98 - 111)  BP: 136/90 (25 @ 06:00) (125/81 - 145/78)  BP(mean): 104 (25 @ 06:00) (91 - 104)  RR: 27 (25 @ 06:00) (25 - 37)  SpO2: 90% (25 @ 06:00) (90% - 99%)    Lipid Panel: Date/Time: 25 @ 05:31  Triglycerides, Serum: 281    POCT Blood Glucose.: 231 mg/dL (2025 06:48)  POCT Blood Glucose.: 267 mg/dL (2025 23:09)  POCT Blood Glucose.: 302 mg/dL (2025 18:24)  POCT Blood Glucose.: 220 mg/dL (2025 09:22)    Iron Total: 34 ug/dL (25 @ 18:46)  Folate, Serum: >20.0 ng/mL (25 @ 18:46)  Vitamin B12, Serum: 1457 pg/mL (25 @ 18:46)  Vitamin D, 25-Hydroxy: 23.0 ng/mL (25 @ 18:46) ** c/w deficiency    *I&O's Detail    2025 07:01  -  2025 07:00  --------------------------------------------------------  IN:    Fat Emulsion (Fish Oil &amp; Plant Based) 20% Infusion: 168 mL    Heparin Infusion: 180.5 mL    IV PiggyBack: 300 mL    TPN (Total Parenteral Nutrition): 1810 mL  Total IN: 2458.5 mL    OUT:    Indwelling Catheter - Urethral (mL): 4200 mL  Total OUT: 4200 mL    Total NET: -1741.5 mL  * fluid status: Negative; large UOP. Will continue to monitor/adjust prn to maintain fluid balance   *last (+) BM doc'd 2/18 x 5 - liquid, diarrhea.  pt on standing bowel regimen (imodium).  *edema: 3+ left leg, left knee  *PI: L/R Buttocks (stage 2)    *malnutrition: Pt continues to meet criteria for severe protein calorie malnutrition in context of acute on chronic illness r/t decreased ability to meet increased nutrient needs 2/2 PO intolerance, N/V and diarrhea, on chemo for pancreatic ca AEB meeting <50% ENN > 5 days, mild-mod muscle/fat wasting    Estimated Needs: based on 73.4 Kg (wt from EMR admit wt )  Calories: 1812 - 2175 Kcal (25 - 30 Kcal/Kg)  Protein: 108 - 145 g (1.5 - 2.0 g/Kg)  Fluids: 1450 - 1812 mL (20 - 25 mL/Kg)    Diet, Low Fiber:   Kosher  Supplement Feeding Modality:  Oral  Ensure Plant-Based Cans or Servings Per Day:  3       Frequency:  Daily (25 @ 16:47) [Active]  Parenteral Nutrition - Adult 1 Each (75 mL/Hr) TPN Continuous <Continuous>, 25 @ 22:00, 22:00, Stop order after: 1 Days    Weight History:  Daily Weight in k.3 (2025 06:00) *2+ edema  Daily Weight in k.4 (2025 05:00)  Daily Weight in k.6 (15 Feb 2025 06:17)  Height (cm): 162.6 (25 @ 02:39)  Weight (kg): 73.4 (25 @ 05:46), 66.8 (25 @ 02:39)  BMI (kg/m2): 27.8 (25 @ 05:46), 25.3 (25 @ 02:39)  BSA (m2): 1.79 (25 @ 05:46), 1.72 (25 @ 02:39)    TPN Recommendations: via implanted infusion port  Total Volume: 1800 mL x 24 hours  125 g  Amino Acids  250 g Dextrose (goal 325g)  50 g Lipids 20%   100 mEq Sodium Chloride  54 mEq Sodium Acetate  0 mmol Sodium Phosphate  40 mEq Potassium Chloride  60 mEq Potassium Acetate  0 mmol Potassium Phosphate  0 mEq Calcium Gluconate (CAPS out of PN solution)   12 mEq Magnesium Sulfate  200 mg Thiamine  1 ml Trace Elements  10 ml MVI  15 units Regular Insulin    Total Calories    1850   (Meets  100%  of lower end of Estimated Energy needs  and  100%  Protein needs)      Additional Recommendations:    1) Daily weights  2) Strict I & O's **pt third spacing  3) Daily lyte checks including magnesium and phos  4) Weekly triglycerides/LFT checks  5) POCT q6hrs; maintain 140-180mg/dL***  6) C/w low fiber diet and encourage PO intake as tolerated  7) Continue to titrate dextrose 50-75g per day until goal of 325g reached  8) Confirm goals of care regarding long-term nutrition support. Would recommend PEG placement if long-term nutrition support is required as GI is functional and would be the preferred route for nutrition. However, long-term nutrition support is not recommended with advanced cancer as studies show that there is no improvement of dehydration symptoms, quality of life, or survival. It also increases the risk for peripheral edema, ascites, and pleural effusions.     *will continue to monitor and adjust PN prn*  Johana Perrin, MS, RDN, CDN [phone]

## 2025-02-23 NOTE — PROGRESS NOTE ADULT - SUBJECTIVE AND OBJECTIVE BOX
OVERNIGHT EVENTS / SUBJECTIVE: Patient seen and examined at bedside.     OBJECTIVE:    VITAL SIGNS:  ICU Vital Signs Last 24 Hrs  T(C): 36.3 (23 Feb 2025 06:00), Max: 36.7 (22 Feb 2025 20:56)  T(F): 97.4 (23 Feb 2025 06:00), Max: 98.1 (22 Feb 2025 20:56)  HR: 97 (23 Feb 2025 07:00) (80 - 123)  BP: 125/67 (23 Feb 2025 07:00) (115/83 - 136/80)  BP(mean): 84 (23 Feb 2025 07:00) (84 - 105)  ABP: --  ABP(mean): --  RR: 23 (23 Feb 2025 07:00) (19 - 35)  SpO2: 92% (23 Feb 2025 07:00) (92% - 100%)    O2 Parameters below as of 23 Feb 2025 07:00  Patient On (Oxygen Delivery Method): room air              02-22 @ 07:01  -  02-23 @ 07:00  --------------------------------------------------------  IN: 2458.5 mL / OUT: 4200 mL / NET: -1741.5 mL      CAPILLARY BLOOD GLUCOSE      POCT Blood Glucose.: 231 mg/dL (23 Feb 2025 06:48)      PHYSICAL EXAM:    General: NAD  HEENT: NC/AT; PERRL, clear conjunctiva  Neck: supple  Respiratory: CTA b/l  Cardiovascular: +S1/S2; RRR  Abdomen: soft, NT/ND; +BS x4  Extremities: WWP, 2+ peripheral pulses b/l; no LE edema  Skin: normal color and turgor; no rash  Neurological:    MEDICATIONS:  MEDICATIONS  (STANDING):  acetaminophen   IVPB .. 1000 milliGRAM(s) IV Intermittent once  acetaminophen   IVPB .. 1000 milliGRAM(s) IV Intermittent once  albumin human 25% IVPB 100 milliLiter(s) IV Intermittent every 6 hours  amLODIPine   Tablet 10 milliGRAM(s) Oral daily  chlorhexidine 2% Cloths 1 Application(s) Topical <User Schedule>  cholecalciferol 2000 Unit(s) Oral at bedtime  dextrose 5%. 1000 milliLiter(s) (50 mL/Hr) IV Continuous <Continuous>  dextrose 5%. 1000 milliLiter(s) (100 mL/Hr) IV Continuous <Continuous>  dextrose 50% Injectable 25 Gram(s) IV Push once  dextrose 50% Injectable 12.5 Gram(s) IV Push once  dextrose 50% Injectable 25 Gram(s) IV Push once  famotidine Injectable 20 milliGRAM(s) IV Push daily  fluconAZOLE IVPB      fluconAZOLE IVPB 200 milliGRAM(s) IV Intermittent every 24 hours  glucagon  Injectable 1 milliGRAM(s) IntraMuscular once  heparin  Infusion.  Unit(s)/Hr (13 mL/Hr) IV Continuous <Continuous>  insulin glargine Injectable (LANTUS) 15 Unit(s) SubCutaneous at bedtime  insulin lispro (ADMELOG) corrective regimen sliding scale   SubCutaneous every 6 hours  lipid, fat emulsion (Fish Oil and Plant Based) 20% Infusion 0.6812 Gm/kG/Day (20.83 mL/Hr) IV Continuous <Continuous>  meropenem Injectable 1000 milliGRAM(s) IV Push every 12 hours  methylPREDNISolone sodium succinate Injectable 20 milliGRAM(s) IV Push daily  metoclopramide Injectable 5 milliGRAM(s) IV Push every 6 hours  metoprolol tartrate Injectable 2.5 milliGRAM(s) IV Push every 6 hours  pantoprazole  Injectable 40 milliGRAM(s) IV Push daily  Parenteral Nutrition - Adult 1 Each (75 mL/Hr) TPN Continuous <Continuous>  sucralfate suspension 1 Gram(s) Oral four times a day  vancomycin    Solution 125 milliGRAM(s) Oral every 6 hours    MEDICATIONS  (PRN):  acetaminophen     Tablet .. 650 milliGRAM(s) Oral every 6 hours PRN Temp greater or equal to 38C (100.4F), Mild Pain (1 - 3)  aluminum hydroxide/magnesium hydroxide/simethicone Suspension 30 milliLiter(s) Oral every 4 hours PRN Dyspepsia  Biotene Dry Mouth Oral Rinse 15 milliLiter(s) Swish and Spit five times a day PRN Mouth Care  dextrose Oral Gel 15 Gram(s) Oral once PRN Blood Glucose LESS THAN 70 milliGRAM(s)/deciliter  heparin   Injectable 6000 Unit(s) IV Push every 6 hours PRN For aPTT less than 40  heparin   Injectable 3000 Unit(s) IV Push every 6 hours PRN For aPTT between 40 - 57  melatonin 3 milliGRAM(s) Oral at bedtime PRN Insomnia  ondansetron Injectable 4 milliGRAM(s) IV Push every 8 hours PRN Nausea and/or Vomiting  sodium chloride 0.65% Nasal 1 Spray(s) Both Nostrils two times a day PRN Nasal Congestion      ALLERGIES:  Allergies    No Known Allergies    Intolerances        LABS:                        7.5    28.76 )-----------( 143      ( 23 Feb 2025 06:16 )             21.8     Hemoglobin: 7.5 g/dL (02-23 @ 06:16)  Hemoglobin: 10.4 g/dL (02-22 @ 05:34)  Hemoglobin: 10.5 g/dL (02-21 @ 05:18)  Hemoglobin: 10.8 g/dL (02-20 @ 05:31)  Hemoglobin: 10.6 g/dL (02-19 @ 22:15)    CBC Full  -  ( 23 Feb 2025 06:16 )  WBC Count : 28.76 K/uL  RBC Count : 2.46 M/uL  Hemoglobin : 7.5 g/dL  Hematocrit : 21.8 %  Platelet Count - Automated : 143 K/uL  Mean Cell Volume : 88.6 fl  Mean Cell Hemoglobin : 30.5 pg  Mean Cell Hemoglobin Concentration : 34.4 g/dL  Auto Neutrophil # : x  Auto Lymphocyte # : x  Auto Monocyte # : x  Auto Eosinophil # : x  Auto Basophil # : x  Auto Neutrophil % : x  Auto Lymphocyte % : x  Auto Monocyte % : x  Auto Eosinophil % : x  Auto Basophil % : x    02-23    144  |  110[H]  |  76[H]  ----------------------------<  220[H]  4.1   |  28  |  1.38[H]    Ca    7.8[L]      23 Feb 2025 06:16  Phos  4.6     02-23  Mg     1.8     02-23    TPro  5.3[L]  /  Alb  2.9[L]  /  TBili  0.8  /  DBili  x   /  AST  61[H]  /  ALT  49  /  AlkPhos  243[H]  02-23    Creatinine Trend: 1.38<--, 1.25<--, 1.33<--, 1.40<--, 1.43<--, 1.42<--  LIVER FUNCTIONS - ( 23 Feb 2025 06:16 )  Alb: 2.9 g/dL / Pro: 5.3 gm/dL / ALK PHOS: 243 U/L / ALT: 49 U/L / AST: 61 U/L / GGT: x           PTT - ( 23 Feb 2025 06:16 )  PTT:72.7 sec    hs Troponin:            Urinalysis Basic - ( 23 Feb 2025 06:16 )    Color: x / Appearance: x / SG: x / pH: x  Gluc: 220 mg/dL / Ketone: x  / Bili: x / Urobili: x   Blood: x / Protein: x / Nitrite: x   Leuk Esterase: x / RBC: x / WBC x   Sq Epi: x / Non Sq Epi: x / Bacteria: x      CSF:                      EKG:   MICROBIOLOGY:    IMAGING:      Labs, imaging, EKG personally reviewed    RADIOLOGY & ADDITIONAL TESTS: Reviewed. OVERNIGHT EVENTS / SUBJECTIVE: Patient seen and examined at bedside.     Hgb drop to 7.5 this AM, rpt 6.5. BP and HR stable despite this large hgb drop. Overnight RN reported a     OBJECTIVE:    VITAL SIGNS:  ICU Vital Signs Last 24 Hrs  T(C): 36.3 (23 Feb 2025 06:00), Max: 36.7 (22 Feb 2025 20:56)  T(F): 97.4 (23 Feb 2025 06:00), Max: 98.1 (22 Feb 2025 20:56)  HR: 97 (23 Feb 2025 07:00) (80 - 123)  BP: 125/67 (23 Feb 2025 07:00) (115/83 - 136/80)  BP(mean): 84 (23 Feb 2025 07:00) (84 - 105)  ABP: --  ABP(mean): --  RR: 23 (23 Feb 2025 07:00) (19 - 35)  SpO2: 92% (23 Feb 2025 07:00) (92% - 100%)    O2 Parameters below as of 23 Feb 2025 07:00  Patient On (Oxygen Delivery Method): room air              02-22 @ 07:01  -  02-23 @ 07:00  --------------------------------------------------------  IN: 2458.5 mL / OUT: 4200 mL / NET: -1741.5 mL      CAPILLARY BLOOD GLUCOSE      POCT Blood Glucose.: 231 mg/dL (23 Feb 2025 06:48)      PHYSICAL EXAM:    General: NAD  HEENT: NC/AT; PERRL, clear conjunctiva  Neck: supple  Respiratory: CTA b/l  Cardiovascular: +S1/S2; RRR  Abdomen: soft, NT/ND; +BS x4  Extremities: WWP, 2+ peripheral pulses b/l; no LE edema  Skin: normal color and turgor; no rash  Neurological:    MEDICATIONS:  MEDICATIONS  (STANDING):  acetaminophen   IVPB .. 1000 milliGRAM(s) IV Intermittent once  acetaminophen   IVPB .. 1000 milliGRAM(s) IV Intermittent once  albumin human 25% IVPB 100 milliLiter(s) IV Intermittent every 6 hours  amLODIPine   Tablet 10 milliGRAM(s) Oral daily  chlorhexidine 2% Cloths 1 Application(s) Topical <User Schedule>  cholecalciferol 2000 Unit(s) Oral at bedtime  dextrose 5%. 1000 milliLiter(s) (50 mL/Hr) IV Continuous <Continuous>  dextrose 5%. 1000 milliLiter(s) (100 mL/Hr) IV Continuous <Continuous>  dextrose 50% Injectable 25 Gram(s) IV Push once  dextrose 50% Injectable 12.5 Gram(s) IV Push once  dextrose 50% Injectable 25 Gram(s) IV Push once  famotidine Injectable 20 milliGRAM(s) IV Push daily  fluconAZOLE IVPB      fluconAZOLE IVPB 200 milliGRAM(s) IV Intermittent every 24 hours  glucagon  Injectable 1 milliGRAM(s) IntraMuscular once  heparin  Infusion.  Unit(s)/Hr (13 mL/Hr) IV Continuous <Continuous>  insulin glargine Injectable (LANTUS) 15 Unit(s) SubCutaneous at bedtime  insulin lispro (ADMELOG) corrective regimen sliding scale   SubCutaneous every 6 hours  lipid, fat emulsion (Fish Oil and Plant Based) 20% Infusion 0.6812 Gm/kG/Day (20.83 mL/Hr) IV Continuous <Continuous>  meropenem Injectable 1000 milliGRAM(s) IV Push every 12 hours  methylPREDNISolone sodium succinate Injectable 20 milliGRAM(s) IV Push daily  metoclopramide Injectable 5 milliGRAM(s) IV Push every 6 hours  metoprolol tartrate Injectable 2.5 milliGRAM(s) IV Push every 6 hours  pantoprazole  Injectable 40 milliGRAM(s) IV Push daily  Parenteral Nutrition - Adult 1 Each (75 mL/Hr) TPN Continuous <Continuous>  sucralfate suspension 1 Gram(s) Oral four times a day  vancomycin    Solution 125 milliGRAM(s) Oral every 6 hours    MEDICATIONS  (PRN):  acetaminophen     Tablet .. 650 milliGRAM(s) Oral every 6 hours PRN Temp greater or equal to 38C (100.4F), Mild Pain (1 - 3)  aluminum hydroxide/magnesium hydroxide/simethicone Suspension 30 milliLiter(s) Oral every 4 hours PRN Dyspepsia  Biotene Dry Mouth Oral Rinse 15 milliLiter(s) Swish and Spit five times a day PRN Mouth Care  dextrose Oral Gel 15 Gram(s) Oral once PRN Blood Glucose LESS THAN 70 milliGRAM(s)/deciliter  heparin   Injectable 6000 Unit(s) IV Push every 6 hours PRN For aPTT less than 40  heparin   Injectable 3000 Unit(s) IV Push every 6 hours PRN For aPTT between 40 - 57  melatonin 3 milliGRAM(s) Oral at bedtime PRN Insomnia  ondansetron Injectable 4 milliGRAM(s) IV Push every 8 hours PRN Nausea and/or Vomiting  sodium chloride 0.65% Nasal 1 Spray(s) Both Nostrils two times a day PRN Nasal Congestion      ALLERGIES:  Allergies    No Known Allergies    Intolerances        LABS:                        7.5    28.76 )-----------( 143      ( 23 Feb 2025 06:16 )             21.8     Hemoglobin: 7.5 g/dL (02-23 @ 06:16)  Hemoglobin: 10.4 g/dL (02-22 @ 05:34)  Hemoglobin: 10.5 g/dL (02-21 @ 05:18)  Hemoglobin: 10.8 g/dL (02-20 @ 05:31)  Hemoglobin: 10.6 g/dL (02-19 @ 22:15)    CBC Full  -  ( 23 Feb 2025 06:16 )  WBC Count : 28.76 K/uL  RBC Count : 2.46 M/uL  Hemoglobin : 7.5 g/dL  Hematocrit : 21.8 %  Platelet Count - Automated : 143 K/uL  Mean Cell Volume : 88.6 fl  Mean Cell Hemoglobin : 30.5 pg  Mean Cell Hemoglobin Concentration : 34.4 g/dL  Auto Neutrophil # : x  Auto Lymphocyte # : x  Auto Monocyte # : x  Auto Eosinophil # : x  Auto Basophil # : x  Auto Neutrophil % : x  Auto Lymphocyte % : x  Auto Monocyte % : x  Auto Eosinophil % : x  Auto Basophil % : x    02-23    144  |  110[H]  |  76[H]  ----------------------------<  220[H]  4.1   |  28  |  1.38[H]    Ca    7.8[L]      23 Feb 2025 06:16  Phos  4.6     02-23  Mg     1.8     02-23    TPro  5.3[L]  /  Alb  2.9[L]  /  TBili  0.8  /  DBili  x   /  AST  61[H]  /  ALT  49  /  AlkPhos  243[H]  02-23    Creatinine Trend: 1.38<--, 1.25<--, 1.33<--, 1.40<--, 1.43<--, 1.42<--  LIVER FUNCTIONS - ( 23 Feb 2025 06:16 )  Alb: 2.9 g/dL / Pro: 5.3 gm/dL / ALK PHOS: 243 U/L / ALT: 49 U/L / AST: 61 U/L / GGT: x           PTT - ( 23 Feb 2025 06:16 )  PTT:72.7 sec    hs Troponin:            Urinalysis Basic - ( 23 Feb 2025 06:16 )    Color: x / Appearance: x / SG: x / pH: x  Gluc: 220 mg/dL / Ketone: x  / Bili: x / Urobili: x   Blood: x / Protein: x / Nitrite: x   Leuk Esterase: x / RBC: x / WBC x   Sq Epi: x / Non Sq Epi: x / Bacteria: x      CSF:                      EKG:   MICROBIOLOGY:    IMAGING:      Labs, imaging, EKG personally reviewed    RADIOLOGY & ADDITIONAL TESTS: Reviewed. OVERNIGHT EVENTS / SUBJECTIVE: Patient seen and examined at bedside.     Hgb drop to 7.5 this AM, rpt 6.5. BP and HR stable despite this large hgb drop. Overnight RN reported a gelatinous dark red BM. Hep gtt paused, 2u pRBC ordered, PPI gtt, GI called.    OBJECTIVE:    VITAL SIGNS:  ICU Vital Signs Last 24 Hrs  T(C): 36.3 (23 Feb 2025 06:00), Max: 36.7 (22 Feb 2025 20:56)  T(F): 97.4 (23 Feb 2025 06:00), Max: 98.1 (22 Feb 2025 20:56)  HR: 97 (23 Feb 2025 07:00) (80 - 123)  BP: 125/67 (23 Feb 2025 07:00) (115/83 - 136/80)  BP(mean): 84 (23 Feb 2025 07:00) (84 - 105)  ABP: --  ABP(mean): --  RR: 23 (23 Feb 2025 07:00) (19 - 35)  SpO2: 92% (23 Feb 2025 07:00) (92% - 100%)    O2 Parameters below as of 23 Feb 2025 07:00  Patient On (Oxygen Delivery Method): room air              02-22 @ 07:01  -  02-23 @ 07:00  --------------------------------------------------------  IN: 2458.5 mL / OUT: 4200 mL / NET: -1741.5 mL      CAPILLARY BLOOD GLUCOSE      POCT Blood Glucose.: 231 mg/dL (23 Feb 2025 06:48)      PHYSICAL EXAM:    General: Mildly tachpneic, in no pain  HEENT: NC/AT; PERRL, clear conjunctiva  Neck: supple  Respiratory: Shallow breaths, clear lungs, decreased at bases  Cardiovascular: +S1/S2; RRR  Abdomen: soft, distended and firm, nontender  Extremities: Warm, 2+ LE edema  Skin: normal color and turgor; no rash  Neurological: A&Ox3, generalized weakness    MEDICATIONS:  MEDICATIONS  (STANDING):  acetaminophen   IVPB .. 1000 milliGRAM(s) IV Intermittent once  acetaminophen   IVPB .. 1000 milliGRAM(s) IV Intermittent once  albumin human 25% IVPB 100 milliLiter(s) IV Intermittent every 6 hours  amLODIPine   Tablet 10 milliGRAM(s) Oral daily  chlorhexidine 2% Cloths 1 Application(s) Topical <User Schedule>  cholecalciferol 2000 Unit(s) Oral at bedtime  dextrose 5%. 1000 milliLiter(s) (50 mL/Hr) IV Continuous <Continuous>  dextrose 5%. 1000 milliLiter(s) (100 mL/Hr) IV Continuous <Continuous>  dextrose 50% Injectable 25 Gram(s) IV Push once  dextrose 50% Injectable 12.5 Gram(s) IV Push once  dextrose 50% Injectable 25 Gram(s) IV Push once  famotidine Injectable 20 milliGRAM(s) IV Push daily  fluconAZOLE IVPB      fluconAZOLE IVPB 200 milliGRAM(s) IV Intermittent every 24 hours  glucagon  Injectable 1 milliGRAM(s) IntraMuscular once  heparin  Infusion.  Unit(s)/Hr (13 mL/Hr) IV Continuous <Continuous>  insulin glargine Injectable (LANTUS) 15 Unit(s) SubCutaneous at bedtime  insulin lispro (ADMELOG) corrective regimen sliding scale   SubCutaneous every 6 hours  lipid, fat emulsion (Fish Oil and Plant Based) 20% Infusion 0.6812 Gm/kG/Day (20.83 mL/Hr) IV Continuous <Continuous>  meropenem Injectable 1000 milliGRAM(s) IV Push every 12 hours  methylPREDNISolone sodium succinate Injectable 20 milliGRAM(s) IV Push daily  metoclopramide Injectable 5 milliGRAM(s) IV Push every 6 hours  metoprolol tartrate Injectable 2.5 milliGRAM(s) IV Push every 6 hours  pantoprazole  Injectable 40 milliGRAM(s) IV Push daily  Parenteral Nutrition - Adult 1 Each (75 mL/Hr) TPN Continuous <Continuous>  sucralfate suspension 1 Gram(s) Oral four times a day  vancomycin    Solution 125 milliGRAM(s) Oral every 6 hours    MEDICATIONS  (PRN):  acetaminophen     Tablet .. 650 milliGRAM(s) Oral every 6 hours PRN Temp greater or equal to 38C (100.4F), Mild Pain (1 - 3)  aluminum hydroxide/magnesium hydroxide/simethicone Suspension 30 milliLiter(s) Oral every 4 hours PRN Dyspepsia  Biotene Dry Mouth Oral Rinse 15 milliLiter(s) Swish and Spit five times a day PRN Mouth Care  dextrose Oral Gel 15 Gram(s) Oral once PRN Blood Glucose LESS THAN 70 milliGRAM(s)/deciliter  heparin   Injectable 6000 Unit(s) IV Push every 6 hours PRN For aPTT less than 40  heparin   Injectable 3000 Unit(s) IV Push every 6 hours PRN For aPTT between 40 - 57  melatonin 3 milliGRAM(s) Oral at bedtime PRN Insomnia  ondansetron Injectable 4 milliGRAM(s) IV Push every 8 hours PRN Nausea and/or Vomiting  sodium chloride 0.65% Nasal 1 Spray(s) Both Nostrils two times a day PRN Nasal Congestion      ALLERGIES:  Allergies    No Known Allergies    Intolerances        LABS:                        7.5    28.76 )-----------( 143      ( 23 Feb 2025 06:16 )             21.8     Hemoglobin: 7.5 g/dL (02-23 @ 06:16)  Hemoglobin: 10.4 g/dL (02-22 @ 05:34)  Hemoglobin: 10.5 g/dL (02-21 @ 05:18)  Hemoglobin: 10.8 g/dL (02-20 @ 05:31)  Hemoglobin: 10.6 g/dL (02-19 @ 22:15)    CBC Full  -  ( 23 Feb 2025 06:16 )  WBC Count : 28.76 K/uL  RBC Count : 2.46 M/uL  Hemoglobin : 7.5 g/dL  Hematocrit : 21.8 %  Platelet Count - Automated : 143 K/uL  Mean Cell Volume : 88.6 fl  Mean Cell Hemoglobin : 30.5 pg  Mean Cell Hemoglobin Concentration : 34.4 g/dL  Auto Neutrophil # : x  Auto Lymphocyte # : x  Auto Monocyte # : x  Auto Eosinophil # : x  Auto Basophil # : x  Auto Neutrophil % : x  Auto Lymphocyte % : x  Auto Monocyte % : x  Auto Eosinophil % : x  Auto Basophil % : x    02-23    144  |  110[H]  |  76[H]  ----------------------------<  220[H]  4.1   |  28  |  1.38[H]    Ca    7.8[L]      23 Feb 2025 06:16  Phos  4.6     02-23  Mg     1.8     02-23    TPro  5.3[L]  /  Alb  2.9[L]  /  TBili  0.8  /  DBili  x   /  AST  61[H]  /  ALT  49  /  AlkPhos  243[H]  02-23    Creatinine Trend: 1.38<--, 1.25<--, 1.33<--, 1.40<--, 1.43<--, 1.42<--  LIVER FUNCTIONS - ( 23 Feb 2025 06:16 )  Alb: 2.9 g/dL / Pro: 5.3 gm/dL / ALK PHOS: 243 U/L / ALT: 49 U/L / AST: 61 U/L / GGT: x           PTT - ( 23 Feb 2025 06:16 )  PTT:72.7 sec    hs Troponin:            Urinalysis Basic - ( 23 Feb 2025 06:16 )    Color: x / Appearance: x / SG: x / pH: x  Gluc: 220 mg/dL / Ketone: x  / Bili: x / Urobili: x   Blood: x / Protein: x / Nitrite: x   Leuk Esterase: x / RBC: x / WBC x   Sq Epi: x / Non Sq Epi: x / Bacteria: x      CSF:                      EKG:   MICROBIOLOGY:    IMAGING:      Labs, imaging, EKG personally reviewed    RADIOLOGY & ADDITIONAL TESTS: Reviewed.

## 2025-02-23 NOTE — PROGRESS NOTE ADULT - ASSESSMENT
69-year-old female with stage IV pancreatic adenocarcinoma presenting with neutropenic fever and severe treatment-related toxicities from modified FOLFIRINOX and investigational EMILY inhibitor [id number].    PROBLEMS:    Acute hypoxic respiratory failure likely secondary to hypervolemia   Bilateral pleural effusions with compressive atelectasis, right greater than left  Neutropenic Fever/Neutropenic Septic shock  Severe Thrombocytopenia-Platelet count critically low at 26k.   Severe diarrhea and inability to tolerate PO intake, likely multifactorial from both FOLFIRINOX (particularly irinotecan component) and study drug [id number].   History of left cephalic vein thrombosis.  MALLORY  69-year-old female with stage IV pancreatic adenocarcinoma presenting with neutropenic fever and severe treatment-related toxicities from modified FOLFIRINOX and investigational EMILY inhibitor MC-6236.  CT Angio Chest PE Protocol w/ IV Cont (02.19.25 @ 14:16) >  CHEST:  Multiple pulmonary emboli within the segmental branches of the left lower and right lower lobe pulmonary arteries.  There are two right middle lobe nodules, new since prior. Exact   etiology is unclear. Primary differential diagnostic consideration   includes infection.      PLAN:    CTA chest + small PEs in RLL and LLL- IV heparin, no bolus   DECD steroids  High WBC-trend  Pulmonary tachpnea may be RTA with low bicarbonate because of GI cause with diarrhea but improving renal fx  Neutropenic Septic shock- IV meropenem started on 2/15- previously on CEFEPIME/Continue filgrastim 480mcg daily-Today w/ improvement in counts- WBC 1.3, Hb 10.1, plt 37, cmp with k 2.3 and Repleted, Cr 1.40.   Daily CBC monitoring  Maintain strict neutropenic precautions  IV diuretics-now on RA doing well   EMPIRIC TREATMENT FOR ESOPHAGEAL CANDIDIASIS-continue with DIFLUCAN   Anemia-received two units of pRBCs Hb 10.1 today   Thrombocytopenia-Transfuse for plts < 15   D/w staff & /haematology & ICU in detail

## 2025-02-23 NOTE — PROGRESS NOTE ADULT - ASSESSMENT
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    Then transferred to  as per request of .  Was getting platelet transfusion and developed tachypnea hypoxia, rigors  Was diuresed and became hypotensive  Upgraded to SICU     Here with   1. Sepsis (POA)  2. Pseudomonal bacteremia suspect from UTI   3. Mucositis suspected esophageal candidiasis  4. Pancytopenia  5. Acute hypoxic respiratory failure   6. MALLORY     CTA chest abd pelvis 2/19 revealing multiple small PE b/l and ?LBO    PLAN    Neuro: AAOx 3. pain control prn. IV tylenol .avoid nsaids. CTH 2/21 no acute findings  CV: BP stable, HR 100s-120s sinus tach. cont IV lopressor 2.5mg q6hr, dc amlodipine  Pulm: on room air today sating 92-93%, work of breathing mildly improved today.  CTA chest 2/19 + b/l small PEs, hep gtt d/c'd d/t hgb drop. TTE no RV strain. cont IV solumedrol 40mg qd for suspected TRALI? Suspect some decreased respiratory capacity 2/2 abdominal distension and may be cause of tachypnea. CT A/P w/ R mod effusion appears larger than previous, given gentle diuresis yesterday and today (with pRBC)  GI: Hold PO intake today abd discomfort remains. on TPN. IV PPI. LBO on CTAP? scans reviewed with GI and Dr. Ziegler, low suspicion for LBO. Repeat CT A/P w/o contrast 2/22 with mild improvement in ileus, now with acute drop in hgb last this AM with ?bloody BM last night, start PPI gtt, GI called, will c/w conservative management for now and monitor closely  Nephro: Cr uptrend today in setting of blood loss?. monitor I & Os. Trend renal fxn. Hold IVF in setting of tenuous respiratory status and hypoalbuminemia giving pRBC  Endo: Hyperglycemia improving c/w lantus 15u qhs (dose held overnight last night due to confusing with insulin in TPN bag) . BGMs q6hr. ISS. while on PN  ID: cont IV meropenem 1gm q8hr #9 (started 2/15) cont IV diflucan 200mg qd #7 (started 2/17). s/p IV cefepime.  blood cultures here neg to date. CMV neg. HSV pcr neg.  Heme: hgb drop today as above suspect possible GI bleed, hep gtt held leukocytosis suspect due to steroids improving,. thrombocytopenia improving.  pt was on filgastrim, dcd on 2/18 as per hemeonc.   PT eval, OOB to chair as tolerated.    Dispo: SICU. possible GI bleed, d/c hep gtt, 2u pRBC, GI, monitor closely, trend CBC. TPN. IV abx. Trend Cr.

## 2025-02-23 NOTE — PROGRESS NOTE ADULT - ASSESSMENT
Imp:  Unclear what the bleeding is from. Drop H/H seems out of proportion to the amount of blood reported.  She's not in great condition for any endoscopic evaluation, and would not tolerate a bowel prep without an NG tube    Rec:  Supportive care  Clears ok and could advance if tolerated  Hold anticoag for a few days and then consider resuming if no further bleeding

## 2025-02-23 NOTE — PROGRESS NOTE ADULT - ASSESSMENT
69-year-old female with stage IV pancreatic adenocarcinoma presenting with neutropenic fever and severe treatment-related toxicities from modified FOLFIRINOX and investigational EMILY inhibitor [id number] found to have PE and LBO.     # pancreatic ca   - remains on clinical trial  - have been in contact with [facility]   - CT negative and discussed w/ pt   - supportive care while in hospital     # Neutropenic Septic shock   - pt previously on neupogen- WBC has now recovered and elevated likely 2/2 GCSF support   - WBC 38, HB 10.4, plt 160 with recovery  - hemodynamically stable   - remains on meropenem started on 2/15 as well as fluconazole and po vanc    # PE   - v/q scan w indeterminate prob of PE, linear defect in RUL posterior segment   - CT chest- multiple PE w/in segmental left lower and right lower, 2 right middle lobe nodules,   -  Pt now off hep gtt due to bleeding concern     # anemia   - received 2u to date prior to last night events   - Today, WBC 28, Hb 7.5, plt 143, Cr 1.38 at 6 am. Repeat labs at 830 suggest WBC 28, Hb 6.5, plt 151-   - Protonix IV started as concern for GIB.   - Pt given 2upRBCs.   - Had dark BM last night - will need to continue ot monitor   - GI following     # large bowel obstruction  - CT a/p Large bowel obstruction with transition point at the distal transverse colon. There is also narrowing at the level of the terminal ileum. Diffuse ascites. Redemonstrated pancreatic lesion, consistent with history of pancreatic adenocarcinoma. There is an umbilical lesion with adjacent peritoneal implants.  - previously was having diarrhea  - GI following and reviewed imaging with radiology   - 2/22/25  repeat CT a/p with mild interval improvements in small and large bowel ileus. pt given lasix and albumin.     # EMPIRIC TREATMENT FOR ESOPHAGEAL CANDIDIASIS   - continue with DIFLUCAN     #Care Coordination  -will continue with ongoing communication with  [facility] [place]  - Review clinical trial protocol regarding toxicity management  - Consider dose modifications for future therapy    will follow and communicate with [facility]

## 2025-02-23 NOTE — PROGRESS NOTE ADULT - SUBJECTIVE AND OBJECTIVE BOX
Subjective:    pat better, RA sat 96% on RA, pat drop Hb 6 required blood transfusion,  @ bedside    Home Medications:  cefepime 2 g intravenous injection: 2 gram(s) intravenous every 12 hours (12 Feb 2025 12:59)  filgrastim: 480 microgram(s) subcutaneous once a day (12 Feb 2025 12:59)  Lactated Ringers Injection intravenous solution: 150 milligram(s) intravenous every 1 to 2 hours (12 Feb 2025 12:59)  loperamide 2 mg oral capsule: 1 cap(s) orally once (12 Feb 2025 12:59)  midodrine 5 mg oral tablet: 1 tab(s) orally every 8 hours (12 Feb 2025 12:59)  Multiple Vitamins with Minerals oral liquid: 1 orally once a day (12 Feb 2025 12:59)  octreotide 2500 mcg/mL subcutaneous solution: 0.04 milliliter(s) subcutaneous 3 times a day (12 Feb 2025 12:59)  sucralfate 1 g/10 mL oral suspension: 10 milliliter(s) orally 4 times a day (12 Feb 2025 12:59)  trimethobenzamide 100 mg/mL intramuscular solution: 2 milliliter(s) intramuscular every 8 hours As needed Nausea and/or Vomiting (12 Feb 2025 12:59)    MEDICATIONS  (STANDING):  acetaminophen   IVPB .. 1000 milliGRAM(s) IV Intermittent once  acetaminophen   IVPB .. 1000 milliGRAM(s) IV Intermittent once  albumin human 25% IVPB 100 milliLiter(s) IV Intermittent every 6 hours  chlorhexidine 2% Cloths 1 Application(s) Topical <User Schedule>  cholecalciferol 2000 Unit(s) Oral at bedtime  dextrose 5%. 1000 milliLiter(s) (50 mL/Hr) IV Continuous <Continuous>  dextrose 5%. 1000 milliLiter(s) (100 mL/Hr) IV Continuous <Continuous>  dextrose 50% Injectable 25 Gram(s) IV Push once  dextrose 50% Injectable 12.5 Gram(s) IV Push once  dextrose 50% Injectable 25 Gram(s) IV Push once  famotidine Injectable 20 milliGRAM(s) IV Push daily  fluconAZOLE IVPB      fluconAZOLE IVPB 200 milliGRAM(s) IV Intermittent every 24 hours  glucagon  Injectable 1 milliGRAM(s) IntraMuscular once  insulin glargine Injectable (LANTUS) 15 Unit(s) SubCutaneous at bedtime  insulin lispro (ADMELOG) corrective regimen sliding scale   SubCutaneous every 6 hours  lipid, fat emulsion (Fish Oil and Plant Based) 20% Infusion 0.6812 Gm/kG/Day (20.83 mL/Hr) IV Continuous <Continuous>  lipid, fat emulsion (Fish Oil and Plant Based) 20% Infusion 0.6812 Gm/kG/Day (20.83 mL/Hr) IV Continuous <Continuous>  meropenem Injectable 1000 milliGRAM(s) IV Push every 12 hours  methylPREDNISolone sodium succinate Injectable 20 milliGRAM(s) IV Push daily  metoclopramide Injectable 5 milliGRAM(s) IV Push every 6 hours  metoprolol tartrate Injectable 2.5 milliGRAM(s) IV Push every 6 hours  pantoprazole Infusion 8 mG/Hr (10 mL/Hr) IV Continuous <Continuous>  Parenteral Nutrition - Adult 1 Each (75 mL/Hr) TPN Continuous <Continuous>  Parenteral Nutrition - Adult 1 Each (75 mL/Hr) TPN Continuous <Continuous>  sucralfate suspension 1 Gram(s) Oral four times a day  vancomycin    Solution 125 milliGRAM(s) Oral every 6 hours    MEDICATIONS  (PRN):  acetaminophen     Tablet .. 650 milliGRAM(s) Oral every 6 hours PRN Temp greater or equal to 38C (100.4F), Mild Pain (1 - 3)  aluminum hydroxide/magnesium hydroxide/simethicone Suspension 30 milliLiter(s) Oral every 4 hours PRN Dyspepsia  Biotene Dry Mouth Oral Rinse 15 milliLiter(s) Swish and Spit five times a day PRN Mouth Care  dextrose Oral Gel 15 Gram(s) Oral once PRN Blood Glucose LESS THAN 70 milliGRAM(s)/deciliter  melatonin 3 milliGRAM(s) Oral at bedtime PRN Insomnia  ondansetron Injectable 4 milliGRAM(s) IV Push every 8 hours PRN Nausea and/or Vomiting  sodium chloride 0.65% Nasal 1 Spray(s) Both Nostrils two times a day PRN Nasal Congestion      Allergies    No Known Allergies    Intolerances        Vital Signs Last 24 Hrs  T(C): 36.5 (23 Feb 2025 10:40), Max: 36.7 (22 Feb 2025 20:56)  T(F): 97.7 (23 Feb 2025 10:40), Max: 98.1 (22 Feb 2025 20:56)  HR: 119 (23 Feb 2025 12:00) (80 - 120)  BP: 137/86 (23 Feb 2025 12:00) (113/92 - 137/86)  BP(mean): 100 (23 Feb 2025 12:00) (84 - 100)  RR: 32 (23 Feb 2025 12:00) (19 - 32)  SpO2: 97% (23 Feb 2025 12:00) (92% - 100%)    Parameters below as of 23 Feb 2025 12:00  Patient On (Oxygen Delivery Method): nasal cannula  O2 Flow (L/min): 4        PHYSICAL EXAMINATION:    NECK:  Supple. No lymphadenopathy. Jugular venous pressure not elevated. Carotids equal.   HEART:   The cardiac impulse has a normal quality. Reg., Nl S1 and S2.  There are no murmurs, rubs or gallops noted  CHEST:  Chest rhonchi to auscultation. Normal respiratory effort.  ABDOMEN:  Soft and nontender.   EXTREMITIES:  There is no edema.       LABS:                        6.5    28.20 )-----------( 151      ( 23 Feb 2025 08:37 )             18.7     02-23    144  |  110[H]  |  76[H]  ----------------------------<  220[H]  4.1   |  28  |  1.38[H]    Ca    7.8[L]      23 Feb 2025 06:16  Phos  4.6     02-23  Mg     1.8     02-23    TPro  5.3[L]  /  Alb  2.9[L]  /  TBili  0.8  /  DBili  x   /  AST  61[H]  /  ALT  49  /  AlkPhos  243[H]  02-23    PTT - ( 23 Feb 2025 06:16 )  PTT:72.7 sec  Urinalysis Basic - ( 23 Feb 2025 06:16 )    Color: x / Appearance: x / SG: x / pH: x  Gluc: 220 mg/dL / Ketone: x  / Bili: x / Urobili: x   Blood: x / Protein: x / Nitrite: x   Leuk Esterase: x / RBC: x / WBC x   Sq Epi: x / Non Sq Epi: x / Bacteria: x

## 2025-02-23 NOTE — PROGRESS NOTE ADULT - SUBJECTIVE AND OBJECTIVE BOX
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 1000 milliGRAM(s) IV Intermittent once  acetaminophen   IVPB .. 1000 milliGRAM(s) IV Intermittent once  albumin human 25% IVPB 100 milliLiter(s) IV Intermittent every 6 hours  amLODIPine   Tablet 10 milliGRAM(s) Oral daily  chlorhexidine 2% Cloths 1 Application(s) Topical <User Schedule>  cholecalciferol 2000 Unit(s) Oral at bedtime  dextrose 5%. 1000 milliLiter(s) (50 mL/Hr) IV Continuous <Continuous>  dextrose 5%. 1000 milliLiter(s) (100 mL/Hr) IV Continuous <Continuous>  dextrose 50% Injectable 25 Gram(s) IV Push once  dextrose 50% Injectable 12.5 Gram(s) IV Push once  dextrose 50% Injectable 25 Gram(s) IV Push once  famotidine Injectable 20 milliGRAM(s) IV Push daily  fluconAZOLE IVPB      fluconAZOLE IVPB 200 milliGRAM(s) IV Intermittent every 24 hours  glucagon  Injectable 1 milliGRAM(s) IntraMuscular once  heparin  Infusion.  Unit(s)/Hr (13 mL/Hr) IV Continuous <Continuous>  insulin glargine Injectable (LANTUS) 15 Unit(s) SubCutaneous at bedtime  insulin lispro (ADMELOG) corrective regimen sliding scale   SubCutaneous every 6 hours  lipid, fat emulsion (Fish Oil and Plant Based) 20% Infusion 0.6812 Gm/kG/Day (20.83 mL/Hr) IV Continuous <Continuous>  lipid, fat emulsion (Fish Oil and Plant Based) 20% Infusion 0.6812 Gm/kG/Day (20.83 mL/Hr) IV Continuous <Continuous>  meropenem Injectable 1000 milliGRAM(s) IV Push every 12 hours  methylPREDNISolone sodium succinate Injectable 20 milliGRAM(s) IV Push daily  metoclopramide Injectable 5 milliGRAM(s) IV Push every 6 hours  metoprolol tartrate Injectable 2.5 milliGRAM(s) IV Push every 6 hours  pantoprazole Infusion 8 mG/Hr (10 mL/Hr) IV Continuous <Continuous>  Parenteral Nutrition - Adult 1 Each (75 mL/Hr) TPN Continuous <Continuous>  Parenteral Nutrition - Adult 1 Each (75 mL/Hr) TPN Continuous <Continuous>  sucralfate suspension 1 Gram(s) Oral four times a day  vancomycin    Solution 125 milliGRAM(s) Oral every 6 hours    MEDICATIONS  (PRN):  acetaminophen     Tablet .. 650 milliGRAM(s) Oral every 6 hours PRN Temp greater or equal to 38C (100.4F), Mild Pain (1 - 3)  aluminum hydroxide/magnesium hydroxide/simethicone Suspension 30 milliLiter(s) Oral every 4 hours PRN Dyspepsia  Biotene Dry Mouth Oral Rinse 15 milliLiter(s) Swish and Spit five times a day PRN Mouth Care  dextrose Oral Gel 15 Gram(s) Oral once PRN Blood Glucose LESS THAN 70 milliGRAM(s)/deciliter  heparin   Injectable 6000 Unit(s) IV Push every 6 hours PRN For aPTT less than 40  heparin   Injectable 3000 Unit(s) IV Push every 6 hours PRN For aPTT between 40 - 57  melatonin 3 milliGRAM(s) Oral at bedtime PRN Insomnia  ondansetron Injectable 4 milliGRAM(s) IV Push every 8 hours PRN Nausea and/or Vomiting  sodium chloride 0.65% Nasal 1 Spray(s) Both Nostrils two times a day PRN Nasal Congestion      REVIEW OF SYSTEMS:    RESPIRATORY: No shortness of breath  CARDIOVASCULAR: No chest pain  All other review of systems is negative unless indicated above.    Vital Signs Last 24 Hrs  T(C): 36.5 (23 Feb 2025 10:40), Max: 36.7 (22 Feb 2025 20:56)  T(F): 97.7 (23 Feb 2025 10:40), Max: 98.1 (22 Feb 2025 20:56)  HR: 107 (23 Feb 2025 10:40) (80 - 120)  BP: 125/81 (23 Feb 2025 10:40) (113/92 - 136/80)  BP(mean): 95 (23 Feb 2025 10:40) (84 - 102)  RR: 21 (23 Feb 2025 10:40) (19 - 35)  SpO2: 95% (23 Feb 2025 10:40) (92% - 100%)    Parameters below as of 23 Feb 2025 10:40  Patient On (Oxygen Delivery Method): nasal cannula  O2 Flow (L/min): 4      PHYSICAL EXAM:    Constitutional: NAD, chronically ill appearing  Respiratory: CTAB  Cardiovascular: S1 and S2, RRR  Gastrointestinal: BS+, soft, NT  Psychiatric: Normal mood, normal affect    LABS:                        6.5    28.20 )-----------( 151      ( 23 Feb 2025 08:37 )             18.7     02-23    144  |  110[H]  |  76[H]  ----------------------------<  220[H]  4.1   |  28  |  1.38[H]    Ca    7.8[L]      23 Feb 2025 06:16  Phos  4.6     02-23  Mg     1.8     02-23    TPro  5.3[L]  /  Alb  2.9[L]  /  TBili  0.8  /  DBili  x   /  AST  61[H]  /  ALT  49  /  AlkPhos  243[H]  02-23    PTT - ( 23 Feb 2025 06:16 )  PTT:72.7 sec  LIVER FUNCTIONS - ( 23 Feb 2025 06:16 )  Alb: 2.9 g/dL / Pro: 5.3 gm/dL / ALK PHOS: 243 U/L / ALT: 49 U/L / AST: 61 U/L / GGT: x             RADIOLOGY & ADDITIONAL STUDIES:

## 2025-02-23 NOTE — PROGRESS NOTE ADULT - SUBJECTIVE AND OBJECTIVE BOX
INTERVAL HPI/OVERNIGHT EVENTS:  Patient S&E at bedside.   Yest CT a/p with mild interval improvements in small and large bowel ileus. pt given lasix and albumin.   Today, WBC 28, Hb 7.5, plt 143, Cr 1.38 at 6 am. Repeat labs at 830 suggest WBC 28, Hb 6.5, plt 151-   Protonix IV started as concern for GIB. Pt given 2upRBCs. 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      GERD (gastroesophageal reflux disease)      Cyclical vomiting syndrome      CVA (cerebrovascular accident)      H/O squamous cell carcinoma excision          FAMILY HISTORY:      VITAL SIGNS:  T(F): 97.8 (02-23-25 @ 13:47)  HR: 114 (02-23-25 @ 13:47)  BP: 131/87 (02-23-25 @ 13:47)  RR: 35 (02-23-25 @ 13:47)  SpO2: 95% (02-23-25 @ 13:47)  Wt(kg): --    PHYSICAL EXAM:    Constitutional: NAD  ent- bleeding from bottom lip dry mucous membranes   Respiratory: CTA b/l, mild tachypnea on 2l this am   Cardiovascular: RRR,   Gastrointestinal: soft, NT  Neurological: AAOx2    MEDICATIONS  (STANDING):  acetaminophen   IVPB .. 1000 milliGRAM(s) IV Intermittent once  acetaminophen   IVPB .. 1000 milliGRAM(s) IV Intermittent once  albumin human 25% IVPB 100 milliLiter(s) IV Intermittent every 6 hours  chlorhexidine 2% Cloths 1 Application(s) Topical <User Schedule>  cholecalciferol 2000 Unit(s) Oral at bedtime  dextrose 5%. 1000 milliLiter(s) (50 mL/Hr) IV Continuous <Continuous>  dextrose 5%. 1000 milliLiter(s) (100 mL/Hr) IV Continuous <Continuous>  dextrose 50% Injectable 25 Gram(s) IV Push once  dextrose 50% Injectable 12.5 Gram(s) IV Push once  dextrose 50% Injectable 25 Gram(s) IV Push once  famotidine Injectable 20 milliGRAM(s) IV Push daily  fluconAZOLE IVPB      fluconAZOLE IVPB 200 milliGRAM(s) IV Intermittent every 24 hours  glucagon  Injectable 1 milliGRAM(s) IntraMuscular once  insulin glargine Injectable (LANTUS) 15 Unit(s) SubCutaneous at bedtime  insulin lispro (ADMELOG) corrective regimen sliding scale   SubCutaneous every 6 hours  lipid, fat emulsion (Fish Oil and Plant Based) 20% Infusion 0.6812 Gm/kG/Day (20.83 mL/Hr) IV Continuous <Continuous>  lipid, fat emulsion (Fish Oil and Plant Based) 20% Infusion 0.6812 Gm/kG/Day (20.83 mL/Hr) IV Continuous <Continuous>  meropenem Injectable 1000 milliGRAM(s) IV Push every 12 hours  metoclopramide Injectable 5 milliGRAM(s) IV Push every 6 hours  metoprolol tartrate Injectable 2.5 milliGRAM(s) IV Push every 6 hours  pantoprazole Infusion 8 mG/Hr (10 mL/Hr) IV Continuous <Continuous>  Parenteral Nutrition - Adult 1 Each (75 mL/Hr) TPN Continuous <Continuous>  Parenteral Nutrition - Adult 1 Each (75 mL/Hr) TPN Continuous <Continuous>  sucralfate suspension 1 Gram(s) Oral four times a day  vancomycin    Solution 125 milliGRAM(s) Oral every 6 hours    MEDICATIONS  (PRN):  acetaminophen     Tablet .. 650 milliGRAM(s) Oral every 6 hours PRN Temp greater or equal to 38C (100.4F), Mild Pain (1 - 3)  aluminum hydroxide/magnesium hydroxide/simethicone Suspension 30 milliLiter(s) Oral every 4 hours PRN Dyspepsia  Biotene Dry Mouth Oral Rinse 15 milliLiter(s) Swish and Spit five times a day PRN Mouth Care  dextrose Oral Gel 15 Gram(s) Oral once PRN Blood Glucose LESS THAN 70 milliGRAM(s)/deciliter  melatonin 3 milliGRAM(s) Oral at bedtime PRN Insomnia  ondansetron Injectable 4 milliGRAM(s) IV Push every 8 hours PRN Nausea and/or Vomiting  sodium chloride 0.65% Nasal 1 Spray(s) Both Nostrils two times a day PRN Nasal Congestion      Allergies    No Known Allergies    Intolerances        LABS:                        6.5    28.20 )-----------( 151      ( 23 Feb 2025 08:37 )             18.7     02-23    144  |  110[H]  |  76[H]  ----------------------------<  220[H]  4.1   |  28  |  1.38[H]    Ca    7.8[L]      23 Feb 2025 06:16  Phos  4.6     02-23  Mg     1.8     02-23    TPro  5.3[L]  /  Alb  2.9[L]  /  TBili  0.8  /  DBili  x   /  AST  61[H]  /  ALT  49  /  AlkPhos  243[H]  02-23    PTT - ( 23 Feb 2025 06:16 )  PTT:72.7 sec  Urinalysis Basic - ( 23 Feb 2025 06:16 )    Color: x / Appearance: x / SG: x / pH: x  Gluc: 220 mg/dL / Ketone: x  / Bili: x / Urobili: x   Blood: x / Protein: x / Nitrite: x   Leuk Esterase: x / RBC: x / WBC x   Sq Epi: x / Non Sq Epi: x / Bacteria: x        RADIOLOGY & ADDITIONAL TESTS:  Studies reviewed.

## 2025-02-24 LAB
ALBUMIN SERPL ELPH-MCNC: 2.9 G/DL — LOW (ref 3.3–5)
ALP SERPL-CCNC: 225 U/L — HIGH (ref 40–120)
ALT FLD-CCNC: 49 U/L — SIGNIFICANT CHANGE UP (ref 12–78)
ANION GAP SERPL CALC-SCNC: 6 MMOL/L — SIGNIFICANT CHANGE UP (ref 5–17)
ANISOCYTOSIS BLD QL: SLIGHT — SIGNIFICANT CHANGE UP
AST SERPL-CCNC: 75 U/L — HIGH (ref 15–37)
BASOPHILS # BLD AUTO: 0.05 K/UL — SIGNIFICANT CHANGE UP (ref 0–0.2)
BASOPHILS # BLD MANUAL: 0 K/UL — SIGNIFICANT CHANGE UP (ref 0–0.2)
BASOPHILS NFR BLD AUTO: 0.2 % — SIGNIFICANT CHANGE UP (ref 0–2)
BASOPHILS NFR BLD MANUAL: 0 % — SIGNIFICANT CHANGE UP (ref 0–2)
BILIRUB SERPL-MCNC: 1.3 MG/DL — HIGH (ref 0.2–1.2)
BUN SERPL-MCNC: 84 MG/DL — HIGH (ref 7–23)
CALCIUM SERPL-MCNC: 8.2 MG/DL — LOW (ref 8.5–10.1)
CHLORIDE SERPL-SCNC: 111 MMOL/L — HIGH (ref 96–108)
CO2 SERPL-SCNC: 29 MMOL/L — SIGNIFICANT CHANGE UP (ref 22–31)
CREAT SERPL-MCNC: 1.53 MG/DL — HIGH (ref 0.5–1.3)
EGFR: 36 ML/MIN/1.73M2 — LOW
EOSINOPHIL # BLD AUTO: 0.01 K/UL — SIGNIFICANT CHANGE UP (ref 0–0.5)
EOSINOPHIL # BLD MANUAL: 0 K/UL — SIGNIFICANT CHANGE UP (ref 0–0.5)
EOSINOPHIL NFR BLD AUTO: 0 % — SIGNIFICANT CHANGE UP (ref 0–6)
GLUCOSE BLDC GLUCOMTR-MCNC: 207 MG/DL — HIGH (ref 70–99)
GLUCOSE BLDC GLUCOMTR-MCNC: 228 MG/DL — HIGH (ref 70–99)
GLUCOSE BLDC GLUCOMTR-MCNC: 273 MG/DL — HIGH (ref 70–99)
GLUCOSE BLDC GLUCOMTR-MCNC: 329 MG/DL — HIGH (ref 70–99)
GLUCOSE SERPL-MCNC: 233 MG/DL — HIGH (ref 70–99)
HCT VFR BLD CALC: 28.2 % — LOW (ref 34.5–45)
HCT VFR BLD CALC: 29.5 % — LOW (ref 34.5–45)
HGB BLD-MCNC: 10.2 G/DL — LOW (ref 11.5–15.5)
HGB BLD-MCNC: 9.9 G/DL — LOW (ref 11.5–15.5)
IMM GRANULOCYTES # BLD AUTO: 5.42 K/UL — HIGH (ref 0–0.07)
IMM GRANULOCYTES NFR BLD AUTO: 22.6 % — HIGH (ref 0–0.9)
LG PLATELETS BLD QL AUTO: SLIGHT — SIGNIFICANT CHANGE UP
LYMPHOCYTES # BLD AUTO: 0.74 K/UL — LOW (ref 1–3.3)
LYMPHOCYTES NFR BLD AUTO: 3.1 % — LOW (ref 13–44)
LYMPHOCYTES NFR BLD MANUAL: 4 % — LOW (ref 13–44)
MAGNESIUM SERPL-MCNC: 1.7 MG/DL — SIGNIFICANT CHANGE UP (ref 1.6–2.6)
MANUAL METAMYELOCYTE #: 0.96 K/UL — HIGH (ref 0–0)
MANUAL MYELOCYTE #: 0.96 K/UL — HIGH (ref 0–0)
MANUAL NEUTROPHIL BANDS #: 1.93 K/UL — HIGH (ref 0–0.84)
MANUAL NRBC #: 1.2 K/UL — HIGH (ref 0–0)
MANUAL PROMYELOCYTE #: 0.24 K/UL — HIGH (ref 0–0)
MANUAL SMEAR VERIFICATION: SIGNIFICANT CHANGE UP
MCHC RBC-ENTMCNC: 29.3 PG — SIGNIFICANT CHANGE UP (ref 27–34)
MCHC RBC-ENTMCNC: 29.8 PG — SIGNIFICANT CHANGE UP (ref 27–34)
MCHC RBC-ENTMCNC: 34.6 G/DL — SIGNIFICANT CHANGE UP (ref 32–36)
MCHC RBC-ENTMCNC: 35.1 G/DL — SIGNIFICANT CHANGE UP (ref 32–36)
MCV RBC AUTO: 84.8 FL — SIGNIFICANT CHANGE UP (ref 80–100)
MCV RBC AUTO: 84.9 FL — SIGNIFICANT CHANGE UP (ref 80–100)
METAMYELOCYTES # FLD: 4 % — HIGH (ref 0–0)
METAMYELOCYTES NFR BLD: 4 % — HIGH (ref 0–0)
MICROCYTES BLD QL: SLIGHT — SIGNIFICANT CHANGE UP
MONOCYTES # BLD MANUAL: 0.72 K/UL — SIGNIFICANT CHANGE UP (ref 0–0.9)
MONOCYTES NFR BLD AUTO: 7.6 % — SIGNIFICANT CHANGE UP (ref 2–14)
MONOCYTES NFR BLD MANUAL: 3 % — SIGNIFICANT CHANGE UP (ref 2–14)
MYELOCYTES NFR BLD: 4 % — HIGH (ref 0–0)
NEUTROPHILS # BLD MANUAL: 18.3 K/UL — HIGH (ref 1.8–7.4)
NEUTROPHILS NFR BLD AUTO: 66.5 % — SIGNIFICANT CHANGE UP (ref 43–77)
NEUTS BAND # BLD: 8 % — SIGNIFICANT CHANGE UP (ref 0–8)
NEUTS BAND NFR BLD: 8 % — SIGNIFICANT CHANGE UP (ref 0–8)
NRBC # BLD AUTO: 1.25 K/UL — HIGH (ref 0–0)
NRBC # BLD AUTO: 1.31 K/UL — HIGH (ref 0–0)
NRBC # BLD: 5 /100 WBCS — HIGH (ref 0–0)
NRBC # FLD: 1.25 K/UL — HIGH (ref 0–0)
NRBC # FLD: 1.31 K/UL — HIGH (ref 0–0)
NRBC BLD AUTO-RTO: 5 /100 WBCS — HIGH (ref 0–0)
NRBC BLD AUTO-RTO: 5 /100 WBCS — HIGH (ref 0–0)
NRBC BLD-RTO: 5 /100 WBCS — HIGH (ref 0–0)
PHOSPHATE SERPL-MCNC: 3.6 MG/DL — SIGNIFICANT CHANGE UP (ref 2.5–4.5)
PLAT MORPH BLD: NORMAL — SIGNIFICANT CHANGE UP
PLATELET # BLD AUTO: 143 K/UL — LOW (ref 150–400)
PLATELET # BLD AUTO: 164 K/UL — SIGNIFICANT CHANGE UP (ref 150–400)
PMV BLD: SIGNIFICANT CHANGE UP (ref 7–13)
PMV BLD: SIGNIFICANT CHANGE UP (ref 7–13)
POTASSIUM SERPL-MCNC: 3.9 MMOL/L — SIGNIFICANT CHANGE UP (ref 3.5–5.3)
POTASSIUM SERPL-SCNC: 3.9 MMOL/L — SIGNIFICANT CHANGE UP (ref 3.5–5.3)
PROMYELOCYTES # FLD: 1 % — HIGH (ref 0–0)
PROMYELOCYTES NFR BLD: 1 % — HIGH (ref 0–0)
PROT SERPL-MCNC: 5.3 GM/DL — LOW (ref 6–8.3)
RBC # BLD: 3.32 M/UL — LOW (ref 3.8–5.2)
RBC # BLD: 3.48 M/UL — LOW (ref 3.8–5.2)
RBC # FLD: 14.8 % — HIGH (ref 10.3–14.5)
RBC # FLD: 15.1 % — HIGH (ref 10.3–14.5)
RBC BLD AUTO: ABNORMAL
SMUDGE CELLS # BLD: PRESENT
SODIUM SERPL-SCNC: 146 MMOL/L — HIGH (ref 135–145)
WBC # BLD: 24.08 K/UL — HIGH (ref 3.8–10.5)
WBC # BLD: 27.39 K/UL — HIGH (ref 3.8–10.5)
WBC # FLD AUTO: 24.08 K/UL — HIGH (ref 3.8–10.5)
WBC # FLD AUTO: 27.39 K/UL — HIGH (ref 3.8–10.5)
WBC MORPHOLOGY: ABNORMAL

## 2025-02-24 PROCEDURE — 99233 SBSQ HOSP IP/OBS HIGH 50: CPT

## 2025-02-24 PROCEDURE — 99232 SBSQ HOSP IP/OBS MODERATE 35: CPT

## 2025-02-24 RX ORDER — I.V. FAT EMULSION 20 G/100ML
0.68 EMULSION INTRAVENOUS
Qty: 50 | Refills: 0 | Status: DISCONTINUED | OUTPATIENT
Start: 2025-02-24 | End: 2025-02-25

## 2025-02-24 RX ORDER — SODIUM/POT/MAG/CALC/CHLOR/ACET 35-20-5MEQ
1 VIAL (ML) INTRAVENOUS
Refills: 0 | Status: DISCONTINUED | OUTPATIENT
Start: 2025-02-24 | End: 2025-02-25

## 2025-02-24 RX ORDER — INSULIN GLARGINE-YFGN 100 [IU]/ML
18 INJECTION, SOLUTION SUBCUTANEOUS AT BEDTIME
Refills: 0 | Status: DISCONTINUED | OUTPATIENT
Start: 2025-02-24 | End: 2025-02-25

## 2025-02-24 RX ORDER — METOPROLOL SUCCINATE 50 MG/1
5 TABLET, EXTENDED RELEASE ORAL EVERY 6 HOURS
Refills: 0 | Status: DISCONTINUED | OUTPATIENT
Start: 2025-02-24 | End: 2025-02-25

## 2025-02-24 RX ADMIN — INSULIN LISPRO 8: 100 INJECTION, SOLUTION INTRAVENOUS; SUBCUTANEOUS at 18:31

## 2025-02-24 RX ADMIN — INSULIN LISPRO 4: 100 INJECTION, SOLUTION INTRAVENOUS; SUBCUTANEOUS at 23:00

## 2025-02-24 RX ADMIN — Medication 10 MG/HR: at 05:22

## 2025-02-24 RX ADMIN — METOPROLOL SUCCINATE 5 MILLIGRAM(S): 50 TABLET, EXTENDED RELEASE ORAL at 17:28

## 2025-02-24 RX ADMIN — Medication 1 GRAM(S): at 05:20

## 2025-02-24 RX ADMIN — INSULIN LISPRO 4: 100 INJECTION, SOLUTION INTRAVENOUS; SUBCUTANEOUS at 12:26

## 2025-02-24 RX ADMIN — Medication 40 MILLIGRAM(S): at 22:54

## 2025-02-24 RX ADMIN — METOPROLOL SUCCINATE 2.5 MILLIGRAM(S): 50 TABLET, EXTENDED RELEASE ORAL at 05:20

## 2025-02-24 RX ADMIN — METOPROLOL SUCCINATE 2.5 MILLIGRAM(S): 50 TABLET, EXTENDED RELEASE ORAL at 11:37

## 2025-02-24 RX ADMIN — Medication 1 GRAM(S): at 18:46

## 2025-02-24 RX ADMIN — Medication 5 MILLIGRAM(S): at 05:20

## 2025-02-24 RX ADMIN — Medication 125 MILLIGRAM(S): at 05:20

## 2025-02-24 RX ADMIN — Medication 125 MILLIGRAM(S): at 17:27

## 2025-02-24 RX ADMIN — Medication 125 MILLIGRAM(S): at 23:01

## 2025-02-24 RX ADMIN — Medication 2000 UNIT(S): at 22:54

## 2025-02-24 RX ADMIN — INSULIN GLARGINE-YFGN 18 UNIT(S): 100 INJECTION, SOLUTION SUBCUTANEOUS at 22:53

## 2025-02-24 RX ADMIN — SODIUM CHLORIDE 50 MILLILITER(S): 9 INJECTION, SOLUTION INTRAVENOUS at 15:09

## 2025-02-24 RX ADMIN — Medication 20 MILLIGRAM(S): at 22:54

## 2025-02-24 RX ADMIN — Medication 125 MILLIGRAM(S): at 11:37

## 2025-02-24 RX ADMIN — Medication 1 GRAM(S): at 23:01

## 2025-02-24 RX ADMIN — INSULIN LISPRO 6: 100 INJECTION, SOLUTION INTRAVENOUS; SUBCUTANEOUS at 05:21

## 2025-02-24 RX ADMIN — Medication 100 MILLIGRAM(S): at 11:34

## 2025-02-24 RX ADMIN — METOPROLOL SUCCINATE 5 MILLIGRAM(S): 50 TABLET, EXTENDED RELEASE ORAL at 23:01

## 2025-02-24 NOTE — PROGRESS NOTE ADULT - ASSESSMENT
69-year-old female recent diagnosis of primary pancreatic adenocarcinoma , pancytopenia and significant PMH of SCC lip, cyclical vomiting syndrome, HTN, HPL, Gout, GERD, CVA w renal evaluation of MALLORY and hypokalemia. Per documents was recently at  [facility] with septic shock on 02/09/2025, nausea, vomiting and diarrhea requiring Levophed drip. Per documents, patient  transferred to  for continued care. Patient with + diuresis w lasix 80 IV, increaserd UOP > 4L.   Treated w IV and po k repletion.     MALLORY  -LIkely pre renal w insensible losses and diuresis, signficant UOP  -IVF to maintain even to positive today  -Trend labs, lytes. Repeat this afternoon  -Monitor UOP closely  -Avoid nsaids/contrast  -Renally dosed abx    Hypokalemia  -K repletion iv/po  -TPN    Sepsis/Pseduomonas  -ID/abx  -FU cultures    d/c with RN staff, Dr Tong, Dr Henry, Dr Maik Sweet to resume care in AM    2/18  MALLORY resolving  Azotemia, responded to IVF resuscitation  Urine output improved  hypoalbuminemia- on spa q 8 h for oncotic pressure and glomerular filtration  NAGMA- hyperchloremia may be due to diarrhea and GI losses  On Meropenem- very rarely associated w Fanconi syndrome  - will check urinary PO4, serum uric acid, repeat PO4 level, low this am  ABG  Lactate  CBC  VQ results pending  d/w Dr YOUSUF Pleitez    Addendum  Repeat labs reviewed d/w intensivist  Lactate 2.7  ABG c/w primary respiratory alkalosis with metabolic acidosis and NAGMA    2/19  Pancreatic Adenoca  MALLORY prerenal, correcting  Primary Hyperventilation, metab acidosis and non anion acidosis  VQ indeterminate  on 2/18 for PE  For CTA today, low risk MAXIMO due to adequate hydration status, UO 3600 ml / last 24 hrs  D/W Dr Tong and Dr Pleitez    2/20  Pancreatic AdenoCa  MALLORY slowly correcting  Bilateral pulm emboli  Creat stable post IV Contrast but will monitor for 48 hrs  Abd distension less  hypokalemia being replaced    2/21  Pancreatic AdenoCa  MALLORY slowly correcting, s/p IV Contrast  Bilateral pulm emboli on IV heparin  Creat stable post IV Contrast but will monitor for 48 hrs  Abd distension improved but still diminished BS  hypokalemia being replaced by ICU team    2/22  Pancreatic AdenoCa with METS  MALLORY slowly correcting, s/p IV Contrast  Bilateral pulm emboli on IV heparin, small bowel and large bowel distention  Creat stable post IV Contras  Abd distension improved but still diminished BS  hypokalemia being replaced by ICU team- need to keep   potassium in normal levels as it may contribute to ileus  LR dcd   CO2 improved                 69-year-old female recent diagnosis of primary pancreatic adenocarcinoma , pancytopenia and significant PMH of SCC lip, cyclical vomiting syndrome, HTN, HPL, Gout, GERD, CVA w renal evaluation of MALLORY and hypokalemia. Per documents was recently at  [facility] with septic shock on 02/09/2025, nausea, vomiting and diarrhea requiring Levophed drip. Per documents, patient  transferred to  for continued care. Patient with + diuresis w lasix 80 IV, increaserd UOP > 4L.   Treated w IV and po k repletion.     MALLORY  -LIkely pre renal w insensible losses and diuresis, signficant UOP  -IVF to maintain even to positive today  -Trend labs, lytes. Repeat this afternoon  -Monitor UOP closely  -Avoid nsaids/contrast  -Renally dosed abx    Hypokalemia  -K repletion iv/po  -TPN    Sepsis/Pseduomonas  -ID/abx  -FU cultures    d/c with RN staff, Dr Tong, Dr Henry, Dr Maik Sweet to resume care in AM    2/18  MALLORY resolving  Azotemia, responded to IVF resuscitation  Urine output improved  hypoalbuminemia- on spa q 8 h for oncotic pressure and glomerular filtration  NAGMA- hyperchloremia may be due to diarrhea and GI losses  On Meropenem- very rarely associated w Fanconi syndrome  - will check urinary PO4, serum uric acid, repeat PO4 level, low this am  ABG  Lactate  CBC  VQ results pending  d/w Dr YOUSUF Pleitez    Addendum  Repeat labs reviewed d/w intensivist  Lactate 2.7  ABG c/w primary respiratory alkalosis with metabolic acidosis and NAGMA    2/19  Pancreatic Adenoca  MALLORY prerenal, correcting  Primary Hyperventilation, metab acidosis and non anion acidosis  VQ indeterminate  on 2/18 for PE  For CTA today, low risk MAXIMO due to adequate hydration status, UO 3600 ml / last 24 hrs  D/W Dr Tong and Dr Pleitez    2/20  Pancreatic AdenoCa  MALLORY slowly correcting  Bilateral pulm emboli  Creat stable post IV Contrast but will monitor for 48 hrs  Abd distension less  hypokalemia being replaced    2/21  Pancreatic AdenoCa  MALLORY slowly correcting, s/p IV Contrast  Bilateral pulm emboli on IV heparin  Creat stable post IV Contrast but will monitor for 48 hrs  Abd distension improved but still diminished BS  hypokalemia being replaced by ICU team    2/22  Pancreatic AdenoCa with METS  MALLORY slowly correcting, s/p IV Contrast  Bilateral pulm emboli on IV heparin, small bowel and large bowel distention  Creat stable post IV Contras  Abd distension improved but still diminished BS  hypokalemia being replaced by ICU team- need to keep   potassium in normal levels as it may contribute to ileus  LR dcd   CO2 improved    2/24  Pancreatic AdenoCa with METS  MALLORY slowly correcting,  mild elevation due to diuretics  Bilateral pulm emboli IV heparin held due to anemia  CT small bowel and large bowel distention  Creat stable post IV Contrast  Abd distension improved but still diminished BS  hypokalemia being replaced  CO2 improved

## 2025-02-24 NOTE — PROGRESS NOTE ADULT - SUBJECTIVE AND OBJECTIVE BOX
Patient is a 69y old  Female who presents with a chief complaint of sepsis (18 Feb 2025 11:37)    BRIEF HOSPITAL COURSE: 70yo female with PMHx recent diagnosis of primary pancreatic adenocarcinoma of head with tumor in umbilicus, chemo-induced pancytopenia, SCC lip, cyclical vomiting syndrome, HTN, HPL, Gout, GERD, CVA on Aggrenox and others had been admitted to [facility] with septic shock 2/2 + pseudomonas bacteremia on 02/09/2025, nausea, vomiting and diarrhea requiring Levophed drip, was transferred out of ICU to regular floor last night.  Patient was being followed by GI, ID, Cardiology and Onc there.  Patient has been transferred to  on patient's  (GI Dr. Pleitez) request.      2/24 pt flat affect, denies pain but when i palpate her lower abd she flinches. denies nausea, had dark BM last night. 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 1000 milliGRAM(s) IV Intermittent once  acetaminophen   IVPB .. 1000 milliGRAM(s) IV Intermittent once  chlorhexidine 2% Cloths 1 Application(s) Topical <User Schedule>  cholecalciferol 2000 Unit(s) Oral at bedtime  dextrose 5%. 1000 milliLiter(s) (50 mL/Hr) IV Continuous <Continuous>  dextrose 5%. 1000 milliLiter(s) (100 mL/Hr) IV Continuous <Continuous>  dextrose 50% Injectable 25 Gram(s) IV Push once  dextrose 50% Injectable 12.5 Gram(s) IV Push once  dextrose 50% Injectable 25 Gram(s) IV Push once  famotidine Injectable 20 milliGRAM(s) IV Push daily  fluconAZOLE IVPB      fluconAZOLE IVPB 200 milliGRAM(s) IV Intermittent every 24 hours  glucagon  Injectable 1 milliGRAM(s) IntraMuscular once  insulin glargine Injectable (LANTUS) 18 Unit(s) SubCutaneous at bedtime  insulin lispro (ADMELOG) corrective regimen sliding scale   SubCutaneous every 6 hours  lipid, fat emulsion (Fish Oil and Plant Based) 20% Infusion 0.681 Gm/kG/Day (20.8 mL/Hr) IV Continuous <Continuous>  lipid, fat emulsion (Fish Oil and Plant Based) 20% Infusion 0.6812 Gm/kG/Day (20.83 mL/Hr) IV Continuous <Continuous>  metoclopramide Injectable 5 milliGRAM(s) IV Push every 6 hours  metoprolol tartrate Injectable 2.5 milliGRAM(s) IV Push every 6 hours  pantoprazole Infusion 8 mG/Hr (10 mL/Hr) IV Continuous <Continuous>  Parenteral Nutrition - Adult 1 Each (75 mL/Hr) TPN Continuous <Continuous>  Parenteral Nutrition - Adult 1 Each (75 mL/Hr) TPN Continuous <Continuous>  sodium chloride 0.45%. 1000 milliLiter(s) (50 mL/Hr) IV Continuous <Continuous>  sucralfate suspension 1 Gram(s) Oral four times a day  vancomycin    Solution 125 milliGRAM(s) Oral every 6 hours    Vital Signs Last 24 Hrs  T(C): 36.8 (24 Feb 2025 10:02), Max: 36.8 (24 Feb 2025 05:00)  T(F): 98.3 (24 Feb 2025 10:02), Max: 98.3 (24 Feb 2025 10:02)  HR: 123 (24 Feb 2025 10:00) (108 - 123)  BP: 138/91 (24 Feb 2025 10:00) (124/88 - 141/87)  BP(mean): 103 (24 Feb 2025 10:00) (94 - 109)  RR: 29 (24 Feb 2025 10:00) (20 - 41)  SpO2: 93% (24 Feb 2025 10:00) (92% - 97%)    Parameters below as of 24 Feb 2025 08:00  Patient On (Oxygen Delivery Method): nasal cannula w/ humidification  O2 Flow (L/min): 5    I&O's Detail    23 Feb 2025 07:01  -  24 Feb 2025 07:00  --------------------------------------------------------  IN:    Fat Emulsion (Fish Oil &amp; Plant Based) 20% Infusion: 89 mL    Heparin Infusion: 22 mL    Pantoprazole: 73 mL    sodium chloride 0.45%: 50 mL    TPN (Total Parenteral Nutrition): 871 mL  Total IN: 1105 mL    OUT:    Indwelling Catheter - Urethral (mL): 6325 mL  Total OUT: 6325 mL    Total NET: -5220 mL    LABS:                              9.9    24.08 )-----------( 164      ( 24 Feb 2025 04:45 )             28.2     02-24    146[H]  |  111[H]  |  84[H]  ----------------------------<  233[H]  3.9   |  29  |  1.53[H]    Ca    8.2[L]      24 Feb 2025 04:45  Phos  3.6     02-24  Mg     1.7     02-24    TPro  5.3[L]  /  Alb  2.9[L]  /  TBili  1.3[H]  /  DBili  x   /  AST  75[H]  /  ALT  49  /  AlkPhos  225[H]  02-24      LIVER FUNCTIONS - ( 24 Feb 2025 04:45 )  Alb: 2.9 g/dL / Pro: 5.3 gm/dL / ALK PHOS: 225 U/L / ALT: 49 U/L / AST: 75 U/L / GGT: x           PTT - ( 23 Feb 2025 06:16 )  PTT:72.7 sec  Urinalysis Basic - ( 24 Feb 2025 04:45 )    Color: x / Appearance: x / SG: x / pH: x  Gluc: 233 mg/dL / Ketone: x  / Bili: x / Urobili: x   Blood: x / Protein: x / Nitrite: x   Leuk Esterase: x / RBC: x / WBC x   Sq Epi: x / Non Sq Epi: x / Bacteria: x    Physical Examination:    General: Mod respiratory distress  HEENT: Pupils equal, reactive to light.  Symmetric.   PULM: decreased at bases b/l.   CVS: Regular rate and rhythm, no murmurs  ABD: Soft, nondistended, mild tenderness, redness by umbilical area,  EXT: 2+ pitting edema in LE b/l extending to knees.   SKIN: Warm and well perfused, no rashes noted.  NEURO: Alert, oriented, interactive    DEVICES:     RADIOLOGY:   < from: CT Chest No Cont (02.15.25 @ 15:55) >  IMPRESSION:  Nondisplaced fractures of the left ninth and 10th ribs, similar to the   prior study.    Pancreatic mass, unchanged    No onset ascites and bilateral pleural effusions with compressive   atelectasis, right greater than left.    Dilated gallbladder, similar to the prior study.    < end of copied text >

## 2025-02-24 NOTE — PROGRESS NOTE ADULT - ASSESSMENT
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    Then transferred to  as per request of .  Was getting platelet transfusion and developed tachypnea hypoxia, rigors  Was diuresed and became hypotensive  Upgraded to SICU     Here with   1. Sepsis (POA)  2. Pseudomonal bacteremia suspect from UTI   3. Mucositis suspected esophageal candidiasis? r/o CMV  4. Pancytopenia  5. Acute hypoxic respiratory failure 2/2 pulm edema   6. MALLORY     CTA chest abd pelvis 2/19 revealing multiple small PE b/l and LBO  Started on IV heparin   Went into Afib with RVR this admission, resolved with lopressor.    Now with bloody BMs. Hep gtt held and started on protonix.     PLAN    Neuro: AAOx 2-3, slow to respond. pain control prn. IV tylenol .avoid nsaids. CTH neg for mets.   CV: BP stable has been 130s/80s. in sinus rhythm 120s. cont IV lopressor 2.5mg q6hr for   Pulm: on room air today sating 92-93%, work of breathing improving.  CTA chest + b/l small PEs. on hep gtt. TTE no RV strain. cont IV solumedrol 40mg qd for suspected TRALI?   GI: PO diet as tolerated. on TPN. IV PPI. LBO on CTAP? scans reviewed with GI and Dr. Ziegler, low suspicion for LBO, abd soft on exam. will trial reglan if pt develops abd pain.   Nephro: Cr 1.55. hyperNa improving, cont 1/2 NS @ 50.. monitor I & Os. Trend renal fxn.    Endo: Hyperglycemic still. inc lantus 15 -> 18units qHS. BGMs q6hr. ISS. while on PN  ID: cont IV diflucan 200mg qd #10 (started 2/15). cont PO vanco q6hr for C diff ppx. dc meropenem. s/p IV cefepime.  blood cultures here neg to date. CMV neg. HSV pcr neg.   Heme: hgb stable. s/p 4units pRBC this admission (2units on 2/23, 2units on 2/16). leukocytosis trending down, thrombocytopenia improving.  s/p filgastrim  PT eval, OOB to chair as tolerated.  d/w     Dispo: SICU. TPN. IV abx. Trend Cr. cont steroids. hep gtt    Will discuss with Dr. 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    Then transferred to  as per request of .  Was getting platelet transfusion and developed tachypnea hypoxia, rigors  Was diuresed and became hypotensive  Upgraded to SICU     Here with   1. Sepsis (POA)  2. Pseudomonal bacteremia suspect from UTI   3. Mucositis suspected esophageal candidiasis? r/o CMV  4. Pancytopenia  5. Acute hypoxic respiratory failure 2/2 pulm edema   6. MALLORY     CTA chest abd pelvis 2/19 revealing multiple small PE b/l and LBO  Started on IV heparin   Went into Afib with RVR this admission, resolved with lopressor.    Now with bloody BMs. Hep gtt held and started on protonix.     PLAN    Neuro: AAOx 2-3, slow to respond. pain control prn. IV tylenol .avoid nsaids. CTH neg for mets.   CV: BP stable has been 130s/80s. in sinus rhythm 120s. cont IV lopressor 2.5mg q6hr for   Pulm: on 3-4L NC sating 95-96%, slightly tachypneic. encouraged pt to try HFNC at night for added PEEP. check CXR in AM.. on hep gtt for b/l PEs. TTE no RV strain. off steroids  GI: PO diet as tolerated. on TPN. IV PPI. LBO on CTAP? scans reviewed with GI and Dr. Ziegler, low suspicion for LBO. Repeat CTAP with ileus. dcd reglan as per  request.  Nephro: Cr 1.55. hyperNa improving, cont 1/2 NS @ 50.. monitor I & Os. Trend renal fxn.    Endo: Hyperglycemic still. inc lantus 15 -> 18units qHS. BGMs q6hr. ISS. while on PN  ID: cont IV diflucan 200mg qd #10 (started 2/15). cont PO vanco q6hr for C diff ppx. dc meropenem. s/p IV cefepime.  blood cultures here neg to date. CMV neg. HSV pcr neg.   Heme: hgb stable. s/p 4units pRBC this admission (2units on 2/23, 2units on 2/16). leukocytosis trending down, thrombocytopenia improving.  s/p filgastrim  PT eval, OOB to chair as tolerated.  d/w     Dispo: SICU. TPN. IV abx. Trend Cr. hep gtt. CXR in AM    Will discuss with Dr. 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    Then transferred to  as per request of .  Was getting platelet transfusion and developed tachypnea hypoxia, rigors  Was diuresed and became hypotensive  Upgraded to SICU     Here with   1. Sepsis (POA)  2. Pseudomonal bacteremia suspect from UTI   3. Mucositis suspected esophageal candidiasis? r/o CMV  4. Pancytopenia  5. Acute hypoxic respiratory failure 2/2 pulm edema   6. MALLORY     CTA chest abd pelvis 2/19 revealing multiple small PE b/l and LBO  Started on IV heparin   Went into Afib with RVR this admission, resolved with lopressor.    Now with bloody BMs. Hep gtt held and started on protonix.     PLAN    Neuro: AAOx 2-3, slow to respond. pain control prn. IV tylenol .avoid nsaids. CTH neg for mets.   CV: BP stable has been 130s/80s. in sinus rhythm 120s. cont IV lopressor 2.5mg q6hr for   Pulm: on 3-4L NC sating 95-96%, slightly tachypneic. encouraged pt to try HFNC at night for added PEEP. check CXR in AM. TTE no RV strain. off steroids  GI: PO diet as tolerated. on TPN. IV PPI. LBO on CTAP? scans reviewed with GI and Dr. Ziegler, low suspicion for LBO. Repeat CTAP with ileus. dcd reglan as per  request.  Nephro: Cr 1.55. hyperNa improving, cont 1/2 NS @ 50.. monitor I & Os. Trend renal fxn.    Endo: Hyperglycemic still. inc lantus 15 -> 18units qHS. BGMs q6hr. ISS. while on PN  ID: cont IV diflucan 200mg qd #10 (started 2/15). cont PO vanco q6hr for C diff ppx. dc meropenem. s/p IV cefepime.  blood cultures here neg to date. CMV neg. HSV pcr neg.   Heme: hgb stable. s/p 4units pRBC this admission (2units on 2/23, 2units on 2/16). leukocytosis trending down, thrombocytopenia improving.  s/p filgastrim  PT eval, OOB to chair as tolerated.  d/w     Dispo: SICU. TPN. IV abx. Trend Cr. CXR in AM    Will discuss with Dr. Geiger, dr Henry

## 2025-02-24 NOTE — PROGRESS NOTE ADULT - SUBJECTIVE AND OBJECTIVE BOX
Date of service: 02-24-25 @ 11:55    Lying in bed in NAD  Alert and verbal  Loose stools are improving  No abdominal cramps  Appetite is ppor    ROS: no fever or chills; denies dizziness, no HA, no SOB or cough, no dysuria, no legs pain, no rashes    MEDICATIONS  (STANDING):  acetaminophen   IVPB .. 1000 milliGRAM(s) IV Intermittent once  acetaminophen   IVPB .. 1000 milliGRAM(s) IV Intermittent once  chlorhexidine 2% Cloths 1 Application(s) Topical <User Schedule>  cholecalciferol 2000 Unit(s) Oral at bedtime  dextrose 5%. 1000 milliLiter(s) (50 mL/Hr) IV Continuous <Continuous>  dextrose 5%. 1000 milliLiter(s) (100 mL/Hr) IV Continuous <Continuous>  dextrose 50% Injectable 25 Gram(s) IV Push once  dextrose 50% Injectable 12.5 Gram(s) IV Push once  dextrose 50% Injectable 25 Gram(s) IV Push once  famotidine Injectable 20 milliGRAM(s) IV Push daily  fluconAZOLE IVPB      fluconAZOLE IVPB 200 milliGRAM(s) IV Intermittent every 24 hours  glucagon  Injectable 1 milliGRAM(s) IntraMuscular once  insulin glargine Injectable (LANTUS) 18 Unit(s) SubCutaneous at bedtime  insulin lispro (ADMELOG) corrective regimen sliding scale   SubCutaneous every 6 hours  lipid, fat emulsion (Fish Oil and Plant Based) 20% Infusion 0.681 Gm/kG/Day (20.8 mL/Hr) IV Continuous <Continuous>  lipid, fat emulsion (Fish Oil and Plant Based) 20% Infusion 0.6812 Gm/kG/Day (20.83 mL/Hr) IV Continuous <Continuous>  metoprolol tartrate Injectable 2.5 milliGRAM(s) IV Push every 6 hours  pantoprazole Infusion 8 mG/Hr (10 mL/Hr) IV Continuous <Continuous>  Parenteral Nutrition - Adult 1 Each (75 mL/Hr) TPN Continuous <Continuous>  Parenteral Nutrition - Adult 1 Each (75 mL/Hr) TPN Continuous <Continuous>  sodium chloride 0.45%. 1000 milliLiter(s) (50 mL/Hr) IV Continuous <Continuous>  sucralfate suspension 1 Gram(s) Oral four times a day  vancomycin    Solution 125 milliGRAM(s) Oral every 6 hours    Vital Signs Last 24 Hrs  T(C): 36.8 (24 Feb 2025 10:02), Max: 36.8 (24 Feb 2025 05:00)  T(F): 98.3 (24 Feb 2025 10:02), Max: 98.3 (24 Feb 2025 10:02)  HR: 123 (24 Feb 2025 10:00) (108 - 123)  BP: 138/91 (24 Feb 2025 10:00) (124/88 - 141/87)  BP(mean): 103 (24 Feb 2025 10:00) (94 - 109)  RR: 29 (24 Feb 2025 10:00) (20 - 41)  SpO2: 93% (24 Feb 2025 10:00) (92% - 97%)    Parameters below as of 24 Feb 2025 08:00  Patient On (Oxygen Delivery Method): nasal cannula w/ humidification  O2 Flow (L/min): 5     Physical exam:    Constitutional:  No acute distress  HEENT: NC/AT, EOMI, PERRLA, conjunctivae clear; ears and nose atraumatic; pharynx benign  Neck: supple; thyroid not palpable  Back: no tenderness  Respiratory: respiratory effort normal; clear to auscultation  Cardiovascular: S1S2 regular, no murmurs  Abdomen: soft, mild periombilical tender, not distended, positive BS; no liver or spleen organomegaly  Genitourinary: no suprapubic tenderness  Lymphatic: no LN palpable  Musculoskeletal: no muscle tenderness, no joint swelling or tenderness  Extremities: no pedal edema  Neurological/ Psychiatric: AxOx3, judgement and insight normal; moving all extremities  Skin: no rashes; right lower chest wall dry, scabbed ulcer; no discharge     Labs: reviewed                        9.9    24.08 )-----------( 164      ( 24 Feb 2025 04:45 )             28.2     02-24    146[H]  |  111[H]  |  84[H]  ----------------------------<  233[H]  3.9   |  29  |  1.53[H]    Ca    8.2[L]      24 Feb 2025 04:45  Phos  3.6     02-24  Mg     1.7     02-24    TPro  5.3[L]  /  Alb  2.9[L]  /  TBili  1.3[H]  /  DBili  x   /  AST  75[H]  /  ALT  49  /  AlkPhos  225[H]  02-24    C-Reactive Protein: 209.0 mg/mL (02-15-25 @ 05:17)  C-Reactive Protein: 233.0 mg/mL (02-14-25 @ 06:50)  Ferritin: 1798 ng/mL (02-13-25 @ 18:46)                        10.5   35.65 )-----------( 132      ( 21 Feb 2025 05:18 )             31.0     02-21    141  |  105  |  66[H]  ----------------------------<  358[H]  3.2[L]   |  26  |  1.33[H]    Ca    8.2[L]      21 Feb 2025 05:18  Phos  3.6     02-21  Mg     2.0     02-21    TPro  5.3[L]  /  Alb  2.1[L]  /  TBili  0.6  /  DBili  x   /  AST  56[H]  /  ALT  33  /  AlkPhos  329[H]  02-21    C-Reactive Protein: 209.0 mg/mL (02-15-25 @ 05:17)  C-Reactive Protein: 233.0 mg/mL (02-14-25 @ 06:50)  Ferritin: 1798 ng/mL (02-13-25 @ 18:46)                        10.8   26.65 )-----------( 90       ( 20 Feb 2025 05:31 )             31.4     02-20    144  |  111[H]  |  64[H]  ----------------------------<  326[H]  3.5   |  23  |  1.40[H]    Ca    8.1[L]      20 Feb 2025 05:31  Phos  3.1     02-20  Mg     2.1     02-20    TPro  5.3[L]  /  Alb  2.0[L]  /  TBili  0.6  /  DBili  x   /  AST  51[H]  /  ALT  29  /  AlkPhos  311[H]  02-20    C-Reactive Protein: 209.0 mg/mL (02-15-25 @ 05:17)  C-Reactive Protein: 233.0 mg/mL (02-14-25 @ 06:50)  Ferritin: 1798 ng/mL (02-13-25 @ 18:46)                        10.4   5.43  )-----------( 45       ( 18 Feb 2025 08:42 )             29.4     02-18    138  |  112[H]  |  69[H]  ----------------------------<  223[H]  4.1   |  16[L]  |  1.44[H]    Ca    8.6      18 Feb 2025 08:42  Phos  2.0     02-18  Mg     2.2     02-18    TPro  5.1[L]  /  Alb  2.4[L]  /  TBili  0.6  /  DBili  x   /  AST  27  /  ALT  21  /  AlkPhos  131[H]  02-18    C-Reactive Protein: 209.0 mg/mL (02-15-25 @ 05:17)  C-Reactive Protein: 233.0 mg/mL (02-14-25 @ 06:50)  Ferritin: 1798 ng/mL (02-13-25 @ 18:46)                        10.1   1.30  )-----------( 37       ( 17 Feb 2025 05:59 )             28.5     02-17    140  |  110[H]  |  70[H]  ----------------------------<  227[H]  2.3[LL]   |  17[L]  |  1.73[H]    Ca    8.4[L]      17 Feb 2025 05:59  Phos  3.2     02-17  Mg     1.5     02-17    TPro  5.2[L]  /  Alb  2.7[L]  /  TBili  0.8  /  DBili  x   /  AST  15  /  ALT  19  /  AlkPhos  101  02-17    C-Reactive Protein: 209.0 mg/mL (02-15-25 @ 05:17)  C-Reactive Protein: 233.0 mg/mL (02-14-25 @ 06:50)  Ferritin: 1798 ng/mL (02-13-25 @ 18:46)                        8.7    0.19  )-----------( 33       ( 13 Feb 2025 06:52 )             25.7     02-13    137  |  109[H]  |  20  ----------------------------<  117[H]  2.6[LL]   |  18[L]  |  1.02    Ca    8.7      13 Feb 2025 06:52  Phos  2.3     02-13  Mg     1.6     02-13    TPro  4.4[L]  /  Alb  1.1[L]  /  TBili  0.7  /  DBili  x   /  AST  11[L]  /  ALT  16  /  AlkPhos  94  02-13     LIVER FUNCTIONS - ( 13 Feb 2025 06:52 )  Alb: 1.1 g/dL / Pro: 4.4 gm/dL / ALK PHOS: 94 U/L / ALT: 16 U/L / AST: 11 U/L / GGT: x           Culture - Blood (collected 11 Feb 2025 09:00)  Source: .Blood BLOOD  Preliminary Report (12 Feb 2025 13:01):    No growth at 24 hours    Culture - Blood (collected 11 Feb 2025 08:50)  Source: .Blood BLOOD  Preliminary Report (12 Feb 2025 13:01):    No growth at 24 hours    Urinalysis with Rflx Culture (collected 09 Feb 2025 01:25)    Culture - Urine (collected 09 Feb 2025 01:25)  Source: Clean Catch  Final Report (11 Feb 2025 08:39):    50,000 - 99,000 CFU/mL Pseudomonas aeruginosa  Organism: Pseudomonas aeruginosa (11 Feb 2025 08:39)  Organism: Pseudomonas aeruginosa (11 Feb 2025 08:39)      Method Type: REJI      -  Amikacin: S <=16      -  Aztreonam: S <=4      -  Cefepime: S <=2      -  Ceftazidime: S <=1      -  Ciprofloxacin: S <=0.25      -  Imipenem: S 2      -  Levofloxacin: S <=0.5      -  Meropenem: S <=1      -  Piperacillin/Tazobactam: S <=8    Culture - Blood (collected 08 Feb 2025 22:00)  Source: .Blood BLOOD  Gram Stain (09 Feb 2025 19:04):    Growth in aerobic bottle: Gram Negative Rods  Final Report (11 Feb 2025 07:53):    Growth in aerobic bottle: Pseudomonas aeruginosa    Direct identification is available within approximately 3-5    hours either by Blood Panel Multiplexed PCR or Direct    MALDI-TOF. Details: [email or web address]  Organism: Blood Culture PCR  Pseudomonas aeruginosa (11 Feb 2025 07:53)  Organism: Pseudomonas aeruginosa (11 Feb 2025 07:53)      Method Type: REJI      -  Aztreonam: S <=4      -  Cefepime: S <=2      -  Ceftazidime: S <=1      -  Ciprofloxacin: S <=0.25      -  Imipenem: S <=1      -  Levofloxacin: S <=0.5      -  Meropenem: S <=1      -  Piperacillin/Tazobactam: S <=8  Organism: Blood Culture PCR (11 Feb 2025 07:53)      Method Type: PCR      -  Pseudomonas aeruginosa: Detec    Culture - Blood (collected 08 Feb 2025 21:55)  Source: .Blood BLOOD  Gram Stain (09 Feb 2025 19:32):    Growth in aerobic bottle: Gram Negative Rods  Final Report (11 Feb 2025 07:54):    Growth in aerobic bottle: Pseudomonas aeruginosa    See previous culture [id number]    Radiology: all available radiological tests reviewed    < from: US Abdomen Upper Quadrant Right (02.09.25 @ 14:55) >  Distended gallbladder with layering sludge.  8 mm right renal calculus without hydronephrosis.  Hepatomegaly.  Right renal cyst.  < end of copied text >    < from: CT Abdomen and Pelvis No Cont (02.09.25 @ 03:01) >  1.   Study somewhat limited due to absence of contrast.  2.   Axial images 71-76 of series 301 and concomitant coronal images demonstrate prominent soft tissue inseparable from the body of the pancreas consistent with pancreatic neoplasm.  3.   Some distension of gallbladder could be further evaluated with right upper quadrant sonography. No definite biliary dilatation.  < end of copied text >    Advanced directives addressed: full resuscitation

## 2025-02-24 NOTE — PROGRESS NOTE ADULT - ASSESSMENT
69-year-old female with h/o recent diagnosis of primary head of pancreatic adenocarcinoma with tumor in umbilicus, chemo-induced pancytopenia, SCC lip, cyclical vomiting syndrome, HTN, HPL, Gout, GERD, CVA on Aggrenox was transferred on 2/12 from [facility] for further care. She was admitted to [facility] with septic shock on 02/09/2025, nausea, vomiting and diarrhea requiring Levophed drip. Patient was being followed by GI, ID, Cardiology and Onc there. Patient has been transferred to  on patient's  (GI Dr. Pleitez) request. She was reported with non-bloody diarrhea x 3 in last 24 hours PTA. She denied abdominal pain, nausea, but has dry heaves. At [place] she was started on Vancomycin and Zosyn, then changed to Cefepime as her blood culture and urine cultures showed Pseudomonas aeruginosa.    #Dysphagia  Possible candida esophagitis  #Acute mucositis with diarrhea due to chemotherapy   #Sepsis with PSAE resolved  #Head of pancreas adenocarcinoma on chemotherapy  #Leukocytosis likely due to neupogen  #Immunocompromised host  #UTI with PSAE resolved  #Kidney stones  #Right lower chest wall dry superficial ulcer   #ARF   -neutropenia resolved, leukocytosis at present  -CMV serology shows no evidence of CMV disease  -renal function is slightly improving  -dysphagia with poor PO intake  -cultures reviewed  -repeat stool for CDT is negative   -source of sepsis is likely intraabdominal and/or urinary  -s/p cefepime 2 gm IV q8h # 3  -on meropenem 1 gm IV q8h # 9 and fluconazole 200 mg IV qd # 8  -on vancomycin 125 mg PO q6h for CDAD prophylaxis  -tolerating abx well so far; no side effects noted  -GI evaluation appreciated   -repeat BC x 2 noted  -oncology evaluation appreciated  -monitor abdomen  -complete meropenem today  -continue antimicrobials with fluconazole IV and vancomycin PO  -f/u cultures  -monitor temps  -f/u CBC and BNP  -supportive care  2. Other issues:   -care per medicine    d/w medicine team  d/w Dr. Pleitez

## 2025-02-24 NOTE — PROGRESS NOTE ADULT - SUBJECTIVE AND OBJECTIVE BOX
Subjective:    pat better, RR 26, , lying in bed, no respiratory distress, having some diarrhea, desaturate during sleep, does not tolerate HFNC.    Home Medications:  cefepime 2 g intravenous injection: 2 gram(s) intravenous every 12 hours (12 Feb 2025 12:59)  filgrastim: 480 microgram(s) subcutaneous once a day (12 Feb 2025 12:59)  Lactated Ringers Injection intravenous solution: 150 milligram(s) intravenous every 1 to 2 hours (12 Feb 2025 12:59)  loperamide 2 mg oral capsule: 1 cap(s) orally once (12 Feb 2025 12:59)  midodrine 5 mg oral tablet: 1 tab(s) orally every 8 hours (12 Feb 2025 12:59)  Multiple Vitamins with Minerals oral liquid: 1 orally once a day (12 Feb 2025 12:59)  octreotide 2500 mcg/mL subcutaneous solution: 0.04 milliliter(s) subcutaneous 3 times a day (12 Feb 2025 12:59)  sucralfate 1 g/10 mL oral suspension: 10 milliliter(s) orally 4 times a day (12 Feb 2025 12:59)  trimethobenzamide 100 mg/mL intramuscular solution: 2 milliliter(s) intramuscular every 8 hours As needed Nausea and/or Vomiting (12 Feb 2025 12:59)    MEDICATIONS  (STANDING):  acetaminophen   IVPB .. 1000 milliGRAM(s) IV Intermittent once  acetaminophen   IVPB .. 1000 milliGRAM(s) IV Intermittent once  chlorhexidine 2% Cloths 1 Application(s) Topical <User Schedule>  cholecalciferol 2000 Unit(s) Oral at bedtime  dextrose 5%. 1000 milliLiter(s) (50 mL/Hr) IV Continuous <Continuous>  dextrose 5%. 1000 milliLiter(s) (100 mL/Hr) IV Continuous <Continuous>  dextrose 50% Injectable 25 Gram(s) IV Push once  dextrose 50% Injectable 12.5 Gram(s) IV Push once  dextrose 50% Injectable 25 Gram(s) IV Push once  famotidine Injectable 20 milliGRAM(s) IV Push daily  fluconAZOLE IVPB      fluconAZOLE IVPB 200 milliGRAM(s) IV Intermittent every 24 hours  glucagon  Injectable 1 milliGRAM(s) IntraMuscular once  insulin glargine Injectable (LANTUS) 18 Unit(s) SubCutaneous at bedtime  insulin lispro (ADMELOG) corrective regimen sliding scale   SubCutaneous every 6 hours  lipid, fat emulsion (Fish Oil and Plant Based) 20% Infusion 0.681 Gm/kG/Day (20.8 mL/Hr) IV Continuous <Continuous>  metoprolol tartrate Injectable 5 milliGRAM(s) IV Push every 6 hours  pantoprazole  Injectable 40 milliGRAM(s) IV Push every 12 hours  Parenteral Nutrition - Adult 1 Each (75 mL/Hr) TPN Continuous <Continuous>  Parenteral Nutrition - Adult 1 Each (75 mL/Hr) TPN Continuous <Continuous>  sodium chloride 0.45%. 1000 milliLiter(s) (50 mL/Hr) IV Continuous <Continuous>  sucralfate suspension 1 Gram(s) Oral four times a day  vancomycin    Solution 125 milliGRAM(s) Oral every 6 hours    MEDICATIONS  (PRN):  acetaminophen     Tablet .. 650 milliGRAM(s) Oral every 6 hours PRN Temp greater or equal to 38C (100.4F), Mild Pain (1 - 3)  aluminum hydroxide/magnesium hydroxide/simethicone Suspension 30 milliLiter(s) Oral every 4 hours PRN Dyspepsia  Biotene Dry Mouth Oral Rinse 15 milliLiter(s) Swish and Spit five times a day PRN Mouth Care  dextrose Oral Gel 15 Gram(s) Oral once PRN Blood Glucose LESS THAN 70 milliGRAM(s)/deciliter  melatonin 3 milliGRAM(s) Oral at bedtime PRN Insomnia  ondansetron Injectable 4 milliGRAM(s) IV Push every 8 hours PRN Nausea and/or Vomiting  sodium chloride 0.65% Nasal 1 Spray(s) Both Nostrils two times a day PRN Nasal Congestion      Allergies    No Known Allergies    Intolerances        Vital Signs Last 24 Hrs  T(C): 36.8 (24 Feb 2025 10:02), Max: 36.8 (24 Feb 2025 05:00)  T(F): 98.3 (24 Feb 2025 10:02), Max: 98.3 (24 Feb 2025 10:02)  HR: 122 (24 Feb 2025 17:00) (108 - 131)  BP: 94/50 (24 Feb 2025 17:00) (94/50 - 144/96)  BP(mean): 59 (24 Feb 2025 17:00) (59 - 109)  RR: 41 (24 Feb 2025 17:00) (20 - 41)  SpO2: 96% (24 Feb 2025 17:00) (92% - 100%)    Parameters below as of 24 Feb 2025 16:00  Patient On (Oxygen Delivery Method): nasal cannula w/ humidification  O2 Flow (L/min): 3        PHYSICAL EXAMINATION:    NECK:  Supple. No lymphadenopathy. Jugular venous pressure not elevated. Carotids equal.   HEART:   The cardiac impulse has a normal quality. Reg., Nl S1 and S2.  There are no murmurs, rubs or gallops noted  CHEST:  Chest crackles to auscultation. Normal respiratory effort.  ABDOMEN:  Soft and nontender.   EXTREMITIES:  There is no edema.       LABS:                        9.9    24.08 )-----------( 164      ( 24 Feb 2025 04:45 )             28.2     02-24    146[H]  |  111[H]  |  84[H]  ----------------------------<  233[H]  3.9   |  29  |  1.53[H]    Ca    8.2[L]      24 Feb 2025 04:45  Phos  3.6     02-24  Mg     1.7     02-24    TPro  5.3[L]  /  Alb  2.9[L]  /  TBili  1.3[H]  /  DBili  x   /  AST  75[H]  /  ALT  49  /  AlkPhos  225[H]  02-24    PTT - ( 23 Feb 2025 06:16 )  PTT:72.7 sec  Urinalysis Basic - ( 24 Feb 2025 04:45 )    Color: x / Appearance: x / SG: x / pH: x  Gluc: 233 mg/dL / Ketone: x  / Bili: x / Urobili: x   Blood: x / Protein: x / Nitrite: x   Leuk Esterase: x / RBC: x / WBC x   Sq Epi: x / Non Sq Epi: x / Bacteria: x

## 2025-02-24 NOTE — PROGRESS NOTE ADULT - SUBJECTIVE AND OBJECTIVE BOX
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 1000 milliGRAM(s) IV Intermittent once  acetaminophen   IVPB .. 1000 milliGRAM(s) IV Intermittent once  chlorhexidine 2% Cloths 1 Application(s) Topical <User Schedule>  cholecalciferol 2000 Unit(s) Oral at bedtime  dextrose 5%. 1000 milliLiter(s) (50 mL/Hr) IV Continuous <Continuous>  dextrose 5%. 1000 milliLiter(s) (100 mL/Hr) IV Continuous <Continuous>  dextrose 50% Injectable 25 Gram(s) IV Push once  dextrose 50% Injectable 12.5 Gram(s) IV Push once  dextrose 50% Injectable 25 Gram(s) IV Push once  famotidine Injectable 20 milliGRAM(s) IV Push daily  fluconAZOLE IVPB      fluconAZOLE IVPB 200 milliGRAM(s) IV Intermittent every 24 hours  glucagon  Injectable 1 milliGRAM(s) IntraMuscular once  insulin glargine Injectable (LANTUS) 18 Unit(s) SubCutaneous at bedtime  insulin lispro (ADMELOG) corrective regimen sliding scale   SubCutaneous every 6 hours  lipid, fat emulsion (Fish Oil and Plant Based) 20% Infusion 0.681 Gm/kG/Day (20.8 mL/Hr) IV Continuous <Continuous>  metoprolol tartrate Injectable 5 milliGRAM(s) IV Push every 6 hours  pantoprazole  Injectable 40 milliGRAM(s) IV Push every 12 hours  Parenteral Nutrition - Adult 1 Each (75 mL/Hr) TPN Continuous <Continuous>  Parenteral Nutrition - Adult 1 Each (75 mL/Hr) TPN Continuous <Continuous>  sodium chloride 0.45%. 1000 milliLiter(s) (50 mL/Hr) IV Continuous <Continuous>  sucralfate suspension 1 Gram(s) Oral four times a day  vancomycin    Solution 125 milliGRAM(s) Oral every 6 hours    MEDICATIONS  (PRN):  acetaminophen     Tablet .. 650 milliGRAM(s) Oral every 6 hours PRN Temp greater or equal to 38C (100.4F), Mild Pain (1 - 3)  aluminum hydroxide/magnesium hydroxide/simethicone Suspension 30 milliLiter(s) Oral every 4 hours PRN Dyspepsia  Biotene Dry Mouth Oral Rinse 15 milliLiter(s) Swish and Spit five times a day PRN Mouth Care  dextrose Oral Gel 15 Gram(s) Oral once PRN Blood Glucose LESS THAN 70 milliGRAM(s)/deciliter  melatonin 3 milliGRAM(s) Oral at bedtime PRN Insomnia  ondansetron Injectable 4 milliGRAM(s) IV Push every 8 hours PRN Nausea and/or Vomiting  sodium chloride 0.65% Nasal 1 Spray(s) Both Nostrils two times a day PRN Nasal Congestion      REVIEW OF SYSTEMS:    RESPIRATORY: No shortness of breath  CARDIOVASCULAR: No chest pain  All other review of systems is negative unless indicated above.    Vital Signs Last 24 Hrs  T(C): 36.3 (24 Feb 2025 18:16), Max: 36.8 (24 Feb 2025 05:00)  T(F): 97.3 (24 Feb 2025 18:16), Max: 98.3 (24 Feb 2025 10:02)  HR: 107 (24 Feb 2025 18:00) (107 - 131)  BP: 110/80 (24 Feb 2025 18:00) (94/50 - 144/96)  BP(mean): 87 (24 Feb 2025 18:00) (59 - 109)  RR: 43 (24 Feb 2025 18:00) (20 - 43)  SpO2: 96% (24 Feb 2025 17:00) (92% - 100%)    Parameters below as of 24 Feb 2025 16:00  Patient On (Oxygen Delivery Method): nasal cannula w/ humidification  O2 Flow (L/min): 3      PHYSICAL EXAM:    Constitutional: NAD, chronically ill appearing  Respiratory: CTAB  Cardiovascular: S1 and S2, RRR  Gastrointestinal: BS+, soft, mildly distended, NT  Extremities: 2+ Bilat peripheral edema  Psychiatric: Normal mood, normal affect    LABS:                        9.9    24.08 )-----------( 164      ( 24 Feb 2025 04:45 )             28.2     02-24    146[H]  |  111[H]  |  84[H]  ----------------------------<  233[H]  3.9   |  29  |  1.53[H]    Ca    8.2[L]      24 Feb 2025 04:45  Phos  3.6     02-24  Mg     1.7     02-24    TPro  5.3[L]  /  Alb  2.9[L]  /  TBili  1.3[H]  /  DBili  x   /  AST  75[H]  /  ALT  49  /  AlkPhos  225[H]  02-24    PTT - ( 23 Feb 2025 06:16 )  PTT:72.7 sec  LIVER FUNCTIONS - ( 24 Feb 2025 04:45 )  Alb: 2.9 g/dL / Pro: 5.3 gm/dL / ALK PHOS: 225 U/L / ALT: 49 U/L / AST: 75 U/L / GGT: x             RADIOLOGY & ADDITIONAL STUDIES:

## 2025-02-24 NOTE — PROGRESS NOTE ADULT - SUBJECTIVE AND OBJECTIVE BOX
INTERVAL HPI/OVERNIGHT EVENTS:  Patient S&E at bedside.   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      GERD (gastroesophageal reflux disease)      Cyclical vomiting syndrome      CVA (cerebrovascular accident)      H/O squamous cell carcinoma excision          FAMILY HISTORY:      VITAL SIGNS:  T(F): 98.2 (02-24-25 @ 05:00)  HR: 121 (02-24-25 @ 08:00)  BP: 125/81 (02-24-25 @ 08:00)  RR: 29 (02-24-25 @ 08:00)  SpO2: 95% (02-24-25 @ 08:00)  Wt(kg): --    PHYSICAL EXAM:    Constitutional: NAD, more withdrawn today   ent- bleeding from bottom lip dry mucous membranes   Respiratory: CTA b/l, mild tachypnea on 2l this am   Cardiovascular: RRR,   Gastrointestinal: soft, NT  Neurological: AAOx2      MEDICATIONS  (STANDING):  acetaminophen   IVPB .. 1000 milliGRAM(s) IV Intermittent once  acetaminophen   IVPB .. 1000 milliGRAM(s) IV Intermittent once  chlorhexidine 2% Cloths 1 Application(s) Topical <User Schedule>  cholecalciferol 2000 Unit(s) Oral at bedtime  dextrose 5%. 1000 milliLiter(s) (50 mL/Hr) IV Continuous <Continuous>  dextrose 5%. 1000 milliLiter(s) (100 mL/Hr) IV Continuous <Continuous>  dextrose 50% Injectable 25 Gram(s) IV Push once  dextrose 50% Injectable 12.5 Gram(s) IV Push once  dextrose 50% Injectable 25 Gram(s) IV Push once  famotidine Injectable 20 milliGRAM(s) IV Push daily  fluconAZOLE IVPB      fluconAZOLE IVPB 200 milliGRAM(s) IV Intermittent every 24 hours  glucagon  Injectable 1 milliGRAM(s) IntraMuscular once  insulin glargine Injectable (LANTUS) 18 Unit(s) SubCutaneous at bedtime  insulin lispro (ADMELOG) corrective regimen sliding scale   SubCutaneous every 6 hours  lipid, fat emulsion (Fish Oil and Plant Based) 20% Infusion 0.6812 Gm/kG/Day (20.83 mL/Hr) IV Continuous <Continuous>  meropenem Injectable 1000 milliGRAM(s) IV Push every 12 hours  metoclopramide Injectable 5 milliGRAM(s) IV Push every 6 hours  metoprolol tartrate Injectable 2.5 milliGRAM(s) IV Push every 6 hours  pantoprazole Infusion 8 mG/Hr (10 mL/Hr) IV Continuous <Continuous>  Parenteral Nutrition - Adult 1 Each (75 mL/Hr) TPN Continuous <Continuous>  sodium chloride 0.45%. 1000 milliLiter(s) (50 mL/Hr) IV Continuous <Continuous>  sucralfate suspension 1 Gram(s) Oral four times a day  vancomycin    Solution 125 milliGRAM(s) Oral every 6 hours    MEDICATIONS  (PRN):  acetaminophen     Tablet .. 650 milliGRAM(s) Oral every 6 hours PRN Temp greater or equal to 38C (100.4F), Mild Pain (1 - 3)  aluminum hydroxide/magnesium hydroxide/simethicone Suspension 30 milliLiter(s) Oral every 4 hours PRN Dyspepsia  Biotene Dry Mouth Oral Rinse 15 milliLiter(s) Swish and Spit five times a day PRN Mouth Care  dextrose Oral Gel 15 Gram(s) Oral once PRN Blood Glucose LESS THAN 70 milliGRAM(s)/deciliter  melatonin 3 milliGRAM(s) Oral at bedtime PRN Insomnia  ondansetron Injectable 4 milliGRAM(s) IV Push every 8 hours PRN Nausea and/or Vomiting  sodium chloride 0.65% Nasal 1 Spray(s) Both Nostrils two times a day PRN Nasal Congestion      Allergies    No Known Allergies    Intolerances        LABS:                        9.9    24.08 )-----------( 164      ( 24 Feb 2025 04:45 )             28.2     02-24    146[H]  |  111[H]  |  84[H]  ----------------------------<  233[H]  3.9   |  29  |  1.53[H]    Ca    8.2[L]      24 Feb 2025 04:45  Phos  3.6     02-24  Mg     1.7     02-24    TPro  5.3[L]  /  Alb  2.9[L]  /  TBili  1.3[H]  /  DBili  x   /  AST  75[H]  /  ALT  49  /  AlkPhos  225[H]  02-24    PTT - ( 23 Feb 2025 06:16 )  PTT:72.7 sec  Urinalysis Basic - ( 24 Feb 2025 04:45 )    Color: x / Appearance: x / SG: x / pH: x  Gluc: 233 mg/dL / Ketone: x  / Bili: x / Urobili: x   Blood: x / Protein: x / Nitrite: x   Leuk Esterase: x / RBC: x / WBC x   Sq Epi: x / Non Sq Epi: x / Bacteria: x        RADIOLOGY & ADDITIONAL TESTS:  Studies reviewed.

## 2025-02-24 NOTE — PROGRESS NOTE ADULT - ASSESSMENT
69-year-old female with stage IV pancreatic adenocarcinoma presenting with neutropenic fever and severe treatment-related toxicities from modified FOLFIRINOX and investigational EMILY inhibitor [id number] found to have PE and LBO.     # pancreatic ca   - remains on clinical trial  - have been in contact with [facility] and    - CTH negative and discussed w/ pt   - supportive care while in hospital     # Neutropenic Septic shock   - pt previously on neupogen- WBC has now recovered and elevated likely 2/2 GCSF support   - WBC 24, HB 9.9, plt 164, Cr 1.53, bili 1.3  - hemodynamically stable   - remains on meropenem started on 2/15 as well as fluconazole and po vanc    # PE   - v/q scan w indeterminate prob of PE, linear defect in RUL posterior segment   - CT chest- multiple PE w/in segmental left lower and right lower, 2 right middle lobe nodules,   -  Pt now off hep gtt due to bleeding concern     # anemia   - received 2u to date prior to last night events   - Protonix IV started as concern for GIB.   - Pt given 2upRBCs.   - Had dark BM last night again but Hb stable 9.9 this am - will need to continue ot monitor   - GI following     # large bowel obstruction  - CT a/p Large bowel obstruction with transition point at the distal transverse colon. There is also narrowing at the level of the terminal ileum. Diffuse ascites. Redemonstrated pancreatic lesion, consistent with history of pancreatic adenocarcinoma. There is an umbilical lesion with adjacent peritoneal implants.  - previously was having diarrhea  - GI following and reviewed imaging with radiology   - 2/22/25  repeat CT a/p with mild interval improvements in small and large bowel ileus. pt given lasix and albumin.     # EMPIRIC TREATMENT FOR ESOPHAGEAL CANDIDIASIS   - continue with DIFLUCAN     #Care Coordination  -will continue with ongoing communication with  [facility]  - Review clinical trial protocol regarding toxicity management  - Consider dose modifications for future therapy    will follow and communicate with [facility]

## 2025-02-24 NOTE — PROGRESS NOTE ADULT - ASSESSMENT
69-year-old female with stage IV pancreatic adenocarcinoma presenting with neutropenic fever and severe treatment-related toxicities from modified FOLFIRINOX and investigational EMILY inhibitor [id number].    PROBLEMS:    Acute hypoxic respiratory failure likely secondary to hypervolemia   Bilateral pleural effusions with compressive atelectasis, right greater than left  Neutropenic Fever/Neutropenic Septic shock  Severe Thrombocytopenia-Platelet count critically low at 26k.   Severe diarrhea and inability to tolerate PO intake, likely multifactorial from both FOLFIRINOX (particularly irinotecan component) and study drug [id number].   History of left cephalic vein thrombosis.  MALLORY  69-year-old female with stage IV pancreatic adenocarcinoma presenting with neutropenic fever and severe treatment-related toxicities from modified FOLFIRINOX and investigational EMILY inhibitor [id number].  CT Angio Chest PE Protocol w/ IV Cont (02.19.25 @ 14:16) >  CHEST:  Multiple pulmonary emboli within the segmental branches of the left lower and right lower lobe pulmonary arteries.  There are two right middle lobe nodules, new since prior. Exact   etiology is unclear. Primary differential diagnostic consideration   includes infection.      PLAN:    pulmonary better  CTA chest + small PEs in RLL and LLL- IV heparin, no bolus   DECD steroids  High WBC-trend  Pulmonary tachpnea may be RTA with low bicarbonate because of GI cause with diarrhea but improving renal fx  Neutropenic Septic shock- IV meropenem started on 2/15- previously on CEFEPIME/Continue filgrastim 480mcg daily-Today w/ improvement in counts- WBC 1.3, Hb 10.1, plt 37, cmp with k 2.3 and Repleted, Cr 1.40.   Daily CBC monitoring  Maintain strict neutropenic precautions  IV diuretics-now on RA doing well   EMPIRIC TREATMENT FOR ESOPHAGEAL CANDIDIASIS-continue with DIFLUCAN   Anemia-received two units of pRBCs Hb 10.1 today   Thrombocytopenia-Transfuse for plts < 15   D/w staff & /haematology & ICU in detail

## 2025-02-24 NOTE — CHART NOTE - NSCHARTNOTEFT_GEN_A_CORE
Clinical Nutrition PN Follow Up Note    *68 y/o F w/ recent diagnosis of primary pancreatic adenocarcinoma of head with tumor in umbilicus, chemo-induced pancytopenia and significant PMH of SCC lip, cyclical vomiting syndrome, HTN, HPL, Gout, GERD, CVA on Aggrenox and others had been admitted to [facility] with septic shock on 2025, nausea, vomiting and diarrhea requiring Levophed drip, was transferred out of ICU to regular floor last night.  Patient was being followed by GI, ID, Cardiology and Onc there.  Patient has been transferred to  today on patient's  (GI Dr. Pleitez) request.  At this time, patient c/o non-bloody diarrhea x 3 in last 24 hours.  She denies any abdominal pain, nausea, but has some dry heaves.  She denies any fever, chills, chest pain, palpitation, constipation or dysuria. Initially, she was started on Vancomycin and Zosyn, but now she has been on Cefepime as her blood culture and urine cultures are positive for Pseudomonas aeruginosa. Currently, patient is on Cefepime.  ID had also recommended Flagyl, but patient had refused it, as it causes nausea to her. Noted with Metabolic acidosis with NAG due to GI loss. Likely chemo-induced diarrhea and vomiting. Better than before. Cutaneous lesion on RUQ area below right breast, unclear etiology, ? Ecthyma gangrenosum. Admitting diagnosis: pancreatic ca  *: Pt transfer from [facility]; seen by RD and met criteria for PCM. Pt reports only being able to tolerate some sips of water and tea at this time. Reports tried eating small bite of mashed banana and felt burning sensation down her throat. Reports #; RD unable to obtain bedscale wt d/t bedscale not working. Admit wt per # note with 1+ and 2+ edema skewing wt. Pt appears overweight, NFPE reveals mild- mod muscle/fat wasting at this time. Recommend to continue with PO diet and encourage intake as tolerated. RD consulted to initiate PN d/t PO intolerance. Will initiate TPN through port.  *2/15: Remains on low fiber diet with minimal PO intake. Still w/ multiple episodes of diarrhea, if persists can consider adding 5mg zinc into PN bag. No other acute events overnight. D/w Dr. Joyner will c/w TPN today.   *: Raised area noted to mid sternum. RCW port intact.- no swelling noted, no redness. Port flushed without resistance. + blood return noted. Assessed with IV team Amy. Per pt no Pain on palpation. CT Chest. TPN on hold until CT read. CT chest from earlier in the day revealed new bilateral pleural effusions with bibasilar consolidated lung and ascites. CXR now shows low lung volumes and bibasilar opacities. Rigors post transfusion - Rapid response called;  Dr. Pleitez requesting ICU consult- concerned for pt.  requesting assessment of patient STAT. Pt transferred to SICU. ? third spacing, and possible PNA. Per pt still drinking small sips of water and tea. D/w Dr. Fortune will keep total volume same in PN bag today, and will c/w TPN.   *:  tx SICU, po remains minimal. On bipap. On additional LR d/t diuresis per critical care PA: "malignancy/severe protein-calorie malnutrition/hypoalbuminemia causing diffuse third spacing, caution with further diuresis as this is not truly a volume overload/acute CHF issue" also on IV albumin  c/w TPN  STRONGLY suggest to Confirm goals of care regarding LONG-TERM nutrition support - However, LONG-TERM Nutrition support is not recommended with advanced cancer as studies show that there is no improvement of dehydration symptoms, quality of life, or survival. It also increases the risk for peripheral edema, ascites, and pleural effusions. Will continue to provide nutr within GOC though  *: plan to c/w TPN for now. RD discussed w/ MD Tong and SICU IRINA Jurado need to confirm long term GOC regarding nutr support. Per GI - Esophageal pain is 2/2 esophagitis but unclear what type -- reflux vs. candida vs CMV or HSV etc. Plan to add imodium standing. ? EGD   *: reports she stills has difficulty swallowing due to pain. feeling weak overall. still having multiple episodes of diarrhea, poor PO intake. Plan to c/w TPN - per GI: "CVM and HSV will be negative which argues that esophagitis is reflux or fungal related"  d/w IRINA Jurado long-term plan regarding nutr support TBD, pending discussion w/ family. ? fluid overload which TPN may be contributing to as some studies show with advanced cancer that nutrition support increases the risk for peripheral edema, ascites, and pleural effusions.   *: PO slighly improving, plan to c/w TPN.  POCT uptrending, d/w IRINA Jurado, steroids to be dc and will hold off in insulin PN bag - will manage w/ insulin outside bag.  Per GI: "Imaging suggests ileus but I personally think that putting the imaging together with history and clinical findings that she just has severe diarrhea illness 2/2 chemo, given that both large and small bowel loops are fluid filled and she has no abd pain or vomiting"  *: PO intake continues to slightly improve, plan to c/w TPN. Hyperglycemia worsening, d/w IRINA Jurado, plan to add regular insulin into PN bag. Will also decrease dextrose to 250g to see if this helps w/ hyperglycemia and can increase again when POCTs better controlled.     *TPN initiated 2/2 PO intolerance, + Mucositis, possible candidal esophagitis    *current status: Abdomen distended, non-tender to palpation - GI rec stat CT w/o contrast for further evaluation and to hold PO intake at this time. POCT remain elevated, per RN  this morning - Lantus was not given last night by RN since insulin was already in PN bag (?). Will hold off on increasing dextrose and will c/w current insulin in PN bag, d/w intensivist and plan to adjust Lantus outside PN bag.     *pertinent meds: Lasix, Lantus (15 units HS), Vitamin D3 (2000IU), Pepcid, Abx, Admelog (18 units x 24 hours), Solu-Medrol, Reglan, Metoprolol, Protonix, Carafate, Albumin Human 25% IVPB    *labs reviewed; Na WNL; corrected Na for hyperglycemia elevated (146), will closely monitor and will adjust Na and total volume prn per labs. K and Mg WNL; As per ASPEN Guidelines, maintenance potassium concentration in PN is 1 – 2 mEq/kg/day. However, [facility] PN Policy indicates a maximum of 120 mEq should be added into the PN bag. Currently at 100mEq in PN bag. Mg remains on lower end, will increase in PN bag. Phos now elevated, will remove from PN bag and will add back once level downtrends. T bili WNL will c/w trace elements. C/w MVI and thiamine in PN bag. POCTs w/ slight improvement however still elevated x 24 hrs, will c/w current dextrose (250g) and will titrate up tomorrow if POCT improved (goal 325g daily; GIR ~3.1 WNL) - will c/w 15 units of regular insulin to PN bag as d/w intensivist.  TG WNL, however increasing will c/w 50g of lipids daily.  Also of note, Cl still elevated, will add more acetate vs Cl into TPN bag (239 vs 140, respectively).       144  |  110[H]  |  76[H]  ----------------------------<  220[H]  4.1   |  28  |  1.38[H]    Ca    7.8[L]      2025 06:16  Phos  4.6       Mg     1.8         TPro  5.3[L]  /  Alb  2.9[L]  /  TBili  0.8  /  DBili  x   /  AST  61[H]  /  ALT  49  /  AlkPhos  243[H]      BMI: BMI (kg/m2): 27.8 (25 @ 05:46)  HbA1c: A1C with Estimated Average Glucose Result: 6.6 % (25 @ 05:35)    Glucose: POCT Blood Glucose.: 220 mg/dL (2025 09:22)    BP: 136/90 (25 @ 06:00) (114/82 - 145/78)Vital Signs Last 24 Hrs  T(C): 37.1 (25 @ 06:00), Max: 37.1 (25 @ 06:00)  T(F): 98.8 (25 @ 06:00), Max: 98.8 (25 @ 06:00)  HR: 98 (25 @ 06:00) (98 - 111)  BP: 136/90 (25 @ 06:00) (125/81 - 145/78)  BP(mean): 104 (25 @ 06:00) (91 - 104)  RR: 27 (25 @ 06:00) (25 - 37)  SpO2: 90% (25 @ 06:00) (90% - 99%)    Lipid Panel: Date/Time: 25 @ 05:31  Triglycerides, Serum: 281    POCT Blood Glucose.: 231 mg/dL (2025 06:48)  POCT Blood Glucose.: 267 mg/dL (2025 23:09)  POCT Blood Glucose.: 302 mg/dL (2025 18:24)  POCT Blood Glucose.: 220 mg/dL (2025 09:22)    Iron Total: 34 ug/dL (25 @ 18:46)  Folate, Serum: >20.0 ng/mL (25 @ 18:46)  Vitamin B12, Serum: 1457 pg/mL (25 @ 18:46)  Vitamin D, 25-Hydroxy: 23.0 ng/mL (25 @ 18:46) ** c/w deficiency    *I&O's Detail    2025 07:01  -  2025 07:00  --------------------------------------------------------  IN:    Fat Emulsion (Fish Oil &amp; Plant Based) 20% Infusion: 168 mL    Heparin Infusion: 180.5 mL    IV PiggyBack: 300 mL    TPN (Total Parenteral Nutrition): 1810 mL  Total IN: 2458.5 mL    OUT:    Indwelling Catheter - Urethral (mL): 4200 mL  Total OUT: 4200 mL    Total NET: -1741.5 mL  * fluid status: Negative; large UOP. Will continue to monitor/adjust prn to maintain fluid balance   *last (+) BM doc'd 2/18 x 5 - liquid, diarrhea.  pt on standing bowel regimen (imodium).  *edema: 3+ left leg, left knee  *PI: L/R Buttocks (stage 2)    *malnutrition: Pt continues to meet criteria for severe protein calorie malnutrition in context of acute on chronic illness r/t decreased ability to meet increased nutrient needs 2/2 PO intolerance, N/V and diarrhea, on chemo for pancreatic ca AEB meeting <50% ENN > 5 days, mild-mod muscle/fat wasting    Estimated Needs: based on 73.4 Kg (wt from EMR admit wt )  Calories: 1812 - 2175 Kcal (25 - 30 Kcal/Kg)  Protein: 108 - 145 g (1.5 - 2.0 g/Kg)  Fluids: 1450 - 1812 mL (20 - 25 mL/Kg)    Diet, Low Fiber:   Kosher  Supplement Feeding Modality:  Oral  Ensure Plant-Based Cans or Servings Per Day:  3       Frequency:  Daily (25 @ 16:47) [Active]  Parenteral Nutrition - Adult 1 Each (75 mL/Hr) TPN Continuous <Continuous>, 25 @ 22:00, 22:00, Stop order after: 1 Days    Weight History:  Daily Weight in k.3 (2025 06:00) *2+ edema  Daily Weight in k.4 (2025 05:00)  Daily Weight in k.6 (15 Feb 2025 06:17)  Height (cm): 162.6 (25 @ 02:39)  Weight (kg): 73.4 (25 @ 05:46), 66.8 (25 @ 02:39)  BMI (kg/m2): 27.8 (25 @ 05:46), 25.3 (25 @ 02:39)  BSA (m2): 1.79 (25 @ 05:46), 1.72 (25 @ 02:39)    TPN Recommendations: via implanted infusion port  Total Volume: 1800 mL x 24 hours  125 g  Amino Acids  250 g Dextrose (goal 325g)  50 g Lipids 20%   50 mEq Sodium Chloride  50 mEq Sodium Acetate  0 mmol Sodium Phosphate  40 mEq Potassium Chloride  60 mEq Potassium Acetate  0 mmol Potassium Phosphate  0 mEq Calcium Gluconate (CAPS out of PN solution)   14 mEq Magnesium Sulfate  200 mg Thiamine  1 ml Trace Elements  10 ml MVI  20 units Regular Insulin    Total Calories    1850   (Meets  100%  of lower end of Estimated Energy needs  and  100%  Protein needs)      Additional Recommendations:    1) Daily weights  2) Strict I & O's **pt third spacing  3) Daily lyte checks including magnesium and phos  4) Weekly triglycerides/LFT checks  5) POCT q6hrs; maintain 140-180mg/dL***  6) C/w low fiber diet and encourage PO intake as tolerated  7) Continue to titrate dextrose 50-75g per day until goal of 325g reached  8) Confirm goals of care regarding long-term nutrition support. Would recommend PEG placement if long-term nutrition support is required as GI is functional and would be the preferred route for nutrition. However, long-term nutrition support is not recommended with advanced cancer as studies show that there is no improvement of dehydration symptoms, quality of life, or survival. It also increases the risk for peripheral edema, ascites, and pleural effusions.     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  Patient was being followed by GI, ID, Cardiology and Onc there.  Patient has been transferred to  today on patient's  (GI Dr. Pleitez) request.  At this time, patient c/o non-bloody diarrhea x 3 in last 24 hours.  She denies any abdominal pain, nausea, but has some dry heaves.  She denies any fever, chills, chest pain, palpitation, constipation or dysuria. Initially, she was started on Vancomycin and Zosyn, but now she has been on Cefepime as her blood culture and urine cultures are positive for Pseudomonas aeruginosa. Currently, patient is on Cefepime.  ID had also recommended Flagyl, but patient had refused it, as it causes nausea to her. Noted with Metabolic acidosis with NAG due to GI loss. Likely chemo-induced diarrhea and vomiting. Better than before. Cutaneous lesion on RUQ area below right breast, unclear etiology, ? Ecthyma gangrenosum. Admitting diagnosis: pancreatic ca  *: Pt transfer from [facility]; seen by RD and met criteria for PCM. Pt reports only being able to tolerate some sips of water and tea at this time. Reports tried eating small bite of mashed banana and felt burning sensation down her throat. Reports #; RD unable to obtain bedscale wt d/t bedscale not working. Admit wt per # note with 1+ and 2+ edema skewing wt. Pt appears overweight, NFPE reveals mild- mod muscle/fat wasting at this time. Recommend to continue with PO diet and encourage intake as tolerated. RD consulted to initiate PN d/t PO intolerance. Will initiate TPN through port.  *2/15: Remains on low fiber diet with minimal PO intake. Still w/ multiple episodes of diarrhea, if persists can consider adding 5mg zinc into PN bag. No other acute events overnight. D/w Dr. Joyner will c/w TPN today.   *: Raised area noted to mid sternum. RCW port intact.- no swelling noted, no redness. Port flushed without resistance. + blood return noted. Assessed with IV team Amy. Per pt no Pain on palpation. CT Chest. TPN on hold until CT read. CT chest from earlier in the day revealed new bilateral pleural effusions with bibasilar consolidated lung and ascites. CXR now shows low lung volumes and bibasilar opacities. Rigors post transfusion - Rapid response called;  Dr. Pleitez requesting ICU consult- concerned for pt.  requesting assessment of patient STAT. Pt transferred to SICU. ? third spacing, and possible PNA. Per pt still drinking small sips of water and tea. D/w Dr. Fortune will keep total volume same in PN bag today, and will c/w TPN.   *:  tx SICU, po remains minimal. On bipap. On additional LR d/t diuresis per critical care PA: "malignancy/severe protein-calorie malnutrition/hypoalbuminemia causing diffuse third spacing, caution with further diuresis as this is not truly a volume overload/acute CHF issue" also on IV albumin  c/w TPN  STRONGLY suggest to Confirm goals of care regarding LONG-TERM nutrition support - However, LONG-TERM Nutrition support is not recommended with advanced cancer as studies show that there is no improvement of dehydration symptoms, quality of life, or survival. It also increases the risk for peripheral edema, ascites, and pleural effusions. Will continue to provide nutr within GOC though  *: plan to c/w TPN for now. RD discussed w/ MD Tong and SICU IRINA Jurado need to confirm long term GOC regarding nutr support. Per GI - Esophageal pain is 2/2 esophagitis but unclear what type -- reflux vs. candida vs CMV or HSV etc. Plan to add imodium standing. ? EGD   *: reports she stills has difficulty swallowing due to pain. feeling weak overall. still having multiple episodes of diarrhea, poor PO intake. Plan to c/w TPN - per GI: "CVM and HSV will be negative which argues that esophagitis is reflux or fungal related"  d/w IRINA Jurado long-term plan regarding nutr support TBD, pending discussion w/ family. ? fluid overload which TPN may be contributing to as some studies show with advanced cancer that nutrition support increases the risk for peripheral edema, ascites, and pleural effusions.   *: PO slighly improving, plan to c/w TPN.  POCT uptrending, d/w IRINA Jurado, steroids to be dc and will hold off in insulin PN bag - will manage w/ insulin outside bag.  Per GI: "Imaging suggests ileus but I personally think that putting the imaging together with history and clinical findings that she just has severe diarrhea illness 2/2 chemo, given that both large and small bowel loops are fluid filled and she has no abd pain or vomiting"  *: PO intake continues to slightly improve, plan to c/w TPN. Hyperglycemia worsening, d/w IRINA Jurado, plan to add regular insulin into PN bag. Will also decrease dextrose to 250g to see if this helps w/ hyperglycemia and can increase again when POCTs better controlled.   *: Abdomen distended, non-tender to palpation - GI rec stat CT w/o contrast for further evaluation and to hold PO intake at this time. POCT remain elevated, per RN  this morning - Lantus was not given last night by RN since insulin was already in PN bag (?). Will hold off on increasing dextrose and will c/w current insulin in PN bag, d/w intensivist and plan to adjust Lantus outside PN bag.     *TPN initiated 2/2 PO intolerance, + Mucositis, possible candidal esophagitis    *current status: Now has (+) GIB,  CTA with improvement in bowel distension, small and lg bowel ileus. Per GI, clears OK. plan to c/w TPN for now per sicu IRINA Jurado    *pertinent meds: acetaminophen, cholecalciferol, abx, famotidine, LANTUS 18 units HS, ADMELOG (22 units given x 24 hrs), Reglan, protonix, NaCl 0.45% IVF, lasix, IV albumin     *labs reviewed; Na elevated, corrected Na for hyperglycemia even more elevated - Na content in PN bag lowered today. Now also on additional IVF. Will not increase total volume for now d/t 3rd spacing.   K WNL; As per ASPEN Guidelines, maintenance potassium concentration in PN is 1 – 2 mEq/kg/day. However, [facility] PN Policy indicates a maximum of 120 mEq should be added into the PN bag. Currently at 100mEq in PN bag. Mg remains on lower end, will increase in PN bag. Phos elevated and removed from bag yetserday, now WNL. If remains WNL and downtrending can add back 10 mmol tomorrow.   T bili WNL will c/w trace elements. C/w MVI and thiamine in PN bag. POCTs still elevated x 24 hrs, will c/w current dextrose (250g), will change goal to 250g as meeting LOW-END of calorie needs w/ that amount d/t persistent hyperglycemia. D/w IRINA Jurado to increase to 20 units of regular insulin to PN bag.  Lantus also increased outside of bag.  TG WNL (<400), however uptrending, will c/w 50g of lipids daily. May need to give every other day. Also of note, Cl still elevated, will add more acetate vs Cl into TPN bag (235 vs 90, respectively).         146[H]  |  111[H]  |  84[H]  ----------------------------<  233[H]  3.9   |  29  |  1.53[H]    Ca    8.2[L]      2025 04:45  Phos  3.6       Mg     1.7         TPro  5.3[L]  /  Alb  2.9[L]  /  TBili  1.3[H]  /  DBili  x   /  AST  75[H]  /  ALT  49  /  AlkPhos  225[H]      BMI: BMI (kg/m2): 27.8 (25 @ 05:46)  HbA1c: A1C with Estimated Average Glucose Result: 6.6 % (25 @ 05:35)    BP: 136/90 (25 @ 06:00) (114/82 - 145/78)Vital Signs Last 24 Hrs  T(C): 37.1 (25 @ 06:00), Max: 37.1 (25 @ 06:00)  T(F): 98.8 (25 @ 06:00), Max: 98.8 (25 @ 06:00)  HR: 98 (25 @ 06:00) (98 - 111)  BP: 136/90 (25 @ 06:00) (125/81 - 145/78)  BP(mean): 104 (25 @ 06:00) (91 - 104)  RR: 27 (25 @ 06:00) (25 - 37)  SpO2: 90% (25 @ 06:00) (90% - 99%)    Lipid Panel: Date/Time: 25 @ 05:31  Triglycerides, Serum: 281    POCT Blood Glucose.: 273 mg/dL (2025 05:18)  POCT Blood Glucose.: 239 mg/dL (2025 23:11)  POCT Blood Glucose.: 348 mg/dL (2025 15:58)    Iron Total: 34 ug/dL (25 @ 18:46)  Folate, Serum: >20.0 ng/mL (25 @ 18:46)  Vitamin B12, Serum: 1457 pg/mL (25 @ 18:46)  Vitamin D, 25-Hydroxy: 23.0 ng/mL (25 @ 18:46) ** c/w deficiency    *I&O's Detail    2025 07:01  -  2025 07:00  --------------------------------------------------------  IN:    Fat Emulsion (Fish Oil &amp; Plant Based) 20% Infusion: 89 mL    Heparin Infusion: 22 mL    Pantoprazole: 73 mL    sodium chloride 0.45%: 50 mL    TPN (Total Parenteral Nutrition): 871 mL  Total IN: 1105 mL    OUT:    Indwelling Catheter - Urethral (mL): 6325 mL  Total OUT: 6325 mL    Total NET: -5220 mL  * fluid status: Negative; large UOP. Will continue to monitor/adjust prn to maintain fluid balance   *last (+) BM doc'd 2/23 - small, loose.  pt not on bowel regimen.  *edema: 3+ right/left leg  *PI: L/R Buttocks (stage 2)    *malnutrition: Pt continues to meet criteria for severe protein calorie malnutrition in context of acute on chronic illness r/t decreased ability to meet increased nutrient needs 2/2 PO intolerance, N/V and diarrhea, on chemo for pancreatic ca AEB meeting <50% ENN > 5 days, mild-mod muscle/fat wasting    Estimated Needs: based on 73.4 Kg (wt from EMR admit wt )  Calories: 1812 - 2175 Kcal (25 - 30 Kcal/Kg)  Protein: 108 - 145 g (1.5 - 2.0 g/Kg)  Fluids: 1450 - 1812 mL (20 - 25 mL/Kg)    Diet, Low Fiber:   Kosher  Supplement Feeding Modality:  Oral  Ensure Plant-Based Cans or Servings Per Day:  3       Frequency:  Daily (25 @ 16:47) [Active]  Parenteral Nutrition - Adult 1 Each (75 mL/Hr) TPN Continuous <Continuous>, 25 @ 22:00, 22:00, Stop order after: 1 Days    Weight History:  Daily Weight in k.3 (2025 06:00) *2+ edema  Daily Weight in k.4 (2025 05:00)  Daily Weight in k.6 (15 Feb 2025 06:17)  Height (cm): 162.6 (25 @ 02:39)  Weight (kg): 73.4 (25 @ 05:46), 66.8 (25 @ 02:39)  BMI (kg/m2): 27.8 (25 @ 05:46), 25.3 (25 @ 02:39)  BSA (m2): 1.79 (25 @ 05:46), 1.72 (25 @ 02:39)    TPN Recommendations: via implanted infusion port  Total Volume: 1800 mL x 24 hours  125 g  Amino Acids  250 g Dextrose (goal 325g)  50 g Lipids 20%   50 mEq Sodium Chloride  50 mEq Sodium Acetate  0 mmol Sodium Phosphate  40 mEq Potassium Chloride  60 mEq Potassium Acetate  0 mmol Potassium Phosphate  0 mEq Calcium Gluconate (CAPS out of PN solution)   14 mEq Magnesium Sulfate  200 mg Thiamine  1 ml Trace Elements  10 ml MVI  20 units Regular Insulin    Total Calories    1850   (Meets  100%  of lower end of Estimated Energy needs  and  100%  Protein needs)      Additional Recommendations:    1) Daily weights  2) Strict I & O's **pt third spacing  3) Daily lyte checks including magnesium and phos  4) Weekly triglycerides/LFT checks  5) POCT q6hrs; maintain 140-180mg/dL*** insulin management per MD  6) C/w low fiber diet and encourage PO intake as tolerated  7) Continue to titrate dextrose 50-75g per day until goal of 325g reached  8) Confirm goals of care regarding long-term nutrition support. Would recommend PEG placement if long-term nutrition support is required as GI is functional and would be the preferred route for nutrition when GIB/ileus resolves. However, long-term nutrition support is not recommended with advanced cancer as studies show that there is no improvement of dehydration symptoms, quality of life, or survival. It also increases the risk for peripheral edema, ascites, and pleural effusions.     *will continue to monitor and adjust PN prn*  Marce Mithcell, MS, RDN, CNSC, [phone]  Certified Nutrition

## 2025-02-24 NOTE — PROGRESS NOTE ADULT - SUBJECTIVE AND OBJECTIVE BOX
69-year-old female recent diagnosis of primary pancreatic adenocarcinoma , pancytopenia and significant PMH of SCC lip, cyclical vomiting syndrome, HTN, HPL, Gout, GERD, CVA w renal evaluation of MALLORY and hypokalemia. Per documents was recently at  [facility] with septic shock on 02/09/2025, nausea, vomiting and diarrhea requiring Levophed drip. Per documents, patient  transferred to  for continued care. Patient with + diuresis w lasix 80 IV, increased UOP > 4L.   Treated w IV and po k repletion.     2/18  Case d/e Dr Pleitez  Pt returned from VQ scan r/o PE, results pending  Pt is tachypneic RR 30 BPM and etiologies are investigated  NO specific complaints  Labs reviewed  Chart reviewed  Recent reemergence of diarrhea/ metabolic acidosis  now on parenteral nutrition and LR  D/W Ethel Jurado    2/19  Evaluated at bedside , d/w Dr tong and Dr Pleitez  Pt still tachypneic and tachycardic    2/20  Case d/w Dr Maik Tong and Ethel Jurado  pt overall feels better  more engaging    2/21  Pt in good spirits  Cont to be tachycardic  rate ~ 110  Tachypneic rate ~ 30    2/22  Still tachypnic  Difficulty eating due to ileus    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 1000 milliGRAM(s) IV Intermittent once  acetaminophen   IVPB .. 1000 milliGRAM(s) IV Intermittent once  chlorhexidine 2% Cloths 1 Application(s) Topical <User Schedule>  cholecalciferol 2000 Unit(s) Oral at bedtime  dextrose 5%. 1000 milliLiter(s) (50 mL/Hr) IV Continuous <Continuous>  dextrose 5%. 1000 milliLiter(s) (100 mL/Hr) IV Continuous <Continuous>  dextrose 50% Injectable 25 Gram(s) IV Push once  dextrose 50% Injectable 12.5 Gram(s) IV Push once  dextrose 50% Injectable 25 Gram(s) IV Push once  famotidine Injectable 20 milliGRAM(s) IV Push daily  fluconAZOLE IVPB      fluconAZOLE IVPB 200 milliGRAM(s) IV Intermittent every 24 hours  glucagon  Injectable 1 milliGRAM(s) IntraMuscular once  insulin glargine Injectable (LANTUS) 18 Unit(s) SubCutaneous at bedtime  insulin lispro (ADMELOG) corrective regimen sliding scale   SubCutaneous every 6 hours  lipid, fat emulsion (Fish Oil and Plant Based) 20% Infusion 0.681 Gm/kG/Day (20.8 mL/Hr) IV Continuous <Continuous>  lipid, fat emulsion (Fish Oil and Plant Based) 20% Infusion 0.6812 Gm/kG/Day (20.83 mL/Hr) IV Continuous <Continuous>  metoprolol tartrate Injectable 2.5 milliGRAM(s) IV Push every 6 hours  pantoprazole  Injectable 40 milliGRAM(s) IV Push every 12 hours  Parenteral Nutrition - Adult 1 Each (75 mL/Hr) TPN Continuous <Continuous>  Parenteral Nutrition - Adult 1 Each (75 mL/Hr) TPN Continuous <Continuous>  sodium chloride 0.45%. 1000 milliLiter(s) (50 mL/Hr) IV Continuous <Continuous>  sucralfate suspension 1 Gram(s) Oral four times a day  vancomycin    Solution 125 milliGRAM(s) Oral every 6 hours    MEDICATIONS  (PRN):  acetaminophen     Tablet .. 650 milliGRAM(s) Oral every 6 hours PRN Temp greater or equal to 38C (100.4F), Mild Pain (1 - 3)  aluminum hydroxide/magnesium hydroxide/simethicone Suspension 30 milliLiter(s) Oral every 4 hours PRN Dyspepsia  Biotene Dry Mouth Oral Rinse 15 milliLiter(s) Swish and Spit five times a day PRN Mouth Care  dextrose Oral Gel 15 Gram(s) Oral once PRN Blood Glucose LESS THAN 70 milliGRAM(s)/deciliter  melatonin 3 milliGRAM(s) Oral at bedtime PRN Insomnia  ondansetron Injectable 4 milliGRAM(s) IV Push every 8 hours PRN Nausea and/or Vomiting  sodium chloride 0.65% Nasal 1 Spray(s) Both Nostrils two times a day PRN Nasal Congestion    I&O's Detail    23 Feb 2025 07:01  -  24 Feb 2025 07:00  --------------------------------------------------------  IN:    Fat Emulsion (Fish Oil &amp; Plant Based) 20% Infusion: 89 mL    Heparin Infusion: 22 mL    Pantoprazole: 73 mL    sodium chloride 0.45%: 50 mL    TPN (Total Parenteral Nutrition): 871 mL  Total IN: 1105 mL    OUT:    Indwelling Catheter - Urethral (mL): 6325 mL  Total OUT: 6325 mL    Total NET: -5220 mL      24 Feb 2025 07:01  -  24 Feb 2025 15:37  --------------------------------------------------------  IN:    IV PiggyBack: 100 mL  Total IN: 100 mL    OUT:    Indwelling Catheter - Urethral (mL): 1575 mL  Total OUT: 1575 mL    Total NET: -1475 mL            Vital Signs Last 24 Hrs  T(C): 36.8 (24 Feb 2025 10:02), Max: 36.8 (24 Feb 2025 05:00)  T(F): 98.3 (24 Feb 2025 10:02), Max: 98.3 (24 Feb 2025 10:02)  HR: 126 (24 Feb 2025 14:00) (108 - 131)  BP: 144/96 (24 Feb 2025 12:00) (124/88 - 144/96)  BP(mean): 108 (24 Feb 2025 12:00) (95 - 109)  RR: 28 (24 Feb 2025 14:00) (20 - 41)  SpO2: 97% (24 Feb 2025 14:00) (92% - 97%)    Parameters below as of 24 Feb 2025 12:00  Patient On (Oxygen Delivery Method): nasal cannula w/ humidification  O2 Flow (L/min): 3    PHYSICAL EXAM:    Constitutional: NAD,   HEENT: PERRLA, EOMI,  MMM  Neck: No LAD, No JVD  Respiratory: CTAB  Cardiovascular: S1 and S2, RRR  Gastrointestinal: distant bowel sounds, non tender  Extremities: + peripheral edema  Neurological: A/O x 3, no focal deficits  Psychiatric: Normal mood, normal affect    Access: Not applicable  Julian dc d/ needed straight cath        LABS:      02-24    146[H]  |  111[H]  |  84[H]  ----------------------------<  233[H]  3.9   |  29  |  1.53[H]    Ca    8.2[L]      24 Feb 2025 04:45  Phos  3.6     02-24  Mg     1.7     02-24    TPro  5.3[L]  /  Alb  2.9[L]  /  TBili  1.3[H]  /  DBili  x   /  AST  75[H]  /  ALT  49  /  AlkPhos  225[H]  02-24                          9.9    24.08 )-----------( 164      ( 24 Feb 2025 04:45 )             28.2                         10.2   27.39 )-----------( 143      ( 23 Feb 2025 23:20 )             29.5                         9.9    24.28 )-----------( 132      ( 23 Feb 2025 17:06 )             28.9         69-year-old female recent diagnosis of primary pancreatic adenocarcinoma , pancytopenia and significant PMH of SCC lip, cyclical vomiting syndrome, HTN, HPL, Gout, GERD, CVA w renal evaluation of MLALORY and hypokalemia. Per documents was recently at  [facility] with septic shock on 02/09/2025, nausea, vomiting and diarrhea requiring Levophed drip. Per documents, patient  transferred to  for continued care. Patient with + diuresis w lasix 80 IV, increased UOP > 4L.   Treated w IV and po k repletion.     2/18  Case d/e Dr Pleitez  Pt returned from VQ scan r/o PE, results pending  Pt is tachypneic RR 30 BPM and etiologies are investigated  NO specific complaints  Labs reviewed  Chart reviewed  Recent reemergence of diarrhea/ metabolic acidosis  now on parenteral nutrition and LR  D/W Ethel Jurado    2/19  Evaluated at bedside , d/w Dr tong and Dr Pleitez  Pt still tachypneic and tachycardic    2/20  Case d/w Dr Maik Tong and Ethel Jurado  pt overall feels better  more engaging    2/21  Pt in good spirits  Cont to be tachycardic  rate ~ 110  Tachypneic rate ~ 30    2/22  Still tachypneic  Difficulty eating due to ileus    2/24  Events noted  Received Lasix 20 mg iv yesterday w   > 6000 ml    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 1000 milliGRAM(s) IV Intermittent once  acetaminophen   IVPB .. 1000 milliGRAM(s) IV Intermittent once  chlorhexidine 2% Cloths 1 Application(s) Topical <User Schedule>  cholecalciferol 2000 Unit(s) Oral at bedtime  dextrose 5%. 1000 milliLiter(s) (50 mL/Hr) IV Continuous <Continuous>  dextrose 5%. 1000 milliLiter(s) (100 mL/Hr) IV Continuous <Continuous>  dextrose 50% Injectable 25 Gram(s) IV Push once  dextrose 50% Injectable 12.5 Gram(s) IV Push once  dextrose 50% Injectable 25 Gram(s) IV Push once  famotidine Injectable 20 milliGRAM(s) IV Push daily  fluconAZOLE IVPB      fluconAZOLE IVPB 200 milliGRAM(s) IV Intermittent every 24 hours  glucagon  Injectable 1 milliGRAM(s) IntraMuscular once  insulin glargine Injectable (LANTUS) 18 Unit(s) SubCutaneous at bedtime  insulin lispro (ADMELOG) corrective regimen sliding scale   SubCutaneous every 6 hours  lipid, fat emulsion (Fish Oil and Plant Based) 20% Infusion 0.681 Gm/kG/Day (20.8 mL/Hr) IV Continuous <Continuous>  lipid, fat emulsion (Fish Oil and Plant Based) 20% Infusion 0.6812 Gm/kG/Day (20.83 mL/Hr) IV Continuous <Continuous>  metoprolol tartrate Injectable 2.5 milliGRAM(s) IV Push every 6 hours  pantoprazole  Injectable 40 milliGRAM(s) IV Push every 12 hours  Parenteral Nutrition - Adult 1 Each (75 mL/Hr) TPN Continuous <Continuous>  Parenteral Nutrition - Adult 1 Each (75 mL/Hr) TPN Continuous <Continuous>  sodium chloride 0.45%. 1000 milliLiter(s) (50 mL/Hr) IV Continuous <Continuous>  sucralfate suspension 1 Gram(s) Oral four times a day  vancomycin    Solution 125 milliGRAM(s) Oral every 6 hours    MEDICATIONS  (PRN):  acetaminophen     Tablet .. 650 milliGRAM(s) Oral every 6 hours PRN Temp greater or equal to 38C (100.4F), Mild Pain (1 - 3)  aluminum hydroxide/magnesium hydroxide/simethicone Suspension 30 milliLiter(s) Oral every 4 hours PRN Dyspepsia  Biotene Dry Mouth Oral Rinse 15 milliLiter(s) Swish and Spit five times a day PRN Mouth Care  dextrose Oral Gel 15 Gram(s) Oral once PRN Blood Glucose LESS THAN 70 milliGRAM(s)/deciliter  melatonin 3 milliGRAM(s) Oral at bedtime PRN Insomnia  ondansetron Injectable 4 milliGRAM(s) IV Push every 8 hours PRN Nausea and/or Vomiting  sodium chloride 0.65% Nasal 1 Spray(s) Both Nostrils two times a day PRN Nasal Congestion    I&O's Detail    23 Feb 2025 07:01  -  24 Feb 2025 07:00  --------------------------------------------------------  IN:    Fat Emulsion (Fish Oil &amp; Plant Based) 20% Infusion: 89 mL    Heparin Infusion: 22 mL    Pantoprazole: 73 mL    sodium chloride 0.45%: 50 mL    TPN (Total Parenteral Nutrition): 871 mL  Total IN: 1105 mL    OUT:    Indwelling Catheter - Urethral (mL): 6325 mL  Total OUT: 6325 mL    Total NET: -5220 mL      24 Feb 2025 07:01  -  24 Feb 2025 15:37  --------------------------------------------------------  IN:    IV PiggyBack: 100 mL  Total IN: 100 mL    OUT:    Indwelling Catheter - Urethral (mL): 1575 mL  Total OUT: 1575 mL    Total NET: -1475 mL            Vital Signs Last 24 Hrs  T(C): 36.8 (24 Feb 2025 10:02), Max: 36.8 (24 Feb 2025 05:00)  T(F): 98.3 (24 Feb 2025 10:02), Max: 98.3 (24 Feb 2025 10:02)  HR: 126 (24 Feb 2025 14:00) (108 - 131)  BP: 144/96 (24 Feb 2025 12:00) (124/88 - 144/96)  BP(mean): 108 (24 Feb 2025 12:00) (95 - 109)  RR: 28 (24 Feb 2025 14:00) (20 - 41)  SpO2: 97% (24 Feb 2025 14:00) (92% - 97%)    Parameters below as of 24 Feb 2025 12:00  Patient On (Oxygen Delivery Method): nasal cannula w/ humidification  O2 Flow (L/min): 3    PHYSICAL EXAM:    Constitutional: NAD,   HEENT: PERRLA, EOMI,  MMM  Neck: No LAD, No JVD  Respiratory: CTAB  Cardiovascular: S1 and S2, RRR  Gastrointestinal: distant bowel sounds, non tender  Extremities: + peripheral edema  Neurological: A/O x 3, no focal deficits  Psychiatric: Normal mood, normal affect    Access: Not applicable  Iesha bain d/ needed straight cath        LABS:      02-24    146[H]  |  111[H]  |  84[H]  ----------------------------<  233[H]  3.9   |  29  |  1.53[H]    Ca    8.2[L]      24 Feb 2025 04:45  Phos  3.6     02-24  Mg     1.7     02-24    TPro  5.3[L]  /  Alb  2.9[L]  /  TBili  1.3[H]  /  DBili  x   /  AST  75[H]  /  ALT  49  /  AlkPhos  225[H]  02-24                          9.9    24.08 )-----------( 164      ( 24 Feb 2025 04:45 )             28.2                         10.2   27.39 )-----------( 143      ( 23 Feb 2025 23:20 )             29.5                         9.9    24.28 )-----------( 132      ( 23 Feb 2025 17:06 )             28.9

## 2025-02-24 NOTE — PROGRESS NOTE ADULT - ASSESSMENT
Imp:  I don't know what the bleeding is from although H/H stable now  My guess is that it's low level ischemic colitis but can't say without a colonoscopy    Rec:  Cont supportive care and TPN

## 2025-02-25 LAB
ADD ON TEST-SPECIMEN IN LAB: SIGNIFICANT CHANGE UP
ADD ON TEST-SPECIMEN IN LAB: SIGNIFICANT CHANGE UP
ALBUMIN SERPL ELPH-MCNC: 2.3 G/DL — LOW (ref 3.3–5)
ALP SERPL-CCNC: 238 U/L — HIGH (ref 40–120)
ALT FLD-CCNC: 55 U/L — SIGNIFICANT CHANGE UP (ref 12–78)
ANION GAP SERPL CALC-SCNC: 8 MMOL/L — SIGNIFICANT CHANGE UP (ref 5–17)
APTT BLD: 31.1 SEC — SIGNIFICANT CHANGE UP (ref 24.5–35.6)
AST SERPL-CCNC: 77 U/L — HIGH (ref 15–37)
BILIRUB SERPL-MCNC: 1.1 MG/DL — SIGNIFICANT CHANGE UP (ref 0.2–1.2)
BUN SERPL-MCNC: 92 MG/DL — HIGH (ref 7–23)
BUN SERPL-MCNC: 97 MG/DL — HIGH (ref 7–23)
CALCIUM SERPL-MCNC: 8.6 MG/DL — SIGNIFICANT CHANGE UP (ref 8.5–10.1)
CALCIUM SERPL-MCNC: 8.7 MG/DL — SIGNIFICANT CHANGE UP (ref 8.5–10.1)
CHLORIDE SERPL-SCNC: 116 MMOL/L — HIGH (ref 96–108)
CHLORIDE SERPL-SCNC: 116 MMOL/L — HIGH (ref 96–108)
CO2 SERPL-SCNC: 24 MMOL/L — SIGNIFICANT CHANGE UP (ref 22–31)
CO2 SERPL-SCNC: 25 MMOL/L — SIGNIFICANT CHANGE UP (ref 22–31)
CREAT SERPL-MCNC: 1.63 MG/DL — HIGH (ref 0.5–1.3)
CREAT SERPL-MCNC: 1.68 MG/DL — HIGH (ref 0.5–1.3)
EGFR: 33 ML/MIN/1.73M2 — LOW
EGFR: 33 ML/MIN/1.73M2 — LOW
EGFR: 34 ML/MIN/1.73M2 — LOW
EGFR: 34 ML/MIN/1.73M2 — LOW
GLUCOSE BLDC GLUCOMTR-MCNC: 191 MG/DL — HIGH (ref 70–99)
GLUCOSE BLDC GLUCOMTR-MCNC: 208 MG/DL — HIGH (ref 70–99)
GLUCOSE BLDC GLUCOMTR-MCNC: 228 MG/DL — HIGH (ref 70–99)
GLUCOSE BLDC GLUCOMTR-MCNC: 231 MG/DL — HIGH (ref 70–99)
GLUCOSE SERPL-MCNC: 227 MG/DL — HIGH (ref 70–99)
HCT VFR BLD CALC: 26.5 % — LOW (ref 34.5–45)
HCT VFR BLD CALC: 29.1 % — LOW (ref 34.5–45)
HGB BLD-MCNC: 8.7 G/DL — LOW (ref 11.5–15.5)
HGB BLD-MCNC: 9.7 G/DL — LOW (ref 11.5–15.5)
INR BLD: 1.19 RATIO — HIGH (ref 0.85–1.16)
MAGNESIUM SERPL-MCNC: 1.8 MG/DL — SIGNIFICANT CHANGE UP (ref 1.6–2.6)
MAGNESIUM SERPL-MCNC: 1.9 MG/DL — SIGNIFICANT CHANGE UP (ref 1.6–2.6)
MCHC RBC-ENTMCNC: 29.5 PG — SIGNIFICANT CHANGE UP (ref 27–34)
MCHC RBC-ENTMCNC: 29.5 PG — SIGNIFICANT CHANGE UP (ref 27–34)
MCHC RBC-ENTMCNC: 32.8 G/DL — SIGNIFICANT CHANGE UP (ref 32–36)
MCV RBC AUTO: 88.4 FL — SIGNIFICANT CHANGE UP (ref 80–100)
MCV RBC AUTO: 89.8 FL — SIGNIFICANT CHANGE UP (ref 80–100)
NRBC # BLD AUTO: 0.55 K/UL — HIGH (ref 0–0)
NRBC # BLD AUTO: 1.03 K/UL — HIGH (ref 0–0)
NRBC # FLD: 1.03 K/UL — HIGH (ref 0–0)
NRBC BLD AUTO-RTO: 2 /100 WBCS — HIGH (ref 0–0)
NRBC BLD AUTO-RTO: 4 /100 WBCS — HIGH (ref 0–0)
PHOSPHATE SERPL-MCNC: 1.7 MG/DL — LOW (ref 2.5–4.5)
PHOSPHATE SERPL-MCNC: 3.1 MG/DL — SIGNIFICANT CHANGE UP (ref 2.5–4.5)
PLATELET # BLD AUTO: 168 K/UL — SIGNIFICANT CHANGE UP (ref 150–400)
PLATELET # BLD AUTO: 182 K/UL — SIGNIFICANT CHANGE UP (ref 150–400)
PMV BLD: SIGNIFICANT CHANGE UP FL (ref 7–13)
PMV BLD: SIGNIFICANT CHANGE UP FL (ref 7–13)
POTASSIUM SERPL-MCNC: 3.6 MMOL/L — SIGNIFICANT CHANGE UP (ref 3.5–5.3)
POTASSIUM SERPL-MCNC: 4 MMOL/L — SIGNIFICANT CHANGE UP (ref 3.5–5.3)
POTASSIUM SERPL-SCNC: 3.6 MMOL/L — SIGNIFICANT CHANGE UP (ref 3.5–5.3)
POTASSIUM SERPL-SCNC: 4 MMOL/L — SIGNIFICANT CHANGE UP (ref 3.5–5.3)
PROT SERPL-MCNC: 5.3 GM/DL — LOW (ref 6–8.3)
PROTHROM AB SERPL-ACNC: 13.7 SEC — HIGH (ref 9.9–13.4)
RBC # BLD: 2.95 M/UL — LOW (ref 3.8–5.2)
RBC # BLD: 3.29 M/UL — LOW (ref 3.8–5.2)
RBC # FLD: 15.8 % — HIGH (ref 10.3–14.5)
RBC # FLD: 16.2 % — HIGH (ref 10.3–14.5)
SODIUM SERPL-SCNC: 147 MMOL/L — HIGH (ref 135–145)
SODIUM SERPL-SCNC: 149 MMOL/L — HIGH (ref 135–145)
TRIGL SERPL-MCNC: 400 MG/DL — HIGH
TSH SERPL-MCNC: 1.36 UU/ML — SIGNIFICANT CHANGE UP (ref 0.34–4.82)
WBC # BLD: 24.14 K/UL — HIGH (ref 3.8–10.5)
WBC # BLD: 24.26 K/UL — HIGH (ref 3.8–10.5)
WBC # FLD AUTO: 24.14 K/UL — HIGH (ref 3.8–10.5)
WBC # FLD AUTO: 24.26 K/UL — HIGH (ref 3.8–10.5)

## 2025-02-25 PROCEDURE — 99232 SBSQ HOSP IP/OBS MODERATE 35: CPT

## 2025-02-25 PROCEDURE — 71045 X-RAY EXAM CHEST 1 VIEW: CPT | Mod: 26

## 2025-02-25 PROCEDURE — 93010 ELECTROCARDIOGRAM REPORT: CPT

## 2025-02-25 PROCEDURE — 99233 SBSQ HOSP IP/OBS HIGH 50: CPT

## 2025-02-25 RX ORDER — I.V. FAT EMULSION 20 G/100ML
0.68 EMULSION INTRAVENOUS
Qty: 50 | Refills: 0 | Status: DISCONTINUED | OUTPATIENT
Start: 2025-02-25 | End: 2025-02-25

## 2025-02-25 RX ORDER — POTASSIUM PHOSPHATE, MONOBASIC POTASSIUM PHOSPHATE, DIBASIC INJECTION, 236; 224 MG/ML; MG/ML
15 SOLUTION, CONCENTRATE INTRAVENOUS ONCE
Refills: 0 | Status: COMPLETED | OUTPATIENT
Start: 2025-02-25 | End: 2025-02-25

## 2025-02-25 RX ORDER — ALBUMIN (HUMAN) 12.5 G/50ML
50 INJECTION, SOLUTION INTRAVENOUS EVERY 6 HOURS
Refills: 0 | Status: COMPLETED | OUTPATIENT
Start: 2025-02-25 | End: 2025-02-27

## 2025-02-25 RX ORDER — ALBUMIN (HUMAN) 12.5 G/50ML
50 INJECTION, SOLUTION INTRAVENOUS EVERY 8 HOURS
Refills: 0 | Status: DISCONTINUED | OUTPATIENT
Start: 2025-02-25 | End: 2025-02-25

## 2025-02-25 RX ORDER — METOPROLOL SUCCINATE 50 MG/1
5 TABLET, EXTENDED RELEASE ORAL EVERY 6 HOURS
Refills: 0 | Status: DISCONTINUED | OUTPATIENT
Start: 2025-02-25 | End: 2025-03-03

## 2025-02-25 RX ORDER — SODIUM CHLORIDE 9 G/1000ML
1000 INJECTION, SOLUTION INTRAVENOUS
Refills: 0 | Status: DISCONTINUED | OUTPATIENT
Start: 2025-02-25 | End: 2025-02-27

## 2025-02-25 RX ORDER — INSULIN GLARGINE-YFGN 100 [IU]/ML
22 INJECTION, SOLUTION SUBCUTANEOUS AT BEDTIME
Refills: 0 | Status: DISCONTINUED | OUTPATIENT
Start: 2025-02-25 | End: 2025-02-27

## 2025-02-25 RX ORDER — HEPARIN SODIUM 1000 [USP'U]/ML
4400 INJECTION INTRAVENOUS; SUBCUTANEOUS EVERY 6 HOURS
Refills: 0 | Status: DISCONTINUED | OUTPATIENT
Start: 2025-02-25 | End: 2025-02-26

## 2025-02-25 RX ORDER — METOPROLOL SUCCINATE 50 MG/1
25 TABLET, EXTENDED RELEASE ORAL EVERY 6 HOURS
Refills: 0 | Status: DISCONTINUED | OUTPATIENT
Start: 2025-02-25 | End: 2025-03-03

## 2025-02-25 RX ORDER — SODIUM/POT/MAG/CALC/CHLOR/ACET 35-20-5MEQ
1 VIAL (ML) INTRAVENOUS
Refills: 0 | Status: DISCONTINUED | OUTPATIENT
Start: 2025-02-25 | End: 2025-02-25

## 2025-02-25 RX ORDER — METOPROLOL SUCCINATE 50 MG/1
25 TABLET, EXTENDED RELEASE ORAL EVERY 6 HOURS
Refills: 0 | Status: DISCONTINUED | OUTPATIENT
Start: 2025-02-25 | End: 2025-02-25

## 2025-02-25 RX ORDER — HEPARIN SODIUM 1000 [USP'U]/ML
INJECTION INTRAVENOUS; SUBCUTANEOUS
Qty: 25000 | Refills: 0 | Status: DISCONTINUED | OUTPATIENT
Start: 2025-02-25 | End: 2025-02-26

## 2025-02-25 RX ADMIN — Medication 1 APPLICATION(S): at 07:05

## 2025-02-25 RX ADMIN — Medication 1 EACH: at 00:01

## 2025-02-25 RX ADMIN — Medication 1000 MILLIGRAM(S): at 13:06

## 2025-02-25 RX ADMIN — ALBUMIN (HUMAN) 50 MILLILITER(S): 12.5 INJECTION, SOLUTION INTRAVENOUS at 14:06

## 2025-02-25 RX ADMIN — Medication 1 GRAM(S): at 05:23

## 2025-02-25 RX ADMIN — INSULIN LISPRO 4: 100 INJECTION, SOLUTION INTRAVENOUS; SUBCUTANEOUS at 07:12

## 2025-02-25 RX ADMIN — SODIUM CHLORIDE 100 MILLILITER(S): 9 INJECTION, SOLUTION INTRAVENOUS at 12:18

## 2025-02-25 RX ADMIN — Medication 40 MILLIGRAM(S): at 22:09

## 2025-02-25 RX ADMIN — Medication 125 MILLIGRAM(S): at 22:09

## 2025-02-25 RX ADMIN — I.V. FAT EMULSION 20.8 GM/KG/DAY: 20 EMULSION INTRAVENOUS at 00:02

## 2025-02-25 RX ADMIN — Medication 40 MILLIGRAM(S): at 10:37

## 2025-02-25 RX ADMIN — Medication 20 MILLIGRAM(S): at 22:09

## 2025-02-25 RX ADMIN — ALBUMIN (HUMAN) 50 MILLILITER(S): 12.5 INJECTION, SOLUTION INTRAVENOUS at 19:52

## 2025-02-25 RX ADMIN — METOPROLOL SUCCINATE 5 MILLIGRAM(S): 50 TABLET, EXTENDED RELEASE ORAL at 07:06

## 2025-02-25 RX ADMIN — Medication 2000 UNIT(S): at 22:09

## 2025-02-25 RX ADMIN — Medication 100 MILLIGRAM(S): at 12:18

## 2025-02-25 RX ADMIN — Medication 125 MILLIGRAM(S): at 05:23

## 2025-02-25 RX ADMIN — Medication 400 MILLIGRAM(S): at 12:18

## 2025-02-25 RX ADMIN — I.V. FAT EMULSION 20.83 GM/KG/DAY: 20 EMULSION INTRAVENOUS at 23:53

## 2025-02-25 RX ADMIN — Medication 1 GRAM(S): at 22:10

## 2025-02-25 RX ADMIN — INSULIN LISPRO 4: 100 INJECTION, SOLUTION INTRAVENOUS; SUBCUTANEOUS at 18:31

## 2025-02-25 RX ADMIN — POTASSIUM PHOSPHATE, MONOBASIC POTASSIUM PHOSPHATE, DIBASIC INJECTION, 62.5 MILLIMOLE(S): 236; 224 SOLUTION, CONCENTRATE INTRAVENOUS at 09:25

## 2025-02-25 RX ADMIN — INSULIN LISPRO 4: 100 INJECTION, SOLUTION INTRAVENOUS; SUBCUTANEOUS at 12:35

## 2025-02-25 RX ADMIN — INSULIN GLARGINE-YFGN 22 UNIT(S): 100 INJECTION, SOLUTION SUBCUTANEOUS at 22:10

## 2025-02-25 RX ADMIN — METOPROLOL SUCCINATE 5 MILLIGRAM(S): 50 TABLET, EXTENDED RELEASE ORAL at 12:18

## 2025-02-25 RX ADMIN — Medication 125 MILLIGRAM(S): at 15:07

## 2025-02-25 RX ADMIN — Medication 1 GRAM(S): at 15:07

## 2025-02-25 RX ADMIN — Medication 1 EACH: at 23:44

## 2025-02-25 RX ADMIN — HEPARIN SODIUM 900 UNIT(S)/HR: 1000 INJECTION INTRAVENOUS; SUBCUTANEOUS at 21:19

## 2025-02-25 RX ADMIN — METOPROLOL SUCCINATE 25 MILLIGRAM(S): 50 TABLET, EXTENDED RELEASE ORAL at 18:19

## 2025-02-25 NOTE — PROGRESS NOTE ADULT - ASSESSMENT
69-year-old female with stage IV pancreatic adenocarcinoma presenting with neutropenic fever and severe treatment-related toxicities from modified FOLFIRINOX and investigational EMILY inhibitor [id number] found to have PE and LBO.     # pancreatic ca   - remains on clinical trial- supportive care while in hospital   - CTH negative and discussed w/ pt   - will continue with ongoing communication with  [facility]  - Review clinical trial protocol regarding toxicity management    # Neutropenic Septic shock   - pt previously on neupogen- WBC has now recovered and elevated likely 2/2 GCSF support   - remains on meropenem started on 2/15 as well as fluconazole and po vanc  - Today WBC 24, Hb 9.7, plt 182, Cr 1.63, AST 77, Na 149       # PE   - v/q scan w indeterminate prob of PE, linear defect in RUL posterior segment   - CT chest- multiple PE w/in segmental left lower and right lower, 2 right middle lobe nodules,   -  Pt now off hep gtt due to bleeding concern - Hb has been stable at 9.2 now     # anemia   - received 4u to date and 1u ffp  - Protonix IV   - GI following   - Hb stable 9.2 today, no further dark bms     # large bowel obstruction  - CT a/p Large bowel obstruction with transition point at the distal transverse colon. There is also narrowing at the level of the terminal ileum. Diffuse ascites. Redemonstrated pancreatic lesion, consistent with history of pancreatic adenocarcinoma. There is an umbilical lesion with adjacent peritoneal implants.  - previously was having diarrhea  - GI following and reviewed imaging with radiology   - 2/22/25  repeat CT a/p with mild interval improvements in small and large bowel ileus. pt given lasix and albumin.     # EMPIRIC TREATMENT FOR ESOPHAGEAL CANDIDIASIS   - continue with DIFLUCAN       will follow and communicate with brian

## 2025-02-25 NOTE — PROGRESS NOTE ADULT - SUBJECTIVE AND OBJECTIVE BOX
Patient is a 69y old  Female who presents with a chief complaint of sepsis (18 Feb 2025 11:37)    BRIEF HOSPITAL COURSE: 70yo female with PMHx recent diagnosis of primary pancreatic adenocarcinoma of head with tumor in umbilicus, chemo-induced pancytopenia, SCC lip, cyclical vomiting syndrome, HTN, HPL, Gout, GERD, CVA on Aggrenox and others had been admitted to [facility] with septic shock 2/2 + pseudomonas bacteremia on 02/09/2025, nausea, vomiting and diarrhea requiring Levophed drip, was transferred out of ICU to regular floor last night.  Patient was being followed by GI, ID, Cardiology and Onc there.  Patient has been transferred to  on patient's  (GI Dr. Pleitez) request.      2/24 pt flat affect, denies pain but when i palpate her lower abd she flinches. denies nausea, had dark BM last night. 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 1000 milliGRAM(s) IV Intermittent once  acetaminophen   IVPB .. 1000 milliGRAM(s) IV Intermittent once  chlorhexidine 2% Cloths 1 Application(s) Topical <User Schedule>  cholecalciferol 2000 Unit(s) Oral at bedtime  dextrose 5%. 1000 milliLiter(s) (50 mL/Hr) IV Continuous <Continuous>  dextrose 5%. 1000 milliLiter(s) (100 mL/Hr) IV Continuous <Continuous>  dextrose 50% Injectable 25 Gram(s) IV Push once  dextrose 50% Injectable 12.5 Gram(s) IV Push once  dextrose 50% Injectable 25 Gram(s) IV Push once  famotidine Injectable 20 milliGRAM(s) IV Push daily  fluconAZOLE IVPB      fluconAZOLE IVPB 200 milliGRAM(s) IV Intermittent every 24 hours  glucagon  Injectable 1 milliGRAM(s) IntraMuscular once  insulin glargine Injectable (LANTUS) 18 Unit(s) SubCutaneous at bedtime  insulin lispro (ADMELOG) corrective regimen sliding scale   SubCutaneous every 6 hours  lipid, fat emulsion (Fish Oil and Plant Based) 20% Infusion 0.681 Gm/kG/Day (20.8 mL/Hr) IV Continuous <Continuous>  lipid, fat emulsion (Fish Oil and Plant Based) 20% Infusion 0.6812 Gm/kG/Day (20.83 mL/Hr) IV Continuous <Continuous>  metoclopramide Injectable 5 milliGRAM(s) IV Push every 6 hours  metoprolol tartrate Injectable 2.5 milliGRAM(s) IV Push every 6 hours  pantoprazole Infusion 8 mG/Hr (10 mL/Hr) IV Continuous <Continuous>  Parenteral Nutrition - Adult 1 Each (75 mL/Hr) TPN Continuous <Continuous>  Parenteral Nutrition - Adult 1 Each (75 mL/Hr) TPN Continuous <Continuous>  sodium chloride 0.45%. 1000 milliLiter(s) (50 mL/Hr) IV Continuous <Continuous>  sucralfate suspension 1 Gram(s) Oral four times a day  vancomycin    Solution 125 milliGRAM(s) Oral every 6 hours    Vital Signs Last 24 Hrs  T(C): 36.8 (24 Feb 2025 10:02), Max: 36.8 (24 Feb 2025 05:00)  T(F): 98.3 (24 Feb 2025 10:02), Max: 98.3 (24 Feb 2025 10:02)  HR: 123 (24 Feb 2025 10:00) (108 - 123)  BP: 138/91 (24 Feb 2025 10:00) (124/88 - 141/87)  BP(mean): 103 (24 Feb 2025 10:00) (94 - 109)  RR: 29 (24 Feb 2025 10:00) (20 - 41)  SpO2: 93% (24 Feb 2025 10:00) (92% - 97%)    Parameters below as of 24 Feb 2025 08:00  Patient On (Oxygen Delivery Method): nasal cannula w/ humidification  O2 Flow (L/min): 5    I&O's Detail    23 Feb 2025 07:01  -  24 Feb 2025 07:00  --------------------------------------------------------  IN:    Fat Emulsion (Fish Oil &amp; Plant Based) 20% Infusion: 89 mL    Heparin Infusion: 22 mL    Pantoprazole: 73 mL    sodium chloride 0.45%: 50 mL    TPN (Total Parenteral Nutrition): 871 mL  Total IN: 1105 mL    OUT:    Indwelling Catheter - Urethral (mL): 6325 mL  Total OUT: 6325 mL    Total NET: -5220 mL    LABS:                              9.9    24.08 )-----------( 164      ( 24 Feb 2025 04:45 )             28.2     02-24    146[H]  |  111[H]  |  84[H]  ----------------------------<  233[H]  3.9   |  29  |  1.53[H]    Ca    8.2[L]      24 Feb 2025 04:45  Phos  3.6     02-24  Mg     1.7     02-24    TPro  5.3[L]  /  Alb  2.9[L]  /  TBili  1.3[H]  /  DBili  x   /  AST  75[H]  /  ALT  49  /  AlkPhos  225[H]  02-24      LIVER FUNCTIONS - ( 24 Feb 2025 04:45 )  Alb: 2.9 g/dL / Pro: 5.3 gm/dL / ALK PHOS: 225 U/L / ALT: 49 U/L / AST: 75 U/L / GGT: x           PTT - ( 23 Feb 2025 06:16 )  PTT:72.7 sec  Urinalysis Basic - ( 24 Feb 2025 04:45 )    Color: x / Appearance: x / SG: x / pH: x  Gluc: 233 mg/dL / Ketone: x  / Bili: x / Urobili: x   Blood: x / Protein: x / Nitrite: x   Leuk Esterase: x / RBC: x / WBC x   Sq Epi: x / Non Sq Epi: x / Bacteria: x    Physical Examination:    General: Mod respiratory distress  HEENT: Pupils equal, reactive to light.  Symmetric.   PULM: decreased at bases b/l.   CVS: Regular rate and rhythm, no murmurs  ABD: Soft, nondistended, mild tenderness, redness by umbilical area,  EXT: 2+ pitting edema in LE b/l extending to knees.   SKIN: Warm and well perfused, no rashes noted.  NEURO: Alert, oriented, interactive    DEVICES:     RADIOLOGY:   < from: CT Chest No Cont (02.15.25 @ 15:55) >  IMPRESSION:  Nondisplaced fractures of the left ninth and 10th ribs, similar to the   prior study.    Pancreatic mass, unchanged    No onset ascites and bilateral pleural effusions with compressive   atelectasis, right greater than left.    Dilated gallbladder, similar to the prior study.    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  Patient was being followed by GI, ID, Cardiology and Onc there.  Patient has been transferred to  on patient's  (GI Dr. Pleitez) request.      2/24 pt flat affect, denies pain but when i palpate her lower abd she flinches. denies nausea, had dark BM last night. whispers to communicate  2/25 slow to respond this am, still tachypneic. 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 1000 milliGRAM(s) IV Intermittent once  albumin human 25% IVPB 50 milliLiter(s) IV Intermittent every 6 hours  chlorhexidine 2% Cloths 1 Application(s) Topical <User Schedule>  cholecalciferol 2000 Unit(s) Oral at bedtime  dextrose 5%. 1000 milliLiter(s) (50 mL/Hr) IV Continuous <Continuous>  dextrose 5%. 1000 milliLiter(s) (100 mL/Hr) IV Continuous <Continuous>  dextrose 50% Injectable 25 Gram(s) IV Push once  dextrose 50% Injectable 12.5 Gram(s) IV Push once  dextrose 50% Injectable 25 Gram(s) IV Push once  famotidine Injectable 20 milliGRAM(s) IV Push daily  fluconAZOLE IVPB      fluconAZOLE IVPB 200 milliGRAM(s) IV Intermittent every 24 hours  glucagon  Injectable 1 milliGRAM(s) IntraMuscular once  insulin glargine Injectable (LANTUS) 18 Unit(s) SubCutaneous at bedtime  insulin lispro (ADMELOG) corrective regimen sliding scale   SubCutaneous every 6 hours  lactated ringers. 1000 milliLiter(s) (100 mL/Hr) IV Continuous <Continuous>  lipid, fat emulsion (Fish Oil and Plant Based) 20% Infusion 0.681 Gm/kG/Day (20.8 mL/Hr) IV Continuous <Continuous>  lipid, fat emulsion (Fish Oil and Plant Based) 20% Infusion 0.681 Gm/kG/Day (20.83 mL/Hr) IV Continuous <Continuous>  metoprolol tartrate Injectable 5 milliGRAM(s) IV Push every 6 hours  pantoprazole  Injectable 40 milliGRAM(s) IV Push every 12 hours  Parenteral Nutrition - Adult 1 Each (75 mL/Hr) TPN Continuous <Continuous>  Parenteral Nutrition - Adult 1 Each (75 mL/Hr) TPN Continuous <Continuous>  sucralfate suspension 1 Gram(s) Oral four times a day  vancomycin    Solution 125 milliGRAM(s) Oral every 6 hours      Vital Signs Last 24 Hrs  T(C): 36.8 (24 Feb 2025 10:02), Max: 36.8 (24 Feb 2025 05:00)  T(F): 98.3 (24 Feb 2025 10:02), Max: 98.3 (24 Feb 2025 10:02)  HR: 123 (24 Feb 2025 10:00) (108 - 123)  BP: 138/91 (24 Feb 2025 10:00) (124/88 - 141/87)  BP(mean): 103 (24 Feb 2025 10:00) (94 - 109)  RR: 29 (24 Feb 2025 10:00) (20 - 41)  SpO2: 93% (24 Feb 2025 10:00) (92% - 97%)    Parameters below as of 24 Feb 2025 08:00  Patient On (Oxygen Delivery Method): nasal cannula w/ humidification  O2 Flow (L/min): 5    I&O's Detail    23 Feb 2025 07:01  -  24 Feb 2025 07:00  --------------------------------------------------------  IN:    Fat Emulsion (Fish Oil &amp; Plant Based) 20% Infusion: 89 mL    Heparin Infusion: 22 mL    Pantoprazole: 73 mL    sodium chloride 0.45%: 50 mL    TPN (Total Parenteral Nutrition): 871 mL  Total IN: 1105 mL    OUT:    Indwelling Catheter - Urethral (mL): 6325 mL  Total OUT: 6325 mL    Total NET: -5220 mL    LABS:                        9.7    24.26 )-----------( 182      ( 25 Feb 2025 05:07 )             29.1     02-25    149[H]  |  116[H]  |  92[H]  ----------------------------<  227[H]  4.0   |  25  |  1.63[H]    Ca    8.6      25 Feb 2025 05:07  Phos  1.7     02-25  Mg     1.8     02-25    TPro  5.3[L]  /  Alb  2.3[L]  /  TBili  1.1  /  DBili  x   /  AST  77[H]  /  ALT  55  /  AlkPhos  238[H]  02-25    LIVER FUNCTIONS - ( 25 Feb 2025 05:07 )  Alb: 2.3 g/dL / Pro: 5.3 gm/dL / ALK PHOS: 238 U/L / ALT: 55 U/L / AST: 77 U/L / GGT: x         Urinalysis Basic - ( 25 Feb 2025 05:07 )    Color: x / Appearance: x / SG: x / pH: x  Gluc: 227 mg/dL / Ketone: x  / Bili: x / Urobili: x   Blood: x / Protein: x / Nitrite: x   Leuk Esterase: x / RBC: x / WBC x   Sq Epi: x / Non Sq Epi: x / Bacteria: x    Physical Examination:    General: Mod respiratory distress  HEENT: Pupils equal, reactive to light.  Symmetric.   PULM: decreased at bases b/l.   CVS: Regular rate and rhythm, no murmurs  ABD: Soft, nondistended, mild tenderness, redness by umbilical area,  EXT: 2+ pitting edema in LE b/l extending to knees - improving  SKIN: Warm and well perfused, no rashes noted.  NEURO: Awake, slow to respond    DEVICES:  R chest port     RADIOLOGY:   < from: CT Chest No Cont (02.15.25 @ 15:55) >  IMPRESSION:  Nondisplaced fractures of the left ninth and 10th ribs, similar to the   prior study.    Pancreatic mass, unchanged    No onset ascites and bilateral pleural effusions with compressive   atelectasis, right greater than left.    Dilated gallbladder, similar to the prior study.    < end of copied text >

## 2025-02-25 NOTE — ADVANCED PRACTICE NURSE CONSULT - ASSESSMENT
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  Patient was being followed by GI, ID, Cardiology and Onc there.  Patient has been transferred to  today on patient's  (GI Dr. Pleitez) request.  At this time, patient c/o non-bloody diarrhea x 3 in last 24 hours.  She denies any abdominal pain, nausea, but has some dry heaves.  She denies any fever, chills, chest pain, palpitation, constipation or dysuria. Initially, she was started on Vancomycin and Zosyn, but now she has been on Cefepime as her blood culture and urine cultures are positive for Pseudomonas aeruginosa. Currently, patient is on Cefepime.  ID had also recommended Flagyl, but patient had refused it, as it causes nausea to her."    Assessed patient on SICU.   Brian Score 11 and along with patients comorbidities, incontinence and current medical status places patient at very high risk for skin injuries.   When assessing patient note patient tachypneic on 3 L Nasal Cannula noted when turning patient she became Tachycardic  , upper and lower extremity edema. WBCs 24.26, BUN/Cr 92/1.63.     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  Patient was being followed by GI, ID, Cardiology and Onc there.  Patient has been transferred to  today on patient's  (GI Dr. Pleitez) request.  At this time, patient c/o non-bloody diarrhea x 3 in last 24 hours.  She denies any abdominal pain, nausea, but has some dry heaves.  She denies any fever, chills, chest pain, palpitation, constipation or dysuria. Initially, she was started on Vancomycin and Zosyn, but now she has been on Cefepime as her blood culture and urine cultures are positive for Pseudomonas aeruginosa. Currently, patient is on Cefepime.  ID had also recommended Flagyl, but patient had refused it, as it causes nausea to her."    Assessed patient on SICU.   Brian Score 11 and along with patients comorbidities, incontinence and current medical status places patient at very high risk for skin injuries.   When assessing patient note patient tachypneic on 3 L Nasal Cannula noted when turning patient she became Tachycardic  , upper and lower extremity edema. WBCs 24.26, BUN/Cr 92/1.63.     Patient with Superficial skin loss with blanchable erythema to the left and right upper gluteal and cross the upper intergluteal cleft total area 6cm x 15cm x 0.1cm. The periskin is intact. Patient was grimacing and reporting pain when cleaning and applying cream. This can be a mixed related skin issue relate to the patients current medical status, oxygenation, pressure, incontinence and friction. The goal is for pressure prevention strategies Patient is [facility] [employer] [facility] for pressure redistribution Spry Philip Lock positioner in use and alternate every two hours for lateral rotation and heel elevation Positioner should NOT be placed in a pillow case, can lay a pillow case or sheet over the positioner to allow for distrubution of positioner), moisture management, and nutrition. The skin injury recommendations are to clean with PeriBottle with Luke warm water and ph balanced soap, spry on and then gently PAT dry, then apply a light coat of Triad cream to open skin ONLY. Apply daily and prn if soiled. The Triad will leave a residue on the open skin and itis ok to leave on as this will not impede healing. Reapply prn if soiled. Do not try and scrub off as this will further irritate skin.

## 2025-02-25 NOTE — PROGRESS NOTE ADULT - SUBJECTIVE AND OBJECTIVE BOX
69-year-old female recent diagnosis of primary pancreatic adenocarcinoma , pancytopenia and significant PMH of SCC lip, cyclical vomiting syndrome, HTN, HPL, Gout, GERD, CVA w renal evaluation of MALLORY and hypokalemia. Per documents was recently at  [facility] with septic shock on 02/09/2025, nausea, vomiting and diarrhea requiring Levophed drip. Per documents, patient  transferred to  for continued care. Patient with + diuresis w lasix 80 IV, increased UOP > 4L.   Treated w IV and po k repletion.     2/18  Case d/e Dr Pleitez  Pt returned from VQ scan r/o PE, results pending  Pt is tachypneic RR 30 BPM and etiologies are investigated  NO specific complaints  Labs reviewed  Chart reviewed  Recent reemergence of diarrhea/ metabolic acidosis  now on parenteral nutrition and LR  D/W Ethel Jurado    2/19  Evaluated at bedside , d/w Dr tong and Dr Pleitez  Pt still tachypneic and tachycardic    2/20  Case d/w Dr Maik Tong and Ethel Jurado  pt overall feels better  more engaging    2/21  Pt in good spirits  Cont to be tachycardic  rate ~ 110  Tachypneic rate ~ 30    2/22  Still tachypneic  Difficulty eating due to ileus    2/24  Events noted  Received Lasix 20 mg iv yesterday w   > 6000 ml    2/25  Hypernatremic  placed on LR  d/w TPN team to reduce Na content    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 1000 milliGRAM(s) IV Intermittent once  acetaminophen   IVPB .. 1000 milliGRAM(s) IV Intermittent once  chlorhexidine 2% Cloths 1 Application(s) Topical <User Schedule>  cholecalciferol 2000 Unit(s) Oral at bedtime  dextrose 5%. 1000 milliLiter(s) (50 mL/Hr) IV Continuous <Continuous>  dextrose 5%. 1000 milliLiter(s) (100 mL/Hr) IV Continuous <Continuous>  dextrose 50% Injectable 25 Gram(s) IV Push once  dextrose 50% Injectable 12.5 Gram(s) IV Push once  dextrose 50% Injectable 25 Gram(s) IV Push once  famotidine Injectable 20 milliGRAM(s) IV Push daily  fluconAZOLE IVPB      fluconAZOLE IVPB 200 milliGRAM(s) IV Intermittent every 24 hours  glucagon  Injectable 1 milliGRAM(s) IntraMuscular once  insulin glargine Injectable (LANTUS) 18 Unit(s) SubCutaneous at bedtime  insulin lispro (ADMELOG) corrective regimen sliding scale   SubCutaneous every 6 hours  lipid, fat emulsion (Fish Oil and Plant Based) 20% Infusion 0.681 Gm/kG/Day (20.8 mL/Hr) IV Continuous <Continuous>  lipid, fat emulsion (Fish Oil and Plant Based) 20% Infusion 0.6812 Gm/kG/Day (20.83 mL/Hr) IV Continuous <Continuous>  metoprolol tartrate Injectable 2.5 milliGRAM(s) IV Push every 6 hours  pantoprazole  Injectable 40 milliGRAM(s) IV Push every 12 hours  Parenteral Nutrition - Adult 1 Each (75 mL/Hr) TPN Continuous <Continuous>  Parenteral Nutrition - Adult 1 Each (75 mL/Hr) TPN Continuous <Continuous>  sodium chloride 0.45%. 1000 milliLiter(s) (50 mL/Hr) IV Continuous <Continuous>  sucralfate suspension 1 Gram(s) Oral four times a day  vancomycin    Solution 125 milliGRAM(s) Oral every 6 hours    MEDICATIONS  (PRN):  acetaminophen     Tablet .. 650 milliGRAM(s) Oral every 6 hours PRN Temp greater or equal to 38C (100.4F), Mild Pain (1 - 3)  aluminum hydroxide/magnesium hydroxide/simethicone Suspension 30 milliLiter(s) Oral every 4 hours PRN Dyspepsia  Biotene Dry Mouth Oral Rinse 15 milliLiter(s) Swish and Spit five times a day PRN Mouth Care  dextrose Oral Gel 15 Gram(s) Oral once PRN Blood Glucose LESS THAN 70 milliGRAM(s)/deciliter  melatonin 3 milliGRAM(s) Oral at bedtime PRN Insomnia  ondansetron Injectable 4 milliGRAM(s) IV Push every 8 hours PRN Nausea and/or Vomiting  sodium chloride 0.65% Nasal 1 Spray(s) Both Nostrils two times a day PRN Nasal Congestion    I&O's Detail    24 Feb 2025 07:01  -  25 Feb 2025 07:00  --------------------------------------------------------  IN:    Fat Emulsion (Fish Oil &amp; Plant Based) 20% Infusion: 90 mL    Fat Emulsion (Fish Oil &amp; Plant Based) 20% Infusion: 160 mL    IV PiggyBack: 100 mL    sodium chloride 0.45%: 1134 mL    TPN (Total Parenteral Nutrition): 1747 mL  Total IN: 3231 mL    OUT:    Indwelling Catheter - Urethral (mL): 3500 mL  Total OUT: 3500 mL    Total NET: -269 mL      25 Feb 2025 07:01  -  25 Feb 2025 15:04  --------------------------------------------------------  IN:    Fat Emulsion (Fish Oil &amp; Plant Based) 20% Infusion: 104 mL    IV PiggyBack: 250 mL    sodium chloride 0.45%: 313 mL  Total IN: 667 mL    OUT:    Indwelling Catheter - Urethral (mL): 1000 mL  Total OUT: 1000 mL    Total NET: -333 mL            Vital Signs Last 24 Hrs  T(C): 37.8 (25 Feb 2025 11:58), Max: 37.8 (25 Feb 2025 11:58)  T(F): 100 (25 Feb 2025 11:58), Max: 100 (25 Feb 2025 11:58)  HR: 110 (25 Feb 2025 14:00) (101 - 127)  BP: 102/72 (25 Feb 2025 14:00) (94/50 - 143/93)  BP(mean): 80 (25 Feb 2025 14:00) (59 - 106)  RR: 35 (25 Feb 2025 14:00) (21 - 43)  SpO2: 100% (25 Feb 2025 12:00) (93% - 100%)    Parameters below as of 25 Feb 2025 08:00  Patient On (Oxygen Delivery Method): nasal cannula w/ humidification  O2 Flow (L/min): 3    PHYSICAL EXAM:    Constitutional: NAD,   Neck: No LAD, No JVD  Respiratory: CTAB  Cardiovascular: S1 and S2, RRR  Gastrointestinal: distant bowel sounds, non tender  Extremities: + peripheral edema  Neurological: A/O x 3, no focal deficits  Psychiatric: Normal mood, normal affect    Access: Not applicable        LABS:    02-25    149[H]  |  116[H]  |  92[H]  ----------------------------<  227[H]  4.0   |  25  |  1.63[H]    Ca    8.6      25 Feb 2025 05:07  Phos  1.7     02-25  Mg     1.8     02-25    TPro  5.3[L]  /  Alb  2.3[L]  /  TBili  1.1  /  DBili  x   /  AST  77[H]  /  ALT  55  /  AlkPhos  238[H]  02-25 02-24    146[H]  |  111[H]  |  84[H]  ----------------------------<  233[H]  3.9   |  29  |  1.53[H]    Ca    8.2[L]      24 Feb 2025 04:45  Phos  3.6     02-24  Mg     1.7     02-24    TPro  5.3[L]  /  Alb  2.9[L]  /  TBili  1.3[H]  /  DBili  x   /  AST  75[H]  /  ALT  49  /  AlkPhos  225[H]  02-24                           9.7    24.26 )-----------( 182      ( 25 Feb 2025 05:07 )             29.1                         9.9    24.08 )-----------( 164      ( 24 Feb 2025 04:45 )             28.2                         10.2   27.39 )-----------( 143      ( 23 Feb 2025 23:20 )             29.5                        9.9    24.08 )-----------( 164      ( 24 Feb 2025 04:45 )             28.2                         10.2   27.39 )-----------( 143      ( 23 Feb 2025 23:20 )             29.5                         9.9    24.28 )-----------( 132      ( 23 Feb 2025 17:06 )             28.9

## 2025-02-25 NOTE — PROGRESS NOTE ADULT - ASSESSMENT
Imp:  No further bleeding and H/H stable    Rec:  Consider resuming some type of anticoag, even if it's just sub Q heparin as a trial

## 2025-02-25 NOTE — PROGRESS NOTE ADULT - ASSESSMENT
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 She was admitted to [facility] with septic shock on 02/09/2025, nausea, vomiting and diarrhea requiring Levophed drip. Patient was being followed by GI, ID, Cardiology and Onc there. Patient has been transferred to  on patient's  (GI Dr. Pleitez) request. She was reported with non-bloody diarrhea x 3 in last 24 hours PTA. She denied abdominal pain, nausea, but has dry heaves. At [place] she was started on Vancomycin and Zosyn, then changed to Cefepime as her blood culture and urine cultures showed Pseudomonas aeruginosa.    #Dysphagia  Possible candida esophagitis  #Acute mucositis with diarrhea due to chemotherapy   #S/p sepsis with PSAE   #Head of pancreas adenocarcinoma on chemotherapy  #Leukocytosis  #Immunocompromised host  #UTI with PSAE resolved  #Kidney stones  #Right lower chest wall dry superficial ulcer   #ARF   -neutropenia resolved, leukocytosis at present  -CMV serology shows no evidence of CMV disease  -renal function is slightly improving  -dysphagia with poor PO intake  -cultures reviewed  -repeat stool for CDT is negative   -source of sepsis is likely intraabdominal and/or urinary  -s/p cefepime 2 gm IV q8h # 3  -s/p meropenem 1 gm IV q8h # 9   -on fluconazole 200 mg IV qd # 9  -on vancomycin 125 mg PO q6h for CDAD prophylaxis  -tolerating abx well so far; no side effects noted  -GI evaluation appreciated   -repeat BC x 2 noted  -oncology evaluation appreciated  -monitor abdomen  -complete meropenem today  -continue antimicrobials with fluconazole IV and vancomycin PO  -f/u cultures  -monitor temps  -f/u CBC and BNP  -supportive care  2. Other issues:   -care per medicine    d/w medicine team

## 2025-02-25 NOTE — PROGRESS NOTE ADULT - ASSESSMENT
ASSESSMENT  68yo female with PMHx recent diagnosis of primary pancreatic adenocarcinoma of head with tumor in umbilicus, chemo-induced pancytopenia, SCC lip, cyclical vomiting syndrome, HTN, HPL, Gout, GERD, CVA on Aggrenox    Initially admitted to [facility] with septic shock 2/2 + pseudomonas bacteremia likely due to UTI vs colitis on 02/09/2025 Was having nausea, vomiting and diarrhea requiring Levophed drip, was transferred out of ICU to regular floor.    Then transferred to  as per request of .  Was getting platelet transfusion and developed tachypnea hypoxia, rigors  Was diuresed and became hypotensive  Upgraded to SICU     Here with   1. Sepsis (POA)  2. Pseudomonal bacteremia suspect from UTI   3. Mucositis suspected esophageal candidiasis? r/o CMV  4. Pancytopenia  5. Acute hypoxic respiratory failure 2/2 pulm edema   6. MALLORY     CTA chest abd pelvis 2/19 revealing multiple small PE b/l and LBO  Started on IV heparin   Went into Afib with RVR this admission, resolved with lopressor.    Now with bloody BMs. Hep gtt held and started on protonix.     PLAN    Neuro: AAOx 2-3, slow to respond. pain control prn. IV tylenol .avoid nsaids. CTH neg for mets.   CV: BP stable has been 130s/80s. in sinus rhythm 120s. cont IV lopressor 5mg q6hr. hep gtt held  Pulm: on 3-4L NC sating 95-96%, tachypneic. encouraged pt to try HFNC at night for added PEEP. check CXR in AM.. on hep gtt for b/l PEs. TTE no RV strain. off steroids  GI: PO diet as tolerated. on TPN. IV PPI. Repeat CTAP with ileus.   Nephro: Cr 1.6, post ATN diuresis. hyperNa trending up again 149. asked dietitian to dec Na in TPN.  ordered for LR @ 100 x 10hr. monitor I & Os. Trend renal fxn. repeat BMP 17:00. started albumin q6hr  Endo: Hyperglycemic still. inc lantus 18units -> 20units qHS. more insulin added to TPN. BGMs q6hr. ISS. while on PN  ID: cont IV diflucan 200mg qd #11 (started 2/15). cont PO vanco q6hr for C diff ppx. dc meropenem. s/p IV cefepime.  blood cultures here neg to date. CMV neg. HSV pcr neg.   Heme: hgb stable. s/p 4units pRBC this admission (2units on 2/23, 2units on 2/16).  s/p filgastrim  PT eval, OOB to chair as tolerated.    Dispo: SICU. TPN. IV abx. off AC. LR started, Trend Na and Cr.     Will discuss with Dr. Geiger, d/w Dr. Sweet     ASSESSMENT  70yo female with PMHx recent diagnosis of primary pancreatic adenocarcinoma of head with tumor in umbilicus, chemo-induced pancytopenia, SCC lip, cyclical vomiting syndrome, HTN, HPL, Gout, GERD, CVA on Aggrenox    Initially admitted to [facility] with septic shock 2/2 + pseudomonas bacteremia likely due to UTI vs colitis on 02/09/2025 Was having nausea, vomiting and diarrhea requiring Levophed drip, was transferred out of ICU to regular floor.    Then transferred to  as per request of .  Was getting platelet transfusion and developed tachypnea hypoxia, rigors  Was diuresed and became hypotensive  Upgraded to SICU     Here with   1. Sepsis (POA)  2. Pseudomonal bacteremia suspect from UTI   3. Mucositis suspected esophageal candidiasis? r/o CMV  4. Pancytopenia  5. Acute hypoxic respiratory failure 2/2 pulm edema   6. MALLORY     CTA chest abd pelvis 2/19 revealing multiple small PE b/l and LBO  Started on IV heparin   Went into Afib with RVR this admission, resolved with lopressor.    Now with bloody BMs. Hep gtt held and started on protonix.     PLAN    Neuro: AAOx 2-3, slow to respond. pain control prn. IV tylenol .avoid nsaids. CTH neg for mets.   CV: BP stable has been 130s/80s. in sinus rhythm 120s. cont IV lopressor 5mg q6hr. hep gtt held  Pulm: on 3-4L NC sating 95-96%, tachypneic. encouraged pt to try HFNC at night for added PEEP. off AC. TTE no RV strain. off steroids  GI: PO diet as tolerated. on TPN. IV PPI. Repeat CTAP with ileus.   Nephro: Cr 1.6, post ATN diuresis. hyperNa trending up again 149. asked dietitian to dec Na in TPN.  ordered for LR @ 100 x 10hr. monitor I & Os. Trend renal fxn. repeat BMP 17:00. started albumin q6hr  Endo: Hyperglycemic still. inc lantus 18units -> 20units qHS. more insulin added to TPN. BGMs q6hr. ISS. while on PN  ID: cont IV diflucan 200mg qd #11 (started 2/15). cont PO vanco q6hr for C diff ppx. dc meropenem. s/p IV cefepime.  blood cultures here neg to date. CMV neg. HSV pcr neg.   Heme: hgb stable. s/p 4units pRBC this admission (2units on 2/23, 2units on 2/16).  s/p filgastrim  PT eval, OOB to chair as tolerated.    Dispo: SICU. TPN. IV abx. off AC. started LR @100 x Trend Na and Cr.     Will discuss with Dr. Geiger, d/w Dr. Sweet     ASSESSMENT  68yo female with PMHx recent diagnosis of primary pancreatic adenocarcinoma of head with tumor in umbilicus, chemo-induced pancytopenia, SCC lip, cyclical vomiting syndrome, HTN, HPL, Gout, GERD, CVA on Aggrenox    Initially admitted to [facility] with septic shock 2/2 + pseudomonas bacteremia likely due to UTI vs colitis on 02/09/2025 Was having nausea, vomiting and diarrhea requiring Levophed drip, was transferred out of ICU to regular floor.    Then transferred to  as per request of .  Was getting platelet transfusion and developed tachypnea hypoxia, rigors  Was diuresed and became hypotensive  Upgraded to SICU     Here with   1. Sepsis (POA)  2. Pseudomonal bacteremia suspect from UTI   3. Mucositis suspected esophageal candidiasis? r/o CMV  4. Pancytopenia  5. Acute hypoxic respiratory failure 2/2 pulm edema   6. MALLORY     CTA chest abd pelvis 2/19 revealing multiple small PE b/l and LBO  Started on IV heparin   Went into Afib with RVR this admission, resolved with lopressor.    Now with bloody BMs. Hep gtt held and started on protonix.     PLAN    Neuro: awake, slow to respond. pain control prn. IV tylenol .avoid nsaids. CTH neg for mets.   CV: BP stable has been 130s/80s. in sinus rhythm 120s. cont IV lopressor 5mg q6hr. hep gtt held  Pulm: on 3-4L NC sating 95-96%, tachypneic. encouraged pt to try HFNC at night for added PEEP. off AC. TTE no RV strain. off steroids  GI: PO diet as tolerated. on TPN. IV PPI. Repeat CTAP with ileus.   Nephro: Cr 1.6, post ATN diuresis? hyperNa trending up again 149. asked dietitian to dec Na in TPN. ordered for LR @ 100 x 10hr. monitor I & Os. Trend renal fxn. repeat BMP 17:00. started albumin q6hr  Endo: Hyperglycemic still. inc lantus 18units -> 20units qHS. more insulin added to TPN. BGMs q6hr. ISS. while on PN  ID: cont IV diflucan 200mg qd #11 (started 2/15). cont PO vanco q6hr for C diff ppx. dc meropenem. s/p IV cefepime.  blood cultures here neg to date. CMV neg. HSV pcr neg.   Heme: hgb stable. s/p 4units pRBC this admission (2units on 2/23, 2units on 2/16).  s/p filgastrim  PT eval, OOB to chair as tolerated.    Dispo: SICU. TPN. IV abx. off AC. started LR @100 x Trend Na and Cr. increase lantus.    Will discuss with Dr. Geiger, d/w Dr. Sweet     ASSESSMENT  68yo female with PMHx recent diagnosis of primary pancreatic adenocarcinoma of head with tumor in umbilicus, chemo-induced pancytopenia, SCC lip, cyclical vomiting syndrome, HTN, HPL, Gout, GERD, CVA on Aggrenox    Initially admitted to [facility] with septic shock 2/2 + pseudomonas bacteremia likely due to UTI vs colitis on 02/09/2025 Was having nausea, vomiting and diarrhea requiring Levophed drip, was transferred out of ICU to regular floor.    Then transferred to  as per request of .  Was getting platelet transfusion and developed tachypnea hypoxia, rigors  Was diuresed and became hypotensive  Upgraded to SICU     Here with   1. Sepsis (POA)  2. Pseudomonal bacteremia suspect from UTI   3. Mucositis suspected esophageal candidiasis? r/o CMV  4. Pancytopenia  5. Acute hypoxic respiratory failure 2/2 pulm edema   6. MALLORY     CTA chest abd pelvis 2/19 revealing multiple small PE b/l and LBO  Started on IV heparin   Went into Afib with RVR this admission, resolved with lopressor.    Now with bloody BMs. Hep gtt held and started on protonix.     PLAN    Neuro: awake, slow to respond. pain control prn. IV tylenol .avoid nsaids. CTH neg for mets.   CV: BP stable has been 130s/80s. in sinus rhythm 120s. cont IV lopressor 5mg q6hr. hep gtt held  Pulm: on 3-4L NC sating 95-96%, tachypneic. encouraged pt to try HFNC at night for added PEEP. off AC. TTE no RV strain. off steroids  GI: PO diet as tolerated. on TPN. IV PPI. Repeat CTAP with ileus.   Nephro: Cr 1.6, post ATN diuresis? hyperNa trending up again 149. asked dietitian to dec Na in TPN. ordered for LR @ 100 x 10hr. monitor I & Os. Trend renal fxn. repeat BMP 17:00. started albumin q6hr  Endo: Hyperglycemic still despite increasing lantus. will continue to inc lantus 18units -> 22units qHS. more insulin added to TPN. BGMs q6hr. ISS. while on PN  ID: cont IV diflucan 200mg qd #11 (started 2/15). cont PO vanco q6hr for C diff ppx. dc meropenem. s/p IV cefepime.  blood cultures here neg to date. CMV neg. HSV pcr neg.   Heme: hgb stable. s/p 4units pRBC this admission (2units on 2/23, 2units on 2/16).  s/p filgastrim  PT eval, OOB to chair as tolerated.    Dispo: SICU. TPN. IV abx. off AC. started LR @100 x Trend Na and Cr. increase lantus.    Will discuss with Dr. Geiger, d/w Dr. Sweet

## 2025-02-25 NOTE — CHART NOTE - NSCHARTNOTEFT_GEN_A_CORE
Clinical Nutrition PN Follow Up Note    *68 y/o F w/ recent diagnosis of primary pancreatic adenocarcinoma of head with tumor in umbilicus, chemo-induced pancytopenia and significant PMH of SCC lip, cyclical vomiting syndrome, HTN, HPL, Gout, GERD, CVA on Aggrenox and others had been admitted to [facility] with septic shock on 2025, nausea, vomiting and diarrhea requiring Levophed drip, was transferred out of ICU to regular floor last night.  Patient was being followed by GI, ID, Cardiology and Onc there.  Patient has been transferred to  today on patient's  (GI Dr. Pleitez) request.  At this time, patient c/o non-bloody diarrhea x 3 in last 24 hours.  She denies any abdominal pain, nausea, but has some dry heaves.  She denies any fever, chills, chest pain, palpitation, constipation or dysuria. Initially, she was started on Vancomycin and Zosyn, but now she has been on Cefepime as her blood culture and urine cultures are positive for Pseudomonas aeruginosa. Currently, patient is on Cefepime.  ID had also recommended Flagyl, but patient had refused it, as it causes nausea to her. Noted with Metabolic acidosis with NAG due to GI loss. Likely chemo-induced diarrhea and vomiting. Better than before. Cutaneous lesion on RUQ area below right breast, unclear etiology, ? Ecthyma gangrenosum. Admitting diagnosis: pancreatic ca  *: Pt transfer from [facility]; seen by RD and met criteria for PCM. Pt reports only being able to tolerate some sips of water and tea at this time. Reports tried eating small bite of mashed banana and felt burning sensation down her throat. Reports #; RD unable to obtain bedscale wt d/t bedscale not working. Admit wt per # note with 1+ and 2+ edema skewing wt. Pt appears overweight, NFPE reveals mild- mod muscle/fat wasting at this time. Recommend to continue with PO diet and encourage intake as tolerated. RD consulted to initiate PN d/t PO intolerance. Will initiate TPN through port.  *2/15: Remains on low fiber diet with minimal PO intake. Still w/ multiple episodes of diarrhea, if persists can consider adding 5mg zinc into PN bag. No other acute events overnight. D/w Dr. Joyner will c/w TPN today.   *: Raised area noted to mid sternum. RCW port intact.- no swelling noted, no redness. Port flushed without resistance. + blood return noted. Assessed with IV team Amy. Per pt no Pain on palpation. CT Chest. TPN on hold until CT read. CT chest from earlier in the day revealed new bilateral pleural effusions with bibasilar consolidated lung and ascites. CXR now shows low lung volumes and bibasilar opacities. Rigors post transfusion - Rapid response called;  Dr. Pleitez requesting ICU consult- concerned for pt.  requesting assessment of patient STAT. Pt transferred to SICU. ? third spacing, and possible PNA. Per pt still drinking small sips of water and tea. D/w Dr. Fortune will keep total volume same in PN bag today, and will c/w TPN.   *:  tx SICU, po remains minimal. On bipap. On additional LR d/t diuresis per critical care PA: "malignancy/severe protein-calorie malnutrition/hypoalbuminemia causing diffuse third spacing, caution with further diuresis as this is not truly a volume overload/acute CHF issue" also on IV albumin  c/w TPN  STRONGLY suggest to Confirm goals of care regarding LONG-TERM nutrition support - However, LONG-TERM Nutrition support is not recommended with advanced cancer as studies show that there is no improvement of dehydration symptoms, quality of life, or survival. It also increases the risk for peripheral edema, ascites, and pleural effusions. Will continue to provide nutr within GOC though  *: plan to c/w TPN for now. RD discussed w/ MD Tong and SICU IRINA Jurado need to confirm long term GOC regarding nutr support. Per GI - Esophageal pain is 2/2 esophagitis but unclear what type -- reflux vs. candida vs CMV or HSV etc. Plan to add imodium standing. ? EGD   *: reports she stills has difficulty swallowing due to pain. feeling weak overall. still having multiple episodes of diarrhea, poor PO intake. Plan to c/w TPN - per GI: "CVM and HSV will be negative which argues that esophagitis is reflux or fungal related"  d/w IRINA Jurado long-term plan regarding nutr support TBD, pending discussion w/ family. ? fluid overload which TPN may be contributing to as some studies show with advanced cancer that nutrition support increases the risk for peripheral edema, ascites, and pleural effusions.   *: PO slighly improving, plan to c/w TPN.  POCT uptrending, d/w IRINA Jurado, steroids to be dc and will hold off in insulin PN bag - will manage w/ insulin outside bag.  Per GI: "Imaging suggests ileus but I personally think that putting the imaging together with history and clinical findings that she just has severe diarrhea illness 2/2 chemo, given that both large and small bowel loops are fluid filled and she has no abd pain or vomiting"  *: PO intake continues to slightly improve, plan to c/w TPN. Hyperglycemia worsening, d/w IRINA Jurado, plan to add regular insulin into PN bag. Will also decrease dextrose to 250g to see if this helps w/ hyperglycemia and can increase again when POCTs better controlled.   *: Abdomen distended, non-tender to palpation - GI rec stat CT w/o contrast for further evaluation and to hold PO intake at this time. POCT remain elevated, per RN  this morning - Lantus was not given last night by RN since insulin was already in PN bag (?). Will hold off on increasing dextrose and will c/w current insulin in PN bag, d/w intensivist and plan to adjust Lantus outside PN bag.     *TPN initiated 2/2 PO intolerance, + Mucositis, possible candidal esophagitis    *current status: Now has (+) GIB,  CTA with improvement in bowel distension, small and lg bowel ileus. Per GI, clears OK. plan to c/w TPN for now per sicu IRINA Jurado    *pertinent meds: acetaminophen, cholecalciferol, abx, famotidine, LANTUS 18 units HS, ADMELOG (22 units given x 24 hrs), Reglan, protonix, NaCl 0.45% IVF, lasix, IV albumin     *labs reviewed; Na elevated, corrected Na for hyperglycemia even more elevated - Na content in PN bag lowered today. Now also on additional IVF. Will not increase total volume for now d/t 3rd spacing.   K WNL; As per ASPEN Guidelines, maintenance potassium concentration in PN is 1 – 2 mEq/kg/day. However, [facility] PN Policy indicates a maximum of 120 mEq should be added into the PN bag. Currently at 100mEq in PN bag. Mg remains on lower end, will increase in PN bag. Phos elevated and removed from bag yetserday, now WNL. If remains WNL and downtrending can add back 10 mmol tomorrow.   T bili WNL will c/w trace elements. C/w MVI and thiamine in PN bag. POCTs still elevated x 24 hrs, will c/w current dextrose (250g), will change goal to 250g as meeting LOW-END of calorie needs w/ that amount d/t persistent hyperglycemia. D/w IRINA Jurado to increase to 20 units of regular insulin to PN bag.  Lantus also increased outside of bag.  TG WNL (<400), however uptrending, will c/w 50g of lipids daily. May need to give every other day. Also of note, Cl still elevated, will add more acetate vs Cl into TPN bag (235 vs 90, respectively).         146[H]  |  111[H]  |  84[H]  ----------------------------<  233[H]  3.9   |  29  |  1.53[H]    Ca    8.2[L]      2025 04:45  Phos  3.6       Mg     1.7         TPro  5.3[L]  /  Alb  2.9[L]  /  TBili  1.3[H]  /  DBili  x   /  AST  75[H]  /  ALT  49  /  AlkPhos  225[H]      BMI: BMI (kg/m2): 27.8 (25 @ 05:46)  HbA1c: A1C with Estimated Average Glucose Result: 6.6 % (25 @ 05:35)    BP: 136/90 (25 @ 06:00) (114/82 - 145/78)Vital Signs Last 24 Hrs  T(C): 37.1 (25 @ 06:00), Max: 37.1 (25 @ 06:00)  T(F): 98.8 (25 @ 06:00), Max: 98.8 (25 @ 06:00)  HR: 98 (25 @ 06:00) (98 - 111)  BP: 136/90 (25 @ 06:00) (125/81 - 145/78)  BP(mean): 104 (25 @ 06:00) (91 - 104)  RR: 27 (25 @ 06:00) (25 - 37)  SpO2: 90% (25 @ 06:00) (90% - 99%)    Lipid Panel: Date/Time: 25 @ 05:31  Triglycerides, Serum: 281    POCT Blood Glucose.: 273 mg/dL (2025 05:18)  POCT Blood Glucose.: 239 mg/dL (2025 23:11)  POCT Blood Glucose.: 348 mg/dL (2025 15:58)    Iron Total: 34 ug/dL (25 @ 18:46)  Folate, Serum: >20.0 ng/mL (25 @ 18:46)  Vitamin B12, Serum: 1457 pg/mL (25 @ 18:46)  Vitamin D, 25-Hydroxy: 23.0 ng/mL (25 @ 18:46) ** c/w deficiency    *I&O's Detail    2025 07:01  -  2025 07:00  --------------------------------------------------------  IN:    Fat Emulsion (Fish Oil &amp; Plant Based) 20% Infusion: 89 mL    Heparin Infusion: 22 mL    Pantoprazole: 73 mL    sodium chloride 0.45%: 50 mL    TPN (Total Parenteral Nutrition): 871 mL  Total IN: 1105 mL    OUT:    Indwelling Catheter - Urethral (mL): 6325 mL  Total OUT: 6325 mL    Total NET: -5220 mL  * fluid status: Negative; large UOP. Will continue to monitor/adjust prn to maintain fluid balance   *last (+) BM doc'd 2/23 - small, loose.  pt not on bowel regimen.  *edema: 3+ right/left leg  *PI: L/R Buttocks (stage 2)    *malnutrition: Pt continues to meet criteria for severe protein calorie malnutrition in context of acute on chronic illness r/t decreased ability to meet increased nutrient needs 2/2 PO intolerance, N/V and diarrhea, on chemo for pancreatic ca AEB meeting <50% ENN > 5 days, mild-mod muscle/fat wasting    Estimated Needs: based on 73.4 Kg (wt from EMR admit wt )  Calories: 1812 - 2175 Kcal (25 - 30 Kcal/Kg)  Protein: 108 - 145 g (1.5 - 2.0 g/Kg)  Fluids: 1450 - 1812 mL (20 - 25 mL/Kg)    Diet, Low Fiber:   Kosher  Supplement Feeding Modality:  Oral  Ensure Plant-Based Cans or Servings Per Day:  3       Frequency:  Daily (25 @ 16:47) [Active]  Parenteral Nutrition - Adult 1 Each (75 mL/Hr) TPN Continuous <Continuous>, 25 @ 22:00, 22:00, Stop order after: 1 Days    Weight History:  Daily Weight in k.3 (2025 06:00) *2+ edema  Daily Weight in k.4 (2025 05:00)  Daily Weight in k.6 (15 Feb 2025 06:17)  Height (cm): 162.6 (25 @ 02:39)  Weight (kg): 73.4 (25 @ 05:46), 66.8 (25 @ 02:39)  BMI (kg/m2): 27.8 (25 @ 05:46), 25.3 (25 @ 02:39)  BSA (m2): 1.79 (25 @ 05:46), 1.72 (25 @ 02:39)    TPN Recommendations: via implanted infusion port  Total Volume: 1800 mL x 24 hours  125 g  Amino Acids  250 g Dextrose (goal 325g)  50 g Lipids 20%   50 mEq Sodium Chloride  0 mEq Sodium Acetate  0 mmol Sodium Phosphate  0 mEq Potassium Chloride  71 mEq Potassium Acetate  20 mmol Potassium Phosphate  0 mEq Calcium Gluconate (CAPS out of PN solution)   16 mEq Magnesium Sulfate  200 mg Thiamine  1 ml Trace Elements  10 ml MVI  25 units Regular Insulin    Total Calories    1850   (Meets  100%  of lower end of Estimated Energy needs  and  100%  Protein needs)      Additional Recommendations:    1) Daily weights  2) Strict I & O's **pt third spacing  3) Daily lyte checks including magnesium and phos  4) Weekly triglycerides/LFT checks  5) POCT q6hrs; maintain 140-180mg/dL*** insulin management per MD  6) C/w low fiber diet and encourage PO intake as tolerated  7) Continue to titrate dextrose 50-75g per day until goal of 325g reached  8) Confirm goals of care regarding long-term nutrition support. Would recommend PEG placement if long-term nutrition support is required as GI is functional and would be the preferred route for nutrition when GIB/ileus resolves. However, long-term nutrition support is not recommended with advanced cancer as studies show that there is no improvement of dehydration symptoms, quality of life, or survival. It also increases the risk for peripheral edema, ascites, and pleural effusions.     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  Patient was being followed by GI, ID, Cardiology and Onc there.  Patient has been transferred to  today on patient's  (GI Dr. Pleitez) request.  At this time, patient c/o non-bloody diarrhea x 3 in last 24 hours.  She denies any abdominal pain, nausea, but has some dry heaves.  She denies any fever, chills, chest pain, palpitation, constipation or dysuria. Initially, she was started on Vancomycin and Zosyn, but now she has been on Cefepime as her blood culture and urine cultures are positive for Pseudomonas aeruginosa. Currently, patient is on Cefepime.  ID had also recommended Flagyl, but patient had refused it, as it causes nausea to her. Noted with Metabolic acidosis with NAG due to GI loss. Likely chemo-induced diarrhea and vomiting. Better than before. Cutaneous lesion on RUQ area below right breast, unclear etiology, ? Ecthyma gangrenosum. Admitting diagnosis: pancreatic ca  *: Pt transfer from [facility]; seen by RD and met criteria for PCM. Pt reports only being able to tolerate some sips of water and tea at this time. Reports tried eating small bite of mashed banana and felt burning sensation down her throat. Reports #; RD unable to obtain bedscale wt d/t bedscale not working. Admit wt per # note with 1+ and 2+ edema skewing wt. Pt appears overweight, NFPE reveals mild- mod muscle/fat wasting at this time. Recommend to continue with PO diet and encourage intake as tolerated. RD consulted to initiate PN d/t PO intolerance. Will initiate TPN through port.  *2/15: Remains on low fiber diet with minimal PO intake. Still w/ multiple episodes of diarrhea, if persists can consider adding 5mg zinc into PN bag. No other acute events overnight. D/w Dr. Joyner will c/w TPN today.   *: Raised area noted to mid sternum. RCW port intact.- no swelling noted, no redness. Port flushed without resistance. + blood return noted. Assessed with IV team Amy. Per pt no Pain on palpation. CT Chest. TPN on hold until CT read. CT chest from earlier in the day revealed new bilateral pleural effusions with bibasilar consolidated lung and ascites. CXR now shows low lung volumes and bibasilar opacities. Rigors post transfusion - Rapid response called;  Dr. Pleitez requesting ICU consult- concerned for pt.  requesting assessment of patient STAT. Pt transferred to SICU. ? third spacing, and possible PNA. Per pt still drinking small sips of water and tea. D/w Dr. Fortune will keep total volume same in PN bag today, and will c/w TPN.   *:  tx SICU, po remains minimal. On bipap. On additional LR d/t diuresis per critical care PA: "malignancy/severe protein-calorie malnutrition/hypoalbuminemia causing diffuse third spacing, caution with further diuresis as this is not truly a volume overload/acute CHF issue" also on IV albumin  c/w TPN  STRONGLY suggest to Confirm goals of care regarding LONG-TERM nutrition support - However, LONG-TERM Nutrition support is not recommended with advanced cancer as studies show that there is no improvement of dehydration symptoms, quality of life, or survival. It also increases the risk for peripheral edema, ascites, and pleural effusions. Will continue to provide nutr within GOC though  *: plan to c/w TPN for now. RD discussed w/ MD Tong and SICU IRINA Jurado need to confirm long term GOC regarding nutr support. Per GI - Esophageal pain is 2/2 esophagitis but unclear what type -- reflux vs. candida vs CMV or HSV etc. Plan to add imodium standing. ? EGD   *: reports she stills has difficulty swallowing due to pain. feeling weak overall. still having multiple episodes of diarrhea, poor PO intake. Plan to c/w TPN - per GI: "CVM and HSV will be negative which argues that esophagitis is reflux or fungal related"  d/w IRINA Jurado long-term plan regarding nutr support TBD, pending discussion w/ family. ? fluid overload which TPN may be contributing to as some studies show with advanced cancer that nutrition support increases the risk for peripheral edema, ascites, and pleural effusions.   *: PO slighly improving, plan to c/w TPN.  POCT uptrending, d/w IRINA Jurado, steroids to be dc and will hold off in insulin PN bag - will manage w/ insulin outside bag.  Per GI: "Imaging suggests ileus but I personally think that putting the imaging together with history and clinical findings that she just has severe diarrhea illness 2/2 chemo, given that both large and small bowel loops are fluid filled and she has no abd pain or vomiting"  *: PO intake continues to slightly improve, plan to c/w TPN. Hyperglycemia worsening, d/w IRINA Jurado, plan to add regular insulin into PN bag. Will also decrease dextrose to 250g to see if this helps w/ hyperglycemia and can increase again when POCTs better controlled.   *: Abdomen distended, non-tender to palpation - GI rec stat CT w/o contrast for further evaluation and to hold PO intake at this time. POCT remain elevated, per RN  this morning - Lantus was not given last night by RN since insulin was already in PN bag (?). Will hold off on increasing dextrose and will c/w current insulin in PN bag, d/w intensivist and plan to adjust Lantus outside PN bag.   *: Now has (+) GIB,  CTA with improvement in bowel distension, small and lg bowel ileus. Per GI, clears OK.    *TPN initiated 2/2 PO intolerance, + Mucositis, possible candidal esophagitis    *current status: consumed some PO yesterday. Per GI, unsure why GIB but now resolved - ?low level ischemic colitis. plan to c/w TPN for now per sicu IRINA Jurado  STRONGLY suggest palliative care consult to determine LONG-TERM GOC regarding nutr support     *pertinent meds: acetaminophen, cholecalciferol, abx, famotidine, lantus 18 units HS, protonix, ADMELOG (26 units given x 24 hrs), 15 mmol KPhos given x 24 hrs    *labs reviewed; Na elevated, corrected Na for hyperglycemia even more elevated - Na content in PN bag lowered yesterday and will be lowered again today. Also on additional IVF. Will not increase total volume for now d/t 3rd spacing.   K WNL; As per ASPEN Guidelines, maintenance potassium concentration in PN is 1 – 2 mEq/kg/day. However, [facility] PN Policy indicates a maximum of 120 mEq should be added into the PN bag. Currently at 100mEq in PN bag. Mg remains on lower end, will increase in PN bag again today. Phos now LOW - will add back in 20 mmol; s/p repletion of KPhos as above.   T bili WNL will c/w trace elements. C/w MVI and thiamine in PN bag. POCTs still elevated x 24 hrs, will c/w current dextrose (250g), will change goal to 250g as meeting LOW-END of calorie needs w/ that amount d/t persistent hyperglycemia. D/w IRINA Jurado to increase to 25 units of regular insulin to PN bag.  TG WNL (<400), however uptrending, will c/w 50g of lipids daily. May need to give every other day. Pending new level. Also of note, Cl still elevated, will add more acetate vs Cl into TPN bag (196 vs 50, respectively).         149[H]  |  116[H]  |  92[H]  ----------------------------<  227[H]  4.0   |  25  |  1.63[H]    Ca    8.6      2025 05:07  Phos  1.7       Mg     1.8         TPro  5.3[L]  /  Alb  2.3[L]  /  TBili  1.1  /  DBili  x   /  AST  77[H]  /  ALT  55  /  AlkPhos  238[H]      BMI: BMI (kg/m2): 27.8 (25 @ 05:46)  HbA1c: A1C with Estimated Average Glucose Result: 6.6 % (25 @ 05:35)    BP: 136/90 (25 @ 06:00) (114/82 - 145/78)Vital Signs Last 24 Hrs  T(C): 37.1 (25 @ 06:00), Max: 37.1 (25 @ 06:00)  T(F): 98.8 (25 @ 06:00), Max: 98.8 (25 @ 06:00)  HR: 98 (25 @ 06:00) (98 - 111)  BP: 136/90 (25 @ 06:00) (125/81 - 145/78)  BP(mean): 104 (25 @ 06:00) (91 - 104)  RR: 27 (25 @ 06:00) (25 - 37)  SpO2: 90% (25 @ 06:00) (90% - 99%)    Lipid Panel: Date/Time: 25 @ 05:31  Triglycerides, Serum: 281    POCT Blood Glucose.: 231 mg/dL (2025 07:11)  POCT Blood Glucose.: 207 mg/dL (2025 22:58)  POCT Blood Glucose.: 329 mg/dL (2025 18:29)  POCT Blood Glucose.: 228 mg/dL (2025 12:23)    Iron Total: 34 ug/dL (25 @ 18:46)  Folate, Serum: >20.0 ng/mL (25 @ 18:46)  Vitamin B12, Serum: 1457 pg/mL (25 @ 18:46)  Vitamin D, 25-Hydroxy: 23.0 ng/mL (25 @ 18:46) ** c/w deficiency    *I&O's Detail    2025 07:01  -  2025 07:00  --------------------------------------------------------  IN:    Fat Emulsion (Fish Oil &amp; Plant Based) 20% Infusion: 90 mL    Fat Emulsion (Fish Oil &amp; Plant Based) 20% Infusion: 160 mL    IV PiggyBack: 100 mL    sodium chloride 0.45%: 1134 mL    TPN (Total Parenteral Nutrition): 1747 mL  Total IN: 3231 mL    OUT:    Indwelling Catheter - Urethral (mL): 3500 mL  Total OUT: 3500 mL    Total NET: -269 mL  * fluid status: Negative; large UOP. Will continue to monitor/adjust prn to maintain fluid balance   *last (+) BM doc'd 2/24 x 2 - small, loose.  pt not on bowel regimen.  *edema: 1+ generalized; 2+ R/L ankle   *PI: L/R Buttocks (stage 2)    *malnutrition: Pt continues to meet criteria for severe protein calorie malnutrition in context of acute on chronic illness r/t decreased ability to meet increased nutrient needs 2/2 PO intolerance, N/V and diarrhea, on chemo for pancreatic ca AEB meeting <50% ENN > 5 days, mild-mod muscle/fat wasting    Estimated Needs: based on 73.4 Kg (wt from EMR admit wt )  Calories: 1812 - 2175 Kcal (25 - 30 Kcal/Kg)  Protein: 108 - 145 g (1.5 - 2.0 g/Kg)  Fluids: 1450 - 1812 mL (20 - 25 mL/Kg)    Diet, Low Fiber:   Kosher  Supplement Feeding Modality:  Oral  Ensure Plant-Based Cans or Servings Per Day:  3       Frequency:  Daily (25 @ 16:47) [Active]  Parenteral Nutrition - Adult 1 Each (75 mL/Hr) TPN Continuous <Continuous>, 25 @ 22:00, 22:00, Stop order after: 1 Days    Weight History:  Daily Weight in k.3 (2025 06:00) *2+ edema  Daily Weight in k.4 (2025 05:00)  Daily Weight in k.6 (15 Feb 2025 06:17)  Height (cm): 162.6 (25 @ 02:39)  Weight (kg): 73.4 (25 @ 05:46), 66.8 (25 @ 02:39)  BMI (kg/m2): 27.8 (25 @ 05:46), 25.3 (25 @ 02:39)  BSA (m2): 1.79 (25 @ 05:46), 1.72 (25 @ 02:39)    TPN Recommendations: via implanted infusion port  Total Volume: 1800 mL x 24 hours  125 g  Amino Acids  250 g Dextrose (goal 325g)  50 g Lipids 20%   50 mEq Sodium Chloride  0 mEq Sodium Acetate  0 mmol Sodium Phosphate  0 mEq Potassium Chloride  71 mEq Potassium Acetate  20 mmol Potassium Phosphate  0 mEq Calcium Gluconate (CAPS out of PN solution)   16 mEq Magnesium Sulfate  200 mg Thiamine  1 ml Trace Elements  10 ml MVI  25 units Regular Insulin    Total Calories    1850   (Meets  100%  of lower end of Estimated Energy needs  and  100%  Protein needs)      Additional Recommendations:    1) Daily weights  2) Strict I & O's **pt third spacing  3) Daily lyte checks including magnesium and phos  4) Weekly triglycerides/LFT checks  5) POCT q6hrs; maintain 140-180mg/dL*** insulin management per MD  6) C/w low fiber diet and encourage PO intake as tolerated  7) Continue to titrate dextrose 50-75g per day until goal of 325g reached  8) Confirm goals of care regarding long-term nutrition support. Would recommend PEG placement if long-term nutrition support is required as GI is functional and would be the preferred route for nutrition when GIB/ileus resolves. However, long-term nutrition support is not recommended with advanced cancer as studies show that there is no improvement of dehydration symptoms, quality of life, or survival. It also increases the risk for peripheral edema, ascites, and pleural effusions.     *will continue to monitor and adjust PN prn*  Marce Mitchell, MS, RDN, CNSC, [phone]  Certified Nutrition

## 2025-02-25 NOTE — PROGRESS NOTE ADULT - SUBJECTIVE AND OBJECTIVE BOX
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 1000 milliGRAM(s) IV Intermittent once  acetaminophen   IVPB .. 1000 milliGRAM(s) IV Intermittent once  chlorhexidine 2% Cloths 1 Application(s) Topical <User Schedule>  cholecalciferol 2000 Unit(s) Oral at bedtime  dextrose 5%. 1000 milliLiter(s) (50 mL/Hr) IV Continuous <Continuous>  dextrose 5%. 1000 milliLiter(s) (100 mL/Hr) IV Continuous <Continuous>  dextrose 50% Injectable 25 Gram(s) IV Push once  dextrose 50% Injectable 12.5 Gram(s) IV Push once  dextrose 50% Injectable 25 Gram(s) IV Push once  famotidine Injectable 20 milliGRAM(s) IV Push daily  fluconAZOLE IVPB      fluconAZOLE IVPB 200 milliGRAM(s) IV Intermittent every 24 hours  glucagon  Injectable 1 milliGRAM(s) IntraMuscular once  insulin glargine Injectable (LANTUS) 18 Unit(s) SubCutaneous at bedtime  insulin lispro (ADMELOG) corrective regimen sliding scale   SubCutaneous every 6 hours  lipid, fat emulsion (Fish Oil and Plant Based) 20% Infusion 0.681 Gm/kG/Day (20.8 mL/Hr) IV Continuous <Continuous>  metoprolol tartrate Injectable 5 milliGRAM(s) IV Push every 6 hours  pantoprazole  Injectable 40 milliGRAM(s) IV Push every 12 hours  Parenteral Nutrition - Adult 1 Each (75 mL/Hr) TPN Continuous <Continuous>  potassium phosphate IVPB 15 milliMole(s) IV Intermittent once  sucralfate suspension 1 Gram(s) Oral four times a day  vancomycin    Solution 125 milliGRAM(s) Oral every 6 hours    MEDICATIONS  (PRN):  acetaminophen     Tablet .. 650 milliGRAM(s) Oral every 6 hours PRN Temp greater or equal to 38C (100.4F), Mild Pain (1 - 3)  aluminum hydroxide/magnesium hydroxide/simethicone Suspension 30 milliLiter(s) Oral every 4 hours PRN Dyspepsia  Biotene Dry Mouth Oral Rinse 15 milliLiter(s) Swish and Spit five times a day PRN Mouth Care  dextrose Oral Gel 15 Gram(s) Oral once PRN Blood Glucose LESS THAN 70 milliGRAM(s)/deciliter  melatonin 3 milliGRAM(s) Oral at bedtime PRN Insomnia  ondansetron Injectable 4 milliGRAM(s) IV Push every 8 hours PRN Nausea and/or Vomiting  sodium chloride 0.65% Nasal 1 Spray(s) Both Nostrils two times a day PRN Nasal Congestion          Vital Signs Last 24 Hrs  T(C): 36.6 (25 Feb 2025 06:14), Max: 36.8 (24 Feb 2025 10:02)  T(F): 97.8 (25 Feb 2025 06:14), Max: 98.3 (24 Feb 2025 10:02)  HR: 103 (25 Feb 2025 06:00) (103 - 131)  BP: 120/82 (25 Feb 2025 06:00) (94/50 - 144/96)  BP(mean): 94 (25 Feb 2025 06:00) (59 - 108)  RR: 31 (25 Feb 2025 06:00) (21 - 43)  SpO2: 97% (25 Feb 2025 06:00) (93% - 100%)    Parameters below as of 24 Feb 2025 16:00  Patient On (Oxygen Delivery Method): nasal cannula w/ humidification  O2 Flow (L/min): 3      PHYSICAL EXAM:    Constitutional: NAD,  chronically ill appearing  Respiratory: CTAB  Cardiovascular: S1 and S2, RRR  Gastrointestinal: BS+, soft, mildly distended  Extremities: 2+ peripheral LE edema  Psychiatric: Normal mood, normal affect    LABS:                        9.7    24.26 )-----------( 182      ( 25 Feb 2025 05:07 )             29.1     02-25    149[H]  |  116[H]  |  92[H]  ----------------------------<  227[H]  4.0   |  25  |  1.63[H]    Ca    8.6      25 Feb 2025 05:07  Phos  1.7     02-25  Mg     1.8     02-25    TPro  5.3[L]  /  Alb  2.3[L]  /  TBili  1.1  /  DBili  x   /  AST  77[H]  /  ALT  55  /  AlkPhos  238[H]  02-25      LIVER FUNCTIONS - ( 25 Feb 2025 05:07 )  Alb: 2.3 g/dL / Pro: 5.3 gm/dL / ALK PHOS: 238 U/L / ALT: 55 U/L / AST: 77 U/L / GGT: x             RADIOLOGY & ADDITIONAL STUDIES:

## 2025-02-25 NOTE — PROGRESS NOTE ADULT - SUBJECTIVE AND OBJECTIVE BOX
INTERVAL HPI/OVERNIGHT EVENTS:  Patient S&E at bedside.  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      GERD (gastroesophageal reflux disease)      Cyclical vomiting syndrome      CVA (cerebrovascular accident)      H/O squamous cell carcinoma excision          FAMILY HISTORY:      VITAL SIGNS:  T(F): 97.8 (02-25-25 @ 06:14)  HR: 103 (02-25-25 @ 06:00)  BP: 120/82 (02-25-25 @ 06:00)  RR: 31 (02-25-25 @ 06:00)  SpO2: 97% (02-25-25 @ 06:00)  Wt(kg): --    PHYSICAL EXAM:    Constitutional: NAD, more withdrawn today   ent- bleeding from bottom lip dry mucous membranes   Respiratory: CTA b/l, mild tachypnea on 2l this am   Cardiovascular: RRR,   Gastrointestinal: soft, NT  Neurological: AAOx2      MEDICATIONS  (STANDING):  acetaminophen   IVPB .. 1000 milliGRAM(s) IV Intermittent once  acetaminophen   IVPB .. 1000 milliGRAM(s) IV Intermittent once  chlorhexidine 2% Cloths 1 Application(s) Topical <User Schedule>  cholecalciferol 2000 Unit(s) Oral at bedtime  dextrose 5%. 1000 milliLiter(s) (50 mL/Hr) IV Continuous <Continuous>  dextrose 5%. 1000 milliLiter(s) (100 mL/Hr) IV Continuous <Continuous>  dextrose 50% Injectable 25 Gram(s) IV Push once  dextrose 50% Injectable 12.5 Gram(s) IV Push once  dextrose 50% Injectable 25 Gram(s) IV Push once  famotidine Injectable 20 milliGRAM(s) IV Push daily  fluconAZOLE IVPB      fluconAZOLE IVPB 200 milliGRAM(s) IV Intermittent every 24 hours  glucagon  Injectable 1 milliGRAM(s) IntraMuscular once  insulin glargine Injectable (LANTUS) 18 Unit(s) SubCutaneous at bedtime  insulin lispro (ADMELOG) corrective regimen sliding scale   SubCutaneous every 6 hours  lipid, fat emulsion (Fish Oil and Plant Based) 20% Infusion 0.681 Gm/kG/Day (20.8 mL/Hr) IV Continuous <Continuous>  metoprolol tartrate Injectable 5 milliGRAM(s) IV Push every 6 hours  pantoprazole  Injectable 40 milliGRAM(s) IV Push every 12 hours  Parenteral Nutrition - Adult 1 Each (75 mL/Hr) TPN Continuous <Continuous>  potassium phosphate IVPB 15 milliMole(s) IV Intermittent once  sucralfate suspension 1 Gram(s) Oral four times a day  vancomycin    Solution 125 milliGRAM(s) Oral every 6 hours    MEDICATIONS  (PRN):  acetaminophen     Tablet .. 650 milliGRAM(s) Oral every 6 hours PRN Temp greater or equal to 38C (100.4F), Mild Pain (1 - 3)  aluminum hydroxide/magnesium hydroxide/simethicone Suspension 30 milliLiter(s) Oral every 4 hours PRN Dyspepsia  Biotene Dry Mouth Oral Rinse 15 milliLiter(s) Swish and Spit five times a day PRN Mouth Care  dextrose Oral Gel 15 Gram(s) Oral once PRN Blood Glucose LESS THAN 70 milliGRAM(s)/deciliter  melatonin 3 milliGRAM(s) Oral at bedtime PRN Insomnia  ondansetron Injectable 4 milliGRAM(s) IV Push every 8 hours PRN Nausea and/or Vomiting  sodium chloride 0.65% Nasal 1 Spray(s) Both Nostrils two times a day PRN Nasal Congestion      Allergies    No Known Allergies    Intolerances        LABS:                        9.7    24.26 )-----------( 182      ( 25 Feb 2025 05:07 )             29.1     02-25    149[H]  |  116[H]  |  92[H]  ----------------------------<  227[H]  4.0   |  25  |  1.63[H]    Ca    8.6      25 Feb 2025 05:07  Phos  1.7     02-25  Mg     1.8     02-25    TPro  5.3[L]  /  Alb  2.3[L]  /  TBili  1.1  /  DBili  x   /  AST  77[H]  /  ALT  55  /  AlkPhos  238[H]  02-25      Urinalysis Basic - ( 25 Feb 2025 05:07 )    Color: x / Appearance: x / SG: x / pH: x  Gluc: 227 mg/dL / Ketone: x  / Bili: x / Urobili: x   Blood: x / Protein: x / Nitrite: x   Leuk Esterase: x / RBC: x / WBC x   Sq Epi: x / Non Sq Epi: x / Bacteria: x        RADIOLOGY & ADDITIONAL TESTS:  Studies reviewed.

## 2025-02-25 NOTE — PROGRESS NOTE ADULT - SUBJECTIVE AND OBJECTIVE BOX
Subjective:    pat awake, tachcardic , RR 20, lying in bed, no respiratory distress, Hb stable 9, malonetic stool today.    Home Medications:  cefepime 2 g intravenous injection: 2 gram(s) intravenous every 12 hours (12 Feb 2025 12:59)  filgrastim: 480 microgram(s) subcutaneous once a day (12 Feb 2025 12:59)  Lactated Ringers Injection intravenous solution: 150 milligram(s) intravenous every 1 to 2 hours (12 Feb 2025 12:59)  loperamide 2 mg oral capsule: 1 cap(s) orally once (12 Feb 2025 12:59)  midodrine 5 mg oral tablet: 1 tab(s) orally every 8 hours (12 Feb 2025 12:59)  Multiple Vitamins with Minerals oral liquid: 1 orally once a day (12 Feb 2025 12:59)  octreotide 2500 mcg/mL subcutaneous solution: 0.04 milliliter(s) subcutaneous 3 times a day (12 Feb 2025 12:59)  sucralfate 1 g/10 mL oral suspension: 10 milliliter(s) orally 4 times a day (12 Feb 2025 12:59)  trimethobenzamide 100 mg/mL intramuscular solution: 2 milliliter(s) intramuscular every 8 hours As needed Nausea and/or Vomiting (12 Feb 2025 12:59)    MEDICATIONS  (STANDING):  acetaminophen   IVPB .. 1000 milliGRAM(s) IV Intermittent once  albumin human 25% IVPB 50 milliLiter(s) IV Intermittent every 6 hours  chlorhexidine 2% Cloths 1 Application(s) Topical <User Schedule>  cholecalciferol 2000 Unit(s) Oral at bedtime  dextrose 5%. 1000 milliLiter(s) (50 mL/Hr) IV Continuous <Continuous>  dextrose 5%. 1000 milliLiter(s) (100 mL/Hr) IV Continuous <Continuous>  dextrose 50% Injectable 25 Gram(s) IV Push once  dextrose 50% Injectable 12.5 Gram(s) IV Push once  dextrose 50% Injectable 25 Gram(s) IV Push once  famotidine Injectable 20 milliGRAM(s) IV Push daily  fluconAZOLE IVPB      fluconAZOLE IVPB 200 milliGRAM(s) IV Intermittent every 24 hours  glucagon  Injectable 1 milliGRAM(s) IntraMuscular once  insulin glargine Injectable (LANTUS) 18 Unit(s) SubCutaneous at bedtime  insulin lispro (ADMELOG) corrective regimen sliding scale   SubCutaneous every 6 hours  lactated ringers. 1000 milliLiter(s) (100 mL/Hr) IV Continuous <Continuous>  lipid, fat emulsion (Fish Oil and Plant Based) 20% Infusion 0.681 Gm/kG/Day (20.83 mL/Hr) IV Continuous <Continuous>  metoprolol tartrate 25 milliGRAM(s) Oral every 6 hours  pantoprazole  Injectable 40 milliGRAM(s) IV Push every 12 hours  Parenteral Nutrition - Adult 1 Each (75 mL/Hr) TPN Continuous <Continuous>  Parenteral Nutrition - Adult 1 Each (75 mL/Hr) TPN Continuous <Continuous>  sucralfate suspension 1 Gram(s) Oral four times a day  vancomycin    Solution 125 milliGRAM(s) Oral every 6 hours    MEDICATIONS  (PRN):  acetaminophen     Tablet .. 650 milliGRAM(s) Oral every 6 hours PRN Temp greater or equal to 38C (100.4F), Mild Pain (1 - 3)  aluminum hydroxide/magnesium hydroxide/simethicone Suspension 30 milliLiter(s) Oral every 4 hours PRN Dyspepsia  Biotene Dry Mouth Oral Rinse 15 milliLiter(s) Swish and Spit five times a day PRN Mouth Care  dextrose Oral Gel 15 Gram(s) Oral once PRN Blood Glucose LESS THAN 70 milliGRAM(s)/deciliter  melatonin 3 milliGRAM(s) Oral at bedtime PRN Insomnia  metoprolol tartrate Injectable 5 milliGRAM(s) IV Push every 6 hours PRN heart rate >110  ondansetron Injectable 4 milliGRAM(s) IV Push every 8 hours PRN Nausea and/or Vomiting  sodium chloride 0.65% Nasal 1 Spray(s) Both Nostrils two times a day PRN Nasal Congestion      Allergies    No Known Allergies    Intolerances        Vital Signs Last 24 Hrs  T(C): 37.8 (25 Feb 2025 11:58), Max: 37.8 (25 Feb 2025 11:58)  T(F): 100 (25 Feb 2025 11:58), Max: 100 (25 Feb 2025 11:58)  HR: 107 (25 Feb 2025 16:00) (101 - 126)  BP: 117/80 (25 Feb 2025 16:00) (99/71 - 143/93)  BP(mean): 91 (25 Feb 2025 16:00) (80 - 106)  RR: 28 (25 Feb 2025 16:00) (21 - 43)  SpO2: 98% (25 Feb 2025 16:00) (93% - 100%)    Parameters below as of 25 Feb 2025 08:00  Patient On (Oxygen Delivery Method): nasal cannula w/ humidification  O2 Flow (L/min): 3        PHYSICAL EXAMINATION:    NECK:  Supple. No lymphadenopathy. Jugular venous pressure not elevated. Carotids equal.   HEART:   The cardiac impulse has a normal quality. Reg., Nl S1 and S2.  There are no murmurs, rubs or gallops noted  CHEST:  Chest crackles to auscultation. Normal respiratory effort.  ABDOMEN:  Soft and nontender.   EXTREMITIES:  There is no edema.       LABS:                        9.7    24.26 )-----------( 182      ( 25 Feb 2025 05:07 )             29.1     02-25    149[H]  |  116[H]  |  92[H]  ----------------------------<  227[H]  4.0   |  25  |  1.63[H]    Ca    8.6      25 Feb 2025 05:07  Phos  1.7     02-25  Mg     1.8     02-25    TPro  5.3[L]  /  Alb  2.3[L]  /  TBili  1.1  /  DBili  x   /  AST  77[H]  /  ALT  55  /  AlkPhos  238[H]  02-25      Urinalysis Basic - ( 25 Feb 2025 05:07 )    Color: x / Appearance: x / SG: x / pH: x  Gluc: 227 mg/dL / Ketone: x  / Bili: x / Urobili: x   Blood: x / Protein: x / Nitrite: x   Leuk Esterase: x / RBC: x / WBC x   Sq Epi: x / Non Sq Epi: x / Bacteria: x

## 2025-02-25 NOTE — PROGRESS NOTE ADULT - ASSESSMENT
69-year-old female with stage IV pancreatic adenocarcinoma presenting with neutropenic fever and severe treatment-related toxicities from modified FOLFIRINOX and investigational EMILY inhibitor [id number].    PROBLEMS:    Acute hypoxic respiratory failure likely secondary to hypervolemia   Bilateral pleural effusions with compressive atelectasis, right greater than left  Neutropenic Fever/Neutropenic Septic shock  Severe Thrombocytopenia-Platelet count critically low at 26k.   Severe diarrhea and inability to tolerate PO intake, likely multifactorial from both FOLFIRINOX (particularly irinotecan component) and study drug [id number].   History of left cephalic vein thrombosis.  MALLORY  69-year-old female with stage IV pancreatic adenocarcinoma presenting with neutropenic fever and severe treatment-related toxicities from modified FOLFIRINOX and investigational EMILY inhibitor [id number].  CT Angio Chest PE Protocol w/ IV Cont (02.19.25 @ 14:16) >  CHEST:  Multiple pulmonary emboli within the segmental branches of the left lower and right lower lobe pulmonary arteries.  There are two right middle lobe nodules, new since prior. Exact   etiology is unclear. Primary differential diagnostic consideration   includes infection.      PLAN:    Pulmonary better  CTA chest + small PEs in RLL and LLL- IV heparin, no bolus-on hold since Gi bleed, discuss with , watch off anti-coagulant vs restart anti-coagulants, does not wants anti-coagulant wants to put GFF as risk of further clots with pancreatic CA-Left massage with intensivitis Dr Geiger  Will consult IR for GFF  DECD steroids  High WBC-trend  Pulmonary tachpnea may be RTA with low bicarbonate because of GI cause with diarrhea but improving renal fx  Neutropenic Septic shock- IV meropenem started on 2/15- previously on CEFEPIME/Continue filgrastim 480mcg daily-Today w/ improvement in counts- WBC 1.3, Hb 10.1, plt 37, cmp with k 2.3 and Repleted, Cr 1.40.   Daily CBC monitoring  Maintain strict neutropenic precautions  IV diuretics-now on RA doing well   EMPIRIC TREATMENT FOR ESOPHAGEAL CANDIDIASIS-continue with DIFLUCAN   Anemia-received two units of pRBCs Hb 10.1 today   Thrombocytopenia-Transfuse for plts < 15   D/w staff & /haematology & ICU in detail

## 2025-02-25 NOTE — PROGRESS NOTE ADULT - ASSESSMENT
69-year-old female recent diagnosis of primary pancreatic adenocarcinoma , pancytopenia and significant PMH of SCC lip, cyclical vomiting syndrome, HTN, HPL, Gout, GERD, CVA w renal evaluation of MALLORY and hypokalemia. Per documents was recently at  [facility] with septic shock on 02/09/2025, nausea, vomiting and diarrhea requiring Levophed drip. Per documents, patient  transferred to  for continued care. Patient with + diuresis w lasix 80 IV, increaserd UOP > 4L.   Treated w IV and po k repletion.     MALLORY  -LIkely pre renal w insensible losses and diuresis, signficant UOP  -IVF to maintain even to positive today  -Trend labs, lytes. Repeat this afternoon  -Monitor UOP closely  -Avoid nsaids/contrast  -Renally dosed abx    Hypokalemia  -K repletion iv/po  -TPN    Sepsis/Pseduomonas  -ID/abx  -FU cultures    d/c with RN staff, Dr Tong, Dr Henry, Dr Maik Sweet to resume care in AM    2/18  MALLORY resolving  Azotemia, responded to IVF resuscitation  Urine output improved  hypoalbuminemia- on spa q 8 h for oncotic pressure and glomerular filtration  NAGMA- hyperchloremia may be due to diarrhea and GI losses  On Meropenem- very rarely associated w Fanconi syndrome  - will check urinary PO4, serum uric acid, repeat PO4 level, low this am  ABG  Lactate  CBC  VQ results pending  d/w Dr YOUSUF Pleitez    Addendum  Repeat labs reviewed d/w intensivist  Lactate 2.7  ABG c/w primary respiratory alkalosis with metabolic acidosis and NAGMA    2/19  Pancreatic Adenoca  MALLORY prerenal, correcting  Primary Hyperventilation, metab acidosis and non anion acidosis  VQ indeterminate  on 2/18 for PE  For CTA today, low risk MAXIMO due to adequate hydration status, UO 3600 ml / last 24 hrs  D/W Dr Tong and Dr Pleitez    2/20  Pancreatic AdenoCa  MALLORY slowly correcting  Bilateral pulm emboli  Creat stable post IV Contrast but will monitor for 48 hrs  Abd distension less  hypokalemia being replaced    2/21  Pancreatic AdenoCa  MALLORY slowly correcting, s/p IV Contrast  Bilateral pulm emboli on IV heparin  Creat stable post IV Contrast but will monitor for 48 hrs  Abd distension improved but still diminished BS  hypokalemia being replaced by ICU team    2/22  Pancreatic AdenoCa with METS  MALLORY slowly correcting, s/p IV Contrast  Bilateral pulm emboli on IV heparin, small bowel and large bowel distention  Creat stable post IV Contras  Abd distension improved but still diminished BS  hypokalemia being replaced by ICU team- need to keep   potassium in normal levels as it may contribute to ileus  LR dcd   CO2 improved    2/24  Pancreatic AdenoCa with METS  MALLORY slowly correcting,  mild elevation due to diuretics  Bilateral pulm emboli IV heparin held due to anemia  CT small bowel and large bowel distention  Creat stable post IV Contrast  Abd distension improved but still diminished BS  hypokalemia being replaced  CO2 improved    2/25  Pancreatic Ca, Bilateral pulm emboli  Hypernatremia  Receiving LR/ d/w TPN team to reduce Na in TPN  Repeat labs in 4 hrs  MALLORY/ ATN

## 2025-02-25 NOTE — PROGRESS NOTE ADULT - SUBJECTIVE AND OBJECTIVE BOX
Date of service: 02-25-25 @ 10:57    Lying in bed in NAD  No BM overnight  Poor PO intake  No fever    ROS: no fever or chills; denies dizziness, no HA, no SOB or cough, no abdominal pain, no diarrhea or constipation; no dysuria, no legs pain, no rashes    MEDICATIONS  (STANDING):  acetaminophen   IVPB .. 1000 milliGRAM(s) IV Intermittent once  acetaminophen   IVPB .. 1000 milliGRAM(s) IV Intermittent once  albumin human 25% IVPB 50 milliLiter(s) IV Intermittent every 8 hours  chlorhexidine 2% Cloths 1 Application(s) Topical <User Schedule>  cholecalciferol 2000 Unit(s) Oral at bedtime  dextrose 5%. 1000 milliLiter(s) (50 mL/Hr) IV Continuous <Continuous>  dextrose 5%. 1000 milliLiter(s) (100 mL/Hr) IV Continuous <Continuous>  dextrose 50% Injectable 25 Gram(s) IV Push once  dextrose 50% Injectable 12.5 Gram(s) IV Push once  dextrose 50% Injectable 25 Gram(s) IV Push once  famotidine Injectable 20 milliGRAM(s) IV Push daily  fluconAZOLE IVPB      fluconAZOLE IVPB 200 milliGRAM(s) IV Intermittent every 24 hours  glucagon  Injectable 1 milliGRAM(s) IntraMuscular once  insulin glargine Injectable (LANTUS) 18 Unit(s) SubCutaneous at bedtime  insulin lispro (ADMELOG) corrective regimen sliding scale   SubCutaneous every 6 hours  lipid, fat emulsion (Fish Oil and Plant Based) 20% Infusion 0.681 Gm/kG/Day (20.8 mL/Hr) IV Continuous <Continuous>  lipid, fat emulsion (Fish Oil and Plant Based) 20% Infusion 0.681 Gm/kG/Day (20.83 mL/Hr) IV Continuous <Continuous>  metoprolol tartrate Injectable 5 milliGRAM(s) IV Push every 6 hours  pantoprazole  Injectable 40 milliGRAM(s) IV Push every 12 hours  Parenteral Nutrition - Adult 1 Each (75 mL/Hr) TPN Continuous <Continuous>  Parenteral Nutrition - Adult 1 Each (75 mL/Hr) TPN Continuous <Continuous>  sucralfate suspension 1 Gram(s) Oral four times a day  vancomycin    Solution 125 milliGRAM(s) Oral every 6 hours    Vital Signs Last 24 Hrs  T(C): 37.1 (25 Feb 2025 08:19), Max: 37.1 (25 Feb 2025 08:19)  T(F): 98.7 (25 Feb 2025 08:19), Max: 98.7 (25 Feb 2025 08:19)  HR: 122 (25 Feb 2025 10:00) (103 - 131)  BP: 128/83 (25 Feb 2025 10:00) (94/50 - 144/96)  BP(mean): 96 (25 Feb 2025 10:00) (59 - 108)  RR: 34 (25 Feb 2025 10:00) (21 - 43)  SpO2: 94% (25 Feb 2025 09:00) (93% - 100%)    Parameters below as of 25 Feb 2025 08:00  Patient On (Oxygen Delivery Method): nasal cannula     Physical exam:    Constitutional:  No acute distress  HEENT: NC/AT, EOMI, PERRLA, conjunctivae clear; ears and nose atraumatic; pharynx benign  Neck: supple; thyroid not palpable  Back: no tenderness  Respiratory: respiratory effort normal; clear to auscultation  Cardiovascular: S1S2 regular, no murmurs  Abdomen: soft, mild periombilical tender, not distended, positive BS; no liver or spleen organomegaly  Genitourinary: no suprapubic tenderness  Lymphatic: no LN palpable  Musculoskeletal: no muscle tenderness, no joint swelling or tenderness  Extremities: no pedal edema  Neurological/ Psychiatric: AxOx3, judgement and insight normal; moving all extremities  Skin: no rashes; right lower chest wall dry, scabbed ulcer; no discharge     Labs: reviewed                        9.7    24.26 )-----------( 182      ( 25 Feb 2025 05:07 )             29.1     02-25    149[H]  |  116[H]  |  92[H]  ----------------------------<  227[H]  4.0   |  25  |  1.63[H]    Ca    8.6      25 Feb 2025 05:07  Phos  1.7     02-25  Mg     1.8     02-25    TPro  5.3[L]  /  Alb  2.3[L]  /  TBili  1.1  /  DBili  x   /  AST  77[H]  /  ALT  55  /  AlkPhos  238[H]  02-25    C-Reactive Protein: 209.0 mg/mL (02-15-25 @ 05:17)  C-Reactive Protein: 233.0 mg/mL (02-14-25 @ 06:50)  Ferritin: 1798 ng/mL (02-13-25 @ 18:46)                        9.9    24.08 )-----------( 164      ( 24 Feb 2025 04:45 )             28.2     02-24    146[H]  |  111[H]  |  84[H]  ----------------------------<  233[H]  3.9   |  29  |  1.53[H]    Ca    8.2[L]      24 Feb 2025 04:45  Phos  3.6     02-24  Mg     1.7     02-24    TPro  5.3[L]  /  Alb  2.9[L]  /  TBili  1.3[H]  /  DBili  x   /  AST  75[H]  /  ALT  49  /  AlkPhos  225[H]  02-24    C-Reactive Protein: 209.0 mg/mL (02-15-25 @ 05:17)  C-Reactive Protein: 233.0 mg/mL (02-14-25 @ 06:50)  Ferritin: 1798 ng/mL (02-13-25 @ 18:46)                        10.5   35.65 )-----------( 132      ( 21 Feb 2025 05:18 )             31.0     02-21    141  |  105  |  66[H]  ----------------------------<  358[H]  3.2[L]   |  26  |  1.33[H]    Ca    8.2[L]      21 Feb 2025 05:18  Phos  3.6     02-21  Mg     2.0     02-21    TPro  5.3[L]  /  Alb  2.1[L]  /  TBili  0.6  /  DBili  x   /  AST  56[H]  /  ALT  33  /  AlkPhos  329[H]  02-21    C-Reactive Protein: 209.0 mg/mL (02-15-25 @ 05:17)  C-Reactive Protein: 233.0 mg/mL (02-14-25 @ 06:50)  Ferritin: 1798 ng/mL (02-13-25 @ 18:46)                        10.4   5.43  )-----------( 45       ( 18 Feb 2025 08:42 )             29.4     02-18    138  |  112[H]  |  69[H]  ----------------------------<  223[H]  4.1   |  16[L]  |  1.44[H]    Ca    8.6      18 Feb 2025 08:42  Phos  2.0     02-18  Mg     2.2     02-18    TPro  5.1[L]  /  Alb  2.4[L]  /  TBili  0.6  /  DBili  x   /  AST  27  /  ALT  21  /  AlkPhos  131[H]  02-18    C-Reactive Protein: 209.0 mg/mL (02-15-25 @ 05:17)  C-Reactive Protein: 233.0 mg/mL (02-14-25 @ 06:50)  Ferritin: 1798 ng/mL (02-13-25 @ 18:46)                        10.1   1.30  )-----------( 37       ( 17 Feb 2025 05:59 )             28.5     02-17    140  |  110[H]  |  70[H]  ----------------------------<  227[H]  2.3[LL]   |  17[L]  |  1.73[H]    Ca    8.4[L]      17 Feb 2025 05:59  Phos  3.2     02-17  Mg     1.5     02-17    TPro  5.2[L]  /  Alb  2.7[L]  /  TBili  0.8  /  DBili  x   /  AST  15  /  ALT  19  /  AlkPhos  101  02-17    C-Reactive Protein: 209.0 mg/mL (02-15-25 @ 05:17)  C-Reactive Protein: 233.0 mg/mL (02-14-25 @ 06:50)  Ferritin: 1798 ng/mL (02-13-25 @ 18:46)                        8.7    0.19  )-----------( 33       ( 13 Feb 2025 06:52 )             25.7     02-13    137  |  109[H]  |  20  ----------------------------<  117[H]  2.6[LL]   |  18[L]  |  1.02    Ca    8.7      13 Feb 2025 06:52  Phos  2.3     02-13  Mg     1.6     02-13    TPro  4.4[L]  /  Alb  1.1[L]  /  TBili  0.7  /  DBili  x   /  AST  11[L]  /  ALT  16  /  AlkPhos  94  02-13     LIVER FUNCTIONS - ( 13 Feb 2025 06:52 )  Alb: 1.1 g/dL / Pro: 4.4 gm/dL / ALK PHOS: 94 U/L / ALT: 16 U/L / AST: 11 U/L / GGT: x           Culture - Blood (collected 11 Feb 2025 09:00)  Source: .Blood BLOOD  Preliminary Report (12 Feb 2025 13:01):    No growth at 24 hours    Culture - Blood (collected 11 Feb 2025 08:50)  Source: .Blood BLOOD  Preliminary Report (12 Feb 2025 13:01):    No growth at 24 hours    Urinalysis with Rflx Culture (collected 09 Feb 2025 01:25)    Culture - Urine (collected 09 Feb 2025 01:25)  Source: Clean Catch  Final Report (11 Feb 2025 08:39):    50,000 - 99,000 CFU/mL Pseudomonas aeruginosa  Organism: Pseudomonas aeruginosa (11 Feb 2025 08:39)  Organism: Pseudomonas aeruginosa (11 Feb 2025 08:39)      Method Type: REJI      -  Amikacin: S <=16      -  Aztreonam: S <=4      -  Cefepime: S <=2      -  Ceftazidime: S <=1      -  Ciprofloxacin: S <=0.25      -  Imipenem: S 2      -  Levofloxacin: S <=0.5      -  Meropenem: S <=1      -  Piperacillin/Tazobactam: S <=8    Culture - Blood (collected 08 Feb 2025 22:00)  Source: .Blood BLOOD  Gram Stain (09 Feb 2025 19:04):    Growth in aerobic bottle: Gram Negative Rods  Final Report (11 Feb 2025 07:53):    Growth in aerobic bottle: Pseudomonas aeruginosa    Direct identification is available within approximately 3-5    hours either by Blood Panel Multiplexed PCR or Direct    MALDI-TOF. Details: [email or web address]  Organism: Blood Culture PCR  Pseudomonas aeruginosa (11 Feb 2025 07:53)  Organism: Pseudomonas aeruginosa (11 Feb 2025 07:53)      Method Type: REJI      -  Aztreonam: S <=4      -  Cefepime: S <=2      -  Ceftazidime: S <=1      -  Ciprofloxacin: S <=0.25      -  Imipenem: S <=1      -  Levofloxacin: S <=0.5      -  Meropenem: S <=1      -  Piperacillin/Tazobactam: S <=8  Organism: Blood Culture PCR (11 Feb 2025 07:53)      Method Type: PCR      -  Pseudomonas aeruginosa: Detec    Culture - Blood (collected 08 Feb 2025 21:55)  Source: .Blood BLOOD  Gram Stain (09 Feb 2025 19:32):    Growth in aerobic bottle: Gram Negative Rods  Final Report (11 Feb 2025 07:54):    Growth in aerobic bottle: Pseudomonas aeruginosa    See previous culture [id number]    Radiology: all available radiological tests reviewed    < from: US Abdomen Upper Quadrant Right (02.09.25 @ 14:55) >  Distended gallbladder with layering sludge.  8 mm right renal calculus without hydronephrosis.  Hepatomegaly.  Right renal cyst.  < end of copied text >    < from: CT Abdomen and Pelvis No Cont (02.09.25 @ 03:01) >  1.   Study somewhat limited due to absence of contrast.  2.   Axial images 71-76 of series 301 and concomitant coronal images demonstrate prominent soft tissue inseparable from the body of the pancreas consistent with pancreatic neoplasm.  3.   Some distension of gallbladder could be further evaluated with right upper quadrant sonography. No definite biliary dilatation.  < end of copied text >    Advanced directives addressed: full resuscitation

## 2025-02-26 LAB
ADD ON TEST-SPECIMEN IN LAB: SIGNIFICANT CHANGE UP
ALBUMIN SERPL ELPH-MCNC: 2.7 G/DL — LOW (ref 3.3–5)
ALP SERPL-CCNC: 307 U/L — HIGH (ref 40–120)
ALT FLD-CCNC: 87 U/L — HIGH (ref 12–78)
AMYLASE P1 CFR SERPL: 54 U/L — SIGNIFICANT CHANGE UP (ref 25–115)
ANION GAP SERPL CALC-SCNC: 9 MMOL/L — SIGNIFICANT CHANGE UP (ref 5–17)
APTT BLD: 35.6 SEC — SIGNIFICANT CHANGE UP (ref 24.5–35.6)
APTT BLD: 91.5 SEC — HIGH (ref 24.5–35.6)
APTT BLD: 94.5 SEC — HIGH (ref 24.5–35.6)
AST SERPL-CCNC: 107 U/L — HIGH (ref 15–37)
BILIRUB SERPL-MCNC: 1.1 MG/DL — SIGNIFICANT CHANGE UP (ref 0.2–1.2)
BUN SERPL-MCNC: 96 MG/DL — HIGH (ref 7–23)
CALCIUM SERPL-MCNC: 8.9 MG/DL — SIGNIFICANT CHANGE UP (ref 8.5–10.1)
CHLORIDE SERPL-SCNC: 115 MMOL/L — HIGH (ref 96–108)
CO2 SERPL-SCNC: 23 MMOL/L — SIGNIFICANT CHANGE UP (ref 22–31)
CREAT SERPL-MCNC: 1.72 MG/DL — HIGH (ref 0.5–1.3)
EGFR: 32 ML/MIN/1.73M2 — LOW
EGFR: 32 ML/MIN/1.73M2 — LOW
GLUCOSE BLDC GLUCOMTR-MCNC: 134 MG/DL — HIGH (ref 70–99)
GLUCOSE BLDC GLUCOMTR-MCNC: 135 MG/DL — HIGH (ref 70–99)
GLUCOSE BLDC GLUCOMTR-MCNC: 146 MG/DL — HIGH (ref 70–99)
GLUCOSE BLDC GLUCOMTR-MCNC: 158 MG/DL — HIGH (ref 70–99)
GLUCOSE BLDC GLUCOMTR-MCNC: 193 MG/DL — HIGH (ref 70–99)
GLUCOSE SERPL-MCNC: 169 MG/DL — HIGH (ref 70–99)
HCT VFR BLD CALC: 25.3 % — LOW (ref 34.5–45)
HCT VFR BLD CALC: 28.1 % — LOW (ref 34.5–45)
HCT VFR BLD CALC: 28.1 % — LOW (ref 34.5–45)
HCT VFR BLD CALC: 32.9 % — LOW (ref 34.5–45)
HCT VFR BLD CALC: 35.9 % — SIGNIFICANT CHANGE UP (ref 34.5–45)
HGB BLD-MCNC: 10.6 G/DL — LOW (ref 11.5–15.5)
HGB BLD-MCNC: 12 G/DL — SIGNIFICANT CHANGE UP (ref 11.5–15.5)
HGB BLD-MCNC: 8.5 G/DL — LOW (ref 11.5–15.5)
HGB BLD-MCNC: 9.3 G/DL — LOW (ref 11.5–15.5)
HGB BLD-MCNC: 9.4 G/DL — LOW (ref 11.5–15.5)
LIDOCAIN IGE QN: 58 U/L — SIGNIFICANT CHANGE UP (ref 13–75)
MAGNESIUM SERPL-MCNC: 1.9 MG/DL — SIGNIFICANT CHANGE UP (ref 1.6–2.6)
MCHC RBC-ENTMCNC: 29 PG — SIGNIFICANT CHANGE UP (ref 27–34)
MCHC RBC-ENTMCNC: 29.6 PG — SIGNIFICANT CHANGE UP (ref 27–34)
MCHC RBC-ENTMCNC: 30.2 PG — SIGNIFICANT CHANGE UP (ref 27–34)
MCHC RBC-ENTMCNC: 30.3 PG — SIGNIFICANT CHANGE UP (ref 27–34)
MCHC RBC-ENTMCNC: 30.8 PG — SIGNIFICANT CHANGE UP (ref 27–34)
MCHC RBC-ENTMCNC: 32.2 G/DL — SIGNIFICANT CHANGE UP (ref 32–36)
MCHC RBC-ENTMCNC: 33.1 G/DL — SIGNIFICANT CHANGE UP (ref 32–36)
MCHC RBC-ENTMCNC: 33.4 G/DL — SIGNIFICANT CHANGE UP (ref 32–36)
MCHC RBC-ENTMCNC: 33.5 G/DL — SIGNIFICANT CHANGE UP (ref 32–36)
MCHC RBC-ENTMCNC: 33.6 G/DL — SIGNIFICANT CHANGE UP (ref 32–36)
MCV RBC AUTO: 88.4 FL — SIGNIFICANT CHANGE UP (ref 80–100)
MCV RBC AUTO: 89.9 FL — SIGNIFICANT CHANGE UP (ref 80–100)
MCV RBC AUTO: 90.4 FL — SIGNIFICANT CHANGE UP (ref 80–100)
MCV RBC AUTO: 91.5 FL — SIGNIFICANT CHANGE UP (ref 80–100)
MCV RBC AUTO: 91.7 FL — SIGNIFICANT CHANGE UP (ref 80–100)
NRBC # BLD AUTO: 0.25 K/UL — HIGH (ref 0–0)
NRBC # BLD AUTO: 0.27 K/UL — HIGH (ref 0–0)
NRBC # BLD AUTO: 0.41 K/UL — HIGH (ref 0–0)
NRBC # BLD AUTO: 0.43 K/UL — HIGH (ref 0–0)
NRBC # BLD AUTO: 0.45 K/UL — HIGH (ref 0–0)
NRBC # FLD: 0.25 K/UL — HIGH (ref 0–0)
NRBC # FLD: 0.27 K/UL — HIGH (ref 0–0)
NRBC # FLD: 0.41 K/UL — HIGH (ref 0–0)
NRBC # FLD: 0.43 K/UL — HIGH (ref 0–0)
NRBC # FLD: 0.45 K/UL — HIGH (ref 0–0)
NRBC BLD AUTO-RTO: 1 /100 WBCS — HIGH (ref 0–0)
NRBC BLD AUTO-RTO: 1 /100 WBCS — HIGH (ref 0–0)
NRBC BLD AUTO-RTO: 2 /100 WBCS — HIGH (ref 0–0)
OSMOLALITY UR: 429 MOSM/KG — SIGNIFICANT CHANGE UP (ref 50–1200)
PHOSPHATE SERPL-MCNC: 2.2 MG/DL — LOW (ref 2.5–4.5)
PLATELET # BLD AUTO: 122 K/UL — LOW (ref 150–400)
PLATELET # BLD AUTO: 167 K/UL — SIGNIFICANT CHANGE UP (ref 150–400)
PLATELET # BLD AUTO: 188 K/UL — SIGNIFICANT CHANGE UP (ref 150–400)
PLATELET # BLD AUTO: 205 K/UL — SIGNIFICANT CHANGE UP (ref 150–400)
PLATELET # BLD AUTO: 208 K/UL — SIGNIFICANT CHANGE UP (ref 150–400)
PMV BLD: SIGNIFICANT CHANGE UP FL (ref 7–13)
POTASSIUM SERPL-MCNC: 4 MMOL/L — SIGNIFICANT CHANGE UP (ref 3.5–5.3)
POTASSIUM SERPL-SCNC: 4 MMOL/L — SIGNIFICANT CHANGE UP (ref 3.5–5.3)
PROT SERPL-MCNC: 5.5 GM/DL — LOW (ref 6–8.3)
RBC # BLD: 2.76 M/UL — LOW (ref 3.8–5.2)
RBC # BLD: 3.07 M/UL — LOW (ref 3.8–5.2)
RBC # BLD: 3.11 M/UL — LOW (ref 3.8–5.2)
RBC # BLD: 3.66 M/UL — LOW (ref 3.8–5.2)
RBC # BLD: 4.06 M/UL — SIGNIFICANT CHANGE UP (ref 3.8–5.2)
RBC # FLD: 15.2 % — HIGH (ref 10.3–14.5)
RBC # FLD: 15.7 % — HIGH (ref 10.3–14.5)
RBC # FLD: 16.3 % — HIGH (ref 10.3–14.5)
RBC # FLD: 16.4 % — HIGH (ref 10.3–14.5)
RBC # FLD: 16.6 % — HIGH (ref 10.3–14.5)
SODIUM SERPL-SCNC: 147 MMOL/L — HIGH (ref 135–145)
SODIUM UR-SCNC: 52 MMOL/L — SIGNIFICANT CHANGE UP
WBC # BLD: 22.63 K/UL — HIGH (ref 3.8–10.5)
WBC # BLD: 24.92 K/UL — HIGH (ref 3.8–10.5)
WBC # BLD: 25.69 K/UL — HIGH (ref 3.8–10.5)
WBC # BLD: 26.04 K/UL — HIGH (ref 3.8–10.5)
WBC # BLD: 26.18 K/UL — HIGH (ref 3.8–10.5)
WBC # FLD AUTO: 22.63 K/UL — HIGH (ref 3.8–10.5)
WBC # FLD AUTO: 24.92 K/UL — HIGH (ref 3.8–10.5)
WBC # FLD AUTO: 25.69 K/UL — HIGH (ref 3.8–10.5)
WBC # FLD AUTO: 26.04 K/UL — HIGH (ref 3.8–10.5)
WBC # FLD AUTO: 26.18 K/UL — HIGH (ref 3.8–10.5)

## 2025-02-26 PROCEDURE — 99232 SBSQ HOSP IP/OBS MODERATE 35: CPT

## 2025-02-26 PROCEDURE — 99221 1ST HOSP IP/OBS SF/LOW 40: CPT | Mod: FS

## 2025-02-26 PROCEDURE — 99233 SBSQ HOSP IP/OBS HIGH 50: CPT

## 2025-02-26 PROCEDURE — 93970 EXTREMITY STUDY: CPT | Mod: 26

## 2025-02-26 RX ORDER — SODIUM CHLORIDE 9 G/1000ML
1000 INJECTION, SOLUTION INTRAVENOUS
Refills: 0 | Status: DISCONTINUED | OUTPATIENT
Start: 2025-02-26 | End: 2025-02-28

## 2025-02-26 RX ORDER — ACETAMINOPHEN 500 MG/5ML
1000 LIQUID (ML) ORAL ONCE
Refills: 0 | Status: COMPLETED | OUTPATIENT
Start: 2025-02-26 | End: 2025-02-28

## 2025-02-26 RX ORDER — I.V. FAT EMULSION 20 G/100ML
0.68 EMULSION INTRAVENOUS
Qty: 50 | Refills: 0 | Status: DISCONTINUED | OUTPATIENT
Start: 2025-02-26 | End: 2025-02-26

## 2025-02-26 RX ORDER — SODIUM/POT/MAG/CALC/CHLOR/ACET 35-20-5MEQ
1 VIAL (ML) INTRAVENOUS
Refills: 0 | Status: DISCONTINUED | OUTPATIENT
Start: 2025-02-26 | End: 2025-02-26

## 2025-02-26 RX ORDER — POTASSIUM PHOSPHATE, MONOBASIC POTASSIUM PHOSPHATE, DIBASIC INJECTION, 236; 224 MG/ML; MG/ML
15 SOLUTION, CONCENTRATE INTRAVENOUS ONCE
Refills: 0 | Status: COMPLETED | OUTPATIENT
Start: 2025-02-26 | End: 2025-02-26

## 2025-02-26 RX ADMIN — ALBUMIN (HUMAN) 50 MILLILITER(S): 12.5 INJECTION, SOLUTION INTRAVENOUS at 05:34

## 2025-02-26 RX ADMIN — Medication 125 MILLIGRAM(S): at 12:41

## 2025-02-26 RX ADMIN — Medication 1 EACH: at 22:54

## 2025-02-26 RX ADMIN — Medication 1 GRAM(S): at 17:13

## 2025-02-26 RX ADMIN — METOPROLOL SUCCINATE 5 MILLIGRAM(S): 50 TABLET, EXTENDED RELEASE ORAL at 01:59

## 2025-02-26 RX ADMIN — Medication 1 GRAM(S): at 22:58

## 2025-02-26 RX ADMIN — HEPARIN SODIUM 4400 UNIT(S): 1000 INJECTION INTRAVENOUS; SUBCUTANEOUS at 04:35

## 2025-02-26 RX ADMIN — Medication 125 MILLIGRAM(S): at 17:13

## 2025-02-26 RX ADMIN — ALBUMIN (HUMAN) 50 MILLILITER(S): 12.5 INJECTION, SOLUTION INTRAVENOUS at 12:40

## 2025-02-26 RX ADMIN — METOPROLOL SUCCINATE 25 MILLIGRAM(S): 50 TABLET, EXTENDED RELEASE ORAL at 01:08

## 2025-02-26 RX ADMIN — Medication 125 MILLIGRAM(S): at 05:35

## 2025-02-26 RX ADMIN — ALBUMIN (HUMAN) 50 MILLILITER(S): 12.5 INJECTION, SOLUTION INTRAVENOUS at 19:38

## 2025-02-26 RX ADMIN — Medication 1 GRAM(S): at 05:35

## 2025-02-26 RX ADMIN — I.V. FAT EMULSION 20.8 GM/KG/DAY: 20 EMULSION INTRAVENOUS at 22:55

## 2025-02-26 RX ADMIN — METOPROLOL SUCCINATE 25 MILLIGRAM(S): 50 TABLET, EXTENDED RELEASE ORAL at 05:35

## 2025-02-26 RX ADMIN — SODIUM CHLORIDE 75 MILLILITER(S): 9 INJECTION, SOLUTION INTRAVENOUS at 12:42

## 2025-02-26 RX ADMIN — INSULIN GLARGINE-YFGN 22 UNIT(S): 100 INJECTION, SOLUTION SUBCUTANEOUS at 22:57

## 2025-02-26 RX ADMIN — Medication 20 MILLIGRAM(S): at 22:57

## 2025-02-26 RX ADMIN — Medication 1 GRAM(S): at 12:41

## 2025-02-26 RX ADMIN — INSULIN LISPRO 2: 100 INJECTION, SOLUTION INTRAVENOUS; SUBCUTANEOUS at 12:42

## 2025-02-26 RX ADMIN — Medication 40 MILLIGRAM(S): at 22:56

## 2025-02-26 RX ADMIN — HEPARIN SODIUM 1100 UNIT(S)/HR: 1000 INJECTION INTRAVENOUS; SUBCUTANEOUS at 07:37

## 2025-02-26 RX ADMIN — Medication 1 GRAM(S): at 01:07

## 2025-02-26 RX ADMIN — Medication 125 MILLIGRAM(S): at 22:58

## 2025-02-26 RX ADMIN — Medication 40 MILLIGRAM(S): at 10:08

## 2025-02-26 RX ADMIN — Medication 125 MILLIGRAM(S): at 01:08

## 2025-02-26 RX ADMIN — HEPARIN SODIUM 1100 UNIT(S)/HR: 1000 INJECTION INTRAVENOUS; SUBCUTANEOUS at 04:34

## 2025-02-26 RX ADMIN — Medication 100 MILLIGRAM(S): at 10:11

## 2025-02-26 RX ADMIN — Medication 2000 UNIT(S): at 22:57

## 2025-02-26 RX ADMIN — ALBUMIN (HUMAN) 50 MILLILITER(S): 12.5 INJECTION, SOLUTION INTRAVENOUS at 01:08

## 2025-02-26 RX ADMIN — Medication 1 APPLICATION(S): at 05:46

## 2025-02-26 RX ADMIN — POTASSIUM PHOSPHATE, MONOBASIC POTASSIUM PHOSPHATE, DIBASIC INJECTION, 62.5 MILLIMOLE(S): 236; 224 SOLUTION, CONCENTRATE INTRAVENOUS at 10:12

## 2025-02-26 RX ADMIN — INSULIN LISPRO 2: 100 INJECTION, SOLUTION INTRAVENOUS; SUBCUTANEOUS at 01:09

## 2025-02-26 NOTE — PROGRESS NOTE ADULT - ASSESSMENT
69-year-old female recent diagnosis of primary pancreatic adenocarcinoma , pancytopenia and significant PMH of SCC lip, cyclical vomiting syndrome, HTN, HPL, Gout, GERD, CVA w renal evaluation of MALLORY and hypokalemia. Per documents was recently at  [facility] with septic shock on 02/09/2025, nausea, vomiting and diarrhea requiring Levophed drip. Per documents, patient  transferred to  for continued care. Patient with + diuresis w lasix 80 IV, increaserd UOP > 4L.   Treated w IV and po k repletion.     MALLORY  -LIkely pre renal w insensible losses and diuresis, signficant UOP  -IVF to maintain even to positive today  -Trend labs, lytes. Repeat this afternoon  -Monitor UOP closely  -Avoid nsaids/contrast  -Renally dosed abx    Hypokalemia  -K repletion iv/po  -TPN    Sepsis/Pseduomonas  -ID/abx  -FU cultures    d/c with RN staff, Dr Tong, Dr Henry, Dr Maik Sweet to resume care in AM    2/18  MALLORY resolving  Azotemia, responded to IVF resuscitation  Urine output improved  hypoalbuminemia- on spa q 8 h for oncotic pressure and glomerular filtration  NAGMA- hyperchloremia may be due to diarrhea and GI losses  On Meropenem- very rarely associated w Fanconi syndrome  - will check urinary PO4, serum uric acid, repeat PO4 level, low this am  ABG  Lactate  CBC  VQ results pending  d/w Dr YOUSUF Pleitez    Addendum  Repeat labs reviewed d/w intensivist  Lactate 2.7  ABG c/w primary respiratory alkalosis with metabolic acidosis and NAGMA    2/19  Pancreatic Adenoca  MALLORY prerenal, correcting  Primary Hyperventilation, metab acidosis and non anion acidosis  VQ indeterminate  on 2/18 for PE  For CTA today, low risk MAXIMO due to adequate hydration status, UO 3600 ml / last 24 hrs  D/W Dr Tong and Dr Pleitez    2/20  Pancreatic AdenoCa  MALLORY slowly correcting  Bilateral pulm emboli  Creat stable post IV Contrast but will monitor for 48 hrs  Abd distension less  hypokalemia being replaced    2/21  Pancreatic AdenoCa  MALLORY slowly correcting, s/p IV Contrast  Bilateral pulm emboli on IV heparin  Creat stable post IV Contrast but will monitor for 48 hrs  Abd distension improved but still diminished BS  hypokalemia being replaced by ICU team    2/22  Pancreatic AdenoCa with METS  MALLORY slowly correcting, s/p IV Contrast  Bilateral pulm emboli on IV heparin, small bowel and large bowel distention  Creat stable post IV Contras  Abd distension improved but still diminished BS  hypokalemia being replaced by ICU team- need to keep   potassium in normal levels as it may contribute to ileus  LR dcd   CO2 improved    2/24  Pancreatic AdenoCa with METS  MALLORY slowly correcting,  mild elevation due to diuretics  Bilateral pulm emboli IV heparin held due to anemia  CT small bowel and large bowel distention  Creat stable post IV Contrast  Abd distension improved but still diminished BS  hypokalemia being replaced  CO2 improved    2/25  Pancreatic Ca, Bilateral pulm emboli  Hypernatremia  Receiving LR/ d/w TPN team to reduce Na in TPN  Repeat labs in 4 hrs  MALLORY/ ATN     2/26  MALLORY/ ATN  Pancreatic Ca/ METS  Hypernatremia improving  More apparent abd tendereness today w pt apprehension/ discomfort   Above noted  Will follow

## 2025-02-26 NOTE — CONSULT NOTE ADULT - ASSESSMENT
Interventional Radiology    Reason for consult: IVC filter placement        HPI: 69-year-old female with stage IV pancreatic adenocarcinoma  with PE and no DVT on imaging with anemia . IR consulted for IVC filter.     Allergies: No Known Allergies    Medications (Abx/Cardiac/Anticoagulation/Blood Products)    fluconAZOLE IVPB: 100 mL/Hr IV Intermittent (02-25 @ 12:18)  heparin  Infusion.: 1100 Unit(s)/Hr IV Continuous (02-26 @ 04:35)  metoprolol tartrate: 25 milliGRAM(s) Oral (02-26 @ 05:35)  metoprolol tartrate Injectable: 5 milliGRAM(s) IV Push (02-25 @ 12:18)  metoprolol tartrate Injectable: 2.5 milliGRAM(s) IV Push (02-24 @ 11:37)  vancomycin    Solution: 125 milliGRAM(s) Oral (02-26 @ 05:35)    Data:    T(C): 36.4  HR: 109  BP: 137/76  RR: 22  SpO2: 96%    -WBC 24.92 / HgB 8.5 / Hct 25.3 / Plt 188  -Na 147 / Cl 115 / BUN 96 / Glucose 169  -K 4.0 / CO2 23 / Cr 1.72  -ALT 87 / Alk Phos 307 / T.Bili 1.1  -INR -- / PTT 35.6         Assessment/Plan: -69-year-old female with stage IV pancreatic adenocarcinoma  with PE and no DVT on imaging with anemia . IR consulted for IVC filter.     - Pt tolerating heparin drip per GI and SICU.  - Since pt has no DVT an IVC F is not indicated.  - D/w Dr. Ivy, Dr. Orozco and SICU.

## 2025-02-26 NOTE — PROGRESS NOTE ADULT - ASSESSMENT
69-year-old female with stage IV pancreatic adenocarcinoma presenting with neutropenic fever and severe treatment-related toxicities from modified FOLFIRINOX and investigational EMILY inhibitor [id number].    PROBLEMS:    Acute hypoxic respiratory failure likely secondary to hypervolemia   Bilateral pleural effusions with compressive atelectasis, right greater than left  Neutropenic Fever/Neutropenic Septic shock  Severe Thrombocytopenia-Platelet count critically low at 26k.   Severe diarrhea and inability to tolerate PO intake, likely multifactorial from both FOLFIRINOX (particularly irinotecan component) and study drug [id number].   History of left cephalic vein thrombosis.  MALLORY  69-year-old female with stage IV pancreatic adenocarcinoma presenting with neutropenic fever and severe treatment-related toxicities from modified FOLFIRINOX and investigational EMILY inhibitor [id number].  CT Angio Chest PE Protocol w/ IV Cont (02.19.25 @ 14:16) >  CHEST:  Multiple pulmonary emboli within the segmental branches of the left lower and right lower lobe pulmonary arteries.  There are two right middle lobe nodules, new since prior. Exact   etiology is unclear. Primary differential diagnostic consideration   includes infection.      PLAN:    Pulmonary better  CTA chest + small PEs in RLL and LLL- IV heparin titrate & watch for any bleeding  IR consult noted  DECD steroids  High WBC-trend  Pulmonary tachpnea may be RTA with low bicarbonate because of GI cause with diarrhea but improving renal fx  Neutropenic Septic shock- IV meropenem started on 2/15- previously on CEFEPIME/Continue filgrastim 480mcg daily-Today w/ improvement in counts- WBC 1.3, Hb 10.1, plt 37, cmp with k 2.3 and Repleted, Cr 1.40.   Daily CBC monitoring  Maintain strict neutropenic precautions  IV diuretics-now on RA doing well   EMPIRIC TREATMENT FOR ESOPHAGEAL CANDIDIASIS-continue with DIFLUCAN   Anemia-received two units of pRBCs Hb 10.1 today   Thrombocytopenia-Transfuse for plts < 15   D/w staff & /haematology & ICU in detail

## 2025-02-26 NOTE — PROGRESS NOTE ADULT - SUBJECTIVE AND OBJECTIVE BOX
Date of service: 02-26-25 @ 09:28    Sitting in bed in NAD  Has poor PO intake  Dysphagia  No loose stools reported   Low grade fever    ROS: denies dizziness, no HA, no SOB or cough, no dysuria, no legs pain, no rashes    MEDICATIONS  (STANDING):  acetaminophen   IVPB .. 1000 milliGRAM(s) IV Intermittent once  albumin human 25% IVPB 50 milliLiter(s) IV Intermittent every 6 hours  chlorhexidine 2% Cloths 1 Application(s) Topical <User Schedule>  cholecalciferol 2000 Unit(s) Oral at bedtime  dextrose 5%. 1000 milliLiter(s) (50 mL/Hr) IV Continuous <Continuous>  dextrose 5%. 1000 milliLiter(s) (100 mL/Hr) IV Continuous <Continuous>  dextrose 50% Injectable 25 Gram(s) IV Push once  dextrose 50% Injectable 12.5 Gram(s) IV Push once  dextrose 50% Injectable 25 Gram(s) IV Push once  famotidine Injectable 20 milliGRAM(s) IV Push daily  fluconAZOLE IVPB      fluconAZOLE IVPB 200 milliGRAM(s) IV Intermittent every 24 hours  glucagon  Injectable 1 milliGRAM(s) IntraMuscular once  heparin  Infusion.  Unit(s)/Hr (9 mL/Hr) IV Continuous <Continuous>  insulin glargine Injectable (LANTUS) 22 Unit(s) SubCutaneous at bedtime  insulin lispro (ADMELOG) corrective regimen sliding scale   SubCutaneous every 6 hours  lactated ringers. 1000 milliLiter(s) (100 mL/Hr) IV Continuous <Continuous>  lipid, fat emulsion (Fish Oil and Plant Based) 20% Infusion 0.681 Gm/kG/Day (20.83 mL/Hr) IV Continuous <Continuous>  metoprolol tartrate 25 milliGRAM(s) Oral every 6 hours  pantoprazole  Injectable 40 milliGRAM(s) IV Push every 12 hours  Parenteral Nutrition - Adult 1 Each (75 mL/Hr) TPN Continuous <Continuous>  potassium phosphate IVPB 15 milliMole(s) IV Intermittent once  sucralfate suspension 1 Gram(s) Oral four times a day  vancomycin    Solution 125 milliGRAM(s) Oral every 6 hours    Vital Signs Last 24 Hrs  T(C): 36.4 (26 Feb 2025 06:00), Max: 37.8 (25 Feb 2025 11:58)  T(F): 97.6 (26 Feb 2025 06:00), Max: 100 (25 Feb 2025 11:58)  HR: 109 (26 Feb 2025 06:00) (101 - 126)  BP: 137/76 (26 Feb 2025 06:00) (99/71 - 141/85)  BP(mean): 95 (26 Feb 2025 06:00) (80 - 107)  RR: 22 (26 Feb 2025 06:00) (20 - 35)  SpO2: 96% (26 Feb 2025 06:00) (95% - 100%)    Parameters below as of 26 Feb 2025 06:00  Patient On (Oxygen Delivery Method): nasal cannula w/ humidification  O2 Flow (L/min): 4     Physical exam:    Constitutional:  No acute distress  HEENT: NC/AT, EOMI, PERRLA, conjunctivae clear; ears and nose atraumatic; pharynx benign  Neck: supple; thyroid not palpable  Back: no tenderness  Respiratory: respiratory effort normal; decreased BS at bases  Cardiovascular: S1S2 regular, no murmurs  Abdomen: soft, mild periombilical tender, not distended, positive BS; no liver or spleen organomegaly  Genitourinary: no suprapubic tenderness  Lymphatic: no LN palpable  Musculoskeletal: no muscle tenderness, no joint swelling or tenderness  Extremities: no pedal edema  Neurological/ Psychiatric: AxOx3, judgement and insight normal; moving all extremities  Skin: no rashes; right lower chest wall dry, scabbed ulcer; no discharge     Labs: reviewed                        8.5    24.92 )-----------( 188      ( 26 Feb 2025 05:25 )             25.3     02-26    147[H]  |  115[H]  |  96[H]  ----------------------------<  169[H]  4.0   |  23  |  1.72[H]    Ca    8.9      26 Feb 2025 05:25  Phos  2.2     02-26  Mg     1.9     02-26    TPro  5.5[L]  /  Alb  2.7[L]  /  TBili  1.1  /  DBili  x   /  AST  107[H]  /  ALT  87[H]  /  AlkPhos  307[H]  02-26    C-Reactive Protein: 209.0 mg/mL (02-15-25 @ 05:17)                        9.9    24.08 )-----------( 164      ( 24 Feb 2025 04:45 )             28.2     02-24    146[H]  |  111[H]  |  84[H]  ----------------------------<  233[H]  3.9   |  29  |  1.53[H]    Ca    8.2[L]      24 Feb 2025 04:45  Phos  3.6     02-24  Mg     1.7     02-24    TPro  5.3[L]  /  Alb  2.9[L]  /  TBili  1.3[H]  /  DBili  x   /  AST  75[H]  /  ALT  49  /  AlkPhos  225[H]  02-24    C-Reactive Protein: 209.0 mg/mL (02-15-25 @ 05:17)  C-Reactive Protein: 233.0 mg/mL (02-14-25 @ 06:50)  Ferritin: 1798 ng/mL (02-13-25 @ 18:46)                        10.5   35.65 )-----------( 132      ( 21 Feb 2025 05:18 )             31.0     02-21    141  |  105  |  66[H]  ----------------------------<  358[H]  3.2[L]   |  26  |  1.33[H]    Ca    8.2[L]      21 Feb 2025 05:18  Phos  3.6     02-21  Mg     2.0     02-21    TPro  5.3[L]  /  Alb  2.1[L]  /  TBili  0.6  /  DBili  x   /  AST  56[H]  /  ALT  33  /  AlkPhos  329[H]  02-21    C-Reactive Protein: 209.0 mg/mL (02-15-25 @ 05:17)  C-Reactive Protein: 233.0 mg/mL (02-14-25 @ 06:50)  Ferritin: 1798 ng/mL (02-13-25 @ 18:46)                        10.4   5.43  )-----------( 45       ( 18 Feb 2025 08:42 )             29.4     02-18    138  |  112[H]  |  69[H]  ----------------------------<  223[H]  4.1   |  16[L]  |  1.44[H]    Ca    8.6      18 Feb 2025 08:42  Phos  2.0     02-18  Mg     2.2     02-18    TPro  5.1[L]  /  Alb  2.4[L]  /  TBili  0.6  /  DBili  x   /  AST  27  /  ALT  21  /  AlkPhos  131[H]  02-18    C-Reactive Protein: 209.0 mg/mL (02-15-25 @ 05:17)  C-Reactive Protein: 233.0 mg/mL (02-14-25 @ 06:50)  Ferritin: 1798 ng/mL (02-13-25 @ 18:46)                        10.1   1.30  )-----------( 37       ( 17 Feb 2025 05:59 )             28.5     02-17    140  |  110[H]  |  70[H]  ----------------------------<  227[H]  2.3[LL]   |  17[L]  |  1.73[H]    Ca    8.4[L]      17 Feb 2025 05:59  Phos  3.2     02-17  Mg     1.5     02-17    TPro  5.2[L]  /  Alb  2.7[L]  /  TBili  0.8  /  DBili  x   /  AST  15  /  ALT  19  /  AlkPhos  101  02-17    C-Reactive Protein: 209.0 mg/mL (02-15-25 @ 05:17)  C-Reactive Protein: 233.0 mg/mL (02-14-25 @ 06:50)  Ferritin: 1798 ng/mL (02-13-25 @ 18:46)                        8.7    0.19  )-----------( 33       ( 13 Feb 2025 06:52 )             25.7     02-13    137  |  109[H]  |  20  ----------------------------<  117[H]  2.6[LL]   |  18[L]  |  1.02    Ca    8.7      13 Feb 2025 06:52  Phos  2.3     02-13  Mg     1.6     02-13    TPro  4.4[L]  /  Alb  1.1[L]  /  TBili  0.7  /  DBili  x   /  AST  11[L]  /  ALT  16  /  AlkPhos  94  02-13     LIVER FUNCTIONS - ( 13 Feb 2025 06:52 )  Alb: 1.1 g/dL / Pro: 4.4 gm/dL / ALK PHOS: 94 U/L / ALT: 16 U/L / AST: 11 U/L / GGT: x           Culture - Blood (collected 11 Feb 2025 09:00)  Source: .Blood BLOOD  Preliminary Report (12 Feb 2025 13:01):    No growth at 24 hours    Culture - Blood (collected 11 Feb 2025 08:50)  Source: .Blood BLOOD  Preliminary Report (12 Feb 2025 13:01):    No growth at 24 hours    Urinalysis with Rflx Culture (collected 09 Feb 2025 01:25)    Culture - Urine (collected 09 Feb 2025 01:25)  Source: Clean Catch  Final Report (11 Feb 2025 08:39):    50,000 - 99,000 CFU/mL Pseudomonas aeruginosa  Organism: Pseudomonas aeruginosa (11 Feb 2025 08:39)  Organism: Pseudomonas aeruginosa (11 Feb 2025 08:39)      Method Type: REJI      -  Amikacin: S <=16      -  Aztreonam: S <=4      -  Cefepime: S <=2      -  Ceftazidime: S <=1      -  Ciprofloxacin: S <=0.25      -  Imipenem: S 2      -  Levofloxacin: S <=0.5      -  Meropenem: S <=1      -  Piperacillin/Tazobactam: S <=8    Culture - Blood (collected 08 Feb 2025 22:00)  Source: .Blood BLOOD  Gram Stain (09 Feb 2025 19:04):    Growth in aerobic bottle: Gram Negative Rods  Final Report (11 Feb 2025 07:53):    Growth in aerobic bottle: Pseudomonas aeruginosa    Direct identification is available within approximately 3-5    hours either by Blood Panel Multiplexed PCR or Direct    MALDI-TOF. Details: [email or web address]  Organism: Blood Culture PCR  Pseudomonas aeruginosa (11 Feb 2025 07:53)  Organism: Pseudomonas aeruginosa (11 Feb 2025 07:53)      Method Type: REJI      -  Aztreonam: S <=4      -  Cefepime: S <=2      -  Ceftazidime: S <=1      -  Ciprofloxacin: S <=0.25      -  Imipenem: S <=1      -  Levofloxacin: S <=0.5      -  Meropenem: S <=1      -  Piperacillin/Tazobactam: S <=8  Organism: Blood Culture PCR (11 Feb 2025 07:53)      Method Type: PCR      -  Pseudomonas aeruginosa: Detec    Culture - Blood (collected 08 Feb 2025 21:55)  Source: .Blood BLOOD  Gram Stain (09 Feb 2025 19:32):    Growth in aerobic bottle: Gram Negative Rods  Final Report (11 Feb 2025 07:54):    Growth in aerobic bottle: Pseudomonas aeruginosa    See previous culture [id number]    Radiology: all available radiological tests reviewed    < from: US Abdomen Upper Quadrant Right (02.09.25 @ 14:55) >  Distended gallbladder with layering sludge.  8 mm right renal calculus without hydronephrosis.  Hepatomegaly.  Right renal cyst.  < end of copied text >    < from: CT Abdomen and Pelvis No Cont (02.09.25 @ 03:01) >  1.   Study somewhat limited due to absence of contrast.  2.   Axial images 71-76 of series 301 and concomitant coronal images demonstrate prominent soft tissue inseparable from the body of the pancreas consistent with pancreatic neoplasm.  3.   Some distension of gallbladder could be further evaluated with right upper quadrant sonography. No definite biliary dilatation.  < end of copied text >    Advanced directives addressed: full resuscitation

## 2025-02-26 NOTE — PROGRESS NOTE ADULT - ASSESSMENT
69-year-old female with stage IV pancreatic adenocarcinoma presenting with neutropenic fever and severe treatment-related toxicities from modified FOLFIRINOX and investigational EMILY inhibitor [id number] found to have PE and LBO.     # pancreatic ca   - remains on clinical trial- supportive care while in hospital   - CTH negative and discussed w/ pt   - will continue with ongoing communication with  [facility]- I discussed case with DR. Tapia yesterday at [facility] [place] - pt with UGT1A1 mutation which makes her more susceptible to toxicities from 5FU and irinotecan which may have contributed to symptoms on arrival   - Review clinical trial protocol - may remain on trial drug alone and changing regimen- to be discussed when recovers from underlying illness and admission     # Neutropenic Septic shock   - pt previously on neupogen- WBC has now recovered and elevated likely 2/2 GCSF support   - remains on meropenem started on 2/15 as well as fluconazole and po vanc  - Today, WBC 24.9, Hb 8.5, plt 188, Cr 1.72, , ALT 87      # PE   - v/q scan w indeterminate prob of PE, linear defect in RUL posterior segment   - CT chest- multiple PE w/in segmental left lower and right lower, 2 right middle lobe nodules,   -  Pt restarted on hep gtt due- monitor closely given recent bleeding concern - Hb has been stable    # anemia   - received 4u to date and 1u ffp  - Protonix IV   - GI following   - keep type and screen active, transfuse for Hb <7    # large bowel obstruction  - CT a/p Large bowel obstruction with transition point at the distal transverse colon. There is also narrowing at the level of the terminal ileum. Diffuse ascites. Redemonstrated pancreatic lesion, consistent with history of pancreatic adenocarcinoma. There is an umbilical lesion with adjacent peritoneal implants.  - previously was having diarrhea  - GI following and reviewed imaging with radiology   - 2/22/25  repeat CT a/p with mild interval improvements in small and large bowel ileus. pt given lasix and albumin.     # EMPIRIC TREATMENT FOR ESOPHAGEAL CANDIDIASIS   - continue with DIFLUCAN       will follow and communicate with [facility] [place]

## 2025-02-26 NOTE — PROGRESS NOTE ADULT - SUBJECTIVE AND OBJECTIVE BOX
INTERVAL HPI/OVERNIGHT EVENTS:  Patient S&E at bedside. 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      GERD (gastroesophageal reflux disease)      Cyclical vomiting syndrome      CVA (cerebrovascular accident)      H/O squamous cell carcinoma excision          FAMILY HISTORY:      VITAL SIGNS:  T(F): 97.6 (02-26-25 @ 06:00)  HR: 109 (02-26-25 @ 06:00)  BP: 137/76 (02-26-25 @ 06:00)  RR: 22 (02-26-25 @ 06:00)  SpO2: 96% (02-26-25 @ 06:00)  Wt(kg): --    PHYSICAL EXAM:    Constitutional: NAD, more withdrawn today   ent- bleeding from bottom lip dry mucous membranes   Respiratory: CTA b/l, mild tachypnea on 2l this am   Cardiovascular: RRR,   Gastrointestinal: soft, NT  Neurological: AAOx2      MEDICATIONS  (STANDING):  acetaminophen   IVPB .. 1000 milliGRAM(s) IV Intermittent once  albumin human 25% IVPB 50 milliLiter(s) IV Intermittent every 6 hours  chlorhexidine 2% Cloths 1 Application(s) Topical <User Schedule>  cholecalciferol 2000 Unit(s) Oral at bedtime  dextrose 5%. 1000 milliLiter(s) (50 mL/Hr) IV Continuous <Continuous>  dextrose 5%. 1000 milliLiter(s) (100 mL/Hr) IV Continuous <Continuous>  dextrose 50% Injectable 25 Gram(s) IV Push once  dextrose 50% Injectable 12.5 Gram(s) IV Push once  dextrose 50% Injectable 25 Gram(s) IV Push once  famotidine Injectable 20 milliGRAM(s) IV Push daily  fluconAZOLE IVPB      fluconAZOLE IVPB 200 milliGRAM(s) IV Intermittent every 24 hours  glucagon  Injectable 1 milliGRAM(s) IntraMuscular once  heparin  Infusion.  Unit(s)/Hr (9 mL/Hr) IV Continuous <Continuous>  insulin glargine Injectable (LANTUS) 22 Unit(s) SubCutaneous at bedtime  insulin lispro (ADMELOG) corrective regimen sliding scale   SubCutaneous every 6 hours  lactated ringers. 1000 milliLiter(s) (100 mL/Hr) IV Continuous <Continuous>  lipid, fat emulsion (Fish Oil and Plant Based) 20% Infusion 0.681 Gm/kG/Day (20.83 mL/Hr) IV Continuous <Continuous>  metoprolol tartrate 25 milliGRAM(s) Oral every 6 hours  pantoprazole  Injectable 40 milliGRAM(s) IV Push every 12 hours  Parenteral Nutrition - Adult 1 Each (75 mL/Hr) TPN Continuous <Continuous>  potassium phosphate IVPB 15 milliMole(s) IV Intermittent once  sucralfate suspension 1 Gram(s) Oral four times a day  vancomycin    Solution 125 milliGRAM(s) Oral every 6 hours    MEDICATIONS  (PRN):  acetaminophen     Tablet .. 650 milliGRAM(s) Oral every 6 hours PRN Temp greater or equal to 38C (100.4F), Mild Pain (1 - 3)  aluminum hydroxide/magnesium hydroxide/simethicone Suspension 30 milliLiter(s) Oral every 4 hours PRN Dyspepsia  Biotene Dry Mouth Oral Rinse 15 milliLiter(s) Swish and Spit five times a day PRN Mouth Care  dextrose Oral Gel 15 Gram(s) Oral once PRN Blood Glucose LESS THAN 70 milliGRAM(s)/deciliter  heparin   Injectable 4400 Unit(s) IV Push every 6 hours PRN For aPTT less than 40  melatonin 3 milliGRAM(s) Oral at bedtime PRN Insomnia  metoprolol tartrate Injectable 5 milliGRAM(s) IV Push every 6 hours PRN heart rate >110  ondansetron Injectable 4 milliGRAM(s) IV Push every 8 hours PRN Nausea and/or Vomiting  sodium chloride 0.65% Nasal 1 Spray(s) Both Nostrils two times a day PRN Nasal Congestion      Allergies    No Known Allergies    Intolerances        LABS:                        8.5    24.92 )-----------( 188      ( 26 Feb 2025 05:25 )             25.3     02-26    147[H]  |  115[H]  |  96[H]  ----------------------------<  169[H]  4.0   |  23  |  1.72[H]    Ca    8.9      26 Feb 2025 05:25  Phos  2.2     02-26  Mg     1.9     02-26    TPro  5.5[L]  /  Alb  2.7[L]  /  TBili  1.1  /  DBili  x   /  AST  107[H]  /  ALT  87[H]  /  AlkPhos  307[H]  02-26    PT/INR - ( 25 Feb 2025 17:37 )   PT: 13.7 sec;   INR: 1.19 ratio         PTT - ( 26 Feb 2025 03:22 )  PTT:35.6 sec  Urinalysis Basic - ( 26 Feb 2025 05:25 )    Color: x / Appearance: x / SG: x / pH: x  Gluc: 169 mg/dL / Ketone: x  / Bili: x / Urobili: x   Blood: x / Protein: x / Nitrite: x   Leuk Esterase: x / RBC: x / WBC x   Sq Epi: x / Non Sq Epi: x / Bacteria: x        RADIOLOGY & ADDITIONAL TESTS:  Studies reviewed.

## 2025-02-26 NOTE — PROGRESS NOTE ADULT - ASSESSMENT
Imp:  Vitals are improved which is good  H/H down but has been fluctuating, and no overt bleeding    Rec:  Cont heparin and watch for bleeding

## 2025-02-26 NOTE — CHART NOTE - NSCHARTNOTEFT_GEN_A_CORE
Pt with recurrent GI bleed on heparin drip. Please repeat Ultrasound of the legs to evaluate DVT. D/w Dr. Geiger.

## 2025-02-26 NOTE — CHART NOTE - NSCHARTNOTEFT_GEN_A_CORE
Clinical Nutrition PN Follow Up Note    *70 y/o F w/ recent diagnosis of primary pancreatic adenocarcinoma of head with tumor in umbilicus, chemo-induced pancytopenia and significant PMH of SCC lip, cyclical vomiting syndrome, HTN, HPL, Gout, GERD, CVA on Aggrenox and others had been admitted to [facility] with septic shock on 2025, nausea, vomiting and diarrhea requiring Levophed drip, was transferred out of ICU to regular floor last night.  Patient was being followed by GI, ID, Cardiology and Onc there.  Patient has been transferred to  today on patient's  (GI Dr. Pleitez) request.  At this time, patient c/o non-bloody diarrhea x 3 in last 24 hours.  She denies any abdominal pain, nausea, but has some dry heaves.  She denies any fever, chills, chest pain, palpitation, constipation or dysuria. Initially, she was started on Vancomycin and Zosyn, but now she has been on Cefepime as her blood culture and urine cultures are positive for Pseudomonas aeruginosa. Currently, patient is on Cefepime.  ID had also recommended Flagyl, but patient had refused it, as it causes nausea to her. Noted with Metabolic acidosis with NAG due to GI loss. Likely chemo-induced diarrhea and vomiting. Better than before. Cutaneous lesion on RUQ area below right breast, unclear etiology, ? Ecthyma gangrenosum. Admitting diagnosis: pancreatic ca  *: Pt transfer from [facility]; seen by RD and met criteria for PCM. Pt reports only being able to tolerate some sips of water and tea at this time. Reports tried eating small bite of mashed banana and felt burning sensation down her throat. Reports #; RD unable to obtain bedscale wt d/t bedscale not working. Admit wt per # note with 1+ and 2+ edema skewing wt. Pt appears overweight, NFPE reveals mild- mod muscle/fat wasting at this time. Recommend to continue with PO diet and encourage intake as tolerated. RD consulted to initiate PN d/t PO intolerance. Will initiate TPN through port.  *2/15: Remains on low fiber diet with minimal PO intake. Still w/ multiple episodes of diarrhea, if persists can consider adding 5mg zinc into PN bag. No other acute events overnight. D/w Dr. Joyner will c/w TPN today.   *: Raised area noted to mid sternum. RCW port intact.- no swelling noted, no redness. Port flushed without resistance. + blood return noted. Assessed with IV team Amy. Per pt no Pain on palpation. CT Chest. TPN on hold until CT read. CT chest from earlier in the day revealed new bilateral pleural effusions with bibasilar consolidated lung and ascites. CXR now shows low lung volumes and bibasilar opacities. Rigors post transfusion - Rapid response called;  Dr. Pleitez requesting ICU consult- concerned for pt.  requesting assessment of patient STAT. Pt transferred to SICU. ? third spacing, and possible PNA. Per pt still drinking small sips of water and tea. D/w Dr. Fortune will keep total volume same in PN bag today, and will c/w TPN.   *:  tx SICU, po remains minimal. On bipap. On additional LR d/t diuresis per critical care PA: "malignancy/severe protein-calorie malnutrition/hypoalbuminemia causing diffuse third spacing, caution with further diuresis as this is not truly a volume overload/acute CHF issue" also on IV albumin  c/w TPN  STRONGLY suggest to Confirm goals of care regarding LONG-TERM nutrition support - However, LONG-TERM Nutrition support is not recommended with advanced cancer as studies show that there is no improvement of dehydration symptoms, quality of life, or survival. It also increases the risk for peripheral edema, ascites, and pleural effusions. Will continue to provide nutr within GOC though  *: plan to c/w TPN for now. RD discussed w/ MD Tong and SICU IRINA Jurado need to confirm long term GOC regarding nutr support. Per GI - Esophageal pain is 2/2 esophagitis but unclear what type -- reflux vs. candida vs CMV or HSV etc. Plan to add imodium standing. ? EGD   *: reports she stills has difficulty swallowing due to pain. feeling weak overall. still having multiple episodes of diarrhea, poor PO intake. Plan to c/w TPN - per GI: "CVM and HSV will be negative which argues that esophagitis is reflux or fungal related"  d/w IRINA Jurado long-term plan regarding nutr support TBD, pending discussion w/ family. ? fluid overload which TPN may be contributing to as some studies show with advanced cancer that nutrition support increases the risk for peripheral edema, ascites, and pleural effusions.   *: PO slighly improving, plan to c/w TPN.  POCT uptrending, d/w IRINA Jurado, steroids to be dc and will hold off in insulin PN bag - will manage w/ insulin outside bag.  Per GI: "Imaging suggests ileus but I personally think that putting the imaging together with history and clinical findings that she just has severe diarrhea illness 2/2 chemo, given that both large and small bowel loops are fluid filled and she has no abd pain or vomiting"  *: PO intake continues to slightly improve, plan to c/w TPN. Hyperglycemia worsening, d/w IRINA Jurado, plan to add regular insulin into PN bag. Will also decrease dextrose to 250g to see if this helps w/ hyperglycemia and can increase again when POCTs better controlled.   *: Abdomen distended, non-tender to palpation - GI rec stat CT w/o contrast for further evaluation and to hold PO intake at this time. POCT remain elevated, per RN  this morning - Lantus was not given last night by RN since insulin was already in PN bag (?). Will hold off on increasing dextrose and will c/w current insulin in PN bag, d/w intensivist and plan to adjust Lantus outside PN bag.   *: Now has (+) GIB,  CTA with improvement in bowel distension, small and lg bowel ileus. Per GI, clears OK.  *: consumed some PO yesterday. Per GI, unsure why GIB but now resolved - ?low level ischemic colitis. plan to c/w TPN for now per sicu IRINA Jurado  STRONGLY suggest palliative care consult to determine LONG-TERM GOC regarding nutr support     *TPN initiated 2/2 PO intolerance, + Mucositis, possible candidal esophagitis    *current status: per SICU RN, pt ate yogurt, bites of oatmeal.  bringing in foods to encourage PO as well. IR consulted for IVC filter.  Wound care RN following.  c/w TPN    *pertinent meds: acetaminophen, IV albumin, cholecalciferol, famotidine, abx, Lantus 22 units HS, LR, ADMELOG (14 units given x 24 hrs), 15 mmol KPhos given yesterday and plan for another today    *labs reviewed; Na elevated, only receiving 50 mEq Na in bag. Also on additional IVF. Will not increase total volume for now d/t 3rd spacing.   K WNL; As per ASPEN Guidelines, maintenance potassium concentration in PN is 1 – 2 mEq/kg/day. However, [facility] PN Policy indicates a maximum of 120 mEq should be added into the PN bag. Currently at 100mEq in PN bag.  Mg remains on lower end of normal, will NOT increase in PN bag again today. C/w current amount in bag.  Phos now LOW - will increase to 30 mmol; s/p repletion of KPhos as above.   T bili WNL will c/w trace elements. C/w MVI and thiamine in PN bag. POCTs improved x 24 hrs, will c/w current dextrose (250g) @ goal - meeting LOW-END of calorie needs w/ that amount d/t persistent hyperglycemia. D/w IRINA Jurado dulce/w 25 units of regular insulin in PN bag. Will hold off on cycling TPN for now. TG WNL (<400), however uptrending, will c/w 50g of lipids daily. May need to give every other day. Pending new level. Also of note, Cl still elevated, will add more acetate vs Cl into TPN bag (181 vs 50, respectively).         147[H]  |  115[H]  |  96[H]  ----------------------------<  169[H]  4.0   |  23  |  1.72[H]    Ca    8.9      2025 05:25  Phos  2.2       Mg     1.9         TPro  5.5[L]  /  Alb  2.7[L]  /  TBili  1.1  /  DBili  x   /  AST  107[H]  /  ALT  87[H]  /  AlkPhos  307[H]      BMI: BMI (kg/m2): 27.8 (25 @ 05:46)  HbA1c: A1C with Estimated Average Glucose Result: 6.6 % (25 @ 05:35)    BP: 136/90 (25 @ 06:00) (114/82 - 145/78)Vital Signs Last 24 Hrs  T(C): 37.1 (25 @ 06:00), Max: 37.1 (25 @ 06:00)  T(F): 98.8 (25 @ 06:00), Max: 98.8 (25 @ 06:00)  HR: 98 (25 @ 06:00) (98 - 111)  BP: 136/90 (25 @ 06:00) (125/81 - 145/78)  BP(mean): 104 (25 @ 06:00) (91 - 104)  RR: 27 (25 @ 06:00) (25 - 37)  SpO2: 90% (25 @ 06:00) (90% - 99%)    Lipid Panel: Date/Time: 25 @ 05:31  Triglycerides, Serum: 281    POCT Blood Glucose.: 146 mg/dL (2025 05:22)  POCT Blood Glucose.: 193 mg/dL (2025 00:59)  POCT Blood Glucose.: 191 mg/dL (2025 21:53)  POCT Blood Glucose.: 228 mg/dL (2025 18:24)  POCT Blood Glucose.: 208 mg/dL (2025 12:32)    Iron Total: 34 ug/dL (25 @ 18:46)  Folate, Serum: >20.0 ng/mL (25 @ 18:46)  Vitamin B12, Serum: 1457 pg/mL (25 @ 18:46)  Vitamin D, 25-Hydroxy: 23.0 ng/mL (25 @ 18:46) ** c/w deficiency    *I&O's Detail    2025 07:01  -  2025 07:00  --------------------------------------------------------  IN:    Fat Emulsion (Fish Oil &amp; Plant Based) 20% Infusion: 221 mL    Heparin Infusion: 117 mL    IV PiggyBack: 600 mL    Lactated Ringers: 887 mL    sodium chloride 0.45%: 313 mL    TPN (Total Parenteral Nutrition): 1663 mL  Total IN: 3801 mL    OUT:    Indwelling Catheter - Urethral (mL): 2850 mL  Total OUT: 2850 mL    Total NET: 951 mL  * fluid status: Positive; large UOP. Will continue to monitor/adjust prn to maintain fluid balance   *last (+) BM doc'd   pt not on bowel regimen.  *edema: 3+ R/L leg, foot  *PI: L/R Buttocks (stage 2)    *malnutrition: Pt continues to meet criteria for severe protein calorie malnutrition in context of acute on chronic illness r/t decreased ability to meet increased nutrient needs 2/2 PO intolerance, N/V and diarrhea, on chemo for pancreatic ca AEB meeting <50% ENN > 5 days, mild-mod muscle/fat wasting    Estimated Needs: based on 73.4 Kg (wt from EMR admit wt )  Calories: 1812 - 2175 Kcal (25 - 30 Kcal/Kg)  Protein: 108 - 145 g (1.5 - 2.0 g/Kg)  Fluids: 1450 - 1812 mL (20 - 25 mL/Kg)    Diet, Low Fiber:   Kosher  Supplement Feeding Modality:  Oral  Ensure Plant-Based Cans or Servings Per Day:  3       Frequency:  Daily (25 @ 16:47) [Active]  Parenteral Nutrition - Adult 1 Each (75 mL/Hr) TPN Continuous <Continuous>, 25 @ 22:00, 22:00, Stop order after: 1 Days    Weight History:  Daily Weight in k.2 (2025 04:00)  Daily Weight in k.3 (2025 06:00) *2+ edema  Daily Weight in k.4 (2025 05:00)  Daily Weight in k.6 (15 Feb 2025 06:17)  Height (cm): 162.6 (25 @ 02:39)  Weight (kg): 73.4 (02-14-25 @ 05:46), 66.8 (25 @ 02:39)  BMI (kg/m2): 27.8 (25 @ 05:46), 25.3 (25 @ 02:39)  BSA (m2): 1.79 (25 @ 05:46), 1.72 (25 @ 02:39)    TPN Recommendations: via implanted infusion port  Total Volume: 1800 mL x 24 hours  125 g  Amino Acids  250 g Dextrose (goal 325g)  50 g Lipids 20%   50 mEq Sodium Chloride  0 mEq Sodium Acetate  0 mmol Sodium Phosphate  0 mEq Potassium Chloride  56 mEq Potassium Acetate  30 mmol Potassium Phosphate  0 mEq Calcium Gluconate (CAPS out of PN solution)   16 mEq Magnesium Sulfate  200 mg Thiamine  1 ml Trace Elements  10 ml MVI  25 units Regular Insulin    Total Calories    1850   (Meets  100%  of lower end of Estimated Energy needs  and  100%  Protein needs)      Additional Recommendations:    1) Daily weights  2) Strict I & O's **pt third spacing  3) Daily lyte checks including magnesium and phos  4) Weekly triglycerides/LFT checks  5) POCT q6hrs; maintain 140-180mg/dL*** insulin management per MD  6) C/w low fiber diet and encourage PO intake as tolerated  7) Continue to titrate dextrose 50-75g per day until goal of 325g reached  8) Confirm goals of care regarding long-term nutrition support. Would recommend PEG placement if long-term nutrition support is required as GI is functional and would be the preferred route for nutrition when ?GIB/ileus resolves. However, long-term nutrition support is not recommended with advanced cancer as studies show that there is no improvement of dehydration symptoms, quality of life, or survival. It also increases the risk for peripheral edema, ascites, and pleural effusions.     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  Patient was being followed by GI, ID, Cardiology and Onc there.  Patient has been transferred to  today on patient's  (GI Dr. Pleitez) request.  At this time, patient c/o non-bloody diarrhea x 3 in last 24 hours.  She denies any abdominal pain, nausea, but has some dry heaves.  She denies any fever, chills, chest pain, palpitation, constipation or dysuria. Initially, she was started on Vancomycin and Zosyn, but now she has been on Cefepime as her blood culture and urine cultures are positive for Pseudomonas aeruginosa. Currently, patient is on Cefepime.  ID had also recommended Flagyl, but patient had refused it, as it causes nausea to her. Noted with Metabolic acidosis with NAG due to GI loss. Likely chemo-induced diarrhea and vomiting. Better than before. Cutaneous lesion on RUQ area below right breast, unclear etiology, ? Ecthyma gangrenosum. Admitting diagnosis: pancreatic ca  *: Pt transfer from [facility]; seen by RD and met criteria for PCM. Pt reports only being able to tolerate some sips of water and tea at this time. Reports tried eating small bite of mashed banana and felt burning sensation down her throat. Reports #; RD unable to obtain bedscale wt d/t bedscale not working. Admit wt per # note with 1+ and 2+ edema skewing wt. Pt appears overweight, NFPE reveals mild- mod muscle/fat wasting at this time. Recommend to continue with PO diet and encourage intake as tolerated. RD consulted to initiate PN d/t PO intolerance. Will initiate TPN through port.  *2/15: Remains on low fiber diet with minimal PO intake. Still w/ multiple episodes of diarrhea, if persists can consider adding 5mg zinc into PN bag. No other acute events overnight. D/w Dr. Joyner will c/w TPN today.   *: Raised area noted to mid sternum. RCW port intact.- no swelling noted, no redness. Port flushed without resistance. + blood return noted. Assessed with IV team Amy. Per pt no Pain on palpation. CT Chest. TPN on hold until CT read. CT chest from earlier in the day revealed new bilateral pleural effusions with bibasilar consolidated lung and ascites. CXR now shows low lung volumes and bibasilar opacities. Rigors post transfusion - Rapid response called;  Dr. Pleitez requesting ICU consult- concerned for pt.  requesting assessment of patient STAT. Pt transferred to SICU. ? third spacing, and possible PNA. Per pt still drinking small sips of water and tea. D/w Dr. Fortune will keep total volume same in PN bag today, and will c/w TPN.   *:  tx SICU, po remains minimal. On bipap. On additional LR d/t diuresis per critical care PA: "malignancy/severe protein-calorie malnutrition/hypoalbuminemia causing diffuse third spacing, caution with further diuresis as this is not truly a volume overload/acute CHF issue" also on IV albumin  c/w TPN  STRONGLY suggest to Confirm goals of care regarding LONG-TERM nutrition support - However, LONG-TERM Nutrition support is not recommended with advanced cancer as studies show that there is no improvement of dehydration symptoms, quality of life, or survival. It also increases the risk for peripheral edema, ascites, and pleural effusions. Will continue to provide nutr within GOC though  *: plan to c/w TPN for now. RD discussed w/ MD Tong and SICU IRINA Jurado need to confirm long term GOC regarding nutr support. Per GI - Esophageal pain is 2/2 esophagitis but unclear what type -- reflux vs. candida vs CMV or HSV etc. Plan to add imodium standing. ? EGD   *: reports she stills has difficulty swallowing due to pain. feeling weak overall. still having multiple episodes of diarrhea, poor PO intake. Plan to c/w TPN - per GI: "CVM and HSV will be negative which argues that esophagitis is reflux or fungal related"  d/w IRINA uJrado long-term plan regarding nutr support TBD, pending discussion w/ family. ? fluid overload which TPN may be contributing to as some studies show with advanced cancer that nutrition support increases the risk for peripheral edema, ascites, and pleural effusions.   *: PO slighly improving, plan to c/w TPN.  POCT uptrending, d/w IRINA Jurado, steroids to be dc and will hold off in insulin PN bag - will manage w/ insulin outside bag.  Per GI: "Imaging suggests ileus but I personally think that putting the imaging together with history and clinical findings that she just has severe diarrhea illness 2/2 chemo, given that both large and small bowel loops are fluid filled and she has no abd pain or vomiting"  *: PO intake continues to slightly improve, plan to c/w TPN. Hyperglycemia worsening, d/w IRINA Jurado, plan to add regular insulin into PN bag. Will also decrease dextrose to 250g to see if this helps w/ hyperglycemia and can increase again when POCTs better controlled.   *: Abdomen distended, non-tender to palpation - GI rec stat CT w/o contrast for further evaluation and to hold PO intake at this time. POCT remain elevated, per RN  this morning - Lantus was not given last night by RN since insulin was already in PN bag (?). Will hold off on increasing dextrose and will c/w current insulin in PN bag, d/w intensivist and plan to adjust Lantus outside PN bag.   *: Now has (+) GIB,  CTA with improvement in bowel distension, small and lg bowel ileus. Per GI, clears OK.  *: consumed some PO yesterday. Per GI, unsure why GIB but now resolved - ?low level ischemic colitis. plan to c/w TPN for now per sicu RIINA Jurado  STRONGLY suggest palliative care consult to determine LONG-TERM GOC regarding nutr support     *TPN initiated 2/2 PO intolerance, + Mucositis, possible candidal esophagitis    *current status: per SICU RN, pt ate yogurt, bites of oatmeal.  bringing in foods to encourage PO as well. IR consulted for IVC filter.  Wound care RN following.  c/w TPN    *pertinent meds: acetaminophen, IV albumin, cholecalciferol, famotidine, abx, Lantus 22 units HS, LR, ADMELOG (14 units given x 24 hrs), 15 mmol KPhos given yesterday and plan for another today    *labs reviewed; Na elevated, only receiving 50 mEq Na in bag. Also on additional IVF. Will not increase total volume for now d/t 3rd spacing.   K WNL; As per ASPEN Guidelines, maintenance potassium concentration in PN is 1 – 2 mEq/kg/day. However, [facility] PN Policy indicates a maximum of 120 mEq should be added into the PN bag. Currently at 100mEq in PN bag.  Mg remains on lower end of normal, will NOT increase in PN bag again today. C/w current amount in bag.  Phos now LOW - will increase to 30 mmol; s/p repletion of KPhos as above.   T bili WNL will c/w trace elements. C/w MVI and thiamine in PN bag. POCTs improved x 24 hrs, will c/w current dextrose (250g) @ goal - meeting LOW-END of calorie needs w/ that amount d/t persistent hyperglycemia. D/w IRINA Jurado dulce/w 25 units of regular insulin in PN bag. Will hold off on cycling TPN for now.  New TG level 400 - will give lipids MWF and recheck level x 1 week. If continues to uptrend can give 1 week break. Also of note, Cl still elevated, will add more acetate vs Cl into TPN bag (181 vs 50, respectively).         147[H]  |  115[H]  |  96[H]  ----------------------------<  169[H]  4.0   |  23  |  1.72[H]    Ca    8.9      2025 05:25  Phos  2.2       Mg     1.9         TPro  5.5[L]  /  Alb  2.7[L]  /  TBili  1.1  /  DBili  x   /  AST  107[H]  /  ALT  87[H]  /  AlkPhos  307[H]      BMI: BMI (kg/m2): 27.8 (25 @ 05:46)  HbA1c: A1C with Estimated Average Glucose Result: 6.6 % (25 @ 05:35)    BP: 136/90 (25 @ 06:00) (114/82 - 145/78)Vital Signs Last 24 Hrs  T(C): 37.1 (25 @ 06:00), Max: 37.1 (25 @ 06:00)  T(F): 98.8 (25 @ 06:00), Max: 98.8 (25 @ 06:00)  HR: 98 (25 @ 06:00) (98 - 111)  BP: 136/90 (25 @ 06:00) (125/81 - 145/78)  BP(mean): 104 (25 @ 06:00) (91 - 104)  RR: 27 (25 @ 06:00) (25 - 37)  SpO2: 90% (25 @ 06:00) (90% - 99%)    Lipid Panel: Date/Time: 25 @ 05:31  Triglycerides, Serum: 281    POCT Blood Glucose.: 146 mg/dL (2025 05:22)  POCT Blood Glucose.: 193 mg/dL (2025 00:59)  POCT Blood Glucose.: 191 mg/dL (2025 21:53)  POCT Blood Glucose.: 228 mg/dL (2025 18:24)  POCT Blood Glucose.: 208 mg/dL (2025 12:32)    Iron Total: 34 ug/dL (25 @ 18:46)  Folate, Serum: >20.0 ng/mL (25 @ 18:46)  Vitamin B12, Serum: 1457 pg/mL (25 @ 18:46)  Vitamin D, 25-Hydroxy: 23.0 ng/mL (25 @ 18:46) ** c/w deficiency    *I&O's Detail    2025 07:01  -  2025 07:00  --------------------------------------------------------  IN:    Fat Emulsion (Fish Oil &amp; Plant Based) 20% Infusion: 221 mL    Heparin Infusion: 117 mL    IV PiggyBack: 600 mL    Lactated Ringers: 887 mL    sodium chloride 0.45%: 313 mL    TPN (Total Parenteral Nutrition): 1663 mL  Total IN: 3801 mL    OUT:    Indwelling Catheter - Urethral (mL): 2850 mL  Total OUT: 2850 mL    Total NET: 951 mL  * fluid status: Positive; large UOP. Will continue to monitor/adjust prn to maintain fluid balance   *last (+) BM doc'd   pt not on bowel regimen.  *edema: 3+ R/L leg, foot  *PI: L/R Buttocks (stage 2)    *malnutrition: Pt continues to meet criteria for severe protein calorie malnutrition in context of acute on chronic illness r/t decreased ability to meet increased nutrient needs 2/2 PO intolerance, N/V and diarrhea, on chemo for pancreatic ca AEB meeting <50% ENN > 5 days, mild-mod muscle/fat wasting    Estimated Needs: based on 73.4 Kg (wt from EMR admit wt )  Calories: 1812 - 2175 Kcal (25 - 30 Kcal/Kg)  Protein: 108 - 145 g (1.5 - 2.0 g/Kg)  Fluids: 1450 - 1812 mL (20 - 25 mL/Kg)    Diet, Low Fiber:   Kosher  Supplement Feeding Modality:  Oral  Ensure Plant-Based Cans or Servings Per Day:  3       Frequency:  Daily (25 @ 16:47) [Active]  Parenteral Nutrition - Adult 1 Each (75 mL/Hr) TPN Continuous <Continuous>, 25 @ 22:00, 22:00, Stop order after: 1 Days    Weight History:  Daily Weight in k.2 (2025 04:00)  Daily Weight in k.3 (2025 06:00) *2+ edema  Daily Weight in k.4 (2025 05:00)  Daily Weight in k.6 (15 Feb 2025 06:17)  Height (cm): 162.6 (25 @ 02:39)  Weight (kg): 73.4 (25 @ 05:46), 66.8 (25 @ 02:39)  BMI (kg/m2): 27.8 (25 @ 05:46), 25.3 (25 @ 02:39)  BSA (m2): 1.79 (25 @ 05:46), 1.72 (25 @ 02:39)    TPN Recommendations: via implanted infusion port  Total Volume: 1800 mL x 24 hours  125 g  Amino Acids  250 g Dextrose (goal 325g)  50 g Lipids 20% (MWF only)  50 mEq Sodium Chloride  0 mEq Sodium Acetate  0 mmol Sodium Phosphate  0 mEq Potassium Chloride  56 mEq Potassium Acetate  30 mmol Potassium Phosphate  0 mEq Calcium Gluconate (CAPS out of PN solution)   16 mEq Magnesium Sulfate  200 mg Thiamine  1 ml Trace Elements  10 ml MVI  25 units Regular Insulin    Total Calories    1850   (Meets  100%  of lower end of Estimated Energy needs  and  100%  Protein needs)      Additional Recommendations:    1) Daily weights  2) Strict I & O's **pt third spacing  3) Daily lyte checks including magnesium and phos  4) Weekly triglycerides/LFT checks  5) POCT q6hrs; maintain 140-180mg/dL*** insulin management per MD  6) C/w low fiber diet and encourage PO intake as tolerated  7) Continue to titrate dextrose 50-75g per day until goal of 325g reached  8) Confirm goals of care regarding long-term nutrition support. Would recommend PEG placement if long-term nutrition support is required as GI is functional and would be the preferred route for nutrition when ?GIB/ileus resolves. However, long-term nutrition support is not recommended with advanced cancer as studies show that there is no improvement of dehydration symptoms, quality of life, or survival. It also increases the risk for peripheral edema, ascites, and pleural effusions.     *will continue to monitor and adjust PN prn*  Marce Mitchell, MS, RDN, CNSC, [phone]  Certified Nutrition

## 2025-02-26 NOTE — CHART NOTE - NSCHARTNOTEFT_GEN_A_CORE
called to bedside by RN   patient with small maroon BM with old blood clots called to bedside by RN   patient with small maroon BM with old blood clots    hep gtt dc'd  stat CBC, type and screen and ptt ordered    /84. -120s.      at bedside aware called to bedside by RN   patient with small maroon BM with old blood clots    hep gtt dc'd  stat CBC, type and screen and ptt ordered    /84. -120s.      at bedside aware    ADDENDUM 14:00  patient with larger maroon BM     ordered for 2units pRBC, to transfused them over 30mins as per  request  concern for volume overload however,  refused lasix with pRBC   wanted to defer endoscopy as well    d/w GI Dr. Hernandez, Will discuss with Dr. Geiger

## 2025-02-26 NOTE — PROGRESS NOTE ADULT - SUBJECTIVE AND OBJECTIVE BOX
Patient is a 69y old  Female who presents with a chief complaint of sepsis (18 Feb 2025 11:37)    BRIEF HOSPITAL COURSE: 68yo female with PMHx recent diagnosis of primary pancreatic adenocarcinoma of head with tumor in umbilicus, chemo-induced pancytopenia, SCC lip, cyclical vomiting syndrome, HTN, HPL, Gout, GERD, CVA on Aggrenox and others had been admitted to [facility] with septic shock 2/2 + pseudomonas bacteremia on 02/09/2025, nausea, vomiting and diarrhea requiring Levophed drip, was transferred out of ICU to regular floor last night.  Patient was being followed by GI, ID, Cardiology and Onc there.  Patient has been transferred to  on patient's  (GI Dr. Pleitez) request.      2/24 pt flat affect, denies pain but when i palpate her lower abd she flinches. denies nausea, had dark BM last night. whispers to communicate  2/25 slow to respond this am, still tachypneic. abd tender to palpation on the R side  2/26 pt sleeping this am, easily arousable. takes alot of encouragement to answer by questions, but doesnt really respond just stares at me. 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 1000 milliGRAM(s) IV Intermittent once  albumin human 25% IVPB 50 milliLiter(s) IV Intermittent every 6 hours  chlorhexidine 2% Cloths 1 Application(s) Topical <User Schedule>  cholecalciferol 2000 Unit(s) Oral at bedtime  dextrose 5%. 1000 milliLiter(s) (50 mL/Hr) IV Continuous <Continuous>  dextrose 5%. 1000 milliLiter(s) (100 mL/Hr) IV Continuous <Continuous>  dextrose 50% Injectable 25 Gram(s) IV Push once  dextrose 50% Injectable 12.5 Gram(s) IV Push once  dextrose 50% Injectable 25 Gram(s) IV Push once  famotidine Injectable 20 milliGRAM(s) IV Push daily  fluconAZOLE IVPB      fluconAZOLE IVPB 200 milliGRAM(s) IV Intermittent every 24 hours  glucagon  Injectable 1 milliGRAM(s) IntraMuscular once  insulin glargine Injectable (LANTUS) 18 Unit(s) SubCutaneous at bedtime  insulin lispro (ADMELOG) corrective regimen sliding scale   SubCutaneous every 6 hours  lactated ringers. 1000 milliLiter(s) (100 mL/Hr) IV Continuous <Continuous>  lipid, fat emulsion (Fish Oil and Plant Based) 20% Infusion 0.681 Gm/kG/Day (20.8 mL/Hr) IV Continuous <Continuous>  lipid, fat emulsion (Fish Oil and Plant Based) 20% Infusion 0.681 Gm/kG/Day (20.83 mL/Hr) IV Continuous <Continuous>  metoprolol tartrate Injectable 5 milliGRAM(s) IV Push every 6 hours  pantoprazole  Injectable 40 milliGRAM(s) IV Push every 12 hours  Parenteral Nutrition - Adult 1 Each (75 mL/Hr) TPN Continuous <Continuous>  Parenteral Nutrition - Adult 1 Each (75 mL/Hr) TPN Continuous <Continuous>  sucralfate suspension 1 Gram(s) Oral four times a day  vancomycin    Solution 125 milliGRAM(s) Oral every 6 hours    Vital Signs Last 24 Hrs  T(C): 36.9 (26 Feb 2025 09:41), Max: 37.8 (25 Feb 2025 11:58)  T(F): 98.4 (26 Feb 2025 09:41), Max: 100 (25 Feb 2025 11:58)  HR: 100 (26 Feb 2025 10:00) (100 - 126)  BP: 127/76 (26 Feb 2025 10:00) (99/71 - 141/85)  BP(mean): 91 (26 Feb 2025 10:00) (80 - 107)  RR: 27 (26 Feb 2025 10:00) (20 - 35)  SpO2: 93% (26 Feb 2025 10:00) (93% - 100%)    Parameters below as of 26 Feb 2025 10:00  Patient On (Oxygen Delivery Method): nasal cannula w/ humidification  O2 Flow (L/min): 4    I&O's Detail    25 Feb 2025 07:01  -  26 Feb 2025 07:00  --------------------------------------------------------  IN:    Fat Emulsion (Fish Oil &amp; Plant Based) 20% Infusion: 221 mL    Heparin Infusion: 117 mL    IV PiggyBack: 600 mL    Lactated Ringers: 887 mL    sodium chloride 0.45%: 313 mL    TPN (Total Parenteral Nutrition): 1663 mL  Total IN: 3801 mL    OUT:    Indwelling Catheter - Urethral (mL): 2850 mL  Total OUT: 2850 mL    Total NET: 951 mL      LABS:                               8.5    24.92 )-----------( 188      ( 26 Feb 2025 05:25 )             25.3     02-26    147[H]  |  115[H]  |  96[H]  ----------------------------<  169[H]  4.0   |  23  |  1.72[H]    Ca    8.9      26 Feb 2025 05:25  Phos  2.2     02-26  Mg     1.9     02-26    TPro  5.5[L]  /  Alb  2.7[L]  /  TBili  1.1  /  DBili  x   /  AST  107[H]  /  ALT  87[H]  /  AlkPhos  307[H]  02-26    LIVER FUNCTIONS - ( 26 Feb 2025 05:25 )  Alb: 2.7 g/dL / Pro: 5.5 gm/dL / ALK PHOS: 307 U/L / ALT: 87 U/L / AST: 107 U/L / GGT: x           PT/INR - ( 25 Feb 2025 17:37 )   PT: 13.7 sec;   INR: 1.19 ratio      PTT - ( 26 Feb 2025 03:22 )  PTT:35.6 sec  Urinalysis Basic - ( 26 Feb 2025 05:25 )    Color: x / Appearance: x / SG: x / pH: x  Gluc: 169 mg/dL / Ketone: x  / Bili: x / Urobili: x   Blood: x / Protein: x / Nitrite: x   Leuk Esterase: x / RBC: x / WBC x   Sq Epi: x / Non Sq Epi: x / Bacteria: x      Physical Examination:    General: Mod respiratory distress  HEENT: Pupils equal, reactive to light.  Symmetric.   PULM: decreased at bases b/l.   CVS: Regular rate and rhythm, no murmurs  ABD: Soft, nondistended, mild tenderness, redness by umbilical area,  EXT: 2+ pitting edema in LE b/l extending to knees - improving  SKIN: Warm and well perfused, no rashes noted.  NEURO: Awake, slow to respond    DEVICES:  R chest port     RADIOLOGY:   < from: CT Abdomen and Pelvis No Cont (02.22.25 @ 15:53) >    IMPRESSION: Mild interval improvement in small and large bowel ileus.   Recommend plain film follow-up.    < end of copied text >      < from: CT Chest No Cont (02.15.25 @ 15:55) >  IMPRESSION:  Nondisplaced fractures of the left ninth and 10th ribs, similar to the   prior study.    Pancreatic mass, unchanged    No onset ascites and bilateral pleural effusions with compressive   atelectasis, right greater than left.    Dilated gallbladder, similar to the prior study.    < end of copied text >

## 2025-02-26 NOTE — PROGRESS NOTE ADULT - SUBJECTIVE AND OBJECTIVE BOX
69-year-old female recent diagnosis of primary pancreatic adenocarcinoma , pancytopenia and significant PMH of SCC lip, cyclical vomiting syndrome, HTN, HPL, Gout, GERD, CVA w renal evaluation of MALLORY and hypokalemia. Per documents was recently at  [facility] with septic shock on 02/09/2025, nausea, vomiting and diarrhea requiring Levophed drip. Per documents, patient  transferred to  for continued care. Patient with + diuresis w lasix 80 IV, increased UOP > 4L.   Treated w IV and po k repletion.     2/18  Case d/e Dr Pleitez  Pt returned from VQ scan r/o PE, results pending  Pt is tachypneic RR 30 BPM and etiologies are investigated  NO specific complaints  Labs reviewed  Chart reviewed  Recent reemergence of diarrhea/ metabolic acidosis  now on parenteral nutrition and LR  D/W Ethel Jurado    2/19  Evaluated at bedside , d/w Dr tong and Dr Pleitez  Pt still tachypneic and tachycardic    2/20  Case d/w Dr Maik Tong and Ethel Jurado  pt overall feels better  more engaging    2/21  Pt in good spirits  Cont to be tachycardic  rate ~ 110  Tachypneic rate ~ 30    2/22  Still tachypneic  Difficulty eating due to ileus    2/24  Events noted  Received Lasix 20 mg iv yesterday w   > 6000 ml    2/25  Hypernatremic  placed on LR  d/w TPN team to reduce Na content    2/26  Pt w some apprehension and abd discomfort  + abd tenderness on palpation  Bladder scan w patches of ascites, no large bladder volume    PAST MEDICAL & SURGICAL HISTORY:  Primary pancreatic adenocarcinoma    MEDICATIONS  (STANDING):  acetaminophen   IVPB .. 1000 milliGRAM(s) IV Intermittent once  acetaminophen   IVPB .. 1000 milliGRAM(s) IV Intermittent once  albumin human 25% IVPB 50 milliLiter(s) IV Intermittent every 6 hours  chlorhexidine 2% Cloths 1 Application(s) Topical <User Schedule>  cholecalciferol 2000 Unit(s) Oral at bedtime  dextrose 5%. 1000 milliLiter(s) (50 mL/Hr) IV Continuous <Continuous>  dextrose 5%. 1000 milliLiter(s) (100 mL/Hr) IV Continuous <Continuous>  dextrose 50% Injectable 25 Gram(s) IV Push once  dextrose 50% Injectable 12.5 Gram(s) IV Push once  dextrose 50% Injectable 25 Gram(s) IV Push once  famotidine Injectable 20 milliGRAM(s) IV Push daily  fluconAZOLE IVPB      fluconAZOLE IVPB 200 milliGRAM(s) IV Intermittent every 24 hours  glucagon  Injectable 1 milliGRAM(s) IntraMuscular once  insulin glargine Injectable (LANTUS) 22 Unit(s) SubCutaneous at bedtime  insulin lispro (ADMELOG) corrective regimen sliding scale   SubCutaneous every 6 hours  lactated ringers. 1000 milliLiter(s) (75 mL/Hr) IV Continuous <Continuous>  lactated ringers. 1000 milliLiter(s) (100 mL/Hr) IV Continuous <Continuous>  lipid, fat emulsion (Fish Oil and Plant Based) 20% Infusion 0.681 Gm/kG/Day (20.8 mL/Hr) IV Continuous <Continuous>  metoprolol tartrate 25 milliGRAM(s) Oral every 6 hours  pantoprazole  Injectable 40 milliGRAM(s) IV Push every 12 hours  Parenteral Nutrition - Adult 1 Each (75 mL/Hr) TPN Continuous <Continuous>  Parenteral Nutrition - Adult 1 Each (75 mL/Hr) TPN Continuous <Continuous>  sucralfate suspension 1 Gram(s) Oral four times a day  vancomycin    Solution 125 milliGRAM(s) Oral every 6 hours    MEDICATIONS  (PRN):  acetaminophen     Tablet .. 650 milliGRAM(s) Oral every 6 hours PRN Temp greater or equal to 38C (100.4F), Mild Pain (1 - 3)  aluminum hydroxide/magnesium hydroxide/simethicone Suspension 30 milliLiter(s) Oral every 4 hours PRN Dyspepsia  Biotene Dry Mouth Oral Rinse 15 milliLiter(s) Swish and Spit five times a day PRN Mouth Care  dextrose Oral Gel 15 Gram(s) Oral once PRN Blood Glucose LESS THAN 70 milliGRAM(s)/deciliter  melatonin 3 milliGRAM(s) Oral at bedtime PRN Insomnia  metoprolol tartrate Injectable 5 milliGRAM(s) IV Push every 6 hours PRN heart rate >110  ondansetron Injectable 4 milliGRAM(s) IV Push every 8 hours PRN Nausea and/or Vomiting  sodium chloride 0.65% Nasal 1 Spray(s) Both Nostrils two times a day PRN Nasal Congestion      I&O's Detail    25 Feb 2025 07:01  -  26 Feb 2025 07:00  --------------------------------------------------------  IN:    Fat Emulsion (Fish Oil &amp; Plant Based) 20% Infusion: 221 mL    Heparin Infusion: 117 mL    IV PiggyBack: 600 mL    Lactated Ringers: 887 mL    sodium chloride 0.45%: 313 mL    TPN (Total Parenteral Nutrition): 1663 mL  Total IN: 3801 mL    OUT:    Indwelling Catheter - Urethral (mL): 2850 mL  Total OUT: 2850 mL    Total NET: 951 mL      26 Feb 2025 07:01  -  26 Feb 2025 16:26  --------------------------------------------------------  IN:    PRBCs (Packed Red Blood Cells): 679 mL  Total IN: 679 mL    OUT:  Total OUT: 0 mL    Total NET: 679 mL    Vital Signs Last 24 Hrs  T(C): 36.8 (26 Feb 2025 16:00), Max: 36.9 (26 Feb 2025 09:41)  T(F): 98.3 (26 Feb 2025 16:00), Max: 98.4 (26 Feb 2025 09:41)  HR: 104 (26 Feb 2025 16:00) (100 - 121)  BP: 142/80 (26 Feb 2025 16:00) (116/81 - 142/80)  BP(mean): 99 (26 Feb 2025 16:00) (87 - 107)  RR: 27 (26 Feb 2025 16:00) (20 - 35)  SpO2: 97% (26 Feb 2025 16:00) (92% - 99%)    Parameters below as of 26 Feb 2025 16:00  Patient On (Oxygen Delivery Method): nasal cannula w/ humidification  O2 Flow (L/min): 4      HTN (hypertension)      HLD (hyperlipidemia)      Gout      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      GERD (gastroesophageal reflux disease)      Cyclical vomiting syndrome      CVA (cerebrovascular accident)      H/O squamous cell carcinoma excision      PHYSICAL EXAM:    Constitutional: apprehension,   Neck: No LAD, No JVD  Respiratory: CTAB  Cardiovascular: S1 and S2, RRR  Gastrointestinal: distant bowel sounds, + diffuse tenderness  Extremities: + peripheral edema  Neurological: A/O x 3, no focal deficits  Psychiatric: Normal mood, normal affect    Access: Not applicable        LABS:      147    |  115    |  96     ----------------------------<  169       26 Feb 2025 05:25  4.0     |  23     |  1.72     147    |  116    |  97     ----------------------------<  228       25 Feb 2025 17:11  3.6     |  24     |  1.68     149    |  116    |  92     ----------------------------<  227       25 Feb 2025 05:07  4.0     |  25     |  1.63     Ca    8.9        26 Feb 2025 05:25  Ca    8.7        25 Feb 2025 17:11  Ca    8.6        25 Feb 2025 05:07    Phos  2.2       26 Feb 2025 05:25  Phos  3.1       25 Feb 2025 17:11  Phos  1.7       25 Feb 2025 05:07    Mg     1.9       26 Feb 2025 05:25  Mg     1.9       25 Feb 2025 17:11  Mg     1.8       25 Feb 2025 05:07                                       9.4    26.18 )-----------( 208      ( 26 Feb 2025 12:34 )             28.1                         8.5    24.92 )-----------( 188      ( 26 Feb 2025 05:25 )             25.3                         9.3    25.69 )-----------( 205      ( 26 Feb 2025 03:22 )             28.1

## 2025-02-26 NOTE — PROGRESS NOTE ADULT - SUBJECTIVE AND OBJECTIVE BOX
Subjective:    pat better, iv heparin restarted after haem spoken to , hb 9-8-9, no new complaint.    Home Medications:  cefepime 2 g intravenous injection: 2 gram(s) intravenous every 12 hours (12 Feb 2025 12:59)  filgrastim: 480 microgram(s) subcutaneous once a day (12 Feb 2025 12:59)  Lactated Ringers Injection intravenous solution: 150 milligram(s) intravenous every 1 to 2 hours (12 Feb 2025 12:59)  loperamide 2 mg oral capsule: 1 cap(s) orally once (12 Feb 2025 12:59)  midodrine 5 mg oral tablet: 1 tab(s) orally every 8 hours (12 Feb 2025 12:59)  Multiple Vitamins with Minerals oral liquid: 1 orally once a day (12 Feb 2025 12:59)  octreotide 2500 mcg/mL subcutaneous solution: 0.04 milliliter(s) subcutaneous 3 times a day (12 Feb 2025 12:59)  sucralfate 1 g/10 mL oral suspension: 10 milliliter(s) orally 4 times a day (12 Feb 2025 12:59)  trimethobenzamide 100 mg/mL intramuscular solution: 2 milliliter(s) intramuscular every 8 hours As needed Nausea and/or Vomiting (12 Feb 2025 12:59)    MEDICATIONS  (STANDING):  acetaminophen   IVPB .. 1000 milliGRAM(s) IV Intermittent once  acetaminophen   IVPB .. 1000 milliGRAM(s) IV Intermittent once  albumin human 25% IVPB 50 milliLiter(s) IV Intermittent every 6 hours  chlorhexidine 2% Cloths 1 Application(s) Topical <User Schedule>  cholecalciferol 2000 Unit(s) Oral at bedtime  dextrose 5%. 1000 milliLiter(s) (50 mL/Hr) IV Continuous <Continuous>  dextrose 5%. 1000 milliLiter(s) (100 mL/Hr) IV Continuous <Continuous>  dextrose 50% Injectable 25 Gram(s) IV Push once  dextrose 50% Injectable 12.5 Gram(s) IV Push once  dextrose 50% Injectable 25 Gram(s) IV Push once  famotidine Injectable 20 milliGRAM(s) IV Push daily  fluconAZOLE IVPB      fluconAZOLE IVPB 200 milliGRAM(s) IV Intermittent every 24 hours  glucagon  Injectable 1 milliGRAM(s) IntraMuscular once  insulin glargine Injectable (LANTUS) 22 Unit(s) SubCutaneous at bedtime  insulin lispro (ADMELOG) corrective regimen sliding scale   SubCutaneous every 6 hours  lactated ringers. 1000 milliLiter(s) (75 mL/Hr) IV Continuous <Continuous>  lactated ringers. 1000 milliLiter(s) (100 mL/Hr) IV Continuous <Continuous>  lipid, fat emulsion (Fish Oil and Plant Based) 20% Infusion 0.681 Gm/kG/Day (20.8 mL/Hr) IV Continuous <Continuous>  metoprolol tartrate 25 milliGRAM(s) Oral every 6 hours  pantoprazole  Injectable 40 milliGRAM(s) IV Push every 12 hours  Parenteral Nutrition - Adult 1 Each (75 mL/Hr) TPN Continuous <Continuous>  Parenteral Nutrition - Adult 1 Each (75 mL/Hr) TPN Continuous <Continuous>  sucralfate suspension 1 Gram(s) Oral four times a day  vancomycin    Solution 125 milliGRAM(s) Oral every 6 hours    MEDICATIONS  (PRN):  acetaminophen     Tablet .. 650 milliGRAM(s) Oral every 6 hours PRN Temp greater or equal to 38C (100.4F), Mild Pain (1 - 3)  aluminum hydroxide/magnesium hydroxide/simethicone Suspension 30 milliLiter(s) Oral every 4 hours PRN Dyspepsia  Biotene Dry Mouth Oral Rinse 15 milliLiter(s) Swish and Spit five times a day PRN Mouth Care  dextrose Oral Gel 15 Gram(s) Oral once PRN Blood Glucose LESS THAN 70 milliGRAM(s)/deciliter  melatonin 3 milliGRAM(s) Oral at bedtime PRN Insomnia  metoprolol tartrate Injectable 5 milliGRAM(s) IV Push every 6 hours PRN heart rate >110  ondansetron Injectable 4 milliGRAM(s) IV Push every 8 hours PRN Nausea and/or Vomiting  sodium chloride 0.65% Nasal 1 Spray(s) Both Nostrils two times a day PRN Nasal Congestion      Allergies    No Known Allergies    Intolerances        Vital Signs Last 24 Hrs  T(C): 36.8 (26 Feb 2025 16:00), Max: 36.9 (26 Feb 2025 09:41)  T(F): 98.3 (26 Feb 2025 16:00), Max: 98.4 (26 Feb 2025 09:41)  HR: 104 (26 Feb 2025 16:00) (100 - 121)  BP: 142/80 (26 Feb 2025 16:00) (116/81 - 142/80)  BP(mean): 99 (26 Feb 2025 16:00) (87 - 107)  RR: 27 (26 Feb 2025 16:00) (20 - 35)  SpO2: 97% (26 Feb 2025 16:00) (92% - 99%)    Parameters below as of 26 Feb 2025 16:00  Patient On (Oxygen Delivery Method): nasal cannula w/ humidification  O2 Flow (L/min): 4        PHYSICAL EXAMINATION:    NECK:  Supple. No lymphadenopathy. Jugular venous pressure not elevated. Carotids equal.   HEART:   The cardiac impulse has a normal quality. Reg., Nl S1 and S2.  There are no murmurs, rubs or gallops noted  CHEST:  Chest crackles to auscultation. Normal respiratory effort.  ABDOMEN:  Soft and nontender.   EXTREMITIES:  There is no edema.       LABS:                        9.4    26.18 )-----------( 208      ( 26 Feb 2025 12:34 )             28.1     02-26    147[H]  |  115[H]  |  96[H]  ----------------------------<  169[H]  4.0   |  23  |  1.72[H]    Ca    8.9      26 Feb 2025 05:25  Phos  2.2     02-26  Mg     1.9     02-26    TPro  5.5[L]  /  Alb  2.7[L]  /  TBili  1.1  /  DBili  x   /  AST  107[H]  /  ALT  87[H]  /  AlkPhos  307[H]  02-26    PT/INR - ( 25 Feb 2025 17:37 )   PT: 13.7 sec;   INR: 1.19 ratio         PTT - ( 26 Feb 2025 12:34 )  PTT:91.5 sec  Urinalysis Basic - ( 26 Feb 2025 05:25 )    Color: x / Appearance: x / SG: x / pH: x  Gluc: 169 mg/dL / Ketone: x  / Bili: x / Urobili: x   Blood: x / Protein: x / Nitrite: x   Leuk Esterase: x / RBC: x / WBC x   Sq Epi: x / Non Sq Epi: x / Bacteria: x

## 2025-02-26 NOTE — PROGRESS NOTE ADULT - ASSESSMENT
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    Then transferred to  as per request of .  Was getting platelet transfusion and developed tachypnea hypoxia, rigors  Was diuresed and became hypotensive  Upgraded to SICU     Here with   1. Sepsis (POA)  2. Pseudomonal bacteremia suspect from UTI   3. Mucositis suspected esophageal candidiasis? r/o CMV  4. Pancytopenia  5. Acute hypoxic respiratory failure 2/2 pulm edema   6. MALLORY     CTA chest abd pelvis 2/19 revealing multiple small PE b/l and LBO  Started on IV heparin   Went into Afib with RVR this admission, resolved with lopressor.    Had bloody BMs. Hep gtt held and started on protonix.     PLAN    Neuro: awake, slow to respond. pain control prn. IV tylenol .avoid nsaids. CTH neg for mets.   CV: BP stable has been 130s/80s. in sinus rhythm 120s. cont IV lopressor 5mg q6hr, started on PO lopressor 25mg q26hr.   Pulm: on 3-4L NC sating 95-96%, less tachypneic today.  TTE no RV strain. trial on heparin gtt again.  GI: PO diet as tolerated. on TPN. IV PPI. Repeat CTAP with ileus.   Nephro: Cr 1.7 post ATN diuresis? hyperNa 147. will continue LR @75 x 10 hrs.. monitor I & Os. Trend renal fxn. albumin q6hr  Endo: Hyperglycemia improved. cont lantus 22units qHS. insulin added to TPN. BGMs q6hr. ISS.  ID: cont IV diflucan 200mg qd #12 (started 2/15). cont PO vanco q6hr for C diff ppx. dc meropenem. s/p IV cefepime.  blood cultures here neg to date. CMV neg. HSV pcr neg.   Heme: hgb drifting down slightly 8.7 -> 9.3 -> 8.5. s/p 4units pRBC this admission (2units on 2/23, 2units on 2/16).  s/p filgastrim  PT eval, OOB to chair as tolerated.    Dispo: SICU. TPN. IV abx. cont LR. Trend Na and Cr. trial on hep gtt again.    Will discuss with Dr. 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    Then transferred to  as per request of .  Was getting platelet transfusion and developed tachypnea hypoxia, rigors  Was diuresed and became hypotensive  Upgraded to SICU     Here with   1. Sepsis (POA)  2. Pseudomonal bacteremia suspect from UTI   3. Mucositis suspected esophageal candidiasis? r/o CMV  4. Pancytopenia  5. Acute hypoxic respiratory failure 2/2 pulm edema   6. MALLORY     CTA chest abd pelvis 2/19 revealing multiple small PE b/l and LBO  Started on IV heparin   Went into Afib with RVR this admission, resolved with lopressor.    Had bloody BMs. Hep gtt held and started on protonix.     PLAN    Neuro: awake, slow to respond. pain control prn. IV tylenol .avoid nsaids. CTH neg for mets.   CV: BP stable has been 130s/80s. in sinus rhythm 120s. cont IV lopressor 5mg q6hr, started on PO lopressor 25mg q26hr.   Pulm: on 3-4L NC sating 95-96%, less tachypneic today.  TTE no RV strain. trial on heparin gtt again.  GI: PO diet as tolerated. on TPN. IV PPI. Repeat CTAP with ileus.   Nephro: Cr 1.7 post ATN diuresis? hyperNa 147. will continue LR @75 x 10 hrs.. monitor I & Os. Trend renal fxn. albumin q6hr  Endo: Hyperglycemia improved. cont lantus 22units qHS. insulin added to TPN. BGMs q6hr. ISS.  ID: leukocytosis likely still related to filgastrim - d/w heme onc. cont IV diflucan 200mg qd #12 (started 2/15). cont PO vanco q6hr for C diff ppx. dc meropenem. s/p IV cefepime.  blood cultures here neg to date. CMV neg. HSV pcr neg.   Heme:  hgb drifting down slightly 8.7 -> 9.3 -> 8.5. s/p 4units pRBC this admission (2units on 2/23, 2units on 2/16).  s/p filgastrim  PT eval, OOB to chair as tolerated.    Dispo: SICU. TPN. IV abx. cont LR. Trend Na and Cr. trial on hep gtt again.    Will discuss with Dr. Geiger    updated  at bedside

## 2025-02-26 NOTE — PROGRESS NOTE ADULT - ASSESSMENT
69-year-old female with h/o recent diagnosis of primary head of pancreatic adenocarcinoma with tumor in umbilicus, chemo-induced pancytopenia, SCC lip, cyclical vomiting syndrome, HTN, HPL, Gout, GERD, CVA on Aggrenox was transferred on 2/12 from [facility] for further care. She was admitted to [facility] with septic shock on 02/09/2025, nausea, vomiting and diarrhea requiring Levophed drip. Patient was being followed by GI, ID, Cardiology and Onc there. Patient has been transferred to  on patient's  (GI Dr. Pleitez) request. She was reported with non-bloody diarrhea x 3 in last 24 hours PTA. She denied abdominal pain, nausea, but has dry heaves. At [place] she was started on Vancomycin and Zosyn, then changed to Cefepime as her blood culture and urine cultures showed Pseudomonas aeruginosa.    #Dysphagia. Possible candida esophagitis  #Acute mucositis with diarrhea due to chemotherapy   #S/p sepsis with PSAE   #Head of pancreas adenocarcinoma on chemotherapy  #Leukocytosis  #Immunocompromised host  #UTI with PSAE resolved  #Kidney stones  #Right lower chest wall dry superficial ulcer   #ARF   -neutropenia resolved, leukocytosis at present  -CMV serology shows no evidence of CMV disease  -renal function is slightly improving  -dysphagia with poor PO intake  -cultures reviewed  -repeat stool for CDT is negative   -source of sepsis is likely intraabdominal and/or urinary  -s/p cefepime 2 gm IV q8h # 3  -s/p meropenem 1 gm IV q8h # 9   -on fluconazole 200 mg IV qd # 10  -on vancomycin 125 mg PO q6h for CDAD prophylaxis  -tolerating abx well so far; no side effects noted  -GI evaluation appreciated - to consider upper endoscopy --> evaluated for esophagitis ?candida, ?gastritis  -repeat BC x 2 noted  -oncology evaluation appreciated  -monitor abdomen  -continue antimicrobials with fluconazole IV and vancomycin PO  -f/u cultures  -monitor temps  -f/u CBC and BNP  -supportive care  2. Other issues:   -care per medicine    d/w medicine team  d/w Dr. Hernandez and Dr. Geiger

## 2025-02-26 NOTE — PROGRESS NOTE ADULT - SUBJECTIVE AND OBJECTIVE BOX
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 1000 milliGRAM(s) IV Intermittent once  albumin human 25% IVPB 50 milliLiter(s) IV Intermittent every 6 hours  chlorhexidine 2% Cloths 1 Application(s) Topical <User Schedule>  cholecalciferol 2000 Unit(s) Oral at bedtime  dextrose 5%. 1000 milliLiter(s) (50 mL/Hr) IV Continuous <Continuous>  dextrose 5%. 1000 milliLiter(s) (100 mL/Hr) IV Continuous <Continuous>  dextrose 50% Injectable 25 Gram(s) IV Push once  dextrose 50% Injectable 12.5 Gram(s) IV Push once  dextrose 50% Injectable 25 Gram(s) IV Push once  famotidine Injectable 20 milliGRAM(s) IV Push daily  fluconAZOLE IVPB      fluconAZOLE IVPB 200 milliGRAM(s) IV Intermittent every 24 hours  glucagon  Injectable 1 milliGRAM(s) IntraMuscular once  heparin  Infusion.  Unit(s)/Hr (9 mL/Hr) IV Continuous <Continuous>  insulin glargine Injectable (LANTUS) 22 Unit(s) SubCutaneous at bedtime  insulin lispro (ADMELOG) corrective regimen sliding scale   SubCutaneous every 6 hours  lactated ringers. 1000 milliLiter(s) (100 mL/Hr) IV Continuous <Continuous>  lipid, fat emulsion (Fish Oil and Plant Based) 20% Infusion 0.681 Gm/kG/Day (20.83 mL/Hr) IV Continuous <Continuous>  metoprolol tartrate 25 milliGRAM(s) Oral every 6 hours  pantoprazole  Injectable 40 milliGRAM(s) IV Push every 12 hours  Parenteral Nutrition - Adult 1 Each (75 mL/Hr) TPN Continuous <Continuous>  potassium phosphate IVPB 15 milliMole(s) IV Intermittent once  sucralfate suspension 1 Gram(s) Oral four times a day  vancomycin    Solution 125 milliGRAM(s) Oral every 6 hours    MEDICATIONS  (PRN):  acetaminophen     Tablet .. 650 milliGRAM(s) Oral every 6 hours PRN Temp greater or equal to 38C (100.4F), Mild Pain (1 - 3)  aluminum hydroxide/magnesium hydroxide/simethicone Suspension 30 milliLiter(s) Oral every 4 hours PRN Dyspepsia  Biotene Dry Mouth Oral Rinse 15 milliLiter(s) Swish and Spit five times a day PRN Mouth Care  dextrose Oral Gel 15 Gram(s) Oral once PRN Blood Glucose LESS THAN 70 milliGRAM(s)/deciliter  heparin   Injectable 4400 Unit(s) IV Push every 6 hours PRN For aPTT less than 40  melatonin 3 milliGRAM(s) Oral at bedtime PRN Insomnia  metoprolol tartrate Injectable 5 milliGRAM(s) IV Push every 6 hours PRN heart rate >110  ondansetron Injectable 4 milliGRAM(s) IV Push every 8 hours PRN Nausea and/or Vomiting  sodium chloride 0.65% Nasal 1 Spray(s) Both Nostrils two times a day PRN Nasal Congestion      REVIEW OF SYSTEMS:  NA    Vital Signs Last 24 Hrs  T(C): 36.4 (26 Feb 2025 06:00), Max: 37.8 (25 Feb 2025 11:58)  T(F): 97.6 (26 Feb 2025 06:00), Max: 100 (25 Feb 2025 11:58)  HR: 109 (26 Feb 2025 06:00) (101 - 126)  BP: 137/76 (26 Feb 2025 06:00) (99/71 - 141/85)  BP(mean): 95 (26 Feb 2025 06:00) (80 - 107)  RR: 22 (26 Feb 2025 06:00) (20 - 35)  SpO2: 96% (26 Feb 2025 06:00) (94% - 100%)    Parameters below as of 26 Feb 2025 06:00  Patient On (Oxygen Delivery Method): nasal cannula w/ humidification  O2 Flow (L/min): 4      PHYSICAL EXAM:    Constitutional: NAD, chronically ill appearing  Respiratory: CTAB  Cardiovascular: S1 and S2, RRR  Gastrointestinal: BS+, soft, NT/ND        LABS:                        8.5    24.92 )-----------( 188      ( 26 Feb 2025 05:25 )             25.3     02-26    147[H]  |  115[H]  |  96[H]  ----------------------------<  169[H]  4.0   |  23  |  1.72[H]    Ca    8.9      26 Feb 2025 05:25  Phos  2.2     02-26  Mg     1.9     02-26    TPro  5.5[L]  /  Alb  2.7[L]  /  TBili  1.1  /  DBili  x   /  AST  107[H]  /  ALT  87[H]  /  AlkPhos  307[H]  02-26    PT/INR - ( 25 Feb 2025 17:37 )   PT: 13.7 sec;   INR: 1.19 ratio         PTT - ( 26 Feb 2025 03:22 )  PTT:35.6 sec  LIVER FUNCTIONS - ( 26 Feb 2025 05:25 )  Alb: 2.7 g/dL / Pro: 5.5 gm/dL / ALK PHOS: 307 U/L / ALT: 87 U/L / AST: 107 U/L / GGT: x             RADIOLOGY & ADDITIONAL STUDIES:

## 2025-02-27 LAB
ALBUMIN SERPL ELPH-MCNC: 2.8 G/DL — LOW (ref 3.3–5)
ALT FLD-CCNC: 45 U/L — SIGNIFICANT CHANGE UP (ref 12–78)
ANION GAP SERPL CALC-SCNC: 7 MMOL/L — SIGNIFICANT CHANGE UP (ref 5–17)
AST SERPL-CCNC: 44 U/L — HIGH (ref 15–37)
BILIRUB SERPL-MCNC: 0.8 MG/DL — SIGNIFICANT CHANGE UP (ref 0.2–1.2)
BUN SERPL-MCNC: 96 MG/DL — HIGH (ref 7–23)
CALCIUM SERPL-MCNC: 9.1 MG/DL — SIGNIFICANT CHANGE UP (ref 8.5–10.1)
CHLORIDE SERPL-SCNC: 116 MMOL/L — HIGH (ref 96–108)
CO2 SERPL-SCNC: 26 MMOL/L — SIGNIFICANT CHANGE UP (ref 22–31)
CREAT SERPL-MCNC: 1.87 MG/DL — HIGH (ref 0.5–1.3)
DPYD GENE MUT ANL BLD/T: SIGNIFICANT CHANGE UP
DPYD GENE PROD MET ACT IMP BLD/T-IMP: NORMAL — SIGNIFICANT CHANGE UP
DPYD GENE PROD MET ACT IMP BLD/T-IMP: SIGNIFICANT CHANGE UP
DPYD GENOTYPE: SIGNIFICANT CHANGE UP
DPYD PHENOTYPE: NORMAL — SIGNIFICANT CHANGE UP
EGFR: 29 ML/MIN/1.73M2 — LOW
EGFR: 29 ML/MIN/1.73M2 — LOW
GLUCOSE BLDC GLUCOMTR-MCNC: 146 MG/DL — HIGH (ref 70–99)
GLUCOSE BLDC GLUCOMTR-MCNC: 152 MG/DL — HIGH (ref 70–99)
GLUCOSE BLDC GLUCOMTR-MCNC: 157 MG/DL — HIGH (ref 70–99)
GLUCOSE SERPL-MCNC: 138 MG/DL — HIGH (ref 70–99)
HCT VFR BLD CALC: 30.2 % — LOW (ref 34.5–45)
HCT VFR BLD CALC: 30.8 % — LOW (ref 34.5–45)
HCT VFR BLD CALC: 31.1 % — LOW (ref 34.5–45)
HCT VFR BLD CALC: 31.3 % — LOW (ref 34.5–45)
HCT VFR BLD CALC: 32 % — LOW (ref 34.5–45)
HGB BLD-MCNC: 10.2 G/DL — LOW (ref 11.5–15.5)
HGB BLD-MCNC: 10.2 G/DL — LOW (ref 11.5–15.5)
HGB BLD-MCNC: 10.3 G/DL — LOW (ref 11.5–15.5)
HGB BLD-MCNC: 10.6 G/DL — LOW (ref 11.5–15.5)
MAGNESIUM SERPL-MCNC: 2 MG/DL — SIGNIFICANT CHANGE UP (ref 1.6–2.6)
MCHC RBC-ENTMCNC: 28.7 PG — SIGNIFICANT CHANGE UP (ref 27–34)
MCHC RBC-ENTMCNC: 29 PG — SIGNIFICANT CHANGE UP (ref 27–34)
MCHC RBC-ENTMCNC: 29.7 PG — SIGNIFICANT CHANGE UP (ref 27–34)
MCHC RBC-ENTMCNC: 30.1 PG — SIGNIFICANT CHANGE UP (ref 27–34)
MCHC RBC-ENTMCNC: 33.1 G/DL — SIGNIFICANT CHANGE UP (ref 32–36)
MCHC RBC-ENTMCNC: 33.8 G/DL — SIGNIFICANT CHANGE UP (ref 32–36)
MCHC RBC-ENTMCNC: 34.5 G/DL — SIGNIFICANT CHANGE UP (ref 32–36)
MCV RBC AUTO: 86.6 FL — SIGNIFICANT CHANGE UP (ref 80–100)
MCV RBC AUTO: 87.2 FL — SIGNIFICANT CHANGE UP (ref 80–100)
MCV RBC AUTO: 87.7 FL — SIGNIFICANT CHANGE UP (ref 80–100)
MCV RBC AUTO: 87.8 FL — SIGNIFICANT CHANGE UP (ref 80–100)
MCV RBC AUTO: 88.5 FL — SIGNIFICANT CHANGE UP (ref 80–100)
NRBC # BLD AUTO: 0.08 K/UL — HIGH (ref 0–0)
NRBC # BLD AUTO: 0.11 K/UL — HIGH (ref 0–0)
NRBC # BLD AUTO: 0.12 K/UL — HIGH (ref 0–0)
NRBC # BLD AUTO: 0.15 K/UL — HIGH (ref 0–0)
NRBC # BLD AUTO: 0.18 K/UL — HIGH (ref 0–0)
NRBC # FLD: 0.08 K/UL — HIGH (ref 0–0)
NRBC # FLD: 0.11 K/UL — HIGH (ref 0–0)
NRBC # FLD: 0.12 K/UL — HIGH (ref 0–0)
NRBC # FLD: 0.15 K/UL — HIGH (ref 0–0)
NRBC # FLD: 0.18 K/UL — HIGH (ref 0–0)
NRBC BLD AUTO-RTO: 0 /100 WBCS — SIGNIFICANT CHANGE UP (ref 0–0)
PATHOLOGY STUDY: SIGNIFICANT CHANGE UP
PHOSPHATE SERPL-MCNC: 4 MG/DL — SIGNIFICANT CHANGE UP (ref 2.5–4.5)
PLATELET # BLD AUTO: 174 K/UL — SIGNIFICANT CHANGE UP (ref 150–400)
PLATELET # BLD AUTO: 181 K/UL — SIGNIFICANT CHANGE UP (ref 150–400)
PLATELET # BLD AUTO: 182 K/UL — SIGNIFICANT CHANGE UP (ref 150–400)
PLATELET # BLD AUTO: 183 K/UL — SIGNIFICANT CHANGE UP (ref 150–400)
PLATELET # BLD AUTO: 187 K/UL — SIGNIFICANT CHANGE UP (ref 150–400)
PMV BLD: 13.8 FL — HIGH (ref 7–13)
PMV BLD: 13.8 FL — HIGH (ref 7–13)
PMV BLD: 14.7 FL — HIGH (ref 7–13)
PMV BLD: SIGNIFICANT CHANGE UP FL (ref 7–13)
POTASSIUM SERPL-MCNC: 3.9 MMOL/L — SIGNIFICANT CHANGE UP (ref 3.5–5.3)
POTASSIUM SERPL-SCNC: 3.9 MMOL/L — SIGNIFICANT CHANGE UP (ref 3.5–5.3)
PROT SERPL-MCNC: 5.4 GM/DL — LOW (ref 6–8.3)
PROTHROM AB SERPL-ACNC: 13.5 SEC — HIGH (ref 9.9–13.4)
RBC # BLD: 3.44 M/UL — LOW (ref 3.8–5.2)
RBC # BLD: 3.48 M/UL — LOW (ref 3.8–5.2)
RBC # BLD: 3.59 M/UL — LOW (ref 3.8–5.2)
RBC # BLD: 3.59 M/UL — LOW (ref 3.8–5.2)
RBC # BLD: 3.65 M/UL — LOW (ref 3.8–5.2)
RBC # FLD: 15.5 % — HIGH (ref 10.3–14.5)
RBC # FLD: 15.9 % — HIGH (ref 10.3–14.5)
RBC # FLD: 15.9 % — HIGH (ref 10.3–14.5)
RBC # FLD: 16 % — HIGH (ref 10.3–14.5)
RBC # FLD: 16.1 % — HIGH (ref 10.3–14.5)
SODIUM SERPL-SCNC: 149 MMOL/L — HIGH (ref 135–145)
WBC # BLD: 16.86 K/UL — HIGH (ref 3.8–10.5)
WBC # BLD: 17.07 K/UL — HIGH (ref 3.8–10.5)
WBC # BLD: 18.33 K/UL — HIGH (ref 3.8–10.5)
WBC # BLD: 18.89 K/UL — HIGH (ref 3.8–10.5)
WBC # BLD: 20.74 K/UL — HIGH (ref 3.8–10.5)
WBC # FLD AUTO: 16.86 K/UL — HIGH (ref 3.8–10.5)
WBC # FLD AUTO: 17.07 K/UL — HIGH (ref 3.8–10.5)
WBC # FLD AUTO: 18.33 K/UL — HIGH (ref 3.8–10.5)
WBC # FLD AUTO: 20.74 K/UL — HIGH (ref 3.8–10.5)

## 2025-02-27 PROCEDURE — 99233 SBSQ HOSP IP/OBS HIGH 50: CPT

## 2025-02-27 PROCEDURE — 99232 SBSQ HOSP IP/OBS MODERATE 35: CPT

## 2025-02-27 RX ORDER — SODIUM CHLORIDE 9 G/1000ML
1000 INJECTION, SOLUTION INTRAVENOUS
Refills: 0 | Status: DISCONTINUED | OUTPATIENT
Start: 2025-02-27 | End: 2025-02-28

## 2025-02-27 RX ORDER — INSULIN GLARGINE-YFGN 100 [IU]/ML
20 INJECTION, SOLUTION SUBCUTANEOUS AT BEDTIME
Refills: 0 | Status: DISCONTINUED | OUTPATIENT
Start: 2025-02-27 | End: 2025-03-02

## 2025-02-27 RX ORDER — SODIUM/POT/MAG/CALC/CHLOR/ACET 35-20-5MEQ
1 VIAL (ML) INTRAVENOUS
Refills: 0 | Status: DISCONTINUED | OUTPATIENT
Start: 2025-02-27 | End: 2025-02-27

## 2025-02-27 RX ADMIN — Medication 40 MILLIGRAM(S): at 09:38

## 2025-02-27 RX ADMIN — Medication 125 MILLIGRAM(S): at 17:12

## 2025-02-27 RX ADMIN — Medication 40 MILLIGRAM(S): at 21:17

## 2025-02-27 RX ADMIN — SODIUM CHLORIDE 75 MILLILITER(S): 9 INJECTION, SOLUTION INTRAVENOUS at 14:00

## 2025-02-27 RX ADMIN — METOPROLOL SUCCINATE 25 MILLIGRAM(S): 50 TABLET, EXTENDED RELEASE ORAL at 17:12

## 2025-02-27 RX ADMIN — Medication 20 MILLIGRAM(S): at 21:17

## 2025-02-27 RX ADMIN — Medication 1 GRAM(S): at 12:33

## 2025-02-27 RX ADMIN — Medication 2000 UNIT(S): at 21:18

## 2025-02-27 RX ADMIN — Medication 125 MILLIGRAM(S): at 23:08

## 2025-02-27 RX ADMIN — ALBUMIN (HUMAN) 50 MILLILITER(S): 12.5 INJECTION, SOLUTION INTRAVENOUS at 09:53

## 2025-02-27 RX ADMIN — Medication 1 GRAM(S): at 04:55

## 2025-02-27 RX ADMIN — Medication 1 GRAM(S): at 23:12

## 2025-02-27 RX ADMIN — Medication 125 MILLIGRAM(S): at 04:55

## 2025-02-27 RX ADMIN — INSULIN LISPRO 2: 100 INJECTION, SOLUTION INTRAVENOUS; SUBCUTANEOUS at 17:13

## 2025-02-27 RX ADMIN — INSULIN GLARGINE-YFGN 20 UNIT(S): 100 INJECTION, SOLUTION SUBCUTANEOUS at 23:08

## 2025-02-27 RX ADMIN — Medication 100 MILLIGRAM(S): at 12:36

## 2025-02-27 RX ADMIN — ALBUMIN (HUMAN) 50 MILLILITER(S): 12.5 INJECTION, SOLUTION INTRAVENOUS at 01:59

## 2025-02-27 RX ADMIN — METOPROLOL SUCCINATE 25 MILLIGRAM(S): 50 TABLET, EXTENDED RELEASE ORAL at 12:32

## 2025-02-27 RX ADMIN — Medication 1 GRAM(S): at 17:12

## 2025-02-27 RX ADMIN — SODIUM CHLORIDE 75 MILLILITER(S): 9 INJECTION, SOLUTION INTRAVENOUS at 23:19

## 2025-02-27 RX ADMIN — Medication 125 MILLIGRAM(S): at 12:33

## 2025-02-27 RX ADMIN — INSULIN LISPRO 2: 100 INJECTION, SOLUTION INTRAVENOUS; SUBCUTANEOUS at 12:39

## 2025-02-27 RX ADMIN — Medication 1 EACH: at 23:19

## 2025-02-27 RX ADMIN — METOPROLOL SUCCINATE 25 MILLIGRAM(S): 50 TABLET, EXTENDED RELEASE ORAL at 23:12

## 2025-02-27 RX ADMIN — METOPROLOL SUCCINATE 25 MILLIGRAM(S): 50 TABLET, EXTENDED RELEASE ORAL at 04:55

## 2025-02-27 NOTE — PRE PROCEDURE NOTE - PRE PROCEDURE EVALUATION
Interventional Radiology    HPI:  69-year-old female with stage IV pancreatic adenocarcinoma  with PE and no DVT on imaging with GI bleed unable to tolerate anticoagulation. Family doesn't want EGD/colonoscopy. IR consulted for IVC filter.     Allergies: No Known Allergies    Medications (Abx/Cardiac/Anticoagulation/Blood Products)    fluconAZOLE IVPB: 100 mL/Hr IV Intermittent (02-26 @ 10:11)  heparin  Infusion.: 1100 Unit(s)/Hr IV Continuous (02-26 @ 04:35)  metoprolol tartrate: 25 milliGRAM(s) Oral (02-27 @ 04:55)  metoprolol tartrate Injectable: 5 milliGRAM(s) IV Push (02-25 @ 12:18)  vancomycin    Solution: 125 milliGRAM(s) Oral (02-27 @ 04:55)    Data:    T(C): 36.7  HR: 93  BP: 138/82  RR: 29  SpO2: 94%    Malignant neoplasm of head of pancreas    Handoff    MEWS Score    Primary pancreatic adenocarcinoma    HTN (hypertension)    HLD (hyperlipidemia)    Gout    SCC of lung (small cell carcinoma)    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    GERD (gastroesophageal reflux disease)    Cyclical vomiting syndrome    CVA (cerebrovascular accident)    Rash    Sepsis due to Pseudomonas    Primary pancreatic cancer with metastasis to other site    Acute hypoxemic respiratory failure    Chronic bilateral pleural effusions    Compressive atelectasis    PE (pulmonary thromboembolism)    MALLORY (acute kidney injury)    Hypoalbuminemia    Mucositis    H/O squamous cell carcinoma excision    SysAdmin_VstLnk        Exam  General: No acute distress  Chest: Non labored breathing    -WBC 18.89 / HgB 10.2 / Hct 30.2 / Plt 181  -Na 149 / Cl 116 / BUN 96 / Glucose 138  -K 3.9 / CO2 26 / Cr 1.87  -ALT 45 / Alk Phos 194 / T.Bili 0.8  -INR1.19    Imaging:     Plan: 69-year-old female with stage IV pancreatic adenocarcinoma  with PE and no DVT on imaging with GI bleed unable to tolerate anticoagulation. Family doesn't want EGD/colonoscopy. IR consulted for IVC filter.     - Per discussion with Dr. Hernandez and pt's , will plan for IVC Filter is appropriate for pt. Will plan for IVC Filter.  - Leukocytosis 2/2 Neupogen per d/w Ethel AREVALO. It is improving . Blood cultures negative. Afebrile  - Pt doesn't need to be NPO.     Interventional Radiology    HPI:  69-year-old female with stage IV pancreatic adenocarcinoma  with PE and no DVT on imaging with GI bleed unable to tolerate anticoagulation. Family doesn't want EGD/colonoscopy. IR consulted for IVC filter.     Allergies: No Known Allergies    Medications (Abx/Cardiac/Anticoagulation/Blood Products)    fluconAZOLE IVPB: 100 mL/Hr IV Intermittent (02-26 @ 10:11)  heparin  Infusion.: 1100 Unit(s)/Hr IV Continuous (02-26 @ 04:35)  metoprolol tartrate: 25 milliGRAM(s) Oral (02-27 @ 04:55)  metoprolol tartrate Injectable: 5 milliGRAM(s) IV Push (02-25 @ 12:18)  vancomycin    Solution: 125 milliGRAM(s) Oral (02-27 @ 04:55)    Data:    T(C): 36.7  HR: 93  BP: 138/82  RR: 29  SpO2: 94%    Malignant neoplasm of head of pancreas    Handoff    MEWS Score    Primary pancreatic adenocarcinoma    HTN (hypertension)    HLD (hyperlipidemia)    Gout    SCC of lung (small cell carcinoma)    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    GERD (gastroesophageal reflux disease)    Cyclical vomiting syndrome    CVA (cerebrovascular accident)    Rash    Sepsis due to Pseudomonas    Primary pancreatic cancer with metastasis to other site    Acute hypoxemic respiratory failure    Chronic bilateral pleural effusions    Compressive atelectasis    PE (pulmonary thromboembolism)    MALLORY (acute kidney injury)    Hypoalbuminemia    Mucositis    H/O squamous cell carcinoma excision    SysAdmin_VstLnk        Exam  General: No acute distress  Chest: Non labored breathing    -WBC 18.89 / HgB 10.2 / Hct 30.2 / Plt 181  -Na 149 / Cl 116 / BUN 96 / Glucose 138  -K 3.9 / CO2 26 / Cr 1.87  -ALT 45 / Alk Phos 194 / T.Bili 0.8  -INR1.19    Imaging:     Plan: 69-year-old female with stage IV pancreatic adenocarcinoma  with PE and no DVT on imaging with GI bleed unable to tolerate anticoagulation. Family doesn't want EGD/colonoscopy. IR consulted for IVC filter.     - Per discussion with Dr. Hernandez and pt's , will plan for IVC Filter is appropriate for pt. Will plan for IVC Filter.  - Leukocytosis 2/2 Neupogen per d/w Ethel AREVALO. It is improving . Blood cultures negative. Afebrile  - Pt doesn't need to be NPO.    Case discussed with pt's  with all device options explained. Decided on convertible filter based on design and likely long term need with possibility of filtration no longer needed at some point. Will need to order or  from another facility which is safe given no current lower ext clot burden.

## 2025-02-27 NOTE — PROGRESS NOTE ADULT - ASSESSMENT
Imp:  I don't think it will be feasible to resume any anticoag until the source of bleeding is clarified    Rec:  Consider IVC filter  I am comfortable performing EGD/colonoscopy which would help with the bleeding and dysphagia etc. if family/team desires  Corepak is reasonable to me

## 2025-02-27 NOTE — CHART NOTE - NSCHARTNOTEFT_GEN_A_CORE
Clinical Nutrition PN Follow Up Note    *70 y/o F w/ recent diagnosis of primary pancreatic adenocarcinoma of head with tumor in umbilicus, chemo-induced pancytopenia and significant PMH of SCC lip, cyclical vomiting syndrome, HTN, HPL, Gout, GERD, CVA on Aggrenox and others had been admitted to [facility] with septic shock on 2025, nausea, vomiting and diarrhea requiring Levophed drip, was transferred out of ICU to regular floor last night.  Patient was being followed by GI, ID, Cardiology and Onc there.  Patient has been transferred to  today on patient's  (GI Dr. Pleitez) request.  At this time, patient c/o non-bloody diarrhea x 3 in last 24 hours.  She denies any abdominal pain, nausea, but has some dry heaves.  She denies any fever, chills, chest pain, palpitation, constipation or dysuria. Initially, she was started on Vancomycin and Zosyn, but now she has been on Cefepime as her blood culture and urine cultures are positive for Pseudomonas aeruginosa. Currently, patient is on Cefepime.  ID had also recommended Flagyl, but patient had refused it, as it causes nausea to her. Noted with Metabolic acidosis with NAG due to GI loss. Likely chemo-induced diarrhea and vomiting. Better than before. Cutaneous lesion on RUQ area below right breast, unclear etiology, ? Ecthyma gangrenosum. Admitting diagnosis: pancreatic ca  *: Pt transfer from [facility]; seen by RD and met criteria for PCM. Pt reports only being able to tolerate some sips of water and tea at this time. Reports tried eating small bite of mashed banana and felt burning sensation down her throat. Reports #; RD unable to obtain bedscale wt d/t bedscale not working. Admit wt per # note with 1+ and 2+ edema skewing wt. Pt appears overweight, NFPE reveals mild- mod muscle/fat wasting at this time. Recommend to continue with PO diet and encourage intake as tolerated. RD consulted to initiate PN d/t PO intolerance. Will initiate TPN through port.  *2/15: Remains on low fiber diet with minimal PO intake. Still w/ multiple episodes of diarrhea, if persists can consider adding 5mg zinc into PN bag. No other acute events overnight. D/w Dr. Joyner will c/w TPN today.   *: Raised area noted to mid sternum. RCW port intact.- no swelling noted, no redness. Port flushed without resistance. + blood return noted. Assessed with IV team Amy. Per pt no Pain on palpation. CT Chest. TPN on hold until CT read. CT chest from earlier in the day revealed new bilateral pleural effusions with bibasilar consolidated lung and ascites. CXR now shows low lung volumes and bibasilar opacities. Rigors post transfusion - Rapid response called;  Dr. Pleitez requesting ICU consult- concerned for pt.  requesting assessment of patient STAT. Pt transferred to SICU. ? third spacing, and possible PNA. Per pt still drinking small sips of water and tea. D/w Dr. Fortune will keep total volume same in PN bag today, and will c/w TPN.   *:  tx SICU, po remains minimal. On bipap. On additional LR d/t diuresis per critical care PA: "malignancy/severe protein-calorie malnutrition/hypoalbuminemia causing diffuse third spacing, caution with further diuresis as this is not truly a volume overload/acute CHF issue" also on IV albumin  c/w TPN  STRONGLY suggest to Confirm goals of care regarding LONG-TERM nutrition support - However, LONG-TERM Nutrition support is not recommended with advanced cancer as studies show that there is no improvement of dehydration symptoms, quality of life, or survival. It also increases the risk for peripheral edema, ascites, and pleural effusions. Will continue to provide nutr within GOC though  *: plan to c/w TPN for now. RD discussed w/ MD Tong and SICU IRINA Jurado need to confirm long term GOC regarding nutr support. Per GI - Esophageal pain is 2/2 esophagitis but unclear what type -- reflux vs. candida vs CMV or HSV etc. Plan to add imodium standing. ? EGD   *: reports she stills has difficulty swallowing due to pain. feeling weak overall. still having multiple episodes of diarrhea, poor PO intake. Plan to c/w TPN - per GI: "CVM and HSV will be negative which argues that esophagitis is reflux or fungal related"  d/w IRINA Jurado long-term plan regarding nutr support TBD, pending discussion w/ family. ? fluid overload which TPN may be contributing to as some studies show with advanced cancer that nutrition support increases the risk for peripheral edema, ascites, and pleural effusions.   *: PO slighly improving, plan to c/w TPN.  POCT uptrending, d/w IRINA Jurado, steroids to be dc and will hold off in insulin PN bag - will manage w/ insulin outside bag.  Per GI: "Imaging suggests ileus but I personally think that putting the imaging together with history and clinical findings that she just has severe diarrhea illness 2/2 chemo, given that both large and small bowel loops are fluid filled and she has no abd pain or vomiting"  *: PO intake continues to slightly improve, plan to c/w TPN. Hyperglycemia worsening, d/w IRINA Jurado, plan to add regular insulin into PN bag. Will also decrease dextrose to 250g to see if this helps w/ hyperglycemia and can increase again when POCTs better controlled.   *: Abdomen distended, non-tender to palpation - GI rec stat CT w/o contrast for further evaluation and to hold PO intake at this time. POCT remain elevated, per RN  this morning - Lantus was not given last night by RN since insulin was already in PN bag (?). Will hold off on increasing dextrose and will c/w current insulin in PN bag, d/w intensivist and plan to adjust Lantus outside PN bag.   *: Now has (+) GIB,  CTA with improvement in bowel distension, small and lg bowel ileus. Per GI, clears OK.  *: consumed some PO yesterday. Per GI, unsure why GIB but now resolved - ?low level ischemic colitis. plan to c/w TPN for now per sicu IRINA Jurado  STRONGLY suggest palliative care consult to determine LONG-TERM GOC regarding nutr support   *: per SICU RN, pt ate yogurt, bites of oatmeal.  bringing in foods to encourage PO as well. IR consulted for IVC filter.  Wound care RN following.  c/w TPN    *TPN initiated 2/2 PO intolerance, + Mucositis, possible candidal esophagitis    *current status:     *pertinent meds: acetaminophen, IV albumin, cholecalciferol, famotidine, abx, Lantus 22 units HS, LR, ADMELOG (14 units given x 24 hrs), 15 mmol KPhos given yesterday and plan for another today    *labs reviewed; Na elevated, only receiving 50 mEq Na in bag. Also on additional IVF. Will not increase total volume for now d/t 3rd spacing.   K WNL; As per ASPEN Guidelines, maintenance potassium concentration in PN is 1 – 2 mEq/kg/day. However, [facility] PN Policy indicates a maximum of 120 mEq should be added into the PN bag. Currently at 100mEq in PN bag.  Mg remains on lower end of normal, will NOT increase in PN bag again today. C/w current amount in bag.  Phos now LOW - will increase to 30 mmol; s/p repletion of KPhos as above.   T bili WNL will c/w trace elements. C/w MVI and thiamine in PN bag. POCTs improved x 24 hrs, will c/w current dextrose (250g) @ goal - meeting LOW-END of calorie needs w/ that amount d/t persistent hyperglycemia. D/w IRINA Jurado dulce/w 25 units of regular insulin in PN bag. Will hold off on cycling TPN for now.  New TG level 400 - will give lipids MWF and recheck level x 1 week. If continues to uptrend can give 1 week break. Also of note, Cl still elevated, will add more acetate vs Cl into TPN bag (181 vs 50, respectively).         147[H]  |  115[H]  |  96[H]  ----------------------------<  169[H]  4.0   |  23  |  1.72[H]    Ca    8.9      2025 05:25  Phos  2.2       Mg     1.9         TPro  5.5[L]  /  Alb  2.7[L]  /  TBili  1.1  /  DBili  x   /  AST  107[H]  /  ALT  87[H]  /  AlkPhos  307[H]      BMI: BMI (kg/m2): 27.8 (25 @ 05:46)  HbA1c: A1C with Estimated Average Glucose Result: 6.6 % (25 @ 05:35)    BP: 136/90 (25 @ 06:00) (114/82 - 145/78)Vital Signs Last 24 Hrs  T(C): 37.1 (25 @ 06:00), Max: 37.1 (25 @ 06:00)  T(F): 98.8 (25 @ 06:00), Max: 98.8 (25 @ 06:00)  HR: 98 (25 @ 06:00) (98 - 111)  BP: 136/90 (25 @ 06:00) (125/81 - 145/78)  BP(mean): 104 (25 @ 06:00) (91 - 104)  RR: 27 (25 @ 06:00) (25 - 37)  SpO2: 90% (25 @ 06:00) (90% - 99%)    Lipid Panel: Date/Time: 25 @ 05:31  Triglycerides, Serum: 281    POCT Blood Glucose.: 146 mg/dL (2025 05:22)  POCT Blood Glucose.: 193 mg/dL (2025 00:59)  POCT Blood Glucose.: 191 mg/dL (2025 21:53)  POCT Blood Glucose.: 228 mg/dL (2025 18:24)  POCT Blood Glucose.: 208 mg/dL (2025 12:32)    Iron Total: 34 ug/dL (25 @ 18:46)  Folate, Serum: >20.0 ng/mL (25 @ 18:46)  Vitamin B12, Serum: 1457 pg/mL (25 @ 18:46)  Vitamin D, 25-Hydroxy: 23.0 ng/mL (25 @ 18:46) ** c/w deficiency    *I&O's Detail    2025 07:01  -  2025 07:00  --------------------------------------------------------  IN:    Fat Emulsion (Fish Oil &amp; Plant Based) 20% Infusion: 221 mL    Heparin Infusion: 117 mL    IV PiggyBack: 600 mL    Lactated Ringers: 887 mL    sodium chloride 0.45%: 313 mL    TPN (Total Parenteral Nutrition): 1663 mL  Total IN: 3801 mL    OUT:    Indwelling Catheter - Urethral (mL): 2850 mL  Total OUT: 2850 mL    Total NET: 951 mL  * fluid status: Positive; large UOP. Will continue to monitor/adjust prn to maintain fluid balance   *last (+) BM doc'd   pt not on bowel regimen.  *edema: 3+ R/L leg, foot  *PI: L/R Buttocks (stage 2)    *malnutrition: Pt continues to meet criteria for severe protein calorie malnutrition in context of acute on chronic illness r/t decreased ability to meet increased nutrient needs 2/2 PO intolerance, N/V and diarrhea, on chemo for pancreatic ca AEB meeting <50% ENN > 5 days, mild-mod muscle/fat wasting    Estimated Needs: based on 73.4 Kg (wt from EMR admit wt )  Calories: 1812 - 2175 Kcal (25 - 30 Kcal/Kg)  Protein: 108 - 145 g (1.5 - 2.0 g/Kg)  Fluids: 1450 - 1812 mL (20 - 25 mL/Kg)    Diet, Low Fiber:   Kosher  Supplement Feeding Modality:  Oral  Ensure Plant-Based Cans or Servings Per Day:  3       Frequency:  Daily (25 @ 16:47) [Active]  Parenteral Nutrition - Adult 1 Each (75 mL/Hr) TPN Continuous <Continuous>, 25 @ 22:00, 22:00, Stop order after: 1 Days    Weight History:  Daily Weight in k.2 (2025 04:00)  Daily Weight in k.3 (2025 06:00) *2+ edema  Daily Weight in k.4 (2025 05:00)  Daily Weight in k.6 (15 Feb 2025 06:17)  Height (cm): 162.6 (25 @ 02:39)  Weight (kg): 73.4 (25 @ 05:46), 66.8 (25 @ 02:39)  BMI (kg/m2): 27.8 (25 @ 05:46), 25.3 (25 @ 02:39)  BSA (m2): 1.79 (25 @ 05:46), 1.72 (25 @ 02:39)    TPN Recommendations: via implanted infusion port  Total Volume: 1800 mL x 24 hours  125 g  Amino Acids  250 g Dextrose (goal 325g)  0 g Lipids 20% (MWF only)  0 mEq Sodium Chloride  0 mEq Sodium Acetate  0 mmol Sodium Phosphate  0 mEq Potassium Chloride  56 mEq Potassium Acetate  30 mmol Potassium Phosphate  0 mEq Calcium Gluconate (CAPS out of PN solution)   16 mEq Magnesium Sulfate  200 mg Thiamine  1 ml Trace Elements  10 ml MVI  25 units Regular Insulin    Total Calories    1350   (Meets  73%  of lower end of Estimated Energy needs  and  100%  Protein needs)      Additional Recommendations:    1) Daily weights  2) Strict I & O's **pt third spacing  3) Daily lyte checks including magnesium and phos  4) Weekly triglycerides/LFT checks  5) POCT q6hrs; maintain 140-180mg/dL*** insulin management per MD  6) C/w low fiber diet and encourage PO intake as tolerated  7) Continue to titrate dextrose 50-75g per day until goal of 325g reached  8) Confirm goals of care regarding long-term nutrition support. Would recommend PEG placement if long-term nutrition support is required as GI is functional and would be the preferred route for nutrition when ?GIB/ileus resolves. However, long-term nutrition support is not recommended with advanced cancer as studies show that there is no improvement of dehydration symptoms, quality of life, or survival. It also increases the risk for peripheral edema, ascites, and pleural effusions.     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  Patient was being followed by GI, ID, Cardiology and Onc there.  Patient has been transferred to  today on patient's  (GI Dr. Pleitez) request.  At this time, patient c/o non-bloody diarrhea x 3 in last 24 hours.  She denies any abdominal pain, nausea, but has some dry heaves.  She denies any fever, chills, chest pain, palpitation, constipation or dysuria. Initially, she was started on Vancomycin and Zosyn, but now she has been on Cefepime as her blood culture and urine cultures are positive for Pseudomonas aeruginosa. Currently, patient is on Cefepime.  ID had also recommended Flagyl, but patient had refused it, as it causes nausea to her. Noted with Metabolic acidosis with NAG due to GI loss. Likely chemo-induced diarrhea and vomiting. Better than before. Cutaneous lesion on RUQ area below right breast, unclear etiology, ? Ecthyma gangrenosum. Admitting diagnosis: pancreatic ca  *: Pt transfer from [facility]; seen by RD and met criteria for PCM. Pt reports only being able to tolerate some sips of water and tea at this time. Reports tried eating small bite of mashed banana and felt burning sensation down her throat. Reports #; RD unable to obtain bedscale wt d/t bedscale not working. Admit wt per # note with 1+ and 2+ edema skewing wt. Pt appears overweight, NFPE reveals mild- mod muscle/fat wasting at this time. Recommend to continue with PO diet and encourage intake as tolerated. RD consulted to initiate PN d/t PO intolerance. Will initiate TPN through port.  *2/15: Remains on low fiber diet with minimal PO intake. Still w/ multiple episodes of diarrhea, if persists can consider adding 5mg zinc into PN bag. No other acute events overnight. D/w Dr. Joyner will c/w TPN today.   *: Raised area noted to mid sternum. RCW port intact.- no swelling noted, no redness. Port flushed without resistance. + blood return noted. Assessed with IV team Amy. Per pt no Pain on palpation. CT Chest. TPN on hold until CT read. CT chest from earlier in the day revealed new bilateral pleural effusions with bibasilar consolidated lung and ascites. CXR now shows low lung volumes and bibasilar opacities. Rigors post transfusion - Rapid response called;  Dr. Pleitez requesting ICU consult- concerned for pt.  requesting assessment of patient STAT. Pt transferred to SICU. ? third spacing, and possible PNA. Per pt still drinking small sips of water and tea. D/w Dr. Fortune will keep total volume same in PN bag today, and will c/w TPN.   *:  tx SICU, po remains minimal. On bipap. On additional LR d/t diuresis per critical care PA: "malignancy/severe protein-calorie malnutrition/hypoalbuminemia causing diffuse third spacing, caution with further diuresis as this is not truly a volume overload/acute CHF issue" also on IV albumin  c/w TPN  STRONGLY suggest to Confirm goals of care regarding LONG-TERM nutrition support - However, LONG-TERM Nutrition support is not recommended with advanced cancer as studies show that there is no improvement of dehydration symptoms, quality of life, or survival. It also increases the risk for peripheral edema, ascites, and pleural effusions. Will continue to provide nutr within GOC though  *: plan to c/w TPN for now. RD discussed w/ MD Tong and SICU IRINA Jurado need to confirm long term GOC regarding nutr support. Per GI - Esophageal pain is 2/2 esophagitis but unclear what type -- reflux vs. candida vs CMV or HSV etc. Plan to add imodium standing. ? EGD   *: reports she stills has difficulty swallowing due to pain. feeling weak overall. still having multiple episodes of diarrhea, poor PO intake. Plan to c/w TPN - per GI: "CVM and HSV will be negative which argues that esophagitis is reflux or fungal related"  d/w IRINA Jurado long-term plan regarding nutr support TBD, pending discussion w/ family. ? fluid overload which TPN may be contributing to as some studies show with advanced cancer that nutrition support increases the risk for peripheral edema, ascites, and pleural effusions.   *: PO slighly improving, plan to c/w TPN.  POCT uptrending, d/w IRINA Jurado, steroids to be dc and will hold off in insulin PN bag - will manage w/ insulin outside bag.  Per GI: "Imaging suggests ileus but I personally think that putting the imaging together with history and clinical findings that she just has severe diarrhea illness 2/2 chemo, given that both large and small bowel loops are fluid filled and she has no abd pain or vomiting"  *: PO intake continues to slightly improve, plan to c/w TPN. Hyperglycemia worsening, d/w IRINA Jurado, plan to add regular insulin into PN bag. Will also decrease dextrose to 250g to see if this helps w/ hyperglycemia and can increase again when POCTs better controlled.   *: Abdomen distended, non-tender to palpation - GI rec stat CT w/o contrast for further evaluation and to hold PO intake at this time. POCT remain elevated, per RN  this morning - Lantus was not given last night by RN since insulin was already in PN bag (?). Will hold off on increasing dextrose and will c/w current insulin in PN bag, d/w intensivist and plan to adjust Lantus outside PN bag.   *: Now has (+) GIB,  CTA with improvement in bowel distension, small and lg bowel ileus. Per GI, clears OK.  *: consumed some PO yesterday. Per GI, unsure why GIB but now resolved - ?low level ischemic colitis. plan to c/w TPN for now per sicu IRINA Jurado  STRONGLY suggest palliative care consult to determine LONG-TERM GOC regarding nutr support   *: per SICU RN, pt ate yogurt, bites of oatmeal.  bringing in foods to encourage PO as well. IR consulted for IVC filter.  Wound care RN following.  c/w TPN    *TPN initiated 2/2 PO intolerance, + Mucositis, possible candidal esophagitis    *current status: IVC filter being considered, possible EGD d/t recurrent GIB.  Per GI - corpak is reasonable. RD feels strongly that corpak or even PEG (if able) would be best route of nutr support for this pt at this point. Has been on TPN x 13 days. Will c/w TPN per IRINA Jurado for now. STRONGLY rec'd palliative care team to Confirm goals of care regarding LONG-TERM nutrition support     *pertinent meds: acetaminphen, cholecalciferol, abx, famotidine, lantus 20 units HS, LR, protonix, ADMELOG (2 units given x 24 hrs)    *labs reviewed; Na elevated, plan to remove from bag entirely today per IRINA Jurado. Also on additional IVF. Will not increase total volume for now d/t 3rd spacing.   K WNL; As per ASPEN Guidelines, maintenance potassium concentration in PN is 1 – 2 mEq/kg/day. However, [facility] PN Policy indicates a maximum of 120 mEq should be added into the PN bag. Currently at 100mEq in PN bag.  Mg WNL.  Phos WNL.   T bili WNL will c/w trace elements. C/w MVI and thiamine in PN bag. POCTs improved x 24 hrs, will c/w current dextrose (250g) @ goal - meeting LOW-END of calorie needs w/ that amount d/t persistent hyperglycemia. D/w IRINA Jurado dulce/w 25 units of regular insulin in PN bag. Will hold off on cycling TPN for now.  New TG level 400 - will give lipids MWF and recheck level x 1 week. If continues to uptrend can give 1 week break. Also of note, Cl still elevated, no Cl added into TPN bag        149[H]  |  116[H]  |  96[H]  ----------------------------<  138[H]  3.9   |  26  |  1.87[H]    Ca    9.1      2025 04:05  Phos  4.0       Mg     2.0         TPro  5.4[L]  /  Alb  2.8[L]  /  TBili  0.8  /  DBili  x   /  AST  44[H]  /  ALT  45  /  AlkPhos  194[H]      BMI: BMI (kg/m2): 27.8 (25 @ 05:46)  HbA1c: A1C with Estimated Average Glucose Result: 6.6 % (25 @ 05:35)    BP: 136/90 (25 @ 06:00) (114/82 - 145/78)Vital Signs Last 24 Hrs  T(C): 37.1 (25 @ 06:00), Max: 37.1 (25 @ 06:00)  T(F): 98.8 (25 @ 06:00), Max: 98.8 (25 @ 06:00)  HR: 98 (25 06:00) (98 - 111)  BP: 136/90 (25 @ 06:00) (125/81 - 145/78)  BP(mean): 104 (25 @ 06:00) (91 - 104)  RR: 27 (25 @ 06:00) (25 - 37)  SpO2: 90% (25 @ 06:00) (90% - 99%)    Lipid Panel: Date/Time: 25 @ 05:31  Triglycerides, Serum: 281    POCT Blood Glucose.: 135 mg/dL (2025 22:53)  POCT Blood Glucose.: 134 mg/dL (2025 17:11)  POCT Blood Glucose.: 158 mg/dL (2025 12:36)    Iron Total: 34 ug/dL (25 @ 18:46)  Folate, Serum: >20.0 ng/mL (25 @ 18:46)  Vitamin B12, Serum: 1457 pg/mL (25 @ 18:46)  Vitamin D, 25-Hydroxy: 23.0 ng/mL (25 @ 18:46) ** c/w deficiency    *I&O's Detail    2025 07:01  -  2025 07:00  --------------------------------------------------------  IN:    Fat Emulsion (Fish Oil &amp; Plant Based) 20% Infusion: 113 mL    Lactated Ringers: 685 mL    PRBCs (Packed Red Blood Cells): 679 mL    TPN (Total Parenteral Nutrition): 665 mL  Total IN: 2142 mL    OUT:    Indwelling Catheter - Urethral (mL): 3700 mL  Total OUT: 3700 mL    Total NET: -1558 mL  * fluid status: negative; large UOP. Will continue to monitor/adjust prn to maintain fluid balance   *last (+) BM doc'd   pt not on bowel regimen.  *edema: 3+ generalized  *PI: L/R Buttocks (stage 2)    *malnutrition: Pt continues to meet criteria for severe protein calorie malnutrition in context of acute on chronic illness r/t decreased ability to meet increased nutrient needs 2/ PO intolerance, N/V and diarrhea, on chemo for pancreatic ca AEB meeting <50% ENN > 5 days, mild-mod muscle/fat wasting    Estimated Needs: based on 73.4 Kg (wt from EMR admit wt )  Calories: 1812 - 2175 Kcal (25 - 30 Kcal/Kg)  Protein: 108 - 145 g (1.5 - 2.0 g/Kg)  Fluids: 1450 - 1812 mL (20 - 25 mL/Kg)    Diet, Regular:   Prosource Gelatein 20 Sugar Free     Qty per Day:  2  Supplement Feeding Modality:  Oral  Ensure Plus High Protein Cans or Servings Per Day:  1       Frequency:  Two Times a day (25 @ 12:01) [Active]    Weight History:  Daily Weight in k.2 (2025 04:00)  Daily Weight in k.3 (2025 06:00) *2+ edema  Daily Weight in k.4 (2025 05:00)  Daily Weight in k.6 (15 Feb 2025 06:17)  Height (cm): 162.6 (25 @ 02:39)  Weight (kg): 73.4 (25 @ 05:46), 66.8 (25 @ 02:39)  BMI (kg/m2): 27.8 (25 @ 05:46), 25.3 (25 @ 02:39)  BSA (m2): 1.79 (25 @ 05:46), 1.72 (25 @ 02:39)    TPN Recommendations: via implanted infusion port  Total Volume: 1800 mL x 24 hours  125 g  Amino Acids  250 g Dextrose (goal 325g)  0 g Lipids 20% (MWF only)  0 mEq Sodium Chloride  0 mEq Sodium Acetate  0 mmol Sodium Phosphate  0 mEq Potassium Chloride  56 mEq Potassium Acetate  30 mmol Potassium Phosphate  0 mEq Calcium Gluconate (CAPS out of PN solution)   16 mEq Magnesium Sulfate  200 mg Thiamine  1 ml Trace Elements  10 ml MVI  25 units Regular Insulin    Total Calories    1350   (Meets  73%  of lower end of Estimated Energy needs  and  100%  Protein needs)      Additional Recommendations:    1) Daily weights  2) Strict I & O's **pt third spacing  3) Daily lyte checks including magnesium and phos  4) Weekly triglycerides/LFT checks  5) POCT q6hrs; maintain 140-180mg/dL*** insulin management per MD  6) C/w low fiber diet and encourage PO intake as tolerated  7) Continue to titrate dextrose 50-75g per day until goal of 325g reached  8) Confirm goals of care regarding long-term nutrition support. Would recommend corpak vs PEG placement if long-term nutrition support is required as GI is functional and would be the preferred route for nutrition when GIB/ileus resolves. However, long-term nutrition support is not recommended with advanced cancer as studies show that there is no improvement of dehydration symptoms, quality of life, or survival. It also increases the risk for peripheral edema, ascites, and pleural effusions.     *will continue to monitor and adjust PN prn*  Marce Mitchell, MS, RDN, CNSC, [phone]  Certified Nutrition

## 2025-02-27 NOTE — PROGRESS NOTE ADULT - SUBJECTIVE AND OBJECTIVE BOX
Patient is a 69y old  Female who presents with a chief complaint of sepsis (18 Feb 2025 11:37)    BRIEF HOSPITAL COURSE: 68yo female with PMHx recent diagnosis of primary pancreatic adenocarcinoma of head with tumor in umbilicus, chemo-induced pancytopenia, SCC lip, cyclical vomiting syndrome, HTN, HPL, Gout, GERD, CVA on Aggrenox and others had been admitted to [facility] with septic shock 2/2 + pseudomonas bacteremia on 02/09/2025, nausea, vomiting and diarrhea requiring Levophed drip, was transferred out of ICU to regular floor last night.  Patient was being followed by GI, ID, Cardiology and Onc there.  Patient has been transferred to  on patient's  (GI Dr. Pleitez) request.      2/24 pt flat affect, denies pain but when i palpate her lower abd she flinches. denies nausea, had dark BM last night. whispers to communicate  2/25 slow to respond this am, still tachypneic. abd tender to palpation on the R side  2/26 pt sleeping this am, easily arousable. takes alot of encouragement to answer by questions, but doesnt really respond just stares at me. tender to palpation on R side of abd and suprapubic area  2/27 pt more awake today, quicker to respond, having abd pain on the R side still but less severe. had maroon BM overnight and again this am. 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 1000 milliGRAM(s) IV Intermittent once  acetaminophen   IVPB .. 1000 milliGRAM(s) IV Intermittent once  chlorhexidine 2% Cloths 1 Application(s) Topical <User Schedule>  cholecalciferol 2000 Unit(s) Oral at bedtime  dextrose 5%. 1000 milliLiter(s) (50 mL/Hr) IV Continuous <Continuous>  dextrose 5%. 1000 milliLiter(s) (100 mL/Hr) IV Continuous <Continuous>  dextrose 50% Injectable 25 Gram(s) IV Push once  dextrose 50% Injectable 12.5 Gram(s) IV Push once  dextrose 50% Injectable 25 Gram(s) IV Push once  famotidine Injectable 20 milliGRAM(s) IV Push daily  fluconAZOLE IVPB      fluconAZOLE IVPB 200 milliGRAM(s) IV Intermittent every 24 hours  glucagon  Injectable 1 milliGRAM(s) IntraMuscular once  insulin glargine Injectable (LANTUS) 20 Unit(s) SubCutaneous at bedtime  insulin lispro (ADMELOG) corrective regimen sliding scale   SubCutaneous every 6 hours  lactated ringers. 1000 milliLiter(s) (75 mL/Hr) IV Continuous <Continuous>  lipid, fat emulsion (Fish Oil and Plant Based) 20% Infusion 0.681 Gm/kG/Day (20.8 mL/Hr) IV Continuous <Continuous>  metoprolol tartrate 25 milliGRAM(s) Oral every 6 hours  pantoprazole  Injectable 40 milliGRAM(s) IV Push every 12 hours  Parenteral Nutrition - Adult 1 Each (75 mL/Hr) TPN Continuous <Continuous>  Parenteral Nutrition - Adult 1 Each (75 mL/Hr) TPN Continuous <Continuous>  sodium chloride 0.45%. 1000 milliLiter(s) (75 mL/Hr) IV Continuous <Continuous>  sucralfate suspension 1 Gram(s) Oral four times a day  vancomycin    Solution 125 milliGRAM(s) Oral every 6 hours    Vital Signs Last 24 Hrs  T(C): 36.8 (27 Feb 2025 13:36), Max: 36.8 (26 Feb 2025 14:30)  T(F): 98.3 (27 Feb 2025 13:36), Max: 98.3 (26 Feb 2025 14:47)  HR: 98 (27 Feb 2025 14:00) (93 - 109)  BP: 140/81 (27 Feb 2025 14:00) (126/82 - 142/80)  BP(mean): 99 (27 Feb 2025 14:00) (91 - 105)  RR: 33 (27 Feb 2025 14:00) (22 - 35)  SpO2: 91% (27 Feb 2025 14:00) (91% - 100%)    Parameters below as of 27 Feb 2025 14:00  Patient On (Oxygen Delivery Method): nasal cannula  O2 Flow (L/min): 4    I&O's Detail    26 Feb 2025 07:01  -  27 Feb 2025 07:00  --------------------------------------------------------  IN:    Fat Emulsion (Fish Oil &amp; Plant Based) 20% Infusion: 113 mL    Lactated Ringers: 685 mL    PRBCs (Packed Red Blood Cells): 679 mL    TPN (Total Parenteral Nutrition): 665 mL  Total IN: 2142 mL    OUT:    Indwelling Catheter - Urethral (mL): 3700 mL  Total OUT: 3700 mL    Total NET: -1558 mL      LABS:                               10.6   18.33 )-----------( 183      ( 27 Feb 2025 10:45 )             32.0     02-27    149[H]  |  116[H]  |  96[H]  ----------------------------<  138[H]  3.9   |  26  |  1.87[H]    Ca    9.1      27 Feb 2025 04:05  Phos  4.0     02-27  Mg     2.0     02-27    TPro  5.4[L]  /  Alb  2.8[L]  /  TBili  0.8  /  DBili  x   /  AST  44[H]  /  ALT  45  /  AlkPhos  194[H]  02-27        LIVER FUNCTIONS - ( 27 Feb 2025 04:05 )  Alb: 2.8 g/dL / Pro: 5.4 gm/dL / ALK PHOS: 194 U/L / ALT: 45 U/L / AST: 44 U/L / GGT: x           PT/INR - ( 25 Feb 2025 17:37 )   PT: 13.7 sec;   INR: 1.19 ratio         PTT - ( 26 Feb 2025 12:34 )  PTT:91.5 sec  Urinalysis Basic - ( 27 Feb 2025 04:05 )    Color: x / Appearance: x / SG: x / pH: x  Gluc: 138 mg/dL / Ketone: x  / Bili: x / Urobili: x   Blood: x / Protein: x / Nitrite: x   Leuk Esterase: x / RBC: x / WBC x   Sq Epi: x / Non Sq Epi: x / Bacteria: x      Physical Examination:    General: no respiratory distress today.  HEENT: lips appear less dry.   PULM: decreased at bases b/l.   CVS: Regular rate and rhythm, no murmurs  ABD: Soft, nondistended, mild tenderness, redness by umbilical area,  EXT: 1+ pitting edema in LE b/l extending to knees - improving  SKIN: Warm and well perfused  NEURO: Awake, more alert today, more conversive    DEVICES:  R chest port     RADIOLOGY:   < from: CT Abdomen and Pelvis No Cont (02.22.25 @ 15:53) >    IMPRESSION: Mild interval improvement in small and large bowel ileus.   Recommend plain film follow-up.    < end of copied text >      < from: CT Chest No Cont (02.15.25 @ 15:55) >  IMPRESSION:  Nondisplaced fractures of the left ninth and 10th ribs, similar to the   prior study.    Pancreatic mass, unchanged    No onset ascites and bilateral pleural effusions with compressive   atelectasis, right greater than left.    Dilated gallbladder, similar to the prior study.    < end of copied text >

## 2025-02-27 NOTE — PROGRESS NOTE ADULT - SUBJECTIVE AND OBJECTIVE BOX
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 1000 milliGRAM(s) IV Intermittent once  acetaminophen   IVPB .. 1000 milliGRAM(s) IV Intermittent once  albumin human 25% IVPB 50 milliLiter(s) IV Intermittent every 6 hours  chlorhexidine 2% Cloths 1 Application(s) Topical <User Schedule>  cholecalciferol 2000 Unit(s) Oral at bedtime  dextrose 5%. 1000 milliLiter(s) (50 mL/Hr) IV Continuous <Continuous>  dextrose 5%. 1000 milliLiter(s) (100 mL/Hr) IV Continuous <Continuous>  dextrose 50% Injectable 25 Gram(s) IV Push once  dextrose 50% Injectable 12.5 Gram(s) IV Push once  dextrose 50% Injectable 25 Gram(s) IV Push once  famotidine Injectable 20 milliGRAM(s) IV Push daily  fluconAZOLE IVPB      fluconAZOLE IVPB 200 milliGRAM(s) IV Intermittent every 24 hours  glucagon  Injectable 1 milliGRAM(s) IntraMuscular once  insulin glargine Injectable (LANTUS) 20 Unit(s) SubCutaneous at bedtime  insulin lispro (ADMELOG) corrective regimen sliding scale   SubCutaneous every 6 hours  lactated ringers. 1000 milliLiter(s) (75 mL/Hr) IV Continuous <Continuous>  lipid, fat emulsion (Fish Oil and Plant Based) 20% Infusion 0.681 Gm/kG/Day (20.8 mL/Hr) IV Continuous <Continuous>  metoprolol tartrate 25 milliGRAM(s) Oral every 6 hours  pantoprazole  Injectable 40 milliGRAM(s) IV Push every 12 hours  Parenteral Nutrition - Adult 1 Each (75 mL/Hr) TPN Continuous <Continuous>  sucralfate suspension 1 Gram(s) Oral four times a day  vancomycin    Solution 125 milliGRAM(s) Oral every 6 hours    MEDICATIONS  (PRN):  acetaminophen     Tablet .. 650 milliGRAM(s) Oral every 6 hours PRN Temp greater or equal to 38C (100.4F), Mild Pain (1 - 3)  aluminum hydroxide/magnesium hydroxide/simethicone Suspension 30 milliLiter(s) Oral every 4 hours PRN Dyspepsia  Biotene Dry Mouth Oral Rinse 15 milliLiter(s) Swish and Spit five times a day PRN Mouth Care  dextrose Oral Gel 15 Gram(s) Oral once PRN Blood Glucose LESS THAN 70 milliGRAM(s)/deciliter  melatonin 3 milliGRAM(s) Oral at bedtime PRN Insomnia  metoprolol tartrate Injectable 5 milliGRAM(s) IV Push every 6 hours PRN heart rate >110  ondansetron Injectable 4 milliGRAM(s) IV Push every 8 hours PRN Nausea and/or Vomiting  sodium chloride 0.65% Nasal 1 Spray(s) Both Nostrils two times a day PRN Nasal Congestion      REVIEW OF SYSTEMS:    RESPIRATORY: No shortness of breath  CARDIOVASCULAR: No chest pain  All other review of systems is negative unless indicated above.    Vital Signs Last 24 Hrs  T(C): 36.8 (27 Feb 2025 06:00), Max: 36.9 (26 Feb 2025 09:41)  T(F): 98.2 (27 Feb 2025 06:00), Max: 98.4 (26 Feb 2025 09:41)  HR: 100 (27 Feb 2025 06:00) (99 - 120)  BP: 139/83 (27 Feb 2025 06:00) (126/82 - 142/80)  BP(mean): 99 (27 Feb 2025 06:00) (87 - 104)  RR: 29 (27 Feb 2025 06:00) (22 - 35)  SpO2: 100% (27 Feb 2025 06:00) (92% - 100%)    Parameters below as of 27 Feb 2025 06:00  Patient On (Oxygen Delivery Method): nasal cannula w/ humidification  O2 Flow (L/min): 4      PHYSICAL EXAM:    Constitutional: NAD,  Respiratory: CTAB  Cardiovascular: S1 and S2, RRR  Gastrointestinal: BS+, soft, NT/ND      LABS:                        10.2   18.89 )-----------( 181      ( 27 Feb 2025 04:05 )             30.2     02-27    149[H]  |  116[H]  |  96[H]  ----------------------------<  138[H]  3.9   |  26  |  1.87[H]    Ca    9.1      27 Feb 2025 04:05  Phos  4.0     02-27  Mg     2.0     02-27    TPro  5.4[L]  /  Alb  2.8[L]  /  TBili  0.8  /  DBili  x   /  AST  44[H]  /  ALT  45  /  AlkPhos  194[H]  02-27    PT/INR - ( 25 Feb 2025 17:37 )   PT: 13.7 sec;   INR: 1.19 ratio         PTT - ( 26 Feb 2025 12:34 )  PTT:91.5 sec  LIVER FUNCTIONS - ( 27 Feb 2025 04:05 )  Alb: 2.8 g/dL / Pro: 5.4 gm/dL / ALK PHOS: 194 U/L / ALT: 45 U/L / AST: 44 U/L / GGT: x             RADIOLOGY & ADDITIONAL STUDIES:

## 2025-02-27 NOTE — PROGRESS NOTE ADULT - ASSESSMENT
69-year-old female with stage IV pancreatic adenocarcinoma presenting with neutropenic fever and severe treatment-related toxicities from modified FOLFIRINOX and investigational EMILY inhibitor [id number].    PROBLEMS:    Acute hypoxic respiratory failure likely secondary to hypervolemia   Bilateral pleural effusions with compressive atelectasis, right greater than left  Neutropenic Fever/Neutropenic Septic shock  Severe Thrombocytopenia-Platelet count critically low at 26k.   Severe diarrhea and inability to tolerate PO intake, likely multifactorial from both FOLFIRINOX (particularly irinotecan component) and study drug [id number].   History of left cephalic vein thrombosis.  MALLORY  69-year-old female with stage IV pancreatic adenocarcinoma presenting with neutropenic fever and severe treatment-related toxicities from modified FOLFIRINOX and investigational EMILY inhibitor [id number].  CT Angio Chest PE Protocol w/ IV Cont (02.19.25 @ 14:16) >  CHEST:  Multiple pulmonary emboli within the segmental branches of the left lower and right lower lobe pulmonary arteries.  There are two right middle lobe nodules, new since prior. Exact   etiology is unclear. Primary differential diagnostic consideration   includes infection.      PLAN:    Pulmonary better  CTA chest + small PEs in RLL and LLL- IV heparin causing bleeding-waiting GFF  Family does not want any procedure EGD/Colonscopy  DECD steroids  High WBC-trend  Pulmonary tachpnea may be RTA with low bicarbonate because of GI cause with diarrhea but improving renal fx  Neutropenic Septic shock- IV meropenem started on 2/15- previously on CEFEPIME/Continue filgrastim 480mcg daily-Today w/ improvement in counts- WBC 1.3, Hb 10.1, plt 37, cmp with k 2.3 and Repleted, Cr 1.40.   Daily CBC monitoring  Maintain strict neutropenic precautions  IV diuretics-now on RA doing well   EMPIRIC TREATMENT FOR ESOPHAGEAL CANDIDIASIS-continue with DIFLUCAN   Anemia-received two units of pRBCs Hb 10.1 today   Thrombocytopenia-Transfuse for plts < 15   D/w staff & /haematology & ICU in detail

## 2025-02-27 NOTE — PROGRESS NOTE ADULT - ASSESSMENT
69-year-old female with h/o recent diagnosis of primary head of pancreatic adenocarcinoma with tumor in umbilicus, chemo-induced pancytopenia, SCC lip, cyclical vomiting syndrome, HTN, HPL, Gout, GERD, CVA on Aggrenox was transferred on 2/12 from [facility] for further care. She was admitted to [facility] with septic shock on 02/09/2025, nausea, vomiting and diarrhea requiring Levophed drip. Patient was being followed by GI, ID, Cardiology and Onc there. Patient has been transferred to  on patient's  (GI Dr. Pleitez) request. She was reported with non-bloody diarrhea x 3 in last 24 hours PTA. She denied abdominal pain, nausea, but has dry heaves. At [place] she was started on Vancomycin and Zosyn, then changed to Cefepime as her blood culture and urine cultures showed Pseudomonas aeruginosa.    #Dysphagia. Possible candida esophagitis  #Acute mucositis with diarrhea due to chemotherapy   #S/p sepsis with PSAE   #Head of pancreas adenocarcinoma on chemotherapy  #Leukocytosis  #Immunocompromised host  #UTI with PSAE resolved  #Kidney stones  #Right lower chest wall dry superficial ulcer   #ARF   -leukocytosis is improving  -CMV serology shows no evidence of CMV disease  -renal function remains poor  -dysphagia with poor PO intake  -cultures reviewed  -repeat stool for CDT is negative   -s/p cefepime 2 gm IV q8h # 3  -s/p meropenem 1 gm IV q8h # 9   -on fluconazole 200 mg IV qd # 11  -on vancomycin 125 mg PO q6h for CDAD prophylaxis  -tolerating abx well so far; no side effects noted  -GI evaluation appreciated - to consider upper endoscopy --> evaluated for esophagitis ?candida, ?gastritis  -repeat BC x 2 noted  -oncology evaluation appreciated  -monitor abdomen  -continue antimicrobials with fluconazole IV and vancomycin PO  -f/u cultures  -monitor temps  -f/u CBC and BNP  -supportive care  2. Other issues:   -care per medicine    d/w medicine team  d/w Dr. Hernandez and Dr. Geiger

## 2025-02-27 NOTE — PROGRESS NOTE ADULT - ASSESSMENT
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 with septic shock 2/2 + pseudomonas bacteremia likely due to UTI vs colitis on 02/09/2025 Was having nausea, vomiting and diarrhea requiring Levophed drip, was transferred out of ICU to regular floor.    Then transferred to  as per request of .  Was getting platelet transfusion and developed tachypnea hypoxia, rigors  Was diuresed and became hypotensive  Upgraded to SICU     Here with   1. Sepsis (POA)  2. Pseudomonal bacteremia suspect from UTI   3. Mucositis suspected esophageal candidiasis?   4. Pancytopenia  5. Acute hypoxic respiratory failure 2/2 pulm edema   6. MALLORY     CTA chest abd pelvis 2/19 revealing multiple small PE b/l and LBO  Started on IV heparin   Went into Afib with RVR this admission, resolved with lopressor.    Developed GI bleed on heparin gtt  started on protonix    re - trialed on heparin infusion on 2/25 -> subsequently had maroon BMs again    PLAN    Neuro: awake, more alert and conversive today.. pain control prn. IV tylenol .avoid nsaids  CV: BP has been stable 130s/80s. in sinus tachycardia improving HR 90-100s. cont PO lopressor 25mg q6hr.   Pulm: on 3-4L NC sating 95-96%, less tachypneic today.failed IV heparin again.  GI: PO diet as tolerated. on TPN. IV PPI bid. had maroon BM again today.  would like to defer endoscopy/colonoscopy at this time  Nephro: Cr 1.7 -> 1.8 suspect hypovolemia given GI bleed. hyperNa 149, will adjust Na in TPN and start 1/2 NS @ 75 x 12hrs. coelho in place. monitor I & Os. Trend renal fxn. s/p albumin q6hr x 8 doses  Endo: Hyperglycemia improved. cont lantus 22units qHS. insulin added to TPN. BGMs q6hr. ISS.  ID: leukocytosis improving, afebrile. likely still related to filgastrim - d/w heme onc. cont IV diflucan 200mg qd #13 (started 2/15). cont PO vanco q6hr for C diff ppx. s/p meropenem. s/p IV cefepime.   Heme: hgb has been stable 10.6,  s/p 6units pRBC this admission ( 2units 2/26, 2units on 2/23, 2units on 2/16). s/p filgastrim  PT eval, OOB to chair as tolerated.    Dispo: SICU. TPN. IV abx. started on  1/2 NS.  Trend Na and Cr. monitor off AC, plan for IVC filter tomorrow    Will discuss with Dr. Geiger, d/w Dr. Hernandez    updated  at bedside

## 2025-02-27 NOTE — PROGRESS NOTE ADULT - SUBJECTIVE AND OBJECTIVE BOX
69-year-old female recent diagnosis of primary pancreatic adenocarcinoma , pancytopenia and significant PMH of SCC lip, cyclical vomiting syndrome, HTN, HPL, Gout, GERD, CVA w renal evaluation of MALLORY and hypokalemia. Per documents was recently at  [facility] with septic shock on 02/09/2025, nausea, vomiting and diarrhea requiring Levophed drip. Per documents, patient  transferred to  for continued care. Patient with + diuresis w lasix 80 IV, increased UOP > 4L.   Treated w IV and po k repletion.     2/18  Case d/e Dr Pleitez  Pt returned from VQ scan r/o PE, results pending  Pt is tachypneic RR 30 BPM and etiologies are investigated  NO specific complaints  Labs reviewed  Chart reviewed  Recent reemergence of diarrhea/ metabolic acidosis  now on parenteral nutrition and LR  D/W Ethel Jurado    2/19  Evaluated at bedside , d/w Dr tong and Dr Pleitez  Pt still tachypneic and tachycardic    2/20  Case d/w Dr Maik Tong and Ethel Jurado  pt overall feels better  more engaging    2/21  Pt in good spirits  Cont to be tachycardic  rate ~ 110  Tachypneic rate ~ 30    2/22  Still tachypneic  Difficulty eating due to ileus    2/24  Events noted  Received Lasix 20 mg iv yesterday w   > 6000 ml    2/25  Hypernatremic  placed on LR  d/w TPN team to reduce Na content    2/26  Pt w some apprehension and abd discomfort  + abd tenderness on palpation  Bladder scan w patches of ascites, no large bladder volume    2/27  more comfortable    PAST MEDICAL & SURGICAL HISTORY:  Primary pancreatic adenocarcinoma    MEDICATIONS  (STANDING):  acetaminophen   IVPB .. 1000 milliGRAM(s) IV Intermittent once  acetaminophen   IVPB .. 1000 milliGRAM(s) IV Intermittent once  albumin human 25% IVPB 50 milliLiter(s) IV Intermittent every 6 hours  chlorhexidine 2% Cloths 1 Application(s) Topical <User Schedule>  cholecalciferol 2000 Unit(s) Oral at bedtime  dextrose 5%. 1000 milliLiter(s) (50 mL/Hr) IV Continuous <Continuous>  dextrose 5%. 1000 milliLiter(s) (100 mL/Hr) IV Continuous <Continuous>  dextrose 50% Injectable 25 Gram(s) IV Push once  dextrose 50% Injectable 12.5 Gram(s) IV Push once  dextrose 50% Injectable 25 Gram(s) IV Push once  famotidine Injectable 20 milliGRAM(s) IV Push daily  fluconAZOLE IVPB      fluconAZOLE IVPB 200 milliGRAM(s) IV Intermittent every 24 hours  glucagon  Injectable 1 milliGRAM(s) IntraMuscular once  insulin glargine Injectable (LANTUS) 22 Unit(s) SubCutaneous at bedtime  insulin lispro (ADMELOG) corrective regimen sliding scale   SubCutaneous every 6 hours  lactated ringers. 1000 milliLiter(s) (75 mL/Hr) IV Continuous <Continuous>  lactated ringers. 1000 milliLiter(s) (100 mL/Hr) IV Continuous <Continuous>  lipid, fat emulsion (Fish Oil and Plant Based) 20% Infusion 0.681 Gm/kG/Day (20.8 mL/Hr) IV Continuous <Continuous>  metoprolol tartrate 25 milliGRAM(s) Oral every 6 hours  pantoprazole  Injectable 40 milliGRAM(s) IV Push every 12 hours  Parenteral Nutrition - Adult 1 Each (75 mL/Hr) TPN Continuous <Continuous>  Parenteral Nutrition - Adult 1 Each (75 mL/Hr) TPN Continuous <Continuous>  sucralfate suspension 1 Gram(s) Oral four times a day  vancomycin    Solution 125 milliGRAM(s) Oral every 6 hours    MEDICATIONS  (PRN):  acetaminophen     Tablet .. 650 milliGRAM(s) Oral every 6 hours PRN Temp greater or equal to 38C (100.4F), Mild Pain (1 - 3)  aluminum hydroxide/magnesium hydroxide/simethicone Suspension 30 milliLiter(s) Oral every 4 hours PRN Dyspepsia  Biotene Dry Mouth Oral Rinse 15 milliLiter(s) Swish and Spit five times a day PRN Mouth Care  dextrose Oral Gel 15 Gram(s) Oral once PRN Blood Glucose LESS THAN 70 milliGRAM(s)/deciliter  melatonin 3 milliGRAM(s) Oral at bedtime PRN Insomnia  metoprolol tartrate Injectable 5 milliGRAM(s) IV Push every 6 hours PRN heart rate >110  ondansetron Injectable 4 milliGRAM(s) IV Push every 8 hours PRN Nausea and/or Vomiting  sodium chloride 0.65% Nasal 1 Spray(s) Both Nostrils two times a day PRN Nasal Congestion      I&O's Detail    25 Feb 2025 07:01  -  26 Feb 2025 07:00  --------------------------------------------------------  IN:    Fat Emulsion (Fish Oil &amp; Plant Based) 20% Infusion: 221 mL    Heparin Infusion: 117 mL    IV PiggyBack: 600 mL    Lactated Ringers: 887 mL    sodium chloride 0.45%: 313 mL    TPN (Total Parenteral Nutrition): 1663 mL  Total IN: 3801 mL    OUT:    Indwelling Catheter - Urethral (mL): 2850 mL  Total OUT: 2850 mL    Total NET: 951 mL      26 Feb 2025 07:01  -  26 Feb 2025 16:26  --------------------------------------------------------  IN:    PRBCs (Packed Red Blood Cells): 679 mL  Total IN: 679 mL    OUT:  Total OUT: 0 mL    Total NET: 679 mL    Vital Signs Last 24 Hrs  T(C): 36.8 (26 Feb 2025 16:00), Max: 36.9 (26 Feb 2025 09:41)  T(F): 98.3 (26 Feb 2025 16:00), Max: 98.4 (26 Feb 2025 09:41)  HR: 104 (26 Feb 2025 16:00) (100 - 121)  BP: 142/80 (26 Feb 2025 16:00) (116/81 - 142/80)  BP(mean): 99 (26 Feb 2025 16:00) (87 - 107)  RR: 27 (26 Feb 2025 16:00) (20 - 35)  SpO2: 97% (26 Feb 2025 16:00) (92% - 99%)    Parameters below as of 26 Feb 2025 16:00  Patient On (Oxygen Delivery Method): nasal cannula w/ humidification  O2 Flow (L/min): 4      HTN (hypertension)      HLD (hyperlipidemia)      Gout      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      GERD (gastroesophageal reflux disease)      Cyclical vomiting syndrome      CVA (cerebrovascular accident)      H/O squamous cell carcinoma excision      PHYSICAL EXAM:    Constitutional: apprehension,   Neck: No LAD, No JVD  Respiratory: CTAB  Cardiovascular: S1 and S2, RRR  Gastrointestinal: distant bowel sounds, + diffuse tenderness  Extremities: + peripheral edema  Neurological: A/O x 3, no focal deficits  Psychiatric: Normal mood, normal affect    Access: Not applicable        LABS:  02-27    149[H]  |  116[H]  |  96[H]  ----------------------------<  138[H]  3.9   |  26  |  1.87[H]    Ca    9.1      27 Feb 2025 04:05  Phos  4.0     02-27  Mg     2.0     02-27    TPro  5.4[L]  /  Alb  2.8[L]  /  TBili  0.8  /  DBili  x   /  AST  44[H]  /  ALT  45  /  AlkPhos  194[H]  02-27      147    |  115    |  96     ----------------------------<  169       26 Feb 2025 05:25  4.0     |  23     |  1.72     147    |  116    |  97     ----------------------------<  228       25 Feb 2025 17:11  3.6     |  24     |  1.68     149    |  116    |  92     ----------------------------<  227       25 Feb 2025 05:07  4.0     |  25     |  1.63     Ca    8.9        26 Feb 2025 05:25  Ca    8.7        25 Feb 2025 17:11  Ca    8.6        25 Feb 2025 05:07    Phos  2.2       26 Feb 2025 05:25  Phos  3.1       25 Feb 2025 17:11  Phos  1.7       25 Feb 2025 05:07    Mg     1.9       26 Feb 2025 05:25  Mg     1.9       25 Feb 2025 17:11  Mg     1.8       25 Feb 2025 05:07                                       9.4    26.18 )-----------( 208      ( 26 Feb 2025 12:34 )             28.1                         8.5    24.92 )-----------( 188      ( 26 Feb 2025 05:25 )             25.3                         9.3    25.69 )-----------( 205      ( 26 Feb 2025 03:22 )             28.1

## 2025-02-27 NOTE — PROGRESS NOTE ADULT - SUBJECTIVE AND OBJECTIVE BOX
Date of service: 02-27-25 @ 09:48    Lying in bed in NAD  Alert and verbal  Poor PO intake  No further bleeding reported  Weak looking    ROS: no fever or chills; denies dizziness, no HA, no SOB or cough, no abdominal pain, no dysuria, no legs pain, no rashes    MEDICATIONS  (STANDING):  acetaminophen   IVPB .. 1000 milliGRAM(s) IV Intermittent once  acetaminophen   IVPB .. 1000 milliGRAM(s) IV Intermittent once  albumin human 25% IVPB 50 milliLiter(s) IV Intermittent every 6 hours  chlorhexidine 2% Cloths 1 Application(s) Topical <User Schedule>  cholecalciferol 2000 Unit(s) Oral at bedtime  dextrose 5%. 1000 milliLiter(s) (50 mL/Hr) IV Continuous <Continuous>  dextrose 5%. 1000 milliLiter(s) (100 mL/Hr) IV Continuous <Continuous>  dextrose 50% Injectable 25 Gram(s) IV Push once  dextrose 50% Injectable 12.5 Gram(s) IV Push once  dextrose 50% Injectable 25 Gram(s) IV Push once  famotidine Injectable 20 milliGRAM(s) IV Push daily  fluconAZOLE IVPB      fluconAZOLE IVPB 200 milliGRAM(s) IV Intermittent every 24 hours  glucagon  Injectable 1 milliGRAM(s) IntraMuscular once  insulin glargine Injectable (LANTUS) 20 Unit(s) SubCutaneous at bedtime  insulin lispro (ADMELOG) corrective regimen sliding scale   SubCutaneous every 6 hours  lactated ringers. 1000 milliLiter(s) (75 mL/Hr) IV Continuous <Continuous>  lipid, fat emulsion (Fish Oil and Plant Based) 20% Infusion 0.681 Gm/kG/Day (20.8 mL/Hr) IV Continuous <Continuous>  metoprolol tartrate 25 milliGRAM(s) Oral every 6 hours  pantoprazole  Injectable 40 milliGRAM(s) IV Push every 12 hours  Parenteral Nutrition - Adult 1 Each (75 mL/Hr) TPN Continuous <Continuous>  sucralfate suspension 1 Gram(s) Oral four times a day  vancomycin    Solution 125 milliGRAM(s) Oral every 6 hours    Vital Signs Last 24 Hrs  T(C): 36.7 (27 Feb 2025 08:54), Max: 36.8 (26 Feb 2025 13:04)  T(F): 98 (27 Feb 2025 08:54), Max: 98.3 (26 Feb 2025 14:47)  HR: 93 (27 Feb 2025 08:00) (93 - 120)  BP: 138/82 (27 Feb 2025 08:00) (126/82 - 142/80)  BP(mean): 99 (27 Feb 2025 08:00) (87 - 104)  RR: 29 (27 Feb 2025 06:00) (22 - 35)  SpO2: 94% (27 Feb 2025 08:00) (92% - 100%)    Parameters below as of 27 Feb 2025 08:00  Patient On (Oxygen Delivery Method): room air     Physical exam:    Constitutional:  No acute distress  HEENT: NC/AT, EOMI, PERRLA, conjunctivae clear; ears and nose atraumatic; pharynx benign  Neck: supple; thyroid not palpable  Back: no tenderness  Respiratory: respiratory effort normal; decreased BS at bases  Cardiovascular: S1S2 regular, no murmurs  Abdomen: soft, mild periombilical tender, not distended, positive BS; no liver or spleen organomegaly  Genitourinary: no suprapubic tenderness  Lymphatic: no LN palpable  Musculoskeletal: no muscle tenderness, no joint swelling or tenderness  Extremities: no pedal edema  Neurological/ Psychiatric: AxOx3, judgement and insight normal; moving all extremities  Skin: no rashes; right lower chest wall dry, scabbed ulcer; no discharge     Labs: reviewed                        10.2   18.89 )-----------( 181      ( 27 Feb 2025 04:05 )             30.2     02-27    149[H]  |  116[H]  |  96[H]  ----------------------------<  138[H]  3.9   |  26  |  1.87[H]    Ca    9.1      27 Feb 2025 04:05  Phos  4.0     02-27  Mg     2.0     02-27    TPro  5.4[L]  /  Alb  2.8[L]  /  TBili  0.8  /  DBili  x   /  AST  44[H]  /  ALT  45  /  AlkPhos  194[H]  02-27                        9.9    24.08 )-----------( 164      ( 24 Feb 2025 04:45 )             28.2     02-24    146[H]  |  111[H]  |  84[H]  ----------------------------<  233[H]  3.9   |  29  |  1.53[H]    Ca    8.2[L]      24 Feb 2025 04:45  Phos  3.6     02-24  Mg     1.7     02-24    TPro  5.3[L]  /  Alb  2.9[L]  /  TBili  1.3[H]  /  DBili  x   /  AST  75[H]  /  ALT  49  /  AlkPhos  225[H]  02-24    C-Reactive Protein: 209.0 mg/mL (02-15-25 @ 05:17)  C-Reactive Protein: 233.0 mg/mL (02-14-25 @ 06:50)  Ferritin: 1798 ng/mL (02-13-25 @ 18:46)                        10.5   35.65 )-----------( 132      ( 21 Feb 2025 05:18 )             31.0     02-21    141  |  105  |  66[H]  ----------------------------<  358[H]  3.2[L]   |  26  |  1.33[H]    Ca    8.2[L]      21 Feb 2025 05:18  Phos  3.6     02-21  Mg     2.0     02-21    TPro  5.3[L]  /  Alb  2.1[L]  /  TBili  0.6  /  DBili  x   /  AST  56[H]  /  ALT  33  /  AlkPhos  329[H]  02-21    C-Reactive Protein: 209.0 mg/mL (02-15-25 @ 05:17)  C-Reactive Protein: 233.0 mg/mL (02-14-25 @ 06:50)  Ferritin: 1798 ng/mL (02-13-25 @ 18:46)                        10.4   5.43  )-----------( 45       ( 18 Feb 2025 08:42 )             29.4     02-18    138  |  112[H]  |  69[H]  ----------------------------<  223[H]  4.1   |  16[L]  |  1.44[H]    Ca    8.6      18 Feb 2025 08:42  Phos  2.0     02-18  Mg     2.2     02-18    TPro  5.1[L]  /  Alb  2.4[L]  /  TBili  0.6  /  DBili  x   /  AST  27  /  ALT  21  /  AlkPhos  131[H]  02-18    C-Reactive Protein: 209.0 mg/mL (02-15-25 @ 05:17)  C-Reactive Protein: 233.0 mg/mL (02-14-25 @ 06:50)  Ferritin: 1798 ng/mL (02-13-25 @ 18:46)                        10.1   1.30  )-----------( 37       ( 17 Feb 2025 05:59 )             28.5     02-17    140  |  110[H]  |  70[H]  ----------------------------<  227[H]  2.3[LL]   |  17[L]  |  1.73[H]    Ca    8.4[L]      17 Feb 2025 05:59  Phos  3.2     02-17  Mg     1.5     02-17    TPro  5.2[L]  /  Alb  2.7[L]  /  TBili  0.8  /  DBili  x   /  AST  15  /  ALT  19  /  AlkPhos  101  02-17    C-Reactive Protein: 209.0 mg/mL (02-15-25 @ 05:17)  C-Reactive Protein: 233.0 mg/mL (02-14-25 @ 06:50)  Ferritin: 1798 ng/mL (02-13-25 @ 18:46)                        8.7    0.19  )-----------( 33       ( 13 Feb 2025 06:52 )             25.7     02-13    137  |  109[H]  |  20  ----------------------------<  117[H]  2.6[LL]   |  18[L]  |  1.02    Ca    8.7      13 Feb 2025 06:52  Phos  2.3     02-13  Mg     1.6     02-13    TPro  4.4[L]  /  Alb  1.1[L]  /  TBili  0.7  /  DBili  x   /  AST  11[L]  /  ALT  16  /  AlkPhos  94  02-13     LIVER FUNCTIONS - ( 13 Feb 2025 06:52 )  Alb: 1.1 g/dL / Pro: 4.4 gm/dL / ALK PHOS: 94 U/L / ALT: 16 U/L / AST: 11 U/L / GGT: x           Culture - Blood (collected 11 Feb 2025 09:00)  Source: .Blood BLOOD  Preliminary Report (12 Feb 2025 13:01):    No growth at 24 hours    Culture - Blood (collected 11 Feb 2025 08:50)  Source: .Blood BLOOD  Preliminary Report (12 Feb 2025 13:01):    No growth at 24 hours    Urinalysis with Rflx Culture (collected 09 Feb 2025 01:25)    Culture - Urine (collected 09 Feb 2025 01:25)  Source: Clean Catch  Final Report (11 Feb 2025 08:39):    50,000 - 99,000 CFU/mL Pseudomonas aeruginosa  Organism: Pseudomonas aeruginosa (11 Feb 2025 08:39)  Organism: Pseudomonas aeruginosa (11 Feb 2025 08:39)      Method Type: REJI      -  Amikacin: S <=16      -  Aztreonam: S <=4      -  Cefepime: S <=2      -  Ceftazidime: S <=1      -  Ciprofloxacin: S <=0.25      -  Imipenem: S 2      -  Levofloxacin: S <=0.5      -  Meropenem: S <=1      -  Piperacillin/Tazobactam: S <=8    Culture - Blood (collected 08 Feb 2025 22:00)  Source: .Blood BLOOD  Gram Stain (09 Feb 2025 19:04):    Growth in aerobic bottle: Gram Negative Rods  Final Report (11 Feb 2025 07:53):    Growth in aerobic bottle: Pseudomonas aeruginosa    Direct identification is available within approximately 3-5    hours either by Blood Panel Multiplexed PCR or Direct    MALDI-TOF. Details: [email or web address]  Organism: Blood Culture PCR  Pseudomonas aeruginosa (11 Feb 2025 07:53)  Organism: Pseudomonas aeruginosa (11 Feb 2025 07:53)      Method Type: REJI      -  Aztreonam: S <=4      -  Cefepime: S <=2      -  Ceftazidime: S <=1      -  Ciprofloxacin: S <=0.25      -  Imipenem: S <=1      -  Levofloxacin: S <=0.5      -  Meropenem: S <=1      -  Piperacillin/Tazobactam: S <=8  Organism: Blood Culture PCR (11 Feb 2025 07:53)      Method Type: PCR      -  Pseudomonas aeruginosa: Detec    Culture - Blood (collected 08 Feb 2025 21:55)  Source: .Blood BLOOD  Gram Stain (09 Feb 2025 19:32):    Growth in aerobic bottle: Gram Negative Rods  Final Report (11 Feb 2025 07:54):    Growth in aerobic bottle: Pseudomonas aeruginosa    See previous culture [id number]    Radiology: all available radiological tests reviewed    < from: US Abdomen Upper Quadrant Right (02.09.25 @ 14:55) >  Distended gallbladder with layering sludge.  8 mm right renal calculus without hydronephrosis.  Hepatomegaly.  Right renal cyst.  < end of copied text >    < from: CT Abdomen and Pelvis No Cont (02.09.25 @ 03:01) >  1.   Study somewhat limited due to absence of contrast.  2.   Axial images 71-76 of series 301 and concomitant coronal images demonstrate prominent soft tissue inseparable from the body of the pancreas consistent with pancreatic neoplasm.  3.   Some distension of gallbladder could be further evaluated with right upper quadrant sonography. No definite biliary dilatation.  < end of copied text >    Advanced directives addressed: full resuscitation

## 2025-02-27 NOTE — PROGRESS NOTE ADULT - SUBJECTIVE AND OBJECTIVE BOX
Subjective:    pat sore throat, another GI bleed with maroon colored stool, pat required another two unit of blood, Hb 10, off heparin, seen by IR for re-evalaution of GFF, possible tomorrow.    Home Medications:  cefepime 2 g intravenous injection: 2 gram(s) intravenous every 12 hours (12 Feb 2025 12:59)  filgrastim: 480 microgram(s) subcutaneous once a day (12 Feb 2025 12:59)  Lactated Ringers Injection intravenous solution: 150 milligram(s) intravenous every 1 to 2 hours (12 Feb 2025 12:59)  loperamide 2 mg oral capsule: 1 cap(s) orally once (12 Feb 2025 12:59)  midodrine 5 mg oral tablet: 1 tab(s) orally every 8 hours (12 Feb 2025 12:59)  Multiple Vitamins with Minerals oral liquid: 1 orally once a day (12 Feb 2025 12:59)  octreotide 2500 mcg/mL subcutaneous solution: 0.04 milliliter(s) subcutaneous 3 times a day (12 Feb 2025 12:59)  sucralfate 1 g/10 mL oral suspension: 10 milliliter(s) orally 4 times a day (12 Feb 2025 12:59)  trimethobenzamide 100 mg/mL intramuscular solution: 2 milliliter(s) intramuscular every 8 hours As needed Nausea and/or Vomiting (12 Feb 2025 12:59)    MEDICATIONS  (STANDING):  acetaminophen   IVPB .. 1000 milliGRAM(s) IV Intermittent once  acetaminophen   IVPB .. 1000 milliGRAM(s) IV Intermittent once  chlorhexidine 2% Cloths 1 Application(s) Topical <User Schedule>  cholecalciferol 2000 Unit(s) Oral at bedtime  dextrose 5%. 1000 milliLiter(s) (50 mL/Hr) IV Continuous <Continuous>  dextrose 5%. 1000 milliLiter(s) (100 mL/Hr) IV Continuous <Continuous>  dextrose 50% Injectable 25 Gram(s) IV Push once  dextrose 50% Injectable 12.5 Gram(s) IV Push once  dextrose 50% Injectable 25 Gram(s) IV Push once  famotidine Injectable 20 milliGRAM(s) IV Push daily  fluconAZOLE IVPB      fluconAZOLE IVPB 200 milliGRAM(s) IV Intermittent every 24 hours  glucagon  Injectable 1 milliGRAM(s) IntraMuscular once  insulin glargine Injectable (LANTUS) 20 Unit(s) SubCutaneous at bedtime  insulin lispro (ADMELOG) corrective regimen sliding scale   SubCutaneous every 6 hours  lactated ringers. 1000 milliLiter(s) (75 mL/Hr) IV Continuous <Continuous>  metoprolol tartrate 25 milliGRAM(s) Oral every 6 hours  pantoprazole  Injectable 40 milliGRAM(s) IV Push every 12 hours  Parenteral Nutrition - Adult 1 Each (75 mL/Hr) TPN Continuous <Continuous>  Parenteral Nutrition - Adult 1 Each (75 mL/Hr) TPN Continuous <Continuous>  sodium chloride 0.45%. 1000 milliLiter(s) (75 mL/Hr) IV Continuous <Continuous>  sucralfate suspension 1 Gram(s) Oral four times a day  vancomycin    Solution 125 milliGRAM(s) Oral every 6 hours    MEDICATIONS  (PRN):  acetaminophen     Tablet .. 650 milliGRAM(s) Oral every 6 hours PRN Temp greater or equal to 38C (100.4F), Mild Pain (1 - 3)  aluminum hydroxide/magnesium hydroxide/simethicone Suspension 30 milliLiter(s) Oral every 4 hours PRN Dyspepsia  Biotene Dry Mouth Oral Rinse 15 milliLiter(s) Swish and Spit five times a day PRN Mouth Care  dextrose Oral Gel 15 Gram(s) Oral once PRN Blood Glucose LESS THAN 70 milliGRAM(s)/deciliter  melatonin 3 milliGRAM(s) Oral at bedtime PRN Insomnia  metoprolol tartrate Injectable 5 milliGRAM(s) IV Push every 6 hours PRN heart rate >110  ondansetron Injectable 4 milliGRAM(s) IV Push every 8 hours PRN Nausea and/or Vomiting  sodium chloride 0.65% Nasal 1 Spray(s) Both Nostrils two times a day PRN Nasal Congestion      Allergies    No Known Allergies    Intolerances        Vital Signs Last 24 Hrs  T(C): 36.8 (27 Feb 2025 18:08), Max: 36.8 (27 Feb 2025 06:00)  T(F): 98.3 (27 Feb 2025 18:08), Max: 98.3 (27 Feb 2025 13:36)  HR: 98 (27 Feb 2025 16:00) (93 - 107)  BP: 135/79 (27 Feb 2025 16:00) (126/82 - 141/92)  BP(mean): 96 (27 Feb 2025 16:00) (94 - 105)  RR: 29 (27 Feb 2025 16:00) (22 - 33)  SpO2: 91% (27 Feb 2025 14:00) (91% - 100%)    Parameters below as of 27 Feb 2025 14:00  Patient On (Oxygen Delivery Method): nasal cannula  O2 Flow (L/min): 4        PHYSICAL EXAMINATION:    NECK:  Supple. No lymphadenopathy. Jugular venous pressure not elevated. Carotids equal.   HEART:   The cardiac impulse has a normal quality. Reg., Nl S1 and S2.  There are no murmurs, rubs or gallops noted  CHEST:  Chest crackles to auscultation. Normal respiratory effort.  ABDOMEN:  Soft and nontender.   EXTREMITIES:  There is no edema.       LABS:                        10.2   16.86 )-----------( 174      ( 27 Feb 2025 15:00 )             30.8     02-27    149[H]  |  116[H]  |  96[H]  ----------------------------<  138[H]  3.9   |  26  |  1.87[H]    Ca    9.1      27 Feb 2025 04:05  Phos  4.0     02-27  Mg     2.0     02-27    TPro  5.4[L]  /  Alb  2.8[L]  /  TBili  0.8  /  DBili  x   /  AST  44[H]  /  ALT  45  /  AlkPhos  194[H]  02-27    PT/INR - ( 27 Feb 2025 15:00 )   PT: 13.5 sec;   INR: 1.18 ratio         PTT - ( 26 Feb 2025 12:34 )  PTT:91.5 sec  Urinalysis Basic - ( 27 Feb 2025 04:05 )    Color: x / Appearance: x / SG: x / pH: x  Gluc: 138 mg/dL / Ketone: x  / Bili: x / Urobili: x   Blood: x / Protein: x / Nitrite: x   Leuk Esterase: x / RBC: x / WBC x   Sq Epi: x / Non Sq Epi: x / Bacteria: x

## 2025-02-27 NOTE — PROGRESS NOTE ADULT - ASSESSMENT
69-year-old female recent diagnosis of primary pancreatic adenocarcinoma , pancytopenia and significant PMH of SCC lip, cyclical vomiting syndrome, HTN, HPL, Gout, GERD, CVA w renal evaluation of MALLORY and hypokalemia. Per documents was recently at  [facility] with septic shock on 02/09/2025, nausea, vomiting and diarrhea requiring Levophed drip. Per documents, patient  transferred to  for continued care. Patient with + diuresis w lasix 80 IV, increaserd UOP > 4L.   Treated w IV and po k repletion.     MALLORY  -LIkely pre renal w insensible losses and diuresis, signficant UOP  -IVF to maintain even to positive today  -Trend labs, lytes. Repeat this afternoon  -Monitor UOP closely  -Avoid nsaids/contrast  -Renally dosed abx    Hypokalemia  -K repletion iv/po  -TPN    Sepsis/Pseduomonas  -ID/abx  -FU cultures    d/c with RN staff, Dr Tong, Dr Henry, Dr Maik Sweet to resume care in AM    2/18  MALLORY resolving  Azotemia, responded to IVF resuscitation  Urine output improved  hypoalbuminemia- on spa q 8 h for oncotic pressure and glomerular filtration  NAGMA- hyperchloremia may be due to diarrhea and GI losses  On Meropenem- very rarely associated w Fanconi syndrome  - will check urinary PO4, serum uric acid, repeat PO4 level, low this am  ABG  Lactate  CBC  VQ results pending  d/w Dr YOUSUF Pleitez    Addendum  Repeat labs reviewed d/w intensivist  Lactate 2.7  ABG c/w primary respiratory alkalosis with metabolic acidosis and NAGMA    2/19  Pancreatic Adenoca  MALLORY prerenal, correcting  Primary Hyperventilation, metab acidosis and non anion acidosis  VQ indeterminate  on 2/18 for PE  For CTA today, low risk MAXIMO due to adequate hydration status, UO 3600 ml / last 24 hrs  D/W Dr Tong and Dr Pleitez    2/20  Pancreatic AdenoCa  MALLORY slowly correcting  Bilateral pulm emboli  Creat stable post IV Contrast but will monitor for 48 hrs  Abd distension less  hypokalemia being replaced    2/21  Pancreatic AdenoCa  MALLORY slowly correcting, s/p IV Contrast  Bilateral pulm emboli on IV heparin  Creat stable post IV Contrast but will monitor for 48 hrs  Abd distension improved but still diminished BS  hypokalemia being replaced by ICU team    2/22  Pancreatic AdenoCa with METS  MALLORY slowly correcting, s/p IV Contrast  Bilateral pulm emboli on IV heparin, small bowel and large bowel distention  Creat stable post IV Contras  Abd distension improved but still diminished BS  hypokalemia being replaced by ICU team- need to keep   potassium in normal levels as it may contribute to ileus  LR dcd   CO2 improved    2/24  Pancreatic AdenoCa with METS  MALLORY slowly correcting,  mild elevation due to diuretics  Bilateral pulm emboli IV heparin held due to anemia  CT small bowel and large bowel distention  Creat stable post IV Contrast  Abd distension improved but still diminished BS  hypokalemia being replaced  CO2 improved    2/25  Pancreatic Ca, Bilateral pulm emboli  Hypernatremia  Receiving LR/ d/w TPN team to reduce Na in TPN  Repeat labs in 4 hrs  MALLORY/ ATN     2/26  MALLORY/ ATN  Pancreatic Ca/ METS  Hypernatremia improving  More apparent abd tendereness today w pt apprehension/ discomfort   Above noted  Will follow    2/27  INCOMPLETE NOTE  Hypernatremia MALLORY                   69-year-old female recent diagnosis of primary pancreatic adenocarcinoma , pancytopenia and significant PMH of SCC lip, cyclical vomiting syndrome, HTN, HPL, Gout, GERD, CVA w renal evaluation of MALLORY and hypokalemia. Per documents was recently at  [facility] with septic shock on 02/09/2025, nausea, vomiting and diarrhea requiring Levophed drip. Per documents, patient  transferred to  for continued care. Patient with + diuresis w lasix 80 IV, increaserd UOP > 4L.   Treated w IV and po k repletion.     MALLORY  -LIkely pre renal w insensible losses and diuresis, signficant UOP  -IVF to maintain even to positive today  -Trend labs, lytes. Repeat this afternoon  -Monitor UOP closely  -Avoid nsaids/contrast  -Renally dosed abx    Hypokalemia  -K repletion iv/po  -TPN    Sepsis/Pseduomonas  -ID/abx  -FU cultures    d/c with RN staff, Dr Tong, Dr Henry, Dr Maik Sweet to resume care in AM    2/18  MALLORY resolving  Azotemia, responded to IVF resuscitation  Urine output improved  hypoalbuminemia- on spa q 8 h for oncotic pressure and glomerular filtration  NAGMA- hyperchloremia may be due to diarrhea and GI losses  On Meropenem- very rarely associated w Fanconi syndrome  - will check urinary PO4, serum uric acid, repeat PO4 level, low this am  ABG  Lactate  CBC  VQ results pending  d/w Dr YOUSUF Pleitez    Addendum  Repeat labs reviewed d/w intensivist  Lactate 2.7  ABG c/w primary respiratory alkalosis with metabolic acidosis and NAGMA    2/19  Pancreatic Adenoca  MALLORY prerenal, correcting  Primary Hyperventilation, metab acidosis and non anion acidosis  VQ indeterminate  on 2/18 for PE  For CTA today, low risk MAXIMO due to adequate hydration status, UO 3600 ml / last 24 hrs  D/W Dr Tong and Dr Pleitez    2/20  Pancreatic AdenoCa  MALLORY slowly correcting  Bilateral pulm emboli  Creat stable post IV Contrast but will monitor for 48 hrs  Abd distension less  hypokalemia being replaced    2/21  Pancreatic AdenoCa  MALLORY slowly correcting, s/p IV Contrast  Bilateral pulm emboli on IV heparin  Creat stable post IV Contrast but will monitor for 48 hrs  Abd distension improved but still diminished BS  hypokalemia being replaced by ICU team    2/22  Pancreatic AdenoCa with METS  MALLORY slowly correcting, s/p IV Contrast  Bilateral pulm emboli on IV heparin, small bowel and large bowel distention  Creat stable post IV Contras  Abd distension improved but still diminished BS  hypokalemia being replaced by ICU team- need to keep   potassium in normal levels as it may contribute to ileus  LR dcd   CO2 improved    2/24  Pancreatic AdenoCa with METS  MALLORY slowly correcting,  mild elevation due to diuretics  Bilateral pulm emboli IV heparin held due to anemia  CT small bowel and large bowel distention  Creat stable post IV Contrast  Abd distension improved but still diminished BS  hypokalemia being replaced  CO2 improved    2/25  Pancreatic Ca, Bilateral pulm emboli  Hypernatremia  Receiving LR/ d/w TPN team to reduce Na in TPN  Repeat labs in 4 hrs  MALLORY/ ATN     2/26  MALLORY/ ATN  Pancreatic Ca/ METS  Hypernatremia improving  More apparent abd tendereness today w pt apprehension/ discomfort   Above noted  Will follow    2/27  Pancreatic CA/ Mets  Hypernatremia MALLORY  Most likely volume depletion  Agree w 1/2 NS infusion  Chronic elevation creat 1.7 range, slowly climbing  - most likely prerenal

## 2025-02-27 NOTE — PROGRESS NOTE ADULT - SUBJECTIVE AND OBJECTIVE BOX
INTERVAL HPI/OVERNIGHT EVENTS:  Patient S&E at bedside. No o/n events, patient resting comfortably. overnight with micky stools     VITAL SIGNS:  T(F): 98.3 (02-27-25 @ 18:08)  HR: 110 (02-27-25 @ 20:00)  BP: 135/78 (02-27-25 @ 20:00)  RR: 20 (02-27-25 @ 20:00)  SpO2: 94% (02-27-25 @ 20:00)  Wt(kg): --    PHYSICAL EXAM:    Constitutional: NAD  Eyes: EOMI, sclera non-icteric  Neck: supple, no masses, no JVD  Respiratory: CTA b/l, good air entry b/l  Cardiovascular: RRR, no M/R/G  Gastrointestinal: soft, NTND, no masses palpable, + BS, no hepatosplenomegaly  Neurological: AAOx3      MEDICATIONS  (STANDING):  acetaminophen   IVPB .. 1000 milliGRAM(s) IV Intermittent once  acetaminophen   IVPB .. 1000 milliGRAM(s) IV Intermittent once  chlorhexidine 2% Cloths 1 Application(s) Topical <User Schedule>  cholecalciferol 2000 Unit(s) Oral at bedtime  dextrose 5%. 1000 milliLiter(s) (50 mL/Hr) IV Continuous <Continuous>  dextrose 5%. 1000 milliLiter(s) (100 mL/Hr) IV Continuous <Continuous>  dextrose 50% Injectable 25 Gram(s) IV Push once  dextrose 50% Injectable 12.5 Gram(s) IV Push once  dextrose 50% Injectable 25 Gram(s) IV Push once  famotidine Injectable 20 milliGRAM(s) IV Push daily  fluconAZOLE IVPB      fluconAZOLE IVPB 200 milliGRAM(s) IV Intermittent every 24 hours  glucagon  Injectable 1 milliGRAM(s) IntraMuscular once  insulin glargine Injectable (LANTUS) 20 Unit(s) SubCutaneous at bedtime  insulin lispro (ADMELOG) corrective regimen sliding scale   SubCutaneous every 6 hours  lactated ringers. 1000 milliLiter(s) (75 mL/Hr) IV Continuous <Continuous>  metoprolol tartrate 25 milliGRAM(s) Oral every 6 hours  pantoprazole  Injectable 40 milliGRAM(s) IV Push every 12 hours  Parenteral Nutrition - Adult 1 Each (75 mL/Hr) TPN Continuous <Continuous>  Parenteral Nutrition - Adult 1 Each (75 mL/Hr) TPN Continuous <Continuous>  sodium chloride 0.45%. 1000 milliLiter(s) (75 mL/Hr) IV Continuous <Continuous>  sucralfate suspension 1 Gram(s) Oral four times a day  vancomycin    Solution 125 milliGRAM(s) Oral every 6 hours    MEDICATIONS  (PRN):  acetaminophen     Tablet .. 650 milliGRAM(s) Oral every 6 hours PRN Temp greater or equal to 38C (100.4F), Mild Pain (1 - 3)  aluminum hydroxide/magnesium hydroxide/simethicone Suspension 30 milliLiter(s) Oral every 4 hours PRN Dyspepsia  Biotene Dry Mouth Oral Rinse 15 milliLiter(s) Swish and Spit five times a day PRN Mouth Care  dextrose Oral Gel 15 Gram(s) Oral once PRN Blood Glucose LESS THAN 70 milliGRAM(s)/deciliter  melatonin 3 milliGRAM(s) Oral at bedtime PRN Insomnia  metoprolol tartrate Injectable 5 milliGRAM(s) IV Push every 6 hours PRN heart rate >110  ondansetron Injectable 4 milliGRAM(s) IV Push every 8 hours PRN Nausea and/or Vomiting  sodium chloride 0.65% Nasal 1 Spray(s) Both Nostrils two times a day PRN Nasal Congestion      Allergies    No Known Allergies    Intolerances        LABS:                        10.3   17.07 )-----------( 187      ( 27 Feb 2025 19:28 )             31.1     02-27    149[H]  |  116[H]  |  96[H]  ----------------------------<  138[H]  3.9   |  26  |  1.87[H]    Ca    9.1      27 Feb 2025 04:05  Phos  4.0     02-27  Mg     2.0     02-27    TPro  5.4[L]  /  Alb  2.8[L]  /  TBili  0.8  /  DBili  x   /  AST  44[H]  /  ALT  45  /  AlkPhos  194[H]  02-27    PT/INR - ( 27 Feb 2025 15:00 )   PT: 13.5 sec;   INR: 1.18 ratio         PTT - ( 26 Feb 2025 12:34 )  PTT:91.5 sec  Urinalysis Basic - ( 27 Feb 2025 04:05 )    Color: x / Appearance: x / SG: x / pH: x  Gluc: 138 mg/dL / Ketone: x  / Bili: x / Urobili: x   Blood: x / Protein: x / Nitrite: x   Leuk Esterase: x / RBC: x / WBC x   Sq Epi: x / Non Sq Epi: x / Bacteria: x        RADIOLOGY & ADDITIONAL TESTS:  Studies reviewed.    ASSESSMENT & PLAN:

## 2025-02-28 LAB
ALBUMIN SERPL ELPH-MCNC: 2.4 G/DL — LOW (ref 3.3–5)
ALBUMIN SERPL ELPH-MCNC: 2.6 G/DL — LOW (ref 3.3–5)
ALP SERPL-CCNC: 203 U/L — HIGH (ref 40–120)
ALT FLD-CCNC: 42 U/L — SIGNIFICANT CHANGE UP (ref 12–78)
ALT FLD-CCNC: 42 U/L — SIGNIFICANT CHANGE UP (ref 12–78)
AST SERPL-CCNC: 35 U/L — SIGNIFICANT CHANGE UP (ref 15–37)
AST SERPL-CCNC: 36 U/L — SIGNIFICANT CHANGE UP (ref 15–37)
BASOPHILS # BLD AUTO: 0.05 K/UL — SIGNIFICANT CHANGE UP (ref 0–0.2)
BASOPHILS NFR BLD AUTO: 0.5 % — SIGNIFICANT CHANGE UP (ref 0–2)
BILIRUB SERPL-MCNC: 0.7 MG/DL — SIGNIFICANT CHANGE UP (ref 0.2–1.2)
BILIRUB UR-MCNC: NEGATIVE — SIGNIFICANT CHANGE UP
BUN SERPL-MCNC: 94 MG/DL — HIGH (ref 7–23)
BUN SERPL-MCNC: 94 MG/DL — HIGH (ref 7–23)
CALCIUM SERPL-MCNC: 9.3 MG/DL — SIGNIFICANT CHANGE UP (ref 8.5–10.1)
CALCIUM SERPL-MCNC: 9.4 MG/DL — SIGNIFICANT CHANGE UP (ref 8.5–10.1)
CHLORIDE SERPL-SCNC: 113 MMOL/L — HIGH (ref 96–108)
CHLORIDE SERPL-SCNC: 113 MMOL/L — HIGH (ref 96–108)
CO2 SERPL-SCNC: 23 MMOL/L — SIGNIFICANT CHANGE UP (ref 22–31)
CO2 SERPL-SCNC: 24 MMOL/L — SIGNIFICANT CHANGE UP (ref 22–31)
COLOR SPEC: YELLOW — SIGNIFICANT CHANGE UP
CREAT SERPL-MCNC: 1.79 MG/DL — HIGH (ref 0.5–1.3)
CREAT SERPL-MCNC: 1.8 MG/DL — HIGH (ref 0.5–1.3)
DIFF PNL FLD: ABNORMAL
EGFR: 30 ML/MIN/1.73M2 — LOW
EOSINOPHIL # BLD AUTO: 0.08 K/UL — SIGNIFICANT CHANGE UP (ref 0–0.5)
EOSINOPHIL NFR BLD AUTO: 0.8 % — SIGNIFICANT CHANGE UP (ref 0–6)
GLUCOSE BLDC GLUCOMTR-MCNC: 129 MG/DL — HIGH (ref 70–99)
GLUCOSE BLDC GLUCOMTR-MCNC: 144 MG/DL — HIGH (ref 70–99)
GLUCOSE BLDC GLUCOMTR-MCNC: 152 MG/DL — HIGH (ref 70–99)
GLUCOSE BLDC GLUCOMTR-MCNC: 169 MG/DL — HIGH (ref 70–99)
GLUCOSE SERPL-MCNC: 132 MG/DL — HIGH (ref 70–99)
GLUCOSE UR QL: 100 MG/DL
HCT VFR BLD CALC: 34.6 % — SIGNIFICANT CHANGE UP (ref 34.5–45)
HCT VFR BLD CALC: 35.8 % — SIGNIFICANT CHANGE UP (ref 34.5–45)
HGB BLD-MCNC: 11.5 G/DL — SIGNIFICANT CHANGE UP (ref 11.5–15.5)
HGB BLD-MCNC: 11.6 G/DL — SIGNIFICANT CHANGE UP (ref 11.5–15.5)
IMM GRANULOCYTES # BLD AUTO: 0.46 K/UL — HIGH (ref 0–0.07)
IMM GRANULOCYTES NFR BLD AUTO: 4.4 % — HIGH (ref 0–0.9)
KETONES UR-MCNC: NEGATIVE MG/DL — SIGNIFICANT CHANGE UP
LACTATE SERPL-SCNC: 1.8 MMOL/L — SIGNIFICANT CHANGE UP (ref 0.7–2)
LACTATE SERPL-SCNC: 2.2 MMOL/L — HIGH (ref 0.7–2)
LYMPHOCYTES # BLD AUTO: 0.37 K/UL — LOW (ref 1–3.3)
LYMPHOCYTES NFR BLD AUTO: 3.5 % — LOW (ref 13–44)
MAGNESIUM SERPL-MCNC: 1.9 MG/DL — SIGNIFICANT CHANGE UP (ref 1.6–2.6)
MCHC RBC-ENTMCNC: 28.5 PG — SIGNIFICANT CHANGE UP (ref 27–34)
MCHC RBC-ENTMCNC: 29.1 PG — SIGNIFICANT CHANGE UP (ref 27–34)
MCHC RBC-ENTMCNC: 32.1 G/DL — SIGNIFICANT CHANGE UP (ref 32–36)
MCHC RBC-ENTMCNC: 33.5 G/DL — SIGNIFICANT CHANGE UP (ref 32–36)
MCV RBC AUTO: 86.7 FL — SIGNIFICANT CHANGE UP (ref 80–100)
MCV RBC AUTO: 88.8 FL — SIGNIFICANT CHANGE UP (ref 80–100)
MONOCYTES # BLD AUTO: 0.21 K/UL — SIGNIFICANT CHANGE UP (ref 0–0.9)
MONOCYTES NFR BLD AUTO: 2 % — SIGNIFICANT CHANGE UP (ref 2–14)
NEUTROPHILS # BLD AUTO: 9.34 K/UL — HIGH (ref 1.8–7.4)
NEUTROPHILS NFR BLD AUTO: 88.8 % — HIGH (ref 43–77)
NITRITE UR-MCNC: NEGATIVE — SIGNIFICANT CHANGE UP
NRBC # BLD AUTO: 0.06 K/UL — HIGH (ref 0–0)
NRBC # BLD AUTO: 0.07 K/UL — HIGH (ref 0–0)
NRBC # FLD: 0.06 K/UL — HIGH (ref 0–0)
NRBC # FLD: 0.07 K/UL — HIGH (ref 0–0)
NRBC BLD AUTO-RTO: 0 /100 WBCS — SIGNIFICANT CHANGE UP (ref 0–0)
NRBC BLD AUTO-RTO: 0 /100 WBCS — SIGNIFICANT CHANGE UP (ref 0–0)
PH UR: 5.5 — SIGNIFICANT CHANGE UP (ref 5–8)
PHOSPHATE SERPL-MCNC: 4.2 MG/DL — SIGNIFICANT CHANGE UP (ref 2.5–4.5)
PLATELET # BLD AUTO: 191 K/UL — SIGNIFICANT CHANGE UP (ref 150–400)
PLATELET # BLD AUTO: 198 K/UL — SIGNIFICANT CHANGE UP (ref 150–400)
PMV BLD: 12.7 FL — SIGNIFICANT CHANGE UP (ref 7–13)
PMV BLD: 13.9 FL — HIGH (ref 7–13)
POTASSIUM SERPL-MCNC: 3.8 MMOL/L — SIGNIFICANT CHANGE UP (ref 3.5–5.3)
POTASSIUM SERPL-MCNC: 4 MMOL/L — SIGNIFICANT CHANGE UP (ref 3.5–5.3)
POTASSIUM SERPL-SCNC: 3.8 MMOL/L — SIGNIFICANT CHANGE UP (ref 3.5–5.3)
PROT SERPL-MCNC: 6 GM/DL — SIGNIFICANT CHANGE UP (ref 6–8.3)
PROT SERPL-MCNC: 6.3 GM/DL — SIGNIFICANT CHANGE UP (ref 6–8.3)
PROT UR-MCNC: 100 MG/DL
RBC # BLD: 3.99 M/UL — SIGNIFICANT CHANGE UP (ref 3.8–5.2)
RBC # BLD: 4.03 M/UL — SIGNIFICANT CHANGE UP (ref 3.8–5.2)
RBC # FLD: 15.9 % — HIGH (ref 10.3–14.5)
SODIUM SERPL-SCNC: 143 MMOL/L — SIGNIFICANT CHANGE UP (ref 135–145)
SODIUM SERPL-SCNC: 145 MMOL/L — SIGNIFICANT CHANGE UP (ref 135–145)
SP GR SPEC: 1.01 — SIGNIFICANT CHANGE UP (ref 1–1.03)
UROBILINOGEN FLD QL: 0.2 MG/DL — SIGNIFICANT CHANGE UP (ref 0.2–1)
WBC # BLD: 10.51 K/UL — HIGH (ref 3.8–10.5)
WBC # BLD: 15.4 K/UL — HIGH (ref 3.8–10.5)
WBC # FLD AUTO: 10.51 K/UL — HIGH (ref 3.8–10.5)
WBC # FLD AUTO: 15.4 K/UL — HIGH (ref 3.8–10.5)

## 2025-02-28 PROCEDURE — 71045 X-RAY EXAM CHEST 1 VIEW: CPT | Mod: 26

## 2025-02-28 PROCEDURE — 99232 SBSQ HOSP IP/OBS MODERATE 35: CPT

## 2025-02-28 PROCEDURE — 71045 X-RAY EXAM CHEST 1 VIEW: CPT | Mod: 26,77

## 2025-02-28 PROCEDURE — 99233 SBSQ HOSP IP/OBS HIGH 50: CPT

## 2025-02-28 RX ORDER — CEFEPIME 2 G/20ML
2000 INJECTION, POWDER, FOR SOLUTION INTRAVENOUS EVERY 12 HOURS
Refills: 0 | Status: DISCONTINUED | OUTPATIENT
Start: 2025-02-28 | End: 2025-03-02

## 2025-02-28 RX ORDER — SODIUM/POT/MAG/CALC/CHLOR/ACET 35-20-5MEQ
1 VIAL (ML) INTRAVENOUS
Refills: 0 | Status: DISCONTINUED | OUTPATIENT
Start: 2025-02-28 | End: 2025-02-28

## 2025-02-28 RX ORDER — VANCOMYCIN HCL IN 5 % DEXTROSE 1.5G/250ML
1000 PLASTIC BAG, INJECTION (ML) INTRAVENOUS ONCE
Refills: 0 | Status: COMPLETED | OUTPATIENT
Start: 2025-02-28 | End: 2025-02-28

## 2025-02-28 RX ORDER — TOUCHLESS CARE ZINC OXIDE PROTECTANT 20; 25 G/100G; G/100G
1 SPRAY TOPICAL
Refills: 0 | Status: DISCONTINUED | OUTPATIENT
Start: 2025-02-28 | End: 2025-03-19

## 2025-02-28 RX ORDER — CEFEPIME 2 G/20ML
2000 INJECTION, POWDER, FOR SOLUTION INTRAVENOUS ONCE
Refills: 0 | Status: COMPLETED | OUTPATIENT
Start: 2025-02-28 | End: 2025-02-28

## 2025-02-28 RX ORDER — SODIUM CHLORIDE 9 G/1000ML
1000 INJECTION, SOLUTION INTRAVENOUS ONCE
Refills: 0 | Status: COMPLETED | OUTPATIENT
Start: 2025-02-28 | End: 2025-02-28

## 2025-02-28 RX ORDER — ACETAMINOPHEN 500 MG/5ML
1000 LIQUID (ML) ORAL ONCE
Refills: 0 | Status: COMPLETED | OUTPATIENT
Start: 2025-02-28 | End: 2025-02-28

## 2025-02-28 RX ORDER — CEFEPIME 2 G/20ML
INJECTION, POWDER, FOR SOLUTION INTRAVENOUS
Refills: 0 | Status: DISCONTINUED | OUTPATIENT
Start: 2025-02-28 | End: 2025-03-02

## 2025-02-28 RX ORDER — SODIUM CHLORIDE 9 G/1000ML
500 INJECTION, SOLUTION INTRAVENOUS ONCE
Refills: 0 | Status: DISCONTINUED | OUTPATIENT
Start: 2025-02-28 | End: 2025-02-28

## 2025-02-28 RX ORDER — I.V. FAT EMULSION 20 G/100ML
0.68 EMULSION INTRAVENOUS
Qty: 50 | Refills: 0 | Status: DISCONTINUED | OUTPATIENT
Start: 2025-02-28 | End: 2025-02-28

## 2025-02-28 RX ORDER — CEFEPIME 2 G/20ML
INJECTION, POWDER, FOR SOLUTION INTRAVENOUS
Refills: 0 | Status: DISCONTINUED | OUTPATIENT
Start: 2025-02-28 | End: 2025-02-28

## 2025-02-28 RX ORDER — SODIUM CHLORIDE 9 G/1000ML
1000 INJECTION, SOLUTION INTRAVENOUS
Refills: 0 | Status: DISCONTINUED | OUTPATIENT
Start: 2025-02-28 | End: 2025-02-28

## 2025-02-28 RX ADMIN — CEFEPIME 2000 MILLIGRAM(S): 2 INJECTION, POWDER, FOR SOLUTION INTRAVENOUS at 12:14

## 2025-02-28 RX ADMIN — Medication 1000 MILLIGRAM(S): at 17:45

## 2025-02-28 RX ADMIN — Medication 1 EACH: at 22:49

## 2025-02-28 RX ADMIN — METOPROLOL SUCCINATE 25 MILLIGRAM(S): 50 TABLET, EXTENDED RELEASE ORAL at 12:16

## 2025-02-28 RX ADMIN — Medication 2000 UNIT(S): at 22:32

## 2025-02-28 RX ADMIN — Medication 1 GRAM(S): at 05:53

## 2025-02-28 RX ADMIN — Medication 20 MILLIGRAM(S): at 22:32

## 2025-02-28 RX ADMIN — Medication 400 MILLIGRAM(S): at 17:15

## 2025-02-28 RX ADMIN — Medication 400 MILLIGRAM(S): at 11:17

## 2025-02-28 RX ADMIN — TOUCHLESS CARE ZINC OXIDE PROTECTANT 1 APPLICATION(S): 20; 25 SPRAY TOPICAL at 23:32

## 2025-02-28 RX ADMIN — Medication 1 APPLICATION(S): at 05:49

## 2025-02-28 RX ADMIN — Medication 40 MILLIGRAM(S): at 22:31

## 2025-02-28 RX ADMIN — METOPROLOL SUCCINATE 25 MILLIGRAM(S): 50 TABLET, EXTENDED RELEASE ORAL at 23:33

## 2025-02-28 RX ADMIN — Medication 125 MILLIGRAM(S): at 05:53

## 2025-02-28 RX ADMIN — Medication 1 GRAM(S): at 12:14

## 2025-02-28 RX ADMIN — INSULIN LISPRO 2: 100 INJECTION, SOLUTION INTRAVENOUS; SUBCUTANEOUS at 18:54

## 2025-02-28 RX ADMIN — Medication 125 MILLIGRAM(S): at 23:31

## 2025-02-28 RX ADMIN — I.V. FAT EMULSION 20.8 GM/KG/DAY: 20 EMULSION INTRAVENOUS at 22:48

## 2025-02-28 RX ADMIN — Medication 1000 MILLIGRAM(S): at 11:47

## 2025-02-28 RX ADMIN — CEFEPIME 2000 MILLIGRAM(S): 2 INJECTION, POWDER, FOR SOLUTION INTRAVENOUS at 23:16

## 2025-02-28 RX ADMIN — Medication 1 GRAM(S): at 23:31

## 2025-02-28 RX ADMIN — SODIUM CHLORIDE 1000 MILLILITER(S): 9 INJECTION, SOLUTION INTRAVENOUS at 11:29

## 2025-02-28 RX ADMIN — METOPROLOL SUCCINATE 25 MILLIGRAM(S): 50 TABLET, EXTENDED RELEASE ORAL at 05:53

## 2025-02-28 RX ADMIN — Medication 250 MILLIGRAM(S): at 18:53

## 2025-02-28 RX ADMIN — TOUCHLESS CARE ZINC OXIDE PROTECTANT 1 APPLICATION(S): 20; 25 SPRAY TOPICAL at 11:29

## 2025-02-28 RX ADMIN — Medication 100 MILLIGRAM(S): at 12:14

## 2025-02-28 RX ADMIN — INSULIN LISPRO 2: 100 INJECTION, SOLUTION INTRAVENOUS; SUBCUTANEOUS at 23:32

## 2025-02-28 RX ADMIN — INSULIN GLARGINE-YFGN 20 UNIT(S): 100 INJECTION, SOLUTION SUBCUTANEOUS at 22:49

## 2025-02-28 RX ADMIN — Medication 40 MILLIGRAM(S): at 11:28

## 2025-02-28 RX ADMIN — Medication 125 MILLIGRAM(S): at 12:15

## 2025-02-28 NOTE — PROGRESS NOTE ADULT - SUBJECTIVE AND OBJECTIVE BOX
Patient is a 69y old  Female who presents with a chief complaint of sepsis (18 Feb 2025 11:37)    BRIEF HOSPITAL COURSE: 70yo female with PMHx recent diagnosis of primary pancreatic adenocarcinoma of head with tumor in umbilicus, chemo-induced pancytopenia, SCC lip, cyclical vomiting syndrome, HTN, HPL, Gout, GERD, CVA on Aggrenox and others had been admitted to [facility] with septic shock 2/2 + pseudomonas bacteremia on 02/09/2025, nausea, vomiting and diarrhea requiring Levophed drip, was transferred out of ICU to regular floor last night.  Patient was being followed by GI, ID, Cardiology and Onc there.  Patient has been transferred to  on patient's  (GI Dr. Pleitez) request.      2/24 pt flat affect, denies pain but when i palpate her lower abd she flinches. denies nausea, had dark BM last night. whispers to communicate  2/25 slow to respond this am, still tachypneic. abd tender to palpation on the R side  2/26 pt sleeping this am, easily arousable. takes alot of encouragement to answer by questions, but doesnt really respond just stares at me. tender to palpation on R side of abd and suprapubic area  2/27 pt more awake today, quicker to respond, having abd pain on the R side still but less severe. had maroon BM overnight and again this am. still hurts to swallow, but not sore throat  2/28 no events overnight, pt less tender on R side abdomen.     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 1000 milliGRAM(s) IV Intermittent once  acetaminophen   IVPB .. 1000 milliGRAM(s) IV Intermittent once  chlorhexidine 2% Cloths 1 Application(s) Topical <User Schedule>  cholecalciferol 2000 Unit(s) Oral at bedtime  dextrose 5%. 1000 milliLiter(s) (50 mL/Hr) IV Continuous <Continuous>  dextrose 5%. 1000 milliLiter(s) (100 mL/Hr) IV Continuous <Continuous>  dextrose 50% Injectable 25 Gram(s) IV Push once  dextrose 50% Injectable 12.5 Gram(s) IV Push once  dextrose 50% Injectable 25 Gram(s) IV Push once  famotidine Injectable 20 milliGRAM(s) IV Push daily  fluconAZOLE IVPB      fluconAZOLE IVPB 200 milliGRAM(s) IV Intermittent every 24 hours  glucagon  Injectable 1 milliGRAM(s) IntraMuscular once  insulin glargine Injectable (LANTUS) 20 Unit(s) SubCutaneous at bedtime  insulin lispro (ADMELOG) corrective regimen sliding scale   SubCutaneous every 6 hours  lactated ringers. 1000 milliLiter(s) (75 mL/Hr) IV Continuous <Continuous>  metoprolol tartrate 25 milliGRAM(s) Oral every 6 hours  pantoprazole  Injectable 40 milliGRAM(s) IV Push every 12 hours  Parenteral Nutrition - Adult 1 Each (75 mL/Hr) TPN Continuous <Continuous>  sodium chloride 0.45%. 1000 milliLiter(s) (75 mL/Hr) IV Continuous <Continuous>  sucralfate suspension 1 Gram(s) Oral four times a day  vancomycin    Solution 125 milliGRAM(s) Oral every 6 hours  zinc oxide 40% Paste 1 Application(s) Topical two times a day    Vital Signs Last 24 Hrs  T(C): 36.7 (28 Feb 2025 08:53), Max: 36.8 (27 Feb 2025 13:36)  T(F): 98 (28 Feb 2025 08:53), Max: 98.3 (27 Feb 2025 13:36)  HR: 96 (28 Feb 2025 06:00) (88 - 110)  BP: 131/77 (28 Feb 2025 06:00) (125/73 - 140/81)  BP(mean): 94 (28 Feb 2025 06:00) (90 - 99)  RR: 28 (28 Feb 2025 06:00) (20 - 33)  SpO2: 98% (28 Feb 2025 06:00) (91% - 98%)    Parameters below as of 28 Feb 2025 06:00  Patient On (Oxygen Delivery Method): nasal cannula  O2 Flow (L/min): 4    MEDICATIONS  (STANDING):  acetaminophen   IVPB .. 1000 milliGRAM(s) IV Intermittent once  acetaminophen   IVPB .. 1000 milliGRAM(s) IV Intermittent once  chlorhexidine 2% Cloths 1 Application(s) Topical <User Schedule>  cholecalciferol 2000 Unit(s) Oral at bedtime  dextrose 5%. 1000 milliLiter(s) (50 mL/Hr) IV Continuous <Continuous>  dextrose 5%. 1000 milliLiter(s) (100 mL/Hr) IV Continuous <Continuous>  dextrose 50% Injectable 25 Gram(s) IV Push once  dextrose 50% Injectable 12.5 Gram(s) IV Push once  dextrose 50% Injectable 25 Gram(s) IV Push once  famotidine Injectable 20 milliGRAM(s) IV Push daily  fluconAZOLE IVPB      fluconAZOLE IVPB 200 milliGRAM(s) IV Intermittent every 24 hours  glucagon  Injectable 1 milliGRAM(s) IntraMuscular once  insulin glargine Injectable (LANTUS) 20 Unit(s) SubCutaneous at bedtime  insulin lispro (ADMELOG) corrective regimen sliding scale   SubCutaneous every 6 hours  lactated ringers. 1000 milliLiter(s) (75 mL/Hr) IV Continuous <Continuous>  metoprolol tartrate 25 milliGRAM(s) Oral every 6 hours  pantoprazole  Injectable 40 milliGRAM(s) IV Push every 12 hours  Parenteral Nutrition - Adult 1 Each (75 mL/Hr) TPN Continuous <Continuous>  sodium chloride 0.45%. 1000 milliLiter(s) (75 mL/Hr) IV Continuous <Continuous>  sucralfate suspension 1 Gram(s) Oral four times a day  vancomycin    Solution 125 milliGRAM(s) Oral every 6 hours  zinc oxide 40% Paste 1 Application(s) Topical two times a day      LABS:                        11.5   15.40 )-----------( 191      ( 28 Feb 2025 06:01 )             35.8     02-28    145  |  113[H]  |  94[H]  ----------------------------<  138[H]  4.0   |  23  |  1.80[H]    Ca    9.3      28 Feb 2025 06:01  Phos  4.2     02-28  Mg     1.9     02-28    TPro  6.3  /  Alb  2.6[L]  /  TBili  0.7  /  DBili  x   /  AST  36  /  ALT  42  /  AlkPhos  203[H]  02-28    LIVER FUNCTIONS - ( 28 Feb 2025 06:01 )  Alb: 2.6 g/dL / Pro: 6.3 gm/dL / ALK PHOS: 203 U/L / ALT: 42 U/L / AST: 36 U/L / GGT: x           PT/INR - ( 27 Feb 2025 15:00 )   PT: 13.5 sec;   INR: 1.18 ratio    PTT - ( 26 Feb 2025 12:34 )  PTT:91.5 sec  Urinalysis Basic - ( 28 Feb 2025 06:01 )  Color: x / Appearance: x / SG: x / pH: x  Gluc: 138 mg/dL / Ketone: x  / Bili: x / Urobili: x   Blood: x / Protein: x / Nitrite: x   Leuk Esterase: x / RBC: x / WBC x   Sq Epi: x / Non Sq Epi: x / Bacteria: x    Physical Examination:    General: no respiratory distress today.  HEENT: lips appear less dry.   PULM: decreased at bases b/l.   CVS: Regular rate and rhythm, no murmurs  ABD: Soft, nondistended, mild tenderness, redness by umbilical area,  EXT: 1+ pitting edema in LE b/l extending to knees - improving  SKIN: Warm and well perfused  NEURO: Awake, more alert today, more conversive    DEVICES:  R chest port     RADIOLOGY:   < from: CT Abdomen and Pelvis No Cont (02.22.25 @ 15:53) >    IMPRESSION: Mild interval improvement in small and large bowel ileus.   Recommend plain film follow-up.    < end of copied text >      < from: CT Chest No Cont (02.15.25 @ 15:55) >  IMPRESSION:  Nondisplaced fractures of the left ninth and 10th ribs, similar to the   prior study.    Pancreatic mass, unchanged    No onset ascites and bilateral pleural effusions with compressive   atelectasis, right greater than left.    Dilated gallbladder, similar to the prior study.    < end of copied text >

## 2025-02-28 NOTE — PROGRESS NOTE ADULT - SUBJECTIVE AND OBJECTIVE BOX
INTERVAL HPI/OVERNIGHT EVENTS:  Patient S&E at bedside. 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 1000 milliGRAM(s) IV Intermittent once  acetaminophen   IVPB .. 1000 milliGRAM(s) IV Intermittent once  chlorhexidine 2% Cloths 1 Application(s) Topical <User Schedule>  cholecalciferol 2000 Unit(s) Oral at bedtime  dextrose 5%. 1000 milliLiter(s) (50 mL/Hr) IV Continuous <Continuous>  dextrose 5%. 1000 milliLiter(s) (100 mL/Hr) IV Continuous <Continuous>  dextrose 50% Injectable 25 Gram(s) IV Push once  dextrose 50% Injectable 12.5 Gram(s) IV Push once  dextrose 50% Injectable 25 Gram(s) IV Push once  famotidine Injectable 20 milliGRAM(s) IV Push daily  fluconAZOLE IVPB      fluconAZOLE IVPB 200 milliGRAM(s) IV Intermittent every 24 hours  glucagon  Injectable 1 milliGRAM(s) IntraMuscular once  insulin glargine Injectable (LANTUS) 20 Unit(s) SubCutaneous at bedtime  insulin lispro (ADMELOG) corrective regimen sliding scale   SubCutaneous every 6 hours  lactated ringers. 1000 milliLiter(s) (75 mL/Hr) IV Continuous <Continuous>  metoprolol tartrate 25 milliGRAM(s) Oral every 6 hours  pantoprazole  Injectable 40 milliGRAM(s) IV Push every 12 hours  Parenteral Nutrition - Adult 1 Each (75 mL/Hr) TPN Continuous <Continuous>  sodium chloride 0.45%. 1000 milliLiter(s) (75 mL/Hr) IV Continuous <Continuous>  sucralfate suspension 1 Gram(s) Oral four times a day  vancomycin    Solution 125 milliGRAM(s) Oral every 6 hours  zinc oxide 40% Paste 1 Application(s) Topical two times a day    MEDICATIONS  (PRN):  acetaminophen     Tablet .. 650 milliGRAM(s) Oral every 6 hours PRN Temp greater or equal to 38C (100.4F), Mild Pain (1 - 3)  aluminum hydroxide/magnesium hydroxide/simethicone Suspension 30 milliLiter(s) Oral every 4 hours PRN Dyspepsia  Biotene Dry Mouth Oral Rinse 15 milliLiter(s) Swish and Spit five times a day PRN Mouth Care  dextrose Oral Gel 15 Gram(s) Oral once PRN Blood Glucose LESS THAN 70 milliGRAM(s)/deciliter  melatonin 3 milliGRAM(s) Oral at bedtime PRN Insomnia  metoprolol tartrate Injectable 5 milliGRAM(s) IV Push every 6 hours PRN heart rate >110  ondansetron Injectable 4 milliGRAM(s) IV Push every 8 hours PRN Nausea and/or Vomiting  sodium chloride 0.65% Nasal 1 Spray(s) Both Nostrils two times a day PRN Nasal Congestion      Allergies    No Known Allergies    Intolerances        LABS:                        11.5   15.40 )-----------( 191      ( 28 Feb 2025 06:01 )             35.8     02-28    145  |  113[H]  |  94[H]  ----------------------------<  138[H]  4.0   |  23  |  1.80[H]    Ca    9.3      28 Feb 2025 06:01  Phos  4.2     02-28  Mg     1.9     02-28    TPro  6.3  /  Alb  2.6[L]  /  TBili  0.7  /  DBili  x   /  AST  36  /  ALT  42  /  AlkPhos  203[H]  02-28    PT/INR - ( 27 Feb 2025 15:00 )   PT: 13.5 sec;   INR: 1.18 ratio         PTT - ( 26 Feb 2025 12:34 )  PTT:91.5 sec  Urinalysis Basic - ( 28 Feb 2025 06:01 )    Color: x / Appearance: x / SG: x / pH: x  Gluc: 138 mg/dL / Ketone: x  / Bili: x / Urobili: x   Blood: x / Protein: x / Nitrite: x   Leuk Esterase: x / RBC: x / WBC x   Sq Epi: x / Non Sq Epi: x / Bacteria: x        RADIOLOGY & ADDITIONAL TESTS:  Studies reviewed.

## 2025-02-28 NOTE — PROGRESS NOTE ADULT - ASSESSMENT
ASSESSMENT  70yo female with PMHx recent diagnosis of primary pancreatic adenocarcinoma of head with tumor in umbilicus, chemo-induced pancytopenia, SCC lip, cyclical vomiting syndrome, HTN, HPL, Gout, GERD, CVA on Aggrenox    Initially admitted to [facility] with septic shock 2/2 + pseudomonas bacteremia likely due to UTI vs colitis on 02/09/2025 Was having nausea, vomiting and diarrhea requiring Levophed drip, was transferred out of ICU to regular floor.    Then transferred to  as per request of .  Was getting platelet transfusion and developed tachypnea hypoxia, rigors  Was diuresed and became hypotensive  Upgraded to SICU     Here with   1. Sepsis (POA)  2. Pseudomonal bacteremia suspect from UTI   3. Mucositis suspected esophageal candidiasis?   4. Pancytopenia  5. Acute hypoxic respiratory failure 2/2 pulm edema   6. MALLORY     CTA chest abd pelvis 2/19 revealing multiple small PE b/l and LBO  Started on IV heparin   Went into Afib with RVR this admission, resolved with lopressor.    Developed GI bleed on heparin gtt  started on protonix    re - trialed on heparin infusion on 2/25 -> subsequently had maroon BMs again    PLAN    Neuro: awake, more alert and conversive today.. pain control prn. IV tylenol .avoid nsaids  CV: BP has been stable 130s/80s. in sinus tachycardia improving HR 90-100s. cont PO lopressor 25mg q6hr.   Pulm: on 3-4L NC sating 95-96%, less tachypneic today.failed IV heparin again.  GI: PO diet as tolerated. on TPN. IV PPI bid. had maroon BM again today.  would like to defer endoscopy/colonoscopy at this time  Nephro: Cr 1.7 -> 1.8 suspect hypovolemia given GI bleed. hyperNa 149, will adjust Na in TPN and start 1/2 NS @ 75 x 12hrs. coelho in place. monitor I & Os. Trend renal fxn. s/p albumin q6hr x 8 doses  Endo: Hyperglycemia improved. cont lantus 22units qHS. insulin added to TPN. BGMs q6hr. ISS.  ID: leukocytosis improving, afebrile. likely still related to filgastrim - d/w heme onc. cont IV diflucan 200mg qd #13 (started 2/15). cont PO vanco q6hr for C diff ppx. s/p meropenem. s/p IV cefepime.   Heme: hgb has been stable 10.6,  s/p 6units pRBC this admission ( 2units 2/26, 2units on 2/23, 2units on 2/16). s/p filgastrim  PT eval, OOB to chair as tolerated.    Dispo: SICU. TPN. IV abx. started on  1/2 NS.  Trend Na and Cr. monitor off AC, plan for IVC filter tomorrow    Will discuss with Dr. Geiger, d/w Dr. Hernandez    updated  at bedside     ASSESSMENT  68yo female with PMHx recent diagnosis of primary pancreatic adenocarcinoma of head with tumor in umbilicus, chemo-induced pancytopenia, SCC lip, cyclical vomiting syndrome, HTN, HPL, Gout, GERD, CVA on Aggrenox    Initially admitted to [facility] with septic shock 2/2 + pseudomonas bacteremia likely due to UTI vs colitis on 02/09/2025 Was having nausea, vomiting and diarrhea requiring Levophed drip, was transferred out of ICU to regular floor.    Then transferred to  as per request of .  Was getting platelet transfusion and developed tachypnea hypoxia, rigors  Was diuresed and became hypotensive  Upgraded to SICU     Here with   1. Sepsis (POA)  2. Pseudomonal bacteremia suspect from UTI   3. Mucositis suspected esophageal candidiasis?   4. Pancytopenia  5. Acute hypoxic respiratory failure 2/2 pulm edema   6. MALLORY     CTA chest abd pelvis 2/19 revealing multiple small PE b/l and LBO  Started on IV heparin   Went into Afib with RVR this admission, resolved with lopressor.    Developed GI bleed on heparin gtt  started on protonix    re - trialed on heparin infusion on 2/25 -> subsequently had maroon BMs again    PLAN    Neuro: awake, more alert and conversive today.. pain control prn. IV tylenol .avoid nsaids  CV: BP has been stable 130s/80s. in sinus tachycardia improving HR 90-100s. cont PO lopressor 25mg q6hr.   Pulm: on 3-4L NC sating 95-96%, less tachypneic today. failed IV heparin again.  GI: PO diet as tolerated. on TPN. IV PPI bid.  would like to defer endoscopy/colonoscopy at this time  Nephro: Cr  1.87 -> 1.80. HyperNa resolved. coelho in place. monitor I & Os. Trend renal fxn. s/p albumin  Endo: Hyperglycemia improved. cont lantus 20units qHS. insulin added to TPN. BGMs q6hr. ISS.  ID: leukocytosis improving, afebrile. likely still related to filgastrim - d/w heme onc. cont IV diflucan 200mg qd #14 (started 2/15). cont PO vanco q6hr for C diff ppx. s/p meropenem. s/p IV cefepime.   Heme: hgb has been stable. s/p 6units pRBC this admission ( 2units 2/26, 2units on 2/23, 2units on 2/16). s/p filgastrim  PT eval, OOB to chair as tolerated.    Dispo: SICU. TPN. IV abx. IVC filter     Will discuss with Dr. Geiger    updated  at bedside

## 2025-02-28 NOTE — PROGRESS NOTE ADULT - ASSESSMENT
69-year-old female with stage IV pancreatic adenocarcinoma presenting with neutropenic fever and severe treatment-related toxicities from modified FOLFIRINOX and investigational EMILY inhibitor MC-6236.    PROBLEMS:    New febrile syndrome with chills. Possible sepsis. Dysphagia. Possible candida esophagitis, S/p sepsis with PSAE   Acute hypoxic respiratory failure likely secondary to hypervolemia   Bilateral pleural effusions with compressive atelectasis, right greater than left  Neutropenic Fever/Neutropenic Septic shock  Severe Thrombocytopenia-Platelet count critically low at 26k.   Severe diarrhea and inability to tolerate PO intake, likely multifactorial from both FOLFIRINOX (particularly irinotecan component) and study drug MC-6236.   History of left cephalic vein thrombosis.  MALLROY  69-year-old female with stage IV pancreatic adenocarcinoma presenting with neutropenic fever and severe treatment-related toxicities from modified FOLFIRINOX and investigational EMILY inhibitor MC-6236.  CT Angio Chest PE Protocol w/ IV Cont (02.19.25 @ 14:16) >  CHEST:  Multiple pulmonary emboli within the segmental branches of the left lower and right lower lobe pulmonary arteries.  There are two right middle lobe nodules, new since prior. Exact   etiology is unclear. Primary differential diagnostic consideration   includes infection.      PLAN:    IV cefepime/vanco/diflucan  CTA chest + small PEs in RLL and LLL- IV heparin causing bleeding-waiting GFF  Family does not want any procedure EGD/Colonscopy  DECD steroids  High WBC-trend  Pulmonary tachpnea may be RTA with low bicarbonate because of GI cause with diarrhea but improving renal fx  Neutropenic Septic shock- IV meropenem started on 2/15- previously on CEFEPIME/Continue filgrastim 480mcg daily-Today w/ improvement in counts- WBC 1.3, Hb 10.1, plt 37, cmp with k 2.3 and Repleted, Cr 1.40.   Daily CBC monitoring  Maintain strict neutropenic precautions  IV diuretics-now on RA doing well   EMPIRIC TREATMENT FOR ESOPHAGEAL CANDIDIASIS-continue with DIFLUCAN   Anemia-received two units of pRBCs Hb 10.1 today   Thrombocytopenia-Transfuse for plts < 15   D/w staff & /haematology & ICU in detail

## 2025-02-28 NOTE — SEPSIS NOTE - OTHER DETAILS
lactate 2.2 . patient to receive additional 500cc bolus, started on LR @100cc/hr lactate 2.2 . started on LR @100cc/hr

## 2025-02-28 NOTE — PROGRESS NOTE ADULT - SUBJECTIVE AND OBJECTIVE BOX
Subjective:    pat lying in bed, No more gi bleed. Noted with shacking chills; rectal temp is 101F, Has coelho, Mediport is accessed with TPN, Poor PO intake, Stools are brown, ID note read. CXR RLL increases marking.    Home Medications:  cefepime 2 g intravenous injection: 2 gram(s) intravenous every 12 hours (12 Feb 2025 12:59)  filgrastim: 480 microgram(s) subcutaneous once a day (12 Feb 2025 12:59)  Lactated Ringers Injection intravenous solution: 150 milligram(s) intravenous every 1 to 2 hours (12 Feb 2025 12:59)  loperamide 2 mg oral capsule: 1 cap(s) orally once (12 Feb 2025 12:59)  midodrine 5 mg oral tablet: 1 tab(s) orally every 8 hours (12 Feb 2025 12:59)  Multiple Vitamins with Minerals oral liquid: 1 orally once a day (12 Feb 2025 12:59)  octreotide 2500 mcg/mL subcutaneous solution: 0.04 milliliter(s) subcutaneous 3 times a day (12 Feb 2025 12:59)  sucralfate 1 g/10 mL oral suspension: 10 milliliter(s) orally 4 times a day (12 Feb 2025 12:59)  trimethobenzamide 100 mg/mL intramuscular solution: 2 milliliter(s) intramuscular every 8 hours As needed Nausea and/or Vomiting (12 Feb 2025 12:59)    MEDICATIONS  (STANDING):  acetaminophen   IVPB .. 1000 milliGRAM(s) IV Intermittent once  cefepime  Injectable.      cefepime  Injectable. 2000 milliGRAM(s) IV Push every 12 hours  chlorhexidine 2% Cloths 1 Application(s) Topical <User Schedule>  cholecalciferol 2000 Unit(s) Oral at bedtime  dextrose 5%. 1000 milliLiter(s) (50 mL/Hr) IV Continuous <Continuous>  dextrose 5%. 1000 milliLiter(s) (100 mL/Hr) IV Continuous <Continuous>  dextrose 50% Injectable 25 Gram(s) IV Push once  dextrose 50% Injectable 12.5 Gram(s) IV Push once  dextrose 50% Injectable 25 Gram(s) IV Push once  famotidine Injectable 20 milliGRAM(s) IV Push daily  fluconAZOLE IVPB      fluconAZOLE IVPB 200 milliGRAM(s) IV Intermittent every 24 hours  glucagon  Injectable 1 milliGRAM(s) IntraMuscular once  insulin glargine Injectable (LANTUS) 20 Unit(s) SubCutaneous at bedtime  insulin lispro (ADMELOG) corrective regimen sliding scale   SubCutaneous every 6 hours  lipid, fat emulsion (Fish Oil and Plant Based) 20% Infusion 0.6812 Gm/kG/Day (20.8 mL/Hr) IV Continuous <Continuous>  metoprolol tartrate 25 milliGRAM(s) Oral every 6 hours  pantoprazole  Injectable 40 milliGRAM(s) IV Push every 12 hours  Parenteral Nutrition - Adult 1 Each (75 mL/Hr) TPN Continuous <Continuous>  Parenteral Nutrition - Adult 1 Each (75 mL/Hr) TPN Continuous <Continuous>  sucralfate suspension 1 Gram(s) Oral four times a day  vancomycin    Solution 125 milliGRAM(s) Oral every 6 hours  vancomycin  IVPB 1000 milliGRAM(s) IV Intermittent once  zinc oxide 40% Paste 1 Application(s) Topical two times a day    MEDICATIONS  (PRN):  acetaminophen     Tablet .. 650 milliGRAM(s) Oral every 6 hours PRN Temp greater or equal to 38C (100.4F), Mild Pain (1 - 3)  aluminum hydroxide/magnesium hydroxide/simethicone Suspension 30 milliLiter(s) Oral every 4 hours PRN Dyspepsia  Biotene Dry Mouth Oral Rinse 15 milliLiter(s) Swish and Spit five times a day PRN Mouth Care  dextrose Oral Gel 15 Gram(s) Oral once PRN Blood Glucose LESS THAN 70 milliGRAM(s)/deciliter  melatonin 3 milliGRAM(s) Oral at bedtime PRN Insomnia  metoprolol tartrate Injectable 5 milliGRAM(s) IV Push every 6 hours PRN heart rate >110  ondansetron Injectable 4 milliGRAM(s) IV Push every 8 hours PRN Nausea and/or Vomiting  sodium chloride 0.65% Nasal 1 Spray(s) Both Nostrils two times a day PRN Nasal Congestion      Allergies    No Known Allergies    Intolerances        Vital Signs Last 24 Hrs  T(C): 38.1 (28 Feb 2025 15:35), Max: 38.4 (28 Feb 2025 11:07)  T(F): 100.6 (28 Feb 2025 15:35), Max: 101.1 (28 Feb 2025 11:07)  HR: 113 (28 Feb 2025 16:00) (83 - 143)  BP: 119/80 (28 Feb 2025 16:00) (119/80 - 169/105)  BP(mean): 92 (28 Feb 2025 16:00) (90 - 118)  RR: 33 (28 Feb 2025 16:00) (18 - 42)  SpO2: 96% (28 Feb 2025 16:00) (79% - 98%)    Parameters below as of 28 Feb 2025 11:24  Patient On (Oxygen Delivery Method): room air          PHYSICAL EXAMINATION:    NECK:  Supple. No lymphadenopathy. Jugular venous pressure not elevated. Carotids equal.   HEART:   The cardiac impulse has a normal quality. Reg., Nl S1 and S2.  There are no murmurs, rubs or gallops noted  CHEST:  Chest crackles to auscultation. Normal respiratory effort.  ABDOMEN:  Soft and nontender.   EXTREMITIES:  There is no edema.       LABS:                        11.6   10.51 )-----------( 198      ( 28 Feb 2025 12:00 )             34.6     02-28    143  |  113[H]  |  94[H]  ----------------------------<  132[H]  3.8   |  24  |  1.79[H]    Ca    9.4      28 Feb 2025 12:00  Phos  4.2     02-28  Mg     1.9     02-28    TPro  6.0  /  Alb  2.4[L]  /  TBili  0.9  /  DBili  x   /  AST  35  /  ALT  42  /  AlkPhos  224[H]  02-28    PT/INR - ( 27 Feb 2025 15:00 )   PT: 13.5 sec;   INR: 1.18 ratio           Urinalysis Basic - ( 28 Feb 2025 12:00 )    Color: x / Appearance: x / SG: x / pH: x  Gluc: 132 mg/dL / Ketone: x  / Bili: x / Urobili: x   Blood: x / Protein: x / Nitrite: x   Leuk Esterase: x / RBC: x / WBC x   Sq Epi: x / Non Sq Epi: x / Bacteria: x

## 2025-02-28 NOTE — PROGRESS NOTE ADULT - ASSESSMENT
69-year-old female with stage IV pancreatic adenocarcinoma presenting with neutropenic fever and severe treatment-related toxicities from modified FOLFIRINOX and investigational EMILY inhibitor [id number] found to have PE and LBO.     # pancreatic ca   - remains on clinical trial- supportive care while in hospital   - CTH negative and discussed w/ pt   - [place]  [facility] with [place]. Tapia yesterday at [facility] - pt with UGT1A1 mutation which makes her more susceptible to toxicities from 5FU and irinotecan which may have contributed to symptoms on arrival   - Review clinical trial protocol - may remain on trial drug alone and changing regimen- to be discussed when recovers from underlying illness and admission     # Neutropenic Septic shock   - pt previously on neupogen- WBC has now recovered and elevated likely 2/2 GCSF support   - s/p meropenem started on 2/15 - remains on  fluconazole and po vanco    # PE   - v/q scan w indeterminate prob of PE, linear defect in RUL posterior segment   - CT chest- multiple PE w/in segmental left lower and right lower, 2 right middle lobe nodules,   -  Pt restarted on hep gtt due- unfortunately had episodes of maroon stools - no further bleeding on bms last night- reports brown stool as per nurse   -plan for placement of IVC filter  - Today, WBC 15.4, Hb 11.5, plt 191 and stable     # anemia   - received 4u to date and 1u ffp- Hb 11.5 today   - Protonix IV   - GI following   - keep type and screen active, transfuse for Hb <7    # large bowel obstruction  - CT a/p Large bowel obstruction with transition point at the distal transverse colon. There is also narrowing at the level of the terminal ileum. Diffuse ascites. Redemonstrated pancreatic lesion, consistent with history of pancreatic adenocarcinoma. There is an umbilical lesion with adjacent peritoneal implants.  - previously was having diarrhea  - GI following and reviewed imaging with radiology   - 2/22/25  repeat CT a/p with mild interval improvements in small and large bowel ileus. pt given lasix and albumin.     # EMPIRIC TREATMENT FOR ESOPHAGEAL CANDIDIASIS   - continue with DIFLUCAN       will follow and communicate with [facility]

## 2025-02-28 NOTE — PROGRESS NOTE ADULT - SUBJECTIVE AND OBJECTIVE BOX
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 1000 milliGRAM(s) IV Intermittent once  acetaminophen   IVPB .. 1000 milliGRAM(s) IV Intermittent once  cefepime  Injectable.      cefepime  Injectable. 2000 milliGRAM(s) IV Push every 12 hours  chlorhexidine 2% Cloths 1 Application(s) Topical <User Schedule>  cholecalciferol 2000 Unit(s) Oral at bedtime  dextrose 5%. 1000 milliLiter(s) (50 mL/Hr) IV Continuous <Continuous>  dextrose 5%. 1000 milliLiter(s) (100 mL/Hr) IV Continuous <Continuous>  dextrose 50% Injectable 25 Gram(s) IV Push once  dextrose 50% Injectable 12.5 Gram(s) IV Push once  dextrose 50% Injectable 25 Gram(s) IV Push once  famotidine Injectable 20 milliGRAM(s) IV Push daily  fluconAZOLE IVPB      fluconAZOLE IVPB 200 milliGRAM(s) IV Intermittent every 24 hours  glucagon  Injectable 1 milliGRAM(s) IntraMuscular once  insulin glargine Injectable (LANTUS) 20 Unit(s) SubCutaneous at bedtime  insulin lispro (ADMELOG) corrective regimen sliding scale   SubCutaneous every 6 hours  lipid, fat emulsion (Fish Oil and Plant Based) 20% Infusion 0.6812 Gm/kG/Day (20.8 mL/Hr) IV Continuous <Continuous>  metoprolol tartrate 25 milliGRAM(s) Oral every 6 hours  pantoprazole  Injectable 40 milliGRAM(s) IV Push every 12 hours  Parenteral Nutrition - Adult 1 Each (75 mL/Hr) TPN Continuous <Continuous>  Parenteral Nutrition - Adult 1 Each (75 mL/Hr) TPN Continuous <Continuous>  sucralfate suspension 1 Gram(s) Oral four times a day  vancomycin    Solution 125 milliGRAM(s) Oral every 6 hours  vancomycin  IVPB 1000 milliGRAM(s) IV Intermittent once  zinc oxide 40% Paste 1 Application(s) Topical two times a day    MEDICATIONS  (PRN):  acetaminophen     Tablet .. 650 milliGRAM(s) Oral every 6 hours PRN Temp greater or equal to 38C (100.4F), Mild Pain (1 - 3)  aluminum hydroxide/magnesium hydroxide/simethicone Suspension 30 milliLiter(s) Oral every 4 hours PRN Dyspepsia  Biotene Dry Mouth Oral Rinse 15 milliLiter(s) Swish and Spit five times a day PRN Mouth Care  dextrose Oral Gel 15 Gram(s) Oral once PRN Blood Glucose LESS THAN 70 milliGRAM(s)/deciliter  melatonin 3 milliGRAM(s) Oral at bedtime PRN Insomnia  metoprolol tartrate Injectable 5 milliGRAM(s) IV Push every 6 hours PRN heart rate >110  ondansetron Injectable 4 milliGRAM(s) IV Push every 8 hours PRN Nausea and/or Vomiting  sodium chloride 0.65% Nasal 1 Spray(s) Both Nostrils two times a day PRN Nasal Congestion      REVIEW OF SYSTEMS:    RESPIRATORY: No shortness of breath  CARDIOVASCULAR: No chest pain  All other review of systems is negative unless indicated above.    Vital Signs Last 24 Hrs  T(C): 38.1 (28 Feb 2025 15:35), Max: 38.4 (28 Feb 2025 11:07)  T(F): 100.6 (28 Feb 2025 15:35), Max: 101.1 (28 Feb 2025 11:07)  HR: 113 (28 Feb 2025 16:00) (83 - 143)  BP: 119/80 (28 Feb 2025 16:00) (119/80 - 169/105)  BP(mean): 92 (28 Feb 2025 16:00) (90 - 118)  RR: 33 (28 Feb 2025 16:00) (18 - 42)  SpO2: 96% (28 Feb 2025 16:00) (79% - 98%)    Parameters below as of 28 Feb 2025 11:24  Patient On (Oxygen Delivery Method): room air        PHYSICAL EXAM:    Constitutional: NAD, chronically ill  Respiratory: CTAB  Cardiovascular: S1 and S2, RRR  Gastrointestinal: BS+, soft, NT/ND  Extremities: No peripheral edema  Psychiatric: Normal mood, normal affect    LABS:                        11.6   10.51 )-----------( 198      ( 28 Feb 2025 12:00 )             34.6     02-28    143  |  113[H]  |  94[H]  ----------------------------<  132[H]  3.8   |  24  |  1.79[H]    Ca    9.4      28 Feb 2025 12:00  Phos  4.2     02-28  Mg     1.9     02-28    TPro  6.0  /  Alb  2.4[L]  /  TBili  0.9  /  DBili  x   /  AST  35  /  ALT  42  /  AlkPhos  224[H]  02-28    PT/INR - ( 27 Feb 2025 15:00 )   PT: 13.5 sec;   INR: 1.18 ratio           LIVER FUNCTIONS - ( 28 Feb 2025 12:00 )  Alb: 2.4 g/dL / Pro: 6.0 gm/dL / ALK PHOS: 224 U/L / ALT: 42 U/L / AST: 35 U/L / GGT: x             RADIOLOGY & ADDITIONAL STUDIES:

## 2025-02-28 NOTE — CHART NOTE - NSCHARTNOTEFT_GEN_A_CORE
called to bedside by RN   Patient with rigors. fever 101.1F rectally, HR 130s-140s. /79    Evaluation for sepsis  Stat CBC, BMP, lactate. blood cultures x 2, UA, CXR   IV cefepime 2gm q12hr ordered  tylenol for fever  will start with 1L LR for now as pt with hx of pulm edema this admission. also receiving TPN @75/hr.  IV filter on hold    d/w Dr. Henry and  at bedside.

## 2025-02-28 NOTE — PROGRESS NOTE ADULT - SUBJECTIVE AND OBJECTIVE BOX
Date of service: 02-28-25 @ 11:22    Lying in bed in NAD   Noted with shacking chills; rectal temp is 101F  Has coelho  Mediport is accessed with TPN  Poor PO intake  Stools are brown    ROS: denies pain, no HA, no SOB or cough, no abdominal pain, no dysuria, no legs pain, no rashes    MEDICATIONS  (STANDING):  acetaminophen   IVPB .. 1000 milliGRAM(s) IV Intermittent once  acetaminophen   IVPB .. 1000 milliGRAM(s) IV Intermittent once  cefepime   IVPB      chlorhexidine 2% Cloths 1 Application(s) Topical <User Schedule>  cholecalciferol 2000 Unit(s) Oral at bedtime  dextrose 5%. 1000 milliLiter(s) (50 mL/Hr) IV Continuous <Continuous>  dextrose 5%. 1000 milliLiter(s) (100 mL/Hr) IV Continuous <Continuous>  dextrose 50% Injectable 25 Gram(s) IV Push once  dextrose 50% Injectable 12.5 Gram(s) IV Push once  dextrose 50% Injectable 25 Gram(s) IV Push once  famotidine Injectable 20 milliGRAM(s) IV Push daily  fluconAZOLE IVPB      fluconAZOLE IVPB 200 milliGRAM(s) IV Intermittent every 24 hours  glucagon  Injectable 1 milliGRAM(s) IntraMuscular once  insulin glargine Injectable (LANTUS) 20 Unit(s) SubCutaneous at bedtime  insulin lispro (ADMELOG) corrective regimen sliding scale   SubCutaneous every 6 hours  lactated ringers Bolus 1000 milliLiter(s) IV Bolus once  metoprolol tartrate 25 milliGRAM(s) Oral every 6 hours  pantoprazole  Injectable 40 milliGRAM(s) IV Push every 12 hours  Parenteral Nutrition - Adult 1 Each (75 mL/Hr) TPN Continuous <Continuous>  sucralfate suspension 1 Gram(s) Oral four times a day  vancomycin    Solution 125 milliGRAM(s) Oral every 6 hours  zinc oxide 40% Paste 1 Application(s) Topical two times a day    Vital Signs Last 24 Hrs  T(C): 36.7 (28 Feb 2025 08:53), Max: 36.8 (27 Feb 2025 13:36)  T(F): 98 (28 Feb 2025 08:53), Max: 98.3 (27 Feb 2025 13:36)  HR: 96 (28 Feb 2025 06:00) (88 - 110)  BP: 131/77 (28 Feb 2025 06:00) (125/73 - 140/81)  BP(mean): 94 (28 Feb 2025 06:00) (90 - 99)  RR: 28 (28 Feb 2025 06:00) (20 - 33)  SpO2: 98% (28 Feb 2025 06:00) (91% - 98%)    Parameters below as of 28 Feb 2025 06:00  Patient On (Oxygen Delivery Method): nasal cannula  O2 Flow (L/min): 4     Physical exam:    Constitutional:  No acute distress  HEENT: NC/AT, EOMI, PERRLA, conjunctivae clear; ears and nose atraumatic; pharynx benign  Neck: supple; thyroid not palpable  Back: no tenderness  Respiratory: respiratory effort normal; decreased BS at bases  Cardiovascular: S1S2 regular, no murmurs  Abdomen: soft, mild periombilical tender, not distended, positive BS; no liver or spleen organomegaly  Genitourinary: no suprapubic tenderness  Lymphatic: no LN palpable  Musculoskeletal: no muscle tenderness, no joint swelling or tenderness  Extremities: no pedal edema  Neurological/ Psychiatric: AxOx3, judgement and insight normal; moving all extremities  Skin: no rashes; right lower chest wall dry, scabbed ulcer; no discharge     Labs: reviewed                        11.5   15.40 )-----------( 191      ( 28 Feb 2025 06:01 )             35.8     02-28    145  |  113[H]  |  94[H]  ----------------------------<  138[H]  4.0   |  23  |  1.80[H]    Ca    9.3      28 Feb 2025 06:01  Phos  4.2     02-28  Mg     1.9     02-28    TPro  6.3  /  Alb  2.6[L]  /  TBili  0.7  /  DBili  x   /  AST  36  /  ALT  42  /  AlkPhos  203[H]  02-28                        10.2   18.89 )-----------( 181      ( 27 Feb 2025 04:05 )             30.2     02-27    149[H]  |  116[H]  |  96[H]  ----------------------------<  138[H]  3.9   |  26  |  1.87[H]    Ca    9.1      27 Feb 2025 04:05  Phos  4.0     02-27  Mg     2.0     02-27    TPro  5.4[L]  /  Alb  2.8[L]  /  TBili  0.8  /  DBili  x   /  AST  44[H]  /  ALT  45  /  AlkPhos  194[H]  02-27                        10.5   35.65 )-----------( 132      ( 21 Feb 2025 05:18 )             31.0     02-21    141  |  105  |  66[H]  ----------------------------<  358[H]  3.2[L]   |  26  |  1.33[H]    Ca    8.2[L]      21 Feb 2025 05:18  Phos  3.6     02-21  Mg     2.0     02-21    TPro  5.3[L]  /  Alb  2.1[L]  /  TBili  0.6  /  DBili  x   /  AST  56[H]  /  ALT  33  /  AlkPhos  329[H]  02-21    C-Reactive Protein: 209.0 mg/mL (02-15-25 @ 05:17)  C-Reactive Protein: 233.0 mg/mL (02-14-25 @ 06:50)  Ferritin: 1798 ng/mL (02-13-25 @ 18:46)                        8.7    0.19  )-----------( 33       ( 13 Feb 2025 06:52 )             25.7     02-13    137  |  109[H]  |  20  ----------------------------<  117[H]  2.6[LL]   |  18[L]  |  1.02    Ca    8.7      13 Feb 2025 06:52  Phos  2.3     02-13  Mg     1.6     02-13    TPro  4.4[L]  /  Alb  1.1[L]  /  TBili  0.7  /  DBili  x   /  AST  11[L]  /  ALT  16  /  AlkPhos  94  02-13     LIVER FUNCTIONS - ( 13 Feb 2025 06:52 )  Alb: 1.1 g/dL / Pro: 4.4 gm/dL / ALK PHOS: 94 U/L / ALT: 16 U/L / AST: 11 U/L / GGT: x           Culture - Blood (collected 11 Feb 2025 09:00)  Source: .Blood BLOOD  Preliminary Report (12 Feb 2025 13:01):    No growth at 24 hours    Culture - Blood (collected 11 Feb 2025 08:50)  Source: .Blood BLOOD  Preliminary Report (12 Feb 2025 13:01):    No growth at 24 hours    Urinalysis with Rflx Culture (collected 09 Feb 2025 01:25)    Culture - Urine (collected 09 Feb 2025 01:25)  Source: Clean Catch  Final Report (11 Feb 2025 08:39):    50,000 - 99,000 CFU/mL Pseudomonas aeruginosa  Organism: Pseudomonas aeruginosa (11 Feb 2025 08:39)  Organism: Pseudomonas aeruginosa (11 Feb 2025 08:39)      Method Type: REJI      -  Amikacin: S <=16      -  Aztreonam: S <=4      -  Cefepime: S <=2      -  Ceftazidime: S <=1      -  Ciprofloxacin: S <=0.25      -  Imipenem: S 2      -  Levofloxacin: S <=0.5      -  Meropenem: S <=1      -  Piperacillin/Tazobactam: S <=8    Culture - Blood (collected 08 Feb 2025 22:00)  Source: .Blood BLOOD  Gram Stain (09 Feb 2025 19:04):    Growth in aerobic bottle: Gram Negative Rods  Final Report (11 Feb 2025 07:53):    Growth in aerobic bottle: Pseudomonas aeruginosa    Direct identification is available within approximately 3-5    hours either by Blood Panel Multiplexed PCR or Direct    MALDI-TOF. Details: [email or web address]  Organism: Blood Culture PCR  Pseudomonas aeruginosa (11 Feb 2025 07:53)  Organism: Pseudomonas aeruginosa (11 Feb 2025 07:53)      Method Type: REJI      -  Aztreonam: S <=4      -  Cefepime: S <=2      -  Ceftazidime: S <=1      -  Ciprofloxacin: S <=0.25      -  Imipenem: S <=1      -  Levofloxacin: S <=0.5      -  Meropenem: S <=1      -  Piperacillin/Tazobactam: S <=8  Organism: Blood Culture PCR (11 Feb 2025 07:53)      Method Type: PCR      -  Pseudomonas aeruginosa: Detec    Culture - Blood (collected 08 Feb 2025 21:55)  Source: .Blood BLOOD  Gram Stain (09 Feb 2025 19:32):    Growth in aerobic bottle: Gram Negative Rods  Final Report (11 Feb 2025 07:54):    Growth in aerobic bottle: Pseudomonas aeruginosa    See previous culture [id number]    Radiology: all available radiological tests reviewed    < from: US Abdomen Upper Quadrant Right (02.09.25 @ 14:55) >  Distended gallbladder with layering sludge.  8 mm right renal calculus without hydronephrosis.  Hepatomegaly.  Right renal cyst.  < end of copied text >    < from: CT Abdomen and Pelvis No Cont (02.09.25 @ 03:01) >  1.   Study somewhat limited due to absence of contrast.  2.   Axial images 71-76 of series 301 and concomitant coronal images demonstrate prominent soft tissue inseparable from the body of the pancreas consistent with pancreatic neoplasm.  3.   Some distension of gallbladder could be further evaluated with right upper quadrant sonography. No definite biliary dilatation.  < end of copied text >    Advanced directives addressed: full resuscitation

## 2025-02-28 NOTE — PROGRESS NOTE ADULT - ASSESSMENT
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 for further care. She was admitted to [facility] with septic shock on 02/09/2025, nausea, vomiting and diarrhea requiring Levophed drip. Patient was being followed by GI, ID, Cardiology and Onc there. Patient has been transferred to  on patient's  (GI Dr. Pleitez) request. She was reported with non-bloody diarrhea x 3 in last 24 hours PTA. She denied abdominal pain, nausea, but has dry heaves. At [place] she was started on Vancomycin and Zosyn, then changed to Cefepime as her blood culture and urine cultures showed Pseudomonas aeruginosa.    #New febrile syndrome with chills. Possible sepsis.  #Dysphagia. Possible candida esophagitis  #S/p sepsis with PSAE   #Head of pancreas adenocarcinoma on chemotherapy  #Leukocytosis  #Immunocompromised host  #Kidney stones  #ARF   -leukocytosis is improving  -renal function is frail but improving  -dysphagia with poor PO intake  -cultures reviewed  -obtain BC x 2 and UA STAT  -s/p cefepime 2 gm IV q8h # 3  -s/p meropenem 1 gm IV q8h # 9   -on fluconazole 200 mg IV qd # 12  -on vancomycin 125 mg PO q6h for CDAD prophylaxis  -tolerating abx well so far; no side effects noted  -concern for new sepsis and infection with multiresistant organisms is raised. Prior cultures reviewed. An epidemiologic assessment was performed. The risk of the microorganism spread to family members, healthcare staff is low. Standard isolation in place at present time. Will reconsider isolation measures according to infection control policy, further culture results and other new information. Obtain new blood cultures now. The patient will be monitored closely, cultures collected as appropriate. The patient was recently treated with meropenem.  -start cefepime 1 gm IV q12h  -reason for abx use and side effects reviewed; monitor BMP   -GI evaluation appreciated   -repeat BC x 2 noted  -monitor abdomen  -continue antimicrobials   -f/u cultures  -monitor temps  -f/u CBC and BNP  -supportive care  2. Other issues:   -care per medicine    d/w medicine team  d/w    Suture Removal: 7 days

## 2025-02-28 NOTE — PROGRESS NOTE ADULT - ASSESSMENT
Imp:  Bleeding has resolved but now febrile    Rec:  I think corepak for feeds would be preferable as TPN increases the risk of infection etc but Dr. Pleitez has declined

## 2025-02-28 NOTE — CHART NOTE - NSCHARTNOTEFT_GEN_A_CORE
Clinical Nutrition PN Follow Up Note    *70 y/o F w/ recent diagnosis of primary pancreatic adenocarcinoma of head with tumor in umbilicus, chemo-induced pancytopenia and significant PMH of SCC lip, cyclical vomiting syndrome, HTN, HPL, Gout, GERD, CVA on Aggrenox and others had been admitted to [facility] with septic shock on 2025, nausea, vomiting and diarrhea requiring Levophed drip, was transferred out of ICU to regular floor last night.  Patient was being followed by GI, ID, Cardiology and Onc there.  Patient has been transferred to  today on patient's  (GI Dr. Pleitez) request.  At this time, patient c/o non-bloody diarrhea x 3 in last 24 hours.  She denies any abdominal pain, nausea, but has some dry heaves.  She denies any fever, chills, chest pain, palpitation, constipation or dysuria. Initially, she was started on Vancomycin and Zosyn, but now she has been on Cefepime as her blood culture and urine cultures are positive for Pseudomonas aeruginosa. Currently, patient is on Cefepime.  ID had also recommended Flagyl, but patient had refused it, as it causes nausea to her. Noted with Metabolic acidosis with NAG due to GI loss. Likely chemo-induced diarrhea and vomiting. Better than before. Cutaneous lesion on RUQ area below right breast, unclear etiology, ? Ecthyma gangrenosum. Admitting diagnosis: pancreatic ca  *: Pt transfer from [facility]; seen by RD and met criteria for PCM. Pt reports only being able to tolerate some sips of water and tea at this time. Reports tried eating small bite of mashed banana and felt burning sensation down her throat. Reports #; RD unable to obtain bedscale wt d/t bedscale not working. Admit wt per # note with 1+ and 2+ edema skewing wt. Pt appears overweight, NFPE reveals mild- mod muscle/fat wasting at this time. Recommend to continue with PO diet and encourage intake as tolerated. RD consulted to initiate PN d/t PO intolerance. Will initiate TPN through port.  *2/15: Remains on low fiber diet with minimal PO intake. Still w/ multiple episodes of diarrhea, if persists can consider adding 5mg zinc into PN bag. No other acute events overnight. D/w Dr. Joyner will c/w TPN today.   *: Raised area noted to mid sternum. RCW port intact.- no swelling noted, no redness. Port flushed without resistance. + blood return noted. Assessed with IV team Amy. Per pt no Pain on palpation. CT Chest. TPN on hold until CT read. CT chest from earlier in the day revealed new bilateral pleural effusions with bibasilar consolidated lung and ascites. CXR now shows low lung volumes and bibasilar opacities. Rigors post transfusion - Rapid response called;  Dr. Pleitez requesting ICU consult- concerned for pt.  requesting assessment of patient STAT. Pt transferred to SICU. ? third spacing, and possible PNA. Per pt still drinking small sips of water and tea. D/w Dr. Fortune will keep total volume same in PN bag today, and will c/w TPN.   *:  tx SICU, po remains minimal. On bipap. On additional LR d/t diuresis per critical care PA: "malignancy/severe protein-calorie malnutrition/hypoalbuminemia causing diffuse third spacing, caution with further diuresis as this is not truly a volume overload/acute CHF issue" also on IV albumin  c/w TPN  STRONGLY suggest to Confirm goals of care regarding LONG-TERM nutrition support - However, LONG-TERM Nutrition support is not recommended with advanced cancer as studies show that there is no improvement of dehydration symptoms, quality of life, or survival. It also increases the risk for peripheral edema, ascites, and pleural effusions. Will continue to provide nutr within GOC though  *: plan to c/w TPN for now. RD discussed w/ MD Tong and SICU IRINA Jurado need to confirm long term GOC regarding nutr support. Per GI - Esophageal pain is 2/2 esophagitis but unclear what type -- reflux vs. candida vs CMV or HSV etc. Plan to add imodium standing. ? EGD   *: reports she stills has difficulty swallowing due to pain. feeling weak overall. still having multiple episodes of diarrhea, poor PO intake. Plan to c/w TPN - per GI: "CVM and HSV will be negative which argues that esophagitis is reflux or fungal related"  d/w IRINA Jurado long-term plan regarding nutr support TBD, pending discussion w/ family. ? fluid overload which TPN may be contributing to as some studies show with advanced cancer that nutrition support increases the risk for peripheral edema, ascites, and pleural effusions.   *: PO slighly improving, plan to c/w TPN.  POCT uptrending, d/w IRINA Jurado, steroids to be dc and will hold off in insulin PN bag - will manage w/ insulin outside bag.  Per GI: "Imaging suggests ileus but I personally think that putting the imaging together with history and clinical findings that she just has severe diarrhea illness 2/2 chemo, given that both large and small bowel loops are fluid filled and she has no abd pain or vomiting"  *: PO intake continues to slightly improve, plan to c/w TPN. Hyperglycemia worsening, d/w IRINA Jurado, plan to add regular insulin into PN bag. Will also decrease dextrose to 250g to see if this helps w/ hyperglycemia and can increase again when POCTs better controlled.   *: Abdomen distended, non-tender to palpation - GI rec stat CT w/o contrast for further evaluation and to hold PO intake at this time. POCT remain elevated, per RN  this morning - Lantus was not given last night by RN since insulin was already in PN bag (?). Will hold off on increasing dextrose and will c/w current insulin in PN bag, d/w intensivist and plan to adjust Lantus outside PN bag.   *: Now has (+) GIB,  CTA with improvement in bowel distension, small and lg bowel ileus. Per GI, clears OK.  *: consumed some PO yesterday. Per GI, unsure why GIB but now resolved - ?low level ischemic colitis. plan to c/w TPN for now per sicu IRINA Jurado  STRONGLY suggest palliative care consult to determine LONG-TERM GOC regarding nutr support   *: per SICU RN, pt ate yogurt, bites of oatmeal.  bringing in foods to encourage PO as well. IR consulted for IVC filter.  Wound care RN following.  c/w TPN  *: IVC filter being considered, possible EGD d/t recurrent GIB.  Per GI - corpak is reasonable. RD feels strongly that corpak or even PEG (if able) would be best route of nutr support for this pt at this point. Has been on TPN x 13 days. Will c/w TPN per IRINA Jurado for now. STRONGLY rec'd palliative care team to Confirm goals of care regarding LONG-TERM nutrition support     *TPN initiated 2/2 PO intolerance, + Mucositis, possible candidal esophagitis    *current status: Had maroon BM again,  would like to defer endoscopy/colonoscopy at this time. Plan for IVC filter placement today.    *pertinent meds: vitamin D3, pepcid, abx, lantus (20 units HS), admelog (4 units x 24 hours), metoprolol, protonix, carafate, IV albumin, IVF    *labs reviewed; Na slightly elevated but downtrending, will c/w current Na and volume. Also on additional IVF. Will not increase total volume for now d/t 3rd spacing. K WNL; As per ASPEN Guidelines, maintenance potassium concentration in PN is 1 – 2 mEq/kg/day. However, [facility] PN Policy indicates a maximum of 120 mEq should be added into the PN bag. Currently at 100mEq in PN bag. Mg WNL.  Phos WNL however uptrending, will decrease in PN bag. T bili WNL will c/w trace elements. C/w MVI and thiamine in PN bag. POCTs improved x 24 hrs, will c/w current dextrose (250g) @ goal - meeting LOW-END of calorie needs w/ that amount d/t persistent hyperglycemia. D/w IRINA Jurado dulce/w 25 units of regular insulin in PN bag. Will hold off on cycling TPN for now.  New TG level 400 - will give lipids MWF and recheck level x 1 week. If continues to uptrend can give 1 week break. Also of note, Cl still elevated, no Cl added into TPN bag      145  |  113[H]  |  94[H]  ----------------------------<  138[H]  4.0   |  23  |  1.80[H]    Ca    9.3      2025 06:01  Phos  4.2       Mg     1.9         TPro  6.3  /  Alb  2.6[L]  /  TBili  0.7  /  DBili  x   /  AST  36  /  ALT  42  /  AlkPhos  203[H]      BMI: BMI (kg/m2): 27.8 (25 @ 05:46)  HbA1c: A1C with Estimated Average Glucose Result: 6.6 % (25 @ 05:35)    Glucose: POCT Blood Glucose.: 129 mg/dL (25 @ 05:43)    BP: 131/77 (25 @ 06:00) (99/71 - 142/80)Vital Signs Last 24 Hrs  T(C): 36.8 (25 @ 18:08), Max: 36.8 (25 @ 13:36)  T(F): 98.3 (25 @ 18:08), Max: 98.3 (25 @ 13:36)  HR: 96 (25 @ 06:00) (88 - 110)  BP: 131/77 (25 @ 06:00) (125/73 - 141/92)  BP(mean): 94 (25 @ 06:00) (90 - 105)  RR: 28 (25 @ 06:00) (20 - 33)  SpO2: 98% (25 @ 06:00) (91% - 99%)    Lipid Panel: Date/Time: 25 @ 05:07  Triglycerides, Serum: 400    POCT Blood Glucose.: 129 mg/dL (2025 05:43)  POCT Blood Glucose.: 146 mg/dL (2025 23:04)  POCT Blood Glucose.: 152 mg/dL (2025 17:12)  POCT Blood Glucose.: 157 mg/dL (2025 12:31)    Iron Total: 34 ug/dL (25 @ 18:46)  Folate, Serum: >20.0 ng/mL (25 @ 18:46)  Vitamin B12, Serum: 1457 pg/mL (25 @ 18:46)  Vitamin D, 25-Hydroxy: 23.0 ng/mL (25 @ 18:46) ** c/w deficiency    *I&O's Detail    2025 07:01  -  2025 07:00  --------------------------------------------------------  IN:    Fat Emulsion (Fish Oil &amp; Plant Based) 20% Infusion: 137 mL    Oral Fluid: 160 mL    sodium chloride 0.45%: 900 mL    TPN (Total Parenteral Nutrition): 1575 mL  Total IN: 2772 mL    OUT:    Indwelling Catheter - Urethral (mL): 2350 mL  Total OUT: 2350 mL    Total NET: 422 mL  * fluid status: positive large UOP. Will continue to monitor/adjust prn to maintain fluid balance   *last (+) BM doc'd  - large to ex-large, liquid, loose, tiffany/ maroon, fecal incontinence x3. pt not on bowel regimen.  *edema: 2+ generalized, 3+ L/R foot   *PI: L/R Buttocks (stage 2)    *malnutrition: Pt continues to meet criteria for severe protein calorie malnutrition in context of acute on chronic illness r/t decreased ability to meet increased nutrient needs 2/2 PO intolerance, N/V and diarrhea, on chemo for pancreatic ca AEB meeting <50% ENN > 5 days, mild-mod muscle/fat wasting    Estimated Needs: based on 73.4 Kg (wt from EMR admit wt )  Calories: 1812 - 2175 Kcal (25 - 30 Kcal/Kg)  Protein: 108 - 145 g (1.5 - 2.0 g/Kg)  Fluids: 1450 - 1812 mL (20 - 25 mL/Kg)    Diet, Regular:   Prosource Gelatein 20 Sugar Free     Qty per Day:  2  Supplement Feeding Modality:  Oral  Ensure Plus High Protein Cans or Servings Per Day:  1       Frequency:  Two Times a day (25 @ 12:01) [Active]    Weight History:  Daily Weight in k.2 (2025 04:00)  Daily Weight in k.3 (2025 06:00) *2+ edema  Daily Weight in k.4 (2025 05:00)  Daily Weight in k.6 (15 Feb 2025 06:17)  Height (cm): 162.6 (25 @ 02:39)  Weight (kg): 73.4 (25 @ 05:46), 66.8 (25 @ 02:39)  BMI (kg/m2): 27.8 (25 @ 05:46), 25.3 (25 @ 02:39)  BSA (m2): 1.79 (25 @ 05:46), 1.72 (25 @ 02:39)    TPN Recommendations: via implanted infusion port  Total Volume: 1800 mL x 24 hours  125 g  Amino Acids  250 g Dextrose (goal 325g)  50 g Lipids 20% (MWF only)  0 mEq Sodium Chloride  0 mEq Sodium Acetate  0 mmol Sodium Phosphate  0 mEq Potassium Chloride  85 mEq Potassium Acetate  10 mmol Potassium Phosphate  0 mEq Calcium Gluconate (CAPS out of PN solution)   16 mEq Magnesium Sulfate  200 mg Thiamine  1 ml Trace Elements  10 ml MVI  25 units Regular Insulin    Total Calories    1850   (Meets  100%  of lower end of Estimated Energy needs  and  100%  Protein needs)      Additional Recommendations:    1) Daily weights  2) Strict I & O's **pt third spacing  3) Daily lyte checks including magnesium and phos  4) Weekly triglycerides/LFT checks  5) POCT q6hrs; maintain 140-180mg/dL*** insulin management per MD  6) C/w low fiber diet and encourage PO intake as tolerated  7) Continue to titrate dextrose 50-75g per day until goal of 325g reached  8) Confirm goals of care regarding long-term nutrition support. Would recommend corpak vs PEG placement if long-term nutrition support is required as GI is functional and would be the preferred route for nutrition when GIB/ileus resolves. However, long-term nutrition support is not recommended with advanced cancer as studies show that there is no improvement of dehydration symptoms, quality of life, or survival. It also increases the risk for peripheral edema, ascites, and pleural effusions.     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  Patient was being followed by GI, ID, Cardiology and Onc there.  Patient has been transferred to  today on patient's  (GI Dr. Pleitez) request.  At this time, patient c/o non-bloody diarrhea x 3 in last 24 hours.  She denies any abdominal pain, nausea, but has some dry heaves.  She denies any fever, chills, chest pain, palpitation, constipation or dysuria. Initially, she was started on Vancomycin and Zosyn, but now she has been on Cefepime as her blood culture and urine cultures are positive for Pseudomonas aeruginosa. Currently, patient is on Cefepime.  ID had also recommended Flagyl, but patient had refused it, as it causes nausea to her. Noted with Metabolic acidosis with NAG due to GI loss. Likely chemo-induced diarrhea and vomiting. Better than before. Cutaneous lesion on RUQ area below right breast, unclear etiology, ? Ecthyma gangrenosum. Admitting diagnosis: pancreatic ca  *: Pt transfer from [facility]; seen by RD and met criteria for PCM. Pt reports only being able to tolerate some sips of water and tea at this time. Reports tried eating small bite of mashed banana and felt burning sensation down her throat. Reports #; RD unable to obtain bedscale wt d/t bedscale not working. Admit wt per # note with 1+ and 2+ edema skewing wt. Pt appears overweight, NFPE reveals mild- mod muscle/fat wasting at this time. Recommend to continue with PO diet and encourage intake as tolerated. RD consulted to initiate PN d/t PO intolerance. Will initiate TPN through port.  *2/15: Remains on low fiber diet with minimal PO intake. Still w/ multiple episodes of diarrhea, if persists can consider adding 5mg zinc into PN bag. No other acute events overnight. D/w Dr. Joyner will c/w TPN today.   *: Raised area noted to mid sternum. RCW port intact.- no swelling noted, no redness. Port flushed without resistance. + blood return noted. Assessed with IV team Amy. Per pt no Pain on palpation. CT Chest. TPN on hold until CT read. CT chest from earlier in the day revealed new bilateral pleural effusions with bibasilar consolidated lung and ascites. CXR now shows low lung volumes and bibasilar opacities. Rigors post transfusion - Rapid response called;  Dr. Pleitez requesting ICU consult- concerned for pt.  requesting assessment of patient STAT. Pt transferred to SICU. ? third spacing, and possible PNA. Per pt still drinking small sips of water and tea. D/w Dr. Fortune will keep total volume same in PN bag today, and will c/w TPN.   *:  tx SICU, po remains minimal. On bipap. On additional LR d/t diuresis per critical care PA: "malignancy/severe protein-calorie malnutrition/hypoalbuminemia causing diffuse third spacing, caution with further diuresis as this is not truly a volume overload/acute CHF issue" also on IV albumin  c/w TPN  STRONGLY suggest to Confirm goals of care regarding LONG-TERM nutrition support - However, LONG-TERM Nutrition support is not recommended with advanced cancer as studies show that there is no improvement of dehydration symptoms, quality of life, or survival. It also increases the risk for peripheral edema, ascites, and pleural effusions. Will continue to provide nutr within GOC though  *: plan to c/w TPN for now. RD discussed w/ MD Tong and SICU IRINA Jurado need to confirm long term GOC regarding nutr support. Per GI - Esophageal pain is 2/2 esophagitis but unclear what type -- reflux vs. candida vs CMV or HSV etc. Plan to add imodium standing. ? EGD   *: reports she stills has difficulty swallowing due to pain. feeling weak overall. still having multiple episodes of diarrhea, poor PO intake. Plan to c/w TPN - per GI: "CVM and HSV will be negative which argues that esophagitis is reflux or fungal related"  d/w IRINA Jurado long-term plan regarding nutr support TBD, pending discussion w/ family. ? fluid overload which TPN may be contributing to as some studies show with advanced cancer that nutrition support increases the risk for peripheral edema, ascites, and pleural effusions.   *: PO slighly improving, plan to c/w TPN.  POCT uptrending, d/w IRINA Jurado, steroids to be dc and will hold off in insulin PN bag - will manage w/ insulin outside bag.  Per GI: "Imaging suggests ileus but I personally think that putting the imaging together with history and clinical findings that she just has severe diarrhea illness 2/2 chemo, given that both large and small bowel loops are fluid filled and she has no abd pain or vomiting"  *: PO intake continues to slightly improve, plan to c/w TPN. Hyperglycemia worsening, d/w IRINA Jurado, plan to add regular insulin into PN bag. Will also decrease dextrose to 250g to see if this helps w/ hyperglycemia and can increase again when POCTs better controlled.   *: Abdomen distended, non-tender to palpation - GI rec stat CT w/o contrast for further evaluation and to hold PO intake at this time. POCT remain elevated, per RN  this morning - Lantus was not given last night by RN since insulin was already in PN bag (?). Will hold off on increasing dextrose and will c/w current insulin in PN bag, d/w intensivist and plan to adjust Lantus outside PN bag.   *: Now has (+) GIB,  CTA with improvement in bowel distension, small and lg bowel ileus. Per GI, clears OK.  *: consumed some PO yesterday. Per GI, unsure why GIB but now resolved - ?low level ischemic colitis. plan to c/w TPN for now per sicu IRINA Jurado  STRONGLY suggest palliative care consult to determine LONG-TERM GOC regarding nutr support   *: per SICU RN, pt ate yogurt, bites of oatmeal.  bringing in foods to encourage PO as well. IR consulted for IVC filter.  Wound care RN following.  c/w TPN  *: IVC filter being considered, possible EGD d/t recurrent GIB.  Per GI - corpak is reasonable. RD feels strongly that corpak or even PEG (if able) would be best route of nutr support for this pt at this point. Has been on TPN x 13 days. Will c/w TPN per IRINA Jurado for now. STRONGLY rec'd palliative care team to Confirm goals of care regarding LONG-TERM nutrition support     *TPN initiated 2/2 PO intolerance, + Mucositis, possible candidal esophagitis    *current status: Had maroon BM again,  would like to defer endoscopy/colonoscopy at this time. Plan for IVC filter placement today. Plan to c/w TPN.    *pertinent meds: vitamin D3, pepcid, abx, lantus (20 units HS), admelog (4 units x 24 hours), metoprolol, protonix, carafate, IV albumin, IVF    *labs reviewed; Na slightly elevated but downtrending, will c/w current Na and volume. Also on additional IVF. Will not increase total volume for now d/t 3rd spacing. K WNL; As per ASPEN Guidelines, maintenance potassium concentration in PN is 1 – 2 mEq/kg/day. However, [facility] PN Policy indicates a maximum of 120 mEq should be added into the PN bag. Currently at 100mEq in PN bag. Mg WNL.  Phos WNL however uptrending, will decrease in PN bag. T bili WNL will c/w trace elements. C/w MVI and thiamine in PN bag. POCTs improved x 24 hrs, will c/w current dextrose (250g) @ goal - meeting LOW-END of calorie needs w/ that amount d/t persistent hyperglycemia. D/w IRINA Jurdao dulce/w 25 units of regular insulin in PN bag. Will hold off on cycling TPN for now.  New TG level 400 - will give lipids MWF and recheck level x 1 week. If continues to uptrend can give 1 week break. Also of note, Cl still elevated, no Cl added into TPN bag      145  |  113[H]  |  94[H]  ----------------------------<  138[H]  4.0   |  23  |  1.80[H]    Ca    9.3      2025 06:01  Phos  4.2       Mg     1.9         TPro  6.3  /  Alb  2.6[L]  /  TBili  0.7  /  DBili  x   /  AST  36  /  ALT  42  /  AlkPhos  203[H]      BMI: BMI (kg/m2): 27.8 (25 @ 05:46)  HbA1c: A1C with Estimated Average Glucose Result: 6.6 % (25 @ 05:35)    Glucose: POCT Blood Glucose.: 129 mg/dL (25 @ 05:43)    BP: 131/77 (25 @ 06:00) (99/71 - 142/80)Vital Signs Last 24 Hrs  T(C): 36.8 (25 @ 18:08), Max: 36.8 (25 @ 13:36)  T(F): 98.3 (25 @ 18:08), Max: 98.3 (25 @ 13:36)  HR: 96 (25 @ 06:00) (88 - 110)  BP: 131/77 (25 @ 06:00) (125/73 - 141/92)  BP(mean): 94 (25 @ 06:00) (90 - 105)  RR: 28 (25 @ 06:00) (20 - 33)  SpO2: 98% (25 @ 06:00) (91% - 99%)    Lipid Panel: Date/Time: 25 @ 05:07  Triglycerides, Serum: 400    POCT Blood Glucose.: 129 mg/dL (2025 05:43)  POCT Blood Glucose.: 146 mg/dL (2025 23:04)  POCT Blood Glucose.: 152 mg/dL (2025 17:12)  POCT Blood Glucose.: 157 mg/dL (2025 12:31)    Iron Total: 34 ug/dL (25 @ 18:46)  Folate, Serum: >20.0 ng/mL (25 @ 18:46)  Vitamin B12, Serum: 1457 pg/mL (25 @ 18:46)  Vitamin D, 25-Hydroxy: 23.0 ng/mL (25 @ 18:46) ** c/w deficiency    *I&O's Detail    2025 07:01  -  2025 07:00  --------------------------------------------------------  IN:    Fat Emulsion (Fish Oil &amp; Plant Based) 20% Infusion: 137 mL    Oral Fluid: 160 mL    sodium chloride 0.45%: 900 mL    TPN (Total Parenteral Nutrition): 1575 mL  Total IN: 2772 mL    OUT:    Indwelling Catheter - Urethral (mL): 2350 mL  Total OUT: 2350 mL    Total NET: 422 mL  * fluid status: positive large UOP. Will continue to monitor/adjust prn to maintain fluid balance   *last (+) BM doc'd  - large to ex-large, liquid, loose, tiffany/ maroon, fecal incontinence x3. pt not on bowel regimen.  *edema: 2+ generalized, 3+ L/R foot   *PI: L/R Buttocks (stage 2)    *malnutrition: Pt continues to meet criteria for severe protein calorie malnutrition in context of acute on chronic illness r/t decreased ability to meet increased nutrient needs 2/2 PO intolerance, N/V and diarrhea, on chemo for pancreatic ca AEB meeting <50% ENN > 5 days, mild-mod muscle/fat wasting    Estimated Needs: based on 73.4 Kg (wt from EMR admit wt )  Calories: 1812 - 2175 Kcal (25 - 30 Kcal/Kg)  Protein: 108 - 145 g (1.5 - 2.0 g/Kg)  Fluids: 1450 - 1812 mL (20 - 25 mL/Kg)    Diet, Regular:   Prosource Gelatein 20 Sugar Free     Qty per Day:  2  Supplement Feeding Modality:  Oral  Ensure Plus High Protein Cans or Servings Per Day:  1       Frequency:  Two Times a day (25 @ 12:01) [Active]    Weight History:  Daily Weight in k.2 (2025 04:00)  Daily Weight in k.3 (2025 06:00) *2+ edema  Daily Weight in k.4 (2025 05:00)  Daily Weight in k.6 (15 Feb 2025 06:17)  Height (cm): 162.6 (25 @ 02:39)  Weight (kg): 73.4 (25 @ 05:46), 66.8 (25 @ 02:39)  BMI (kg/m2): 27.8 (25 @ 05:46), 25.3 (25 @ 02:39)  BSA (m2): 1.79 (25 @ 05:46), 1.72 (25 @ 02:39)    TPN Recommendations: via implanted infusion port  Total Volume: 1800 mL x 24 hours  125 g  Amino Acids  250 g Dextrose (goal 325g)  50 g Lipids 20% (MWF only)  0 mEq Sodium Chloride  0 mEq Sodium Acetate  0 mmol Sodium Phosphate  0 mEq Potassium Chloride  85 mEq Potassium Acetate  10 mmol Potassium Phosphate  0 mEq Calcium Gluconate (CAPS out of PN solution)   16 mEq Magnesium Sulfate  200 mg Thiamine  1 ml Trace Elements  10 ml MVI  25 units Regular Insulin    Total Calories    1850   (Meets  100%  of lower end of Estimated Energy needs  and  100%  Protein needs)      Additional Recommendations:    1) Daily weights  2) Strict I & O's **pt third spacing  3) Daily lyte checks including magnesium and phos  4) Weekly triglycerides/LFT checks  5) POCT q6hrs; maintain 140-180mg/dL*** insulin management per MD  6) C/w low fiber diet and encourage PO intake as tolerated  7) Continue to titrate dextrose 50-75g per day until goal of 325g reached  8) Confirm goals of care regarding long-term nutrition support. Would recommend corpak vs PEG placement if long-term nutrition support is required as GI is functional and would be the preferred route for nutrition when GIB/ileus resolves. However, long-term nutrition support is not recommended with advanced cancer as studies show that there is no improvement of dehydration symptoms, quality of life, or survival. It also increases the risk for peripheral edema, ascites, and pleural effusions.     *will continue to monitor and adjust PN prn*  Sheri Seo, MS, RDN, CDN, [facility] [phone]  Certified Nutrition

## 2025-02-28 NOTE — PROGRESS NOTE ADULT - SUBJECTIVE AND OBJECTIVE BOX
69-year-old female recent diagnosis of primary pancreatic adenocarcinoma , pancytopenia and significant PMH of SCC lip, cyclical vomiting syndrome, HTN, HPL, Gout, GERD, CVA w renal evaluation of MALLORY and hypokalemia. Per documents was recently at  [facility] with septic shock on 02/09/2025, nausea, vomiting and diarrhea requiring Levophed drip. Per documents, patient  transferred to  for continued care. Patient with + diuresis w lasix 80 IV, increased UOP > 4L.   Treated w IV and po k repletion.     2/18  Case d/e Dr Pleitez  Pt returned from VQ scan r/o PE, results pending  Pt is tachypneic RR 30 BPM and etiologies are investigated  NO specific complaints  Labs reviewed  Chart reviewed  Recent reemergence of diarrhea/ metabolic acidosis  now on parenteral nutrition and LR  D/W Ethel Jurado    2/19  Evaluated at bedside , d/w Dr tong and Dr Pleitez  Pt still tachypneic and tachycardic    2/20  Case d/w Dr Maik Tong and Ethel Jurado  pt overall feels better  more engaging    2/21  Pt in good spirits  Cont to be tachycardic  rate ~ 110  Tachypneic rate ~ 30    2/22  Still tachypneic  Difficulty eating due to ileus    2/24  Events noted  Received Lasix 20 mg iv yesterday w   > 6000 ml    2/25  Hypernatremic  placed on LR  d/w TPN team to reduce Na content    2/26  Pt w some apprehension and abd discomfort  + abd tenderness on palpation  Bladder scan w patches of ascites, no large bladder volume    2/27  more comfortable    2/28  More aware, comfortable  Less apprehensive    PAST MEDICAL & SURGICAL HISTORY:  Primary pancreatic adenocarcinoma    MEDICATIONS  (STANDING):  acetaminophen   IVPB .. 1000 milliGRAM(s) IV Intermittent once  acetaminophen   IVPB .. 1000 milliGRAM(s) IV Intermittent once  cefepime  Injectable.      cefepime  Injectable. 2000 milliGRAM(s) IV Push every 12 hours  chlorhexidine 2% Cloths 1 Application(s) Topical <User Schedule>  cholecalciferol 2000 Unit(s) Oral at bedtime  dextrose 5%. 1000 milliLiter(s) (50 mL/Hr) IV Continuous <Continuous>  dextrose 5%. 1000 milliLiter(s) (100 mL/Hr) IV Continuous <Continuous>  dextrose 50% Injectable 25 Gram(s) IV Push once  dextrose 50% Injectable 12.5 Gram(s) IV Push once  dextrose 50% Injectable 25 Gram(s) IV Push once  famotidine Injectable 20 milliGRAM(s) IV Push daily  fluconAZOLE IVPB      fluconAZOLE IVPB 200 milliGRAM(s) IV Intermittent every 24 hours  glucagon  Injectable 1 milliGRAM(s) IntraMuscular once  insulin glargine Injectable (LANTUS) 20 Unit(s) SubCutaneous at bedtime  insulin lispro (ADMELOG) corrective regimen sliding scale   SubCutaneous every 6 hours  lactated ringers Bolus 500 milliLiter(s) IV Bolus once  lactated ringers. 1000 milliLiter(s) (100 mL/Hr) IV Continuous <Continuous>  lipid, fat emulsion (Fish Oil and Plant Based) 20% Infusion 0.6812 Gm/kG/Day (20.8 mL/Hr) IV Continuous <Continuous>  metoprolol tartrate 25 milliGRAM(s) Oral every 6 hours  pantoprazole  Injectable 40 milliGRAM(s) IV Push every 12 hours  Parenteral Nutrition - Adult 1 Each (75 mL/Hr) TPN Continuous <Continuous>  Parenteral Nutrition - Adult 1 Each (75 mL/Hr) TPN Continuous <Continuous>  sucralfate suspension 1 Gram(s) Oral four times a day  vancomycin    Solution 125 milliGRAM(s) Oral every 6 hours  zinc oxide 40% Paste 1 Application(s) Topical two times a day    MEDICATIONS  (PRN):  acetaminophen     Tablet .. 650 milliGRAM(s) Oral every 6 hours PRN Temp greater or equal to 38C (100.4F), Mild Pain (1 - 3)  aluminum hydroxide/magnesium hydroxide/simethicone Suspension 30 milliLiter(s) Oral every 4 hours PRN Dyspepsia  Biotene Dry Mouth Oral Rinse 15 milliLiter(s) Swish and Spit five times a day PRN Mouth Care  dextrose Oral Gel 15 Gram(s) Oral once PRN Blood Glucose LESS THAN 70 milliGRAM(s)/deciliter  melatonin 3 milliGRAM(s) Oral at bedtime PRN Insomnia  metoprolol tartrate Injectable 5 milliGRAM(s) IV Push every 6 hours PRN heart rate >110  ondansetron Injectable 4 milliGRAM(s) IV Push every 8 hours PRN Nausea and/or Vomiting  sodium chloride 0.65% Nasal 1 Spray(s) Both Nostrils two times a day PRN Nasal Congestion    I&O's Detail    27 Feb 2025 07:01  -  28 Feb 2025 07:00  --------------------------------------------------------  IN:    Fat Emulsion (Fish Oil &amp; Plant Based) 20% Infusion: 137 mL    Oral Fluid: 160 mL    sodium chloride 0.45%: 900 mL    TPN (Total Parenteral Nutrition): 1575 mL  Total IN: 2772 mL    OUT:    Indwelling Catheter - Urethral (mL): 2350 mL  Total OUT: 2350 mL    Total NET: 422 mL      Vital Signs Last 24 Hrs  T(C): 36.7 (28 Feb 2025 13:00), Max: 38.4 (28 Feb 2025 11:07)  T(F): 98 (28 Feb 2025 13:00), Max: 101.1 (28 Feb 2025 11:07)  HR: 109 (28 Feb 2025 13:00) (83 - 143)  BP: 126/86 (28 Feb 2025 13:00) (120/79 - 169/105)  BP(mean): 98 (28 Feb 2025 13:00) (90 - 118)  RR: 18 (28 Feb 2025 13:00) (18 - 42)  SpO2: 97% (28 Feb 2025 13:00) (79% - 98%)    Parameters below as of 28 Feb 2025 11:24  Patient On (Oxygen Delivery Method): room air      PMH     HTN (hypertension)      HLD (hyperlipidemia)      Gout      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      GERD (gastroesophageal reflux disease)      Cyclical vomiting syndrome      CVA (cerebrovascular accident)      H/O squamous cell carcinoma excision      PHYSICAL EXAM:    Constitutional: apprehension,   Neck: No LAD, No JVD  Respiratory: CTAB  Cardiovascular: S1 and S2, RRR  Gastrointestinal: distant bowel sounds, + diffuse tenderness  Extremities: less peripheral edema  Neurological: A/O x 3, no focal deficits  Psychiatric: Normal mood, normal affect    Access: Not applicable    LABS:    02-28    143  |  113[H]  |  94[H]  ----------------------------<  132[H]  3.8   |  24  |  1.79[H]    Ca    9.4      28 Feb 2025 12:00  Phos  4.2     02-28  Mg     1.9     02-28    TPro  6.0  /  Alb  2.4[L]  /  TBili  0.9  /  DBili  x   /  AST  35  /  ALT  42  /  AlkPhos  224[H]  02-28 02-27    149[H]  |  116[H]  |  96[H]  ----------------------------<  138[H]  3.9   |  26  |  1.87[H]    Ca    9.1      27 Feb 2025 04:05  Phos  4.0     02-27  Mg     2.0     02-27    TPro  5.4[L]  /  Alb  2.8[L]  /  TBili  0.8  /  DBili  x   /  AST  44[H]  /  ALT  45  /  AlkPhos  194[H]  02-27      147    |  115    |  96     ----------------------------<  169       26 Feb 2025 05:25  4.0     |  23     |  1.72     147    |  116    |  97     ----------------------------<  228       25 Feb 2025 17:11  3.6     |  24     |  1.68     149    |  116    |  92     ----------------------------<  227       25 Feb 2025 05:07  4.0     |  25     |  1.63     Ca    8.9        26 Feb 2025 05:25  Ca    8.7        25 Feb 2025 17:11  Ca    8.6        25 Feb 2025 05:07    Phos  2.2       26 Feb 2025 05:25  Phos  3.1       25 Feb 2025 17:11  Phos  1.7       25 Feb 2025 05:07    Mg     1.9       26 Feb 2025 05:25  Mg     1.9       25 Feb 2025 17:11  Mg     1.8       25 Feb 2025 05:07                                       9.4    26.18 )-----------( 208      ( 26 Feb 2025 12:34 )             28.1                         8.5    24.92 )-----------( 188      ( 26 Feb 2025 05:25 )             25.3                         9.3    25.69 )-----------( 205      ( 26 Feb 2025 03:22 )             28.1

## 2025-02-28 NOTE — PROGRESS NOTE ADULT - ASSESSMENT
69-year-old female recent diagnosis of primary pancreatic adenocarcinoma , pancytopenia and significant PMH of SCC lip, cyclical vomiting syndrome, HTN, HPL, Gout, GERD, CVA w renal evaluation of MALLORY and hypokalemia. Per documents was recently at  [facility] with septic shock on 02/09/2025, nausea, vomiting and diarrhea requiring Levophed drip. Per documents, patient  transferred to  for continued care. Patient with + diuresis w lasix 80 IV, increaserd UOP > 4L.   Treated w IV and po k repletion.     MALLORY  -LIkely pre renal w insensible losses and diuresis, signficant UOP  -IVF to maintain even to positive today  -Trend labs, lytes. Repeat this afternoon  -Monitor UOP closely  -Avoid nsaids/contrast  -Renally dosed abx    Hypokalemia  -K repletion iv/po  -TPN    Sepsis/Pseduomonas  -ID/abx  -FU cultures    d/c with RN staff, Dr Tong, Dr Henry, Dr Maik Sweet to resume care in AM    2/18  MALLORY resolving  Azotemia, responded to IVF resuscitation  Urine output improved  hypoalbuminemia- on spa q 8 h for oncotic pressure and glomerular filtration  NAGMA- hyperchloremia may be due to diarrhea and GI losses  On Meropenem- very rarely associated w Fanconi syndrome  - will check urinary PO4, serum uric acid, repeat PO4 level, low this am  ABG  Lactate  CBC  VQ results pending  d/w Dr YOUSUF Pleitez    Addendum  Repeat labs reviewed d/w intensivist  Lactate 2.7  ABG c/w primary respiratory alkalosis with metabolic acidosis and NAGMA    2/19  Pancreatic Adenoca  MALLORY prerenal, correcting  Primary Hyperventilation, metab acidosis and non anion acidosis  VQ indeterminate  on 2/18 for PE  For CTA today, low risk MAXIMO due to adequate hydration status, UO 3600 ml / last 24 hrs  D/W Dr Tong and Dr Pleitez    2/20  Pancreatic AdenoCa  MALLORY slowly correcting  Bilateral pulm emboli  Creat stable post IV Contrast but will monitor for 48 hrs  Abd distension less  hypokalemia being replaced    2/21  Pancreatic AdenoCa  MALLORY slowly correcting, s/p IV Contrast  Bilateral pulm emboli on IV heparin  Creat stable post IV Contrast but will monitor for 48 hrs  Abd distension improved but still diminished BS  hypokalemia being replaced by ICU team    2/22  Pancreatic AdenoCa with METS  MALLORY slowly correcting, s/p IV Contrast  Bilateral pulm emboli on IV heparin, small bowel and large bowel distention  Creat stable post IV Contras  Abd distension improved but still diminished BS  hypokalemia being replaced by ICU team- need to keep   potassium in normal levels as it may contribute to ileus  LR dcd   CO2 improved    2/24  Pancreatic AdenoCa with METS  MALLORY slowly correcting,  mild elevation due to diuretics  Bilateral pulm emboli IV heparin held due to anemia  CT small bowel and large bowel distention  Creat stable post IV Contrast  Abd distension improved but still diminished BS  hypokalemia being replaced  CO2 improved    2/25  Pancreatic Ca, Bilateral pulm emboli  Hypernatremia  Receiving LR/ d/w TPN team to reduce Na in TPN  Repeat labs in 4 hrs  MALLORY/ ATN     2/26  MALLORY/ ATN  Pancreatic Ca/ METS  Hypernatremia improving  More apparent abd tendereness today w pt apprehension/ discomfort   Above noted  Will follow    2/27  Pancreatic CA/ Mets  Hypernatremia MALLORY  Most likely volume depletion  Agree w 1/2 NS infusion  Chronic elevation creat 1.7 range, slowly climbing  - most likely prerenal    2/28  Pancreatic CA/ Mets  Hypernatremia MALLORY  Most likely volume depletion  on parenteral nutrition, Na levels correcting  Creat improving excellent UO

## 2025-03-01 LAB
ALBUMIN SERPL ELPH-MCNC: 2 G/DL — LOW (ref 3.3–5)
ALP SERPL-CCNC: 339 U/L — HIGH (ref 40–120)
ALT FLD-CCNC: 58 U/L — SIGNIFICANT CHANGE UP (ref 12–78)
ANION GAP SERPL CALC-SCNC: 8 MMOL/L — SIGNIFICANT CHANGE UP (ref 5–17)
AST SERPL-CCNC: 41 U/L — HIGH (ref 15–37)
BILIRUB SERPL-MCNC: 0.7 MG/DL — SIGNIFICANT CHANGE UP (ref 0.2–1.2)
BUN SERPL-MCNC: 100 MG/DL — HIGH (ref 7–23)
CALCIUM SERPL-MCNC: 9.1 MG/DL — SIGNIFICANT CHANGE UP (ref 8.5–10.1)
CHLORIDE SERPL-SCNC: 113 MMOL/L — HIGH (ref 96–108)
CO2 SERPL-SCNC: 23 MMOL/L — SIGNIFICANT CHANGE UP (ref 22–31)
CREAT SERPL-MCNC: 1.8 MG/DL — HIGH (ref 0.5–1.3)
EGFR: 30 ML/MIN/1.73M2 — LOW
GLUCOSE BLDC GLUCOMTR-MCNC: 123 MG/DL — HIGH (ref 70–99)
GLUCOSE BLDC GLUCOMTR-MCNC: 136 MG/DL — HIGH (ref 70–99)
GLUCOSE BLDC GLUCOMTR-MCNC: 147 MG/DL — HIGH (ref 70–99)
GLUCOSE BLDC GLUCOMTR-MCNC: 178 MG/DL — HIGH (ref 70–99)
GLUCOSE SERPL-MCNC: 167 MG/DL — HIGH (ref 70–99)
HCT VFR BLD CALC: 32.2 % — LOW (ref 34.5–45)
HGB BLD-MCNC: 10.8 G/DL — LOW (ref 11.5–15.5)
MAGNESIUM SERPL-MCNC: 2 MG/DL — SIGNIFICANT CHANGE UP (ref 1.6–2.6)
MCHC RBC-ENTMCNC: 29.5 PG — SIGNIFICANT CHANGE UP (ref 27–34)
MCHC RBC-ENTMCNC: 33.5 G/DL — SIGNIFICANT CHANGE UP (ref 32–36)
MCV RBC AUTO: 88 FL — SIGNIFICANT CHANGE UP (ref 80–100)
NRBC # BLD AUTO: 0.03 K/UL — HIGH (ref 0–0)
NRBC # FLD: 0.03 K/UL — HIGH (ref 0–0)
NRBC BLD AUTO-RTO: 0 /100 WBCS — SIGNIFICANT CHANGE UP (ref 0–0)
PHOSPHATE SERPL-MCNC: 4.1 MG/DL — SIGNIFICANT CHANGE UP (ref 2.5–4.5)
PLATELET # BLD AUTO: 193 K/UL — SIGNIFICANT CHANGE UP (ref 150–400)
PMV BLD: 13.3 FL — HIGH (ref 7–13)
POTASSIUM SERPL-MCNC: 3.7 MMOL/L — SIGNIFICANT CHANGE UP (ref 3.5–5.3)
POTASSIUM SERPL-SCNC: 3.7 MMOL/L — SIGNIFICANT CHANGE UP (ref 3.5–5.3)
PROT SERPL-MCNC: 5.6 GM/DL — LOW (ref 6–8.3)
RBC # BLD: 3.66 M/UL — LOW (ref 3.8–5.2)
RBC # FLD: 15.9 % — HIGH (ref 10.3–14.5)
SODIUM SERPL-SCNC: 144 MMOL/L — SIGNIFICANT CHANGE UP (ref 135–145)
WBC # BLD: 12.44 K/UL — HIGH (ref 3.8–10.5)

## 2025-03-01 PROCEDURE — 99232 SBSQ HOSP IP/OBS MODERATE 35: CPT

## 2025-03-01 PROCEDURE — 71045 X-RAY EXAM CHEST 1 VIEW: CPT | Mod: 26

## 2025-03-01 PROCEDURE — 99233 SBSQ HOSP IP/OBS HIGH 50: CPT

## 2025-03-01 RX ORDER — I.V. FAT EMULSION 20 G/100ML
0.68 EMULSION INTRAVENOUS
Qty: 50 | Refills: 0 | Status: DISCONTINUED | OUTPATIENT
Start: 2025-03-01 | End: 2025-03-01

## 2025-03-01 RX ORDER — ACETAMINOPHEN 500 MG/5ML
1000 LIQUID (ML) ORAL ONCE
Refills: 0 | Status: COMPLETED | OUTPATIENT
Start: 2025-03-01 | End: 2025-03-01

## 2025-03-01 RX ORDER — SODIUM/POT/MAG/CALC/CHLOR/ACET 35-20-5MEQ
1 VIAL (ML) INTRAVENOUS
Refills: 0 | Status: DISCONTINUED | OUTPATIENT
Start: 2025-03-01 | End: 2025-03-01

## 2025-03-01 RX ADMIN — METOPROLOL SUCCINATE 25 MILLIGRAM(S): 50 TABLET, EXTENDED RELEASE ORAL at 23:17

## 2025-03-01 RX ADMIN — INSULIN LISPRO 2: 100 INJECTION, SOLUTION INTRAVENOUS; SUBCUTANEOUS at 06:44

## 2025-03-01 RX ADMIN — CEFEPIME 2000 MILLIGRAM(S): 2 INJECTION, POWDER, FOR SOLUTION INTRAVENOUS at 06:03

## 2025-03-01 RX ADMIN — METOPROLOL SUCCINATE 25 MILLIGRAM(S): 50 TABLET, EXTENDED RELEASE ORAL at 06:03

## 2025-03-01 RX ADMIN — Medication 125 MILLIGRAM(S): at 06:03

## 2025-03-01 RX ADMIN — INSULIN GLARGINE-YFGN 20 UNIT(S): 100 INJECTION, SOLUTION SUBCUTANEOUS at 21:08

## 2025-03-01 RX ADMIN — I.V. FAT EMULSION 20.8 GM/KG/DAY: 20 EMULSION INTRAVENOUS at 22:26

## 2025-03-01 RX ADMIN — TOUCHLESS CARE ZINC OXIDE PROTECTANT 1 APPLICATION(S): 20; 25 SPRAY TOPICAL at 09:40

## 2025-03-01 RX ADMIN — METOPROLOL SUCCINATE 25 MILLIGRAM(S): 50 TABLET, EXTENDED RELEASE ORAL at 17:52

## 2025-03-01 RX ADMIN — Medication 2000 UNIT(S): at 21:07

## 2025-03-01 RX ADMIN — Medication 1 GRAM(S): at 06:03

## 2025-03-01 RX ADMIN — Medication 1 EACH: at 22:25

## 2025-03-01 RX ADMIN — Medication 40 MILLIGRAM(S): at 21:07

## 2025-03-01 RX ADMIN — CEFEPIME 2000 MILLIGRAM(S): 2 INJECTION, POWDER, FOR SOLUTION INTRAVENOUS at 17:52

## 2025-03-01 RX ADMIN — Medication 1 APPLICATION(S): at 00:00

## 2025-03-01 RX ADMIN — Medication 20 MILLIGRAM(S): at 21:07

## 2025-03-01 RX ADMIN — Medication 40 MILLIGRAM(S): at 09:39

## 2025-03-01 RX ADMIN — Medication 1 GRAM(S): at 23:17

## 2025-03-01 RX ADMIN — Medication 125 MILLIGRAM(S): at 23:17

## 2025-03-01 RX ADMIN — Medication 100 MILLIGRAM(S): at 11:56

## 2025-03-01 RX ADMIN — Medication 1000 MILLIGRAM(S): at 17:37

## 2025-03-01 RX ADMIN — METOPROLOL SUCCINATE 25 MILLIGRAM(S): 50 TABLET, EXTENDED RELEASE ORAL at 12:05

## 2025-03-01 RX ADMIN — Medication 400 MILLIGRAM(S): at 16:59

## 2025-03-01 NOTE — PROGRESS NOTE ADULT - ASSESSMENT
69-year-old female with stage IV pancreatic adenocarcinoma presenting with neutropenic fever and severe treatment-related toxicities from modified FOLFIRINOX and investigational EMILY inhibitor [id number].    PROBLEMS:    New febrile syndrome with chills. Possible sepsis. Dysphagia. Possible candida esophagitis, S/p sepsis with PSAE   Acute hypoxic respiratory failure likely secondary to hypervolemia   Bilateral pleural effusions with compressive atelectasis, right greater than left  Neutropenic Fever/Neutropenic Septic shock  Severe Thrombocytopenia-Platelet count critically low at 26k.   Severe diarrhea and inability to tolerate PO intake, likely multifactorial from both FOLFIRINOX (particularly irinotecan component) and study drug [id number].   History of left cephalic vein thrombosis.  MALLORY  69-year-old female with stage IV pancreatic adenocarcinoma presenting with neutropenic fever and severe treatment-related toxicities from modified FOLFIRINOX and investigational EMILY inhibitor [id number].  CT Angio Chest PE Protocol w/ IV Cont (02.19.25 @ 14:16) >  CHEST:  Multiple pulmonary emboli within the segmental branches of the left lower and right lower lobe pulmonary arteries.  There are two right middle lobe nodules, new since prior. Exact   etiology is unclear. Primary differential diagnostic consideration   includes infection.      PLAN:    Pulmonary better  IV cefepime/vanco/diflucan  CTA chest + small PEs in RLL and LLL- IV heparin causing bleeding-waiting GFF  Family does not want any procedure EGD/Colonscopy  DECD steroids  High WBC-trend  Pulmonary tachpnea may be RTA with low bicarbonate because of GI cause with diarrhea but improving renal fx  Neutropenic Septic shock- IV meropenem started on 2/15- previously on CEFEPIME/Continue filgrastim 480mcg daily-Today w/ improvement in counts- WBC 1.3, Hb 10.1, plt 37, cmp with k 2.3 and Repleted, Cr 1.40.   Daily CBC monitoring  Maintain strict neutropenic precautions  IV diuretics-now on RA doing well   EMPIRIC TREATMENT FOR ESOPHAGEAL CANDIDIASIS-continue with DIFLUCAN   Anemia-received two units of pRBCs Hb 10.1 today   Thrombocytopenia-Transfuse for plts < 15   D/w staff & /haematology & ICU in detail

## 2025-03-01 NOTE — CHART NOTE - NSCHARTNOTEFT_GEN_A_CORE
Clinical Nutrition PN Follow Up Note    *70 y/o F w/ recent diagnosis of primary pancreatic adenocarcinoma of head with tumor in umbilicus, chemo-induced pancytopenia and significant PMH of SCC lip, cyclical vomiting syndrome, HTN, HPL, Gout, GERD, CVA on Aggrenox and others had been admitted to [facility] with septic shock on 2025, nausea, vomiting and diarrhea requiring Levophed drip, was transferred out of ICU to regular floor last night.  Patient was being followed by GI, ID, Cardiology and Onc there.  Patient has been transferred to  today on patient's  (GI Dr. Pleitez) request.  At this time, patient c/o non-bloody diarrhea x 3 in last 24 hours.  She denies any abdominal pain, nausea, but has some dry heaves.  She denies any fever, chills, chest pain, palpitation, constipation or dysuria. Initially, she was started on Vancomycin and Zosyn, but now she has been on Cefepime as her blood culture and urine cultures are positive for Pseudomonas aeruginosa. Currently, patient is on Cefepime.  ID had also recommended Flagyl, but patient had refused it, as it causes nausea to her. Noted with Metabolic acidosis with NAG due to GI loss. Likely chemo-induced diarrhea and vomiting. Better than before. Cutaneous lesion on RUQ area below right breast, unclear etiology, ? Ecthyma gangrenosum. Admitting diagnosis: pancreatic ca  *: Pt transfer from [facility]; seen by RD and met criteria for PCM. Pt reports only being able to tolerate some sips of water and tea at this time. Reports tried eating small bite of mashed banana and felt burning sensation down her throat. Reports #; RD unable to obtain bedscale wt d/t bedscale not working. Admit wt per # note with 1+ and 2+ edema skewing wt. Pt appears overweight, NFPE reveals mild- mod muscle/fat wasting at this time. Recommend to continue with PO diet and encourage intake as tolerated. RD consulted to initiate PN d/t PO intolerance. Will initiate TPN through port.  *2/15: Remains on low fiber diet with minimal PO intake. Still w/ multiple episodes of diarrhea, if persists can consider adding 5mg zinc into PN bag. No other acute events overnight. D/w Dr. Joyner will c/w TPN today.   *: Raised area noted to mid sternum. RCW port intact.- no swelling noted, no redness. Port flushed without resistance. + blood return noted. Assessed with IV team Amy. Per pt no Pain on palpation. CT Chest. TPN on hold until CT read. CT chest from earlier in the day revealed new bilateral pleural effusions with bibasilar consolidated lung and ascites. CXR now shows low lung volumes and bibasilar opacities. Rigors post transfusion - Rapid response called;  Dr. Pleitez requesting ICU consult- concerned for pt.  requesting assessment of patient STAT. Pt transferred to SICU. ? third spacing, and possible PNA. Per pt still drinking small sips of water and tea. D/w Dr. Fortune will keep total volume same in PN bag today, and will c/w TPN.   *:  tx SICU, po remains minimal. On bipap. On additional LR d/t diuresis per critical care PA: "malignancy/severe protein-calorie malnutrition/hypoalbuminemia causing diffuse third spacing, caution with further diuresis as this is not truly a volume overload/acute CHF issue" also on IV albumin  c/w TPN  STRONGLY suggest to Confirm goals of care regarding LONG-TERM nutrition support - However, LONG-TERM Nutrition support is not recommended with advanced cancer as studies show that there is no improvement of dehydration symptoms, quality of life, or survival. It also increases the risk for peripheral edema, ascites, and pleural effusions. Will continue to provide nutr within GOC though  *: plan to c/w TPN for now. RD discussed w/ MD Tong and SICU IRINA Jurado need to confirm long term GOC regarding nutr support. Per GI - Esophageal pain is 2/2 esophagitis but unclear what type -- reflux vs. candida vs CMV or HSV etc. Plan to add imodium standing. ? EGD   *: reports she stills has difficulty swallowing due to pain. feeling weak overall. still having multiple episodes of diarrhea, poor PO intake. Plan to c/w TPN - per GI: "CVM and HSV will be negative which argues that esophagitis is reflux or fungal related"  d/w IRINA Jurado long-term plan regarding nutr support TBD, pending discussion w/ family. ? fluid overload which TPN may be contributing to as some studies show with advanced cancer that nutrition support increases the risk for peripheral edema, ascites, and pleural effusions.   *: PO slighly improving, plan to c/w TPN.  POCT uptrending, d/w IRINA Jurado, steroids to be dc and will hold off in insulin PN bag - will manage w/ insulin outside bag.  Per GI: "Imaging suggests ileus but I personally think that putting the imaging together with history and clinical findings that she just has severe diarrhea illness 2/2 chemo, given that both large and small bowel loops are fluid filled and she has no abd pain or vomiting"  *: PO intake continues to slightly improve, plan to c/w TPN. Hyperglycemia worsening, d/w IRINA Jurado, plan to add regular insulin into PN bag. Will also decrease dextrose to 250g to see if this helps w/ hyperglycemia and can increase again when POCTs better controlled.   *: Abdomen distended, non-tender to palpation - GI rec stat CT w/o contrast for further evaluation and to hold PO intake at this time. POCT remain elevated, per RN  this morning - Lantus was not given last night by RN since insulin was already in PN bag (?). Will hold off on increasing dextrose and will c/w current insulin in PN bag, d/w intensivist and plan to adjust Lantus outside PN bag.   *: Now has (+) GIB,  CTA with improvement in bowel distension, small and lg bowel ileus. Per GI, clears OK.  *: consumed some PO yesterday. Per GI, unsure why GIB but now resolved - ?low level ischemic colitis. plan to c/w TPN for now per sicu IRINA Jurado  STRONGLY suggest palliative care consult to determine LONG-TERM GOC regarding nutr support   *: per SICU RN, pt ate yogurt, bites of oatmeal.  bringing in foods to encourage PO as well. IR consulted for IVC filter.  Wound care RN following.  c/w TPN  *: IVC filter being considered, possible EGD d/t recurrent GIB.  Per GI - corpak is reasonable. RD feels strongly that corpak or even PEG (if able) would be best route of nutr support for this pt at this point. Has been on TPN x 13 days. Will c/w TPN per IRINA Jurado for now. STRONGLY rec'd palliative care team to Confirm goals of care regarding LONG-TERM nutrition support   *: Had maroon BM again,  would like to defer endoscopy/colonoscopy at this time. Plan for IVC filter placement today.     *TPN initiated 2/2 PO intolerance, + Mucositis, possible candidal esophagitis    *current status: Plan to c/w TPN. Bleeding resolved per GI but febrile, per GI Dr. Hernandez "I think corepak for feeds would be preferable as TPN increases the risk of infection etc but Dr. Pleitez has declined"  NGT was placed for gastric decompression yesterday.  IVC filter deferred at this time    *pertinent meds: acetaminophen, abx, famotidine, lantus 20 U HS, ADMELOG (6 units given x 24 hrs), protonix     *labs reviewed; Na elevated but downtrending, will c/w current Na and volume. Also on additional IVF. Will not increase total volume for now d/t 3rd spacing. K low-end of normal; As per ASPEN Guidelines, maintenance potassium concentration in PN is 1 – 2 mEq/kg/day. However, [facility] PN Policy indicates a maximum of 120 mEq should be added into the PN bag. Currently at 100mEq in PN bag, continue. Mg WNL.  Phos WNL.  NO CHANGES TO BAG 3/   T bili WNL will c/w trace elements. C/w MVI and thiamine in PN bag. POCTs improved x 24 hrs, will c/w current dextrose (250g) @ goal - meeting LOW-END of calorie needs w/ that amount d/t persistent hyperglycemia. D/w IRINA Jurado to c/w 25 units of regular insulin in PN bag. Will hold off on cycling TPN for now.  New TG level 400 - will give lipids MWF and recheck level x 1 week. If continues to uptrend can give 1 week break. Also of note, Cl still elevated, no Cl added into TPN bag        144  |  113[H]  |  100[H]  ----------------------------<  167[H]  3.7   |  23  |  1.80[H]    Ca    9.1      01 Mar 2025 05:28  Phos  4.1       Mg     2.0         TPro  5.6[L]  /  Alb  2.0[L]  /  TBili  0.7  /  DBili  x   /  AST  41[H]  /  ALT  58  /  AlkPhos  339[H]      BMI: BMI (kg/m2): 27.8 (25 @ 05:46)  HbA1c: A1C with Estimated Average Glucose Result: 6.6 % (25 @ 05:35)    Glucose: POCT Blood Glucose.: 129 mg/dL (25 @ 05:43)    BP: 131/77 (25 @ 06:00) (99/71 - 142/80)Vital Signs Last 24 Hrs  T(C): 36.8 (25 @ 18:08), Max: 36.8 (25 @ 13:36)  T(F): 98.3 (25 @ 18:08), Max: 98.3 (25 @ 13:36)  HR: 96 (25 @ 06:00) (88 - 110)  BP: 131/77 (25 @ 06:00) (125/73 - 141/92)  BP(mean): 94 (25 @ 06:00) (90 - 105)  RR: 28 (25 @ 06:00) (20 - 33)  SpO2: 98% (25 @ 06:00) (91% - 99%)    Lipid Panel: Date/Time: 25 @ 05:07  Triglycerides, Serum: 400    POCT Blood Glucose.: 178 mg/dL (01 Mar 2025 05:57)  POCT Blood Glucose.: 152 mg/dL (2025 22:47)  POCT Blood Glucose.: 169 mg/dL (2025 18:27)  POCT Blood Glucose.: 144 mg/dL (2025 12:59)    Iron Total: 34 ug/dL (25 @ 18:46)  Folate, Serum: >20.0 ng/mL (25 @ 18:46)  Vitamin B12, Serum: 1457 pg/mL (25 @ 18:46)  Vitamin D, 25-Hydroxy: 23.0 ng/mL (25 @ 18:46) ** c/w deficiency    *I&O's Detail    2025 07:01  -  01 Mar 2025 07:00  --------------------------------------------------------  IN:    Fat Emulsion (Fish Oil &amp; Plant Based) 20% Infusion: 160 mL    TPN (Total Parenteral Nutrition): 750 mL  Total IN: 910 mL    OUT:    Indwelling Catheter - Urethral (mL): 2650 mL    Nasogastric/Oral tube (mL): 900 mL  Total OUT: 3550 mL    Total NET: -2640 mL  * fluid status: negative; large UOP, NGT output 900 mL. Will continue to monitor/adjust prn to maintain fluid balance   *last (+) BM doc'd 3 - large to ex-large, liquid, loose, tiffany/ maroon, fecal incontinence x3. pt not on bowel regimen.  *edema: 2+ generalized, dependent   *PI: L/R Buttocks (stage 2)    *malnutrition: Pt continues to meet criteria for severe protein calorie malnutrition in context of acute on chronic illness r/t decreased ability to meet increased nutrient needs 2/2 PO intolerance, N/V and diarrhea, on chemo for pancreatic ca AEB meeting <50% ENN > 5 days, mild-mod muscle/fat wasting    Estimated Needs: based on 73.4 Kg (wt from EMR admit wt )  Calories: 1812 - 2175 Kcal (25 - 30 Kcal/Kg)  Protein: 108 - 145 g (1.5 - 2.0 g/Kg)  Fluids: 1450 - 1812 mL (20 - 25 mL/Kg)    Diet, Regular:   Prosource Gelatein 20 Sugar Free     Qty per Day:  2  Supplement Feeding Modality:  Oral  Ensure Plus High Protein Cans or Servings Per Day:  1       Frequency:  Two Times a day (25 @ 12:01) [Active]    Weight History:  Daily Weight in k.2 (2025 04:00)  Daily Weight in k.3 (2025 06:00) *2+ edema  Daily Weight in k.4 (2025 05:00)  Daily Weight in k.6 (15 Feb 2025 06:17)  Height (cm): 162.6 (25 @ 02:39)  Weight (kg): 73.4 (25 @ 05:46), 66.8 (25 @ 02:39)  BMI (kg/m2): 27.8 (25 @ 05:46), 25.3 (25 @ 02:39)  BSA (m2): 1.79 (25 @ 05:46), 1.72 (25 @ 02:39)    **no changes to TPN bag**  TPN Recommendations: via implanted infusion port  Total Volume: 1800 mL x 24 hours  125 g  Amino Acids  250 g Dextrose  50 g Lipids 20% (MWF only)  0 mEq Sodium Chloride  0 mEq Sodium Acetate  0 mmol Sodium Phosphate  0 mEq Potassium Chloride  85 mEq Potassium Acetate  10 mmol Potassium Phosphate  0 mEq Calcium Gluconate (CAPS out of PN solution)   16 mEq Magnesium Sulfate  200 mg Thiamine  1 ml Trace Elements  10 ml MVI  25 units Regular Insulin    Total Calories    1850   (Meets  100%  of lower end of Estimated Energy needs  and  100%  Protein needs)      Additional Recommendations:    1) Daily weights  2) Strict I & O's **pt third spacing  3) Daily lyte checks including magnesium and phos  4) Weekly triglycerides/LFT checks  5) POCT q6hrs; maintain 140-180mg/dL*** insulin management per MD  6) C/w low fiber diet and encourage PO intake as tolerated  7) Continue to titrate dextrose 50-75g per day until goal of 325g reached  8) Confirm goals of care regarding long-term nutrition support. Would recommend corpak vs PEG placement if long-term nutrition support is required as GI is functional and would be the preferred route for nutrition when GIB/ileus resolves. However, long-term nutrition support is not recommended with advanced cancer as studies show that there is no improvement of dehydration symptoms, quality of life, or survival. It also increases the risk for peripheral edema, ascites, and pleural effusions.     *will continue to monitor and adjust PN prn*  Marce Mitchell MS, RDN, CNSC, [phone]  Certified Nutrition

## 2025-03-01 NOTE — PROGRESS NOTE ADULT - SUBJECTIVE AND OBJECTIVE BOX
69-year-old female recent diagnosis of primary pancreatic adenocarcinoma , pancytopenia and significant PMH of SCC lip, cyclical vomiting syndrome, HTN, HPL, Gout, GERD, CVA w renal evaluation of MALLORY and hypokalemia. Per documents was recently at  [facility] [place] with septic shock on 02/09/2025, nausea, vomiting and diarrhea requiring Levophed drip. Per documents, patient  transferred to  for continued care. Patient with + diuresis w lasix 80 IV, increased UOP > 4L.   Treated w IV and po k repletion.     2/18  Case d/e Dr Pleitez  Pt returned from VQ scan r/o PE, results pending  Pt is tachypneic RR 30 BPM and etiologies are investigated  NO specific complaints  Labs reviewed  Chart reviewed  Recent reemergence of diarrhea/ metabolic acidosis  now on parenteral nutrition and LR  D/W Ethel Jurado    2/19  Evaluated at bedside , d/w Dr tong and Dr Pleitez  Pt still tachypneic and tachycardic    2/20  Case d/w Dr Maik Tong and Ethel Jurado  pt overall feels better  more engaging    2/21  Pt in good spirits  Cont to be tachycardic  rate ~ 110  Tachypneic rate ~ 30    2/22  Still tachypneic  Difficulty eating due to ileus    2/24  Events noted  Received Lasix 20 mg iv yesterday w   > 6000 ml    2/25  Hypernatremic  placed on LR  d/w TPN team to reduce Na content    2/26  Pt w some apprehension and abd discomfort  + abd tenderness on palpation  Bladder scan w patches of ascites, no large bladder volume    2/27  more comfortable    2/28  More aware, comfortable  Less apprehensive    3/1  oob      PAST MEDICAL & SURGICAL HISTORY:  Primary pancreatic adenocarcinoma    MEDICATIONS  (STANDING):  acetaminophen   IVPB .. 1000 milliGRAM(s) IV Intermittent once  acetaminophen   IVPB .. 1000 milliGRAM(s) IV Intermittent once  cefepime  Injectable.      cefepime  Injectable. 2000 milliGRAM(s) IV Push every 12 hours  chlorhexidine 2% Cloths 1 Application(s) Topical <User Schedule>  cholecalciferol 2000 Unit(s) Oral at bedtime  dextrose 5%. 1000 milliLiter(s) (50 mL/Hr) IV Continuous <Continuous>  dextrose 5%. 1000 milliLiter(s) (100 mL/Hr) IV Continuous <Continuous>  dextrose 50% Injectable 25 Gram(s) IV Push once  dextrose 50% Injectable 12.5 Gram(s) IV Push once  dextrose 50% Injectable 25 Gram(s) IV Push once  famotidine Injectable 20 milliGRAM(s) IV Push daily  fluconAZOLE IVPB      fluconAZOLE IVPB 200 milliGRAM(s) IV Intermittent every 24 hours  glucagon  Injectable 1 milliGRAM(s) IntraMuscular once  insulin glargine Injectable (LANTUS) 20 Unit(s) SubCutaneous at bedtime  insulin lispro (ADMELOG) corrective regimen sliding scale   SubCutaneous every 6 hours  lactated ringers Bolus 500 milliLiter(s) IV Bolus once  lactated ringers. 1000 milliLiter(s) (100 mL/Hr) IV Continuous <Continuous>  lipid, fat emulsion (Fish Oil and Plant Based) 20% Infusion 0.6812 Gm/kG/Day (20.8 mL/Hr) IV Continuous <Continuous>  metoprolol tartrate 25 milliGRAM(s) Oral every 6 hours  pantoprazole  Injectable 40 milliGRAM(s) IV Push every 12 hours  Parenteral Nutrition - Adult 1 Each (75 mL/Hr) TPN Continuous <Continuous>  Parenteral Nutrition - Adult 1 Each (75 mL/Hr) TPN Continuous <Continuous>  sucralfate suspension 1 Gram(s) Oral four times a day  vancomycin    Solution 125 milliGRAM(s) Oral every 6 hours  zinc oxide 40% Paste 1 Application(s) Topical two times a day    MEDICATIONS  (PRN):  acetaminophen     Tablet .. 650 milliGRAM(s) Oral every 6 hours PRN Temp greater or equal to 38C (100.4F), Mild Pain (1 - 3)  aluminum hydroxide/magnesium hydroxide/simethicone Suspension 30 milliLiter(s) Oral every 4 hours PRN Dyspepsia  Biotene Dry Mouth Oral Rinse 15 milliLiter(s) Swish and Spit five times a day PRN Mouth Care  dextrose Oral Gel 15 Gram(s) Oral once PRN Blood Glucose LESS THAN 70 milliGRAM(s)/deciliter  melatonin 3 milliGRAM(s) Oral at bedtime PRN Insomnia  metoprolol tartrate Injectable 5 milliGRAM(s) IV Push every 6 hours PRN heart rate >110  ondansetron Injectable 4 milliGRAM(s) IV Push every 8 hours PRN Nausea and/or Vomiting  sodium chloride 0.65% Nasal 1 Spray(s) Both Nostrils two times a day PRN Nasal Congestion    I&O's Detail    28 Feb 2025 07:01  -  01 Mar 2025 07:00  --------------------------------------------------------  IN:    Fat Emulsion (Fish Oil &amp; Plant Based) 20% Infusion: 160 mL    TPN (Total Parenteral Nutrition): 750 mL  Total IN: 910 mL    OUT:    Indwelling Catheter - Urethral (mL): 2650 mL    Nasogastric/Oral tube (mL): 900 mL  Total OUT: 3550 mL    Total NET: -2640 mL      01 Mar 2025 07:01  -  01 Mar 2025 21:50  --------------------------------------------------------  IN:    Fat Emulsion (Fish Oil &amp; Plant Based) 20% Infusion: 78 mL    IV PiggyBack: 200 mL    TPN (Total Parenteral Nutrition): 712 mL  Total IN: 990 mL    OUT:    Indwelling Catheter - Urethral (mL): 1125 mL    Nasogastric/Oral tube (mL): 320 mL  Total OUT: 1445 mL    Total NET: -455 mL            PMH     HTN (hypertension)      HLD (hyperlipidemia)      Gout      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      GERD (gastroesophageal reflux disease)      Cyclical vomiting syndrome      CVA (cerebrovascular accident)      H/O squamous cell carcinoma excision      PHYSICAL EXAM:    Constitutional: apprehension,   Neck: No LAD, No JVD  Respiratory: CTAB  Cardiovascular: S1 and S2, RRR  Gastrointestinal: distant bowel sounds, + diffuse tenderness  Extremities: less peripheral edema  Neurological: A/O x 3, no focal deficits  Psychiatric: Normal mood, normal affect    Access: Not applicable    LABS:    03-01    144  |  113[H]  |  100[H]  ----------------------------<  167[H]  3.7   |  23  |  1.80[H]    Ca    9.1      01 Mar 2025 05:28  Phos  4.1     03-01  Mg     2.0     03-01    TPro  5.6[L]  /  Alb  2.0[L]  /  TBili  0.7  /  DBili  x   /  AST  41[H]  /  ALT  58  /  AlkPhos  339[H]  03-01 02-28    143  |  113[H]  |  94[H]  ----------------------------<  132[H]  3.8   |  24  |  1.79[H]    Ca    9.4      28 Feb 2025 12:00  Phos  4.2     02-28  Mg     1.9     02-28    TPro  6.0  /  Alb  2.4[L]  /  TBili  0.9  /  DBili  x   /  AST  35  /  ALT  42  /  AlkPhos  224[H]  02-28 02-27    149[H]  |  116[H]  |  96[H]  ----------------------------<  138[H]  3.9   |  26  |  1.87[H]    Ca    9.1      27 Feb 2025 04:05  Phos  4.0     02-27  Mg     2.0     02-27    TPro  5.4[L]  /  Alb  2.8[L]  /  TBili  0.8  /  DBili  x   /  AST  44[H]  /  ALT  45  /  AlkPhos  194[H]  02-27      147    |  115    |  96     ----------------------------<  169       26 Feb 2025 05:25  4.0     |  23     |  1.72     147    |  116    |  97     ----------------------------<  228       25 Feb 2025 17:11  3.6     |  24     |  1.68     149    |  116    |  92     ----------------------------<  227       25 Feb 2025 05:07  4.0     |  25     |  1.63     Ca    8.9        26 Feb 2025 05:25  Ca    8.7        25 Feb 2025 17:11  Ca    8.6        25 Feb 2025 05:07    Phos  2.2       26 Feb 2025 05:25  Phos  3.1       25 Feb 2025 17:11  Phos  1.7       25 Feb 2025 05:07    Mg     1.9       26 Feb 2025 05:25  Mg     1.9       25 Feb 2025 17:11  Mg     1.8       25 Feb 2025 05:07                                       9.4    26.18 )-----------( 208      ( 26 Feb 2025 12:34 )             28.1                         8.5    24.92 )-----------( 188      ( 26 Feb 2025 05:25 )             25.3                         9.3    25.69 )-----------( 205      ( 26 Feb 2025 03:22 )             28.1

## 2025-03-01 NOTE — PROGRESS NOTE ADULT - ASSESSMENT
69 year old woman with the above PMH including a recent diagnosis of pancreatic cancer on chemotherapy admitted with pseudomonas sepsis, N&V, diarrhea.  At the present time her cardiac status is stable on midodrine.  Will continue.  To be seen by Dr Arias.  Antibiotics as per ID.  Continue medications for the above GI symptomatology.  Will follow     02/18/25-Patient appears improved.  She is still showing signs of intravascular depletion and hypokalemia-needs fluids and potassium.  Cardiac status stable with stable BP.  Heart rates still variable but I suspect this will also stabilize as we treat her sepsis and fluid/electrolyte status    03/1/25-  As per above.  Patient had a set back that is GI related.  I will speak to Dr Arias regarding future plans.  There is however no evidence for bleeding.  Plan would still to place an IVC filter

## 2025-03-01 NOTE — PROGRESS NOTE ADULT - ASSESSMENT
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    Then transferred to  as per request of .  Was getting platelet transfusion and developed tachypnea hypoxia, rigors  Was diuresed and became hypotensive  Upgraded to SICU     Here with   1. Sepsis (POA)  2. Pseudomonal bacteremia suspect from UTI   3. Mucositis suspected esophageal candidiasis?   4. Pancytopenia  5. Acute hypoxic respiratory failure 2/2 pulm edema   6. MALLORY     CTA chest abd pelvis 2/19 revealing multiple small PE b/l and LBO  Started on IV heparin   Went into Afib with RVR, resolved with lopressor    Developed GI bleed on heparin gtt, started on protonix    Re - trialed on heparin infusion on 2/25 -> subsequently had maroon BMs again    Fever, rigors on 2/28, recultured, started on cefepime, vanc x 1      Plan:    Encephalopathic but not restless or agitated, continue daily reorientation    BP stable, HR 90-100s, continue metoprolol standing and PRN  TTE 2/19 shows EF 63% normal RV size and systolic function mild TR  Patient euvolemic at this time    Continue cefepime, WBC increased slightly today, will trend, temp improved, blood culture from 2/28 pending  On fluconazole for esophagitis, oral vancomycin for C. difficile prophylaxis  ID following    Persistent ileus, gastric distention on chest x-ray 2/28, NGT placed for decompression, abdomen softer today  Continue TPN    Continue Lantus and sliding scale insulin, glucose WNL    Renal function stable, urine output adequate    No chemical DVT prophylaxis due to GI bleed  IVC filter on hold for now    Prognosis guarded to poor     updated    Sepsis criteria met? - NO

## 2025-03-01 NOTE — PROGRESS NOTE ADULT - SUBJECTIVE AND OBJECTIVE BOX
INTERVAL HPI/OVERNIGHT EVENTS:  Patient S&E at bedside. 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      GERD (gastroesophageal reflux disease)      Cyclical vomiting syndrome      CVA (cerebrovascular accident)      H/O squamous cell carcinoma excision          FAMILY HISTORY:      Vital Signs Last 24 Hrs  T(C): 38.2 (03-01-25 @ 10:49), Max: 38.2 (03-01-25 @ 10:49)  T(F): 100.7 (03-01-25 @ 10:49), Max: 100.7 (03-01-25 @ 10:49)  HR: 91 (03-01-25 @ 12:00) (89 - 113)  BP: 120/94 (03-01-25 @ 12:00) (106/94 - 133/77)  BP(mean): 104 (03-01-25 @ 12:00) (86 - 104)  RR: 26 (03-01-25 @ 12:00) (15 - 38)  SpO2: 95% (03-01-25 @ 09:00) (94% - 97%)      PHYSICAL EXAM:    Constitutional: NAD  ENT: EOMI, dry lips  Respiratory: CTA b/l,   Cardiovascular: RRR, HR 80s  Gastrointestinal: soft, NTND,       MEDICATIONS  (STANDING):  acetaminophen   IVPB .. 1000 milliGRAM(s) IV Intermittent once  cefepime  Injectable.      cefepime  Injectable. 2000 milliGRAM(s) IV Push every 12 hours  chlorhexidine 2% Cloths 1 Application(s) Topical <User Schedule>  cholecalciferol 2000 Unit(s) Oral at bedtime  dextrose 5%. 1000 milliLiter(s) (50 mL/Hr) IV Continuous <Continuous>  dextrose 5%. 1000 milliLiter(s) (100 mL/Hr) IV Continuous <Continuous>  dextrose 50% Injectable 25 Gram(s) IV Push once  dextrose 50% Injectable 12.5 Gram(s) IV Push once  dextrose 50% Injectable 25 Gram(s) IV Push once  famotidine Injectable 20 milliGRAM(s) IV Push daily  fluconAZOLE IVPB      fluconAZOLE IVPB 200 milliGRAM(s) IV Intermittent every 24 hours  glucagon  Injectable 1 milliGRAM(s) IntraMuscular once  insulin glargine Injectable (LANTUS) 20 Unit(s) SubCutaneous at bedtime  insulin lispro (ADMELOG) corrective regimen sliding scale   SubCutaneous every 6 hours  lipid, fat emulsion (Fish Oil and Plant Based) 20% Infusion 0.6812 Gm/kG/Day (20.8 mL/Hr) IV Continuous <Continuous>  lipid, fat emulsion (Fish Oil and Plant Based) 20% Infusion 0.681 Gm/kG/Day (20.8 mL/Hr) IV Continuous <Continuous>  metoprolol tartrate 25 milliGRAM(s) Oral every 6 hours  pantoprazole  Injectable 40 milliGRAM(s) IV Push every 12 hours  Parenteral Nutrition - Adult 1 Each (75 mL/Hr) TPN Continuous <Continuous>  Parenteral Nutrition - Adult 1 Each (75 mL/Hr) TPN Continuous <Continuous>  sucralfate suspension 1 Gram(s) Oral four times a day  vancomycin    Solution 125 milliGRAM(s) Oral every 6 hours  zinc oxide 40% Paste 1 Application(s) Topical two times a day    MEDICATIONS  (PRN):  acetaminophen     Tablet .. 650 milliGRAM(s) Oral every 6 hours PRN Temp greater or equal to 38C (100.4F), Mild Pain (1 - 3)  aluminum hydroxide/magnesium hydroxide/simethicone Suspension 30 milliLiter(s) Oral every 4 hours PRN Dyspepsia  Biotene Dry Mouth Oral Rinse 15 milliLiter(s) Swish and Spit five times a day PRN Mouth Care  dextrose Oral Gel 15 Gram(s) Oral once PRN Blood Glucose LESS THAN 70 milliGRAM(s)/deciliter  metoprolol tartrate Injectable 5 milliGRAM(s) IV Push every 6 hours PRN heart rate >110  ondansetron Injectable 4 milliGRAM(s) IV Push every 8 hours PRN Nausea and/or Vomiting  sodium chloride 0.65% Nasal 1 Spray(s) Both Nostrils two times a day PRN Nasal Congestion    Allergies    No Known Allergies    Intolerances        LABS:                          10.8   12.44 )-----------( 193      ( 01 Mar 2025 05:28 )             32.2                           11.5   15.40 )-----------( 191      ( 28 Feb 2025 06:01 )             35.8     02-28    145  |  113[H]  |  94[H]  ----------------------------<  138[H]  4.0   |  23  |  1.80[H]    Ca    9.3      28 Feb 2025 06:01  Phos  4.2     02-28  Mg     1.9     02-28    TPro  6.3  /  Alb  2.6[L]  /  TBili  0.7  /  DBili  x   /  AST  36  /  ALT  42  /  AlkPhos  203[H]  02-28    PT/INR - ( 27 Feb 2025 15:00 )   PT: 13.5 sec;   INR: 1.18 ratio         PTT - ( 26 Feb 2025 12:34 )  PTT:91.5 sec  Urinalysis Basic - ( 28 Feb 2025 06:01 )    Color: x / Appearance: x / SG: x / pH: x  Gluc: 138 mg/dL / Ketone: x  / Bili: x / Urobili: x   Blood: x / Protein: x / Nitrite: x   Leuk Esterase: x / RBC: x / WBC x   Sq Epi: x / Non Sq Epi: x / Bacteria: x        RADIOLOGY & ADDITIONAL TESTS:  Studies reviewed.   INTERVAL HPI/OVERNIGHT EVENTS:  Patient S&E at bedside. 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      GERD (gastroesophageal reflux disease)      Cyclical vomiting syndrome      CVA (cerebrovascular accident)      H/O squamous cell carcinoma excision          FAMILY HISTORY:      Vital Signs Last 24 Hrs  T(C): 38.2 (03-01-25 @ 10:49), Max: 38.2 (03-01-25 @ 10:49)  T(F): 100.7 (03-01-25 @ 10:49), Max: 100.7 (03-01-25 @ 10:49)  HR: 91 (03-01-25 @ 12:00) (89 - 113)  BP: 120/94 (03-01-25 @ 12:00) (106/94 - 133/77)  BP(mean): 104 (03-01-25 @ 12:00) (86 - 104)  RR: 26 (03-01-25 @ 12:00) (15 - 38)  SpO2: 95% (03-01-25 @ 09:00) (94% - 97%)      PHYSICAL EXAM:    Constitutional: NAD  ENT: EOMI, dry lips  Respiratory: CTA b/l,   Cardiovascular: RRR, HR 80s  Gastrointestinal: soft, NTND,       MEDICATIONS  (STANDING):  acetaminophen   IVPB .. 1000 milliGRAM(s) IV Intermittent once  cefepime  Injectable.      cefepime  Injectable. 2000 milliGRAM(s) IV Push every 12 hours  chlorhexidine 2% Cloths 1 Application(s) Topical <User Schedule>  cholecalciferol 2000 Unit(s) Oral at bedtime  dextrose 5%. 1000 milliLiter(s) (50 mL/Hr) IV Continuous <Continuous>  dextrose 5%. 1000 milliLiter(s) (100 mL/Hr) IV Continuous <Continuous>  dextrose 50% Injectable 25 Gram(s) IV Push once  dextrose 50% Injectable 12.5 Gram(s) IV Push once  dextrose 50% Injectable 25 Gram(s) IV Push once  famotidine Injectable 20 milliGRAM(s) IV Push daily  fluconAZOLE IVPB      fluconAZOLE IVPB 200 milliGRAM(s) IV Intermittent every 24 hours  glucagon  Injectable 1 milliGRAM(s) IntraMuscular once  insulin glargine Injectable (LANTUS) 20 Unit(s) SubCutaneous at bedtime  insulin lispro (ADMELOG) corrective regimen sliding scale   SubCutaneous every 6 hours  lipid, fat emulsion (Fish Oil and Plant Based) 20% Infusion 0.6812 Gm/kG/Day (20.8 mL/Hr) IV Continuous <Continuous>  lipid, fat emulsion (Fish Oil and Plant Based) 20% Infusion 0.681 Gm/kG/Day (20.8 mL/Hr) IV Continuous <Continuous>  metoprolol tartrate 25 milliGRAM(s) Oral every 6 hours  pantoprazole  Injectable 40 milliGRAM(s) IV Push every 12 hours  Parenteral Nutrition - Adult 1 Each (75 mL/Hr) TPN Continuous <Continuous>  Parenteral Nutrition - Adult 1 Each (75 mL/Hr) TPN Continuous <Continuous>  sucralfate suspension 1 Gram(s) Oral four times a day  vancomycin    Solution 125 milliGRAM(s) Oral every 6 hours  zinc oxide 40% Paste 1 Application(s) Topical two times a day    MEDICATIONS  (PRN):  acetaminophen     Tablet .. 650 milliGRAM(s) Oral every 6 hours PRN Temp greater or equal to 38C (100.4F), Mild Pain (1 - 3)  aluminum hydroxide/magnesium hydroxide/simethicone Suspension 30 milliLiter(s) Oral every 4 hours PRN Dyspepsia  Biotene Dry Mouth Oral Rinse 15 milliLiter(s) Swish and Spit five times a day PRN Mouth Care  dextrose Oral Gel 15 Gram(s) Oral once PRN Blood Glucose LESS THAN 70 milliGRAM(s)/deciliter  metoprolol tartrate Injectable 5 milliGRAM(s) IV Push every 6 hours PRN heart rate >110  ondansetron Injectable 4 milliGRAM(s) IV Push every 8 hours PRN Nausea and/or Vomiting  sodium chloride 0.65% Nasal 1 Spray(s) Both Nostrils two times a day PRN Nasal Congestion    Allergies    No Known Allergies    Intolerances        LABS:                          10.8   12.44 )-----------( 193      ( 01 Mar 2025 05:28 )             32.2                           11.5   15.40 )-----------( 191      ( 28 Feb 2025 06:01 )             35.8     02-28    145  |  113[H]  |  94[H]  ----------------------------<  138[H]  4.0   |  23  |  1.80[H]    Ca    9.3      28 Feb 2025 06:01  Phos  4.2     02-28  Mg     1.9     02-28    TPro  6.3  /  Alb  2.6[L]  /  TBili  0.7  /  DBili  x   /  AST  36  /  ALT  42  /  AlkPhos  203[H]  02-28    PT/INR - ( 27 Feb 2025 15:00 )   PT: 13.5 sec;   INR: 1.18 ratio         PTT - ( 26 Feb 2025 12:34 )  PTT:91.5 sec  Urinalysis Basic - ( 28 Feb 2025 06:01 )    Color: x / Appearance: x / SG: x / pH: x  Gluc: 138 mg/dL / Ketone: x  / Bili: x / Urobili: x   Blood: x / Protein: x / Nitrite: x   Leuk Esterase: x / RBC: x / WBC x   Sq Epi: x / Non Sq Epi: x / Bacteria: x        RADIOLOGY & ADDITIONAL TESTS:  Studies reviewed.

## 2025-03-01 NOTE — PROGRESS NOTE ADULT - SUBJECTIVE AND OBJECTIVE BOX
Date of service: 03-01-25 @ 10:28    Lying in bed in NAD  Alert and poorly verbal  Fever is down  Urine in coelho bag is clean    ROS: no fever or chills; denies dizziness, no HA, no SOB or cough, no abdominal pain, no diarrhea or constipation; no dysuria, no legs pain, no rashes    MEDICATIONS  (STANDING):  acetaminophen   IVPB .. 1000 milliGRAM(s) IV Intermittent once  cefepime  Injectable.      cefepime  Injectable. 2000 milliGRAM(s) IV Push every 12 hours  chlorhexidine 2% Cloths 1 Application(s) Topical <User Schedule>  cholecalciferol 2000 Unit(s) Oral at bedtime  dextrose 5%. 1000 milliLiter(s) (100 mL/Hr) IV Continuous <Continuous>  dextrose 5%. 1000 milliLiter(s) (50 mL/Hr) IV Continuous <Continuous>  dextrose 50% Injectable 25 Gram(s) IV Push once  dextrose 50% Injectable 12.5 Gram(s) IV Push once  dextrose 50% Injectable 25 Gram(s) IV Push once  famotidine Injectable 20 milliGRAM(s) IV Push daily  fluconAZOLE IVPB      fluconAZOLE IVPB 200 milliGRAM(s) IV Intermittent every 24 hours  glucagon  Injectable 1 milliGRAM(s) IntraMuscular once  insulin glargine Injectable (LANTUS) 20 Unit(s) SubCutaneous at bedtime  insulin lispro (ADMELOG) corrective regimen sliding scale   SubCutaneous every 6 hours  lipid, fat emulsion (Fish Oil and Plant Based) 20% Infusion 0.6812 Gm/kG/Day (20.8 mL/Hr) IV Continuous <Continuous>  metoprolol tartrate 25 milliGRAM(s) Oral every 6 hours  pantoprazole  Injectable 40 milliGRAM(s) IV Push every 12 hours  Parenteral Nutrition - Adult 1 Each (75 mL/Hr) TPN Continuous <Continuous>  sucralfate suspension 1 Gram(s) Oral four times a day  vancomycin    Solution 125 milliGRAM(s) Oral every 6 hours  zinc oxide 40% Paste 1 Application(s) Topical two times a day    Vital Signs Last 24 Hrs  T(C): 36.6 (01 Mar 2025 06:00), Max: 38.4 (28 Feb 2025 11:07)  T(F): 97.8 (01 Mar 2025 06:00), Max: 101.1 (28 Feb 2025 11:07)  HR: 100 (01 Mar 2025 09:00) (100 - 143)  BP: 131/88 (01 Mar 2025 09:00) (116/76 - 169/105)  BP(mean): 101 (01 Mar 2025 09:00) (86 - 118)  RR: 34 (01 Mar 2025 09:00) (18 - 42)  SpO2: 95% (01 Mar 2025 09:00) (79% - 97%)    Parameters below as of 01 Mar 2025 08:00  Patient On (Oxygen Delivery Method): nasal cannula w/ humidification  O2 Flow (L/min): 3     Physical exam:    Constitutional:  No acute distress  HEENT: NC/AT, EOMI, PERRLA, conjunctivae clear; ears and nose atraumatic; pharynx benign  Neck: supple; thyroid not palpable  Back: no tenderness  Respiratory: respiratory effort normal; decreased BS at bases  Cardiovascular: S1S2 regular, no murmurs  Abdomen: soft, mild periombilical tender, not distended, positive BS; no liver or spleen organomegaly  Genitourinary: no suprapubic tenderness  Lymphatic: no LN palpable  Musculoskeletal: no muscle tenderness, no joint swelling or tenderness  Extremities: no pedal edema  Neurological/ Psychiatric: AxOx3, judgement and insight normal; moving all extremities  Skin: no rashes; right lower chest wall dry, scabbed ulcer; no discharge     Labs: reviewed                        11.5   15.40 )-----------( 191      ( 28 Feb 2025 06:01 )             35.8     02-28    145  |  113[H]  |  94[H]  ----------------------------<  138[H]  4.0   |  23  |  1.80[H]    Ca    9.3      28 Feb 2025 06:01  Phos  4.2     02-28  Mg     1.9     02-28    TPro  6.3  /  Alb  2.6[L]  /  TBili  0.7  /  DBili  x   /  AST  36  /  ALT  42  /  AlkPhos  203[H]  02-28                        10.2   18.89 )-----------( 181      ( 27 Feb 2025 04:05 )             30.2     02-27    149[H]  |  116[H]  |  96[H]  ----------------------------<  138[H]  3.9   |  26  |  1.87[H]    Ca    9.1      27 Feb 2025 04:05  Phos  4.0     02-27  Mg     2.0     02-27    TPro  5.4[L]  /  Alb  2.8[L]  /  TBili  0.8  /  DBili  x   /  AST  44[H]  /  ALT  45  /  AlkPhos  194[H]  02-27                        10.5   35.65 )-----------( 132      ( 21 Feb 2025 05:18 )             31.0     02-21    141  |  105  |  66[H]  ----------------------------<  358[H]  3.2[L]   |  26  |  1.33[H]    Ca    8.2[L]      21 Feb 2025 05:18  Phos  3.6     02-21  Mg     2.0     02-21    TPro  5.3[L]  /  Alb  2.1[L]  /  TBili  0.6  /  DBili  x   /  AST  56[H]  /  ALT  33  /  AlkPhos  329[H]  02-21    C-Reactive Protein: 209.0 mg/mL (02-15-25 @ 05:17)  C-Reactive Protein: 233.0 mg/mL (02-14-25 @ 06:50)  Ferritin: 1798 ng/mL (02-13-25 @ 18:46)                        8.7    0.19  )-----------( 33       ( 13 Feb 2025 06:52 )             25.7     02-13    137  |  109[H]  |  20  ----------------------------<  117[H]  2.6[LL]   |  18[L]  |  1.02    Ca    8.7      13 Feb 2025 06:52  Phos  2.3     02-13  Mg     1.6     02-13    TPro  4.4[L]  /  Alb  1.1[L]  /  TBili  0.7  /  DBili  x   /  AST  11[L]  /  ALT  16  /  AlkPhos  94  02-13     LIVER FUNCTIONS - ( 13 Feb 2025 06:52 )  Alb: 1.1 g/dL / Pro: 4.4 gm/dL / ALK PHOS: 94 U/L / ALT: 16 U/L / AST: 11 U/L / GGT: x           Culture - Blood (collected 11 Feb 2025 09:00)  Source: .Blood BLOOD  Preliminary Report (12 Feb 2025 13:01):    No growth at 24 hours    Culture - Blood (collected 11 Feb 2025 08:50)  Source: .Blood BLOOD  Preliminary Report (12 Feb 2025 13:01):    No growth at 24 hours    Urinalysis with Rflx Culture (collected 09 Feb 2025 01:25)    Culture - Urine (collected 09 Feb 2025 01:25)  Source: Clean Catch  Final Report (11 Feb 2025 08:39):    50,000 - 99,000 CFU/mL Pseudomonas aeruginosa  Organism: Pseudomonas aeruginosa (11 Feb 2025 08:39)  Organism: Pseudomonas aeruginosa (11 Feb 2025 08:39)      Method Type: REJI      -  Amikacin: S <=16      -  Aztreonam: S <=4      -  Cefepime: S <=2      -  Ceftazidime: S <=1      -  Ciprofloxacin: S <=0.25      -  Imipenem: S 2      -  Levofloxacin: S <=0.5      -  Meropenem: S <=1      -  Piperacillin/Tazobactam: S <=8    Culture - Blood (collected 08 Feb 2025 22:00)  Source: .Blood BLOOD  Gram Stain (09 Feb 2025 19:04):    Growth in aerobic bottle: Gram Negative Rods  Final Report (11 Feb 2025 07:53):    Growth in aerobic bottle: Pseudomonas aeruginosa    Direct identification is available within approximately 3-5    hours either by Blood Panel Multiplexed PCR or Direct    MALDI-TOF. Details: [email or web address]  Organism: Blood Culture PCR  Pseudomonas aeruginosa (11 Feb 2025 07:53)  Organism: Pseudomonas aeruginosa (11 Feb 2025 07:53)      Method Type: REJI      -  Aztreonam: S <=4      -  Cefepime: S <=2      -  Ceftazidime: S <=1      -  Ciprofloxacin: S <=0.25      -  Imipenem: S <=1      -  Levofloxacin: S <=0.5      -  Meropenem: S <=1      -  Piperacillin/Tazobactam: S <=8  Organism: Blood Culture PCR (11 Feb 2025 07:53)      Method Type: PCR      -  Pseudomonas aeruginosa: Detec    Culture - Blood (collected 08 Feb 2025 21:55)  Source: .Blood BLOOD  Gram Stain (09 Feb 2025 19:32):    Growth in aerobic bottle: Gram Negative Rods  Final Report (11 Feb 2025 07:54):    Growth in aerobic bottle: Pseudomonas aeruginosa    See previous culture [id number]    Radiology: all available radiological tests reviewed    < from: US Abdomen Upper Quadrant Right (02.09.25 @ 14:55) >  Distended gallbladder with layering sludge.  8 mm right renal calculus without hydronephrosis.  Hepatomegaly.  Right renal cyst.  < end of copied text >    < from: CT Abdomen and Pelvis No Cont (02.09.25 @ 03:01) >  1.   Study somewhat limited due to absence of contrast.  2.   Axial images 71-76 of series 301 and concomitant coronal images demonstrate prominent soft tissue inseparable from the body of the pancreas consistent with pancreatic neoplasm.  3.   Some distension of gallbladder could be further evaluated with right upper quadrant sonography. No definite biliary dilatation.  < end of copied text >    Advanced directives addressed: full resuscitation

## 2025-03-01 NOTE — PROGRESS NOTE ADULT - ASSESSMENT
69-year-old female with h/o recent diagnosis of primary head of pancreatic adenocarcinoma with tumor in umbilicus, chemo-induced pancytopenia, SCC lip, cyclical vomiting syndrome, HTN, HPL, Gout, GERD, CVA on Aggrenox was transferred on 2/12 from [facility] for further care. She was admitted to [facility] with septic shock on 02/09/2025, nausea, vomiting and diarrhea requiring Levophed drip. Patient was being followed by GI, ID, Cardiology and Onc there. Patient has been transferred to  on patient's  (GI Dr. Pleitez) request. She was reported with non-bloody diarrhea x 3 in last 24 hours PTA. She denied abdominal pain, nausea, but has dry heaves. At [place] she was started on Vancomycin and Zosyn, then changed to Cefepime as her blood culture and urine cultures showed Pseudomonas aeruginosa.    #New febrile syndrome with chills. Possible sepsis.  #Dysphagia. Possible candida esophagitis  #S/p sepsis with PSAE   #Head of pancreas adenocarcinoma on chemotherapy  #Leukocytosis  #Immunocompromised host  #Kidney stones  #ARF   -leukocytosis is improving  -renal function is frail but improving  -dysphagia with poor PO intake  -cultures reviewed  -obtain BC x 2 and UA STAT  -s/p cefepime 2 gm IV q8h # 3  -s/p meropenem 1 gm IV q8h # 9   -on fluconazole 200 mg IV qd # 13  -on cefepime 1 gm IV q12h # 2  -on vancomycin 125 mg PO q6h for CDAD prophylaxis  -tolerating abx well so far; no side effects noted  -GI evaluation appreciated   -repeat BC x 2 noted  -monitor abdomen  -continue antimicrobial coverage  -f/u cultures  -monitor temps  -f/u CBC and BNP  -supportive care  2. Other issues:   -care per medicine    d/w medicine team

## 2025-03-01 NOTE — PROGRESS NOTE ADULT - ASSESSMENT
69-year-old female recent diagnosis of primary pancreatic adenocarcinoma , pancytopenia and significant PMH of SCC lip, cyclical vomiting syndrome, HTN, HPL, Gout, GERD, CVA w renal evaluation of MALLORY and hypokalemia. Per documents was recently at  [facility] with septic shock on 02/09/2025, nausea, vomiting and diarrhea requiring Levophed drip. Per documents, patient  transferred to  for continued care. Patient with + diuresis w lasix 80 IV, increaserd UOP > 4L.   Treated w IV and po k repletion.     MALLORY  -LIkely pre renal w insensible losses and diuresis, signficant UOP  -IVF to maintain even to positive today  -Trend labs, lytes. Repeat this afternoon  -Monitor UOP closely  -Avoid nsaids/contrast  -Renally dosed abx    Hypokalemia  -K repletion iv/po  -TPN    Sepsis/Pseduomonas  -ID/abx  -FU cultures    d/c with RN staff, Dr Tong, Dr Henry, Dr Maik Sweet to resume care in AM    2/18  MALLORY resolving  Azotemia, responded to IVF resuscitation  Urine output improved  hypoalbuminemia- on spa q 8 h for oncotic pressure and glomerular filtration  NAGMA- hyperchloremia may be due to diarrhea and GI losses  On Meropenem- very rarely associated w Fanconi syndrome  - will check urinary PO4, serum uric acid, repeat PO4 level, low this am  ABG  Lactate  CBC  VQ results pending  d/w Dr YOUSUF Pleitez    Addendum  Repeat labs reviewed d/w intensivist  Lactate 2.7  ABG c/w primary respiratory alkalosis with metabolic acidosis and NAGMA    2/19  Pancreatic Adenoca  MALLORY prerenal, correcting  Primary Hyperventilation, metab acidosis and non anion acidosis  VQ indeterminate  on 2/18 for PE  For CTA today, low risk MAXIMO due to adequate hydration status, UO 3600 ml / last 24 hrs  D/W Dr Tong and Dr Pleitez    2/20  Pancreatic AdenoCa  MALLORY slowly correcting  Bilateral pulm emboli  Creat stable post IV Contrast but will monitor for 48 hrs  Abd distension less  hypokalemia being replaced    2/21  Pancreatic AdenoCa  MALLORY slowly correcting, s/p IV Contrast  Bilateral pulm emboli on IV heparin  Creat stable post IV Contrast but will monitor for 48 hrs  Abd distension improved but still diminished BS  hypokalemia being replaced by ICU team    2/22  Pancreatic AdenoCa with METS  MALLORY slowly correcting, s/p IV Contrast  Bilateral pulm emboli on IV heparin, small bowel and large bowel distention  Creat stable post IV Contras  Abd distension improved but still diminished BS  hypokalemia being replaced by ICU team- need to keep   potassium in normal levels as it may contribute to ileus  LR dcd   CO2 improved    2/24  Pancreatic AdenoCa with METS  MALLORY slowly correcting,  mild elevation due to diuretics  Bilateral pulm emboli IV heparin held due to anemia  CT small bowel and large bowel distention  Creat stable post IV Contrast  Abd distension improved but still diminished BS  hypokalemia being replaced  CO2 improved    2/25  Pancreatic Ca, Bilateral pulm emboli  Hypernatremia  Receiving LR/ d/w TPN team to reduce Na in TPN  Repeat labs in 4 hrs  MALLORY/ ATN     2/26  MALLORY/ ATN  Pancreatic Ca/ METS  Hypernatremia improving  More apparent abd tendereness today w pt apprehension/ discomfort   Above noted  Will follow    2/27  Pancreatic CA/ Mets  Hypernatremia MALLORY  Most likely volume depletion  Agree w 1/2 NS infusion  Chronic elevation creat 1.7 range, slowly climbing  - most likely prerenal    2/28  Pancreatic CA/ Mets  Hypernatremia MALLORY  Most likely volume depletion  on parenteral nutrition, Na levels correcting  Creat improving excellent UO    3/1  MALLORY/ATN  Mild hypernatremia  Cont parenteral nutrition  Reviewed I/O  Output is > input, will monitor for now  Question of fluid overload remains,

## 2025-03-01 NOTE — PROGRESS NOTE ADULT - SUBJECTIVE AND OBJECTIVE BOX
Patient is a 69y old  Female who presents with a chief complaint of sepsis (01 Mar 2025 10:28)    Allergies    No Known Allergies    Intolerances      REVIEW OF SYSTEMS: SEE BELOW       ICU Vital Signs Last 24 Hrs  T(C): 38.2 (01 Mar 2025 10:49), Max: 38.2 (01 Mar 2025 10:49)  T(F): 100.7 (01 Mar 2025 10:49), Max: 100.7 (01 Mar 2025 10:49)  HR: 89 (01 Mar 2025 11:00) (89 - 137)  BP: 106/94 (01 Mar 2025 11:00) (106/94 - 133/77)  BP(mean): 100 (01 Mar 2025 11:00) (86 - 101)  ABP: --  ABP(mean): --  RR: 27 (01 Mar 2025 11:00) (15 - 38)  SpO2: 95% (01 Mar 2025 09:00) (94% - 97%)    O2 Parameters below as of 01 Mar 2025 08:00  Patient On (Oxygen Delivery Method): nasal cannula w/ humidification  O2 Flow (L/min): 3          CAPILLARY BLOOD GLUCOSE      POCT Blood Glucose.: 178 mg/dL (01 Mar 2025 05:57)  POCT Blood Glucose.: 152 mg/dL (28 Feb 2025 22:47)  POCT Blood Glucose.: 169 mg/dL (28 Feb 2025 18:27)  POCT Blood Glucose.: 144 mg/dL (28 Feb 2025 12:59)      I&O's Summary    28 Feb 2025 07:01  -  01 Mar 2025 07:00  --------------------------------------------------------  IN: 910 mL / OUT: 3550 mL / NET: -2640 mL            MEDICATIONS  (STANDING):  acetaminophen   IVPB .. 1000 milliGRAM(s) IV Intermittent once  cefepime  Injectable.      cefepime  Injectable. 2000 milliGRAM(s) IV Push every 12 hours  chlorhexidine 2% Cloths 1 Application(s) Topical <User Schedule>  cholecalciferol 2000 Unit(s) Oral at bedtime  dextrose 5%. 1000 milliLiter(s) (100 mL/Hr) IV Continuous <Continuous>  dextrose 5%. 1000 milliLiter(s) (50 mL/Hr) IV Continuous <Continuous>  dextrose 50% Injectable 25 Gram(s) IV Push once  dextrose 50% Injectable 12.5 Gram(s) IV Push once  dextrose 50% Injectable 25 Gram(s) IV Push once  famotidine Injectable 20 milliGRAM(s) IV Push daily  fluconAZOLE IVPB      fluconAZOLE IVPB 200 milliGRAM(s) IV Intermittent every 24 hours  glucagon  Injectable 1 milliGRAM(s) IntraMuscular once  insulin glargine Injectable (LANTUS) 20 Unit(s) SubCutaneous at bedtime  insulin lispro (ADMELOG) corrective regimen sliding scale   SubCutaneous every 6 hours  lipid, fat emulsion (Fish Oil and Plant Based) 20% Infusion 0.6812 Gm/kG/Day (20.8 mL/Hr) IV Continuous <Continuous>  lipid, fat emulsion (Fish Oil and Plant Based) 20% Infusion 0.681 Gm/kG/Day (20.8 mL/Hr) IV Continuous <Continuous>  metoprolol tartrate 25 milliGRAM(s) Oral every 6 hours  pantoprazole  Injectable 40 milliGRAM(s) IV Push every 12 hours  Parenteral Nutrition - Adult 1 Each (75 mL/Hr) TPN Continuous <Continuous>  Parenteral Nutrition - Adult 1 Each (75 mL/Hr) TPN Continuous <Continuous>  sucralfate suspension 1 Gram(s) Oral four times a day  vancomycin    Solution 125 milliGRAM(s) Oral every 6 hours  zinc oxide 40% Paste 1 Application(s) Topical two times a day      MEDICATIONS  (PRN):  acetaminophen     Tablet .. 650 milliGRAM(s) Oral every 6 hours PRN Temp greater or equal to 38C (100.4F), Mild Pain (1 - 3)  aluminum hydroxide/magnesium hydroxide/simethicone Suspension 30 milliLiter(s) Oral every 4 hours PRN Dyspepsia  Biotene Dry Mouth Oral Rinse 15 milliLiter(s) Swish and Spit five times a day PRN Mouth Care  dextrose Oral Gel 15 Gram(s) Oral once PRN Blood Glucose LESS THAN 70 milliGRAM(s)/deciliter  melatonin 3 milliGRAM(s) Oral at bedtime PRN Insomnia  metoprolol tartrate Injectable 5 milliGRAM(s) IV Push every 6 hours PRN heart rate >110  ondansetron Injectable 4 milliGRAM(s) IV Push every 8 hours PRN Nausea and/or Vomiting  sodium chloride 0.65% Nasal 1 Spray(s) Both Nostrils two times a day PRN Nasal Congestion      PHYSICAL EXAM: SEE BELOW                          10.8   12.44 )-----------( 193      ( 01 Mar 2025 05:28 )             32.2       03-01    144  |  113[H]  |  100[H]  ----------------------------<  167[H]  3.7   |  23  |  1.80[H]    Ca    9.1      01 Mar 2025 05:28  Phos  4.1     03-01  Mg     2.0     03-01    TPro  5.6[L]  /  Alb  2.0[L]  /  TBili  0.7  /  DBili  x   /  AST  41[H]  /  ALT  58  /  AlkPhos  339[H]  03-01    Lactate 1.8           02-28 @ 17:24    Lactate 2.2           02-28 @ 12:00          PT/INR - ( 27 Feb 2025 15:00 )   PT: 13.5 sec;   INR: 1.18 ratio           Urinalysis Basic - ( 01 Mar 2025 05:28 )    Color: x / Appearance: x / SG: x / pH: x  Gluc: 167 mg/dL / Ketone: x  / Bili: x / Urobili: x   Blood: x / Protein: x / Nitrite: x   Leuk Esterase: x / RBC: x / WBC x   Sq Epi: x / Non Sq Epi: x / Bacteria: x

## 2025-03-01 NOTE — PROGRESS NOTE ADULT - SUBJECTIVE AND OBJECTIVE BOX
CHIEF COMPLAINT: Patient is a 69y old  Female who presents with a chief complaint of     HPI: HPI:   History of Present Illness:    HPI: Patient is a 69-year-old female recent diagnosis of primary pancreatic adenocarcinoma of head with tumor in umbilicus, chemo-induced pancytopenia and significant PMH of SCC lip, cyclical vomiting syndrome, HTN, HPL, Gout, GERD, CVA on Aggrenox and others had been admitted to [facility] with septic shock on 02/09/2025, nausea, vomiting and diarrhea requiring Levophed drip, was transferred out of ICU to regular floor last night.  Patient was being followed by GI, ID, Cardiology and Onc there.  Patient has been transferred to  today on patient's  (GI Dr. Pleitez) request.  At this time, patient c/o non-bloody diarrhea x 3 in last 24 hours.  She denies any abdominal pain, nausea, but has some dry heaves.  She denies any fever, chills, chest pain, palpitation, constipation or dysuria. Initially, she was started on Vancomycin and Zosyn, but now she has been on Cefepime as her blood culture and urine cultures are positive for Pseudomonas aeruginosa. Currently, patient is on Cefepime.  ID had also recommended Flagyl, but patient had refused it, as it causes nausea to her.    Sig labs: WBC 0.45k, N 82%, Hb 7.8, Platelets 59k, Bicarb 19, AG 9, , Cr 0.99, TP 4.6, Alb 1.2, LFTs wnl.  Mg 1.9, PO4 2.4.  Prior C.diff negative.     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  Her antibiotics were changes, electrolytes repleted and was given 3 units of blood.  She was on bipap overnight but is now comfortable on nasal O2 with good sats.  3/1/25-After I saw the patient in the morning at which point she was doing well she developed a fever and an apparent ileus and now has an NGT draining bilious fluid.  When this happened her HR increased which is now back down.  ICV filter is currently on hold            ICU Vital Signs Last 24 Hrs  T(C): 36.6 (01 Mar 2025 06:00), Max: 38.4 (28 Feb 2025 11:07)  T(F): 97.8 (01 Mar 2025 06:00), Max: 101.1 (28 Feb 2025 11:07)  HR: 104 (01 Mar 2025 08:00) (83 - 143)  BP: 123/81 (01 Mar 2025 08:00) (116/76 - 169/105)  BP(mean): 94 (01 Mar 2025 08:00) (86 - 118)  ABP: --  ABP(mean): --  RR: 35 (01 Mar 2025 08:00) (18 - 42)  SpO2: 94% (01 Mar 2025 06:00) (79% - 97%)    O2 Parameters below as of 01 Mar 2025 08:00  Patient On (Oxygen Delivery Method): nasal cannula w/ humidification  O2 Flow (L/min): 3          12 Feb 2025 07:01  -  13 Feb 2025 07:00  --------------------------------------------------------  IN: 0 mL / OUT: 900 mL / NET: -900 mL          PHYSICAL EXAM:   Patient in NAD  Neck: No JVD; Carotids:  2+ without bruits  Respiratory:  Clear to A&P  Cardiovascular: S1 and S2, regular rate and rhythm, no Murmurs, gallops or rubs  Abd:  BS positive      MEDICATIONS  (STANDING):  acetaminophen   IVPB .. 1000 milliGRAM(s) IV Intermittent once  cefepime  Injectable.      cefepime  Injectable. 2000 milliGRAM(s) IV Push every 12 hours  chlorhexidine 2% Cloths 1 Application(s) Topical <User Schedule>  cholecalciferol 2000 Unit(s) Oral at bedtime  dextrose 5%. 1000 milliLiter(s) (100 mL/Hr) IV Continuous <Continuous>  dextrose 5%. 1000 milliLiter(s) (50 mL/Hr) IV Continuous <Continuous>  dextrose 50% Injectable 25 Gram(s) IV Push once  dextrose 50% Injectable 12.5 Gram(s) IV Push once  dextrose 50% Injectable 25 Gram(s) IV Push once  famotidine Injectable 20 milliGRAM(s) IV Push daily  fluconAZOLE IVPB      fluconAZOLE IVPB 200 milliGRAM(s) IV Intermittent every 24 hours  glucagon  Injectable 1 milliGRAM(s) IntraMuscular once  insulin glargine Injectable (LANTUS) 20 Unit(s) SubCutaneous at bedtime  insulin lispro (ADMELOG) corrective regimen sliding scale   SubCutaneous every 6 hours  lipid, fat emulsion (Fish Oil and Plant Based) 20% Infusion 0.6812 Gm/kG/Day (20.8 mL/Hr) IV Continuous <Continuous>  metoprolol tartrate 25 milliGRAM(s) Oral every 6 hours  pantoprazole  Injectable 40 milliGRAM(s) IV Push every 12 hours  Parenteral Nutrition - Adult 1 Each (75 mL/Hr) TPN Continuous <Continuous>  sucralfate suspension 1 Gram(s) Oral four times a day  vancomycin    Solution 125 milliGRAM(s) Oral every 6 hours  zinc oxide 40% Paste 1 Application(s) Topical two times a day        WBC Count: 0.45 K/uL (02-12 @ 09:15)     Hemoglobin: 7.8 g/dL (02-12 @ 09:15)  Hemoglobin: 8.9 g/dL (02-11 @ 08:45)  Hemoglobin: 9.6 g/dL (02-10 @ 08:46)  Hemoglobin: 10.1 g/dL (02-09 @ 05:41)  Hemoglobin: 8.9 g/dL (02-08 @ 23:27)              Hematocrit: 23.6 % (02-12 @ 09:15)  Hematocrit: 26.7 % (02-11 @ 08:45)  Hematocrit: 29.6 % (02-10 @ 08:46)  Hematocrit: 31.5 % (02-09 @ 05:41)  Hematocrit: 28.4 % (02-08 @ 23:27)              Mean Cell Volume: 90.4 fl (02-12 @ 09:15)  Mean Cell Volume: 89.9 fl (02-11 @ 08:45)  Mean Cell Volume: 91.6 fl (02-10 @ 08:46)  Mean Cell Volume: 92.6 fl (02-09 @ 05:41)  Mean Cell Volume: 93.7 fl (02-08 @ 23:27)              Platelet Count - Automated: 59 K/uL (02-12 @ 09:15)  Platelet Count - Automated: 13 K/uL (02-11 @ 08:45)  Platelet Count - Automated: 25 K/uL (02-10 @ 08:46)  Platelet Count - Automated: 60 K/uL (02-09 @ 05:41)  Platelet Count - Automated: 64 K/uL (02-08 @ 23:27)                        10.1   1.30  )-----------( 37       ( 17 Feb 2025 05:59 )             28.5                         10.8   12.44 )-----------( 193      ( 01 Mar 2025 05:28 )             32.2                                           [place]        Sodium: 140 mmol/L (02-12 @ 09:15)  Sodium: 140 mmol/L (02-11 @ 06:39)  Sodium: 141 mmol/L (02-10 @ 08:46)  Sodium: 146 mmol/L (02-09 @ 05:41)  Sodium: 144 mmol/L (02-08 @ 23:27)          Potassium: 3.5 mmol/L (02-12 @ 09:15)  Potassium: 3.4 mmol/L (02-11 @ 06:39)  Potassium: 3.3 mmol/L (02-10 @ 08:46)  Potassium: 4.3 mmol/L (02-09 @ 05:41)  Potassium: 4.2 mmol/L (02-08 @ 23:27)          Blood Urea Nitrogen: 22 mg/dL (02-12 @ 09:15)  Blood Urea Nitrogen: 23 mg/dL (02-11 @ 06:39)  Blood Urea Nitrogen: 28 mg/dL (02-10 @ 08:46)  Blood Urea Nitrogen: 29 mg/dL (02-09 @ 05:41)  Blood Urea Nitrogen: 32 mg/dL (02-08 @ 23:27)          Creatinine 0.99  Creatinine 1.10  Creatinine 1.40  Creatinine 1.40  Creatinine 1.50          Magnesium: 1.9 mg/dL (02-12 @ 09:15)  Magnesium: 1.8 mg/dL (02-11 @ 06:39)  Magnesium: 2.1 mg/dL (02-10 @ 08:46)  Magnesium: 2.1 mg/dL (02-09 @ 05:41)          Protein Total: 4.6 g/dL (02-12 @ 09:15)  Protein Total: 4.6 g/dL (02-11 @ 06:39)  Protein Total: 5.0 g/dL (02-10 @ 08:46)  Protein Total: 5.0 g/dL (02-09 @ 05:41)  Protein Total: 4.5 g/dL (02-08 @ 23:27)                  Calcium: 8.9 mg/dL (02-12 @ 09:15)  Calcium: 8.7 mg/dL (02-11 @ 06:39)  Calcium: 9.1 mg/dL (02-10 @ 08:46)  Calcium: 9.2 mg/dL (02-09 @ 05:41)  Calcium: 8.5 mg/dL (02-08 @ 23:27)          Phosphorus: 2.4 mg/dL (02-12 @ 09:15)  Phosphorus: 2.3 mg/dL (02-11 @ 06:39)  Phosphorus: 2.9 mg/dL (02-10 @ 08:46)  Phosphorus: 2.9 mg/dL (02-09 @ 05:41)          Bilirubin Total: 0.7 mg/dL (02-12 @ 09:15)  Bilirubin Total: 0.2 mg/dL (02-11 @ 06:39)  Bilirubin Total: 0.5 mg/dL (02-10 @ 08:46)  Bilirubin Total: 0.4 mg/dL (02-09 @ 05:41)  Bilirubin Total: 0.3 mg/dL (02-08 @ 23:27)          Alanine Aminotransferase (ALT/SGPT): 17 U/L (02-12 @ 09:15)  Alanine Aminotransferase (ALT/SGPT): 18 U/L (02-11 @ 06:39)  Alanine Aminotransferase (ALT/SGPT): 25 U/L (02-10 @ 08:46)  Alanine Aminotransferase (ALT/SGPT): 33 U/L (02-09 @ 05:41)  Alanine Aminotransferase (ALT/SGPT): 33 U/L (02-08 @ 23:27)          Aspartate Aminotransferase (AST/SGOT): 8 U/L (02-12 @ 09:15)  Aspartate Aminotransferase (AST/SGOT): 13 U/L (02-11 @ 06:39)  Aspartate Aminotransferase (AST/SGOT): 17 U/L (02-10 @ 08:46)  Aspartate Aminotransferase (AST/SGOT): 23 U/L (02-09 @ 05:41)  Aspartate Aminotransferase (AST/SGOT): 27 U/L (02-08 @ 23:27)    02-17    140  |  110[H]  |  70[H]  ----------------------------<  227[H]  2.3[LL]   |  17[L]  |  1.73[H]    Ca    8.4[L]      17 Feb 2025 05:59  Phos  3.2     02-17  Mg     1.5     02-17    TPro  5.2[L]  /  Alb  2.7[L]  /  TBili  0.8  /  DBili  x   /  AST  15  /  ALT  19  /  AlkPhos  101  02-17 03-01    144  |  113[H]  |  100[H]  ----------------------------<  167[H]  3.7   |  23  |  1.80[H]    Ca    9.1      01 Mar 2025 05:28  Phos  4.1     03-01  Mg     2.0     03-01    TPro  5.6[L]  /  Alb  2.0[L]  /  TBili  0.7  /  DBili  x   /  AST  41[H]  /  ALT  58  /  AlkPhos  339[H]  03-01                 RADIOLOGY:< from: CT Abdomen and Pelvis No Cont (02.09.25 @ 03:01) >  IMPRESSION:  1.   Study somewhat limited due to absence of contrast.  2.   Central venous catheter on right side with tip in cavoatrial   junction.  3.   Bibasilar atelectasis/scarring right-greater-than-left; can not   exclude  trace pleural effusions. No areas of consolidation. No pneumothorax.  4.   Lucency/deformity is noted involving the posterior aspect of the   left 10 ,  possibly 9th ribs; minimal fractures of indeterminate age in these areas   can  not be excluded.    < end of copied text >  < from: CT Abdomen and Pelvis No Cont (02.09.25 @ 03:01) >  IMPRESSION:  1.   Study somewhat limited due to absence of contrast.  2.   Axial images 71-76 of series 301 and  concomitant coronal images demonstrate prominent soft tissue inseparable   from  the body of the pancreas consistent with pancreatic neoplasm.  3.   Some distension of gallbladder could be further evaluated with right   upper  quadrant sonography. No definite biliary dilatation.  4.   Other incidental findings as above.    < end of copied text >      EKG:  < from: 12 Lead ECG (02.09.25 @ 16:52) >  Diagnosis Line *** Poor data quality, interpretation may be adversely affected  Sinus tachycardia with premature atrial complexes  Left axis deviation  Low voltage QRS  Inferior infarct (cited on or before 08-FEB-2025)  Anterolateral infarct (cited on or before 08-FEB-2025)  Abnormal ECG  When compared with ECG of 09-FEB-2025 16:51, (Unconfirmed)  premature atrial complexes are now present  Confirmed by HAYLEY LANTIGUA ([id number]) on 2/10/2025 10:25:52 PM    < end of copied text >              ECHO:  My reading-Normal LV systolic function; trace MR/mild MAC; mild aortic sclerosis; no significant pulmonary hypertension; trace pericardial effusion    Telem:  Sinus; rate now 100

## 2025-03-01 NOTE — PROGRESS NOTE ADULT - ASSESSMENT
69-year-old female with stage IV pancreatic adenocarcinoma presenting with neutropenic fever and severe treatment-related toxicities from modified FOLFIRINOX and investigational EMILY inhibitor [id number] found to have PE and LBO.     # pancreatic ca   - remains on clinical trial- supportive care while in hospital   - CTH negative and discussed w/ pt   - will continue with ongoing communication with  [facility]- [place] [facility] [place] with UGT1A1 mutation which makes her more susceptible to toxicities from 5FU and irinotecan which may have contributed to symptoms on arrival   - Review clinical trial protocol - may remain on trial drug alone and changing regimen- to be discussed when recovers from underlying illness and admission     # Neutropenic Septic shock   - pt previously on neupogen- WBC has now recovered and elevated likely 2/2 GCSF support   - s/p meropenem started on 2/15 - remains on  fluconazole and po vanco    # PE   - v/q scan w indeterminate prob of PE, linear defect in RUL posterior segment   - CT chest- multiple PE w/in segmental left lower and right lower, 2 right middle lobe nodules,   -  Pt restarted on hep gtt due- unfortunately had episodes of maroon stools - no further bleeding on bms last night- reports brown stool as per nurse   -plan for placement of IVC filter  - Today, WBC 15.4, Hb 11.5, plt 191 and stable     # anemia   - received 4u to date and 1u ffp- Hb 11.5 today   - Protonix IV   - GI following   - keep type and screen active, transfuse for Hb <7    # large bowel obstruction  - CT a/p Large bowel obstruction with transition point at the distal transverse colon. There is also narrowing at the level of the terminal ileum. Diffuse ascites. Redemonstrated pancreatic lesion, consistent with history of pancreatic adenocarcinoma. There is an umbilical lesion with adjacent peritoneal implants.  - previously was having diarrhea  - GI following and reviewed imaging with radiology   - 2/22/25  repeat CT a/p with mild interval improvements in small and large bowel ileus. pt given lasix and albumin.     # EMPIRIC TREATMENT FOR ESOPHAGEAL CANDIDIASIS   - continue with DIFLUCAN       [place] follow and [place] [facility] [place]      69-year-old female with stage IV pancreatic adenocarcinoma presenting with neutropenic fever and severe treatment-related toxicities from modified FOLFIRINOX and investigational EMILY inhibitor [id number] found to have PE and LBO.     # pancreatic ca   - remains on clinical trial- supportive care while in hospital   - CTH negative and discussed w/ pt   - [place]  [facility] [place]. Tapia yesterday at [facility] [place] - pt with UGT1A1 mutation which makes her more susceptible to toxicities from 5FU and irinotecan which may have contributed to symptoms on arrival.  no DPD deficiency   - Review clinical trial protocol - may remain on trial drug alone and changing regimen- to be discussed when recovers from underlying illness and admission     # Neutropenic Septic shock   - pt previously on neupogen- WBC has now recovered and elevated likely 2/2 GCSF support   - meropenem d/c'd  - remains on  fluconazole and po vanco  - had rigors yesterday.  given cefepime and IV vanco x 1     # PE   - v/q scan w indeterminate prob of PE, linear defect in RUL posterior segment   - CT chest- multiple PE w/in segmental left lower and right lower, 2 right middle lobe nodules,   -  Pt restarted on hep gtt due- unfortunately had episodes of maroon stools - no further bleeding on bms last night- reports brown stool as per nurse   -plan for placement of IVC filter  - Today, WBC 12.4, Hb 10.8, plt 193 and stable     # anemia   - received 4u to date and 1u ffp- Hb 10.8 today   - Protonix IV   - GI following   - keep type and screen active, transfuse for Hb <7    # large bowel obstruction  - CT a/p Large bowel obstruction with transition point at the distal transverse colon. There is also narrowing at the level of the terminal ileum. Diffuse ascites. Redemonstrated pancreatic lesion, consistent with history of pancreatic adenocarcinoma. There is an umbilical lesion with adjacent peritoneal implants.  - previously was having diarrhea  - GI following and reviewed imaging with radiology   - 2/22/25  repeat CT a/p with mild interval improvements in small and large bowel ileus. pt given lasix and albumin.     # EMPIRIC TREATMENT FOR ESOPHAGEAL CANDIDIASIS   - continue with DIFLUCAN       will follow and communicate with [facility] [place]     d/w  at bedside    James Griffin MD  [facility]  [phone]

## 2025-03-01 NOTE — PROGRESS NOTE ADULT - SUBJECTIVE AND OBJECTIVE BOX
Subjective:    pat low grade fever 100, RA sat 99%, NG billous secreatons, lying in bed, comfortably, urine neg, B/C pending,  @bedside.    Home Medications:  cefepime 2 g intravenous injection: 2 gram(s) intravenous every 12 hours (12 Feb 2025 12:59)  filgrastim: 480 microgram(s) subcutaneous once a day (12 Feb 2025 12:59)  Lactated Ringers Injection intravenous solution: 150 milligram(s) intravenous every 1 to 2 hours (12 Feb 2025 12:59)  loperamide 2 mg oral capsule: 1 cap(s) orally once (12 Feb 2025 12:59)  midodrine 5 mg oral tablet: 1 tab(s) orally every 8 hours (12 Feb 2025 12:59)  Multiple Vitamins with Minerals oral liquid: 1 orally once a day (12 Feb 2025 12:59)  octreotide 2500 mcg/mL subcutaneous solution: 0.04 milliliter(s) subcutaneous 3 times a day (12 Feb 2025 12:59)  sucralfate 1 g/10 mL oral suspension: 10 milliliter(s) orally 4 times a day (12 Feb 2025 12:59)  trimethobenzamide 100 mg/mL intramuscular solution: 2 milliliter(s) intramuscular every 8 hours As needed Nausea and/or Vomiting (12 Feb 2025 12:59)    MEDICATIONS  (STANDING):  acetaminophen   IVPB .. 1000 milliGRAM(s) IV Intermittent once  cefepime  Injectable.      cefepime  Injectable. 2000 milliGRAM(s) IV Push every 12 hours  chlorhexidine 2% Cloths 1 Application(s) Topical <User Schedule>  cholecalciferol 2000 Unit(s) Oral at bedtime  dextrose 5%. 1000 milliLiter(s) (50 mL/Hr) IV Continuous <Continuous>  dextrose 5%. 1000 milliLiter(s) (100 mL/Hr) IV Continuous <Continuous>  dextrose 50% Injectable 25 Gram(s) IV Push once  dextrose 50% Injectable 12.5 Gram(s) IV Push once  dextrose 50% Injectable 25 Gram(s) IV Push once  famotidine Injectable 20 milliGRAM(s) IV Push daily  fluconAZOLE IVPB      fluconAZOLE IVPB 200 milliGRAM(s) IV Intermittent every 24 hours  glucagon  Injectable 1 milliGRAM(s) IntraMuscular once  insulin glargine Injectable (LANTUS) 20 Unit(s) SubCutaneous at bedtime  insulin lispro (ADMELOG) corrective regimen sliding scale   SubCutaneous every 6 hours  lipid, fat emulsion (Fish Oil and Plant Based) 20% Infusion 0.681 Gm/kG/Day (20.8 mL/Hr) IV Continuous <Continuous>  metoprolol tartrate 25 milliGRAM(s) Oral every 6 hours  pantoprazole  Injectable 40 milliGRAM(s) IV Push every 12 hours  Parenteral Nutrition - Adult 1 Each (75 mL/Hr) TPN Continuous <Continuous>  Parenteral Nutrition - Adult 1 Each (75 mL/Hr) TPN Continuous <Continuous>  sucralfate suspension 1 Gram(s) Oral four times a day  vancomycin    Solution 125 milliGRAM(s) Oral every 6 hours  zinc oxide 40% Paste 1 Application(s) Topical two times a day    MEDICATIONS  (PRN):  acetaminophen     Tablet .. 650 milliGRAM(s) Oral every 6 hours PRN Temp greater or equal to 38C (100.4F), Mild Pain (1 - 3)  aluminum hydroxide/magnesium hydroxide/simethicone Suspension 30 milliLiter(s) Oral every 4 hours PRN Dyspepsia  Biotene Dry Mouth Oral Rinse 15 milliLiter(s) Swish and Spit five times a day PRN Mouth Care  dextrose Oral Gel 15 Gram(s) Oral once PRN Blood Glucose LESS THAN 70 milliGRAM(s)/deciliter  metoprolol tartrate Injectable 5 milliGRAM(s) IV Push every 6 hours PRN heart rate >110  ondansetron Injectable 4 milliGRAM(s) IV Push every 8 hours PRN Nausea and/or Vomiting  sodium chloride 0.65% Nasal 1 Spray(s) Both Nostrils two times a day PRN Nasal Congestion      Allergies    No Known Allergies    Intolerances        Vital Signs Last 24 Hrs  T(C): 38.2 (01 Mar 2025 10:49), Max: 38.2 (01 Mar 2025 10:49)  T(F): 100.7 (01 Mar 2025 10:49), Max: 100.7 (01 Mar 2025 10:49)  HR: 87 (01 Mar 2025 15:00) (87 - 112)  BP: 107/69 (01 Mar 2025 15:00) (106/62 - 133/77)  BP(mean): 82 (01 Mar 2025 15:00) (77 - 104)  RR: 28 (01 Mar 2025 15:00) (15 - 35)  SpO2: 100% (01 Mar 2025 15:00) (93% - 100%)    Parameters below as of 01 Mar 2025 13:30  Patient On (Oxygen Delivery Method): room air          PHYSICAL EXAMINATION:    NECK:  Supple. No lymphadenopathy. Jugular venous pressure not elevated. Carotids equal.   HEART:   The cardiac impulse has a normal quality. Reg., Nl S1 and S2.  There are no murmurs, rubs or gallops noted  CHEST:  Chest crackles to auscultation. Normal respiratory effort.  ABDOMEN:  Soft and nontender.   EXTREMITIES:  There is no edema.       LABS:                        10.8   12.44 )-----------( 193      ( 01 Mar 2025 05:28 )             32.2     03-01    144  |  113[H]  |  100[H]  ----------------------------<  167[H]  3.7   |  23  |  1.80[H]    Ca    9.1      01 Mar 2025 05:28  Phos  4.1     03-01  Mg     2.0     03-01    TPro  5.6[L]  /  Alb  2.0[L]  /  TBili  0.7  /  DBili  x   /  AST  41[H]  /  ALT  58  /  AlkPhos  339[H]  03-01      Urinalysis Basic - ( 01 Mar 2025 05:28 )    Color: x / Appearance: x / SG: x / pH: x  Gluc: 167 mg/dL / Ketone: x  / Bili: x / Urobili: x   Blood: x / Protein: x / Nitrite: x   Leuk Esterase: x / RBC: x / WBC x   Sq Epi: x / Non Sq Epi: x / Bacteria: x        Xray Chest 1 View- PORTABLE-Urgent (Xray Chest 1 View- PORTABLE-Urgent .) (03.01.25 @ 08:54) >  FINDINGS/  IMPRESSION: Examination is limited due to the low lung volumes. There is   a right injection port catheter the tip located in the region of the   right atrium. There is an enteric tube which has a coiled course in the   region the gastric body the distal tip located in the region of the   antrum/duodenum. There are bibasilar effusions right greater than left.   The remainder the lung zones are clear.

## 2025-03-02 LAB
-  BLOOD PCR PANEL: SIGNIFICANT CHANGE UP
ALBUMIN SERPL ELPH-MCNC: 1.9 G/DL — LOW (ref 3.3–5)
ALT FLD-CCNC: 44 U/L — SIGNIFICANT CHANGE UP (ref 12–78)
ANION GAP SERPL CALC-SCNC: 7 MMOL/L — SIGNIFICANT CHANGE UP (ref 5–17)
AST SERPL-CCNC: 35 U/L — SIGNIFICANT CHANGE UP (ref 15–37)
BILIRUB SERPL-MCNC: 0.6 MG/DL — SIGNIFICANT CHANGE UP (ref 0.2–1.2)
BUN SERPL-MCNC: 105 MG/DL — HIGH (ref 7–23)
CALCIUM SERPL-MCNC: 9.3 MG/DL — SIGNIFICANT CHANGE UP (ref 8.5–10.1)
CHLORIDE SERPL-SCNC: 110 MMOL/L — HIGH (ref 96–108)
CO2 SERPL-SCNC: 25 MMOL/L — SIGNIFICANT CHANGE UP (ref 22–31)
CREAT SERPL-MCNC: 1.86 MG/DL — HIGH (ref 0.5–1.3)
EGFR: 29 ML/MIN/1.73M2 — LOW
EGFR: 29 ML/MIN/1.73M2 — LOW
GLUCOSE BLDC GLUCOMTR-MCNC: 121 MG/DL — HIGH (ref 70–99)
GLUCOSE BLDC GLUCOMTR-MCNC: 143 MG/DL — HIGH (ref 70–99)
GLUCOSE BLDC GLUCOMTR-MCNC: 98 MG/DL — SIGNIFICANT CHANGE UP (ref 70–99)
GLUCOSE SERPL-MCNC: 132 MG/DL — HIGH (ref 70–99)
GRAM STN FLD: ABNORMAL
HCT VFR BLD CALC: 30.6 % — LOW (ref 34.5–45)
MAGNESIUM SERPL-MCNC: 2.2 MG/DL — SIGNIFICANT CHANGE UP (ref 1.6–2.6)
MCHC RBC-ENTMCNC: 29.4 PG — SIGNIFICANT CHANGE UP (ref 27–34)
MCHC RBC-ENTMCNC: 33 G/DL — SIGNIFICANT CHANGE UP (ref 32–36)
METHOD TYPE: SIGNIFICANT CHANGE UP
NRBC # BLD AUTO: 0.02 K/UL — HIGH (ref 0–0)
NRBC # FLD: 0.02 K/UL — HIGH (ref 0–0)
NRBC BLD AUTO-RTO: 0 /100 WBCS — SIGNIFICANT CHANGE UP (ref 0–0)
PHOSPHATE SERPL-MCNC: 3.9 MG/DL — SIGNIFICANT CHANGE UP (ref 2.5–4.5)
PLATELET # BLD AUTO: 200 K/UL — SIGNIFICANT CHANGE UP (ref 150–400)
PMV BLD: 13 FL — SIGNIFICANT CHANGE UP (ref 7–13)
POTASSIUM SERPL-MCNC: 3.9 MMOL/L — SIGNIFICANT CHANGE UP (ref 3.5–5.3)
POTASSIUM SERPL-SCNC: 3.9 MMOL/L — SIGNIFICANT CHANGE UP (ref 3.5–5.3)
PROT SERPL-MCNC: 6.2 GM/DL — SIGNIFICANT CHANGE UP (ref 6–8.3)
RBC # FLD: 15.9 % — HIGH (ref 10.3–14.5)
SODIUM SERPL-SCNC: 142 MMOL/L — SIGNIFICANT CHANGE UP (ref 135–145)
WBC # BLD: 12.72 K/UL — HIGH (ref 3.8–10.5)
WBC # FLD AUTO: 12.72 K/UL — HIGH (ref 3.8–10.5)

## 2025-03-02 PROCEDURE — 99233 SBSQ HOSP IP/OBS HIGH 50: CPT

## 2025-03-02 RX ORDER — DEXTROSE 50 % IN WATER 50 %
25 SYRINGE (ML) INTRAVENOUS ONCE
Refills: 0 | Status: DISCONTINUED | OUTPATIENT
Start: 2025-03-02 | End: 2025-03-03

## 2025-03-02 RX ORDER — ALBUMIN (HUMAN) 12.5 G/50ML
50 INJECTION, SOLUTION INTRAVENOUS EVERY 6 HOURS
Refills: 0 | Status: COMPLETED | OUTPATIENT
Start: 2025-03-02 | End: 2025-03-04

## 2025-03-02 RX ORDER — SODIUM CHLORIDE 9 G/1000ML
1000 INJECTION, SOLUTION INTRAVENOUS
Refills: 0 | Status: DISCONTINUED | OUTPATIENT
Start: 2025-03-02 | End: 2025-03-19

## 2025-03-02 RX ORDER — INSULIN LISPRO 100 U/ML
INJECTION, SOLUTION INTRAVENOUS; SUBCUTANEOUS EVERY 6 HOURS
Refills: 0 | Status: DISCONTINUED | OUTPATIENT
Start: 2025-03-02 | End: 2025-03-03

## 2025-03-02 RX ORDER — CASPOFUNGIN ACETATE 5 MG/ML
70 INJECTION, POWDER, LYOPHILIZED, FOR SOLUTION INTRAVENOUS ONCE
Refills: 0 | Status: COMPLETED | OUTPATIENT
Start: 2025-03-02 | End: 2025-03-02

## 2025-03-02 RX ORDER — CASPOFUNGIN ACETATE 5 MG/ML
INJECTION, POWDER, LYOPHILIZED, FOR SOLUTION INTRAVENOUS
Refills: 0 | Status: DISCONTINUED | OUTPATIENT
Start: 2025-03-02 | End: 2025-03-16

## 2025-03-02 RX ORDER — GLUCAGON 3 MG/1
1 POWDER NASAL ONCE
Refills: 0 | Status: DISCONTINUED | OUTPATIENT
Start: 2025-03-02 | End: 2025-03-19

## 2025-03-02 RX ORDER — DEXTROSE 50 % IN WATER 50 %
15 SYRINGE (ML) INTRAVENOUS ONCE
Refills: 0 | Status: DISCONTINUED | OUTPATIENT
Start: 2025-03-02 | End: 2025-03-03

## 2025-03-02 RX ORDER — CASPOFUNGIN ACETATE 5 MG/ML
50 INJECTION, POWDER, LYOPHILIZED, FOR SOLUTION INTRAVENOUS EVERY 24 HOURS
Refills: 0 | Status: DISCONTINUED | OUTPATIENT
Start: 2025-03-03 | End: 2025-03-16

## 2025-03-02 RX ORDER — ACETAMINOPHEN 500 MG/5ML
1000 LIQUID (ML) ORAL ONCE
Refills: 0 | Status: COMPLETED | OUTPATIENT
Start: 2025-03-02 | End: 2025-03-02

## 2025-03-02 RX ORDER — SODIUM CHLORIDE 9 G/1000ML
1000 INJECTION, SOLUTION INTRAVENOUS
Refills: 0 | Status: DISCONTINUED | OUTPATIENT
Start: 2025-03-02 | End: 2025-03-12

## 2025-03-02 RX ORDER — DEXTROSE 50 % IN WATER 50 %
12.5 SYRINGE (ML) INTRAVENOUS ONCE
Refills: 0 | Status: DISCONTINUED | OUTPATIENT
Start: 2025-03-02 | End: 2025-03-03

## 2025-03-02 RX ORDER — SODIUM/POT/MAG/CALC/CHLOR/ACET 35-20-5MEQ
1 VIAL (ML) INTRAVENOUS
Refills: 0 | Status: DISCONTINUED | OUTPATIENT
Start: 2025-03-02 | End: 2025-03-02

## 2025-03-02 RX ADMIN — METOPROLOL SUCCINATE 25 MILLIGRAM(S): 50 TABLET, EXTENDED RELEASE ORAL at 05:27

## 2025-03-02 RX ADMIN — Medication 1 GRAM(S): at 12:46

## 2025-03-02 RX ADMIN — ALBUMIN (HUMAN) 50 MILLILITER(S): 12.5 INJECTION, SOLUTION INTRAVENOUS at 12:45

## 2025-03-02 RX ADMIN — Medication 2000 UNIT(S): at 22:21

## 2025-03-02 RX ADMIN — CASPOFUNGIN ACETATE 260 MILLIGRAM(S): 5 INJECTION, POWDER, LYOPHILIZED, FOR SOLUTION INTRAVENOUS at 08:43

## 2025-03-02 RX ADMIN — ALBUMIN (HUMAN) 50 MILLILITER(S): 12.5 INJECTION, SOLUTION INTRAVENOUS at 18:08

## 2025-03-02 RX ADMIN — Medication 125 MILLIGRAM(S): at 12:46

## 2025-03-02 RX ADMIN — Medication 20 MILLIGRAM(S): at 22:20

## 2025-03-02 RX ADMIN — TOUCHLESS CARE ZINC OXIDE PROTECTANT 1 APPLICATION(S): 20; 25 SPRAY TOPICAL at 04:10

## 2025-03-02 RX ADMIN — Medication 40 MILLIGRAM(S): at 10:34

## 2025-03-02 RX ADMIN — METOPROLOL SUCCINATE 25 MILLIGRAM(S): 50 TABLET, EXTENDED RELEASE ORAL at 18:07

## 2025-03-02 RX ADMIN — Medication 125 MILLIGRAM(S): at 23:01

## 2025-03-02 RX ADMIN — CEFEPIME 2000 MILLIGRAM(S): 2 INJECTION, POWDER, FOR SOLUTION INTRAVENOUS at 05:26

## 2025-03-02 RX ADMIN — Medication 1 GRAM(S): at 18:07

## 2025-03-02 RX ADMIN — Medication 125 MILLIGRAM(S): at 18:08

## 2025-03-02 RX ADMIN — TOUCHLESS CARE ZINC OXIDE PROTECTANT 1 APPLICATION(S): 20; 25 SPRAY TOPICAL at 23:04

## 2025-03-02 RX ADMIN — METOPROLOL SUCCINATE 25 MILLIGRAM(S): 50 TABLET, EXTENDED RELEASE ORAL at 23:01

## 2025-03-02 RX ADMIN — ALBUMIN (HUMAN) 50 MILLILITER(S): 12.5 INJECTION, SOLUTION INTRAVENOUS at 23:01

## 2025-03-02 RX ADMIN — Medication 1 GRAM(S): at 05:27

## 2025-03-02 RX ADMIN — Medication 40 MILLIGRAM(S): at 22:20

## 2025-03-02 RX ADMIN — TOUCHLESS CARE ZINC OXIDE PROTECTANT 1 APPLICATION(S): 20; 25 SPRAY TOPICAL at 10:34

## 2025-03-02 RX ADMIN — SODIUM CHLORIDE 75 MILLILITER(S): 9 INJECTION, SOLUTION INTRAVENOUS at 12:47

## 2025-03-02 RX ADMIN — Medication 400 MILLIGRAM(S): at 10:50

## 2025-03-02 RX ADMIN — METOPROLOL SUCCINATE 25 MILLIGRAM(S): 50 TABLET, EXTENDED RELEASE ORAL at 12:47

## 2025-03-02 RX ADMIN — Medication 1 GRAM(S): at 23:01

## 2025-03-02 RX ADMIN — Medication 125 MILLIGRAM(S): at 05:27

## 2025-03-02 RX ADMIN — Medication 1000 MILLIGRAM(S): at 11:15

## 2025-03-02 NOTE — PROGRESS NOTE ADULT - ASSESSMENT
70 yo female with PMHx recent diagnosis of primary pancreatic adenocarcinoma of head with tumor in umbilicus, chemo-induced pancytopenia, SCC lip, cyclical vomiting syndrome, HTN, HPL, Gout, GERD, CVA on Aggrenox    Initially admitted to [facility] with septic shock 2/2 + pseudomonas bacteremia likely due to UTI vs colitis on 02/09/2025 Was having nausea, vomiting and diarrhea requiring Levophed drip, was transferred out of ICU to regular floor.    Then transferred to  as per request of .  Was getting platelet transfusion and developed tachypnea hypoxia, rigors  Was diuresed and became hypotensive  Upgraded to SICU     Here with   1. Sepsis (POA)  2. Pseudomonal bacteremia suspect from UTI   3. Mucositis suspected esophageal candidiasis?   4. Pancytopenia  5. Acute hypoxic respiratory failure 2/2 pulm edema   6. MALLORY     CTA chest abd pelvis 2/19 revealing multiple small PE b/l and LBO  Started on IV heparin   Went into Afib with RVR, resolved with lopressor    Developed GI bleed on heparin gtt, started on protonix    Re - trialed on heparin infusion on 2/25 -> subsequently had maroon BMs again    Fever, rigors on 2/28, recultured, started on cefepime, vanc x 1    Found to have fungemia       Plan:    Encephalopathic but not restless or agitated, continue daily reorientation    BP stable, HR 70-80s, continue metoprolol standing and PRN  TTE 2/19 shows EF 63% normal RV size and systolic function mild TR  Patient euvolemic at this time    Started on caspofungin 3/2, off diflucan, continue po vanc, ID to d/c cefepime, WBC stable, temp curve improving   Consult surgery Dr Avalos (at 's request) to remove port. TPN off     Abd soft, remains tender but no guarding/rebound, will try TF via NGT. Having BMs    Glu normal, off TPN, will d/c Lantus, started on D5 IVF to prevent hypoglycemia     Renal function stable, urine output adequate    No chemical DVT prophylaxis due to GI bleed  IVC filter on hold for now    Prognosis guarded to poor     updated    Sepsis criteria met? - NO

## 2025-03-02 NOTE — PROVIDER CONTACT NOTE (CRITICAL VALUE NOTIFICATION) - TEST AND RESULT REPORTED:
Blood Culture 2/28 (Preliminary)  Growth in aerobic bottle: Blood Culture 2/28 (Preliminary)  Growth in aerobic bottle: yeast like cells

## 2025-03-02 NOTE — PROGRESS NOTE ADULT - SUBJECTIVE AND OBJECTIVE BOX
Date of service: 03-02-25 @ 11:12    Lying in bed in NAD  Has low grade fever  Reported with new fungus in blood culture  Alert  Poorly verbal    ROS: denies pain, no HA, no SOB or cough, no abdominal pain, no diarrhea or constipation; no dysuria, no legs pain, no rashes    MEDICATIONS  (STANDING):  albumin human 25% IVPB 50 milliLiter(s) IV Intermittent every 6 hours  caspofungin IVPB      cefepime  Injectable.      cefepime  Injectable. 2000 milliGRAM(s) IV Push every 12 hours  chlorhexidine 2% Cloths 1 Application(s) Topical <User Schedule>  cholecalciferol 2000 Unit(s) Oral at bedtime  dextrose 5%. 1000 milliLiter(s) (50 mL/Hr) IV Continuous <Continuous>  dextrose 5%. 1000 milliLiter(s) (100 mL/Hr) IV Continuous <Continuous>  dextrose 50% Injectable 25 Gram(s) IV Push once  dextrose 50% Injectable 12.5 Gram(s) IV Push once  dextrose 50% Injectable 25 Gram(s) IV Push once  famotidine Injectable 20 milliGRAM(s) IV Push daily  glucagon  Injectable 1 milliGRAM(s) IntraMuscular once  insulin lispro (ADMELOG) corrective regimen sliding scale   SubCutaneous every 6 hours  lipid, fat emulsion (Fish Oil and Plant Based) 20% Infusion 0.681 Gm/kG/Day (20.8 mL/Hr) IV Continuous <Continuous>  metoprolol tartrate 25 milliGRAM(s) Oral every 6 hours  pantoprazole  Injectable 40 milliGRAM(s) IV Push every 12 hours  Parenteral Nutrition - Adult 1 Each (75 mL/Hr) TPN Continuous <Continuous>  sucralfate suspension 1 Gram(s) Oral four times a day  vancomycin    Solution 125 milliGRAM(s) Oral every 6 hours  zinc oxide 40% Paste 1 Application(s) Topical two times a day    Vital Signs Last 24 Hrs  T(C): 37.8 (02 Mar 2025 09:34), Max: 37.8 (02 Mar 2025 09:34)  T(F): 100 (02 Mar 2025 09:34), Max: 100 (02 Mar 2025 09:34)  HR: 82 (02 Mar 2025 10:00) (72 - 96)  BP: 137/66 (02 Mar 2025 10:00) (98/60 - 137/66)  BP(mean): 86 (02 Mar 2025 10:00) (73 - 104)  RR: 19 (02 Mar 2025 10:00) (19 - 33)  SpO2: 95% (02 Mar 2025 10:00) (93% - 100%)    Parameters below as of 02 Mar 2025 10:00  Patient On (Oxygen Delivery Method): room air     Physical exam:    Constitutional:  No acute distress  HEENT: NC/AT, EOMI, PERRLA, conjunctivae clear; ears and nose atraumatic; pharynx benign  Neck: supple; thyroid not palpable  Back: no tenderness  Respiratory: respiratory effort normal; decreased BS at bases  Cardiovascular: S1S2 regular, no murmurs  Abdomen: soft, mild periombilical tender, not distended, positive BS; no liver or spleen organomegaly  Genitourinary: no suprapubic tenderness  Lymphatic: no LN palpable  Musculoskeletal: no muscle tenderness, no joint swelling or tenderness  Extremities: no pedal edema  Neurological/ Psychiatric: AxOx3, judgement and insight normal; moving all extremities  Skin: no rashes; right lower chest wall dry, scabbed ulcer; no discharge     Labs: reviewed                        10.1   12.72 )-----------( 200      ( 02 Mar 2025 05:50 )             30.6     03-02    142  |  110[H]  |  105[H]  ----------------------------<  132[H]  3.9   |  25  |  1.86[H]    Ca    9.3      02 Mar 2025 05:50  Phos  3.9     03-02  Mg     2.2     03-02    TPro  6.2  /  Alb  1.9[L]  /  TBili  0.6  /  DBili  x   /  AST  35  /  ALT  44  /  AlkPhos  269[H]  03-02                        10.2   18.89 )-----------( 181      ( 27 Feb 2025 04:05 )             30.2     02-27    149[H]  |  116[H]  |  96[H]  ----------------------------<  138[H]  3.9   |  26  |  1.87[H]    Ca    9.1      27 Feb 2025 04:05  Phos  4.0     02-27  Mg     2.0     02-27    TPro  5.4[L]  /  Alb  2.8[L]  /  TBili  0.8  /  DBili  x   /  AST  44[H]  /  ALT  45  /  AlkPhos  194[H]  02-27                        10.5   35.65 )-----------( 132      ( 21 Feb 2025 05:18 )             31.0     02-21    141  |  105  |  66[H]  ----------------------------<  358[H]  3.2[L]   |  26  |  1.33[H]    Ca    8.2[L]      21 Feb 2025 05:18  Phos  3.6     02-21  Mg     2.0     02-21    TPro  5.3[L]  /  Alb  2.1[L]  /  TBili  0.6  /  DBili  x   /  AST  56[H]  /  ALT  33  /  AlkPhos  329[H]  02-21    C-Reactive Protein: 209.0 mg/mL (02-15-25 @ 05:17)  C-Reactive Protein: 233.0 mg/mL (02-14-25 @ 06:50)  Ferritin: 1798 ng/mL (02-13-25 @ 18:46)                        8.7    0.19  )-----------( 33       ( 13 Feb 2025 06:52 )             25.7     02-13    137  |  109[H]  |  20  ----------------------------<  117[H]  2.6[LL]   |  18[L]  |  1.02    Ca    8.7      13 Feb 2025 06:52  Phos  2.3     02-13  Mg     1.6     02-13    TPro  4.4[L]  /  Alb  1.1[L]  /  TBili  0.7  /  DBili  x   /  AST  11[L]  /  ALT  16  /  AlkPhos  94  02-13     LIVER FUNCTIONS - ( 13 Feb 2025 06:52 )  Alb: 1.1 g/dL / Pro: 4.4 gm/dL / ALK PHOS: 94 U/L / ALT: 16 U/L / AST: 11 U/L / GGT: x           Culture - Urine (collected 09 Feb 2025 01:25)  Source: Clean Catch  Final Report (11 Feb 2025 08:39):    50,000 - 99,000 CFU/mL Pseudomonas aeruginosa  Organism: Pseudomonas aeruginosa (11 Feb 2025 08:39)  Organism: Pseudomonas aeruginosa (11 Feb 2025 08:39)      Method Type: REJI      -  Amikacin: S <=16      -  Aztreonam: S <=4      -  Cefepime: S <=2      -  Ceftazidime: S <=1      -  Ciprofloxacin: S <=0.25      -  Imipenem: S 2      -  Levofloxacin: S <=0.5      -  Meropenem: S <=1      -  Piperacillin/Tazobactam: S <=8    Culture - Blood (collected 08 Feb 2025 22:00)  Source: .Blood BLOOD  Gram Stain (09 Feb 2025 19:04):    Growth in aerobic bottle: Gram Negative Rods  Final Report (11 Feb 2025 07:53):    Growth in aerobic bottle: Pseudomonas aeruginosa    Direct identification is available within approximately 3-5    hours either by Blood Panel Multiplexed PCR or Direct    MALDI-TOF. Details: [email or web address]  Organism: Blood Culture PCR  Pseudomonas aeruginosa (11 Feb 2025 07:53)  Organism: Pseudomonas aeruginosa (11 Feb 2025 07:53)      Method Type: REJI      -  Aztreonam: S <=4      -  Cefepime: S <=2      -  Ceftazidime: S <=1      -  Ciprofloxacin: S <=0.25      -  Imipenem: S <=1      -  Levofloxacin: S <=0.5      -  Meropenem: S <=1      -  Piperacillin/Tazobactam: S <=8  Organism: Blood Culture PCR (11 Feb 2025 07:53)      Method Type: PCR      -  Pseudomonas aeruginosa: Detec    Culture - Blood (collected 08 Feb 2025 21:55)  Source: .Blood BLOOD  Gram Stain (09 Feb 2025 19:32):    Growth in aerobic bottle: Gram Negative Rods  Final Report (11 Feb 2025 07:54):    Growth in aerobic bottle: Pseudomonas aeruginosa    See previous culture [id number]    Culture - Blood (collected 28 Feb 2025 12:00)  Source: .Blood None  Preliminary Report (01 Mar 2025 19:01):    No growth at 24 hours    Culture - Blood (collected 28 Feb 2025 11:54)  Source: .Blood None  Gram Stain (02 Mar 2025 06:12):    Growth in aerobic bottle: Yeast like cells  Preliminary Report (02 Mar 2025 06:12):    Growth in aerobic bottle: Yeast like cells    Direct identification is available within approximately 3-5    hours either by Blood Panel Multiplexed PCR or Direct    MALDI-TOF. Details: [email or web address]  Organism: Blood Culture PCR (02 Mar 2025 08:38)  Organism: Blood Culture PCR (02 Mar 2025 08:38)    Radiology: all available radiological tests reviewed    < from: US Abdomen Upper Quadrant Right (02.09.25 @ 14:55) >  Distended gallbladder with layering sludge.  8 mm right renal calculus without hydronephrosis.  Hepatomegaly.  Right renal cyst.  < end of copied text >    < from: CT Abdomen and Pelvis No Cont (02.09.25 @ 03:01) >  1.   Study somewhat limited due to absence of contrast.  2.   Axial images 71-76 of series 301 and concomitant coronal images demonstrate prominent soft tissue inseparable from the body of the pancreas consistent with pancreatic neoplasm.  3.   Some distension of gallbladder could be further evaluated with right upper quadrant sonography. No definite biliary dilatation.  < end of copied text >    Advanced directives addressed: full resuscitation

## 2025-03-02 NOTE — PROGRESS NOTE ADULT - SUBJECTIVE AND OBJECTIVE BOX
Patient is a 69y old  Female who presents with a chief complaint of sepsis (02 Mar 2025 11:11)    Allergies    No Known Allergies    Intolerances      REVIEW OF SYSTEMS: SEE BELOW       ICU Vital Signs Last 24 Hrs  T(C): 37.8 (02 Mar 2025 09:34), Max: 37.8 (02 Mar 2025 09:34)  T(F): 100 (02 Mar 2025 09:34), Max: 100 (02 Mar 2025 09:34)  HR: 78 (02 Mar 2025 12:00) (72 - 96)  BP: 118/67 (02 Mar 2025 12:00) (98/60 - 137/66)  BP(mean): 83 (02 Mar 2025 12:00) (73 - 92)  ABP: --  ABP(mean): --  RR: 21 (02 Mar 2025 12:00) (19 - 33)  SpO2: 94% (02 Mar 2025 12:00) (93% - 100%)    O2 Parameters below as of 02 Mar 2025 12:00  Patient On (Oxygen Delivery Method): room air            CAPILLARY BLOOD GLUCOSE      POCT Blood Glucose.: 98 mg/dL (02 Mar 2025 11:50)  POCT Blood Glucose.: 121 mg/dL (02 Mar 2025 06:44)  POCT Blood Glucose.: 123 mg/dL (01 Mar 2025 23:50)  POCT Blood Glucose.: 136 mg/dL (01 Mar 2025 18:20)  POCT Blood Glucose.: 147 mg/dL (01 Mar 2025 14:19)      I&O's Summary    01 Mar 2025 07:01  -  02 Mar 2025 07:00  --------------------------------------------------------  IN: 2198 mL / OUT: 1495 mL / NET: 703 mL    02 Mar 2025 07:01  -  02 Mar 2025 12:45  --------------------------------------------------------  IN: 663.1 mL / OUT: 0 mL / NET: 663.1 mL            MEDICATIONS  (STANDING):  albumin human 25% IVPB 50 milliLiter(s) IV Intermittent every 6 hours  caspofungin IVPB      chlorhexidine 2% Cloths 1 Application(s) Topical <User Schedule>  cholecalciferol 2000 Unit(s) Oral at bedtime  dextrose 5% + sodium chloride 0.45%. 1000 milliLiter(s) (75 mL/Hr) IV Continuous <Continuous>  dextrose 5%. 1000 milliLiter(s) (100 mL/Hr) IV Continuous <Continuous>  dextrose 5%. 1000 milliLiter(s) (50 mL/Hr) IV Continuous <Continuous>  dextrose 50% Injectable 25 Gram(s) IV Push once  dextrose 50% Injectable 12.5 Gram(s) IV Push once  dextrose 50% Injectable 25 Gram(s) IV Push once  famotidine Injectable 20 milliGRAM(s) IV Push daily  glucagon  Injectable 1 milliGRAM(s) IntraMuscular once  insulin lispro (ADMELOG) corrective regimen sliding scale   SubCutaneous every 6 hours  lipid, fat emulsion (Fish Oil and Plant Based) 20% Infusion 0.681 Gm/kG/Day (20.8 mL/Hr) IV Continuous <Continuous>  metoprolol tartrate 25 milliGRAM(s) Oral every 6 hours  pantoprazole  Injectable 40 milliGRAM(s) IV Push every 12 hours  Parenteral Nutrition - Adult 1 Each (75 mL/Hr) TPN Continuous <Continuous>  sucralfate suspension 1 Gram(s) Oral four times a day  vancomycin    Solution 125 milliGRAM(s) Oral every 6 hours  zinc oxide 40% Paste 1 Application(s) Topical two times a day      MEDICATIONS  (PRN):  acetaminophen     Tablet .. 650 milliGRAM(s) Oral every 6 hours PRN Temp greater or equal to 38C (100.4F), Mild Pain (1 - 3)  aluminum hydroxide/magnesium hydroxide/simethicone Suspension 30 milliLiter(s) Oral every 4 hours PRN Dyspepsia  Biotene Dry Mouth Oral Rinse 15 milliLiter(s) Swish and Spit five times a day PRN Mouth Care  dextrose Oral Gel 15 Gram(s) Oral once PRN Blood Glucose LESS THAN 70 milliGRAM(s)/deciliter  metoprolol tartrate Injectable 5 milliGRAM(s) IV Push every 6 hours PRN heart rate >110  ondansetron Injectable 4 milliGRAM(s) IV Push every 8 hours PRN Nausea and/or Vomiting  sodium chloride 0.65% Nasal 1 Spray(s) Both Nostrils two times a day PRN Nasal Congestion      PHYSICAL EXAM: SEE BELOW                          10.1   12.72 )-----------( 200      ( 02 Mar 2025 05:50 )             30.6       03-02    142  |  110[H]  |  105[H]  ----------------------------<  132[H]  3.9   |  25  |  1.86[H]    Ca    9.3      02 Mar 2025 05:50  Phos  3.9     03-02  Mg     2.2     03-02    TPro  6.2  /  Alb  1.9[L]  /  TBili  0.6  /  DBili  x   /  AST  35  /  ALT  44  /  AlkPhos  269[H]  03-02            Urinalysis Basic - ( 02 Mar 2025 05:50 )    Color: x / Appearance: x / SG: x / pH: x  Gluc: 132 mg/dL / Ketone: x  / Bili: x / Urobili: x   Blood: x / Protein: x / Nitrite: x   Leuk Esterase: x / RBC: x / WBC x   Sq Epi: x / Non Sq Epi: x / Bacteria: x      .Blood None   No growth at 24 hours -- 02-28 @ 12:00  .Blood None   Growth in aerobic bottle: Yeast like cells  Direct identification is available within approximately 3-5  hours either by Blood Panel Multiplexed PCR or Direct  MALDI-TOF. Details: [email or web address]   Growth in aerobic bottle: Yeast like cells 02-28 @ 11:54

## 2025-03-02 NOTE — PROGRESS NOTE ADULT - ASSESSMENT
69 year old woman with the above PMH including a recent diagnosis of pancreatic cancer on chemotherapy admitted with pseudomonas sepsis, N&V, diarrhea.  At the present time her cardiac status is stable on midodrine.  Will continue.  To be seen by Dr Arias.  Antibiotics as per ID.  Continue medications for the above GI symptomatology.  Will follow     02/18/25-Patient appears improved.  She is still showing signs of intravascular depletion and hypokalemia-needs fluids and potassium.  Cardiac status stable with stable BP.  Heart rates still variable but I suspect this will also stabilize as we treat her sepsis and fluid/electrolyte status    03/1/25-  As per above.  Patient had a set back that is GI related.  I will speak to Dr Arias regarding future plans.  There is however no evidence for bleeding.  Plan would still be to place an IVC filter    3/3/25-Cardiac status stable. Events as per above.  TPN stopped and the plan is to start NGT feedings with also some PO intake.  No IVC filter at the present time but her recent GI bleeding precludes starting AC 69 year old woman with the above PMH including a recent diagnosis of pancreatic cancer on chemotherapy admitted with pseudomonas sepsis, N&V, diarrhea.  At the present time her cardiac status is stable on midodrine.  Will continue.  To be seen by Dr Arias.  Antibiotics as per ID.  Continue medications for the above GI symptomatology.  Will follow     02/18/25-Patient appears improved.  She is still showing signs of intravascular depletion and hypokalemia-needs fluids and potassium.  Cardiac status stable with stable BP.  Heart rates still variable but I suspect this will also stabilize as we treat her sepsis and fluid/electrolyte status    03/1/25-  As per above.  Patient had a set back that is GI related.  I will speak to Dr Arias regarding future plans.  There is however no evidence for bleeding.  Plan would still be to place an IVC filter    3/2/25-Cardiac status stable. Events as per above.  TPN stopped and the plan is to start NGT feedings with also some PO intake.  No IVC filter at the present time but her recent GI bleeding precludes starting AC

## 2025-03-02 NOTE — PROGRESS NOTE ADULT - SUBJECTIVE AND OBJECTIVE BOX
CHIEF COMPLAINT: Patient is a 69y old  Female who presents with a chief complaint of     HPI: HPI:   History of Present Illness:    HPI: Patient is a 69-year-old female recent diagnosis of primary pancreatic adenocarcinoma of head with tumor in umbilicus, chemo-induced pancytopenia and significant PMH of SCC lip, cyclical vomiting syndrome, HTN, HPL, Gout, GERD, CVA on Aggrenox and others had been admitted to [facility] with septic shock on 02/09/2025, nausea, vomiting and diarrhea requiring Levophed drip, was transferred out of ICU to regular floor last night.  Patient was being followed by GI, ID, Cardiology and Onc there.  Patient has been transferred to  today on patient's  (GI Dr. Pleitez) request.  At this time, patient c/o non-bloody diarrhea x 3 in last 24 hours.  She denies any abdominal pain, nausea, but has some dry heaves.  She denies any fever, chills, chest pain, palpitation, constipation or dysuria. Initially, she was started on Vancomycin and Zosyn, but now she has been on Cefepime as her blood culture and urine cultures are positive for Pseudomonas aeruginosa. Currently, patient is on Cefepime.  ID had also recommended Flagyl, but patient had refused it, as it causes nausea to her.    Sig labs: WBC 0.45k, N 82%, Hb 7.8, Platelets 59k, Bicarb 19, AG 9, , Cr 0.99, TP 4.6, Alb 1.2, LFTs wnl.  Mg 1.9, PO4 2.4.  Prior C.diff negative.     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  Her antibiotics were changes, electrolytes repleted and was given 3 units of blood.  She was on bipap overnight but is now comfortable on nasal O2 with good sats.  3/1/25-After I saw the patient in the morning at which point she was doing well she developed a fever and an apparent ileus and now has an NGT draining bilious fluid.  When this happened her HR increased which is now back down.  ICV filter is currently on hold  3/3/25-Patient has now developed fungemia but right at the current time is feeling well with stable vital signs.  The IVC filter was not placed and the plan is to remove her port-Dr Avalos has been called          ICU Vital Signs Last 24 Hrs  T(C): 37.1 (02 Mar 2025 14:00), Max: 37.8 (02 Mar 2025 09:34)  T(F): 98.8 (02 Mar 2025 14:00), Max: 100 (02 Mar 2025 09:34)  HR: 74 (02 Mar 2025 16:00) (72 - 96)  BP: 125/68 (02 Mar 2025 16:00) (107/75 - 137/66)  BP(mean): 84 (02 Mar 2025 16:00) (83 - 92)  ABP: --  ABP(mean): --  RR: 21 (02 Mar 2025 16:00) (19 - 33)  SpO2: 97% (02 Mar 2025 16:00) (94% - 97%)    O2 Parameters below as of 02 Mar 2025 16:00  Patient On (Oxygen Delivery Method): room air                  12 Feb 2025 07:01  -  13 Feb 2025 07:00  --------------------------------------------------------  IN: 0 mL / OUT: 900 mL / NET: -900 mL          PHYSICAL EXAM:   Patient in NAD  Neck: No JVD; Carotids:  2+ without bruits  Respiratory:  Clear to A&P  Cardiovascular: S1 and S2, regular rate and rhythm, no Murmurs, gallops or rubs  Abd:  BS positive    MEDICATIONS  (STANDING):  albumin human 25% IVPB 50 milliLiter(s) IV Intermittent every 6 hours  caspofungin IVPB      chlorhexidine 2% Cloths 1 Application(s) Topical <User Schedule>  cholecalciferol 2000 Unit(s) Oral at bedtime  dextrose 5% + sodium chloride 0.45%. 1000 milliLiter(s) (75 mL/Hr) IV Continuous <Continuous>  dextrose 5%. 1000 milliLiter(s) (100 mL/Hr) IV Continuous <Continuous>  dextrose 5%. 1000 milliLiter(s) (50 mL/Hr) IV Continuous <Continuous>  dextrose 50% Injectable 25 Gram(s) IV Push once  dextrose 50% Injectable 12.5 Gram(s) IV Push once  dextrose 50% Injectable 25 Gram(s) IV Push once  famotidine Injectable 20 milliGRAM(s) IV Push daily  glucagon  Injectable 1 milliGRAM(s) IntraMuscular once  insulin lispro (ADMELOG) corrective regimen sliding scale   SubCutaneous every 6 hours  metoprolol tartrate 25 milliGRAM(s) Oral every 6 hours  pantoprazole  Injectable 40 milliGRAM(s) IV Push every 12 hours  Parenteral Nutrition - Adult 1 Each (75 mL/Hr) TPN Continuous <Continuous>  sucralfate suspension 1 Gram(s) Oral four times a day  vancomycin    Solution 125 milliGRAM(s) Oral every 6 hours  zinc oxide 40% Paste 1 Application(s) Topical two times a day          WBC Count: 0.45 K/uL (02-12 @ 09:15)     Hemoglobin: 7.8 g/dL (02-12 @ 09:15)  Hemoglobin: 8.9 g/dL (02-11 @ 08:45)  Hemoglobin: 9.6 g/dL (02-10 @ 08:46)  Hemoglobin: 10.1 g/dL (02-09 @ 05:41)  Hemoglobin: 8.9 g/dL (02-08 @ 23:27)              Hematocrit: 23.6 % (02-12 @ 09:15)  Hematocrit: 26.7 % (02-11 @ 08:45)  Hematocrit: 29.6 % (02-10 @ 08:46)  Hematocrit: 31.5 % (02-09 @ 05:41)  Hematocrit: 28.4 % (02-08 @ 23:27)              Mean Cell Volume: 90.4 fl (02-12 @ 09:15)  Mean Cell Volume: 89.9 fl (02-11 @ 08:45)  Mean Cell Volume: 91.6 fl (02-10 @ 08:46)  Mean Cell Volume: 92.6 fl (02-09 @ 05:41)  Mean Cell Volume: 93.7 fl (02-08 @ 23:27)              Platelet Count - Automated: 59 K/uL (02-12 @ 09:15)  Platelet Count - Automated: 13 K/uL (02-11 @ 08:45)  Platelet Count - Automated: 25 K/uL (02-10 @ 08:46)  Platelet Count - Automated: 60 K/uL (02-09 @ 05:41)  Platelet Count - Automated: 64 K/uL (02-08 @ 23:27)                        10.1   1.30  )-----------( 37       ( 17 Feb 2025 05:59 )             28.5                         10.8   12.44 )-----------( 193      ( 01 Mar 2025 05:28 )             32.2                           10.1   12.72 )-----------( 200      ( 02 Mar 2025 05:50 )             30.6                                           Chems        Sodium: 140 mmol/L (02-12 @ 09:15)  Sodium: 140 mmol/L (02-11 @ 06:39)  Sodium: 141 mmol/L (02-10 @ 08:46)  Sodium: 146 mmol/L (02-09 @ 05:41)  Sodium: 144 mmol/L (02-08 @ 23:27)          Potassium: 3.5 mmol/L (02-12 @ 09:15)  Potassium: 3.4 mmol/L (02-11 @ 06:39)  Potassium: 3.3 mmol/L (02-10 @ 08:46)  Potassium: 4.3 mmol/L (02-09 @ 05:41)  Potassium: 4.2 mmol/L (02-08 @ 23:27)          Blood Urea Nitrogen: 22 mg/dL (02-12 @ 09:15)  Blood Urea Nitrogen: 23 mg/dL (02-11 @ 06:39)  Blood Urea Nitrogen: 28 mg/dL (02-10 @ 08:46)  Blood Urea Nitrogen: 29 mg/dL (02-09 @ 05:41)  Blood Urea Nitrogen: 32 mg/dL (02-08 @ 23:27)          Creatinine 0.99  Creatinine 1.10  Creatinine 1.40  Creatinine 1.40  Creatinine 1.50          Magnesium: 1.9 mg/dL (02-12 @ 09:15)  Magnesium: 1.8 mg/dL (02-11 @ 06:39)  Magnesium: 2.1 mg/dL (02-10 @ 08:46)  Magnesium: 2.1 mg/dL (02-09 @ 05:41)          Protein Total: 4.6 g/dL (02-12 @ 09:15)  Protein Total: 4.6 g/dL (02-11 @ 06:39)  Protein Total: 5.0 g/dL (02-10 @ 08:46)  Protein Total: 5.0 g/dL (02-09 @ 05:41)  Protein Total: 4.5 g/dL (02-08 @ 23:27)                  Calcium: 8.9 mg/dL (02-12 @ 09:15)  Calcium: 8.7 mg/dL (02-11 @ 06:39)  Calcium: 9.1 mg/dL (02-10 @ 08:46)  Calcium: 9.2 mg/dL (02-09 @ 05:41)  Calcium: 8.5 mg/dL (02-08 @ 23:27)          Phosphorus: 2.4 mg/dL (02-12 @ 09:15)  Phosphorus: 2.3 mg/dL (02-11 @ 06:39)  Phosphorus: 2.9 mg/dL (02-10 @ 08:46)  Phosphorus: 2.9 mg/dL (02-09 @ 05:41)          Bilirubin Total: 0.7 mg/dL (02-12 @ 09:15)  Bilirubin Total: 0.2 mg/dL (02-11 @ 06:39)  Bilirubin Total: 0.5 mg/dL (02-10 @ 08:46)  Bilirubin Total: 0.4 mg/dL (02-09 @ 05:41)  Bilirubin Total: 0.3 mg/dL (02-08 @ 23:27)          Alanine Aminotransferase (ALT/SGPT): 17 U/L (02-12 @ 09:15)  Alanine Aminotransferase (ALT/SGPT): 18 U/L (02-11 @ 06:39)  Alanine Aminotransferase (ALT/SGPT): 25 U/L (02-10 @ 08:46)  Alanine Aminotransferase (ALT/SGPT): 33 U/L (02-09 @ 05:41)  Alanine Aminotransferase (ALT/SGPT): 33 U/L (02-08 @ 23:27)          Aspartate Aminotransferase (AST/SGOT): 8 U/L (02-12 @ 09:15)  Aspartate Aminotransferase (AST/SGOT): 13 U/L (02-11 @ 06:39)  Aspartate Aminotransferase (AST/SGOT): 17 U/L (02-10 @ 08:46)  Aspartate Aminotransferase (AST/SGOT): 23 U/L (02-09 @ 05:41)  Aspartate Aminotransferase (AST/SGOT): 27 U/L (02-08 @ 23:27)    02-17    140  |  110[H]  |  70[H]  ----------------------------<  227[H]  2.3[LL]   |  17[L]  |  1.73[H]    Ca    8.4[L]      17 Feb 2025 05:59  Phos  3.2     02-17  Mg     1.5     02-17    TPro  5.2[L]  /  Alb  2.7[L]  /  TBili  0.8  /  DBili  x   /  AST  15  /  ALT  19  /  AlkPhos  101  02-17 03-01    144  |  113[H]  |  100[H]  ----------------------------<  167[H]  3.7   |  23  |  1.80[H]    Ca    9.1      01 Mar 2025 05:28  Phos  4.1     03-01  Mg     2.0     03-01    TPro  5.6[L]  /  Alb  2.0[L]  /  TBili  0.7  /  DBili  x   /  AST  41[H]  /  ALT  58  /  AlkPhos  339[H]  03-01 03-02    142  |  110[H]  |  105[H]  ----------------------------<  132[H]  3.9   |  25  |  1.86[H]    Ca    9.3      02 Mar 2025 05:50  Phos  3.9     03-02  Mg     2.2     03-02    TPro  6.2  /  Alb  1.9[L]  /  TBili  0.6  /  DBili  x   /  AST  35  /  ALT  44  /  AlkPhos  269[H]  03-02                 RADIOLOGY:< from: CT Abdomen and Pelvis No Cont (02.09.25 @ 03:01) >  IMPRESSION:  1.   Study somewhat limited due to absence of contrast.  2.   Central venous catheter on right side with tip in cavoatrial   junction.  3.   Bibasilar atelectasis/scarring right-greater-than-left; can not   exclude  trace pleural effusions. No areas of consolidation. No pneumothorax.  4.   Lucency/deformity is noted involving the posterior aspect of the   left 10 ,  possibly 9th ribs; minimal fractures of indeterminate age in these areas   can  not be excluded.    < end of copied text >  < from: CT Abdomen and Pelvis No Cont (02.09.25 @ 03:01) >  IMPRESSION:  1.   Study somewhat limited due to absence of contrast.  2.   Axial images 71-76 of series 301 and  concomitant coronal images demonstrate prominent soft tissue inseparable   from  the body of the pancreas consistent with pancreatic neoplasm.  3.   Some distension of gallbladder could be further evaluated with right   upper  quadrant sonography. No definite biliary dilatation.  4.   Other incidental findings as above.    < end of copied text >      EKG:  < from: 12 Lead ECG (02.09.25 @ 16:52) >  Diagnosis Line *** Poor data quality, interpretation may be adversely affected  Sinus tachycardia with premature atrial complexes  Left axis deviation  Low voltage QRS  Inferior infarct (cited on or before 08-FEB-2025)  Anterolateral infarct (cited on or before 08-FEB-2025)  Abnormal ECG  When compared with ECG of 09-FEB-2025 16:51, (Unconfirmed)  premature atrial complexes are now present  Confirmed by HAYLEY LANTIGUA ([id number]) on 2/10/2025 10:25:52 PM    < end of copied text >              ECHO:  My reading-Normal LV systolic function; trace MR/mild MAC; mild aortic sclerosis; no significant pulmonary hypertension; trace pericardial effusion    Telem:  Sinus; rate now 100       CHIEF COMPLAINT: Patient is a 69y old  Female who presents with a chief complaint of     HPI: HPI:   History of Present Illness:    HPI: Patient is a 69-year-old female recent diagnosis of 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  Patient was being followed by GI, ID, Cardiology and Onc there.  Patient has been transferred to  today on patient's  (GI Dr. Pleitez) request.  At this time, patient c/o non-bloody diarrhea x 3 in last 24 hours.  She denies any abdominal pain, nausea, but has some dry heaves.  She denies any fever, chills, chest pain, palpitation, constipation or dysuria. Initially, she was started on Vancomycin and Zosyn, but now she has been on Cefepime as her blood culture and urine cultures are positive for Pseudomonas aeruginosa. Currently, patient is on Cefepime.  ID had also recommended Flagyl, but patient had refused it, as it causes nausea to her.    Sig labs: WBC 0.45k, N 82%, Hb 7.8, Platelets 59k, Bicarb 19, AG 9, , Cr 0.99, TP 4.6, Alb 1.2, LFTs wnl.  Mg 1.9, PO4 2.4.  Prior C.diff negative.     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  Her antibiotics were changes, electrolytes repleted and was given 3 units of blood.  She was on bipap overnight but is now comfortable on nasal O2 with good sats.  3/1/25-After I saw the patient in the morning at which point she was doing well she developed a fever and an apparent ileus and now has an NGT draining bilious fluid.  When this happened her HR increased which is now back down.  ICV filter is currently on hold  3/2/25-Patient has now developed fungemia but right at the current time is feeling well with stable vital signs.  The IVC filter was not placed and the plan is to remove her port-Dr Avalos has been called          ICU Vital Signs Last 24 Hrs  T(C): 37.1 (02 Mar 2025 14:00), Max: 37.8 (02 Mar 2025 09:34)  T(F): 98.8 (02 Mar 2025 14:00), Max: 100 (02 Mar 2025 09:34)  HR: 74 (02 Mar 2025 16:00) (72 - 96)  BP: 125/68 (02 Mar 2025 16:00) (107/75 - 137/66)  BP(mean): 84 (02 Mar 2025 16:00) (83 - 92)  ABP: --  ABP(mean): --  RR: 21 (02 Mar 2025 16:00) (19 - 33)  SpO2: 97% (02 Mar 2025 16:00) (94% - 97%)    O2 Parameters below as of 02 Mar 2025 16:00  Patient On (Oxygen Delivery Method): room air                  12 Feb 2025 07:01  -  13 Feb 2025 07:00  --------------------------------------------------------  IN: 0 mL / OUT: 900 mL / NET: -900 mL          PHYSICAL EXAM:   Patient in NAD  Neck: No JVD; Carotids:  2+ without bruits  Respiratory:  Clear to A&P  Cardiovascular: S1 and S2, regular rate and rhythm, no Murmurs, gallops or rubs  Abd:  BS positive    MEDICATIONS  (STANDING):  albumin human 25% IVPB 50 milliLiter(s) IV Intermittent every 6 hours  caspofungin IVPB      chlorhexidine 2% Cloths 1 Application(s) Topical <User Schedule>  cholecalciferol 2000 Unit(s) Oral at bedtime  dextrose 5% + sodium chloride 0.45%. 1000 milliLiter(s) (75 mL/Hr) IV Continuous <Continuous>  dextrose 5%. 1000 milliLiter(s) (100 mL/Hr) IV Continuous <Continuous>  dextrose 5%. 1000 milliLiter(s) (50 mL/Hr) IV Continuous <Continuous>  dextrose 50% Injectable 25 Gram(s) IV Push once  dextrose 50% Injectable 12.5 Gram(s) IV Push once  dextrose 50% Injectable 25 Gram(s) IV Push once  famotidine Injectable 20 milliGRAM(s) IV Push daily  glucagon  Injectable 1 milliGRAM(s) IntraMuscular once  insulin lispro (ADMELOG) corrective regimen sliding scale   SubCutaneous every 6 hours  metoprolol tartrate 25 milliGRAM(s) Oral every 6 hours  pantoprazole  Injectable 40 milliGRAM(s) IV Push every 12 hours  Parenteral Nutrition - Adult 1 Each (75 mL/Hr) TPN Continuous <Continuous>  sucralfate suspension 1 Gram(s) Oral four times a day  vancomycin    Solution 125 milliGRAM(s) Oral every 6 hours  zinc oxide 40% Paste 1 Application(s) Topical two times a day          WBC Count: 0.45 K/uL (02-12 @ 09:15)     Hemoglobin: 7.8 g/dL (02-12 @ 09:15)  Hemoglobin: 8.9 g/dL (02-11 @ 08:45)  Hemoglobin: 9.6 g/dL (02-10 @ 08:46)  Hemoglobin: 10.1 g/dL (02-09 @ 05:41)  Hemoglobin: 8.9 g/dL (02-08 @ 23:27)              Hematocrit: 23.6 % (02-12 @ 09:15)  Hematocrit: 26.7 % (02-11 @ 08:45)  Hematocrit: 29.6 % (02-10 @ 08:46)  Hematocrit: 31.5 % (02-09 @ 05:41)  Hematocrit: 28.4 % (02-08 @ 23:27)              Mean Cell Volume: 90.4 fl (02-12 @ 09:15)  Mean Cell Volume: 89.9 fl (02-11 @ 08:45)  Mean Cell Volume: 91.6 fl (02-10 @ 08:46)  Mean Cell Volume: 92.6 fl (02-09 @ 05:41)  Mean Cell Volume: 93.7 fl (02-08 @ 23:27)              Platelet Count - Automated: 59 K/uL (02-12 @ 09:15)  Platelet Count - Automated: 13 K/uL (02-11 @ 08:45)  Platelet Count - Automated: 25 K/uL (02-10 @ 08:46)  Platelet Count - Automated: 60 K/uL (02-09 @ 05:41)  Platelet Count - Automated: 64 K/uL (02-08 @ 23:27)                        10.1   1.30  )-----------( 37       ( 17 Feb 2025 05:59 )             28.5                         10.8   12.44 )-----------( 193      ( 01 Mar 2025 05:28 )             32.2                           10.1   12.72 )-----------( 200      ( 02 Mar 2025 05:50 )             30.6                                           Chems        Sodium: 140 mmol/L (02-12 @ 09:15)  Sodium: 140 mmol/L (02-11 @ 06:39)  Sodium: 141 mmol/L (02-10 @ 08:46)  Sodium: 146 mmol/L (02-09 @ 05:41)  Sodium: 144 mmol/L (02-08 @ 23:27)          Potassium: 3.5 mmol/L (02-12 @ 09:15)  Potassium: 3.4 mmol/L (02-11 @ 06:39)  Potassium: 3.3 mmol/L (02-10 @ 08:46)  Potassium: 4.3 mmol/L (02-09 @ 05:41)  Potassium: 4.2 mmol/L (02-08 @ 23:27)          Blood Urea Nitrogen: 22 mg/dL (02-12 @ 09:15)  Blood Urea Nitrogen: 23 mg/dL (02-11 @ 06:39)  Blood Urea Nitrogen: 28 mg/dL (02-10 @ 08:46)  Blood Urea Nitrogen: 29 mg/dL (02-09 @ 05:41)  Blood Urea Nitrogen: 32 mg/dL (02-08 @ 23:27)          Creatinine 0.99  Creatinine 1.10  Creatinine 1.40  Creatinine 1.40  Creatinine 1.50          Magnesium: 1.9 mg/dL (02-12 @ 09:15)  Magnesium: 1.8 mg/dL (02-11 @ 06:39)  Magnesium: 2.1 mg/dL (02-10 @ 08:46)  Magnesium: 2.1 mg/dL (02-09 @ 05:41)          Protein Total: 4.6 g/dL (02-12 @ 09:15)  Protein Total: 4.6 g/dL (02-11 @ 06:39)  Protein Total: 5.0 g/dL (02-10 @ 08:46)  Protein Total: 5.0 g/dL (02-09 @ 05:41)  Protein Total: 4.5 g/dL (02-08 @ 23:27)                  Calcium: 8.9 mg/dL (02-12 @ 09:15)  Calcium: 8.7 mg/dL (02-11 @ 06:39)  Calcium: 9.1 mg/dL (02-10 @ 08:46)  Calcium: 9.2 mg/dL (02-09 @ 05:41)  Calcium: 8.5 mg/dL (02-08 @ 23:27)          Phosphorus: 2.4 mg/dL (02-12 @ 09:15)  Phosphorus: 2.3 mg/dL (02-11 @ 06:39)  Phosphorus: 2.9 mg/dL (02-10 @ 08:46)  Phosphorus: 2.9 mg/dL (02-09 @ 05:41)          Bilirubin Total: 0.7 mg/dL (02-12 @ 09:15)  Bilirubin Total: 0.2 mg/dL (02-11 @ 06:39)  Bilirubin Total: 0.5 mg/dL (02-10 @ 08:46)  Bilirubin Total: 0.4 mg/dL (02-09 @ 05:41)  Bilirubin Total: 0.3 mg/dL (02-08 @ 23:27)          Alanine Aminotransferase (ALT/SGPT): 17 U/L (02-12 @ 09:15)  Alanine Aminotransferase (ALT/SGPT): 18 U/L (02-11 @ 06:39)  Alanine Aminotransferase (ALT/SGPT): 25 U/L (02-10 @ 08:46)  Alanine Aminotransferase (ALT/SGPT): 33 U/L (02-09 @ 05:41)  Alanine Aminotransferase (ALT/SGPT): 33 U/L (02-08 @ 23:27)          Aspartate Aminotransferase (AST/SGOT): 8 U/L (02-12 @ 09:15)  Aspartate Aminotransferase (AST/SGOT): 13 U/L (02-11 @ 06:39)  Aspartate Aminotransferase (AST/SGOT): 17 U/L (02-10 @ 08:46)  Aspartate Aminotransferase (AST/SGOT): 23 U/L (02-09 @ 05:41)  Aspartate Aminotransferase (AST/SGOT): 27 U/L (02-08 @ 23:27)    02-17    140  |  110[H]  |  70[H]  ----------------------------<  227[H]  2.3[LL]   |  17[L]  |  1.73[H]    Ca    8.4[L]      17 Feb 2025 05:59  Phos  3.2     02-17  Mg     1.5     02-17    TPro  5.2[L]  /  Alb  2.7[L]  /  TBili  0.8  /  DBili  x   /  AST  15  /  ALT  19  /  AlkPhos  101  02-17 03-01    144  |  113[H]  |  100[H]  ----------------------------<  167[H]  3.7   |  23  |  1.80[H]    Ca    9.1      01 Mar 2025 05:28  Phos  4.1     03-01  Mg     2.0     03-01    TPro  5.6[L]  /  Alb  2.0[L]  /  TBili  0.7  /  DBili  x   /  AST  41[H]  /  ALT  58  /  AlkPhos  339[H]  03-01 03-02    142  |  110[H]  |  105[H]  ----------------------------<  132[H]  3.9   |  25  |  1.86[H]    Ca    9.3      02 Mar 2025 05:50  Phos  3.9     03-02  Mg     2.2     03-02    TPro  6.2  /  Alb  1.9[L]  /  TBili  0.6  /  DBili  x   /  AST  35  /  ALT  44  /  AlkPhos  269[H]  03-02                 RADIOLOGY:< from: CT Abdomen and Pelvis No Cont (02.09.25 @ 03:01) >  IMPRESSION:  1.   Study somewhat limited due to absence of contrast.  2.   Central venous catheter on right side with tip in cavoatrial   junction.  3.   Bibasilar atelectasis/scarring right-greater-than-left; can not   exclude  trace pleural effusions. No areas of consolidation. No pneumothorax.  4.   Lucency/deformity is noted involving the posterior aspect of the   left 10 ,  possibly 9th ribs; minimal fractures of indeterminate age in these areas   can  not be excluded.    < end of copied text >  < from: CT Abdomen and Pelvis No Cont (02.09.25 @ 03:01) >  IMPRESSION:  1.   Study somewhat limited due to absence of contrast.  2.   Axial images 71-76 of series 301 and  concomitant coronal images demonstrate prominent soft tissue inseparable   from  the body of the pancreas consistent with pancreatic neoplasm.  3.   Some distension of gallbladder could be further evaluated with right   upper  quadrant sonography. No definite biliary dilatation.  4.   Other incidental findings as above.    < end of copied text >      EKG:  < from: 12 Lead ECG (02.09.25 @ 16:52) >  Diagnosis Line *** Poor data quality, interpretation may be adversely affected  Sinus tachycardia with premature atrial complexes  Left axis deviation  Low voltage QRS  Inferior infarct (cited on or before 08-FEB-2025)  Anterolateral infarct (cited on or before 08-FEB-2025)  Abnormal ECG  When compared with ECG of 09-FEB-2025 16:51, (Unconfirmed)  premature atrial complexes are now present  Confirmed by HAYLEY LANTIGUA ([id number]) on 2/10/2025 10:25:52 PM    < end of copied text >              ECHO:  My reading-Normal LV systolic function; trace MR/mild MAC; mild aortic sclerosis; no significant pulmonary hypertension; trace pericardial effusion    Telem:  Sinus; rate now 100

## 2025-03-02 NOTE — PROGRESS NOTE ADULT - SUBJECTIVE AND OBJECTIVE BOX
Subjective:    pat lying comfortably, temp 100, B/C + yeasts, RA 98%, NG tube on suction. Reported with new fungus in blood cultureRemoval, venous port 02-Mar-2025 15:28:47  James Avalos    Home Medications:  cefepime 2 g intravenous injection: 2 gram(s) intravenous every 12 hours (12 Feb 2025 12:59)  filgrastim: 480 microgram(s) subcutaneous once a day (12 Feb 2025 12:59)  Lactated Ringers Injection intravenous solution: 150 milligram(s) intravenous every 1 to 2 hours (12 Feb 2025 12:59)  loperamide 2 mg oral capsule: 1 cap(s) orally once (12 Feb 2025 12:59)  midodrine 5 mg oral tablet: 1 tab(s) orally every 8 hours (12 Feb 2025 12:59)  Multiple Vitamins with Minerals oral liquid: 1 orally once a day (12 Feb 2025 12:59)  octreotide 2500 mcg/mL subcutaneous solution: 0.04 milliliter(s) subcutaneous 3 times a day (12 Feb 2025 12:59)  sucralfate 1 g/10 mL oral suspension: 10 milliliter(s) orally 4 times a day (12 Feb 2025 12:59)  trimethobenzamide 100 mg/mL intramuscular solution: 2 milliliter(s) intramuscular every 8 hours As needed Nausea and/or Vomiting (12 Feb 2025 12:59)    MEDICATIONS  (STANDING):  albumin human 25% IVPB 50 milliLiter(s) IV Intermittent every 6 hours  caspofungin IVPB      chlorhexidine 2% Cloths 1 Application(s) Topical <User Schedule>  cholecalciferol 2000 Unit(s) Oral at bedtime  dextrose 5% + sodium chloride 0.45%. 1000 milliLiter(s) (75 mL/Hr) IV Continuous <Continuous>  dextrose 5%. 1000 milliLiter(s) (100 mL/Hr) IV Continuous <Continuous>  dextrose 5%. 1000 milliLiter(s) (50 mL/Hr) IV Continuous <Continuous>  dextrose 50% Injectable 25 Gram(s) IV Push once  dextrose 50% Injectable 12.5 Gram(s) IV Push once  dextrose 50% Injectable 25 Gram(s) IV Push once  famotidine Injectable 20 milliGRAM(s) IV Push daily  glucagon  Injectable 1 milliGRAM(s) IntraMuscular once  insulin lispro (ADMELOG) corrective regimen sliding scale   SubCutaneous every 6 hours  lipid, fat emulsion (Fish Oil and Plant Based) 20% Infusion 0.681 Gm/kG/Day (20.8 mL/Hr) IV Continuous <Continuous>  metoprolol tartrate 25 milliGRAM(s) Oral every 6 hours  pantoprazole  Injectable 40 milliGRAM(s) IV Push every 12 hours  Parenteral Nutrition - Adult 1 Each (75 mL/Hr) TPN Continuous <Continuous>  sucralfate suspension 1 Gram(s) Oral four times a day  vancomycin    Solution 125 milliGRAM(s) Oral every 6 hours  zinc oxide 40% Paste 1 Application(s) Topical two times a day    MEDICATIONS  (PRN):  acetaminophen     Tablet .. 650 milliGRAM(s) Oral every 6 hours PRN Temp greater or equal to 38C (100.4F), Mild Pain (1 - 3)  aluminum hydroxide/magnesium hydroxide/simethicone Suspension 30 milliLiter(s) Oral every 4 hours PRN Dyspepsia  Biotene Dry Mouth Oral Rinse 15 milliLiter(s) Swish and Spit five times a day PRN Mouth Care  dextrose Oral Gel 15 Gram(s) Oral once PRN Blood Glucose LESS THAN 70 milliGRAM(s)/deciliter  metoprolol tartrate Injectable 5 milliGRAM(s) IV Push every 6 hours PRN heart rate >110  ondansetron Injectable 4 milliGRAM(s) IV Push every 8 hours PRN Nausea and/or Vomiting  sodium chloride 0.65% Nasal 1 Spray(s) Both Nostrils two times a day PRN Nasal Congestion      Allergies    No Known Allergies    Intolerances        Vital Signs Last 24 Hrs  T(C): 37.8 (02 Mar 2025 09:34), Max: 37.8 (02 Mar 2025 09:34)  T(F): 100 (02 Mar 2025 09:34), Max: 100 (02 Mar 2025 09:34)  HR: 78 (02 Mar 2025 12:00) (72 - 96)  BP: 118/67 (02 Mar 2025 12:00) (98/60 - 137/66)  BP(mean): 83 (02 Mar 2025 12:00) (73 - 92)  RR: 21 (02 Mar 2025 12:00) (19 - 33)  SpO2: 94% (02 Mar 2025 12:00) (94% - 95%)    Parameters below as of 02 Mar 2025 12:00  Patient On (Oxygen Delivery Method): room air          PHYSICAL EXAMINATION:    NECK:  Supple. No lymphadenopathy. Jugular venous pressure not elevated. Carotids equal.   HEART:   The cardiac impulse has a normal quality. Reg., Nl S1 and S2.  There are no murmurs, rubs or gallops noted  CHEST:  Chest crackles to auscultation. Normal respiratory effort.  ABDOMEN:  Soft and nontender.   EXTREMITIES:  There is no edema.       LABS:                        10.1   12.72 )-----------( 200      ( 02 Mar 2025 05:50 )             30.6     03-02    142  |  110[H]  |  105[H]  ----------------------------<  132[H]  3.9   |  25  |  1.86[H]    Ca    9.3      02 Mar 2025 05:50  Phos  3.9     03-02  Mg     2.2     03-02    TPro  6.2  /  Alb  1.9[L]  /  TBili  0.6  /  DBili  x   /  AST  35  /  ALT  44  /  AlkPhos  269[H]  03-02      Urinalysis Basic - ( 02 Mar 2025 05:50 )    Color: x / Appearance: x / SG: x / pH: x  Gluc: 132 mg/dL / Ketone: x  / Bili: x / Urobili: x   Blood: x / Protein: x / Nitrite: x   Leuk Esterase: x / RBC: x / WBC x   Sq Epi: x / Non Sq Epi: x / Bacteria: x

## 2025-03-02 NOTE — CHART NOTE - NSCHARTNOTEFT_GEN_A_CORE
Clinical Nutrition PN Follow Up Note    *68 y/o F w/ recent diagnosis of primary pancreatic adenocarcinoma of head with tumor in umbilicus, chemo-induced pancytopenia and significant PMH of SCC lip, cyclical vomiting syndrome, HTN, HPL, Gout, GERD, CVA on Aggrenox and others had been admitted to [facility] with septic shock on 2025, nausea, vomiting and diarrhea requiring Levophed drip, was transferred out of ICU to regular floor last night.  Patient was being followed by GI, ID, Cardiology and Onc there.  Patient has been transferred to  today on patient's  (GI Dr. Pleitez) request.  At this time, patient c/o non-bloody diarrhea x 3 in last 24 hours.  She denies any abdominal pain, nausea, but has some dry heaves.  She denies any fever, chills, chest pain, palpitation, constipation or dysuria. Initially, she was started on Vancomycin and Zosyn, but now she has been on Cefepime as her blood culture and urine cultures are positive for Pseudomonas aeruginosa. Currently, patient is on Cefepime.  ID had also recommended Flagyl, but patient had refused it, as it causes nausea to her. Noted with Metabolic acidosis with NAG due to GI loss. Likely chemo-induced diarrhea and vomiting. Better than before. Cutaneous lesion on RUQ area below right breast, unclear etiology, ? Ecthyma gangrenosum. Admitting diagnosis: pancreatic ca  *: Pt transfer from [facility]; seen by RD and met criteria for PCM. Pt reports only being able to tolerate some sips of water and tea at this time. Reports tried eating small bite of mashed banana and felt burning sensation down her throat. Reports #; RD unable to obtain bedscale wt d/t bedscale not working. Admit wt per # note with 1+ and 2+ edema skewing wt. Pt appears overweight, NFPE reveals mild- mod muscle/fat wasting at this time. Recommend to continue with PO diet and encourage intake as tolerated. RD consulted to initiate PN d/t PO intolerance. Will initiate TPN through port.  *2/15: Remains on low fiber diet with minimal PO intake. Still w/ multiple episodes of diarrhea, if persists can consider adding 5mg zinc into PN bag. No other acute events overnight. D/w Dr. Joyner will c/w TPN today.   *: Raised area noted to mid sternum. RCW port intact.- no swelling noted, no redness. Port flushed without resistance. + blood return noted. Assessed with IV team Amy. Per pt no Pain on palpation. CT Chest. TPN on hold until CT read. CT chest from earlier in the day revealed new bilateral pleural effusions with bibasilar consolidated lung and ascites. CXR now shows low lung volumes and bibasilar opacities. Rigors post transfusion - Rapid response called;  Dr. Pleitez requesting ICU consult- concerned for pt.  requesting assessment of patient STAT. Pt transferred to SICU. ? third spacing, and possible PNA. Per pt still drinking small sips of water and tea. D/w Dr. Fortune will keep total volume same in PN bag today, and will c/w TPN.   *:  tx SICU, po remains minimal. On bipap. On additional LR d/t diuresis per critical care PA: "malignancy/severe protein-calorie malnutrition/hypoalbuminemia causing diffuse third spacing, caution with further diuresis as this is not truly a volume overload/acute CHF issue" also on IV albumin  c/w TPN  STRONGLY suggest to Confirm goals of care regarding LONG-TERM nutrition support - However, LONG-TERM Nutrition support is not recommended with advanced cancer as studies show that there is no improvement of dehydration symptoms, quality of life, or survival. It also increases the risk for peripheral edema, ascites, and pleural effusions. Will continue to provide nutr within GOC though  *: plan to c/w TPN for now. RD discussed w/ MD Tong and SICU IRINA Jurado need to confirm long term GOC regarding nutr support. Per GI - Esophageal pain is 2/2 esophagitis but unclear what type -- reflux vs. candida vs CMV or HSV etc. Plan to add imodium standing. ? EGD   *: reports she stills has difficulty swallowing due to pain. feeling weak overall. still having multiple episodes of diarrhea, poor PO intake. Plan to c/w TPN - per GI: "CVM and HSV will be negative which argues that esophagitis is reflux or fungal related"  d/w IRINA Jurado long-term plan regarding nutr support TBD, pending discussion w/ family. ? fluid overload which TPN may be contributing to as some studies show with advanced cancer that nutrition support increases the risk for peripheral edema, ascites, and pleural effusions.   *: PO slighly improving, plan to c/w TPN.  POCT uptrending, d/w IRINA Jurado, steroids to be dc and will hold off in insulin PN bag - will manage w/ insulin outside bag.  Per GI: "Imaging suggests ileus but I personally think that putting the imaging together with history and clinical findings that she just has severe diarrhea illness 2/2 chemo, given that both large and small bowel loops are fluid filled and she has no abd pain or vomiting"  *: PO intake continues to slightly improve, plan to c/w TPN. Hyperglycemia worsening, d/w IRINA Jurado, plan to add regular insulin into PN bag. Will also decrease dextrose to 250g to see if this helps w/ hyperglycemia and can increase again when POCTs better controlled.   *: Abdomen distended, non-tender to palpation - GI rec stat CT w/o contrast for further evaluation and to hold PO intake at this time. POCT remain elevated, per RN  this morning - Lantus was not given last night by RN since insulin was already in PN bag (?). Will hold off on increasing dextrose and will c/w current insulin in PN bag, d/w intensivist and plan to adjust Lantus outside PN bag.   *: Now has (+) GIB,  CTA with improvement in bowel distension, small and lg bowel ileus. Per GI, clears OK.  *: consumed some PO yesterday. Per GI, unsure why GIB but now resolved - ?low level ischemic colitis. plan to c/w TPN for now per sicu IRINA Jurado  STRONGLY suggest palliative care consult to determine LONG-TERM GOC regarding nutr support   *: per SICU RN, pt ate yogurt, bites of oatmeal.  bringing in foods to encourage PO as well. IR consulted for IVC filter.  Wound care RN following.  c/w TPN  *: IVC filter being considered, possible EGD d/t recurrent GIB.  Per GI - corpak is reasonable. RD feels strongly that corpak or even PEG (if able) would be best route of nutr support for this pt at this point. Has been on TPN x 13 days. Will c/w TPN per IRINA Jurado for now. STRONGLY rec'd palliative care team to Confirm goals of care regarding LONG-TERM nutrition support   *: Had werner BM again,  would like to defer endoscopy/colonoscopy at this time. Plan for IVC filter placement today.   *3/1: Bleeding resolved per GI but febrile, per GI Dr. Hernandez "I think corepak for feeds would be preferable as TPN increases the risk of infection etc but Dr. Pleitez has declined"  NGT was placed for gastric decompression yesterday.  IVC filter deferred at this time    *TPN initiated  PO intolerance, + Mucositis, possible candidal esophagitis    *current status: Plan to c/w TPN - Persistent ileus, gastric distention on chest x-ray , NGT remains in place for decompression, abdomen softer yesterday per MD. Found to have preliminary findings of "yeast-like cells" in blood culture from , abx regimen to be adjusted prn    *pertinent meds: acetaminophen, abx, famotidine, lantus 20 U HS, ADMELOG (2 units given x 24 hrs), protonix     *labs reviewed; Lytes WNL. No changes to lytes 3/1 and 3/2   T bili WNL will c/w trace elements. C/w MVI and thiamine in PN bag. POCTs improved x 24 hrs, will c/w current dextrose (250g) @ goal - meeting LOW-END of calorie needs w/ that amount d/t persistent hyperglycemia. c/w 25 units of regular insulin in PN bag. Will hold off on cycling TPN for now.  New TG level 400 - will give lipids MWF. Pending new TG level. If continues to uptrend can give 1 week lipid break. Also of note, Cl still elevated, no Cl added into TPN bag        142  |  110[H]  |  105[H]  ----------------------------<  132[H]  3.9   |  25  |  1.86[H]    Ca    9.3      02 Mar 2025 05:50  Phos  3.9       Mg     2.2         TPro  6.2  /  Alb  1.9[L]  /  TBili  0.6  /  DBili  x   /  AST  35  /  ALT  44  /  AlkPhos  269[H]      BMI: BMI (kg/m2): 27.8 (25 @ 05:46)  HbA1c: A1C with Estimated Average Glucose Result: 6.6 % (25 @ 05:35)    BP: 131/77 (25 @ 06:00) (99/71 - 142/80)Vital Signs Last 24 Hrs  T(C): 36.8 (25 @ 18:08), Max: 36.8 (25 @ 13:36)  T(F): 98.3 (25 @ 18:08), Max: 98.3 (25 @ 13:36)  HR: 96 (25 @ 06:00) (88 - 110)  BP: 131/77 (25 @ 06:00) (125/73 - 141/92)  BP(mean): 94 (25 @ 06:00) (90 - 105)  RR: 28 (25 @ 06:00) (20 - 33)  SpO2: 98% (25 @ 06:00) (91% - 99%)    Lipid Panel: Date/Time: 25 @ 05:07  Triglycerides, Serum: 400    POCT Blood Glucose.: 121 mg/dL (02 Mar 2025 06:44)  POCT Blood Glucose.: 123 mg/dL (01 Mar 2025 23:50)  POCT Blood Glucose.: 136 mg/dL (01 Mar 2025 18:20)  POCT Blood Glucose.: 147 mg/dL (01 Mar 2025 14:19)    Iron Total: 34 ug/dL (25 @ 18:46)  Folate, Serum: >20.0 ng/mL (25 @ 18:46)  Vitamin B12, Serum: 1457 pg/mL (25 @ 18:46)  Vitamin D, 25-Hydroxy: 23.0 ng/mL (25 @ 18:46) ** c/w deficiency    *I&O's Detail    01 Mar 2025 07:01  -  02 Mar 2025 07:00  --------------------------------------------------------  IN:    Fat Emulsion (Fish Oil &amp; Plant Based) 20% Infusion: 246 mL    IV PiggyBack: 200 mL    Oral Fluid: 200 mL    TPN (Total Parenteral Nutrition): 1552 mL  Total IN: 2198 mL    OUT:    Indwelling Catheter - Urethral (mL): 1125 mL    Nasogastric/Oral tube (mL): 370 mL  Total OUT: 1495 mL    Total NET: 703 mL  * fluid status: positive; UOP decreased, small NGT output noted. Will continue to monitor/adjust prn to maintain fluid balance   *last (+) BM doc'd 3/ - mucoid, green. With 5 loose, red-streaked BM's yesterday 3/1. pt not on bowel regimen.  *edema: 1+ generalized, 2+ L leg  *PI: L/R Buttocks (stage 2)    *malnutrition: Pt continues to meet criteria for severe protein calorie malnutrition in context of acute on chronic illness r/t decreased ability to meet increased nutrient needs 2/2 PO intolerance, N/V and diarrhea, on chemo for pancreatic ca AEB meeting <50% ENN > 5 days, mild-mod muscle/fat wasting    Estimated Needs: based on 73.4 Kg (wt from EMR admit wt )  Calories: 1812 - 2175 Kcal (25 - 30 Kcal/Kg)  Protein: 108 - 145 g (1.5 - 2.0 g/Kg)  Fluids: 1450 - 1812 mL (20 - 25 mL/Kg)    Diet, Regular:   Prosource Gelatein 20 Sugar Free     Qty per Day:  2  Supplement Feeding Modality:  Oral  Ensure Plus High Protein Cans or Servings Per Day:  1       Frequency:  Two Times a day (25 @ 12:01) [Active]    Weight History:  Daily Weight in k.2 (2025 04:00)  Daily Weight in k.3 (2025 06:00) *2+ edema  Daily Weight in k.4 (2025 05:00)  Daily Weight in k.6 (15 Feb 2025 06:17)  Height (cm): 162.6 (25 @ 02:39)  Weight (kg): 73.4 (25 @ 05:46), 66.8 (25 @ 02:39)  BMI (kg/m2): 27.8 (25 @ 05:46), 25.3 (25 @ 02:39)  BSA (m2): 1.79 (25 @ 05:46), 1.72 (25 @ 02:39)    TPN Recommendations: via implanted infusion port  Total Volume: 1800 mL x 24 hours  125 g  Amino Acids  250 g Dextrose  0 g Lipids 20% (MWF only)  0 mEq Sodium Chloride  0 mEq Sodium Acetate  0 mmol Sodium Phosphate  0 mEq Potassium Chloride  85 mEq Potassium Acetate  10 mmol Potassium Phosphate  0 mEq Calcium Gluconate (CAPS out of PN solution)   16 mEq Magnesium Sulfate  200 mg Thiamine  1 ml Trace Elements  10 ml MVI  25 units Regular Insulin    Total Calories    1350   (Meets  73%  of lower end of Estimated Energy needs  and  100%  Protein needs)      Additional Recommendations:    1) Daily weights  2) Strict I & O's **pt third spacing  3) Daily lyte checks including magnesium and phos  4) Weekly triglycerides/LFT checks  5) POCT q6hrs; maintain 140-180mg/dL*** insulin management per MD  6) C/w low fiber diet and encourage PO intake as tolerated  7) STRONGLY rec'd palliative care consult to Confirm goals of care regarding long-term nutrition support. Would recommend corpak vs PEG placement if long-term nutrition support is required as GI is functional and would be the preferred route for nutrition when GIB/ileus resolves - now w/ NGT to decompression. However, long-term nutrition support (EN or TPN) is not recommended with advanced cancer as studies show that there is no improvement of dehydration symptoms, quality of life, or survival. It also increases the risk for peripheral edema, ascites, and pleural effusions.     *will continue to monitor and adjust PN prn*  Marce Mitchell, MS, RDN, CNSC, [phone]  Certified Nutrition  ADDENDUM: plan to pull infusion port d/t new infection. NGT w/ minimal output, plan to stop TPN and start TF - recommend initiation of [employer] Peptide 1.5 (which is also low carb) @ 20 cc/hr, increase by 10 cc/hr q6 hours until goal rate of 65 cc/hr (total volume 1300 mL) met with 2 packets of Prosource TF. Will provide ~ 2150 kcal, 126 g protein, and 910 mL free water. Additional free water flushes as per MD to maintain hydration. 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  Patient was being followed by GI, ID, Cardiology and Onc there.  Patient has been transferred to  today on patient's  (GI Dr. Pleitez) request.  At this time, patient c/o non-bloody diarrhea x 3 in last 24 hours.  She denies any abdominal pain, nausea, but has some dry heaves.  She denies any fever, chills, chest pain, palpitation, constipation or dysuria. Initially, she was started on Vancomycin and Zosyn, but now she has been on Cefepime as her blood culture and urine cultures are positive for Pseudomonas aeruginosa. Currently, patient is on Cefepime.  ID had also recommended Flagyl, but patient had refused it, as it causes nausea to her. Noted with Metabolic acidosis with NAG due to GI loss. Likely chemo-induced diarrhea and vomiting. Better than before. Cutaneous lesion on RUQ area below right breast, unclear etiology, ? Ecthyma gangrenosum. Admitting diagnosis: pancreatic ca  *: Pt transfer from [facility]; seen by RD and met criteria for PCM. Pt reports only being able to tolerate some sips of water and tea at this time. Reports tried eating small bite of mashed banana and felt burning sensation down her throat. Reports #; RD unable to obtain bedscale wt d/t bedscale not working. Admit wt per # note with 1+ and 2+ edema skewing wt. Pt appears overweight, NFPE reveals mild- mod muscle/fat wasting at this time. Recommend to continue with PO diet and encourage intake as tolerated. RD consulted to initiate PN d/t PO intolerance. Will initiate TPN through port.  *2/15: Remains on low fiber diet with minimal PO intake. Still w/ multiple episodes of diarrhea, if persists can consider adding 5mg zinc into PN bag. No other acute events overnight. D/w Dr. Joyner will c/w TPN today.   *: Raised area noted to mid sternum. RCW port intact.- no swelling noted, no redness. Port flushed without resistance. + blood return noted. Assessed with IV team Amy. Per pt no Pain on palpation. CT Chest. TPN on hold until CT read. CT chest from earlier in the day revealed new bilateral pleural effusions with bibasilar consolidated lung and ascites. CXR now shows low lung volumes and bibasilar opacities. Rigors post transfusion - Rapid response called;  Dr. Pleitez requesting ICU consult- concerned for pt.  requesting assessment of patient STAT. Pt transferred to SICU. ? third spacing, and possible PNA. Per pt still drinking small sips of water and tea. D/w Dr. Fortune will keep total volume same in PN bag today, and will c/w TPN.   *:  tx SICU, po remains minimal. On bipap. On additional LR d/t diuresis per critical care PA: "malignancy/severe protein-calorie malnutrition/hypoalbuminemia causing diffuse third spacing, caution with further diuresis as this is not truly a volume overload/acute CHF issue" also on IV albumin  c/w TPN  STRONGLY suggest to Confirm goals of care regarding LONG-TERM nutrition support - However, LONG-TERM Nutrition support is not recommended with advanced cancer as studies show that there is no improvement of dehydration symptoms, quality of life, or survival. It also increases the risk for peripheral edema, ascites, and pleural effusions. Will continue to provide nutr within GO though  *: plan to c/w TPN for now. RD discussed w/ MD Fama and SICU PA Ethel Jurado need to confirm long term GOC regarding nutr support. Per GI - Esophageal pain is 2/2 esophagitis but unclear what type -- reflux vs. candida vs CMV or HSV etc. Plan to add imodium standing. ? EGD   *: reports she stills has difficulty swallowing due to pain. feeling weak overall. still having multiple episodes of diarrhea, poor PO intake. Plan to c/w TPN - per GI: "CVM and HSV will be negative which argues that esophagitis is reflux or fungal related"  d/w IRINA Jurado long-term plan regarding nutr support TBD, pending discussion w/ family. ? fluid overload which TPN may be contributing to as some studies show with advanced cancer that nutrition support increases the risk for peripheral edema, ascites, and pleural effusions.   *: PO slighly improving, plan to c/w TPN.  POCT uptrending, d/w IRINA Jurado, steroids to be dc and will hold off in insulin PN bag - will manage w/ insulin outside bag.  Per GI: "Imaging suggests ileus but I personally think that putting the imaging together with history and clinical findings that she just has severe diarrhea illness 2/2 chemo, given that both large and small bowel loops are fluid filled and she has no abd pain or vomiting"  *: PO intake continues to slightly improve, plan to c/w TPN. Hyperglycemia worsening, d/w IRINA Jurado, plan to add regular insulin into PN bag. Will also decrease dextrose to 250g to see if this helps w/ hyperglycemia and can increase again when POCTs better controlled.   *: Abdomen distended, non-tender to palpation - GI rec stat CT w/o contrast for further evaluation and to hold PO intake at this time. POCT remain elevated, per RN  this morning - Lantus was not given last night by RN since insulin was already in PN bag (?). Will hold off on increasing dextrose and will c/w current insulin in PN bag, d/w intensivist and plan to adjust Lantus outside PN bag.   *: Now has (+) GIB,  CTA with improvement in bowel distension, small and lg bowel ileus. Per GI, clears OK.  *: consumed some PO yesterday. Per GI, unsure why GIB but now resolved - ?low level ischemic colitis. plan to c/w TPN for now per sicu IRINA Jurado  STRONGLY suggest palliative care consult to determine LONG-TERM GOC regarding nutr support   *: per SICU RN, pt ate yogurt, bites of oatmeal.  bringing in foods to encourage PO as well. IR consulted for IVC filter.  Wound care RN following.  c/w TPN  *: IVC filter being considered, possible EGD d/t recurrent GIB.  Per GI - corpak is reasonable. RD feels strongly that corpak or even PEG (if able) would be best route of nutr support for this pt at this point. Has been on TPN x 13 days. Will c/w TPN per IRINA Jurado for now. STRONGLY rec'd palliative care team to Confirm goals of care regarding LONG-TERM nutrition support   *: Had maroon BM again,  would like to defer endoscopy/colonoscopy at this time. Plan for IVC filter placement today.   *3/1: Bleeding resolved per GI but febrile, per GI Dr. Hernandez "I think corepak for feeds would be preferable as TPN increases the risk of infection etc but Dr. Pleitez has declined"  NGT was placed for gastric decompression yesterday.  IVC filter deferred at this time    *TPN initiated  PO intolerance, + Mucositis, possible candidal esophagitis    *current status: Plan to c/w TPN - Persistent ileus, gastric distention on chest x-ray , NGT remains in place for decompression, abdomen softer yesterday per MD. Found to have preliminary findings of "yeast-like cells" in blood culture from , abx regimen to be adjusted prn    *pertinent meds: acetaminophen, abx, famotidine, lantus 20 U HS, ADMELOG (2 units given x 24 hrs), protonix     *labs reviewed; Lytes WNL. No changes to lytes 3/1 and 3/2   T bili WNL will c/w trace elements. C/w MVI and thiamine in PN bag. POCTs improved x 24 hrs, will c/w current dextrose (250g) @ goal - meeting LOW-END of calorie needs w/ that amount d/t persistent hyperglycemia. c/w 25 units of regular insulin in PN bag. Will hold off on cycling TPN for now.  New TG level 400 - will give lipids MWF. Pending new TG level. If continues to uptrend can give 1 week lipid break. Also of note, Cl still elevated, no Cl added into TPN bag        142  |  110[H]  |  105[H]  ----------------------------<  132[H]  3.9   |  25  |  1.86[H]    Ca    9.3      02 Mar 2025 05:50  Phos  3.9       Mg     2.2         TPro  6.2  /  Alb  1.9[L]  /  TBili  0.6  /  DBili  x   /  AST  35  /  ALT  44  /  AlkPhos  269[H]      BMI: BMI (kg/m2): 27.8 (25 @ 05:46)  HbA1c: A1C with Estimated Average Glucose Result: 6.6 % (25 @ 05:35)    BP: 131/77 (25 @ 06:00) (99/71 - 142/80)Vital Signs Last 24 Hrs  T(C): 36.8 (25 @ 18:08), Max: 36.8 (25 @ 13:36)  T(F): 98.3 (25 @ 18:08), Max: 98.3 (25 @ 13:36)  HR: 96 (25 @ 06:00) (88 - 110)  BP: 131/77 (25 @ 06:00) (125/73 - 141/92)  BP(mean): 94 (25 @ 06:00) (90 - 105)  RR: 28 (25 @ 06:00) (20 - 33)  SpO2: 98% (25 @ 06:00) (91% - 99%)    Lipid Panel: Date/Time: 25 @ 05:07  Triglycerides, Serum: 400    POCT Blood Glucose.: 121 mg/dL (02 Mar 2025 06:44)  POCT Blood Glucose.: 123 mg/dL (01 Mar 2025 23:50)  POCT Blood Glucose.: 136 mg/dL (01 Mar 2025 18:20)  POCT Blood Glucose.: 147 mg/dL (01 Mar 2025 14:19)    Iron Total: 34 ug/dL (25 @ 18:46)  Folate, Serum: >20.0 ng/mL (25 @ 18:46)  Vitamin B12, Serum: 1457 pg/mL (25 @ 18:46)  Vitamin D, 25-Hydroxy: 23.0 ng/mL (25 @ 18:46) ** c/w deficiency    *I&O's Detail    01 Mar 2025 07:01  -  02 Mar 2025 07:00  --------------------------------------------------------  IN:    Fat Emulsion (Fish Oil &amp; Plant Based) 20% Infusion: 246 mL    IV PiggyBack: 200 mL    Oral Fluid: 200 mL    TPN (Total Parenteral Nutrition): 1552 mL  Total IN: 2198 mL    OUT:    Indwelling Catheter - Urethral (mL): 1125 mL    Nasogastric/Oral tube (mL): 370 mL  Total OUT: 1495 mL    Total NET: 703 mL  * fluid status: positive; UOP decreased, small NGT output noted. Will continue to monitor/adjust prn to maintain fluid balance   *last (+) BM doc'd 3/2 - mucoid, green. With 5 loose, red-streaked BM's yesterday 3/1. pt not on bowel regimen.  *edema: 1+ generalized, 2+ L leg  *PI: L/R Buttocks (stage 2)    *malnutrition: Pt continues to meet criteria for severe protein calorie malnutrition in context of acute on chronic illness r/t decreased ability to meet increased nutrient needs 2/2 PO intolerance, N/V and diarrhea, on chemo for pancreatic ca AEB meeting <50% ENN > 5 days, mild-mod muscle/fat wasting    Estimated Needs: based on 73.4 Kg (wt from EMR admit wt )  Calories: 1812 - 2175 Kcal (25 - 30 Kcal/Kg)  Protein: 108 - 145 g (1.5 - 2.0 g/Kg)  Fluids: 1450 - 1812 mL (20 - 25 mL/Kg)    Diet, Regular:   Prosource Gelatein 20 Sugar Free     Qty per Day:  2  Supplement Feeding Modality:  Oral  Ensure Plus High Protein Cans or Servings Per Day:  1       Frequency:  Two Times a day (25 @ 12:01) [Active]    Weight History:  Daily Weight in k.2 (2025 04:00)  Daily Weight in k.3 (2025 06:00) *2+ edema  Daily Weight in k.4 (2025 05:00)  Daily Weight in k.6 (15 Feb 2025 06:17)  Height (cm): 162.6 (25 @ 02:39)  Weight (kg): 73.4 (25 @ 05:46), 66.8 (25 @ 02:39)  BMI (kg/m2): 27.8 (25 @ 05:46), 25.3 (25 @ 02:39)  BSA (m2): 1.79 (25 @ 05:46), 1.72 (25 @ 02:39)    TPN Recommendations: via implanted infusion port  Total Volume: 1800 mL x 24 hours  125 g  Amino Acids  250 g Dextrose  0 g Lipids 20% (MWF only)  0 mEq Sodium Chloride  0 mEq Sodium Acetate  0 mmol Sodium Phosphate  0 mEq Potassium Chloride  85 mEq Potassium Acetate  10 mmol Potassium Phosphate  0 mEq Calcium Gluconate (CAPS out of PN solution)   16 mEq Magnesium Sulfate  200 mg Thiamine  1 ml Trace Elements  10 ml MVI  25 units Regular Insulin    Total Calories    1350   (Meets  73%  of lower end of Estimated Energy needs  and  100%  Protein needs)      Additional Recommendations:    1) Daily weights  2) Strict I & O's **pt third spacing  3) Daily lyte checks including magnesium and phos  4) Weekly triglycerides/LFT checks  5) POCT q6hrs; maintain 140-180mg/dL*** insulin management per MD  6) C/w low fiber diet and encourage PO intake as tolerated  7) STRONGLY rec'd palliative care consult to Confirm goals of care regarding long-term nutrition support. Would recommend corpak vs PEG placement if long-term nutrition support is required as GI is functional and would be the preferred route for nutrition when GIB/ileus resolves - now w/ NGT to decompression. However, long-term nutrition support (EN or TPN) is not recommended with advanced cancer as studies show that there is no improvement of dehydration symptoms, quality of life, or survival. It also increases the risk for peripheral edema, ascites, and pleural effusions.     *will continue to monitor and adjust PN prn*  Marce Mitchell, MS, RDN, CNSC, [phone]  Certified Nutrition

## 2025-03-02 NOTE — CONSULT NOTE ADULT - SUBJECTIVE AND OBJECTIVE BOX
Patient is a 69-year-old female recent diagnosis of primary pancreatic adenocarcinoma of head with tumor in umbilicus, chemo-induced pancytopenia and significant PMH of SCC lip, cyclical vomiting syndrome, HTN, HPL, Gout, GERD, CVA on Aggrenox and others had been admitted to [facility] with septic shock on 02/09/2025, nausea, vomiting and diarrhea requiring Levophed drip, was transferred out of ICU to regular floor last night.  Patient was being followed by GI, ID, Cardiology and Onc there.  Patient has been transferred to  today on patient's  (GI Dr. Pleitez) request.  At this time, patient c/o non-bloody diarrhea x 3 in last 24 hours.  She denies any abdominal pain, nausea, but has some dry heaves.  She denies any fever, chills, chest pain, palpitation, constipation or dysuria. Initially, she was started on Vancomycin and Zosyn, but now she has been on Cefepime as her blood culture and urine cultures are positive for Pseudomonas aeruginosa. Currently, patient is on Cefepime.  ID had also recommended Flagyl, but patient had refused it, as it causes nausea to her.    The patient developed acute hypoxemic respiratory failure, which is felt to be multifactorial-pulmonary embolic disease, compressive atelectasis, ?TRALI. She is currently being treated with solumedrol in tapering doses for possible acute lung injury.   She was subsequently diagnosed with a PE. She was started on a heparin drip but developed 2 GI bleeds, so this was stopped.   The patient has a mediport in, placed by [facility] for her chemo. It has also been used for TPN. Due to ongoing fevers cultures were seen which showed presence of fungemia.   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      GERD (gastroesophageal reflux disease)      Cyclical vomiting syndrome    CVA (cerebrovascular accident)      H/O squamous cell carcinoma excision  s/p incarcerated umbilical hernia repair through which metastatic adeno Ca of pancreas was diagnosed  SOCIAL HISTORY: Denies tobacco, etoh abuse or illicit drug useAllergies    No Known Allergies    Meds- med rec reviewed    Physical exam    ICU Vital Signs Last 24 Hrs  T(C): 37.8 (02 Mar 2025 09:34), Max: 37.8 (02 Mar 2025 09:34)  T(F): 100 (02 Mar 2025 09:34), Max: 100 (02 Mar 2025 09:34)  HR: 78 (02 Mar 2025 12:00) (72 - 96)  BP: 118/67 (02 Mar 2025 12:00) (98/60 - 137/66)  BP(mean): 83 (02 Mar 2025 12:00) (73 - 92)  ABP: --  ABP(mean): --  RR: 21 (02 Mar 2025 12:00) (19 - 33)  SpO2: 94% (02 Mar 2025 12:00) (93% - 100%)    68 yo female WDWN in no acute distress  skin- warm,dry  HEENT- pupils equal, sclerae anicteric  Neck- no JVD  Chest- R subclavicular port. No tenderness to palpation  Lungs- clear  Cor- RRR no murmer  Abd- soft non tender. Umbilical scar contains growth believed to be metastatic carcinoma  Ext- no edema  Neuro- A and O, no focal deficits.  Culture Results:   Growth in aerobic bottle: Yeast like cells

## 2025-03-02 NOTE — CONSULT NOTE ADULT - ASSESSMENT
70 yo female with metastatic pancreatic ca, being treated for Pseudomonas urosepsis, now found to have yeast growing in blood culture. Suspect she wasa seeded due to mediport, which has also been used for TPN. I recommend removal which can be done at bedside with a local anesthetic. I will discuss with Dr Pina.

## 2025-03-02 NOTE — PROGRESS NOTE ADULT - ASSESSMENT
69-year-old female with stage IV pancreatic adenocarcinoma presenting with neutropenic fever and severe treatment-related toxicities from modified FOLFIRINOX and investigational EMILY inhibitor [id number].    PROBLEMS:    Febrile syndrome. Fungemia. Likely disseminated candidemia.   Dysphagia. Possible candida esophagitis  New febrile syndrome with chills. Possible sepsis. Dysphagia. Possible candida esophagitis, S/p sepsis with PSAE   Acute hypoxic respiratory failure likely secondary to hypervolemia   Bilateral pleural effusions with compressive atelectasis, right greater than left  Neutropenic Fever/Neutropenic Septic shock  Severe Thrombocytopenia-Platelet count critically low at 26k.   Severe diarrhea and inability to tolerate PO intake, likely multifactorial from both FOLFIRINOX (particularly irinotecan component) and study drug [id number].   History of left cephalic vein thrombosis.  MALLORY  69-year-old female with stage IV pancreatic adenocarcinoma presenting with neutropenic fever and severe treatment-related toxicities from modified FOLFIRINOX and investigational EMILY inhibitor [id number].  CT Angio Chest PE Protocol w/ IV Cont (02.19.25 @ 14:16) >  CHEST:  Multiple pulmonary emboli within the segmental branches of the left lower and right lower lobe pulmonary arteries.  There are two right middle lobe nodules, new since prior. Exact   etiology is unclear. Primary differential diagnostic consideration   includes infection.      PLAN:    Pulmonary better  Dc fluconazole  and d/c cefepime  Start caspofungin 70 mg IV x1, then 50 mg IV qd  CTA chest + small PEs in RLL and LLL- IV heparin causing bleeding-waiting GFF  Family does not want any procedure EGD/Colonscopy  DECD steroids  High WBC-trend  Pulmonary tachpnea may be RTA with low bicarbonate because of GI cause with diarrhea but improving renal fx  Neutropenic Septic shock- IV meropenem started on 2/15- previously on CEFEPIME/Continue filgrastim 480mcg daily-Today w/ improvement in counts- WBC 1.3, Hb 10.1, plt 37, cmp with k 2.3 and Repleted, Cr 1.40.   Daily CBC monitoring  Maintain strict neutropenic precautions  Anemia-received two units of pRBCs Hb 10.1 today   Thrombocytopenia-Transfuse for plts < 15   D/w staff & /haematology & ICU in detail

## 2025-03-02 NOTE — PROGRESS NOTE ADULT - ASSESSMENT
69-year-old female with h/o recent diagnosis of primary head of pancreatic adenocarcinoma with tumor in umbilicus, chemo-induced pancytopenia, SCC lip, cyclical vomiting syndrome, HTN, HPL, Gout, GERD, CVA on Aggrenox was transferred on 2/12 from [facility] for further care. She was admitted to [facility] with septic shock on 02/09/2025, nausea, vomiting and diarrhea requiring Levophed drip. Patient was being followed by GI, ID, Cardiology and Onc there. Patient has been transferred to  on patient's  (GI Dr. Pleitez) request. She was reported with non-bloody diarrhea x 3 in last 24 hours PTA. She denied abdominal pain, nausea, but has dry heaves. At [facility] she was started on Vancomycin and Zosyn, then changed to Cefepime as her blood culture and urine cultures showed Pseudomonas aeruginosa.    #ebrile syndrome. Fungemia. Likely disseminated candidemia.   #Dysphagia. Possible candida esophagitis  #S/p sepsis with PSAE   #Head of pancreas adenocarcinoma on chemotherapy  #Leukocytosis  #Immunocompromised host  #Kidney stones  #ARF   -leukocytosis is improving  -renal function is frail but improving  -dysphagia with poor PO intake  -cultures reviewed  -obtain BC x 2 and UA STAT  -s/p cefepime 2 gm IV q8h # 3  -s/p meropenem 1 gm IV q8h # 9   -on fluconazole 200 mg IV qd # 14  -on cefepime 1 gm IV q12h # 3  -on vancomycin 125 mg PO q6h for CDAD prophylaxis  -tolerating abx well so far; no side effects noted  -d/c fluconazole  and d/c cefepime  -start caspofungin 70 mg IV x1, then 50 mg IV qd  -GI evaluation appreciated   -consider removing mediport  -repeat BC x 2 noted  -monitor abdomen  -continue antimicrobial coverage  -f/u cultures  -monitor temps  -f/u CBC and BNP  -supportive care  2. Other issues:   -care per medicine    d/w medicine team and Dr. Geiger

## 2025-03-02 NOTE — PROGRESS NOTE ADULT - ASSESSMENT
69-year-old female with stage IV pancreatic adenocarcinoma presenting with neutropenic fever and severe treatment-related toxicities from modified FOLFIRINOX and investigational EMILY inhibitor [id number] found to have PE and LBO.     # pancreatic ca   - remains on clinical trial- supportive care while in hospital   - CTH negative and discussed w/ pt   - [place]  [facility] [place]. Tapia yesterday at [facility] - pt with UGT1A1 mutation which makes her more susceptible to toxicities from 5FU and irinotecan which may have contributed to symptoms on arrival.  no DPD deficiency   - Review clinical trial protocol - may remain on trial drug alone and changing regimen- to be discussed when recovers from underlying illness and admission     # Neutropenic Septic shock   - pt previously on neupogen- WBC has now recovered and elevated likely 2/2 GCSF support   - meropenem d/c'd  - remains on  fluconazole and po vanco  - had rigors yesterday.  given cefepime and IV vanco x 1     # PE   - v/q scan w indeterminate prob of PE, linear defect in RUL posterior segment   - CT chest- multiple PE w/in segmental left lower and right lower, 2 right middle lobe nodules,   -  Pt restarted on hep gtt due- unfortunately had episodes of maroon stools - no further bleeding on bms last night- reports brown stool as per nurse   -plan for placement of IVC filter  - Today, WBC 12.4, Hb 10.8, plt 193 and stable     # anemia   - received 4u to date and 1u ffp- Hb 10.8 today   - Protonix IV   - GI following   - keep type and screen active, transfuse for Hb <7    # large bowel obstruction  - CT a/p Large bowel obstruction with transition point at the distal transverse colon. There is also narrowing at the level of the terminal ileum. Diffuse ascites. Redemonstrated pancreatic lesion, consistent with history of pancreatic adenocarcinoma. There is an umbilical lesion with adjacent peritoneal implants.  - previously was having diarrhea  - GI following and reviewed imaging with radiology   - 2/22/25  repeat CT a/p with mild interval improvements in small and large bowel ileus. pt given lasix and albumin.     # EMPIRIC TREATMENT FOR ESOPHAGEAL CANDIDIASIS   - continue with DIFLUCAN       will follow and communicate with [facility]     d/w  at bedside    James Griffin MD  [facility]  [phone]      69-year-old female with stage IV pancreatic adenocarcinoma presenting with neutropenic fever and severe treatment-related toxicities from modified FOLFIRINOX and investigational EMILY inhibitor [id number] found to have PE and LBO.     # pancreatic ca   - remains on clinical trial- supportive care while in hospital   - CTH negative and discussed w/ pt   - will continue with ongoing communication with  [facility] [place]- I discussed case with DR. Tapia yesterday at [facility] - pt with UGT1A1 mutation which makes her more susceptible to toxicities from 5FU and irinotecan which may have contributed to symptoms on arrival.  no DPD deficiency   - Review clinical trial protocol - may remain on trial drug alone and changing regimen- to be discussed when recovers from underlying illness and admission     # Neutropenic Septic shock   - pt previously on neupogen- WBC has now recovered and elevated likely 2/2 GCSF support   BCx with fungal growth likely disseminated candidiasis.  identification of organism pending.  ID d/c'd Fluconazole and cefepime.  started caspofungin.  suggested to remove mediport      # PE   - v/q scan w indeterminate prob of PE, linear defect in RUL posterior segment   - CT chest- multiple PE w/in segmental left lower and right lower, 2 right middle lobe nodules,   -  Pt restarted on hep gtt due- unfortunately had episodes of maroon stools - no further bleeding on bms last night- reports brown stool as per nurse   - consider rechallenge with heparin   -IVC filter cancelled due to fevers   - Today, WBC 12.4, Hb 10.1, plt 200 and stable     # anemia   - received 4u to date and 1u ffp- Hb 10.8 today   - Protonix IV   - GI following   - keep type and screen active, transfuse for Hb <7    # large bowel obstruction  - CT a/p Large bowel obstruction with transition point at the distal transverse colon. There is also narrowing at the level of the terminal ileum. Diffuse ascites. Redemonstrated pancreatic lesion, consistent with history of pancreatic adenocarcinoma. There is an umbilical lesion with adjacent peritoneal implants.  - previously was having diarrhea  - GI following and reviewed imaging with radiology   - 2/22/25  repeat CT a/p with mild interval improvements in small and large bowel ileus. pt given lasix and albumin.     ESOPHAGEAL CANDIDIASIS   - s/p DIFLUCAN   - now on caspofungin       will follow and communicate with [facility]     d/w  at bedside    James Griffin MD  [facility]  [phone]      Briana pos. Phototherapy started for cord bili 4. Stopped at 24 h of life bili 6.7  Good po intake. Normal voiding, stooling.  Normal exam

## 2025-03-02 NOTE — PROGRESS NOTE ADULT - SUBJECTIVE AND OBJECTIVE BOX
INTERVAL HPI/OVERNIGHT EVENTS:  Patient S&E at bedside. 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      GERD (gastroesophageal reflux disease)      Cyclical vomiting syndrome      CVA (cerebrovascular accident)      H/O squamous cell carcinoma excision          FAMILY HISTORY:    Vital Signs Last 24 Hrs  T(C): 37.8 (03-02-25 @ 09:34), Max: 38.2 (03-01-25 @ 10:49)  T(F): 100 (03-02-25 @ 09:34), Max: 100.7 (03-01-25 @ 10:49)  HR: 82 (03-02-25 @ 07:00) (72 - 99)  BP: 125/67 (03-02-25 @ 07:00) (98/60 - 132/73)  BP(mean): 84 (03-02-25 @ 07:00) (73 - 104)  RR: 22 (03-02-25 @ 02:00) (15 - 33)  SpO2: 100% (03-01-25 @ 15:00) (93% - 100%)          PHYSICAL EXAM:    Constitutional: NAD  ENT: EOMI, dry lips  Respiratory: CTA b/l,   Cardiovascular: RRR, HR 80s  Gastrointestinal: soft, NTND,       MEDICATIONS  (STANDING):  acetaminophen   IVPB .. 1000 milliGRAM(s) IV Intermittent once  cefepime  Injectable.      cefepime  Injectable. 2000 milliGRAM(s) IV Push every 12 hours  chlorhexidine 2% Cloths 1 Application(s) Topical <User Schedule>  cholecalciferol 2000 Unit(s) Oral at bedtime  dextrose 5%. 1000 milliLiter(s) (50 mL/Hr) IV Continuous <Continuous>  dextrose 5%. 1000 milliLiter(s) (100 mL/Hr) IV Continuous <Continuous>  dextrose 50% Injectable 25 Gram(s) IV Push once  dextrose 50% Injectable 12.5 Gram(s) IV Push once  dextrose 50% Injectable 25 Gram(s) IV Push once  famotidine Injectable 20 milliGRAM(s) IV Push daily  fluconAZOLE IVPB      fluconAZOLE IVPB 200 milliGRAM(s) IV Intermittent every 24 hours  glucagon  Injectable 1 milliGRAM(s) IntraMuscular once  insulin glargine Injectable (LANTUS) 20 Unit(s) SubCutaneous at bedtime  insulin lispro (ADMELOG) corrective regimen sliding scale   SubCutaneous every 6 hours  lipid, fat emulsion (Fish Oil and Plant Based) 20% Infusion 0.6812 Gm/kG/Day (20.8 mL/Hr) IV Continuous <Continuous>  lipid, fat emulsion (Fish Oil and Plant Based) 20% Infusion 0.681 Gm/kG/Day (20.8 mL/Hr) IV Continuous <Continuous>  metoprolol tartrate 25 milliGRAM(s) Oral every 6 hours  pantoprazole  Injectable 40 milliGRAM(s) IV Push every 12 hours  Parenteral Nutrition - Adult 1 Each (75 mL/Hr) TPN Continuous <Continuous>  Parenteral Nutrition - Adult 1 Each (75 mL/Hr) TPN Continuous <Continuous>  sucralfate suspension 1 Gram(s) Oral four times a day  vancomycin    Solution 125 milliGRAM(s) Oral every 6 hours  zinc oxide 40% Paste 1 Application(s) Topical two times a day    MEDICATIONS  (PRN):  acetaminophen     Tablet .. 650 milliGRAM(s) Oral every 6 hours PRN Temp greater or equal to 38C (100.4F), Mild Pain (1 - 3)  aluminum hydroxide/magnesium hydroxide/simethicone Suspension 30 milliLiter(s) Oral every 4 hours PRN Dyspepsia  Biotene Dry Mouth Oral Rinse 15 milliLiter(s) Swish and Spit five times a day PRN Mouth Care  dextrose Oral Gel 15 Gram(s) Oral once PRN Blood Glucose LESS THAN 70 milliGRAM(s)/deciliter  metoprolol tartrate Injectable 5 milliGRAM(s) IV Push every 6 hours PRN heart rate >110  ondansetron Injectable 4 milliGRAM(s) IV Push every 8 hours PRN Nausea and/or Vomiting  sodium chloride 0.65% Nasal 1 Spray(s) Both Nostrils two times a day PRN Nasal Congestion    Allergies    No Known Allergies    Intolerances        LABS:                          10.1   12.72 )-----------( 200      ( 02 Mar 2025 05:50 )             30.6                           10.8   12.44 )-----------( 193      ( 01 Mar 2025 05:28 )             32.2                           11.5   15.40 )-----------( 191      ( 28 Feb 2025 06:01 )             35.8     02-28    145  |  113[H]  |  94[H]  ----------------------------<  138[H]  4.0   |  23  |  1.80[H]    Ca    9.3      28 Feb 2025 06:01  Phos  4.2     02-28  Mg     1.9     02-28    TPro  6.3  /  Alb  2.6[L]  /  TBili  0.7  /  DBili  x   /  AST  36  /  ALT  42  /  AlkPhos  203[H]  02-28    PT/INR - ( 27 Feb 2025 15:00 )   PT: 13.5 sec;   INR: 1.18 ratio         PTT - ( 26 Feb 2025 12:34 )  PTT:91.5 sec  Urinalysis Basic - ( 28 Feb 2025 06:01 )    Color: x / Appearance: x / SG: x / pH: x  Gluc: 138 mg/dL / Ketone: x  / Bili: x / Urobili: x   Blood: x / Protein: x / Nitrite: x   Leuk Esterase: x / RBC: x / WBC x   Sq Epi: x / Non Sq Epi: x / Bacteria: x        RADIOLOGY & ADDITIONAL TESTS:  Studies reviewed.   INTERVAL HPI/OVERNIGHT EVENTS:  Patient S&E at bedside. 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      GERD (gastroesophageal reflux disease)      Cyclical vomiting syndrome      CVA (cerebrovascular accident)      H/O squamous cell carcinoma excision          FAMILY HISTORY:    Vital Signs Last 24 Hrs  T(C): 37.8 (03-02-25 @ 09:34), Max: 38.2 (03-01-25 @ 10:49)  T(F): 100 (03-02-25 @ 09:34), Max: 100.7 (03-01-25 @ 10:49)  HR: 82 (03-02-25 @ 07:00) (72 - 99)  BP: 125/67 (03-02-25 @ 07:00) (98/60 - 132/73)  BP(mean): 84 (03-02-25 @ 07:00) (73 - 104)  RR: 22 (03-02-25 @ 02:00) (15 - 33)  SpO2: 100% (03-01-25 @ 15:00) (93% - 100%)          PHYSICAL EXAM:    Constitutional: NAD  ENT: EOMI, dry lips  Respiratory: CTA b/l,   Cardiovascular: RRR, HR 80s  Gastrointestinal: soft, NTND,       MEDICATIONS  (STANDING):  acetaminophen   IVPB .. 1000 milliGRAM(s) IV Intermittent once  cefepime  Injectable.      cefepime  Injectable. 2000 milliGRAM(s) IV Push every 12 hours  chlorhexidine 2% Cloths 1 Application(s) Topical <User Schedule>  cholecalciferol 2000 Unit(s) Oral at bedtime  dextrose 5%. 1000 milliLiter(s) (50 mL/Hr) IV Continuous <Continuous>  dextrose 5%. 1000 milliLiter(s) (100 mL/Hr) IV Continuous <Continuous>  dextrose 50% Injectable 25 Gram(s) IV Push once  dextrose 50% Injectable 12.5 Gram(s) IV Push once  dextrose 50% Injectable 25 Gram(s) IV Push once  famotidine Injectable 20 milliGRAM(s) IV Push daily  fluconAZOLE IVPB      fluconAZOLE IVPB 200 milliGRAM(s) IV Intermittent every 24 hours  glucagon  Injectable 1 milliGRAM(s) IntraMuscular once  insulin glargine Injectable (LANTUS) 20 Unit(s) SubCutaneous at bedtime  insulin lispro (ADMELOG) corrective regimen sliding scale   SubCutaneous every 6 hours  lipid, fat emulsion (Fish Oil and Plant Based) 20% Infusion 0.6812 Gm/kG/Day (20.8 mL/Hr) IV Continuous <Continuous>  lipid, fat emulsion (Fish Oil and Plant Based) 20% Infusion 0.681 Gm/kG/Day (20.8 mL/Hr) IV Continuous <Continuous>  metoprolol tartrate 25 milliGRAM(s) Oral every 6 hours  pantoprazole  Injectable 40 milliGRAM(s) IV Push every 12 hours  Parenteral Nutrition - Adult 1 Each (75 mL/Hr) TPN Continuous <Continuous>  Parenteral Nutrition - Adult 1 Each (75 mL/Hr) TPN Continuous <Continuous>  sucralfate suspension 1 Gram(s) Oral four times a day  vancomycin    Solution 125 milliGRAM(s) Oral every 6 hours  zinc oxide 40% Paste 1 Application(s) Topical two times a day    MEDICATIONS  (PRN):  acetaminophen     Tablet .. 650 milliGRAM(s) Oral every 6 hours PRN Temp greater or equal to 38C (100.4F), Mild Pain (1 - 3)  aluminum hydroxide/magnesium hydroxide/simethicone Suspension 30 milliLiter(s) Oral every 4 hours PRN Dyspepsia  Biotene Dry Mouth Oral Rinse 15 milliLiter(s) Swish and Spit five times a day PRN Mouth Care  dextrose Oral Gel 15 Gram(s) Oral once PRN Blood Glucose LESS THAN 70 milliGRAM(s)/deciliter  metoprolol tartrate Injectable 5 milliGRAM(s) IV Push every 6 hours PRN heart rate >110  ondansetron Injectable 4 milliGRAM(s) IV Push every 8 hours PRN Nausea and/or Vomiting  sodium chloride 0.65% Nasal 1 Spray(s) Both Nostrils two times a day PRN Nasal Congestion    Allergies    No Known Allergies    Intolerances        LABS:                          10.1   12.72 )-----------( 200      ( 02 Mar 2025 05:50 )             30.6                           10.8   12.44 )-----------( 193      ( 01 Mar 2025 05:28 )             32.2                           11.5   15.40 )-----------( 191      ( 28 Feb 2025 06:01 )             35.8     02-28    145  |  113[H]  |  94[H]  ----------------------------<  138[H]  4.0   |  23  |  1.80[H]    Ca    9.3      28 Feb 2025 06:01  Phos  4.2     02-28  Mg     1.9     02-28    TPro  6.3  /  Alb  2.6[L]  /  TBili  0.7  /  DBili  x   /  AST  36  /  ALT  42  /  AlkPhos  203[H]  02-28    PT/INR - ( 27 Feb 2025 15:00 )   PT: 13.5 sec;   INR: 1.18 ratio         PTT - ( 26 Feb 2025 12:34 )  PTT:91.5 sec  Urinalysis Basic - ( 28 Feb 2025 06:01 )    Color: x / Appearance: x / SG: x / pH: x  Gluc: 138 mg/dL / Ketone: x  / Bili: x / Urobili: x   Blood: x / Protein: x / Nitrite: x   Leuk Esterase: x / RBC: x / WBC x   Sq Epi: x / Non Sq Epi: x / Bacteria: x        RADIOLOGY & ADDITIONAL TESTS:  Studies reviewed.

## 2025-03-03 LAB
ADD ON TEST-SPECIMEN IN LAB: SIGNIFICANT CHANGE UP
ADD ON TEST-SPECIMEN IN LAB: SIGNIFICANT CHANGE UP
ALBUMIN SERPL ELPH-MCNC: 2 G/DL — LOW (ref 3.3–5)
ALP SERPL-CCNC: 234 U/L — HIGH (ref 40–120)
ANION GAP SERPL CALC-SCNC: 7 MMOL/L — SIGNIFICANT CHANGE UP (ref 5–17)
AST SERPL-CCNC: 27 U/L — SIGNIFICANT CHANGE UP (ref 15–37)
BILIRUB SERPL-MCNC: 0.6 MG/DL — SIGNIFICANT CHANGE UP (ref 0.2–1.2)
BUN SERPL-MCNC: 90 MG/DL — HIGH (ref 7–23)
CALCIUM SERPL-MCNC: 9.2 MG/DL — SIGNIFICANT CHANGE UP (ref 8.5–10.1)
CHLORIDE SERPL-SCNC: 113 MMOL/L — HIGH (ref 96–108)
CO2 SERPL-SCNC: 22 MMOL/L — SIGNIFICANT CHANGE UP (ref 22–31)
CREAT SERPL-MCNC: 1.74 MG/DL — HIGH (ref 0.5–1.3)
EGFR: 31 ML/MIN/1.73M2 — LOW
EGFR: 31 ML/MIN/1.73M2 — LOW
GLUCOSE BLDC GLUCOMTR-MCNC: 103 MG/DL — HIGH (ref 70–99)
GLUCOSE BLDC GLUCOMTR-MCNC: 117 MG/DL — HIGH (ref 70–99)
GLUCOSE BLDC GLUCOMTR-MCNC: 129 MG/DL — HIGH (ref 70–99)
GLUCOSE SERPL-MCNC: 108 MG/DL — HIGH (ref 70–99)
HCT VFR BLD CALC: 27.2 % — LOW (ref 34.5–45)
HCT VFR BLD CALC: 28.2 % — LOW (ref 34.5–45)
HGB BLD-MCNC: 9 G/DL — LOW (ref 11.5–15.5)
MAGNESIUM SERPL-MCNC: 1.8 MG/DL — SIGNIFICANT CHANGE UP (ref 1.6–2.6)
MCHC RBC-ENTMCNC: 29 PG — SIGNIFICANT CHANGE UP (ref 27–34)
MCHC RBC-ENTMCNC: 29.1 PG — SIGNIFICANT CHANGE UP (ref 27–34)
MCHC RBC-ENTMCNC: 32.6 G/DL — SIGNIFICANT CHANGE UP (ref 32–36)
MCHC RBC-ENTMCNC: 33.1 G/DL — SIGNIFICANT CHANGE UP (ref 32–36)
MCV RBC AUTO: 88 FL — SIGNIFICANT CHANGE UP (ref 80–100)
MCV RBC AUTO: 89 FL — SIGNIFICANT CHANGE UP (ref 80–100)
NRBC # BLD AUTO: 0 K/UL — SIGNIFICANT CHANGE UP (ref 0–0)
NRBC # BLD AUTO: 0 K/UL — SIGNIFICANT CHANGE UP (ref 0–0)
NRBC # FLD: 0 K/UL — SIGNIFICANT CHANGE UP (ref 0–0)
NRBC # FLD: 0 K/UL — SIGNIFICANT CHANGE UP (ref 0–0)
NRBC BLD AUTO-RTO: 0 /100 WBCS — SIGNIFICANT CHANGE UP (ref 0–0)
NRBC BLD AUTO-RTO: 0 /100 WBCS — SIGNIFICANT CHANGE UP (ref 0–0)
PHOSPHATE SERPL-MCNC: 4.1 MG/DL — SIGNIFICANT CHANGE UP (ref 2.5–4.5)
PLATELET # BLD AUTO: 183 K/UL — SIGNIFICANT CHANGE UP (ref 150–400)
PLATELET # BLD AUTO: 217 K/UL — SIGNIFICANT CHANGE UP (ref 150–400)
PMV BLD: 12.4 FL — SIGNIFICANT CHANGE UP (ref 7–13)
PMV BLD: 13 FL — SIGNIFICANT CHANGE UP (ref 7–13)
POTASSIUM SERPL-MCNC: 3.2 MMOL/L — LOW (ref 3.5–5.3)
POTASSIUM SERPL-SCNC: 3.2 MMOL/L — LOW (ref 3.5–5.3)
PROT SERPL-MCNC: 5.9 GM/DL — LOW (ref 6–8.3)
RBC # BLD: 3.09 M/UL — LOW (ref 3.8–5.2)
RBC # BLD: 3.17 M/UL — LOW (ref 3.8–5.2)
RBC # FLD: 15.8 % — HIGH (ref 10.3–14.5)
RBC # FLD: 15.9 % — HIGH (ref 10.3–14.5)
SODIUM SERPL-SCNC: 142 MMOL/L — SIGNIFICANT CHANGE UP (ref 135–145)
TRIGL SERPL-MCNC: 341 MG/DL — HIGH
TROPONIN I, HIGH SENSITIVITY RESULT: 72.18 NG/L — HIGH
TROPONIN I, HIGH SENSITIVITY RESULT: 76.06 NG/L — HIGH
TSH SERPL-MCNC: 2.84 UU/ML — SIGNIFICANT CHANGE UP (ref 0.34–4.82)
WBC # BLD: 10.57 K/UL — HIGH (ref 3.8–10.5)
WBC # BLD: 10.85 K/UL — HIGH (ref 3.8–10.5)
WBC # FLD AUTO: 10.57 K/UL — HIGH (ref 3.8–10.5)
WBC # FLD AUTO: 10.85 K/UL — HIGH (ref 3.8–10.5)

## 2025-03-03 PROCEDURE — 93010 ELECTROCARDIOGRAM REPORT: CPT

## 2025-03-03 PROCEDURE — 99233 SBSQ HOSP IP/OBS HIGH 50: CPT | Mod: FS

## 2025-03-03 RX ORDER — ACETAMINOPHEN 500 MG/5ML
1000 LIQUID (ML) ORAL ONCE
Refills: 0 | Status: COMPLETED | OUTPATIENT
Start: 2025-03-03 | End: 2025-03-03

## 2025-03-03 RX ORDER — ACETAMINOPHEN 500 MG/5ML
1000 LIQUID (ML) ORAL ONCE
Refills: 0 | Status: DISCONTINUED | OUTPATIENT
Start: 2025-03-03 | End: 2025-03-19

## 2025-03-03 RX ADMIN — TOUCHLESS CARE ZINC OXIDE PROTECTANT 1 APPLICATION(S): 20; 25 SPRAY TOPICAL at 10:00

## 2025-03-03 RX ADMIN — TOUCHLESS CARE ZINC OXIDE PROTECTANT 1 APPLICATION(S): 20; 25 SPRAY TOPICAL at 22:43

## 2025-03-03 RX ADMIN — Medication 125 MILLIGRAM(S): at 18:23

## 2025-03-03 RX ADMIN — SODIUM CHLORIDE 75 MILLILITER(S): 9 INJECTION, SOLUTION INTRAVENOUS at 23:23

## 2025-03-03 RX ADMIN — ALBUMIN (HUMAN) 50 MILLILITER(S): 12.5 INJECTION, SOLUTION INTRAVENOUS at 05:20

## 2025-03-03 RX ADMIN — METOPROLOL SUCCINATE 5 MILLIGRAM(S): 50 TABLET, EXTENDED RELEASE ORAL at 05:19

## 2025-03-03 RX ADMIN — Medication 125 MILLIGRAM(S): at 05:19

## 2025-03-03 RX ADMIN — Medication 40 MILLIGRAM(S): at 22:41

## 2025-03-03 RX ADMIN — Medication 1 GRAM(S): at 23:08

## 2025-03-03 RX ADMIN — CASPOFUNGIN ACETATE 260 MILLIGRAM(S): 5 INJECTION, POWDER, LYOPHILIZED, FOR SOLUTION INTRAVENOUS at 11:35

## 2025-03-03 RX ADMIN — ALBUMIN (HUMAN) 50 MILLILITER(S): 12.5 INJECTION, SOLUTION INTRAVENOUS at 11:14

## 2025-03-03 RX ADMIN — Medication 40 MILLIGRAM(S): at 11:13

## 2025-03-03 RX ADMIN — Medication 1000 MILLIGRAM(S): at 01:09

## 2025-03-03 RX ADMIN — Medication 1 GRAM(S): at 05:19

## 2025-03-03 RX ADMIN — Medication 400 MILLIGRAM(S): at 00:41

## 2025-03-03 RX ADMIN — Medication 2000 UNIT(S): at 22:41

## 2025-03-03 RX ADMIN — Medication 1 GRAM(S): at 11:22

## 2025-03-03 RX ADMIN — Medication 20 MILLIGRAM(S): at 22:42

## 2025-03-03 RX ADMIN — Medication 125 MILLIGRAM(S): at 11:22

## 2025-03-03 RX ADMIN — Medication 125 MILLIGRAM(S): at 23:09

## 2025-03-03 RX ADMIN — Medication 1 GRAM(S): at 18:23

## 2025-03-03 RX ADMIN — ALBUMIN (HUMAN) 50 MILLILITER(S): 12.5 INJECTION, SOLUTION INTRAVENOUS at 18:23

## 2025-03-03 RX ADMIN — Medication 40 MILLIEQUIVALENT(S): at 18:23

## 2025-03-03 RX ADMIN — ALBUMIN (HUMAN) 50 MILLILITER(S): 12.5 INJECTION, SOLUTION INTRAVENOUS at 23:23

## 2025-03-03 NOTE — PROGRESS NOTE ADULT - ASSESSMENT
Imp:  I'm open to a PEG because she can't resume TPN for a while  She apparently didn't tolerate an NG tube either    Rec:  WIll d/w Dr. Pleitez re PEG option.

## 2025-03-03 NOTE — PROGRESS NOTE ADULT - ASSESSMENT
69 year old woman with the above PMH including a recent diagnosis of pancreatic cancer on chemotherapy admitted with pseudomonas sepsis, N&V, diarrhea.  At the present time her cardiac status is stable on midodrine.  Will continue.  To be seen by Dr Arias.  Antibiotics as per ID.  Continue medications for the above GI symptomatology.  Will follow     02/18/25-Patient appears improved.  She is still showing signs of intravascular depletion and hypokalemia-needs fluids and potassium.  Cardiac status stable with stable BP.  Heart rates still variable but I suspect this will also stabilize as we treat her sepsis and fluid/electrolyte status    03/1/25-  As per above.  Patient had a set back that is GI related.  I will speak to Dr Arias regarding future plans.  There is however no evidence for bleeding.  Plan would still be to place an IVC filter    3/2/25-Cardiac status stable. Events as per above.  TPN stopped and the plan is to start NGT feedings with also some PO intake.  No IVC filter at the present time but her recent GI bleeding precludes starting AC    3/3/25-Due to issues of bradykardia will stop metoprolol

## 2025-03-03 NOTE — PROGRESS NOTE ADULT - SUBJECTIVE AND OBJECTIVE BOX
INTERVAL HPI/OVERNIGHT EVENTS:  Patient S&E at bedside.   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      GERD (gastroesophageal reflux disease)      Cyclical vomiting syndrome      CVA (cerebrovascular accident)      H/O squamous cell carcinoma excision          FAMILY HISTORY:      VITAL SIGNS:  T(F): 97.6 (03-03-25 @ 06:26)  HR: 53 (03-03-25 @ 06:00)  BP: 129/62 (03-03-25 @ 06:00)  RR: 18 (03-03-25 @ 06:00)  SpO2: 95% (03-03-25 @ 00:00)  Wt(kg): --    PHYSICAL EXAM:  Constitutional: NAD, flat affect  ENT: EOMI,   Respiratory: CTA b/l, on RA  Cardiovascular: bradycardic, HR 60-70s  Gastrointestinal: soft, NTND,   ext- LLE cold w/ some discoloration at tips, +pulses  neuro- aox1      MEDICATIONS  (STANDING):  albumin human 25% IVPB 50 milliLiter(s) IV Intermittent every 6 hours  caspofungin IVPB 50 milliGRAM(s) IV Intermittent every 24 hours  caspofungin IVPB      chlorhexidine 2% Cloths 1 Application(s) Topical <User Schedule>  cholecalciferol 2000 Unit(s) Oral at bedtime  dextrose 5% + sodium chloride 0.45%. 1000 milliLiter(s) (75 mL/Hr) IV Continuous <Continuous>  dextrose 5%. 1000 milliLiter(s) (50 mL/Hr) IV Continuous <Continuous>  dextrose 5%. 1000 milliLiter(s) (100 mL/Hr) IV Continuous <Continuous>  dextrose 50% Injectable 25 Gram(s) IV Push once  dextrose 50% Injectable 12.5 Gram(s) IV Push once  dextrose 50% Injectable 25 Gram(s) IV Push once  famotidine Injectable 20 milliGRAM(s) IV Push daily  glucagon  Injectable 1 milliGRAM(s) IntraMuscular once  insulin lispro (ADMELOG) corrective regimen sliding scale   SubCutaneous every 6 hours  pantoprazole  Injectable 40 milliGRAM(s) IV Push every 12 hours  potassium chloride   Powder 40 milliEquivalent(s) Oral once  sucralfate suspension 1 Gram(s) Oral four times a day  vancomycin    Solution 125 milliGRAM(s) Oral every 6 hours  zinc oxide 40% Paste 1 Application(s) Topical two times a day    MEDICATIONS  (PRN):  acetaminophen     Tablet .. 650 milliGRAM(s) Oral every 6 hours PRN Temp greater or equal to 38C (100.4F), Mild Pain (1 - 3)  aluminum hydroxide/magnesium hydroxide/simethicone Suspension 30 milliLiter(s) Oral every 4 hours PRN Dyspepsia  Biotene Dry Mouth Oral Rinse 15 milliLiter(s) Swish and Spit five times a day PRN Mouth Care  dextrose Oral Gel 15 Gram(s) Oral once PRN Blood Glucose LESS THAN 70 milliGRAM(s)/deciliter  metoprolol tartrate Injectable 5 milliGRAM(s) IV Push every 6 hours PRN heart rate >110  ondansetron Injectable 4 milliGRAM(s) IV Push every 8 hours PRN Nausea and/or Vomiting  sodium chloride 0.65% Nasal 1 Spray(s) Both Nostrils two times a day PRN Nasal Congestion      Allergies    No Known Allergies    Intolerances        LABS:                        10.1   12.72 )-----------( 200      ( 02 Mar 2025 05:50 )             30.6     03-03    142  |  113[H]  |  90[H]  ----------------------------<  108[H]  3.2[L]   |  22  |  1.74[H]    Ca    9.2      03 Mar 2025 05:00  Phos  4.1     03-03  Mg     1.8     03-03    TPro  5.9[L]  /  Alb  2.0[L]  /  TBili  0.6  /  DBili  x   /  AST  27  /  ALT  36  /  AlkPhos  234[H]  03-03      Urinalysis Basic - ( 03 Mar 2025 05:00 )    Color: x / Appearance: x / SG: x / pH: x  Gluc: 108 mg/dL / Ketone: x  / Bili: x / Urobili: x   Blood: x / Protein: x / Nitrite: x   Leuk Esterase: x / RBC: x / WBC x   Sq Epi: x / Non Sq Epi: x / Bacteria: x        RADIOLOGY & ADDITIONAL TESTS:  Studies reviewed.

## 2025-03-03 NOTE — PROGRESS NOTE ADULT - SUBJECTIVE AND OBJECTIVE BOX
Subjective:    pat lying in bed, no new complaint, no respiratory distress.    Home Medications:  cefepime 2 g intravenous injection: 2 gram(s) intravenous every 12 hours (12 Feb 2025 12:59)  filgrastim: 480 microgram(s) subcutaneous once a day (12 Feb 2025 12:59)  Lactated Ringers Injection intravenous solution: 150 milligram(s) intravenous every 1 to 2 hours (12 Feb 2025 12:59)  loperamide 2 mg oral capsule: 1 cap(s) orally once (12 Feb 2025 12:59)  midodrine 5 mg oral tablet: 1 tab(s) orally every 8 hours (12 Feb 2025 12:59)  Multiple Vitamins with Minerals oral liquid: 1 orally once a day (12 Feb 2025 12:59)  octreotide 2500 mcg/mL subcutaneous solution: 0.04 milliliter(s) subcutaneous 3 times a day (12 Feb 2025 12:59)  sucralfate 1 g/10 mL oral suspension: 10 milliliter(s) orally 4 times a day (12 Feb 2025 12:59)  trimethobenzamide 100 mg/mL intramuscular solution: 2 milliliter(s) intramuscular every 8 hours As needed Nausea and/or Vomiting (12 Feb 2025 12:59)    MEDICATIONS  (STANDING):  acetaminophen   IVPB .. 1000 milliGRAM(s) IV Intermittent once  albumin human 25% IVPB 50 milliLiter(s) IV Intermittent every 6 hours  caspofungin IVPB 50 milliGRAM(s) IV Intermittent every 24 hours  caspofungin IVPB      chlorhexidine 2% Cloths 1 Application(s) Topical <User Schedule>  cholecalciferol 2000 Unit(s) Oral at bedtime  dextrose 5% + sodium chloride 0.45%. 1000 milliLiter(s) (75 mL/Hr) IV Continuous <Continuous>  dextrose 5%. 1000 milliLiter(s) (50 mL/Hr) IV Continuous <Continuous>  dextrose 5%. 1000 milliLiter(s) (100 mL/Hr) IV Continuous <Continuous>  famotidine Injectable 20 milliGRAM(s) IV Push daily  glucagon  Injectable 1 milliGRAM(s) IntraMuscular once  pantoprazole  Injectable 40 milliGRAM(s) IV Push every 12 hours  sucralfate suspension 1 Gram(s) Oral four times a day  vancomycin    Solution 125 milliGRAM(s) Oral every 6 hours  zinc oxide 40% Paste 1 Application(s) Topical two times a day    MEDICATIONS  (PRN):  acetaminophen     Tablet .. 650 milliGRAM(s) Oral every 6 hours PRN Temp greater or equal to 38C (100.4F), Mild Pain (1 - 3)  aluminum hydroxide/magnesium hydroxide/simethicone Suspension 30 milliLiter(s) Oral every 4 hours PRN Dyspepsia  Biotene Dry Mouth Oral Rinse 15 milliLiter(s) Swish and Spit five times a day PRN Mouth Care  ondansetron Injectable 4 milliGRAM(s) IV Push every 8 hours PRN Nausea and/or Vomiting  sodium chloride 0.65% Nasal 1 Spray(s) Both Nostrils two times a day PRN Nasal Congestion      Allergies    No Known Allergies    Intolerances        Vital Signs Last 24 Hrs  T(C): 36.6 (03 Mar 2025 17:05), Max: 37.3 (03 Mar 2025 15:00)  T(F): 97.8 (03 Mar 2025 17:05), Max: 99.1 (03 Mar 2025 15:00)  HR: 85 (03 Mar 2025 20:00) (53 - 85)  BP: 129/64 (03 Mar 2025 20:00) (108/69 - 136/80)  BP(mean): 84 (03 Mar 2025 20:00) (75 - 108)  RR: 24 (03 Mar 2025 20:00) (17 - 33)  SpO2: 94% (03 Mar 2025 14:00) (93% - 95%)          PHYSICAL EXAMINATION:    NECK:  Supple. No lymphadenopathy. Jugular venous pressure not elevated. Carotids equal.   HEART:   The cardiac impulse has a normal quality. Reg., Nl S1 and S2.  There are no murmurs, rubs or gallops noted  CHEST:  Chest crackles to auscultation. Normal respiratory effort.  ABDOMEN:  Soft and nontender.   EXTREMITIES:  There is no edema.       LABS:                        9.0    10.57 )-----------( 217      ( 03 Mar 2025 18:53 )             27.2     03-03    142  |  113[H]  |  90[H]  ----------------------------<  108[H]  3.2[L]   |  22  |  1.74[H]    Ca    9.2      03 Mar 2025 05:00  Phos  4.1     03-03  Mg     1.8     03-03    TPro  5.9[L]  /  Alb  2.0[L]  /  TBili  0.6  /  DBili  x   /  AST  27  /  ALT  36  /  AlkPhos  234[H]  03-03      Urinalysis Basic - ( 03 Mar 2025 05:00 )    Color: x / Appearance: x / SG: x / pH: x  Gluc: 108 mg/dL / Ketone: x  / Bili: x / Urobili: x   Blood: x / Protein: x / Nitrite: x   Leuk Esterase: x / RBC: x / WBC x   Sq Epi: x / Non Sq Epi: x / Bacteria: x

## 2025-03-03 NOTE — PROGRESS NOTE ADULT - SUBJECTIVE AND OBJECTIVE BOX
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 1000 milliGRAM(s) IV Intermittent once  albumin human 25% IVPB 50 milliLiter(s) IV Intermittent every 6 hours  caspofungin IVPB 50 milliGRAM(s) IV Intermittent every 24 hours  caspofungin IVPB      chlorhexidine 2% Cloths 1 Application(s) Topical <User Schedule>  cholecalciferol 2000 Unit(s) Oral at bedtime  dextrose 5% + sodium chloride 0.45%. 1000 milliLiter(s) (75 mL/Hr) IV Continuous <Continuous>  dextrose 5%. 1000 milliLiter(s) (50 mL/Hr) IV Continuous <Continuous>  dextrose 5%. 1000 milliLiter(s) (100 mL/Hr) IV Continuous <Continuous>  dextrose 50% Injectable 25 Gram(s) IV Push once  dextrose 50% Injectable 12.5 Gram(s) IV Push once  dextrose 50% Injectable 25 Gram(s) IV Push once  famotidine Injectable 20 milliGRAM(s) IV Push daily  glucagon  Injectable 1 milliGRAM(s) IntraMuscular once  insulin lispro (ADMELOG) corrective regimen sliding scale   SubCutaneous every 6 hours  pantoprazole  Injectable 40 milliGRAM(s) IV Push every 12 hours  potassium chloride   Powder 40 milliEquivalent(s) Oral once  sucralfate suspension 1 Gram(s) Oral four times a day  vancomycin    Solution 125 milliGRAM(s) Oral every 6 hours  zinc oxide 40% Paste 1 Application(s) Topical two times a day    MEDICATIONS  (PRN):  acetaminophen     Tablet .. 650 milliGRAM(s) Oral every 6 hours PRN Temp greater or equal to 38C (100.4F), Mild Pain (1 - 3)  aluminum hydroxide/magnesium hydroxide/simethicone Suspension 30 milliLiter(s) Oral every 4 hours PRN Dyspepsia  Biotene Dry Mouth Oral Rinse 15 milliLiter(s) Swish and Spit five times a day PRN Mouth Care  dextrose Oral Gel 15 Gram(s) Oral once PRN Blood Glucose LESS THAN 70 milliGRAM(s)/deciliter  metoprolol tartrate Injectable 5 milliGRAM(s) IV Push every 6 hours PRN heart rate >110  ondansetron Injectable 4 milliGRAM(s) IV Push every 8 hours PRN Nausea and/or Vomiting  sodium chloride 0.65% Nasal 1 Spray(s) Both Nostrils two times a day PRN Nasal Congestion      REVIEW OF SYSTEMS:    RESPIRATORY: No shortness of breath  CARDIOVASCULAR: No chest pain  All other review of systems is negative unless indicated above.    Vital Signs Last 24 Hrs  T(C): 36.8 (03 Mar 2025 12:33), Max: 37.2 (02 Mar 2025 22:00)  T(F): 98.2 (03 Mar 2025 12:33), Max: 99 (02 Mar 2025 22:00)  HR: 76 (03 Mar 2025 12:00) (53 - 86)  BP: 125/65 (03 Mar 2025 12:00) (108/69 - 136/80)  BP(mean): 83 (03 Mar 2025 12:00) (75 - 108)  RR: 21 (03 Mar 2025 12:00) (17 - 33)  SpO2: 93% (03 Mar 2025 12:00) (93% - 97%)    Parameters below as of 02 Mar 2025 16:00  Patient On (Oxygen Delivery Method): room air        PHYSICAL EXAM:    Constitutional: NAD, chronically ill appearing  Respiratory: CTAB  Cardiovascular: S1 and S2, RRR  Gastrointestinal: BS+, soft, NT/ND      LABS:                        9.2    10.85 )-----------( 183      ( 03 Mar 2025 11:52 )             28.2     03-03    142  |  113[H]  |  90[H]  ----------------------------<  108[H]  3.2[L]   |  22  |  1.74[H]    Ca    9.2      03 Mar 2025 05:00  Phos  4.1     03-03  Mg     1.8     03-03    TPro  5.9[L]  /  Alb  2.0[L]  /  TBili  0.6  /  DBili  x   /  AST  27  /  ALT  36  /  AlkPhos  234[H]  03-03      LIVER FUNCTIONS - ( 03 Mar 2025 05:00 )  Alb: 2.0 g/dL / Pro: 5.9 gm/dL / ALK PHOS: 234 U/L / ALT: 36 U/L / AST: 27 U/L / GGT: x             RADIOLOGY & ADDITIONAL STUDIES:

## 2025-03-03 NOTE — PROGRESS NOTE ADULT - SUBJECTIVE AND OBJECTIVE BOX
Patient is a 69y old  Female who presents with a chief complaint of sepsis (2025 11:37)    BRIEF HOSPITAL COURSE: 68yo female with PMHx recent diagnosis of primary pancreatic adenocarcinoma of head with tumor in umbilicus, chemo-induced pancytopenia, SCC lip, cyclical vomiting syndrome, HTN, HPL, Gout, GERD, CVA on Aggrenox and others had been admitted to [facility] with septic shock 2/2 + pseudomonas bacteremia on 2025, nausea, vomiting and diarrhea requiring Levophed drip, was transferred out of ICU to regular floor last night.  Patient was being followed by GI, ID, Cardiology and Onc there.  Patient has been transferred to  on patient's  (GI Dr. Pleitez) request.      3/3 pt lethargic today, slow to respond denies abd pain but diffusely tender to palpation, unable to tell if pt with rebound tenderness. no guarding. pt pulled out NGT overnight.    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 1000 milliLiter(s) (75 mL/Hr) IV Continuous <Continuous>  dextrose 5%. 1000 milliLiter(s) (50 mL/Hr) IV Continuous <Continuous>  dextrose 5%. 1000 milliLiter(s) (100 mL/Hr) IV Continuous <Continuous>  dextrose 50% Injectable 25 Gram(s) IV Push once  dextrose 50% Injectable 12.5 Gram(s) IV Push once  dextrose 50% Injectable 25 Gram(s) IV Push once  famotidine Injectable 20 milliGRAM(s) IV Push daily  glucagon  Injectable 1 milliGRAM(s) IntraMuscular once  insulin lispro (ADMELOG) corrective regimen sliding scale   SubCutaneous every 6 hours  pantoprazole  Injectable 40 milliGRAM(s) IV Push every 12 hours  potassium chloride   Powder 40 milliEquivalent(s) Oral once  sucralfate suspension 1 Gram(s) Oral four times a day  vancomycin    Solution 125 milliGRAM(s) Oral every 6 hours  zinc oxide 40% Paste 1 Application(s) Topical two times a day    Vital Signs Last 24 Hrs  T(C): 36.4 (03 Mar 2025 06:26), Max: 37.2 (02 Mar 2025 22:00)  T(F): 97.6 (03 Mar 2025 06:26), Max: 99 (02 Mar 2025 22:00)  HR: 69 (03 Mar 2025 10:00) (53 - 86)  BP: 115/69 (03 Mar 2025 10:00) (108/69 - 136/80)  BP(mean): 85 (03 Mar 2025 10:00) (75 - 108)  RR: 21 (03 Mar 2025 10:00) (17 - 33)  SpO2: 95% (03 Mar 2025 00:00) (95% - 97%)    Parameters below as of 02 Mar 2025 16:00  Patient On (Oxygen Delivery Method): room air    I&O's Detail    02 Mar 2025 07:01  -  03 Mar 2025 07:00  --------------------------------------------------------  IN:    dextrose 5% + sodium chloride 0.45%: 1277 mL    Fat Emulsion (Fish Oil &amp; Plant Based) 20% Infusion: 90.4 mL    IV PiggyBack: 350 mL    Miscellaneous Tube Feedin mL    TPN (Total Parenteral Nutrition): 322.7 mL  Total IN: 2185.1 mL    OUT:    Indwelling Catheter - Urethral (mL): 2450 mL  Total OUT: 2450 mL    Total NET: -264.9 mL      LABS:                        9.2    10.85 )-----------( 183      ( 03 Mar 2025 11:52 )             28.2     03-    142  |  113[H]  |  90[H]  ----------------------------<  108[H]  3.2[L]   |  22  |  1.74[H]    Ca    9.2      03 Mar 2025 05:00  Phos  4.1     03-03  Mg     1.8     03-    TPro  5.9[L]  /  Alb  2.0[L]  /  TBili  0.6  /  DBili  x   /  AST  27  /  ALT  36  /  AlkPhos  234[H]  03-03    LIVER FUNCTIONS - ( 03 Mar 2025 05:00 )  Alb: 2.0 g/dL / Pro: 5.9 gm/dL / ALK PHOS: 234 U/L / ALT: 36 U/L / AST: 27 U/L / GGT: x           Urinalysis Basic - ( 03 Mar 2025 05:00 )    Color: x / Appearance: x / SG: x / pH: x  Gluc: 108 mg/dL / Ketone: x  / Bili: x / Urobili: x   Blood: x / Protein: x / Nitrite: x   Leuk Esterase: x / RBC: x / WBC x   Sq Epi: x / Non Sq Epi: x / Bacteria: x      Physical Examination:    General: no respiratory distress today.  HEENT: lips appear less dry.   PULM: decreased at bases b/l.   CVS: Regular rate and rhythm, no murmurs  ABD: Soft, nondistended, mild tenderness, redness by umbilical area,  EXT: 1+ pitting edema in LE b/l extending to knees - improving  SKIN: Warm and well perfused  NEURO: Awake, more alert today, more conversive    DEVICES:  R chest port     RADIOLOGY:   < from: CT Abdomen and Pelvis No Cont (25 @ 15:53) >    IMPRESSION: Mild interval improvement in small and large bowel ileus.   Recommend plain film follow-up.    < end of copied text >      < from: CT Chest No Cont (02.15.25 @ 15:55) >  IMPRESSION:  Nondisplaced fractures of the left ninth and 10th ribs, similar to the   prior study.    Pancreatic mass, unchanged    No onset ascites and bilateral pleural effusions with compressive   atelectasis, right greater than left.    Dilated gallbladder, similar to the prior study.    < end of copied text >

## 2025-03-03 NOTE — PROGRESS NOTE ADULT - ASSESSMENT
69-year-old female with stage IV pancreatic adenocarcinoma presenting with neutropenic fever and severe treatment-related toxicities from modified FOLFIRINOX and investigational EMILY inhibitor [id number].    PROBLEMS:    Febrile syndrome. Fungemia. Likely disseminated candidemia.   Dysphagia. Possible candida esophagitis  New febrile syndrome with chills. Possible sepsis. Dysphagia. Possible candida esophagitis, S/p sepsis with PSAE   Acute hypoxic respiratory failure likely secondary to hypervolemia   Bilateral pleural effusions with compressive atelectasis, right greater than left  Neutropenic Fever/Neutropenic Septic shock  Severe Thrombocytopenia-Platelet count critically low at 26k.   Severe diarrhea and inability to tolerate PO intake, likely multifactorial from both FOLFIRINOX (particularly irinotecan component) and study drug [id number].   History of left cephalic vein thrombosis.  MALLORY  CT Angio Chest PE Protocol w/ IV Cont (02.19.25 @ 14:16) >  CHEST:  Multiple pulmonary emboli within the segmental branches of the left lower and right lower lobe pulmonary arteries.  There are two right middle lobe nodules, new since prior. Exact   etiology is unclear. Primary differential diagnostic consideration   includes infection.      PLAN:    Pulmonary better  Dc fluconazole  and d/c cefepime  Start caspofungin 70 mg IV x1, then 50 mg IV qd  CTA chest + small PEs in RLL and LLL- off IV heparin causing bleeding-waiting GFF- now fungaemia  Family does not want any procedure EGD/Colonscopy  DECD steroids  High WBC-trend  Pulmonary tachpnea may be RTA with low bicarbonate because of GI cause with diarrhea but improving renal fx  Neutropenic Septic shock- IV meropenem started on 2/15- previously on CEFEPIME/Continue filgrastim 480mcg daily-Today w/ improvement in counts- WBC 1.3, Hb 10.1, plt 37, cmp with k 2.3 and Repleted, Cr 1.40.   Daily CBC monitoring  Maintain strict neutropenic precautions  Anemia-received two units of pRBCs Hb 10.1 today   Thrombocytopenia-Transfuse for plts < 15   D/w staff & /haematology & ICU in detail

## 2025-03-03 NOTE — PROGRESS NOTE ADULT - SUBJECTIVE AND OBJECTIVE BOX
Resting comfortably. more confused. appetite poor    Vital Signs Last 24 Hrs  T(C): 36.8 (03 Mar 2025 12:33), Max: 37.2 (02 Mar 2025 22:00)  T(F): 98.2 (03 Mar 2025 12:33), Max: 99 (02 Mar 2025 22:00)  HR: 76 (03 Mar 2025 12:00) (53 - 86)  BP: 125/65 (03 Mar 2025 12:00) (108/69 - 136/80)  BP(mean): 83 (03 Mar 2025 12:00) (75 - 108)  RR: 21 (03 Mar 2025 12:00) (17 - 33)  SpO2: 93% (03 Mar 2025 12:00) (93% - 97%)    R chest wall dressing changed. Wound looks clean. No swelling or drainage. Bacitracin applied to wound along with sterile dressing.    WBC Count: 10.57 K/uL (03.03.25 @ 18:53)   WBC Count: 10.85 K/uL (03.03.25 @ 11:52)   WBC Count: 12.72 K/uL (03.02.25 @ 05:50)   Specimen Source: Catheter Port Device (03.02.25 @ 15:30) No growth to date (03.02.25 @ 15:30)

## 2025-03-03 NOTE — PROGRESS NOTE ADULT - SUBJECTIVE AND OBJECTIVE BOX
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 with septic shock on 02/09/2025, nausea, vomiting and diarrhea requiring Levophed drip, was transferred out of ICU to regular floor last night.  Patient was being followed by GI, ID, Cardiology and Onc there.  Patient has been transferred to  today on patient's  (GI Dr. Pleitez) request.  At this time, patient c/o non-bloody diarrhea x 3 in last 24 hours.  She denies any abdominal pain, nausea, but has some dry heaves.  She denies any fever, chills, chest pain, palpitation, constipation or dysuria. Initially, she was started on Vancomycin and Zosyn, but now she has been on Cefepime as her blood culture and urine cultures are positive for Pseudomonas aeruginosa. Currently, patient is on Cefepime.  ID had also recommended Flagyl, but patient had refused it, as it causes nausea to her.    Sig labs: WBC 0.45k, N 82%, Hb 7.8, Platelets 59k, Bicarb 19, AG 9, , Cr 0.99, TP 4.6, Alb 1.2, LFTs wnl.  Mg 1.9, PO4 2.4.  Prior C.diff negative.     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  Her antibiotics were changes, electrolytes repleted and was given 3 units of blood.  She was on bipap overnight but is now comfortable on nasal O2 with good sats.  3/1/25-After I saw the patient in the morning at which point she was doing well she developed a fever and an apparent ileus and now has an NGT draining bilious fluid.  When this happened her HR increased which is now back down.  ICV filter is currently on hold  3/2/25-Patient has now developed fungemia but right at the current time is feeling well with stable vital signs.  The IVC filter was not placed and the plan is to remove her port-Dr Avalos has been called  3/3/25-Patient had an episode of bradykardia into the 40's; now in the 50's.  She also pulled out her NGT        ICU Vital Signs Last 24 Hrs  T(C): 36.4 (03 Mar 2025 06:26), Max: 37.8 (02 Mar 2025 09:34)  T(F): 97.6 (03 Mar 2025 06:26), Max: 100 (02 Mar 2025 09:34)  HR: 53 (03 Mar 2025 06:00) (53 - 86)  BP: 129/62 (03 Mar 2025 06:00) (108/69 - 137/66)  BP(mean): 80 (03 Mar 2025 06:00) (75 - 108)  ABP: --  ABP(mean): --  RR: 18 (03 Mar 2025 06:00) (18 - 33)  SpO2: 95% (03 Mar 2025 00:00) (94% - 97%)    O2 Parameters below as of 02 Mar 2025 16:00  Patient On (Oxygen Delivery Method): room air                            PHYSICAL EXAM:   Patient in NAD  Neck: No JVD; Carotids:  2+ without bruits  Respiratory:  Clear to A&P  Cardiovascular: S1 and S2, regular rate and rhythm, no Murmurs, gallops or rubs  Abd:  BS positive    MEDICATIONS  (STANDING):  albumin human 25% IVPB 50 milliLiter(s) IV Intermittent every 6 hours  caspofungin IVPB 50 milliGRAM(s) IV Intermittent every 24 hours  caspofungin IVPB      chlorhexidine 2% Cloths 1 Application(s) Topical <User Schedule>  cholecalciferol 2000 Unit(s) Oral at bedtime  dextrose 5% + sodium chloride 0.45%. 1000 milliLiter(s) (75 mL/Hr) IV Continuous <Continuous>  dextrose 5%. 1000 milliLiter(s) (50 mL/Hr) IV Continuous <Continuous>  dextrose 5%. 1000 milliLiter(s) (100 mL/Hr) IV Continuous <Continuous>  dextrose 50% Injectable 25 Gram(s) IV Push once  dextrose 50% Injectable 12.5 Gram(s) IV Push once  dextrose 50% Injectable 25 Gram(s) IV Push once  famotidine Injectable 20 milliGRAM(s) IV Push daily  glucagon  Injectable 1 milliGRAM(s) IntraMuscular once  insulin lispro (ADMELOG) corrective regimen sliding scale   SubCutaneous every 6 hours  metoprolol tartrate 25 milliGRAM(s) Oral every 6 hours  pantoprazole  Injectable 40 milliGRAM(s) IV Push every 12 hours  potassium chloride   Powder 40 milliEquivalent(s) Oral once  sucralfate suspension 1 Gram(s) Oral four times a day  vancomycin    Solution 125 milliGRAM(s) Oral every 6 hours  zinc oxide 40% Paste 1 Application(s) Topical two times a day          WBC Count: 0.45 K/uL (02-12 @ 09:15)     Hemoglobin: 7.8 g/dL (02-12 @ 09:15)  Hemoglobin: 8.9 g/dL (02-11 @ 08:45)  Hemoglobin: 9.6 g/dL (02-10 @ 08:46)  Hemoglobin: 10.1 g/dL (02-09 @ 05:41)  Hemoglobin: 8.9 g/dL (02-08 @ 23:27)              Hematocrit: 23.6 % (02-12 @ 09:15)  Hematocrit: 26.7 % (02-11 @ 08:45)  Hematocrit: 29.6 % (02-10 @ 08:46)  Hematocrit: 31.5 % (02-09 @ 05:41)  Hematocrit: 28.4 % (02-08 @ 23:27)              Mean Cell Volume: 90.4 fl (02-12 @ 09:15)  Mean Cell Volume: 89.9 fl (02-11 @ 08:45)  Mean Cell Volume: 91.6 fl (02-10 @ 08:46)  Mean Cell Volume: 92.6 fl (02-09 @ 05:41)  Mean Cell Volume: 93.7 fl (02-08 @ 23:27)              Platelet Count - Automated: 59 K/uL (02-12 @ 09:15)  Platelet Count - Automated: 13 K/uL (02-11 @ 08:45)  Platelet Count - Automated: 25 K/uL (02-10 @ 08:46)  Platelet Count - Automated: 60 K/uL (02-09 @ 05:41)  Platelet Count - Automated: 64 K/uL (02-08 @ 23:27)                        10.1   1.30  )-----------( 37       ( 17 Feb 2025 05:59 )             28.5                         10.8   12.44 )-----------( 193      ( 01 Mar 2025 05:28 )             32.2                           10.1   12.72 )-----------( 200      ( 02 Mar 2025 05:50 )             30.6                                                    [place]        Sodium: 140 mmol/L (02-12 @ 09:15)  Sodium: 140 mmol/L (02-11 @ 06:39)  Sodium: 141 mmol/L (02-10 @ 08:46)  Sodium: 146 mmol/L (02-09 @ 05:41)  Sodium: 144 mmol/L (02-08 @ 23:27)          Potassium: 3.5 mmol/L (02-12 @ 09:15)  Potassium: 3.4 mmol/L (02-11 @ 06:39)  Potassium: 3.3 mmol/L (02-10 @ 08:46)  Potassium: 4.3 mmol/L (02-09 @ 05:41)  Potassium: 4.2 mmol/L (02-08 @ 23:27)          Blood Urea Nitrogen: 22 mg/dL (02-12 @ 09:15)  Blood Urea Nitrogen: 23 mg/dL (02-11 @ 06:39)  Blood Urea Nitrogen: 28 mg/dL (02-10 @ 08:46)  Blood Urea Nitrogen: 29 mg/dL (02-09 @ 05:41)  Blood Urea Nitrogen: 32 mg/dL (02-08 @ 23:27)          Creatinine 0.99  Creatinine 1.10  Creatinine 1.40  Creatinine 1.40  Creatinine 1.50          Magnesium: 1.9 mg/dL (02-12 @ 09:15)  Magnesium: 1.8 mg/dL (02-11 @ 06:39)  Magnesium: 2.1 mg/dL (02-10 @ 08:46)  Magnesium: 2.1 mg/dL (02-09 @ 05:41)          Protein Total: 4.6 g/dL (02-12 @ 09:15)  Protein Total: 4.6 g/dL (02-11 @ 06:39)  Protein Total: 5.0 g/dL (02-10 @ 08:46)  Protein Total: 5.0 g/dL (02-09 @ 05:41)  Protein Total: 4.5 g/dL (02-08 @ 23:27)                  Calcium: 8.9 mg/dL (02-12 @ 09:15)  Calcium: 8.7 mg/dL (02-11 @ 06:39)  Calcium: 9.1 mg/dL (02-10 @ 08:46)  Calcium: 9.2 mg/dL (02-09 @ 05:41)  Calcium: 8.5 mg/dL (02-08 @ 23:27)          Phosphorus: 2.4 mg/dL (02-12 @ 09:15)  Phosphorus: 2.3 mg/dL (02-11 @ 06:39)  Phosphorus: 2.9 mg/dL (02-10 @ 08:46)  Phosphorus: 2.9 mg/dL (02-09 @ 05:41)          Bilirubin Total: 0.7 mg/dL (02-12 @ 09:15)  Bilirubin Total: 0.2 mg/dL (02-11 @ 06:39)  Bilirubin Total: 0.5 mg/dL (02-10 @ 08:46)  Bilirubin Total: 0.4 mg/dL (02-09 @ 05:41)  Bilirubin Total: 0.3 mg/dL (02-08 @ 23:27)          Alanine Aminotransferase (ALT/SGPT): 17 U/L (02-12 @ 09:15)  Alanine Aminotransferase (ALT/SGPT): 18 U/L (02-11 @ 06:39)  Alanine Aminotransferase (ALT/SGPT): 25 U/L (02-10 @ 08:46)  Alanine Aminotransferase (ALT/SGPT): 33 U/L (02-09 @ 05:41)  Alanine Aminotransferase (ALT/SGPT): 33 U/L (02-08 @ 23:27)          Aspartate Aminotransferase (AST/SGOT): 8 U/L (02-12 @ 09:15)  Aspartate Aminotransferase (AST/SGOT): 13 U/L (02-11 @ 06:39)  Aspartate Aminotransferase (AST/SGOT): 17 U/L (02-10 @ 08:46)  Aspartate Aminotransferase (AST/SGOT): 23 U/L (02-09 @ 05:41)  Aspartate Aminotransferase (AST/SGOT): 27 U/L (02-08 @ 23:27)    02-17    140  |  110[H]  |  70[H]  ----------------------------<  227[H]  2.3[LL]   |  17[L]  |  1.73[H]    Ca    8.4[L]      17 Feb 2025 05:59  Phos  3.2     02-17  Mg     1.5     02-17    TPro  5.2[L]  /  Alb  2.7[L]  /  TBili  0.8  /  DBili  x   /  AST  15  /  ALT  19  /  AlkPhos  101  02-17 03-01    144  |  113[H]  |  100[H]  ----------------------------<  167[H]  3.7   |  23  |  1.80[H]    Ca    9.1      01 Mar 2025 05:28  Phos  4.1     03-01  Mg     2.0     03-01    TPro  5.6[L]  /  Alb  2.0[L]  /  TBili  0.7  /  DBili  x   /  AST  41[H]  /  ALT  58  /  AlkPhos  339[H]  03-01 03-02    142  |  110[H]  |  105[H]  ----------------------------<  132[H]  3.9   |  25  |  1.86[H]    Ca    9.3      02 Mar 2025 05:50  Phos  3.9     03-02  Mg     2.2     03-02    TPro  6.2  /  Alb  1.9[L]  /  TBili  0.6  /  DBili  x   /  AST  35  /  ALT  44  /  AlkPhos  269[H]  03-02 03-03    142  |  113[H]  |  90[H]  ----------------------------<  108[H]  3.2[L]   |  22  |  1.74[H]    Ca    9.2      03 Mar 2025 05:00  Phos  4.1     03-03  Mg     1.8     03-03    TPro  5.9[L]  /  Alb  2.0[L]  /  TBili  0.6  /  DBili  x   /  AST  27  /  ALT  36  /  AlkPhos  234[H]  03-03                   RADIOLOGY:< from: CT Abdomen and Pelvis No Cont (02.09.25 @ 03:01) >  IMPRESSION:  1.   Study somewhat limited due to absence of contrast.  2.   Central venous catheter on right side with tip in cavoatrial   junction.  3.   Bibasilar atelectasis/scarring right-greater-than-left; can not   exclude  trace pleural effusions. No areas of consolidation. No pneumothorax.  4.   Lucency/deformity is noted involving the posterior aspect of the   left 10 ,  possibly 9th ribs; minimal fractures of indeterminate age in these areas   can  not be excluded.    < end of copied text >  < from: CT Abdomen and Pelvis No Cont (02.09.25 @ 03:01) >  IMPRESSION:  1.   Study somewhat limited due to absence of contrast.  2.   Axial images 71-76 of series 301 and  concomitant coronal images demonstrate prominent soft tissue inseparable   from  the body of the pancreas consistent with pancreatic neoplasm.  3.   Some distension of gallbladder could be further evaluated with right   upper  quadrant sonography. No definite biliary dilatation.  4.   Other incidental findings as above.    < end of copied text >      EKG:  < from: 12 Lead ECG (02.09.25 @ 16:52) >  Diagnosis Line *** Poor data quality, interpretation may be adversely affected  Sinus tachycardia with premature atrial complexes  Left axis deviation  Low voltage QRS  Inferior infarct (cited on or before 08-FEB-2025)  Anterolateral infarct (cited on or before 08-FEB-2025)  Abnormal ECG  When compared with ECG of 09-FEB-2025 16:51, (Unconfirmed)  premature atrial complexes are now present  Confirmed by HAYLEY LANTIGUA ([id number]) on 2/10/2025 10:25:52 PM    < end of copied text >              ECHO:  My reading-Normal LV systolic function; trace MR/mild MAC; mild aortic sclerosis; no significant pulmonary hypertension; trace pericardial effusion    Telem:  Sinus; rate =56

## 2025-03-03 NOTE — PROGRESS NOTE ADULT - ASSESSMENT
Fungemia likely secondary to colonization from mediport. Stable s/p port site removal. Severe protein calorie malnutrition. Continue anti fungal treatment. Encourage po intake, nutritional support.

## 2025-03-03 NOTE — PROGRESS NOTE ADULT - ASSESSMENT
69-year-old female with h/o recent diagnosis of primary head of pancreatic adenocarcinoma with tumor in umbilicus, chemo-induced pancytopenia, SCC lip, cyclical vomiting syndrome, HTN, HPL, Gout, GERD, CVA on Aggrenox was transferred on 2/12 from [facility] for further care. She was admitted to [facility] with septic shock on 02/09/2025, nausea, vomiting and diarrhea requiring Levophed drip. Patient was being followed by GI, ID, Cardiology and Onc there. Patient has been transferred to  on patient's  (GI Dr. Pleitez) request. She was reported with non-bloody diarrhea x 3 in last 24 hours PTA. She denied abdominal pain, nausea, but has dry heaves. At [facility] she was started on Vancomycin and Zosyn, then changed to Cefepime as her blood culture and urine cultures showed Pseudomonas aeruginosa.    #Febrile syndrome. Fungemia. Likely disseminated candidemia.   #Dysphagia. Possible candida esophagitis  #S/p sepsis with PSAE   #Head of pancreas adenocarcinoma on chemotherapy  #Leukocytosis  #Immunocompromised host  #Kidney stones  #ARF   -leukocytosis is improving  -renal function is frail but improving  -dysphagia with poor PO intake  -cultures reviewed  -obtain BC x 2 and UA STAT  -s/p cefepime 2 gm IV q8h # 3  -s/p meropenem 1 gm IV q8h # 9   -s/p fluconazole 200 mg IV qd # 14  -s/p cefepime 1 gm IV q12h # 3  -on vancomycin 125 mg PO q6h for CDAD prophylaxis  -on caspofungin 50 mg IV qd # 2  -tolerating abx well so far; no side effects noted  -GI evaluation appreciated   -mediport removed  -repeat BC x 2 noted  -monitor abdomen  -continue antimicrobial coverage  -f/u cultures  -monitor temps  -f/u CBC and BNP  -supportive care  2. Other issues:   -care per medicine    d/w medicine team and Dr. Geiger

## 2025-03-03 NOTE — CHART NOTE - NSCHARTNOTEFT_GEN_A_CORE
Clinical Nutrition Follow Up Note:    *70 y/o F w/ recent diagnosis of primary pancreatic adenocarcinoma of head with tumor in umbilicus, chemo-induced pancytopenia and significant PMH of SCC lip, cyclical vomiting syndrome, HTN, HPL, Gout, GERD, CVA on Aggrenox and others had been admitted to [facility] with septic shock on 2025, nausea, vomiting and diarrhea requiring Levophed drip, was transferred out of ICU to regular floor last night.  Patient was being followed by GI, ID, Cardiology and Onc there.  Patient has been transferred to  today on patient's  (GI Dr. Pleitez) request.  At this time, patient c/o non-bloody diarrhea x 3 in last 24 hours.  She denies any abdominal pain, nausea, but has some dry heaves.  She denies any fever, chills, chest pain, palpitation, constipation or dysuria. Initially, she was started on Vancomycin and Zosyn, but now she has been on Cefepime as her blood culture and urine cultures are positive for Pseudomonas aeruginosa. Currently, patient is on Cefepime.  ID had also recommended Flagyl, but patient had refused it, as it causes nausea to her. Noted with Metabolic acidosis with NAG due to GI loss. Likely chemo-induced diarrhea and vomiting. Better than before. Cutaneous lesion on RUQ area below right breast, unclear etiology, ? Ecthyma gangrenosum. Admitting diagnosis: pancreatic ca  *: Pt transfer from [facility]; seen by RD and met criteria for PCM. Pt reports only being able to tolerate some sips of water and tea at this time. Reports tried eating small bite of mashed banana and felt burning sensation down her throat. Reports #; RD unable to obtain bedscale wt d/t bedscale not working. Admit wt per # note with 1+ and 2+ edema skewing wt. Pt appears overweight, NFPE reveals mild- mod muscle/fat wasting at this time. Recommend to continue with PO diet and encourage intake as tolerated. RD consulted to initiate PN d/t PO intolerance. Will initiate TPN through port.  *2/15: Remains on low fiber diet with minimal PO intake. Still w/ multiple episodes of diarrhea, if persists can consider adding 5mg zinc into PN bag. No other acute events overnight. D/w Dr. Joyner will c/w TPN today.   *: Raised area noted to mid sternum. RCW port intact.- no swelling noted, no redness. Port flushed without resistance. + blood return noted. Assessed with IV team Amy. Per pt no Pain on palpation. CT Chest. TPN on hold until CT read. CT chest from earlier in the day revealed new bilateral pleural effusions with bibasilar consolidated lung and ascites. CXR now shows low lung volumes and bibasilar opacities. Rigors post transfusion - Rapid response called;  Dr. Pleitez requesting ICU consult- concerned for pt.  requesting assessment of patient STAT. Pt transferred to SICU. ? third spacing, and possible PNA. Per pt still drinking small sips of water and tea. D/w Dr. Fortune will keep total volume same in PN bag today, and will c/w TPN.   *:  tx SICU, po remains minimal. On bipap. On additional LR d/t diuresis per critical care PA: "malignancy/severe protein-calorie malnutrition/hypoalbuminemia causing diffuse third spacing, caution with further diuresis as this is not truly a volume overload/acute CHF issue" also on IV albumin  c/w TPN  STRONGLY suggest to Confirm goals of care regarding LONG-TERM nutrition support - However, LONG-TERM Nutrition support is not recommended with advanced cancer as studies show that there is no improvement of dehydration symptoms, quality of life, or survival. It also increases the risk for peripheral edema, ascites, and pleural effusions. Will continue to provide nutr within GOC though  *: plan to c/w TPN for now. RD discussed w/ MD Tong and SICU IRINA Jurado need to confirm long term GOC regarding nutr support. Per GI - Esophageal pain is 2/2 esophagitis but unclear what type -- reflux vs. candida vs CMV or HSV etc. Plan to add imodium standing. ? EGD   *: reports she stills has difficulty swallowing due to pain. feeling weak overall. still having multiple episodes of diarrhea, poor PO intake. Plan to c/w TPN - per GI: "CVM and HSV will be negative which argues that esophagitis is reflux or fungal related"  d/w IRINA Jurado long-term plan regarding nutr support TBD, pending discussion w/ family. ? fluid overload which TPN may be contributing to as some studies show with advanced cancer that nutrition support increases the risk for peripheral edema, ascites, and pleural effusions.   *: PO slighly improving, plan to c/w TPN.  POCT uptrending, d/w IRINA Jurado, steroids to be dc and will hold off in insulin PN bag - will manage w/ insulin outside bag.  Per GI: "Imaging suggests ileus but I personally think that putting the imaging together with history and clinical findings that she just has severe diarrhea illness 2/2 chemo, given that both large and small bowel loops are fluid filled and she has no abd pain or vomiting"  *: PO intake continues to slightly improve, plan to c/w TPN. Hyperglycemia worsening, d/w IRINA Jurado, plan to add regular insulin into PN bag. Will also decrease dextrose to 250g to see if this helps w/ hyperglycemia and can increase again when POCTs better controlled.   *: Abdomen distended, non-tender to palpation - GI rec stat CT w/o contrast for further evaluation and to hold PO intake at this time. POCT remain elevated, per RN  this morning - Lantus was not given last night by RN since insulin was already in PN bag (?). Will hold off on increasing dextrose and will c/w current insulin in PN bag, d/w intensivist and plan to adjust Lantus outside PN bag.   *: Now has (+) GIB,  CTA with improvement in bowel distension, small and lg bowel ileus. Per GI, clears OK.  *: consumed some PO yesterday. Per GI, unsure why GIB but now resolved - ?low level ischemic colitis. plan to c/w TPN for now per sicu IRINA Jurado  STRONGLY suggest palliative care consult to determine LONG-TERM GOC regarding nutr support   *: per SICU RN, pt ate yogurt, bites of oatmeal.  bringing in foods to encourage PO as well. IR consulted for IVC filter.  Wound care RN following.  c/w TPN  *: IVC filter being considered, possible EGD d/t recurrent GIB.  Per GI - corpak is reasonable. RD feels strongly that corpak or even PEG (if able) would be best route of nutr support for this pt at this point. Has been on TPN x 13 days. Will c/w TPN per IRINA Jurado for now. STRONGLY rec'd palliative care team to Confirm goals of care regarding LONG-TERM nutrition support   *: Had maroon BM again,  would like to defer endoscopy/colonoscopy at this time. Plan for IVC filter placement today.   *3/1: Bleeding resolved per GI but febrile, per GI Dr. Hernandez "I think corepak for feeds would be preferable as TPN increases the risk of infection etc but Dr. Pleitez has declined"  NGT was placed for gastric decompression yesterday.  IVC filter deferred at this time  *3/2: Plan to c/w TPN - Persistent ileus, gastric distention on chest x-ray , NGT remains in place for decompression, abdomen softer yesterday per MD. Found to have preliminary findings of "yeast-like cells" in blood culture from , abx regimen to be adjusted prn  -ADDENDUM: plan to pull infusion port d/t new infection. NGT w/ minimal output, plan to stop TPN and start TF.    *current status: S/p venous port removal (3/2). Pt pulled NGT (3/3). Per SICU PA,  would like to defer corpak placement at this time. GI following, plan to discuss with  about PEG placement. Recommend to c/w regular diet +ONS. Confirm goals of care regarding nutrition support - Nutrition support is not recommended due to overall declining medical status which evidenced based studies indicate EN is not effective in prolonging survival and improving quality of life. It can also increase risk of aspiration pneumonia as well as other related issues (infection, GI complications, and worsening/ non-healing PI's). However, will provide nutrition/ hydration within [facility]. Will follow if pt has PEG placed; recommend to restart the [employer] if PEG is placed. Please see additional recommendations below.     *Labs Reviewed:      142  |  113[H]  |  90[H]  ----------------------------<  108[H]  3.2[L]   |  22  |  1.74[H]    Ca    9.2      03 Mar 2025 05:00  Phos  4.1       Mg     1.8         TPro  5.9[L]  /  Alb  2.0[L]  /  TBili  0.6  /  DBili  x   /  AST  27  /  ALT  36  /  AlkPhos  234[H]        BMI: BMI (kg/m2): 27.8 (25 @ 05:46)  HbA1c: A1C with Estimated Average Glucose Result: 6.6 % (25 @ 05:35)    Glucose: POCT Blood Glucose.: 103 mg/dL (25 @ 11:29)    BP: 125/65 (25 @ 12:00) (98/60 - 137/66)Vital Signs Last 24 Hrs  T(C): 36.8 (25 @ 12:33), Max: 37.2 (25 @ 22:00)  T(F): 98.2 (25 @ 12:33), Max: 99 (25 @ 22:00)  HR: 76 (25 @ 12:00) (53 - 86)  BP: 125/65 (25 @ 12:00) (108/69 - 136/80)  BP(mean): 83 (25 @ 12:00) (75 - 108)  RR: 21 (25 @ 12:00) (17 - 33)  SpO2: 93% (25 @ 12:00) (93% - 97%)    Lipid Panel: Date/Time: 25 @ 05:00  Triglycerides, Serum: 341    POCT Blood Glucose.: 103 mg/dL (03 Mar 2025 11:29)  POCT Blood Glucose.: 117 mg/dL (03 Mar 2025 00:38)  POCT Blood Glucose.: 143 mg/dL (02 Mar 2025 17:43)    Iron Total: 34 ug/dL (25 @ 18:46)  Folate, Serum: >20.0 ng/mL (25 @ 18:46)  Vitamin B12, Serum: 1457 pg/mL (25 @ 18:46)  Vitamin D, 25-Hydroxy: 23.0 ng/mL (25 @ 18:46) *c/w deficency    *pertinent meds: Acetaminophen, Abx, Vitamin D3, Pepcid, Admelog (0 units x 24 hours), Metoprolol, Protonix, Carafate, IV Albumin, D5 + IVF    *I and O's:    25 @ 07:01  -  25 @ 07:00  --------------------------------------------------------  IN:    dextrose 5% + sodium chloride 0.45%: 1277 mL    Fat Emulsion (Fish Oil &amp; Plant Based) 20% Infusion: 90.4 mL    IV PiggyBack: 350 mL    Miscellaneous Tube Feedin mL    TPN (Total Parenteral Nutrition): 322.7 mL  Total IN: 2185.1 mL    OUT:    Indwelling Catheter - Urethral (mL): 2450 mL  Total OUT: 2450 mL    Total NET: -264.9 mL    *(+) BM on 3/ - moderate, mucoid, dark, green; pt not on bowel regimen.    *fiona score of 10 : PIs documented - L/R buttocks (stage 2). 2+ generalized edema documented.    *PO intake, meeting ~ 0-25% of estimated nutr needs.    *Malnutrition dx: Pt continues to meet criteria for severe protein calorie malnutrition in context of acute on chronic illness r/t decreased ability to meet increased nutrient needs 2/2 PO intolerance, N/V and diarrhea, on chemo for pancreatic ca AEB meeting <50% ENN > 5 days, mild-mod muscle/fat wasting    Diet, Regular:   Prosource Gelatein Plus     Qty per Day:  2  Liquid Protein Supplement     Qty per Day:  2  Supplement Feeding Modality:  Oral  Ensure Plus High Protein Cans or Servings Per Day:  1       Frequency:  Two Times a day (25 @ 12:14) [Active]    Estimated Needs: based on 73.4 Kg (wt from EMR admit wt )  Calories: 1812 - 2175 Kcal (25 - 30 Kcal/Kg)  Protein: 108 - 145 g (1.5 - 2.0 g/Kg)  Fluids: 1450 - 1812 mL (20 - 25 mL/Kg)    *Wt Hx:  Daily Weight in k.2 (2025 04:00)  Daily Weight in k.3 (2025 06:00) *2+ edema  Daily Weight in k.4 (2025 05:00)  Daily Weight in k.6 (15 Feb 2025 06:17)    Height (cm): 162.6 (25 @ 02:39)  Weight (kg): 73.4 (25 @ 05:46), 66.8 (25 @ 02:39)  BMI (kg/m2): 27.8 (25 @ :46), 25.3 (25 @ 02:39)  BSA (m2): 1.79 (25 @ 05:46), 1.72 (25 @ 02:39)    Recommendations:  1) C/w Regular diet + ONS  2) C/w vitamin D supplementation  3) Please obtain daily weights   4) MVI w/ minerals daily to ensure 100% RDA met   5) Consider adding thiamine 100 mg daily 2/2 poor PO intake/ malnutrition   6) Encourage protein-rich foods, maximize food preferences  7) Monitor bowel movements, if no BM for >3 days, consider implementing bowel regimen.  8) Confirm goals of care regarding nutrition support - Nutrition support is not recommended due to overall declining medical status which evidenced based studies indicate EN is not effective in prolonging survival and improving quality of life. It can also increase risk of aspiration pneumonia as well as other related issues (infection, GI complications, and worsening/ non-healing PI's). However, will provide nutrition/ hydration within GOC.   9) If PEG is placed, recommend to restart [employer] Peptide 1.5 (which is also low carb) @ 20 cc/hr, increase by 10 cc/hr q6 hours until goal rate of 65 cc/hr (total volume 1300 mL) met with 2 packets of Prosource TF. Will provide ~ 2150 kcal, 126 g protein, and 910 mL free water.    *will continue to monitor and follow up with adjustments to treatment plan prn*  Sheri Seo, MS, RDN, CDN, [facility] [phone]  Certified Nutrition

## 2025-03-03 NOTE — PROGRESS NOTE ADULT - ASSESSMENT
69-year-old female with stage IV pancreatic adenocarcinoma presenting with neutropenic fever and severe treatment-related toxicities from modified FOLFIRINOX and investigational EMILY inhibitor [id number] found to have PE and LBO.     # pancreatic ca   - remains on clinical trial- supportive care while in hospital   - CTH negative and discussed w/ pt   - will continue with ongoing communication with  [place]- I discussed case with DR. Tapia yesterday at [facility] - pt with UGT1A1 mutation which makes her more susceptible to toxicities from 5FU and irinotecan which may have contributed to symptoms on arrival.  no DPD deficiency   - Review clinical trial protocol - may remain on trial drug alone and changing regimen- to be discussed when recovers from underlying illness and admission     # Neutropenic Septic shock - +fungal cultures   - pt previously on neupogen- WBC has now recovered and elevated likely 2/2 GCSF support   - BCx with fungal growth likely disseminated candidiasis.  identification of organism pending.  ID - c/w caspofungin.    - removed mediport  - pt bradycardic o/n- dc'd metoprolol- cardio following   - wound care for control of skin breakdown from diarrhea    # PE   - v/q scan w indeterminate prob of PE, linear defect in RUL posterior segment   - CT chest- multiple PE w/in segmental left lower and right lower, 2 right middle lobe nodules,   -  Pt restarted on hep gtt due- unfortunately had episodes of maroon stools - no further bleeding on bms- reports brown stool as per nurse   - IVC filter cancelled due to fevers over weekend     # anemia   - received 4u to date and 1u ffp- last Hb 10.1   - Protonix IV   - GI following   - keep type and screen active, transfuse for Hb <7    # large bowel obstruction  - CT a/p Large bowel obstruction with transition point at the distal transverse colon. There is also narrowing at the level of the terminal ileum. Diffuse ascites. Redemonstrated pancreatic lesion, consistent with history of pancreatic adenocarcinoma. There is an umbilical lesion with adjacent peritoneal implants.  - previously was having diarrhea  - GI following and reviewed imaging with radiology   - 2/22/25  repeat CT a/p with mild interval improvements in small and large bowel ileus. pt given lasix and albumin.     ESOPHAGEAL CANDIDIASIS   - s/p DIFLUCAN   - now on caspofungin       will follow and communicate with [facility]

## 2025-03-03 NOTE — PROGRESS NOTE ADULT - SUBJECTIVE AND OBJECTIVE BOX
Date of service: 03-03-25 @ 14:24    Lying in bed in NAD  Weak looking  Alert and confused  Had episodes of bradycardia    ROS: no fever or chills; limited     MEDICATIONS  (STANDING):  acetaminophen   IVPB .. 1000 milliGRAM(s) IV Intermittent once  albumin human 25% IVPB 50 milliLiter(s) IV Intermittent every 6 hours  caspofungin IVPB 50 milliGRAM(s) IV Intermittent every 24 hours  caspofungin IVPB      chlorhexidine 2% Cloths 1 Application(s) Topical <User Schedule>  cholecalciferol 2000 Unit(s) Oral at bedtime  dextrose 5% + sodium chloride 0.45%. 1000 milliLiter(s) (75 mL/Hr) IV Continuous <Continuous>  dextrose 5%. 1000 milliLiter(s) (50 mL/Hr) IV Continuous <Continuous>  dextrose 5%. 1000 milliLiter(s) (100 mL/Hr) IV Continuous <Continuous>  dextrose 50% Injectable 25 Gram(s) IV Push once  dextrose 50% Injectable 12.5 Gram(s) IV Push once  dextrose 50% Injectable 25 Gram(s) IV Push once  famotidine Injectable 20 milliGRAM(s) IV Push daily  glucagon  Injectable 1 milliGRAM(s) IntraMuscular once  insulin lispro (ADMELOG) corrective regimen sliding scale   SubCutaneous every 6 hours  pantoprazole  Injectable 40 milliGRAM(s) IV Push every 12 hours  potassium chloride   Powder 40 milliEquivalent(s) Oral once  sucralfate suspension 1 Gram(s) Oral four times a day  vancomycin    Solution 125 milliGRAM(s) Oral every 6 hours  zinc oxide 40% Paste 1 Application(s) Topical two times a day    Vital Signs Last 24 Hrs  T(C): 36.8 (03 Mar 2025 12:33), Max: 37.2 (02 Mar 2025 22:00)  T(F): 98.2 (03 Mar 2025 12:33), Max: 99 (02 Mar 2025 22:00)  HR: 76 (03 Mar 2025 12:00) (53 - 86)  BP: 125/65 (03 Mar 2025 12:00) (108/69 - 136/80)  BP(mean): 83 (03 Mar 2025 12:00) (75 - 108)  RR: 21 (03 Mar 2025 12:00) (17 - 33)  SpO2: 93% (03 Mar 2025 12:00) (93% - 97%)    Parameters below as of 02 Mar 2025 16:00  Patient On (Oxygen Delivery Method): room air     Physical exam:    Constitutional:  No acute distress  HEENT: NC/AT, EOMI, PERRLA, conjunctivae clear; ears and nose atraumatic; pharynx benign  Neck: supple; thyroid not palpable  Back: no tenderness  Respiratory: respiratory effort normal; decreased BS at bases  Cardiovascular: S1S2 regular, no murmurs  Abdomen: soft, mild periombilical tender, not distended, positive BS; no liver or spleen organomegaly  Genitourinary: no suprapubic tenderness  Lymphatic: no LN palpable  Musculoskeletal: no muscle tenderness, no joint swelling or tenderness  Extremities: no pedal edema  Neurological/ Psychiatric: AxOx3, judgement and insight normal; moving all extremities  Skin: no rashes; right lower chest wall dry, scabbed ulcer; no discharge     Labs: reviewed                        10.1   12.72 )-----------( 200      ( 02 Mar 2025 05:50 )             30.6     03-02    142  |  110[H]  |  105[H]  ----------------------------<  132[H]  3.9   |  25  |  1.86[H]    Ca    9.3      02 Mar 2025 05:50  Phos  3.9     03-02  Mg     2.2     03-02    TPro  6.2  /  Alb  1.9[L]  /  TBili  0.6  /  DBili  x   /  AST  35  /  ALT  44  /  AlkPhos  269[H]  03-02                        10.2   18.89 )-----------( 181      ( 27 Feb 2025 04:05 )             30.2     02-27    149[H]  |  116[H]  |  96[H]  ----------------------------<  138[H]  3.9   |  26  |  1.87[H]    Ca    9.1      27 Feb 2025 04:05  Phos  4.0     02-27  Mg     2.0     02-27    TPro  5.4[L]  /  Alb  2.8[L]  /  TBili  0.8  /  DBili  x   /  AST  44[H]  /  ALT  45  /  AlkPhos  194[H]  02-27                        10.5   35.65 )-----------( 132      ( 21 Feb 2025 05:18 )             31.0     02-21    141  |  105  |  66[H]  ----------------------------<  358[H]  3.2[L]   |  26  |  1.33[H]    Ca    8.2[L]      21 Feb 2025 05:18  Phos  3.6     02-21  Mg     2.0     02-21    TPro  5.3[L]  /  Alb  2.1[L]  /  TBili  0.6  /  DBili  x   /  AST  56[H]  /  ALT  33  /  AlkPhos  329[H]  02-21    C-Reactive Protein: 209.0 mg/mL (02-15-25 @ 05:17)  C-Reactive Protein: 233.0 mg/mL (02-14-25 @ 06:50)  Ferritin: 1798 ng/mL (02-13-25 @ 18:46)                        8.7    0.19  )-----------( 33       ( 13 Feb 2025 06:52 )             25.7     02-13    137  |  109[H]  |  20  ----------------------------<  117[H]  2.6[LL]   |  18[L]  |  1.02    Ca    8.7      13 Feb 2025 06:52  Phos  2.3     02-13  Mg     1.6     02-13    TPro  4.4[L]  /  Alb  1.1[L]  /  TBili  0.7  /  DBili  x   /  AST  11[L]  /  ALT  16  /  AlkPhos  94  02-13     LIVER FUNCTIONS - ( 13 Feb 2025 06:52 )  Alb: 1.1 g/dL / Pro: 4.4 gm/dL / ALK PHOS: 94 U/L / ALT: 16 U/L / AST: 11 U/L / GGT: x           Culture - Urine (collected 09 Feb 2025 01:25)  Source: Clean Catch  Final Report (11 Feb 2025 08:39):    50,000 - 99,000 CFU/mL Pseudomonas aeruginosa  Organism: Pseudomonas aeruginosa (11 Feb 2025 08:39)  Organism: Pseudomonas aeruginosa (11 Feb 2025 08:39)      Method Type: REJI      -  Amikacin: S <=16      -  Aztreonam: S <=4      -  Cefepime: S <=2      -  Ceftazidime: S <=1      -  Ciprofloxacin: S <=0.25      -  Imipenem: S 2      -  Levofloxacin: S <=0.5      -  Meropenem: S <=1      -  Piperacillin/Tazobactam: S <=8    Culture - Blood (collected 08 Feb 2025 22:00)  Source: .Blood BLOOD  Gram Stain (09 Feb 2025 19:04):    Growth in aerobic bottle: Gram Negative Rods  Final Report (11 Feb 2025 07:53):    Growth in aerobic bottle: Pseudomonas aeruginosa    Direct identification is available within approximately 3-5    hours either by Blood Panel Multiplexed PCR or Direct    MALDI-TOF. Details: [email or web address]  Organism: Blood Culture PCR  Pseudomonas aeruginosa (11 Feb 2025 07:53)  Organism: Pseudomonas aeruginosa (11 Feb 2025 07:53)      Method Type: REJI      -  Aztreonam: S <=4      -  Cefepime: S <=2      -  Ceftazidime: S <=1      -  Ciprofloxacin: S <=0.25      -  Imipenem: S <=1      -  Levofloxacin: S <=0.5      -  Meropenem: S <=1      -  Piperacillin/Tazobactam: S <=8  Organism: Blood Culture PCR (11 Feb 2025 07:53)      Method Type: PCR      -  Pseudomonas aeruginosa: Detec    Culture - Blood (collected 08 Feb 2025 21:55)  Source: .Blood BLOOD  Gram Stain (09 Feb 2025 19:32):    Growth in aerobic bottle: Gram Negative Rods  Final Report (11 Feb 2025 07:54):    Growth in aerobic bottle: Pseudomonas aeruginosa    See previous culture [id number]    Culture - Blood (collected 28 Feb 2025 12:00)  Source: .Blood None  Preliminary Report (01 Mar 2025 19:01):    No growth at 24 hours    Culture - Blood (collected 28 Feb 2025 11:54)  Source: .Blood None  Gram Stain (02 Mar 2025 06:12):    Growth in aerobic bottle: Yeast like cells  Preliminary Report (02 Mar 2025 06:12):    Growth in aerobic bottle: Yeast like cells    Direct identification is available within approximately 3-5    hours either by Blood Panel Multiplexed PCR or Direct    MALDI-TOF. Details: [email or web address]  Organism: Blood Culture PCR (02 Mar 2025 08:38)  Organism: Blood Culture PCR (02 Mar 2025 08:38)    Radiology: all available radiological tests reviewed    < from: US Abdomen Upper Quadrant Right (02.09.25 @ 14:55) >  Distended gallbladder with layering sludge.  8 mm right renal calculus without hydronephrosis.  Hepatomegaly.  Right renal cyst.  < end of copied text >    < from: CT Abdomen and Pelvis No Cont (02.09.25 @ 03:01) >  1.   Study somewhat limited due to absence of contrast.  2.   Axial images 71-76 of series 301 and concomitant coronal images demonstrate prominent soft tissue inseparable from the body of the pancreas consistent with pancreatic neoplasm.  3.   Some distension of gallbladder could be further evaluated with right upper quadrant sonography. No definite biliary dilatation.  < end of copied text >    Advanced directives addressed: full resuscitation

## 2025-03-03 NOTE — PROGRESS NOTE ADULT - ASSESSMENT
ASSESSMENT  68yo female with PMHx recent diagnosis of primary pancreatic adenocarcinoma of head with tumor in umbilicus, chemo-induced pancytopenia, SCC lip, cyclical vomiting syndrome, HTN, HPL, Gout, GERD, CVA on Aggrenox    Initially admitted to [facility] with septic shock 2/2 + pseudomonas bacteremia likely due to UTI vs colitis on 02/09/2025 Was having nausea, vomiting and diarrhea requiring Levophed drip, was transferred out of ICU to regular floor.    Then transferred to  as per request of .  Was getting platelet transfusion and developed tachypnea hypoxia, rigors  Was diuresed and became hypotensive  Upgraded to SICU     Here with   1. Sepsis (POA)  2. Pseudomonal bacteremia suspect from UTI   3. Mucositis suspected esophageal candidiasis?   4. Pancytopenia  5. Acute hypoxic respiratory failure 2/2 pulm edema   6. MALLORY     CTA chest abd pelvis 2/19 revealing multiple small PE b/l and LBO  Started on IV heparin   Went into Afib with RVR this admission, resolved with lopressor.    Developed GI bleed on heparin gtt  started on protonix    re - trialed on heparin infusion on 2/25 -> subsequently had maroon BMs again    PLAN    Neuro: awake, but slow to respond. pain control prn. IV tylenol .avoid nsaids  CV: BP has been stable 130s/80s. intermittent episodes sinus bradycardia 45s today, dc lopressor. EKG sinus bradycardia DE 136ms. new TWI in V3? check troponin  Pulm: on room air. encourage incentive spirometer.  GI: PO diet as tolerated. patient pulled NGT overnight,  would like to defer corpak placement at this time. IV PPI bid. off TPN, R chest port removed  Nephro: Cr  1.8 -> 1.7 HyperNa resolved. coelho in place. monitor I & Os. Trend renal fxn. IV albumin 8 doses.   Endo: BGMs q6hr. ISS.  ID: febrile. blood cx 2/28 + yeast, cont IV caspofungin 50mg qd (started 3/2),  cont PO vanco q6hr for C diff ppx. s/p diflucan. s/p meropenem. s/p IV cefepime.   Heme: hgb has been stable. s/p 6units pRBC this admission ( 2units 2/26, 2units on 2/23, 2units on 2/16). s/p filgastrim  PT eval, OOB to chair as tolerated.    Dispo: SICU. cont IV fluids. holding beta blockers. IV caspofungin.    Will discuss with Dr. 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    Then transferred to  as per request of .  Was getting platelet transfusion and developed tachypnea hypoxia, rigors  Was diuresed and became hypotensive  Upgraded to SICU     Here with   1. Sepsis (POA)  2. Pseudomonal bacteremia suspect from UTI   3. Mucositis suspected esophageal candidiasis?   4. Pancytopenia  5. Acute hypoxic respiratory failure 2/2 pulm edema   6. MALLORY     CTA chest abd pelvis 2/19 revealing multiple small PE b/l and LBO  Started on IV heparin   Went into Afib with RVR this admission, resolved with lopressor.    Developed GI bleed on heparin gtt  started on protonix    re - trialed on heparin infusion on 2/25 -> subsequently had maroon BMs again    PLAN    Neuro: awake, but slow to respond. pain control prn. IV tylenol .avoid nsaids  CV: BP has been stable 130s/80s. intermittent episodes sinus bradycardia 45s today, dc lopressor. EKG sinus bradycardia CT 136ms. new TWI in V3? check troponin  Pulm: on room air. encourage incentive spirometer.  GI: PO diet as tolerated. patient pulled NGT overnight,  would like to defer corpak placement at this time. IV PPI bid. off TPN, R chest port removed  Nephro: Cr  1.8 -> 1.7 HyperNa resolved. cont D5 + 1/2 NS until PO intake adequate. HypoKalemia - K repleted. coelho in place. monitor I & Os. Trend renal fxn. IV albumin 8 doses.   Endo: BGMs q6hr. ISS.  ID: afebrile. blood cx 2/28 + yeast, cont IV caspofungin 50mg qd (started 3/2),  cont PO vanco q6hr for C diff ppx. s/p diflucan. s/p meropenem. s/p IV cefepime.   Heme: hgb has been stable. s/p 6units pRBC this admission ( 2units 2/26, 2units on 2/23, 2units on 2/16). s/p filgastrim  PT eval, OOB to chair as tolerated.    Dispo: SICU. cont IV fluids. holding beta blockers. IV caspofungin.    Will discuss with Dr. Lanier    updated  at bedside

## 2025-03-04 LAB
ANION GAP SERPL CALC-SCNC: 6 MMOL/L — SIGNIFICANT CHANGE UP (ref 5–17)
BUN SERPL-MCNC: 62 MG/DL — HIGH (ref 7–23)
CALCIUM SERPL-MCNC: 9.7 MG/DL — SIGNIFICANT CHANGE UP (ref 8.5–10.1)
CO2 SERPL-SCNC: 25 MMOL/L — SIGNIFICANT CHANGE UP (ref 22–31)
CREAT SERPL-MCNC: 1.78 MG/DL — HIGH (ref 0.5–1.3)
EGFR: 30 ML/MIN/1.73M2 — LOW
EGFR: 30 ML/MIN/1.73M2 — LOW
GLUCOSE SERPL-MCNC: 133 MG/DL — HIGH (ref 70–99)
HCT VFR BLD CALC: 28.4 % — LOW (ref 34.5–45)
HGB BLD-MCNC: 9.2 G/DL — LOW (ref 11.5–15.5)
MAGNESIUM SERPL-MCNC: 1.7 MG/DL — SIGNIFICANT CHANGE UP (ref 1.6–2.6)
MCHC RBC-ENTMCNC: 28.8 PG — SIGNIFICANT CHANGE UP (ref 27–34)
MCHC RBC-ENTMCNC: 32.4 G/DL — SIGNIFICANT CHANGE UP (ref 32–36)
MCV RBC AUTO: 88.8 FL — SIGNIFICANT CHANGE UP (ref 80–100)
NRBC # BLD AUTO: 0 K/UL — SIGNIFICANT CHANGE UP (ref 0–0)
NRBC # FLD: 0 K/UL — SIGNIFICANT CHANGE UP (ref 0–0)
NRBC BLD AUTO-RTO: 0 /100 WBCS — SIGNIFICANT CHANGE UP (ref 0–0)
PHOSPHATE SERPL-MCNC: 4 MG/DL — SIGNIFICANT CHANGE UP (ref 2.5–4.5)
PLATELET # BLD AUTO: 233 K/UL — SIGNIFICANT CHANGE UP (ref 150–400)
PMV BLD: 13.4 FL — HIGH (ref 7–13)
POTASSIUM SERPL-MCNC: 3.5 MMOL/L — SIGNIFICANT CHANGE UP (ref 3.5–5.3)
POTASSIUM SERPL-SCNC: 3.5 MMOL/L — SIGNIFICANT CHANGE UP (ref 3.5–5.3)
RBC # BLD: 3.2 M/UL — LOW (ref 3.8–5.2)
RBC # FLD: 15.9 % — HIGH (ref 10.3–14.5)
SODIUM SERPL-SCNC: 144 MMOL/L — SIGNIFICANT CHANGE UP (ref 135–145)
WBC # BLD: 11.14 K/UL — HIGH (ref 3.8–10.5)
WBC # FLD AUTO: 11.14 K/UL — HIGH (ref 3.8–10.5)

## 2025-03-04 PROCEDURE — 99233 SBSQ HOSP IP/OBS HIGH 50: CPT

## 2025-03-04 PROCEDURE — 93970 EXTREMITY STUDY: CPT | Mod: 26

## 2025-03-04 RX ORDER — MEROPENEM 1 G/30ML
1000 INJECTION INTRAVENOUS EVERY 12 HOURS
Refills: 0 | Status: DISCONTINUED | OUTPATIENT
Start: 2025-03-04 | End: 2025-03-04

## 2025-03-04 RX ORDER — ACETAMINOPHEN 500 MG/5ML
1000 LIQUID (ML) ORAL ONCE
Refills: 0 | Status: COMPLETED | OUTPATIENT
Start: 2025-03-04 | End: 2025-03-04

## 2025-03-04 RX ORDER — MAGNESIUM OXIDE 400 MG
400 TABLET ORAL
Refills: 0 | Status: DISCONTINUED | OUTPATIENT
Start: 2025-03-04 | End: 2025-03-05

## 2025-03-04 RX ORDER — MEROPENEM 1 G/30ML
1000 INJECTION INTRAVENOUS EVERY 12 HOURS
Refills: 0 | Status: DISCONTINUED | OUTPATIENT
Start: 2025-03-04 | End: 2025-03-09

## 2025-03-04 RX ADMIN — Medication 1000 MILLIGRAM(S): at 18:00

## 2025-03-04 RX ADMIN — Medication 1 GRAM(S): at 06:06

## 2025-03-04 RX ADMIN — Medication 40 MILLIGRAM(S): at 09:44

## 2025-03-04 RX ADMIN — TOUCHLESS CARE ZINC OXIDE PROTECTANT 1 APPLICATION(S): 20; 25 SPRAY TOPICAL at 22:15

## 2025-03-04 RX ADMIN — CASPOFUNGIN ACETATE 260 MILLIGRAM(S): 5 INJECTION, POWDER, LYOPHILIZED, FOR SOLUTION INTRAVENOUS at 09:44

## 2025-03-04 RX ADMIN — Medication 125 MILLIGRAM(S): at 12:53

## 2025-03-04 RX ADMIN — Medication 125 MILLIGRAM(S): at 06:06

## 2025-03-04 RX ADMIN — ALBUMIN (HUMAN) 50 MILLILITER(S): 12.5 INJECTION, SOLUTION INTRAVENOUS at 06:06

## 2025-03-04 RX ADMIN — Medication 20 MILLIGRAM(S): at 22:23

## 2025-03-04 RX ADMIN — Medication 125 MILLIGRAM(S): at 23:03

## 2025-03-04 RX ADMIN — Medication 650 MILLIGRAM(S): at 13:24

## 2025-03-04 RX ADMIN — Medication 1 GRAM(S): at 23:04

## 2025-03-04 RX ADMIN — Medication 40 MILLIGRAM(S): at 22:23

## 2025-03-04 RX ADMIN — Medication 400 MILLIGRAM(S): at 00:42

## 2025-03-04 RX ADMIN — Medication 650 MILLIGRAM(S): at 12:53

## 2025-03-04 RX ADMIN — Medication 1 GRAM(S): at 12:53

## 2025-03-04 RX ADMIN — Medication 20 MILLIEQUIVALENT(S): at 22:23

## 2025-03-04 RX ADMIN — Medication 400 MILLIGRAM(S): at 17:17

## 2025-03-04 RX ADMIN — Medication 2000 UNIT(S): at 22:23

## 2025-03-04 RX ADMIN — Medication 125 MILLIGRAM(S): at 17:17

## 2025-03-04 RX ADMIN — Medication 1 GRAM(S): at 17:17

## 2025-03-04 RX ADMIN — MEROPENEM 1000 MILLIGRAM(S): 1 INJECTION INTRAVENOUS at 15:46

## 2025-03-04 RX ADMIN — TOUCHLESS CARE ZINC OXIDE PROTECTANT 1 APPLICATION(S): 20; 25 SPRAY TOPICAL at 09:44

## 2025-03-04 RX ADMIN — Medication 1000 MILLIGRAM(S): at 01:10

## 2025-03-04 NOTE — PROGRESS NOTE ADULT - SUBJECTIVE AND OBJECTIVE BOX
INTERVAL HPI/OVERNIGHT EVENTS:  Patient S&E at bedside.   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      GERD (gastroesophageal reflux disease)      Cyclical vomiting syndrome      CVA (cerebrovascular accident)      H/O squamous cell carcinoma excision          FAMILY HISTORY:      VITAL SIGNS:  T(F): 97.5 (03-04-25 @ 08:29)  HR: 72 (03-04-25 @ 08:00)  BP: 142/86 (03-04-25 @ 08:00)  RR: 24 (03-04-25 @ 08:00)  SpO2: 94% (03-04-25 @ 08:00)  Wt(kg): --    PHYSICAL EXAM:    Constitutional: NAD, more awake and vocal this am   ENT: EOMI,   Respiratory: CTA b/l, on RA  Cardiovascular: HR improved rrr  Gastrointestinal: soft, NTND,   ext- LLE cold w/ some discoloration at tips, +pulses  neuro- aox2    MEDICATIONS  (STANDING):  acetaminophen   IVPB .. 1000 milliGRAM(s) IV Intermittent once  caspofungin IVPB 50 milliGRAM(s) IV Intermittent every 24 hours  caspofungin IVPB      chlorhexidine 2% Cloths 1 Application(s) Topical <User Schedule>  cholecalciferol 2000 Unit(s) Oral at bedtime  dextrose 5% + sodium chloride 0.45%. 1000 milliLiter(s) (75 mL/Hr) IV Continuous <Continuous>  dextrose 5%. 1000 milliLiter(s) (50 mL/Hr) IV Continuous <Continuous>  dextrose 5%. 1000 milliLiter(s) (100 mL/Hr) IV Continuous <Continuous>  famotidine Injectable 20 milliGRAM(s) IV Push daily  glucagon  Injectable 1 milliGRAM(s) IntraMuscular once  pantoprazole  Injectable 40 milliGRAM(s) IV Push every 12 hours  sucralfate suspension 1 Gram(s) Oral four times a day  vancomycin    Solution 125 milliGRAM(s) Oral every 6 hours  zinc oxide 40% Paste 1 Application(s) Topical two times a day    MEDICATIONS  (PRN):  acetaminophen     Tablet .. 650 milliGRAM(s) Oral every 6 hours PRN Temp greater or equal to 38C (100.4F), Mild Pain (1 - 3)  aluminum hydroxide/magnesium hydroxide/simethicone Suspension 30 milliLiter(s) Oral every 4 hours PRN Dyspepsia  Biotene Dry Mouth Oral Rinse 15 milliLiter(s) Swish and Spit five times a day PRN Mouth Care  ondansetron Injectable 4 milliGRAM(s) IV Push every 8 hours PRN Nausea and/or Vomiting  sodium chloride 0.65% Nasal 1 Spray(s) Both Nostrils two times a day PRN Nasal Congestion      Allergies    No Known Allergies    Intolerances        LABS:                        9.2    11.14 )-----------( 233      ( 04 Mar 2025 08:13 )             28.4     03-03    142  |  113[H]  |  90[H]  ----------------------------<  108[H]  3.2[L]   |  22  |  1.74[H]    Ca    9.2      03 Mar 2025 05:00  Phos  4.1     03-03  Mg     1.8     03-03    TPro  5.9[L]  /  Alb  2.0[L]  /  TBili  0.6  /  DBili  x   /  AST  27  /  ALT  36  /  AlkPhos  234[H]  03-03      Urinalysis Basic - ( 03 Mar 2025 05:00 )    Color: x / Appearance: x / SG: x / pH: x  Gluc: 108 mg/dL / Ketone: x  / Bili: x / Urobili: x   Blood: x / Protein: x / Nitrite: x   Leuk Esterase: x / RBC: x / WBC x   Sq Epi: x / Non Sq Epi: x / Bacteria: x        RADIOLOGY & ADDITIONAL TESTS:  Studies reviewed.

## 2025-03-04 NOTE — ADVANCED PRACTICE NURSE CONSULT - ASSESSMENT
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  Patient was being followed by GI, ID, Cardiology and Onc there.  Patient has been transferred to  today on patient's  (GI Dr. Pleitez) request.  At this time, patient c/o non-bloody diarrhea x 3 in last 24 hours.  She denies any abdominal pain, nausea, but has some dry heaves.  She denies any fever, chills, chest pain, palpitation, constipation or dysuria. Initially, she was started on Vancomycin and Zosyn, but now she has been on Cefepime as her blood culture and urine cultures are positive for Pseudomonas aeruginosa. Currently, patient is on Cefepime.  ID had also recommended Flagyl, but patient had refused it, as it causes nausea to her."    Assessed patient on SICU.   Brian Score 11 and along with patients comorbidities, incontinence and current medical status places patient at very high risk for skin injuries. Patient with anal fissure to right side of anus and draining. Patient is incontinent of stool and does have an indwelling urinary catheter.  Patient has been hospitalized since 2/12/2025 with multiple comorbidities, compromised respiratory status, sepsis, MALLORY, hypoalbuminemia, fungemia which all play a role in skin status and risk for skin injuries and skin failure.          Patient with Superficial skin loss with blanchable erythema to the left and right upper gluteal and cross the upper intergluteal cleft total area 6cm x 15cm x 0.1cm with superficial black slough tot he left upper intergluteal cleft left gluteal 1cm x 1.5cm and noted it sloughing off.  The periskin is intact. Patient was grimacing and reporting pain when cleaning and applying cream. This can be a mixed related skin issue relate to the patients current medical status, respiratory status and oxygenation (patient was on high flow, cpap, now NC all affecting oxygenation to the skin), H/H trend, pressure, incontinence and friction, As part of my previous assessment patient current hospital stay the skin injury is from the superficial layer with areas of superficial skin necrosis that has characteristics of skin failure based. As patient is improving noted the skin improving.  The goal is for pressure prevention strategies Patient is on a [facility] Low Air Loss Mattress for pressure redistribution Spry Philip Lock positioner in use and alternate every two hours for lateral rotation and heel elevation Positioner should NOT be placed in a pillow case, can lay a pillow case or sheet over the positioner to allow for distrubution of positioner), moisture management, and nutrition. The skin injury recommendations are to clean with PeriBottle with Luke warm water and ph balanced soap, spry on and then gently PAT dry, then apply a light coat of Triad cream to open skin ONLY. Apply daily and prn if soiled. The Triad will leave a residue on the open skin and itis ok to leave on as this will not impede healing. Reapply prn if soiled. Do not try and scrub off as this will further irritate skin.

## 2025-03-04 NOTE — PROGRESS NOTE ADULT - SUBJECTIVE AND OBJECTIVE BOX
Patient is a 69y Female who reports no complaints as new per staff, stable UOP    MEDICATIONS  (STANDING):  acetaminophen   IVPB .. 1000 milliGRAM(s) IV Intermittent once  caspofungin IVPB 50 milliGRAM(s) IV Intermittent every 24 hours  caspofungin IVPB      chlorhexidine 2% Cloths 1 Application(s) Topical <User Schedule>  cholecalciferol 2000 Unit(s) Oral at bedtime  dextrose 5% + sodium chloride 0.45%. 1000 milliLiter(s) (75 mL/Hr) IV Continuous <Continuous>  dextrose 5%. 1000 milliLiter(s) (50 mL/Hr) IV Continuous <Continuous>  dextrose 5%. 1000 milliLiter(s) (100 mL/Hr) IV Continuous <Continuous>  famotidine Injectable 20 milliGRAM(s) IV Push daily  glucagon  Injectable 1 milliGRAM(s) IntraMuscular once  pantoprazole  Injectable 40 milliGRAM(s) IV Push every 12 hours  sucralfate suspension 1 Gram(s) Oral four times a day  vancomycin    Solution 125 milliGRAM(s) Oral every 6 hours  zinc oxide 40% Paste 1 Application(s) Topical two times a day    MEDICATIONS  (PRN):  acetaminophen     Tablet .. 650 milliGRAM(s) Oral every 6 hours PRN Temp greater or equal to 38C (100.4F), Mild Pain (1 - 3)  aluminum hydroxide/magnesium hydroxide/simethicone Suspension 30 milliLiter(s) Oral every 4 hours PRN Dyspepsia  Biotene Dry Mouth Oral Rinse 15 milliLiter(s) Swish and Spit five times a day PRN Mouth Care  ondansetron Injectable 4 milliGRAM(s) IV Push every 8 hours PRN Nausea and/or Vomiting  sodium chloride 0.65% Nasal 1 Spray(s) Both Nostrils two times a day PRN Nasal Congestion        T(C): , Max: 37.3 (03-03-25 @ 15:00)  T(F): , Max: 99.1 (03-03-25 @ 15:00)  HR: 75 (03-04-25 @ 10:00)  BP: 143/74 (03-04-25 @ 10:00)  BP(mean): 95 (03-04-25 @ 10:00)  RR: 29 (03-04-25 @ 10:00)  SpO2: 92% (03-04-25 @ 10:00)  Wt(kg): --    03-03 @ 07:01  -  03-04 @ 07:00  --------------------------------------------------------  IN: 925 mL / OUT: 2200 mL / NET: -1275 mL    PHYSICAL EXAM:    Constitutional: frail, temp wasting  MM  Neck: No LAD, No JVD  dist  Cardiovascular: S1 and S2   chr edema      LABS:                        9.2    11.14 )-----------( 233      ( 04 Mar 2025 08:13 )             28.4     04 Mar 2025 10:09    144    |  113    |  62     ----------------------------<  133    3.5     |  25     |  1.78   03 Mar 2025 05:00    142    |  113    |  90     ----------------------------<  108    3.2     |  22     |  1.74   02 Mar 2025 05:50    142    |  110    |  105    ----------------------------<  132    3.9     |  25     |  1.86   01 Mar 2025 05:28    144    |  113    |  100    ----------------------------<  167    3.7     |  23     |  1.80     Ca    9.7        04 Mar 2025 10:09  Ca    9.2        03 Mar 2025 05:00  Ca    9.3        02 Mar 2025 05:50  Ca    9.1        01 Mar 2025 05:28  Phos  4.0       04 Mar 2025 10:09  Phos  4.1       03 Mar 2025 05:00  Phos  3.9       02 Mar 2025 05:50  Phos  4.1       01 Mar 2025 05:28  Mg     1.7       04 Mar 2025 10:09  Mg     1.8       03 Mar 2025 05:00  Mg     2.2       02 Mar 2025 05:50  Mg     2.0       01 Mar 2025 05:28    TPro  5.9    /  Alb  2.0    /  TBili  0.6    /  DBili  x      /  AST  27     /  ALT  36     /  AlkPhos  234    03 Mar 2025 05:00  TPro  6.2    /  Alb  1.9    /  TBili  0.6    /  DBili  x      /  AST  35     /  ALT  44     /  AlkPhos  269    02 Mar 2025 05:50  TPro  5.6    /  Alb  2.0    /  TBili  0.7    /  DBili  x      /  AST  41     /  ALT  58     /  AlkPhos  339    01 Mar 2025 05:28          Urine Studies:  Urinalysis Basic - ( 04 Mar 2025 10:09 )    Color: x / Appearance: x / SG: x / pH: x  Gluc: 133 mg/dL / Ketone: x  / Bili: x / Urobili: x   Blood: x / Protein: x / Nitrite: x   Leuk Esterase: x / RBC: x / WBC x   Sq Epi: x / Non Sq Epi: x / Bacteria: x            RADIOLOGY & ADDITIONAL STUDIES:

## 2025-03-04 NOTE — PROGRESS NOTE ADULT - ASSESSMENT
69-year-old female recent diagnosis of primary pancreatic adenocarcinoma , pancytopenia and significant PMH of SCC lip, cyclical vomiting syndrome, HTN, HPL, Gout, GERD, CVA w renal evaluation of MALLORY and hypokalemia     MALLORY  -stable function, volume ok  -IVF to maintain even to positive today  -Trend labs, lytes.   -Monitor UOP closely  -Avoid nsaids/contras     Hypokalemia  -K repletion iv/po as needed    Fungemia  -Therapy per ID      d/c with RN staff, team

## 2025-03-04 NOTE — PROGRESS NOTE ADULT - SUBJECTIVE AND OBJECTIVE BOX
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  Patient was being followed by GI, ID, Cardiology and Onc there.  Patient has been transferred to  on patient's  (GI Dr. Pleitez) request.      3/3 pt lethargic today, slow to respond denies abd pain but diffusely tender to palpation, unable to tell if pt with rebound tenderness. no guarding. pt pulled out NGT overnight.  3/4 no events overnight, HR better today. 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 1000 milliLiter(s) (75 mL/Hr) IV Continuous <Continuous>  dextrose 5%. 1000 milliLiter(s) (50 mL/Hr) IV Continuous <Continuous>  dextrose 5%. 1000 milliLiter(s) (100 mL/Hr) IV Continuous <Continuous>  dextrose 50% Injectable 25 Gram(s) IV Push once  dextrose 50% Injectable 12.5 Gram(s) IV Push once  dextrose 50% Injectable 25 Gram(s) IV Push once  famotidine Injectable 20 milliGRAM(s) IV Push daily  glucagon  Injectable 1 milliGRAM(s) IntraMuscular once  insulin lispro (ADMELOG) corrective regimen sliding scale   SubCutaneous every 6 hours  pantoprazole  Injectable 40 milliGRAM(s) IV Push every 12 hours  potassium chloride   Powder 40 milliEquivalent(s) Oral once  sucralfate suspension 1 Gram(s) Oral four times a day  vancomycin    Solution 125 milliGRAM(s) Oral every 6 hours  zinc oxide 40% Paste 1 Application(s) Topical two times a day      Vital Signs Last 24 Hrs  T(C): 36.4 (04 Mar 2025 08:29), Max: 37.3 (03 Mar 2025 15:00)  T(F): 97.5 (04 Mar 2025 08:29), Max: 99.1 (03 Mar 2025 15:00)  HR: 93 (04 Mar 2025 12:00) (66 - 93)  BP: 150/90 (04 Mar 2025 12:00) (118/63 - 150/90)  BP(mean): 106 (04 Mar 2025 12:00) (79 - 106)  RR: 24 (04 Mar 2025 12:00) (12 - 30)  SpO2: 95% (04 Mar 2025 12:00) (92% - 95%)    Parameters below as of 04 Mar 2025 12:00  Patient On (Oxygen Delivery Method): room air    Vital Signs Last 24 Hrs  T(C): 36.4 (04 Mar 2025 08:29), Max: 37.3 (03 Mar 2025 15:00)  T(F): 97.5 (04 Mar 2025 08:29), Max: 99.1 (03 Mar 2025 15:00)  HR: 93 (04 Mar 2025 12:00) (66 - 93)  BP: 150/90 (04 Mar 2025 12:00) (118/63 - 150/90)  BP(mean): 106 (04 Mar 2025 12:00) (79 - 106)  RR: 24 (04 Mar 2025 12:00) (12 - 30)  SpO2: 95% (04 Mar 2025 12:00) (92% - 95%)    Parameters below as of 04 Mar 2025 12:00  Patient On (Oxygen Delivery Method): room air      LABS:                          9.2    11.14 )-----------( 233      ( 04 Mar 2025 08:13 )             28.4     03-04    144  |  113[H]  |  62[H]  ----------------------------<  133[H]  3.5   |  25  |  1.78[H]    Ca    9.7      04 Mar 2025 10:09  Phos  4.0     03-04  Mg     1.7     03-04    TPro  5.9[L]  /  Alb  2.0[L]  /  TBili  0.6  /  DBili  x   /  AST  27  /  ALT  36  /  AlkPhos  234[H]  03-03    LIVER FUNCTIONS - ( 03 Mar 2025 05:00 )  Alb: 2.0 g/dL / Pro: 5.9 gm/dL / ALK PHOS: 234 U/L / ALT: 36 U/L / AST: 27 U/L / GGT: x           Urinalysis Basic - ( 04 Mar 2025 10:09 )    Color: x / Appearance: x / SG: x / pH: x  Gluc: 133 mg/dL / Ketone: x  / Bili: x / Urobili: x   Blood: x / Protein: x / Nitrite: x   Leuk Esterase: x / RBC: x / WBC x   Sq Epi: x / Non Sq Epi: x / Bacteria: x      Physical Examination:    General: no respiratory distress today.  HEENT: lips appear less dry.   PULM: decreased at bases b/l.   CVS: Regular rate and rhythm, no murmurs  ABD: Soft, nondistended,  less tenderness, redness by umbilical area (unchanged and nontender)  EXT: 1+ pitting edema in LE b/l extending to knees - improving  SKIN: Warm and well perfused  NEURO: Awake, more alert today, more conversive    DEVICES:  R chest port removed 3/2    RADIOLOGY:   < from: CT Abdomen and Pelvis No Cont (02.22.25 @ 15:53) >    IMPRESSION: Mild interval improvement in small and large bowel ileus.   Recommend plain film follow-up.    < end of copied text >      < from: CT Chest No Cont (02.15.25 @ 15:55) >  IMPRESSION:  Nondisplaced fractures of the left ninth and 10th ribs, similar to the   prior study.    Pancreatic mass, unchanged    No onset ascites and bilateral pleural effusions with compressive   atelectasis, right greater than left.    Dilated gallbladder, similar to the prior study.    < end of copied text >

## 2025-03-04 NOTE — PROGRESS NOTE ADULT - ASSESSMENT
69-year-old female with stage IV pancreatic adenocarcinoma presenting with neutropenic fever and severe treatment-related toxicities from modified FOLFIRINOX and investigational EMILY inhibitor [id number].    PROBLEMS:    Febrile syndrome. Fungemia. Likely disseminated candidemia.   Dysphagia. Possible candida esophagitis  New febrile syndrome with chills. Possible sepsis. Dysphagia. Possible candida esophagitis, S/p sepsis with PSAE   Acute hypoxic respiratory failure likely secondary to hypervolemia   Bilateral pleural effusions with compressive atelectasis, right greater than left  Neutropenic Fever/Neutropenic Septic shock  Severe Thrombocytopenia-Platelet count critically low at 26k.   Severe diarrhea and inability to tolerate PO intake, likely multifactorial from both FOLFIRINOX (particularly irinotecan component) and study drug [id number].   History of left cephalic vein thrombosis.  MALLORY  CT Angio Chest PE Protocol w/ IV Cont (02.19.25 @ 14:16) >  CHEST:  Multiple pulmonary emboli within the segmental branches of the left lower and right lower lobe pulmonary arteries.  There are two right middle lobe nodules, new since prior. Exact   etiology is unclear. Primary differential diagnostic consideration   includes infection.      PLAN:    Pulmonary stable  Dc fluconazole  and d/c cefepime  IV caspofungin 70 mg IV x1, then 50 mg IV qd  CTA chest + small PEs in RLL and LLL- off IV heparin causing bleeding-waiting GFF- now fungaemia  Family does not want any procedure EGD/Colonscopy  DECD steroids  High WBC-trend  Pulmonary tachpnea may be RTA with low bicarbonate because of GI cause with diarrhea but improving renal fx  Neutropenic Septic shock- IV meropenem started on 2/15- previously on CEFEPIME/Continue filgrastim 480mcg daily-Today w/ improvement in counts- WBC 1.3, Hb 10.1, plt 37, cmp with k 2.3 and Repleted, Cr 1.40.   Daily CBC monitoring  Maintain strict neutropenic precautions  Anemia-received two units of pRBCs Hb 10.1 today   Thrombocytopenia-Transfuse for plts < 15   D/w staff & /haematology & ICU in detail

## 2025-03-04 NOTE — PROGRESS NOTE ADULT - SUBJECTIVE AND OBJECTIVE BOX
Resting comfortably    proVital Signs Last 24 Hrs  T(C): 36.4 (04 Mar 2025 08:29), Max: 37.3 (03 Mar 2025 15:00)  T(F): 97.5 (04 Mar 2025 08:29), Max: 99.1 (03 Mar 2025 15:00)  HR: 75 (04 Mar 2025 10:00) (66 - 85)  BP: 143/74 (04 Mar 2025 10:00) (118/63 - 143/74)  BP(mean): 95 (04 Mar 2025 10:00) (79 - 103)  RR: 29 (04 Mar 2025 10:00) (12 - 30)  SpO2: 92% (04 Mar 2025 10:00) (92% - 94%)    Right chest wall dressing changed. Wound looks clean. No erythema. No tendermess or swelling

## 2025-03-04 NOTE — PROGRESS NOTE ADULT - SUBJECTIVE AND OBJECTIVE BOX
Date of service: 03-04-25 @ 09:39    Lying in bed in NAD  More alert  s/p mediport removal  Fever is down  Noted with PE    ROS: denies pain, no HA, no SOB or cough, no abdominal pain, no diarrhea or constipation; no dysuria, no legs pain, no rashes    MEDICATIONS  (STANDING):  acetaminophen   IVPB .. 1000 milliGRAM(s) IV Intermittent once  caspofungin IVPB 50 milliGRAM(s) IV Intermittent every 24 hours  caspofungin IVPB      chlorhexidine 2% Cloths 1 Application(s) Topical <User Schedule>  cholecalciferol 2000 Unit(s) Oral at bedtime  dextrose 5% + sodium chloride 0.45%. 1000 milliLiter(s) (75 mL/Hr) IV Continuous <Continuous>  dextrose 5%. 1000 milliLiter(s) (50 mL/Hr) IV Continuous <Continuous>  dextrose 5%. 1000 milliLiter(s) (100 mL/Hr) IV Continuous <Continuous>  famotidine Injectable 20 milliGRAM(s) IV Push daily  glucagon  Injectable 1 milliGRAM(s) IntraMuscular once  pantoprazole  Injectable 40 milliGRAM(s) IV Push every 12 hours  sucralfate suspension 1 Gram(s) Oral four times a day  vancomycin    Solution 125 milliGRAM(s) Oral every 6 hours  zinc oxide 40% Paste 1 Application(s) Topical two times a day    Vital Signs Last 24 Hrs  T(C): 36.4 (04 Mar 2025 08:29), Max: 37.3 (03 Mar 2025 15:00)  T(F): 97.5 (04 Mar 2025 08:29), Max: 99.1 (03 Mar 2025 15:00)  HR: 72 (04 Mar 2025 08:00) (66 - 85)  BP: 142/86 (04 Mar 2025 08:00) (115/69 - 142/86)  BP(mean): 103 (04 Mar 2025 08:00) (79 - 103)  RR: 24 (04 Mar 2025 08:00) (12 - 30)  SpO2: 94% (04 Mar 2025 08:00) (92% - 94%)    Parameters below as of 04 Mar 2025 08:00  Patient On (Oxygen Delivery Method): room air     Physical exam:    Constitutional:  No acute distress  HEENT: NC/AT, EOMI, PERRLA, conjunctivae clear; ears and nose atraumatic; pharynx benign  Neck: supple; thyroid not palpable  Back: no tenderness  Respiratory: respiratory effort normal; decreased BS at bases  Cardiovascular: S1S2 regular, no murmurs  Abdomen: soft, mild periombilical tender, not distended, positive BS; no liver or spleen organomegaly  Genitourinary: no suprapubic tenderness  Lymphatic: no LN palpable  Musculoskeletal: no muscle tenderness, no joint swelling or tenderness  Extremities: no pedal edema  Neurological/ Psychiatric: AxOx3, judgement and insight normal; moving all extremities  Skin: no rashes; right lower chest wall dry, scabbed ulcer; no discharge     Labs: reviewed                        9.2    11.14 )-----------( 233      ( 04 Mar 2025 08:13 )             28.4     03-03    142  |  113[H]  |  90[H]  ----------------------------<  108[H]  3.2[L]   |  22  |  1.74[H]    Ca    9.2      03 Mar 2025 05:00  Phos  4.1     03-03  Mg     1.8     03-03    TPro  5.9[L]  /  Alb  2.0[L]  /  TBili  0.6  /  DBili  x   /  AST  27  /  ALT  36  /  AlkPhos  234[H]  03-03                        10.2   18.89 )-----------( 181      ( 27 Feb 2025 04:05 )             30.2     02-27    149[H]  |  116[H]  |  96[H]  ----------------------------<  138[H]  3.9   |  26  |  1.87[H]    Ca    9.1      27 Feb 2025 04:05  Phos  4.0     02-27  Mg     2.0     02-27    TPro  5.4[L]  /  Alb  2.8[L]  /  TBili  0.8  /  DBili  x   /  AST  44[H]  /  ALT  45  /  AlkPhos  194[H]  02-27                        10.5   35.65 )-----------( 132      ( 21 Feb 2025 05:18 )             31.0     02-21    141  |  105  |  66[H]  ----------------------------<  358[H]  3.2[L]   |  26  |  1.33[H]    Ca    8.2[L]      21 Feb 2025 05:18  Phos  3.6     02-21  Mg     2.0     02-21    TPro  5.3[L]  /  Alb  2.1[L]  /  TBili  0.6  /  DBili  x   /  AST  56[H]  /  ALT  33  /  AlkPhos  329[H]  02-21    C-Reactive Protein: 209.0 mg/mL (02-15-25 @ 05:17)  C-Reactive Protein: 233.0 mg/mL (02-14-25 @ 06:50)  Ferritin: 1798 ng/mL (02-13-25 @ 18:46)                        8.7    0.19  )-----------( 33       ( 13 Feb 2025 06:52 )             25.7     02-13    137  |  109[H]  |  20  ----------------------------<  117[H]  2.6[LL]   |  18[L]  |  1.02    Ca    8.7      13 Feb 2025 06:52  Phos  2.3     02-13  Mg     1.6     02-13    TPro  4.4[L]  /  Alb  1.1[L]  /  TBili  0.7  /  DBili  x   /  AST  11[L]  /  ALT  16  /  AlkPhos  94  02-13     LIVER FUNCTIONS - ( 13 Feb 2025 06:52 )  Alb: 1.1 g/dL / Pro: 4.4 gm/dL / ALK PHOS: 94 U/L / ALT: 16 U/L / AST: 11 U/L / GGT: x           Culture - Urine (collected 09 Feb 2025 01:25)  Source: Clean Catch  Final Report (11 Feb 2025 08:39):    50,000 - 99,000 CFU/mL Pseudomonas aeruginosa  Organism: Pseudomonas aeruginosa (11 Feb 2025 08:39)  Organism: Pseudomonas aeruginosa (11 Feb 2025 08:39)      Method Type: REJI      -  Amikacin: S <=16      -  Aztreonam: S <=4      -  Cefepime: S <=2      -  Ceftazidime: S <=1      -  Ciprofloxacin: S <=0.25      -  Imipenem: S 2      -  Levofloxacin: S <=0.5      -  Meropenem: S <=1      -  Piperacillin/Tazobactam: S <=8    Culture - Blood (collected 08 Feb 2025 22:00)  Source: .Blood BLOOD  Gram Stain (09 Feb 2025 19:04):    Growth in aerobic bottle: Gram Negative Rods  Final Report (11 Feb 2025 07:53):    Growth in aerobic bottle: Pseudomonas aeruginosa    Direct identification is available within approximately 3-5    hours either by Blood Panel Multiplexed PCR or Direct    MALDI-TOF. Details: [email or web address]  Organism: Blood Culture PCR  Pseudomonas aeruginosa (11 Feb 2025 07:53)  Organism: Pseudomonas aeruginosa (11 Feb 2025 07:53)      Method Type: REJI      -  Aztreonam: S <=4      -  Cefepime: S <=2      -  Ceftazidime: S <=1      -  Ciprofloxacin: S <=0.25      -  Imipenem: S <=1      -  Levofloxacin: S <=0.5      -  Meropenem: S <=1      -  Piperacillin/Tazobactam: S <=8  Organism: Blood Culture PCR (11 Feb 2025 07:53)      Method Type: PCR      -  Pseudomonas aeruginosa: Detec    Culture - Blood (collected 08 Feb 2025 21:55)  Source: .Blood BLOOD  Gram Stain (09 Feb 2025 19:32):    Growth in aerobic bottle: Gram Negative Rods  Final Report (11 Feb 2025 07:54):    Growth in aerobic bottle: Pseudomonas aeruginosa    See previous culture [id number]    Culture - Blood (collected 28 Feb 2025 12:00)  Source: .Blood None  Preliminary Report (01 Mar 2025 19:01):    No growth at 24 hours    Culture - Blood (collected 28 Feb 2025 11:54)  Source: .Blood None  Gram Stain (02 Mar 2025 06:12):    Growth in aerobic bottle: Yeast like cells  Preliminary Report (02 Mar 2025 06:12):    Growth in aerobic bottle: Yeast like cells    Direct identification is available within approximately 3-5    hours either by Blood Panel Multiplexed PCR or Direct    MALDI-TOF. Details: [email or web address]  Organism: Blood Culture PCR (02 Mar 2025 08:38)  Organism: Blood Culture PCR (02 Mar 2025 08:38)    Radiology: all available radiological tests reviewed    < from: US Abdomen Upper Quadrant Right (02.09.25 @ 14:55) >  Distended gallbladder with layering sludge.  8 mm right renal calculus without hydronephrosis.  Hepatomegaly.  Right renal cyst.  < end of copied text >    < from: CT Abdomen and Pelvis No Cont (02.09.25 @ 03:01) >  1.   Study somewhat limited due to absence of contrast.  2.   Axial images 71-76 of series 301 and concomitant coronal images demonstrate prominent soft tissue inseparable from the body of the pancreas consistent with pancreatic neoplasm.  3.   Some distension of gallbladder could be further evaluated with right upper quadrant sonography. No definite biliary dilatation.  < end of copied text >    Advanced directives addressed: full resuscitation

## 2025-03-04 NOTE — PROGRESS NOTE ADULT - ASSESSMENT
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 She was admitted to [facility] with septic shock on 02/09/2025, nausea, vomiting and diarrhea requiring Levophed drip. Patient was being followed by GI, ID, Cardiology and Onc there. Patient has been transferred to  on patient's  (GI Dr. Pleitez) request. She was reported with non-bloody diarrhea x 3 in last 24 hours PTA. She denied abdominal pain, nausea, but has dry heaves. At [place] she was started on Vancomycin and Zosyn, then changed to Cefepime as her blood culture and urine cultures showed Pseudomonas aeruginosa.    #Febrile syndrome. Fungemia. Likely disseminated candidemia.   #Dysphagia. Possible candida esophagitis  #S/p sepsis with PSAE   #Head of pancreas adenocarcinoma on chemotherapy  #Leukocytosis  #Immunocompromised host  #Kidney stones  #ARF on CRF stage 3  -leukocytosis is improving  -renal function is frail   -dysphagia with poor PO intake  -cultures reviewed  -s/p fluconazole 200 mg IV qd # 14  -on vancomycin 125 mg PO q6h for CDAD prophylaxis  -on caspofungin 50 mg IV qd # 3  -tolerating abx well so far; no side effects noted  -GI evaluation appreciated   -mediport removed  -repeat BC x 2 in AM  -monitor abdomen  -continue antimicrobial and antifungal coverage  -f/u cultures  -monitor temps  -f/u CBC and BNP  -supportive care  2. Other issues:   -care per medicine    d/w medicine team and Dr. Lanier

## 2025-03-04 NOTE — ADVANCED PRACTICE NURSE CONSULT - REASON FOR CONSULT
Assessment of Left and Right Upper Gluteal Skin 
Reassessment of Left and right gluteal Intergluteal cleft

## 2025-03-04 NOTE — ADVANCED PRACTICE NURSE CONSULT - RECOMMEDATIONS
-Continue turning/positioning patient from side-to-side q2h while in bed, q1h when/if OOB chair, or in accordance w/ pt's plan of care. Utilize pillows and/or Spry positioner (Don not place in pillowcase, can ly a pillow case over or utilize a sheet)  to assist w/ turning/positioning. When/if OOB chair, utilize pillows or chair cushion to offload pressure.   -Continue to offload heels from bed surface with Spry Philip-Lock positioner   -Continue applying Coloplast Reinier Protect moisture barrier cream to buttock and perineal area daily and prn after each incontinent episode.    -Continue utilizing one underpad underneath patient to contain incontinence episodes; change pad when saturated/soiled.   -Consider utilizing female incontinence device/Primafit (if patient candidate) to contain urinary incontinence.  -Continue nutrition consult for optimal wound healing & nutritional status.   -Assess skin/wound qshift, report changes to primary provider.   -Maintain Low Air loss mattress for pressure redistribution   -Utilize HoverMAT for boosting and transferring to decrease risk of friction   -Limit use of adhesives to patient skin with edema     Left and Rigth upper Gluteal Intergluteal cleft   -Peribottle with Luke warm water and ph balanced soap, spray on and then Gently PAT completely dry   -Apply A light Coat of Triad Cream to open skin ONLY   -Can apply Adaptic over the triad   -Cover with NON Stick Foam   Apply Daily and prn   **Please note that Triad Cream will leave a residue and will not impede healing. please do not try and scrub off as this will irritate the skin further** Clean off any excess and then apply another light coat on the residual cream.     Patient already ordered Desitin for skin irritation       Plan of Care: Primary RN made aware of above recs. Spoke w/ Dr Agrawal  in regards to above. No further needs/recs from [facility] service at this time. Staff RN to perform routine skin/wound assessment and manage wound care. Questions or concerns or if wound worsens and reconsult needed, please contact [facility]  
-Continue turning/positioning patient from side-to-side q2h while in bed, q1h when/if OOB chair, or in accordance w/ pt's plan of care. Utilize pillows and/or Spry positioner (Don not place in pillowcase, can ly a pillow case over or utilize a sheet)  to assist w/ turning/positioning. When/if OOB chair, utilize pillows or chair cushion to offload pressure.   -Continue to offload heels from bed surface with Spry Philip-Lock positioner   -Continue applying Coloplast Reinier Protect moisture barrier cream to buttock and perineal area daily and prn after each incontinent episode.    -Continue utilizing one underpad underneath patient to contain incontinence episodes; change pad when saturated/soiled.   -Consider utilizing female incontinence device/Primafit (if patient candidate) to contain urinary incontinence.  -Continue nutrition consult for optimal wound healing & nutritional status.   -Assess skin/wound qshift, report changes to primary provider.   -Maintain Low Air loss mattress for pressure redistribution   -Utilize HoverMAT for boosting and transferring to decrease risk of friction   -Limit use of adhesives to patient skin with edema     Left and Rigth upper Gluteal Intergluteal cleft   -Peribottle with Luke warm water and ph balanced soap, spray on and then Gently PAT completely dry   -Apply A light Coat of Triad Cream to open skin ONLY   Apply Daily and prn   **Please note that Triad Cream will leave a residue and will not impede healing. please do not try and scrub off as this will irritate the skin further** Clean off any excess and then apply another light coat on the residual cream.     Plan of Care: Primary RN made aware of above recs. Spoke w/ provider in regards to above. No further needs/recs from [facility] service at this time. Staff RN to perform routine skin/wound assessment and manage wound care. Questions or concerns or if wound worsens and reconsult needed, please contact [facility]

## 2025-03-04 NOTE — PROGRESS NOTE ADULT - ASSESSMENT
69-year-old female recent diagnosis of primary pancreatic adenocarcinoma , pancytopenia and significant PMH of SCC lip, cyclical vomiting syndrome, HTN, HPL, Gout, GERD, CVA w renal evaluation of MALLORY and hypokalemia  69-year-old female recent diagnosis of primary pancreatic adenocarcinoma , pancytopenia and significant PMH of SCC lip, cyclical vomiting syndrome, HTN, HPL, Gout, GERD, CVA w renal evaluation of MALLORY and hypokalemia     #Sepsis (present on admission)  - Off pressors    #Pseudomonas bacteremia  - Meropenem started 3/4/25     #Mucositis from suspected esophageal candidiasis  - Capsofungin IV    #Primary pancreatic adenocarcinoma    #Skin injury Left and Rigth upper Gluteal Intergluteal cleft - superficial skin necrosis  - Wound care evaluation appreciated  -Low air mattress  - Turning / positioning q2h while in bed    #MALLORY  - continue D5 1/2 NS @ 75 cc/hr    #Hypokalemia  - 3.5 today 3/4/25    Disposition: SICU downgrade, Hospitalist Medicine team accepted    Total time spent: 60 minutes

## 2025-03-04 NOTE — PROGRESS NOTE ADULT - ASSESSMENT
ASSESSMENT  68yo female with PMHx recent diagnosis of primary pancreatic adenocarcinoma of head with tumor in umbilicus, chemo-induced pancytopenia, SCC lip, cyclical vomiting syndrome, HTN, HPL, Gout, GERD, CVA on Aggrenox    Initially admitted to [facility] with septic shock 2/2 + pseudomonas bacteremia likely due to UTI vs colitis on 02/09/2025 Was having nausea, vomiting and diarrhea requiring Levophed drip, was transferred out of ICU to regular floor.    Then transferred to  as per request of .  Was getting platelet transfusion and developed tachypnea hypoxia, rigors  Was diuresed and became hypotensive  Upgraded to SICU     Here with   1. Sepsis (POA)  2. Pseudomonal bacteremia suspect from UTI   3. Mucositis suspected esophageal candidiasis?   4. Pancytopenia  5. Acute hypoxic respiratory failure 2/2 pulm edema   6. MALLORY     CTA chest abd pelvis 2/19 revealing multiple small PE b/l and LBO  Started on IV heparin   Went into Afib with RVR this admission, resolved with lopressor.    Developed GI bleed on heparin gtt  started on protonix    re - trialed on heparin infusion on 2/25 -> subsequently had maroon BMs again    2/28 patient developed rigors, fever and tachycardia . IVC filter on hold  blood cx + yeast    TPN stopped, R chest port removed by surgery on 3/2 and patient started on IV caspofungin, s/p fluconazole    PLAN    Neuro: awake, more alert today. pain control prn. IV tylenol .avoid nsaids  CV: BP has been stable 130s/80s. no episodes of bradycardia overnight. off lopressor  Pulm: on room air. encourage incentive spirometer.  GI: PO diet as tolerated.  would like to defer corpak placement at this time. IV PPI bid.  Nephro: Cr remains 1.7. cont D5 + 1/2 NS until PO intake adequate. coelho in place - trial of void  monitor I & Os. Trend renal fxn. completed another 8 doses albumin.   Endo: BGMs q6hr. ISS.  ID: afebrile. blood cx 2/28 + yeast, cont IV caspofungin 50mg qd (started 3/2), cont PO vanco q6hr for C diff ppx. s/p diflucan. s/p meropenem. s/p IV cefepime. repeat blood cultures today 3/4. IVC filter deferred until cultures cleared  Heme: hgb has been stable. s/p 6units pRBC this admission ( 2units 2/26, 2units on 2/23, 2units on 2/16). s/p filgastrim  PT eval, OOB to chair as tolerated.    Dispo: Stable for med/surg with remote tele. IV caspofungin. repeat blood cx today, PO diet as tolerated.   updated  at bedside    Will discuss with Dr. Lanier    signed out to Dr. Agrawal           ASSESSMENT  68yo female with PMHx recent diagnosis of primary pancreatic adenocarcinoma of head with tumor in umbilicus, chemo-induced pancytopenia, SCC lip, cyclical vomiting syndrome, HTN, HPL, Gout, GERD, CVA on Aggrenox    Initially admitted to [facility] with septic shock 2/2 + pseudomonas bacteremia likely due to UTI vs colitis on 02/09/2025 Was having nausea, vomiting and diarrhea requiring Levophed drip, was transferred out of ICU to regular floor.    Then transferred to  as per request of .  Was getting platelet transfusion and developed tachypnea hypoxia, rigors  Was diuresed and became hypotensive  Upgraded to SICU     Here with   1. Sepsis (POA)  2. Pseudomonal bacteremia suspect from UTI   3. Mucositis suspected esophageal candidiasis?   4. Pancytopenia  5. Acute hypoxic respiratory failure 2/2 pulm edema   6. MALLORY     CTA chest abd pelvis 2/19 revealing multiple small PE b/l and LBO  Started on IV heparin   Went into Afib with RVR this admission, resolved with lopressor.    Developed GI bleed on heparin gtt  started on protonix    re - trialed on heparin infusion on 2/25 -> subsequently had maroon BMs again    2/28 patient developed rigors, fever and tachycardia . IVC filter on hold  blood cx + yeast    TPN stopped, R chest port removed by surgery on 3/2 and patient started on IV caspofungin, s/p fluconazole    PLAN    Neuro: awake, more alert today. pain control prn. IV tylenol .avoid nsaids  CV: BP has been stable 130s/80s. no episodes of bradycardia overnight. off lopressor  Pulm: on room air. encourage incentive spirometer.  GI: PO diet as tolerated.  would like to defer corpak placement at this time. IV PPI bid.  Nephro: Cr remains 1.7. cont D5 + 1/2 NS until PO intake adequate. coelho in place - trial of void  monitor I & Os. Trend renal fxn. completed another 8 doses albumin.   Endo: BGMs q6hr. ISS.  ID: afebrile. blood cx 2/28 + yeast, cont IV caspofungin 50mg qd (started 3/2), cont PO vanco q6hr for C diff ppx. s/p diflucan. s/p meropenem. s/p IV cefepime. repeat blood cultures today 3/4. IVC filter deferred until cultures cleared  Heme: hgb has been stable. s/p 6units pRBC this admission ( 2units 2/26, 2units on 2/23, 2units on 2/16). s/p filgastrim. US doppler LE  - neg for DVT.  does not want hep sq for DVT ppx.   PT eval, OOB to chair as tolerated.    Dispo: Stable for med/surg with remote tele. IV caspofungin. repeat blood cx today, PO diet as tolerated.   updated  at bedside    Will discuss with Dr. Lanier    signed out to Dr. Agrawal

## 2025-03-04 NOTE — PROGRESS NOTE ADULT - SUBJECTIVE AND OBJECTIVE BOX
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 with septic shock on 02/09/2025, nausea, vomiting and diarrhea requiring Levophed drip, was transferred out of ICU to regular floor last night.  Patient was being followed by GI, ID, Cardiology and Onc there.  Patient has been transferred to  today on patient's  (GI Dr. Pleitez) request.  At this time, patient c/o non-bloody diarrhea x 3 in last 24 hours.  She denies any abdominal pain, nausea, but has some dry heaves.  She denies any fever, chills, chest pain, palpitation, constipation or dysuria. Initially, she was started on Vancomycin and Zosyn, but now she has been on Cefepime as her blood culture and urine cultures are positive for Pseudomonas aeruginosa. Currently, patient is on Cefepime.  ID had also recommended Flagyl, but patient had refused it, as it causes nausea to her.    Sig labs: WBC 0.45k, N 82%, Hb 7.8, Platelets 59k, Bicarb 19, AG 9, , Cr 0.99, TP 4.6, Alb 1.2, LFTs wnl.  Mg 1.9, PO4 2.4.  Prior C.diff negative.     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  Her antibiotics were changes, electrolytes repleted and was given 3 units of blood.  She was on bipap overnight but is now comfortable on nasal O2 with good sats.  3/1/25-After I saw the patient in the morning at which point she was doing well she developed a fever and an apparent ileus and now has an NGT draining bilious fluid.  When this happened her HR increased which is now back down.  ICV filter is currently on hold  3/2/25-Patient has now developed fungemia but right at the current time is feeling well with stable vital signs.  The IVC filter was not placed and the plan is to remove her port-Dr Avalos has been called  3/3/25-Patient had an episode of bradykardia into the 40's; now in the 50's.  She also pulled out her NGT  3/4/25-Patient was without events over night        ICU Vital Signs Last 24 Hrs  T(C): 36.8 (04 Mar 2025 06:00), Max: 37.3 (03 Mar 2025 15:00)  T(F): 98.2 (04 Mar 2025 06:00), Max: 99.1 (03 Mar 2025 15:00)  HR: 72 (04 Mar 2025 08:00) (66 - 85)  BP: 142/86 (04 Mar 2025 08:00) (115/69 - 142/86)  BP(mean): 103 (04 Mar 2025 08:00) (79 - 103)  ABP: --  ABP(mean): --  RR: 24 (04 Mar 2025 08:00) (16 - 30)  SpO2: 94% (04 Mar 2025 08:00) (92% - 94%)    O2 Parameters below as of 04 Mar 2025 08:00  Patient On (Oxygen Delivery Method): room air                                    PHYSICAL EXAM:   Patient in NAD  Neck: No JVD; Carotids:  2+ without bruits  Respiratory:  Clear to A&P  Cardiovascular: S1 and S2, regular rate and rhythm, no Murmurs, gallops or rubs  Abd:  BS positive    MEDICATIONS  (STANDING):  albumin human 25% IVPB 50 milliLiter(s) IV Intermittent every 6 hours  caspofungin IVPB 50 milliGRAM(s) IV Intermittent every 24 hours  caspofungin IVPB      chlorhexidine 2% Cloths 1 Application(s) Topical <User Schedule>  cholecalciferol 2000 Unit(s) Oral at bedtime  dextrose 5% + sodium chloride 0.45%. 1000 milliLiter(s) (75 mL/Hr) IV Continuous <Continuous>  dextrose 5%. 1000 milliLiter(s) (50 mL/Hr) IV Continuous <Continuous>  dextrose 5%. 1000 milliLiter(s) (100 mL/Hr) IV Continuous <Continuous>  dextrose 50% Injectable 25 Gram(s) IV Push once  dextrose 50% Injectable 12.5 Gram(s) IV Push once  dextrose 50% Injectable 25 Gram(s) IV Push once  famotidine Injectable 20 milliGRAM(s) IV Push daily  glucagon  Injectable 1 milliGRAM(s) IntraMuscular once  insulin lispro (ADMELOG) corrective regimen sliding scale   SubCutaneous every 6 hours  metoprolol tartrate 25 milliGRAM(s) Oral every 6 hours  pantoprazole  Injectable 40 milliGRAM(s) IV Push every 12 hours  potassium chloride   Powder 40 milliEquivalent(s) Oral once  sucralfate suspension 1 Gram(s) Oral four times a day  vancomycin    Solution 125 milliGRAM(s) Oral every 6 hours  zinc oxide 40% Paste 1 Application(s) Topical two times a day          WBC Count: 0.45 K/uL (02-12 @ 09:15)     Hemoglobin: 7.8 g/dL (02-12 @ 09:15)  Hemoglobin: 8.9 g/dL (02-11 @ 08:45)  Hemoglobin: 9.6 g/dL (02-10 @ 08:46)  Hemoglobin: 10.1 g/dL (02-09 @ 05:41)  Hemoglobin: 8.9 g/dL (02-08 @ 23:27)              Hematocrit: 23.6 % (02-12 @ 09:15)  Hematocrit: 26.7 % (02-11 @ 08:45)  Hematocrit: 29.6 % (02-10 @ 08:46)  Hematocrit: 31.5 % (02-09 @ 05:41)  Hematocrit: 28.4 % (02-08 @ 23:27)              Mean Cell Volume: 90.4 fl (02-12 @ 09:15)  Mean Cell Volume: 89.9 fl (02-11 @ 08:45)  Mean Cell Volume: 91.6 fl (02-10 @ 08:46)  Mean Cell Volume: 92.6 fl (02-09 @ 05:41)  Mean Cell Volume: 93.7 fl (02-08 @ 23:27)              Platelet Count - Automated: 59 K/uL (02-12 @ 09:15)  Platelet Count - Automated: 13 K/uL (02-11 @ 08:45)  Platelet Count - Automated: 25 K/uL (02-10 @ 08:46)  Platelet Count - Automated: 60 K/uL (02-09 @ 05:41)  Platelet Count - Automated: 64 K/uL (02-08 @ 23:27)                        10.1   1.30  )-----------( 37       ( 17 Feb 2025 05:59 )             28.5                         10.8   12.44 )-----------( 193      ( 01 Mar 2025 05:28 )             32.2                           10.1   12.72 )-----------( 200      ( 02 Mar 2025 05:50 )             30.6                         9.0    10.57 )-----------( 217      ( 03 Mar 2025 18:53 )             27.2                                                      Chems        Sodium: 140 mmol/L (02-12 @ 09:15)  Sodium: 140 mmol/L (02-11 @ 06:39)  Sodium: 141 mmol/L (02-10 @ 08:46)  Sodium: 146 mmol/L (02-09 @ 05:41)  Sodium: 144 mmol/L (02-08 @ 23:27)          Potassium: 3.5 mmol/L (02-12 @ 09:15)  Potassium: 3.4 mmol/L (02-11 @ 06:39)  Potassium: 3.3 mmol/L (02-10 @ 08:46)  Potassium: 4.3 mmol/L (02-09 @ 05:41)  Potassium: 4.2 mmol/L (02-08 @ 23:27)          Blood Urea Nitrogen: 22 mg/dL (02-12 @ 09:15)  Blood Urea Nitrogen: 23 mg/dL (02-11 @ 06:39)  Blood Urea Nitrogen: 28 mg/dL (02-10 @ 08:46)  Blood Urea Nitrogen: 29 mg/dL (02-09 @ 05:41)  Blood Urea Nitrogen: 32 mg/dL (02-08 @ 23:27)          Creatinine 0.99  Creatinine 1.10  Creatinine 1.40  Creatinine 1.40  Creatinine 1.50          Magnesium: 1.9 mg/dL (02-12 @ 09:15)  Magnesium: 1.8 mg/dL (02-11 @ 06:39)  Magnesium: 2.1 mg/dL (02-10 @ 08:46)  Magnesium: 2.1 mg/dL (02-09 @ 05:41)          Protein Total: 4.6 g/dL (02-12 @ 09:15)  Protein Total: 4.6 g/dL (02-11 @ 06:39)  Protein Total: 5.0 g/dL (02-10 @ 08:46)  Protein Total: 5.0 g/dL (02-09 @ 05:41)  Protein Total: 4.5 g/dL (02-08 @ 23:27)                  Calcium: 8.9 mg/dL (02-12 @ 09:15)  Calcium: 8.7 mg/dL (02-11 @ 06:39)  Calcium: 9.1 mg/dL (02-10 @ 08:46)  Calcium: 9.2 mg/dL (02-09 @ 05:41)  Calcium: 8.5 mg/dL (02-08 @ 23:27)          Phosphorus: 2.4 mg/dL (02-12 @ 09:15)  Phosphorus: 2.3 mg/dL (02-11 @ 06:39)  Phosphorus: 2.9 mg/dL (02-10 @ 08:46)  Phosphorus: 2.9 mg/dL (02-09 @ 05:41)          Bilirubin Total: 0.7 mg/dL (02-12 @ 09:15)  Bilirubin Total: 0.2 mg/dL (02-11 @ 06:39)  Bilirubin Total: 0.5 mg/dL (02-10 @ 08:46)  Bilirubin Total: 0.4 mg/dL (02-09 @ 05:41)  Bilirubin Total: 0.3 mg/dL (02-08 @ 23:27)          Alanine Aminotransferase (ALT/SGPT): 17 U/L (02-12 @ 09:15)  Alanine Aminotransferase (ALT/SGPT): 18 U/L (02-11 @ 06:39)  Alanine Aminotransferase (ALT/SGPT): 25 U/L (02-10 @ 08:46)  Alanine Aminotransferase (ALT/SGPT): 33 U/L (02-09 @ 05:41)  Alanine Aminotransferase (ALT/SGPT): 33 U/L (02-08 @ 23:27)          Aspartate Aminotransferase (AST/SGOT): 8 U/L (02-12 @ 09:15)  Aspartate Aminotransferase (AST/SGOT): 13 U/L (02-11 @ 06:39)  Aspartate Aminotransferase (AST/SGOT): 17 U/L (02-10 @ 08:46)  Aspartate Aminotransferase (AST/SGOT): 23 U/L (02-09 @ 05:41)  Aspartate Aminotransferase (AST/SGOT): 27 U/L (02-08 @ 23:27)    02-17    140  |  110[H]  |  70[H]  ----------------------------<  227[H]  2.3[LL]   |  17[L]  |  1.73[H]    Ca    8.4[L]      17 Feb 2025 05:59  Phos  3.2     02-17  Mg     1.5     02-17    TPro  5.2[L]  /  Alb  2.7[L]  /  TBili  0.8  /  DBili  x   /  AST  15  /  ALT  19  /  AlkPhos  101  02-17 03-01    144  |  113[H]  |  100[H]  ----------------------------<  167[H]  3.7   |  23  |  1.80[H]    Ca    9.1      01 Mar 2025 05:28  Phos  4.1     03-01  Mg     2.0     03-01    TPro  5.6[L]  /  Alb  2.0[L]  /  TBili  0.7  /  DBili  x   /  AST  41[H]  /  ALT  58  /  AlkPhos  339[H]  03-01 03-02    142  |  110[H]  |  105[H]  ----------------------------<  132[H]  3.9   |  25  |  1.86[H]    Ca    9.3      02 Mar 2025 05:50  Phos  3.9     03-02  Mg     2.2     03-02    TPro  6.2  /  Alb  1.9[L]  /  TBili  0.6  /  DBili  x   /  AST  35  /  ALT  44  /  AlkPhos  269[H]  03-02    03-03    142  |  113[H]  |  90[H]  ----------------------------<  108[H]  3.2[L]   |  22  |  1.74[H]    Ca    9.2      03 Mar 2025 05:00  Phos  4.1     03-03  Mg     1.8     03-03    TPro  5.9[L]  /  Alb  2.0[L]  /  TBili  0.6  /  DBili  x   /  AST  27  /  ALT  36  /  AlkPhos  234[H]  03-03                 RADIOLOGY:< from: CT Abdomen and Pelvis No Cont (02.09.25 @ 03:01) >  IMPRESSION:  1.   Study somewhat limited due to absence of contrast.  2.   Central venous catheter on right side with tip in cavoatrial   junction.  3.   Bibasilar atelectasis/scarring right-greater-than-left; can not   exclude  trace pleural effusions. No areas of consolidation. No pneumothorax.  4.   Lucency/deformity is noted involving the posterior aspect of the   left 10 ,  possibly 9th ribs; minimal fractures of indeterminate age in these areas   can  not be excluded.    < end of copied text >  < from: CT Abdomen and Pelvis No Cont (02.09.25 @ 03:01) >  IMPRESSION:  1.   Study somewhat limited due to absence of contrast.  2.   Axial images 71-76 of series 301 and  concomitant coronal images demonstrate prominent soft tissue inseparable   from  the body of the pancreas consistent with pancreatic neoplasm.  3.   Some distension of gallbladder could be further evaluated with right   upper  quadrant sonography. No definite biliary dilatation.  4.   Other incidental findings as above.    < end of copied text >      EKG:  < from: 12 Lead ECG (02.09.25 @ 16:52) >  Diagnosis Line *** Poor data quality, interpretation may be adversely affected  Sinus tachycardia with premature atrial complexes  Left axis deviation  Low voltage QRS  Inferior infarct (cited on or before 08-FEB-2025)  Anterolateral infarct (cited on or before 08-FEB-2025)  Abnormal ECG  When compared with ECG of 09-FEB-2025 16:51, (Unconfirmed)  premature atrial complexes are now present  Confirmed by HAYLEY LANTIGUA ([id number]) on 2/10/2025 10:25:52 PM    < end of copied text >              ECHO:  My reading-Normal LV systolic function; trace MR/mild MAC; mild aortic sclerosis; no significant pulmonary hypertension; trace pericardial effusion    Telem:  Sinus; rate =56

## 2025-03-04 NOTE — PROGRESS NOTE ADULT - ASSESSMENT
69-year-old female with stage IV pancreatic adenocarcinoma presenting with neutropenic fever and severe treatment-related toxicities from modified FOLFIRINOX and investigational EMILY inhibitor [id number] found to have PE and LBO.     # pancreatic ca   - remains on clinical trial- supportive care while in hospital   - CTH negative and discussed w/ pt   - [place]  [facility] [place]. Tapia yesterday at [facility] [place] - pt with [id number] mutation which makes her more susceptible to toxicities from 5FU and irinotecan which may have contributed to symptoms on arrival.  no DPD deficiency   - Review clinical trial protocol - may remain on trial drug alone and changing regimen- to be discussed when recovers from underlying illness and admission     # Neutropenic Septic shock - +fungal cultures   - pt previously on neupogen- WBC has now recovered and elevated likely 2/2 GCSF support   - BCx with fungal growth likely disseminated candidiasis.  identification of organism pending.  ID - c/w caspofungin.    - removed mediport  - pt bradycardic yest- dc'd metoprolol- cardio following - improved HR today   - wound care for control of skin breakdown from diarrhea    # PE   - v/q scan w indeterminate prob of PE, linear defect in RUL posterior segment   - CT chest- multiple PE w/in segmental left lower and right lower, 2 right middle lobe nodules,   -  Pt restarted on hep gtt due- unfortunately had episodes of maroon stools - no further bleeding on bms- reports brown stool as per nurse   - IVC filter cancelled due to fevers over weekend     # anemia   - received 4u to date and 1u ffp- last Hb 9.2 today   - Protonix   - GI following   - keep type and screen active, transfuse for Hb <7    # large bowel obstruction  - CT a/p Large bowel obstruction with transition point at the distal transverse colon. There is also narrowing at the level of the terminal ileum. Diffuse ascites. Redemonstrated pancreatic lesion, consistent with history of pancreatic adenocarcinoma. There is an umbilical lesion with adjacent peritoneal implants.  - previously was having diarrhea  - GI following and reviewed imaging with radiology   - 2/22/25  repeat CT a/p with mild interval improvements in small and large bowel ileus. pt given lasix and albumin.     ESOPHAGEAL CANDIDIASIS   - s/p DIFLUCAN   - now on caspofungin       will follow and communicate with [facility] [place]

## 2025-03-04 NOTE — PROGRESS NOTE ADULT - NS ATTEND AMEND GEN_ALL_CORE FT
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    Then transferred to  as per request of .  Was getting platelet transfusion and developed tachypnea hypoxia, rigors  Was diuresed and became hypotensive  Upgraded to SICU     Here with   1. Sepsis (POA)  2. Pseudomonal bacteremia suspect from UTI   3. Mucositis suspected esophageal candidiasis?   4. Pancytopenia  5. Acute hypoxic respiratory failure 2/2 pulm edema   6. MALLORY     CTA chest abd pelvis 2/19 revealing multiple small PE b/l and LBO  Started on IV heparin   Went into Afib with RVR this admission, resolved with lopressor.    Developed GI bleed on heparin gtt  started on protonix    re - trialed on heparin infusion on 2/25 -> subsequently had maroon BMs again    2/28 patient developed rigors, fever and tachycardia . IVC filter on hold  blood cx + yeast    TPN stopped, R chest port removed by surgery on 3/2 and patient started on IV caspofungin, s/p fluconazole      Plan:  Patient re cultured today  Slightly improved Po intake  had a low grade temp today  Continue Caspo  Will need an IVC filter but needs to negative BC from today as D/W ID  Transfer to     D/W  and Cardio
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 with septic shock 2/2 + pseudomonas bacteremia likely due to UTI vs colitis on 02/09/2025 Was having nausea, vomiting and diarrhea requiring Levophed drip, was transferred out of ICU to regular floor.    Then transferred to  as per request of .  Was getting platelet transfusion and developed tachypnea hypoxia, rigors  Was diuresed and became hypotensive  Upgraded to SICU     Here with   1. Sepsis (POA)  2. Pseudomonal bacteremia suspect from UTI   3. Mucositis suspected esophageal candidiasis?   4. Pancytopenia  5. Acute hypoxic respiratory failure 2/2 pulm edema   6. MALLORY       CTA chest abd pelvis 2/19 revealing multiple small PE b/l and LBO  Started on IV heparin   Went into Afib with RVR this admission, resolved with lopressor.    Developed GI bleed on heparin gtt  started on protonix    re - trialed on heparin infusion on 2/25 -> subsequently had maroon BMs again    Plan:  SICU  PAin Control  Po as she tolerates  Cr slightly better  Continue Caspo    CTA chest abd pelvis 2/19 revealing multiple small PE b/l and LBO  Started on IV heparin   Went into Afib with RVR this admission, resolved with lopressor.    Developed GI bleed on heparin gtt  started on protonix    re - trialed on heparin infusion on 2/25 -> subsequently had maroon BMs again
plan as above   d/w  at bedside
Clinically stable overall, intermittent GI bleed but without worsening tachycardia or hypotension, H/H stable, no EGD per  and GI, IR to place IVC filter.  Heart rate 80s today, patient more alert and responsive,   WBC improving.  Overall prognosis guarded to poor given multiple complications and multiple/prolonged hospitalization,  wishes to continue aggressive measures hoping that she will be able to make it through this for oncology and surgical unk follow-up.
agree with assessment and plan as above
Rigors, fever today, recultured, started on cefepime and vancomycin   CXR with PVC, d/c IVF, also shows gastric distension - NGT placed  IVC filter canceled   updated at bedside
agree with assessment and plan as above
as above
agree with above     Will start crystalloids and colloids for hydration for hypovolemia and prerenal MALLORY  H/H has remained relatively stable, no further GIB, will start iv heparin, d/w oncology     Continue TPN  Finished abx course   Daily reorientation  Trend UO and renal fn     updated at bedside

## 2025-03-04 NOTE — PROGRESS NOTE ADULT - SUBJECTIVE AND OBJECTIVE BOX
Subjective:    pat better, awake & alert & tolerating po, RA sat 98%.    Home Medications:  cefepime 2 g intravenous injection: 2 gram(s) intravenous every 12 hours (12 Feb 2025 12:59)  filgrastim: 480 microgram(s) subcutaneous once a day (12 Feb 2025 12:59)  Lactated Ringers Injection intravenous solution: 150 milligram(s) intravenous every 1 to 2 hours (12 Feb 2025 12:59)  loperamide 2 mg oral capsule: 1 cap(s) orally once (12 Feb 2025 12:59)  midodrine 5 mg oral tablet: 1 tab(s) orally every 8 hours (12 Feb 2025 12:59)  Multiple Vitamins with Minerals oral liquid: 1 orally once a day (12 Feb 2025 12:59)  octreotide 2500 mcg/mL subcutaneous solution: 0.04 milliliter(s) subcutaneous 3 times a day (12 Feb 2025 12:59)  sucralfate 1 g/10 mL oral suspension: 10 milliliter(s) orally 4 times a day (12 Feb 2025 12:59)  trimethobenzamide 100 mg/mL intramuscular solution: 2 milliliter(s) intramuscular every 8 hours As needed Nausea and/or Vomiting (12 Feb 2025 12:59)    MEDICATIONS  (STANDING):  acetaminophen   IVPB .. 1000 milliGRAM(s) IV Intermittent once  caspofungin IVPB 50 milliGRAM(s) IV Intermittent every 24 hours  caspofungin IVPB      chlorhexidine 2% Cloths 1 Application(s) Topical <User Schedule>  cholecalciferol 2000 Unit(s) Oral at bedtime  dextrose 5% + sodium chloride 0.45%. 1000 milliLiter(s) (75 mL/Hr) IV Continuous <Continuous>  dextrose 5%. 1000 milliLiter(s) (100 mL/Hr) IV Continuous <Continuous>  dextrose 5%. 1000 milliLiter(s) (50 mL/Hr) IV Continuous <Continuous>  famotidine Injectable 20 milliGRAM(s) IV Push daily  glucagon  Injectable 1 milliGRAM(s) IntraMuscular once  magnesium oxide 400 milliGRAM(s) Oral three times a day with meals  meropenem Injectable 1000 milliGRAM(s) IV Push every 12 hours  pantoprazole  Injectable 40 milliGRAM(s) IV Push every 12 hours  potassium chloride   Powder 20 milliEquivalent(s) Oral two times a day  sucralfate suspension 1 Gram(s) Oral four times a day  vancomycin    Solution 125 milliGRAM(s) Oral every 6 hours  zinc oxide 40% Paste 1 Application(s) Topical two times a day    MEDICATIONS  (PRN):  acetaminophen     Tablet .. 650 milliGRAM(s) Oral every 6 hours PRN Temp greater or equal to 38C (100.4F), Mild Pain (1 - 3)  aluminum hydroxide/magnesium hydroxide/simethicone Suspension 30 milliLiter(s) Oral every 4 hours PRN Dyspepsia  Biotene Dry Mouth Oral Rinse 15 milliLiter(s) Swish and Spit five times a day PRN Mouth Care  ondansetron Injectable 4 milliGRAM(s) IV Push every 8 hours PRN Nausea and/or Vomiting  sodium chloride 0.65% Nasal 1 Spray(s) Both Nostrils two times a day PRN Nasal Congestion      Allergies    No Known Allergies    Intolerances        Vital Signs Last 24 Hrs  T(C): 36.6 (04 Mar 2025 17:01), Max: 36.8 (04 Mar 2025 06:00)  T(F): 97.8 (04 Mar 2025 17:01), Max: 98.2 (04 Mar 2025 06:00)  HR: 93 (04 Mar 2025 12:00) (71 - 93)  BP: 150/90 (04 Mar 2025 12:00) (121/69 - 150/90)  BP(mean): 106 (04 Mar 2025 12:00) (79 - 106)  RR: 24 (04 Mar 2025 12:00) (12 - 30)  SpO2: 95% (04 Mar 2025 12:00) (92% - 95%)    Parameters below as of 04 Mar 2025 12:00  Patient On (Oxygen Delivery Method): room air          PHYSICAL EXAMINATION:    NECK:  Supple. No lymphadenopathy. Jugular venous pressure not elevated. Carotids equal.   HEART:   The cardiac impulse has a normal quality. Reg., Nl S1 and S2.  There are no murmurs, rubs or gallops noted  CHEST:  Chest rhonchi to auscultation. Normal respiratory effort.  ABDOMEN:  Soft and nontender.   EXTREMITIES:  There is no edema.       LABS:                        9.2    11.14 )-----------( 233      ( 04 Mar 2025 08:13 )             28.4     03-04    144  |  113[H]  |  62[H]  ----------------------------<  133[H]  3.5   |  25  |  1.78[H]    Ca    9.7      04 Mar 2025 10:09  Phos  4.0     03-04  Mg     1.7     03-04    TPro  5.9[L]  /  Alb  2.0[L]  /  TBili  0.6  /  DBili  x   /  AST  27  /  ALT  36  /  AlkPhos  234[H]  03-03      Urinalysis Basic - ( 04 Mar 2025 10:09 )    Color: x / Appearance: x / SG: x / pH: x  Gluc: 133 mg/dL / Ketone: x  / Bili: x / Urobili: x   Blood: x / Protein: x / Nitrite: x   Leuk Esterase: x / RBC: x / WBC x   Sq Epi: x / Non Sq Epi: x / Bacteria: x

## 2025-03-04 NOTE — PROGRESS NOTE ADULT - SUBJECTIVE AND OBJECTIVE BOX
SICU Downgrade  HOSPITALIST ATTENDING PROGRESS Acceptance NOTE    Chart and meds reviewed.  Patient seen and examined.    CC/ HPI Patient is a 69y old  Female who presents with a chief complaint of sepsis (04 Mar 2025 09:38)      Subjective:     All other systems reviewed and found to be negative with the exception of what has been described above.    MEDICATIONS:  MEDICATIONS  (STANDING):  acetaminophen   IVPB .. 1000 milliGRAM(s) IV Intermittent once  caspofungin IVPB 50 milliGRAM(s) IV Intermittent every 24 hours  caspofungin IVPB      chlorhexidine 2% Cloths 1 Application(s) Topical <User Schedule>  cholecalciferol 2000 Unit(s) Oral at bedtime  dextrose 5% + sodium chloride 0.45%. 1000 milliLiter(s) (75 mL/Hr) IV Continuous <Continuous>  dextrose 5%. 1000 milliLiter(s) (50 mL/Hr) IV Continuous <Continuous>  dextrose 5%. 1000 milliLiter(s) (100 mL/Hr) IV Continuous <Continuous>  famotidine Injectable 20 milliGRAM(s) IV Push daily  glucagon  Injectable 1 milliGRAM(s) IntraMuscular once  pantoprazole  Injectable 40 milliGRAM(s) IV Push every 12 hours  sucralfate suspension 1 Gram(s) Oral four times a day  vancomycin    Solution 125 milliGRAM(s) Oral every 6 hours  zinc oxide 40% Paste 1 Application(s) Topical two times a day      Vital Signs Last 24 Hrs  T(C): 36.4 (04 Mar 2025 08:29), Max: 37.3 (03 Mar 2025 15:00)  T(F): 97.5 (04 Mar 2025 08:29), Max: 99.1 (03 Mar 2025 15:00)  HR: 75 (04 Mar 2025 10:00) (66 - 85)  BP: 143/74 (04 Mar 2025 10:00) (118/63 - 143/74)  BP(mean): 95 (04 Mar 2025 10:00) (79 - 103)  RR: 29 (04 Mar 2025 10:00) (12 - 30)  SpO2: 92% (04 Mar 2025 10:00) (92% - 94%)    Parameters below as of 04 Mar 2025 10:00  Patient On (Oxygen Delivery Method): room air        I&O's Summary    03 Mar 2025 07:01  -  04 Mar 2025 07:00  --------------------------------------------------------  IN: 925 mL / OUT: 2200 mL / NET: -1275 mL        CAPILLARY BLOOD GLUCOSE      POCT Blood Glucose.: 129 mg/dL (03 Mar 2025 18:21)      PHYSICAL EXAM:    Constitutional: NAD, awake and alert, well-developed  HEENT:  EOMI, Normal Hearing, MMM  Neck: Soft and supple, No LAD, No JVD  Respiratory: Breath sounds are clear bilaterally, No wheezing, rales or rhonchi  Cardiovascular: S1 and S2, regular rate and rhythm, no Murmurs, gallops or rubs  Gastrointestinal: Bowel Sounds present, soft, nontender, nondistended, no guarding, no rebound  Extremities: No peripheral edema  Vascular: 2+ peripheral pulses  Neurological: A/O x 3, no focal deficits  Musculoskeletal: 5/5 strength b/l upper and lower extremities  Skin: No rashes        LABS: All Labs Reviewed:                        9.2    11.14 )-----------( 233      ( 04 Mar 2025 08:13 )             28.4     03-04    144  |  113[H]  |  62[H]  ----------------------------<  133[H]  3.5   |  25  |  1.78[H]    Ca    9.7      04 Mar 2025 10:09  Phos  4.0     03-04  Mg     1.7     03-04    TPro  5.9[L]  /  Alb  2.0[L]  /  TBili  0.6  /  DBili  x   /  AST  27  /  ALT  36  /  AlkPhos  234[H]  03-03          Blood Culture: 03-02 @ 15:30  Organism --  Gram Stain Blood -- Gram Stain --  Specimen Source Catheter Port Device  Culture-Blood --    02-28 @ 12:00  Organism --  Gram Stain Blood -- Gram Stain --  Specimen Source .Blood None  Culture-Blood --    02-28 @ 11:54  Organism Blood Culture PCR  Gram Stain Blood -- Gram Stain   Growth in aerobic bottle: Yeast like cells  Specimen Source .Blood None  Culture-Blood --        RADIOLOGY/EKG:    DVT PPX:    ADVANCED DIRECTIVE:    DISPOSITION:    Total time spent: 25 / 35  / 50 minutes  SICU Downgrade  HOSPITALIST ATTENDING PROGRESS Acceptance NOTE    Chart and meds reviewed.  Patient seen and examined.    CC/ HPI Patient is a 69y old  Female who presents with a chief complaint of sepsis (04 Mar 2025 09:38)      Subjective: Patient seen with  Dr. Pleitez at bedside and appears lethargic.   states this morning patient had appeared improved and was more interactive, but subsequently had chills and became more lethargic. Upon questioning, patient does not offer any complaints and denies headache, no chest pain, no shortness of breath, no abdominal pain, no nausea or vomiting.    All other systems reviewed and found to be negative with the exception of what has been described above.    MEDICATIONS:  MEDICATIONS  (STANDING):  acetaminophen   IVPB .. 1000 milliGRAM(s) IV Intermittent once  caspofungin IVPB 50 milliGRAM(s) IV Intermittent every 24 hours  caspofungin IVPB      chlorhexidine 2% Cloths 1 Application(s) Topical <User Schedule>  cholecalciferol 2000 Unit(s) Oral at bedtime  dextrose 5% + sodium chloride 0.45%. 1000 milliLiter(s) (75 mL/Hr) IV Continuous <Continuous>  dextrose 5%. 1000 milliLiter(s) (50 mL/Hr) IV Continuous <Continuous>  dextrose 5%. 1000 milliLiter(s) (100 mL/Hr) IV Continuous <Continuous>  famotidine Injectable 20 milliGRAM(s) IV Push daily  glucagon  Injectable 1 milliGRAM(s) IntraMuscular once  pantoprazole  Injectable 40 milliGRAM(s) IV Push every 12 hours  sucralfate suspension 1 Gram(s) Oral four times a day  vancomycin    Solution 125 milliGRAM(s) Oral every 6 hours  zinc oxide 40% Paste 1 Application(s) Topical two times a day      Vital Signs Last 24 Hrs  T(C): 36.4 (04 Mar 2025 08:29), Max: 37.3 (03 Mar 2025 15:00)  T(F): 97.5 (04 Mar 2025 08:29), Max: 99.1 (03 Mar 2025 15:00)  HR: 75 (04 Mar 2025 10:00) (66 - 85)  BP: 143/74 (04 Mar 2025 10:00) (118/63 - 143/74)  BP(mean): 95 (04 Mar 2025 10:00) (79 - 103)  RR: 29 (04 Mar 2025 10:00) (12 - 30)  SpO2: 92% (04 Mar 2025 10:00) (92% - 94%)    Parameters below as of 04 Mar 2025 10:00  Patient On (Oxygen Delivery Method): room air        I&O's Summary    03 Mar 2025 07:01  -  04 Mar 2025 07:00  --------------------------------------------------------  IN: 925 mL / OUT: 2200 mL / NET: -1275 mL        CAPILLARY BLOOD GLUCOSE      POCT Blood Glucose.: 129 mg/dL (03 Mar 2025 18:21)      PHYSICAL EXAM:    Constitutional: NAD, lethargic  HEENT:  EOMI, Normal Hearing, MMM  Neck: Soft and supple, No LAD, No JVD  Respiratory: Breath sounds are clear bilaterally, No wheezing, rales or rhonchi  Cardiovascular: S1 and S2, regular rate and rhythm, no Murmurs, gallops or rubs  Gastrointestinal: Bowel Sounds present, soft, nontender, nondistended, no guarding, no rebound  Extremities: No peripheral edema  Vascular: 2+ peripheral pulses  Musculoskeletal: 5/5 strength b/l upper and lower extremities  Skin: No rashes        LABS: All Labs Reviewed:                        9.2    11.14 )-----------( 233      ( 04 Mar 2025 08:13 )             28.4     03-04    144  |  113[H]  |  62[H]  ----------------------------<  133[H]  3.5   |  25  |  1.78[H]    Ca    9.7      04 Mar 2025 10:09  Phos  4.0     03-04  Mg     1.7     03-04    TPro  5.9[L]  /  Alb  2.0[L]  /  TBili  0.6  /  DBili  x   /  AST  27  /  ALT  36  /  AlkPhos  234[H]  03-03          Blood Culture: 03-02 @ 15:30  Organism --  Gram Stain Blood -- Gram Stain --  Specimen Source Catheter Port Device  Culture-Blood --    02-28 @ 12:00  Organism --  Gram Stain Blood -- Gram Stain --  Specimen Source .Blood None  Culture-Blood --    02-28 @ 11:54  Organism Blood Culture PCR  Gram Stain Blood -- Gram Stain   Growth in aerobic bottle: Yeast like cells  Specimen Source .Blood None  Culture-Blood --        RADIOLOGY/EKG:    DVT PPX:    ADVANCED DIRECTIVE:    DISPOSITION: SICU downgrade to med surg, discussed with IRINA Jurado and Critical Care Dr. Terrance Lanier    Total time spent: 25 / 35  / 50 minutes  SICU Downgrade  HOSPITALIST ATTENDING PROGRESS Acceptance NOTE    Chart and meds reviewed.  Patient seen and examined.    CC/ HPI Patient is a 69y old  Female who presents with a chief complaint of sepsis (04 Mar 2025 09:38)      Subjective: Patient seen with  Dr. Pleitez at bedside and appears lethargic.   states this morning patient had appeared improved and was more interactive, but subsequently had chills and became more lethargic. Upon questioning, patient does not offer any complaints and denies headache, no chest pain, no shortness of breath, no abdominal pain, no nausea or vomiting.    All other systems reviewed and found to be negative with the exception of what has been described above.    MEDICATIONS:  MEDICATIONS  (STANDING):  acetaminophen   IVPB .. 1000 milliGRAM(s) IV Intermittent once  caspofungin IVPB 50 milliGRAM(s) IV Intermittent every 24 hours  caspofungin IVPB      chlorhexidine 2% Cloths 1 Application(s) Topical <User Schedule>  cholecalciferol 2000 Unit(s) Oral at bedtime  dextrose 5% + sodium chloride 0.45%. 1000 milliLiter(s) (75 mL/Hr) IV Continuous <Continuous>  dextrose 5%. 1000 milliLiter(s) (50 mL/Hr) IV Continuous <Continuous>  dextrose 5%. 1000 milliLiter(s) (100 mL/Hr) IV Continuous <Continuous>  famotidine Injectable 20 milliGRAM(s) IV Push daily  glucagon  Injectable 1 milliGRAM(s) IntraMuscular once  pantoprazole  Injectable 40 milliGRAM(s) IV Push every 12 hours  sucralfate suspension 1 Gram(s) Oral four times a day  vancomycin    Solution 125 milliGRAM(s) Oral every 6 hours  zinc oxide 40% Paste 1 Application(s) Topical two times a day      Vital Signs Last 24 Hrs  T(C): 36.4 (04 Mar 2025 08:29), Max: 37.3 (03 Mar 2025 15:00)  T(F): 97.5 (04 Mar 2025 08:29), Max: 99.1 (03 Mar 2025 15:00)  HR: 75 (04 Mar 2025 10:00) (66 - 85)  BP: 143/74 (04 Mar 2025 10:00) (118/63 - 143/74)  BP(mean): 95 (04 Mar 2025 10:00) (79 - 103)  RR: 29 (04 Mar 2025 10:00) (12 - 30)  SpO2: 92% (04 Mar 2025 10:00) (92% - 94%)    Parameters below as of 04 Mar 2025 10:00  Patient On (Oxygen Delivery Method): room air        I&O's Summary    03 Mar 2025 07:01  -  04 Mar 2025 07:00  --------------------------------------------------------  IN: 925 mL / OUT: 2200 mL / NET: -1275 mL        CAPILLARY BLOOD GLUCOSE      POCT Blood Glucose.: 129 mg/dL (03 Mar 2025 18:21)      PHYSICAL EXAM:    Constitutional: NAD, lethargic  HEENT:  EOMI, Normal Hearing, MMM  Neck: Soft and supple, No LAD, No JVD  Respiratory: Breath sounds are clear bilaterally, No wheezing, rales or rhonchi  Cardiovascular: S1 and S2, regular rate and rhythm, no Murmurs, gallops or rubs  Gastrointestinal: Bowel Sounds present, soft, nontender, nondistended, no guarding, no rebound  Extremities: No peripheral edema  Vascular: 2+ peripheral pulses  Musculoskeletal: Moves all extremities  Skin: Superficial skin loss with blanchable erythema to the left and right upper gluteal        LABS: All Labs Reviewed:                        9.2    11.14 )-----------( 233      ( 04 Mar 2025 08:13 )             28.4     03-04    144  |  113[H]  |  62[H]  ----------------------------<  133[H]  3.5   |  25  |  1.78[H]    Ca    9.7      04 Mar 2025 10:09  Phos  4.0     03-04  Mg     1.7     03-04    TPro  5.9[L]  /  Alb  2.0[L]  /  TBili  0.6  /  DBili  x   /  AST  27  /  ALT  36  /  AlkPhos  234[H]  03-03          Blood Culture: 03-02 @ 15:30  Organism --  Gram Stain Blood -- Gram Stain --  Specimen Source Catheter Port Device  Culture-Blood --    02-28 @ 12:00  Organism --  Gram Stain Blood -- Gram Stain --  Specimen Source .Blood None  Culture-Blood --    02-28 @ 11:54  Organism Blood Culture PCR  Gram Stain Blood -- Gram Stain   Growth in aerobic bottle: Yeast like cells  Specimen Source .Blood None  Culture-Blood --        RADIOLOGY/EKG:    DVT PPX:    ADVANCED DIRECTIVE:    DISPOSITION: SICU downgrade to med surg, discussed with IRINA Jurado and Critical Care Dr. Terrance Lanier    Total time spent: 60 minutes

## 2025-03-04 NOTE — PROGRESS NOTE ADULT - ASSESSMENT
69 year old woman with the above PMH including a recent diagnosis of pancreatic cancer on chemotherapy admitted with pseudomonas sepsis, N&V, diarrhea.  At the present time her cardiac status is stable on midodrine.  Will continue.  To be seen by Dr Arias.  Antibiotics as per ID.  Continue medications for the above GI symptomatology.  Will follow     02/18/25-Patient appears improved.  She is still showing signs of intravascular depletion and hypokalemia-needs fluids and potassium.  Cardiac status stable with stable BP.  Heart rates still variable but I suspect this will also stabilize as we treat her sepsis and fluid/electrolyte status    03/1/25-  As per above.  Patient had a set back that is GI related.  I will speak to Dr Arias regarding future plans.  There is however no evidence for bleeding.  Plan would still be to place an IVC filter    3/2/25-Cardiac status stable. Events as per above.  TPN stopped and the plan is to start NGT feedings with also some PO intake.  No IVC filter at the present time but her recent GI bleeding precludes starting AC    3/3/25-Due to issues of bradykardia will stop metoprolol    3/4/25-Cardiac status stable.  Would consider placement of the IVC filter now

## 2025-03-05 LAB
ALBUMIN SERPL ELPH-MCNC: 2.2 G/DL — LOW (ref 3.3–5)
ALP SERPL-CCNC: 190 U/L — HIGH (ref 40–120)
ANION GAP SERPL CALC-SCNC: 7 MMOL/L — SIGNIFICANT CHANGE UP (ref 5–17)
AST SERPL-CCNC: 9 U/L — LOW (ref 15–37)
BILIRUB SERPL-MCNC: 0.7 MG/DL — SIGNIFICANT CHANGE UP (ref 0.2–1.2)
BUN SERPL-MCNC: 52 MG/DL — HIGH (ref 7–23)
CALCIUM SERPL-MCNC: 9.3 MG/DL — SIGNIFICANT CHANGE UP (ref 8.5–10.1)
CHLORIDE SERPL-SCNC: 113 MMOL/L — HIGH (ref 96–108)
CO2 SERPL-SCNC: 23 MMOL/L — SIGNIFICANT CHANGE UP (ref 22–31)
CREAT SERPL-MCNC: 1.73 MG/DL — HIGH (ref 0.5–1.3)
CULTURE RESULTS: SIGNIFICANT CHANGE UP
EGFR: 31 ML/MIN/1.73M2 — LOW
EGFR: 31 ML/MIN/1.73M2 — LOW
HCT VFR BLD CALC: 27.3 % — LOW (ref 34.5–45)
HGB BLD-MCNC: 8.9 G/DL — LOW (ref 11.5–15.5)
MAGNESIUM SERPL-MCNC: 1.5 MG/DL — LOW (ref 1.6–2.6)
MCHC RBC-ENTMCNC: 28.4 PG — SIGNIFICANT CHANGE UP (ref 27–34)
MCHC RBC-ENTMCNC: 32.6 G/DL — SIGNIFICANT CHANGE UP (ref 32–36)
MCV RBC AUTO: 87.2 FL — SIGNIFICANT CHANGE UP (ref 80–100)
NRBC # BLD AUTO: 0 K/UL — SIGNIFICANT CHANGE UP (ref 0–0)
NRBC # FLD: 0 K/UL — SIGNIFICANT CHANGE UP (ref 0–0)
NRBC BLD AUTO-RTO: 0 /100 WBCS — SIGNIFICANT CHANGE UP (ref 0–0)
PHOSPHATE SERPL-MCNC: 3.5 MG/DL — SIGNIFICANT CHANGE UP (ref 2.5–4.5)
PLATELET # BLD AUTO: 242 K/UL — SIGNIFICANT CHANGE UP (ref 150–400)
PMV BLD: 12.8 FL — SIGNIFICANT CHANGE UP (ref 7–13)
POTASSIUM SERPL-MCNC: 3 MMOL/L — LOW (ref 3.5–5.3)
POTASSIUM SERPL-SCNC: 3 MMOL/L — LOW (ref 3.5–5.3)
PROT SERPL-MCNC: 6 GM/DL — SIGNIFICANT CHANGE UP (ref 6–8.3)
RBC # BLD: 3.13 M/UL — LOW (ref 3.8–5.2)
RBC # FLD: 15.7 % — HIGH (ref 10.3–14.5)
SODIUM SERPL-SCNC: 143 MMOL/L — SIGNIFICANT CHANGE UP (ref 135–145)
WBC # BLD: 11.26 K/UL — HIGH (ref 3.8–10.5)
WBC # FLD AUTO: 11.26 K/UL — HIGH (ref 3.8–10.5)

## 2025-03-05 PROCEDURE — 99233 SBSQ HOSP IP/OBS HIGH 50: CPT

## 2025-03-05 RX ORDER — MAGNESIUM SULFATE 500 MG/ML
2 SYRINGE (ML) INJECTION ONCE
Refills: 0 | Status: DISCONTINUED | OUTPATIENT
Start: 2025-03-05 | End: 2025-03-05

## 2025-03-05 RX ORDER — MAGNESIUM SULFATE 500 MG/ML
2 SYRINGE (ML) INJECTION ONCE
Refills: 0 | Status: COMPLETED | OUTPATIENT
Start: 2025-03-05 | End: 2025-03-05

## 2025-03-05 RX ADMIN — Medication 40 MILLIGRAM(S): at 10:16

## 2025-03-05 RX ADMIN — Medication 1 GRAM(S): at 12:41

## 2025-03-05 RX ADMIN — Medication 25 GRAM(S): at 15:50

## 2025-03-05 RX ADMIN — Medication 100 MILLIEQUIVALENT(S): at 20:15

## 2025-03-05 RX ADMIN — Medication 650 MILLIGRAM(S): at 21:00

## 2025-03-05 RX ADMIN — Medication 2000 UNIT(S): at 22:22

## 2025-03-05 RX ADMIN — Medication 650 MILLIGRAM(S): at 22:00

## 2025-03-05 RX ADMIN — Medication 20 MILLIEQUIVALENT(S): at 10:16

## 2025-03-05 RX ADMIN — Medication 400 MILLIGRAM(S): at 10:16

## 2025-03-05 RX ADMIN — Medication 1 GRAM(S): at 05:27

## 2025-03-05 RX ADMIN — Medication 125 MILLIGRAM(S): at 05:27

## 2025-03-05 RX ADMIN — SODIUM CHLORIDE 75 MILLILITER(S): 9 INJECTION, SOLUTION INTRAVENOUS at 20:20

## 2025-03-05 RX ADMIN — Medication 400 MILLIGRAM(S): at 12:41

## 2025-03-05 RX ADMIN — Medication 125 MILLIGRAM(S): at 23:48

## 2025-03-05 RX ADMIN — Medication 100 MILLIEQUIVALENT(S): at 17:50

## 2025-03-05 RX ADMIN — MEROPENEM 1000 MILLIGRAM(S): 1 INJECTION INTRAVENOUS at 17:49

## 2025-03-05 RX ADMIN — TOUCHLESS CARE ZINC OXIDE PROTECTANT 1 APPLICATION(S): 20; 25 SPRAY TOPICAL at 12:42

## 2025-03-05 RX ADMIN — Medication 20 MILLIGRAM(S): at 23:48

## 2025-03-05 RX ADMIN — Medication 1 APPLICATION(S): at 05:27

## 2025-03-05 RX ADMIN — Medication 125 MILLIGRAM(S): at 12:41

## 2025-03-05 RX ADMIN — Medication 100 MILLIEQUIVALENT(S): at 22:21

## 2025-03-05 RX ADMIN — Medication 125 MILLIGRAM(S): at 18:03

## 2025-03-05 RX ADMIN — TOUCHLESS CARE ZINC OXIDE PROTECTANT 1 APPLICATION(S): 20; 25 SPRAY TOPICAL at 21:20

## 2025-03-05 RX ADMIN — MEROPENEM 1000 MILLIGRAM(S): 1 INJECTION INTRAVENOUS at 05:27

## 2025-03-05 RX ADMIN — CASPOFUNGIN ACETATE 260 MILLIGRAM(S): 5 INJECTION, POWDER, LYOPHILIZED, FOR SOLUTION INTRAVENOUS at 09:51

## 2025-03-05 RX ADMIN — Medication 1 GRAM(S): at 18:03

## 2025-03-05 RX ADMIN — Medication 1 GRAM(S): at 23:48

## 2025-03-05 RX ADMIN — Medication 40 MILLIGRAM(S): at 22:22

## 2025-03-05 NOTE — PROGRESS NOTE ADULT - SUBJECTIVE AND OBJECTIVE BOX
Patient is a 69y Female who reports no complaints as new per staff    MEDICATIONS  (STANDING):  acetaminophen   IVPB .. 1000 milliGRAM(s) IV Intermittent once  caspofungin IVPB 50 milliGRAM(s) IV Intermittent every 24 hours  caspofungin IVPB      chlorhexidine 2% Cloths 1 Application(s) Topical <User Schedule>  cholecalciferol 2000 Unit(s) Oral at bedtime  dextrose 5% + sodium chloride 0.45%. 1000 milliLiter(s) (75 mL/Hr) IV Continuous <Continuous>  dextrose 5%. 1000 milliLiter(s) (100 mL/Hr) IV Continuous <Continuous>  dextrose 5%. 1000 milliLiter(s) (50 mL/Hr) IV Continuous <Continuous>  famotidine Injectable 20 milliGRAM(s) IV Push daily  glucagon  Injectable 1 milliGRAM(s) IntraMuscular once  meropenem Injectable 1000 milliGRAM(s) IV Push every 12 hours  pantoprazole  Injectable 40 milliGRAM(s) IV Push every 12 hours  potassium chloride  10 mEq/100 mL IVPB 10 milliEquivalent(s) IV Intermittent every 1 hour  sucralfate suspension 1 Gram(s) Oral four times a day  vancomycin    Solution 125 milliGRAM(s) Oral every 6 hours  zinc oxide 40% Paste 1 Application(s) Topical two times a day    MEDICATIONS  (PRN):  acetaminophen     Tablet .. 650 milliGRAM(s) Oral every 6 hours PRN Temp greater or equal to 38C (100.4F), Mild Pain (1 - 3)  aluminum hydroxide/magnesium hydroxide/simethicone Suspension 30 milliLiter(s) Oral every 4 hours PRN Dyspepsia  Biotene Dry Mouth Oral Rinse 15 milliLiter(s) Swish and Spit five times a day PRN Mouth Care  ondansetron Injectable 4 milliGRAM(s) IV Push every 8 hours PRN Nausea and/or Vomiting  sodium chloride 0.65% Nasal 1 Spray(s) Both Nostrils two times a day PRN Nasal Congestion        T(C): , Max: 37.9 (03-05-25 @ 15:46)  T(F): , Max: 100.2 (03-05-25 @ 15:46)  HR: 88 (03-05-25 @ 15:46)  BP: 137/86 (03-05-25 @ 15:46)  BP(mean): 98 (03-05-25 @ 11:06)  RR: 20 (03-05-25 @ 15:46)  SpO2: 96% (03-05-25 @ 15:46)  Wt(kg): --    03-04 @ 07:01  -  03-05 @ 07:00  --------------------------------------------------------  IN: 825 mL / OUT: 2100 mL / NET: -1275 mL    03-05 @ 07:01  -  03-05 @ 19:20  --------------------------------------------------------  IN: 225 mL / OUT: 1200 mL / NET: -975 mL    Constitutional: frail, ill appearing  MM  Neck: No LAD, No JVD  Respiratory: CTAB  Cardiovascular: S1 and S2  Extremities: chronic peripheral edema  Neurological: Alert        LABS:                        8.9    11.26 )-----------( 242      ( 05 Mar 2025 05:18 )             27.3     05 Mar 2025 05:18    143    |  113    |  52     ----------------------------<  144    3.0     |  23     |  1.73   04 Mar 2025 10:09    144    |  113    |  62     ----------------------------<  133    3.5     |  25     |  1.78   03 Mar 2025 05:00    142    |  113    |  90     ----------------------------<  108    3.2     |  22     |  1.74   02 Mar 2025 05:50    142    |  110    |  105    ----------------------------<  132    3.9     |  25     |  1.86     Ca    9.3        05 Mar 2025 05:18  Ca    9.7        04 Mar 2025 10:09  Ca    9.2        03 Mar 2025 05:00  Ca    9.3        02 Mar 2025 05:50  Phos  3.5       05 Mar 2025 05:18  Phos  4.0       04 Mar 2025 10:09  Phos  4.1       03 Mar 2025 05:00  Phos  3.9       02 Mar 2025 05:50  Mg     1.5       05 Mar 2025 05:18  Mg     1.7       04 Mar 2025 10:09  Mg     1.8       03 Mar 2025 05:00  Mg     2.2       02 Mar 2025 05:50    TPro  6.0    /  Alb  2.2    /  TBili  0.7    /  DBili  x      /  AST  9      /  ALT  24     /  AlkPhos  190    05 Mar 2025 05:18  TPro  5.9    /  Alb  2.0    /  TBili  0.6    /  DBili  x      /  AST  27     /  ALT  36     /  AlkPhos  234    03 Mar 2025 05:00  TPro  6.2    /  Alb  1.9    /  TBili  0.6    /  DBili  x      /  AST  35     /  ALT  44     /  AlkPhos  269    02 Mar 2025 05:50          Urine Studies:  Urinalysis Basic - ( 05 Mar 2025 05:18 )    Color: x / Appearance: x / SG: x / pH: x  Gluc: 144 mg/dL / Ketone: x  / Bili: x / Urobili: x   Blood: x / Protein: x / Nitrite: x   Leuk Esterase: x / RBC: x / WBC x   Sq Epi: x / Non Sq Epi: x / Bacteria: x            RADIOLOGY & ADDITIONAL STUDIES:

## 2025-03-05 NOTE — PROGRESS NOTE ADULT - ASSESSMENT
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 She was admitted to [facility] with septic shock on 02/09/2025, nausea, vomiting and diarrhea requiring Levophed drip. Patient was being followed by GI, ID, Cardiology and Onc there. Patient has been transferred to  on patient's  (GI Dr. Pleitez) request. She was reported with non-bloody diarrhea x 3 in last 24 hours PTA. She denied abdominal pain, nausea, but has dry heaves. At [place] she was started on Vancomycin and Zosyn, then changed to Cefepime as her blood culture and urine cultures showed Pseudomonas aeruginosa.    #Fungemia. Likely disseminated candidemia.   #Dysphagia. Possible candida esophagitis  #S/p sepsis with PSAE   #Head of pancreas adenocarcinoma on chemotherapy  #Leukocytosis  #Immunocompromised host  #Kidney stones  #ARF on CRF stage 3  -leukocytosis is improved  -episode of low grade fever yesterday --> concern for recurrent sepsis was raised --> started on meropenem 1 gm IV q8h  -renal function is frail   -dysphagia with poor PO intake  -cultures reviewed  -s/p fluconazole 200 mg IV qd # 14  -on vancomycin 125 mg PO q6h for CDAD prophylaxis  -on caspofungin 50 mg IV qd # 4  -tolerating abx well so far; no side effects noted  -GI evaluation appreciated   -repeat BC x 2 collected  -monitor abdomen  -continue antimicrobial and antifungal coverage  -f/u cultures  -monitor temps  -f/u CBC and BNP  -supportive care  2. Other issues:   -care per medicine    d/w medicine team and Dr. Orosco

## 2025-03-05 NOTE — PROGRESS NOTE ADULT - SUBJECTIVE AND OBJECTIVE BOX
HOSPITALIST ATTENDING PROGRESS NOTE    Chart and meds reviewed.  Patient seen and examined.    CC: sepsis    Subjective: Pt denies pain. Reports some issues w sleep but hoping it will be better tonight.  (Dr. Pleitez) reports that PT has not worked w pt today.     All other systems reviewed and found to be negative with the exception of what has been described above.    MEDICATIONS  (STANDING):  acetaminophen   IVPB .. 1000 milliGRAM(s) IV Intermittent once  caspofungin IVPB 50 milliGRAM(s) IV Intermittent every 24 hours  caspofungin IVPB      chlorhexidine 2% Cloths 1 Application(s) Topical <User Schedule>  cholecalciferol 2000 Unit(s) Oral at bedtime  dextrose 5% + sodium chloride 0.45%. 1000 milliLiter(s) (75 mL/Hr) IV Continuous <Continuous>  dextrose 5%. 1000 milliLiter(s) (100 mL/Hr) IV Continuous <Continuous>  dextrose 5%. 1000 milliLiter(s) (50 mL/Hr) IV Continuous <Continuous>  famotidine Injectable 20 milliGRAM(s) IV Push daily  glucagon  Injectable 1 milliGRAM(s) IntraMuscular once  meropenem Injectable 1000 milliGRAM(s) IV Push every 12 hours  pantoprazole  Injectable 40 milliGRAM(s) IV Push every 12 hours  potassium chloride  10 mEq/100 mL IVPB 10 milliEquivalent(s) IV Intermittent every 1 hour  sucralfate suspension 1 Gram(s) Oral four times a day  vancomycin    Solution 125 milliGRAM(s) Oral every 6 hours  zinc oxide 40% Paste 1 Application(s) Topical two times a day    MEDICATIONS  (PRN):  acetaminophen     Tablet .. 650 milliGRAM(s) Oral every 6 hours PRN Temp greater or equal to 38C (100.4F), Mild Pain (1 - 3)  aluminum hydroxide/magnesium hydroxide/simethicone Suspension 30 milliLiter(s) Oral every 4 hours PRN Dyspepsia  Biotene Dry Mouth Oral Rinse 15 milliLiter(s) Swish and Spit five times a day PRN Mouth Care  ondansetron Injectable 4 milliGRAM(s) IV Push every 8 hours PRN Nausea and/or Vomiting  sodium chloride 0.65% Nasal 1 Spray(s) Both Nostrils two times a day PRN Nasal Congestion      VITALS:  T(F): 98.8 (03-05-25 @ 11:06), Max: 99.7 (03-04-25 @ 21:34)  HR: 81 (03-05-25 @ 11:06) (81 - 106)  BP: 145/81 (03-05-25 @ 11:06) (128/72 - 145/81)  RR: 36 (03-05-25 @ 11:06) (25 - 38)  SpO2: 95% (03-05-25 @ 11:06) (94% - 96%)  Wt(kg): --    I&O's Summary    04 Mar 2025 07:01  -  05 Mar 2025 07:00  --------------------------------------------------------  IN: 825 mL / OUT: 2100 mL / NET: -1275 mL    05 Mar 2025 07:01  -  05 Mar 2025 15:51  --------------------------------------------------------  IN: 225 mL / OUT: 600 mL / NET: -375 mL        CAPILLARY BLOOD GLUCOSE          PHYSICAL EXAM:  Gen: NAD  HEENT:  pupils equal and reactive, EOMI, no oropharyngeal lesions, erythema, exudates, oral thrush  NECK:   supple, no carotid bruits, no palpable lymph nodes, no thyromegaly  CV:  +S1, +S2, regular, no murmurs or rubs  RESP:   lungs clear to auscultation bilaterally, no wheezing, rales, rhonchi, good air entry bilaterally  BREAST:  not examined  GI:  abdomen soft, non-tender, non-distended, normal BS, no bruits, no abdominal masses, no palpable masses  RECTAL:  not examined  :  not examined  MSK:   normal muscle tone, no atrophy, no rigidity, no contractions  EXT:  no clubbing, no cyanosis, no edema, no calf pain, swelling or erythema  VASCULAR:  pulses equal and symmetric in the upper and lower extremities  NEURO:  AAOX3, no focal neurological deficits, follows all commands, able to move extremities spontaneously  SKIN:  no ulcers, lesions or rashes    LABS:                            8.9    11.26 )-----------( 242      ( 05 Mar 2025 05:18 )             27.3     03-05    143  |  113[H]  |  52[H]  ----------------------------<  144[H]  3.0[L]   |  23  |  1.73[H]    Ca    9.3      05 Mar 2025 05:18  Phos  3.5     03-05  Mg     1.5     03-05    TPro  6.0  /  Alb  2.2[L]  /  TBili  0.7  /  DBili  x   /  AST  9[L]  /  ALT  24  /  AlkPhos  190[H]  03-05        LIVER FUNCTIONS - ( 05 Mar 2025 05:18 )  Alb: 2.2 g/dL / Pro: 6.0 gm/dL / ALK PHOS: 190 U/L / ALT: 24 U/L / AST: 9 U/L / GGT: x             Urinalysis Basic - ( 05 Mar 2025 05:18 )    Color: x / Appearance: x / SG: x / pH: x  Gluc: 144 mg/dL / Ketone: x  / Bili: x / Urobili: x   Blood: x / Protein: x / Nitrite: x   Leuk Esterase: x / RBC: x / WBC x   Sq Epi: x / Non Sq Epi: x / Bacteria: x    CULTURES:  BCx 2/28: yeast  BCx 3/4/25: NG    Additional results/Imaging, I have personally reviewed:  LE doppler 3/4/25: neg b/l

## 2025-03-05 NOTE — PROGRESS NOTE ADULT - ASSESSMENT
69-year-old female with stage IV pancreatic adenocarcinoma presenting with neutropenic fever and severe treatment-related toxicities from modified FOLFIRINOX and investigational EMILY inhibitor [id number] found to have PE and LBO.     # pancreatic ca   - remains on clinical trial- supportive care while in hospital   - CTH negative and discussed w/ pt   - will continue with ongoing communication with  [facility]- I discussed case with DR. Tapia yesterday at [facility] [place] - pt with [id number] mutation which makes her more susceptible to toxicities from 5FU and irinotecan which may have contributed to symptoms on arrival.  no DPD deficiency   - Review clinical trial protocol - may remain on trial drug alone and changing regimen- to be discussed when recovers from underlying illness and admission     # Neutropenic Septic shock - +fungal cultures   - pt previously on neupogen- WBC has now recovered and elevated likely 2/2 GCSF support   - BCx with fungal growth likely disseminated candidiasis.  identification of organism pending.  ID - c/w caspofungin.    - removed mediport  - wound care for control of skin breakdown from diarrhea    # PE   - v/q scan w indeterminate prob of PE, linear defect in RUL posterior segment   - CT chest- multiple PE w/in segmental left lower and right lower, 2 right middle lobe nodules,   -  Pt restarted on hep gtt due- unfortunately had episodes of maroon stools - no further bleeding on bms- reports brown stool as per nurse   - IVC filter cancelled due to fevers over weekend   - repeat 3/4/25 US LE doppler repeated yest and negative for DVt       # anemia   - received 4u to date and 1u ffp- Today, WBC 11.26, hb 8.9, plt 242, k 3 and repleted   - Protonix   - GI following - pt more distended today   - keep type and screen active, transfuse for Hb <7    # large bowel obstruction  - CT a/p Large bowel obstruction with transition point at the distal transverse colon. There is also narrowing at the level of the terminal ileum. Diffuse ascites. Redemonstrated pancreatic lesion, consistent with history of pancreatic adenocarcinoma. There is an umbilical lesion with adjacent peritoneal implants.  - GI following and reviewed imaging with radiology   - 2/22/25  repeat CT a/p with mild interval improvements in small and large bowel ileus. pt given lasix and albumin.   - pt more distended today but passing gas and had bm last night     ESOPHAGEAL CANDIDIASIS   - s/p DIFLUCAN   - now on caspofungin     up for transfer to   will follow and communicate with brian

## 2025-03-05 NOTE — PROGRESS NOTE ADULT - SUBJECTIVE AND OBJECTIVE BOX
Subjective:    pat better, lying in bed, no new complaint.    Home Medications:  amitriptyline: 10 milligram(s) orally once a day (23 Dec 2024 18:06)  amLODIPine 5 mg oral tablet: 1 tab(s) orally once a day (23 Dec 2024 18:06)  aspirin 81 mg oral tablet, chewable: 1 tab(s) orally once a day (23 Dec 2024 18:06)  atorvastatin 40 mg oral tablet: 1 tab(s) orally once a day (at bedtime) (23 Dec 2024 18:06)  cefepime 2 g intravenous injection: 2 gram(s) intravenous every 12 hours (04 Mar 2025 21:11)  clopidogrel 75 mg oral tablet: 1 tab(s) orally once a day (23 Dec 2024 16:32)  filgrastim: 480 microgram(s) subcutaneous once a day (04 Mar 2025 21:11)  Lactated Ringers Injection intravenous solution: 150 milligram(s) intravenous every 1 to 2 hours (04 Mar 2025 21:11)  loperamide 2 mg oral capsule: 1 cap(s) orally once (04 Mar 2025 21:11)  midodrine 5 mg oral tablet: 1 tab(s) orally every 8 hours (04 Mar 2025 21:11)  Multiple Vitamins with Minerals oral liquid: 1 orally once a day (04 Mar 2025 21:11)  octreotide 2500 mcg/mL subcutaneous solution: 0.04 milliliter(s) subcutaneous 3 times a day (04 Mar 2025 21:11)  Pepcid 40 mg oral tablet: 40 milligram(s) orally once a day as needed for dyspepsia (23 Dec 2024 18:06)  sucralfate 1 g/10 mL oral suspension: 10 milliliter(s) orally 4 times a day (04 Mar 2025 21:11)  trimethobenzamide 100 mg/mL intramuscular solution: 2 milliliter(s) intramuscular every 8 hours As needed Nausea and/or Vomiting (04 Mar 2025 21:11)    MEDICATIONS  (STANDING):  acetaminophen   IVPB .. 1000 milliGRAM(s) IV Intermittent once  caspofungin IVPB 50 milliGRAM(s) IV Intermittent every 24 hours  caspofungin IVPB      chlorhexidine 2% Cloths 1 Application(s) Topical <User Schedule>  cholecalciferol 2000 Unit(s) Oral at bedtime  dextrose 5% + sodium chloride 0.45%. 1000 milliLiter(s) (75 mL/Hr) IV Continuous <Continuous>  dextrose 5%. 1000 milliLiter(s) (100 mL/Hr) IV Continuous <Continuous>  dextrose 5%. 1000 milliLiter(s) (50 mL/Hr) IV Continuous <Continuous>  famotidine Injectable 20 milliGRAM(s) IV Push daily  glucagon  Injectable 1 milliGRAM(s) IntraMuscular once  meropenem Injectable 1000 milliGRAM(s) IV Push every 12 hours  pantoprazole  Injectable 40 milliGRAM(s) IV Push every 12 hours  potassium chloride  10 mEq/100 mL IVPB 10 milliEquivalent(s) IV Intermittent every 1 hour  sucralfate suspension 1 Gram(s) Oral four times a day  vancomycin    Solution 125 milliGRAM(s) Oral every 6 hours  zinc oxide 40% Paste 1 Application(s) Topical two times a day    MEDICATIONS  (PRN):  acetaminophen     Tablet .. 650 milliGRAM(s) Oral every 6 hours PRN Temp greater or equal to 38C (100.4F), Mild Pain (1 - 3)  aluminum hydroxide/magnesium hydroxide/simethicone Suspension 30 milliLiter(s) Oral every 4 hours PRN Dyspepsia  Biotene Dry Mouth Oral Rinse 15 milliLiter(s) Swish and Spit five times a day PRN Mouth Care  ondansetron Injectable 4 milliGRAM(s) IV Push every 8 hours PRN Nausea and/or Vomiting  sodium chloride 0.65% Nasal 1 Spray(s) Both Nostrils two times a day PRN Nasal Congestion      Allergies    No Known Allergies    Intolerances        Vital Signs Last 24 Hrs  T(C): 37.9 (05 Mar 2025 15:46), Max: 37.9 (05 Mar 2025 15:46)  T(F): 100.2 (05 Mar 2025 15:46), Max: 100.2 (05 Mar 2025 15:46)  HR: 88 (05 Mar 2025 15:46) (81 - 106)  BP: 137/86 (05 Mar 2025 15:46) (128/72 - 145/81)  BP(mean): 98 (05 Mar 2025 11:06) (85 - 98)  RR: 20 (05 Mar 2025 15:46) (20 - 38)  SpO2: 96% (05 Mar 2025 15:46) (94% - 96%)    Parameters below as of 05 Mar 2025 15:46  Patient On (Oxygen Delivery Method): nasal cannula  O2 Flow (L/min): 2        PHYSICAL EXAMINATION:    NECK:  Supple. No lymphadenopathy. Jugular venous pressure not elevated. Carotids equal.   HEART:   The cardiac impulse has a normal quality. Reg., Nl S1 and S2.  There are no murmurs, rubs or gallops noted  CHEST:  Chest rhonchi to auscultation. Normal respiratory effort.  ABDOMEN:  Soft and nontender.   EXTREMITIES:  There is no edema.       LABS:                        8.9    11.26 )-----------( 242      ( 05 Mar 2025 05:18 )             27.3     03-05    143  |  113[H]  |  52[H]  ----------------------------<  144[H]  3.0[L]   |  23  |  1.73[H]    Ca    9.3      05 Mar 2025 05:18  Phos  3.5     03-05  Mg     1.5     03-05    TPro  6.0  /  Alb  2.2[L]  /  TBili  0.7  /  DBili  x   /  AST  9[L]  /  ALT  24  /  AlkPhos  190[H]  03-05      Urinalysis Basic - ( 05 Mar 2025 05:18 )    Color: x / Appearance: x / SG: x / pH: x  Gluc: 144 mg/dL / Ketone: x  / Bili: x / Urobili: x   Blood: x / Protein: x / Nitrite: x   Leuk Esterase: x / RBC: x / WBC x   Sq Epi: x / Non Sq Epi: x / Bacteria: x

## 2025-03-05 NOTE — PROGRESS NOTE ADULT - ASSESSMENT
69-year-old female recent diagnosis of primary pancreatic adenocarcinoma , pancytopenia and significant PMH of SCC lip, cyclical vomiting syndrome, HTN, HPL, Gout, GERD, CVA w renal evaluation of MALLORY and hypokalemia     MALLORY  -stable function, volume ok  -IVF if intake not at goal  -Trend labs, lytes. Replete Hypokalemia for goal near 4.0  -Monitor UOP   -Avoid nsaids/contras     Hypokalemia  -K repletion iv/po as needed    Fungemia  -Therapy per ID    d/c with RN staff, team

## 2025-03-05 NOTE — PROGRESS NOTE ADULT - SUBJECTIVE AND OBJECTIVE BOX
INTERVAL HPI/OVERNIGHT EVENTS:  Patient S&E at bedside. 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      GERD (gastroesophageal reflux disease)      Cyclical vomiting syndrome      CVA (cerebrovascular accident)      H/O: hysterectomy      H/O squamous cell carcinoma excision          FAMILY HISTORY:  No pertinent family history in first degree relatives        VITAL SIGNS:  T(F): 98.7 (03-05-25 @ 06:00)  HR: 106 (03-05-25 @ 08:00)  BP: 142/77 (03-05-25 @ 08:00)  RR: 32 (03-05-25 @ 08:00)  SpO2: 95% (03-05-25 @ 08:00)  Wt(kg): --    PHYSICAL EXAM:    Constitutional: NAD, more awake and vocal this am   ENT: EOMI,   Respiratory: CTA b/l, on RA  Cardiovascular: HR improved rrr  Gastrointestinal: soft, distended today   neuro- aox2      MEDICATIONS  (STANDING):  acetaminophen   IVPB .. 1000 milliGRAM(s) IV Intermittent once  caspofungin IVPB 50 milliGRAM(s) IV Intermittent every 24 hours  caspofungin IVPB      chlorhexidine 2% Cloths 1 Application(s) Topical <User Schedule>  cholecalciferol 2000 Unit(s) Oral at bedtime  dextrose 5% + sodium chloride 0.45%. 1000 milliLiter(s) (75 mL/Hr) IV Continuous <Continuous>  dextrose 5%. 1000 milliLiter(s) (100 mL/Hr) IV Continuous <Continuous>  dextrose 5%. 1000 milliLiter(s) (50 mL/Hr) IV Continuous <Continuous>  famotidine Injectable 20 milliGRAM(s) IV Push daily  glucagon  Injectable 1 milliGRAM(s) IntraMuscular once  magnesium oxide 400 milliGRAM(s) Oral three times a day with meals  meropenem Injectable 1000 milliGRAM(s) IV Push every 12 hours  pantoprazole  Injectable 40 milliGRAM(s) IV Push every 12 hours  potassium chloride   Powder 20 milliEquivalent(s) Oral two times a day  sucralfate suspension 1 Gram(s) Oral four times a day  vancomycin    Solution 125 milliGRAM(s) Oral every 6 hours  zinc oxide 40% Paste 1 Application(s) Topical two times a day    MEDICATIONS  (PRN):  acetaminophen     Tablet .. 650 milliGRAM(s) Oral every 6 hours PRN Temp greater or equal to 38C (100.4F), Mild Pain (1 - 3)  aluminum hydroxide/magnesium hydroxide/simethicone Suspension 30 milliLiter(s) Oral every 4 hours PRN Dyspepsia  Biotene Dry Mouth Oral Rinse 15 milliLiter(s) Swish and Spit five times a day PRN Mouth Care  ondansetron Injectable 4 milliGRAM(s) IV Push every 8 hours PRN Nausea and/or Vomiting  sodium chloride 0.65% Nasal 1 Spray(s) Both Nostrils two times a day PRN Nasal Congestion      Allergies    No Known Allergies    Intolerances        LABS:                        8.9    11.26 )-----------( 242      ( 05 Mar 2025 05:18 )             27.3     03-05    143  |  113[H]  |  52[H]  ----------------------------<  144[H]  3.0[L]   |  23  |  1.73[H]    Ca    9.3      05 Mar 2025 05:18  Phos  3.5     03-05  Mg     1.5     03-05    TPro  6.0  /  Alb  2.2[L]  /  TBili  0.7  /  DBili  x   /  AST  9[L]  /  ALT  24  /  AlkPhos  190[H]  03-05      Urinalysis Basic - ( 05 Mar 2025 05:18 )    Color: x / Appearance: x / SG: x / pH: x  Gluc: 144 mg/dL / Ketone: x  / Bili: x / Urobili: x   Blood: x / Protein: x / Nitrite: x   Leuk Esterase: x / RBC: x / WBC x   Sq Epi: x / Non Sq Epi: x / Bacteria: x        RADIOLOGY & ADDITIONAL TESTS:  Studies reviewed.

## 2025-03-05 NOTE — PROGRESS NOTE ADULT - SUBJECTIVE AND OBJECTIVE BOX
Feels well, more alert, appetite poor  VSS afeb    Chest wall dressing dry, intact. no erythema, no tenderness  Abd- soft non tender. Erythematous palpable mass at umbilicus unchanged. No drainage. No tenderness

## 2025-03-05 NOTE — PROVIDER CONTACT NOTE (OTHER) - BACKGROUND
Pancreatic CA
per day RN, pt was tachypneic during the day as well, MD was made aware and pt was placed on 2L NC.
Panc CA

## 2025-03-06 LAB
ANION GAP SERPL CALC-SCNC: 7 MMOL/L — SIGNIFICANT CHANGE UP (ref 5–17)
ANION GAP SERPL CALC-SCNC: 8 MMOL/L — SIGNIFICANT CHANGE UP (ref 5–17)
BUN SERPL-MCNC: 45 MG/DL — HIGH (ref 7–23)
BUN SERPL-MCNC: 46 MG/DL — HIGH (ref 7–23)
CALCIUM SERPL-MCNC: 9.1 MG/DL — SIGNIFICANT CHANGE UP (ref 8.5–10.1)
CHLORIDE SERPL-SCNC: 110 MMOL/L — HIGH (ref 96–108)
CHLORIDE SERPL-SCNC: 110 MMOL/L — HIGH (ref 96–108)
CO2 SERPL-SCNC: 22 MMOL/L — SIGNIFICANT CHANGE UP (ref 22–31)
CO2 SERPL-SCNC: 22 MMOL/L — SIGNIFICANT CHANGE UP (ref 22–31)
CREAT SERPL-MCNC: 1.75 MG/DL — HIGH (ref 0.5–1.3)
EGFR: 31 ML/MIN/1.73M2 — LOW
EGFR: 31 ML/MIN/1.73M2 — LOW
EGFR: 33 ML/MIN/1.73M2 — LOW
GLUCOSE SERPL-MCNC: 122 MG/DL — HIGH (ref 70–99)
HCT VFR BLD CALC: 27.7 % — LOW (ref 34.5–45)
HCT VFR BLD CALC: 28.6 % — LOW (ref 34.5–45)
HGB BLD-MCNC: 9.1 G/DL — LOW (ref 11.5–15.5)
HGB BLD-MCNC: 9.3 G/DL — LOW (ref 11.5–15.5)
LACTATE SERPL-SCNC: 2.2 MMOL/L — HIGH (ref 0.7–2)
MCHC RBC-ENTMCNC: 28.7 PG — SIGNIFICANT CHANGE UP (ref 27–34)
MCHC RBC-ENTMCNC: 28.8 PG — SIGNIFICANT CHANGE UP (ref 27–34)
MCHC RBC-ENTMCNC: 32.5 G/DL — SIGNIFICANT CHANGE UP (ref 32–36)
MCHC RBC-ENTMCNC: 32.9 G/DL — SIGNIFICANT CHANGE UP (ref 32–36)
MCV RBC AUTO: 87.4 FL — SIGNIFICANT CHANGE UP (ref 80–100)
MCV RBC AUTO: 88.5 FL — SIGNIFICANT CHANGE UP (ref 80–100)
NRBC # BLD AUTO: 0 K/UL — SIGNIFICANT CHANGE UP (ref 0–0)
NRBC # BLD AUTO: 0 K/UL — SIGNIFICANT CHANGE UP (ref 0–0)
NRBC # FLD: 0 K/UL — SIGNIFICANT CHANGE UP (ref 0–0)
NRBC # FLD: 0 K/UL — SIGNIFICANT CHANGE UP (ref 0–0)
NRBC BLD AUTO-RTO: 0 /100 WBCS — SIGNIFICANT CHANGE UP (ref 0–0)
NRBC BLD AUTO-RTO: 0 /100 WBCS — SIGNIFICANT CHANGE UP (ref 0–0)
PLATELET # BLD AUTO: 277 K/UL — SIGNIFICANT CHANGE UP (ref 150–400)
PMV BLD: 11.6 FL — SIGNIFICANT CHANGE UP (ref 7–13)
PMV BLD: 12.5 FL — SIGNIFICANT CHANGE UP (ref 7–13)
POTASSIUM SERPL-MCNC: 2.8 MMOL/L — CRITICAL LOW (ref 3.5–5.3)
POTASSIUM SERPL-MCNC: 4.5 MMOL/L — SIGNIFICANT CHANGE UP (ref 3.5–5.3)
POTASSIUM SERPL-SCNC: 2.8 MMOL/L — CRITICAL LOW (ref 3.5–5.3)
POTASSIUM SERPL-SCNC: 4.5 MMOL/L — SIGNIFICANT CHANGE UP (ref 3.5–5.3)
RBC # BLD: 3.17 M/UL — LOW (ref 3.8–5.2)
RBC # BLD: 3.23 M/UL — LOW (ref 3.8–5.2)
RBC # FLD: 15.6 % — HIGH (ref 10.3–14.5)
RBC # FLD: 15.7 % — HIGH (ref 10.3–14.5)
SODIUM SERPL-SCNC: 139 MMOL/L — SIGNIFICANT CHANGE UP (ref 135–145)
SODIUM SERPL-SCNC: 140 MMOL/L — SIGNIFICANT CHANGE UP (ref 135–145)
WBC # BLD: 10.21 K/UL — SIGNIFICANT CHANGE UP (ref 3.8–10.5)
WBC # BLD: 10.52 K/UL — HIGH (ref 3.8–10.5)
WBC # FLD AUTO: 10.21 K/UL — SIGNIFICANT CHANGE UP (ref 3.8–10.5)
WBC # FLD AUTO: 10.52 K/UL — HIGH (ref 3.8–10.5)

## 2025-03-06 PROCEDURE — 74176 CT ABD & PELVIS W/O CONTRAST: CPT | Mod: 26

## 2025-03-06 PROCEDURE — 99233 SBSQ HOSP IP/OBS HIGH 50: CPT

## 2025-03-06 RX ADMIN — Medication 40 MILLIEQUIVALENT(S): at 15:43

## 2025-03-06 RX ADMIN — Medication 125 MILLIGRAM(S): at 06:02

## 2025-03-06 RX ADMIN — MEROPENEM 1000 MILLIGRAM(S): 1 INJECTION INTRAVENOUS at 17:56

## 2025-03-06 RX ADMIN — TOUCHLESS CARE ZINC OXIDE PROTECTANT 1 APPLICATION(S): 20; 25 SPRAY TOPICAL at 20:12

## 2025-03-06 RX ADMIN — Medication 100 MILLIEQUIVALENT(S): at 13:47

## 2025-03-06 RX ADMIN — Medication 40 MILLIGRAM(S): at 22:21

## 2025-03-06 RX ADMIN — Medication 1 GRAM(S): at 12:03

## 2025-03-06 RX ADMIN — Medication 1 APPLICATION(S): at 12:04

## 2025-03-06 RX ADMIN — Medication 2000 UNIT(S): at 22:20

## 2025-03-06 RX ADMIN — Medication 1 GRAM(S): at 17:56

## 2025-03-06 RX ADMIN — MEROPENEM 1000 MILLIGRAM(S): 1 INJECTION INTRAVENOUS at 06:02

## 2025-03-06 RX ADMIN — Medication 1 GRAM(S): at 06:02

## 2025-03-06 RX ADMIN — Medication 125 MILLIGRAM(S): at 12:02

## 2025-03-06 RX ADMIN — Medication 20 MILLIGRAM(S): at 22:21

## 2025-03-06 RX ADMIN — Medication 125 MILLIGRAM(S): at 17:56

## 2025-03-06 RX ADMIN — Medication 100 MILLIEQUIVALENT(S): at 12:04

## 2025-03-06 RX ADMIN — TOUCHLESS CARE ZINC OXIDE PROTECTANT 1 APPLICATION(S): 20; 25 SPRAY TOPICAL at 12:05

## 2025-03-06 RX ADMIN — Medication 40 MILLIGRAM(S): at 12:03

## 2025-03-06 RX ADMIN — Medication 100 MILLIEQUIVALENT(S): at 15:43

## 2025-03-06 RX ADMIN — Medication 40 MILLIEQUIVALENT(S): at 12:02

## 2025-03-06 RX ADMIN — CASPOFUNGIN ACETATE 260 MILLIGRAM(S): 5 INJECTION, POWDER, LYOPHILIZED, FOR SOLUTION INTRAVENOUS at 12:03

## 2025-03-06 RX ADMIN — SODIUM CHLORIDE 75 MILLILITER(S): 9 INJECTION, SOLUTION INTRAVENOUS at 12:06

## 2025-03-06 RX ADMIN — Medication 650 MILLIGRAM(S): at 18:05

## 2025-03-06 NOTE — PROGRESS NOTE ADULT - SUBJECTIVE AND OBJECTIVE BOX
Subjective:    pat better, sitting in bed, Tmax 100, sat 94%, no respiratory distress.    Home Medications:  amitriptyline: 10 milligram(s) orally once a day (23 Dec 2024 18:06)  amLODIPine 5 mg oral tablet: 1 tab(s) orally once a day (23 Dec 2024 18:06)  aspirin 81 mg oral tablet, chewable: 1 tab(s) orally once a day (23 Dec 2024 18:06)  atorvastatin 40 mg oral tablet: 1 tab(s) orally once a day (at bedtime) (23 Dec 2024 18:06)  cefepime 2 g intravenous injection: 2 gram(s) intravenous every 12 hours (04 Mar 2025 21:11)  clopidogrel 75 mg oral tablet: 1 tab(s) orally once a day (23 Dec 2024 16:32)  filgrastim: 480 microgram(s) subcutaneous once a day (04 Mar 2025 21:11)  Lactated Ringers Injection intravenous solution: 150 milligram(s) intravenous every 1 to 2 hours (04 Mar 2025 21:11)  loperamide 2 mg oral capsule: 1 cap(s) orally once (04 Mar 2025 21:11)  midodrine 5 mg oral tablet: 1 tab(s) orally every 8 hours (04 Mar 2025 21:11)  Multiple Vitamins with Minerals oral liquid: 1 orally once a day (04 Mar 2025 21:11)  octreotide 2500 mcg/mL subcutaneous solution: 0.04 milliliter(s) subcutaneous 3 times a day (04 Mar 2025 21:11)  Pepcid 40 mg oral tablet: 40 milligram(s) orally once a day as needed for dyspepsia (23 Dec 2024 18:06)  sucralfate 1 g/10 mL oral suspension: 10 milliliter(s) orally 4 times a day (04 Mar 2025 21:11)  trimethobenzamide 100 mg/mL intramuscular solution: 2 milliliter(s) intramuscular every 8 hours As needed Nausea and/or Vomiting (04 Mar 2025 21:11)    MEDICATIONS  (STANDING):  acetaminophen   IVPB .. 1000 milliGRAM(s) IV Intermittent once  caspofungin IVPB 50 milliGRAM(s) IV Intermittent every 24 hours  caspofungin IVPB      chlorhexidine 2% Cloths 1 Application(s) Topical <User Schedule>  cholecalciferol 2000 Unit(s) Oral at bedtime  dextrose 5% + sodium chloride 0.45%. 1000 milliLiter(s) (75 mL/Hr) IV Continuous <Continuous>  dextrose 5%. 1000 milliLiter(s) (100 mL/Hr) IV Continuous <Continuous>  dextrose 5%. 1000 milliLiter(s) (50 mL/Hr) IV Continuous <Continuous>  famotidine Injectable 20 milliGRAM(s) IV Push daily  glucagon  Injectable 1 milliGRAM(s) IntraMuscular once  meropenem Injectable 1000 milliGRAM(s) IV Push every 12 hours  pantoprazole  Injectable 40 milliGRAM(s) IV Push every 12 hours  sucralfate suspension 1 Gram(s) Oral four times a day  vancomycin    Solution 125 milliGRAM(s) Oral every 6 hours  zinc oxide 40% Paste 1 Application(s) Topical two times a day    MEDICATIONS  (PRN):  acetaminophen     Tablet .. 650 milliGRAM(s) Oral every 6 hours PRN Temp greater or equal to 38C (100.4F), Mild Pain (1 - 3)  aluminum hydroxide/magnesium hydroxide/simethicone Suspension 30 milliLiter(s) Oral every 4 hours PRN Dyspepsia  Biotene Dry Mouth Oral Rinse 15 milliLiter(s) Swish and Spit five times a day PRN Mouth Care  ondansetron Injectable 4 milliGRAM(s) IV Push every 8 hours PRN Nausea and/or Vomiting  sodium chloride 0.65% Nasal 1 Spray(s) Both Nostrils two times a day PRN Nasal Congestion      Allergies    No Known Allergies    Intolerances        Vital Signs Last 24 Hrs  T(C): 36.9 (06 Mar 2025 08:36), Max: 38.2 (05 Mar 2025 20:56)  T(F): 98.4 (06 Mar 2025 08:36), Max: 100.7 (05 Mar 2025 20:56)  HR: 79 (06 Mar 2025 08:36) (72 - 100)  BP: 134/90 (06 Mar 2025 08:36) (131/89 - 145/83)  BP(mean): --  RR: 20 (06 Mar 2025 08:36) (20 - 35)  SpO2: 96% (06 Mar 2025 08:36) (94% - 96%)    Parameters below as of 06 Mar 2025 08:36  Patient On (Oxygen Delivery Method): nasal cannula  O2 Flow (L/min): 2        PHYSICAL EXAMINATION:    NECK:  Supple. No lymphadenopathy. Jugular venous pressure not elevated. Carotids equal.   HEART:   The cardiac impulse has a normal quality. Reg., Nl S1 and S2.  There are no murmurs, rubs or gallops noted  CHEST:  Chest crackles to auscultation. Normal respiratory effort.  ABDOMEN:  Soft and nontender.   EXTREMITIES:  There is no edema.       LABS:                        9.1    10.21 )-----------( 281      ( 06 Mar 2025 08:16 )             27.7     03-06    139  |  110[H]  |  45[H]  ----------------------------<  130[H]  2.8[LL]   |  22  |  1.65[H]    Ca    9.3      06 Mar 2025 08:16  Phos  3.5     03-05  Mg     2.0     03-06    TPro  6.0  /  Alb  2.2[L]  /  TBili  0.7  /  DBili  x   /  AST  9[L]  /  ALT  24  /  AlkPhos  190[H]  03-05      Urinalysis Basic - ( 06 Mar 2025 08:16 )    Color: x / Appearance: x / SG: x / pH: x  Gluc: 130 mg/dL / Ketone: x  / Bili: x / Urobili: x   Blood: x / Protein: x / Nitrite: x   Leuk Esterase: x / RBC: x / WBC x   Sq Epi: x / Non Sq Epi: x / Bacteria: x

## 2025-03-06 NOTE — PROGRESS NOTE ADULT - ASSESSMENT
69-year-old female with stage IV pancreatic adenocarcinoma presenting with neutropenic fever and severe treatment-related toxicities from modified FOLFIRINOX and investigational EMILY inhibitor [id number].    PROBLEMS:    Febrile syndrome. Fungemia. Likely disseminated candidemia.   Dysphagia. Possible candida esophagitis  New febrile syndrome with chills. Possible sepsis. Dysphagia. Possible candida esophagitis, S/p sepsis with PSAE   Acute hypoxic respiratory failure likely secondary to hypervolemia   Bilateral pleural effusions with compressive atelectasis, right greater than left  Neutropenic Fever/Neutropenic Septic shock  Severe Thrombocytopenia-Platelet count critically low at 26k.   Severe diarrhea and inability to tolerate PO intake, likely multifactorial from both FOLFIRINOX (particularly irinotecan component) and study drug [id number].   History of left cephalic vein thrombosis.  MALLORY  CT Angio Chest PE Protocol w/ IV Cont (02.19.25 @ 14:16) >  CHEST:  Multiple pulmonary emboli within the segmental branches of the left lower and right lower lobe pulmonary arteries.  There are two right middle lobe nodules, new since prior. Exact   etiology is unclear. Primary differential diagnostic consideration   includes infection.      PLAN:    Pulmonary stable  Dc fluconazole  and d/c cefepime  IV caspofungin 70 mg IV x1, then 50 mg IV qd/Iv merpenem  CTA chest + small PEs in RLL and LLL- off IV heparin causing bleeding-waiting GFF- now fungaemia  Family does not want any procedure EGD/Colonscopy  DECD steroids  High WBC-trend  Pulmonary tachpnea may be RTA with low bicarbonate because of GI cause with diarrhea but improving renal fx  Neutropenic Septic shock- IV meropenem started on 2/15- previously on CEFEPIME/Continue filgrastim 480mcg daily-Today w/ improvement in counts- WBC 1.3, Hb 10.1, plt 37, cmp with k 2.3 and Repleted, Cr 1.40.   Daily CBC monitoring  Maintain strict neutropenic precautions  Anemia-received two units of pRBCs Hb 10.1 today   Thrombocytopenia-Transfuse for plts < 15   D/w staff & /haematology & ICU in detail

## 2025-03-06 NOTE — PROGRESS NOTE ADULT - ASSESSMENT
Pancreatic Ca with abdominal wall met. Pt had increasing abdominal discomfort today and low grade fever. CT scan reviewed. Question of short segment pneumatosis. Uncertain if this is in transverse or sigmoid colon as per report. Patient has no peritoneal signs on exam. Lactate at 2.2. Anion gap normal. However since this represents a new finding, it needs to be followed closely with serial abdominal exams, follow up labs. Continue abx as per ID. Critical care to evaluate if further monitoring recommended.

## 2025-03-06 NOTE — PROGRESS NOTE ADULT - ASSESSMENT
69-year-old female with h/o recent diagnosis of primary head of pancreatic adenocarcinoma with tumor in umbilicus, chemo-induced pancytopenia, SCC lip, cyclical vomiting syndrome, HTN, HPL, Gout, GERD, CVA on Aggrenox was transferred on 2/12 from [facility] for further care. She was admitted to [facility] with septic shock on 02/09/2025, nausea, vomiting and diarrhea requiring Levophed drip. Patient was being followed by GI, ID, Cardiology and Onc there. Patient has been transferred to  on patient's  (GI Dr. Pleitez) request. She was reported with non-bloody diarrhea x 3 in last 24 hours PTA. She denied abdominal pain, nausea, but has dry heaves. At [facility] she was started on Vancomycin and Zosyn, then changed to Cefepime as her blood culture and urine cultures showed Pseudomonas aeruginosa.    #Fungemia. Likely disseminated candidemia.   #Dysphagia. Possible candida esophagitis  #S/p sepsis with PSAE   #Head of pancreas adenocarcinoma on chemotherapy  #Leukocytosis  #Immunocompromised host  #Kidney stones  #ARF on CRF stage 3  -leukocytosis is improved  -low grade fever  -renal function is frail   -dysphagia with poor PO intake  -cultures reviewed  -s/p fluconazole 200 mg IV qd # 14  -on vancomycin 125 mg PO q6h for CDAD prophylaxis  -on caspofungin 50 mg IV qd # 5 and meropenem 1000 mg IV q8h # 2  -tolerating abx well so far; no side effects noted  -GI evaluation appreciated   -repeat BC x 2 are negative to date  -spoken with lab --> yeast not yet identified  -monitor abdomen  -continue antimicrobial and antifungal coverage  -f/u cultures  -monitor temps  -f/u CBC and BNP  -supportive care  2. Other issues:   -care per medicine    d/w medicine team and Dr. Orosco   d/w Dr. Pleitez

## 2025-03-06 NOTE — PROGRESS NOTE ADULT - ASSESSMENT
69-year-old female with stage IV pancreatic adenocarcinoma presenting with neutropenic fever and severe treatment-related toxicities from modified FOLFIRINOX and investigational EMILY inhibitor [id number] found to have PE and LBO.     # pancreatic ca   - remains on clinical trial- supportive care while in hospital   - CTH negative and discussed w/ pt   - will continue with ongoing communication with  [facility] have contributed to symptoms on arrival.  no DPD deficiency   - Review clinical trial protocol - may remain on trial drug alone and changing regimen- to be discussed when recovers from underlying illness and admission     # Neutropenic Septic shock - +fungal cultures   - pt previously on neupogen- WBC has now recovered and elevated likely 2/2 GCSF support   - BCx with fungal growth likely disseminated candidiasis  - repeat cultuures NEG  -  ID - c/w caspofungin.    - removed mediport  - wound care for control of skin breakdown from diarrhea    # PE   - v/q scan w indeterminate prob of PE, linear defect in RUL posterior segment   - CT chest- multiple PE w/in segmental left lower and right lower, 2 right middle lobe nodules,   - discussed with ID - cultures thus far are neg - can consider proceeding with IVC filter       # anemia   - Protonix   - keep type and screen active, transfuse for Hb <7    will follow and communicate with [facility]

## 2025-03-06 NOTE — PROGRESS NOTE ADULT - SUBJECTIVE AND OBJECTIVE BOX
HOSPITALIST ATTENDING PROGRESS NOTE    Chart and meds reviewed.  Patient seen and examined.    CC: fever    Subjective: Temp to 100.7 overnight. Pt denies new complaints. K remains low.     All other systems reviewed and found to be negative with the exception of what has been described above.    MEDICATIONS  (STANDING):  acetaminophen   IVPB .. 1000 milliGRAM(s) IV Intermittent once  caspofungin IVPB 50 milliGRAM(s) IV Intermittent every 24 hours  caspofungin IVPB      chlorhexidine 2% Cloths 1 Application(s) Topical <User Schedule>  cholecalciferol 2000 Unit(s) Oral at bedtime  dextrose 5% + sodium chloride 0.45%. 1000 milliLiter(s) (75 mL/Hr) IV Continuous <Continuous>  dextrose 5%. 1000 milliLiter(s) (100 mL/Hr) IV Continuous <Continuous>  dextrose 5%. 1000 milliLiter(s) (50 mL/Hr) IV Continuous <Continuous>  famotidine Injectable 20 milliGRAM(s) IV Push daily  glucagon  Injectable 1 milliGRAM(s) IntraMuscular once  meropenem Injectable 1000 milliGRAM(s) IV Push every 12 hours  pantoprazole  Injectable 40 milliGRAM(s) IV Push every 12 hours  potassium chloride   Powder 40 milliEquivalent(s) Oral every 4 hours  potassium chloride  10 mEq/100 mL IVPB 10 milliEquivalent(s) IV Intermittent every 1 hour  sucralfate suspension 1 Gram(s) Oral four times a day  vancomycin    Solution 125 milliGRAM(s) Oral every 6 hours  zinc oxide 40% Paste 1 Application(s) Topical two times a day    MEDICATIONS  (PRN):  acetaminophen     Tablet .. 650 milliGRAM(s) Oral every 6 hours PRN Temp greater or equal to 38C (100.4F), Mild Pain (1 - 3)  aluminum hydroxide/magnesium hydroxide/simethicone Suspension 30 milliLiter(s) Oral every 4 hours PRN Dyspepsia  Biotene Dry Mouth Oral Rinse 15 milliLiter(s) Swish and Spit five times a day PRN Mouth Care  ondansetron Injectable 4 milliGRAM(s) IV Push every 8 hours PRN Nausea and/or Vomiting  sodium chloride 0.65% Nasal 1 Spray(s) Both Nostrils two times a day PRN Nasal Congestion      VITALS:  T(F): 98.4 (03-06-25 @ 08:36), Max: 100.7 (03-05-25 @ 20:56)  HR: 79 (03-06-25 @ 08:36) (72 - 100)  BP: 134/90 (03-06-25 @ 08:36) (131/89 - 145/83)  RR: 20 (03-06-25 @ 08:36) (20 - 35)  SpO2: 96% (03-06-25 @ 08:36) (94% - 96%)  Wt(kg): --    I&O's Summary    05 Mar 2025 07:01  -  06 Mar 2025 07:00  --------------------------------------------------------  IN: 225 mL / OUT: 2200 mL / NET: -1975 mL        CAPILLARY BLOOD GLUCOSE          PHYSICAL EXAM:  Gen: NAD  HEENT:  pupils equal and reactive, EOMI, no oropharyngeal lesions, erythema, exudates, oral thrush  NECK:   supple, no carotid bruits, no palpable lymph nodes, no thyromegaly  CV:  +S1, +S2, regular, no murmurs or rubs  RESP:   lungs clear to auscultation bilaterally, no wheezing, rales, rhonchi, good air entry bilaterally  BREAST:  not examined  GI:  abdomen soft, non-tender, non-distended, normal BS, no bruits, no abdominal masses, no palpable masses  RECTAL:  not examined  :  not examined  MSK:   normal muscle tone, no atrophy, no rigidity, no contractions  EXT:  no clubbing, no cyanosis, no edema, no calf pain, swelling or erythema  VASCULAR:  pulses equal and symmetric in the upper and lower extremities  NEURO:  AAOX3, no focal neurological deficits, follows all commands, able to move extremities spontaneously  SKIN:  no ulcers, lesions or rashes    LABS:                            9.1    10.21 )-----------( 281      ( 06 Mar 2025 08:16 )             27.7     03-06    139  |  110[H]  |  45[H]  ----------------------------<  130[H]  2.8[LL]   |  22  |  1.65[H]    Ca    9.3      06 Mar 2025 08:16  Phos  3.5     03-05  Mg     2.0     03-06    TPro  6.0  /  Alb  2.2[L]  /  TBili  0.7  /  DBili  x   /  AST  9[L]  /  ALT  24  /  AlkPhos  190[H]  03-05        LIVER FUNCTIONS - ( 05 Mar 2025 05:18 )  Alb: 2.2 g/dL / Pro: 6.0 gm/dL / ALK PHOS: 190 U/L / ALT: 24 U/L / AST: 9 U/L / GGT: x             Urinalysis Basic - ( 06 Mar 2025 08:16 )    Color: x / Appearance: x / SG: x / pH: x  Gluc: 130 mg/dL / Ketone: x  / Bili: x / Urobili: x   Blood: x / Protein: x / Nitrite: x   Leuk Esterase: x / RBC: x / WBC x   Sq Epi: x / Non Sq Epi: x / Bacteria: x    CULTURES:  BCx 2/28: yeast  BCx 3/4/25: NG    Additional results/Imaging, I have personally reviewed:  LE doppler 3/4/25: neg b/l HOSPITALIST ATTENDING PROGRESS NOTE    Chart and meds reviewed.  Patient seen and examined.    CC: fever    Subjective: Temp to 100.7 overnight. Pt with worsening abdnl distension. K remains low.     All other systems reviewed and found to be negative with the exception of what has been described above.    MEDICATIONS  (STANDING):  acetaminophen   IVPB .. 1000 milliGRAM(s) IV Intermittent once  caspofungin IVPB 50 milliGRAM(s) IV Intermittent every 24 hours  caspofungin IVPB      chlorhexidine 2% Cloths 1 Application(s) Topical <User Schedule>  cholecalciferol 2000 Unit(s) Oral at bedtime  dextrose 5% + sodium chloride 0.45%. 1000 milliLiter(s) (75 mL/Hr) IV Continuous <Continuous>  dextrose 5%. 1000 milliLiter(s) (100 mL/Hr) IV Continuous <Continuous>  dextrose 5%. 1000 milliLiter(s) (50 mL/Hr) IV Continuous <Continuous>  famotidine Injectable 20 milliGRAM(s) IV Push daily  glucagon  Injectable 1 milliGRAM(s) IntraMuscular once  meropenem Injectable 1000 milliGRAM(s) IV Push every 12 hours  pantoprazole  Injectable 40 milliGRAM(s) IV Push every 12 hours  potassium chloride   Powder 40 milliEquivalent(s) Oral every 4 hours  potassium chloride  10 mEq/100 mL IVPB 10 milliEquivalent(s) IV Intermittent every 1 hour  sucralfate suspension 1 Gram(s) Oral four times a day  vancomycin    Solution 125 milliGRAM(s) Oral every 6 hours  zinc oxide 40% Paste 1 Application(s) Topical two times a day    MEDICATIONS  (PRN):  acetaminophen     Tablet .. 650 milliGRAM(s) Oral every 6 hours PRN Temp greater or equal to 38C (100.4F), Mild Pain (1 - 3)  aluminum hydroxide/magnesium hydroxide/simethicone Suspension 30 milliLiter(s) Oral every 4 hours PRN Dyspepsia  Biotene Dry Mouth Oral Rinse 15 milliLiter(s) Swish and Spit five times a day PRN Mouth Care  ondansetron Injectable 4 milliGRAM(s) IV Push every 8 hours PRN Nausea and/or Vomiting  sodium chloride 0.65% Nasal 1 Spray(s) Both Nostrils two times a day PRN Nasal Congestion      VITALS:  T(F): 98.4 (03-06-25 @ 08:36), Max: 100.7 (03-05-25 @ 20:56)  HR: 79 (03-06-25 @ 08:36) (72 - 100)  BP: 134/90 (03-06-25 @ 08:36) (131/89 - 145/83)  RR: 20 (03-06-25 @ 08:36) (20 - 35)  SpO2: 96% (03-06-25 @ 08:36) (94% - 96%)  Wt(kg): --    I&O's Summary    05 Mar 2025 07:01  -  06 Mar 2025 07:00  --------------------------------------------------------  IN: 225 mL / OUT: 2200 mL / NET: -1975 mL        CAPILLARY BLOOD GLUCOSE          PHYSICAL EXAM:  Gen: NAD  HEENT:  pupils equal and reactive, EOMI, no oropharyngeal lesions, erythema, exudates, oral thrush  NECK:   supple, no carotid bruits, no palpable lymph nodes, no thyromegaly  CV:  +S1, +S2, regular, no murmurs or rubs  RESP:   lungs clear to auscultation bilaterally, no wheezing, rales, rhonchi, good air entry bilaterally  BREAST:  not examined  GI:  abdomen soft, non-tender, non-distended, normal BS, no bruits, no abdominal masses, no palpable masses  RECTAL:  not examined  :  not examined  MSK:   normal muscle tone, no atrophy, no rigidity, no contractions  EXT:  no clubbing, no cyanosis, no edema, no calf pain, swelling or erythema  VASCULAR:  pulses equal and symmetric in the upper and lower extremities  NEURO:  AAOX3, no focal neurological deficits, follows all commands, able to move extremities spontaneously  SKIN:  no ulcers, lesions or rashes    LABS:                            9.1    10.21 )-----------( 281      ( 06 Mar 2025 08:16 )             27.7     03-06    139  |  110[H]  |  45[H]  ----------------------------<  130[H]  2.8[LL]   |  22  |  1.65[H]    Ca    9.3      06 Mar 2025 08:16  Phos  3.5     03-05  Mg     2.0     03-06    TPro  6.0  /  Alb  2.2[L]  /  TBili  0.7  /  DBili  x   /  AST  9[L]  /  ALT  24  /  AlkPhos  190[H]  03-05        LIVER FUNCTIONS - ( 05 Mar 2025 05:18 )  Alb: 2.2 g/dL / Pro: 6.0 gm/dL / ALK PHOS: 190 U/L / ALT: 24 U/L / AST: 9 U/L / GGT: x             Urinalysis Basic - ( 06 Mar 2025 08:16 )    Color: x / Appearance: x / SG: x / pH: x  Gluc: 130 mg/dL / Ketone: x  / Bili: x / Urobili: x   Blood: x / Protein: x / Nitrite: x   Leuk Esterase: x / RBC: x / WBC x   Sq Epi: x / Non Sq Epi: x / Bacteria: x    CULTURES:  BCx 2/28: yeast  BCx 3/4/25: NG    Additional results/Imaging, I have personally reviewed:  LE doppler 3/4/25: neg b/l

## 2025-03-06 NOTE — PROGRESS NOTE ADULT - SUBJECTIVE AND OBJECTIVE BOX
Date of service: 03-06-25 @ 08:20    Lying in bed in NAD  Weak looking  Low grade fever to 100.7F rectally  Alert and mild confused    ROS: limited, denies pain    MEDICATIONS  (STANDING):  acetaminophen   IVPB .. 1000 milliGRAM(s) IV Intermittent once  caspofungin IVPB 50 milliGRAM(s) IV Intermittent every 24 hours  caspofungin IVPB      chlorhexidine 2% Cloths 1 Application(s) Topical <User Schedule>  cholecalciferol 2000 Unit(s) Oral at bedtime  dextrose 5% + sodium chloride 0.45%. 1000 milliLiter(s) (75 mL/Hr) IV Continuous <Continuous>  dextrose 5%. 1000 milliLiter(s) (100 mL/Hr) IV Continuous <Continuous>  dextrose 5%. 1000 milliLiter(s) (50 mL/Hr) IV Continuous <Continuous>  famotidine Injectable 20 milliGRAM(s) IV Push daily  glucagon  Injectable 1 milliGRAM(s) IntraMuscular once  meropenem Injectable 1000 milliGRAM(s) IV Push every 12 hours  pantoprazole  Injectable 40 milliGRAM(s) IV Push every 12 hours  sucralfate suspension 1 Gram(s) Oral four times a day  vancomycin    Solution 125 milliGRAM(s) Oral every 6 hours  zinc oxide 40% Paste 1 Application(s) Topical two times a day    Vital Signs Last 24 Hrs  T(C): 37.2 (06 Mar 2025 05:43), Max: 38.2 (05 Mar 2025 20:56)  T(F): 99 (06 Mar 2025 05:43), Max: 100.7 (05 Mar 2025 20:56)  HR: 72 (06 Mar 2025 05:43) (72 - 100)  BP: 131/89 (06 Mar 2025 05:43) (131/89 - 145/83)  BP(mean): 98 (05 Mar 2025 11:06) (98 - 98)  RR: 29 (05 Mar 2025 23:31) (20 - 36)  SpO2: 94% (06 Mar 2025 05:43) (94% - 96%)    Parameters below as of 06 Mar 2025 05:43  Patient On (Oxygen Delivery Method): nasal cannula     Physical exam:    Constitutional:  No acute distress  HEENT: NC/AT, EOMI, PERRLA, conjunctivae clear; ears and nose atraumatic; pharynx benign  Neck: supple; thyroid not palpable  Back: no tenderness  Respiratory: respiratory effort normal; decreased BS at bases  Cardiovascular: S1S2 regular, no murmurs  Abdomen: soft, mild periombilical tender, not distended, positive BS; no liver or spleen organomegaly  Genitourinary: no suprapubic tenderness  Lymphatic: no LN palpable  Musculoskeletal: no muscle tenderness, no joint swelling or tenderness  Extremities: no pedal edema  Neurological/ Psychiatric: Alert, moving all extremities  Skin: no rashes; right lower chest wall dry, scabbed ulcer; no discharge     Labs: reviewed                                   8.9    11.26 )-----------( 242      ( 05 Mar 2025 05:18 )             27.3     03-05    143  |  113[H]  |  52[H]  ----------------------------<  144[H]  3.0[L]   |  23  |  1.73[H]    Ca    9.3      05 Mar 2025 05:18  Phos  3.5     03-05  Mg     1.5     03-05    TPro  6.0  /  Alb  2.2[L]  /  TBili  0.7  /  DBili  x   /  AST  9[L]  /  ALT  24  /  AlkPhos  190[H]  03-05                        10.2   18.89 )-----------( 181      ( 27 Feb 2025 04:05 )             30.2     02-27    149[H]  |  116[H]  |  96[H]  ----------------------------<  138[H]  3.9   |  26  |  1.87[H]    Ca    9.1      27 Feb 2025 04:05  Phos  4.0     02-27  Mg     2.0     02-27    TPro  5.4[L]  /  Alb  2.8[L]  /  TBili  0.8  /  DBili  x   /  AST  44[H]  /  ALT  45  /  AlkPhos  194[H]  02-27                        10.5   35.65 )-----------( 132      ( 21 Feb 2025 05:18 )             31.0     02-21    141  |  105  |  66[H]  ----------------------------<  358[H]  3.2[L]   |  26  |  1.33[H]    Ca    8.2[L]      21 Feb 2025 05:18  Phos  3.6     02-21  Mg     2.0     02-21    TPro  5.3[L]  /  Alb  2.1[L]  /  TBili  0.6  /  DBili  x   /  AST  56[H]  /  ALT  33  /  AlkPhos  329[H]  02-21    C-Reactive Protein: 209.0 mg/mL (02-15-25 @ 05:17)  C-Reactive Protein: 233.0 mg/mL (02-14-25 @ 06:50)  Ferritin: 1798 ng/mL (02-13-25 @ 18:46)                        8.7    0.19  )-----------( 33       ( 13 Feb 2025 06:52 )             25.7     02-13    137  |  109[H]  |  20  ----------------------------<  117[H]  2.6[LL]   |  18[L]  |  1.02    Ca    8.7      13 Feb 2025 06:52  Phos  2.3     02-13  Mg     1.6     02-13    TPro  4.4[L]  /  Alb  1.1[L]  /  TBili  0.7  /  DBili  x   /  AST  11[L]  /  ALT  16  /  AlkPhos  94  02-13     LIVER FUNCTIONS - ( 13 Feb 2025 06:52 )  Alb: 1.1 g/dL / Pro: 4.4 gm/dL / ALK PHOS: 94 U/L / ALT: 16 U/L / AST: 11 U/L / GGT: x           Culture - Urine (collected 09 Feb 2025 01:25)  Source: Clean Catch  Final Report (11 Feb 2025 08:39):    50,000 - 99,000 CFU/mL Pseudomonas aeruginosa  Organism: Pseudomonas aeruginosa (11 Feb 2025 08:39)  Organism: Pseudomonas aeruginosa (11 Feb 2025 08:39)      Method Type: REJI      -  Amikacin: S <=16      -  Aztreonam: S <=4      -  Cefepime: S <=2      -  Ceftazidime: S <=1      -  Ciprofloxacin: S <=0.25      -  Imipenem: S 2      -  Levofloxacin: S <=0.5      -  Meropenem: S <=1      -  Piperacillin/Tazobactam: S <=8    Culture - Blood (collected 08 Feb 2025 22:00)  Source: .Blood BLOOD  Gram Stain (09 Feb 2025 19:04):    Growth in aerobic bottle: Gram Negative Rods  Final Report (11 Feb 2025 07:53):    Growth in aerobic bottle: Pseudomonas aeruginosa    Direct identification is available within approximately 3-5    hours either by Blood Panel Multiplexed PCR or Direct    MALDI-TOF. Details: [email or web address]  Organism: Blood Culture PCR  Pseudomonas aeruginosa (11 Feb 2025 07:53)  Organism: Pseudomonas aeruginosa (11 Feb 2025 07:53)      Method Type: REJI      -  Aztreonam: S <=4      -  Cefepime: S <=2      -  Ceftazidime: S <=1      -  Ciprofloxacin: S <=0.25      -  Imipenem: S <=1      -  Levofloxacin: S <=0.5      -  Meropenem: S <=1      -  Piperacillin/Tazobactam: S <=8  Organism: Blood Culture PCR (11 Feb 2025 07:53)      Method Type: PCR      -  Pseudomonas aeruginosa: Detec    Culture - Blood (collected 08 Feb 2025 21:55)  Source: .Blood BLOOD  Gram Stain (09 Feb 2025 19:32):    Growth in aerobic bottle: Gram Negative Rods  Final Report (11 Feb 2025 07:54):    Growth in aerobic bottle: Pseudomonas aeruginosa    See previous culture [id number]    Culture - Blood (collected 28 Feb 2025 12:00)  Source: .Blood None  Preliminary Report (01 Mar 2025 19:01):    No growth at 24 hours    Culture - Blood (collected 28 Feb 2025 11:54)  Source: .Blood None  Gram Stain (02 Mar 2025 06:12):    Growth in aerobic bottle: Yeast like cells  Preliminary Report (02 Mar 2025 06:12):    Growth in aerobic bottle: Yeast like cells    Direct identification is available within approximately 3-5    hours either by Blood Panel Multiplexed PCR or Direct    MALDI-TOF. Details: [email or web address]  Organism: Blood Culture PCR (02 Mar 2025 08:38)  Organism: Blood Culture PCR (02 Mar 2025 08:38)    Culture - Blood (collected 04 Mar 2025 18:58)  Source: Blood None  Preliminary Report (06 Mar 2025 02:02):    No growth at 24 hours    Culture - Blood (collected 04 Mar 2025 18:57)  Source: Blood None  Preliminary Report (06 Mar 2025 02:02):    No growth at 24 hours    Culture - Blood (collected 04 Mar 2025 08:14)  Source: Blood None  Preliminary Report (05 Mar 2025 13:01):    No growth at 24 hours    Culture - Blood (collected 04 Mar 2025 08:13)  Source: Blood None  Preliminary Report (05 Mar 2025 13:01):    No growth at 24 hours    Radiology: all available radiological tests reviewed    < from: US Abdomen Upper Quadrant Right (02.09.25 @ 14:55) >  Distended gallbladder with layering sludge.  8 mm right renal calculus without hydronephrosis.  Hepatomegaly.  Right renal cyst.  < end of copied text >    < from: CT Abdomen and Pelvis No Cont (02.09.25 @ 03:01) >  1.   Study somewhat limited due to absence of contrast.  2.   Axial images 71-76 of series 301 and concomitant coronal images demonstrate prominent soft tissue inseparable from the body of the pancreas consistent with pancreatic neoplasm.  3.   Some distension of gallbladder could be further evaluated with right upper quadrant sonography. No definite biliary dilatation.  < end of copied text >    Advanced directives addressed: full resuscitation

## 2025-03-06 NOTE — PROGRESS NOTE ADULT - SUBJECTIVE AND OBJECTIVE BOX
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 1000 milliGRAM(s) IV Intermittent once  caspofungin IVPB      caspofungin IVPB 50 milliGRAM(s) IV Intermittent every 24 hours  chlorhexidine 2% Cloths 1 Application(s) Topical <User Schedule>  cholecalciferol 2000 Unit(s) Oral at bedtime  dextrose 5% + sodium chloride 0.45%. 1000 milliLiter(s) (75 mL/Hr) IV Continuous <Continuous>  dextrose 5%. 1000 milliLiter(s) (100 mL/Hr) IV Continuous <Continuous>  dextrose 5%. 1000 milliLiter(s) (50 mL/Hr) IV Continuous <Continuous>  famotidine Injectable 20 milliGRAM(s) IV Push daily  glucagon  Injectable 1 milliGRAM(s) IntraMuscular once  meropenem Injectable 1000 milliGRAM(s) IV Push every 12 hours  pantoprazole  Injectable 40 milliGRAM(s) IV Push every 12 hours  sucralfate suspension 1 Gram(s) Oral four times a day  vancomycin    Solution 125 milliGRAM(s) Oral every 6 hours  zinc oxide 40% Paste 1 Application(s) Topical two times a day    MEDICATIONS  (PRN):  acetaminophen     Tablet .. 650 milliGRAM(s) Oral every 6 hours PRN Temp greater or equal to 38C (100.4F), Mild Pain (1 - 3)  aluminum hydroxide/magnesium hydroxide/simethicone Suspension 30 milliLiter(s) Oral every 4 hours PRN Dyspepsia  Biotene Dry Mouth Oral Rinse 15 milliLiter(s) Swish and Spit five times a day PRN Mouth Care  ondansetron Injectable 4 milliGRAM(s) IV Push every 8 hours PRN Nausea and/or Vomiting  sodium chloride 0.65% Nasal 1 Spray(s) Both Nostrils two times a day PRN Nasal Congestion      REVIEW OF SYSTEMS:    RESPIRATORY: No shortness of breath  CARDIOVASCULAR: No chest pain  All other review of systems is negative unless indicated above.    Vital Signs Last 24 Hrs  T(C): 37.9 (06 Mar 2025 17:07), Max: 38.2 (05 Mar 2025 20:56)  T(F): 100.2 (06 Mar 2025 17:07), Max: 100.7 (05 Mar 2025 20:56)  HR: 100 (06 Mar 2025 16:30) (72 - 100)  BP: 151/89 (06 Mar 2025 16:30) (131/89 - 151/89)  BP(mean): --  RR: 18 (06 Mar 2025 16:30) (18 - 35)  SpO2: 97% (06 Mar 2025 16:30) (94% - 97%)    Parameters below as of 06 Mar 2025 16:30  Patient On (Oxygen Delivery Method): room air        PHYSICAL EXAM (as of AM 3/6)    Constitutional: NAD  Respiratory: CTAB  Cardiovascular: S1 and S2, RRR  Gastrointestinal: BS+, soft  Psychiatric: Normal mood, normal affect    LABS:                        9.1    10.21 )-----------( 281      ( 06 Mar 2025 08:16 )             27.7     03-06    139  |  110[H]  |  45[H]  ----------------------------<  130[H]  2.8[LL]   |  22  |  1.65[H]    Ca    9.3      06 Mar 2025 08:16  Phos  3.5     03-05  Mg     2.0     03-06    TPro  6.0  /  Alb  2.2[L]  /  TBili  0.7  /  DBili  x   /  AST  9[L]  /  ALT  24  /  AlkPhos  190[H]  03-05      LIVER FUNCTIONS - ( 05 Mar 2025 05:18 )  Alb: 2.2 g/dL / Pro: 6.0 gm/dL / ALK PHOS: 190 U/L / ALT: 24 U/L / AST: 9 U/L / GGT: x             RADIOLOGY & ADDITIONAL STUDIES:

## 2025-03-06 NOTE — PROGRESS NOTE ADULT - ASSESSMENT
Imp:  Focal pneumatosis could represent bowel ischemia    Rec:  F/U Lactate and routine labs  Surgical eval with Dr. Avalos pending

## 2025-03-06 NOTE — PROGRESS NOTE ADULT - ASSESSMENT
68yo female with PMHx recent diagnosis of primary pancreatic adenocarcinoma of head with tumor in umbilicus, chemo-induced pancytopenia, SCC lip, cyclical vomiting syndrome, HTN, HPL, Gout, GERD, CVA on Aggrenox    Initially admitted to [facility] with septic shock 2/2 + pseudomonas bacteremia likely due to UTI vs colitis on 02/09/2025 Was having nausea, vomiting and diarrhea requiring Levophed drip, was transferred out of ICU to regular floor.    Then transferred to  as per request of . Was getting platelet transfusion and developed tachypnea hypoxia, rigors. Was diuresed and became hypotensive, Upgraded to SICU. CTA c/a/p on 2/19 showed multiple b/l PE and large bowel obstruction, was started on heparin drip, c/b GIB, then retrialed heparin drip with recurrent c/f GIB. Also with Afib w RVR during admission, resolved w iv metoprolol.     On 2/28, developed SIRS, Blood cx growing yeast. TPN stopped, R chest port removed on 3/2 and pt started on iv caspofungin.    #Sepsis (POA), immunocompromised host  #Prior pseudomonal bacteremia 2/2 UTI  #Mucositis, suspect esophageal candidiasis  #Fungemia  -now on iv caspofungin, awaiting yeast S&S  -no recurrent bacterial growth, d/w Dr. Henry- ?d/c meropenem on 3/7?  -f/u ID recs    #Acute b/l PE  -will need IVC filter when confirmed BCx clearance  -s/p recurrent GIB on heparin    #MALLORY, likely ATN  -continue IVF    #anemia  -in setting of malignancy on treatment, also w GIB after starting heparin drip  -s/p pRBC x 6u this admission    #Primary pancreatic adenocarcinoma  -f/u onc recs    #Skin injury Left and Rigth upper Gluteal Intergluteal cleft - superficial skin necrosis  - Wound care evaluation appreciated  -Low air mattress  - Turning / positioning q2h while in bed    #HypoK, HypoMag  -replete prn    #DVT ppx- SCDs    #PT eval    Medically active     68yo female with PMHx recent diagnosis of 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    Then transferred to  as per request of . Was getting platelet transfusion and developed tachypnea hypoxia, rigors. Was diuresed and became hypotensive, Upgraded to SICU. CTA c/a/p on 2/19 showed multiple b/l PE and large bowel obstruction, was started on heparin drip, c/b GIB, then retrialed heparin drip with recurrent c/f GIB. Also with Afib w RVR during admission, resolved w iv metoprolol.     On 2/28, developed SIRS, Blood cx growing yeast. TPN stopped, R chest port removed on 3/2 and pt started on iv caspofungin.    #Sepsis (POA), immunocompromised host  #Prior pseudomonal bacteremia 2/2 UTI  #Mucositis, suspect esophageal candidiasis  #Fungemia  -now on iv caspofungin, awaiting yeast S&S  -no recurrent bacterial growth, d/w Dr. Henry- ?d/c meropenem on 3/7?  -will check CT a/p noncon to eval abdnl distension  -f/u ID recs    #Acute b/l PE  -will need IVC filter when confirmed BCx clearance  -s/p recurrent GIB on heparin    #MALLORY, likely ATN  -continue IVF    #anemia  -in setting of malignancy on treatment, also w GIB after starting heparin drip  -s/p pRBC x 6u this admission    #Primary pancreatic adenocarcinoma  -f/u onc recs    #Skin injury Left and Rigth upper Gluteal Intergluteal cleft - superficial skin necrosis  - Wound care evaluation appreciated  -Low air mattress  - Turning / positioning q2h while in bed    #HypoK, HypoMag  -replete prn    #DVT ppx- SCDs    #PT eval    Medically active

## 2025-03-06 NOTE — PROGRESS NOTE ADULT - SUBJECTIVE AND OBJECTIVE BOX
INTERVAL HPI/OVERNIGHT EVENTS:  Patient S&E at bedside. No o/n events, patient resting comfortably. No complaints at this time.    VITAL SIGNS:  T(F): 100.2 (03-06-25 @ 17:07)  HR: 100 (03-06-25 @ 16:30)  BP: 151/89 (03-06-25 @ 16:30)  RR: 18 (03-06-25 @ 16:30)  SpO2: 97% (03-06-25 @ 16:30)  Wt(kg): --    PHYSICAL EXAM:    Constitutional: NAD  Eyes: EOMI, sclera non-icteric  Neck: supple, no masses, no JVD  Respiratory: CTA b/l, good air entry b/l  Cardiovascular: RRR, no M/R/G  Gastrointestinal: soft, NTND, no masses palpable, + BS, no hepatosplenomegaly  Extremities: no c/c/e  Neurological: AAOx3      MEDICATIONS  (STANDING):  acetaminophen   IVPB .. 1000 milliGRAM(s) IV Intermittent once  caspofungin IVPB 50 milliGRAM(s) IV Intermittent every 24 hours  caspofungin IVPB      chlorhexidine 2% Cloths 1 Application(s) Topical <User Schedule>  cholecalciferol 2000 Unit(s) Oral at bedtime  dextrose 5% + sodium chloride 0.45%. 1000 milliLiter(s) (75 mL/Hr) IV Continuous <Continuous>  dextrose 5%. 1000 milliLiter(s) (100 mL/Hr) IV Continuous <Continuous>  dextrose 5%. 1000 milliLiter(s) (50 mL/Hr) IV Continuous <Continuous>  famotidine Injectable 20 milliGRAM(s) IV Push daily  glucagon  Injectable 1 milliGRAM(s) IntraMuscular once  meropenem Injectable 1000 milliGRAM(s) IV Push every 12 hours  pantoprazole  Injectable 40 milliGRAM(s) IV Push every 12 hours  sucralfate suspension 1 Gram(s) Oral four times a day  vancomycin    Solution 125 milliGRAM(s) Oral every 6 hours  zinc oxide 40% Paste 1 Application(s) Topical two times a day    MEDICATIONS  (PRN):  acetaminophen     Tablet .. 650 milliGRAM(s) Oral every 6 hours PRN Temp greater or equal to 38C (100.4F), Mild Pain (1 - 3)  aluminum hydroxide/magnesium hydroxide/simethicone Suspension 30 milliLiter(s) Oral every 4 hours PRN Dyspepsia  Biotene Dry Mouth Oral Rinse 15 milliLiter(s) Swish and Spit five times a day PRN Mouth Care  ondansetron Injectable 4 milliGRAM(s) IV Push every 8 hours PRN Nausea and/or Vomiting  sodium chloride 0.65% Nasal 1 Spray(s) Both Nostrils two times a day PRN Nasal Congestion      Allergies    No Known Allergies    Intolerances        LABS:                        9.1    10.21 )-----------( 281      ( 06 Mar 2025 08:16 )             27.7     03-06    139  |  110[H]  |  45[H]  ----------------------------<  130[H]  2.8[LL]   |  22  |  1.65[H]    Ca    9.3      06 Mar 2025 08:16  Phos  3.5     03-05  Mg     2.0     03-06    TPro  6.0  /  Alb  2.2[L]  /  TBili  0.7  /  DBili  x   /  AST  9[L]  /  ALT  24  /  AlkPhos  190[H]  03-05      Urinalysis Basic - ( 06 Mar 2025 08:16 )    Color: x / Appearance: x / SG: x / pH: x  Gluc: 130 mg/dL / Ketone: x  / Bili: x / Urobili: x   Blood: x / Protein: x / Nitrite: x   Leuk Esterase: x / RBC: x / WBC x   Sq Epi: x / Non Sq Epi: x / Bacteria: x        RADIOLOGY & ADDITIONAL TESTS:  Studies reviewed.    ASSESSMENT & PLAN:

## 2025-03-06 NOTE — PROGRESS NOTE ADULT - SUBJECTIVE AND OBJECTIVE BOX
Resting comfortably. Has had intermittent abdominal pain during day. Appetite remains poor. Continues to run low grade fevers  VSS T 100.2      Lungs- diminished BS bases  Cor- tachy S1S2  Abd- distended, active BS soft no guarding or rebound tenderness. Tender to more deep palpation. Umbilical wound growth is unchanged  Ext- no edema    < from: CT Abdomen and Pelvis No Cont (03.06.25 @ 16:32) >    LIVER: Within normal limits.  BILE DUCTS: Normal caliber.  GALLBLADDER: Within normal limits.  SPLEEN: Within normal limits.  PANCREAS: 4.4 x 4.1 cm hypoattenuating pancreatic mass is unchanged.  ADRENALS: Within normal limits.  KIDNEYS/URETERS: Bilateral cysts.    BLADDER: Collapsed around a Julian catheter balloon.  REPRODUCTIVE ORGANS: Hysterectomy.    BOWEL: No bowel obstruction. Appendix is not visualized. Gas-filled but   nondilated loops of small bowel and colon, now normal in caliber and   improved from prior examination. Mottled lucency involving a short   segment of mid transverse colon suspect for pneumatosis.  PERITONEUM/RETROPERITONEUM: Small amount of ascites, minimally increased.  VESSELS: Mild atherosclerotic arterial calcification.  LYMPH NODES: No lymphadenopathy.  ABDOMINAL WALL: No change in size of irregular shaped umbilical mass.  BONES: No lytic or blastic bony lesion.      IMPRESSION:    Resolution of small and large bowel dilatation.    Mottled lucency involving a short segment of sigmoid colon suspect for   pneumatosis which could be due to benign or ischemic etiology. Please   correlate clinically and with laboratory values.    Minimal increase in amount of ascites.    Unchanged pancreatic body and umbilical masses.    < end of copied text >    Lactate 2.2  WBC 10

## 2025-03-07 LAB
ANION GAP SERPL CALC-SCNC: 9 MMOL/L — SIGNIFICANT CHANGE UP (ref 5–17)
BUN SERPL-MCNC: 37 MG/DL — HIGH (ref 7–23)
CALCIUM SERPL-MCNC: 9.6 MG/DL — SIGNIFICANT CHANGE UP (ref 8.5–10.1)
CHLORIDE SERPL-SCNC: 112 MMOL/L — HIGH (ref 96–108)
CO2 SERPL-SCNC: 22 MMOL/L — SIGNIFICANT CHANGE UP (ref 22–31)
CREAT SERPL-MCNC: 1.69 MG/DL — HIGH (ref 0.5–1.3)
CULTURE RESULTS: SIGNIFICANT CHANGE UP
EGFR: 32 ML/MIN/1.73M2 — LOW
EGFR: 32 ML/MIN/1.73M2 — LOW
GLUCOSE SERPL-MCNC: 125 MG/DL — HIGH (ref 70–99)
HCT VFR BLD CALC: 28.5 % — LOW (ref 34.5–45)
HGB BLD-MCNC: 9.2 G/DL — LOW (ref 11.5–15.5)
MAGNESIUM SERPL-MCNC: 1.8 MG/DL — SIGNIFICANT CHANGE UP (ref 1.6–2.6)
MCHC RBC-ENTMCNC: 28.7 PG — SIGNIFICANT CHANGE UP (ref 27–34)
MCHC RBC-ENTMCNC: 32.3 G/DL — SIGNIFICANT CHANGE UP (ref 32–36)
MCV RBC AUTO: 88.8 FL — SIGNIFICANT CHANGE UP (ref 80–100)
NRBC # BLD AUTO: 0 K/UL — SIGNIFICANT CHANGE UP (ref 0–0)
NRBC # FLD: 0 K/UL — SIGNIFICANT CHANGE UP (ref 0–0)
NRBC BLD AUTO-RTO: 0 /100 WBCS — SIGNIFICANT CHANGE UP (ref 0–0)
PMV BLD: 11.3 FL — SIGNIFICANT CHANGE UP (ref 7–13)
POTASSIUM SERPL-MCNC: 3.8 MMOL/L — SIGNIFICANT CHANGE UP (ref 3.5–5.3)
POTASSIUM SERPL-SCNC: 3.8 MMOL/L — SIGNIFICANT CHANGE UP (ref 3.5–5.3)
RBC # BLD: 3.21 M/UL — LOW (ref 3.8–5.2)
RBC # FLD: 15.7 % — HIGH (ref 10.3–14.5)
SODIUM SERPL-SCNC: 143 MMOL/L — SIGNIFICANT CHANGE UP (ref 135–145)
SPECIMEN SOURCE: SIGNIFICANT CHANGE UP
WBC # BLD: 8.66 K/UL — SIGNIFICANT CHANGE UP (ref 3.8–10.5)
WBC # FLD AUTO: 8.66 K/UL — SIGNIFICANT CHANGE UP (ref 3.8–10.5)

## 2025-03-07 PROCEDURE — 99233 SBSQ HOSP IP/OBS HIGH 50: CPT

## 2025-03-07 PROCEDURE — 99222 1ST HOSP IP/OBS MODERATE 55: CPT

## 2025-03-07 RX ADMIN — Medication 125 MILLIGRAM(S): at 05:58

## 2025-03-07 RX ADMIN — TOUCHLESS CARE ZINC OXIDE PROTECTANT 1 APPLICATION(S): 20; 25 SPRAY TOPICAL at 09:43

## 2025-03-07 RX ADMIN — MEROPENEM 1000 MILLIGRAM(S): 1 INJECTION INTRAVENOUS at 05:58

## 2025-03-07 RX ADMIN — Medication 1 GRAM(S): at 17:14

## 2025-03-07 RX ADMIN — Medication 20 MILLIGRAM(S): at 21:16

## 2025-03-07 RX ADMIN — SODIUM CHLORIDE 75 MILLILITER(S): 9 INJECTION, SOLUTION INTRAVENOUS at 21:18

## 2025-03-07 RX ADMIN — Medication 1 APPLICATION(S): at 10:23

## 2025-03-07 RX ADMIN — Medication 40 MILLIGRAM(S): at 09:35

## 2025-03-07 RX ADMIN — TOUCHLESS CARE ZINC OXIDE PROTECTANT 1 APPLICATION(S): 20; 25 SPRAY TOPICAL at 21:18

## 2025-03-07 RX ADMIN — Medication 125 MILLIGRAM(S): at 17:13

## 2025-03-07 RX ADMIN — Medication 1 GRAM(S): at 05:58

## 2025-03-07 RX ADMIN — Medication 1 GRAM(S): at 12:33

## 2025-03-07 RX ADMIN — SODIUM CHLORIDE 75 MILLILITER(S): 9 INJECTION, SOLUTION INTRAVENOUS at 01:23

## 2025-03-07 RX ADMIN — Medication 125 MILLIGRAM(S): at 12:33

## 2025-03-07 RX ADMIN — Medication 0.1 MILLIGRAM(S): at 21:14

## 2025-03-07 RX ADMIN — Medication 40 MILLIGRAM(S): at 21:20

## 2025-03-07 RX ADMIN — Medication 1 GRAM(S): at 01:18

## 2025-03-07 RX ADMIN — Medication 125 MILLIGRAM(S): at 01:18

## 2025-03-07 RX ADMIN — CASPOFUNGIN ACETATE 260 MILLIGRAM(S): 5 INJECTION, POWDER, LYOPHILIZED, FOR SOLUTION INTRAVENOUS at 09:36

## 2025-03-07 RX ADMIN — Medication 2000 UNIT(S): at 21:15

## 2025-03-07 RX ADMIN — MEROPENEM 1000 MILLIGRAM(S): 1 INJECTION INTRAVENOUS at 17:13

## 2025-03-07 NOTE — PROGRESS NOTE ADULT - ASSESSMENT
Stable s/p fungemia. Cultures of removed venous access port are negative thus far. Newest issue is finding of pneumatosis in a short segment of transverse colon. Abdominal exam is benign. WBC improved. Appetite better. Possible focal segment of ischemic colitis. Continue abx as per ID. Encourage po intake. Follow up serum lactate.

## 2025-03-07 NOTE — PROGRESS NOTE ADULT - SUBJECTIVE AND OBJECTIVE BOX
INTERVAL HPI/OVERNIGHT EVENTS:  Patient S&E at bedside. 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      GERD (gastroesophageal reflux disease)      Cyclical vomiting syndrome      CVA (cerebrovascular accident)      H/O: hysterectomy      H/O squamous cell carcinoma excision          FAMILY HISTORY:  No pertinent family history in first degree relatives        VITAL SIGNS:  T(F): 98.3 (03-07-25 @ 08:20)  HR: 94 (03-07-25 @ 08:20)  BP: 145/78 (03-07-25 @ 08:20)  RR: 18 (03-07-25 @ 08:20)  SpO2: 99% (03-07-25 @ 08:20)  Wt(kg): --    PHYSICAL EXAM:    Constitutional: NAD  Eyes: EOMI,  Respiratory: CTA b/l, on nc   Cardiovascular: RRR, no M/R/G  Gastrointestinal: soft, distended, no pain   Neurological: AAOx2      MEDICATIONS  (STANDING):  acetaminophen   IVPB .. 1000 milliGRAM(s) IV Intermittent once  caspofungin IVPB      caspofungin IVPB 50 milliGRAM(s) IV Intermittent every 24 hours  chlorhexidine 2% Cloths 1 Application(s) Topical <User Schedule>  cholecalciferol 2000 Unit(s) Oral at bedtime  dextrose 5% + sodium chloride 0.45%. 1000 milliLiter(s) (75 mL/Hr) IV Continuous <Continuous>  dextrose 5%. 1000 milliLiter(s) (100 mL/Hr) IV Continuous <Continuous>  dextrose 5%. 1000 milliLiter(s) (50 mL/Hr) IV Continuous <Continuous>  famotidine Injectable 20 milliGRAM(s) IV Push daily  glucagon  Injectable 1 milliGRAM(s) IntraMuscular once  meropenem Injectable 1000 milliGRAM(s) IV Push every 12 hours  pantoprazole  Injectable 40 milliGRAM(s) IV Push every 12 hours  sucralfate suspension 1 Gram(s) Oral four times a day  vancomycin    Solution 125 milliGRAM(s) Oral every 6 hours  zinc oxide 40% Paste 1 Application(s) Topical two times a day    MEDICATIONS  (PRN):  acetaminophen     Tablet .. 650 milliGRAM(s) Oral every 6 hours PRN Temp greater or equal to 38C (100.4F), Mild Pain (1 - 3)  aluminum hydroxide/magnesium hydroxide/simethicone Suspension 30 milliLiter(s) Oral every 4 hours PRN Dyspepsia  Biotene Dry Mouth Oral Rinse 15 milliLiter(s) Swish and Spit five times a day PRN Mouth Care  ondansetron Injectable 4 milliGRAM(s) IV Push every 8 hours PRN Nausea and/or Vomiting  sodium chloride 0.65% Nasal 1 Spray(s) Both Nostrils two times a day PRN Nasal Congestion      Allergies    No Known Allergies    Intolerances        LABS:                        9.2    8.66  )-----------( 271      ( 07 Mar 2025 07:22 )             28.5     03-07    143  |  112[H]  |  37[H]  ----------------------------<  125[H]  3.8   |  22  |  1.69[H]    Ca    9.6      07 Mar 2025 07:22  Mg     1.8     03-07        Urinalysis Basic - ( 07 Mar 2025 07:22 )    Color: x / Appearance: x / SG: x / pH: x  Gluc: 125 mg/dL / Ketone: x  / Bili: x / Urobili: x   Blood: x / Protein: x / Nitrite: x   Leuk Esterase: x / RBC: x / WBC x   Sq Epi: x / Non Sq Epi: x / Bacteria: x        RADIOLOGY & ADDITIONAL TESTS:  Studies reviewed.

## 2025-03-07 NOTE — CONSULT NOTE ADULT - SUBJECTIVE AND OBJECTIVE BOX
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  Patient was being followed by GI, ID, Cardiology and Onc there.  Patient has been transferred to  today on patient's  (GI Dr. Pleitez) request.  At this time, patient c/o non-bloody diarrhea x 3 in last 24 hours.  She denies any abdominal pain, nausea, but has some dry heaves.  She denies any fever, chills, chest pain, palpitation, constipation or dysuria. Initially, she was started on Vancomycin and Zosyn, but now she has been on Cefepime as her blood culture and urine cultures are positive for Pseudomonas aeruginosa. Currently, patient is on Cefepime.  ID had also recommended Flagyl, but patient had refused it, as it causes nausea to her.    Sig labs: WBC 0.45k, N 82%, Hb 7.8, Platelets 59k, Bicarb 19, AG 9, , Cr 0.99, TP 4.6, Alb 1.2, LFTs wnl.  Mg 1.9, PO4 2.4.  Prior C.diff negative.    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    Then transferred to  as per request of . Was getting platelet transfusion and developed tachypnea hypoxia, rigors. Was diuresed and became hypotensive, Upgraded to SICU. CTA c/a/p on 2/19 showed multiple b/l PE and large bowel obstruction, was started on heparin drip, c/b GIB, then retrialed heparin drip with recurrent c/f GIB. Also with Afib w RVR during admission, resolved w iv metoprolol.     On 2/28, developed SIRS, Blood cx growing yeast. TPN stopped, R chest port removed on 3/2 and pt started on iv caspofungin.    #Sepsis (POA), immunocompromised host  #Prior pseudomonal bacteremia 2/2 UTI  #Mucositis, suspect esophageal candidiasis  #Fungemia  -now on iv caspofungin, awaiting yeast S&S  -no recurrent bacterial growth, d/w Dr. Henry- ?d/c meropenem on 3/7?  -will check CT a/p noncon to eval abdnl distension  -f/u ID recs    #Acute b/l PE  -will need IVC filter when confirmed BCx clearance  -s/p recurrent GIB on heparin    #MALLORY, likely ATN  -continue IVF    #anemia  -in setting of malignancy on treatment, also w GIB after starting heparin drip  -s/p pRBC x 6u this admission    #Primary pancreatic adenocarcinoma  -f/u onc recs    #Skin injury Left and Rigth upper Gluteal Intergluteal cleft - superficial skin necrosis  - Wound care evaluation appreciated  -Low air mattress  - Turning / positioning q2h while in bed    #HypoK, HypoMag  -replete prn    #DVT ppx- SCDs    #PT eval    Imaging  abd CT 3/6/2025  Resolution of small and large bowel dilatation.    Mottled lucency involving a short segment of sigmoid colon suspect for pneumatosis which could be due to benign or ischemic etiology. Please correlate clinically and with laboratory values.    Minimal increase in amount of ascites.    Unchanged pancreatic body and umbilical masses.      REVIEW OF SYSTEMS  Constitutional - No fever, No weight loss, No fatigue  HEENT - No eye pain, No visual disturbances, No difficulty hearing, No tinnitus, No vertigo, No neck pain  Respiratory - No cough, No wheezing, No shortness of breath  Cardiovascular - No chest pain, No palpitations  Gastrointestinal - No abdominal pain, No nausea, No vomiting, No diarrhea, No constipation  Genitourinary - No dysuria, No frequency, No hematuria, No incontinence  Neurological - No headaches, No memory loss, No loss of strength, No numbness, No tremors  Skin - No itching, No rashes, No lesions   Endocrine - No temperature intolerance  Musculoskeletal - No joint pain, No joint swelling, No muscle pain  Psychiatric - No depression, No anxiety    VITALS  T(C): 36.8 (03-07-25 @ 08:20), Max: 37.9 (03-06-25 @ 17:07)  HR: 94 (03-07-25 @ 08:20) (92 - 100)  BP: 145/78 (03-07-25 @ 08:20) (143/77 - 151/89)  RR: 18 (03-07-25 @ 08:20) (18 - 20)  SpO2: 99% (03-07-25 @ 08:20) (96% - 99%)  Wt(kg): --    PAST MEDICAL & SURGICAL HISTORY  CVA (cerebrovascular accident)    Primary pancreatic adenocarcinoma    HTN (hypertension)    HLD (hyperlipidemia)    Gout    SCC of lung (small cell carcinoma)    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     CURRENT FUNCTIONAL STATUS  supine to sit is maximum assist of 1 person  Sit to stand is maximum assist of 1 person    FAMILY HISTORY   No pertinent family history in first degree relatives        RECENT LABS - Reviewed  CBC Full  -  ( 07 Mar 2025 07:22 )  WBC Count : 8.66 K/uL  RBC Count : 3.21 M/uL  Hemoglobin : 9.2 g/dL  Hematocrit : 28.5 %  Platelet Count - Automated : 271 K/uL  Mean Cell Volume : 88.8 fl  Mean Cell Hemoglobin : 28.7 pg  Mean Cell Hemoglobin Concentration : 32.3 g/dL  Auto Neutrophil # : x  Auto Lymphocyte # : x  Auto Monocyte # : x  Auto Eosinophil # : x  Auto Basophil # : x  Auto Neutrophil % : x  Auto Lymphocyte % : x  Auto Monocyte % : x  Auto Eosinophil % : x  Auto Basophil % : x    03-07    143  |  112[H]  |  37[H]  ----------------------------<  125[H]  3.8   |  22  |  1.69[H]    Ca    9.6      07 Mar 2025 07:22  Mg     1.8     03-07      Urinalysis Basic - ( 07 Mar 2025 07:22 )    Color: x / Appearance: x / SG: x / pH: x  Gluc: 125 mg/dL / Ketone: x  / Bili: x / Urobili: x   Blood: x / Protein: x / Nitrite: x   Leuk Esterase: x / RBC: x / WBC x   Sq Epi: x / Non Sq Epi: x / Bacteria: x        ALLERGIES  No Known Allergies      MEDICATIONS   acetaminophen     Tablet .. 650 milliGRAM(s) Oral every 6 hours PRN  acetaminophen   IVPB .. 1000 milliGRAM(s) IV Intermittent once  aluminum hydroxide/magnesium hydroxide/simethicone Suspension 30 milliLiter(s) Oral every 4 hours PRN  Biotene Dry Mouth Oral Rinse 15 milliLiter(s) Swish and Spit five times a day PRN  caspofungin IVPB 50 milliGRAM(s) IV Intermittent every 24 hours  caspofungin IVPB      chlorhexidine 2% Cloths 1 Application(s) Topical <User Schedule>  cholecalciferol 2000 Unit(s) Oral at bedtime  dextrose 5% + sodium chloride 0.45%. 1000 milliLiter(s) IV Continuous <Continuous>  dextrose 5%. 1000 milliLiter(s) IV Continuous <Continuous>  dextrose 5%. 1000 milliLiter(s) IV Continuous <Continuous>  famotidine Injectable 20 milliGRAM(s) IV Push daily  glucagon  Injectable 1 milliGRAM(s) IntraMuscular once  meropenem Injectable 1000 milliGRAM(s) IV Push every 12 hours  ondansetron Injectable 4 milliGRAM(s) IV Push every 8 hours PRN  pantoprazole  Injectable 40 milliGRAM(s) IV Push every 12 hours  sodium chloride 0.65% Nasal 1 Spray(s) Both Nostrils two times a day PRN  sucralfate suspension 1 Gram(s) Oral four times a day  vancomycin    Solution 125 milliGRAM(s) Oral every 6 hours  zinc oxide 40% Paste 1 Application(s) Topical two times a day      ----------------------------------------------------------------------------------------  PHYSICAL EXAM  Constitutional - NAD, Comfortable  HEENT - NCAT, EOMI  Neck - Supple, No limited ROM  Chest - Breathing comfortably, No wheezing  Cardiovascular - S1S2   Abdomen - Soft   Extremities - No C/C/E, No calf tenderness   Neurologic Exam -                    Cognitive - AAO to self, place, date, year, situation     Communication - Fluent, No dysarthria     Cranial Nerves - CN 2-12 intact     Motor - No focal deficits                    LEFT    UE - ShAB 3/5, EF 3/5, EE 3/5, WE 3/5,  3/5                    RIGHT UE - ShAB 3/5, EF 3/5, EE 3/5, WE 3/5,  3/5                    LEFT    LE - HF 3/5, KE 3/5, DF 3/5, PF 3/5                    RIGHT LE - HF 3/5, KE 3/5, DF 3/5, PF 3/5        Sensory - Intact to LT     Reflexes - DTR Intact, No primitive reflexive     Coordination - FTN intact     OculoVestibular - No saccades, No nystagmus, VOR         Balance - WNL Static  Psychiatric - Mood stable, Affect WNL  ----------------------------------------------------------------------------------------

## 2025-03-07 NOTE — PROGRESS NOTE ADULT - ASSESSMENT
69-year-old female with h/o recent diagnosis of primary head of pancreatic adenocarcinoma with tumor in umbilicus, chemo-induced pancytopenia, SCC lip, cyclical vomiting syndrome, HTN, HPL, Gout, GERD, CVA on Aggrenox was transferred on 2/12 from [facility] for further care. She was admitted to [facility] with septic shock on 02/09/2025, nausea, vomiting and diarrhea requiring Levophed drip. Patient was being followed by GI, ID, Cardiology and Onc there. Patient has been transferred to  on patient's  (GI Dr. Pleitez) request. She was reported with non-bloody diarrhea x 3 in last 24 hours PTA. She denied abdominal pain, nausea, but has dry heaves. At [place] she was started on Vancomycin and Zosyn, then changed to Cefepime as her blood culture and urine cultures showed Pseudomonas aeruginosa.    #Fungemia. Likely disseminated candidemia.   #Dysphagia. Possible candida esophagitis  #S/p sepsis with PSAE   #Head of pancreas adenocarcinoma on chemotherapy  #Immunocompromised host  #Kidney stones  #ARF on CRF stage 3  #Sigmoid colon pneumatosis. Probable infectious or ischemic colitis.  -leukocytosis is improved  -low grade fever  -renal function is frail   -dysphagia with poor PO intake  -cultures reviewed  -s/p fluconazole 200 mg IV qd # 14  -on vancomycin 125 mg PO q6h for CDAD prophylaxis  -on caspofungin 50 mg IV qd # 6 and meropenem 1000 mg IV q8h # 3  -tolerating abx well so far; no side effects noted  -GI evaluation appreciated   -surgical evaluation --> conservative care at this time  -repeat BC x 2 are negative to date  -spoken with lab --> yeast not yet identified  -monitor abdomen  -continue antimicrobial and antifungal coverage  -f/u cultures  -monitor temps  -f/u CBC and BNP  -supportive care  2. Other issues:   -care per medicine    d/w medicine team and Dr. Orosco   d/w Dr. 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 She was admitted to [facility] with septic shock on 02/09/2025, nausea, vomiting and diarrhea requiring Levophed drip. Patient was being followed by GI, ID, Cardiology and Onc there. Patient has been transferred to  on patient's  (GI Dr. Pleitez) request. She was reported with non-bloody diarrhea x 3 in last 24 hours PTA. She denied abdominal pain, nausea, but has dry heaves. At [place] she was started on Vancomycin and Zosyn, then changed to Cefepime as her blood culture and urine cultures showed Pseudomonas aeruginosa.    #New sigmoid colon pneumatosis. Probable infectious or ischemic colitis.  #Fungemia. Likely disseminated candidemia.   #Dysphagia. Possible candida esophagitis  #S/p sepsis with PSAE   #Head of pancreas adenocarcinoma on chemotherapy  #Immunocompromised host  #Kidney stones  #ARF on CRF stage 3  -leukocytosis is improved  -low grade fever  -renal function is frail   -dysphagia with poor PO intake  -cultures reviewed  -s/p fluconazole 200 mg IV qd # 14  -on vancomycin 125 mg PO q6h for CDAD prophylaxis  -on caspofungin 50 mg IV qd # 6 and meropenem 1000 mg IV q8h # 3  -tolerating abx well so far; no side effects noted  -GI evaluation appreciated   -surgical evaluation --> conservative care at this time  -repeat BC x 2 are negative to date  -spoken with lab --> yeast not yet identified  -monitor abdomen  -continue antimicrobial and antifungal coverage  -continue meropenem as well in sourav of new colon findings  -f/u cultures  -monitor temps  -f/u CBC and BNP  -supportive care  2. Other issues:   -care per medicine    d/w medicine team and Dr. Orosco   d/w Dr. Pleitez

## 2025-03-07 NOTE — CONSULT NOTE ADULT - SUBJECTIVE AND OBJECTIVE BOX
Brief Hospital Course:   Patient is a 69 year old female with dx of primary pancreatic adenocarcinoma, pancyotpenia s/p chemo, SCC lip, HTN, GERD, CVA admitted to [facility] after transfer from [place] for sepsis secondary to UTI/colitis with pseudomonas. Upgraded to SICU for b/l PEs on heparing gtt. Downgraded to medicine floors where she has been managed for Sepsis and  fungemia. Critical care consulted this evening due to concern for pneumatosis of the abdomen on CT this afternoon.     Patient evaluated at bedside in no acute distress, endorsing mild abdominal pain. States she had one BM this evening with negative blood or abnormalities. Denies dizziness, chest pain, or fevers.     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  GERD (gastroesophageal reflux disease)  Cyclical vomiting syndrome  CVA (cerebrovascular accident)  H/O: hysterectomy  H/O squamous cell carcinoma excision        Allergies    No Known Allergies    Intolerances      FAMILY HISTORY:  No pertinent family history in first degree relatives        Review of Systems:  CONSTITUTIONAL: No fever, chills, or fatigue  EYES: No eye pain, visual disturbances, or discharge  ENMT:  No difficulty hearing, tinnitus, vertigo; No sinus or throat pain  NECK: No pain or stiffness  RESPIRATORY: No cough, wheezing, chills or hemoptysis; No shortness of breath  CARDIOVASCULAR: No chest pain, palpitations, dizziness, or leg swelling  GASTROINTESTINAL: No abdominal or epigastric pain. No nausea, vomiting, or hematemesis; No diarrhea or constipation. No melena or hematochezia.  GENITOURINARY: No dysuria, frequency, hematuria, or incontinence  NEUROLOGICAL: No headaches, memory loss, loss of strength, numbness, or tremors  SKIN: No itching, burning, rashes, or lesions   MUSCULOSKELETAL: No joint pain or swelling; No muscle, back, or extremity pain  PSYCHIATRIC: No depression, anxiety, mood swings, or difficulty sleeping      Medications:  caspofungin IVPB      caspofungin IVPB 50 milliGRAM(s) IV Intermittent every 24 hours  meropenem Injectable 1000 milliGRAM(s) IV Push every 12 hours  vancomycin    Solution 125 milliGRAM(s) Oral every 6 hours        acetaminophen     Tablet .. 650 milliGRAM(s) Oral every 6 hours PRN  acetaminophen   IVPB .. 1000 milliGRAM(s) IV Intermittent once  ondansetron Injectable 4 milliGRAM(s) IV Push every 8 hours PRN        aluminum hydroxide/magnesium hydroxide/simethicone Suspension 30 milliLiter(s) Oral every 4 hours PRN  famotidine Injectable 20 milliGRAM(s) IV Push daily  pantoprazole  Injectable 40 milliGRAM(s) IV Push every 12 hours  sucralfate suspension 1 Gram(s) Oral four times a day      glucagon  Injectable 1 milliGRAM(s) IntraMuscular once    cholecalciferol 2000 Unit(s) Oral at bedtime  dextrose 5% + sodium chloride 0.45%. 1000 milliLiter(s) IV Continuous <Continuous>  dextrose 5%. 1000 milliLiter(s) IV Continuous <Continuous>  dextrose 5%. 1000 milliLiter(s) IV Continuous <Continuous>      Biotene Dry Mouth Oral Rinse 15 milliLiter(s) Swish and Spit five times a day PRN  chlorhexidine 2% Cloths 1 Application(s) Topical <User Schedule>  sodium chloride 0.65% Nasal 1 Spray(s) Both Nostrils two times a day PRN  zinc oxide 40% Paste 1 Application(s) Topical two times a day        Vital Signs Last 24 Hrs  T(C): 36.9 (06 Mar 2025 21:08), Max: 37.9 (06 Mar 2025 17:07)  T(F): 98.4 (06 Mar 2025 21:08), Max: 100.2 (06 Mar 2025 17:07)  HR: 92 (06 Mar 2025 21:08) (72 - 100)  BP: 143/77 (06 Mar 2025 21:08) (131/89 - 151/89)  BP(mean): --  RR: 20 (06 Mar 2025 21:08) (18 - 20)  SpO2: 96% (06 Mar 2025 21:08) (94% - 97%)    Parameters below as of 06 Mar 2025 21:08  Patient On (Oxygen Delivery Method): nasal cannula          I&O's Detail    05 Mar 2025 07:01  -  06 Mar 2025 07:00  --------------------------------------------------------  IN:    dextrose 5% + sodium chloride 0.45%: 225 mL  Total IN: 225 mL    OUT:    Indwelling Catheter - Urethral (mL): 2200 mL  Total OUT: 2200 mL    Total NET: -1975 mL      06 Mar 2025 07:01  -  07 Mar 2025 00:22  --------------------------------------------------------  IN:  Total IN: 0 mL    OUT:    Indwelling Catheter - Urethral (mL): 1200 mL  Total OUT: 1200 mL    Total NET: -1200 mL            LABS:                        9.3    10.52 )-----------( 277      ( 06 Mar 2025 18:55 )             28.6     03-06    140  |  110[H]  |  46[H]  ----------------------------<  122[H]  4.5   |  22  |  1.75[H]    Ca    9.1      06 Mar 2025 18:55  Phos  3.5     03-05  Mg     2.0     03-06    TPro  6.0  /  Alb  2.2[L]  /  TBili  0.7  /  DBili  x   /  AST  9[L]  /  ALT  24  /  AlkPhos  190[H]  03-05          CAPILLARY BLOOD GLUCOSE          Urinalysis Basic - ( 06 Mar 2025 18:55 )    Color: x / Appearance: x / SG: x / pH: x  Gluc: 122 mg/dL / Ketone: x  / Bili: x / Urobili: x   Blood: x / Protein: x / Nitrite: x   Leuk Esterase: x / RBC: x / WBC x   Sq Epi: x / Non Sq Epi: x / Bacteria: x      CULTURES:  Culture Results:   No growth at 24 hours (03-04 @ 18:58)  Culture Results:   No growth at 24 hours (03-04 @ 18:57)  Culture Results:   No growth at 48 Hours (03-04 @ 08:14)  Culture Results:   No growth at 48 Hours (03-04 @ 08:13)  Culture Results:   No growth to date (03-02 @ 15:30)  Culture Results:   No growth at 5 days (02-28 @ 12:00)  Culture Results:   Growth in aerobic bottle: Yeast like cells  Direct identification is available within approximately 3-5  hours either by Blood Panel Multiplexed PCR or Direct  MALDI-TOF. Details: [email or web address] (02-28 @ 11:54)      Physical Examination:    General: No acute distress.  Alert, oriented, interactive, nonfocal    HEENT: Pupils equal, reactive to light.  Symmetric.    PULM: Clear to auscultation bilaterally, no significant sputum production    CVS: Regular rate and rhythm, no murmurs, rubs, or gallops    ABD: Soft, nondistended, nontender, normoactive bowel sounds, no masses    EXT: No edema, nontender    SKIN: Warm and well perfused, no rashes noted.    Neuro: Alert and awake, follows command, move all four extremities    A/P:      #         Brief Hospital Course:   Patient is a 69 year old female with dx of primary pancreatic adenocarcinoma, pancyotpenia s/p chemo, SCC lip, HTN, GERD, CVA admitted to [facility] after transfer from [place] for sepsis secondary to UTI/colitis with pseudomonas. Upgraded to SICU for b/l PEs on heparing gtt. Downgraded to medicine floors where she has been managed for Sepsis and  fungemia. Critical care consulted this evening due to concern for pneumatosis of the abdomen on CT this afternoon.     Patient evaluated at bedside in no acute distress, endorsing mild abdominal pain. States she had one BM this evening with negative blood or abnormalities. Denies dizziness, chest pain, or fevers.     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  GERD (gastroesophageal reflux disease)  Cyclical vomiting syndrome  CVA (cerebrovascular accident)  H/O: hysterectomy  H/O squamous cell carcinoma excision        Allergies    No Known Allergies    Intolerances      FAMILY HISTORY:  No pertinent family history in first degree relatives        Review of Systems:  CONSTITUTIONAL: No fever, chills, or fatigue  EYES: No eye pain, visual disturbances, or discharge  ENMT:  No difficulty hearing, tinnitus, vertigo; No sinus or throat pain  NECK: No pain or stiffness  RESPIRATORY: No cough, wheezing, chills or hemoptysis; No shortness of breath  CARDIOVASCULAR: No chest pain, palpitations, dizziness, or leg swelling  GASTROINTESTINAL: No abdominal or epigastric pain. No nausea, vomiting, or hematemesis; No diarrhea or constipation. No melena or hematochezia.  GENITOURINARY: No dysuria, frequency, hematuria, or incontinence  NEUROLOGICAL: No headaches, memory loss, loss of strength, numbness, or tremors  SKIN: No itching, burning, rashes, or lesions   MUSCULOSKELETAL: No joint pain or swelling; No muscle, back, or extremity pain  PSYCHIATRIC: No depression, anxiety, mood swings, or difficulty sleeping      Medications:  caspofungin IVPB      caspofungin IVPB 50 milliGRAM(s) IV Intermittent every 24 hours  meropenem Injectable 1000 milliGRAM(s) IV Push every 12 hours  vancomycin    Solution 125 milliGRAM(s) Oral every 6 hours        acetaminophen     Tablet .. 650 milliGRAM(s) Oral every 6 hours PRN  acetaminophen   IVPB .. 1000 milliGRAM(s) IV Intermittent once  ondansetron Injectable 4 milliGRAM(s) IV Push every 8 hours PRN        aluminum hydroxide/magnesium hydroxide/simethicone Suspension 30 milliLiter(s) Oral every 4 hours PRN  famotidine Injectable 20 milliGRAM(s) IV Push daily  pantoprazole  Injectable 40 milliGRAM(s) IV Push every 12 hours  sucralfate suspension 1 Gram(s) Oral four times a day      glucagon  Injectable 1 milliGRAM(s) IntraMuscular once    cholecalciferol 2000 Unit(s) Oral at bedtime  dextrose 5% + sodium chloride 0.45%. 1000 milliLiter(s) IV Continuous <Continuous>  dextrose 5%. 1000 milliLiter(s) IV Continuous <Continuous>  dextrose 5%. 1000 milliLiter(s) IV Continuous <Continuous>      Biotene Dry Mouth Oral Rinse 15 milliLiter(s) Swish and Spit five times a day PRN  chlorhexidine 2% Cloths 1 Application(s) Topical <User Schedule>  sodium chloride 0.65% Nasal 1 Spray(s) Both Nostrils two times a day PRN  zinc oxide 40% Paste 1 Application(s) Topical two times a day        Vital Signs Last 24 Hrs  T(C): 36.9 (06 Mar 2025 21:08), Max: 37.9 (06 Mar 2025 17:07)  T(F): 98.4 (06 Mar 2025 21:08), Max: 100.2 (06 Mar 2025 17:07)  HR: 92 (06 Mar 2025 21:08) (72 - 100)  BP: 143/77 (06 Mar 2025 21:08) (131/89 - 151/89)  BP(mean): --  RR: 20 (06 Mar 2025 21:08) (18 - 20)  SpO2: 96% (06 Mar 2025 21:08) (94% - 97%)    Parameters below as of 06 Mar 2025 21:08  Patient On (Oxygen Delivery Method): nasal cannula          I&O's Detail    05 Mar 2025 07:01  -  06 Mar 2025 07:00  --------------------------------------------------------  IN:    dextrose 5% + sodium chloride 0.45%: 225 mL  Total IN: 225 mL    OUT:    Indwelling Catheter - Urethral (mL): 2200 mL  Total OUT: 2200 mL    Total NET: -1975 mL      06 Mar 2025 07:01  -  07 Mar 2025 00:22  --------------------------------------------------------  IN:  Total IN: 0 mL    OUT:    Indwelling Catheter - Urethral (mL): 1200 mL  Total OUT: 1200 mL    Total NET: -1200 mL            LABS:                        9.3    10.52 )-----------( 277      ( 06 Mar 2025 18:55 )             28.6     03-06    140  |  110[H]  |  46[H]  ----------------------------<  122[H]  4.5   |  22  |  1.75[H]    Ca    9.1      06 Mar 2025 18:55  Phos  3.5     03-05  Mg     2.0     03-06    TPro  6.0  /  Alb  2.2[L]  /  TBili  0.7  /  DBili  x   /  AST  9[L]  /  ALT  24  /  AlkPhos  190[H]  03-05          CAPILLARY BLOOD GLUCOSE          Urinalysis Basic - ( 06 Mar 2025 18:55 )    Color: x / Appearance: x / SG: x / pH: x  Gluc: 122 mg/dL / Ketone: x  / Bili: x / Urobili: x   Blood: x / Protein: x / Nitrite: x   Leuk Esterase: x / RBC: x / WBC x   Sq Epi: x / Non Sq Epi: x / Bacteria: x      CULTURES:  Culture Results:   No growth at 24 hours (03-04 @ 18:58)  Culture Results:   No growth at 24 hours (03-04 @ 18:57)  Culture Results:   No growth at 48 Hours (03-04 @ 08:14)  Culture Results:   No growth at 48 Hours (03-04 @ 08:13)  Culture Results:   No growth to date (03-02 @ 15:30)  Culture Results:   No growth at 5 days (02-28 @ 12:00)  Culture Results:   Growth in aerobic bottle: Yeast like cells  Direct identification is available within approximately 3-5  hours either by Blood Panel Multiplexed PCR or Direct  MALDI-TOF. Details: [email or web address] (02-28 @ 11:54)      Physical Examination:    General: No acute distress.  Alert, oriented, interactive, nonfocal    HEENT: Pupils equal, reactive to light.  Symmetric.    PULM: Clear to auscultation bilaterally, negative cyanosis or accessory muscle usage     CVS: RRR, +S1/s2     ABD: Soft, nondistended, normoactive bowel sounds. +tenderness in b/l upper quadrants. Negative rebound tenderness    EXT: No edema, nontender    SKIN: Warm and well perfused, no rashes noted.    Neuro: Alert and awake, follows command, move all four extremities

## 2025-03-07 NOTE — PROGRESS NOTE ADULT - SUBJECTIVE AND OBJECTIVE BOX
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 1000 milliGRAM(s) IV Intermittent once  caspofungin IVPB      caspofungin IVPB 50 milliGRAM(s) IV Intermittent every 24 hours  chlorhexidine 2% Cloths 1 Application(s) Topical <User Schedule>  cholecalciferol 2000 Unit(s) Oral at bedtime  dextrose 5% + sodium chloride 0.45%. 1000 milliLiter(s) (75 mL/Hr) IV Continuous <Continuous>  dextrose 5%. 1000 milliLiter(s) (100 mL/Hr) IV Continuous <Continuous>  dextrose 5%. 1000 milliLiter(s) (50 mL/Hr) IV Continuous <Continuous>  famotidine Injectable 20 milliGRAM(s) IV Push daily  glucagon  Injectable 1 milliGRAM(s) IntraMuscular once  meropenem Injectable 1000 milliGRAM(s) IV Push every 12 hours  pantoprazole  Injectable 40 milliGRAM(s) IV Push every 12 hours  sucralfate suspension 1 Gram(s) Oral four times a day  vancomycin    Solution 125 milliGRAM(s) Oral every 6 hours  zinc oxide 40% Paste 1 Application(s) Topical two times a day    MEDICATIONS  (PRN):  acetaminophen     Tablet .. 650 milliGRAM(s) Oral every 6 hours PRN Temp greater or equal to 38C (100.4F), Mild Pain (1 - 3)  aluminum hydroxide/magnesium hydroxide/simethicone Suspension 30 milliLiter(s) Oral every 4 hours PRN Dyspepsia  Biotene Dry Mouth Oral Rinse 15 milliLiter(s) Swish and Spit five times a day PRN Mouth Care  ondansetron Injectable 4 milliGRAM(s) IV Push every 8 hours PRN Nausea and/or Vomiting  sodium chloride 0.65% Nasal 1 Spray(s) Both Nostrils two times a day PRN Nasal Congestion      REVIEW OF SYSTEMS:    RESPIRATORY: No shortness of breath  CARDIOVASCULAR: No chest pain  All other review of systems is negative unless indicated above.    Vital Signs Last 24 Hrs  T(C): 36.9 (06 Mar 2025 21:08), Max: 37.9 (06 Mar 2025 17:07)  T(F): 98.4 (06 Mar 2025 21:08), Max: 100.2 (06 Mar 2025 17:07)  HR: 92 (06 Mar 2025 21:08) (79 - 100)  BP: 143/77 (06 Mar 2025 21:08) (134/90 - 151/89)  BP(mean): --  RR: 20 (06 Mar 2025 21:08) (18 - 20)  SpO2: 96% (06 Mar 2025 21:08) (96% - 97%)    Parameters below as of 06 Mar 2025 21:08  Patient On (Oxygen Delivery Method): nasal cannula        PHYSICAL EXAM:    Constitutional: NAD, chronically ill appearing  Respiratory: CTAB  Cardiovascular: S1 and S2, RRR  Gastrointestinal: BS+, soft, NT/ND  Psychiatric: Normal mood, normal affect    LABS:                        9.3    10.52 )-----------( 277      ( 06 Mar 2025 18:55 )             28.6     03-06    140  |  110[H]  |  46[H]  ----------------------------<  122[H]  4.5   |  22  |  1.75[H]    Ca    9.1      06 Mar 2025 18:55  Mg     2.0     03-06            RADIOLOGY & ADDITIONAL STUDIES:

## 2025-03-07 NOTE — PROGRESS NOTE ADULT - ASSESSMENT
70yo female with PMHx recent diagnosis of primary pancreatic adenocarcinoma of head with tumor in umbilicus, chemo-induced pancytopenia, SCC lip, cyclical vomiting syndrome, HTN, HPL, Gout, GERD, CVA on Aggrenox    Initially admitted to [facility] with septic shock 2/2 + pseudomonas bacteremia likely due to UTI vs colitis on 02/09/2025 Was having nausea, vomiting and diarrhea requiring Levophed drip, was transferred out of ICU to regular floor.    Then transferred to  as per request of . Was getting platelet transfusion and developed tachypnea hypoxia, rigors. Was diuresed and became hypotensive, Upgraded to SICU. CTA c/a/p on 2/19 showed multiple b/l PE and large bowel obstruction, was started on heparin drip, c/b GIB, then retrialed heparin drip with recurrent c/f GIB. Also with Afib w RVR during admission, resolved w iv metoprolol.     On 2/28, developed SIRS, Blood cx growing yeast. TPN stopped, R chest port removed on 3/2 and pt started on iv caspofungin.    #Sepsis (POA), immunocompromised host  #Prior pseudomonal bacteremia 2/2 UTI  #Mucositis, suspect esophageal candidiasis  #Fungemia  -now on iv caspofungin, awaiting yeast S&S  -no recurrent bacterial growth, d/w Dr. Henry, continue meropenem for now given c/f pneumatosis on imaging  -f/u ID recs    #c/f sigmoid colon pneumatosis on CT  -no abdnl pain today, less distended  -lactate 2.2  -conservative management for now, CLD  -f/u GI and surgery recs    #Acute b/l PE  -will need IVC filter when confirmed BCx clearance  -s/p recurrent GIB on heparin    #MALLORY, likely ATN  -continue IVF    #anemia  -in setting of malignancy on treatment, also w GIB after starting heparin drip  -s/p pRBC x 6u this admission    #Primary pancreatic adenocarcinoma  -f/u onc recs    #Skin injury Left and Rigth upper Gluteal Intergluteal cleft - superficial skin necrosis  - Wound care evaluation appreciated  -Low air mattress  - Turning / positioning q2h while in bed    #HypoK, HypoMag  -replete prn    #DVT ppx- SCDs    #PT eval    Possible IVC filter by IR early this coming week.  Medically active,  interested in eventual acute rehab. PM&R eval rec'd [facility] 68yo female with PMHx recent diagnosis of primary pancreatic adenocarcinoma of head with tumor in umbilicus, chemo-induced pancytopenia, SCC lip, cyclical vomiting syndrome, HTN, HPL, Gout, GERD, CVA on Aggrenox    Initially admitted to [facility] with septic shock 2/2 + pseudomonas bacteremia likely due to UTI vs colitis on 02/09/2025 Was having nausea, vomiting and diarrhea requiring Levophed drip, was transferred out of ICU to regular floor.    Then transferred to  as per request of . Was getting platelet transfusion and developed tachypnea hypoxia, rigors. Was diuresed and became hypotensive, Upgraded to SICU. CTA c/a/p on 2/19 showed multiple b/l PE and large bowel obstruction, was started on heparin drip, c/b GIB, then retrialed heparin drip with recurrent c/f GIB. Also with Afib w RVR during admission, resolved w iv metoprolol.     On 2/28, developed SIRS, Blood cx growing yeast. TPN stopped, R chest port removed on 3/2 and pt started on iv caspofungin.    #Sepsis (POA), immunocompromised host  #Prior pseudomonal bacteremia 2/2 UTI  #Mucositis, suspect esophageal candidiasis  #Fungemia- saccromyces, awaiting sensi's  -now on iv caspofungin  -no recurrent bacterial growth, d/w Dr. Henry, continue meropenem for now given c/f pneumatosis on imaging  -f/u ID recs    #c/f sigmoid colon pneumatosis on CT  -no abdnl pain today, less distended  -lactate 2.2  -conservative management for now, CLD  -f/u GI and surgery recs    #Acute b/l PE  -will need IVC filter when confirmed BCx clearance  -s/p recurrent GIB on heparin    #MALLORY, likely ATN  -continue IVF    #anemia  -in setting of malignancy on treatment, also w GIB after starting heparin drip  -s/p pRBC x 6u this admission    #Primary pancreatic adenocarcinoma  -f/u onc recs    #Skin injury Left and Rigth upper Gluteal Intergluteal cleft - superficial skin necrosis  - Wound care evaluation appreciated  -Low air mattress  - Turning / positioning q2h while in bed    #HypoK, HypoMag  -replete prn    #DVT ppx- SCDs    #PT eval    Possible IVC filter by IR early this coming week.  Medically active,  interested in eventual acute rehab. PM&R eval rec'd [facility]

## 2025-03-07 NOTE — CONSULT NOTE ADULT - ASSESSMENT
Assessment:   Patient is a 69 year old female with dx of primary pancreatic adenocarcinoma, pancyotpenia s/p chemo, SCC lip, HTN, GERD, CVA admitted to [facility] after transfer from [place] for sepsis secondary to UTI/colitis with pseudomonas. Upgraded to SICU for b/l PEs on heparing gtt. Downgraded to medicine floors where she has been managed for Sepsis and  fungemia. Critical care consulted this evening due to concern for pneumatosis of the abdomen on CT this afternoon.     1. Abdominal pain   2. Pancreatic adenocarcinoma  3. Fungemia       Plan:   -Surgery and GI following, as per Dr. Avalos no surgical intervention at this time.   -Elevated lactate at 2.4, recommend repeat in AM.   -Abdomen is soft, mild tenderness in r/l upper quadrants. Negative rebound tenderness. Denies any worsening of pain or vomiting.  -Hemodynamically stable, no pressor requirements.     Case discussed with EICU attending. Patient does not require ICU level of care at this time, can remain on unit. Can reconsult if change in clinical status.     Time spent on this patient encounter, which includes documenting this note in the electronic medical record, was 40 minutes including assessing the presenting problems with associated risks, reviewing the medical record to prepare for the encounter, and meeting face to face with the patient to obtain additional history. I have also performed an appropriate physical exam, made interventions listed and ordered and interpreted appropriate diagnostic studies as documented. To improve  communication and patient safety, I have coordinated care with the multidisciplinary team including the bedside nurse, appropriate attending of record and consultants as needed.    Date of entry of this note is equal to the date of services rendered.  Assessment:   Patient is a 69 year old female with dx of primary pancreatic adenocarcinoma, pancyotpenia s/p chemo, SCC lip, HTN, GERD, CVA admitted to [facility] after transfer from [place] for sepsis secondary to UTI/colitis with pseudomonas. Upgraded to SICU for b/l PEs on heparing gtt. Downgraded to medicine floors where she has been managed for Sepsis and  fungemia. Critical care consulted this evening due to concern for pneumatosis of the abdomen on CT this afternoon.     1. Abdominal pain   2. Pancreatic adenocarcinoma  3. Fungemia       Plan:   -Surgery and GI following, as per Dr. Avalos no surgical intervention at this time.   -Elevated lactate at 2.4, recommend repeat in AM.   -Abdomen is soft, mild tenderness in r/l upper quadrants. Negative rebound tenderness. Denies any worsening of pain or vomiting.  -Hemodynamically stable, no pressor requirements.       Case discussed with EICU attending. Patient does not require ICU level of care at this time, can remain on unit. Can reconsult if change in clinical status.     Time spent on this patient encounter, which includes documenting this note in the electronic medical record, was 40 minutes including assessing the presenting problems with associated risks, reviewing the medical record to prepare for the encounter, and meeting face to face with the patient to obtain additional history. I have also performed an appropriate physical exam, made interventions listed and ordered and interpreted appropriate diagnostic studies as documented. To improve  communication and patient safety, I have coordinated care with the multidisciplinary team including the bedside nurse, appropriate attending of record and consultants as needed.    Date of entry of this note is equal to the date of services rendered.  Assessment:   Patient is a 69 year old female with dx of primary pancreatic adenocarcinoma, pancyotpenia s/p chemo, SCC lip, HTN, GERD, CVA admitted to [facility] after transfer from [place] for sepsis secondary to UTI/colitis with pseudomonas. Upgraded to SICU for b/l PEs on heparing gtt. Downgraded to medicine floors where she has been managed for Sepsis and  fungemia. Critical care consulted this evening due to concern for pneumatosis of the abdomen on CT this afternoon.     1. Abdominal pain   2. Pancreatic adenocarcinoma  3. Fungemia   4. Sepsis      Plan:   -Surgery and GI following for suspected pneumatosis possibly seen on CT scan, as per Dr. Avalos no surgical intervention at this time.   -Elevated lactate at 2.4, recommend to trend levels.   -Abdomen is soft, mild tenderness in r/l upper quadrants. Negative rebound tenderness. Denies any worsening of pain or vomiting. Noted with pancreatic adenocarcinoma, f/u with oncology.  -Hemodynamically stable, no pressor requirements.   -Treated for sepsis with Meropenem. Follow up cultures with ID. Noted with fungemia with blood cx growing yeast, TPN and chest port discontinued. Continue on IV caspofungin. Trend WBC and fever curves.      Case discussed with EICU attending Dr Lopez. Patient does not require ICU level of care at this time, can remain on unit. Can reconsult if change in clinical status.     Time spent on this patient encounter, which includes documenting this note in the electronic medical record, was 40 minutes including assessing the presenting problems with associated risks, reviewing the medical record to prepare for the encounter, and meeting face to face with the patient to obtain additional history. I have also performed an appropriate physical exam, made interventions listed and ordered and interpreted appropriate diagnostic studies as documented. To improve  communication and patient safety, I have coordinated care with the multidisciplinary team including the bedside nurse, appropriate attending of record and consultants as needed.    Date of entry of this note is equal to the date of services rendered.

## 2025-03-07 NOTE — PROGRESS NOTE ADULT - ASSESSMENT
69-year-old female with stage IV pancreatic adenocarcinoma presenting with neutropenic fever and severe treatment-related toxicities from modified FOLFIRINOX and investigational EMILY inhibitor MC-6236.    PROBLEMS:    Febrile syndrome. Fungemia. Likely disseminated candidemia.   Dysphagia. Possible candida esophagitis  New febrile syndrome with chills. Possible sepsis. Dysphagia. Possible candida esophagitis, S/p sepsis with PSAE   Acute hypoxic respiratory failure likely secondary to hypervolemia   Bilateral pleural effusions with compressive atelectasis, right greater than left  Neutropenic Fever/Neutropenic Septic shock  Severe Thrombocytopenia-Platelet count critically low at 26k.   Severe diarrhea and inability to tolerate PO intake, likely multifactorial from both FOLFIRINOX (particularly irinotecan component) and study drug [id number].   History of left cephalic vein thrombosis.  MALLORY  CT Angio Chest PE Protocol w/ IV Cont (02.19.25 @ 14:16) >  CHEST:  Multiple pulmonary emboli within the segmental branches of the left lower and right lower lobe pulmonary arteries.  There are two right middle lobe nodules, new since prior. Exact   etiology is unclear. Primary differential diagnostic consideration   includes infection.  CT Abdomen and Pelvis-Resolution of small and large bowel dilatation. Mottled lucency involving a short segment of sigmoid colon suspect for pneumatosis which could be due to benign or ischemic etiology. Please correlate clinically and with laboratory values.  Minimal increase in amount of ascites.  Unchanged pancreatic body and umbilical masses.      PLAN:    Pulmonary stable  Dc fluconazole  and d/c cefepime  IV caspofungin 70 mg IV x1, then 50 mg IV qd/Iv merpenem  CTA chest + small PEs in RLL and LLL- off IV heparin causing bleeding-waiting GFF- now fungaemia  Family does not want any procedure EGD/Colonscopy  DECD steroids  High WBC-trend  Pulmonary tachpnea may be RTA with low bicarbonate because of GI cause with diarrhea but improving renal fx  Neutropenic Septic shock- IV meropenem started on 2/15- previously on CEFEPIME/Continue filgrastim 480mcg daily-Today w/ improvement in counts- WBC 1.3, Hb 10.1, plt 37, cmp with k 2.3 and Repleted, Cr 1.40.   Daily CBC monitoring  Maintain strict neutropenic precautions  Anemia-received two units of pRBCs Hb 10.1 today   Thrombocytopenia-Transfuse for plts < 15   D/w staff & /haematology & ICU in detail

## 2025-03-07 NOTE — PROGRESS NOTE ADULT - SUBJECTIVE AND OBJECTIVE BOX
Date of service: 03-07-25 @ 09:04    Lying in bed in NAD  Developed abdominal pain last PM  Imaging  showed new mttled lucency involving a short segment of sigmoid colon suspect for pneumatosis which could be due to benign or ischemic etiology  Low grade fever    ROS: no fever or chills; denies dizziness, no HA, no SOB or cough, no abdominal pain, no diarrhea or constipation; no dysuria, no legs pain, no rashes    MEDICATIONS  (STANDING):  acetaminophen   IVPB .. 1000 milliGRAM(s) IV Intermittent once  caspofungin IVPB      caspofungin IVPB 50 milliGRAM(s) IV Intermittent every 24 hours  chlorhexidine 2% Cloths 1 Application(s) Topical <User Schedule>  cholecalciferol 2000 Unit(s) Oral at bedtime  dextrose 5% + sodium chloride 0.45%. 1000 milliLiter(s) (75 mL/Hr) IV Continuous <Continuous>  dextrose 5%. 1000 milliLiter(s) (100 mL/Hr) IV Continuous <Continuous>  dextrose 5%. 1000 milliLiter(s) (50 mL/Hr) IV Continuous <Continuous>  famotidine Injectable 20 milliGRAM(s) IV Push daily  glucagon  Injectable 1 milliGRAM(s) IntraMuscular once  meropenem Injectable 1000 milliGRAM(s) IV Push every 12 hours  pantoprazole  Injectable 40 milliGRAM(s) IV Push every 12 hours  sucralfate suspension 1 Gram(s) Oral four times a day  vancomycin    Solution 125 milliGRAM(s) Oral every 6 hours  zinc oxide 40% Paste 1 Application(s) Topical two times a day    Vital Signs Last 24 Hrs  T(C): 36.8 (07 Mar 2025 08:20), Max: 37.9 (06 Mar 2025 17:07)  T(F): 98.3 (07 Mar 2025 08:20), Max: 100.2 (06 Mar 2025 17:07)  HR: 94 (07 Mar 2025 08:20) (92 - 100)  BP: 145/78 (07 Mar 2025 08:20) (143/77 - 151/89)  BP(mean): --  RR: 18 (07 Mar 2025 08:20) (18 - 20)  SpO2: 99% (07 Mar 2025 08:20) (96% - 99%)    Parameters below as of 07 Mar 2025 08:20  Patient On (Oxygen Delivery Method): nasal cannula     Physical exam:    Constitutional:  No acute distress  HEENT: NC/AT, EOMI, PERRLA, conjunctivae clear; ears and nose atraumatic; pharynx benign  Neck: supple; thyroid not palpable  Back: no tenderness  Respiratory: respiratory effort normal; decreased BS at bases  Cardiovascular: S1S2 regular, no murmurs  Abdomen: soft, mild periombilical tender, not distended, positive BS; no liver or spleen organomegaly  Genitourinary: no suprapubic tenderness  Lymphatic: no LN palpable  Musculoskeletal: no muscle tenderness, no joint swelling or tenderness  Extremities: no pedal edema  Neurological/ Psychiatric: Alert, moving all extremities  Skin: no rashes; right lower chest wall dry, scabbed ulcer; no discharge     Labs: reviewed                        9.2    8.66  )-----------( 271      ( 07 Mar 2025 07:22 )             28.5     03-07    143  |  112[H]  |  37[H]  ----------------------------<  125[H]  3.8   |  22  |  1.69[H]    Ca    9.6      07 Mar 2025 07:22  Mg     1.8     03-07                        8.9    11.26 )-----------( 242      ( 05 Mar 2025 05:18 )             27.3     03-05    143  |  113[H]  |  52[H]  ----------------------------<  144[H]  3.0[L]   |  23  |  1.73[H]    Ca    9.3      05 Mar 2025 05:18  Phos  3.5     03-05  Mg     1.5     03-05    TPro  6.0  /  Alb  2.2[L]  /  TBili  0.7  /  DBili  x   /  AST  9[L]  /  ALT  24  /  AlkPhos  190[H]  03-05                        10.2   18.89 )-----------( 181      ( 27 Feb 2025 04:05 )             30.2     02-27    149[H]  |  116[H]  |  96[H]  ----------------------------<  138[H]  3.9   |  26  |  1.87[H]    Ca    9.1      27 Feb 2025 04:05  Phos  4.0     02-27  Mg     2.0     02-27    TPro  5.4[L]  /  Alb  2.8[L]  /  TBili  0.8  /  DBili  x   /  AST  44[H]  /  ALT  45  /  AlkPhos  194[H]  02-27                        10.5   35.65 )-----------( 132      ( 21 Feb 2025 05:18 )             31.0     02-21    141  |  105  |  66[H]  ----------------------------<  358[H]  3.2[L]   |  26  |  1.33[H]    Ca    8.2[L]      21 Feb 2025 05:18  Phos  3.6     02-21  Mg     2.0     02-21    TPro  5.3[L]  /  Alb  2.1[L]  /  TBili  0.6  /  DBili  x   /  AST  56[H]  /  ALT  33  /  AlkPhos  329[H]  02-21    C-Reactive Protein: 209.0 mg/mL (02-15-25 @ 05:17)  C-Reactive Protein: 233.0 mg/mL (02-14-25 @ 06:50)  Ferritin: 1798 ng/mL (02-13-25 @ 18:46)                        8.7    0.19  )-----------( 33       ( 13 Feb 2025 06:52 )             25.7     02-13    137  |  109[H]  |  20  ----------------------------<  117[H]  2.6[LL]   |  18[L]  |  1.02    Ca    8.7      13 Feb 2025 06:52  Phos  2.3     02-13  Mg     1.6     02-13    TPro  4.4[L]  /  Alb  1.1[L]  /  TBili  0.7  /  DBili  x   /  AST  11[L]  /  ALT  16  /  AlkPhos  94  02-13     LIVER FUNCTIONS - ( 13 Feb 2025 06:52 )  Alb: 1.1 g/dL / Pro: 4.4 gm/dL / ALK PHOS: 94 U/L / ALT: 16 U/L / AST: 11 U/L / GGT: x           Culture - Urine (collected 09 Feb 2025 01:25)  Source: Clean Catch  Final Report (11 Feb 2025 08:39):    50,000 - 99,000 CFU/mL Pseudomonas aeruginosa  Organism: Pseudomonas aeruginosa (11 Feb 2025 08:39)  Organism: Pseudomonas aeruginosa (11 Feb 2025 08:39)      Method Type: REJI      -  Amikacin: S <=16      -  Aztreonam: S <=4      -  Cefepime: S <=2      -  Ceftazidime: S <=1      -  Ciprofloxacin: S <=0.25      -  Imipenem: S 2      -  Levofloxacin: S <=0.5      -  Meropenem: S <=1      -  Piperacillin/Tazobactam: S <=8    Culture - Blood (collected 08 Feb 2025 22:00)  Source: .Blood BLOOD  Gram Stain (09 Feb 2025 19:04):    Growth in aerobic bottle: Gram Negative Rods  Final Report (11 Feb 2025 07:53):    Growth in aerobic bottle: Pseudomonas aeruginosa    Direct identification is available within approximately 3-5    hours either by Blood Panel Multiplexed PCR or Direct    MALDI-TOF. Details: [email or web address]  Organism: Blood Culture PCR  Pseudomonas aeruginosa (11 Feb 2025 07:53)  Organism: Pseudomonas aeruginosa (11 Feb 2025 07:53)      Method Type: REJI      -  Aztreonam: S <=4      -  Cefepime: S <=2      -  Ceftazidime: S <=1      -  Ciprofloxacin: S <=0.25      -  Imipenem: S <=1      -  Levofloxacin: S <=0.5      -  Meropenem: S <=1      -  Piperacillin/Tazobactam: S <=8  Organism: Blood Culture PCR (11 Feb 2025 07:53)      Method Type: PCR      -  Pseudomonas aeruginosa: Detec    Culture - Blood (collected 08 Feb 2025 21:55)  Source: .Blood BLOOD  Gram Stain (09 Feb 2025 19:32):    Growth in aerobic bottle: Gram Negative Rods  Final Report (11 Feb 2025 07:54):    Growth in aerobic bottle: Pseudomonas aeruginosa    See previous culture [id number]    Culture - Blood (collected 28 Feb 2025 12:00)  Source: .Blood None  Preliminary Report (01 Mar 2025 19:01):    No growth at 24 hours    Culture - Blood (collected 28 Feb 2025 11:54)  Source: .Blood None  Gram Stain (02 Mar 2025 06:12):    Growth in aerobic bottle: Yeast like cells  Preliminary Report (02 Mar 2025 06:12):    Growth in aerobic bottle: Yeast like cells    Direct identification is available within approximately 3-5    hours either by Blood Panel Multiplexed PCR or Direct    MALDI-TOF. Details: [email or web address]  Organism: Blood Culture PCR (02 Mar 2025 08:38)  Organism: Blood Culture PCR (02 Mar 2025 08:38)    Culture - Blood (collected 04 Mar 2025 18:58)  Source: Blood None  Preliminary Report (06 Mar 2025 02:02):    No growth at 24 hours    Culture - Blood (collected 04 Mar 2025 18:57)  Source: Blood None  Preliminary Report (06 Mar 2025 02:02):    No growth at 24 hours    Culture - Blood (collected 04 Mar 2025 08:14)  Source: Blood None  Preliminary Report (05 Mar 2025 13:01):    No growth at 24 hours    Culture - Blood (collected 04 Mar 2025 08:13)  Source: Blood None  Preliminary Report (05 Mar 2025 13:01):    No growth at 24 hours    Radiology: all available radiological tests reviewed    < from: US Abdomen Upper Quadrant Right (02.09.25 @ 14:55) >  Distended gallbladder with layering sludge.  8 mm right renal calculus without hydronephrosis.  Hepatomegaly.  Right renal cyst.  < end of copied text >    < from: CT Abdomen and Pelvis No Cont (02.09.25 @ 03:01) >  1.   Study somewhat limited due to absence of contrast.  2.   Axial images 71-76 of series 301 and concomitant coronal images demonstrate prominent soft tissue inseparable from the body of the pancreas consistent with pancreatic neoplasm.  3.   Some distension of gallbladder could be further evaluated with right upper quadrant sonography. No definite biliary dilatation.  < end of copied text >    < from: CT Abdomen and Pelvis No Cont (03.06.25 @ 16:32) >  Resolution of small and large bowel dilatation.  Mottled lucency involving a short segment of sigmoid colon suspect for pneumatosis which could be due to benign or ischemic etiology. Please correlate clinically and with laboratory values.  Minimal increase in amount of ascites.  Unchanged pancreatic body and umbilical masses.  < end of copied text >    Advanced directives addressed: full resuscitation

## 2025-03-07 NOTE — PROGRESS NOTE ADULT - ASSESSMENT
69-year-old female with stage IV pancreatic adenocarcinoma presenting with neutropenic fever and severe treatment-related toxicities from modified FOLFIRINOX and investigational EMILY inhibitor [id number] found to have PE and LBO.     # pancreatic ca   - remains on clinical trial- supportive care while in hospital   - CTH negative and discussed w/ pt   - will continue with ongoing communication with  [facility] have contributed to symptoms on arrival.  no DPD deficiency   - Review clinical trial protocol - may remain on trial drug alone and changing regimen- to be discussed when recovers from underlying illness and admission   -  spoke w/ Dr. Barr yesterday- will need to get to office at [facility] [place] to discuss continuing with treatment     # Neutropenic Septic shock - +fungal cultures   - pt previously on neupogen- WBC has now recovered and elevated likely 2/2 GCSF support   - BCx with fungal growth likely disseminated candidiasis- spoke with dr lindsey- he called lab to find out speciation   - repeat cultuures NEG to date  -  ID - c/w caspofungin.    - removed mediport  - wound care for control of skin breakdown from diarrhea  - most recent CT a/p 3/6- resolution of sbo/LBO, mottled lucency involving a short segment of sigmoid colon suspect for pneumatosis which could be due to benign or ischemic etiology- send lactate    # PE   - v/q scan w indeterminate prob of PE, linear defect in RUL posterior segment   - CT chest- multiple PE w/in segmental left lower and right lower, 2 right middle lobe nodules,   - discussed with ID - cultures thus far are neg  - most recent US LE doppler negative        # anemia   - Protonix   - keep type and screen active, transfuse for Hb <7  - Today, WBC 8.66, Hb 9.2, plt 271, Cr 1.69    will follow and communicate with [facility] [place]

## 2025-03-07 NOTE — PROGRESS NOTE ADULT - ASSESSMENT
Imp:  Pneumatosis clinically doesn't seem an issue today    Rec:  I favor clears and advancing diet again although PO intake is minimal

## 2025-03-07 NOTE — PROGRESS NOTE ADULT - SUBJECTIVE AND OBJECTIVE BOX
Patient is a 69y Female with  who reports   no complaints overnight.    Allergies    No Known Allergies    Intolerances        MEDICATIONS  (STANDING):  acetaminophen   IVPB .. 1000 milliGRAM(s) IV Intermittent once  caspofungin IVPB 50 milliGRAM(s) IV Intermittent every 24 hours  caspofungin IVPB      chlorhexidine 2% Cloths 1 Application(s) Topical <User Schedule>  cholecalciferol 2000 Unit(s) Oral at bedtime  cloNIDine 0.1 milliGRAM(s) Oral two times a day  dextrose 5% + sodium chloride 0.45%. 1000 milliLiter(s) (75 mL/Hr) IV Continuous <Continuous>  dextrose 5%. 1000 milliLiter(s) (100 mL/Hr) IV Continuous <Continuous>  dextrose 5%. 1000 milliLiter(s) (50 mL/Hr) IV Continuous <Continuous>  famotidine Injectable 20 milliGRAM(s) IV Push daily  glucagon  Injectable 1 milliGRAM(s) IntraMuscular once  meropenem Injectable 1000 milliGRAM(s) IV Push every 12 hours  pantoprazole  Injectable 40 milliGRAM(s) IV Push every 12 hours  sucralfate suspension 1 Gram(s) Oral four times a day  vancomycin    Solution 125 milliGRAM(s) Oral every 6 hours  zinc oxide 40% Paste 1 Application(s) Topical two times a day      Vitals:  T(F): 98.6 (03-08-25), Max: 98.6 (03-08-25)  HR: 60 (03-08-25) (60 - 101)  BP: 108/54 (03-08-25) (108/54 - 145/78)  RR: 18 (03-08-25)  SpO2: 98% (03-08-25)    I&O's Detail    06 Mar 2025 07:01  -  07 Mar 2025 07:00  --------------------------------------------------------  IN:  Total IN: 0 mL    OUT:    Indwelling Catheter - Urethral (mL): 2450 mL  Total OUT: 2450 mL    Total NET: -2450 mL      07 Mar 2025 07:01  -  08 Mar 2025 02:26  --------------------------------------------------------  IN:  Total IN: 0 mL    OUT:    Indwelling Catheter - Urethral (mL): 1150 mL  Total OUT: 1150 mL    Total NET: -1150 mL        PHYSICAL EXAM:    Constitutional: NAD,   HEENT:   MM  Respiratory: CTAB  Cardiovascular: S1 and S2  Gastrointestinal: BS+, soft, NT/ND  Extremities: No peripheral edema  Neurological: A/O  : No Julian  Dialysis Access: Not applicable    LABS:                        9.2    8.66  )-----------( 271      ( 07 Mar 2025 07:22 )             28.5                         9.3    10.52 )-----------( 277      ( 06 Mar 2025 18:55 )             28.6       143    |  112    |  37     ----------------------------<  125       07 Mar 2025 07:22  3.8     |  22     |  1.69     140    |  110    |  46     ----------------------------<  122       06 Mar 2025 18:55  4.5     |  22     |  1.75     139    |  110    |  45     ----------------------------<  130       06 Mar 2025 08:16  2.8     |  22     |  1.65     Ca    9.6        07 Mar 2025 07:22  Ca    9.1        06 Mar 2025 18:55    Phos  3.5       05 Mar 2025 05:18  Phos  4.0       04 Mar 2025 10:09      Serum Osmo:   Serum Uric Acid:     Urine Studies:  Urinalysis Basic - ( 07 Mar 2025 07:22 )    Color: x / Appearance: x / SG: x / pH: x  Gluc: 125 mg/dL / Ketone: x  / Bili: x / Urobili: x   Blood: x / Protein: x / Nitrite: x   Leuk Esterase: x / RBC: x / WBC x   Sq Epi: x / Non Sq Epi: x / Bacteria: x        Urine chemistry:   Urine Na:   Urine Creatinine:   Urine Protein/Cr ratio:  Urine K:   Urine Osm:       RADIOLOGY & ADDITIONAL STUDIES:

## 2025-03-07 NOTE — PROGRESS NOTE ADULT - ASSESSMENT
69-year-old female recent diagnosis of primary pancreatic adenocarcinoma , pancytopenia and significant PMH of SCC lip, cyclical vomiting syndrome, HTN, HPL, Gout, GERD, CVA w renal evaluation of MALLORY and hypokalemia     MALLORY  Cr stable , volume ok  -IVF if intake not at goal  -Trend labs, lytes. Replete Hypokalemia for goal near 4.0  -Monitor UOP   -Avoid nsaids/contras     Hypokalemia  -K repletion iv/po as needed    Fungemia  -Therapy per ID    * pt seen this AM, note now     Dr Sweet to resume care 3/10

## 2025-03-07 NOTE — CONSULT NOTE ADULT - ASSESSMENT
ASSESSMENT/PLAN  69yFemale with functional deficits after Chemotherapy primary pancreatic adenocarcinoma of head with tumor in umbilicus, chemo-induced pancytopenia     Pain - Tylenol  DVT PPX - SCDs  Rehab -      Recommend [facility], Patient needs a more prolonged stay to achieve transition to community. Patient is making gains with bedside therapy.     Will continue to follow. Functional progress will determine ongoing rehab dispo recommendations, which may change.    Continue bedside therapy as well as OOB throughout the day with mobilization by staff to maintain cardiopulmonary function and prevention of secondary complications related to debility.

## 2025-03-07 NOTE — PROGRESS NOTE ADULT - SUBJECTIVE AND OBJECTIVE BOX
Subjective:    pat better, tmax 100, pulse ox 98% on RA, yesterday,s events noted, no respiratory distress.    Home Medications:  amitriptyline: 10 milligram(s) orally once a day (23 Dec 2024 18:06)  amLODIPine 5 mg oral tablet: 1 tab(s) orally once a day (23 Dec 2024 18:06)  aspirin 81 mg oral tablet, chewable: 1 tab(s) orally once a day (23 Dec 2024 18:06)  atorvastatin 40 mg oral tablet: 1 tab(s) orally once a day (at bedtime) (23 Dec 2024 18:06)  cefepime 2 g intravenous injection: 2 gram(s) intravenous every 12 hours (04 Mar 2025 21:11)  clopidogrel 75 mg oral tablet: 1 tab(s) orally once a day (23 Dec 2024 16:32)  filgrastim: 480 microgram(s) subcutaneous once a day (04 Mar 2025 21:11)  Lactated Ringers Injection intravenous solution: 150 milligram(s) intravenous every 1 to 2 hours (04 Mar 2025 21:11)  loperamide 2 mg oral capsule: 1 cap(s) orally once (04 Mar 2025 21:11)  midodrine 5 mg oral tablet: 1 tab(s) orally every 8 hours (04 Mar 2025 21:11)  Multiple Vitamins with Minerals oral liquid: 1 orally once a day (04 Mar 2025 21:11)  octreotide 2500 mcg/mL subcutaneous solution: 0.04 milliliter(s) subcutaneous 3 times a day (04 Mar 2025 21:11)  Pepcid 40 mg oral tablet: 40 milligram(s) orally once a day as needed for dyspepsia (23 Dec 2024 18:06)  sucralfate 1 g/10 mL oral suspension: 10 milliliter(s) orally 4 times a day (04 Mar 2025 21:11)  trimethobenzamide 100 mg/mL intramuscular solution: 2 milliliter(s) intramuscular every 8 hours As needed Nausea and/or Vomiting (04 Mar 2025 21:11)    MEDICATIONS  (STANDING):  acetaminophen   IVPB .. 1000 milliGRAM(s) IV Intermittent once  caspofungin IVPB 50 milliGRAM(s) IV Intermittent every 24 hours  caspofungin IVPB      chlorhexidine 2% Cloths 1 Application(s) Topical <User Schedule>  cholecalciferol 2000 Unit(s) Oral at bedtime  dextrose 5% + sodium chloride 0.45%. 1000 milliLiter(s) (75 mL/Hr) IV Continuous <Continuous>  dextrose 5%. 1000 milliLiter(s) (100 mL/Hr) IV Continuous <Continuous>  dextrose 5%. 1000 milliLiter(s) (50 mL/Hr) IV Continuous <Continuous>  famotidine Injectable 20 milliGRAM(s) IV Push daily  glucagon  Injectable 1 milliGRAM(s) IntraMuscular once  meropenem Injectable 1000 milliGRAM(s) IV Push every 12 hours  pantoprazole  Injectable 40 milliGRAM(s) IV Push every 12 hours  sucralfate suspension 1 Gram(s) Oral four times a day  vancomycin    Solution 125 milliGRAM(s) Oral every 6 hours  zinc oxide 40% Paste 1 Application(s) Topical two times a day    MEDICATIONS  (PRN):  acetaminophen     Tablet .. 650 milliGRAM(s) Oral every 6 hours PRN Temp greater or equal to 38C (100.4F), Mild Pain (1 - 3)  aluminum hydroxide/magnesium hydroxide/simethicone Suspension 30 milliLiter(s) Oral every 4 hours PRN Dyspepsia  Biotene Dry Mouth Oral Rinse 15 milliLiter(s) Swish and Spit five times a day PRN Mouth Care  ondansetron Injectable 4 milliGRAM(s) IV Push every 8 hours PRN Nausea and/or Vomiting  sodium chloride 0.65% Nasal 1 Spray(s) Both Nostrils two times a day PRN Nasal Congestion      Allergies    No Known Allergies    Intolerances        Vital Signs Last 24 Hrs  T(C): 36.8 (07 Mar 2025 08:20), Max: 37.9 (06 Mar 2025 17:07)  T(F): 98.3 (07 Mar 2025 08:20), Max: 100.2 (06 Mar 2025 17:07)  HR: 94 (07 Mar 2025 08:20) (92 - 100)  BP: 145/78 (07 Mar 2025 08:20) (143/77 - 151/89)  BP(mean): --  RR: 18 (07 Mar 2025 08:20) (18 - 20)  SpO2: 99% (07 Mar 2025 08:20) (96% - 99%)    Parameters below as of 07 Mar 2025 08:20  Patient On (Oxygen Delivery Method): nasal cannula          PHYSICAL EXAMINATION:    NECK:  Supple. No lymphadenopathy. Jugular venous pressure not elevated. Carotids equal.   HEART:   The cardiac impulse has a normal quality. Reg., Nl S1 and S2.  There are no murmurs, rubs or gallops noted  CHEST:  Chest crackles to auscultation. Normal respiratory effort.  ABDOMEN:  Soft and nontender.   EXTREMITIES:  There is no edema.       LABS:                        9.2    8.66  )-----------( 271      ( 07 Mar 2025 07:22 )             28.5     03-07    143  |  112[H]  |  37[H]  ----------------------------<  125[H]  3.8   |  22  |  1.69[H]    Ca    9.6      07 Mar 2025 07:22  Mg     1.8     03-07        Urinalysis Basic - ( 07 Mar 2025 07:22 )    Color: x / Appearance: x / SG: x / pH: x  Gluc: 125 mg/dL / Ketone: x  / Bili: x / Urobili: x   Blood: x / Protein: x / Nitrite: x   Leuk Esterase: x / RBC: x / WBC x   Sq Epi: x / Non Sq Epi: x / Bacteria: x        CT Abdomen and Pelvis No Cont (03.06.25 @ 16:32) >  IMPRESSION:    Resolution of small and large bowel dilatation.    Mottled lucency involving a short segment of sigmoid colon suspect for   pneumatosis which could be due to benign or ischemic etiology. Please   correlate clinically and with laboratory values.    Minimal increase in amount of ascites.    Unchanged pancreatic body and umbilical masses.

## 2025-03-07 NOTE — PROGRESS NOTE ADULT - SUBJECTIVE AND OBJECTIVE BOX
Awake and alert, feels a little better today. Asking for food. Denies abdominal pain currently    VSS afeb, Tm 100.2  Chest wall dressing- dry and intact  Abd- remains distended, but soft, no tenderness or guarding  Ext- no edema    WBC Count: 8.66 K/uL (03.07.25 @ 07:22)   WBC Count: 10.52 K/uL (03.06.25 @ 18:55)   WBC Count: 10.21 K/uL (03.06.25 @ 08:16)   WBC Count: 11.26 K/uL (03.05.25 @ 05:18)   WBC Count: 11.14 K/uL (03.04.25 @ 08:13)

## 2025-03-07 NOTE — PROGRESS NOTE ADULT - SUBJECTIVE AND OBJECTIVE BOX
HOSPITALIST ATTENDING PROGRESS NOTE    Chart and meds reviewed.  Patient seen and examined.    CC: sepsis    Subjective: Last night, CT returned w c/f pneumatosis,  reported abdnl pain last night. Today, no abdnl pain, less distended, put back on CLD.    All other systems reviewed and found to be negative with the exception of what has been described above.    MEDICATIONS  (STANDING):  acetaminophen   IVPB .. 1000 milliGRAM(s) IV Intermittent once  caspofungin IVPB 50 milliGRAM(s) IV Intermittent every 24 hours  caspofungin IVPB      chlorhexidine 2% Cloths 1 Application(s) Topical <User Schedule>  cholecalciferol 2000 Unit(s) Oral at bedtime  dextrose 5% + sodium chloride 0.45%. 1000 milliLiter(s) (75 mL/Hr) IV Continuous <Continuous>  dextrose 5%. 1000 milliLiter(s) (100 mL/Hr) IV Continuous <Continuous>  dextrose 5%. 1000 milliLiter(s) (50 mL/Hr) IV Continuous <Continuous>  famotidine Injectable 20 milliGRAM(s) IV Push daily  glucagon  Injectable 1 milliGRAM(s) IntraMuscular once  meropenem Injectable 1000 milliGRAM(s) IV Push every 12 hours  pantoprazole  Injectable 40 milliGRAM(s) IV Push every 12 hours  sucralfate suspension 1 Gram(s) Oral four times a day  vancomycin    Solution 125 milliGRAM(s) Oral every 6 hours  zinc oxide 40% Paste 1 Application(s) Topical two times a day    MEDICATIONS  (PRN):  acetaminophen     Tablet .. 650 milliGRAM(s) Oral every 6 hours PRN Temp greater or equal to 38C (100.4F), Mild Pain (1 - 3)  aluminum hydroxide/magnesium hydroxide/simethicone Suspension 30 milliLiter(s) Oral every 4 hours PRN Dyspepsia  Biotene Dry Mouth Oral Rinse 15 milliLiter(s) Swish and Spit five times a day PRN Mouth Care  ondansetron Injectable 4 milliGRAM(s) IV Push every 8 hours PRN Nausea and/or Vomiting  sodium chloride 0.65% Nasal 1 Spray(s) Both Nostrils two times a day PRN Nasal Congestion      VITALS:  T(F): 98.3 (03-07-25 @ 08:20), Max: 100.2 (03-06-25 @ 17:07)  HR: 94 (03-07-25 @ 08:20) (92 - 100)  BP: 145/78 (03-07-25 @ 08:20) (143/77 - 151/89)  RR: 18 (03-07-25 @ 08:20) (18 - 20)  SpO2: 99% (03-07-25 @ 08:20) (96% - 99%)  Wt(kg): --    I&O's Summary    06 Mar 2025 07:01  -  07 Mar 2025 07:00  --------------------------------------------------------  IN: 0 mL / OUT: 2450 mL / NET: -2450 mL        CAPILLARY BLOOD GLUCOSE          PHYSICAL EXAM:  Gen: NAD  HEENT:  pupils equal and reactive, EOMI, no oropharyngeal lesions, erythema, exudates, oral thrush  NECK:   supple, no carotid bruits, no palpable lymph nodes, no thyromegaly  CV:  +S1, +S2, regular, no murmurs or rubs  RESP:   lungs clear to auscultation bilaterally, no wheezing, rales, rhonchi, good air entry bilaterally  BREAST:  not examined  GI:  abdomen soft, non-tender, non-distended, normal BS, no bruits, no abdominal masses, no palpable masses  RECTAL:  not examined  :  not examined  MSK:   normal muscle tone, no atrophy, no rigidity, no contractions  EXT:  no clubbing, no cyanosis, no edema, no calf pain, swelling or erythema  VASCULAR:  pulses equal and symmetric in the upper and lower extremities  NEURO:  AAOX3, no focal neurological deficits, follows all commands, able to move extremities spontaneously  SKIN:  no ulcers, lesions or rashes    LABS:                            9.2    8.66  )-----------( 271      ( 07 Mar 2025 07:22 )             28.5     03-07    143  |  112[H]  |  37[H]  ----------------------------<  125[H]  3.8   |  22  |  1.69[H]    Ca    9.6      07 Mar 2025 07:22  Mg     1.8     03-07              Urinalysis Basic - ( 07 Mar 2025 07:22 )    Color: x / Appearance: x / SG: x / pH: x  Gluc: 125 mg/dL / Ketone: x  / Bili: x / Urobili: x   Blood: x / Protein: x / Nitrite: x   Leuk Esterase: x / RBC: x / WBC x   Sq Epi: x / Non Sq Epi: x / Bacteria: x        Lactate, Blood: 2.2 mmol/L (03-06 @ 18:55)        CULTURES:  BCx 2/28: yeast  BCx 3/4/25: NG    Additional results/Imaging, I have personally reviewed:  LE doppler 3/4/25: neg b/l    CT a/p noncon 3/7/25: Resolution of small and large bowel dilatation. Mottled lucency involving a short segment of sigmoid colon suspect for pneumatosis which could be due to benign or ischemic etiology. Please correlate clinically and with laboratory values. Minimal increase in amount of ascites. Unchanged pancreatic body and umbilical masses.

## 2025-03-08 LAB
ALBUMIN SERPL ELPH-MCNC: 1.9 G/DL — LOW (ref 3.3–5)
ALP SERPL-CCNC: 151 U/L — HIGH (ref 40–120)
ALT FLD-CCNC: 21 U/L — SIGNIFICANT CHANGE UP (ref 12–78)
ANION GAP SERPL CALC-SCNC: 7 MMOL/L — SIGNIFICANT CHANGE UP (ref 5–17)
AST SERPL-CCNC: 17 U/L — SIGNIFICANT CHANGE UP (ref 15–37)
BILIRUB SERPL-MCNC: 0.6 MG/DL — SIGNIFICANT CHANGE UP (ref 0.2–1.2)
BUN SERPL-MCNC: 32 MG/DL — HIGH (ref 7–23)
CALCIUM SERPL-MCNC: 9.2 MG/DL — SIGNIFICANT CHANGE UP (ref 8.5–10.1)
CHLORIDE SERPL-SCNC: 112 MMOL/L — HIGH (ref 96–108)
CREAT SERPL-MCNC: 1.61 MG/DL — HIGH (ref 0.5–1.3)
EGFR: 34 ML/MIN/1.73M2 — LOW
GLUCOSE SERPL-MCNC: 126 MG/DL — HIGH (ref 70–99)
HCT VFR BLD CALC: 28.5 % — LOW (ref 34.5–45)
HGB BLD-MCNC: 9.1 G/DL — LOW (ref 11.5–15.5)
LACTATE SERPL-SCNC: 1.3 MMOL/L — SIGNIFICANT CHANGE UP (ref 0.7–2)
MAGNESIUM SERPL-MCNC: 1.6 MG/DL — SIGNIFICANT CHANGE UP (ref 1.6–2.6)
MCHC RBC-ENTMCNC: 31.9 G/DL — LOW (ref 32–36)
MCV RBC AUTO: 88.8 FL — SIGNIFICANT CHANGE UP (ref 80–100)
NRBC # BLD AUTO: 0 K/UL — SIGNIFICANT CHANGE UP (ref 0–0)
NRBC # FLD: 0 K/UL — SIGNIFICANT CHANGE UP (ref 0–0)
NRBC BLD AUTO-RTO: 0 /100 WBCS — SIGNIFICANT CHANGE UP (ref 0–0)
PLATELET # BLD AUTO: 283 K/UL — SIGNIFICANT CHANGE UP (ref 150–400)
PMV BLD: 11.8 FL — SIGNIFICANT CHANGE UP (ref 7–13)
POTASSIUM SERPL-SCNC: 3.3 MMOL/L — LOW (ref 3.5–5.3)
PROT SERPL-MCNC: 5.8 GM/DL — LOW (ref 6–8.3)
RBC # BLD: 3.21 M/UL — LOW (ref 3.8–5.2)
RBC # FLD: 15.5 % — HIGH (ref 10.3–14.5)
SODIUM SERPL-SCNC: 144 MMOL/L — SIGNIFICANT CHANGE UP (ref 135–145)
WBC # BLD: 9.34 K/UL — SIGNIFICANT CHANGE UP (ref 3.8–10.5)
WBC # FLD AUTO: 9.34 K/UL — SIGNIFICANT CHANGE UP (ref 3.8–10.5)

## 2025-03-08 PROCEDURE — 99233 SBSQ HOSP IP/OBS HIGH 50: CPT

## 2025-03-08 RX ORDER — SIMETHICONE 80 MG
80 TABLET,CHEWABLE ORAL EVERY 4 HOURS
Refills: 0 | Status: DISCONTINUED | OUTPATIENT
Start: 2025-03-08 | End: 2025-03-11

## 2025-03-08 RX ADMIN — Medication 40 MILLIGRAM(S): at 21:37

## 2025-03-08 RX ADMIN — MEROPENEM 1000 MILLIGRAM(S): 1 INJECTION INTRAVENOUS at 05:53

## 2025-03-08 RX ADMIN — Medication 1 GRAM(S): at 17:50

## 2025-03-08 RX ADMIN — Medication 40 MILLIEQUIVALENT(S): at 11:46

## 2025-03-08 RX ADMIN — MEROPENEM 1000 MILLIGRAM(S): 1 INJECTION INTRAVENOUS at 17:49

## 2025-03-08 RX ADMIN — Medication 80 MILLIGRAM(S): at 17:50

## 2025-03-08 RX ADMIN — Medication 125 MILLIGRAM(S): at 05:56

## 2025-03-08 RX ADMIN — Medication 125 MILLIGRAM(S): at 00:15

## 2025-03-08 RX ADMIN — TOUCHLESS CARE ZINC OXIDE PROTECTANT 1 APPLICATION(S): 20; 25 SPRAY TOPICAL at 21:37

## 2025-03-08 RX ADMIN — TOUCHLESS CARE ZINC OXIDE PROTECTANT 1 APPLICATION(S): 20; 25 SPRAY TOPICAL at 09:05

## 2025-03-08 RX ADMIN — Medication 0.1 MILLIGRAM(S): at 09:05

## 2025-03-08 RX ADMIN — Medication 40 MILLIEQUIVALENT(S): at 21:37

## 2025-03-08 RX ADMIN — Medication 1 GRAM(S): at 00:15

## 2025-03-08 RX ADMIN — CASPOFUNGIN ACETATE 260 MILLIGRAM(S): 5 INJECTION, POWDER, LYOPHILIZED, FOR SOLUTION INTRAVENOUS at 09:05

## 2025-03-08 RX ADMIN — Medication 1 APPLICATION(S): at 09:07

## 2025-03-08 RX ADMIN — Medication 1 GRAM(S): at 05:56

## 2025-03-08 RX ADMIN — Medication 125 MILLIGRAM(S): at 17:50

## 2025-03-08 RX ADMIN — SODIUM CHLORIDE 75 MILLILITER(S): 9 INJECTION, SOLUTION INTRAVENOUS at 05:51

## 2025-03-08 RX ADMIN — Medication 125 MILLIGRAM(S): at 23:34

## 2025-03-08 RX ADMIN — Medication 1 GRAM(S): at 11:46

## 2025-03-08 RX ADMIN — Medication 40 MILLIGRAM(S): at 09:05

## 2025-03-08 RX ADMIN — Medication 1 GRAM(S): at 23:34

## 2025-03-08 RX ADMIN — Medication 20 MILLIGRAM(S): at 21:37

## 2025-03-08 RX ADMIN — Medication 2000 UNIT(S): at 21:36

## 2025-03-08 RX ADMIN — Medication 125 MILLIGRAM(S): at 11:46

## 2025-03-08 NOTE — PROGRESS NOTE ADULT - ASSESSMENT
69-year-old female with stage IV pancreatic adenocarcinoma presenting with neutropenic fever and severe treatment-related toxicities from modified FOLFIRINOX and investigational EMILY inhibitor [id number] found to have PE and LBO.     # pancreatic ca   - remains on clinical trial- supportive care while in hospital   - CTH negative and discussed w/ pt   - will continue with ongoing communication with  [facility] have contributed to symptoms on arrival.  no DPD deficiency   - Review clinical trial protocol - may remain on trial drug alone and changing regimen- to be discussed when recovers from underlying illness and admission   -  spoke w/ Dr. Barr - will need to get to office at [facility] to discuss continuing with treatment     # Neutropenic Septic shock - +fungal cultures   - pt previously on neupogen- WBC has now recovered and elevated likely 2/2 GCSF support   - BCx with fungal growth likely disseminated candidiasis- spoke with dr lindsey- he called lab to find out speciation   - repeat cultuures NEG to date (2/28 culture grew saccharomyces cerevisiae)  -  ID - c/w caspofungin.    - removed mediport  - wound care for control of skin breakdown from diarrhea  - most recent CT a/p 3/6- resolution of sbo/LBO, mottled lucency involving a short segment of sigmoid colon suspect for pneumatosis which could be due to benign or ischemic etiology- send lactate    # PE   - v/q scan w indeterminate prob of PE, linear defect in RUL posterior segment   - CT chest- multiple PE w/in segmental left lower and right lower, 2 right middle lobe nodules,   - discussed with ID - cultures thus far are neg  - most recent US LE doppler negative    - remains off a/c given high risk - no sign of DVT to suggest filter placement     # anemia   - Protonix   - keep type and screen active, transfuse for Hb <7  - most recent cbc WBC 8.66, Hb 9.2, plt 271, Cr 1.69 and stable     will follow and communicate with [facility]

## 2025-03-08 NOTE — PROGRESS NOTE ADULT - SUBJECTIVE AND OBJECTIVE BOX
HOSPITALIST ATTENDING PROGRESS NOTE    Chart and meds reviewed.  Patient seen and examined.    CC: Sepsis    Subjective: No acute events overnight. Tolerating regular diet yesterday, + BM. No fever.     All other systems reviewed and found to be negative with the exception of what has been described above.    MEDICATIONS  (STANDING):  acetaminophen   IVPB .. 1000 milliGRAM(s) IV Intermittent once  caspofungin IVPB 50 milliGRAM(s) IV Intermittent every 24 hours  caspofungin IVPB      chlorhexidine 2% Cloths 1 Application(s) Topical <User Schedule>  cholecalciferol 2000 Unit(s) Oral at bedtime  cloNIDine 0.1 milliGRAM(s) Oral two times a day  dextrose 5% + sodium chloride 0.45%. 1000 milliLiter(s) (75 mL/Hr) IV Continuous <Continuous>  dextrose 5%. 1000 milliLiter(s) (100 mL/Hr) IV Continuous <Continuous>  dextrose 5%. 1000 milliLiter(s) (50 mL/Hr) IV Continuous <Continuous>  famotidine Injectable 20 milliGRAM(s) IV Push daily  glucagon  Injectable 1 milliGRAM(s) IntraMuscular once  meropenem Injectable 1000 milliGRAM(s) IV Push every 12 hours  pantoprazole  Injectable 40 milliGRAM(s) IV Push every 12 hours  potassium chloride   Powder 40 milliEquivalent(s) Oral two times a day  sucralfate suspension 1 Gram(s) Oral four times a day  vancomycin    Solution 125 milliGRAM(s) Oral every 6 hours  zinc oxide 40% Paste 1 Application(s) Topical two times a day    MEDICATIONS  (PRN):  acetaminophen     Tablet .. 650 milliGRAM(s) Oral every 6 hours PRN Temp greater or equal to 38C (100.4F), Mild Pain (1 - 3)  aluminum hydroxide/magnesium hydroxide/simethicone Suspension 30 milliLiter(s) Oral every 4 hours PRN Dyspepsia  Biotene Dry Mouth Oral Rinse 15 milliLiter(s) Swish and Spit five times a day PRN Mouth Care  ondansetron Injectable 4 milliGRAM(s) IV Push every 8 hours PRN Nausea and/or Vomiting  sodium chloride 0.65% Nasal 1 Spray(s) Both Nostrils two times a day PRN Nasal Congestion    Vital Signs Last 24 Hrs  T(C): 36.7 (08 Mar 2025 08:23), Max: 37 (08 Mar 2025 01:03)  T(F): 98 (08 Mar 2025 08:23), Max: 98.6 (08 Mar 2025 01:03)  HR: 62 (08 Mar 2025 08:23) (60 - 101)  BP: 150/79 (08 Mar 2025 08:23) (108/54 - 150/79)  RR: 18 (08 Mar 2025 08:23) (18 - 18)  SpO2: 96% (08 Mar 2025 08:23) (94% - 98%)    Parameters below as of 08 Mar 2025 08:23  Patient On (Oxygen Delivery Method): nasal cannula    GEN: NAD, frail   HEENT:  pupils equal and reactive, EOMI, no oropharyngeal lesions, erythema, exudates, oral thrush  NECK:   supple, no carotid bruits  CV:  +S1, +S2, regular, no murmurs  RESP:   lungs clear to auscultation bilaterally, no wheezing, rales, rhonchi, good air entry bilaterally  GI:  abdomen soft, non-tender, + Distended + Periumbical mass  EXT:  no clubbing, no cyanosis, no edema, no calf pain, swelling or erythema  NEURO:  AAOX3, no focal neurological deficits, follows all commands, able to move extremities spontaneously  SKIN: DTI unstageable      LABS:                          9.1    9.34  )-----------( 283      ( 08 Mar 2025 07:59 )             28.5     03-08    144  |  112[H]  |  32[H]  ----------------------------<  126[H]  3.3[L]   |  25  |  1.61[H]    Ca    9.2      08 Mar 2025 07:59  Mg     1.6     03-08    TPro  5.8[L]  /  Alb  1.9[L]  /  TBili  0.6  /  DBili  x   /  AST  17  /  ALT  21  /  AlkPhos  151[H]  03-08    LIVER FUNCTIONS - ( 08 Mar 2025 07:59 )  Alb: 1.9 g/dL / Pro: 5.8 gm/dL / ALK PHOS: 151 U/L / ALT: 21 U/L / AST: 17 U/L / GGT: x           Urinalysis Basic - ( 08 Mar 2025 07:59 )  Color: x / Appearance: x / SG: x / pH: x  Gluc: 126 mg/dL / Ketone: x  / Bili: x / Urobili: x   Blood: x / Protein: x / Nitrite: x   Leuk Esterase: x / RBC: x / WBC x   Sq Epi: x / Non Sq Epi: x / Bacteria: x    Lactate, Blood: 1.3 mmol/L (03-08 @ 07:59)    Doppler LE neg        CT Abdomen and Pelvis No Cont (03.06.25 @ 16:32) >  IMPRESSION:    Resolution of small and large bowel dilatation.    Mottled lucency involving a short segment of sigmoid colon suspect for   pneumatosis which could be due to benign or ischemic etiology. Please   correlate clinically and with laboratory values.    Minimal increase in amount of ascites.    Unchanged pancreatic body and umbilical masses.            < end of copied text >

## 2025-03-08 NOTE — PROGRESS NOTE ADULT - ASSESSMENT
69-year-old female with h/o recent diagnosis of primary head of pancreatic adenocarcinoma with tumor in umbilicus, chemo-induced pancytopenia, SCC lip, cyclical vomiting syndrome, HTN, HPL, Gout, GERD, CVA on Aggrenox was transferred on 2/12 from Health system for further care. She was admitted to [facility] with septic shock on 02/09/2025, nausea, vomiting and diarrhea requiring Levophed drip. Patient was being followed by GI, ID, Cardiology and Onc there. Patient has been transferred to  on patient's  (GI Dr. Pleitez) request. She was reported with non-bloody diarrhea x 3 in last 24 hours PTA. She denied abdominal pain, nausea, but has dry heaves. At [facility] she was started on Vancomycin and Zosyn, then changed to Cefepime as her blood culture and urine cultures showed Pseudomonas aeruginosa.    #New sigmoid colon pneumatosis. Probable infectious or ischemic colitis.  #Fungemia with Saccharomyces cerevisiae  #Dysphagia. Possible candida esophagitis  #S/p sepsis with PSAE   #Head of pancreas adenocarcinoma on chemotherapy  #Immunocompromised host  #Kidney stones  #ARF on CRF stage 3  -leukocytosis is improved  -low grade fever  -renal function is frail   -dysphagia with poor PO intake  -cultures reviewed  -s/p fluconazole 200 mg IV qd # 14  -on vancomycin 125 mg PO q6h for CDAD prophylaxis  -on caspofungin 50 mg IV qd # 7 and meropenem 1000 mg IV q8h # 4  -tolerating abx well so far; no side effects noted  -surgical evaluation --> conservative care at this time  -repeat BC x 2 are negative to date  -monitor abdomen  -continue antimicrobial and antifungal coverage  -continue meropenem as well in sourav of new colon findings  -f/u cultures  -monitor temps  -f/u CBC and BNP  -supportive care  2. Other issues:   -care per medicine    d/w medicine team   d/w Dr. Pleitez

## 2025-03-08 NOTE — PROGRESS NOTE ADULT - SUBJECTIVE AND OBJECTIVE BOX
INTERVAL HPI/OVERNIGHT EVENTS:  Patient S&E at bedside. 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      GERD (gastroesophageal reflux disease)      Cyclical vomiting syndrome      CVA (cerebrovascular accident)      H/O: hysterectomy      H/O squamous cell carcinoma excision          FAMILY HISTORY:  No pertinent family history in first degree relatives        VITAL SIGNS:  T(F): 98.6 (03-08-25 @ 01:03)  HR: 60 (03-08-25 @ 01:03)  BP: 108/54 (03-08-25 @ 01:03)  RR: 18 (03-08-25 @ 01:03)  SpO2: 98% (03-08-25 @ 01:03)  Wt(kg): --    PHYSICAL EXAM:    Constitutional: NAD  Eyes: EOMI,  Respiratory: CTA b/l, off nc   Cardiovascular: RRR, no M/R/G  Gastrointestinal: soft, distended, mild pain LLQ to deep palpation   Neurological: AAOx2    MEDICATIONS  (STANDING):  acetaminophen   IVPB .. 1000 milliGRAM(s) IV Intermittent once  caspofungin IVPB 50 milliGRAM(s) IV Intermittent every 24 hours  caspofungin IVPB      chlorhexidine 2% Cloths 1 Application(s) Topical <User Schedule>  cholecalciferol 2000 Unit(s) Oral at bedtime  cloNIDine 0.1 milliGRAM(s) Oral two times a day  dextrose 5% + sodium chloride 0.45%. 1000 milliLiter(s) (75 mL/Hr) IV Continuous <Continuous>  dextrose 5%. 1000 milliLiter(s) (100 mL/Hr) IV Continuous <Continuous>  dextrose 5%. 1000 milliLiter(s) (50 mL/Hr) IV Continuous <Continuous>  famotidine Injectable 20 milliGRAM(s) IV Push daily  glucagon  Injectable 1 milliGRAM(s) IntraMuscular once  meropenem Injectable 1000 milliGRAM(s) IV Push every 12 hours  pantoprazole  Injectable 40 milliGRAM(s) IV Push every 12 hours  sucralfate suspension 1 Gram(s) Oral four times a day  vancomycin    Solution 125 milliGRAM(s) Oral every 6 hours  zinc oxide 40% Paste 1 Application(s) Topical two times a day    MEDICATIONS  (PRN):  acetaminophen     Tablet .. 650 milliGRAM(s) Oral every 6 hours PRN Temp greater or equal to 38C (100.4F), Mild Pain (1 - 3)  aluminum hydroxide/magnesium hydroxide/simethicone Suspension 30 milliLiter(s) Oral every 4 hours PRN Dyspepsia  Biotene Dry Mouth Oral Rinse 15 milliLiter(s) Swish and Spit five times a day PRN Mouth Care  ondansetron Injectable 4 milliGRAM(s) IV Push every 8 hours PRN Nausea and/or Vomiting  sodium chloride 0.65% Nasal 1 Spray(s) Both Nostrils two times a day PRN Nasal Congestion      Allergies    No Known Allergies    Intolerances        LABS:                        9.2    8.66  )-----------( 271      ( 07 Mar 2025 07:22 )             28.5     03-07    143  |  112[H]  |  37[H]  ----------------------------<  125[H]  3.8   |  22  |  1.69[H]    Ca    9.6      07 Mar 2025 07:22  Mg     1.8     03-07        Urinalysis Basic - ( 07 Mar 2025 07:22 )    Color: x / Appearance: x / SG: x / pH: x  Gluc: 125 mg/dL / Ketone: x  / Bili: x / Urobili: x   Blood: x / Protein: x / Nitrite: x   Leuk Esterase: x / RBC: x / WBC x   Sq Epi: x / Non Sq Epi: x / Bacteria: x        RADIOLOGY & ADDITIONAL TESTS:  Studies reviewed.

## 2025-03-08 NOTE — PROGRESS NOTE ADULT - ASSESSMENT
Overall appears a little better today. Continue abx and antifungal as per ID. Challenge has been nutritional. CT scan earlier this week showing focal segment of pneumatosis involving transverse colon, but serum lactate at 1.3 and no real tenderness on exam.

## 2025-03-08 NOTE — PROGRESS NOTE ADULT - ASSESSMENT
69-year-old female with stage IV pancreatic adenocarcinoma presenting with neutropenic fever and severe treatment-related toxicities from modified FOLFIRINOX and investigational EMILY inhibitor [id number].    PROBLEMS:    pulmonary stable  Febrile syndrome. Fungemia. Likely disseminated candidemia.   Dysphagia. Possible candida esophagitis  New febrile syndrome with chills. Possible sepsis. Dysphagia. Possible candida esophagitis, S/p sepsis with PSAE   Acute hypoxic respiratory failure likely secondary to hypervolemia   Bilateral pleural effusions with compressive atelectasis, right greater than left  Neutropenic Fever/Neutropenic Septic shock  Severe Thrombocytopenia-Platelet count critically low at 26k.   Severe diarrhea and inability to tolerate PO intake, likely multifactorial from both FOLFIRINOX (particularly irinotecan component) and study drug [id number].   History of left cephalic vein thrombosis.  MALLORY  CT Angio Chest PE Protocol w/ IV Cont (02.19.25 @ 14:16) >  CHEST:  Multiple pulmonary emboli within the segmental branches of the left lower and right lower lobe pulmonary arteries.  There are two right middle lobe nodules, new since prior. Exact   etiology is unclear. Primary differential diagnostic consideration   includes infection.  CT Abdomen and Pelvis-Resolution of small and large bowel dilatation. Mottled lucency involving a short segment of sigmoid colon suspect for pneumatosis which could be due to benign or ischemic etiology. Please correlate clinically and with laboratory values.  Minimal increase in amount of ascites.  Unchanged pancreatic body and umbilical masses.      PLAN:    Pulmonary stable  Dc fluconazole  and d/c cefepime  IV caspofungin 70 mg IV x1, then 50 mg IV qd/Iv merpenem  CTA chest + small PEs in RLL and LLL- off IV heparin causing bleeding-waiting GFF- now fungaemia  Family does not want any procedure EGD/Colonscopy  DECD steroids  High WBC-trend  Pulmonary tachpnea may be RTA with low bicarbonate because of GI cause with diarrhea but improving renal fx  Neutropenic Septic shock- IV meropenem started on 2/15- previously on CEFEPIME/Continue filgrastim 480mcg daily-Today w/ improvement in counts- WBC 1.3, Hb 10.1, plt 37, cmp with k 2.3 and Repleted, Cr 1.40.   Daily CBC monitoring  Maintain strict neutropenic precautions  Anemia-received two units of pRBCs Hb 10.1 today   Thrombocytopenia-Transfuse for plts < 15   D/w staff & /haematology & ICU in detail

## 2025-03-08 NOTE — PROGRESS NOTE ADULT - SUBJECTIVE AND OBJECTIVE BOX
Date of service: 03-08-25 @ 08:10    Lying in bed in NAD  Alert and confused  New bacterial culture identification reported  No fever    ROS: no fever or chills; denies pain, limited     MEDICATIONS  (STANDING):  acetaminophen   IVPB .. 1000 milliGRAM(s) IV Intermittent once  caspofungin IVPB 50 milliGRAM(s) IV Intermittent every 24 hours  caspofungin IVPB      chlorhexidine 2% Cloths 1 Application(s) Topical <User Schedule>  cholecalciferol 2000 Unit(s) Oral at bedtime  cloNIDine 0.1 milliGRAM(s) Oral two times a day  dextrose 5% + sodium chloride 0.45%. 1000 milliLiter(s) (75 mL/Hr) IV Continuous <Continuous>  dextrose 5%. 1000 milliLiter(s) (100 mL/Hr) IV Continuous <Continuous>  dextrose 5%. 1000 milliLiter(s) (50 mL/Hr) IV Continuous <Continuous>  famotidine Injectable 20 milliGRAM(s) IV Push daily  glucagon  Injectable 1 milliGRAM(s) IntraMuscular once  meropenem Injectable 1000 milliGRAM(s) IV Push every 12 hours  pantoprazole  Injectable 40 milliGRAM(s) IV Push every 12 hours  sucralfate suspension 1 Gram(s) Oral four times a day  vancomycin    Solution 125 milliGRAM(s) Oral every 6 hours  zinc oxide 40% Paste 1 Application(s) Topical two times a day    Vital Signs Last 24 Hrs  T(C): 37 (08 Mar 2025 01:03), Max: 37 (08 Mar 2025 01:03)  T(F): 98.6 (08 Mar 2025 01:03), Max: 98.6 (08 Mar 2025 01:03)  HR: 60 (08 Mar 2025 01:03) (60 - 101)  BP: 108/54 (08 Mar 2025 01:03) (108/54 - 145/78)  BP(mean): --  RR: 18 (08 Mar 2025 01:03) (18 - 18)  SpO2: 98% (08 Mar 2025 01:03) (94% - 99%)    Parameters below as of 08 Mar 2025 01:03  Patient On (Oxygen Delivery Method): nasal cannula     Physical exam:    Constitutional:  No acute distress  HEENT: NC/AT, EOMI, PERRLA, conjunctivae clear; ears and nose atraumatic; pharynx benign  Neck: supple; thyroid not palpable  Back: no tenderness  Respiratory: respiratory effort normal; decreased BS at bases  Cardiovascular: S1S2 regular, no murmurs  Abdomen: soft, mild periombilical tender, not distended, positive BS; no liver or spleen organomegaly  Genitourinary: no suprapubic tenderness  Lymphatic: no LN palpable  Musculoskeletal: no muscle tenderness, no joint swelling or tenderness  Extremities: no pedal edema  Neurological/ Psychiatric: Alert, moving all extremities  Skin: no rashes; right lower chest wall dry, scabbed ulcer; no discharge     Labs: reviewed                        9.2    8.66  )-----------( 271      ( 07 Mar 2025 07:22 )             28.5     03-07    143  |  112[H]  |  37[H]  ----------------------------<  125[H]  3.8   |  22  |  1.69[H]    Ca    9.6      07 Mar 2025 07:22  Mg     1.8     03-07                        8.9    11.26 )-----------( 242      ( 05 Mar 2025 05:18 )             27.3     03-05    143  |  113[H]  |  52[H]  ----------------------------<  144[H]  3.0[L]   |  23  |  1.73[H]    Ca    9.3      05 Mar 2025 05:18  Phos  3.5     03-05  Mg     1.5     03-05    TPro  6.0  /  Alb  2.2[L]  /  TBili  0.7  /  DBili  x   /  AST  9[L]  /  ALT  24  /  AlkPhos  190[H]  03-05                        10.5   35.65 )-----------( 132      ( 21 Feb 2025 05:18 )             31.0     02-21    141  |  105  |  66[H]  ----------------------------<  358[H]  3.2[L]   |  26  |  1.33[H]    Ca    8.2[L]      21 Feb 2025 05:18  Phos  3.6     02-21  Mg     2.0     02-21    TPro  5.3[L]  /  Alb  2.1[L]  /  TBili  0.6  /  DBili  x   /  AST  56[H]  /  ALT  33  /  AlkPhos  329[H]  02-21    C-Reactive Protein: 209.0 mg/mL (02-15-25 @ 05:17)  C-Reactive Protein: 233.0 mg/mL (02-14-25 @ 06:50)  Ferritin: 1798 ng/mL (02-13-25 @ 18:46)                        8.7    0.19  )-----------( 33       ( 13 Feb 2025 06:52 )             25.7     02-13    137  |  109[H]  |  20  ----------------------------<  117[H]  2.6[LL]   |  18[L]  |  1.02    Ca    8.7      13 Feb 2025 06:52  Phos  2.3     02-13  Mg     1.6     02-13    TPro  4.4[L]  /  Alb  1.1[L]  /  TBili  0.7  /  DBili  x   /  AST  11[L]  /  ALT  16  /  AlkPhos  94  02-13     LIVER FUNCTIONS - ( 13 Feb 2025 06:52 )  Alb: 1.1 g/dL / Pro: 4.4 gm/dL / ALK PHOS: 94 U/L / ALT: 16 U/L / AST: 11 U/L / GGT: x           Culture - Blood (collected 04 Mar 2025 18:58)  Source: Blood None  Preliminary Report (08 Mar 2025 02:01):    No growth at 72 Hours    Culture - Blood (collected 04 Mar 2025 18:57)  Source: Blood None  Preliminary Report (08 Mar 2025 02:01):    No growth at 72 Hours    Culture - Blood (collected 04 Mar 2025 08:14)  Source: Blood None  Preliminary Report (07 Mar 2025 13:01):    No growth at 72 Hours    Culture - Blood (collected 04 Mar 2025 08:13)  Source: Blood None  Preliminary Report (07 Mar 2025 13:01):    No growth at 72 Hours    Culture - Catheter (collected 02 Mar 2025 15:30)  Source: Catheter Port Device  Final Report (07 Mar 2025 17:22):    No growth at 5 days    Culture - Blood (collected 28 Feb 2025 12:00)  Source: Blood None  Final Report (05 Mar 2025 19:00):    No growth at 5 days    Culture - Blood (collected 28 Feb 2025 11:54)  Source: Blood None  Gram Stain (02 Mar 2025 06:12):    Growth in aerobic bottle: Yeast like cells  Preliminary Report (07 Mar 2025 14:37):    Growth in aerobic bottle: Saccharomyces cerevisiae    Direct identification is available within approximately 3-5    hours either by Blood Panel Multiplexed PCR or Direct    MALDI-TOF. Details: [email or web address]  Organism: Blood Culture PCR (02 Mar 2025 08:38)  Organism: Blood Culture PCR (02 Mar 2025 08:38)      Method Type: PCR      -  Blood PCR Panel: NEG    Radiology: all available radiological tests reviewed    < from: US Abdomen Upper Quadrant Right (02.09.25 @ 14:55) >  Distended gallbladder with layering sludge.  8 mm right renal calculus without hydronephrosis.  Hepatomegaly.  Right renal cyst.  < end of copied text >    < from: CT Abdomen and Pelvis No Cont (02.09.25 @ 03:01) >  1.   Study somewhat limited due to absence of contrast.  2.   Axial images 71-76 of series 301 and concomitant coronal images demonstrate prominent soft tissue inseparable from the body of the pancreas consistent with pancreatic neoplasm.  3.   Some distension of gallbladder could be further evaluated with right upper quadrant sonography. No definite biliary dilatation.  < end of copied text >    < from: CT Abdomen and Pelvis No Cont (03.06.25 @ 16:32) >  Resolution of small and large bowel dilatation.  Mottled lucency involving a short segment of sigmoid colon suspect for pneumatosis which could be due to benign or ischemic etiology. Please correlate clinically and with laboratory values.  Minimal increase in amount of ascites.  Unchanged pancreatic body and umbilical masses.  < end of copied text >    Advanced directives addressed: full resuscitation

## 2025-03-08 NOTE — PROGRESS NOTE ADULT - ASSESSMENT
68yo female with PMHx recent diagnosis of primary pancreatic adenocarcinoma of head with tumor in umbilicus, chemo-induced pancytopenia, SCC lip, cyclical vomiting syndrome, HTN, HPL, Gout, GERD, CVA on Aggrenox  Initially admitted to [facility] with septic shock 2/2 + pseudomonas bacteremia likely due to UTI vs colitis on 02/09/2025 Was having nausea, vomiting and diarrhea requiring Levophed drip, was transferred out of ICU to regular floor.  Then transferred to  as per request of . Was getting platelet transfusion and developed tachypnea hypoxia, rigors. Was diuresed and became hypotensive, Upgraded to SICU. CTA c/a/p on 2/19 showed multiple b/l PE and large bowel obstruction, was started on heparin drip, c/b GIB, then retrialed heparin drip with recurrent c/f GIB. Also with Afib w RVR during admission, resolved w iv metoprolol.   On 2/28, developed SIRS, Blood cx growing yeast. TPN stopped, R chest port removed on 3/2 and pt started on iv caspofungin.    #Sepsis (POA), immunocompromised host  #Prior pseudomonal bacteremia 2/2 UTI  #Mucositis, suspect esophageal candidiasis  #Fungemia- saccromyces  - Continue  iv caspofungin  - repeat blood cultures NTD  - Saccromyces,awaiting sensitivities  - Currently on meropenem as well  - On vanco po prophylaxis   - Follow up ID recs    #sigmoid colon pneumatosis   -lactate cleared 1.3 3/8  - Tolerated CLD, now on regular diet  - GI on board  - Continue meropenem as per surgery and ID    #Acute bilateral PE  - Doppler LE negative  - CT chest- multiple PE w/in segmental left lower and right lower, 2 right middle lobe nodules,   - For IV filter  - Recurrent GIB on heparin    # GERD/Esophagitis  - Continue protonix   - Continue carafate    # HTN  - Continue clonidine      #MALLORY, likely ATN  - Stable at 1.6  - Continue IVF until can maintain po intake  - Nephrology on board    # Acute blood loss anemia and Malignancy  - SP GIB x 2 after starting heparin  -s/p pRBC x 6u this admission  -Hemoglobin stable 9.1 3/8  - Continue protonix    #Primary pancreatic adenocarcinoma  - SP folfirinox with reaction to irinotecan  - on a clinical trial now, on hold     #Skin injury Left and Rigth upper Gluteal Intergluteal cleft   - superficial skin necrosis  - Wound care evaluation appreciated  -Low air mattress  - Turning / positioning q2h while in bed    # Hypokalemia  # Hypomag  - replete prn    #DVT ppx  - SCDs       70yo female with PMHx recent diagnosis of 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  Then transferred to  as per request of . Was getting platelet transfusion and developed tachypnea hypoxia, rigors. Was diuresed and became hypotensive, Upgraded to SICU. CTA c/a/p on 2/19 showed multiple b/l PE and large bowel obstruction, was started on heparin drip, c/b GIB, then retrialed heparin drip with recurrent c/f GIB. Also with Afib w RVR during admission, resolved w iv metoprolol.   On 2/28, developed SIRS, Blood cx growing yeast. TPN stopped, R chest port removed on 3/2 and pt started on iv caspofungin.    #Sepsis (POA), immunocompromised host  #Prior pseudomonal bacteremia 2/2 UTI  #Mucositis, suspect esophageal candidiasis  #Fungemia- saccromyces  - Continue  iv caspofungin  - repeat blood cultures NTD  - Saccromyces,awaiting sensitivities  - Currently on meropenem as well  - On vanco po prophylaxis   - Follow up ID recs    #sigmoid colon pneumatosis   -lactate cleared 1.3 3/8  - Tolerated CLD, now on regular diet  - GI on board  - Continue meropenem as per surgery and ID    #Acute bilateral PE  - Doppler LE negative  - CT chest- multiple PE w/in segmental left lower and right lower, 2 right middle lobe nodules,   - For IV filter  - Recurrent GIB on heparin    # GERD/Esophagitis  - Continue protonix   - Continue carafate    #MALLORY, likely ATN  - Stable at 1.6  - Continue IVF until can maintain po intake  - Nephrology on board    # Acute blood loss anemia and Malignancy  - SP GIB x 2 after starting heparin  -s/p pRBC x 6u this admission  -Hemoglobin stable 9.1 3/8  - Continue protonix    #Primary pancreatic adenocarcinoma  - SP folfirinox with reaction to irinotecan  - on a clinical trial now, on hold     #Skin injury Left and Rigth upper Gluteal Intergluteal cleft   - superficial skin necrosis  - Wound care evaluation appreciated  -Low air mattress  - Turning / positioning q2h while in bed    # Hypokalemia  # Hypomag  - replete prn    #DVT ppx  - SCDs       maximum assist (25% patients effort)

## 2025-03-08 NOTE — PROGRESS NOTE ADULT - SUBJECTIVE AND OBJECTIVE BOX
Resting comfortably. Still with pain and early bloating couple of minutes after she eats anything. + BM    Vital Signs Last 24 Hrs  T(C): 36.7 (08 Mar 2025 08:23), Max: 37 (08 Mar 2025 01:03)  T(F): 98 (08 Mar 2025 08:23), Max: 98.6 (08 Mar 2025 01:03)  HR: 62 (08 Mar 2025 08:23) (60 - 101)  BP: 150/79 (08 Mar 2025 08:23) (108/54 - 150/79)  BP(mean): --  RR: 18 (08 Mar 2025 08:23) (18 - 18)  SpO2: 96% (08 Mar 2025 08:23) (94% - 98%)    chest wall bandage dry and intact  lungs- clear  cor- RRR  Abd- + BS soft distended, non tender, no guarding or rebound  Ext- no edema

## 2025-03-08 NOTE — PROGRESS NOTE ADULT - SUBJECTIVE AND OBJECTIVE BOX
Subjective:    pat better, lying in bed, afebrile, 2lpm nc sat 96%, hb 9, cr 1.6.    Home Medications:  amitriptyline: 10 milligram(s) orally once a day (23 Dec 2024 18:06)  amLODIPine 5 mg oral tablet: 1 tab(s) orally once a day (23 Dec 2024 18:06)  aspirin 81 mg oral tablet, chewable: 1 tab(s) orally once a day (23 Dec 2024 18:06)  atorvastatin 40 mg oral tablet: 1 tab(s) orally once a day (at bedtime) (23 Dec 2024 18:06)  cefepime 2 g intravenous injection: 2 gram(s) intravenous every 12 hours (04 Mar 2025 21:11)  clopidogrel 75 mg oral tablet: 1 tab(s) orally once a day (23 Dec 2024 16:32)  filgrastim: 480 microgram(s) subcutaneous once a day (04 Mar 2025 21:11)  Lactated Ringers Injection intravenous solution: 150 milligram(s) intravenous every 1 to 2 hours (04 Mar 2025 21:11)  loperamide 2 mg oral capsule: 1 cap(s) orally once (04 Mar 2025 21:11)  midodrine 5 mg oral tablet: 1 tab(s) orally every 8 hours (04 Mar 2025 21:11)  Multiple Vitamins with Minerals oral liquid: 1 orally once a day (04 Mar 2025 21:11)  octreotide 2500 mcg/mL subcutaneous solution: 0.04 milliliter(s) subcutaneous 3 times a day (04 Mar 2025 21:11)  Pepcid 40 mg oral tablet: 40 milligram(s) orally once a day as needed for dyspepsia (23 Dec 2024 18:06)  sucralfate 1 g/10 mL oral suspension: 10 milliliter(s) orally 4 times a day (04 Mar 2025 21:11)  trimethobenzamide 100 mg/mL intramuscular solution: 2 milliliter(s) intramuscular every 8 hours As needed Nausea and/or Vomiting (04 Mar 2025 21:11)    MEDICATIONS  (STANDING):  acetaminophen   IVPB .. 1000 milliGRAM(s) IV Intermittent once  caspofungin IVPB 50 milliGRAM(s) IV Intermittent every 24 hours  caspofungin IVPB      chlorhexidine 2% Cloths 1 Application(s) Topical <User Schedule>  cholecalciferol 2000 Unit(s) Oral at bedtime  cloNIDine 0.1 milliGRAM(s) Oral two times a day  dextrose 5% + sodium chloride 0.45%. 1000 milliLiter(s) (75 mL/Hr) IV Continuous <Continuous>  dextrose 5%. 1000 milliLiter(s) (50 mL/Hr) IV Continuous <Continuous>  dextrose 5%. 1000 milliLiter(s) (100 mL/Hr) IV Continuous <Continuous>  famotidine Injectable 20 milliGRAM(s) IV Push daily  glucagon  Injectable 1 milliGRAM(s) IntraMuscular once  meropenem Injectable 1000 milliGRAM(s) IV Push every 12 hours  pantoprazole  Injectable 40 milliGRAM(s) IV Push every 12 hours  potassium chloride   Powder 40 milliEquivalent(s) Oral two times a day  sucralfate suspension 1 Gram(s) Oral four times a day  vancomycin    Solution 125 milliGRAM(s) Oral every 6 hours  zinc oxide 40% Paste 1 Application(s) Topical two times a day    MEDICATIONS  (PRN):  acetaminophen     Tablet .. 650 milliGRAM(s) Oral every 6 hours PRN Temp greater or equal to 38C (100.4F), Mild Pain (1 - 3)  aluminum hydroxide/magnesium hydroxide/simethicone Suspension 30 milliLiter(s) Oral every 4 hours PRN Dyspepsia  Biotene Dry Mouth Oral Rinse 15 milliLiter(s) Swish and Spit five times a day PRN Mouth Care  ondansetron Injectable 4 milliGRAM(s) IV Push every 8 hours PRN Nausea and/or Vomiting  sodium chloride 0.65% Nasal 1 Spray(s) Both Nostrils two times a day PRN Nasal Congestion      Allergies    No Known Allergies    Intolerances        Vital Signs Last 24 Hrs  T(C): 36.7 (08 Mar 2025 08:23), Max: 37 (08 Mar 2025 01:03)  T(F): 98 (08 Mar 2025 08:23), Max: 98.6 (08 Mar 2025 01:03)  HR: 62 (08 Mar 2025 08:23) (60 - 101)  BP: 150/79 (08 Mar 2025 08:23) (108/54 - 150/79)  BP(mean): --  RR: 18 (08 Mar 2025 08:23) (18 - 18)  SpO2: 96% (08 Mar 2025 08:23) (94% - 98%)    Parameters below as of 08 Mar 2025 08:23  Patient On (Oxygen Delivery Method): nasal cannula          PHYSICAL EXAMINATION:    NECK:  Supple. No lymphadenopathy. Jugular venous pressure not elevated. Carotids equal.   HEART:   The cardiac impulse has a normal quality. Reg., Nl S1 and S2.  There are no murmurs, rubs or gallops noted  CHEST:  Chest crackles to auscultation. Normal respiratory effort.  ABDOMEN:  Soft and nontender.   EXTREMITIES:  There is no edema.       LABS:                        9.1    9.34  )-----------( 283      ( 08 Mar 2025 07:59 )             28.5     03-08    144  |  112[H]  |  32[H]  ----------------------------<  126[H]  3.3[L]   |  25  |  1.61[H]    Ca    9.2      08 Mar 2025 07:59  Mg     1.6     03-08    TPro  5.8[L]  /  Alb  1.9[L]  /  TBili  0.6  /  DBili  x   /  AST  17  /  ALT  21  /  AlkPhos  151[H]  03-08      Urinalysis Basic - ( 08 Mar 2025 07:59 )    Color: x / Appearance: x / SG: x / pH: x  Gluc: 126 mg/dL / Ketone: x  / Bili: x / Urobili: x   Blood: x / Protein: x / Nitrite: x   Leuk Esterase: x / RBC: x / WBC x   Sq Epi: x / Non Sq Epi: x / Bacteria: x

## 2025-03-09 LAB
ALBUMIN SERPL ELPH-MCNC: 2 G/DL — LOW (ref 3.3–5)
ALP SERPL-CCNC: 150 U/L — HIGH (ref 40–120)
ALT FLD-CCNC: 21 U/L — SIGNIFICANT CHANGE UP (ref 12–78)
ANION GAP SERPL CALC-SCNC: 7 MMOL/L — SIGNIFICANT CHANGE UP (ref 5–17)
AST SERPL-CCNC: 17 U/L — SIGNIFICANT CHANGE UP (ref 15–37)
BILIRUB SERPL-MCNC: 0.6 MG/DL — SIGNIFICANT CHANGE UP (ref 0.2–1.2)
BUN SERPL-MCNC: 31 MG/DL — HIGH (ref 7–23)
CALCIUM SERPL-MCNC: 9.4 MG/DL — SIGNIFICANT CHANGE UP (ref 8.5–10.1)
CHLORIDE SERPL-SCNC: 113 MMOL/L — HIGH (ref 96–108)
CO2 SERPL-SCNC: 23 MMOL/L — SIGNIFICANT CHANGE UP (ref 22–31)
CREAT SERPL-MCNC: 1.48 MG/DL — HIGH (ref 0.5–1.3)
CULTURE RESULTS: SIGNIFICANT CHANGE UP
CULTURE RESULTS: SIGNIFICANT CHANGE UP
EGFR: 38 ML/MIN/1.73M2 — LOW
EGFR: 38 ML/MIN/1.73M2 — LOW
HCT VFR BLD CALC: 28.5 % — LOW (ref 34.5–45)
HGB BLD-MCNC: 9 G/DL — LOW (ref 11.5–15.5)
MAGNESIUM SERPL-MCNC: 1.6 MG/DL — SIGNIFICANT CHANGE UP (ref 1.6–2.6)
MCHC RBC-ENTMCNC: 28.3 PG — SIGNIFICANT CHANGE UP (ref 27–34)
MCHC RBC-ENTMCNC: 31.6 G/DL — LOW (ref 32–36)
MCV RBC AUTO: 89.6 FL — SIGNIFICANT CHANGE UP (ref 80–100)
NRBC # BLD AUTO: 0 K/UL — SIGNIFICANT CHANGE UP (ref 0–0)
NRBC # FLD: 0 K/UL — SIGNIFICANT CHANGE UP (ref 0–0)
NRBC BLD AUTO-RTO: 0 /100 WBCS — SIGNIFICANT CHANGE UP (ref 0–0)
PLATELET # BLD AUTO: 265 K/UL — SIGNIFICANT CHANGE UP (ref 150–400)
PMV BLD: 11 FL — SIGNIFICANT CHANGE UP (ref 7–13)
POTASSIUM SERPL-MCNC: 3.9 MMOL/L — SIGNIFICANT CHANGE UP (ref 3.5–5.3)
POTASSIUM SERPL-SCNC: 3.9 MMOL/L — SIGNIFICANT CHANGE UP (ref 3.5–5.3)
PROT SERPL-MCNC: 6 GM/DL — SIGNIFICANT CHANGE UP (ref 6–8.3)
RBC # BLD: 3.18 M/UL — LOW (ref 3.8–5.2)
RBC # FLD: 15.4 % — HIGH (ref 10.3–14.5)
SODIUM SERPL-SCNC: 143 MMOL/L — SIGNIFICANT CHANGE UP (ref 135–145)
SPECIMEN SOURCE: SIGNIFICANT CHANGE UP
SPECIMEN SOURCE: SIGNIFICANT CHANGE UP
WBC # BLD: 7.93 K/UL — SIGNIFICANT CHANGE UP (ref 3.8–10.5)
WBC # FLD AUTO: 7.93 K/UL — SIGNIFICANT CHANGE UP (ref 3.8–10.5)

## 2025-03-09 PROCEDURE — 99233 SBSQ HOSP IP/OBS HIGH 50: CPT

## 2025-03-09 RX ADMIN — TOUCHLESS CARE ZINC OXIDE PROTECTANT 1 APPLICATION(S): 20; 25 SPRAY TOPICAL at 21:31

## 2025-03-09 RX ADMIN — Medication 125 MILLIGRAM(S): at 05:51

## 2025-03-09 RX ADMIN — Medication 2000 UNIT(S): at 21:24

## 2025-03-09 RX ADMIN — Medication 125 MILLIGRAM(S): at 09:30

## 2025-03-09 RX ADMIN — Medication 1 GRAM(S): at 17:59

## 2025-03-09 RX ADMIN — TOUCHLESS CARE ZINC OXIDE PROTECTANT 1 APPLICATION(S): 20; 25 SPRAY TOPICAL at 09:32

## 2025-03-09 RX ADMIN — Medication 40 MILLIGRAM(S): at 21:24

## 2025-03-09 RX ADMIN — Medication 1 GRAM(S): at 09:30

## 2025-03-09 RX ADMIN — Medication 125 MILLIGRAM(S): at 23:20

## 2025-03-09 RX ADMIN — Medication 1 APPLICATION(S): at 09:25

## 2025-03-09 RX ADMIN — Medication 20 MILLIGRAM(S): at 21:24

## 2025-03-09 RX ADMIN — SODIUM CHLORIDE 75 MILLILITER(S): 9 INJECTION, SOLUTION INTRAVENOUS at 21:31

## 2025-03-09 RX ADMIN — CASPOFUNGIN ACETATE 260 MILLIGRAM(S): 5 INJECTION, POWDER, LYOPHILIZED, FOR SOLUTION INTRAVENOUS at 09:30

## 2025-03-09 RX ADMIN — Medication 125 MILLIGRAM(S): at 17:59

## 2025-03-09 RX ADMIN — Medication 1 GRAM(S): at 05:51

## 2025-03-09 RX ADMIN — Medication 40 MILLIGRAM(S): at 09:30

## 2025-03-09 RX ADMIN — MEROPENEM 1000 MILLIGRAM(S): 1 INJECTION INTRAVENOUS at 05:51

## 2025-03-09 RX ADMIN — SODIUM CHLORIDE 75 MILLILITER(S): 9 INJECTION, SOLUTION INTRAVENOUS at 06:05

## 2025-03-09 NOTE — PROGRESS NOTE ADULT - ASSESSMENT
69-year-old female recent diagnosis of primary pancreatic adenocarcinoma , pancytopenia and significant PMH of SCC lip, cyclical vomiting syndrome, HTN, HPL, Gout, GERD, CVA w renal evaluation of MALLORY and hypokalemia     MALLORY  Cr improving  , volume ok  -IVF continues   -Trend labs, lytes. Replete Hypokalemia for goal near 4.0  -Monitor UOP   -Avoid nsaids/contrast   - for IVC filter insertion tomorrow - continue IVF hdyration to mitigate MAXIMO risk       * pt seen this AM, note now     d/w Dr Tyrone Sweet to resume care tomorrow                                   Generalized Anxiety Disorder  Generalized anxiety disorder (AMY) is a mental disorder. It interferes with life functions, including relationships, work, and school.  AMY is different from normal anxiety, which everyone experiences at some point in their lives in response to specific life events and activities. Normal anxiety actually helps us prepare for and get through these life events and activities. Normal anxiety goes away after the event or activity is over.   AMY causes anxiety that is not necessarily related to specific events or activities. It also causes excess anxiety in proportion to specific events or activities. The anxiety associated with AMY is also difficult to control. AMY can vary from mild to severe. People with severe AMY can have intense waves of anxiety with physical symptoms (panic attacks).   SYMPTOMS  The anxiety and worry associated with AMY are difficult to control. This anxiety and worry are related to many life events and activities and also occur more days than not for 6 months or longer. People with AMY also have three or more of the following symptoms (one or more in children):  · Restlessness.    · Fatigue.  · Difficulty concentrating.    · Irritability.  · Muscle tension.  · Difficulty sleeping or unsatisfying sleep.  DIAGNOSIS  AMY is diagnosed through an assessment by your health care provider. Your health care provider will ask you questions about your mood, physical symptoms, and events in your life. Your health care provider may ask you about your medical history and use of alcohol or drugs, including prescription medicines. Your health care provider may also do a physical exam and blood tests. Certain medical conditions and the use of certain substances can cause symptoms similar to those associated with AMY. Your health care provider may refer you to a mental health specialist for further evaluation.  TREATMENT  The following therapies are usually used to treat AMY:    · Medication. Antidepressant medication usually is prescribed for long-term daily control. Antianxiety medicines may be added in severe cases, especially when panic attacks occur.    · Talk therapy (psychotherapy). Certain types of talk therapy can be helpful in treating AMY by providing support, education, and guidance. A form of talk therapy called cognitive behavioral therapy can teach you healthy ways to think about and react to daily life events and activities.  · Stress management techniques. These include yoga, meditation, and exercise and can be very helpful when they are practiced regularly.  A mental health specialist can help determine which treatment is best for you. Some people see improvement with one therapy. However, other people require a combination of therapies.     This information is not intended to replace advice given to you by your health care provider. Make sure you discuss any questions you have with your health care provider.     Document Released: 04/14/2014 Document Revised: 01/08/2016 Document Reviewed: 04/14/2014  [employer] Patient Education ©2016 [employer]

## 2025-03-09 NOTE — PROGRESS NOTE ADULT - SUBJECTIVE AND OBJECTIVE BOX
COVERING FOR DR LÓPEZ     Patient is a 69y Female with  who reports   no complaints overnight.    Allergies    No Known Allergies    Intolerances        MEDICATIONS  (STANDING):  acetaminophen   IVPB .. 1000 milliGRAM(s) IV Intermittent once  caspofungin IVPB 50 milliGRAM(s) IV Intermittent every 24 hours  caspofungin IVPB      chlorhexidine 2% Cloths 1 Application(s) Topical <User Schedule>  cholecalciferol 2000 Unit(s) Oral at bedtime  dextrose 5% + sodium chloride 0.45%. 1000 milliLiter(s) (75 mL/Hr) IV Continuous <Continuous>  famotidine Injectable 20 milliGRAM(s) IV Push daily  glucagon  Injectable 1 milliGRAM(s) IntraMuscular once  pantoprazole  Injectable 40 milliGRAM(s) IV Push every 12 hours  sucralfate suspension 1 Gram(s) Oral four times a day  vancomycin    Solution 125 milliGRAM(s) Oral every 6 hours  zinc oxide 40% Paste 1 Application(s) Topical two times a day      Vitals:  T(F): 98.2 (03-09-25), Max: 98.4 (03-09-25)  HR: 84 (03-09-25) (84 - 95)  BP: 154/74 (03-09-25) (144/79 - 154/74)  RR: 18 (03-09-25)  SpO2: 100% (03-09-25)    I and O's:    03-08 @ 06:01  -  03-09 @ 07:00  --------------------------------------------------------  IN: 0 mL / OUT: 1035 mL / NET: -1035 mL    03-09 @ 07:01  -  03-09 @ 16:32  --------------------------------------------------------  IN: 0 mL / OUT: 200 mL / NET: -200 mL          PHYSICAL EXAM:    Constitutional: NAD,   HEENT:   MM  Respiratory: CTAB  Cardiovascular: S1 and S2  Gastrointestinal: BS+, soft, NT/ND  Extremities: + peripheral edema  Neurological: A/O  : No Julian  Dialysis Access: Not applicable    LABS:                        9.0    7.93  )-----------( 265      ( 09 Mar 2025 07:07 )             28.5                         9.1    9.34  )-----------( 283      ( 08 Mar 2025 07:59 )             28.5       143    |  113    |  31     ----------------------------<  129       09 Mar 2025 07:07  3.9     |  23     |  1.48     144    |  112    |  32     ----------------------------<  126       08 Mar 2025 07:59  3.3     |  25     |  1.61     143    |  112    |  37     ----------------------------<  125       07 Mar 2025 07:22  3.8     |  22     |  1.69     Ca    9.4        09 Mar 2025 07:07  Ca    9.2        08 Mar 2025 07:59      Mg     1.6       09 Mar 2025 07:07  Mg     1.6       08 Mar 2025 07:59    TPro  6.0    /  Alb  2.0    /  TBili  0.6    /        09 Mar 2025 07:07  DBili  x      /  AST  17     /  ALT  21     /  AlkPhos  150      TPro  5.8    /  Alb  1.9    /  TBili  0.6    /        08 Mar 2025 07:59  DBili  x      /  AST  17     /  ALT  21     /  AlkPhos  151          Serum Osmo:   Serum Uric Acid:     Urine Studies:  Urinalysis Basic - ( 09 Mar 2025 07:07 )    Color: x / Appearance: x / SG: x / pH: x  Gluc: 129 mg/dL / Ketone: x  / Bili: x / Urobili: x   Blood: x / Protein: x / Nitrite: x   Leuk Esterase: x / RBC: x / WBC x   Sq Epi: x / Non Sq Epi: x / Bacteria: x        Urine chemistry:   Urine Na:   Urine Creatinine:   Urine Protein/Cr ratio:  Urine K:   Urine Osm:       RADIOLOGY & ADDITIONAL STUDIES:

## 2025-03-09 NOTE — PROGRESS NOTE ADULT - SUBJECTIVE AND OBJECTIVE BOX
Subjective:    pat 0n 3lpm nc sat 100%, npo gets abd pain with po,  wants trial of HFNC/NRB & hyperbaric oxygen for pneumatosis of bowel, sitting in bed.     Home Medications:  amitriptyline: 10 milligram(s) orally once a day (23 Dec 2024 18:06)  amLODIPine 5 mg oral tablet: 1 tab(s) orally once a day (23 Dec 2024 18:06)  aspirin 81 mg oral tablet, chewable: 1 tab(s) orally once a day (23 Dec 2024 18:06)  atorvastatin 40 mg oral tablet: 1 tab(s) orally once a day (at bedtime) (23 Dec 2024 18:06)  cefepime 2 g intravenous injection: 2 gram(s) intravenous every 12 hours (04 Mar 2025 21:11)  clopidogrel 75 mg oral tablet: 1 tab(s) orally once a day (23 Dec 2024 16:32)  filgrastim: 480 microgram(s) subcutaneous once a day (04 Mar 2025 21:11)  Lactated Ringers Injection intravenous solution: 150 milligram(s) intravenous every 1 to 2 hours (04 Mar 2025 21:11)  loperamide 2 mg oral capsule: 1 cap(s) orally once (04 Mar 2025 21:11)  midodrine 5 mg oral tablet: 1 tab(s) orally every 8 hours (04 Mar 2025 21:11)  Multiple Vitamins with Minerals oral liquid: 1 orally once a day (04 Mar 2025 21:11)  octreotide 2500 mcg/mL subcutaneous solution: 0.04 milliliter(s) subcutaneous 3 times a day (04 Mar 2025 21:11)  Pepcid 40 mg oral tablet: 40 milligram(s) orally once a day as needed for dyspepsia (23 Dec 2024 18:06)  sucralfate 1 g/10 mL oral suspension: 10 milliliter(s) orally 4 times a day (04 Mar 2025 21:11)  trimethobenzamide 100 mg/mL intramuscular solution: 2 milliliter(s) intramuscular every 8 hours As needed Nausea and/or Vomiting (04 Mar 2025 21:11)    MEDICATIONS  (STANDING):  acetaminophen   IVPB .. 1000 milliGRAM(s) IV Intermittent once  caspofungin IVPB 50 milliGRAM(s) IV Intermittent every 24 hours  caspofungin IVPB      chlorhexidine 2% Cloths 1 Application(s) Topical <User Schedule>  cholecalciferol 2000 Unit(s) Oral at bedtime  dextrose 5% + sodium chloride 0.45%. 1000 milliLiter(s) (75 mL/Hr) IV Continuous <Continuous>  dextrose 5%. 1000 milliLiter(s) (100 mL/Hr) IV Continuous <Continuous>  dextrose 5%. 1000 milliLiter(s) (50 mL/Hr) IV Continuous <Continuous>  famotidine Injectable 20 milliGRAM(s) IV Push daily  glucagon  Injectable 1 milliGRAM(s) IntraMuscular once  pantoprazole  Injectable 40 milliGRAM(s) IV Push every 12 hours  sucralfate suspension 1 Gram(s) Oral four times a day  vancomycin    Solution 125 milliGRAM(s) Oral every 6 hours  zinc oxide 40% Paste 1 Application(s) Topical two times a day    MEDICATIONS  (PRN):  acetaminophen     Tablet .. 650 milliGRAM(s) Oral every 6 hours PRN Temp greater or equal to 38C (100.4F), Mild Pain (1 - 3)  aluminum hydroxide/magnesium hydroxide/simethicone Suspension 30 milliLiter(s) Oral every 4 hours PRN Dyspepsia  Biotene Dry Mouth Oral Rinse 15 milliLiter(s) Swish and Spit five times a day PRN Mouth Care  ondansetron Injectable 4 milliGRAM(s) IV Push every 8 hours PRN Nausea and/or Vomiting  simethicone 80 milliGRAM(s) Chew every 4 hours PRN Upset Stomach  sodium chloride 0.65% Nasal 1 Spray(s) Both Nostrils two times a day PRN Nasal Congestion      Allergies    No Known Allergies    Intolerances        Vital Signs Last 24 Hrs  T(C): 36.7 (09 Mar 2025 08:22), Max: 36.9 (09 Mar 2025 00:10)  T(F): 98.1 (09 Mar 2025 08:22), Max: 98.4 (09 Mar 2025 00:10)  HR: 95 (09 Mar 2025 08:22) (88 - 95)  BP: 146/81 (09 Mar 2025 08:22) (140/76 - 146/81)  BP(mean): --  RR: 18 (09 Mar 2025 08:22) (18 - 18)  SpO2: 95% (09 Mar 2025 08:22) (95% - 100%)    Parameters below as of 09 Mar 2025 08:22  Patient On (Oxygen Delivery Method): nasal cannula          PHYSICAL EXAMINATION:    NECK:  Supple. No lymphadenopathy. Jugular venous pressure not elevated. Carotids equal.   HEART:   The cardiac impulse has a normal quality. Reg., Nl S1 and S2.  There are no murmurs, rubs or gallops noted  CHEST:  Chest crackles to auscultation. Normal respiratory effort.  ABDOMEN:  Soft and nontender.   EXTREMITIES:  There is no edema.       LABS:                        9.0    7.93  )-----------( 265      ( 09 Mar 2025 07:07 )             28.5     03-09    143  |  113[H]  |  31[H]  ----------------------------<  129[H]  3.9   |  23  |  1.48[H]    Ca    9.4      09 Mar 2025 07:07  Mg     1.6     03-09    TPro  6.0  /  Alb  2.0[L]  /  TBili  0.6  /  DBili  x   /  AST  17  /  ALT  21  /  AlkPhos  150[H]  03-09      Urinalysis Basic - ( 09 Mar 2025 07:07 )    Color: x / Appearance: x / SG: x / pH: x  Gluc: 129 mg/dL / Ketone: x  / Bili: x / Urobili: x   Blood: x / Protein: x / Nitrite: x   Leuk Esterase: x / RBC: x / WBC x   Sq Epi: x / Non Sq Epi: x / Bacteria: x

## 2025-03-09 NOTE — PROGRESS NOTE ADULT - ASSESSMENT
Clinically seems to be improving. On elemental diet to facilitate absorption. Await IVC filter tomorrow for prior PE. With regard to pneumatosis, pt seems to be asymptomatic. Question if there would be any role for high flow oxygen.

## 2025-03-09 NOTE — PROGRESS NOTE ADULT - ASSESSMENT
69-year-old female with h/o recent diagnosis of primary head of pancreatic adenocarcinoma with tumor in umbilicus, chemo-induced pancytopenia, SCC lip, cyclical vomiting syndrome, HTN, HPL, Gout, GERD, CVA on Aggrenox was transferred on 2/12 from [facility] for further care. She was admitted to [facility] with septic shock on 02/09/2025, nausea, vomiting and diarrhea requiring Levophed drip. Patient was being followed by GI, ID, Cardiology and Onc there. Patient has been transferred to  on patient's  (GI Dr. Pleitez) request. She was reported with non-bloody diarrhea x 3 in last 24 hours PTA. She denied abdominal pain, nausea, but has dry heaves. At [place] she was started on Vancomycin and Zosyn, then changed to Cefepime as her blood culture and urine cultures showed Pseudomonas aeruginosa.    #New sigmoid colon pneumatosis. ?ischemic colitis.  #Fungemia with Saccharomyces cerevisiae  #Dysphagia. Possible candida esophagitis  #S/p sepsis with PSAE   #Head of pancreas adenocarcinoma on chemotherapy  #Immunocompromised host  #Kidney stones  #ARF on CRF stage 3  -leukocytosis is improved  -low grade fever  -renal function is frail   -dysphagia with poor PO intake  -cultures reviewed  -s/p fluconazole 200 mg IV qd # 14  -on vancomycin 125 mg PO q6h for CDAD prophylaxis  -on caspofungin 50 mg IV qd # 8 and meropenem 1000 mg IV q8h # 5  -tolerating abx well so far; no side effects noted  -surgical evaluation --> conservative care at this time  -repeat BC x 2 are negative to date  -no evidence od bacterial infection --> complete meropenem today  -monitor abdomen  -continue antifungal coverage and vancomycin PO  -f/u cultures  -monitor temps  -f/u CBC and BMP  -supportive care  2. Other issues:   -care per medicine    d/w medicine team   d/w Dr. Pleitez

## 2025-03-09 NOTE — PROGRESS NOTE ADULT - ASSESSMENT
68yo female with PMHx recent diagnosis of primary pancreatic adenocarcinoma of head with tumor in umbilicus, chemo-induced pancytopenia, SCC lip, cyclical vomiting syndrome, HTN, HPL, Gout, GERD, CVA on Aggrenox  Initially admitted to [facility] with septic shock 2/2 + pseudomonas bacteremia likely due to UTI vs colitis on 02/09/2025 Was having nausea, vomiting and diarrhea requiring Levophed drip, was transferred out of ICU to regular floor.  Then transferred to  as per request of . Was getting platelet transfusion and developed tachypnea hypoxia, rigors. Was diuresed and became hypotensive, Upgraded to SICU. CTA c/a/p on 2/19 showed multiple b/l PE and large bowel obstruction, was started on heparin drip, c/b GIB, then retrialed heparin drip with recurrent c/f GIB. Also with Afib w RVR during admission, resolved w iv metoprolol.   On 2/28, developed SIRS, Blood cx growing yeast. TPN stopped, R chest port removed on 3/2 and pt started on iv caspofungin.    #Sepsis (POA), immunocompromised host  #Prior pseudomonal bacteremia 2/2 UTI  #Mucositis, suspect esophageal candidiasis  #Fungemia- saccromyces  - Continue  iv caspofungin  - repeat blood cultures NTD  - Saccromyces,awaiting sensitivities  - Currently on meropenem as well  - On vanco po prophylaxis   - Follow up ID recs    #sigmoid colon pneumatosis   -lactate cleared 1.3 3/8  - Tolerated CLD, now on regular diet  - GI on board  - Continue meropenem as per surgery and ID    #Acute bilateral PE  - Doppler LE negative  - CT chest- multiple PE w/in segmental left lower and right lower, 2 right middle lobe nodules,   - For IV filter  - Recurrent GIB on heparin    # GERD/Esophagitis  - Continue protonix   - Continue carafate    #MALLORY, likely ATN  - Stable at 1.4 3/9  - Continue IVF until can maintain po intake  - Nephrology on board    # Acute blood loss anemia and Malignancy  - SP GIB x 2 after starting heparin  -s/p pRBC x 6u this admission  -Hemoglobin stable 9.0  3/9  - Continue protonix    #Primary pancreatic adenocarcinoma  - SP folfirinox with reaction to irinotecan  - on a clinical trial now, on hold     #Skin injury Left and Rigth upper Gluteal Intergluteal cleft   - superficial skin necrosis  - Wound care evaluation appreciated  -Low air mattress  - Turning / positioning q2h while in bed    # Hypokalemia  # Hypomag  - replete prn    #DVT ppx  - SCDs     68yo female with PMHx recent diagnosis of primary pancreatic adenocarcinoma of head with tumor in umbilicus, chemo-induced pancytopenia, SCC lip, cyclical vomiting syndrome, HTN, HPL, Gout, GERD, CVA on Aggrenox  Initially admitted to [facility] with septic shock 2/2 + pseudomonas bacteremia likely due to UTI vs colitis on 02/09/2025 Was having nausea, vomiting and diarrhea requiring Levophed drip, was transferred out of ICU to regular floor.  Then transferred to  as per request of . Was getting platelet transfusion and developed tachypnea hypoxia, rigors. Was diuresed and became hypotensive, Upgraded to SICU. CTA c/a/p on 2/19 showed multiple b/l PE and large bowel obstruction, was started on heparin drip, c/b GIB, then retrialed heparin drip with recurrent c/f GIB. Also with Afib w RVR during admission, resolved w iv metoprolol.   On 2/28, developed SIRS, Blood cx growing yeast. TPN stopped, R chest port removed on 3/2 and pt started on iv caspofungin.    #Sepsis (POA), immunocompromised host  #Prior pseudomonal bacteremia 2/2 UTI  #Mucositis, suspect esophageal candidiasis  #Fungemia- saccromyces  - Continue  iv caspofungin  - repeat blood cultures NTD  - Saccromyces,awaiting sensitivities  - Currently on meropenem as well  - On vanco po prophylaxis   - Follow up ID recs    #sigmoid colon pneumatosis   -lactate cleared 1.3 3/8  - Tolerated CLD, now on regular diet  - GI on board  - Continue meropenem as per surgery and ID  - Elemental diet  - Would not be able to tolerate HFNC at 200 mgHg  - Trial of NRB for treatment     #Acute bilateral PE  - Doppler LE negative  - CT chest- multiple PE w/in segmental left lower and right lower, 2 right middle lobe nodules,   - For IV filter  - Recurrent GIB on heparin    # GERD/Esophagitis  - Continue protonix   - Continue carafate    #MALLORY, likely ATN  - Stable at 1.4 3/9  - Continue IVF until can maintain po intake  - Nephrology on board    # Acute blood loss anemia and Malignancy  - SP GIB x 2 after starting heparin  -s/p pRBC x 6u this admission  -Hemoglobin stable 9.0  3/9  - Continue protonix    #Primary pancreatic adenocarcinoma  - SP folfirinox with reaction to irinotecan  - on a clinical trial now, on hold     #Skin injury Left and Rigth upper Gluteal Intergluteal cleft   - superficial skin necrosis  - Wound care evaluation appreciated  -Low air mattress  - Turning / positioning q2h while in bed    # Hypokalemia  # Hypomag  - replete prn    #DVT ppx  - SCDs     70yo female with PMHx recent diagnosis of primary pancreatic adenocarcinoma of head with tumor in umbilicus, chemo-induced pancytopenia, SCC lip, cyclical vomiting syndrome, HTN, HPL, Gout, GERD, CVA on Aggrenox  Initially admitted to [facility] with septic shock 2/2 + pseudomonas bacteremia likely due to UTI vs colitis on 02/09/2025 Was having nausea, vomiting and diarrhea requiring Levophed drip, was transferred out of ICU to regular floor.  Then transferred to  as per request of . Was getting platelet transfusion and developed tachypnea hypoxia, rigors. Was diuresed and became hypotensive, Upgraded to SICU. CTA c/a/p on 2/19 showed multiple b/l PE and large bowel obstruction, was started on heparin drip, c/b GIB, then retrialed heparin drip with recurrent c/f GIB. Also with Afib w RVR during admission, resolved w iv metoprolol.   On 2/28, developed SIRS, Blood cx growing yeast. TPN stopped, R chest port removed on 3/2 and pt started on iv caspofungin.    #Sepsis (POA), immunocompromised host  #Prior pseudomonal bacteremia 2/2 UTI  #Mucositis, suspect esophageal candidiasis  #Fungemia- saccromyces  - Continue  iv caspofungin  - repeat blood cultures NTD  - Saccromyces,awaiting sensitivities  - Currently on meropenem as well  - On vanco po prophylaxis   - Follow up ID recs    #sigmoid colon pneumatosis   -lactate cleared 1.3 3/8  - Tolerated CLD, now on regular diet  - GI and surgery on board  - Continue meropenem as per surgery and ID  - Elemental diet  - Would not be able to tolerate HFNC at 200 mgHg  - Trial of NRB for treatment     #Acute bilateral PE  - Doppler LE negative  - CT chest- multiple PE w/in segmental left lower and right lower, 2 right middle lobe nodules,   - For IV filter  - Recurrent GIB on heparin    # GERD/Esophagitis  - Continue protonix   - Continue carafate    #MALLORY, likely ATN  - Stable at 1.4 3/9  - Continue IVF until can maintain po intake  - Nephrology on board    # Acute blood loss anemia and Malignancy  - SP GIB x 2 after starting heparin  -s/p pRBC x 6u this admission  -Hemoglobin stable 9.0  3/9  - Continue protonix    #Primary pancreatic adenocarcinoma  - SP folfirinox with reaction to irinotecan  - on a clinical trial now, on hold     #Skin injury Left and Rigth upper Gluteal Intergluteal cleft   - superficial skin necrosis  - Wound care evaluation appreciated  -Low air mattress  - Turning / positioning q2h while in bed    # Hypokalemia  # Hypomag  - replete prn    #DVT ppx  - SCDs     68yo female with PMHx recent diagnosis of primary pancreatic adenocarcinoma of head with tumor in umbilicus, chemo-induced pancytopenia, SCC lip, cyclical vomiting syndrome, HTN, HPL, Gout, GERD, CVA on Aggrenox  Initially admitted to [facility] with septic shock 2/2 + pseudomonas bacteremia likely due to UTI vs colitis on 02/09/2025 Was having nausea, vomiting and diarrhea requiring Levophed drip, was transferred out of ICU to regular floor.  Then transferred to  as per request of . Was getting platelet transfusion and developed tachypnea hypoxia, rigors. Was diuresed and became hypotensive, Upgraded to SICU. CTA c/a/p on 2/19 showed multiple b/l PE and large bowel obstruction, was started on heparin drip, c/b GIB, then retrialed heparin drip with recurrent c/f GIB. Also with Afib w RVR during admission, resolved w iv metoprolol.   On 2/28, developed SIRS, Blood cx growing yeast. TPN stopped, R chest port removed on 3/2 and pt started on iv caspofungin.    #Sepsis (POA), immunocompromised host  #Prior pseudomonal bacteremia 2/2 UTI  #Mucositis, suspect esophageal candidiasis  #Fungemia- saccromyces  - Continue  iv caspofungin  - repeat blood cultures NTD  - Saccromyces,awaiting sensitivities  - Currently on meropenem as well  - On vanco po prophylaxis   - Follow up ID recs    #sigmoid colon pneumatosis   -lactate cleared 1.3 3/8  - Tolerated CLD, now on regular diet  - GI and surgery on board  - Continue meropenem as per surgery and ID  - Elemental diet  - Would not be able to tolerate HFNC at 200 mgHg  - Trial of NRB for treatment, discussed with pulm     #Acute bilateral PE  - Doppler LE negative  - CT chest- multiple PE w/in segmental left lower and right lower, 2 right middle lobe nodules,   - For IV filter  - Recurrent GIB on heparin    # GERD/Esophagitis  - Continue protonix   - Continue carafate    #MALLORY, likely ATN  - Stable at 1.4 3/9  - Continue IVF until can maintain po intake  - Nephrology on board    # Acute blood loss anemia and Malignancy  - SP GIB x 2 after starting heparin  -s/p pRBC x 6u this admission  -Hemoglobin stable 9.0  3/9  - Continue protonix    #Primary pancreatic adenocarcinoma  - SP folfirinox with reaction to irinotecan  - on a clinical trial now, on hold     #Skin injury Left and Rigth upper Gluteal Intergluteal cleft   - superficial skin necrosis  - Wound care evaluation appreciated  -Low air mattress  - Turning / positioning q2h while in bed    # Hypokalemia  # Hypomag  - replete prn    #DVT ppx  - SCDs

## 2025-03-09 NOTE — PROGRESS NOTE ADULT - ASSESSMENT
69-year-old female with stage IV pancreatic adenocarcinoma presenting with neutropenic fever and severe treatment-related toxicities from modified FOLFIRINOX and investigational EMILY inhibitor [id number] found to have PE and LBO.     # pancreatic ca   - remains on clinical trial- supportive care while in hospital   - CTH negative and discussed w/ pt   - will continue with ongoing communication with  [facility] have contributed to symptoms on arrival.  no DPD deficiency   - Review clinical trial protocol - may remain on trial drug alone and changing regimen- to be discussed when recovers from underlying illness and admission   -  spoke w/ Dr. Barr - will need to get to office at [facility] to discuss continuing with treatment   - if goes to rehab will be holding chemotherapy     # Neutropenic Septic shock - +fungal cultures   - pt previously on neupogen- WBC has now recovered and elevated likely 2/2 GCSF support   - BCx with fungal growth likely disseminated candidiasis- spoke with dr lindsey- he called lab to find out speciation   - repeat cultuures NEG to date (2/28 culture grew saccharomyces cerevisiae)  -  ID - c/w caspofungin. meropenem   - removed mediport  - most recent CT a/p 3/6- resolution of sbo/LBO, mottled lucency involving a short segment of sigmoid colon suspect for pneumatosis which could be due to benign or ischemic etiology- send lactate    # PE   - v/q scan w indeterminate prob of PE, linear defect in RUL posterior segment   - CT chest- multiple PE w/in segmental left lower and right lower, 2 right middle lobe nodules,   - most recent US LE doppler negative    - remains off a/c given high risk - no sign of DVT to suggest filter placement     # anemia   - Hb stable   - keep type and screen active, transfuse for Hb <7      will follow and communicate with [facility]

## 2025-03-09 NOTE — PROGRESS NOTE ADULT - ASSESSMENT
69-year-old female with stage IV pancreatic adenocarcinoma presenting with neutropenic fever and severe treatment-related toxicities from modified FOLFIRINOX and investigational EMILY inhibitor MC-6236.    PROBLEMS:    pulmonary stable  Febrile syndrome. Fungemia. Likely disseminated candidemia.   Dysphagia. Possible candida esophagitis  New febrile syndrome with chills. Possible sepsis. Dysphagia. Possible candida esophagitis, S/p sepsis with PSAE   Acute hypoxic respiratory failure likely secondary to hypervolemia   Bilateral pleural effusions with compressive atelectasis, right greater than left  Neutropenic Fever/Neutropenic Septic shock  Severe Thrombocytopenia-Platelet count critically low at 26k.   Severe diarrhea and inability to tolerate PO intake, likely multifactorial from both FOLFIRINOX (particularly irinotecan component) and study drug [id number].   History of left cephalic vein thrombosis.  MALLORY  CT Angio Chest PE Protocol w/ IV Cont (02.19.25 @ 14:16) >  CHEST:  Multiple pulmonary emboli within the segmental branches of the left lower and right lower lobe pulmonary arteries.  There are two right middle lobe nodules, new since prior. Exact   etiology is unclear. Primary differential diagnostic consideration   includes infection.  CT Abdomen and Pelvis-Resolution of small and large bowel dilatation. Mottled lucency involving a short segment of sigmoid colon suspect for pneumatosis which could be due to benign or ischemic etiology. Please correlate clinically and with laboratory values.  Minimal increase in amount of ascites.  Unchanged pancreatic body and umbilical masses.      PLAN:    Pulmonary stable-trial of NRB mask before consider HFNC, as pat difficulty with HFNC previouly. hyperbaric oxygen consult for pneumatosis of bowel-d/w   Dc fluconazole  and d/c cefepime  IV caspofungin 70 mg IV x1, then 50 mg IV qd/Iv merpenem  CTA chest + small PEs in RLL and LLL- off IV heparin causing bleeding-waiting GFF- now fungaemia  Family does not want any procedure EGD/Colonscopy  DECD steroids  High WBC-trend  Pulmonary tachpnea may be RTA with low bicarbonate because of GI cause with diarrhea but improving renal fx  Neutropenic Septic shock- IV meropenem started on 2/15- previously on CEFEPIME/Continue filgrastim 480mcg daily-Today w/ improvement in counts- WBC 1.3, Hb 10.1, plt 37, cmp with k 2.3 and Repleted, Cr 1.40.   Daily CBC monitoring  Maintain strict neutropenic precautions  Anemia-received two units of pRBCs Hb 10.1 today   Thrombocytopenia-Transfuse for plts < 15   D/w staff & /haematology & ICU in detail 69-year-old female with stage IV pancreatic adenocarcinoma presenting with neutropenic fever and severe treatment-related toxicities from modified FOLFIRINOX and investigational EMILY inhibitor [id number].    PROBLEMS:    pulmonary stable  Febrile syndrome. Fungemia. Likely disseminated candidemia.   Dysphagia. Possible candida esophagitis  New febrile syndrome with chills. Possible sepsis. Dysphagia. Possible candida esophagitis, S/p sepsis with PSAE   Acute hypoxic respiratory failure likely secondary to hypervolemia   Bilateral pleural effusions with compressive atelectasis, right greater than left  Neutropenic Fever/Neutropenic Septic shock  Severe Thrombocytopenia-Platelet count critically low at 26k.   Severe diarrhea and inability to tolerate PO intake, likely multifactorial from both FOLFIRINOX (particularly irinotecan component) and study drug [id number].   History of left cephalic vein thrombosis.  MALLORY  CT Angio Chest PE Protocol w/ IV Cont (02.19.25 @ 14:16) >  CHEST:  Multiple pulmonary emboli within the segmental branches of the left lower and right lower lobe pulmonary arteries.  There are two right middle lobe nodules, new since prior. Exact   etiology is unclear. Primary differential diagnostic consideration   includes infection.  CT Abdomen and Pelvis-Resolution of small and large bowel dilatation. Mottled lucency involving a short segment of sigmoid colon suspect for pneumatosis which could be due to benign or ischemic etiology. Please correlate clinically and with laboratory values.  Minimal increase in amount of ascites.  Unchanged pancreatic body and umbilical masses.      PLAN:    Pulmonary stable-trial of NRB mask fu with ABG with Pao2 60-70 before consider HFNC, as pat difficulty with HFNC previouly. hyperbaric oxygen consult for pneumatosis of bowel-d/w   Dc fluconazole  and d/c cefepime  IV caspofungin 70 mg IV x1, then 50 mg IV qd/Iv meropenem  CTA chest + small PEs in RLL and LLL- off IV heparin causing bleeding-waiting GFF- now fungaemia  Family does not want any procedure EGD/Colonscopy  DECD steroids  High WBC-trend  Pulmonary tachpnea may be RTA with low bicarbonate because of GI cause with diarrhea but improving renal fx  Neutropenic Septic shock- IV meropenem started on 2/15- previously on CEFEPIME/Continue filgrastim 480mcg daily-Today w/ improvement in counts- WBC 1.3, Hb 10.1, plt 37, cmp with k 2.3 and Repleted, Cr 1.40.   Daily CBC monitoring  Maintain strict neutropenic precautions  Anemia-received two units of pRBCs Hb 10.1 today   Thrombocytopenia-Transfuse for plts < 15   D/w staff & /haematology & ICU in detail

## 2025-03-09 NOTE — CHART NOTE - NSCHARTNOTEFT_GEN_A_CORE
RD received call from Dr. Hansen, diagnosed Pneumatosis, discussed no PO elemental diet, only comparable option is low fiber.  Pt is already poor PO, visited with patient and spouse bedside.  Agreed to Weizoom peptide 1.5 TID (500kcal, 24g protein/serving).  No additional questions/concerns.  RD remains available prn.    Rachel Calderon RDN [phone]

## 2025-03-09 NOTE — PROGRESS NOTE ADULT - SUBJECTIVE AND OBJECTIVE BOX
Resting comfortably, no new complaints  VSS afeb    chest- wound healing well, sutures removed  lungs- clear  cor- RRR  Abd- distension a bit improved, active BS soft non tender. Area at umbilicus surgery site seems more full, slightly tender, erythema ulceration of mid portion.  Ext- no edema

## 2025-03-09 NOTE — PROGRESS NOTE ADULT - SUBJECTIVE AND OBJECTIVE BOX
HOSPITALIST ATTENDING PROGRESS NOTE    Chart and meds reviewed.  Patient seen and examined.    CC: Sepsis    Subjective: + BM this am, still with abd swelling and mild discomfort, afebrile.     All other systems reviewed and found to be negative with the exception of what has been described above.    MEDICATIONS  (STANDING):  acetaminophen   IVPB .. 1000 milliGRAM(s) IV Intermittent once  caspofungin IVPB 50 milliGRAM(s) IV Intermittent every 24 hours  caspofungin IVPB      chlorhexidine 2% Cloths 1 Application(s) Topical <User Schedule>  cholecalciferol 2000 Unit(s) Oral at bedtime  dextrose 5% + sodium chloride 0.45%. 1000 milliLiter(s) (75 mL/Hr) IV Continuous <Continuous>  dextrose 5%. 1000 milliLiter(s) (100 mL/Hr) IV Continuous <Continuous>  dextrose 5%. 1000 milliLiter(s) (50 mL/Hr) IV Continuous <Continuous>  famotidine Injectable 20 milliGRAM(s) IV Push daily  glucagon  Injectable 1 milliGRAM(s) IntraMuscular once  pantoprazole  Injectable 40 milliGRAM(s) IV Push every 12 hours  sucralfate suspension 1 Gram(s) Oral four times a day  vancomycin    Solution 125 milliGRAM(s) Oral every 6 hours  zinc oxide 40% Paste 1 Application(s) Topical two times a day    MEDICATIONS  (PRN):  acetaminophen     Tablet .. 650 milliGRAM(s) Oral every 6 hours PRN Temp greater or equal to 38C (100.4F), Mild Pain (1 - 3)  aluminum hydroxide/magnesium hydroxide/simethicone Suspension 30 milliLiter(s) Oral every 4 hours PRN Dyspepsia  Biotene Dry Mouth Oral Rinse 15 milliLiter(s) Swish and Spit five times a day PRN Mouth Care  ondansetron Injectable 4 milliGRAM(s) IV Push every 8 hours PRN Nausea and/or Vomiting  simethicone 80 milliGRAM(s) Chew every 4 hours PRN Upset Stomach  sodium chloride 0.65% Nasal 1 Spray(s) Both Nostrils two times a day PRN Nasal Congestion    Vital Signs Last 24 Hrs  T(C): 36.7 (09 Mar 2025 08:22), Max: 36.9 (09 Mar 2025 00:10)  T(F): 98.1 (09 Mar 2025 08:22), Max: 98.4 (09 Mar 2025 00:10)  HR: 95 (09 Mar 2025 08:22) (88 - 95)  BP: 146/81 (09 Mar 2025 08:22) (140/76 - 146/81)  RR: 18 (09 Mar 2025 08:22) (18 - 18)  SpO2: 95% (09 Mar 2025 08:22) (95% - 100%)    Parameters below as of 09 Mar 2025 08:22  Patient On (Oxygen Delivery Method): nasal cannula    GEN: NAD, Frail   HEENT:  pupils equal and reactive, EOMI, no oropharyngeal lesions, erythema, exudates, oral thrush  NECK:   supple, no carotid bruits  CV:  +S1, +S2, regular, no murmurs  RESP:   lungs clear to auscultation bilaterally, no wheezing, rales, rhonchi, good air entry bilaterally  GI:  abdomen soft, non-tender, distended + Periumbical mass  EXT:  no clubbing, no cyanosis, no edema, no calf pain, swelling or erythema  NEURO:  AAOX3, no focal neurological deficits, follows all commands, able to move extremities spontaneously  SKIN: DTI sacrum  : Julian     LABS:                          9.0    7.93  )-----------( 265      ( 09 Mar 2025 07:07 )             28.5     03-09    143  |  113[H]  |  31[H]  ----------------------------<  129[H]  3.9   |  23  |  1.48[H]    Ca    9.4      09 Mar 2025 07:07  Mg     1.6     03-09    TPro  6.0  /  Alb  2.0[L]  /  TBili  0.6  /  DBili  x   /  AST  17  /  ALT  21  /  AlkPhos  150[H]  03-09    LIVER FUNCTIONS - ( 09 Mar 2025 07:07 )  Alb: 2.0 g/dL / Pro: 6.0 gm/dL / ALK PHOS: 150 U/L / ALT: 21 U/L / AST: 17 U/L / GGT: x           Urinalysis Basic - ( 09 Mar 2025 07:07 )  Color: x / Appearance: x / SG: x / pH: x  Gluc: 129 mg/dL / Ketone: x  / Bili: x / Urobili: x   Blood: x / Protein: x / Nitrite: x   Leuk Esterase: x / RBC: x / WBC x   Sq Epi: x / Non Sq Epi: x / Bacteria: x      CT Abdomen and Pelvis No Cont (03.06.25 @ 16:32) >  IMPRESSION:    Resolution of small and large bowel dilatation.    Mottled lucency involving a short segment of sigmoid colon suspect for   pneumatosis which could be due to benign or ischemic etiology. Please   correlate clinically and with laboratory values.    Minimal increase in amount of ascites.    Unchanged pancreatic body and umbilical masses.

## 2025-03-09 NOTE — PROGRESS NOTE ADULT - SUBJECTIVE AND OBJECTIVE BOX
INTERVAL HPI/OVERNIGHT EVENTS:  Patient S&E at bedside. 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      GERD (gastroesophageal reflux disease)      Cyclical vomiting syndrome      CVA (cerebrovascular accident)      H/O: hysterectomy      H/O squamous cell carcinoma excision          FAMILY HISTORY:  No pertinent family history in first degree relatives        VITAL SIGNS:  T(F): 98.1 (03-09-25 @ 08:22)  HR: 95 (03-09-25 @ 08:22)  BP: 146/81 (03-09-25 @ 08:22)  RR: 18 (03-09-25 @ 08:22)  SpO2: 95% (03-09-25 @ 08:22)  Wt(kg): --    PHYSICAL EXAM:    Constitutional: NAD  Eyes: EOMI,  Respiratory: CTA b/l, off nc   Cardiovascular: RRR, no M/R/G  Gastrointestinal: soft, distended, no tenderness today   Neurological: AAOx3      MEDICATIONS  (STANDING):  acetaminophen   IVPB .. 1000 milliGRAM(s) IV Intermittent once  caspofungin IVPB 50 milliGRAM(s) IV Intermittent every 24 hours  caspofungin IVPB      chlorhexidine 2% Cloths 1 Application(s) Topical <User Schedule>  cholecalciferol 2000 Unit(s) Oral at bedtime  dextrose 5% + sodium chloride 0.45%. 1000 milliLiter(s) (75 mL/Hr) IV Continuous <Continuous>  dextrose 5%. 1000 milliLiter(s) (100 mL/Hr) IV Continuous <Continuous>  dextrose 5%. 1000 milliLiter(s) (50 mL/Hr) IV Continuous <Continuous>  famotidine Injectable 20 milliGRAM(s) IV Push daily  glucagon  Injectable 1 milliGRAM(s) IntraMuscular once  meropenem Injectable 1000 milliGRAM(s) IV Push every 12 hours  pantoprazole  Injectable 40 milliGRAM(s) IV Push every 12 hours  sucralfate suspension 1 Gram(s) Oral four times a day  vancomycin    Solution 125 milliGRAM(s) Oral every 6 hours  zinc oxide 40% Paste 1 Application(s) Topical two times a day    MEDICATIONS  (PRN):  acetaminophen     Tablet .. 650 milliGRAM(s) Oral every 6 hours PRN Temp greater or equal to 38C (100.4F), Mild Pain (1 - 3)  aluminum hydroxide/magnesium hydroxide/simethicone Suspension 30 milliLiter(s) Oral every 4 hours PRN Dyspepsia  Biotene Dry Mouth Oral Rinse 15 milliLiter(s) Swish and Spit five times a day PRN Mouth Care  ondansetron Injectable 4 milliGRAM(s) IV Push every 8 hours PRN Nausea and/or Vomiting  simethicone 80 milliGRAM(s) Chew every 4 hours PRN Upset Stomach  sodium chloride 0.65% Nasal 1 Spray(s) Both Nostrils two times a day PRN Nasal Congestion      Allergies    No Known Allergies    Intolerances        LABS:                        9.0    7.93  )-----------( 265      ( 09 Mar 2025 07:07 )             28.5     03-09    143  |  113[H]  |  31[H]  ----------------------------<  129[H]  3.9   |  23  |  1.48[H]    Ca    9.4      09 Mar 2025 07:07  Mg     1.6     03-09    TPro  6.0  /  Alb  2.0[L]  /  TBili  0.6  /  DBili  x   /  AST  17  /  ALT  21  /  AlkPhos  150[H]  03-09      Urinalysis Basic - ( 09 Mar 2025 07:07 )    Color: x / Appearance: x / SG: x / pH: x  Gluc: 129 mg/dL / Ketone: x  / Bili: x / Urobili: x   Blood: x / Protein: x / Nitrite: x   Leuk Esterase: x / RBC: x / WBC x   Sq Epi: x / Non Sq Epi: x / Bacteria: x        RADIOLOGY & ADDITIONAL TESTS:  Studies reviewed.

## 2025-03-09 NOTE — PROGRESS NOTE ADULT - SUBJECTIVE AND OBJECTIVE BOX
Date of service: 03-09-25 @ 08:49    Lying in bed in NAD  Alert  Opens eyes  Dose not seem in pain    ROS: no fever or chills; limited    MEDICATIONS  (STANDING):  acetaminophen   IVPB .. 1000 milliGRAM(s) IV Intermittent once  caspofungin IVPB 50 milliGRAM(s) IV Intermittent every 24 hours  caspofungin IVPB      chlorhexidine 2% Cloths 1 Application(s) Topical <User Schedule>  cholecalciferol 2000 Unit(s) Oral at bedtime  dextrose 5% + sodium chloride 0.45%. 1000 milliLiter(s) (75 mL/Hr) IV Continuous <Continuous>  dextrose 5%. 1000 milliLiter(s) (100 mL/Hr) IV Continuous <Continuous>  dextrose 5%. 1000 milliLiter(s) (50 mL/Hr) IV Continuous <Continuous>  famotidine Injectable 20 milliGRAM(s) IV Push daily  glucagon  Injectable 1 milliGRAM(s) IntraMuscular once  pantoprazole  Injectable 40 milliGRAM(s) IV Push every 12 hours  sucralfate suspension 1 Gram(s) Oral four times a day  vancomycin    Solution 125 milliGRAM(s) Oral every 6 hours  zinc oxide 40% Paste 1 Application(s) Topical two times a day    Vital Signs Last 24 Hrs  T(C): 36.7 (09 Mar 2025 08:22), Max: 36.9 (09 Mar 2025 00:10)  T(F): 98.1 (09 Mar 2025 08:22), Max: 98.4 (09 Mar 2025 00:10)  HR: 95 (09 Mar 2025 08:22) (88 - 95)  BP: 146/81 (09 Mar 2025 08:22) (140/76 - 146/81)  BP(mean): --  RR: 18 (09 Mar 2025 08:22) (18 - 18)  SpO2: 95% (09 Mar 2025 08:22) (95% - 100%)    Parameters below as of 09 Mar 2025 08:22  Patient On (Oxygen Delivery Method): nasal cannula     Physical exam:    Constitutional:  No acute distress  HEENT: NC/AT, EOMI, PERRLA, conjunctivae clear; ears and nose atraumatic; pharynx benign  Neck: supple; thyroid not palpable  Back: no tenderness  Respiratory: respiratory effort normal; decreased BS at bases  Cardiovascular: S1S2 regular, no murmurs  Abdomen: soft, mild periombilical tender, not distended, positive BS; no liver or spleen organomegaly  Genitourinary: no suprapubic tenderness  Lymphatic: no LN palpable  Musculoskeletal: no muscle tenderness, no joint swelling or tenderness  Extremities: no pedal edema  Neurological/ Psychiatric: Alert, moving all extremities  Skin: no rashes; right lower chest wall dry, scabbed ulcer; no discharge     Labs: reviewed                        9.0    7.93  )-----------( 265      ( 09 Mar 2025 07:07 )             28.5     03-09    143  |  113[H]  |  31[H]  ----------------------------<  129[H]  3.9   |  23  |  1.48[H]    Ca    9.4      09 Mar 2025 07:07  Mg     1.6     03-09    TPro  6.0  /  Alb  2.0[L]  /  TBili  0.6  /  DBili  x   /  AST  17  /  ALT  21  /  AlkPhos  150[H]  03-09                        9.2    8.66  )-----------( 271      ( 07 Mar 2025 07:22 )             28.5     03-07    143  |  112[H]  |  37[H]  ----------------------------<  125[H]  3.8   |  22  |  1.69[H]    Ca    9.6      07 Mar 2025 07:22  Mg     1.8     03-07                        8.9    11.26 )-----------( 242      ( 05 Mar 2025 05:18 )             27.3     03-05    143  |  113[H]  |  52[H]  ----------------------------<  144[H]  3.0[L]   |  23  |  1.73[H]    Ca    9.3      05 Mar 2025 05:18  Phos  3.5     03-05  Mg     1.5     03-05    TPro  6.0  /  Alb  2.2[L]  /  TBili  0.7  /  DBili  x   /  AST  9[L]  /  ALT  24  /  AlkPhos  190[H]  03-05                        10.5   35.65 )-----------( 132      ( 21 Feb 2025 05:18 )             31.0     02-21    141  |  105  |  66[H]  ----------------------------<  358[H]  3.2[L]   |  26  |  1.33[H]    Ca    8.2[L]      21 Feb 2025 05:18  Phos  3.6     02-21  Mg     2.0     02-21    TPro  5.3[L]  /  Alb  2.1[L]  /  TBili  0.6  /  DBili  x   /  AST  56[H]  /  ALT  33  /  AlkPhos  329[H]  02-21    C-Reactive Protein: 209.0 mg/mL (02-15-25 @ 05:17)  C-Reactive Protein: 233.0 mg/mL (02-14-25 @ 06:50)  Ferritin: 1798 ng/mL (02-13-25 @ 18:46)    Culture - Blood (collected 04 Mar 2025 18:58)  Source: Blood None  Preliminary Report (08 Mar 2025 02:01):    No growth at 72 Hours    Culture - Blood (collected 04 Mar 2025 18:57)  Source: Blood None  Preliminary Report (08 Mar 2025 02:01):    No growth at 72 Hours    Culture - Blood (collected 04 Mar 2025 08:14)  Source: Blood None  Preliminary Report (07 Mar 2025 13:01):    No growth at 72 Hours    Culture - Blood (collected 04 Mar 2025 08:13)  Source: Blood None  Preliminary Report (07 Mar 2025 13:01):    No growth at 72 Hours    Culture - Catheter (collected 02 Mar 2025 15:30)  Source: Catheter Port Device  Final Report (07 Mar 2025 17:22):    No growth at 5 days    Culture - Blood (collected 28 Feb 2025 12:00)  Source: Blood None  Final Report (05 Mar 2025 19:00):    No growth at 5 days    Culture - Blood (collected 28 Feb 2025 11:54)  Source: Blood None  Gram Stain (02 Mar 2025 06:12):    Growth in aerobic bottle: Yeast like cells  Preliminary Report (07 Mar 2025 14:37):    Growth in aerobic bottle: Saccharomyces cerevisiae    Direct identification is available within approximately 3-5    hours either by Blood Panel Multiplexed PCR or Direct    MALDI-TOF. Details: [email or web address]  Organism: Blood Culture PCR (02 Mar 2025 08:38)  Organism: Blood Culture PCR (02 Mar 2025 08:38)      Method Type: PCR      -  Blood PCR Panel: NEG    Radiology: all available radiological tests reviewed    < from: US Abdomen Upper Quadrant Right (02.09.25 @ 14:55) >  Distended gallbladder with layering sludge.  8 mm right renal calculus without hydronephrosis.  Hepatomegaly.  Right renal cyst.  < end of copied text >    < from: CT Abdomen and Pelvis No Cont (02.09.25 @ 03:01) >  1.   Study somewhat limited due to absence of contrast.  2.   Axial images 71-76 of series 301 and concomitant coronal images demonstrate prominent soft tissue inseparable from the body of the pancreas consistent with pancreatic neoplasm.  3.   Some distension of gallbladder could be further evaluated with right upper quadrant sonography. No definite biliary dilatation.  < end of copied text >    < from: CT Abdomen and Pelvis No Cont (03.06.25 @ 16:32) >  Resolution of small and large bowel dilatation.  Mottled lucency involving a short segment of sigmoid colon suspect for pneumatosis which could be due to benign or ischemic etiology. Please correlate clinically and with laboratory values.  Minimal increase in amount of ascites.  Unchanged pancreatic body and umbilical masses.  < end of copied text >    Advanced directives addressed: full resuscitation

## 2025-03-10 LAB
ALBUMIN SERPL ELPH-MCNC: 2 G/DL — LOW (ref 3.3–5)
ALP SERPL-CCNC: 139 U/L — HIGH (ref 40–120)
ALT FLD-CCNC: 21 U/L — SIGNIFICANT CHANGE UP (ref 12–78)
ANION GAP SERPL CALC-SCNC: 4 MMOL/L — LOW (ref 5–17)
APTT BLD: 32.4 SEC — SIGNIFICANT CHANGE UP (ref 24.5–35.6)
AST SERPL-CCNC: 16 U/L — SIGNIFICANT CHANGE UP (ref 15–37)
BILIRUB SERPL-MCNC: 0.5 MG/DL — SIGNIFICANT CHANGE UP (ref 0.2–1.2)
BUN SERPL-MCNC: 29 MG/DL — HIGH (ref 7–23)
CALCIUM SERPL-MCNC: 9.4 MG/DL — SIGNIFICANT CHANGE UP (ref 8.5–10.1)
CHLORIDE SERPL-SCNC: 109 MMOL/L — HIGH (ref 96–108)
CO2 SERPL-SCNC: 27 MMOL/L — SIGNIFICANT CHANGE UP (ref 22–31)
CREAT SERPL-MCNC: 1.39 MG/DL — HIGH (ref 0.5–1.3)
CULTURE RESULTS: SIGNIFICANT CHANGE UP
CULTURE RESULTS: SIGNIFICANT CHANGE UP
EGFR: 41 ML/MIN/1.73M2 — LOW
EGFR: 41 ML/MIN/1.73M2 — LOW
GLUCOSE BLDC GLUCOMTR-MCNC: 120 MG/DL — HIGH (ref 70–99)
GLUCOSE SERPL-MCNC: 121 MG/DL — HIGH (ref 70–99)
HCT VFR BLD CALC: 28.3 % — LOW (ref 34.5–45)
HGB BLD-MCNC: 9 G/DL — LOW (ref 11.5–15.5)
INR BLD: 1.22 RATIO — HIGH (ref 0.85–1.16)
MAGNESIUM SERPL-MCNC: 1.6 MG/DL — SIGNIFICANT CHANGE UP (ref 1.6–2.6)
MCHC RBC-ENTMCNC: 28.3 PG — SIGNIFICANT CHANGE UP (ref 27–34)
MCHC RBC-ENTMCNC: 31.8 G/DL — LOW (ref 32–36)
MCV RBC AUTO: 89 FL — SIGNIFICANT CHANGE UP (ref 80–100)
NRBC # BLD AUTO: 0 K/UL — SIGNIFICANT CHANGE UP (ref 0–0)
NRBC # FLD: 0 K/UL — SIGNIFICANT CHANGE UP (ref 0–0)
NRBC BLD AUTO-RTO: 0 /100 WBCS — SIGNIFICANT CHANGE UP (ref 0–0)
PLATELET # BLD AUTO: 294 K/UL — SIGNIFICANT CHANGE UP (ref 150–400)
PMV BLD: 11.5 FL — SIGNIFICANT CHANGE UP (ref 7–13)
POTASSIUM SERPL-MCNC: 3.4 MMOL/L — LOW (ref 3.5–5.3)
POTASSIUM SERPL-SCNC: 3.4 MMOL/L — LOW (ref 3.5–5.3)
PROT SERPL-MCNC: 6 GM/DL — SIGNIFICANT CHANGE UP (ref 6–8.3)
PROTHROM AB SERPL-ACNC: 14.4 SEC — HIGH (ref 9.9–13.4)
RBC # BLD: 3.18 M/UL — LOW (ref 3.8–5.2)
RBC # FLD: 15.3 % — HIGH (ref 10.3–14.5)
SODIUM SERPL-SCNC: 140 MMOL/L — SIGNIFICANT CHANGE UP (ref 135–145)
SPECIMEN SOURCE: SIGNIFICANT CHANGE UP
WBC # BLD: 9.17 K/UL — SIGNIFICANT CHANGE UP (ref 3.8–10.5)
WBC # FLD AUTO: 9.17 K/UL — SIGNIFICANT CHANGE UP (ref 3.8–10.5)

## 2025-03-10 PROCEDURE — 93010 ELECTROCARDIOGRAM REPORT: CPT

## 2025-03-10 PROCEDURE — 74176 CT ABD & PELVIS W/O CONTRAST: CPT | Mod: 26

## 2025-03-10 PROCEDURE — 99233 SBSQ HOSP IP/OBS HIGH 50: CPT

## 2025-03-10 PROCEDURE — 37191 INS ENDOVAS VENA CAVA FILTR: CPT

## 2025-03-10 RX ORDER — MAGNESIUM HYDROXIDE 400 MG/5ML
30 SUSPENSION ORAL ONCE
Refills: 0 | Status: COMPLETED | OUTPATIENT
Start: 2025-03-10 | End: 2025-03-10

## 2025-03-10 RX ADMIN — Medication 125 MILLIGRAM(S): at 23:00

## 2025-03-10 RX ADMIN — Medication 40 MILLIGRAM(S): at 21:39

## 2025-03-10 RX ADMIN — Medication 125 MILLIGRAM(S): at 18:21

## 2025-03-10 RX ADMIN — Medication 2000 UNIT(S): at 21:39

## 2025-03-10 RX ADMIN — Medication 125 MILLIGRAM(S): at 06:11

## 2025-03-10 RX ADMIN — CASPOFUNGIN ACETATE 260 MILLIGRAM(S): 5 INJECTION, POWDER, LYOPHILIZED, FOR SOLUTION INTRAVENOUS at 09:18

## 2025-03-10 RX ADMIN — Medication 40 MILLIEQUIVALENT(S): at 21:40

## 2025-03-10 RX ADMIN — TOUCHLESS CARE ZINC OXIDE PROTECTANT 1 APPLICATION(S): 20; 25 SPRAY TOPICAL at 21:39

## 2025-03-10 RX ADMIN — MAGNESIUM HYDROXIDE 30 MILLILITER(S): 400 SUSPENSION ORAL at 21:39

## 2025-03-10 RX ADMIN — Medication 125 MILLIGRAM(S): at 14:27

## 2025-03-10 RX ADMIN — Medication 40 MILLIGRAM(S): at 09:18

## 2025-03-10 RX ADMIN — TOUCHLESS CARE ZINC OXIDE PROTECTANT 1 APPLICATION(S): 20; 25 SPRAY TOPICAL at 14:30

## 2025-03-10 RX ADMIN — Medication 20 MILLIGRAM(S): at 21:38

## 2025-03-10 RX ADMIN — Medication 1 APPLICATION(S): at 09:06

## 2025-03-10 NOTE — PROGRESS NOTE ADULT - SUBJECTIVE AND OBJECTIVE BOX
Subjective:    pat lying in bed, no new complaint, waiting for GFF today, on NRB using on & off, awake no respiratory distress.    Home Medications:  amitriptyline: 10 milligram(s) orally once a day (23 Dec 2024 18:06)  amLODIPine 5 mg oral tablet: 1 tab(s) orally once a day (23 Dec 2024 18:06)  aspirin 81 mg oral tablet, chewable: 1 tab(s) orally once a day (23 Dec 2024 18:06)  atorvastatin 40 mg oral tablet: 1 tab(s) orally once a day (at bedtime) (23 Dec 2024 18:06)  cefepime 2 g intravenous injection: 2 gram(s) intravenous every 12 hours (04 Mar 2025 21:11)  clopidogrel 75 mg oral tablet: 1 tab(s) orally once a day (23 Dec 2024 16:32)  filgrastim: 480 microgram(s) subcutaneous once a day (04 Mar 2025 21:11)  Lactated Ringers Injection intravenous solution: 150 milligram(s) intravenous every 1 to 2 hours (04 Mar 2025 21:11)  loperamide 2 mg oral capsule: 1 cap(s) orally once (04 Mar 2025 21:11)  midodrine 5 mg oral tablet: 1 tab(s) orally every 8 hours (04 Mar 2025 21:11)  Multiple Vitamins with Minerals oral liquid: 1 orally once a day (04 Mar 2025 21:11)  octreotide 2500 mcg/mL subcutaneous solution: 0.04 milliliter(s) subcutaneous 3 times a day (04 Mar 2025 21:11)  Pepcid 40 mg oral tablet: 40 milligram(s) orally once a day as needed for dyspepsia (23 Dec 2024 18:06)  sucralfate 1 g/10 mL oral suspension: 10 milliliter(s) orally 4 times a day (04 Mar 2025 21:11)  trimethobenzamide 100 mg/mL intramuscular solution: 2 milliliter(s) intramuscular every 8 hours As needed Nausea and/or Vomiting (04 Mar 2025 21:11)    MEDICATIONS  (STANDING):  acetaminophen   IVPB .. 1000 milliGRAM(s) IV Intermittent once  caspofungin IVPB 50 milliGRAM(s) IV Intermittent every 24 hours  caspofungin IVPB      chlorhexidine 2% Cloths 1 Application(s) Topical <User Schedule>  cholecalciferol 2000 Unit(s) Oral at bedtime  dextrose 5% + sodium chloride 0.45%. 1000 milliLiter(s) (75 mL/Hr) IV Continuous <Continuous>  dextrose 5%. 1000 milliLiter(s) (50 mL/Hr) IV Continuous <Continuous>  dextrose 5%. 1000 milliLiter(s) (100 mL/Hr) IV Continuous <Continuous>  famotidine Injectable 20 milliGRAM(s) IV Push daily  glucagon  Injectable 1 milliGRAM(s) IntraMuscular once  pantoprazole  Injectable 40 milliGRAM(s) IV Push every 12 hours  potassium chloride   Powder 40 milliEquivalent(s) Oral two times a day  vancomycin    Solution 125 milliGRAM(s) Oral every 6 hours  zinc oxide 40% Paste 1 Application(s) Topical two times a day    MEDICATIONS  (PRN):  acetaminophen     Tablet .. 650 milliGRAM(s) Oral every 6 hours PRN Temp greater or equal to 38C (100.4F), Mild Pain (1 - 3)  aluminum hydroxide/magnesium hydroxide/simethicone Suspension 30 milliLiter(s) Oral every 4 hours PRN Dyspepsia  Biotene Dry Mouth Oral Rinse 15 milliLiter(s) Swish and Spit five times a day PRN Mouth Care  ondansetron Injectable 4 milliGRAM(s) IV Push every 8 hours PRN Nausea and/or Vomiting  simethicone 80 milliGRAM(s) Chew every 4 hours PRN Upset Stomach  sodium chloride 0.65% Nasal 1 Spray(s) Both Nostrils two times a day PRN Nasal Congestion      Allergies    No Known Allergies    Intolerances        Vital Signs Last 24 Hrs  T(C): 36.5 (10 Mar 2025 07:52), Max: 36.5 (10 Mar 2025 07:52)  T(F): 97.7 (10 Mar 2025 07:52), Max: 97.7 (10 Mar 2025 07:52)  HR: 99 (10 Mar 2025 07:52) (97 - 99)  BP: 151/86 (10 Mar 2025 07:52) (144/81 - 151/86)  BP(mean): 96 (10 Mar 2025 00:00) (96 - 96)  RR: 16 (10 Mar 2025 07:52) (16 - 18)  SpO2: 98% (10 Mar 2025 07:52) (97% - 98%)    Parameters below as of 10 Mar 2025 07:52  Patient On (Oxygen Delivery Method): nasal cannula          PHYSICAL EXAMINATION:    NECK:  Supple. No lymphadenopathy. Jugular venous pressure not elevated. Carotids equal.   HEART:   The cardiac impulse has a normal quality. Reg., Nl S1 and S2.  There are no murmurs, rubs or gallops noted  CHEST:  Chest rhonchi to auscultation. Normal respiratory effort.  ABDOMEN:  Soft and nontender.   EXTREMITIES:  There is no edema.       LABS:                        9.0    9.17  )-----------( 294      ( 10 Mar 2025 06:40 )             28.3     03-10    140  |  109[H]  |  29[H]  ----------------------------<  121[H]  3.4[L]   |  27  |  1.39[H]    Ca    9.4      10 Mar 2025 06:40  Mg     1.6     03-10    TPro  6.0  /  Alb  2.0[L]  /  TBili  0.5  /  DBili  x   /  AST  16  /  ALT  21  /  AlkPhos  139[H]  03-10    PT/INR - ( 10 Mar 2025 06:40 )   PT: 14.4 sec;   INR: 1.22 ratio         PTT - ( 10 Mar 2025 06:40 )  PTT:32.4 sec  Urinalysis Basic - ( 10 Mar 2025 06:40 )    Color: x / Appearance: x / SG: x / pH: x  Gluc: 121 mg/dL / Ketone: x  / Bili: x / Urobili: x   Blood: x / Protein: x / Nitrite: x   Leuk Esterase: x / RBC: x / WBC x   Sq Epi: x / Non Sq Epi: x / Bacteria: x

## 2025-03-10 NOTE — PROGRESS NOTE ADULT - SUBJECTIVE AND OBJECTIVE BOX
INTERVAL HPI/OVERNIGHT EVENTS:  Patient S&E at bedside. 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      GERD (gastroesophageal reflux disease)      Cyclical vomiting syndrome      CVA (cerebrovascular accident)      H/O: hysterectomy      H/O squamous cell carcinoma excision          FAMILY HISTORY:  No pertinent family history in first degree relatives        VITAL SIGNS:  T(F): 97.7 (03-10-25 @ 07:52)  HR: 99 (03-10-25 @ 07:52)  BP: 151/86 (03-10-25 @ 07:52)  RR: 16 (03-10-25 @ 07:52)  SpO2: 98% (03-10-25 @ 07:52)  Wt(kg): --    PHYSICAL EXAM:    Constitutional: NAD  Eyes: EOMI,  Respiratory: CTA b/l, off nc   Cardiovascular: RRR, no M/R/G  Gastrointestinal: soft, no tenderness today   Neurological: AAOx3        MEDICATIONS  (STANDING):  acetaminophen   IVPB .. 1000 milliGRAM(s) IV Intermittent once  caspofungin IVPB 50 milliGRAM(s) IV Intermittent every 24 hours  caspofungin IVPB      chlorhexidine 2% Cloths 1 Application(s) Topical <User Schedule>  cholecalciferol 2000 Unit(s) Oral at bedtime  dextrose 5% + sodium chloride 0.45%. 1000 milliLiter(s) (75 mL/Hr) IV Continuous <Continuous>  dextrose 5%. 1000 milliLiter(s) (100 mL/Hr) IV Continuous <Continuous>  dextrose 5%. 1000 milliLiter(s) (50 mL/Hr) IV Continuous <Continuous>  famotidine Injectable 20 milliGRAM(s) IV Push daily  glucagon  Injectable 1 milliGRAM(s) IntraMuscular once  pantoprazole  Injectable 40 milliGRAM(s) IV Push every 12 hours  potassium chloride   Powder 40 milliEquivalent(s) Oral two times a day  vancomycin    Solution 125 milliGRAM(s) Oral every 6 hours  zinc oxide 40% Paste 1 Application(s) Topical two times a day    MEDICATIONS  (PRN):  acetaminophen     Tablet .. 650 milliGRAM(s) Oral every 6 hours PRN Temp greater or equal to 38C (100.4F), Mild Pain (1 - 3)  aluminum hydroxide/magnesium hydroxide/simethicone Suspension 30 milliLiter(s) Oral every 4 hours PRN Dyspepsia  Biotene Dry Mouth Oral Rinse 15 milliLiter(s) Swish and Spit five times a day PRN Mouth Care  ondansetron Injectable 4 milliGRAM(s) IV Push every 8 hours PRN Nausea and/or Vomiting  simethicone 80 milliGRAM(s) Chew every 4 hours PRN Upset Stomach  sodium chloride 0.65% Nasal 1 Spray(s) Both Nostrils two times a day PRN Nasal Congestion      Allergies    No Known Allergies    Intolerances        LABS:                        9.0    9.17  )-----------( 294      ( 10 Mar 2025 06:40 )             28.3     03-10    140  |  109[H]  |  29[H]  ----------------------------<  121[H]  3.4[L]   |  27  |  1.39[H]    Ca    9.4      10 Mar 2025 06:40  Mg     1.6     03-10    TPro  6.0  /  Alb  2.0[L]  /  TBili  0.5  /  DBili  x   /  AST  16  /  ALT  21  /  AlkPhos  139[H]  03-10    PT/INR - ( 10 Mar 2025 06:40 )   PT: 14.4 sec;   INR: 1.22 ratio         PTT - ( 10 Mar 2025 06:40 )  PTT:32.4 sec  Urinalysis Basic - ( 10 Mar 2025 06:40 )    Color: x / Appearance: x / SG: x / pH: x  Gluc: 121 mg/dL / Ketone: x  / Bili: x / Urobili: x   Blood: x / Protein: x / Nitrite: x   Leuk Esterase: x / RBC: x / WBC x   Sq Epi: x / Non Sq Epi: x / Bacteria: x        RADIOLOGY & ADDITIONAL TESTS:  Studies reviewed.

## 2025-03-10 NOTE — PROVIDER CONTACT NOTE (OTHER) - NAME OF MD/NP/PA/DO NOTIFIED:
dusty AREVALO
Dr Frank Clement (PCP)
Dr Ezekiel Sweet
Dr. Santana
barbie AREVALO
Dr. Santana/ ICU- Rapid response called

## 2025-03-10 NOTE — PROGRESS NOTE ADULT - ASSESSMENT
69-year-old female with stage IV pancreatic adenocarcinoma presenting with neutropenic fever and severe treatment-related toxicities from modified FOLFIRINOX and investigational EMILY inhibitor [id number].    PROBLEMS:    Febrile syndrome. Fungemia. Likely disseminated candidemia.   Dysphagia. Possible candida esophagitis  New febrile syndrome with chills. Possible sepsis. Dysphagia. Possible candida esophagitis, S/p sepsis with PSAE   Acute hypoxic respiratory failure likely secondary to hypervolemia   Bilateral pleural effusions with compressive atelectasis, right greater than left  Neutropenic Fever/Neutropenic Septic shock  Severe Thrombocytopenia-Platelet count critically low at 26k.   Severe diarrhea and inability to tolerate PO intake, likely multifactorial from both FOLFIRINOX (particularly irinotecan component) and study drug [id number].   History of left cephalic vein thrombosis.  MALLORY  CT Angio Chest PE Protocol w/ IV Cont (02.19.25 @ 14:16) >  CHEST:  Multiple pulmonary emboli within the segmental branches of the left lower and right lower lobe pulmonary arteries.  There are two right middle lobe nodules, new since prior. Exact   etiology is unclear. Primary differential diagnostic consideration   includes infection.  CT Abdomen and Pelvis-Resolution of small and large bowel dilatation. Mottled lucency involving a short segment of sigmoid colon suspect for pneumatosis which could be due to benign or ischemic etiology. Please correlate clinically and with laboratory values.  Minimal increase in amount of ascites.  Unchanged pancreatic body and umbilical masses.      PLAN:    Pulmonary stable- waiting GFF today  Trial of NRB mask fu with ABG with Pao2 60-70 before consider HFNC, as pat difficulty with HFNC previouly. hyperbaric oxygen consult for pneumatosis of bowel-d/w   Dc fluconazole  and d/c cefepime  IV caspofungin 70 mg IV x1, then 50 mg IV qd/Iv meropenem  CTA chest + small PEs in RLL and LLL- off IV heparin causing bleeding-waiting GFF- now fungaemia  Family does not want any procedure EGD/Colonscopy  DECD steroids  High WBC-trend  Pulmonary tachpnea may be RTA with low bicarbonate because of GI cause with diarrhea but improving renal fx  Neutropenic Septic shock- IV meropenem started on 2/15- previously on CEFEPIME/Continue filgrastim 480mcg daily-Today w/ improvement in counts- WBC 1.3, Hb 10.1, plt 37, cmp with k 2.3 and Repleted, Cr 1.40.   Daily CBC monitoring  Maintain strict neutropenic precautions  Anemia-received two units of pRBCs Hb 10.1 today   Thrombocytopenia-Transfuse for plts < 15   D/w staff & /haematology & ICU in detail

## 2025-03-10 NOTE — PROGRESS NOTE ADULT - ASSESSMENT
Metastatic pancreatic Ca. Etiology of her abdominal pain is multifactorial, including ileus to some degree, ascites, metastatic appearing implant at umbilicus. No definitive areas of concern on CT scan. Again seen is a focal segment of transverse colon thickening, possibly fistulized to jejunum. Unlikely source of her pain given the very subtle CT findings. Etiology could be tumor implant.  Would continue diet as tolerated. Trial MOM for BM. S/p IVC filter today- stable from that standpoint.

## 2025-03-10 NOTE — PROGRESS NOTE ADULT - SUBJECTIVE AND OBJECTIVE BOX
Feels OK, still with periumbilical abdominal discomfort especially shortly after eating or drinking. - BM today    VSS afeb    Cor- RRR  Abd- + BS distended, tympanitic, soft non tender, no guarding or rebound  Ext- trace edema      < from: CT Abdomen and Pelvis No Cont (03.06.25 @ 16:32) >  IMPRESSION:    Resolution of small and large bowel dilatation.    Mottled lucency involving a short segment of sigmoid colon suspect for   pneumatosis which could be due to benign or ischemic etiology. Please   correlate clinically and with laboratory values.    Minimal increase in amount of ascites.    Unchanged pancreatic body and umbilical masses.    < end of copied text >

## 2025-03-10 NOTE — PROGRESS NOTE ADULT - ASSESSMENT
Imp:  No further bleeding  Main GI issue is lack of PO intake  Pneumatosis seems clinically silent    Rec:  IVC filter today

## 2025-03-10 NOTE — PROGRESS NOTE ADULT - ASSESSMENT
69-year-old female with stage IV pancreatic adenocarcinoma presenting with neutropenic fever and severe treatment-related toxicities from modified FOLFIRINOX and investigational EMILY inhibitor [id number] found to have PE and LBO.     # pancreatic ca   - remains on clinical trial- supportive care while in hospital   - will continue with [place]  [facility] [place] on [place].  no DPD deficiency   - Review clinical trial protocol - may remain on trial drug alone and changing regimen- to be discussed when recovers from underlying illness and admission   -  spoke w/ Dr. Barr - will need to get to office at [facility] [place] to discuss continuing with treatment   - if goes to rehab will be holding chemotherapy     # Neutropenic Septic shock - +fungal cultures   - pt previously on neupogen- WBC has now recovered and elevated likely 2/2 GCSF support   - BCx with fungal growth likely disseminated candidiasis- spoke with dr lindsey- he called lab to find out speciation   - repeat cultuures NEG to date (2/28 culture grew saccharomyces cerevisiae)  -  ID - c/w caspofungin. meropenem   - removed mediport  - most recent CT a/p 3/6- resolution of sbo/LBO, mottled lucency involving a short segment of sigmoid colon suspect for pneumatosis which could be due to benign or ischemic etiology- send lactate    # PE   - v/q scan w indeterminate prob of PE, linear defect in RUL posterior segment   - CT chest- multiple PE w/in segmental left lower and right lower, 2 right middle lobe nodules,   - most recent US LE doppler negative    - remains off a/c given high risk - no sign of DVT   - ivc filter to be placed today to prevent further clot given inability to a/c    # anemia   - Hb stable   - keep type and screen active, transfuse for Hb <7      will follow and communicate with [facility]

## 2025-03-10 NOTE — PROGRESS NOTE ADULT - SUBJECTIVE AND OBJECTIVE BOX
Date of service: 03-10-25 @ 08:13    Lying in bed in NAD  Alert and verbal   Denies pain    ROS: no fever or chills; no HA, no SOB or cough, no abdominal pain, no diarrhea or constipation; no dysuria, no legs pain, no rashes    MEDICATIONS  (STANDING):  acetaminophen   IVPB .. 1000 milliGRAM(s) IV Intermittent once  caspofungin IVPB 50 milliGRAM(s) IV Intermittent every 24 hours  caspofungin IVPB      chlorhexidine 2% Cloths 1 Application(s) Topical <User Schedule>  cholecalciferol 2000 Unit(s) Oral at bedtime  dextrose 5% + sodium chloride 0.45%. 1000 milliLiter(s) (75 mL/Hr) IV Continuous <Continuous>  dextrose 5%. 1000 milliLiter(s) (100 mL/Hr) IV Continuous <Continuous>  dextrose 5%. 1000 milliLiter(s) (50 mL/Hr) IV Continuous <Continuous>  famotidine Injectable 20 milliGRAM(s) IV Push daily  glucagon  Injectable 1 milliGRAM(s) IntraMuscular once  pantoprazole  Injectable 40 milliGRAM(s) IV Push every 12 hours  potassium chloride   Powder 40 milliEquivalent(s) Oral two times a day  sucralfate suspension 1 Gram(s) Oral four times a day  vancomycin    Solution 125 milliGRAM(s) Oral every 6 hours  zinc oxide 40% Paste 1 Application(s) Topical two times a day    Vital Signs Last 24 Hrs  T(C): 36.5 (10 Mar 2025 07:52), Max: 36.8 (09 Mar 2025 15:08)  T(F): 97.7 (10 Mar 2025 07:52), Max: 98.2 (09 Mar 2025 15:08)  HR: 99 (10 Mar 2025 07:52) (84 - 99)  BP: 151/86 (10 Mar 2025 07:52) (144/81 - 154/74)  BP(mean): 96 (10 Mar 2025 00:00) (96 - 96)  RR: 16 (10 Mar 2025 07:52) (16 - 18)  SpO2: 98% (10 Mar 2025 07:52) (95% - 100%)    Parameters below as of 10 Mar 2025 07:52  Patient On (Oxygen Delivery Method): nasal cannula     Physical exam:    Constitutional:  No acute distress  HEENT: NC/AT, EOMI, PERRLA, conjunctivae clear; ears and nose atraumatic; pharynx benign  Neck: supple; thyroid not palpable  Back: no tenderness  Respiratory: respiratory effort normal; decreased BS at bases  Cardiovascular: S1S2 regular, no murmurs  Abdomen: soft, mild periombilical tender, not distended, positive BS; no liver or spleen organomegaly  Genitourinary: no suprapubic tenderness  Lymphatic: no LN palpable  Musculoskeletal: no muscle tenderness, no joint swelling or tenderness  Extremities: no pedal edema  Neurological/ Psychiatric: Alert, moving all extremities  Skin: no rashes; right lower chest wall dry, scabbed ulcer; no discharge     Labs: reviewed                        9.0    7.93  )-----------( 265      ( 09 Mar 2025 07:07 )             28.5     03-09    143  |  113[H]  |  31[H]  ----------------------------<  129[H]  3.9   |  23  |  1.48[H]    Ca    9.4      09 Mar 2025 07:07  Mg     1.6     03-09    TPro  6.0  /  Alb  2.0[L]  /  TBili  0.6  /  DBili  x   /  AST  17  /  ALT  21  /  AlkPhos  150[H]  03-09                        9.2    8.66  )-----------( 271      ( 07 Mar 2025 07:22 )             28.5     03-07    143  |  112[H]  |  37[H]  ----------------------------<  125[H]  3.8   |  22  |  1.69[H]    Ca    9.6      07 Mar 2025 07:22  Mg     1.8     03-07                        8.9    11.26 )-----------( 242      ( 05 Mar 2025 05:18 )             27.3     03-05    143  |  113[H]  |  52[H]  ----------------------------<  144[H]  3.0[L]   |  23  |  1.73[H]    Ca    9.3      05 Mar 2025 05:18  Phos  3.5     03-05  Mg     1.5     03-05    TPro  6.0  /  Alb  2.2[L]  /  TBili  0.7  /  DBili  x   /  AST  9[L]  /  ALT  24  /  AlkPhos  190[H]  03-05                        10.5   35.65 )-----------( 132      ( 21 Feb 2025 05:18 )             31.0     02-21    141  |  105  |  66[H]  ----------------------------<  358[H]  3.2[L]   |  26  |  1.33[H]    Ca    8.2[L]      21 Feb 2025 05:18  Phos  3.6     02-21  Mg     2.0     02-21    TPro  5.3[L]  /  Alb  2.1[L]  /  TBili  0.6  /  DBili  x   /  AST  56[H]  /  ALT  33  /  AlkPhos  329[H]  02-21    C-Reactive Protein: 209.0 mg/mL (02-15-25 @ 05:17)  C-Reactive Protein: 233.0 mg/mL (02-14-25 @ 06:50)  Ferritin: 1798 ng/mL (02-13-25 @ 18:46)    Culture - Blood (collected 04 Mar 2025 18:58)  Source: Blood None  Preliminary Report (08 Mar 2025 02:01):    No growth at 72 Hours    Culture - Blood (collected 04 Mar 2025 18:57)  Source: Blood None  Preliminary Report (08 Mar 2025 02:01):    No growth at 72 Hours    Culture - Blood (collected 04 Mar 2025 08:14)  Source: Blood None  Preliminary Report (07 Mar 2025 13:01):    No growth at 72 Hours    Culture - Blood (collected 04 Mar 2025 08:13)  Source: Blood None  Preliminary Report (07 Mar 2025 13:01):    No growth at 72 Hours    Culture - Catheter (collected 02 Mar 2025 15:30)  Source: Catheter Port Device  Final Report (07 Mar 2025 17:22):    No growth at 5 days    Culture - Blood (collected 28 Feb 2025 12:00)  Source: Blood None  Final Report (05 Mar 2025 19:00):    No growth at 5 days    Culture - Blood (collected 28 Feb 2025 11:54)  Source: Blood None  Gram Stain (02 Mar 2025 06:12):    Growth in aerobic bottle: Yeast like cells  Preliminary Report (07 Mar 2025 14:37):    Growth in aerobic bottle: Saccharomyces cerevisiae    Direct identification is available within approximately 3-5    hours either by Blood Panel Multiplexed PCR or Direct    MALDI-TOF. Details: [email or web address]  Organism: Blood Culture PCR (02 Mar 2025 08:38)  Organism: Blood Culture PCR (02 Mar 2025 08:38)      Method Type: PCR      -  Blood PCR Panel: NEG    Radiology: all available radiological tests reviewed    < from: US Abdomen Upper Quadrant Right (02.09.25 @ 14:55) >  Distended gallbladder with layering sludge.  8 mm right renal calculus without hydronephrosis.  Hepatomegaly.  Right renal cyst.  < end of copied text >    < from: CT Abdomen and Pelvis No Cont (02.09.25 @ 03:01) >  1.   Study somewhat limited due to absence of contrast.  2.   Axial images 71-76 of series 301 and concomitant coronal images demonstrate prominent soft tissue inseparable from the body of the pancreas consistent with pancreatic neoplasm.  3.   Some distension of gallbladder could be further evaluated with right upper quadrant sonography. No definite biliary dilatation.  < end of copied text >    < from: CT Abdomen and Pelvis No Cont (03.06.25 @ 16:32) >  Resolution of small and large bowel dilatation.  Mottled lucency involving a short segment of sigmoid colon suspect for pneumatosis which could be due to benign or ischemic etiology. Please correlate clinically and with laboratory values.  Minimal increase in amount of ascites.  Unchanged pancreatic body and umbilical masses.  < end of copied text >    Advanced directives addressed: full resuscitation

## 2025-03-10 NOTE — PROVIDER CONTACT NOTE (OTHER) - REASON
Dr Frank Clement (PCP)
[place] post transfusion
tachycardia
Dr Ezekiel Sweet
Raised area noted to mid sternum
pt tachypneic, RR 35, rectal temp 100.7

## 2025-03-10 NOTE — PROGRESS NOTE ADULT - SUBJECTIVE AND OBJECTIVE BOX
HOSPITALIST ATTENDING PROGRESS NOTE    Chart and meds reviewed.  Patient seen and examined.    CC: Sepsis    Subjective: Feeling better, still with abd pain but improved. No fever. Tolerating some diet.     All other systems reviewed and found to be negative with the exception of what has been described above.    MEDICATIONS  (STANDING):  acetaminophen   IVPB .. 1000 milliGRAM(s) IV Intermittent once  caspofungin IVPB 50 milliGRAM(s) IV Intermittent every 24 hours  caspofungin IVPB      chlorhexidine 2% Cloths 1 Application(s) Topical <User Schedule>  cholecalciferol 2000 Unit(s) Oral at bedtime  dextrose 5% + sodium chloride 0.45%. 1000 milliLiter(s) (75 mL/Hr) IV Continuous <Continuous>  dextrose 5%. 1000 milliLiter(s) (50 mL/Hr) IV Continuous <Continuous>  dextrose 5%. 1000 milliLiter(s) (100 mL/Hr) IV Continuous <Continuous>  famotidine Injectable 20 milliGRAM(s) IV Push daily  glucagon  Injectable 1 milliGRAM(s) IntraMuscular once  pantoprazole  Injectable 40 milliGRAM(s) IV Push every 12 hours  potassium chloride   Powder 40 milliEquivalent(s) Oral two times a day  vancomycin    Solution 125 milliGRAM(s) Oral every 6 hours  zinc oxide 40% Paste 1 Application(s) Topical two times a day    MEDICATIONS  (PRN):  acetaminophen     Tablet .. 650 milliGRAM(s) Oral every 6 hours PRN Temp greater or equal to 38C (100.4F), Mild Pain (1 - 3)  aluminum hydroxide/magnesium hydroxide/simethicone Suspension 30 milliLiter(s) Oral every 4 hours PRN Dyspepsia  Biotene Dry Mouth Oral Rinse 15 milliLiter(s) Swish and Spit five times a day PRN Mouth Care  ondansetron Injectable 4 milliGRAM(s) IV Push every 8 hours PRN Nausea and/or Vomiting  simethicone 80 milliGRAM(s) Chew every 4 hours PRN Upset Stomach  sodium chloride 0.65% Nasal 1 Spray(s) Both Nostrils two times a day PRN Nasal Congestion    Vital Signs Last 24 Hrs  T(C): 36.5 (10 Mar 2025 07:52), Max: 36.8 (09 Mar 2025 15:08)  T(F): 97.7 (10 Mar 2025 07:52), Max: 98.2 (09 Mar 2025 15:08)  HR: 99 (10 Mar 2025 07:52) (84 - 99)  BP: 151/86 (10 Mar 2025 07:52) (144/81 - 154/74)  BP(mean): 96 (10 Mar 2025 00:00) (96 - 96)  RR: 16 (10 Mar 2025 07:52) (16 - 18)  SpO2: 98% (10 Mar 2025 07:52) (97% - 100%)    Parameters below as of 10 Mar 2025 07:52  Patient On (Oxygen Delivery Method): nasal cannula    GEN: NAD, Frail   HEENT:  pupils equal and reactive, EOMI, no oropharyngeal lesions, erythema, exudates, oral thrush  NECK:   supple, no carotid bruits  CV:  +S1, +S2, regular, no murmurs  RESP:   lungs clear to auscultation bilaterally, no wheezing, rales, rhonchi, good air entry bilaterally  GI:  abdomen soft, non-tender, Distended, normal BS, periumbilical mass  EXT:  no clubbing, no cyanosis, no edema, no calf pain, swelling or erythema  NEURO:  AAOX3, no focal neurological deficits, follows all commands, able to move extremities spontaneously  SKIN:  sacral DTI  : Julian     LABS:                          9.0    9.17  )-----------( 294      ( 10 Mar 2025 06:40 )             28.3     03-10    140  |  109[H]  |  29[H]  ----------------------------<  121[H]  3.4[L]   |  27  |  1.39[H]    Ca    9.4      10 Mar 2025 06:40  Mg     1.6     03-10    TPro  6.0  /  Alb  2.0[L]  /  TBili  0.5  /  DBili  x   /  AST  16  /  ALT  21  /  AlkPhos  139[H]  03-10      LIVER FUNCTIONS - ( 10 Mar 2025 06:40 )  Alb: 2.0 g/dL / Pro: 6.0 gm/dL / ALK PHOS: 139 U/L / ALT: 21 U/L / AST: 16 U/L / GGT: x           PT/INR - ( 10 Mar 2025 06:40 )   PT: 14.4 sec;   INR: 1.22 ratio    PTT - ( 10 Mar 2025 06:40 )  PTT:32.4 sec    Urinalysis Basic - ( 10 Mar 2025 06:40 )  Color: x / Appearance: x / SG: x / pH: x  Gluc: 121 mg/dL / Ketone: x  / Bili: x / Urobili: x   Blood: x / Protein: x / Nitrite: x   Leuk Esterase: x / RBC: x / WBC x   Sq Epi: x / Non Sq Epi: x / Bacteria: x    < from: CT Abdomen and Pelvis No Cont (03.06.25 @ 16:32) >  IMPRESSION:  Resolution of small and large bowel dilatation.    Mottled lucency involving a short segment of sigmoid colon suspect for   pneumatosis which could be due to benign or ischemic etiology. Please   correlate clinically and with laboratory values.    Minimal increase in amount of ascites.  Unchanged pancreatic body and umbilical masses.      : US Duplex Venous Lower Ext Complete, Bilateral (03.04.25 @ 10:23) >  IMPRESSION:  No evidence of deep venous thrombosis in either lower extremity.

## 2025-03-10 NOTE — PROGRESS NOTE ADULT - SUBJECTIVE AND OBJECTIVE BOX
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 1000 milliGRAM(s) IV Intermittent once  caspofungin IVPB 50 milliGRAM(s) IV Intermittent every 24 hours  caspofungin IVPB      chlorhexidine 2% Cloths 1 Application(s) Topical <User Schedule>  cholecalciferol 2000 Unit(s) Oral at bedtime  dextrose 5% + sodium chloride 0.45%. 1000 milliLiter(s) (75 mL/Hr) IV Continuous <Continuous>  dextrose 5%. 1000 milliLiter(s) (50 mL/Hr) IV Continuous <Continuous>  dextrose 5%. 1000 milliLiter(s) (100 mL/Hr) IV Continuous <Continuous>  famotidine Injectable 20 milliGRAM(s) IV Push daily  glucagon  Injectable 1 milliGRAM(s) IntraMuscular once  pantoprazole  Injectable 40 milliGRAM(s) IV Push every 12 hours  potassium chloride   Powder 40 milliEquivalent(s) Oral two times a day  vancomycin    Solution 125 milliGRAM(s) Oral every 6 hours  zinc oxide 40% Paste 1 Application(s) Topical two times a day    MEDICATIONS  (PRN):  acetaminophen     Tablet .. 650 milliGRAM(s) Oral every 6 hours PRN Temp greater or equal to 38C (100.4F), Mild Pain (1 - 3)  aluminum hydroxide/magnesium hydroxide/simethicone Suspension 30 milliLiter(s) Oral every 4 hours PRN Dyspepsia  Biotene Dry Mouth Oral Rinse 15 milliLiter(s) Swish and Spit five times a day PRN Mouth Care  ondansetron Injectable 4 milliGRAM(s) IV Push every 8 hours PRN Nausea and/or Vomiting  simethicone 80 milliGRAM(s) Chew every 4 hours PRN Upset Stomach  sodium chloride 0.65% Nasal 1 Spray(s) Both Nostrils two times a day PRN Nasal Congestion      REVIEW OF SYSTEMS:    RESPIRATORY: No shortness of breath  CARDIOVASCULAR: No chest pain  All other review of systems is negative unless indicated above.    Vital Signs Last 24 Hrs  T(C): 36.5 (10 Mar 2025 07:52), Max: 36.8 (09 Mar 2025 15:08)  T(F): 97.7 (10 Mar 2025 07:52), Max: 98.2 (09 Mar 2025 15:08)  HR: 99 (10 Mar 2025 07:52) (84 - 99)  BP: 151/86 (10 Mar 2025 07:52) (144/81 - 154/74)  BP(mean): 96 (10 Mar 2025 00:00) (96 - 96)  RR: 16 (10 Mar 2025 07:52) (16 - 18)  SpO2: 98% (10 Mar 2025 07:52) (97% - 100%)    Parameters below as of 10 Mar 2025 07:52  Patient On (Oxygen Delivery Method): nasal cannula        PHYSICAL EXAM:    Constitutional: NAD, chronically ill appearing  Respiratory: CTAB  Cardiovascular: S1 and S2, RRR  Gastrointestinal: BS+, soft, NT/ND  Psychiatric: Normal mood, normal affect    LABS:                        9.0    9.17  )-----------( 294      ( 10 Mar 2025 06:40 )             28.3     03-10    140  |  109[H]  |  29[H]  ----------------------------<  121[H]  3.4[L]   |  27  |  1.39[H]    Ca    9.4      10 Mar 2025 06:40  Mg     1.6     03-10    TPro  6.0  /  Alb  2.0[L]  /  TBili  0.5  /  DBili  x   /  AST  16  /  ALT  21  /  AlkPhos  139[H]  03-10    PT/INR - ( 10 Mar 2025 06:40 )   PT: 14.4 sec;   INR: 1.22 ratio         PTT - ( 10 Mar 2025 06:40 )  PTT:32.4 sec  LIVER FUNCTIONS - ( 10 Mar 2025 06:40 )  Alb: 2.0 g/dL / Pro: 6.0 gm/dL / ALK PHOS: 139 U/L / ALT: 21 U/L / AST: 16 U/L / GGT: x             RADIOLOGY & ADDITIONAL STUDIES:

## 2025-03-10 NOTE — PROGRESS NOTE ADULT - ASSESSMENT
68yo female with PMHx recent diagnosis of primary pancreatic adenocarcinoma of head with tumor in umbilicus, chemo-induced pancytopenia, SCC lip, cyclical vomiting syndrome, HTN, HPL, Gout, GERD, CVA on Aggrenox  Initially admitted to [facility] with septic shock 2/2 + pseudomonas bacteremia likely due to UTI vs colitis on 02/09/2025 Was having nausea, vomiting and diarrhea requiring Levophed drip, was transferred out of ICU to regular floor.  Then transferred to  as per request of . Was getting platelet transfusion and developed tachypnea hypoxia, rigors. Was diuresed and became hypotensive, Upgraded to SICU. CTA c/a/p on 2/19 showed multiple b/l PE and large bowel obstruction, was started on heparin drip, c/b GIB, then retrialed heparin drip with recurrent c/f GIB. Also with Afib w RVR during admission, resolved w iv metoprolol.   On 2/28, developed SIRS, Blood cx growing yeast. TPN stopped, R chest port removed on 3/2 and pt started on iv caspofungin.    #Sepsis (POA), immunocompromised host  #Prior pseudomonal bacteremia 2/2 UTI  #Mucositis, suspect esophageal candidiasis  #Fungemia- saccromyces  - Continue  iv caspofungin  - repeat blood cultures NTD  - Saccromyces,awaiting sensitivities  - Currently on meropenem as well  - On vanco po prophylaxis   - Follow up ID recs    #sigmoid colon pneumatosis   -lactate cleared 1.3 3/8  - Tolerated CLD, now on regular diet  - GI and surgery on board  - Continue meropenem as per surgery and ID  - Elemental diet  - Would not be able to tolerate HFNC at 200 mgHg  - Trial of NRB for treatment, discussed with pulm     #Acute bilateral PE  - Doppler LE negative  - CT chest- multiple PE w/in segmental left lower and right lower, 2 right middle lobe nodules,   - For IV filter  - Recurrent GIB on heparin    # GERD/Esophagitis  - Continue protonix   - Continue carafate    #MALLORY, likely ATN  - Stable at 1.4 3/9  - Continue IVF until can maintain po intake  - Nephrology on board    # Acute blood loss anemia and Malignancy  - SP GIB x 2 after starting heparin  -s/p pRBC x 6u this admission  -Hemoglobin stable 9.0  3/9  - Continue protonix    #Primary pancreatic adenocarcinoma  - SP folfirinox with reaction to irinotecan  - on a clinical trial now, on hold     #Skin injury Left and Rigth upper Gluteal Intergluteal cleft   - superficial skin necrosis  - Wound care evaluation appreciated  -Low air mattress  - Turning / positioning q2h while in bed    # Hypokalemia  # Hypomag  - replete prn    #DVT ppx  - SCDs       68yo female with PMHx recent diagnosis of 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  Then transferred to  as per request of . Was getting platelet transfusion and developed tachypnea hypoxia, rigors. Was diuresed and became hypotensive, Upgraded to SICU. CTA c/a/p on 2/19 showed multiple b/l PE and large bowel obstruction, was started on heparin drip, c/b GIB, then retrialed heparin drip with recurrent c/f GIB. Also with Afib w RVR during admission, resolved w iv metoprolol.   On 2/28, developed SIRS, Blood cx growing yeast. TPN stopped, R chest port removed on 3/2 and pt started on iv caspofungin.    #Sepsis (POA), immunocompromised host  #Prior pseudomonal bacteremia 2/2 UTI  #Mucositis, suspect esophageal candidiasis  #Fungemia- saccromyces  - Continue  iv caspofungin  - repeat blood cultures NTD  - Saccromyces,awaiting sensitivities  - SP Meropenem   - On vanco po prophylaxis   - Follow up ID recs    #sigmoid colon pneumatosis   - lactate cleared 1.3 3/8  - Tolerated CLD, now on regular diet  - GI and surgery on board  - Sp Meropenem  - Elemental diet  - Treatment with HFNC suggested for pneumatosis   - Would not be able to tolerate HFNC at 200 mgHg  - Trial of NRB for treatment, discussed with pulm   - Hyperbaric medicine consult    #Acute bilateral PE  - Doppler LE negative  - CT chest- multiple PE w/in segmental left lower and right lower, 2 right middle lobe nodules,   - For IV filter 3/10  - Recurrent GIB on heparin    # GERD/Esophagitis  - Continue protonix   - Continue carafate    #MALLORY, likely ATN  - Stable at 1.4 3/9  - Continue IVF until can maintain po intake  - Nephrology on board    # Acute blood loss anemia and Malignancy  - SP GIB x 2 after starting heparin  -s/p pRBC x 6u this admission  -Hemoglobin stable 9.0  3/9  - Continue protonix    #Primary pancreatic adenocarcinoma  - SP folfirinox with reaction to irinotecan  - on a clinical trial now, on hold     #Skin injury Left and Rigth upper Gluteal Intergluteal cleft   - superficial skin necrosis  - Wound care evaluation appreciated  -Low air mattress  - Turning / positioning q2h while in bed    # Hypokalemia  # Hypomag  - replete prn    #DVT ppx  - SCDs    Dispo: For IVC filter today, for MITCH. FU hyperbaric eval for pneumatosis. 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  Then transferred to  as per request of . Was getting platelet transfusion and developed tachypnea hypoxia, rigors. Was diuresed and became hypotensive, Upgraded to SICU. CTA c/a/p on 2/19 showed multiple b/l PE and large bowel obstruction, was started on heparin drip, c/b GIB, then retrialed heparin drip with recurrent c/f GIB. Also with Afib w RVR during admission, resolved w iv metoprolol.   On 2/28, developed SIRS, Blood cx growing yeast. TPN stopped, R chest port removed on 3/2 and pt started on iv caspofungin.    #Sepsis (POA), immunocompromised host  #Prior pseudomonal bacteremia 2/2 UTI  #Mucositis, suspect esophageal candidiasis  #Fungemia- saccromyces  - Continue  iv caspofungin  - repeat blood cultures NTD  - Saccromyces,awaiting sensitivities  - SP Meropenem   - On vanco po prophylaxis   - Follow up ID recs    #sigmoid colon pneumatosis   - lactate cleared 1.3 3/8  - Tolerated CLD, now on regular diet  - GI and surgery on board  - Sp Meropenem  - Elemental diet  - Treatment with HFNC suggested for pneumatosis   - Would not be able to tolerate HFNC at 200 mgHg  - Trial of NRB for treatment, discussed with pulm   - Hyperbaric medicine consult    #Acute bilateral PE  - Doppler LE negative  - CT chest- multiple PE w/in segmental left lower and right lower, 2 right middle lobe nodules,   - For IV filter 3/10  - Recurrent GIB on heparin    # GERD/Esophagitis  - Continue protonix   - Continue carafate    #MALLORY, likely ATN  - Stable at 1.4 3/9  - Continue IVF until can maintain po intake  - Nephrology on board    # Acute blood loss anemia and Malignancy  - SP GIB x 2 after starting heparin  -s/p pRBC x 6u this admission  - Hemoglobin stable 9.0  3/9  - Continue protonix    #Primary pancreatic adenocarcinoma  - SP folfirinox with reaction to irinotecan  - on a clinical trial now, on hold     #Skin injury Left and Rigth upper Gluteal Intergluteal cleft   - superficial skin necrosis  - Wound care evaluation appreciated  -Low air mattress  - Turning / positioning q2h while in bed    # Hypokalemia  # Hypomag  - replete prn    #DVT ppx  - SCDs    Dispo: For IVC filter today, for MITCH. FU hyperbaric eval for pneumatosis. PT

## 2025-03-10 NOTE — PROVIDER CONTACT NOTE (OTHER) - ASSESSMENT
Raised area noted to mid sternum. RCW port intact.- no swelling noted, no redness. Port flushed without resistance. + blood return noted. Assessed with IV team Amy. Per pt no Pain on palpation.
Rigors noted, AxOx4, , no other s/s of reaction.
pt  on auscultation, had patient bear down/blow thru a straw and HR came down to 105.

## 2025-03-10 NOTE — PROVIDER CONTACT NOTE (OTHER) - ACTION/TREATMENT ORDERED:
PA aware, EKG ordered    addendum: PA made aware of EKG results, no further orders at this time.
CT Chest. TPN on hold until CT read.
PA will come to evaluate
ICU at bedside- deferring to medicine at this time. Awaiting overnight MD. IV benadryl. Oral Tylenol given. another dose of IV lasix ordered. 3L O2.

## 2025-03-10 NOTE — PROVIDER CONTACT NOTE (OTHER) - RECOMMENDATIONS
IV benadryl. Rapid called. MD unable to assess pt at bedside. Change of shift.  Dr. Pleitez requesting ICU consult- concerned for pt.  requesting assessment of patient STAT.
pa to come evaluate patient, tylenol given
EKG
CT Chest/ CXR to assess area. Stop TPN until read.

## 2025-03-10 NOTE — PROVIDER CONTACT NOTE (OTHER) - DATE AND TIME:
15-Feb-2025 15:00
17-Feb-2025 11:37
05-Mar-2025 20:54
17-Feb-2025 11:44
10-Mar-2025 21:33
15-Feb-2025 19:24

## 2025-03-10 NOTE — PROGRESS NOTE ADULT - ASSESSMENT
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 for further care. She was admitted to [facility] with septic shock on 02/09/2025, nausea, vomiting and diarrhea requiring Levophed drip. Patient was being followed by GI, ID, Cardiology and Onc there. Patient has been transferred to  on patient's  (GI Dr. Pleitez) request. She was reported with non-bloody diarrhea x 3 in last 24 hours PTA. She denied abdominal pain, nausea, but has dry heaves. At [place] she was started on Vancomycin and Zosyn, then changed to Cefepime as her blood culture and urine cultures showed Pseudomonas aeruginosa.    #New sigmoid colon pneumatosis. ?ischemic colitis.  #Fungemia with Saccharomyces cerevisiae  #Dysphagia. Possible candida esophagitis  #S/p sepsis with PSAE   #Head of pancreas adenocarcinoma on chemotherapy  #Immunocompromised host  #Kidney stones  #ARF on CRF stage 3  -leukocytosis is improved  -low grade fever  -renal function is frail   -dysphagia with poor PO intake  -cultures reviewed  -s/p fluconazole 200 mg IV qd # 14  -s/p meropenem 1000 mg IV q8h # 5  -on vancomycin 125 mg PO q6h for CDAD prophylaxis  -on caspofungin 50 mg IV qd # 9   -tolerating abx well so far; no side effects noted  -surgical evaluation --> conservative care at this time  -repeat BC x 2 are negative to date  -plan for IVC filter placement  -monitor abdomen  -continue antifungal coverage and vancomycin PO  -f/u cultures  -monitor temps  -f/u CBC and BMP  -supportive care  2. Other issues:   -care per medicine    d/w medicine team

## 2025-03-11 LAB
ALBUMIN SERPL ELPH-MCNC: 2.2 G/DL — LOW (ref 3.3–5)
ALT FLD-CCNC: 20 U/L — SIGNIFICANT CHANGE UP (ref 12–78)
ANION GAP SERPL CALC-SCNC: 8 MMOL/L — SIGNIFICANT CHANGE UP (ref 5–17)
AST SERPL-CCNC: 19 U/L — SIGNIFICANT CHANGE UP (ref 15–37)
BILIRUB SERPL-MCNC: 0.7 MG/DL — SIGNIFICANT CHANGE UP (ref 0.2–1.2)
BUN SERPL-MCNC: 25 MG/DL — HIGH (ref 7–23)
CALCIUM SERPL-MCNC: 9.6 MG/DL — SIGNIFICANT CHANGE UP (ref 8.5–10.1)
CHLORIDE SERPL-SCNC: 107 MMOL/L — SIGNIFICANT CHANGE UP (ref 96–108)
CO2 SERPL-SCNC: 26 MMOL/L — SIGNIFICANT CHANGE UP (ref 22–31)
CREAT SERPL-MCNC: 1.4 MG/DL — HIGH (ref 0.5–1.3)
CRP SERPL-MCNC: 38.4 MG/ML — HIGH (ref 0–5)
EGFR: 40 ML/MIN/1.73M2 — LOW
EGFR: 40 ML/MIN/1.73M2 — LOW
GLUCOSE SERPL-MCNC: 123 MG/DL — HIGH (ref 70–99)
HCT VFR BLD CALC: 29.7 % — LOW (ref 34.5–45)
HGB BLD-MCNC: 9.4 G/DL — LOW (ref 11.5–15.5)
MAGNESIUM SERPL-MCNC: 1.7 MG/DL — SIGNIFICANT CHANGE UP (ref 1.6–2.6)
MCHC RBC-ENTMCNC: 27.8 PG — SIGNIFICANT CHANGE UP (ref 27–34)
MCHC RBC-ENTMCNC: 31.6 G/DL — LOW (ref 32–36)
MCV RBC AUTO: 87.9 FL — SIGNIFICANT CHANGE UP (ref 80–100)
NRBC # BLD AUTO: 0 K/UL — SIGNIFICANT CHANGE UP (ref 0–0)
NRBC # FLD: 0 K/UL — SIGNIFICANT CHANGE UP (ref 0–0)
NRBC BLD AUTO-RTO: 0 /100 WBCS — SIGNIFICANT CHANGE UP (ref 0–0)
PHOSPHATE SERPL-MCNC: 3.2 MG/DL — SIGNIFICANT CHANGE UP (ref 2.5–4.5)
PLATELET # BLD AUTO: 253 K/UL — SIGNIFICANT CHANGE UP (ref 150–400)
PMV BLD: 10.6 FL — SIGNIFICANT CHANGE UP (ref 7–13)
POTASSIUM SERPL-MCNC: 3.7 MMOL/L — SIGNIFICANT CHANGE UP (ref 3.5–5.3)
POTASSIUM SERPL-SCNC: 3.7 MMOL/L — SIGNIFICANT CHANGE UP (ref 3.5–5.3)
PROT SERPL-MCNC: 6.3 GM/DL — SIGNIFICANT CHANGE UP (ref 6–8.3)
RBC # BLD: 3.38 M/UL — LOW (ref 3.8–5.2)
RBC # FLD: 14.9 % — HIGH (ref 10.3–14.5)
SODIUM SERPL-SCNC: 141 MMOL/L — SIGNIFICANT CHANGE UP (ref 135–145)
WBC # BLD: 8.95 K/UL — SIGNIFICANT CHANGE UP (ref 3.8–10.5)
WBC # FLD AUTO: 8.95 K/UL — SIGNIFICANT CHANGE UP (ref 3.8–10.5)

## 2025-03-11 PROCEDURE — 99222 1ST HOSP IP/OBS MODERATE 55: CPT

## 2025-03-11 PROCEDURE — 99232 SBSQ HOSP IP/OBS MODERATE 35: CPT

## 2025-03-11 PROCEDURE — 99233 SBSQ HOSP IP/OBS HIGH 50: CPT

## 2025-03-11 PROCEDURE — 99231 SBSQ HOSP IP/OBS SF/LOW 25: CPT

## 2025-03-11 RX ORDER — BISACODYL 5 MG
10 TABLET, DELAYED RELEASE (ENTERIC COATED) ORAL AT BEDTIME
Refills: 0 | Status: DISCONTINUED | OUTPATIENT
Start: 2025-03-11 | End: 2025-03-12

## 2025-03-11 RX ORDER — SIMETHICONE 80 MG
125 TABLET,CHEWABLE ORAL THREE TIMES A DAY
Refills: 0 | Status: DISCONTINUED | OUTPATIENT
Start: 2025-03-11 | End: 2025-03-15

## 2025-03-11 RX ORDER — AMLODIPINE BESYLATE 10 MG/1
5 TABLET ORAL ONCE
Refills: 0 | Status: COMPLETED | OUTPATIENT
Start: 2025-03-11 | End: 2025-03-11

## 2025-03-11 RX ORDER — MAGNESIUM CITRATE
148 SOLUTION, ORAL ORAL ONCE
Refills: 0 | Status: COMPLETED | OUTPATIENT
Start: 2025-03-11 | End: 2025-03-11

## 2025-03-11 RX ORDER — MAGNESIUM SULFATE 500 MG/ML
1 SYRINGE (ML) INJECTION ONCE
Refills: 0 | Status: COMPLETED | OUTPATIENT
Start: 2025-03-11 | End: 2025-03-11

## 2025-03-11 RX ADMIN — Medication 40 MILLIGRAM(S): at 20:26

## 2025-03-11 RX ADMIN — TOUCHLESS CARE ZINC OXIDE PROTECTANT 1 APPLICATION(S): 20; 25 SPRAY TOPICAL at 20:24

## 2025-03-11 RX ADMIN — Medication 40 MILLIGRAM(S): at 09:31

## 2025-03-11 RX ADMIN — CASPOFUNGIN ACETATE 260 MILLIGRAM(S): 5 INJECTION, POWDER, LYOPHILIZED, FOR SOLUTION INTRAVENOUS at 09:31

## 2025-03-11 RX ADMIN — Medication 20 MILLIGRAM(S): at 20:27

## 2025-03-11 RX ADMIN — Medication 1 APPLICATION(S): at 09:41

## 2025-03-11 RX ADMIN — Medication 148 MILLILITER(S): at 20:00

## 2025-03-11 RX ADMIN — Medication 148 MILLILITER(S): at 14:53

## 2025-03-11 RX ADMIN — Medication 125 MILLIGRAM(S): at 20:24

## 2025-03-11 RX ADMIN — SODIUM CHLORIDE 75 MILLILITER(S): 9 INJECTION, SOLUTION INTRAVENOUS at 05:05

## 2025-03-11 RX ADMIN — AMLODIPINE BESYLATE 5 MILLIGRAM(S): 10 TABLET ORAL at 20:24

## 2025-03-11 RX ADMIN — Medication 125 MILLIGRAM(S): at 12:42

## 2025-03-11 RX ADMIN — Medication 650 MILLIGRAM(S): at 16:41

## 2025-03-11 RX ADMIN — Medication 100 GRAM(S): at 18:18

## 2025-03-11 RX ADMIN — TOUCHLESS CARE ZINC OXIDE PROTECTANT 1 APPLICATION(S): 20; 25 SPRAY TOPICAL at 09:41

## 2025-03-11 RX ADMIN — Medication 125 MILLIGRAM(S): at 05:06

## 2025-03-11 RX ADMIN — SODIUM CHLORIDE 75 MILLILITER(S): 9 INJECTION, SOLUTION INTRAVENOUS at 19:39

## 2025-03-11 RX ADMIN — Medication 125 MILLIGRAM(S): at 18:18

## 2025-03-11 RX ADMIN — Medication 2000 UNIT(S): at 20:25

## 2025-03-11 RX ADMIN — Medication 125 MILLIGRAM(S): at 23:01

## 2025-03-11 NOTE — CONSULT NOTE ADULT - ASSESSMENT
69 year old female with significant history of HTN, HLD, CVA on Aggrenox, GERD with recently diagnosed adenocarcinoma of pancreas managed by [facility] on chemo infusion cycle and daily oral chemo with request for second GI opinion regarding persistent lower abdominal pain and bloating in setting of constipation and limited mobility. 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     Imp: FTT, minimal PO intake 2/2 persistent lower abdominal pain    Pain location not characteristic of pancreatic malignancy although given mass effect would still consider as differential. Would first attempt aggressive bowel regimen as patient with significant bloating, bed-ridden, minimal BM, and pain worsened with PO intake, early satiety. Should pain persist despite clearing out colon, would then suggest/offer celiac plexus block/neurolysis as alternative for abdominal pain. Discussed at length at bedside with spouse, Dr. Pleitez and patient. Both amenable to plan.    Rec:  ::Mag citrate today  ::Fleets enemas; may alternate with warm tap water enemas  ::Dulcolax supp  ::Simethicone TID ATC  ::Enc turning and positioning

## 2025-03-11 NOTE — PROGRESS NOTE ADULT - SUBJECTIVE AND OBJECTIVE BOX
INTERVAL HPI/OVERNIGHT EVENTS:  Patient S&E at bedside.  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      GERD (gastroesophageal reflux disease)      Cyclical vomiting syndrome      CVA (cerebrovascular accident)      H/O: hysterectomy      H/O squamous cell carcinoma excision          FAMILY HISTORY:  No pertinent family history in first degree relatives        VITAL SIGNS:  T(F): 98.1 (03-11-25 @ 07:46)  HR: 101 (03-11-25 @ 07:46)  BP: 156/91 (03-11-25 @ 07:46)  RR: 18 (03-11-25 @ 07:46)  SpO2: 95% (03-11-25 @ 07:46)  Wt(kg): --    PHYSICAL EXAM:    Constitutional: NAD  Eyes: EOMI,  Respiratory: CTA b/l, off nc   Cardiovascular: tachycardic   Gastrointestinal: soft, no tenderness today   Neurological: AAOx3    MEDICATIONS  (STANDING):  acetaminophen   IVPB .. 1000 milliGRAM(s) IV Intermittent once  caspofungin IVPB 50 milliGRAM(s) IV Intermittent every 24 hours  caspofungin IVPB      chlorhexidine 2% Cloths 1 Application(s) Topical <User Schedule>  cholecalciferol 2000 Unit(s) Oral at bedtime  dextrose 5% + sodium chloride 0.45%. 1000 milliLiter(s) (75 mL/Hr) IV Continuous <Continuous>  dextrose 5%. 1000 milliLiter(s) (100 mL/Hr) IV Continuous <Continuous>  dextrose 5%. 1000 milliLiter(s) (50 mL/Hr) IV Continuous <Continuous>  famotidine Injectable 20 milliGRAM(s) IV Push daily  glucagon  Injectable 1 milliGRAM(s) IntraMuscular once  pantoprazole  Injectable 40 milliGRAM(s) IV Push every 12 hours  vancomycin    Solution 125 milliGRAM(s) Oral every 6 hours  zinc oxide 40% Paste 1 Application(s) Topical two times a day    MEDICATIONS  (PRN):  acetaminophen     Tablet .. 650 milliGRAM(s) Oral every 6 hours PRN Temp greater or equal to 38C (100.4F), Mild Pain (1 - 3)  aluminum hydroxide/magnesium hydroxide/simethicone Suspension 30 milliLiter(s) Oral every 4 hours PRN Dyspepsia  Biotene Dry Mouth Oral Rinse 15 milliLiter(s) Swish and Spit five times a day PRN Mouth Care  ondansetron Injectable 4 milliGRAM(s) IV Push every 8 hours PRN Nausea and/or Vomiting  simethicone 80 milliGRAM(s) Chew every 4 hours PRN Upset Stomach  sodium chloride 0.65% Nasal 1 Spray(s) Both Nostrils two times a day PRN Nasal Congestion      Allergies    No Known Allergies    Intolerances        LABS:                        9.0    9.17  )-----------( 294      ( 10 Mar 2025 06:40 )             28.3     03-10    140  |  109[H]  |  29[H]  ----------------------------<  121[H]  3.4[L]   |  27  |  1.39[H]    Ca    9.4      10 Mar 2025 06:40  Mg     1.6     03-10    TPro  6.0  /  Alb  2.0[L]  /  TBili  0.5  /  DBili  x   /  AST  16  /  ALT  21  /  AlkPhos  139[H]  03-10    PT/INR - ( 10 Mar 2025 06:40 )   PT: 14.4 sec;   INR: 1.22 ratio         PTT - ( 10 Mar 2025 06:40 )  PTT:32.4 sec  Urinalysis Basic - ( 10 Mar 2025 06:40 )    Color: x / Appearance: x / SG: x / pH: x  Gluc: 121 mg/dL / Ketone: x  / Bili: x / Urobili: x   Blood: x / Protein: x / Nitrite: x   Leuk Esterase: x / RBC: x / WBC x   Sq Epi: x / Non Sq Epi: x / Bacteria: x        RADIOLOGY & ADDITIONAL TESTS:  Studies reviewed.

## 2025-03-11 NOTE — PROGRESS NOTE ADULT - ASSESSMENT
69-year-old female with stage IV pancreatic adenocarcinoma presenting with neutropenic fever and severe treatment-related toxicities from modified FOLFIRINOX and investigational EMILY inhibitor MC-6236.    PROBLEMS:    Febrile syndrome. Fungemia. Likely disseminated candidemia.   Dysphagia. Possible candida esophagitis  New febrile syndrome with chills. Possible sepsis. Dysphagia. Possible candida esophagitis, S/p sepsis with PSAE   Acute hypoxic respiratory failure likely secondary to hypervolemia   Bilateral pleural effusions with compressive atelectasis, right greater than left  Neutropenic Fever/Neutropenic Septic shock  Severe Thrombocytopenia-Platelet count critically low at 26k.   Severe diarrhea and inability to tolerate PO intake, likely multifactorial from both FOLFIRINOX (particularly irinotecan component) and study drug [id number].   History of left cephalic vein thrombosis.  MALLORY  CT Angio Chest PE Protocol w/ IV Cont (02.19.25 @ 14:16) >  CHEST:  Multiple pulmonary emboli within the segmental branches of the left lower and right lower lobe pulmonary arteries.  There are two right middle lobe nodules, new since prior. Exact   etiology is unclear. Primary differential diagnostic consideration   includes infection.  CT Abdomen and Pelvis-Resolution of small and large bowel dilatation. Mottled lucency involving a short segment of sigmoid colon suspect for pneumatosis which could be due to benign or ischemic etiology. Please correlate clinically and with laboratory values.  Minimal increase in amount of ascites.  Unchanged pancreatic body and umbilical masses.      PLAN:    Pulmonary stable- s/p GFF 03/10  Trial of NRB mask fu with ABG with Pao2 60-70 before consider HFNC, as pat difficulty with HFNC previouly. hyperbaric oxygen consult for pneumatosis of bowel-d/w   Dc fluconazole  and d/c cefepime  IV caspofungin 70 mg IV x1, then 50 mg IV qd/Iv meropenem  CTA chest + small PEs in RLL and LLL- off IV heparin causing bleeding-S/P GFF 03/10  Family does not want any procedure EGD/Colonscopy  Neutropenic Septic shock- IV meropenem started on 2/15- previously on CEFEPIME/Continue filgrastim 480mcg daily-Today w/ improvement in counts- WBC 1.3, Hb 10.1, plt 37, cmp with k 2.3 and Repleted, Cr 1.40.   Daily CBC monitoring  Maintain strict neutropenic precautions  Supportive care

## 2025-03-11 NOTE — PROGRESS NOTE ADULT - SUBJECTIVE AND OBJECTIVE BOX
HOSPITALIST ATTENDING PROGRESS NOTE    Chart and meds reviewed.  Patient seen and examined.    CC:  abdominal pain, poor po intake     3/11 - epigastric abdominal pain, No fever. Tolerating some diet. + Bms, denies cp, dyspnea, nausea     All other systems reviewed and found to be negative with the exception of what has been described above.    Vital sings reviewed for last 24 h  T(C): 36.7 (03-11-25 @ 07:46), Max: 36.8 (03-10-25 @ 16:05)  T(F): 98.1 (03-11-25 @ 07:46), Max: 98.2 (03-10-25 @ 16:05)  HR: 101 (03-11-25 @ 07:46) (94 - 132)  BP: 156/91 (03-11-25 @ 07:46) (136/74 - 156/91)  RR: 18 (03-11-25 @ 07:46) (17 - 20)  SpO2: 95% (03-11-25 @ 07:46) (93% - 96%)  Wt(kg): --  Daily     Daily   CAPILLARY BLOOD GLUCOSE        Physical exam :   GEN: NAD, Frail   HEENT:  pupils equal and reactive, EOMI, no oropharyngeal lesions, erythema, exudates, oral thrush  NECK:   supple, no carotid bruits  CV:  +S1, +S2, regular, no murmurs  RESP:   lungs clear to auscultation bilaterally, no wheezing, rales, rhonchi, good air entry bilaterally  GI:  abdomen soft,  + epigastric tenderness, Distended, normal BS, periumbilical mass  EXT:  no clubbing, no cyanosis, no edema, no calf pain, swelling or erythema  NEURO:  AAOX3, no focal neurological deficits, follows all commands, able to move extremities spontaneously  SKIN:  sacral DTI , under right breast  inframammary:  + 4.0 cm subcutaneous nodule, mildly tender, with drying ulceration of skin surface; not acutely inflamed   : Julian     LABS:  all reviewed                           9.4    8.95  )-----------( 253      ( 11 Mar 2025 09:12 )             29.7     03-11    141  |  107  |  25[H]  ----------------------------<  123[H]  3.7   |  26  |  1.40[H]    Ca    9.6      11 Mar 2025 09:12  Phos  3.2     03-11  Mg     1.7     03-11    TPro  6.3  /  Alb  2.2[L]  /  TBili  0.7  /  DBili  x   /  AST  19  /  ALT  20  /  AlkPhos  140[H]  03-11        LIVER FUNCTIONS - ( 11 Mar 2025 09:12 )  Alb: 2.2 g/dL / Pro: 6.3 gm/dL / ALK PHOS: 140 U/L / ALT: 20 U/L / AST: 19 U/L / GGT: x           Culture - Blood (03.04.25 @ 18:58)    Specimen Source: Blood None   Culture Results:   No growth at 5 days    Culture - Blood (02.28.25 @ 11:54)    -  Blood PCR Panel: NEG   Gram Stain:   Growth in aerobic bottle: Yeast like cells   Specimen Source: Blood None   Organism: Blood Culture PCR   Culture Results:   Growth in aerobic bottle: Saccharomyces cerevisiae Sent to [facility]  [address] [id number] for  susceptibility  Direct identification is available within approximately 3-5  hours either by Blood Panel Multiplexed PCR or Direct  MALDI-TOF. Details: [email or web address]   Organism Identification: Blood Culture PCR   Method Type: PCR    Fungitell: 478 Abnormal, H        CT Abdomen and Pelvis w/ Oral Cont (03.10.25 @ 17:44) >  IMPRESSION:    There is an apparent fistula between the transverse colon and a mid   jejunal loop with enteric contrast in the fistula (2:64). Mottled air   lucency and mild fat infiltration surrounding the jejunal segment (2:62.   Indeterminant as to contained perforation of the transverse colon or the   jejunum.  The finding is new compared with 2/22/2025. Recommend surgical   consultation.    Indeterminate 8 mm right middle lobe nodule, either infectious or   malignant. Stable bilateral pleural effusions.    Redemonstrated pancreatic mass, inseparable from the posterior gastric   wall. Unchanged umbilical mass.    Pelvic floor laxity with mild bladder and rectal prolapse.    Ascites unchanged.    Findings were discussed with Dr. Venice Joyner 3/11/2025 9:35 AM by   Dr. Anum Calle with read back confirmation.    A preliminary report was provided by Dr. Adam Robertson     CT Abdomen and Pelvis No Cont (03.06.25 @ 16:32) >  IMPRESSION:  Resolution of small and large bowel dilatation.    Mottled lucency involving a short segment of sigmoid colon suspect for   pneumatosis which could be due to benign or ischemic etiology. Please   correlate clinically and with laboratory values.    Minimal increase in amount of ascites.  Unchanged pancreatic body and umbilical masses.      : US Duplex Venous Lower Ext Complete, Bilateral (03.04.25 @ 10:23) >  IMPRESSION:  No evidence of deep venous thrombosis in either lower extremity.    MEDICATIONS  (STANDING):  acetaminophen   IVPB .. 1000 milliGRAM(s) IV Intermittent once  caspofungin IVPB 50 milliGRAM(s) IV Intermittent every 24 hours  caspofungin IVPB      chlorhexidine 2% Cloths 1 Application(s) Topical <User Schedule>  cholecalciferol 2000 Unit(s) Oral at bedtime  dextrose 5% + sodium chloride 0.45%. 1000 milliLiter(s) (75 mL/Hr) IV Continuous <Continuous>  dextrose 5%. 1000 milliLiter(s) (50 mL/Hr) IV Continuous <Continuous>  dextrose 5%. 1000 milliLiter(s) (100 mL/Hr) IV Continuous <Continuous>  famotidine Injectable 20 milliGRAM(s) IV Push daily  glucagon  Injectable 1 milliGRAM(s) IntraMuscular once  pantoprazole  Injectable 40 milliGRAM(s) IV Push every 12 hours  vancomycin    Solution 125 milliGRAM(s) Oral every 6 hours  zinc oxide 40% Paste 1 Application(s) Topical two times a day    MEDICATIONS  (PRN):  acetaminophen     Tablet .. 650 milliGRAM(s) Oral every 6 hours PRN Temp greater or equal to 38C (100.4F), Mild Pain (1 - 3)  aluminum hydroxide/magnesium hydroxide/simethicone Suspension 30 milliLiter(s) Oral every 4 hours PRN Dyspepsia  Biotene Dry Mouth Oral Rinse 15 milliLiter(s) Swish and Spit five times a day PRN Mouth Care  ondansetron Injectable 4 milliGRAM(s) IV Push every 8 hours PRN Nausea and/or Vomiting  simethicone 80 milliGRAM(s) Chew every 4 hours PRN Upset Stomach  sodium chloride 0.65% Nasal 1 Spray(s) Both Nostrils two times a day PRN Nasal Congestion

## 2025-03-11 NOTE — CONSULT NOTE ADULT - SUBJECTIVE AND OBJECTIVE BOX
Patient is a 69y old  Female who presents with a chief complaint of sob    HPI:  This is a 69 year old female with significant history of HTN, HLD, CVA on Aggrenox, GERD with recently diagnosed adenocarcinoma of pancreas managed by [facility] on chemo infusion cycle and daily oral chemo with request for second GI opinion regarding persistent lower abdominal pain and bloating in setting of constipation and limited mobility. Prolonged hospitalization with full chart review and discussion with primary GI.  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 Evaluated at bedside, on bedpan. Patient endorses nonradiating lower abdominal pain with bloating and early satiety despite good appetite and desire to eat. No narcotics on board. Tylenol only. Denies nausea or vomiting. Afebrile with stable hemodynamics.       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 1000 milliGRAM(s) IV Intermittent once  caspofungin IVPB 50 milliGRAM(s) IV Intermittent every 24 hours  caspofungin IVPB      chlorhexidine 2% Cloths 1 Application(s) Topical <User Schedule>  cholecalciferol 2000 Unit(s) Oral at bedtime  dextrose 5% + sodium chloride 0.45%. 1000 milliLiter(s) (75 mL/Hr) IV Continuous <Continuous>  dextrose 5%. 1000 milliLiter(s) (50 mL/Hr) IV Continuous <Continuous>  dextrose 5%. 1000 milliLiter(s) (100 mL/Hr) IV Continuous <Continuous>  famotidine Injectable 20 milliGRAM(s) IV Push daily  glucagon  Injectable 1 milliGRAM(s) IntraMuscular once  pantoprazole  Injectable 40 milliGRAM(s) IV Push every 12 hours  vancomycin    Solution 125 milliGRAM(s) Oral every 6 hours  zinc oxide 40% Paste 1 Application(s) Topical two times a day    MEDICATIONS  (PRN):  acetaminophen     Tablet .. 650 milliGRAM(s) Oral every 6 hours PRN Temp greater or equal to 38C (100.4F), Mild Pain (1 - 3)  aluminum hydroxide/magnesium hydroxide/simethicone Suspension 30 milliLiter(s) Oral every 4 hours PRN Dyspepsia  Biotene Dry Mouth Oral Rinse 15 milliLiter(s) Swish and Spit five times a day PRN Mouth Care  ondansetron Injectable 4 milliGRAM(s) IV Push every 8 hours PRN Nausea and/or Vomiting  simethicone 80 milliGRAM(s) Chew every 4 hours PRN Upset Stomach  sodium chloride 0.65% Nasal 1 Spray(s) Both Nostrils two times a day PRN Nasal Congestion      Allergies    No Known Allergies    Intolerances        SOCIAL HISTORY:    FAMILY HISTORY:  No pertinent family history in first degree relatives        REVIEW OF SYSTEMS:    CONSTITUTIONAL: No weakness, fevers or chills  EYES/ENT: No visual changes;  No vertigo or throat pain   NECK: No pain or stiffness  RESPIRATORY: No cough, wheezing, hemoptysis; No shortness of breath  CARDIOVASCULAR: No chest pain or palpitations  GASTROINTESTINAL: See HPI  GENITOURINARY: No dysuria, frequency or hematuria  NEUROLOGICAL: No numbness or weakness  SKIN: No itching, burning, rashes, or lesions   PSYCH: Normal mood and affect  All other review of systems is negative unless indicated above.    Vital Signs Last 24 Hrs  T(C): 36.7 (11 Mar 2025 07:46), Max: 36.8 (10 Mar 2025 16:05)  T(F): 98.1 (11 Mar 2025 07:46), Max: 98.2 (10 Mar 2025 16:05)  HR: 101 (11 Mar 2025 07:46) (94 - 132)  BP: 156/91 (11 Mar 2025 07:46) (136/74 - 156/91)  BP(mean): --  RR: 18 (11 Mar 2025 07:46) (17 - 20)  SpO2: 95% (11 Mar 2025 07:46) (93% - 96%)    Parameters below as of 11 Mar 2025 07:46  Patient On (Oxygen Delivery Method): room air        PHYSICAL EXAM:    Constitutional: No acute distress, ill appearing, frail, non-toxic appearing  HEENT: good phonation, not icteric  Neck: supple, no lymphadenopathy  Respiratory: clear to ascultation bilaterally, no wheezing  Cardiovascular: S1 and S2, regular rate and rhythm, no murmurs rubs or gallops  Gastrointestinal: soft, non-tender, softly distended, +bowel sounds, no rebound or guarding, no surgical scars, no drains  Extremities: No peripheral edema, no cyanosis or clubbing  Vascular: 2+ peripheral pulses, no venous stasis  Neurological: A/O x 3, no focal deficits, no asterixis  Psychiatric: Normal mood, normal affect  Skin: No rashes, not jaundiced    LABS:                        9.4    8.95  )-----------( 253      ( 11 Mar 2025 09:12 )             29.7     03-11    141  |  107  |  25[H]  ----------------------------<  123[H]  3.7   |  26  |  1.40[H]    Ca    9.6      11 Mar 2025 09:12  Phos  3.2     03-11  Mg     1.7     03-11    TPro  6.3  /  Alb  2.2[L]  /  TBili  0.7  /  DBili  x   /  AST  19  /  ALT  20  /  AlkPhos  140[H]  03-11    PT/INR - ( 10 Mar 2025 06:40 )   PT: 14.4 sec;   INR: 1.22 ratio         PTT - ( 10 Mar 2025 06:40 )  PTT:32.4 sec  LIVER FUNCTIONS - ( 11 Mar 2025 09:12 )  Alb: 2.2 g/dL / Pro: 6.3 gm/dL / ALK PHOS: 140 U/L / ALT: 20 U/L / AST: 19 U/L / GGT: x             RADIOLOGY & ADDITIONAL STUDIES:  FINDINGS:  LOWER CHEST: Stable small bilateral pleural effusions with underlying   passive atelectasis. Indeterminate 8 mm right middle lobe nodule (2:8).    LIVER: Within normal limits.  BILE DUCTS: Normal caliber.  GALLBLADDER: Within normal limits.  SPLEEN: Within normal limits.  PANCREAS: 4.2 x 4.5 cm pancreatic mass redemonstrated. No obvious duct   distention. Mass is inseparable from the posterior gastric wall.  ADRENALS: Within normal limits.  KIDNEYS/URETERS: Multiple bilateral cysts. No hydronephrosis.    BLADDER: Decompressed around Julian catheter. Mild bladder prolapse.  REPRODUCTIVE ORGANS: Hysterectomy.    BOWEL: No bowel obstruction. There is an apparent fistula between the   transverse colon and a mid jejunal loop with enteric contrast in the   fistula (2:64). Mottled air lucency and mild fat infiltration surrounding   the jejunal segment (2:62. Indeterminant as to contained perforation of   the transverse colon or the jejunum. The finding is new compared with   2/22/2025. Appendix is not visualized and cannot be assessed. Mild rectal   prolapse.  PERITONEUM/RETROPERITONEUM: Small ascites unchanged.  VESSELS: Atherosclerotic changes. IVC filter.  LYMPH NODES: No lymphadenopathy.  ABDOMINAL WALL: Stable 3.1 cm umbilical mass.  BONES: Within normal limits.    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 Mottled air   lucency and mild fat infiltration surrounding the jejunal segment (2:62.   Indeterminant as to contained perforation of the transverse colon or the   jejunum.  The finding is new compared with 2/22/2025. Recommend surgical   consultation.    Indeterminate 8 mm right middle lobe nodule, either infectious or   malignant. Stable bilateral pleural effusions.    Redemonstrated pancreatic mass, inseparable from the posterior gastric   wall. Unchanged umbilical mass.    Pelvic floor laxity with mild bladder and rectal prolapse.    Ascites unchanged.     Patient is a 69y old  Female who presents with a chief complaint of sob    69 year old woman with HTN, HLD, CVA, recently diagnosed metastatic adenocarcinoma, admitted to [place] with sepsis and transferred here for further management, with course complicated by recurrent bacteremia requiring port removal, and abdominal pain. Consulted for abdominal pain.     Patient states she has had persistent lower abdominal pain. Pain is laura-umbilical and lower, constant, 5/10, non radiating, without known alleviating or exacerbating factors. She is not on any pain meds. She has been more constipated than usual. She is not so mobile. No overt signs of GI bleeding like hematemesis, melena, hematochezia. She can't eat significant amounts, but no post prandial nausea or vomiting but she does have post prandial bloating and very early satiety. In terms of pancreas cancer,  managed by [facility] on folfox chemo infusion cycle and daily oral chemo.        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 1000 milliGRAM(s) IV Intermittent once  caspofungin IVPB 50 milliGRAM(s) IV Intermittent every 24 hours  caspofungin IVPB      chlorhexidine 2% Cloths 1 Application(s) Topical <User Schedule>  cholecalciferol 2000 Unit(s) Oral at bedtime  dextrose 5% + sodium chloride 0.45%. 1000 milliLiter(s) (75 mL/Hr) IV Continuous <Continuous>  dextrose 5%. 1000 milliLiter(s) (50 mL/Hr) IV Continuous <Continuous>  dextrose 5%. 1000 milliLiter(s) (100 mL/Hr) IV Continuous <Continuous>  famotidine Injectable 20 milliGRAM(s) IV Push daily  glucagon  Injectable 1 milliGRAM(s) IntraMuscular once  pantoprazole  Injectable 40 milliGRAM(s) IV Push every 12 hours  vancomycin    Solution 125 milliGRAM(s) Oral every 6 hours  zinc oxide 40% Paste 1 Application(s) Topical two times a day    MEDICATIONS  (PRN):  acetaminophen     Tablet .. 650 milliGRAM(s) Oral every 6 hours PRN Temp greater or equal to 38C (100.4F), Mild Pain (1 - 3)  aluminum hydroxide/magnesium hydroxide/simethicone Suspension 30 milliLiter(s) Oral every 4 hours PRN Dyspepsia  Biotene Dry Mouth Oral Rinse 15 milliLiter(s) Swish and Spit five times a day PRN Mouth Care  ondansetron Injectable 4 milliGRAM(s) IV Push every 8 hours PRN Nausea and/or Vomiting  simethicone 80 milliGRAM(s) Chew every 4 hours PRN Upset Stomach  sodium chloride 0.65% Nasal 1 Spray(s) Both Nostrils two times a day PRN Nasal Congestion      Allergies    No Known Allergies    Intolerances    SOCIAL HISTORY:  no smoking, drinknig or drugs    FAMILY HISTORY:  No pertinent family history in first degree relatives        REVIEW OF SYSTEMS:    CONSTITUTIONAL: + weakness, no fevers or chills  EYES/ENT: No visual changes;  No vertigo or throat pain   NECK: No pain or stiffness  RESPIRATORY: No cough, wheezing, hemoptysis; No shortness of breath  CARDIOVASCULAR: No chest pain or palpitations  GASTROINTESTINAL: See HPI  GENITOURINARY: No dysuria, frequency or hematuria  NEUROLOGICAL: No numbness or weakness  SKIN: No itching, burning, rashes, or lesions   PSYCH: Normal mood and affect  All other review of systems is negative unless indicated above.    Vital Signs Last 24 Hrs  T(C): 36.7 (11 Mar 2025 07:46), Max: 36.8 (10 Mar 2025 16:05)  T(F): 98.1 (11 Mar 2025 07:46), Max: 98.2 (10 Mar 2025 16:05)  HR: 101 (11 Mar 2025 07:46) (94 - 132)  BP: 156/91 (11 Mar 2025 07:46) (136/74 - 156/91)  BP(mean): --  RR: 18 (11 Mar 2025 07:46) (17 - 20)  SpO2: 95% (11 Mar 2025 07:46) (93% - 96%)    Parameters below as of 11 Mar 2025 07:46  Patient On (Oxygen Delivery Method): room air        PHYSICAL EXAM:    Constitutional: No acute distress, ill appearing, frail, non-toxic appearing, on bedpan  HEENT: good phonation, not icteric  Neck: supple, no lymphadenopathy  Respiratory: unlabored  Gastrointestinal: softly distended, +umbilical nodule no rebound or guarding, + surgical scars, no drains  Extremities: No peripheral edema, no cyanosis or clubbing  Vascular: 2+ peripheral pulses, no venous stasis  Neurological: A/O x 3, no focal deficits, no asterixis  Psychiatric: Normal mood, normal affect  Skin: No rashes, not jaundiced    LABS:                        9.4    8.95  )-----------( 253      ( 11 Mar 2025 09:12 )             29.7     03-11    141  |  107  |  25[H]  ----------------------------<  123[H]  3.7   |  26  |  1.40[H]    Ca    9.6      11 Mar 2025 09:12  Phos  3.2     03-11  Mg     1.7     03-11    TPro  6.3  /  Alb  2.2[L]  /  TBili  0.7  /  DBili  x   /  AST  19  /  ALT  20  /  AlkPhos  140[H]  03-11    PT/INR - ( 10 Mar 2025 06:40 )   PT: 14.4 sec;   INR: 1.22 ratio         PTT - ( 10 Mar 2025 06:40 )  PTT:32.4 sec  LIVER FUNCTIONS - ( 11 Mar 2025 09:12 )  Alb: 2.2 g/dL / Pro: 6.3 gm/dL / ALK PHOS: 140 U/L / ALT: 20 U/L / AST: 19 U/L / GGT: x             RADIOLOGY & ADDITIONAL STUDIES:  FINDINGS:  LOWER CHEST: Stable small bilateral pleural effusions with underlying   passive atelectasis. Indeterminate 8 mm right middle lobe nodule (2:8).    LIVER: Within normal limits.  BILE DUCTS: Normal caliber.  GALLBLADDER: Within normal limits.  SPLEEN: Within normal limits.  PANCREAS: 4.2 x 4.5 cm pancreatic mass redemonstrated. No obvious duct   distention. Mass is inseparable from the posterior gastric wall.  ADRENALS: Within normal limits.  KIDNEYS/URETERS: Multiple bilateral cysts. No hydronephrosis.    BLADDER: Decompressed around Julian catheter. Mild bladder prolapse.  REPRODUCTIVE ORGANS: Hysterectomy.    BOWEL: No bowel obstruction. There is an apparent fistula between the   transverse colon and a mid jejunal loop with enteric contrast in the   fistula (2:64). Mottled air lucency and mild fat infiltration surrounding   the jejunal segment (2:62. Indeterminant as to contained perforation of   the transverse colon or the jejunum. The finding is new compared with   2/22/2025. Appendix is not visualized and cannot be assessed. Mild rectal   prolapse.  PERITONEUM/RETROPERITONEUM: Small ascites unchanged.  VESSELS: Atherosclerotic changes. IVC filter.  LYMPH NODES: No lymphadenopathy.  ABDOMINAL WALL: Stable 3.1 cm umbilical mass.  BONES: Within normal limits.    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 Mottled air   lucency and mild fat infiltration surrounding the jejunal segment (2:62.   Indeterminant as to contained perforation of the transverse colon or the   jejunum.  The finding is new compared with 2/22/2025. Recommend surgical   consultation.    Indeterminate 8 mm right middle lobe nodule, either infectious or   malignant. Stable bilateral pleural effusions.    Redemonstrated pancreatic mass, inseparable from the posterior gastric   wall. Unchanged umbilical mass.    Pelvic floor laxity with mild bladder and rectal prolapse.    Ascites unchanged.

## 2025-03-11 NOTE — CONSULT NOTE ADULT - NS ATTEND AMEND GEN_ALL_CORE FT
69 year old woman with recently diagnosed metastatic pancreatic cancer, with prolongedhospitalization from recurrent sepsis. With abdominal pain.     Is more constipated and has constant pain. Offerred bowel prep with moviprep but patient/ did not think she could tolerate. Will do lower volume bowel prep with goal to clear out colon. Her CT shows the body/tail masss abutting and ?eroding intot he proximal stomach. She also has ?contained perforation. So although not typical for pancreatic pain in terms of lication and two other sources, if not better with bowel prep will plan for celiac neurolysis on thursday.

## 2025-03-11 NOTE — PROGRESS NOTE ADULT - ASSESSMENT
69-year-old female recent diagnosis of primary pancreatic adenocarcinoma , pancytopenia and significant PMH of SCC lip, cyclical vomiting syndrome, HTN, HPL, Gout, GERD, CVA w renal evaluation of MALLORY and hypokalemia     MALLORY  Cr improving  , volume ok  -IVF continues   -Trend labs, lytes. Replete Hypokalemia for goal near 4.0  -Monitor UOP   -Avoid nsaids/contrast   - for IVC filter insertion tomorrow - continue IVF hydration to mitigate MAXIMO risk                                          69-year-old female recent diagnosis of primary pancreatic adenocarcinoma , pancytopenia and significant PMH of SCC lip, cyclical vomiting syndrome, HTN, HPL, Gout, GERD, CVA w renal evaluation of MALLORY and hypokalemia     MALLORY  Cr improving  , volume ok  -IVF continues   -Trend labs, lytes. Replete Hypokalemia for goal near 4.0  -Monitor UOP   -Avoid nsaids/contrast   - for IVC filter insertion tomorrow - continue IVF hydration to mitigate MAXIMO risk     3/11  Pacreatic CA/ METS  MALLORY slowly improving  Maintaining current IVF  I/O acceptable   Monitor and replace K/ Mg as needed

## 2025-03-11 NOTE — PROGRESS NOTE ADULT - SUBJECTIVE AND OBJECTIVE BOX
Continues to complain of immediate post prandial pain after eating or drinking even a small amount. + BMs after laxatives. No new complaints    VSS afeb    lungs- clear  cor- RRR  Abd- + BS soft distended, tender to deep palp, periumbilical region. Umbilical nodule unchanged in size. Pt reports no tenderness due to this.  No guarding or rebound  Ext- no edema

## 2025-03-11 NOTE — PROGRESS NOTE ADULT - SUBJECTIVE AND OBJECTIVE BOX
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  Patient was being followed by GI, ID, Cardiology and Onc there.  Patient has been transferred to  today on patient's  (GI Dr. Pleitez) request.  At this time, patient c/o non-bloody diarrhea x 3 in last 24 hours.  She denies any abdominal pain, nausea, but has some dry heaves.  She denies any fever, chills, chest pain, palpitation, constipation or dysuria. Initially, she was started on Vancomycin and Zosyn, but now she has been on Cefepime as her blood culture and urine cultures are positive for Pseudomonas aeruginosa. Currently, patient is on Cefepime.  ID had also recommended Flagyl, but patient had refused it, as it causes nausea to her.    Sig labs: WBC 0.45k, N 82%, Hb 7.8, Platelets 59k, Bicarb 19, AG 9, , Cr 0.99, TP 4.6, Alb 1.2, LFTs wnl.  Mg 1.9, PO4 2.4.  Prior C.diff negative.    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    Then transferred to  as per request of . Was getting platelet transfusion and developed tachypnea hypoxia, rigors. Was diuresed and became hypotensive, Upgraded to SICU. CTA c/a/p on 2/19 showed multiple b/l PE and large bowel obstruction, was started on heparin drip, c/b GIB, then retrialed heparin drip with recurrent c/f GIB. Also with Afib w RVR during admission, resolved w iv metoprolol.     On 2/28, developed SIRS, Blood cx growing yeast. TPN stopped, R chest port removed on 3/2 and pt started on iv caspofungin.  03/10/2024 s/p IVC  Patient is working with therapy bedside. Patient is moderate assist with bed mobility and ambulation    69-year-old female with stage IV pancreatic adenocarcinoma presenting with neutropenic fever and severe treatment-related toxicities from modified FOLFIRINOX and investigational EMILY inhibitor [id number] found to have PE and LBO.     # pancreatic ca   - remains on clinical trial- supportive care while in hospital   - will continue with ongoing communication with  [facility] have contributed to symptoms on arrival.  no DPD deficiency   - Review clinical trial protocol - may remain on trial drug alone and changing regimen- to be discussed when recovers from underlying illness and admission   -  spoke w/ Dr. Barr - will need to get to office at [facility] to discuss continuing with treatment   - if goes to rehab will be holding chemotherapy     # Neutropenic Septic shock - +fungal cultures   - pt previously on neupogen- WBC has now recovered and elevated likely 2/2 GCSF support   - BCx with fungal growth likely disseminated candidiasis  - removed mediport  - repeat cultuures NEG to date (2/28 culture grew saccharomyces cerevisiae)  -  ID - c/w caspofungin. po vanc (completed meropenem)  - most recent CT a/p 3/6- resolution of sbo/LBO, mottled lucency involving a short segment of sigmoid colon suspect for pneumatosis which could be due to benign or ischemic etiology- send lactate    # PE   - v/q scan w indeterminate prob of PE, linear defect in RUL posterior segment   - CT chest- multiple PE w/in segmental left lower and right lower, 2 right middle lobe nodules,   - most recent US LE doppler negative    - remains off a/c given high risk - no sign of DVT   - ivc filter  placed 3/10    # anemia   - Hb stable   - keep type and screen active, transfuse for Hb <7    # tachycardia- to 130s- cardio to be reconsulted      will follow and communicate with [facility]     Imaging  abd CT 3/6/2025  Resolution of small and large bowel dilatation.    Mottled lucency involving a short segment of sigmoid colon suspect for pneumatosis which could be due to benign or ischemic etiology. Please correlate clinically and with laboratory values.    Minimal increase in amount of ascites.    Unchanged pancreatic body and umbilical masses.      REVIEW OF SYSTEMS  Constitutional - No fever, No weight loss, No fatigue  HEENT - No eye pain, No visual disturbances, No difficulty hearing, No tinnitus, No vertigo, No neck pain  Respiratory - No cough, No wheezing, No shortness of breath  Cardiovascular - No chest pain, No palpitations  Gastrointestinal - No abdominal pain, No nausea, No vomiting, No diarrhea, No constipation  Genitourinary - No dysuria, No frequency, No hematuria, No incontinence  Neurological - No headaches, No memory loss, No loss of strength, No numbness, No tremors  Skin - No itching, No rashes, No lesions   Endocrine - No temperature intolerance  Musculoskeletal - No joint pain, No joint swelling, No muscle pain  Psychiatric - No depression, No anxiety    VITALS  T(C): 36.8 (03-07-25 @ 08:20), Max: 37.9 (03-06-25 @ 17:07)  HR: 94 (03-07-25 @ 08:20) (92 - 100)  BP: 145/78 (03-07-25 @ 08:20) (143/77 - 151/89)  RR: 18 (03-07-25 @ 08:20) (18 - 20)  SpO2: 99% (03-07-25 @ 08:20) (96% - 99%)  Wt(kg): --    PAST MEDICAL & SURGICAL HISTORY  CVA (cerebrovascular accident)    Primary pancreatic adenocarcinoma    HTN (hypertension)    HLD (hyperlipidemia)    Gout    SCC of lung (small cell carcinoma)    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     CURRENT FUNCTIONAL STATUS  Per PT supine to sit is moderate assist of 1 person  Sit to stand is moderate assist of 1 person    FAMILY HISTORY   No pertinent family history in first degree relatives    RECENT LABS - Reviewed  CBC Full  -  ( 07 Mar 2025 07:22 )  WBC Count : 8.66 K/uL  RBC Count : 3.21 M/uL  Hemoglobin : 9.2 g/dL  Hematocrit : 28.5 %  Platelet Count - Automated : 271 K/uL  Mean Cell Volume : 88.8 fl  Mean Cell Hemoglobin : 28.7 pg  Mean Cell Hemoglobin Concentration : 32.3 g/dL  Auto Neutrophil # : x  Auto Lymphocyte # : x  Auto Monocyte # : x  Auto Eosinophil # : x  Auto Basophil # : x  Auto Neutrophil % : x  Auto Lymphocyte % : x  Auto Monocyte % : x  Auto Eosinophil % : x  Auto Basophil % : x    03-07    143  |  112[H]  |  37[H]  ----------------------------<  125[H]  3.8   |  22  |  1.69[H]    Ca    9.6      07 Mar 2025 07:22  Mg     1.8     03-07      Urinalysis Basic - ( 07 Mar 2025 07:22 )    Color: x / Appearance: x / SG: x / pH: x  Gluc: 125 mg/dL / Ketone: x  / Bili: x / Urobili: x   Blood: x / Protein: x / Nitrite: x   Leuk Esterase: x / RBC: x / WBC x   Sq Epi: x / Non Sq Epi: x / Bacteria: x      ALLERGIES  No Known Allergies    MEDICATIONS   acetaminophen     Tablet .. 650 milliGRAM(s) Oral every 6 hours PRN  acetaminophen   IVPB .. 1000 milliGRAM(s) IV Intermittent once  aluminum hydroxide/magnesium hydroxide/simethicone Suspension 30 milliLiter(s) Oral every 4 hours PRN  Biotene Dry Mouth Oral Rinse 15 milliLiter(s) Swish and Spit five times a day PRN  caspofungin IVPB 50 milliGRAM(s) IV Intermittent every 24 hours  caspofungin IVPB      chlorhexidine 2% Cloths 1 Application(s) Topical <User Schedule>  cholecalciferol 2000 Unit(s) Oral at bedtime  dextrose 5% + sodium chloride 0.45%. 1000 milliLiter(s) IV Continuous <Continuous>  dextrose 5%. 1000 milliLiter(s) IV Continuous <Continuous>  dextrose 5%. 1000 milliLiter(s) IV Continuous <Continuous>  famotidine Injectable 20 milliGRAM(s) IV Push daily  glucagon  Injectable 1 milliGRAM(s) IntraMuscular once  meropenem Injectable 1000 milliGRAM(s) IV Push every 12 hours  ondansetron Injectable 4 milliGRAM(s) IV Push every 8 hours PRN  pantoprazole  Injectable 40 milliGRAM(s) IV Push every 12 hours  sodium chloride 0.65% Nasal 1 Spray(s) Both Nostrils two times a day PRN  sucralfate suspension 1 Gram(s) Oral four times a day  vancomycin    Solution 125 milliGRAM(s) Oral every 6 hours  zinc oxide 40% Paste 1 Application(s) Topical two times a day      ----------------------------------------------------------------------------------------  PHYSICAL EXAM  Constitutional - NAD, Comfortable  HEENT - NCAT, EOMI  Neck - Supple, No limited ROM  Chest - Breathing comfortably, No wheezing  Cardiovascular - S1S2   Abdomen - Soft   Extremities - No C/C/E, No calf tenderness   Neurologic Exam -                    Cognitive - AAO to self, place, date, year, situation     Communication - Fluent, No dysarthria     Cranial Nerves - CN 2-12 intact     Motor - No focal deficits                    LEFT    UE - ShAB 3/5, EF 3/5, EE 3/5, WE 3/5,  3/5                    RIGHT UE - ShAB 3/5, EF 3/5, EE 3/5, WE 3/5,  3/5                    LEFT    LE - HF 3/5, KE 3/5, DF 3/5, PF 3/5                    RIGHT LE - HF 3/5, KE 3/5, DF 3/5, PF 3/5        Sensory - Intact to LT     Reflexes - DTR Intact, No primitive reflexive     Coordination - FTN intact     OculoVestibular - No saccades, No nystagmus, VOR         Balance - WNL Static  Psychiatric - Mood stable, Affect WNL  ----------------------------------------------------------------------------------------

## 2025-03-11 NOTE — PROGRESS NOTE ADULT - ASSESSMENT
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  Then transferred to  as per request of . Was getting platelet transfusion and developed tachypnea hypoxia, rigors. Was diuresed and became hypotensive, Upgraded to SICU. CTA c/a/p on 2/19 showed multiple b/l PE and large bowel obstruction, was started on heparin drip, c/b GIB, then retrialed heparin drip with recurrent c/f GIB. Also with Afib w RVR during admission, resolved w iv metoprolol.   On 2/28, developed SIRS, Blood cx growing yeast. TPN stopped, R chest port removed on 3/2 and pt started on iv caspofungin.    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 Mottled air   lucency and mild fat infiltration surrounding the jejunal segment (2:62. Indeterminant as to contained perforation   - lactate cleared 1.3 3/8   - Tolerated CLD, now on regular diet  - GI and surgery on board  - Sp Meropenem  - colorectal surgery consult   - hyperbaric consult   - second opinion GI Dr. Young - plan for celiac block for pain control     #Acute bilateral PE  # Lung nodules  - Doppler LE negative  - CT chest- multiple PE w/in segmental left lower and right lower, 2 right middle lobe nodules,   -  3/10 s/p IVC filter   - Recurrent GIB on heparin     # H/o CVA (cerebrovascular accident).   - Was originally on Plavix per  but because is now taking [facility] [id number],  - was told to start Aggrenox instead to avoid DDIs)  - platelets 33 --> 253   - off AP and AC due to GI bleeds lisbeth d/w  GI team    # SVT on 02/09/2025: resolved after Valsalva and Carotid massage.  -Norvasc and Lipitor on hold to septic shock.   -Off levophed; now on oral midodrine.  -Cardiology consulted Dr. Clement  - off BB    # GERD/Esophagitis , recurrent GI bleeds while on IV heparin   - Continue protonix   - Continue carafate    #MALLORY, likely ATN  - Stable at 1.4 3/9  - Continue IVF until can maintain po intake  - Nephrology on board    # Acute blood loss anemia and Malignancy  - SP GIB x 2 after starting heparin  -s/p pRBC x 6u this admission  - Hemoglobin stable 9.0  3/9  - Continue protonix    #Primary pancreatic adenocarcinoma  - SP folfirinox with reaction to irinotecan  - on a clinical trial now, on hold     #Skin injury Left and Rigth upper Gluteal Intergluteal cleft   - superficial skin necrosis  - Wound care evaluation appreciated  -Low air mattress  - Turning / positioning q2h while in bed    # Cutaneous lesion on RUQ area below right breast,   Ecthyma gangrenosum.  - Ecthyma gangrenosum. life-threatening skin infection caused by Pseudomonas aeruginosa bacteria.   - dermatology consult  -  appears to be resolving, most skin changes appear post inflammatory.  - Please recall if the lesion does not continue to slowly resolve over the next 2-3 weeks.   - wound care - If the surface ulcerates, cover with telfa/ vaseline/ paper tape      # Hypokalemia  # Hypomagnesemia   - replete prn    #DVT ppx  - SCDs    Dispo: colorectal surgery and second opinion  GI Dr. Young consult  , for MITCH.  PT

## 2025-03-11 NOTE — CONSULT NOTE ADULT - SUBJECTIVE AND OBJECTIVE BOX
HPI:  69 F with PMH metastatic pancreatic cancer in a clinical trial at [facility] admitted a month ago with sepsis, found to have pseudomonal bacteremia and fungemia. During her hosp admissin developed PE, placed on heparin drip, subsequently developed GI bleed and now has an IVC filter. She reports abdominal pain intermittently for approx a week, diffuse, that worsens with food. A/w low appetite. She denies nausea or vomiting adn has been having regular bowel function. Had a CT scan done on 3/6 that showed pneumatosis in mid transverse colon and yesterday repeat CT with oral contrast showed fistula b/w transverse colon and jejunum for which colorectal Surgery is now consulted.       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 1000 milliGRAM(s) IV Intermittent once  caspofungin IVPB 50 milliGRAM(s) IV Intermittent every 24 hours  caspofungin IVPB      chlorhexidine 2% Cloths 1 Application(s) Topical <User Schedule>  cholecalciferol 2000 Unit(s) Oral at bedtime  dextrose 5% + sodium chloride 0.45%. 1000 milliLiter(s) (75 mL/Hr) IV Continuous <Continuous>  dextrose 5%. 1000 milliLiter(s) (50 mL/Hr) IV Continuous <Continuous>  dextrose 5%. 1000 milliLiter(s) (100 mL/Hr) IV Continuous <Continuous>  famotidine Injectable 20 milliGRAM(s) IV Push daily  glucagon  Injectable 1 milliGRAM(s) IntraMuscular once  pantoprazole  Injectable 40 milliGRAM(s) IV Push every 12 hours  vancomycin    Solution 125 milliGRAM(s) Oral every 6 hours  zinc oxide 40% Paste 1 Application(s) Topical two times a day    MEDICATIONS  (PRN):  acetaminophen     Tablet .. 650 milliGRAM(s) Oral every 6 hours PRN Temp greater or equal to 38C (100.4F), Mild Pain (1 - 3)  aluminum hydroxide/magnesium hydroxide/simethicone Suspension 30 milliLiter(s) Oral every 4 hours PRN Dyspepsia  Biotene Dry Mouth Oral Rinse 15 milliLiter(s) Swish and Spit five times a day PRN Mouth Care  ondansetron Injectable 4 milliGRAM(s) IV Push every 8 hours PRN Nausea and/or Vomiting  simethicone 80 milliGRAM(s) Chew every 4 hours PRN Upset Stomach  sodium chloride 0.65% Nasal 1 Spray(s) Both Nostrils two times a day PRN Nasal Congestion      Allergies    No Known Allergies    Intolerances        SOCIAL HISTORY:    FAMILY HISTORY:  No pertinent family history in first degree relatives            Physical Exam:  General: NAD, resting comfortably  HEENT: NC/AT, EOMI, normal hearing, no oral lesions, no LAD, neck supple  Pulmonary: normal resp effort, CTA-B  Cardiovascular: NSR  Abdominal: soft, epigastric tenderness, mass palpable around the umbilicus with well healed surgical incision, no rigidity or guarding  Neuro: A/O x 3, CNs II-XII grossly intact, normal sensation, no focal deficits      Vital Signs Last 24 Hrs  T(C): 36.7 (11 Mar 2025 07:46), Max: 36.8 (10 Mar 2025 16:05)  T(F): 98.1 (11 Mar 2025 07:46), Max: 98.2 (10 Mar 2025 16:05)  HR: 101 (11 Mar 2025 07:46) (94 - 132)  BP: 156/91 (11 Mar 2025 07:46) (136/74 - 156/91)  BP(mean): --  RR: 18 (11 Mar 2025 07:46) (17 - 20)  SpO2: 95% (11 Mar 2025 07:46) (93% - 96%)    Parameters below as of 11 Mar 2025 07:46  Patient On (Oxygen Delivery Method): room air        I&O's Summary    10 Mar 2025 07:01  -  11 Mar 2025 07:00  --------------------------------------------------------  IN: 0 mL / OUT: 2250 mL / NET: -2250 mL            LABS:                        9.4    8.95  )-----------( 253      ( 11 Mar 2025 09:12 )             29.7     03-11    141  |  107  |  25[H]  ----------------------------<  123[H]  3.7   |  26  |  1.40[H]    Ca    9.6      11 Mar 2025 09:12  Phos  3.2     03-11  Mg     1.7     03-11    TPro  6.3  /  Alb  2.2[L]  /  TBili  0.7  /  DBili  x   /  AST  19  /  ALT  20  /  AlkPhos  140[H]  03-11    PT/INR - ( 10 Mar 2025 06:40 )   PT: 14.4 sec;   INR: 1.22 ratio         PTT - ( 10 Mar 2025 06:40 )  PTT:32.4 sec  Urinalysis Basic - ( 11 Mar 2025 09:12 )    Color: x / Appearance: x / SG: x / pH: x  Gluc: 123 mg/dL / Ketone: x  / Bili: x / Urobili: x   Blood: x / Protein: x / Nitrite: x   Leuk Esterase: x / RBC: x / WBC x   Sq Epi: x / Non Sq Epi: x / Bacteria: x      CAPILLARY BLOOD GLUCOSE        LIVER FUNCTIONS - ( 11 Mar 2025 09:12 )  Alb: 2.2 g/dL / Pro: 6.3 gm/dL / ALK PHOS: 140 U/L / ALT: 20 U/L / AST: 19 U/L / GGT: x             Cultures:      RADIOLOGY & ADDITIONAL STUDIES:    < from: CT Abdomen and Pelvis w/ Oral Cont (03.10.25 @ 17:44) >  IMPRESSION:    There is an apparent fistula between the transverse colon and a mid   jejunal loop with enteric contrast in the fistula (2:64). Mottled air   lucency and mild fat infiltration surrounding the jejunal segment (2:62.   Indeterminant as to contained perforation of the transverse colon or the   jejunum.  The finding is new compared with 2/22/2025. Recommend surgical   consultation.    Indeterminate 8 mm right middle lobe nodule, either infectious or   malignant. Stable bilateral pleural effusions.    Redemonstrated pancreatic mass, inseparable from the posterior gastric   wall. Unchanged umbilical mass.    Pelvic floor laxity with mild bladder and rectal prolapse.    Ascites unchanged.    < end of copied text >

## 2025-03-11 NOTE — PROGRESS NOTE ADULT - SUBJECTIVE AND OBJECTIVE BOX
COVERING FOR DR LÓPEZ     Patient is a 69y Female with  who reports   no complaints overnight.    Allergies    No Known Allergies    Intolerances        MEDICATIONS  (STANDING):  acetaminophen   IVPB .. 1000 milliGRAM(s) IV Intermittent once  bisacodyl Suppository 10 milliGRAM(s) Rectal at bedtime  caspofungin IVPB 50 milliGRAM(s) IV Intermittent every 24 hours  caspofungin IVPB      chlorhexidine 2% Cloths 1 Application(s) Topical <User Schedule>  cholecalciferol 2000 Unit(s) Oral at bedtime  dextrose 5% + sodium chloride 0.45%. 1000 milliLiter(s) (75 mL/Hr) IV Continuous <Continuous>  dextrose 5%. 1000 milliLiter(s) (50 mL/Hr) IV Continuous <Continuous>  dextrose 5%. 1000 milliLiter(s) (100 mL/Hr) IV Continuous <Continuous>  famotidine Injectable 20 milliGRAM(s) IV Push daily  glucagon  Injectable 1 milliGRAM(s) IntraMuscular once  magnesium sulfate  IVPB 1 Gram(s) IV Intermittent once  pantoprazole  Injectable 40 milliGRAM(s) IV Push every 12 hours  simethicone 125 milliGRAM(s) Chew three times a day  vancomycin    Solution 125 milliGRAM(s) Oral every 6 hours  zinc oxide 40% Paste 1 Application(s) Topical two times a day    MEDICATIONS  (PRN):  acetaminophen     Tablet .. 650 milliGRAM(s) Oral every 6 hours PRN Temp greater or equal to 38C (100.4F), Mild Pain (1 - 3)  aluminum hydroxide/magnesium hydroxide/simethicone Suspension 30 milliLiter(s) Oral every 4 hours PRN Dyspepsia  Biotene Dry Mouth Oral Rinse 15 milliLiter(s) Swish and Spit five times a day PRN Mouth Care  ondansetron Injectable 4 milliGRAM(s) IV Push every 8 hours PRN Nausea and/or Vomiting  sodium chloride 0.65% Nasal 1 Spray(s) Both Nostrils two times a day PRN Nasal Congestion      Vital Signs Last 24 Hrs  T(C): 37.1 (11 Mar 2025 16:32), Max: 37.1 (11 Mar 2025 16:32)  T(F): 98.7 (11 Mar 2025 16:32), Max: 98.7 (11 Mar 2025 16:32)  HR: 108 (11 Mar 2025 16:32) (94 - 132)  BP: 152/94 (11 Mar 2025 16:32) (136/74 - 156/91)  BP(mean): --  RR: 18 (11 Mar 2025 16:32) (18 - 20)  SpO2: 95% (11 Mar 2025 16:32) (93% - 95%)    Parameters below as of 11 Mar 2025 16:32  Patient On (Oxygen Delivery Method): room air      I&O's Detail    10 Mar 2025 07:01  -  11 Mar 2025 07:00  --------------------------------------------------------  IN:  Total IN: 0 mL    OUT:    Indwelling Catheter - Urethral (mL): 2250 mL  Total OUT: 2250 mL    Total NET: -2250 mL      11 Mar 2025 07:01  -  11 Mar 2025 17:31  --------------------------------------------------------  IN:  Total IN: 0 mL    OUT:    Indwelling Catheter - Urethral (mL): 1000 mL  Total OUT: 1000 mL    Total NET: -1000 mL      Constitutional: NAD,   HEENT:   MM  Respiratory: CTAB  Cardiovascular: S1 and S2  Gastrointestinal: BS+, soft, NT/ND  Extremities: + peripheral edema  Neurological: A/O  : No Julian  Dialysis Access: Not applicable    LABS:                          9.4    8.95  )-----------( 253      ( 11 Mar 2025 09:12 )             29.7                           9.0    7.93  )-----------( 265      ( 09 Mar 2025 07:07 )             28.5                         9.1    9.34  )-----------( 283      ( 08 Mar 2025 07:59 )             28.5       03-11    141  |  107  |  25[H]  ----------------------------<  123[H]  3.7   |  26  |  1.40[H]    Ca    9.6      11 Mar 2025 09:12  Phos  3.2     03-11  Mg     1.7     03-11    TPro  6.3  /  Alb  2.2[L]  /  TBili  0.7  /  DBili  x   /  AST  19  /  ALT  20  /  AlkPhos  140[H]  03-11      143    |  113    |  31     ----------------------------<  129       09 Mar 2025 07:07  3.9     |  23     |  1.48     144    |  112    |  32     ----------------------------<  126       08 Mar 2025 07:59  3.3     |  25     |  1.61     143    |  112    |  37     ----------------------------<  125       07 Mar 2025 07:22  3.8     |  22     |  1.69     Ca    9.4        09 Mar 2025 07:07  Ca    9.2        08 Mar 2025 07:59      Mg     1.6       09 Mar 2025 07:07  Mg     1.6       08 Mar 2025 07:59    TPro  6.0    /  Alb  2.0    /  TBili  0.6    /        09 Mar 2025 07:07  DBili  x      /  AST  17     /  ALT  21     /  AlkPhos  150      TPro  5.8    /  Alb  1.9    /  TBili  0.6    /        08 Mar 2025 07:59  DBili  x      /  AST  17     /  ALT  21     /  AlkPhos  151          Serum Osmo:   Serum Uric Acid:     Urine Studies:  Urinalysis Basic - ( 09 Mar 2025 07:07 )    Color: x / Appearance: x / SG: x / pH: x  Gluc: 129 mg/dL / Ketone: x  / Bili: x / Urobili: x   Blood: x / Protein: x / Nitrite: x   Leuk Esterase: x / RBC: x / WBC x   Sq Epi: x / Non Sq Epi: x / Bacteria: x        Urine chemistry:   Urine Na:   Urine Creatinine:   Urine Protein/Cr ratio:  Urine K:   Urine Osm:       RADIOLOGY & ADDITIONAL STUDIES:

## 2025-03-11 NOTE — PROGRESS NOTE ADULT - ASSESSMENT
69-year-old female with stage IV pancreatic adenocarcinoma presenting with neutropenic fever and severe treatment-related toxicities from modified FOLFIRINOX and investigational EMILY inhibitor [id number] found to have PE and LBO.     # pancreatic ca   - remains on clinical trial- supportive care while in hospital   - will continue with ongoing communication with  [facility] have contributed to symptoms on arrival.  no DPD deficiency   - Review clinical trial protocol - may remain on trial drug alone and changing regimen- to be discussed when recovers from underlying illness and admission   -  spoke w/ Dr. Barr - will need to get to office at [facility] to discuss continuing with treatment   - if goes to rehab will be holding chemotherapy     # Neutropenic Septic shock - +fungal cultures   - pt previously on neupogen- WBC has now recovered and elevated likely 2/2 GCSF support   - BCx with fungal growth likely disseminated candidiasis  - removed mediport  - repeat cultuures NEG to date (2/28 culture grew saccharomyces cerevisiae)  -  ID - c/w caspofungin. po vanc (completed meropenem)  - most recent CT a/p 3/6- resolution of sbo/LBO, mottled lucency involving a short segment of sigmoid colon suspect for pneumatosis which could be due to benign or ischemic etiology- send lactate    # PE   - v/q scan w indeterminate prob of PE, linear defect in RUL posterior segment   - CT chest- multiple PE w/in segmental left lower and right lower, 2 right middle lobe nodules,   - most recent US LE doppler negative    - remains off a/c given high risk - no sign of DVT   - ivc filter  placed 3/10    # anemia   - Hb stable   - keep type and screen active, transfuse for Hb <7    # tachycardia- to 130s- cardio to be reconsulted      will follow and communicate with [facility]

## 2025-03-11 NOTE — CONSULT NOTE ADULT - ASSESSMENT
68 yo F with metastatic pancreatic cancer  Enterocolonic fistula present  Abdominal pain +  No peritonitits on exam      Trend WBC and lactate  JHONATAN  Continue abx as per ID

## 2025-03-11 NOTE — PROGRESS NOTE ADULT - ASSESSMENT
Metastatic pancreatic Ca, s/p urosepsis, neutropenia, fungemia. Pt's abdominal symptoms persist. CT scan shows possible jejunal transverse colon contained fistula. This may be due to a focal erosion or a tumor implant. In view of immediate pain after eating, consider EGD or EUS for further diagnostic purposes.

## 2025-03-11 NOTE — PROGRESS NOTE ADULT - ASSESSMENT
ASSESSMENT/PLAN  69yFemale with functional deficits after Chemotherapy primary pancreatic adenocarcinoma of head with tumor in umbilicus, chemo-induced pancytopenia     Pain - Tylenol  DVT PPX - SCDs  Rehab -     Patient is making gains with bedside therapy. Patient has good potential for Acute inpatient rehab  Dx; Physical debility.     Will continue to follow. Functional progress will determine ongoing rehab dispo recommendations, which may change.    Continue bedside therapy as well as OOB throughout the day with mobilization by staff to maintain cardiopulmonary function and prevention of secondary complications related to debility.

## 2025-03-11 NOTE — PROGRESS NOTE ADULT - ASSESSMENT
69-year-old female with h/o recent diagnosis of primary head of pancreatic adenocarcinoma with tumor in umbilicus, chemo-induced pancytopenia, SCC lip, cyclical vomiting syndrome, HTN, HPL, Gout, GERD, CVA on Aggrenox was transferred on 2/12 from [facility] for further care. She was admitted to [facility] with septic shock on 02/09/2025, nausea, vomiting and diarrhea requiring Levophed drip. Patient was being followed by GI, ID, Cardiology and Onc there. Patient has been transferred to  on patient's  (GI Dr. Pleitez) request. She was reported with non-bloody diarrhea x 3 in last 24 hours PTA. She denied abdominal pain, nausea, but has dry heaves. At [facility] she was started on Vancomycin and Zosyn, then changed to Cefepime as her blood culture and urine cultures showed Pseudomonas aeruginosa.    #Sigmoid colon pneumatosis. ?ischemic colitis.  #Fungemia with Saccharomyces cerevisiae  #Dysphagia. Possible candida esophagitis  #S/p sepsis with PSAE   #Head of pancreas adenocarcinoma on chemotherapy  #Immunocompromised host  #Kidney stones  #ARF on CRF stage 3  -leukocytosis is improved  -renal function is frail   -dysphagia with poor PO intake  -cultures reviewed  -s/p fluconazole 200 mg IV qd # 14  -s/p meropenem 1000 mg IV q8h # 5  -on vancomycin 125 mg PO q6h for CDAD prophylaxis  -on caspofungin 50 mg IV qd # 10   -tolerating abx well so far; no side effects noted  -surgical evaluation --> conservative care at this time  -repeat BC x 2 are negative to date  -plan for IVC filter placement  -monitor abdomen  -continue antifungal coverage to complete 14 days and vancomycin PO  -f/u cultures  -monitor temps  -f/u CBC and BMP  -supportive care  2. Other issues:   -care per medicine    d/w medicine team

## 2025-03-11 NOTE — PROGRESS NOTE ADULT - SUBJECTIVE AND OBJECTIVE BOX
Date of service: 03-11-25 @ 07:36    Lying in bed in NAD  s/p IVC filter placement  Denies pain  No fever    ROS: no fever or chills; limited    MEDICATIONS  (STANDING):  acetaminophen   IVPB .. 1000 milliGRAM(s) IV Intermittent once  caspofungin IVPB 50 milliGRAM(s) IV Intermittent every 24 hours  caspofungin IVPB      chlorhexidine 2% Cloths 1 Application(s) Topical <User Schedule>  cholecalciferol 2000 Unit(s) Oral at bedtime  dextrose 5% + sodium chloride 0.45%. 1000 milliLiter(s) (75 mL/Hr) IV Continuous <Continuous>  dextrose 5%. 1000 milliLiter(s) (100 mL/Hr) IV Continuous <Continuous>  dextrose 5%. 1000 milliLiter(s) (50 mL/Hr) IV Continuous <Continuous>  famotidine Injectable 20 milliGRAM(s) IV Push daily  glucagon  Injectable 1 milliGRAM(s) IntraMuscular once  pantoprazole  Injectable 40 milliGRAM(s) IV Push every 12 hours  vancomycin    Solution 125 milliGRAM(s) Oral every 6 hours  zinc oxide 40% Paste 1 Application(s) Topical two times a day    Vital Signs Last 24 Hrs  T(C): 36.3 (11 Mar 2025 04:02), Max: 36.8 (10 Mar 2025 16:05)  T(F): 97.3 (11 Mar 2025 04:02), Max: 98.2 (10 Mar 2025 16:05)  HR: 94 (11 Mar 2025 04:02) (94 - 132)  BP: 152/82 (11 Mar 2025 04:02) (136/74 - 152/82)  BP(mean): --  RR: 18 (11 Mar 2025 04:02) (16 - 20)  SpO2: 93% (11 Mar 2025 04:02) (93% - 98%)    Parameters below as of 11 Mar 2025 04:02  Patient On (Oxygen Delivery Method): room air     Physical exam:    Constitutional:  No acute distress  HEENT: NC/AT, EOMI, PERRLA, conjunctivae clear; ears and nose atraumatic; pharynx benign  Neck: supple; thyroid not palpable  Back: no tenderness  Respiratory: respiratory effort normal; decreased BS at bases  Cardiovascular: S1S2 regular, no murmurs  Abdomen: soft, mild periombilical tender, not distended, positive BS; no liver or spleen organomegaly  Genitourinary: no suprapubic tenderness  Lymphatic: no LN palpable  Musculoskeletal: no muscle tenderness, no joint swelling or tenderness  Extremities: no pedal edema  Neurological/ Psychiatric: Alert, moving all extremities  Skin: no rashes; right lower chest wall dry, scabbed ulcer; no discharge     Labs: reviewed                        9.0    9.17  )-----------( 294      ( 10 Mar 2025 06:40 )             28.3     03-10    140  |  109[H]  |  29[H]  ----------------------------<  121[H]  3.4[L]   |  27  |  1.39[H]    Ca    9.4      10 Mar 2025 06:40  Mg     1.6     03-10    TPro  6.0  /  Alb  2.0[L]  /  TBili  0.5  /  DBili  x   /  AST  16  /  ALT  21  /  AlkPhos  139[H]  03-10                        9.0    7.93  )-----------( 265      ( 09 Mar 2025 07:07 )             28.5     03-09    143  |  113[H]  |  31[H]  ----------------------------<  129[H]  3.9   |  23  |  1.48[H]    Ca    9.4      09 Mar 2025 07:07  Mg     1.6     03-09    TPro  6.0  /  Alb  2.0[L]  /  TBili  0.6  /  DBili  x   /  AST  17  /  ALT  21  /  AlkPhos  150[H]  03-09                        9.2    8.66  )-----------( 271      ( 07 Mar 2025 07:22 )             28.5     03-07    143  |  112[H]  |  37[H]  ----------------------------<  125[H]  3.8   |  22  |  1.69[H]    Ca    9.6      07 Mar 2025 07:22  Mg     1.8     03-07                        10.5   35.65 )-----------( 132      ( 21 Feb 2025 05:18 )             31.0     02-21    141  |  105  |  66[H]  ----------------------------<  358[H]  3.2[L]   |  26  |  1.33[H]    Ca    8.2[L]      21 Feb 2025 05:18  Phos  3.6     02-21  Mg     2.0     02-21    TPro  5.3[L]  /  Alb  2.1[L]  /  TBili  0.6  /  DBili  x   /  AST  56[H]  /  ALT  33  /  AlkPhos  329[H]  02-21    C-Reactive Protein: 209.0 mg/mL (02-15-25 @ 05:17)  C-Reactive Protein: 233.0 mg/mL (02-14-25 @ 06:50)  Ferritin: 1798 ng/mL (02-13-25 @ 18:46)    Culture - Blood (collected 04 Mar 2025 18:58)  Source: Blood None  Preliminary Report (08 Mar 2025 02:01):    No growth at 72 Hours    Culture - Blood (collected 04 Mar 2025 18:57)  Source: Blood None  Preliminary Report (08 Mar 2025 02:01):    No growth at 72 Hours    Culture - Blood (collected 04 Mar 2025 08:14)  Source: Blood None  Preliminary Report (07 Mar 2025 13:01):    No growth at 72 Hours    Culture - Blood (collected 04 Mar 2025 08:13)  Source: Blood None  Preliminary Report (07 Mar 2025 13:01):    No growth at 72 Hours    Culture - Catheter (collected 02 Mar 2025 15:30)  Source: Catheter Port Device  Final Report (07 Mar 2025 17:22):    No growth at 5 days    Culture - Blood (collected 28 Feb 2025 12:00)  Source: Blood None  Final Report (05 Mar 2025 19:00):    No growth at 5 days    Culture - Blood (collected 28 Feb 2025 11:54)  Source: Blood None  Gram Stain (02 Mar 2025 06:12):    Growth in aerobic bottle: Yeast like cells  Preliminary Report (07 Mar 2025 14:37):    Growth in aerobic bottle: Saccharomyces cerevisiae    Direct identification is available within approximately 3-5    hours either by Blood Panel Multiplexed PCR or Direct    MALDI-TOF. Details: [email or web address]  Organism: Blood Culture PCR (02 Mar 2025 08:38)  Organism: Blood Culture PCR (02 Mar 2025 08:38)      Method Type: PCR      -  Blood PCR Panel: NEG    Radiology: all available radiological tests reviewed    < from: US Abdomen Upper Quadrant Right (02.09.25 @ 14:55) >  Distended gallbladder with layering sludge.  8 mm right renal calculus without hydronephrosis.  Hepatomegaly.  Right renal cyst.  < end of copied text >    < from: CT Abdomen and Pelvis No Cont (02.09.25 @ 03:01) >  1.   Study somewhat limited due to absence of contrast.  2.   Axial images 71-76 of series 301 and concomitant coronal images demonstrate prominent soft tissue inseparable from the body of the pancreas consistent with pancreatic neoplasm.  3.   Some distension of gallbladder could be further evaluated with right upper quadrant sonography. No definite biliary dilatation.  < end of copied text >    < from: CT Abdomen and Pelvis No Cont (03.06.25 @ 16:32) >  Resolution of small and large bowel dilatation.  Mottled lucency involving a short segment of sigmoid colon suspect for pneumatosis which could be due to benign or ischemic etiology. Please correlate clinically and with laboratory values.  Minimal increase in amount of ascites.  Unchanged pancreatic body and umbilical masses.  < end of copied text >    Advanced directives addressed: full resuscitation

## 2025-03-11 NOTE — PROGRESS NOTE ADULT - ASSESSMENT
Imp:  I'm not sure that the jejuno-colic fistula explains the pain, which could be just from abdominal distension, ascites, anasarca    Rec:  Mag citrate 1/2 bottle per Dr. Pleitez request

## 2025-03-11 NOTE — PROGRESS NOTE ADULT - SUBJECTIVE AND OBJECTIVE BOX
Subjective:    pat better, sitting in bed, no new complaint. Generalized abdominal pain which worsens with eating, but not focal, s/p ivc filter placement, last night pt tachycardic to 130s, given vagal maneuver w/ improvement.     Home Medications:  amitriptyline: 10 milligram(s) orally once a day (23 Dec 2024 18:06)  amLODIPine 5 mg oral tablet: 1 tab(s) orally once a day (23 Dec 2024 18:06)  aspirin 81 mg oral tablet, chewable: 1 tab(s) orally once a day (23 Dec 2024 18:06)  atorvastatin 40 mg oral tablet: 1 tab(s) orally once a day (at bedtime) (23 Dec 2024 18:06)  cefepime 2 g intravenous injection: 2 gram(s) intravenous every 12 hours (04 Mar 2025 21:11)  clopidogrel 75 mg oral tablet: 1 tab(s) orally once a day (23 Dec 2024 16:32)  filgrastim: 480 microgram(s) subcutaneous once a day (04 Mar 2025 21:11)  Lactated Ringers Injection intravenous solution: 150 milligram(s) intravenous every 1 to 2 hours (04 Mar 2025 21:11)  loperamide 2 mg oral capsule: 1 cap(s) orally once (04 Mar 2025 21:11)  midodrine 5 mg oral tablet: 1 tab(s) orally every 8 hours (04 Mar 2025 21:11)  Multiple Vitamins with Minerals oral liquid: 1 orally once a day (04 Mar 2025 21:11)  octreotide 2500 mcg/mL subcutaneous solution: 0.04 milliliter(s) subcutaneous 3 times a day (04 Mar 2025 21:11)  Pepcid 40 mg oral tablet: 40 milligram(s) orally once a day as needed for dyspepsia (23 Dec 2024 18:06)  sucralfate 1 g/10 mL oral suspension: 10 milliliter(s) orally 4 times a day (04 Mar 2025 21:11)  trimethobenzamide 100 mg/mL intramuscular solution: 2 milliliter(s) intramuscular every 8 hours As needed Nausea and/or Vomiting (04 Mar 2025 21:11)    MEDICATIONS  (STANDING):  acetaminophen   IVPB .. 1000 milliGRAM(s) IV Intermittent once  caspofungin IVPB 50 milliGRAM(s) IV Intermittent every 24 hours  caspofungin IVPB      chlorhexidine 2% Cloths 1 Application(s) Topical <User Schedule>  cholecalciferol 2000 Unit(s) Oral at bedtime  dextrose 5% + sodium chloride 0.45%. 1000 milliLiter(s) (75 mL/Hr) IV Continuous <Continuous>  dextrose 5%. 1000 milliLiter(s) (50 mL/Hr) IV Continuous <Continuous>  dextrose 5%. 1000 milliLiter(s) (100 mL/Hr) IV Continuous <Continuous>  famotidine Injectable 20 milliGRAM(s) IV Push daily  glucagon  Injectable 1 milliGRAM(s) IntraMuscular once  magnesium citrate Oral Solution 148 milliLiter(s) Oral once  pantoprazole  Injectable 40 milliGRAM(s) IV Push every 12 hours  vancomycin    Solution 125 milliGRAM(s) Oral every 6 hours  zinc oxide 40% Paste 1 Application(s) Topical two times a day    MEDICATIONS  (PRN):  acetaminophen     Tablet .. 650 milliGRAM(s) Oral every 6 hours PRN Temp greater or equal to 38C (100.4F), Mild Pain (1 - 3)  aluminum hydroxide/magnesium hydroxide/simethicone Suspension 30 milliLiter(s) Oral every 4 hours PRN Dyspepsia  Biotene Dry Mouth Oral Rinse 15 milliLiter(s) Swish and Spit five times a day PRN Mouth Care  ondansetron Injectable 4 milliGRAM(s) IV Push every 8 hours PRN Nausea and/or Vomiting  simethicone 80 milliGRAM(s) Chew every 4 hours PRN Upset Stomach  sodium chloride 0.65% Nasal 1 Spray(s) Both Nostrils two times a day PRN Nasal Congestion      Allergies    No Known Allergies    Intolerances        Vital Signs Last 24 Hrs  T(C): 36.7 (11 Mar 2025 07:46), Max: 36.8 (10 Mar 2025 16:05)  T(F): 98.1 (11 Mar 2025 07:46), Max: 98.2 (10 Mar 2025 16:05)  HR: 101 (11 Mar 2025 07:46) (94 - 132)  BP: 156/91 (11 Mar 2025 07:46) (136/74 - 156/91)  BP(mean): --  RR: 18 (11 Mar 2025 07:46) (17 - 20)  SpO2: 95% (11 Mar 2025 07:46) (93% - 96%)    Parameters below as of 11 Mar 2025 07:46  Patient On (Oxygen Delivery Method): room air          PHYSICAL EXAMINATION:    NECK:  Supple. No lymphadenopathy. Jugular venous pressure not elevated. Carotids equal.   HEART:   The cardiac impulse has a normal quality. Reg., Nl S1 and S2.  There are no murmurs, rubs or gallops noted  CHEST:  Chest crackles to auscultation. Normal respiratory effort.  ABDOMEN:  Soft and nontender.   EXTREMITIES:  There is no edema.       LABS:                        9.4    8.95  )-----------( 253      ( 11 Mar 2025 09:12 )             29.7     03-11    141  |  107  |  25[H]  ----------------------------<  123[H]  3.7   |  26  |  1.40[H]    Ca    9.6      11 Mar 2025 09:12  Phos  3.2     03-11  Mg     1.7     03-11    TPro  6.3  /  Alb  2.2[L]  /  TBili  0.7  /  DBili  x   /  AST  19  /  ALT  20  /  AlkPhos  140[H]  03-11    PT/INR - ( 10 Mar 2025 06:40 )   PT: 14.4 sec;   INR: 1.22 ratio         PTT - ( 10 Mar 2025 06:40 )  PTT:32.4 sec  Urinalysis Basic - ( 11 Mar 2025 09:12 )    Color: x / Appearance: x / SG: x / pH: x  Gluc: 123 mg/dL / Ketone: x  / Bili: x / Urobili: x   Blood: x / Protein: x / Nitrite: x   Leuk Esterase: x / RBC: x / WBC x   Sq Epi: x / Non Sq Epi: x / Bacteria: x

## 2025-03-11 NOTE — PROVIDER CONTACT NOTE (CHANGE IN STATUS NOTIFICATION) - SITUATION
2 brief episodes of PATs up to 160's on tele  ~6 seconds each
Pt activity having large, formed, soft, brown BM's. x4 on my shift.
Pt pulled NGT, and c/o sacral wound pain

## 2025-03-11 NOTE — PROVIDER CONTACT NOTE (CHANGE IN STATUS NOTIFICATION) - ACTION/TREATMENT ORDERED:
stat BNP
Give IV tylenol. Leave tube out for now
Hold fleet. Give suppository tonight and simethicone.

## 2025-03-11 NOTE — PROGRESS NOTE ADULT - SUBJECTIVE AND OBJECTIVE BOX
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 1000 milliGRAM(s) IV Intermittent once  caspofungin IVPB 50 milliGRAM(s) IV Intermittent every 24 hours  caspofungin IVPB      chlorhexidine 2% Cloths 1 Application(s) Topical <User Schedule>  cholecalciferol 2000 Unit(s) Oral at bedtime  dextrose 5% + sodium chloride 0.45%. 1000 milliLiter(s) (75 mL/Hr) IV Continuous <Continuous>  dextrose 5%. 1000 milliLiter(s) (100 mL/Hr) IV Continuous <Continuous>  dextrose 5%. 1000 milliLiter(s) (50 mL/Hr) IV Continuous <Continuous>  famotidine Injectable 20 milliGRAM(s) IV Push daily  glucagon  Injectable 1 milliGRAM(s) IntraMuscular once  magnesium citrate Oral Solution 148 milliLiter(s) Oral once  pantoprazole  Injectable 40 milliGRAM(s) IV Push every 12 hours  vancomycin    Solution 125 milliGRAM(s) Oral every 6 hours  zinc oxide 40% Paste 1 Application(s) Topical two times a day    MEDICATIONS  (PRN):  acetaminophen     Tablet .. 650 milliGRAM(s) Oral every 6 hours PRN Temp greater or equal to 38C (100.4F), Mild Pain (1 - 3)  aluminum hydroxide/magnesium hydroxide/simethicone Suspension 30 milliLiter(s) Oral every 4 hours PRN Dyspepsia  Biotene Dry Mouth Oral Rinse 15 milliLiter(s) Swish and Spit five times a day PRN Mouth Care  ondansetron Injectable 4 milliGRAM(s) IV Push every 8 hours PRN Nausea and/or Vomiting  simethicone 80 milliGRAM(s) Chew every 4 hours PRN Upset Stomach  sodium chloride 0.65% Nasal 1 Spray(s) Both Nostrils two times a day PRN Nasal Congestion      REVIEW OF SYSTEMS:    RESPIRATORY: No shortness of breath  CARDIOVASCULAR: No chest pain  All other review of systems is negative unless indicated above.    Vital Signs Last 24 Hrs  T(C): 36.7 (11 Mar 2025 07:46), Max: 36.8 (10 Mar 2025 16:05)  T(F): 98.1 (11 Mar 2025 07:46), Max: 98.2 (10 Mar 2025 16:05)  HR: 101 (11 Mar 2025 07:46) (94 - 132)  BP: 156/91 (11 Mar 2025 07:46) (136/74 - 156/91)  BP(mean): --  RR: 18 (11 Mar 2025 07:46) (17 - 20)  SpO2: 95% (11 Mar 2025 07:46) (93% - 96%)    Parameters below as of 11 Mar 2025 07:46  Patient On (Oxygen Delivery Method): room air        PHYSICAL EXAM:    Constitutional: NAD  Respiratory: CTAB  Cardiovascular: S1 and S2, RRR  Gastrointestinal: BS+, soft,  Psychiatric: Normal mood, normal affect    LABS:                        9.4    8.95  )-----------( 253      ( 11 Mar 2025 09:12 )             29.7     03-11    141  |  107  |  25[H]  ----------------------------<  123[H]  3.7   |  26  |  1.40[H]    Ca    9.6      11 Mar 2025 09:12  Phos  3.2     03-11  Mg     1.7     03-11    TPro  6.3  /  Alb  2.2[L]  /  TBili  0.7  /  DBili  x   /  AST  19  /  ALT  20  /  AlkPhos  140[H]  03-11    PT/INR - ( 10 Mar 2025 06:40 )   PT: 14.4 sec;   INR: 1.22 ratio         PTT - ( 10 Mar 2025 06:40 )  PTT:32.4 sec  LIVER FUNCTIONS - ( 11 Mar 2025 09:12 )  Alb: 2.2 g/dL / Pro: 6.3 gm/dL / ALK PHOS: 140 U/L / ALT: 20 U/L / AST: 19 U/L / GGT: x             RADIOLOGY & ADDITIONAL STUDIES:

## 2025-03-12 LAB
ALBUMIN SERPL ELPH-MCNC: 2.2 G/DL — LOW (ref 3.3–5)
ALP SERPL-CCNC: 136 U/L — HIGH (ref 40–120)
ALT FLD-CCNC: 20 U/L — SIGNIFICANT CHANGE UP (ref 12–78)
ANION GAP SERPL CALC-SCNC: 5 MMOL/L — SIGNIFICANT CHANGE UP (ref 5–17)
AST SERPL-CCNC: 16 U/L — SIGNIFICANT CHANGE UP (ref 15–37)
BILIRUB DIRECT SERPL-MCNC: 0.2 MG/DL — SIGNIFICANT CHANGE UP (ref 0–0.3)
BILIRUB INDIRECT FLD-MCNC: 0.4 MG/DL — SIGNIFICANT CHANGE UP (ref 0.2–1)
BUN SERPL-MCNC: 22 MG/DL — SIGNIFICANT CHANGE UP (ref 7–23)
CALCIUM SERPL-MCNC: 9.9 MG/DL — SIGNIFICANT CHANGE UP (ref 8.5–10.1)
CHLORIDE SERPL-SCNC: 105 MMOL/L — SIGNIFICANT CHANGE UP (ref 96–108)
CO2 SERPL-SCNC: 28 MMOL/L — SIGNIFICANT CHANGE UP (ref 22–31)
CREAT SERPL-MCNC: 1.32 MG/DL — HIGH (ref 0.5–1.3)
CRP SERPL-MCNC: 38.7 MG/ML — HIGH (ref 0–5)
EGFR: 43 ML/MIN/1.73M2 — LOW
EGFR: 43 ML/MIN/1.73M2 — LOW
GLUCOSE SERPL-MCNC: 134 MG/DL — HIGH (ref 70–99)
HCT VFR BLD CALC: 27.3 % — LOW (ref 34.5–45)
HGB BLD-MCNC: 8.8 G/DL — LOW (ref 11.5–15.5)
LACTATE SERPL-SCNC: 1.3 MMOL/L — SIGNIFICANT CHANGE UP (ref 0.7–2)
MAGNESIUM SERPL-MCNC: 2.5 MG/DL — SIGNIFICANT CHANGE UP (ref 1.6–2.6)
MCHC RBC-ENTMCNC: 27.9 PG — SIGNIFICANT CHANGE UP (ref 27–34)
MCHC RBC-ENTMCNC: 32.2 G/DL — SIGNIFICANT CHANGE UP (ref 32–36)
MCV RBC AUTO: 86.7 FL — SIGNIFICANT CHANGE UP (ref 80–100)
NRBC # BLD AUTO: 0 K/UL — SIGNIFICANT CHANGE UP (ref 0–0)
NRBC # FLD: 0 K/UL — SIGNIFICANT CHANGE UP (ref 0–0)
NRBC BLD AUTO-RTO: 0 /100 WBCS — SIGNIFICANT CHANGE UP (ref 0–0)
PHOSPHATE SERPL-MCNC: 3.6 MG/DL — SIGNIFICANT CHANGE UP (ref 2.5–4.5)
PLATELET # BLD AUTO: 319 K/UL — SIGNIFICANT CHANGE UP (ref 150–400)
PMV BLD: 11 FL — SIGNIFICANT CHANGE UP (ref 7–13)
POTASSIUM SERPL-MCNC: 3.4 MMOL/L — LOW (ref 3.5–5.3)
POTASSIUM SERPL-SCNC: 3.4 MMOL/L — LOW (ref 3.5–5.3)
PROT SERPL-MCNC: 6.2 GM/DL — SIGNIFICANT CHANGE UP (ref 6–8.3)
RBC # BLD: 3.15 M/UL — LOW (ref 3.8–5.2)
RBC # FLD: 14.8 % — HIGH (ref 10.3–14.5)
SODIUM SERPL-SCNC: 138 MMOL/L — SIGNIFICANT CHANGE UP (ref 135–145)
WBC # BLD: 7.9 K/UL — SIGNIFICANT CHANGE UP (ref 3.8–10.5)
WBC # FLD AUTO: 7.9 K/UL — SIGNIFICANT CHANGE UP (ref 3.8–10.5)

## 2025-03-12 PROCEDURE — 99231 SBSQ HOSP IP/OBS SF/LOW 25: CPT

## 2025-03-12 PROCEDURE — 99223 1ST HOSP IP/OBS HIGH 75: CPT

## 2025-03-12 PROCEDURE — 99232 SBSQ HOSP IP/OBS MODERATE 35: CPT

## 2025-03-12 PROCEDURE — 99233 SBSQ HOSP IP/OBS HIGH 50: CPT

## 2025-03-12 RX ORDER — POTASSIUM CHLORIDE, DEXTROSE MONOHYDRATE AND SODIUM CHLORIDE 150; 5; 900 MG/100ML; G/100ML; MG/100ML
1000 INJECTION, SOLUTION INTRAVENOUS
Refills: 0 | Status: DISCONTINUED | OUTPATIENT
Start: 2025-03-12 | End: 2025-03-13

## 2025-03-12 RX ORDER — TRAMADOL HYDROCHLORIDE 50 MG/1
25 TABLET, FILM COATED ORAL EVERY 6 HOURS
Refills: 0 | Status: DISCONTINUED | OUTPATIENT
Start: 2025-03-12 | End: 2025-03-15

## 2025-03-12 RX ORDER — BISACODYL 5 MG
10 TABLET, DELAYED RELEASE (ENTERIC COATED) ORAL DAILY
Refills: 0 | Status: DISCONTINUED | OUTPATIENT
Start: 2025-03-12 | End: 2025-03-14

## 2025-03-12 RX ORDER — AMLODIPINE BESYLATE 10 MG/1
2.5 TABLET ORAL DAILY
Refills: 0 | Status: DISCONTINUED | OUTPATIENT
Start: 2025-03-12 | End: 2025-03-14

## 2025-03-12 RX ORDER — ACETAMINOPHEN 500 MG/5ML
650 LIQUID (ML) ORAL EVERY 12 HOURS
Refills: 0 | Status: DISCONTINUED | OUTPATIENT
Start: 2025-03-12 | End: 2025-03-15

## 2025-03-12 RX ORDER — BISACODYL 5 MG
5 TABLET, DELAYED RELEASE (ENTERIC COATED) ORAL AT BEDTIME
Refills: 0 | Status: DISCONTINUED | OUTPATIENT
Start: 2025-03-12 | End: 2025-03-14

## 2025-03-12 RX ORDER — BETHANECHOL CHLORIDE 50 MG
10 TABLET ORAL EVERY 8 HOURS
Refills: 0 | Status: DISCONTINUED | OUTPATIENT
Start: 2025-03-12 | End: 2025-03-12

## 2025-03-12 RX ADMIN — Medication 125 MILLIGRAM(S): at 14:27

## 2025-03-12 RX ADMIN — TOUCHLESS CARE ZINC OXIDE PROTECTANT 1 APPLICATION(S): 20; 25 SPRAY TOPICAL at 12:38

## 2025-03-12 RX ADMIN — Medication 4 MILLIGRAM(S): at 14:28

## 2025-03-12 RX ADMIN — Medication 40 MILLIGRAM(S): at 10:49

## 2025-03-12 RX ADMIN — Medication 2000 UNIT(S): at 21:29

## 2025-03-12 RX ADMIN — Medication 20 MILLIGRAM(S): at 21:30

## 2025-03-12 RX ADMIN — Medication 40 MILLIGRAM(S): at 21:29

## 2025-03-12 RX ADMIN — Medication 100 MILLIEQUIVALENT(S): at 20:19

## 2025-03-12 RX ADMIN — Medication 125 MILLIGRAM(S): at 05:58

## 2025-03-12 RX ADMIN — Medication 10 MILLIGRAM(S): at 15:50

## 2025-03-12 RX ADMIN — Medication 1 APPLICATION(S): at 12:37

## 2025-03-12 RX ADMIN — TRAMADOL HYDROCHLORIDE 25 MILLIGRAM(S): 50 TABLET, FILM COATED ORAL at 21:29

## 2025-03-12 RX ADMIN — Medication 125 MILLIGRAM(S): at 12:41

## 2025-03-12 RX ADMIN — Medication 125 MILLIGRAM(S): at 21:29

## 2025-03-12 RX ADMIN — POTASSIUM CHLORIDE, DEXTROSE MONOHYDRATE AND SODIUM CHLORIDE 50 MILLILITER(S): 150; 5; 900 INJECTION, SOLUTION INTRAVENOUS at 12:41

## 2025-03-12 RX ADMIN — Medication 100 MILLIEQUIVALENT(S): at 19:19

## 2025-03-12 RX ADMIN — CASPOFUNGIN ACETATE 260 MILLIGRAM(S): 5 INJECTION, POWDER, LYOPHILIZED, FOR SOLUTION INTRAVENOUS at 10:47

## 2025-03-12 RX ADMIN — AMLODIPINE BESYLATE 2.5 MILLIGRAM(S): 10 TABLET ORAL at 10:53

## 2025-03-12 RX ADMIN — TRAMADOL HYDROCHLORIDE 25 MILLIGRAM(S): 50 TABLET, FILM COATED ORAL at 14:27

## 2025-03-12 RX ADMIN — Medication 100 MILLIEQUIVALENT(S): at 17:39

## 2025-03-12 RX ADMIN — Medication 650 MILLIGRAM(S): at 21:29

## 2025-03-12 NOTE — PROGRESS NOTE ADULT - ASSESSMENT
Metastatic pancreatic Ca, with abdominal wall implant. Likely cause of the pain itself is the cancer. Tumor of pancreas and in light of umbilical hernia findings reported, spread to omentum. Pt scheduled for EGD tomorrow. Finding of jejunal transverse colon on CT shows it is a contained fistula with no surrounding inflammation or abscess.  Continue symptom management.

## 2025-03-12 NOTE — PROGRESS NOTE ADULT - SUBJECTIVE AND OBJECTIVE BOX
69yF was admitted on 02-12    Patient is a 69y old  Female who presents with a chief complaint of sob (07 Mar 2025 09:24)    HPI:   History of Present Illness:    HPI: Patient is a 69-year-old female recent diagnosis of primary pancreatic adenocarcinoma of head with tumor in umbilicus, chemo-induced pancytopenia and significant PMH of SCC lip, cyclical vomiting syndrome, HTN, HPL, Gout, GERD, CVA on Aggrenox and others had been admitted to [facility] with septic shock on 02/09/2025, nausea, vomiting and diarrhea requiring Levophed drip, was transferred out of ICU to regular floor last night.  Patient was being followed by GI, ID, Cardiology and Onc there.  Patient has been transferred to  today on patient's  (GI Dr. Pleitez) request.  At this time, patient c/o non-bloody diarrhea x 3 in last 24 hours.  She denies any abdominal pain, nausea, but has some dry heaves.  She denies any fever, chills, chest pain, palpitation, constipation or dysuria. Initially, she was started on Vancomycin and Zosyn, but now she has been on Cefepime as her blood culture and urine cultures are positive for Pseudomonas aeruginosa. Currently, patient is on Cefepime.  ID had also recommended Flagyl, but patient had refused it, as it causes nausea to her.  Patient is working with therapies bedside. She is ambulating 15 feet with a rollator min assist. Transfers with min assist.     Sig labs: WBC 0.45k, N 82%, Hb 7.8, Platelets 59k, Bicarb 19, AG 9, , Cr 0.99, TP 4.6, Alb 1.2, LFTs wnl.  Mg 1.9, PO4 2.4.  Prior C.diff negative.    acute medical issues  70yo female with PMHx recent diagnosis of primary pancreatic adenocarcinoma of head with tumor in umbilicus, chemo-induced pancytopenia, SCC lip, cyclical vomiting syndrome, HTN, HPL, Gout, GERD, CVA on Aggrenox    Initially admitted to [facility] with septic shock 2/2 + pseudomonas bacteremia likely due to UTI vs colitis on 02/09/2025 Was having nausea, vomiting and diarrhea requiring Levophed drip, was transferred out of ICU to regular floor.    Then transferred to  as per request of . Was getting platelet transfusion and developed tachypnea hypoxia, rigors. Was diuresed and became hypotensive, Upgraded to SICU. CTA c/a/p on 2/19 showed multiple b/l PE and large bowel obstruction, was started on heparin drip, c/b GIB, then retrialed heparin drip with recurrent c/f GIB. Also with Afib w RVR during admission, resolved w iv metoprolol.     On 2/28, developed SIRS, Blood cx growing yeast. TPN stopped, R chest port removed on 3/2 and pt started on iv caspofungin.  03/10/2024 s/p IVC  Patient is working with therapy bedside. Patient is moderate assist with bed mobility and ambulation    69-year-old female with stage IV pancreatic adenocarcinoma presenting with neutropenic fever and severe treatment-related toxicities from modified FOLFIRINOX and investigational EMILY inhibitor [id number] found to have PE and LBO.       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 Mottled air   lucency and mild fat infiltration surrounding the jejunal segment (2:62. Indeterminant as to contained perforation   - lactate cleared 1.3 3/8   - Tolerated CLD, now on regular diet  - GI and surgery on board  - Sp Meropenem  - colorectal surgery consult   - hyperbaric consult   - second opinion GI Dr. Young - plan for celiac block for pain control     #Acute bilateral PE  # Lung nodules  - Doppler LE negative  - CT chest- multiple PE w/in segmental left lower and right lower, 2 right middle lobe nodules,   -  3/10 s/p IVC filter   - Recurrent GIB on heparin     # H/o CVA (cerebrovascular accident).   - Was originally on Plavix per  but because is now taking [facility] [id number],  - was told to start Aggrenox instead to avoid DDIs)  - platelets 33 --> 253   - off AP and AC due to GI bleeds lisbeth d/w  GI team    # SVT on 02/09/2025: resolved after Valsalva and Carotid massage.  -Norvasc and Lipitor on hold to septic shock.   -Off levophed; now on oral midodrine.  -Cardiology consulted Dr. Clement  - off BB    # GERD/Esophagitis , recurrent GI bleeds while on IV heparin   - Continue protonix   - Continue carafate    #MALLORY, likely ATN  - Stable at 1.4 3/9  - Continue IVF until can maintain po intake  - Nephrology on board    # Acute blood loss anemia and Malignancy  - SP GIB x 2 after starting heparin  -s/p pRBC x 6u this admission  - Hemoglobin stable 9.0  3/9  - Continue protonix    #Primary pancreatic adenocarcinoma  - SP folfirinox with reaction to irinotecan  - on a clinical trial now, on hold     #Skin injury Left and Rigth upper Gluteal Intergluteal cleft   - superficial skin necrosis  - Wound care evaluation appreciated  -Low air mattress  - Turning / positioning q2h while in bed    # Cutaneous lesion on RUQ area below right breast,   Ecthyma gangrenosum.  - Ecthyma gangrenosum. life-threatening skin infection caused by Pseudomonas aeruginosa bacteria.   - dermatology consult  -  appears to be resolving, most skin changes appear post inflammatory.  - Please recall if the lesion does not continue to slowly resolve over the next 2-3 weeks.   - wound care - If the surface ulcerates, cover with telfa/ vaseline/ paper tape    # Hypokalemia  # Hypomagnesemia   - replete prn    #DVT ppx    Imaging  abd CT 3/6/2025  Resolution of small and large bowel dilatation.  Mottled lucency involving a short segment of sigmoid colon suspect for pneumatosis which could be due to benign or ischemic etiology. Please correlate clinically and with laboratory values.  Minimal increase in amount of ascites.  Unchanged pancreatic body and umbilical masses.    Abdominal CT 03/10/2025  There is an apparent fistula between the transverse colon and a mid jejunal loop with enteric contrast in the fistula (2:64). Mottled air lucency and mild fat infiltration surrounding the jejunal segment (2:62. Indeterminant as to contained perforation of the transverse colon or the jejunum. The finding is new compared with 2/22/2025. Recommend surgical consultation.  Indeterminate 8 mm right middle lobe nodule, either infectious or malignant. Stable bilateral pleural effusions.  Redemonstrated pancreatic mass, inseparable from the posterior gastric wall. Unchanged umbilical mass.  Pelvic floor laxity with mild bladder and rectal prolapse.  Ascites unchanged.    REVIEW OF SYSTEMS  Constitutional - No fever, No weight loss, No fatigue  HEENT - No eye pain, No visual disturbances, No difficulty hearing, No tinnitus, No vertigo, No neck pain  Respiratory - No cough, No wheezing, No shortness of breath  Cardiovascular - No chest pain, No palpitations  Gastrointestinal - No abdominal pain, No nausea, No vomiting, No diarrhea, No constipation  Genitourinary - No dysuria, No frequency, No hematuria, No incontinence  Neurological - No headaches, No memory loss, No loss of strength, No numbness, No tremors  Skin - No itching, No rashes, No lesions   Endocrine - No temperature intolerance  Musculoskeletal - No joint pain, No joint swelling, No muscle pain  Psychiatric - No depression, No anxiety    VITALS  T(C): 36.8 (03-07-25 @ 08:20), Max: 37.9 (03-06-25 @ 17:07)  HR: 94 (03-07-25 @ 08:20) (92 - 100)  BP: 145/78 (03-07-25 @ 08:20) (143/77 - 151/89)  RR: 18 (03-07-25 @ 08:20) (18 - 20)  SpO2: 99% (03-07-25 @ 08:20) (96% - 99%)  Wt(kg): --    PAST MEDICAL & SURGICAL HISTORY  CVA (cerebrovascular accident)    Primary pancreatic adenocarcinoma    HTN (hypertension)    HLD (hyperlipidemia)    Gout    SCC of lung (small cell carcinoma)    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     CURRENT FUNCTIONAL STATUS  Per PT supine to sit is moderate assist of 1 person  Sit to stand is moderate assist of 1 person    FAMILY HISTORY   No pertinent family history in first degree relatives    RECENT LABS - Reviewed  CBC Full  -  ( 07 Mar 2025 07:22 )  WBC Count : 8.66 K/uL  RBC Count : 3.21 M/uL  Hemoglobin : 9.2 g/dL  Hematocrit : 28.5 %  Platelet Count - Automated : 271 K/uL  Mean Cell Volume : 88.8 fl  Mean Cell Hemoglobin : 28.7 pg  Mean Cell Hemoglobin Concentration : 32.3 g/dL  Auto Neutrophil # : x  Auto Lymphocyte # : x  Auto Monocyte # : x  Auto Eosinophil # : x  Auto Basophil # : x  Auto Neutrophil % : x  Auto Lymphocyte % : x  Auto Monocyte % : x  Auto Eosinophil % : x  Auto Basophil % : x    03-07    143  |  112[H]  |  37[H]  ----------------------------<  125[H]  3.8   |  22  |  1.69[H]    Ca    9.6      07 Mar 2025 07:22  Mg     1.8     03-07      Urinalysis Basic - ( 07 Mar 2025 07:22 )    Color: x / Appearance: x / SG: x / pH: x  Gluc: 125 mg/dL / Ketone: x  / Bili: x / Urobili: x   Blood: x / Protein: x / Nitrite: x   Leuk Esterase: x / RBC: x / WBC x   Sq Epi: x / Non Sq Epi: x / Bacteria: x      ALLERGIES  No Known Allergies    MEDICATIONS   acetaminophen     Tablet .. 650 milliGRAM(s) Oral every 6 hours PRN  acetaminophen   IVPB .. 1000 milliGRAM(s) IV Intermittent once  aluminum hydroxide/magnesium hydroxide/simethicone Suspension 30 milliLiter(s) Oral every 4 hours PRN  Biotene Dry Mouth Oral Rinse 15 milliLiter(s) Swish and Spit five times a day PRN  caspofungin IVPB 50 milliGRAM(s) IV Intermittent every 24 hours  caspofungin IVPB      chlorhexidine 2% Cloths 1 Application(s) Topical <User Schedule>  cholecalciferol 2000 Unit(s) Oral at bedtime  dextrose 5% + sodium chloride 0.45%. 1000 milliLiter(s) IV Continuous <Continuous>  dextrose 5%. 1000 milliLiter(s) IV Continuous <Continuous>  dextrose 5%. 1000 milliLiter(s) IV Continuous <Continuous>  famotidine Injectable 20 milliGRAM(s) IV Push daily  glucagon  Injectable 1 milliGRAM(s) IntraMuscular once  meropenem Injectable 1000 milliGRAM(s) IV Push every 12 hours  ondansetron Injectable 4 milliGRAM(s) IV Push every 8 hours PRN  pantoprazole  Injectable 40 milliGRAM(s) IV Push every 12 hours  sodium chloride 0.65% Nasal 1 Spray(s) Both Nostrils two times a day PRN  sucralfate suspension 1 Gram(s) Oral four times a day  vancomycin    Solution 125 milliGRAM(s) Oral every 6 hours  zinc oxide 40% Paste 1 Application(s) Topical two times a day      ----------------------------------------------------------------------------------------  PHYSICAL EXAM  Constitutional - NAD, Comfortable  HEENT - NCAT, EOMI  Neck - Supple, No limited ROM  Chest - Breathing comfortably, No wheezing  Cardiovascular - S1S2   Abdomen - Soft   Extremities - No C/C/E, No calf tenderness   Neurologic Exam -                    Cognitive - AAO to self, place, date, year, situation     Communication - Fluent, No dysarthria     Cranial Nerves - CN 2-12 intact     Motor - No focal deficits                    LEFT    UE - ShAB 3/5, EF 3/5, EE 3/5, WE 3/5,  3/5                    RIGHT UE - ShAB 3/5, EF 3/5, EE 3/5, WE 3/5,  3/5                    LEFT    LE - HF 3/5, KE 3/5, DF 3/5, PF 3/5                    RIGHT LE - HF 3/5, KE 3/5, DF 3/5, PF 3/5        Sensory - Intact to LT     Reflexes - DTR Intact, No primitive reflexive     Coordination - FTN intact     OculoVestibular - No saccades, No nystagmus, VOR         Balance - WNL Static  Psychiatric - Mood stable, Affect WNL  ----------------------------------------------------------------------------------------

## 2025-03-12 NOTE — PROGRESS NOTE ADULT - ASSESSMENT
ASSESSMENT/PLAN  69yFemale with functional deficits after Chemotherapy primary pancreatic adenocarcinoma of head with tumor in umbilicus, chemo-induced pancytopenia     Pain - Tylenol  DVT PPX - SCDs  Rehab -     Patient is making gains with bedside therapy. Patient ambulated 15 feet with rolling walker and min assist. Patient has good potential for Acute inpatient rehab  Dx; Physical debility.     Will continue to follow. Functional progress will determine ongoing rehab dispo recommendations, which may change.    Continue bedside therapy as well as OOB throughout the day with mobilization by staff to maintain cardiopulmonary function and prevention of secondary complications related to debility.    Never

## 2025-03-12 NOTE — PROGRESS NOTE ADULT - SUBJECTIVE AND OBJECTIVE BOX
SURGERY DAILY PROGRESS NOTE:     Subjective:  Patient seen and examined at bedside during am rounds. AVSS. Having bowel function. Did not eat anything yesterday. Denies any fevers, chills, n/v/d, chest pain or shortness of breath    Objective:    MEDICATIONS  (STANDING):  acetaminophen   IVPB .. 1000 milliGRAM(s) IV Intermittent once  bisacodyl Suppository 10 milliGRAM(s) Rectal at bedtime  caspofungin IVPB 50 milliGRAM(s) IV Intermittent every 24 hours  caspofungin IVPB      chlorhexidine 2% Cloths 1 Application(s) Topical <User Schedule>  cholecalciferol 2000 Unit(s) Oral at bedtime  dextrose 5% + sodium chloride 0.45%. 1000 milliLiter(s) (75 mL/Hr) IV Continuous <Continuous>  dextrose 5%. 1000 milliLiter(s) (100 mL/Hr) IV Continuous <Continuous>  dextrose 5%. 1000 milliLiter(s) (50 mL/Hr) IV Continuous <Continuous>  famotidine Injectable 20 milliGRAM(s) IV Push daily  glucagon  Injectable 1 milliGRAM(s) IntraMuscular once  pantoprazole  Injectable 40 milliGRAM(s) IV Push every 12 hours  simethicone 125 milliGRAM(s) Chew three times a day  vancomycin    Solution 125 milliGRAM(s) Oral every 6 hours  zinc oxide 40% Paste 1 Application(s) Topical two times a day    MEDICATIONS  (PRN):  acetaminophen     Tablet .. 650 milliGRAM(s) Oral every 6 hours PRN Temp greater or equal to 38C (100.4F), Mild Pain (1 - 3)  aluminum hydroxide/magnesium hydroxide/simethicone Suspension 30 milliLiter(s) Oral every 4 hours PRN Dyspepsia  Biotene Dry Mouth Oral Rinse 15 milliLiter(s) Swish and Spit five times a day PRN Mouth Care  ondansetron Injectable 4 milliGRAM(s) IV Push every 8 hours PRN Nausea and/or Vomiting  sodium chloride 0.65% Nasal 1 Spray(s) Both Nostrils two times a day PRN Nasal Congestion      Vital Signs Last 24 Hrs  T(C): 36.8 (12 Mar 2025 07:28), Max: 37.1 (11 Mar 2025 16:32)  T(F): 98.2 (12 Mar 2025 07:28), Max: 98.7 (11 Mar 2025 16:32)  HR: 105 (12 Mar 2025 07:28) (100 - 108)  BP: 140/83 (12 Mar 2025 07:28) (140/83 - 155/99)  BP(mean): --  RR: 18 (12 Mar 2025 07:28) (18 - 18)  SpO2: 92% (12 Mar 2025 07:28) (92% - 96%)    Parameters below as of 12 Mar 2025 07:28  Patient On (Oxygen Delivery Method): room air          PHYSICAL EXAM   Gen: well-appearing, in no acute distress  CV: pulse regularly present   Resp: airway patent, non-labored breathing  Abd: soft, distended, laura umbilical TTP with palpable mass  Ext. no cyanosis or edema      I&O's Detail    11 Mar 2025 07:01  -  12 Mar 2025 07:00  --------------------------------------------------------  IN:  Total IN: 0 mL    OUT:    Indwelling Catheter - Urethral (mL): 1850 mL  Total OUT: 1850 mL    Total NET: -1850 mL          Daily     Daily Weight in k.7 (12 Mar 2025 06:12)    LABS:                        8.8    7.90  )-----------( 319      ( 12 Mar 2025 07:38 )             27.3     03-12    138  |  105  |  22  ----------------------------<  134[H]  3.4[L]   |  28  |  1.32[H]    Ca    9.9      12 Mar 2025 07:38  Phos  3.6     03-12  Mg     2.5     03-12    TPro  6.2  /  Alb  2.2[L]  /  TBili  0.6  /  DBili  0.2  /  AST  16  /  ALT  20  /  AlkPhos  136[H]  03-12      Urinalysis Basic - ( 12 Mar 2025 07:38 )    Color: x / Appearance: x / SG: x / pH: x  Gluc: 134 mg/dL / Ketone: x  / Bili: x / Urobili: x   Blood: x / Protein: x / Nitrite: x   Leuk Esterase: x / RBC: x / WBC x   Sq Epi: x / Non Sq Epi: x / Bacteria: x

## 2025-03-12 NOTE — PROGRESS NOTE ADULT - SUBJECTIVE AND OBJECTIVE BOX
INTERVAL HPI/OVERNIGHT EVENTS:  Patient S&E at bedside.   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      GERD (gastroesophageal reflux disease)      Cyclical vomiting syndrome      CVA (cerebrovascular accident)      H/O: hysterectomy      H/O squamous cell carcinoma excision          FAMILY HISTORY:  No pertinent family history in first degree relatives        VITAL SIGNS:  T(F): 98.2 (03-12-25 @ 07:28)  HR: 105 (03-12-25 @ 07:28)  BP: 140/83 (03-12-25 @ 07:28)  RR: 18 (03-12-25 @ 07:28)  SpO2: 92% (03-12-25 @ 07:28)  Wt(kg): --    PHYSICAL EXAM:    Constitutional: NAD  Eyes: EOMI,  Respiratory: CTA b/l, off nc   Cardiovascular: tachycardic   Gastrointestinal: soft, distended  Neurological: AAOx3      MEDICATIONS  (STANDING):  acetaminophen   IVPB .. 1000 milliGRAM(s) IV Intermittent once  bisacodyl Suppository 10 milliGRAM(s) Rectal at bedtime  caspofungin IVPB 50 milliGRAM(s) IV Intermittent every 24 hours  caspofungin IVPB      chlorhexidine 2% Cloths 1 Application(s) Topical <User Schedule>  cholecalciferol 2000 Unit(s) Oral at bedtime  dextrose 5% + sodium chloride 0.45%. 1000 milliLiter(s) (75 mL/Hr) IV Continuous <Continuous>  dextrose 5%. 1000 milliLiter(s) (50 mL/Hr) IV Continuous <Continuous>  dextrose 5%. 1000 milliLiter(s) (100 mL/Hr) IV Continuous <Continuous>  famotidine Injectable 20 milliGRAM(s) IV Push daily  glucagon  Injectable 1 milliGRAM(s) IntraMuscular once  pantoprazole  Injectable 40 milliGRAM(s) IV Push every 12 hours  simethicone 125 milliGRAM(s) Chew three times a day  vancomycin    Solution 125 milliGRAM(s) Oral every 6 hours  zinc oxide 40% Paste 1 Application(s) Topical two times a day    MEDICATIONS  (PRN):  acetaminophen     Tablet .. 650 milliGRAM(s) Oral every 6 hours PRN Temp greater or equal to 38C (100.4F), Mild Pain (1 - 3)  aluminum hydroxide/magnesium hydroxide/simethicone Suspension 30 milliLiter(s) Oral every 4 hours PRN Dyspepsia  Biotene Dry Mouth Oral Rinse 15 milliLiter(s) Swish and Spit five times a day PRN Mouth Care  ondansetron Injectable 4 milliGRAM(s) IV Push every 8 hours PRN Nausea and/or Vomiting  sodium chloride 0.65% Nasal 1 Spray(s) Both Nostrils two times a day PRN Nasal Congestion      Allergies    No Known Allergies    Intolerances        LABS:                        8.8    7.90  )-----------( 319      ( 12 Mar 2025 07:38 )             27.3     03-12    138  |  105  |  22  ----------------------------<  134[H]  3.4[L]   |  28  |  1.32[H]    Ca    9.9      12 Mar 2025 07:38  Phos  3.6     03-12  Mg     2.5     03-12    TPro  6.2  /  Alb  2.2[L]  /  TBili  0.6  /  DBili  0.2  /  AST  16  /  ALT  20  /  AlkPhos  136[H]  03-12      Urinalysis Basic - ( 12 Mar 2025 07:38 )    Color: x / Appearance: x / SG: x / pH: x  Gluc: 134 mg/dL / Ketone: x  / Bili: x / Urobili: x   Blood: x / Protein: x / Nitrite: x   Leuk Esterase: x / RBC: x / WBC x   Sq Epi: x / Non Sq Epi: x / Bacteria: x        RADIOLOGY & ADDITIONAL TESTS:  Studies reviewed.

## 2025-03-12 NOTE — PROGRESS NOTE ADULT - SUBJECTIVE AND OBJECTIVE BOX
HOSPITALIST ATTENDING PROGRESS NOTE    Chart and meds reviewed.  Patient seen and examined.    CC:  abdominal pain, poor po intake     3/11 - epigastric abdominal pain, No fever. Tolerating some diet. + Bms, denies cp, dyspnea, nausea   3/12 - + periumbilical pain 7/10, dull, non-radiating constant, slightly better from tylenol , afebrile, multiple Bms after laxatives, + early satiety , + nausea      All other systems reviewed and found to be negative with the exception of what has been described above.    Vital sings reviewed for last 24 h  T(C): 36.8 (25 @ 07:28), Max: 36.8 (25 @ 07:28)  T(F): 98.2 (25 @ 07:28), Max: 98.2 (25 @ 07:28)  HR: 103 (25 @ 10:40) (100 - 105)  BP: 132/87 (25 @ 10:40) (132/87 - 155/99)  RR: 18 (25 @ 07:28) (18 - 18)  SpO2: 92% (25 @ 07:28) (92% - 96%)  Wt(kg): --  Daily     Daily Weight in k.7 (12 Mar 2025 06:12)  CAPILLARY BLOOD GLUCOSE          Physical exam :   GEN: NAD, Frail   HEENT:  pupils equal and reactive, EOMI, no oropharyngeal lesions, erythema, exudates, oral thrush  NECK:   supple, no carotid bruits  CV:  +S1, +S2, regular, no murmurs  RESP:   lungs clear to auscultation bilaterally, no wheezing, rales, rhonchi, good air entry bilaterally  GI:  abdomen soft,  + epigastric tenderness, Distended, normal BS, periumbilical mass  EXT:  no clubbing, no cyanosis, no edema, no calf pain, swelling or erythema  NEURO:  AAOX3, no focal neurological deficits, follows all commands, able to move extremities spontaneously  SKIN:  sacral DTI , under right breast  inframammary:  + 4.0 cm subcutaneous nodule, mildly tender, with drying ulceration of skin surface; not acutely inflamed   : Julian     LABS:  all reviewed                           8.8    7.90  )-----------( 319      ( 12 Mar 2025 07:38 )             27.3     03-12    138  |  105  |  22  ----------------------------<  134[H]  3.4[L]   |  28  |  1.32[H]    Ca    9.9      12 Mar 2025 07:38  Phos  3.6     03-12  Mg     2.5     03-12    TPro  6.2  /  Alb  2.2[L]  /  TBili  0.6  /  DBili  0.2  /  AST  16  /  ALT  20  /  AlkPhos  136[H]  03-12        LIVER FUNCTIONS - ( 12 Mar 2025 07:38 )  Alb: 2.2 g/dL / Pro: 6.2 gm/dL / ALK PHOS: 136 U/L / ALT: 20 U/L / AST: 16 U/L / GGT: x                 Culture - Blood (25 @ 18:58)    Specimen Source: Blood None   Culture Results:   No growth at 5 days    Culture - Blood (25 @ 11:54)    -  Blood PCR Panel: NEG   Gram Stain:   Growth in aerobic bottle: Yeast like cells   Specimen Source: Blood None   Organism: Blood Culture PCR   Culture Results:   Growth in aerobic bottle: Saccharomyces cerevisiae Sent to [facility]  [address] [id number] for  susceptibility  Direct identification is available within approximately 3-5  hours either by Blood Panel Multiplexed PCR or Direct  MALDI-TOF. 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 Mottled air   lucency and mild fat infiltration surrounding the jejunal segment (2:62.   Indeterminant as to contained perforation of the transverse colon or the   jejunum.  The finding is new compared with 2025. Recommend surgical   consultation.    Indeterminate 8 mm right middle lobe nodule, either infectious or   malignant. Stable bilateral pleural effusions.    Redemonstrated pancreatic mass, inseparable from the posterior gastric   wall. Unchanged umbilical mass.    Pelvic floor laxity with mild bladder and rectal prolapse.    Ascites unchanged.    Findings were discussed with Dr. Venice Joyner 3/11/2025 9:35 AM by   Dr. Anum Calle with read back confirmation.    A preliminary report was provided by Dr. Adam Robertson     CT Abdomen and Pelvis No Cont (25 @ 16:32) >  IMPRESSION:  Resolution of small and large bowel dilatation.    Mottled lucency involving a short segment of sigmoid colon suspect for   pneumatosis which could be due to benign or ischemic etiology. Please   correlate clinically and with laboratory values.    Minimal increase in amount of ascites.  Unchanged pancreatic body and umbilical masses.      : US Duplex Venous Lower Ext Complete, Bilateral (25 @ 10:23) >  IMPRESSION:  No evidence of deep venous thrombosis in either lower extremity.    MEDICATIONS  (STANDING):  acetaminophen   IVPB .. 1000 milliGRAM(s) IV Intermittent once  caspofungin IVPB 50 milliGRAM(s) IV Intermittent every 24 hours  caspofungin IVPB      chlorhexidine 2% Cloths 1 Application(s) Topical <User Schedule>  cholecalciferol 2000 Unit(s) Oral at bedtime  dextrose 5% + sodium chloride 0.45%. 1000 milliLiter(s) (75 mL/Hr) IV Continuous <Continuous>  dextrose 5%. 1000 milliLiter(s) (50 mL/Hr) IV Continuous <Continuous>  dextrose 5%. 1000 milliLiter(s) (100 mL/Hr) IV Continuous <Continuous>  famotidine Injectable 20 milliGRAM(s) IV Push daily  glucagon  Injectable 1 milliGRAM(s) IntraMuscular once  pantoprazole  Injectable 40 milliGRAM(s) IV Push every 12 hours  vancomycin    Solution 125 milliGRAM(s) Oral every 6 hours  zinc oxide 40% Paste 1 Application(s) Topical two times a day    MEDICATIONS  (PRN):  acetaminophen     Tablet .. 650 milliGRAM(s) Oral every 6 hours PRN Temp greater or equal to 38C (100.4F), Mild Pain (1 - 3)  aluminum hydroxide/magnesium hydroxide/simethicone Suspension 30 milliLiter(s) Oral every 4 hours PRN Dyspepsia  Biotene Dry Mouth Oral Rinse 15 milliLiter(s) Swish and Spit five times a day PRN Mouth Care  ondansetron Injectable 4 milliGRAM(s) IV Push every 8 hours PRN Nausea and/or Vomiting  simethicone 80 milliGRAM(s) Chew every 4 hours PRN Upset Stomach  sodium chloride 0.65% Nasal 1 Spray(s) Both Nostrils two times a day PRN Nasal Congestion

## 2025-03-12 NOTE — PROGRESS NOTE ADULT - SUBJECTIVE AND OBJECTIVE BOX
Patient is a 69y Female with  who reports   no complaints overnight.    Allergies    No Known Allergies    Intolerances      MEDICATIONS  (STANDING):  acetaminophen   IVPB .. 1000 milliGRAM(s) IV Intermittent once  amLODIPine   Tablet 2.5 milliGRAM(s) Oral daily  bethanechol 10 milliGRAM(s) Oral every 8 hours  bisacodyl Suppository 10 milliGRAM(s) Rectal at bedtime  caspofungin IVPB 50 milliGRAM(s) IV Intermittent every 24 hours  caspofungin IVPB      chlorhexidine 2% Cloths 1 Application(s) Topical <User Schedule>  cholecalciferol 2000 Unit(s) Oral at bedtime  dextrose 5% + sodium chloride 0.45% with potassium chloride 20 mEq/L 1000 milliLiter(s) (50 mL/Hr) IV Continuous <Continuous>  dextrose 5%. 1000 milliLiter(s) (50 mL/Hr) IV Continuous <Continuous>  dextrose 5%. 1000 milliLiter(s) (100 mL/Hr) IV Continuous <Continuous>  famotidine Injectable 20 milliGRAM(s) IV Push daily  glucagon  Injectable 1 milliGRAM(s) IntraMuscular once  pantoprazole  Injectable 40 milliGRAM(s) IV Push every 12 hours  simethicone 125 milliGRAM(s) Chew three times a day  vancomycin    Solution 125 milliGRAM(s) Oral every 6 hours  zinc oxide 40% Paste 1 Application(s) Topical two times a day    MEDICATIONS  (PRN):  acetaminophen     Tablet .. 650 milliGRAM(s) Oral every 6 hours PRN Temp greater or equal to 38C (100.4F), Mild Pain (1 - 3)  aluminum hydroxide/magnesium hydroxide/simethicone Suspension 30 milliLiter(s) Oral every 4 hours PRN Dyspepsia  Biotene Dry Mouth Oral Rinse 15 milliLiter(s) Swish and Spit five times a day PRN Mouth Care  ondansetron Injectable 4 milliGRAM(s) IV Push every 8 hours PRN Nausea and/or Vomiting  sodium chloride 0.65% Nasal 1 Spray(s) Both Nostrils two times a day PRN Nasal Congestion  traMADol 25 milliGRAM(s) Oral every 6 hours PRN Severe Pain (7 - 10)    Vital Signs Last 24 Hrs  T(C): 36.8 (12 Mar 2025 07:28), Max: 37.1 (11 Mar 2025 16:32)  T(F): 98.2 (12 Mar 2025 07:28), Max: 98.7 (11 Mar 2025 16:32)  HR: 103 (12 Mar 2025 10:40) (100 - 108)  BP: 132/87 (12 Mar 2025 10:40) (132/87 - 155/99)  BP(mean): --  RR: 18 (12 Mar 2025 07:28) (18 - 18)  SpO2: 92% (12 Mar 2025 07:28) (92% - 96%)    Parameters below as of 12 Mar 2025 07:28  Patient On (Oxygen Delivery Method): room air      I&O's Detail    11 Mar 2025 07:01  -  12 Mar 2025 07:00  --------------------------------------------------------  IN:  Total IN: 0 mL    OUT:    Indwelling Catheter - Urethral (mL): 1850 mL  Total OUT: 1850 mL    Total NET: -1850 mL            Constitutional: NAD,   HEENT:   MM  Respiratory: CTAB  Cardiovascular: S1 and S2  Gastrointestinal: BS+, soft, NT/ND  Extremities: minimal  peripheral edema  Neurological: A/O  : + coelho  Dialysis Access: Not applicable    LABS:                                     8.8    7.90  )-----------( 319      ( 12 Mar 2025 07:38 )             27.3                9.4    8.95  )-----------( 253      ( 11 Mar 2025 09:12 )             29.7                           9.0    7.93  )-----------( 265      ( 09 Mar 2025 07:07 )             28.5                         9.1    9.34  )-----------( 283      ( 08 Mar 2025 07:59 )             28.5     03-12    138  |  105  |  22  ----------------------------<  134[H]  3.4[L]   |  28  |  1.32[H]    Ca    9.9      12 Mar 2025 07:38  Phos  3.6     03-12  Mg     2.5     03-12    TPro  6.2  /  Alb  2.2[L]  /  TBili  0.6  /  DBili  0.2  /  AST  16  /  ALT  20  /  AlkPhos  136[H]  03-12 03-11    141  |  107  |  25[H]  ----------------------------<  123[H]  3.7   |  26  |  1.40[H]    Ca    9.6      11 Mar 2025 09:12  Phos  3.2     03-11  Mg     1.7     03-11    TPro  6.3  /  Alb  2.2[L]  /  TBili  0.7  /  DBili  x   /  AST  19  /  ALT  20  /  AlkPhos  140[H]  03-11      143    |  113    |  31     ----------------------------<  129       09 Mar 2025 07:07  3.9     |  23     |  1.48     144    |  112    |  32     ----------------------------<  126       08 Mar 2025 07:59  3.3     |  25     |  1.61     143    |  112    |  37     ----------------------------<  125       07 Mar 2025 07:22  3.8     |  22     |  1.69     Ca    9.4        09 Mar 2025 07:07  Ca    9.2        08 Mar 2025 07:59      Mg     1.6       09 Mar 2025 07:07  Mg     1.6       08 Mar 2025 07:59    TPro  6.0    /  Alb  2.0    /  TBili  0.6    /        09 Mar 2025 07:07  DBili  x      /  AST  17     /  ALT  21     /  AlkPhos  150      TPro  5.8    /  Alb  1.9    /  TBili  0.6    /        08 Mar 2025 07:59  DBili  x      /  AST  17     /  ALT  21     /  AlkPhos  151          Serum Osmo:   Serum Uric Acid:     Urine Studies:  Urinalysis Basic - ( 09 Mar 2025 07:07 )    Color: x / Appearance: x / SG: x / pH: x  Gluc: 129 mg/dL / Ketone: x  / Bili: x / Urobili: x   Blood: x / Protein: x / Nitrite: x   Leuk Esterase: x / RBC: x / WBC x   Sq Epi: x / Non Sq Epi: x / Bacteria: x        Urine chemistry:   Urine Na:   Urine Creatinine:   Urine Protein/Cr ratio:  Urine K:   Urine Osm:       RADIOLOGY & ADDITIONAL STUDIES:

## 2025-03-12 NOTE — CHART NOTE - NSCHARTNOTEFT_GEN_A_CORE
Patient seen at bedside this morning with per spouse, several formed bowel movements with minimal relief from lower abdominal pain. Julian is draining mod/large amount of urine. Patient states she has minimal appetite and has constant feeling of "fullness" with little desire to ingest liquids or solids. Dr. Young and spouse/patient to discuss possibility of interventional EGD with palliative celiac block/neurolysis versus diagnostic EGD for pain/satiety/bloating.     For now-  Rec:  ::Continued nutrition recs appreciated as quality of nutrition supersedes quantity due to minimal PO intake and minimal tolerance large volume  ::Continue daily bowel regimen with oral dulcolax in AM and suppository QHS  ::Pain control per primary team  ::NPO p MN for likely EGD Patient seen at bedside this morning with per spouse, several formed bowel movements with minimal relief from lower abdominal pain. Julian is draining mod/large amount of urine. Patient states she has minimal appetite and has constant feeling of "fullness" with little desire to ingest liquids or solids. Dr. Young and spouse/patient to discuss possibility of interventional EGD with palliative celiac block/neurolysis versus diagnostic EGD for pain/satiety/bloating.     For now-  Rec:  ::Continued nutrition recs appreciated as quality of nutrition supersedes quantity due to minimal PO intake and minimal tolerance large volume  ::Continue daily bowel regimen with oral dulcolax in AM and suppository QHS  ::Pain control per primary team  ::NPO p MN for likely EGD      Hannah edits:  --I've discussed my thoughts with Dr. Chowdhury and wife several times. They are both hesitant for celiac neurolysis. Even though pain is lower, based on CT findings I do think a neurolysis could help with pain and appetite, but they are not on board with this plan. Dr. Hernandez to perform EGD. If she does end up needing a block, they know due to my availability it won't be till mid-next week. For now, signing off.

## 2025-03-12 NOTE — PROGRESS NOTE ADULT - SUBJECTIVE AND OBJECTIVE BOX
Resting comfortably, offers no new complaints. Main issue is abdominal pain immediately after she eats or drinks something.     VSS afeb    lungs- clear  cor- RRR  Abd- + BS soft distended, tympanitic, no guarding or rebound. No change of umbilical mass  Ext-trace edema

## 2025-03-12 NOTE — PROGRESS NOTE ADULT - ASSESSMENT
Imp:  S/P mag citrate clean out    Rec:  Will follow today to see if this facilitates PO intake  F/U surgical recs

## 2025-03-12 NOTE — PROGRESS NOTE ADULT - SUBJECTIVE AND OBJECTIVE BOX
Patient is a 69y old  Female who presents with a chief complaint of sepsis (11 Mar 2025 15:54)      Subective:  Had clean out yesterday and feels somewhat better. However, she hasn't eaten anything.    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 1000 milliGRAM(s) IV Intermittent once  bisacodyl Suppository 10 milliGRAM(s) Rectal at bedtime  caspofungin IVPB 50 milliGRAM(s) IV Intermittent every 24 hours  caspofungin IVPB      chlorhexidine 2% Cloths 1 Application(s) Topical <User Schedule>  cholecalciferol 2000 Unit(s) Oral at bedtime  dextrose 5% + sodium chloride 0.45%. 1000 milliLiter(s) (75 mL/Hr) IV Continuous <Continuous>  dextrose 5%. 1000 milliLiter(s) (50 mL/Hr) IV Continuous <Continuous>  dextrose 5%. 1000 milliLiter(s) (100 mL/Hr) IV Continuous <Continuous>  famotidine Injectable 20 milliGRAM(s) IV Push daily  glucagon  Injectable 1 milliGRAM(s) IntraMuscular once  pantoprazole  Injectable 40 milliGRAM(s) IV Push every 12 hours  simethicone 125 milliGRAM(s) Chew three times a day  vancomycin    Solution 125 milliGRAM(s) Oral every 6 hours  zinc oxide 40% Paste 1 Application(s) Topical two times a day    MEDICATIONS  (PRN):  acetaminophen     Tablet .. 650 milliGRAM(s) Oral every 6 hours PRN Temp greater or equal to 38C (100.4F), Mild Pain (1 - 3)  aluminum hydroxide/magnesium hydroxide/simethicone Suspension 30 milliLiter(s) Oral every 4 hours PRN Dyspepsia  Biotene Dry Mouth Oral Rinse 15 milliLiter(s) Swish and Spit five times a day PRN Mouth Care  ondansetron Injectable 4 milliGRAM(s) IV Push every 8 hours PRN Nausea and/or Vomiting  sodium chloride 0.65% Nasal 1 Spray(s) Both Nostrils two times a day PRN Nasal Congestion      REVIEW OF SYSTEMS:    RESPIRATORY: No shortness of breath  CARDIOVASCULAR: No chest pain  All other review of systems is negative unless indicated above.    Vital Signs Last 24 Hrs  T(C): 36.8 (12 Mar 2025 07:28), Max: 37.1 (11 Mar 2025 16:32)  T(F): 98.2 (12 Mar 2025 07:28), Max: 98.7 (11 Mar 2025 16:32)  HR: 105 (12 Mar 2025 07:28) (100 - 108)  BP: 140/83 (12 Mar 2025 07:28) (140/83 - 156/91)  BP(mean): --  RR: 18 (12 Mar 2025 07:28) (18 - 18)  SpO2: 92% (12 Mar 2025 07:28) (92% - 96%)    Parameters below as of 12 Mar 2025 07:28  Patient On (Oxygen Delivery Method): room air        PHYSICAL EXAM:    Constitutional: NAD,  Respiratory: CTAB  Cardiovascular: S1 and S2, RRR  Gastrointestinal: BS+, soft,  Extremities: No peripheral edema  Psychiatric: Normal mood, normal affect    LABS:                        9.4    8.95  )-----------( 253      ( 11 Mar 2025 09:12 )             29.7     03-11    141  |  107  |  25[H]  ----------------------------<  123[H]  3.7   |  26  |  1.40[H]    Ca    9.6      11 Mar 2025 09:12  Phos  3.2     03-11  Mg     1.7     03-11    TPro  6.3  /  Alb  2.2[L]  /  TBili  0.7  /  DBili  x   /  AST  19  /  ALT  20  /  AlkPhos  140[H]  03-11      LIVER FUNCTIONS - ( 11 Mar 2025 09:12 )  Alb: 2.2 g/dL / Pro: 6.3 gm/dL / ALK PHOS: 140 U/L / ALT: 20 U/L / AST: 19 U/L / GGT: x             RADIOLOGY & ADDITIONAL STUDIES:

## 2025-03-12 NOTE — PROGRESS NOTE ADULT - ASSESSMENT
68 yo F with metastatic pancreatic cancer. 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

## 2025-03-12 NOTE — PROGRESS NOTE ADULT - SUBJECTIVE AND OBJECTIVE BOX
Subjective:    pat better, lying in bed, no respiratory complaint, afebrile, RA 93%.    Home Medications:  amitriptyline: 10 milligram(s) orally once a day (23 Dec 2024 18:06)  amLODIPine 5 mg oral tablet: 1 tab(s) orally once a day (23 Dec 2024 18:06)  aspirin 81 mg oral tablet, chewable: 1 tab(s) orally once a day (23 Dec 2024 18:06)  atorvastatin 40 mg oral tablet: 1 tab(s) orally once a day (at bedtime) (23 Dec 2024 18:06)  cefepime 2 g intravenous injection: 2 gram(s) intravenous every 12 hours (04 Mar 2025 21:11)  clopidogrel 75 mg oral tablet: 1 tab(s) orally once a day (23 Dec 2024 16:32)  filgrastim: 480 microgram(s) subcutaneous once a day (04 Mar 2025 21:11)  Lactated Ringers Injection intravenous solution: 150 milligram(s) intravenous every 1 to 2 hours (04 Mar 2025 21:11)  loperamide 2 mg oral capsule: 1 cap(s) orally once (04 Mar 2025 21:11)  midodrine 5 mg oral tablet: 1 tab(s) orally every 8 hours (04 Mar 2025 21:11)  Multiple Vitamins with Minerals oral liquid: 1 orally once a day (04 Mar 2025 21:11)  octreotide 2500 mcg/mL subcutaneous solution: 0.04 milliliter(s) subcutaneous 3 times a day (04 Mar 2025 21:11)  Pepcid 40 mg oral tablet: 40 milligram(s) orally once a day as needed for dyspepsia (23 Dec 2024 18:06)  sucralfate 1 g/10 mL oral suspension: 10 milliliter(s) orally 4 times a day (04 Mar 2025 21:11)  trimethobenzamide 100 mg/mL intramuscular solution: 2 milliliter(s) intramuscular every 8 hours As needed Nausea and/or Vomiting (04 Mar 2025 21:11)    MEDICATIONS  (STANDING):  acetaminophen   IVPB .. 1000 milliGRAM(s) IV Intermittent once  amLODIPine   Tablet 2.5 milliGRAM(s) Oral daily  bethanechol 10 milliGRAM(s) Oral every 8 hours  bisacodyl Suppository 10 milliGRAM(s) Rectal at bedtime  caspofungin IVPB 50 milliGRAM(s) IV Intermittent every 24 hours  caspofungin IVPB      chlorhexidine 2% Cloths 1 Application(s) Topical <User Schedule>  cholecalciferol 2000 Unit(s) Oral at bedtime  dextrose 5% + sodium chloride 0.45% with potassium chloride 20 mEq/L 1000 milliLiter(s) (50 mL/Hr) IV Continuous <Continuous>  dextrose 5%. 1000 milliLiter(s) (50 mL/Hr) IV Continuous <Continuous>  dextrose 5%. 1000 milliLiter(s) (100 mL/Hr) IV Continuous <Continuous>  famotidine Injectable 20 milliGRAM(s) IV Push daily  glucagon  Injectable 1 milliGRAM(s) IntraMuscular once  pantoprazole  Injectable 40 milliGRAM(s) IV Push every 12 hours  simethicone 125 milliGRAM(s) Chew three times a day  vancomycin    Solution 125 milliGRAM(s) Oral every 6 hours  zinc oxide 40% Paste 1 Application(s) Topical two times a day    MEDICATIONS  (PRN):  acetaminophen     Tablet .. 650 milliGRAM(s) Oral every 6 hours PRN Temp greater or equal to 38C (100.4F), Mild Pain (1 - 3)  aluminum hydroxide/magnesium hydroxide/simethicone Suspension 30 milliLiter(s) Oral every 4 hours PRN Dyspepsia  Biotene Dry Mouth Oral Rinse 15 milliLiter(s) Swish and Spit five times a day PRN Mouth Care  ondansetron Injectable 4 milliGRAM(s) IV Push every 8 hours PRN Nausea and/or Vomiting  sodium chloride 0.65% Nasal 1 Spray(s) Both Nostrils two times a day PRN Nasal Congestion  traMADol 25 milliGRAM(s) Oral every 6 hours PRN Severe Pain (7 - 10)      Allergies    No Known Allergies    Intolerances        Vital Signs Last 24 Hrs  T(C): 36.8 (12 Mar 2025 07:28), Max: 37.1 (11 Mar 2025 16:32)  T(F): 98.2 (12 Mar 2025 07:28), Max: 98.7 (11 Mar 2025 16:32)  HR: 103 (12 Mar 2025 10:40) (100 - 108)  BP: 132/87 (12 Mar 2025 10:40) (132/87 - 155/99)  BP(mean): --  RR: 18 (12 Mar 2025 07:28) (18 - 18)  SpO2: 92% (12 Mar 2025 07:28) (92% - 96%)    Parameters below as of 12 Mar 2025 07:28  Patient On (Oxygen Delivery Method): room air          PHYSICAL EXAMINATION:    NECK:  Supple. No lymphadenopathy. Jugular venous pressure not elevated. Carotids equal.   HEART:   The cardiac impulse has a normal quality. Reg., Nl S1 and S2.  There are no murmurs, rubs or gallops noted  CHEST:  Chest crackles to auscultation. Normal respiratory effort.  ABDOMEN:  Soft and nontender.   EXTREMITIES:  There is no edema.       LABS:                        8.8    7.90  )-----------( 319      ( 12 Mar 2025 07:38 )             27.3     03-12    138  |  105  |  22  ----------------------------<  134[H]  3.4[L]   |  28  |  1.32[H]    Ca    9.9      12 Mar 2025 07:38  Phos  3.6     03-12  Mg     2.5     03-12    TPro  6.2  /  Alb  2.2[L]  /  TBili  0.6  /  DBili  0.2  /  AST  16  /  ALT  20  /  AlkPhos  136[H]  03-12      Urinalysis Basic - ( 12 Mar 2025 07:38 )    Color: x / Appearance: x / SG: x / pH: x  Gluc: 134 mg/dL / Ketone: x  / Bili: x / Urobili: x   Blood: x / Protein: x / Nitrite: x   Leuk Esterase: x / RBC: x / WBC x   Sq Epi: x / Non Sq Epi: x / Bacteria: x

## 2025-03-12 NOTE — PROGRESS NOTE ADULT - ASSESSMENT
70yo female with PMHx recent diagnosis of primary pancreatic adenocarcinoma of head with tumor in umbilicus, chemo-induced pancytopenia, SCC lip, cyclical vomiting syndrome, HTN, HPL, Gout, GERD, CVA on Aggrenox  Initially admitted to [facility] with septic shock 2/2 + pseudomonas bacteremia likely due to UTI vs colitis on 02/09/2025 Was having nausea, vomiting and diarrhea requiring Levophed drip, was transferred out of ICU to regular floor.  Then transferred to  as per request of . Was getting platelet transfusion and developed tachypnea hypoxia, rigors. Was diuresed and became hypotensive, Upgraded to SICU. CTA c/a/p on 2/19 showed multiple b/l PE and large bowel obstruction, was started on heparin drip, c/b GIB, then retrialed heparin drip with recurrent c/f GIB. Also with Afib w RVR during admission, resolved w iv metoprolol.   On 2/28, developed SIRS, Blood cx growing yeast. TPN stopped, R chest port removed on 3/2 and pt started on iv caspofungin.    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 Mottled air   lucency and mild fat infiltration surrounding the jejunal segment (2:62. Indeterminant as to contained perforation   - lactate cleared 1.3 3/8   - Tolerated CLD, now on regular diet  - GI and surgery and oncology surgery  on board   - Sp Meropenem  - second opinion GI Dr. Young - plan for celiac block for pain control vs EGD     # Periumbilical Abdominal pain   - tylenol 650 bid   - start tramadol 25 q6 prn for severe pain     #Acute bilateral PE  # Lung nodules  - Doppler LE negative  - CT chest- multiple PE w/in segmental left lower and right lower, 2 right middle lobe nodules,   -  3/10 s/p IVC filter   - Recurrent GIB on heparin     # H/o CVA (cerebrovascular accident).   - Was originally on Plavix per  but because is now taking [facility] 6236,  - was told to start Aggrenox instead to avoid DDIs)  - platelets 33 --> 253   - off AP and AC due to GI bleeds lisbeth d/w  GI team    # SVT on 02/09/2025: resolved after Valsalva and Carotid massage.  -Norvasc and Lipitor on hold to septic shock.   -Off levophed; s/p  oral midodrine.  -Cardiology consulted Dr. Clement  - off BB    # GERD/Esophagitis , recurrent GI bleeds while on IV heparin   - Continue protonix   - Continue carafate    #MALLORY, likely ATN  - Stable at 1.4 3/9  - Continue IVF until can maintain po intake  - Nephrology on board    # Acute blood loss anemia and Malignancy  - SP GIB x 2 after starting heparin  -s/p pRBC x 6u this admission  - Hemoglobin stable 9.0  3/9  - Continue protonix    #Primary pancreatic adenocarcinoma  - SP folfirinox with reaction to irinotecan  - on a clinical trial now, on hold     #Skin injury Left and Rigth upper Gluteal Intergluteal cleft   - superficial skin necrosis  - Wound care evaluation appreciated  -Low air mattress  - Turning / positioning q2h while in bed    # Cutaneous lesion on RUQ area below right breast,   Ecthyma gangrenosum.  - Ecthyma gangrenosum. life-threatening skin infection caused by Pseudomonas aeruginosa bacteria.   - dermatology consult  -  appears to be resolving, most skin changes appear post inflammatory.  - Please recall if the lesion does not continue to slowly resolve over the next 2-3 weeks.   - wound care - If the surface ulcerates, cover with telfa/ vaseline/ paper tape      # Hypokalemia  # Hypomagnesemia   - replete prn  - keep K close to 4     # HTN   - amlodipine 2.5     #DVT ppx  - SCDs    Dispo: colorectal surgery and second opinion  GI Dr. Young consult  , for MITCH.  PT    68yo female with PMHx recent diagnosis of primary pancreatic adenocarcinoma of head with tumor in umbilicus, chemo-induced pancytopenia, SCC lip, cyclical vomiting syndrome, HTN, HPL, Gout, GERD, CVA on Aggrenox  Initially admitted to [facility] with septic shock 2/2 + pseudomonas bacteremia likely due to UTI vs colitis on 02/09/2025 Was having nausea, vomiting and diarrhea requiring Levophed drip, was transferred out of ICU to regular floor.  Then transferred to  as per request of . Was getting platelet transfusion and developed tachypnea hypoxia, rigors. Was diuresed and became hypotensive, Upgraded to SICU. CTA c/a/p on 2/19 showed multiple b/l PE and large bowel obstruction, was started on heparin drip, c/b GIB, then retrialed heparin drip with recurrent c/f GIB. Also with Afib w RVR during admission, resolved w iv metoprolol.   On 2/28, developed SIRS, Blood cx growing yeast. TPN stopped, R chest port removed on 3/2 and pt started on iv caspofungin.    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 Mottled air   lucency and mild fat infiltration surrounding the jejunal segment (2:62. Indeterminant as to contained perforation   - lactate cleared 1.3 3/8   - Tolerated CLD, now on regular diet  - GI and surgery and oncology surgery  on board   - Sp Meropenem  - second opinion GI Dr. Young - plan for celiac block for pain control vs EGD     # Urinary retention   - s/p Julian   - start bethanechol 10 tid   - voiding trial once more ambulatory    # Periumbilical Abdominal pain   - tylenol 650 bid   - start tramadol 25 q6 prn for severe pain     #Acute bilateral PE  # Lung nodules  - Doppler LE negative  - CT chest- multiple PE w/in segmental left lower and right lower, 2 right middle lobe nodules,   -  3/10 s/p IVC filter   - Recurrent GIB on heparin     # H/o CVA (cerebrovascular accident).   - Was originally on Plavix per  but because is now taking [facility] 6236,  - was told to start Aggrenox instead to avoid DDIs)  - platelets 33 --> 253   - off AP and AC due to GI bleeds will  d/w  GI team  - need to be on plavix d/w Dr. Clement will start once cleared by GI    # SVT on 02/09/2025: resolved after Valsalva and Carotid massage.  -Norvasc and Lipitor on hold to septic shock.   -Off levophed; s/p  oral midodrine.  -Cardiology consulted Dr. Clement  - off BB  - 3/12 - restart low dose of amlodipine 2.5     # GERD/Esophagitis , recurrent GI bleeds while on IV heparin   - Continue protonix   - Continue carafate    #MALLORY, likely ATN  - Stable at 1.4 3/9  - Continue IVF until can maintain po intake  - Nephrology on board    # Acute blood loss anemia and Malignancy  - SP GIB x 2 after starting heparin  - s/p pRBC x 6u this admission  - Hemoglobin stable 9.0  3/9  - Continue Protonix      #Primary pancreatic adenocarcinoma  - SP folfirinox with reaction to irinotecan  - on a clinical trial now, on hold     #Skin injury Left and Rigth upper Gluteal Intergluteal cleft   - superficial skin necrosis  - Wound care evaluation appreciated  - Low air mattress  - Turning / positioning q2h while in bed    # Cutaneous lesion on RUQ area below right breast,   Ecthyma gangrenosum.  - Ecthyma gangrenosum. life-threatening skin infection caused by Pseudomonas aeruginosa bacteria.   - dermatology consult  -  appears to be resolving, most skin changes appear post inflammatory.  - Please recall if the lesion does not continue to slowly resolve over the next 2-3 weeks.   - wound care - If the surface ulcerates, cover with telfa/ vaseline/ paper tape      # Hypokalemia  # Hypomagnesemia   - replete prn  - keep K close to 4     # HTN   - amlodipine 2.5     #DVT ppx - SCDs    Dispo: colorectal surgery and second opinion  GI Dr. Young consult  , for MITCH.  PT

## 2025-03-12 NOTE — PROGRESS NOTE ADULT - ASSESSMENT
69-year-old female recent diagnosis of primary pancreatic adenocarcinoma , pancytopenia and significant PMH of SCC lip, cyclical vomiting syndrome, HTN, HPL, Gout, GERD, CVA w renal evaluation of MALLORY and hypokalemia     MALLORY  Cr improving  , volume ok  -IVF continues   -Trend labs, lytes. Replete Hypokalemia for goal near 4.0  -Monitor UOP   -Avoid nsaids/contrast   - for IVC filter insertion tomorrow - continue IVF hydration to mitigate MAXIMO risk     3/11  Pacreatic CA/ METS  MALLORY slowly improving  Maintaining current IVF  I/O acceptable   Monitor and replace K/ Mg as needed    3/12  Pacreatic CA/ METS  MALLORY slowly improving, down to 1.32 today  - good UO 1850 ml  Maintaining current IVF  I/O acceptable   Monitor and replace K/ Mg as needed  Maintain K near 4 to minimize ileus, pt has poor IV access , await IV team  Good response to bowel preps  D/W Dr YOUSUF Pleitez

## 2025-03-12 NOTE — PROGRESS NOTE ADULT - ASSESSMENT
69-year-old female with stage IV pancreatic adenocarcinoma presenting with neutropenic fever and severe treatment-related toxicities from modified FOLFIRINOX and investigational EMILY inhibitor [id number] found to have PE and LBO.     # pancreatic ca   - remains on clinical trial- supportive care while in hospital   - will continue with ongoing communication with  [facility] have contributed to symptoms on arrival.  no DPD deficiency   - Review clinical trial protocol - may remain on trial drug alone and changing regimen- to be discussed when recovers from underlying illness and admission   -  spoke w/ Dr. Barr - will need to get to office at [facility] to discuss continuing with treatment   - if goes to rehab will be holding chemotherapy   - irinotecan unlikely source for perforation     # Neutropenic Septic shock - +fungal cultures   - pt previously on neupogen- WBC has now recovered and elevated likely 2/2 GCSF support   - BCx with fungal growth likely disseminated candidiasis  - removed mediport  - repeat cultuures NEG to date (2/28 culture grew saccharomyces cerevisiae)  -  ID - c/w caspofungin. 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 Mottled air lucency and mild fat infiltration surrounding the jejunal segment (2:62. Indeterminant as to contained perforation of the transverse colon or the jejunum.  The finding is new compared with 2/22/2025. Indeterminate 8 mm right middle lobe nodule, either infectious or malignant. Stable bilateral pleural effusions. Redemonstrated pancreatic mass, inseparable from the posterior gastric wall. Unchanged umbilical mass.  - surgery following- no planned intervention   - GI following as well   - Today, WBC 7.9, HB 8.8, plt 319, cr 1.32 and improving, crp remains elevated, albumin low     # PE   - v/q scan w indeterminate prob of PE, linear defect in RUL posterior segment   - CT chest- multiple PE w/in segmental left lower and right lower, 2 right middle lobe nodules,   - most recent US LE doppler negative    - remains off a/c given high risk - no sign of DVT   - ivc filter  placed 3/10    # anemia   - Hb stable   - keep type and screen active, transfuse for Hb <7    # tachycardia- cardio following       will follow and communicate with msleroy

## 2025-03-13 LAB
ALBUMIN SERPL ELPH-MCNC: 2.2 G/DL — LOW (ref 3.3–5)
ALP SERPL-CCNC: 133 U/L — HIGH (ref 40–120)
ALT FLD-CCNC: 20 U/L — SIGNIFICANT CHANGE UP (ref 12–78)
ANION GAP SERPL CALC-SCNC: 5 MMOL/L — SIGNIFICANT CHANGE UP (ref 5–17)
AST SERPL-CCNC: 15 U/L — SIGNIFICANT CHANGE UP (ref 15–37)
BILIRUB DIRECT SERPL-MCNC: 0.2 MG/DL — SIGNIFICANT CHANGE UP (ref 0–0.3)
BILIRUB INDIRECT FLD-MCNC: 0.3 MG/DL — SIGNIFICANT CHANGE UP (ref 0.2–1)
BILIRUB SERPL-MCNC: 0.5 MG/DL — SIGNIFICANT CHANGE UP (ref 0.2–1.2)
BUN SERPL-MCNC: 24 MG/DL — HIGH (ref 7–23)
CALCIUM SERPL-MCNC: 9.5 MG/DL — SIGNIFICANT CHANGE UP (ref 8.5–10.1)
CHLORIDE SERPL-SCNC: 107 MMOL/L — SIGNIFICANT CHANGE UP (ref 96–108)
CO2 SERPL-SCNC: 28 MMOL/L — SIGNIFICANT CHANGE UP (ref 22–31)
CREAT SERPL-MCNC: 1.62 MG/DL — HIGH (ref 0.5–1.3)
EGFR: 34 ML/MIN/1.73M2 — LOW
EGFR: 34 ML/MIN/1.73M2 — LOW
GLUCOSE BLDC GLUCOMTR-MCNC: 132 MG/DL — HIGH (ref 70–99)
GLUCOSE SERPL-MCNC: 119 MG/DL — HIGH (ref 70–99)
HCT VFR BLD CALC: 28.3 % — LOW (ref 34.5–45)
HGB BLD-MCNC: 8.9 G/DL — LOW (ref 11.5–15.5)
LACTATE SERPL-SCNC: 1.8 MMOL/L — SIGNIFICANT CHANGE UP (ref 0.7–2)
MAGNESIUM SERPL-MCNC: 2.8 MG/DL — HIGH (ref 1.6–2.6)
MCHC RBC-ENTMCNC: 28.4 PG — SIGNIFICANT CHANGE UP (ref 27–34)
MCHC RBC-ENTMCNC: 31.4 G/DL — LOW (ref 32–36)
MCV RBC AUTO: 90.4 FL — SIGNIFICANT CHANGE UP (ref 80–100)
NRBC # BLD AUTO: 0 K/UL — SIGNIFICANT CHANGE UP (ref 0–0)
NRBC # FLD: 0 K/UL — SIGNIFICANT CHANGE UP (ref 0–0)
NRBC BLD AUTO-RTO: 0 /100 WBCS — SIGNIFICANT CHANGE UP (ref 0–0)
PHOSPHATE SERPL-MCNC: 3.1 MG/DL — SIGNIFICANT CHANGE UP (ref 2.5–4.5)
PLATELET # BLD AUTO: 321 K/UL — SIGNIFICANT CHANGE UP (ref 150–400)
PMV BLD: 10.5 FL — SIGNIFICANT CHANGE UP (ref 7–13)
POTASSIUM SERPL-MCNC: 4.1 MMOL/L — SIGNIFICANT CHANGE UP (ref 3.5–5.3)
POTASSIUM SERPL-SCNC: 4.1 MMOL/L — SIGNIFICANT CHANGE UP (ref 3.5–5.3)
PROT SERPL-MCNC: 6.4 GM/DL — SIGNIFICANT CHANGE UP (ref 6–8.3)
RBC # BLD: 3.13 M/UL — LOW (ref 3.8–5.2)
RBC # FLD: 14.8 % — HIGH (ref 10.3–14.5)
SODIUM SERPL-SCNC: 140 MMOL/L — SIGNIFICANT CHANGE UP (ref 135–145)
WBC # BLD: 7.46 K/UL — SIGNIFICANT CHANGE UP (ref 3.8–10.5)
WBC # FLD AUTO: 7.46 K/UL — SIGNIFICANT CHANGE UP (ref 3.8–10.5)

## 2025-03-13 PROCEDURE — 93010 ELECTROCARDIOGRAM REPORT: CPT

## 2025-03-13 PROCEDURE — 88305 TISSUE EXAM BY PATHOLOGIST: CPT | Mod: 26

## 2025-03-13 PROCEDURE — 99232 SBSQ HOSP IP/OBS MODERATE 35: CPT

## 2025-03-13 PROCEDURE — 99233 SBSQ HOSP IP/OBS HIGH 50: CPT

## 2025-03-13 PROCEDURE — 88312 SPECIAL STAINS GROUP 1: CPT | Mod: 26

## 2025-03-13 RX ORDER — SUCRALFATE 1 G
1 TABLET ORAL EVERY 12 HOURS
Refills: 0 | Status: DISCONTINUED | OUTPATIENT
Start: 2025-03-13 | End: 2025-03-19

## 2025-03-13 RX ORDER — ASPIRIN 325 MG
81 TABLET ORAL DAILY
Refills: 0 | Status: DISCONTINUED | OUTPATIENT
Start: 2025-03-13 | End: 2025-03-19

## 2025-03-13 RX ORDER — DRONABINOL 10 MG/1
2.5 CAPSULE ORAL AT BEDTIME
Refills: 0 | Status: DISCONTINUED | OUTPATIENT
Start: 2025-03-13 | End: 2025-03-19

## 2025-03-13 RX ORDER — TAMSULOSIN HYDROCHLORIDE 0.4 MG/1
0.4 CAPSULE ORAL AT BEDTIME
Refills: 0 | Status: DISCONTINUED | OUTPATIENT
Start: 2025-03-13 | End: 2025-03-19

## 2025-03-13 RX ORDER — METOPROLOL SUCCINATE 50 MG/1
25 TABLET, EXTENDED RELEASE ORAL DAILY
Refills: 0 | Status: DISCONTINUED | OUTPATIENT
Start: 2025-03-13 | End: 2025-03-14

## 2025-03-13 RX ORDER — POTASSIUM CHLORIDE, DEXTROSE MONOHYDRATE AND SODIUM CHLORIDE 150; 5; 900 MG/100ML; G/100ML; MG/100ML
1000 INJECTION, SOLUTION INTRAVENOUS
Refills: 0 | Status: DISCONTINUED | OUTPATIENT
Start: 2025-03-13 | End: 2025-03-14

## 2025-03-13 RX ADMIN — Medication 125 MILLIGRAM(S): at 01:48

## 2025-03-13 RX ADMIN — Medication 40 MILLIGRAM(S): at 10:26

## 2025-03-13 RX ADMIN — DRONABINOL 2.5 MILLIGRAM(S): 10 CAPSULE ORAL at 22:20

## 2025-03-13 RX ADMIN — Medication 1 APPLICATION(S): at 10:25

## 2025-03-13 RX ADMIN — Medication 20 MILLIGRAM(S): at 22:09

## 2025-03-13 RX ADMIN — TOUCHLESS CARE ZINC OXIDE PROTECTANT 1 APPLICATION(S): 20; 25 SPRAY TOPICAL at 22:38

## 2025-03-13 RX ADMIN — Medication 125 MILLIGRAM(S): at 12:47

## 2025-03-13 RX ADMIN — TOUCHLESS CARE ZINC OXIDE PROTECTANT 1 APPLICATION(S): 20; 25 SPRAY TOPICAL at 10:56

## 2025-03-13 RX ADMIN — TAMSULOSIN HYDROCHLORIDE 0.4 MILLIGRAM(S): 0.4 CAPSULE ORAL at 22:21

## 2025-03-13 RX ADMIN — POTASSIUM CHLORIDE, DEXTROSE MONOHYDRATE AND SODIUM CHLORIDE 75 MILLILITER(S): 150; 5; 900 INJECTION, SOLUTION INTRAVENOUS at 15:28

## 2025-03-13 RX ADMIN — Medication 10 MILLIGRAM(S): at 12:47

## 2025-03-13 RX ADMIN — CASPOFUNGIN ACETATE 260 MILLIGRAM(S): 5 INJECTION, POWDER, LYOPHILIZED, FOR SOLUTION INTRAVENOUS at 10:26

## 2025-03-13 RX ADMIN — Medication 125 MILLIGRAM(S): at 22:18

## 2025-03-13 RX ADMIN — METOPROLOL SUCCINATE 25 MILLIGRAM(S): 50 TABLET, EXTENDED RELEASE ORAL at 17:51

## 2025-03-13 RX ADMIN — Medication 125 MILLIGRAM(S): at 23:03

## 2025-03-13 RX ADMIN — Medication 650 MILLIGRAM(S): at 22:21

## 2025-03-13 RX ADMIN — AMLODIPINE BESYLATE 2.5 MILLIGRAM(S): 10 TABLET ORAL at 10:26

## 2025-03-13 RX ADMIN — Medication 125 MILLIGRAM(S): at 17:49

## 2025-03-13 RX ADMIN — Medication 81 MILLIGRAM(S): at 17:49

## 2025-03-13 RX ADMIN — Medication 2000 UNIT(S): at 22:20

## 2025-03-13 RX ADMIN — Medication 40 MILLIGRAM(S): at 22:19

## 2025-03-13 RX ADMIN — Medication 5 MILLIGRAM(S): at 22:21

## 2025-03-13 RX ADMIN — Medication 1 GRAM(S): at 22:18

## 2025-03-13 RX ADMIN — Medication 10 MILLIGRAM(S): at 17:49

## 2025-03-13 NOTE — PROGRESS NOTE ADULT - ASSESSMENT
69-year-old female with stage IV pancreatic adenocarcinoma presenting with neutropenic fever and severe treatment-related toxicities from modified FOLFIRINOX and investigational EMILY inhibitor [id number] found to have PE and LBO.     # pancreatic ca   - remains on clinical trial- supportive care while in hospital   - will continue with ongoing communication with  [facility] have contributed to symptoms on arrival.  no DPD deficiency   -as per discussion with Dr. Caldwell- planned for possible Monotherapy with investigational therapy   -No plan for therapy while at rehab     # Neutropenic Septic shock - +fungal cultures   - pt previously on neupogen- WBC has now recovered and elevated likely 2/2 GCSF support   - BCx with fungal growth likely disseminated candidiasis  - removed mediport  - repeat cultuures NEG to date (2/28 culture grew saccharomyces cerevisiae)  -  ID - c/w caspofungin. 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 Mottled air lucency and mild fat infiltration surrounding the jejunal segment (2:62. Indeterminant as to contained perforation of the transverse colon or the jejunum.  The finding is new compared with 2/22/2025. Indeterminate 8 mm right middle lobe nodule, either infectious or malignant. Stable bilateral pleural effusions. Redemonstrated pancreatic mass, inseparable from the posterior gastric wall. Unchanged umbilical mass.  - surgery evaluation appreciated - no planned intervention     # PE - multiple PE w/in segmental left lower and right lower, 2 right middle lobe nodules,    - remains off a/c given high risk - no sign of DVT   - ivc filter  placed 3/10    # anemia   - Hb stable   - keep type and screen active, transfuse for Hb <7    # tachycardia- cardio following       will follow and communicate with [facility]      discussed with spouse at bedside.

## 2025-03-13 NOTE — CONSULT NOTE ADULT - ATTENDING COMMENTS
I was asked to evaluate this 68 yo F with metastatic pancreatic cancer who has an incidental finding of an Enterocolic fistula of a recent CT. She has known intraabdominal metastases as evidenced by the umbilical implant and significant ascites. I suspect the fistula is related to a small bowel implant invading the colon. The fistula is contained and there is no evidence of contrast extravasation.  My recommendation is to continue conservative management. She is not obstructed so she doesn't need to be NPO but I have encouraged liquid nutritional supplements.

## 2025-03-13 NOTE — CONSULT NOTE ADULT - ASSESSMENT
68 yo F with metastatic pancreatic cancer. Found to have Enterocolonic fistula on CT scan  Abdominal pain +  No peritonitits on exam    Clinical    PLAN  F/u GI recs  JHONATAN  Continue abx as per ID  No acute surgical management from CRS standpoint 70 yo F with metastatic pancreatic cancer. Found to have Enterocolonic fistula on CT scan    Abdominal pain +, No peritonitits on exam   Clinical assessment suggestive of contained perf    PLAN  F/u GI recs  JHONATAN  Continue abx as per ID  No acute surgical management

## 2025-03-13 NOTE — PROGRESS NOTE ADULT - ASSESSMENT
Metastatic pancreatic Ca, with umbilical met. EGD results noted. Pain control a little better. Ambulated a bit today. Pneumatosis and jejunal transverse colon fistula dont seem to be main issue at this point. Labs normal. Continue to monitor.

## 2025-03-13 NOTE — CONSULT NOTE ADULT - SUBJECTIVE AND OBJECTIVE BOX
HPI:    69 F with PMH metastatic pancreatic cancer in a clinical trial at [facility] [place] admitted a month ago with sepsis, found to have pseudomonal bacteremia and fungemia. During her hosp admissin developed PE, placed on heparin drip, subsequently developed GI bleed and now has an IVC filter. She reports abdominal pain intermittently for approx a week, diffuse, that worsens with food. A/w low appetite. She denies nausea or vomiting adn has been having regular bowel function. Had a CT scan done on 3/6 that showed pneumatosis in mid transverse colon and yesterday repeat CT with oral contrast showed fistula b/w transverse colon and jejunum for which colorectal Surgery is now consulted.     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 650 milliGRAM(s) Oral every 12 hours  acetaminophen   IVPB .. 1000 milliGRAM(s) IV Intermittent once  amLODIPine   Tablet 2.5 milliGRAM(s) Oral daily  bisacodyl 5 milliGRAM(s) Oral at bedtime  caspofungin IVPB 50 milliGRAM(s) IV Intermittent every 24 hours  caspofungin IVPB      chlorhexidine 2% Cloths 1 Application(s) Topical <User Schedule>  cholecalciferol 2000 Unit(s) Oral at bedtime  dextrose 5% + sodium chloride 0.45% with potassium chloride 20 mEq/L 1000 milliLiter(s) (50 mL/Hr) IV Continuous <Continuous>  dextrose 5%. 1000 milliLiter(s) (50 mL/Hr) IV Continuous <Continuous>  dextrose 5%. 1000 milliLiter(s) (100 mL/Hr) IV Continuous <Continuous>  dicyclomine 10 milliGRAM(s) Oral three times a day before meals  famotidine Injectable 20 milliGRAM(s) IV Push daily  glucagon  Injectable 1 milliGRAM(s) IntraMuscular once  pantoprazole  Injectable 40 milliGRAM(s) IV Push every 12 hours  simethicone 125 milliGRAM(s) Chew three times a day  vancomycin    Solution 125 milliGRAM(s) Oral every 6 hours  zinc oxide 40% Paste 1 Application(s) Topical two times a day    MEDICATIONS  (PRN):  acetaminophen     Tablet .. 650 milliGRAM(s) Oral every 6 hours PRN Temp greater or equal to 38C (100.4F), Mild Pain (1 - 3)  aluminum hydroxide/magnesium hydroxide/simethicone Suspension 30 milliLiter(s) Oral every 4 hours PRN Dyspepsia  Biotene Dry Mouth Oral Rinse 15 milliLiter(s) Swish and Spit five times a day PRN Mouth Care  bisacodyl Suppository 10 milliGRAM(s) Rectal daily PRN Constipation  ondansetron Injectable 4 milliGRAM(s) IV Push every 8 hours PRN Nausea and/or Vomiting  sodium chloride 0.65% Nasal 1 Spray(s) Both Nostrils two times a day PRN Nasal Congestion  traMADol 25 milliGRAM(s) Oral every 6 hours PRN Severe Pain (7 - 10)      Allergies    No Known Allergies    Intolerances        SOCIAL HISTORY:    FAMILY HISTORY:  No pertinent family history in first degree relatives            Physical Exam:  General: NAD, resting comfortably  HEENT: NC/AT, EOMI, normal hearing, no oral lesions, no LAD, neck supple  Pulmonary: normal resp effort, CTA-B  Cardiovascular: NSR, no murmurs  Abdominal: soft, ND/NT, no organomegaly  Extremities: WWP, normal strength, no clubbing/cyanosis/edema  Neuro: A/O x 3, CNs II-XII grossly intact, normal sensation, no focal deficits  Pulses: palpable distal pulses    Vital Signs Last 24 Hrs  T(C): 36.5 (13 Mar 2025 05:12), Max: 37.2 (13 Mar 2025 00:38)  T(F): 97.7 (13 Mar 2025 05:12), Max: 98.9 (13 Mar 2025 00:38)  HR: 69 (13 Mar 2025 05:12) (69 - 109)  BP: 119/70 (13 Mar 2025 05:12) (105/65 - 149/88)  BP(mean): --  RR: 18 (13 Mar 2025 05:12) (18 - 18)  SpO2: 96% (13 Mar 2025 05:12) (91% - 96%)    Parameters below as of 13 Mar 2025 05:12  Patient On (Oxygen Delivery Method): room air        I&O's Summary    11 Mar 2025 07:01  -  12 Mar 2025 07:00  --------------------------------------------------------  IN: 0 mL / OUT: 1850 mL / NET: -1850 mL    12 Mar 2025 07:01  -  13 Mar 2025 06:04  --------------------------------------------------------  IN: 0 mL / OUT: 1000 mL / NET: -1000 mL            LABS:                        8.8    7.90  )-----------( 319      ( 12 Mar 2025 07:38 )             27.3     03-12    138  |  105  |  22  ----------------------------<  134[H]  3.4[L]   |  28  |  1.32[H]    Ca    9.9      12 Mar 2025 07:38  Phos  3.6     03-12  Mg     2.5     03-12    TPro  6.2  /  Alb  2.2[L]  /  TBili  0.6  /  DBili  0.2  /  AST  16  /  ALT  20  /  AlkPhos  136[H]  03-12      Urinalysis Basic - ( 12 Mar 2025 07:38 )    Color: x / Appearance: x / SG: x / pH: x  Gluc: 134 mg/dL / Ketone: x  / Bili: x / Urobili: x   Blood: x / Protein: x / Nitrite: x   Leuk Esterase: x / RBC: x / WBC x   Sq Epi: x / Non Sq Epi: x / Bacteria: x      CAPILLARY BLOOD GLUCOSE      POCT Blood Glucose.: 132 mg/dL (13 Mar 2025 05:39)    LIVER FUNCTIONS - ( 12 Mar 2025 07:38 )  Alb: 2.2 g/dL / Pro: 6.2 gm/dL / ALK PHOS: 136 U/L / ALT: 20 U/L / AST: 16 U/L / GGT: x             Cultures:      RADIOLOGY & ADDITIONAL STUDIES:      Plan:           HPI:    69 F with PMH metastatic pancreatic cancer [place] [facility] [place] with sepsis, found to have pseudomonal bacteremia and fungemia. During her hosp admissin developed PE, placed on heparin drip, subsequently developed GI bleed and now has an IVC filter. She reports abdominal pain intermittently for approx a week, diffuse, that worsens with food. A/w low appetite. She denies nausea or vomiting and has been having regular bowel function. Had a CT scan done on 3/6 that showed pneumatosis in mid transverse colon and yesterday repeat CT with oral contrast showed fistula b/w transverse colon and jejunum for which colorectal Surgery is now consulted.     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 650 milliGRAM(s) Oral every 12 hours  acetaminophen   IVPB .. 1000 milliGRAM(s) IV Intermittent once  amLODIPine   Tablet 2.5 milliGRAM(s) Oral daily  bisacodyl 5 milliGRAM(s) Oral at bedtime  caspofungin IVPB 50 milliGRAM(s) IV Intermittent every 24 hours  caspofungin IVPB      chlorhexidine 2% Cloths 1 Application(s) Topical <User Schedule>  cholecalciferol 2000 Unit(s) Oral at bedtime  dextrose 5% + sodium chloride 0.45% with potassium chloride 20 mEq/L 1000 milliLiter(s) (50 mL/Hr) IV Continuous <Continuous>  dextrose 5%. 1000 milliLiter(s) (50 mL/Hr) IV Continuous <Continuous>  dextrose 5%. 1000 milliLiter(s) (100 mL/Hr) IV Continuous <Continuous>  dicyclomine 10 milliGRAM(s) Oral three times a day before meals  famotidine Injectable 20 milliGRAM(s) IV Push daily  glucagon  Injectable 1 milliGRAM(s) IntraMuscular once  pantoprazole  Injectable 40 milliGRAM(s) IV Push every 12 hours  simethicone 125 milliGRAM(s) Chew three times a day  vancomycin    Solution 125 milliGRAM(s) Oral every 6 hours  zinc oxide 40% Paste 1 Application(s) Topical two times a day    MEDICATIONS  (PRN):  acetaminophen     Tablet .. 650 milliGRAM(s) Oral every 6 hours PRN Temp greater or equal to 38C (100.4F), Mild Pain (1 - 3)  aluminum hydroxide/magnesium hydroxide/simethicone Suspension 30 milliLiter(s) Oral every 4 hours PRN Dyspepsia  Biotene Dry Mouth Oral Rinse 15 milliLiter(s) Swish and Spit five times a day PRN Mouth Care  bisacodyl Suppository 10 milliGRAM(s) Rectal daily PRN Constipation  ondansetron Injectable 4 milliGRAM(s) IV Push every 8 hours PRN Nausea and/or Vomiting  sodium chloride 0.65% Nasal 1 Spray(s) Both Nostrils two times a day PRN Nasal Congestion  traMADol 25 milliGRAM(s) Oral every 6 hours PRN Severe Pain (7 - 10)      Allergies    No Known Allergies    Intolerances        SOCIAL HISTORY:    FAMILY HISTORY:  No pertinent family history in first degree relatives              PHYSICAL EXAM   Gen: well-appearing, in no acute distress  CV: pulse regularly present   Resp: airway patent, non-labored breathing  Abd: soft, distended, laura umbilical TTP with palpable mass  Ext. no cyanosis or edema      Vital Signs Last 24 Hrs  T(C): 36.5 (13 Mar 2025 05:12), Max: 37.2 (13 Mar 2025 00:38)  T(F): 97.7 (13 Mar 2025 05:12), Max: 98.9 (13 Mar 2025 00:38)  HR: 69 (13 Mar 2025 05:12) (69 - 109)  BP: 119/70 (13 Mar 2025 05:12) (105/65 - 149/88)  BP(mean): --  RR: 18 (13 Mar 2025 05:12) (18 - 18)  SpO2: 96% (13 Mar 2025 05:12) (91% - 96%)    Parameters below as of 13 Mar 2025 05:12  Patient On (Oxygen Delivery Method): room air        I&O's Summary    11 Mar 2025 07:01  -  12 Mar 2025 07:00  --------------------------------------------------------  IN: 0 mL / OUT: 1850 mL / NET: -1850 mL    12 Mar 2025 07:01  -  13 Mar 2025 06:04  --------------------------------------------------------  IN: 0 mL / OUT: 1000 mL / NET: -1000 mL            LABS:                        8.8    7.90  )-----------( 319      ( 12 Mar 2025 07:38 )             27.3     03-12    138  |  105  |  22  ----------------------------<  134[H]  3.4[L]   |  28  |  1.32[H]    Ca    9.9      12 Mar 2025 07:38  Phos  3.6     03-12  Mg     2.5     03-12    TPro  6.2  /  Alb  2.2[L]  /  TBili  0.6  /  DBili  0.2  /  AST  16  /  ALT  20  /  AlkPhos  136[H]  03-12      Urinalysis Basic - ( 12 Mar 2025 07:38 )    Color: x / Appearance: x / SG: x / pH: x  Gluc: 134 mg/dL / Ketone: x  / Bili: x / Urobili: x   Blood: x / Protein: x / Nitrite: x   Leuk Esterase: x / RBC: x / WBC x   Sq Epi: x / Non Sq Epi: x / Bacteria: x      CAPILLARY BLOOD GLUCOSE      POCT Blood Glucose.: 132 mg/dL (13 Mar 2025 05:39)    LIVER FUNCTIONS - ( 12 Mar 2025 07:38 )  Alb: 2.2 g/dL / Pro: 6.2 gm/dL / ALK PHOS: 136 U/L / ALT: 20 U/L / AST: 16 U/L / GGT: x             Cultures:      RADIOLOGY & ADDITIONAL STUDIES:      Plan:

## 2025-03-13 NOTE — PROGRESS NOTE ADULT - SUBJECTIVE AND OBJECTIVE BOX
Feels a little better, eating small amounts, no nausea, has better control of pain    Vital sings reviewed for last 24 h  T(C): 36.7 (03-13-25 @ 10:40), Max: 37.2 (03-13-25 @ 00:38)  T(F): 98.1 (03-13-25 @ 10:40), Max: 98.9 (03-13-25 @ 00:38)  HR: 107 (03-13-25 @ 10:40) (69 - 109)  BP: 136/86 (03-13-25 @ 10:40) (105/65 - 136/86)  RR: 18 (03-13-25 @ 10:40) (18 - 18)  SpO2: 93% (03-13-25 @ 10:40) (91% - 96%)    Cor- RRR  Abd- distended, tympanitic, + BS soft non tender, umbilical wound unchanged  Ext- no edema

## 2025-03-13 NOTE — PROGRESS NOTE ADULT - ASSESSMENT
68yo female with PMHx recent diagnosis of primary pancreatic adenocarcinoma of head with tumor in umbilicus, chemo-induced pancytopenia, SCC lip, cyclical vomiting syndrome, HTN, HPL, Gout, GERD, CVA on Aggrenox  Initially admitted to [facility] with septic shock 2/2 + pseudomonas bacteremia likely due to UTI vs colitis on 02/09/2025 Was having nausea, vomiting and diarrhea requiring Levophed drip, was transferred out of ICU to regular floor.  Then transferred to  as per request of . Was getting platelet transfusion and developed tachypnea hypoxia, rigors. Was diuresed and became hypotensive, Upgraded to SICU. CTA c/a/p on 2/19 showed multiple b/l PE and large bowel obstruction, was started on heparin drip, c/b GIB, then retrialed heparin drip with recurrent c/f GIB. Also with Afib w RVR during admission, resolved w iv metoprolol.   On 2/28, developed SIRS, Blood cx growing yeast. TPN stopped, R chest port removed on 3/2 and pt started on iv caspofungin.    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 Mottled air   lucency and mild fat infiltration surrounding the jejunal segment (2:62. Indeterminant as to contained perforation   - lactate cleared 1.3 3/8   - Tolerated CLD, now on regular diet  - GI and surgery and oncology surgery  on board   - Sp Meropenem  - second opinion GI Dr. Young   - 3/13  -s/p EGD - gastritis , pt refusing colonoscopy    # Urinary retention   - s/p Julian   - stop  bethanechol 10 tid  - can cause abdominal cramping   - voiding trial once more ambulatory  - 3/13 - start flomax hs  - urology consult    # Periumbilical Abdominal pain   - tylenol 650 bid   - start tramadol 25 q6 prn for severe pain   - added bentyl tid    #Acute bilateral PE  # Lung nodules  # Sinus tachycardia  - Doppler LE negative  - CT chest- multiple PE w/in segmental left lower and right lower, 2 right middle lobe nodules,   -  3/10 s/p IVC filter   - Recurrent GIB on heparin   - serial ekgs  - cardiology consult    # H/o CVA (cerebrovascular accident).   - Was originally on Plavix per  but because is now taking [facility] 6236,  - was told to start Aggrenox instead to avoid DDIs)  - platelets 33 --> 253   - off AP and AC due to GI bleeds will  d/w  GI team  - need to be on plavix d/w Dr. Clement will start once cleared by GI   - d/w Dr. Hernandez ok to restart ASA 81     # SVT on 02/09/2025: resolved after Valsalva and Carotid massage.  -Norvasc and Lipitor on hold to septic shock.   -Off levophed; s/p  oral midodrine.  -Cardiology consulted Dr. Clement  - off BB  - 3/12 - restart low dose of amlodipine 2.5 for high BP  - 3/13 - cardiology consult, ekgs    # GERD/Esophagitis , recurrent GI bleeds while on IV heparin   - Continue protonix   - Continue carafate bid    #MALLORY, likely ATN  - Stable at 1.4 3/9  - Nephrology on board  - c/w IV fluids  - Creatinine Trend: 1.62<--, 1.32<--, 1.40<--, 1.39<--, 1.48<--, 1.61<--    # Acute blood loss anemia and Malignancy  - SP GIB x 2 after starting heparin  - s/p pRBC x 6u this admission  - Hemoglobin stable 9.0  3/9  - Continue Protonix      #Primary pancreatic adenocarcinoma  - SP folfirinox with reaction to irinotecan  - on a clinical trial now, on hold     #Skin injury Left and Rigth upper Gluteal Intergluteal cleft   - superficial skin necrosis  - Wound care evaluation appreciated  - Low air mattress  - Turning / positioning q2h while in bed    # Cutaneous lesion on RUQ area below right breast,   Ecthyma gangrenosum.  - Ecthyma gangrenosum. life-threatening skin infection caused by Pseudomonas aeruginosa bacteria.   - dermatology consult  -  appears to be resolving, most skin changes appear post inflammatory.  - Please recall if the lesion does not continue to slowly resolve over the next 2-3 weeks.   - wound care - If the surface ulcerates, cover with telfa/ vaseline/ paper tape      # Hypokalemia  # Hypomagnesemia   - replete prn  - keep K close to 4     # HTN   - amlodipine 2.5     #DVT ppx - SCDs    Dispo: IV fluids IV caspofungin, PT daily, urology and cardiology consult , restart ASA monitor H/H, d/c planning to [facility].    70yo female with PMHx recent diagnosis of primary pancreatic adenocarcinoma of head with tumor in umbilicus, chemo-induced pancytopenia, SCC lip, cyclical vomiting syndrome, HTN, HPL, Gout, GERD, CVA on Aggrenox  Initially admitted to [facility] with septic shock 2/2 + pseudomonas bacteremia likely due to UTI vs colitis on 02/09/2025 Was having nausea, vomiting and diarrhea requiring Levophed drip, was transferred out of ICU to regular floor.  Then transferred to  as per request of . Was getting platelet transfusion and developed tachypnea hypoxia, rigors. Was diuresed and became hypotensive, Upgraded to SICU. CTA c/a/p on 2/19 showed multiple b/l PE and large bowel obstruction, was started on heparin drip, c/b GIB, then retrialed heparin drip with recurrent c/f GIB. Also with Afib w RVR during admission, resolved w iv metoprolol.   On 2/28, developed SIRS, Blood cx growing yeast. TPN stopped, R chest port removed on 3/2 and pt started on iv caspofungin.    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 Mottled air   lucency and mild fat infiltration surrounding the jejunal segment (2:62. Indeterminant as to contained perforation   - lactate cleared 1.3 3/8   - Tolerated CLD, now on regular diet  - GI and surgery and oncology surgery  on board   - Sp Meropenem  - second opinion GI Dr. Young   - 3/13  -s/p EGD - gastritis , pt refusing colonoscopy    # Urinary retention   - s/p Julian   - stop  bethanechol 10 tid  - can cause abdominal cramping   - voiding trial once more ambulatory  - 3/13 - start flomax hs  - urology consult    # Periumbilical Abdominal pain   - tylenol 650 bid   - start tramadol 25 q6 prn for severe pain   - added bentyl tid as needed    #Acute bilateral PE  # Lung nodules  # Sinus tachycardia  - Doppler LE negative  - CT chest- multiple PE w/in segmental left lower and right lower, 2 right middle lobe nodules,   -  3/10 s/p IVC filter   - Recurrent GIB on heparin   - serial ekgs  - cardiology consult  - 3/13 - start toprol 25     # H/o CVA (cerebrovascular accident).   - Was originally on Plavix per  but because is now taking [facility] 6236,  - was told to start Aggrenox instead to avoid DDIs)  - platelets 33 --> 253   - off AP and AC due to GI bleeds will  d/w  GI team  - need to be on plavix d/w Dr. Clement will start once cleared by GI   - d/w Dr. Hernandez ok to restart ASA 81     # SVT on 02/09/2025: resolved after Valsalva and Carotid massage.  -Norvasc and Lipitor on hold to septic shock.   -Off levophed; s/p  oral midodrine.  -Cardiology consulted Dr. Clement  - off BB  - 3/12 - restart low dose of amlodipine 2.5 for high BP  - 3/13 - cardiology consult, ekgs, start toprol 25    # GERD/Esophagitis , recurrent GI bleeds while on IV heparin   - Continue protonix iv bid--> po  - Continue carafate bid    #MALLORY, likely ATN  - Stable at 1.4 3/9  - Nephrology on board  - c/w IV fluids  - Creatinine Trend: 1.62<--, 1.32<--, 1.40<--, 1.39<--, 1.48<--, 1.61<--    # Acute blood loss anemia and Malignancy  - SP GIB x 2 after starting heparin  - s/p pRBC x 6u this admission  - Hemoglobin stable 9.0  3/9  - Continue Protonix      #Primary pancreatic adenocarcinoma  - SP folfirinox with reaction to irinotecan  - on a clinical trial now, on hold     #Skin injury Left and Rigth upper Gluteal Intergluteal cleft   - superficial skin necrosis  - Wound care evaluation appreciated  - Low air mattress  - Turning / positioning q2h while in bed    # Cutaneous lesion on RUQ area below right breast,   Ecthyma gangrenosum.  - Ecthyma gangrenosum. life-threatening skin infection caused by Pseudomonas aeruginosa bacteria.   - dermatology consult  -  appears to be resolving, most skin changes appear post inflammatory.  - Please recall if the lesion does not continue to slowly resolve over the next 2-3 weeks.   - wound care - If the surface ulcerates, cover with telfa/ vaseline/ paper tape      # Hypokalemia  # Hypomagnesemia   - replete prn  - keep K close to 4     # HTN   - amlodipine 2.5     #DVT ppx - SCDs    Dispo: IV fluids IV caspofungin, PT daily, urology and cardiology consult , restart ASA monitor H/H, d/c planning to [facility].

## 2025-03-13 NOTE — PROGRESS NOTE ADULT - ASSESSMENT
69-year-old female recent diagnosis of primary pancreatic adenocarcinoma , pancytopenia and significant PMH of SCC lip, cyclical vomiting syndrome, HTN, HPL, Gout, GERD, CVA w renal evaluation of MALLORY and hypokalemia     MALLORY  Cr improving  , volume ok  -IVF continues   -Trend labs, lytes. Replete Hypokalemia for goal near 4.0  -Monitor UOP   -Avoid nsaids/contrast   - for IVC filter insertion tomorrow - continue IVF hydration to mitigate MAXIMO risk     3/11  Pacreatic CA/ METS  MALLORY slowly improving  Maintaining current IVF  I/O acceptable   Monitor and replace K/ Mg as needed    3/12  Pacreatic CA/ METS  MALLORY slowly improving, down to 1.32 today  - good UO 1850 ml  Maintaining current IVF  I/O acceptable   Monitor and replace K/ Mg as needed  Maintain K near 4 to minimize ileus, pt has poor IV access , await IV team  Good response to bowel preps  D/W Dr YOUSUF Pleitez    3/13  Full note to follow  Creat stable no finding in the EGD to explain pt sxs  May need CT with IVC,   Will hydrate appropriately prior to CT, D/W Dr Pleitez

## 2025-03-13 NOTE — PROGRESS NOTE ADULT - SUBJECTIVE AND OBJECTIVE BOX
INTERVAL HPI/OVERNIGHT EVENTS:  Patient S&E at bedside. No o/n events, patient resting comfortably. No complaints at this time.    seen with spouse at bedside.   earlier went for EGD     VITAL SIGNS:  T(F): 98.1 (03-13-25 @ 10:40)  HR: 107 (03-13-25 @ 10:40)  BP: 136/86 (03-13-25 @ 10:40)  RR: 18 (03-13-25 @ 10:40)  SpO2: 93% (03-13-25 @ 10:40)  Wt(kg): --    PHYSICAL EXAM:    Constitutional: NAD  Eyes: EOMI, sclera non-icteric  Cardiovascular: RRR, no M/R/G  Gastrointestinal: soft, NTND, no masses palpable, + BS, no hepatosplenomegaly  Extremities: no c/c/e  Neurological: AAOx3      MEDICATIONS  (STANDING):  acetaminophen     Tablet .. 650 milliGRAM(s) Oral every 12 hours  acetaminophen   IVPB .. 1000 milliGRAM(s) IV Intermittent once  amLODIPine   Tablet 2.5 milliGRAM(s) Oral daily  bisacodyl 5 milliGRAM(s) Oral at bedtime  caspofungin IVPB 50 milliGRAM(s) IV Intermittent every 24 hours  caspofungin IVPB      chlorhexidine 2% Cloths 1 Application(s) Topical <User Schedule>  cholecalciferol 2000 Unit(s) Oral at bedtime  dextrose 5% + sodium chloride 0.45% with potassium chloride 20 mEq/L 1000 milliLiter(s) (75 mL/Hr) IV Continuous <Continuous>  dextrose 5%. 1000 milliLiter(s) (100 mL/Hr) IV Continuous <Continuous>  dextrose 5%. 1000 milliLiter(s) (50 mL/Hr) IV Continuous <Continuous>  dicyclomine 10 milliGRAM(s) Oral three times a day before meals  famotidine Injectable 20 milliGRAM(s) IV Push daily  glucagon  Injectable 1 milliGRAM(s) IntraMuscular once  pantoprazole  Injectable 40 milliGRAM(s) IV Push every 12 hours  simethicone 125 milliGRAM(s) Chew three times a day  vancomycin    Solution 125 milliGRAM(s) Oral every 6 hours  zinc oxide 40% Paste 1 Application(s) Topical two times a day    MEDICATIONS  (PRN):  acetaminophen     Tablet .. 650 milliGRAM(s) Oral every 6 hours PRN Temp greater or equal to 38C (100.4F), Mild Pain (1 - 3)  aluminum hydroxide/magnesium hydroxide/simethicone Suspension 30 milliLiter(s) Oral every 4 hours PRN Dyspepsia  Biotene Dry Mouth Oral Rinse 15 milliLiter(s) Swish and Spit five times a day PRN Mouth Care  bisacodyl Suppository 10 milliGRAM(s) Rectal daily PRN Constipation  ondansetron Injectable 4 milliGRAM(s) IV Push every 8 hours PRN Nausea and/or Vomiting  sodium chloride 0.65% Nasal 1 Spray(s) Both Nostrils two times a day PRN Nasal Congestion  traMADol 25 milliGRAM(s) Oral every 6 hours PRN Severe Pain (7 - 10)      Allergies    No Known Allergies    Intolerances        LABS:                        8.9    7.46  )-----------( 321      ( 13 Mar 2025 06:21 )             28.3     03-13    140  |  107  |  24[H]  ----------------------------<  119[H]  4.1   |  28  |  1.62[H]    Ca    9.5      13 Mar 2025 06:21  Phos  3.1     03-13  Mg     2.8     03-13    TPro  6.4  /  Alb  2.2[L]  /  TBili  0.5  /  DBili  0.2  /  AST  15  /  ALT  20  /  AlkPhos  133[H]  03-13      Urinalysis Basic - ( 13 Mar 2025 06:21 )    Color: x / Appearance: x / SG: x / pH: x  Gluc: 119 mg/dL / Ketone: x  / Bili: x / Urobili: x   Blood: x / Protein: x / Nitrite: x   Leuk Esterase: x / RBC: x / WBC x   Sq Epi: x / Non Sq Epi: x / Bacteria: x        RADIOLOGY & ADDITIONAL TESTS:  Studies reviewed.    ASSESSMENT & PLAN:

## 2025-03-13 NOTE — PROGRESS NOTE ADULT - SUBJECTIVE AND OBJECTIVE BOX
HOSPITALIST ATTENDING PROGRESS NOTE    Chart and meds reviewed.  Patient seen and examined.    CC:  abdominal pain, poor po intake     3/11 - epigastric abdominal pain, No fever. Tolerating some diet. + Bms, denies cp, dyspnea, nausea   3/12 - + periumbilical pain 7/10, dull, non-radiating constant, slightly better from tylenol , afebrile, multiple Bms after laxatives, + early satiety , + nausea    3/13 - abdominal pain better after tramadol, s/p EGD, tolerating some po intake, afebrile, no Bms today, denies nausea, vomiting    All other systems reviewed and found to be negative with the exception of what has been described above.    Vital sings reviewed for last 24 h  T(C): 36.7 (03-13-25 @ 10:40), Max: 37.2 (03-13-25 @ 00:38)  T(F): 98.1 (03-13-25 @ 10:40), Max: 98.9 (03-13-25 @ 00:38)  HR: 107 (03-13-25 @ 10:40) (69 - 109)  BP: 136/86 (03-13-25 @ 10:40) (105/65 - 136/86)  RR: 18 (03-13-25 @ 10:40) (18 - 18)  SpO2: 93% (03-13-25 @ 10:40) (91% - 96%)  CAPILLARY BLOOD GLUCOSE  POCT Blood Glucose.: 132 mg/dL (13 Mar 2025 05:39)    Physical exam :   GEN: NAD, Frail   HEENT:  pupils equal and reactive, EOMI, no oropharyngeal lesions, erythema, exudates, oral thrush  NECK:   supple, no carotid bruits  CV:  +S1, +S2, regular, no murmurs  RESP:   lungs clear to auscultation bilaterally, no wheezing, rales, rhonchi, good air entry bilaterally  GI:  abdomen soft,  + epigastric tenderness, Distended, normal BS, periumbilical mass  EXT:  no clubbing, no cyanosis, no edema, no calf pain, swelling or erythema  NEURO:  AAOX3, no focal neurological deficits, follows all commands, able to move extremities spontaneously  SKIN:  sacral DTI , under right breast  inframammary:  + 4.0 cm subcutaneous nodule, mildly tender, with drying ulceration of skin surface; not acutely inflamed   : Julian     LABS:  all reviewed                           8.9    7.46  )-----------( 321      ( 13 Mar 2025 06:21 )             28.3     03-13    140  |  107  |  24[H]  ----------------------------<  119[H]  4.1   |  28  |  1.62[H]    Ca    9.5      13 Mar 2025 06:21  Phos  3.1     03-13  Mg     2.8     03-13    TPro  6.4  /  Alb  2.2[L]  /  TBili  0.5  /  DBili  0.2  /  AST  15  /  ALT  20  /  AlkPhos  133[H]  03-13        LIVER FUNCTIONS - ( 13 Mar 2025 06:21 )  Alb: 2.2 g/dL / Pro: 6.4 gm/dL / ALK PHOS: 133 U/L / ALT: 20 U/L / AST: 15 U/L / GGT: x           Culture - Blood (03.04.25 @ 18:58)    Specimen Source: Blood None   Culture Results:   No growth at 5 days    Culture - Blood (02.28.25 @ 11:54)    -  Blood PCR Panel: NEG   Gram Stain:   Growth in aerobic bottle: Yeast like cells   Specimen Source: Blood None   Organism: Blood Culture PCR   Culture Results:   Growth in aerobic bottle: Saccharomyces cerevisiae Sent to [facility]  [address] [id number] for  susceptibility  Direct identification is available within approximately 3-5  hours either by Blood Panel Multiplexed PCR or Direct  MALDI-TOF. 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 Mottled air   lucency and mild fat infiltration surrounding the jejunal segment (2:62.   Indeterminant as to contained perforation of the transverse colon or the   jejunum.  The finding is new compared with 2/22/2025. Recommend surgical   consultation.    Indeterminate 8 mm right middle lobe nodule, either infectious or   malignant. Stable bilateral pleural effusions.    Redemonstrated pancreatic mass, inseparable from the posterior gastric   wall. Unchanged umbilical mass.    Pelvic floor laxity with mild bladder and rectal prolapse.    Ascites unchanged.    Findings were discussed with Dr. Venice Joyner 3/11/2025 9:35 AM by   Dr. Anum Calle with read back confirmation.    A preliminary report was provided by Dr. Adam Robertson     CT Abdomen and Pelvis No Cont (03.06.25 @ 16:32) >  IMPRESSION:  Resolution of small and large bowel dilatation.    Mottled lucency involving a short segment of sigmoid colon suspect for   pneumatosis which could be due to benign or ischemic etiology. Please   correlate clinically and with laboratory values.    Minimal increase in amount of ascites.  Unchanged pancreatic body and umbilical masses.      : US Duplex Venous Lower Ext Complete, Bilateral (03.04.25 @ 10:23) >  IMPRESSION:  No evidence of deep venous thrombosis in either lower extremity.    MEDICATIONS  (STANDING):  acetaminophen   IVPB .. 1000 milliGRAM(s) IV Intermittent once  caspofungin IVPB 50 milliGRAM(s) IV Intermittent every 24 hours  caspofungin IVPB      chlorhexidine 2% Cloths 1 Application(s) Topical <User Schedule>  cholecalciferol 2000 Unit(s) Oral at bedtime  dextrose 5% + sodium chloride 0.45%. 1000 milliLiter(s) (75 mL/Hr) IV Continuous <Continuous>  dextrose 5%. 1000 milliLiter(s) (50 mL/Hr) IV Continuous <Continuous>  dextrose 5%. 1000 milliLiter(s) (100 mL/Hr) IV Continuous <Continuous>  famotidine Injectable 20 milliGRAM(s) IV Push daily  glucagon  Injectable 1 milliGRAM(s) IntraMuscular once  pantoprazole  Injectable 40 milliGRAM(s) IV Push every 12 hours  vancomycin    Solution 125 milliGRAM(s) Oral every 6 hours  zinc oxide 40% Paste 1 Application(s) Topical two times a day    MEDICATIONS  (PRN):  acetaminophen     Tablet .. 650 milliGRAM(s) Oral every 6 hours PRN Temp greater or equal to 38C (100.4F), Mild Pain (1 - 3)  aluminum hydroxide/magnesium hydroxide/simethicone Suspension 30 milliLiter(s) Oral every 4 hours PRN Dyspepsia  Biotene Dry Mouth Oral Rinse 15 milliLiter(s) Swish and Spit five times a day PRN Mouth Care  ondansetron Injectable 4 milliGRAM(s) IV Push every 8 hours PRN Nausea and/or Vomiting  simethicone 80 milliGRAM(s) Chew every 4 hours PRN Upset Stomach  sodium chloride 0.65% Nasal 1 Spray(s) Both Nostrils two times a day PRN Nasal Congestion

## 2025-03-13 NOTE — PROGRESS NOTE ADULT - SUBJECTIVE AND OBJECTIVE BOX
s/p endoscopy this am  No findings   Supine comfortable    Allergies    No Known Allergies    Intolerances      MEDICATIONS  (STANDING):  acetaminophen   IVPB .. 1000 milliGRAM(s) IV Intermittent once  amLODIPine   Tablet 2.5 milliGRAM(s) Oral daily  bethanechol 10 milliGRAM(s) Oral every 8 hours  bisacodyl Suppository 10 milliGRAM(s) Rectal at bedtime  caspofungin IVPB 50 milliGRAM(s) IV Intermittent every 24 hours  caspofungin IVPB      chlorhexidine 2% Cloths 1 Application(s) Topical <User Schedule>  cholecalciferol 2000 Unit(s) Oral at bedtime  dextrose 5% + sodium chloride 0.45% with potassium chloride 20 mEq/L 1000 milliLiter(s) (50 mL/Hr) IV Continuous <Continuous>  dextrose 5%. 1000 milliLiter(s) (50 mL/Hr) IV Continuous <Continuous>  dextrose 5%. 1000 milliLiter(s) (100 mL/Hr) IV Continuous <Continuous>  famotidine Injectable 20 milliGRAM(s) IV Push daily  glucagon  Injectable 1 milliGRAM(s) IntraMuscular once  pantoprazole  Injectable 40 milliGRAM(s) IV Push every 12 hours  simethicone 125 milliGRAM(s) Chew three times a day  vancomycin    Solution 125 milliGRAM(s) Oral every 6 hours  zinc oxide 40% Paste 1 Application(s) Topical two times a day    MEDICATIONS  (PRN):  acetaminophen     Tablet .. 650 milliGRAM(s) Oral every 6 hours PRN Temp greater or equal to 38C (100.4F), Mild Pain (1 - 3)  aluminum hydroxide/magnesium hydroxide/simethicone Suspension 30 milliLiter(s) Oral every 4 hours PRN Dyspepsia  Biotene Dry Mouth Oral Rinse 15 milliLiter(s) Swish and Spit five times a day PRN Mouth Care  ondansetron Injectable 4 milliGRAM(s) IV Push every 8 hours PRN Nausea and/or Vomiting  sodium chloride 0.65% Nasal 1 Spray(s) Both Nostrils two times a day PRN Nasal Congestion  traMADol 25 milliGRAM(s) Oral every 6 hours PRN Severe Pain (7 - 10)    Vital Signs Last 24 Hrs  T(C): 36.8 (12 Mar 2025 07:28), Max: 37.1 (11 Mar 2025 16:32)  T(F): 98.2 (12 Mar 2025 07:28), Max: 98.7 (11 Mar 2025 16:32)  HR: 103 (12 Mar 2025 10:40) (100 - 108)  BP: 132/87 (12 Mar 2025 10:40) (132/87 - 155/99)  BP(mean): --  RR: 18 (12 Mar 2025 07:28) (18 - 18)  SpO2: 92% (12 Mar 2025 07:28) (92% - 96%)    Parameters below as of 12 Mar 2025 07:28  Patient On (Oxygen Delivery Method): room air      I&O's Detail    11 Mar 2025 07:01  -  12 Mar 2025 07:00  --------------------------------------------------------  IN:  Total IN: 0 mL    OUT:    Indwelling Catheter - Urethral (mL): 1850 mL  Total OUT: 1850 mL    Total NET: -1850 mL            Constitutional: NAD,   HEENT:   MM  Respiratory: CTAB  Cardiovascular: S1 and S2  Gastrointestinal: BS+, soft, NT/ND  Extremities: minimal  peripheral edema  Neurological: A/O  : + coelho  Dialysis Access: Not applicable    LABS:                                 8.9    7.46  )-----------( 321      ( 13 Mar 2025 06:21 )             28.3                             8.8    7.90  )-----------( 319      ( 12 Mar 2025 07:38 )             27.3                9.4    8.95  )-----------( 253      ( 11 Mar 2025 09:12 )             29.7                           9.0    7.93  )-----------( 265      ( 09 Mar 2025 07:07 )             28.5                         9.1    9.34  )-----------( 283      ( 08 Mar 2025 07:59 )             28.5     03-13    140  |  107  |  24[H]  ----------------------------<  119[H]  4.1   |  28  |  1.62[H]    Ca    9.5      13 Mar 2025 06:21  Phos  3.1     03-13  Mg     2.8     03-13    TPro  6.4  /  Alb  2.2[L]  /  TBili  0.5  /  DBili  0.2  /  AST  15  /  ALT  20  /  AlkPhos  133[H]  03-13      03-12    138  |  105  |  22  ----------------------------<  134[H]  3.4[L]   |  28  |  1.32[H]    Ca    9.9      12 Mar 2025 07:38  Phos  3.6     03-12  Mg     2.5     03-12    TPro  6.2  /  Alb  2.2[L]  /  TBili  0.6  /  DBili  0.2  /  AST  16  /  ALT  20  /  AlkPhos  136[H]  03-12        03-11    141  |  107  |  25[H]  ----------------------------<  123[H]  3.7   |  26  |  1.40[H]    Ca    9.6      11 Mar 2025 09:12  Phos  3.2     03-11  Mg     1.7     03-11    TPro  6.3  /  Alb  2.2[L]  /  TBili  0.7  /  DBili  x   /  AST  19  /  ALT  20  /  AlkPhos  140[H]  03-11      143    |  113    |  31     ----------------------------<  129       09 Mar 2025 07:07  3.9     |  23     |  1.48     144    |  112    |  32     ----------------------------<  126       08 Mar 2025 07:59  3.3     |  25     |  1.61     143    |  112    |  37     ----------------------------<  125       07 Mar 2025 07:22  3.8     |  22     |  1.69     Ca    9.4        09 Mar 2025 07:07  Ca    9.2        08 Mar 2025 07:59      Mg     1.6       09 Mar 2025 07:07  Mg     1.6       08 Mar 2025 07:59    TPro  6.0    /  Alb  2.0    /  TBili  0.6    /        09 Mar 2025 07:07  DBili  x      /  AST  17     /  ALT  21     /  AlkPhos  150      TPro  5.8    /  Alb  1.9    /  TBili  0.6    /        08 Mar 2025 07:59  DBili  x      /  AST  17     /  ALT  21     /  AlkPhos  151          Serum Osmo:   Serum Uric Acid:     Urine Studies:  Urinalysis Basic - ( 09 Mar 2025 07:07 )    Color: x / Appearance: x / SG: x / pH: x  Gluc: 129 mg/dL / Ketone: x  / Bili: x / Urobili: x   Blood: x / Protein: x / Nitrite: x   Leuk Esterase: x / RBC: x / WBC x   Sq Epi: x / Non Sq Epi: x / Bacteria: x        Urine chemistry:   Urine Na:   Urine Creatinine:   Urine Protein/Cr ratio:  Urine K:   Urine Osm:       RADIOLOGY & ADDITIONAL STUDIES:               s/p endoscopy this am  No findings   Supine comfortable    Allergies    No Known Allergies    Intolerances      MEDICATIONS  (STANDING):  acetaminophen   IVPB .. 1000 milliGRAM(s) IV Intermittent once  amLODIPine   Tablet 2.5 milliGRAM(s) Oral daily  bethanechol 10 milliGRAM(s) Oral every 8 hours  bisacodyl Suppository 10 milliGRAM(s) Rectal at bedtime  caspofungin IVPB 50 milliGRAM(s) IV Intermittent every 24 hours  caspofungin IVPB      chlorhexidine 2% Cloths 1 Application(s) Topical <User Schedule>  cholecalciferol 2000 Unit(s) Oral at bedtime  dextrose 5% + sodium chloride 0.45% with potassium chloride 20 mEq/L 1000 milliLiter(s) (50 mL/Hr) IV Continuous <Continuous>  dextrose 5%. 1000 milliLiter(s) (50 mL/Hr) IV Continuous <Continuous>  dextrose 5%. 1000 milliLiter(s) (100 mL/Hr) IV Continuous <Continuous>  famotidine Injectable 20 milliGRAM(s) IV Push daily  glucagon  Injectable 1 milliGRAM(s) IntraMuscular once  pantoprazole  Injectable 40 milliGRAM(s) IV Push every 12 hours  simethicone 125 milliGRAM(s) Chew three times a day  vancomycin    Solution 125 milliGRAM(s) Oral every 6 hours  zinc oxide 40% Paste 1 Application(s) Topical two times a day    MEDICATIONS  (PRN):  acetaminophen     Tablet .. 650 milliGRAM(s) Oral every 6 hours PRN Temp greater or equal to 38C (100.4F), Mild Pain (1 - 3)  aluminum hydroxide/magnesium hydroxide/simethicone Suspension 30 milliLiter(s) Oral every 4 hours PRN Dyspepsia  Biotene Dry Mouth Oral Rinse 15 milliLiter(s) Swish and Spit five times a day PRN Mouth Care  ondansetron Injectable 4 milliGRAM(s) IV Push every 8 hours PRN Nausea and/or Vomiting  sodium chloride 0.65% Nasal 1 Spray(s) Both Nostrils two times a day PRN Nasal Congestion  traMADol 25 milliGRAM(s) Oral every 6 hours PRN Severe Pain (7 - 10)    Vital Signs Last 24 Hrs  T(C): 36.8 (13 Mar 2025 15:15), Max: 37.2 (13 Mar 2025 00:38)  T(F): 98.2 (13 Mar 2025 15:15), Max: 98.9 (13 Mar 2025 00:38)  HR: 130 (13 Mar 2025 15:15) (69 - 130)  BP: 116/67 (13 Mar 2025 15:15) (105/65 - 136/86)  BP(mean): --  RR: 18 (13 Mar 2025 15:15) (18 - 18)  SpO2: 92% (13 Mar 2025 15:15) (92% - 96%)    Parameters below as of 13 Mar 2025 15:15  Patient On (Oxygen Delivery Method): room air      I&O's Detail    12 Mar 2025 07:01  -  13 Mar 2025 07:00  --------------------------------------------------------  IN:  Total IN: 0 mL    OUT:    Indwelling Catheter - Urethral (mL): 1000 mL  Total OUT: 1000 mL    Total NET: -1000 mL      13 Mar 2025 07:01  -  13 Mar 2025 21:34  --------------------------------------------------------  IN:  Total IN: 0 mL    OUT:    Indwelling Catheter - Urethral (mL): 800 mL  Total OUT: 800 mL    Total NET: -800 mL            Constitutional: NAD,   HEENT:   MM  Respiratory: CTAB  Cardiovascular: S1 and S2  Gastrointestinal: BS+, soft, NT/ND  Extremities: minimal  peripheral edema  Neurological: A/O  : + coelho  Dialysis Access: Not applicable    LABS:                                 8.9    7.46  )-----------( 321      ( 13 Mar 2025 06:21 )             28.3                             8.8    7.90  )-----------( 319      ( 12 Mar 2025 07:38 )             27.3                9.4    8.95  )-----------( 253      ( 11 Mar 2025 09:12 )             29.7                03-13    140  |  107  |  24[H]  ----------------------------<  119[H]  4.1   |  28  |  1.62[H]    Ca    9.5      13 Mar 2025 06:21  Phos  3.1     03-13  Mg     2.8     03-13    TPro  6.4  /  Alb  2.2[L]  /  TBili  0.5  /  DBili  0.2  /  AST  15  /  ALT  20  /  AlkPhos  133[H]  03-13 03-12    138  |  105  |  22  ----------------------------<  134[H]  3.4[L]   |  28  |  1.32[H]    Ca    9.9      12 Mar 2025 07:38  Phos  3.6     03-12  Mg     2.5     03-12    TPro  6.2  /  Alb  2.2[L]  /  TBili  0.6  /  DBili  0.2  /  AST  16  /  ALT  20  /  AlkPhos  136[H]  03-12 03-11    141  |  107  |  25[H]  ----------------------------<  123[H]  3.7   |  26  |  1.40[H]    Ca    9.6      11 Mar 2025 09:12  Phos  3.2     03-11  Mg     1.7     03-11    TPro  6.3  /  Alb  2.2[L]  /  TBili  0.7  /  DBili  x   /  AST  19  /  ALT  20  /  AlkPhos  140[H]  03-11      143    |  113    |  31     ----------------------------<  129       09 Mar 2025 07:07  3.9     |  23     |  1.48     144    |  112    |  32     ----------------------------<  126       08 Mar 2025 07:59  3.3     |  25     |  1.61     143    |  112    |  37     ----------------------------<  125       07 Mar 2025 07:22  3.8     |  22     |  1.69     Ca    9.4        09 Mar 2025 07:07  Ca    9.2        08 Mar 2025 07:59      Mg     1.6       09 Mar 2025 07:07  Mg     1.6       08 Mar 2025 07:59    TPro  6.0    /  Alb  2.0    /  TBili  0.6    /        09 Mar 2025 07:07  DBili  x      /  AST  17     /  ALT  21     /  AlkPhos  150      TPro  5.8    /  Alb  1.9    /  TBili  0.6    /        08 Mar 2025 07:59  DBili  x      /  AST  17     /  ALT  21     /  AlkPhos  151          Serum Osmo:   Serum Uric Acid:     Urine Studies:  Urinalysis Basic - ( 09 Mar 2025 07:07 )    Color: x / Appearance: x / SG: x / pH: x  Gluc: 129 mg/dL / Ketone: x  / Bili: x / Urobili: x   Blood: x / Protein: x / Nitrite: x   Leuk Esterase: x / RBC: x / WBC x   Sq Epi: x / Non Sq Epi: x / Bacteria: x        Urine chemistry:   Urine Na:   Urine Creatinine:   Urine Protein/Cr ratio:  Urine K:   Urine Osm:       RADIOLOGY & ADDITIONAL STUDIES:

## 2025-03-13 NOTE — PROGRESS NOTE ADULT - SUBJECTIVE AND OBJECTIVE BOX
Subjective:    pat better, s/p EGD neg, siting in bed, no new complaint.    Home Medications:  amitriptyline: 10 milligram(s) orally once a day (23 Dec 2024 18:06)  amLODIPine 5 mg oral tablet: 1 tab(s) orally once a day (23 Dec 2024 18:06)  aspirin 81 mg oral tablet, chewable: 1 tab(s) orally once a day (23 Dec 2024 18:06)  atorvastatin 40 mg oral tablet: 1 tab(s) orally once a day (at bedtime) (23 Dec 2024 18:06)  cefepime 2 g intravenous injection: 2 gram(s) intravenous every 12 hours (04 Mar 2025 21:11)  clopidogrel 75 mg oral tablet: 1 tab(s) orally once a day (23 Dec 2024 16:32)  filgrastim: 480 microgram(s) subcutaneous once a day (04 Mar 2025 21:11)  Lactated Ringers Injection intravenous solution: 150 milligram(s) intravenous every 1 to 2 hours (04 Mar 2025 21:11)  loperamide 2 mg oral capsule: 1 cap(s) orally once (04 Mar 2025 21:11)  midodrine 5 mg oral tablet: 1 tab(s) orally every 8 hours (04 Mar 2025 21:11)  Multiple Vitamins with Minerals oral liquid: 1 orally once a day (04 Mar 2025 21:11)  octreotide 2500 mcg/mL subcutaneous solution: 0.04 milliliter(s) subcutaneous 3 times a day (04 Mar 2025 21:11)  Pepcid 40 mg oral tablet: 40 milligram(s) orally once a day as needed for dyspepsia (23 Dec 2024 18:06)  sucralfate 1 g/10 mL oral suspension: 10 milliliter(s) orally 4 times a day (04 Mar 2025 21:11)  trimethobenzamide 100 mg/mL intramuscular solution: 2 milliliter(s) intramuscular every 8 hours As needed Nausea and/or Vomiting (04 Mar 2025 21:11)    MEDICATIONS  (STANDING):  acetaminophen     Tablet .. 650 milliGRAM(s) Oral every 12 hours  acetaminophen   IVPB .. 1000 milliGRAM(s) IV Intermittent once  amLODIPine   Tablet 2.5 milliGRAM(s) Oral daily  bisacodyl 5 milliGRAM(s) Oral at bedtime  caspofungin IVPB 50 milliGRAM(s) IV Intermittent every 24 hours  caspofungin IVPB      chlorhexidine 2% Cloths 1 Application(s) Topical <User Schedule>  cholecalciferol 2000 Unit(s) Oral at bedtime  dextrose 5% + sodium chloride 0.45% with potassium chloride 20 mEq/L 1000 milliLiter(s) (75 mL/Hr) IV Continuous <Continuous>  dextrose 5%. 1000 milliLiter(s) (50 mL/Hr) IV Continuous <Continuous>  dextrose 5%. 1000 milliLiter(s) (100 mL/Hr) IV Continuous <Continuous>  dicyclomine 10 milliGRAM(s) Oral three times a day before meals  famotidine Injectable 20 milliGRAM(s) IV Push daily  glucagon  Injectable 1 milliGRAM(s) IntraMuscular once  pantoprazole  Injectable 40 milliGRAM(s) IV Push every 12 hours  simethicone 125 milliGRAM(s) Chew three times a day  vancomycin    Solution 125 milliGRAM(s) Oral every 6 hours  zinc oxide 40% Paste 1 Application(s) Topical two times a day    MEDICATIONS  (PRN):  acetaminophen     Tablet .. 650 milliGRAM(s) Oral every 6 hours PRN Temp greater or equal to 38C (100.4F), Mild Pain (1 - 3)  aluminum hydroxide/magnesium hydroxide/simethicone Suspension 30 milliLiter(s) Oral every 4 hours PRN Dyspepsia  Biotene Dry Mouth Oral Rinse 15 milliLiter(s) Swish and Spit five times a day PRN Mouth Care  bisacodyl Suppository 10 milliGRAM(s) Rectal daily PRN Constipation  ondansetron Injectable 4 milliGRAM(s) IV Push every 8 hours PRN Nausea and/or Vomiting  sodium chloride 0.65% Nasal 1 Spray(s) Both Nostrils two times a day PRN Nasal Congestion  traMADol 25 milliGRAM(s) Oral every 6 hours PRN Severe Pain (7 - 10)      Allergies    No Known Allergies    Intolerances        Vital Signs Last 24 Hrs  T(C): 36.7 (13 Mar 2025 10:40), Max: 37.2 (13 Mar 2025 00:38)  T(F): 98.1 (13 Mar 2025 10:40), Max: 98.9 (13 Mar 2025 00:38)  HR: 107 (13 Mar 2025 10:40) (69 - 109)  BP: 136/86 (13 Mar 2025 10:40) (105/65 - 136/86)  BP(mean): --  RR: 18 (13 Mar 2025 10:40) (18 - 18)  SpO2: 93% (13 Mar 2025 10:40) (91% - 96%)    Parameters below as of 13 Mar 2025 10:40  Patient On (Oxygen Delivery Method): room air          PHYSICAL EXAMINATION:    NECK:  Supple. No lymphadenopathy. Jugular venous pressure not elevated. Carotids equal.   HEART:   The cardiac impulse has a normal quality. Reg., Nl S1 and S2.  There are no murmurs, rubs or gallops noted  CHEST:  Chest crackles to auscultation. Normal respiratory effort.  ABDOMEN:  Soft and nontender.   EXTREMITIES:  There is no edema.       LABS:                        8.9    7.46  )-----------( 321      ( 13 Mar 2025 06:21 )             28.3     03-13    140  |  107  |  24[H]  ----------------------------<  119[H]  4.1   |  28  |  1.62[H]    Ca    9.5      13 Mar 2025 06:21  Phos  3.1     03-13  Mg     2.8     03-13    TPro  6.4  /  Alb  2.2[L]  /  TBili  0.5  /  DBili  0.2  /  AST  15  /  ALT  20  /  AlkPhos  133[H]  03-13      Urinalysis Basic - ( 13 Mar 2025 06:21 )    Color: x / Appearance: x / SG: x / pH: x  Gluc: 119 mg/dL / Ketone: x  / Bili: x / Urobili: x   Blood: x / Protein: x / Nitrite: x   Leuk Esterase: x / RBC: x / WBC x   Sq Epi: x / Non Sq Epi: x / Bacteria: x

## 2025-03-14 LAB
ANION GAP SERPL CALC-SCNC: 4 MMOL/L — LOW (ref 5–17)
BUN SERPL-MCNC: 23 MG/DL — SIGNIFICANT CHANGE UP (ref 7–23)
CALCIUM SERPL-MCNC: 9.8 MG/DL — SIGNIFICANT CHANGE UP (ref 8.5–10.1)
CHLORIDE SERPL-SCNC: 108 MMOL/L — SIGNIFICANT CHANGE UP (ref 96–108)
CO2 SERPL-SCNC: 28 MMOL/L — SIGNIFICANT CHANGE UP (ref 22–31)
CREAT SERPL-MCNC: 1.64 MG/DL — HIGH (ref 0.5–1.3)
CRP SERPL-MCNC: 36.9 MG/ML — HIGH (ref 0–5)
EGFR: 33 ML/MIN/1.73M2 — LOW
EGFR: 33 ML/MIN/1.73M2 — LOW
GLUCOSE SERPL-MCNC: 123 MG/DL — HIGH (ref 70–99)
HCT VFR BLD CALC: 26.8 % — LOW (ref 34.5–45)
HGB BLD-MCNC: 8.5 G/DL — LOW (ref 11.5–15.5)
LACTATE SERPL-SCNC: 1.2 MMOL/L — SIGNIFICANT CHANGE UP (ref 0.7–2)
MAGNESIUM SERPL-MCNC: 2.5 MG/DL — SIGNIFICANT CHANGE UP (ref 1.6–2.6)
MCHC RBC-ENTMCNC: 28.5 PG — SIGNIFICANT CHANGE UP (ref 27–34)
MCHC RBC-ENTMCNC: 31.7 G/DL — LOW (ref 32–36)
MCV RBC AUTO: 89.9 FL — SIGNIFICANT CHANGE UP (ref 80–100)
NRBC # BLD AUTO: 0 K/UL — SIGNIFICANT CHANGE UP (ref 0–0)
NRBC # FLD: 0 K/UL — SIGNIFICANT CHANGE UP (ref 0–0)
NRBC BLD AUTO-RTO: 0 /100 WBCS — SIGNIFICANT CHANGE UP (ref 0–0)
NT-PROBNP SERPL-SCNC: 1180 PG/ML — HIGH (ref 0–125)
PHOSPHATE SERPL-MCNC: 2.9 MG/DL — SIGNIFICANT CHANGE UP (ref 2.5–4.5)
PLATELET # BLD AUTO: 310 K/UL — SIGNIFICANT CHANGE UP (ref 150–400)
PMV BLD: 10.3 FL — SIGNIFICANT CHANGE UP (ref 7–13)
POTASSIUM SERPL-MCNC: 4.2 MMOL/L — SIGNIFICANT CHANGE UP (ref 3.5–5.3)
PROCALCITONIN SERPL-MCNC: 0.29 NG/ML — HIGH (ref 0.02–0.1)
RBC # BLD: 2.98 M/UL — LOW (ref 3.8–5.2)
RBC # FLD: 14.7 % — HIGH (ref 10.3–14.5)
SODIUM SERPL-SCNC: 140 MMOL/L — SIGNIFICANT CHANGE UP (ref 135–145)
SURGICAL PATHOLOGY STUDY: SIGNIFICANT CHANGE UP
T3FREE SERPL-MCNC: 1.7 PG/ML — LOW (ref 2–4.4)
T4 FREE SERPL-MCNC: 1.26 NG/DL — SIGNIFICANT CHANGE UP (ref 0.93–1.7)
TSH SERPL-MCNC: 2.92 UU/ML — SIGNIFICANT CHANGE UP (ref 0.34–4.82)
WBC # BLD: 8.13 K/UL — SIGNIFICANT CHANGE UP (ref 3.8–10.5)
WBC # FLD AUTO: 8.13 K/UL — SIGNIFICANT CHANGE UP (ref 3.8–10.5)

## 2025-03-14 PROCEDURE — 99232 SBSQ HOSP IP/OBS MODERATE 35: CPT

## 2025-03-14 PROCEDURE — 78451 HT MUSCLE IMAGE SPECT SING: CPT | Mod: 26

## 2025-03-14 PROCEDURE — 99233 SBSQ HOSP IP/OBS HIGH 50: CPT

## 2025-03-14 PROCEDURE — 93010 ELECTROCARDIOGRAM REPORT: CPT

## 2025-03-14 PROCEDURE — 71045 X-RAY EXAM CHEST 1 VIEW: CPT | Mod: 26

## 2025-03-14 RX ORDER — METOPROLOL SUCCINATE 50 MG/1
25 TABLET, EXTENDED RELEASE ORAL ONCE
Refills: 0 | Status: COMPLETED | OUTPATIENT
Start: 2025-03-14 | End: 2025-03-14

## 2025-03-14 RX ORDER — AMLODIPINE BESYLATE 10 MG/1
2.5 TABLET ORAL DAILY
Refills: 0 | Status: DISCONTINUED | OUTPATIENT
Start: 2025-03-14 | End: 2025-03-19

## 2025-03-14 RX ORDER — HYDROMORPHONE/SOD CHLOR,ISO/PF 2 MG/10 ML
0.2 SYRINGE (ML) INJECTION EVERY 4 HOURS
Refills: 0 | Status: DISCONTINUED | OUTPATIENT
Start: 2025-03-14 | End: 2025-03-15

## 2025-03-14 RX ORDER — METOPROLOL SUCCINATE 50 MG/1
50 TABLET, EXTENDED RELEASE ORAL DAILY
Refills: 0 | Status: DISCONTINUED | OUTPATIENT
Start: 2025-03-15 | End: 2025-03-19

## 2025-03-14 RX ADMIN — Medication 1 APPLICATION(S): at 12:12

## 2025-03-14 RX ADMIN — POTASSIUM CHLORIDE, DEXTROSE MONOHYDRATE AND SODIUM CHLORIDE 75 MILLILITER(S): 150; 5; 900 INJECTION, SOLUTION INTRAVENOUS at 06:20

## 2025-03-14 RX ADMIN — Medication 650 MILLIGRAM(S): at 12:27

## 2025-03-14 RX ADMIN — Medication 125 MILLIGRAM(S): at 12:34

## 2025-03-14 RX ADMIN — TAMSULOSIN HYDROCHLORIDE 0.4 MILLIGRAM(S): 0.4 CAPSULE ORAL at 20:53

## 2025-03-14 RX ADMIN — Medication 125 MILLIGRAM(S): at 12:29

## 2025-03-14 RX ADMIN — Medication 125 MILLIGRAM(S): at 20:53

## 2025-03-14 RX ADMIN — CASPOFUNGIN ACETATE 260 MILLIGRAM(S): 5 INJECTION, POWDER, LYOPHILIZED, FOR SOLUTION INTRAVENOUS at 12:30

## 2025-03-14 RX ADMIN — TOUCHLESS CARE ZINC OXIDE PROTECTANT 1 APPLICATION(S): 20; 25 SPRAY TOPICAL at 12:34

## 2025-03-14 RX ADMIN — Medication 1 GRAM(S): at 20:54

## 2025-03-14 RX ADMIN — Medication 125 MILLIGRAM(S): at 06:20

## 2025-03-14 RX ADMIN — Medication 20 MILLIGRAM(S): at 20:54

## 2025-03-14 RX ADMIN — Medication 2000 UNIT(S): at 20:52

## 2025-03-14 RX ADMIN — Medication 20 MILLIGRAM(S): at 15:53

## 2025-03-14 RX ADMIN — METOPROLOL SUCCINATE 25 MILLIGRAM(S): 50 TABLET, EXTENDED RELEASE ORAL at 12:28

## 2025-03-14 RX ADMIN — METOPROLOL SUCCINATE 25 MILLIGRAM(S): 50 TABLET, EXTENDED RELEASE ORAL at 15:52

## 2025-03-14 RX ADMIN — Medication 1 GRAM(S): at 12:29

## 2025-03-14 RX ADMIN — TOUCHLESS CARE ZINC OXIDE PROTECTANT 1 APPLICATION(S): 20; 25 SPRAY TOPICAL at 20:53

## 2025-03-14 RX ADMIN — Medication 650 MILLIGRAM(S): at 20:52

## 2025-03-14 RX ADMIN — Medication 0.2 MILLIGRAM(S): at 16:58

## 2025-03-14 RX ADMIN — Medication 20 MILLIGRAM(S): at 12:29

## 2025-03-14 RX ADMIN — Medication 81 MILLIGRAM(S): at 12:28

## 2025-03-14 RX ADMIN — TRAMADOL HYDROCHLORIDE 25 MILLIGRAM(S): 50 TABLET, FILM COATED ORAL at 14:30

## 2025-03-14 RX ADMIN — DRONABINOL 2.5 MILLIGRAM(S): 10 CAPSULE ORAL at 20:53

## 2025-03-14 NOTE — PROGRESS NOTE ADULT - SUBJECTIVE AND OBJECTIVE BOX
INTERVAL HPI/OVERNIGHT EVENTS:  Patient S&E at bedside. No o/n events,   EGD- minimal focal erythema, congestion and slight erosion in the distal gastric body. plan for MITCH. Pt was tachy yesterday but improved this am. REmains on RA. 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      GERD (gastroesophageal reflux disease)      Cyclical vomiting syndrome      CVA (cerebrovascular accident)      H/O: hysterectomy      H/O squamous cell carcinoma excision          FAMILY HISTORY:  No pertinent family history in first degree relatives        VITAL SIGNS:  T(F): 98.1 (03-14-25 @ 08:49)  HR: 99 (03-14-25 @ 08:49)  BP: 130/80 (03-14-25 @ 08:49)  RR: 18 (03-14-25 @ 08:49)  SpO2: 90% (03-14-25 @ 08:49)  Wt(kg): --    PHYSICAL EXAM:    Constitutional: NAD  Eyes: EOMI,   Respiratory: CTA b/l,   Cardiovascular: tahycardic   Gastrointestinal: soft, mild tenderness in epigastrum and distension   Extremities: no c/c/e  Neurological: AAOx3      MEDICATIONS  (STANDING):  acetaminophen     Tablet .. 650 milliGRAM(s) Oral every 12 hours  acetaminophen   IVPB .. 1000 milliGRAM(s) IV Intermittent once  amLODIPine   Tablet 2.5 milliGRAM(s) Oral daily  aspirin enteric coated 81 milliGRAM(s) Oral daily  bisacodyl 5 milliGRAM(s) Oral at bedtime  caspofungin IVPB 50 milliGRAM(s) IV Intermittent every 24 hours  caspofungin IVPB      chlorhexidine 2% Cloths 1 Application(s) Topical <User Schedule>  cholecalciferol 2000 Unit(s) Oral at bedtime  dextrose 5% + sodium chloride 0.45% with potassium chloride 20 mEq/L 1000 milliLiter(s) (75 mL/Hr) IV Continuous <Continuous>  dextrose 5%. 1000 milliLiter(s) (50 mL/Hr) IV Continuous <Continuous>  dextrose 5%. 1000 milliLiter(s) (100 mL/Hr) IV Continuous <Continuous>  dronabinol 2.5 milliGRAM(s) Oral at bedtime  famotidine Injectable 20 milliGRAM(s) IV Push daily  glucagon  Injectable 1 milliGRAM(s) IntraMuscular once  metoprolol succinate ER 25 milliGRAM(s) Oral daily  pantoprazole  Injectable 40 milliGRAM(s) IV Push every 12 hours  simethicone 125 milliGRAM(s) Chew three times a day  sucralfate suspension 1 Gram(s) Oral every 12 hours  tamsulosin 0.4 milliGRAM(s) Oral at bedtime  vancomycin    Solution 125 milliGRAM(s) Oral every 6 hours  zinc oxide 40% Paste 1 Application(s) Topical two times a day    MEDICATIONS  (PRN):  acetaminophen     Tablet .. 650 milliGRAM(s) Oral every 6 hours PRN Temp greater or equal to 38C (100.4F), Mild Pain (1 - 3)  aluminum hydroxide/magnesium hydroxide/simethicone Suspension 30 milliLiter(s) Oral every 4 hours PRN Dyspepsia  Biotene Dry Mouth Oral Rinse 15 milliLiter(s) Swish and Spit five times a day PRN Mouth Care  bisacodyl Suppository 10 milliGRAM(s) Rectal daily PRN Constipation  dicyclomine 10 milliGRAM(s) Oral three times a day before meals PRN abdominal cramps  ondansetron Injectable 4 milliGRAM(s) IV Push every 8 hours PRN Nausea and/or Vomiting  sodium chloride 0.65% Nasal 1 Spray(s) Both Nostrils two times a day PRN Nasal Congestion  traMADol 25 milliGRAM(s) Oral every 6 hours PRN Severe Pain (7 - 10)      Allergies    No Known Allergies    Intolerances        LABS:                        8.5    8.13  )-----------( 310      ( 14 Mar 2025 07:23 )             26.8     03-14    140  |  108  |  23  ----------------------------<  123[H]  4.2   |  28  |  1.64[H]    Ca    9.8      14 Mar 2025 07:23  Phos  2.9     03-14  Mg     2.5     03-14    TPro  6.4  /  Alb  2.2[L]  /  TBili  0.5  /  DBili  0.2  /  AST  15  /  ALT  20  /  AlkPhos  133[H]  03-13      Urinalysis Basic - ( 14 Mar 2025 07:23 )    Color: x / Appearance: x / SG: x / pH: x  Gluc: 123 mg/dL / Ketone: x  / Bili: x / Urobili: x   Blood: x / Protein: x / Nitrite: x   Leuk Esterase: x / RBC: x / WBC x   Sq Epi: x / Non Sq Epi: x / Bacteria: x        RADIOLOGY & ADDITIONAL TESTS:  Studies reviewed.

## 2025-03-14 NOTE — CONSULT NOTE ADULT - ASSESSMENT
A/P:  70 y/o female with urinary retention    Would continue coelho and send pt to rehab w/ coelho  After rehab course completed, can follow up with Dr. Steele for possible coelho removal  Above discussed with Dr. Steele

## 2025-03-14 NOTE — PROGRESS NOTE ADULT - SUBJECTIVE AND OBJECTIVE BOX
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  Patient was being followed by GI, ID, Cardiology and Onc there.  Patient has been transferred to  today on patient's  (GI Dr. Pleitez) request.  At this time, patient c/o non-bloody diarrhea x 3 in last 24 hours.  She denies any abdominal pain, nausea, but has some dry heaves.  She denies any fever, chills, chest pain, palpitation, constipation or dysuria. Initially, she was started on Vancomycin and Zosyn, but now she has been on Cefepime as her blood culture and urine cultures are positive for Pseudomonas aeruginosa. Currently, patient is on Cefepime.  ID had also recommended Flagyl, but patient had refused it, as it causes nausea to her.  Patient is working with therapies bedside. She is ambulating 15 feet with a rollator min assist. Transfers with min assist.     Sig labs: WBC 0.45k, N 82%, Hb 7.8, Platelets 59k, Bicarb 19, AG 9, , Cr 0.99, TP 4.6, Alb 1.2, LFTs wnl.  Mg 1.9, PO4 2.4.  Prior C.diff negative.    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    Then transferred to  as per request of . Was getting platelet transfusion and developed tachypnea hypoxia, rigors. Was diuresed and became hypotensive, Upgraded to SICU. CTA c/a/p on 2/19 showed multiple b/l PE and large bowel obstruction, was started on heparin drip, c/b GIB, then retrialed heparin drip with recurrent c/f GIB. Also with Afib w RVR during admission, resolved w iv metoprolol.     On 2/28, developed SIRS, Blood cx growing yeast. TPN stopped, R chest port removed on 3/2 and pt started on iv caspofungin.  03/10/2024 s/p IVC  Patient is working with therapy bedside. Patient is moderate assist with bed mobility and ambulation    69-year-old female with stage IV pancreatic adenocarcinoma presenting with neutropenic fever and severe treatment-related toxicities from modified FOLFIRINOX and investigational EMILY inhibitor [id number] found to have PE and LBO.     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 Mottled air   lucency and mild fat infiltration surrounding the jejunal segment (2:62. Indeterminant as to contained perforation   - lactate cleared 1.3 3/8   - Tolerated CLD, now on regular diet  - GI and surgery and oncology surgery  on board   - Sp Meropenem  - second opinion GI Dr. Young   - 3/13  -s/p EGD - gastritis , pt refusing colonoscopy    # Urinary retention   - s/p Julian   - stop  bethanechol 10 tid  - can cause abdominal cramping   - voiding trial once more ambulatory  - 3/13 - start flomax hs  - urology consult    # Periumbilical Abdominal pain   - tylenol 650 bid   - start tramadol 25 q6 prn for severe pain   - added bentyl tid as needed    #Acute bilateral PE  # Lung nodules  # Sinus tachycardia  - Doppler LE negative  - CT chest- multiple PE w/in segmental left lower and right lower, 2 right middle lobe nodules,   -  3/10 s/p IVC filter   - Recurrent GIB on heparin   - serial ekgs  - cardiology consult  - 3/13 - start toprol 25     # H/o CVA (cerebrovascular accident).   - Was originally on Plavix per  but because is now taking [facility] [id number],  - was told to start Aggrenox instead to avoid DDIs)  - platelets 33 --> 253   - off AP and AC due to GI bleeds will  d/w  GI team  - need to be on plavix d/w Dr. Clement will start once cleared by GI   - d/w Dr. Hernandez ok to restart ASA 81     # SVT on 02/09/2025: resolved after Valsalva and Carotid massage.  -Norvasc and Lipitor on hold to septic shock.   -Off levophed; s/p  oral midodrine.  -Cardiology consulted Dr. Clement  - off BB  - 3/12 - restart low dose of amlodipine 2.5 for high BP  - 3/13 - cardiology consult, ekgs, start toprol 25    # GERD/Esophagitis , recurrent GI bleeds while on IV heparin   - Continue protonix iv bid--> po  - Continue carafate bid    #MALLORY, likely ATN  - Stable at 1.4 3/9  - Nephrology on board  - c/w IV fluids  - Creatinine Trend: 1.62<--, 1.32<--, 1.40<--, 1.39<--, 1.48<--, 1.61<--    # Acute blood loss anemia and Malignancy  - SP GIB x 2 after starting heparin  - s/p pRBC x 6u this admission  - Hemoglobin stable 9.0  3/9  - Continue Protonix      #Primary pancreatic adenocarcinoma  - SP folfirinox with reaction to irinotecan  - on a clinical trial now, on hold     #Skin injury Left and Rigth upper Gluteal Intergluteal cleft   - superficial skin necrosis  - Wound care evaluation appreciated  - Low air mattress  - Turning / positioning q2h while in bed    # Cutaneous lesion on RUQ area below right breast,   Ecthyma gangrenosum.  - Ecthyma gangrenosum. life-threatening skin infection caused by Pseudomonas aeruginosa bacteria.   - dermatology consult  -  appears to be resolving, most skin changes appear post inflammatory.  - Please recall if the lesion does not continue to slowly resolve over the next 2-3 weeks.   - wound care - If the surface ulcerates, cover with telfa/ vaseline/ paper tape      # Hypokalemia  # Hypomagnesemia   - replete prn  - keep K close to 4     # HTN   - amlodipine 2.5     #DVT ppx - SCDs    Imaging  abd CT 3/6/2025  Resolution of small and large bowel dilatation.  Mottled lucency involving a short segment of sigmoid colon suspect for pneumatosis which could be due to benign or ischemic etiology. Please correlate clinically and with laboratory values.  Minimal increase in amount of ascites.  Unchanged pancreatic body and umbilical masses.    Abdominal CT 03/10/2025  There is an apparent fistula between the transverse colon and a mid jejunal loop with enteric contrast in the fistula (2:64). Mottled air lucency and mild fat infiltration surrounding the jejunal segment (2:62. Indeterminant as to contained perforation of the transverse colon or the jejunum. The finding is new compared with 2/22/2025. Recommend surgical consultation.  Indeterminate 8 mm right middle lobe nodule, either infectious or malignant. Stable bilateral pleural effusions.  Redemonstrated pancreatic mass, inseparable from the posterior gastric wall. Unchanged umbilical mass.  Pelvic floor laxity with mild bladder and rectal prolapse.  Ascites unchanged.    REVIEW OF SYSTEMS  Constitutional - No fever, No weight loss, No fatigue  HEENT - No eye pain, No visual disturbances, No difficulty hearing, No tinnitus, No vertigo, No neck pain  Respiratory - No cough, No wheezing, No shortness of breath  Cardiovascular - No chest pain, No palpitations  Gastrointestinal - No abdominal pain, No nausea, No vomiting, No diarrhea, No constipation  Genitourinary - No dysuria, No frequency, No hematuria, No incontinence  Neurological - No headaches, No memory loss, No loss of strength, No numbness, No tremors  Skin - No itching, No rashes, No lesions   Endocrine - No temperature intolerance  Musculoskeletal - No joint pain, No joint swelling, No muscle pain  Psychiatric - No depression, No anxiety    VITALS  ICU Vital Signs Last 24 Hrs  T(C): 37.1 (14 Mar 2025 12:14), Max: 37.1 (14 Mar 2025 01:13)  T(F): 98.7 (14 Mar 2025 12:14), Max: 98.7 (14 Mar 2025 01:13)  HR: 125 (14 Mar 2025 12:14) (98 - 130)  BP: 139/89 (14 Mar 2025 12:14) (116/67 - 139/89)  RR: 18 (14 Mar 2025 12:14) (18 - 18)  SpO2: 92% (14 Mar 2025 12:14) (90% - 92%)    PAST MEDICAL & SURGICAL HISTORY  CVA (cerebrovascular accident)    Primary pancreatic adenocarcinoma    HTN (hypertension)    HLD (hyperlipidemia)    Gout    SCC of lung (small cell carcinoma)    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     CURRENT FUNCTIONAL STATUS  Per PT supine to sit is moderate assist of 1 person  Sit to stand is moderate assist of 1 person    FAMILY HISTORY   No pertinent family history in first degree relatives    RECENT LABS - Reviewed  CBC Full  -  ( 07 Mar 2025 07:22 )  WBC Count : 8.66 K/uL  RBC Count : 3.21 M/uL  Hemoglobin : 9.2 g/dL  Hematocrit : 28.5 %  Platelet Count - Automated : 271 K/uL  Mean Cell Volume : 88.8 fl  Mean Cell Hemoglobin : 28.7 pg  Mean Cell Hemoglobin Concentration : 32.3 g/dL  Auto Neutrophil # : x  Auto Lymphocyte # : x  Auto Monocyte # : x  Auto Eosinophil # : x  Auto Basophil # : x  Auto Neutrophil % : x  Auto Lymphocyte % : x  Auto Monocyte % : x  Auto Eosinophil % : x  Auto Basophil % : x    03-07    143  |  112[H]  |  37[H]  ----------------------------<  125[H]  3.8   |  22  |  1.69[H]    Ca    9.6      07 Mar 2025 07:22  Mg     1.8     03-07      Urinalysis Basic - ( 07 Mar 2025 07:22 )    Color: x / Appearance: x / SG: x / pH: x  Gluc: 125 mg/dL / Ketone: x  / Bili: x / Urobili: x   Blood: x / Protein: x / Nitrite: x   Leuk Esterase: x / RBC: x / WBC x   Sq Epi: x / Non Sq Epi: x / Bacteria: x      ALLERGIES  No Known Allergies    MEDICATIONS   acetaminophen     Tablet .. 650 milliGRAM(s) Oral every 6 hours PRN  acetaminophen   IVPB .. 1000 milliGRAM(s) IV Intermittent once  aluminum hydroxide/magnesium hydroxide/simethicone Suspension 30 milliLiter(s) Oral every 4 hours PRN  Biotene Dry Mouth Oral Rinse 15 milliLiter(s) Swish and Spit five times a day PRN  caspofungin IVPB 50 milliGRAM(s) IV Intermittent every 24 hours  caspofungin IVPB      chlorhexidine 2% Cloths 1 Application(s) Topical <User Schedule>  cholecalciferol 2000 Unit(s) Oral at bedtime  dextrose 5% + sodium chloride 0.45%. 1000 milliLiter(s) IV Continuous <Continuous>  dextrose 5%. 1000 milliLiter(s) IV Continuous <Continuous>  dextrose 5%. 1000 milliLiter(s) IV Continuous <Continuous>  famotidine Injectable 20 milliGRAM(s) IV Push daily  glucagon  Injectable 1 milliGRAM(s) IntraMuscular once  meropenem Injectable 1000 milliGRAM(s) IV Push every 12 hours  ondansetron Injectable 4 milliGRAM(s) IV Push every 8 hours PRN  pantoprazole  Injectable 40 milliGRAM(s) IV Push every 12 hours  sodium chloride 0.65% Nasal 1 Spray(s) Both Nostrils two times a day PRN  sucralfate suspension 1 Gram(s) Oral four times a day  vancomycin    Solution 125 milliGRAM(s) Oral every 6 hours  zinc oxide 40% Paste 1 Application(s) Topical two times a day      ----------------------------------------------------------------------------------------  PHYSICAL EXAM  Constitutional - NAD, Comfortable  HEENT - NCAT, EOMI  Neck - Supple, No limited ROM  Chest - Breathing comfortably, No wheezing  Cardiovascular - S1S2   Abdomen - Soft   Extremities - No C/C/E, No calf tenderness   Neurologic Exam -                    Cognitive - AAO to self, place, date, year, situation     Communication - Fluent, No dysarthria     Cranial Nerves - CN 2-12 intact     Motor - No focal deficits                    LEFT    UE - ShAB 3/5, EF 3/5, EE 3/5, WE 3/5,  3/5                    RIGHT UE - ShAB 3/5, EF 3/5, EE 3/5, WE 3/5,  3/5                    LEFT    LE - HF 3/5, KE 3/5, DF 3/5, PF 3/5                    RIGHT LE - HF 3/5, KE 3/5, DF 3/5, PF 3/5        Sensory - Intact to LT     Reflexes - DTR Intact, No primitive reflexive     Coordination - FTN intact     OculoVestibular - No saccades, No nystagmus, VOR         Balance - WNL Static  Psychiatric - Mood stable, Affect WNL  ----------------------------------------------------------------------------------------

## 2025-03-14 NOTE — PROGRESS NOTE ADULT - ASSESSMENT
1:1 Continuous Observation Initiated.  Shin Blount,  at bedside.  Cooperative behaviors present at this time.  Suicide Safety Precautions and Interventions in place.    Imp:  She reports tolerating POs better but abdominal discomfort last night    Rec:  No new recs today

## 2025-03-14 NOTE — PROGRESS NOTE ADULT - ASSESSMENT
ASSESSMENT/PLAN  69yFemale with functional deficits after Chemotherapy primary pancreatic adenocarcinoma of head with tumor in umbilicus, chemo-induced pancytopenia     Pain - Tylenol  DVT PPX - SCDs  Rehab -     Patient is making gains with bedside therapy. Patient ambulated 15 feet with rolling walker and min assist. Patient has good potential for Acute inpatient rehab  Dx; Physical debility.     Will continue to follow. Functional progress will determine ongoing rehab dispo recommendations, which may change.    Continue bedside therapy as well as OOB throughout the day with mobilization by staff to maintain cardiopulmonary function and prevention of secondary complications related to debility.    ASSESSMENT/PLAN  69yFemale with functional deficits after Chemotherapy primary pancreatic adenocarcinoma of head with tumor in umbilicus, chemo-induced pancytopenia     Pain - Tylenol  DVT PPX - SCDs  Rehab -     Patient is making gains with bedside therapy. Patient has decided on subacute rehabilitation.  Dx; Physical debility.     Will continue to follow. Functional progress will determine ongoing rehab dispo recommendations, which may change.    Continue bedside therapy as well as OOB throughout the day with mobilization by staff to maintain cardiopulmonary function and prevention of secondary complications related to debility.

## 2025-03-14 NOTE — PROGRESS NOTE ADULT - ASSESSMENT
69-year-old female with stage IV pancreatic adenocarcinoma presenting with neutropenic fever and severe treatment-related toxicities from modified FOLFIRINOX and investigational EMILY inhibitor [id number].    PROBLEMS:    Febrile syndrome. Fungemia. Likely disseminated candidemia.   Dysphagia. Possible candida esophagitis  New febrile syndrome with chills. Possible sepsis. Dysphagia. Possible candida esophagitis, S/p sepsis with PSAE   Acute hypoxic respiratory failure likely secondary to hypervolemia   Bilateral pleural effusions with compressive atelectasis, right greater than left  Neutropenic Fever/Neutropenic Septic shock  Severe Thrombocytopenia-Platelet count critically low at 26k.   Severe diarrhea and inability to tolerate PO intake, likely multifactorial from both FOLFIRINOX (particularly irinotecan component) and study drug [id number].   History of left cephalic vein thrombosis.  MALLORY  CT Angio Chest PE Protocol w/ IV Cont (02.19.25 @ 14:16) >  CHEST:  Multiple pulmonary emboli within the segmental branches of the left lower and right lower lobe pulmonary arteries.  There are two right middle lobe nodules, new since prior. Exact   etiology is unclear. Primary differential diagnostic consideration   includes infection.  CT Abdomen and Pelvis-Resolution of small and large bowel dilatation. Mottled lucency involving a short segment of sigmoid colon suspect for pneumatosis which could be due to benign or ischemic etiology. Please correlate clinically and with laboratory values.  Minimal increase in amount of ascites.  Unchanged pancreatic body and umbilical masses.      PLAN:    Pulmonary stable- s/p GFF 03/10-rehab placement  Trial of NRB mask fu with ABG with Pao2 60-70 before consider HFNC, as pat difficulty with HFNC previouly. hyperbaric oxygen consult for pneumatosis of bowel-d/w   Dc fluconazole  and d/c cefepime  IV caspofungin 70 mg IV x1, then 50 mg IV qd/Iv meropenem  CTA chest + small PEs in RLL and LLL- off IV heparin causing bleeding-S/P GFF 03/10  Family does not want any procedure EGD/Colonscopy  Neutropenic Septic shock- IV meropenem started on 2/15- previously on CEFEPIME/Continue filgrastim 480mcg daily-Today w/ improvement in counts- WBC 1.3, Hb 10.1, plt 37, cmp with k 2.3 and Repleted, Cr 1.40.   Daily CBC monitoring  Maintain strict neutropenic precautions  Supportive care

## 2025-03-14 NOTE — PROGRESS NOTE ADULT - ASSESSMENT
69-year-old female recent diagnosis of primary pancreatic adenocarcinoma , pancytopenia and significant PMH of SCC lip, cyclical vomiting syndrome, HTN, HPL, Gout, GERD, CVA w renal evaluation of MALLORY and hypokalemia     MALLORY  Cr improving  , volume ok  -IVF continues   -Trend labs, lytes. Replete Hypokalemia for goal near 4.0  -Monitor UOP   -Avoid nsaids/contrast   - for IVC filter insertion tomorrow - continue IVF hydration to mitigate MAXIMO risk     3/11  Pacreatic CA/ METS  MALLORY slowly improving  Maintaining current IVF  I/O acceptable   Monitor and replace K/ Mg as needed    3/12  Pacreatic CA/ METS  MALLORY slowly improving, down to 1.32 today  - good UO 1850 ml  Maintaining current IVF  I/O acceptable   Monitor and replace K/ Mg as needed  Maintain K near 4 to minimize ileus, pt has poor IV access , await IV team  Good response to bowel preps  D/W Dr YOUSUF Pleitez    3/13  Full note to follow  Creat stable no finding in the EGD to explain pt sxs  May need CT with IVC,   Will hydrate appropriately prior to CT, D/W Dr Pleitez       3/14  Pacreatic CA/ METS  MALLORY slowly improving, , up to 1.64 today may be 3 rd spacing, causing azotemia  - good UO 1700 ml  and 3rd spacing  Maintaining current IVF  I/O acceptable   Monitor and replace K/ Mg as needed  Maintain K near 4 to minimize ileus, pt has poor IV access  Good response to bowel preps  D/W Dr YOUSUF Pleitez and Dr Joyner

## 2025-03-14 NOTE — PROGRESS NOTE ADULT - SUBJECTIVE AND OBJECTIVE BOX
Subjective:    pat     Home Medications:  amitriptyline: 10 milligram(s) orally once a day (23 Dec 2024 18:06)  amLODIPine 5 mg oral tablet: 1 tab(s) orally once a day (23 Dec 2024 18:06)  aspirin 81 mg oral tablet, chewable: 1 tab(s) orally once a day (23 Dec 2024 18:06)  atorvastatin 40 mg oral tablet: 1 tab(s) orally once a day (at bedtime) (23 Dec 2024 18:06)  cefepime 2 g intravenous injection: 2 gram(s) intravenous every 12 hours (04 Mar 2025 21:11)  clopidogrel 75 mg oral tablet: 1 tab(s) orally once a day (23 Dec 2024 16:32)  filgrastim: 480 microgram(s) subcutaneous once a day (04 Mar 2025 21:11)  Lactated Ringers Injection intravenous solution: 150 milligram(s) intravenous every 1 to 2 hours (04 Mar 2025 21:11)  loperamide 2 mg oral capsule: 1 cap(s) orally once (04 Mar 2025 21:11)  midodrine 5 mg oral tablet: 1 tab(s) orally every 8 hours (04 Mar 2025 21:11)  Multiple Vitamins with Minerals oral liquid: 1 orally once a day (04 Mar 2025 21:11)  octreotide 2500 mcg/mL subcutaneous solution: 0.04 milliliter(s) subcutaneous 3 times a day (04 Mar 2025 21:11)  Pepcid 40 mg oral tablet: 40 milligram(s) orally once a day as needed for dyspepsia (23 Dec 2024 18:06)  sucralfate 1 g/10 mL oral suspension: 10 milliliter(s) orally 4 times a day (04 Mar 2025 21:11)  trimethobenzamide 100 mg/mL intramuscular solution: 2 milliliter(s) intramuscular every 8 hours As needed Nausea and/or Vomiting (04 Mar 2025 21:11)    MEDICATIONS  (STANDING):  acetaminophen     Tablet .. 650 milliGRAM(s) Oral every 12 hours  acetaminophen   IVPB .. 1000 milliGRAM(s) IV Intermittent once  aspirin enteric coated 81 milliGRAM(s) Oral daily  caspofungin IVPB 50 milliGRAM(s) IV Intermittent every 24 hours  caspofungin IVPB      chlorhexidine 2% Cloths 1 Application(s) Topical <User Schedule>  cholecalciferol 2000 Unit(s) Oral at bedtime  dextrose 5% + sodium chloride 0.45% with potassium chloride 20 mEq/L 1000 milliLiter(s) (75 mL/Hr) IV Continuous <Continuous>  dextrose 5%. 1000 milliLiter(s) (50 mL/Hr) IV Continuous <Continuous>  dextrose 5%. 1000 milliLiter(s) (100 mL/Hr) IV Continuous <Continuous>  dicyclomine 20 milliGRAM(s) Oral four times a day before meals  dronabinol 2.5 milliGRAM(s) Oral at bedtime  glucagon  Injectable 1 milliGRAM(s) IntraMuscular once  metoprolol succinate ER 25 milliGRAM(s) Oral daily  pantoprazole    Tablet 40 milliGRAM(s) Oral before breakfast  simethicone 125 milliGRAM(s) Chew three times a day  sucralfate suspension 1 Gram(s) Oral every 12 hours  tamsulosin 0.4 milliGRAM(s) Oral at bedtime  vancomycin    Solution 125 milliGRAM(s) Oral every 6 hours  zinc oxide 40% Paste 1 Application(s) Topical two times a day    MEDICATIONS  (PRN):  acetaminophen     Tablet .. 650 milliGRAM(s) Oral every 6 hours PRN Temp greater or equal to 38C (100.4F), Mild Pain (1 - 3)  aluminum hydroxide/magnesium hydroxide/simethicone Suspension 30 milliLiter(s) Oral every 4 hours PRN Dyspepsia  amLODIPine   Tablet 2.5 milliGRAM(s) Oral daily PRN for SBP > 140  Biotene Dry Mouth Oral Rinse 15 milliLiter(s) Swish and Spit five times a day PRN Mouth Care  ondansetron Injectable 4 milliGRAM(s) IV Push every 8 hours PRN Nausea and/or Vomiting  sodium chloride 0.65% Nasal 1 Spray(s) Both Nostrils two times a day PRN Nasal Congestion  traMADol 25 milliGRAM(s) Oral every 6 hours PRN Severe Pain (7 - 10)      Allergies    No Known Allergies    Intolerances        Vital Signs Last 24 Hrs  T(C): 37.1 (14 Mar 2025 12:14), Max: 37.1 (14 Mar 2025 01:13)  T(F): 98.7 (14 Mar 2025 12:14), Max: 98.7 (14 Mar 2025 01:13)  HR: 125 (14 Mar 2025 12:14) (98 - 130)  BP: 139/89 (14 Mar 2025 12:14) (116/67 - 139/89)  BP(mean): --  RR: 18 (14 Mar 2025 12:14) (18 - 18)  SpO2: 92% (14 Mar 2025 12:14) (90% - 92%)    Parameters below as of 14 Mar 2025 12:14  Patient On (Oxygen Delivery Method): room air          PHYSICAL EXAMINATION:    NECK:  Supple. No lymphadenopathy. Jugular venous pressure not elevated. Carotids equal.   HEART:   The cardiac impulse has a normal quality. Reg., Nl S1 and S2.  There are no murmurs, rubs or gallops noted  CHEST:  Chest is clear to auscultation. Normal respiratory effort.  ABDOMEN:  Soft and nontender.   EXTREMITIES:  There is no edema.       LABS:                        8.5    8.13  )-----------( 310      ( 14 Mar 2025 07:23 )             26.8     03-14    140  |  108  |  23  ----------------------------<  123[H]  4.2   |  28  |  1.64[H]    Ca    9.8      14 Mar 2025 07:23  Phos  2.9     03-14  Mg     2.5     03-14    TPro  6.4  /  Alb  2.2[L]  /  TBili  0.5  /  DBili  0.2  /  AST  15  /  ALT  20  /  AlkPhos  133[H]  03-13      Urinalysis Basic - ( 14 Mar 2025 07:23 )    Color: x / Appearance: x / SG: x / pH: x  Gluc: 123 mg/dL / Ketone: x  / Bili: x / Urobili: x   Blood: x / Protein: x / Nitrite: x   Leuk Esterase: x / RBC: x / WBC x   Sq Epi: x / Non Sq Epi: x / Bacteria: x             Subjective:    pat lying in bed, no new complaint, urology continue coelho.    Home Medications:  amitriptyline: 10 milligram(s) orally once a day (23 Dec 2024 18:06)  amLODIPine 5 mg oral tablet: 1 tab(s) orally once a day (23 Dec 2024 18:06)  aspirin 81 mg oral tablet, chewable: 1 tab(s) orally once a day (23 Dec 2024 18:06)  atorvastatin 40 mg oral tablet: 1 tab(s) orally once a day (at bedtime) (23 Dec 2024 18:06)  cefepime 2 g intravenous injection: 2 gram(s) intravenous every 12 hours (04 Mar 2025 21:11)  clopidogrel 75 mg oral tablet: 1 tab(s) orally once a day (23 Dec 2024 16:32)  filgrastim: 480 microgram(s) subcutaneous once a day (04 Mar 2025 21:11)  Lactated Ringers Injection intravenous solution: 150 milligram(s) intravenous every 1 to 2 hours (04 Mar 2025 21:11)  loperamide 2 mg oral capsule: 1 cap(s) orally once (04 Mar 2025 21:11)  midodrine 5 mg oral tablet: 1 tab(s) orally every 8 hours (04 Mar 2025 21:11)  Multiple Vitamins with Minerals oral liquid: 1 orally once a day (04 Mar 2025 21:11)  octreotide 2500 mcg/mL subcutaneous solution: 0.04 milliliter(s) subcutaneous 3 times a day (04 Mar 2025 21:11)  Pepcid 40 mg oral tablet: 40 milligram(s) orally once a day as needed for dyspepsia (23 Dec 2024 18:06)  sucralfate 1 g/10 mL oral suspension: 10 milliliter(s) orally 4 times a day (04 Mar 2025 21:11)  trimethobenzamide 100 mg/mL intramuscular solution: 2 milliliter(s) intramuscular every 8 hours As needed Nausea and/or Vomiting (04 Mar 2025 21:11)    MEDICATIONS  (STANDING):  acetaminophen     Tablet .. 650 milliGRAM(s) Oral every 12 hours  acetaminophen   IVPB .. 1000 milliGRAM(s) IV Intermittent once  aspirin enteric coated 81 milliGRAM(s) Oral daily  caspofungin IVPB 50 milliGRAM(s) IV Intermittent every 24 hours  caspofungin IVPB      chlorhexidine 2% Cloths 1 Application(s) Topical <User Schedule>  cholecalciferol 2000 Unit(s) Oral at bedtime  dextrose 5% + sodium chloride 0.45% with potassium chloride 20 mEq/L 1000 milliLiter(s) (75 mL/Hr) IV Continuous <Continuous>  dextrose 5%. 1000 milliLiter(s) (50 mL/Hr) IV Continuous <Continuous>  dextrose 5%. 1000 milliLiter(s) (100 mL/Hr) IV Continuous <Continuous>  dicyclomine 20 milliGRAM(s) Oral four times a day before meals  dronabinol 2.5 milliGRAM(s) Oral at bedtime  glucagon  Injectable 1 milliGRAM(s) IntraMuscular once  metoprolol succinate ER 25 milliGRAM(s) Oral daily  pantoprazole    Tablet 40 milliGRAM(s) Oral before breakfast  simethicone 125 milliGRAM(s) Chew three times a day  sucralfate suspension 1 Gram(s) Oral every 12 hours  tamsulosin 0.4 milliGRAM(s) Oral at bedtime  vancomycin    Solution 125 milliGRAM(s) Oral every 6 hours  zinc oxide 40% Paste 1 Application(s) Topical two times a day    MEDICATIONS  (PRN):  acetaminophen     Tablet .. 650 milliGRAM(s) Oral every 6 hours PRN Temp greater or equal to 38C (100.4F), Mild Pain (1 - 3)  aluminum hydroxide/magnesium hydroxide/simethicone Suspension 30 milliLiter(s) Oral every 4 hours PRN Dyspepsia  amLODIPine   Tablet 2.5 milliGRAM(s) Oral daily PRN for SBP > 140  Biotene Dry Mouth Oral Rinse 15 milliLiter(s) Swish and Spit five times a day PRN Mouth Care  ondansetron Injectable 4 milliGRAM(s) IV Push every 8 hours PRN Nausea and/or Vomiting  sodium chloride 0.65% Nasal 1 Spray(s) Both Nostrils two times a day PRN Nasal Congestion  traMADol 25 milliGRAM(s) Oral every 6 hours PRN Severe Pain (7 - 10)      Allergies    No Known Allergies    Intolerances        Vital Signs Last 24 Hrs  T(C): 37.1 (14 Mar 2025 12:14), Max: 37.1 (14 Mar 2025 01:13)  T(F): 98.7 (14 Mar 2025 12:14), Max: 98.7 (14 Mar 2025 01:13)  HR: 125 (14 Mar 2025 12:14) (98 - 130)  BP: 139/89 (14 Mar 2025 12:14) (116/67 - 139/89)  BP(mean): --  RR: 18 (14 Mar 2025 12:14) (18 - 18)  SpO2: 92% (14 Mar 2025 12:14) (90% - 92%)    Parameters below as of 14 Mar 2025 12:14  Patient On (Oxygen Delivery Method): room air          PHYSICAL EXAMINATION:    NECK:  Supple. No lymphadenopathy. Jugular venous pressure not elevated. Carotids equal.   HEART:   The cardiac impulse has a normal quality. Reg., Nl S1 and S2.  There are no murmurs, rubs or gallops noted  CHEST:  Chest rhonchi to auscultation. Normal respiratory effort.  ABDOMEN:  Soft and nontender.   EXTREMITIES:  There is no edema.       LABS:                        8.5    8.13  )-----------( 310      ( 14 Mar 2025 07:23 )             26.8     03-14    140  |  108  |  23  ----------------------------<  123[H]  4.2   |  28  |  1.64[H]    Ca    9.8      14 Mar 2025 07:23  Phos  2.9     03-14  Mg     2.5     03-14    TPro  6.4  /  Alb  2.2[L]  /  TBili  0.5  /  DBili  0.2  /  AST  15  /  ALT  20  /  AlkPhos  133[H]  03-13      Urinalysis Basic - ( 14 Mar 2025 07:23 )    Color: x / Appearance: x / SG: x / pH: x  Gluc: 123 mg/dL / Ketone: x  / Bili: x / Urobili: x   Blood: x / Protein: x / Nitrite: x   Leuk Esterase: x / RBC: x / WBC x   Sq Epi: x / Non Sq Epi: x / Bacteria: x

## 2025-03-14 NOTE — PROGRESS NOTE ADULT - SUBJECTIVE AND OBJECTIVE BOX
HOSPITALIST ATTENDING PROGRESS NOTE    Chart and meds reviewed.  Patient seen and examined.    CC:  abdominal pain, poor po intake     3/11 - epigastric abdominal pain, No fever. Tolerating some diet. + Bms, denies cp, dyspnea, nausea   3/12 - + periumbilical pain 7/10, dull, non-radiating constant, slightly better from tylenol , afebrile, multiple Bms after laxatives, + early satiety , + nausea    3/13 - abdominal pain better after tramadol, s/p EGD, tolerating some po intake, afebrile, no Bms today, denies nausea, vomiting  3/14 - abdominal pain worse, + BMs, lose, tolerating some po intake, + puffy face, denies HA, + abdominal cramping, plan discussed    All other systems reviewed and found to be negative with the exception of what has been described above.    Vital sings reviewed for last 24 h  T(C): 37.2 (25 @ 15:00), Max: 37.2 (25 @ 15:00)  T(F): 99 (25 @ 15:00), Max: 99 (25 @ 15:00)  HR: 121 (25 @ 15:00) (98 - 125)  BP: 131/73 (25 @ 15:00) (123/61 - 139/89)  RR: 18 (25 @ 15:00) (18 - 18)  SpO2: 94% (25 @ 15:00) (90% - 94%)  Wt(kg): --  Daily     Daily Weight in k.2 (14 Mar 2025 06:00)  CAPILLARY BLOOD GLUCOSE          Physical exam :   GEN: NAD, Frail   HEENT:  pupils equal and reactive, EOMI, no oropharyngeal lesions, erythema, exudates, oral thrush  NECK:   supple, no carotid bruits  CV:  +S1, +S2, regular, no murmurs  RESP:   lungs clear to auscultation bilaterally, no wheezing, rales, rhonchi, good air entry bilaterally  GI:  abdomen soft,  + epigastric tenderness, Distended, normal BS, periumbilical mass  EXT:  no clubbing, no cyanosis, no edema, no calf pain, swelling or erythema  NEURO:  AAOX3, no focal neurological deficits, follows all commands, able to move extremities spontaneously  SKIN:  sacral DTI , under right breast  inframammary:  + 4.0 cm subcutaneous nodule, mildly tender, with drying ulceration of skin surface; not acutely inflamed   : Julian     LABS:  all reviewed                         8.5    8.13  )-----------( 310      ( 14 Mar 2025 07:23 )             26.8     03-14    140  |  108  |  23  ----------------------------<  123[H]  4.2   |  28  |  1.64[H]    Ca    9.8      14 Mar 2025 07:23  Phos  2.9     03-14  Mg     2.5     03-14    TPro  6.4  /  Alb  2.2[L]  /  TBili  0.5  /  DBili  0.2  /  AST  15  /  ALT  20  /  AlkPhos  133[H]  03-13        LIVER FUNCTIONS - ( 13 Mar 2025 06:21 )  Alb: 2.2 g/dL / Pro: 6.4 gm/dL / ALK PHOS: 133 U/L / ALT: 20 U/L / AST: 15 U/L / GGT: x                 Culture - Blood (25 @ 18:58)    Specimen Source: Blood None   Culture Results:   No growth at 5 days    Culture - Blood (25 @ 11:54)    -  Blood PCR Panel: NEG   Gram Stain:   Growth in aerobic bottle: Yeast like cells   Specimen Source: Blood None   Organism: Blood Culture PCR   Culture Results:   Growth in aerobic bottle: Saccharomyces cerevisiae Sent to [facility]  [address] [id number] for  susceptibility  Direct identification is available within approximately 3-5  hours either by Blood Panel Multiplexed PCR or Direct  MALDI-TOF. 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 Mottled air   lucency and mild fat infiltration surrounding the jejunal segment (2:62.   Indeterminant as to contained perforation of the transverse colon or the   jejunum.  The finding is new compared with 2025. Recommend surgical   consultation.    Indeterminate 8 mm right middle lobe nodule, either infectious or   malignant. Stable bilateral pleural effusions.    Redemonstrated pancreatic mass, inseparable from the posterior gastric   wall. Unchanged umbilical mass.    Pelvic floor laxity with mild bladder and rectal prolapse.    Ascites unchanged.    Findings were discussed with Dr. Venice Joyner 3/11/2025 9:35 AM by   Dr. Anum Calle with read back confirmation.    A preliminary report was provided by Dr. Adam Robertson     CT Abdomen and Pelvis No Cont (25 @ 16:32) >  IMPRESSION:  Resolution of small and large bowel dilatation.    Mottled lucency involving a short segment of sigmoid colon suspect for   pneumatosis which could be due to benign or ischemic etiology. Please   correlate clinically and with laboratory values.    Minimal increase in amount of ascites.  Unchanged pancreatic body and umbilical masses.      : US Duplex Venous Lower Ext Complete, Bilateral (25 @ 10:23) >  IMPRESSION:  No evidence of deep venous thrombosis in either lower extremity.    MEDICATIONS  (STANDING):  acetaminophen   IVPB .. 1000 milliGRAM(s) IV Intermittent once  caspofungin IVPB 50 milliGRAM(s) IV Intermittent every 24 hours  caspofungin IVPB      chlorhexidine 2% Cloths 1 Application(s) Topical <User Schedule>  cholecalciferol 2000 Unit(s) Oral at bedtime  dextrose 5% + sodium chloride 0.45%. 1000 milliLiter(s) (75 mL/Hr) IV Continuous <Continuous>  dextrose 5%. 1000 milliLiter(s) (50 mL/Hr) IV Continuous <Continuous>  dextrose 5%. 1000 milliLiter(s) (100 mL/Hr) IV Continuous <Continuous>  famotidine Injectable 20 milliGRAM(s) IV Push daily  glucagon  Injectable 1 milliGRAM(s) IntraMuscular once  pantoprazole  Injectable 40 milliGRAM(s) IV Push every 12 hours  vancomycin    Solution 125 milliGRAM(s) Oral every 6 hours  zinc oxide 40% Paste 1 Application(s) Topical two times a day    MEDICATIONS  (PRN):  acetaminophen     Tablet .. 650 milliGRAM(s) Oral every 6 hours PRN Temp greater or equal to 38C (100.4F), Mild Pain (1 - 3)  aluminum hydroxide/magnesium hydroxide/simethicone Suspension 30 milliLiter(s) Oral every 4 hours PRN Dyspepsia  Biotene Dry Mouth Oral Rinse 15 milliLiter(s) Swish and Spit five times a day PRN Mouth Care  ondansetron Injectable 4 milliGRAM(s) IV Push every 8 hours PRN Nausea and/or Vomiting  simethicone 80 milliGRAM(s) Chew every 4 hours PRN Upset Stomach  sodium chloride 0.65% Nasal 1 Spray(s) Both Nostrils two times a day PRN Nasal Congestion

## 2025-03-14 NOTE — PROGRESS NOTE ADULT - ASSESSMENT
69-year-old female with stage IV pancreatic adenocarcinoma presenting with neutropenic fever and severe treatment-related toxicities from modified FOLFIRINOX and investigational EMILY inhibitor [id number] found to have PE and LBO.     # pancreatic ca   - remains on clinical trial- supportive care while in hospital   - will continue with ongoing communication with  [facility] have contributed to symptoms on arrival.  no DPD deficiency   -as per discussion with Dr. Caldwell- planned for possible Monotherapy with investigational therapy   -No plan for therapy while at rehab     # Neutropenic Septic shock - +fungal cultures   - pt previously on neupogen- WBC has now recovered and elevated likely 2/2 GCSF support   - BCx with fungal growth likely disseminated candidiasis  - removed mediport  - repeat cultuures NEG to date (2/28 culture grew saccharomyces cerevisiae)  -  ID - c/w caspofungin. 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 Mottled air lucency and mild fat infiltration surrounding the jejunal segment (2:62. Indeterminant as to contained perforation of the transverse colon or the jejunum.  The finding is new compared with 2/22/2025. Indeterminate 8 mm right middle lobe nodule, either infectious or malignant. Stable bilateral pleural effusions. Redemonstrated pancreatic mass, inseparable from the posterior gastric wall. Unchanged umbilical mass.  - surgery evaluation appreciated - no planned intervention   - EGD- minimal focal erythema, congestion and slight erosion in the distal gastric body.   - Today WBC 81, hb 8.5, plt 310     # PE - multiple PE w/in segmental left lower and right lower, 2 right middle lobe nodules,    - remains off a/c given high risk - no sign of DVT   - ivc filter  placed 3/10    # anemia   - Hb stable 8.5 today   - keep type and screen active, transfuse for Hb <7    # tachycardia- cardio following       will follow and communicate with [facility]   dc planning

## 2025-03-14 NOTE — PROGRESS NOTE ADULT - SUBJECTIVE AND OBJECTIVE BOX
Patient is a 69y old  Female who presents with a chief complaint of Sepsis (13 Mar 2025 15:13)      Subective:  Felt better yesterday, she reports that she ate. Didn't get any dicyclomine as it was ordered prn.  Had a stomach ache overnight though.    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 650 milliGRAM(s) Oral every 12 hours  acetaminophen   IVPB .. 1000 milliGRAM(s) IV Intermittent once  amLODIPine   Tablet 2.5 milliGRAM(s) Oral daily  aspirin enteric coated 81 milliGRAM(s) Oral daily  bisacodyl 5 milliGRAM(s) Oral at bedtime  caspofungin IVPB 50 milliGRAM(s) IV Intermittent every 24 hours  caspofungin IVPB      chlorhexidine 2% Cloths 1 Application(s) Topical <User Schedule>  cholecalciferol 2000 Unit(s) Oral at bedtime  dextrose 5% + sodium chloride 0.45% with potassium chloride 20 mEq/L 1000 milliLiter(s) (75 mL/Hr) IV Continuous <Continuous>  dextrose 5%. 1000 milliLiter(s) (50 mL/Hr) IV Continuous <Continuous>  dextrose 5%. 1000 milliLiter(s) (100 mL/Hr) IV Continuous <Continuous>  dronabinol 2.5 milliGRAM(s) Oral at bedtime  famotidine Injectable 20 milliGRAM(s) IV Push daily  glucagon  Injectable 1 milliGRAM(s) IntraMuscular once  metoprolol succinate ER 25 milliGRAM(s) Oral daily  pantoprazole  Injectable 40 milliGRAM(s) IV Push every 12 hours  simethicone 125 milliGRAM(s) Chew three times a day  sucralfate suspension 1 Gram(s) Oral every 12 hours  tamsulosin 0.4 milliGRAM(s) Oral at bedtime  vancomycin    Solution 125 milliGRAM(s) Oral every 6 hours  zinc oxide 40% Paste 1 Application(s) Topical two times a day    MEDICATIONS  (PRN):  acetaminophen     Tablet .. 650 milliGRAM(s) Oral every 6 hours PRN Temp greater or equal to 38C (100.4F), Mild Pain (1 - 3)  aluminum hydroxide/magnesium hydroxide/simethicone Suspension 30 milliLiter(s) Oral every 4 hours PRN Dyspepsia  Biotene Dry Mouth Oral Rinse 15 milliLiter(s) Swish and Spit five times a day PRN Mouth Care  bisacodyl Suppository 10 milliGRAM(s) Rectal daily PRN Constipation  dicyclomine 10 milliGRAM(s) Oral three times a day before meals PRN abdominal cramps  ondansetron Injectable 4 milliGRAM(s) IV Push every 8 hours PRN Nausea and/or Vomiting  sodium chloride 0.65% Nasal 1 Spray(s) Both Nostrils two times a day PRN Nasal Congestion  traMADol 25 milliGRAM(s) Oral every 6 hours PRN Severe Pain (7 - 10)      REVIEW OF SYSTEMS:    RESPIRATORY: No shortness of breath  CARDIOVASCULAR: No chest pain  All other review of systems is negative unless indicated above.    Vital Signs Last 24 Hrs  T(C): 37.1 (14 Mar 2025 01:13), Max: 37.1 (14 Mar 2025 01:13)  T(F): 98.7 (14 Mar 2025 01:13), Max: 98.7 (14 Mar 2025 01:13)  HR: 98 (14 Mar 2025 01:13) (98 - 130)  BP: 123/61 (14 Mar 2025 01:13) (116/67 - 136/86)  BP(mean): --  RR: 18 (14 Mar 2025 01:13) (18 - 18)  SpO2: 91% (14 Mar 2025 01:13) (91% - 93%)    Parameters below as of 14 Mar 2025 01:13  Patient On (Oxygen Delivery Method): room air        PHYSICAL EXAM:    Constitutional: NAD, chronically ill appearing  Respiratory: CTAB  Cardiovascular: S1 and S2, RRR  Gastrointestinal: BS+, soft, NT/ND  Extremities: No peripheral edema  Psychiatric: Normal mood, normal affect    LABS:                        8.9    7.46  )-----------( 321      ( 13 Mar 2025 06:21 )             28.3     03-13    140  |  107  |  24[H]  ----------------------------<  119[H]  4.1   |  28  |  1.62[H]    Ca    9.5      13 Mar 2025 06:21  Phos  3.1     03-13  Mg     2.8     03-13    TPro  6.4  /  Alb  2.2[L]  /  TBili  0.5  /  DBili  0.2  /  AST  15  /  ALT  20  /  AlkPhos  133[H]  03-13      LIVER FUNCTIONS - ( 13 Mar 2025 06:21 )  Alb: 2.2 g/dL / Pro: 6.4 gm/dL / ALK PHOS: 133 U/L / ALT: 20 U/L / AST: 15 U/L / GGT: x             RADIOLOGY & ADDITIONAL STUDIES:

## 2025-03-14 NOTE — PROGRESS NOTE ADULT - SUBJECTIVE AND OBJECTIVE BOX
s/p endoscopy this am  No findings   Supine comfortable    3/14  not talkative today  meds adjusted bt Hospitalist    Allergies    No Known Allergies    Intolerances      MEDICATIONS  (STANDING):  acetaminophen     Tablet .. 650 milliGRAM(s) Oral every 12 hours  acetaminophen   IVPB .. 1000 milliGRAM(s) IV Intermittent once  aspirin enteric coated 81 milliGRAM(s) Oral daily  caspofungin IVPB 50 milliGRAM(s) IV Intermittent every 24 hours  caspofungin IVPB      chlorhexidine 2% Cloths 1 Application(s) Topical <User Schedule>  cholecalciferol 2000 Unit(s) Oral at bedtime  dextrose 5%. 1000 milliLiter(s) (50 mL/Hr) IV Continuous <Continuous>  dextrose 5%. 1000 milliLiter(s) (100 mL/Hr) IV Continuous <Continuous>  dicyclomine 20 milliGRAM(s) Oral four times a day before meals  dronabinol 2.5 milliGRAM(s) Oral at bedtime  glucagon  Injectable 1 milliGRAM(s) IntraMuscular once  metoprolol succinate ER 25 milliGRAM(s) Oral daily  pantoprazole    Tablet 40 milliGRAM(s) Oral before breakfast  simethicone 125 milliGRAM(s) Chew three times a day  sucralfate suspension 1 Gram(s) Oral every 12 hours  tamsulosin 0.4 milliGRAM(s) Oral at bedtime  vancomycin    Solution 125 milliGRAM(s) Oral every 6 hours  zinc oxide 40% Paste 1 Application(s) Topical two times a day    MEDICATIONS  (PRN):  acetaminophen     Tablet .. 650 milliGRAM(s) Oral every 6 hours PRN Temp greater or equal to 38C (100.4F), Mild Pain (1 - 3)  aluminum hydroxide/magnesium hydroxide/simethicone Suspension 30 milliLiter(s) Oral every 4 hours PRN Dyspepsia  I&O's Detail    13 Mar 2025 07:01  -  14 Mar 2025 07:00  --------------------------------------------------------  IN:  Total IN: 0 mL    OUT:    Indwelling Catheter - Urethral (mL): 1700 mL  Total OUT: 1700 mL    Total NET: -1700 mL     Vital Signs Last 24 Hrs  T(C): 37.2 (14 Mar 2025 15:00), Max: 37.2 (14 Mar 2025 15:00)  T(F): 99 (14 Mar 2025 15:00), Max: 99 (14 Mar 2025 15:00)  HR: 121 (14 Mar 2025 15:00) (98 - 130)  BP: 131/73 (14 Mar 2025 15:00) (116/67 - 139/89)  BP(mean): --  ABP: --  ABP(mean): --  RR: 18 (14 Mar 2025 15:00) (18 - 18)  SpO2: 94% (14 Mar 2025 15:00) (90% - 94%)    O2 Parameters below as of 14 Mar 2025 15:00  Patient On (Oxygen Delivery Method): room air        Constitutional: NAD,   HEENT:   MM  Respiratory: CTAB  Cardiovascular: S1 and S2  Gastrointestinal: BS+, soft, NT/ND  Extremities: minimal  peripheral edema  Neurological: A/O  : + coelho  Dialysis Access: Not applicable    LABS:                        8.5    8.13  )-----------( 310      ( 14 Mar 2025 07:23 )             26.8                                  8.9    7.46  )-----------( 321      ( 13 Mar 2025 06:21 )             28.3                             8.8    7.90  )-----------( 319      ( 12 Mar 2025 07:38 )             27.3                9.4    8.95  )-----------( 253      ( 11 Mar 2025 09:12 )             29.7     03-14    140  |  108  |  23  ----------------------------<  123[H]  4.2   |  28  |  1.64[H]    Ca    9.8      14 Mar 2025 07:23  Phos  2.9     03-14  Mg     2.5     03-14    TPro  6.4  /  Alb  2.2[L]  /  TBili  0.5  /  DBili  0.2  /  AST  15  /  ALT  20  /  AlkPhos  133[H]  03-13                 03-13    140  |  107  |  24[H]  ----------------------------<  119[H]  4.1   |  28  |  1.62[H]    Ca    9.5      13 Mar 2025 06:21  Phos  3.1     03-13  Mg     2.8     03-13    TPro  6.4  /  Alb  2.2[L]  /  TBili  0.5  /  DBili  0.2  /  AST  15  /  ALT  20  /  AlkPhos  133[H]  03-13 03-12    138  |  105  |  22  ----------------------------<  134[H]  3.4[L]   |  28  |  1.32[H]    Ca    9.9      12 Mar 2025 07:38  Phos  3.6     03-12  Mg     2.5     03-12    TPro  6.2  /  Alb  2.2[L]  /  TBili  0.6  /  DBili  0.2  /  AST  16  /  ALT  20  /  AlkPhos  136[H]  03-12 03-11    141  |  107  |  25[H]  ----------------------------<  123[H]  3.7   |  26  |  1.40[H]    Ca    9.6      11 Mar 2025 09:12  Phos  3.2     03-11  Mg     1.7     03-11    TPro  6.3  /  Alb  2.2[L]  /  TBili  0.7  /  DBili  x   /  AST  19  /  ALT  20  /  AlkPhos  140[H]  03-11      143    |  113    |  31     ----------------------------<  129       09 Mar 2025 07:07  3.9     |  23     |  1.48     144    |  112    |  32     ----------------------------<  126       08 Mar 2025 07:59  3.3     |  25     |  1.61     143    |  112    |  37     ----------------------------<  125       07 Mar 2025 07:22  3.8     |  22     |  1.69     Ca    9.4        09 Mar 2025 07:07  Ca    9.2        08 Mar 2025 07:59      Mg     1.6       09 Mar 2025 07:07  Mg     1.6       08 Mar 2025 07:59    TPro  6.0    /  Alb  2.0    /  TBili  0.6    /        09 Mar 2025 07:07  DBili  x      /  AST  17     /  ALT  21     /  AlkPhos  150      TPro  5.8    /  Alb  1.9    /  TBili  0.6    /        08 Mar 2025 07:59  DBili  x      /  AST  17     /  ALT  21     /  AlkPhos  151          Serum Osmo:   Serum Uric Acid:     Urine Studies:  Urinalysis Basic - ( 09 Mar 2025 07:07 )    Color: x / Appearance: x / SG: x / pH: x  Gluc: 129 mg/dL / Ketone: x  / Bili: x / Urobili: x   Blood: x / Protein: x / Nitrite: x   Leuk Esterase: x / RBC: x / WBC x   Sq Epi: x / Non Sq Epi: x / Bacteria: x        Urine chemistry:   Urine Na:   Urine Creatinine:   Urine Protein/Cr ratio:  Urine K:   Urine Osm:       RADIOLOGY & ADDITIONAL STUDIES:

## 2025-03-14 NOTE — PROGRESS NOTE ADULT - ASSESSMENT
70yo female with PMHx recent diagnosis of primary pancreatic adenocarcinoma of head with tumor in umbilicus, chemo-induced pancytopenia, SCC lip, cyclical vomiting syndrome, HTN, HPL, Gout, GERD, CVA on Aggrenox  Initially admitted to [facility] with septic shock 2/2 + pseudomonas bacteremia likely due to UTI vs colitis on 02/09/2025 Was having nausea, vomiting and diarrhea requiring Levophed drip, was transferred out of ICU to regular floor.  Then transferred to  as per request of . Was getting platelet transfusion and developed tachypnea hypoxia, rigors. Was diuresed and became hypotensive, Upgraded to SICU. CTA c/a/p on 2/19 showed multiple b/l PE and large bowel obstruction, was started on heparin drip, c/b GIB, then retrialed heparin drip with recurrent c/f GIB. Also with Afib w RVR during admission, resolved w iv metoprolol.   On 2/28, developed SIRS, Blood cx growing yeast. TPN stopped, R chest port removed on 3/2 and pt started on iv caspofungin.    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 Mottled air   lucency and mild fat infiltration surrounding the jejunal segment (2:62. Indeterminant as to contained perforation   - lactate cleared 1.3 3/8   - Tolerated CLD, now on regular diet  - GI and surgery and oncology surgery  on board   - Sp Meropenem  - second opinion GI Dr. Young   - 3/13  -s/p EGD - gastritis , pt refusing colonoscopy    # Urinary retention   - s/p Julian   - stop  bethanechol 10 tid  - can cause abdominal cramping   - voiding trial once more ambulatory  - 3/13 - start flomax hs  - urology consult     # Periumbilical Abdominal pain   - tylenol 650 bid   -  tramadol 25 q6 prn for severe pain  - not effective  -  bentyl tid  20 mg - not effective  - 3/14 - dilaudid 0.2 IV prn     # Suspected SIBO  - start xifaxan 550 tid d/w Dr. Hernandez    #Acute bilateral PE  # Lung nodules  # Sinus tachycardia  - Doppler LE negative  - CT chest- multiple PE w/in segmental left lower and right lower, 2 right middle lobe nodules,   -  3/10 s/p IVC filter   - Recurrent GIB on heparin   - serial ekgs  - cardiology consult  - 3/13 - start toprol 25   - 3/14 - toprol 25--> 50 , stop benthyl can cause tachyrhythmias   - CXR , remote tele    # H/o CVA (cerebrovascular accident).   - Was originally on Plavix per  but because is now taking [facility] [id number],  - was told to start Aggrenox instead to avoid DDIs)  - platelets 33 --> 253   - off AP and AC due to GI bleeds will  d/w  GI team  - need to be on plavix d/w Dr. Clement will start once cleared by GI   - d/w Dr. Hernandez ok to restart ASA 81     # SVT on 02/09/2025: resolved after Valsalva and Carotid massage.  -Norvasc and Lipitor on hold to septic shock.   -Off levophed; s/p  oral midodrine.  -Cardiology consulted Dr. Clement  - off BB  - 3/12 - restart low dose of amlodipine 2.5 for high BP  - 3/13 - cardiology consult, ekgs, start toprol 25    # GERD/Esophagitis , recurrent GI bleeds while on IV heparin   - Continue protonix iv bid--> po  - Continue carafate bid    #MALLORY, likely ATN  - Stable at 1.4 3/9  - Nephrology on board  - c/w IV fluids  - Creatinine Trend: 1.62<--, 1.32<--, 1.40<--, 1.39<--, 1.48<--, 1.61<--    # Acute blood loss anemia and Malignancy  - SP GIB x 2 after starting heparin  - s/p pRBC x 6u this admission  - Hemoglobin stable 9.0  3/9  - Continue Protonix      #Primary pancreatic adenocarcinoma  - SP folfirinox with reaction to irinotecan  - on a clinical trial now, on hold     #Skin injury Left and Rigth upper Gluteal Intergluteal cleft   - superficial skin necrosis  - Wound care evaluation appreciated  - Low air mattress  - Turning / positioning q2h while in bed    # Cutaneous lesion on RUQ area below right breast,   Ecthyma gangrenosum.  - Ecthyma gangrenosum. life-threatening skin infection caused by Pseudomonas aeruginosa bacteria.   - dermatology consult  -  appears to be resolving, most skin changes appear post inflammatory.  - Please recall if the lesion does not continue to slowly resolve over the next 2-3 weeks.   - wound care - If the surface ulcerates, cover with telfa/ vaseline/ paper tape      # Hypokalemia  # Hypomagnesemia   - replete prn  - keep K close to 4     # HTN   - amlodipine 2.5     #DVT ppx - SCDs    Dispo: IV fluids IV caspofungin, PT daily, urology and cardiology consult , restart ASA monitor H/H, d/c planning to [facility].

## 2025-03-14 NOTE — CONSULT NOTE ADULT - SUBJECTIVE AND OBJECTIVE BOX
69 F with PMH metastatic pancreatic cancer in [place] [facility] [place] ago with sepsis, found to have pseudomonal bacteremia and fungemia. During her hosp admissin developed PE, placed on heparin drip, subsequently developed GI bleed and now has an IVC filter. She reports abdominal pain intermittently for approx a week, diffuse, that worsens with food. A/w low appetite. She denies nausea or vomiting and has been having regular bowel function. Had a CT scan done on 3/6 that showed pneumatosis in mid transverse colon and yesterday repeat CT with oral contrast showed fistula b/w transverse colon and jejunum for which colorectal Surgery is now consulted.     Called to see this 70 y/o for urinary retention. Pt has had the coelho in during this admission. Pt states she has difficulty ambulating and will most likely end up going to rehab for ambulation.  Pt does not mind having the coelho as it saves her from getting up to go to the bathroom.    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 650 milliGRAM(s) Oral every 12 hours  acetaminophen   IVPB .. 1000 milliGRAM(s) IV Intermittent once  amLODIPine   Tablet 2.5 milliGRAM(s) Oral daily  bisacodyl 5 milliGRAM(s) Oral at bedtime  caspofungin IVPB 50 milliGRAM(s) IV Intermittent every 24 hours  caspofungin IVPB      chlorhexidine 2% Cloths 1 Application(s) Topical <User Schedule>  cholecalciferol 2000 Unit(s) Oral at bedtime  dextrose 5% + sodium chloride 0.45% with potassium chloride 20 mEq/L 1000 milliLiter(s) (50 mL/Hr) IV Continuous <Continuous>  dextrose 5%. 1000 milliLiter(s) (50 mL/Hr) IV Continuous <Continuous>  dextrose 5%. 1000 milliLiter(s) (100 mL/Hr) IV Continuous <Continuous>  dicyclomine 10 milliGRAM(s) Oral three times a day before meals  famotidine Injectable 20 milliGRAM(s) IV Push daily  glucagon  Injectable 1 milliGRAM(s) IntraMuscular once  pantoprazole  Injectable 40 milliGRAM(s) IV Push every 12 hours  simethicone 125 milliGRAM(s) Chew three times a day  vancomycin    Solution 125 milliGRAM(s) Oral every 6 hours  zinc oxide 40% Paste 1 Application(s) Topical two times a day    MEDICATIONS  (PRN):  acetaminophen     Tablet .. 650 milliGRAM(s) Oral every 6 hours PRN Temp greater or equal to 38C (100.4F), Mild Pain (1 - 3)  aluminum hydroxide/magnesium hydroxide/simethicone Suspension 30 milliLiter(s) Oral every 4 hours PRN Dyspepsia  Biotene Dry Mouth Oral Rinse 15 milliLiter(s) Swish and Spit five times a day PRN Mouth Care  bisacodyl Suppository 10 milliGRAM(s) Rectal daily PRN Constipation  ondansetron Injectable 4 milliGRAM(s) IV Push every 8 hours PRN Nausea and/or Vomiting  sodium chloride 0.65% Nasal 1 Spray(s) Both Nostrils two times a day PRN Nasal Congestion  traMADol 25 milliGRAM(s) Oral every 6 hours PRN Severe Pain (7 - 10)      Allergies    No Known Allergies    Intolerances        SOCIAL HISTORY:    FAMILY HISTORY:  No pertinent family history in first degree relatives              PHYSICAL EXAM   Vital Signs Last 24 Hrs  T(C): 36.7 (14 Mar 2025 08:49), Max: 37.1 (14 Mar 2025 01:13)  T(F): 98.1 (14 Mar 2025 08:49), Max: 98.7 (14 Mar 2025 01:13)  HR: 99 (14 Mar 2025 08:49) (98 - 130)  BP: 130/80 (14 Mar 2025 08:49) (116/67 - 130/80)  BP(mean): --  RR: 18 (14 Mar 2025 08:49) (18 - 18)  SpO2: 90% (14 Mar 2025 08:49) (90% - 92%)    Parameters below as of 14 Mar 2025 08:49  Patient On (Oxygen Delivery Method): room air    Head:  NC/AT,   Neck: soft/supple  Heart:  RRR, S1S2 normal  Lungs: CTA bilat, no rales, rhonchi or wheezes  Back: No CVA tenderness  Abdomen: Soft, NT/ND, +BS          No bladder palp  :  Coelho in place - urine yellow, no clots  Lower Ext; no calf tenderness      LABS:                          8.5    8.13  )-----------( 310      ( 14 Mar 2025 07:23 )             26.8     03-14    140  |  108  |  23  ----------------------------<  123[H]  4.2   |  28  |  1.64[H]    Ca    9.8      14 Mar 2025 07:23  Phos  2.9     03-14  Mg     2.5     03-14    TPro  6.4  /  Alb  2.2[L]  /  TBili  0.5  /  DBili  0.2  /  AST  15  /  ALT  20  /  AlkPhos  133[H]  03-13    LIVER FUNCTIONS - ( 13 Mar 2025 06:21 )  Alb: 2.2 g/dL / Pro: 6.4 gm/dL / ALK PHOS: 133 U/L / ALT: 20 U/L / AST: 15 U/L / GGT: x             Urinalysis Basic - ( 14 Mar 2025 07:23 )    Color: x / Appearance: x / SG: x / pH: x  Gluc: 123 mg/dL / Ketone: x  / Bili: x / Urobili: x   Blood: x / Protein: x / Nitrite: x   Leuk Esterase: x / RBC: x / WBC x   Sq Epi: x / Non Sq Epi: x / Bacteria: x    ACC: [id number] EXAM:  CT ABDOMEN AND PELVIS OC   ORDERED BY: ONUR MARAVILLA     PROCEDURE DATE:  03/10/2025          INTERPRETATION:  CLINICAL INFORMATION: 69 years  Female with abdominal   pain. Follow-up pneumatosis.    COMPARISON: Noncontrast CT abdomen and pelvis 3/6/2025    CONTRAST/COMPLICATIONS:  IV Contrast: NONE  Oral Contrast: Omnipaque 300  .    PROCEDURE:  CT of the Abdomen and Pelvis was performed.  Sagittal and coronal reformats were performed.    LIMITATION: Evaluation of the solid organs and vascular structures is   limited by absence of IV contrast.    FINDINGS:  LOWER CHEST: Stable small bilateral pleural effusions with underlying   passive atelectasis. Indeterminate 8 mm right middle lobe nodule (2:8).    LIVER: Within normallimits.  BILE DUCTS: Normal caliber.  GALLBLADDER: Within normal limits.  SPLEEN: Within normal limits.  PANCREAS: 4.2 x 4.5 cm pancreatic mass redemonstrated. No obvious duct   distention. Mass is inseparable from the posterior gastric wall.  ADRENALS: Within normal limits.  KIDNEYS/URETERS: Multiple bilateral cysts. No hydronephrosis.    BLADDER: Decompressed around Coelho catheter. Mild bladder prolapse.  REPRODUCTIVE ORGANS: Hysterectomy.    BOWEL: No bowel obstruction. There is an apparent fistula between the   transverse colon and a mid jejunal loop with enteric contrast in the   fistula (2:64). Mottled air lucency and mild fat infiltration surrounding   the jejunal segment (2:62. Indeterminant as to contained perforation of   the transverse colon or the jejunum. The finding is new compared with   2/22/2025. Appendix is not visualized and cannot be assessed. Mild rectal   prolapse.  PERITONEUM/RETROPERITONEUM: Small ascites unchanged.  VESSELS: Atherosclerotic changes. IVC filter.  LYMPH NODES: No lymphadenopathy.  ABDOMINAL WALL: Stable 3.1 cm umbilical mass.  BONES: Within normal limits.    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 Mottled air   lucency and mild fat infiltration surrounding the jejunal segment (2:62.   Indeterminant as to contained perforation of the transverse colon or the   jejunum.  The finding is new compared with 2/22/2025. Recommend surgical   consultation.    Indeterminate 8 mm right middle lobe nodule, either infectious or   malignant. Stable bilateral pleural effusions.    Redemonstrated pancreatic mass, inseparable from the posterior gastric   wall. Unchanged umbilical mass.    Pelvic floor laxity with mild bladder and rectal prolapse.    Ascites unchanged.    Findings were discussed with Dr. Venice Joyner 3/11/2025 9:35 AM by   Dr. Anum Calle with read back confirmation.    A preliminary report was provided by Dr. Adam Robertson.

## 2025-03-15 LAB
ALBUMIN SERPL ELPH-MCNC: 2 G/DL — LOW (ref 3.3–5)
ALP SERPL-CCNC: 126 U/L — HIGH (ref 40–120)
ALT FLD-CCNC: 21 U/L — SIGNIFICANT CHANGE UP (ref 12–78)
ANION GAP SERPL CALC-SCNC: 3 MMOL/L — LOW (ref 5–17)
AST SERPL-CCNC: 25 U/L — SIGNIFICANT CHANGE UP (ref 15–37)
BILIRUB DIRECT SERPL-MCNC: 0.1 MG/DL — SIGNIFICANT CHANGE UP (ref 0–0.3)
BILIRUB INDIRECT FLD-MCNC: 0.4 MG/DL — SIGNIFICANT CHANGE UP (ref 0.2–1)
BILIRUB SERPL-MCNC: 0.5 MG/DL — SIGNIFICANT CHANGE UP (ref 0.2–1.2)
BUN SERPL-MCNC: 25 MG/DL — HIGH (ref 7–23)
CALCIUM SERPL-MCNC: 9.7 MG/DL — SIGNIFICANT CHANGE UP (ref 8.5–10.1)
CHLORIDE SERPL-SCNC: 107 MMOL/L — SIGNIFICANT CHANGE UP (ref 96–108)
CO2 SERPL-SCNC: 29 MMOL/L — SIGNIFICANT CHANGE UP (ref 22–31)
CREAT SERPL-MCNC: 1.49 MG/DL — HIGH (ref 0.5–1.3)
CRP SERPL-MCNC: 42.6 MG/ML — HIGH (ref 0–5)
EGFR: 38 ML/MIN/1.73M2 — LOW
EGFR: 38 ML/MIN/1.73M2 — LOW
GLUCOSE SERPL-MCNC: 96 MG/DL — SIGNIFICANT CHANGE UP (ref 70–99)
HCT VFR BLD CALC: 25.5 % — LOW (ref 34.5–45)
HGB BLD-MCNC: 8.1 G/DL — LOW (ref 11.5–15.5)
MAGNESIUM SERPL-MCNC: 2.3 MG/DL — SIGNIFICANT CHANGE UP (ref 1.6–2.6)
MCHC RBC-ENTMCNC: 28.5 PG — SIGNIFICANT CHANGE UP (ref 27–34)
MCHC RBC-ENTMCNC: 31.8 G/DL — LOW (ref 32–36)
MCV RBC AUTO: 89.8 FL — SIGNIFICANT CHANGE UP (ref 80–100)
NRBC # BLD AUTO: 0 K/UL — SIGNIFICANT CHANGE UP (ref 0–0)
NRBC # FLD: 0 K/UL — SIGNIFICANT CHANGE UP (ref 0–0)
NRBC BLD AUTO-RTO: 0 /100 WBCS — SIGNIFICANT CHANGE UP (ref 0–0)
NT-PROBNP SERPL-SCNC: 1134 PG/ML — HIGH (ref 0–125)
PHOSPHATE SERPL-MCNC: 3.5 MG/DL — SIGNIFICANT CHANGE UP (ref 2.5–4.5)
PLATELET # BLD AUTO: 296 K/UL — SIGNIFICANT CHANGE UP (ref 150–400)
PMV BLD: 11 FL — SIGNIFICANT CHANGE UP (ref 7–13)
POTASSIUM SERPL-MCNC: 4.5 MMOL/L — SIGNIFICANT CHANGE UP (ref 3.5–5.3)
POTASSIUM SERPL-SCNC: 4.5 MMOL/L — SIGNIFICANT CHANGE UP (ref 3.5–5.3)
PROT SERPL-MCNC: 5.9 GM/DL — LOW (ref 6–8.3)
RBC # BLD: 2.84 M/UL — LOW (ref 3.8–5.2)
RBC # FLD: 14.7 % — HIGH (ref 10.3–14.5)
SODIUM SERPL-SCNC: 139 MMOL/L — SIGNIFICANT CHANGE UP (ref 135–145)
WBC # BLD: 5.15 K/UL — SIGNIFICANT CHANGE UP (ref 3.8–10.5)
WBC # FLD AUTO: 5.15 K/UL — SIGNIFICANT CHANGE UP (ref 3.8–10.5)

## 2025-03-15 PROCEDURE — 74176 CT ABD & PELVIS W/O CONTRAST: CPT | Mod: 26

## 2025-03-15 PROCEDURE — 99232 SBSQ HOSP IP/OBS MODERATE 35: CPT

## 2025-03-15 PROCEDURE — 99233 SBSQ HOSP IP/OBS HIGH 50: CPT

## 2025-03-15 RX ORDER — WHITE PETROLATUM 1 G/G
1 OINTMENT TOPICAL DAILY
Refills: 0 | Status: DISCONTINUED | OUTPATIENT
Start: 2025-03-15 | End: 2025-03-15

## 2025-03-15 RX ORDER — SIMETHICONE 80 MG
80 TABLET,CHEWABLE ORAL THREE TIMES A DAY
Refills: 0 | Status: DISCONTINUED | OUTPATIENT
Start: 2025-03-15 | End: 2025-03-15

## 2025-03-15 RX ORDER — HYDROMORPHONE/SOD CHLOR,ISO/PF 2 MG/10 ML
1 SYRINGE (ML) INJECTION EVERY 4 HOURS
Refills: 0 | Status: DISCONTINUED | OUTPATIENT
Start: 2025-03-15 | End: 2025-03-15

## 2025-03-15 RX ORDER — EMOLLIENT COMBINATION NO.35
1 CREAM (GRAM) TOPICAL DAILY
Refills: 0 | Status: DISCONTINUED | OUTPATIENT
Start: 2025-03-15 | End: 2025-03-19

## 2025-03-15 RX ORDER — LIDOCAINE HYDROCHLORIDE 20 MG/ML
1 JELLY TOPICAL
Refills: 0 | Status: DISCONTINUED | OUTPATIENT
Start: 2025-03-15 | End: 2025-03-19

## 2025-03-15 RX ORDER — ACETAMINOPHEN 500 MG/5ML
1000 LIQUID (ML) ORAL ONCE
Refills: 0 | Status: COMPLETED | OUTPATIENT
Start: 2025-03-15 | End: 2025-03-15

## 2025-03-15 RX ADMIN — TAMSULOSIN HYDROCHLORIDE 0.4 MILLIGRAM(S): 0.4 CAPSULE ORAL at 22:25

## 2025-03-15 RX ADMIN — Medication 400 MILLIGRAM(S): at 12:37

## 2025-03-15 RX ADMIN — Medication 81 MILLIGRAM(S): at 10:04

## 2025-03-15 RX ADMIN — DRONABINOL 2.5 MILLIGRAM(S): 10 CAPSULE ORAL at 22:25

## 2025-03-15 RX ADMIN — Medication 40 MILLIGRAM(S): at 05:59

## 2025-03-15 RX ADMIN — Medication 1 APPLICATION(S): at 10:06

## 2025-03-15 RX ADMIN — Medication 125 MILLIGRAM(S): at 12:37

## 2025-03-15 RX ADMIN — CASPOFUNGIN ACETATE 260 MILLIGRAM(S): 5 INJECTION, POWDER, LYOPHILIZED, FOR SOLUTION INTRAVENOUS at 10:01

## 2025-03-15 RX ADMIN — LIDOCAINE HYDROCHLORIDE 1 APPLICATION(S): 20 JELLY TOPICAL at 17:11

## 2025-03-15 RX ADMIN — Medication 2000 UNIT(S): at 22:24

## 2025-03-15 RX ADMIN — TOUCHLESS CARE ZINC OXIDE PROTECTANT 1 APPLICATION(S): 20; 25 SPRAY TOPICAL at 22:26

## 2025-03-15 RX ADMIN — Medication 650 MILLIGRAM(S): at 10:05

## 2025-03-15 RX ADMIN — TOUCHLESS CARE ZINC OXIDE PROTECTANT 1 APPLICATION(S): 20; 25 SPRAY TOPICAL at 10:16

## 2025-03-15 RX ADMIN — Medication 125 MILLIGRAM(S): at 17:22

## 2025-03-15 RX ADMIN — Medication 125 MILLIGRAM(S): at 18:39

## 2025-03-15 RX ADMIN — Medication 1 GRAM(S): at 10:01

## 2025-03-15 RX ADMIN — Medication 125 MILLIGRAM(S): at 05:59

## 2025-03-15 RX ADMIN — Medication 1 GRAM(S): at 22:25

## 2025-03-15 RX ADMIN — METOPROLOL SUCCINATE 50 MILLIGRAM(S): 50 TABLET, EXTENDED RELEASE ORAL at 10:04

## 2025-03-15 NOTE — PROGRESS NOTE ADULT - ASSESSMENT
69 year old woman with HTN, HLD, CVA, recently diagnosed metastatic adenocarcinoma, admitted to [place] with sepsis and transferred here for further management, with course complicated by recurrent bacteremia requiring port removal, and abdominal pain. Consulted for abdominal pain. 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 Currently on rifaximin for empiric tx of SIBO. CTE pending.     Recommendations:  -Diet as tolerated  -Await results of CTE which are pending  -Continue rifaximin for empiric SIBO tx

## 2025-03-15 NOTE — PROGRESS NOTE ADULT - ASSESSMENT
69-year-old female with stage IV pancreatic adenocarcinoma presenting with neutropenic fever and severe treatment-related toxicities from modified FOLFIRINOX and investigational EMILY inhibitor [id number].    PROBLEMS:    Febrile syndrome. Fungemia. Likely disseminated candidemia.   Dysphagia. Possible candida esophagitis  New febrile syndrome with chills. Possible sepsis. Dysphagia. Possible candida esophagitis, S/p sepsis with PSAE   Acute hypoxic respiratory failure likely secondary to hypervolemia   Bilateral pleural effusions with compressive atelectasis, right greater than left  Neutropenic Fever/Neutropenic Septic shock  Severe Thrombocytopenia-Platelet count critically low at 26k.   Severe diarrhea and inability to tolerate PO intake, likely multifactorial from both FOLFIRINOX (particularly irinotecan component) and study drug [id number].   History of left cephalic vein thrombosis.  MALLORY  CT Angio Chest PE Protocol w/ IV Cont (02.19.25 @ 14:16) >  CHEST:  Multiple pulmonary emboli within the segmental branches of the left lower and right lower lobe pulmonary arteries.  There are two right middle lobe nodules, new since prior. Exact   etiology is unclear. Primary differential diagnostic consideration   includes infection.  CT Abdomen and Pelvis-Resolution of small and large bowel dilatation. Mottled lucency involving a short segment of sigmoid colon suspect for pneumatosis which could be due to benign or ischemic etiology. Please correlate clinically and with laboratory values.  Minimal increase in amount of ascites.  Unchanged pancreatic body and umbilical masses.      PLAN:    Pulmonary stable- s/p GFF 03/10-rehab placement-CXR small basilar effusion  Dc fluconazole  and d/c cefepime  IV caspofungin 50 mg IV qd/ rifAXIMin 550 milliGRAM(s) Oral three times a day/simethicone 125 milliGRAM(s) Chew three times a day/sucralfate suspension 1 Gram(s) Oral every 12 hours/ vancomycin    Solution 125 milliGRAM(s) Oral every 6 hours  CTA chest + small PEs in RLL and LLL- off IV heparin causing bleeding-S/P GFF 03/10  Family does not want any procedure EGD/Colonscopy  Neutropenic Septic shock- IV meropenem started on 2/15- previously on CEFEPIME/Continue filgrastim 480mcg daily-Today w/ improvement in counts- WBC 1.3, Hb 10.1, plt 37, cmp with k 2.3 and Repleted, Cr 1.40.   Daily CBC monitoring  Maintain strict neutropenic precautions  Supportive care

## 2025-03-15 NOTE — PROGRESS NOTE ADULT - SUBJECTIVE AND OBJECTIVE BOX
s/p endoscopy this am  No findings   Supine comfortable    3/14  not talkative today  meds adjusted bt Hospitalist    3/15  Started on Xifaxan  feels better     Allergies    No Known Allergies    Intolerances      MEDICATIONS  (STANDING):  acetaminophen     Tablet .. 650 milliGRAM(s) Oral every 12 hours  acetaminophen   IVPB .. 1000 milliGRAM(s) IV Intermittent once  aspirin enteric coated 81 milliGRAM(s) Oral daily  caspofungin IVPB 50 milliGRAM(s) IV Intermittent every 24 hours  caspofungin IVPB      chlorhexidine 2% Cloths 1 Application(s) Topical <User Schedule>  cholecalciferol 2000 Unit(s) Oral at bedtime  dextrose 5%. 1000 milliLiter(s) (50 mL/Hr) IV Continuous <Continuous>  dextrose 5%. 1000 milliLiter(s) (100 mL/Hr) IV Continuous <Continuous>  dicyclomine 20 milliGRAM(s) Oral four times a day before meals  dronabinol 2.5 milliGRAM(s) Oral at bedtime  glucagon  Injectable 1 milliGRAM(s) IntraMuscular once  metoprolol succinate ER 25 milliGRAM(s) Oral daily  pantoprazole    Tablet 40 milliGRAM(s) Oral before breakfast  simethicone 125 milliGRAM(s) Chew three times a day  sucralfate suspension 1 Gram(s) Oral every 12 hours  tamsulosin 0.4 milliGRAM(s) Oral at bedtime  vancomycin    Solution 125 milliGRAM(s) Oral every 6 hours  zinc oxide 40% Paste 1 Application(s) Topical two times a day    MEDICATIONS  (PRN):  acetaminophen     Tablet .. 650 milliGRAM(s) Oral every 6 hours PRN Temp greater or equal to 38C (100.4F), Mild Pain (1 - 3)  aluminum hydroxide/magnesium hydroxide/simethicone Suspension 30 milliLiter(s) Oral every 4 hours PRN Dyspepsia  I&O's Detail    13 Mar 2025 07:01  -  14 Mar 2025 07:00  --------------------------------------------------------  IN:  Total IN: 0 mL    OUT:    Indwelling Catheter - Urethral (mL): 1700 mL  Total OUT: 1700 mL    Total NET: -1700 mL     Vital Signs Last 24 Hrs  T(C): 37.2 (14 Mar 2025 15:00), Max: 37.2 (14 Mar 2025 15:00)  T(F): 99 (14 Mar 2025 15:00), Max: 99 (14 Mar 2025 15:00)  HR: 121 (14 Mar 2025 15:00) (98 - 130)  BP: 131/73 (14 Mar 2025 15:00) (116/67 - 139/89)  BP(mean): --  ABP: --  ABP(mean): --  RR: 18 (14 Mar 2025 15:00) (18 - 18)  SpO2: 94% (14 Mar 2025 15:00) (90% - 94%)    O2 Parameters below as of 14 Mar 2025 15:00  Patient On (Oxygen Delivery Method): room air        Constitutional: NAD,   HEENT:   MM  Respiratory: CTAB  Cardiovascular: S1 and S2  Gastrointestinal: BS+, soft, NT/ND  Extremities: minimal  peripheral edema  Neurological: A/O  : + coelho  Dialysis Access: Not applicable    LABS:                          8.1    5.15  )-----------( 296      ( 15 Mar 2025 07:19 )             25.5                           8.5    8.13  )-----------( 310      ( 14 Mar 2025 07:23 )             26.8                                  8.9    7.46  )-----------( 321      ( 13 Mar 2025 06:21 )             28.3                             8.8    7.90  )-----------( 319      ( 12 Mar 2025 07:38 )             27.3                9.4    8.95  )-----------( 253      ( 11 Mar 2025 09:12 )             29.7     03-15    139  |  107  |  25[H]  ----------------------------<  96  4.5   |  29  |  1.49[H]    Ca    9.7      15 Mar 2025 07:19  Phos  3.5     03-15  Mg     2.3     03-15    TPro  5.9[L]  /  Alb  2.0[L]  /  TBili  0.5  /  DBili  0.1  /  AST  25  /  ALT  21  /  AlkPhos  126[H]  03-15      03-14    140  |  108  |  23  ----------------------------<  123[H]  4.2   |  28  |  1.64[H]    Ca    9.8      14 Mar 2025 07:23  Phos  2.9     03-14  Mg     2.5     03-14    TPro  6.4  /  Alb  2.2[L]  /  TBili  0.5  /  DBili  0.2  /  AST  15  /  ALT  20  /  AlkPhos  133[H]  03-13 03-13    140  |  107  |  24[H]  ----------------------------<  119[H]  4.1   |  28  |  1.62[H]    Ca    9.5      13 Mar 2025 06:21  Phos  3.1     03-13  Mg     2.8     03-13    TPro  6.4  /  Alb  2.2[L]  /  TBili  0.5  /  DBili  0.2  /  AST  15  /  ALT  20  /  AlkPhos  133[H]  03-13      03-12    138  |  105  |  22  ----------------------------<  134[H]  3.4[L]   |  28  |  1.32[H]    Ca    9.9      12 Mar 2025 07:38  Phos  3.6     03-12  Mg     2.5     03-12    TPro  6.2  /  Alb  2.2[L]  /  TBili  0.6  /  DBili  0.2  /  AST  16  /  ALT  20  /  AlkPhos  136[H]  03-12        03-11    141  |  107  |  25[H]  ----------------------------<  123[H]  3.7   |  26  |  1.40[H]    Ca    9.6      11 Mar 2025 09:12  Phos  3.2     03-11  Mg     1.7     03-11    TPro  6.3  /  Alb  2.2[L]  /  TBili  0.7  /  DBili  x   /  AST  19  /  ALT  20  /  AlkPhos  140[H]  03-11      143    |  113    |  31     ----------------------------<  129       09 Mar 2025 07:07  3.9     |  23     |  1.48     144    |  112    |  32     ----------------------------<  126       08 Mar 2025 07:59  3.3     |  25     |  1.61     143    |  112    |  37     ----------------------------<  125       07 Mar 2025 07:22  3.8     |  22     |  1.69     Ca    9.4        09 Mar 2025 07:07  Ca    9.2        08 Mar 2025 07:59      Mg     1.6       09 Mar 2025 07:07  Mg     1.6       08 Mar 2025 07:59    TPro  6.0    /  Alb  2.0    /  TBili  0.6    /        09 Mar 2025 07:07  DBili  x      /  AST  17     /  ALT  21     /  AlkPhos  150      TPro  5.8    /  Alb  1.9    /  TBili  0.6    /        08 Mar 2025 07:59  DBili  x      /  AST  17     /  ALT  21     /  AlkPhos  151          Serum Osmo:   Serum Uric Acid:     Urine Studies:  Urinalysis Basic - ( 09 Mar 2025 07:07 )    Color: x / Appearance: x / SG: x / pH: x  Gluc: 129 mg/dL / Ketone: x  / Bili: x / Urobili: x   Blood: x / Protein: x / Nitrite: x   Leuk Esterase: x / RBC: x / WBC x   Sq Epi: x / Non Sq Epi: x / Bacteria: x        Urine chemistry:   Urine Na:   Urine Creatinine:   Urine Protein/Cr ratio:  Urine K:   Urine Osm:       RADIOLOGY & ADDITIONAL STUDIES:

## 2025-03-15 NOTE — PROGRESS NOTE ADULT - SUBJECTIVE AND OBJECTIVE BOX
Patient is a 69y old  Female who presents with a chief complaint of sob (15 Mar 2025 10:14)    Patient seen and evaluated at bedside this morning. No nausea, vomiting. Some abdominal fullness after PO contrast from CTE which is pending.     MEDICATIONS  (STANDING):  acetaminophen   IVPB .. 1000 milliGRAM(s) IV Intermittent once  aspirin enteric coated 81 milliGRAM(s) Oral daily  caspofungin IVPB 50 milliGRAM(s) IV Intermittent every 24 hours  caspofungin IVPB      chlorhexidine 2% Cloths 1 Application(s) Topical <User Schedule>  cholecalciferol 2000 Unit(s) Oral at bedtime  dextrose 5%. 1000 milliLiter(s) (100 mL/Hr) IV Continuous <Continuous>  dextrose 5%. 1000 milliLiter(s) (50 mL/Hr) IV Continuous <Continuous>  dronabinol 2.5 milliGRAM(s) Oral at bedtime  glucagon  Injectable 1 milliGRAM(s) IntraMuscular once  metoprolol succinate ER 50 milliGRAM(s) Oral daily  pantoprazole    Tablet 40 milliGRAM(s) Oral before breakfast  rifAXIMin 550 milliGRAM(s) Oral three times a day  simethicone 125 milliGRAM(s) Chew three times a day  sucralfate suspension 1 Gram(s) Oral every 12 hours  tamsulosin 0.4 milliGRAM(s) Oral at bedtime  vancomycin    Solution 125 milliGRAM(s) Oral every 6 hours  zinc oxide 40% Paste 1 Application(s) Topical two times a day    MEDICATIONS  (PRN):  acetaminophen     Tablet .. 650 milliGRAM(s) Oral every 6 hours PRN Temp greater or equal to 38C (100.4F), Mild Pain (1 - 3)  aluminum hydroxide/magnesium hydroxide/simethicone Suspension 30 milliLiter(s) Oral every 4 hours PRN Dyspepsia  amLODIPine   Tablet 2.5 milliGRAM(s) Oral daily PRN for SBP > 140  Biotene Dry Mouth Oral Rinse 15 milliLiter(s) Swish and Spit five times a day PRN Mouth Care  ondansetron Injectable 4 milliGRAM(s) IV Push every 8 hours PRN Nausea and/or Vomiting  sodium chloride 0.65% Nasal 1 Spray(s) Both Nostrils two times a day PRN Nasal Congestion      Vital Signs Last 24 Hrs  T(C): 36.6 (15 Mar 2025 08:15), Max: 37.3 (15 Mar 2025 00:49)  T(F): 97.8 (15 Mar 2025 08:15), Max: 99.2 (15 Mar 2025 00:49)  HR: 92 (15 Mar 2025 10:00) (85 - 121)  BP: 132/89 (15 Mar 2025 10:00) (123/71 - 132/89)  BP(mean): --  RR: 18 (15 Mar 2025 08:15) (18 - 18)  SpO2: 100% (15 Mar 2025 08:15) (94% - 100%)    Parameters below as of 15 Mar 2025 08:15  Patient On (Oxygen Delivery Method): room air        PHYSICAL EXAM:    Constitutional: No acute distress, well-developed, non-toxic appearing  HEENT: masked, good phonation, not icteric  Neck: supple, no lymphadenopathy  Respiratory: clear to ascultation bilaterally, no wheezing  Cardiovascular: S1 and S2, regular rate and rhythm, no murmurs rubs or gallops  Gastrointestinal: soft, non-tender, non-distended, +bowel sounds, no rebound or guarding, no surgical scars, no drains  Extremities: No peripheral edema, no cyanosis or clubbing  Vascular: 2+ peripheral pulses, no venous stasis  Neurological: A/O x 3, no focal deficits, no asterixis  Psychiatric: Normal mood, normal affect  Skin: No rashes, not jaundiced    LABS:                        8.1    5.15  )-----------( 296      ( 15 Mar 2025 07:19 )             25.5     03-15    139  |  107  |  25[H]  ----------------------------<  96  4.5   |  29  |  1.49[H]    Ca    9.7      15 Mar 2025 07:19  Phos  3.5     03-15  Mg     2.3     03-15    TPro  5.9[L]  /  Alb  2.0[L]  /  TBili  0.5  /  DBili  0.1  /  AST  25  /  ALT  21  /  AlkPhos  126[H]  03-15      LIVER FUNCTIONS - ( 15 Mar 2025 07:19 )  Alb: 2.0 g/dL / Pro: 5.9 gm/dL / ALK PHOS: 126 U/L / ALT: 21 U/L / AST: 25 U/L / GGT: x             RADIOLOGY & ADDITIONAL STUDIES:

## 2025-03-15 NOTE — PROGRESS NOTE ADULT - ASSESSMENT
69-year-old female recent diagnosis of primary pancreatic adenocarcinoma , pancytopenia and significant PMH of SCC lip, cyclical vomiting syndrome, HTN, HPL, Gout, GERD, CVA w renal evaluation of MALLORY and hypokalemia     MALLORY  Cr improving  , volume ok  -IVF continues   -Trend labs, lytes. Replete Hypokalemia for goal near 4.0  -Monitor UOP   -Avoid nsaids/contrast   - for IVC filter insertion tomorrow - continue IVF hydration to mitigate MAXIMO risk     3/11  Pacreatic CA/ METS  MALLORY slowly improving  Maintaining current IVF  I/O acceptable   Monitor and replace K/ Mg as needed    3/12  Pacreatic CA/ METS  MALLORY slowly improving, down to 1.32 today  - good UO 1850 ml  Maintaining current IVF  I/O acceptable   Monitor and replace K/ Mg as needed  Maintain K near 4 to minimize ileus, pt has poor IV access , await IV team  Good response to bowel preps  D/W Dr YOUSUF Pleitez    3/13  Full note to follow  Creat stable no finding in the EGD to explain pt sxs  May need CT with IVC,   Will hydrate appropriately prior to CT, D/W Dr Pleitez       3/14  Pacreatic CA/ METS  MALLORY slowly improving, , up to 1.64 today may be 3 rd spacing, causing azotemia  - good UO 1700 ml  and 3rd spacing  Maintaining current IVF  I/O acceptable   Monitor and replace K/ Mg as needed  Maintain K near 4 to minimize ileus, pt has poor IV access  Good response to bowel preps  D/W Dr YOUSUF Pleitez and Dr Joyner      3/15  Pacreatic CA/ METS  MALLORY slowly improving, ,creat 1.49  - good UO   On Xifaxan improved sxs   + 3rd spacing  Maintaining current IVF  I/O acceptable   Monitor and replace K/ Mg as needed  Maintain K near 4 to minimize ileus, pt has poor IV access  Good response to bowel preps  D/W Dr YOUSUF Pleitez and Dr Joyner

## 2025-03-15 NOTE — PROGRESS NOTE ADULT - SUBJECTIVE AND OBJECTIVE BOX
Subjective:    pat better, lying in bed, no new complaint.    Home Medications:  amitriptyline: 10 milligram(s) orally once a day (23 Dec 2024 18:06)  amLODIPine 5 mg oral tablet: 1 tab(s) orally once a day (23 Dec 2024 18:06)  aspirin 81 mg oral tablet, chewable: 1 tab(s) orally once a day (23 Dec 2024 18:06)  atorvastatin 40 mg oral tablet: 1 tab(s) orally once a day (at bedtime) (23 Dec 2024 18:06)  cefepime 2 g intravenous injection: 2 gram(s) intravenous every 12 hours (04 Mar 2025 21:11)  clopidogrel 75 mg oral tablet: 1 tab(s) orally once a day (23 Dec 2024 16:32)  filgrastim: 480 microgram(s) subcutaneous once a day (04 Mar 2025 21:11)  Lactated Ringers Injection intravenous solution: 150 milligram(s) intravenous every 1 to 2 hours (04 Mar 2025 21:11)  loperamide 2 mg oral capsule: 1 cap(s) orally once (04 Mar 2025 21:11)  midodrine 5 mg oral tablet: 1 tab(s) orally every 8 hours (04 Mar 2025 21:11)  Multiple Vitamins with Minerals oral liquid: 1 orally once a day (04 Mar 2025 21:11)  octreotide 2500 mcg/mL subcutaneous solution: 0.04 milliliter(s) subcutaneous 3 times a day (04 Mar 2025 21:11)  Pepcid 40 mg oral tablet: 40 milligram(s) orally once a day as needed for dyspepsia (23 Dec 2024 18:06)  sucralfate 1 g/10 mL oral suspension: 10 milliliter(s) orally 4 times a day (04 Mar 2025 21:11)  trimethobenzamide 100 mg/mL intramuscular solution: 2 milliliter(s) intramuscular every 8 hours As needed Nausea and/or Vomiting (04 Mar 2025 21:11)    MEDICATIONS  (STANDING):  acetaminophen     Tablet .. 650 milliGRAM(s) Oral every 12 hours  acetaminophen   IVPB .. 1000 milliGRAM(s) IV Intermittent once  aspirin enteric coated 81 milliGRAM(s) Oral daily  caspofungin IVPB 50 milliGRAM(s) IV Intermittent every 24 hours  caspofungin IVPB      chlorhexidine 2% Cloths 1 Application(s) Topical <User Schedule>  cholecalciferol 2000 Unit(s) Oral at bedtime  dextrose 5%. 1000 milliLiter(s) (100 mL/Hr) IV Continuous <Continuous>  dextrose 5%. 1000 milliLiter(s) (50 mL/Hr) IV Continuous <Continuous>  dronabinol 2.5 milliGRAM(s) Oral at bedtime  glucagon  Injectable 1 milliGRAM(s) IntraMuscular once  metoprolol succinate ER 50 milliGRAM(s) Oral daily  pantoprazole    Tablet 40 milliGRAM(s) Oral before breakfast  rifAXIMin 550 milliGRAM(s) Oral three times a day  simethicone 125 milliGRAM(s) Chew three times a day  sucralfate suspension 1 Gram(s) Oral every 12 hours  tamsulosin 0.4 milliGRAM(s) Oral at bedtime  vancomycin    Solution 125 milliGRAM(s) Oral every 6 hours  zinc oxide 40% Paste 1 Application(s) Topical two times a day    MEDICATIONS  (PRN):  acetaminophen     Tablet .. 650 milliGRAM(s) Oral every 6 hours PRN Temp greater or equal to 38C (100.4F), Mild Pain (1 - 3)  aluminum hydroxide/magnesium hydroxide/simethicone Suspension 30 milliLiter(s) Oral every 4 hours PRN Dyspepsia  amLODIPine   Tablet 2.5 milliGRAM(s) Oral daily PRN for SBP > 140  Biotene Dry Mouth Oral Rinse 15 milliLiter(s) Swish and Spit five times a day PRN Mouth Care  HYDROmorphone   Tablet 1 milliGRAM(s) Oral every 4 hours PRN Moderate Pain (4 - 6)  HYDROmorphone  Injectable 0.2 milliGRAM(s) IV Push every 4 hours PRN Severe Pain (7 - 10)  ondansetron Injectable 4 milliGRAM(s) IV Push every 8 hours PRN Nausea and/or Vomiting  sodium chloride 0.65% Nasal 1 Spray(s) Both Nostrils two times a day PRN Nasal Congestion      Allergies    No Known Allergies    Intolerances        Vital Signs Last 24 Hrs  T(C): 36.6 (15 Mar 2025 08:15), Max: 37.3 (15 Mar 2025 00:49)  T(F): 97.8 (15 Mar 2025 08:15), Max: 99.2 (15 Mar 2025 00:49)  HR: 92 (15 Mar 2025 10:00) (85 - 125)  BP: 132/89 (15 Mar 2025 10:00) (123/71 - 139/89)  BP(mean): --  RR: 18 (15 Mar 2025 08:15) (18 - 18)  SpO2: 100% (15 Mar 2025 08:15) (92% - 100%)    Parameters below as of 15 Mar 2025 08:15  Patient On (Oxygen Delivery Method): room air          PHYSICAL EXAMINATION:    NECK:  Supple. No lymphadenopathy. Jugular venous pressure not elevated. Carotids equal.   HEART:   The cardiac impulse has a normal quality. Reg., Nl S1 and S2.  There are no murmurs, rubs or gallops noted  CHEST:  Chest crackles to auscultation. Normal respiratory effort.  ABDOMEN:  Soft and nontender.   EXTREMITIES:  There is no edema.       LABS:                        8.1    5.15  )-----------( 296      ( 15 Mar 2025 07:19 )             25.5     03-15    139  |  107  |  25[H]  ----------------------------<  96  4.5   |  29  |  1.49[H]    Ca    9.7      15 Mar 2025 07:19  Phos  3.5     03-15  Mg     2.3     03-15    TPro  5.9[L]  /  Alb  2.0[L]  /  TBili  0.5  /  DBili  0.1  /  AST  25  /  ALT  21  /  AlkPhos  126[H]  03-15      Urinalysis Basic - ( 15 Mar 2025 07:19 )    Color: x / Appearance: x / SG: x / pH: x  Gluc: 96 mg/dL / Ketone: x  / Bili: x / Urobili: x   Blood: x / Protein: x / Nitrite: x   Leuk Esterase: x / RBC: x / WBC x   Sq Epi: x / Non Sq Epi: x / Bacteria: x        Xray Chest 1 View- PORTABLE-Urgent (Xray Chest 1 View- PORTABLE-Urgent .) (03.14.25 @ 16:25) >  IMPRESSION:  Bilateral perihilar haziness and bilateral pleural effusions, right   greater than left, compatible with pulmonary edema.

## 2025-03-15 NOTE — PROGRESS NOTE ADULT - ASSESSMENT
68yo female with PMHx recent diagnosis of primary pancreatic adenocarcinoma of head with tumor in umbilicus, chemo-induced pancytopenia, SCC lip, cyclical vomiting syndrome, HTN, HPL, Gout, GERD, CVA on Aggrenox  Initially admitted to [facility] with septic shock 2/2 + pseudomonas bacteremia likely due to UTI vs colitis on 02/09/2025 Was having nausea, vomiting and diarrhea requiring Levophed drip, was transferred out of ICU to regular floor.  Then transferred to  as per request of . Was getting platelet transfusion and developed tachypnea hypoxia, rigors. Was diuresed and became hypotensive, Upgraded to SICU. CTA c/a/p on 2/19 showed multiple b/l PE and large bowel obstruction, was started on heparin drip, c/b GIB, then retrialed heparin drip with recurrent c/f GIB. Also with Afib w RVR during admission, resolved w iv metoprolol.   On 2/28, developed SIRS, Blood cx growing yeast. TPN stopped, R chest port removed on 3/2 and pt started on iv caspofungin.    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 Mottled air   lucency and mild fat infiltration surrounding the jejunal segment (2:62. 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  - lactate cleared 1.3 3/8   - Tolerated CLD, now on regular diet  - GI and surgery and oncology surgery  on board   - Sp Meropenem  - second opinion GI Dr. Young   - 3/13  -s/p EGD - gastritis , pt refusing colonoscopy    # Urinary retention   - s/p Julian   - stop  bethanechol 10 tid  - can cause abdominal cramping   - voiding trial once more ambulatory  - 3/13 - start flomax hs  - urology consult    - plan for voiding trial marcela    # Periumbilical Abdominal pain   - tylenol 650 bid   -  tramadol 25 q6 prn for severe pain  - not effective  -  bentyl tid  20 mg - not effective  - 3/14 - dilaudid 0.2 IV prn  stopped not helpful  - 3/15 - use po and IV prn tylenol  - lidocaine cream to pressure ulcer    # Suspected SIBO  # Diarrheal syndrome   - c/w  xifaxan 550 tid d/w Dr. Hernandez  - stop simethicone 125 tid - not effective, can worsen diarrhea    #Acute bilateral PE  # Lung nodules  # Sinus tachycardia, resolved  - Doppler LE negative  - CT chest- multiple PE w/in segmental left lower and right lower, 2 right middle lobe nodules,   -  3/10 s/p IVC filter   - Recurrent GIB on heparin   - serial ekgs  - cardiology consult  - 3/13 - start toprol 25   - 3/14 - toprol 25--> 50 , stop benthyl can cause tachyrhythmias   - CXR , remote tele   - 3/15 - sinus 80 th     # H/o CVA (cerebrovascular accident).   - Was originally on Plavix per  but because is now taking [facility] [id number],  - was told to start Aggrenox instead to avoid DDIs)  - platelets 33 --> 253   - off AP and AC due to GI bleeds will  d/w  GI team  - need to be on plavix d/w Dr. Clement will start once cleared by GI   - d/w Dr. Hernandez ok to restart ASA 81     # SVT on 02/09/2025: resolved after Valsalva and Carotid massage.  -Norvasc and Lipitor on hold to septic shock.   -Off levophed; s/p  oral midodrine.  -Cardiology consulted Dr. Clement  - off BB  - 3/12 - restart low dose of amlodipine 2.5 for high BP  - 3/13 - cardiology consult, ekgs, start toprol 25  - c/w toprol 50     # GERD/Esophagitis , recurrent GI bleeds while on IV heparin   - Continue protonix iv bid--> po  - Continue carafate bid    #MALLORY, likely ATN  - Stable at 1.4 3/9  - Nephrology on board  - d/c IV fluids  - Creatinine Trend: 1.4 <--1.62<--, 1.32<--, 1.40<--, 1.39<--, 1.48<--, 1.61<--    # Acute blood loss anemia and Malignancy  - SP GIB x 2 after starting heparin  - s/p pRBC x 6u this admission  - Hemoglobin stable 9.0  3/9  - Continue Protonix      #Primary pancreatic adenocarcinoma  - SP folfirinox with reaction to irinotecan  - on a clinical trial now, on hold     #Skin injury Left and Rigth upper Gluteal Intergluteal cleft   - superficial skin necrosis  - Wound care evaluation appreciated  - Low air mattress  - Turning / positioning q2h while in bed    # Cutaneous lesion on RUQ area below right breast,   Ecthyma gangrenosum.  - Ecthyma gangrenosum. life-threatening skin infection caused by Pseudomonas aeruginosa bacteria.   - dermatology consult  -  appears to be resolving, most skin changes appear post inflammatory.  - Please recall if the lesion does not continue to slowly resolve over the next 2-3 weeks.   - wound care - If the surface ulcerates, cover with telfa/ vaseline/ paper tape      # Hypokalemia  # Hypomagnesemia   - replete prn  - keep K close to 4     # HTN   - amlodipine 2.5     #DVT ppx - SCDs    Dispo:  IV caspofungin, PT daily,  monitor H/H, d/c planning to [facility]. on Monday

## 2025-03-15 NOTE — PROGRESS NOTE ADULT - SUBJECTIVE AND OBJECTIVE BOX
HOSPITALIST ATTENDING PROGRESS NOTE    Chart and meds reviewed.  Patient seen and examined.    CC:  abdominal pain, poor po intake     3/11 - epigastric abdominal pain, No fever. Tolerating some diet. + Bms, denies cp, dyspnea, nausea   3/12 - + periumbilical pain 7/10, dull, non-radiating constant, slightly better from tylenol , afebrile, multiple Bms after laxatives, + early satiety , + nausea    3/13 - abdominal pain better after tramadol, s/p EGD, tolerating some po intake, afebrile, no Bms today, denies nausea, vomiting  3/14 - abdominal pain worse, + BMs, lose, tolerating some po intake, + puffy face, denies HA, + abdominal cramping, plan discussed  3/15 - abdominal pain was good overnight, + worse after oral contrast for CT , + buttock pain from pressure ulcers, denies cp, dyspnea, tolerating some fluids, afebrile + Bms    All other systems reviewed and found to be negative with the exception of what has been described above.    Vital sings reviewed for last 24 h  T(C): 36.9 (03-15-25 @ 15:22), Max: 37.3 (03-15-25 @ 00:49)  T(F): 98.4 (03-15-25 @ 15:22), Max: 99.2 (03-15-25 @ 00:49)  HR: 81 (03-15-25 @ 15:22) (81 - 92)  BP: 145/69 (03-15-25 @ 15:22) (123/71 - 145/69)  RR: 18 (03-15-25 @ 15:22) (18 - 18)  SpO2: 93% (03-15-25 @ 15:22) (93% - 100%)  Wt(kg): --  Daily     Daily Weight in k.5 (15 Mar 2025 05:49)  CAPILLARY BLOOD GLUCOSE          Physical exam :   GEN: NAD, Frail   HEENT:  pupils equal and reactive, EOMI, no oropharyngeal lesions, erythema, exudates, oral thrush  NECK:   supple, no carotid bruits  CV:  +S1, +S2, regular, no murmurs  RESP:   lungs clear to auscultation bilaterally, no wheezing, rales, rhonchi, good air entry bilaterally  GI:  abdomen soft,  + diffuse more periumbilical tenderness, Distended, normal BS, periumbilical mass  EXT:  no clubbing, no cyanosis, no edema, no calf pain, swelling or erythema  NEURO:  AAOX3, no focal neurological deficits, follows all commands, able to move extremities spontaneously  SKIN:  sacral DTI , under right breast  inframammary:  + 4.0 cm subcutaneous nodule, mildly tender, with drying ulceration of skin surface; not acutely inflamed   : Julian     LABS:  all reviewed                           8.1    5.15  )-----------( 296      ( 15 Mar 2025 07:19 )             25.5     03-15    139  |  107  |  25[H]  ----------------------------<  96  4.5   |  29  |  1.49[H]    Ca    9.7      15 Mar 2025 07:19  Phos  3.5     03-15  Mg     2.3     03-15    TPro  5.9[L]  /  Alb  2.0[L]  /  TBili  0.5  /  DBili  0.1  /  AST  25  /  ALT  21  /  AlkPhos  126[H]  03-15        LIVER FUNCTIONS - ( 15 Mar 2025 07:19 )  Alb: 2.0 g/dL / Pro: 5.9 gm/dL / ALK PHOS: 126 U/L / ALT: 21 U/L / AST: 25 U/L / GGT: x                 Culture - Blood (25 @ 18:58)    Specimen Source: Blood None   Culture Results:   No growth at 5 days    Culture - Blood (25 @ 11:54)    -  Blood PCR Panel: NEG   Gram Stain:   Growth in aerobic bottle: Yeast like cells   Specimen Source: Blood None   Organism: Blood Culture PCR   Culture Results:   Growth in aerobic bottle: Saccharomyces cerevisiae Sent to [facility]  [address] [address] for  susceptibility  Direct identification is available within approximately 3-5  hours either by Blood Panel Multiplexed PCR or Direct  MALDI-TOF. Details: [email or web address]   Organism Identification: Blood Culture PCR   Method Type: PCR    Fungitell: 478 Abnormal, H        CT Abdomen and Pelvis w/ Oral Cont (03.10.25 @ 17:44) >  IMPRESSION:    There is an apparent fistula between the transverse colon and a mid   jejunal loop with enteric contrast in the fistula (2:64). Mottled air   lucency and mild fat infiltration surrounding the jejunal segment (2:62.   Indeterminant as to contained perforation of the transverse colon or the   jejunum.  The finding is new compared with 2025. Recommend surgical   consultation.    Indeterminate 8 mm right middle lobe nodule, either infectious or   malignant. Stable bilateral pleural effusions.    Redemonstrated pancreatic mass, inseparable from the posterior gastric   wall. Unchanged umbilical mass.    Pelvic floor laxity with mild bladder and rectal prolapse.    Ascites unchanged.    Findings were discussed with Dr. Venice Joyner 3/11/2025 9:35 AM by   Dr. Anum Calle with read back confirmation.    A preliminary report was provided by Dr. Adam Robertson     CT Abdomen and Pelvis No Cont (25 @ 16:32) >  IMPRESSION:  Resolution of small and large bowel dilatation.    Mottled lucency involving a short segment of sigmoid colon suspect for   pneumatosis which could be due to benign or ischemic etiology. Please   correlate clinically and with laboratory values.    Minimal increase in amount of ascites.  Unchanged pancreatic body and umbilical masses.      : US Duplex Venous Lower Ext Complete, Bilateral (25 @ 10:23) >  IMPRESSION:  No evidence of deep venous thrombosis in either lower extremity.    MEDICATIONS  (STANDING):  acetaminophen   IVPB .. 1000 milliGRAM(s) IV Intermittent once  aspirin enteric coated 81 milliGRAM(s) Oral daily  caspofungin IVPB 50 milliGRAM(s) IV Intermittent every 24 hours  caspofungin IVPB      chlorhexidine 2% Cloths 1 Application(s) Topical <User Schedule>  cholecalciferol 2000 Unit(s) Oral at bedtime  dextrose 5%. 1000 milliLiter(s) (100 mL/Hr) IV Continuous <Continuous>  dextrose 5%. 1000 milliLiter(s) (50 mL/Hr) IV Continuous <Continuous>  dronabinol 2.5 milliGRAM(s) Oral at bedtime  glucagon  Injectable 1 milliGRAM(s) IntraMuscular once  metoprolol succinate ER 50 milliGRAM(s) Oral daily  pantoprazole    Tablet 40 milliGRAM(s) Oral before breakfast  rifAXIMin 550 milliGRAM(s) Oral three times a day  simethicone 125 milliGRAM(s) Chew three times a day  sucralfate suspension 1 Gram(s) Oral every 12 hours  tamsulosin 0.4 milliGRAM(s) Oral at bedtime  vancomycin    Solution 125 milliGRAM(s) Oral every 6 hours  zinc oxide 40% Paste 1 Application(s) Topical two times a day    MEDICATIONS  (PRN):  acetaminophen     Tablet .. 650 milliGRAM(s) Oral every 6 hours PRN Temp greater or equal to 38C (100.4F), Mild Pain (1 - 3)  aluminum hydroxide/magnesium hydroxide/simethicone Suspension 30 milliLiter(s) Oral every 4 hours PRN Dyspepsia  amLODIPine   Tablet 2.5 milliGRAM(s) Oral daily PRN for SBP > 140  Biotene Dry Mouth Oral Rinse 15 milliLiter(s) Swish and Spit five times a day PRN Mouth Care  lidocaine 4% Cream 1 Application(s) Topical four times a day PRN sacral ulcer  ondansetron Injectable 4 milliGRAM(s) IV Push every 8 hours PRN Nausea and/or Vomiting  sodium chloride 0.65% Nasal 1 Spray(s) Both Nostrils two times a day PRN Nasal Congestion

## 2025-03-16 LAB
ALBUMIN SERPL ELPH-MCNC: 2.1 G/DL — LOW (ref 3.3–5)
ALP SERPL-CCNC: 135 U/L — HIGH (ref 40–120)
ALT FLD-CCNC: 20 U/L — SIGNIFICANT CHANGE UP (ref 12–78)
ANION GAP SERPL CALC-SCNC: 7 MMOL/L — SIGNIFICANT CHANGE UP (ref 5–17)
AST SERPL-CCNC: 16 U/L — SIGNIFICANT CHANGE UP (ref 15–37)
BILIRUB DIRECT SERPL-MCNC: 0.2 MG/DL — SIGNIFICANT CHANGE UP (ref 0–0.3)
BILIRUB INDIRECT FLD-MCNC: 0.4 MG/DL — SIGNIFICANT CHANGE UP (ref 0.2–1)
BILIRUB SERPL-MCNC: 0.6 MG/DL — SIGNIFICANT CHANGE UP (ref 0.2–1.2)
BLD GP AB SCN SERPL QL: SIGNIFICANT CHANGE UP
BUN SERPL-MCNC: 22 MG/DL — SIGNIFICANT CHANGE UP (ref 7–23)
CALCIUM SERPL-MCNC: 10 MG/DL — SIGNIFICANT CHANGE UP (ref 8.5–10.1)
CHLORIDE SERPL-SCNC: 105 MMOL/L — SIGNIFICANT CHANGE UP (ref 96–108)
CO2 SERPL-SCNC: 26 MMOL/L — SIGNIFICANT CHANGE UP (ref 22–31)
CREAT SERPL-MCNC: 1.34 MG/DL — HIGH (ref 0.5–1.3)
EGFR: 43 ML/MIN/1.73M2 — LOW
EGFR: 43 ML/MIN/1.73M2 — LOW
GLUCOSE SERPL-MCNC: 81 MG/DL — SIGNIFICANT CHANGE UP (ref 70–99)
HCT VFR BLD CALC: 25.3 % — LOW (ref 34.5–45)
HGB BLD-MCNC: 8.1 G/DL — LOW (ref 11.5–15.5)
MAGNESIUM SERPL-MCNC: 1.9 MG/DL — SIGNIFICANT CHANGE UP (ref 1.6–2.6)
MCHC RBC-ENTMCNC: 27.9 PG — SIGNIFICANT CHANGE UP (ref 27–34)
MCHC RBC-ENTMCNC: 32 G/DL — SIGNIFICANT CHANGE UP (ref 32–36)
MCV RBC AUTO: 87.2 FL — SIGNIFICANT CHANGE UP (ref 80–100)
NRBC # BLD AUTO: 0 K/UL — SIGNIFICANT CHANGE UP (ref 0–0)
NRBC # FLD: 0 K/UL — SIGNIFICANT CHANGE UP (ref 0–0)
NRBC BLD AUTO-RTO: 0 /100 WBCS — SIGNIFICANT CHANGE UP (ref 0–0)
NT-PROBNP SERPL-SCNC: 1721 PG/ML — HIGH (ref 0–125)
PHOSPHATE SERPL-MCNC: 4.3 MG/DL — SIGNIFICANT CHANGE UP (ref 2.5–4.5)
PLATELET # BLD AUTO: 314 K/UL — SIGNIFICANT CHANGE UP (ref 150–400)
POTASSIUM SERPL-SCNC: 3.5 MMOL/L — SIGNIFICANT CHANGE UP (ref 3.5–5.3)
PROT SERPL-MCNC: 6.3 GM/DL — SIGNIFICANT CHANGE UP (ref 6–8.3)
RBC # BLD: 2.9 M/UL — LOW (ref 3.8–5.2)
RBC # FLD: 14.5 % — SIGNIFICANT CHANGE UP (ref 10.3–14.5)
SODIUM SERPL-SCNC: 138 MMOL/L — SIGNIFICANT CHANGE UP (ref 135–145)
WBC # BLD: 4.29 K/UL — SIGNIFICANT CHANGE UP (ref 3.8–10.5)
WBC # FLD AUTO: 4.29 K/UL — SIGNIFICANT CHANGE UP (ref 3.8–10.5)

## 2025-03-16 PROCEDURE — 99233 SBSQ HOSP IP/OBS HIGH 50: CPT

## 2025-03-16 PROCEDURE — 99232 SBSQ HOSP IP/OBS MODERATE 35: CPT

## 2025-03-16 RX ORDER — HYDROMORPHONE/SOD CHLOR,ISO/PF 2 MG/10 ML
0.2 SYRINGE (ML) INJECTION ONCE
Refills: 0 | Status: DISCONTINUED | OUTPATIENT
Start: 2025-03-16 | End: 2025-03-16

## 2025-03-16 RX ORDER — OXYCODONE HYDROCHLORIDE 30 MG/1
2.5 TABLET ORAL EVERY 4 HOURS
Refills: 0 | Status: DISCONTINUED | OUTPATIENT
Start: 2025-03-16 | End: 2025-03-16

## 2025-03-16 RX ORDER — MORPHINE 10 MG/ML
6 TINCTURE ORAL EVERY 6 HOURS
Refills: 0 | Status: DISCONTINUED | OUTPATIENT
Start: 2025-03-16 | End: 2025-03-16

## 2025-03-16 RX ORDER — LIDOCAINE HYDROCHLORIDE 20 MG/ML
1 JELLY TOPICAL
Refills: 0 | Status: DISCONTINUED | OUTPATIENT
Start: 2025-03-16 | End: 2025-03-19

## 2025-03-16 RX ORDER — HYDROMORPHONE/SOD CHLOR,ISO/PF 2 MG/10 ML
0.2 SYRINGE (ML) INJECTION EVERY 4 HOURS
Refills: 0 | Status: DISCONTINUED | OUTPATIENT
Start: 2025-03-16 | End: 2025-03-16

## 2025-03-16 RX ORDER — ALBUMIN (HUMAN) 12.5 G/50ML
100 INJECTION, SOLUTION INTRAVENOUS EVERY 12 HOURS
Refills: 0 | Status: COMPLETED | OUTPATIENT
Start: 2025-03-16 | End: 2025-03-17

## 2025-03-16 RX ORDER — OXYCODONE HYDROCHLORIDE 30 MG/1
5 TABLET ORAL EVERY 4 HOURS
Refills: 0 | Status: DISCONTINUED | OUTPATIENT
Start: 2025-03-16 | End: 2025-03-18

## 2025-03-16 RX ADMIN — Medication 125 MILLIGRAM(S): at 05:44

## 2025-03-16 RX ADMIN — Medication 2 MILLIGRAM(S): at 17:32

## 2025-03-16 RX ADMIN — OXYCODONE HYDROCHLORIDE 5 MILLIGRAM(S): 30 TABLET ORAL at 21:55

## 2025-03-16 RX ADMIN — Medication 125 MILLIGRAM(S): at 01:47

## 2025-03-16 RX ADMIN — Medication 125 MILLIGRAM(S): at 14:26

## 2025-03-16 RX ADMIN — ALBUMIN (HUMAN) 50 MILLILITER(S): 12.5 INJECTION, SOLUTION INTRAVENOUS at 14:31

## 2025-03-16 RX ADMIN — OXYCODONE HYDROCHLORIDE 5 MILLIGRAM(S): 30 TABLET ORAL at 21:25

## 2025-03-16 RX ADMIN — Medication 81 MILLIGRAM(S): at 10:42

## 2025-03-16 RX ADMIN — TOUCHLESS CARE ZINC OXIDE PROTECTANT 1 APPLICATION(S): 20; 25 SPRAY TOPICAL at 21:39

## 2025-03-16 RX ADMIN — Medication 1 GRAM(S): at 10:40

## 2025-03-16 RX ADMIN — MORPHINE 6 MILLIGRAM(S): 10 TINCTURE ORAL at 14:26

## 2025-03-16 RX ADMIN — ALBUMIN (HUMAN) 50 MILLILITER(S): 12.5 INJECTION, SOLUTION INTRAVENOUS at 21:37

## 2025-03-16 RX ADMIN — Medication 2000 UNIT(S): at 21:38

## 2025-03-16 RX ADMIN — TOUCHLESS CARE ZINC OXIDE PROTECTANT 1 APPLICATION(S): 20; 25 SPRAY TOPICAL at 10:44

## 2025-03-16 RX ADMIN — Medication 1 APPLICATION(S): at 13:38

## 2025-03-16 RX ADMIN — METOPROLOL SUCCINATE 50 MILLIGRAM(S): 50 TABLET, EXTENDED RELEASE ORAL at 10:43

## 2025-03-16 RX ADMIN — Medication 1 APPLICATION(S): at 10:40

## 2025-03-16 RX ADMIN — TAMSULOSIN HYDROCHLORIDE 0.4 MILLIGRAM(S): 0.4 CAPSULE ORAL at 21:38

## 2025-03-16 RX ADMIN — DRONABINOL 2.5 MILLIGRAM(S): 10 CAPSULE ORAL at 21:38

## 2025-03-16 RX ADMIN — Medication 1 GRAM(S): at 21:38

## 2025-03-16 RX ADMIN — Medication 40 MILLIGRAM(S): at 05:45

## 2025-03-16 NOTE — PROGRESS NOTE ADULT - ASSESSMENT
68yo female with PMHx recent diagnosis of primary pancreatic adenocarcinoma of head with tumor in umbilicus, chemo-induced pancytopenia, SCC lip, cyclical vomiting syndrome, HTN, HPL, Gout, GERD, CVA on Aggrenox  Initially admitted to [facility] with septic shock 2/2 + pseudomonas bacteremia likely due to UTI vs colitis on 02/09/2025 Was having nausea, vomiting and diarrhea requiring Levophed drip, was transferred out of ICU to regular floor.  Then transferred to  as per request of . Was getting platelet transfusion and developed tachypnea hypoxia, rigors. Was diuresed and became hypotensive, Upgraded to SICU. CTA c/a/p on 2/19 showed multiple b/l PE and large bowel obstruction, was started on heparin drip, c/b GIB, then retrialed heparin drip with recurrent c/f GIB. Also with Afib w RVR during admission, resolved w iv metoprolol.   On 2/28, developed SIRS, Blood cx growing yeast. TPN stopped, R chest port removed on 3/2 and pt started on iv caspofungin.    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 Mottled air   lucency and mild fat infiltration surrounding the jejunal segment (2:62. 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  - lactate cleared 1.3 3/8   - Tolerated CLD, now on regular diet  - GI and surgery and oncology surgery  on board   - Sp Meropenem  - second opinion GI Dr. Young   - 3/13  -s/p EGD - gastritis , pt refusing colonoscopy    # Urinary retention   - s/p Julian   - stop  bethanechol 10 tid  - can cause abdominal cramping   - voiding trial once more ambulatory  - 3/13 - start flomax hs  - urology consult    - plan for voiding trial marcela    # Periumbilical Abdominal pain   - tylenol 650 bid   -  tramadol 25 q6 prn for severe pain  - not effective  -  bentyl tid  20 mg - not effective  - 3/14 - dilaudid 0.2 IV prn  stopped not helpful  - 3/15 - use po and IV prn tylenol  - lidocaine cream to pressure ulcer    # Suspected SIBO  # Diarrheal syndrome   - c/w  xifaxan 550 tid d/w Dr. Hernandez  - stop simethicone 125 tid - not effective, can worsen diarrhea    #Acute bilateral PE  # Lung nodules  # Sinus tachycardia, resolved  - Doppler LE negative  - CT chest- multiple PE w/in segmental left lower and right lower, 2 right middle lobe nodules,   -  3/10 s/p IVC filter   - Recurrent GIB on heparin   - serial ekgs  - cardiology consult  - 3/13 - start toprol 25   - 3/14 - toprol 25--> 50 , stop benthyl can cause tachyrhythmias   - CXR , remote tele   - 3/15 - sinus 80 th     # H/o CVA (cerebrovascular accident).   - Was originally on Plavix per  but because is now taking [facility] 6236,  - was told to start Aggrenox instead to avoid DDIs)  - platelets 33 --> 253   - off AP and AC due to GI bleeds will  d/w  GI team  - need to be on plavix d/w Dr. Clement will start once cleared by GI   - d/w Dr. Hernandez ok to restart ASA 81     # SVT on 02/09/2025: resolved after Valsalva and Carotid massage.  -Norvasc and Lipitor on hold to septic shock.   -Off levophed; s/p  oral midodrine.  -Cardiology consulted Dr. Clement  - off BB  - 3/12 - restart low dose of amlodipine 2.5 for high BP  - 3/13 - cardiology consult, ekgs, start toprol 25  - c/w toprol 50     # GERD/Esophagitis , recurrent GI bleeds while on IV heparin   - Continue protonix iv bid--> po  - Continue carafate bid    #MALLORY, likely ATN  - Stable at 1.4 3/9  - Nephrology on board  - d/c IV fluids  - Creatinine Trend: 1.4 <--1.62<--, 1.32<--, 1.40<--, 1.39<--, 1.48<--, 1.61<--    # Acute blood loss anemia and Malignancy  - SP GIB x 2 after starting heparin  - s/p pRBC x 6u this admission  - Hemoglobin stable 9.0  3/9  - Continue Protonix      #Primary pancreatic adenocarcinoma  - SP folfirinox with reaction to irinotecan  - on a clinical trial now, on hold     #Skin injury Left and Rigth upper Gluteal Intergluteal cleft   - superficial skin necrosis  - Wound care evaluation appreciated  - Low air mattress  - Turning / positioning q2h while in bed    # Cutaneous lesion on RUQ area below right breast,   Ecthyma gangrenosum.  - Ecthyma gangrenosum. life-threatening skin infection caused by Pseudomonas aeruginosa bacteria.   - dermatology consult  -  appears to be resolving, most skin changes appear post inflammatory.  - Please recall if the lesion does not continue to slowly resolve over the next 2-3 weeks.   - wound care - If the surface ulcerates, cover with telfa/ vaseline/ paper tape      # Hypokalemia  # Hypomagnesemia   - replete prn  - keep K close to 4     # HTN   - amlodipine 2.5     #DVT ppx - SCDs    Dispo:  IV caspofungin, PT daily,  monitor H/H, d/c planning to [facility]. 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 with septic shock 2/2 + pseudomonas bacteremia likely due to UTI vs colitis on 02/09/2025 Was having nausea, vomiting and diarrhea requiring Levophed drip, was transferred out of ICU to regular floor.  Then transferred to  as per request of . Was getting platelet transfusion and developed tachypnea hypoxia, rigors. Was diuresed and became hypotensive, Upgraded to SICU. CTA c/a/p on 2/19 showed multiple b/l PE and large bowel obstruction, was started on heparin drip, c/b GIB, then retrialed heparin drip with recurrent c/f GIB. Also with Afib w RVR during admission, resolved w iv metoprolol.   On 2/28, developed SIRS, Blood cx growing yeast. TPN stopped, R chest port removed on 3/2 and pt started on iv caspofungin.    Persistent Abdominal Pain:  Patient presenting with ongoing lower abdominal discomfort, characterized as moderate intensity (5-6/10) stomach ache sensation. Clinical presentation and recent history of fistulization raise concern for possible SIBO. Given complex history and recent surgical interventions, current symptoms warrant careful monitoring and consideration of transfer to higher level of care if no improvement noted. Empiric antibiotic therapy initiated with Augmentin appears reasonable given clinical context.    Deep Tissue Injury:  Newly identified sacral deep tissue injury, likely related to prolonged immobility and pressure. This complication requires immediate attention and may impact overall treatment planning and mobility protocols. Current management includes pressure redistribution measures, though comprehensive wound care protocol needs to be established.        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 Mottled air   lucency and mild fat infiltration surrounding the jejunal segment (2:62. 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  - lactate cleared 1.3 3/8   - Tolerated CLD, now on regular diet  - GI and surgery and oncology surgery  on board   - Sp Meropenem  - second opinion GI Dr. Young   - 3/13  -s/p EGD - gastritis , pt refusing colonoscopy    # Urinary retention   - s/p Julian   - stop  bethanechol 10 tid  - can cause abdominal cramping   - voiding trial once more ambulatory  - 3/13 - start flomax hs  - urology consult    - plan for voiding trial marcela    #Abdominal Pain  - Continue current Augmentin therapy  - Monitor response to antibiotics  - Coordinate with Dr. Tapia regarding potential transfer if symptoms persist  - Pain management: Continue current pain regimen with regular reassessment    #Deep Tissue Injury  - Implement comprehensive wound care protocol  - Regular repositioning schedule  - Continue pressure redistribution devices  - Daily skin assessment    #Acute bilateral PE  # Lung nodules  # Sinus tachycardia, resolved  - Doppler LE negative  - CT chest- multiple PE w/in segmental left lower and right lower, 2 right middle lobe nodules,   -  3/10 s/p IVC filter   - Recurrent GIB on heparin   - serial ekgs  - cardiology consult  - 3/13 - start toprol 25   - 3/14 - toprol 25--> 50 , stop benthyl can cause tachyrhythmias   - CXR , remote tele   - 3/15 - sinus 80 th     # H/o CVA (cerebrovascular accident).   - Was originally on Plavix per  but because is now taking [facility] 6236,  - was told to start Aggrenox instead to avoid DDIs)  - platelets 33 --> 253   - off AP and AC due to GI bleeds will  d/w  GI team  - need to be on plavix d/w Dr. Clement will start once cleared by GI   - d/w Dr. Hernandez ok to restart ASA 81     # SVT on 02/09/2025: resolved after Valsalva and Carotid massage.  -Norvasc and Lipitor on hold to septic shock.   -Off levophed; s/p  oral midodrine.  -Cardiology consulted Dr. Clement  - off BB  - 3/12 - restart low dose of amlodipine 2.5 for high BP  - 3/13 - cardiology consult, ekgs, start toprol 25  - c/w toprol 50     # GERD/Esophagitis , recurrent GI bleeds while on IV heparin   - Continue protonix iv bid--> po  - Continue carafate bid    #MALLORY, likely ATN  - Stable at 1.4 3/9  - Nephrology on board  - d/c IV fluids  - Creatinine Trend: 1.4 <--1.62<--, 1.32<--, 1.40<--, 1.39<--, 1.48<--, 1.61<--    # Acute blood loss anemia and Malignancy  - SP GIB x 2 after starting heparin  - s/p pRBC x 6u this admission  - Hemoglobin stable 9.0  3/9  - Continue Protonix      #Primary pancreatic adenocarcinoma  - SP folfirinox with reaction to irinotecan  - on a clinical trial now, on hold     #Skin injury Left and Rigth upper Gluteal Intergluteal cleft   - superficial skin necrosis  - Wound care evaluation appreciated  - Low air mattress  - Turning / positioning q2h while in bed    # Cutaneous lesion on RUQ area below right breast,   Ecthyma gangrenosum.  - Ecthyma gangrenosum. life-threatening skin infection caused by Pseudomonas aeruginosa bacteria.   - dermatology consult  -  appears to be resolving, most skin changes appear post inflammatory.  - Please recall if the lesion does not continue to slowly resolve over the next 2-3 weeks.   - wound care - If the surface ulcerates, cover with telfa/ vaseline/ paper tape      # Hypokalemia  # Hypomagnesemia   - replete prn  - keep K close to 4     # HTN   - amlodipine 2.5     #DVT ppx - SCDs    Dispo:  IV caspofungin, PT daily,  monitor H/H, d/c planning to [facility]. on Monday   70yo female with PMHx recent diagnosis of primary pancreatic adenocarcinoma of head with tumor in umbilicus, chemo-induced pancytopenia, SCC lip, cyclical vomiting syndrome, HTN, HPL, Gout, GERD, CVA on Aggrenox  Initially admitted to [facility] with septic shock 2/2 + pseudomonas bacteremia likely due to UTI vs colitis on 02/09/2025 Was having nausea, vomiting and diarrhea requiring Levophed drip, was transferred out of ICU to regular floor.  Then transferred to  as per request of . Was getting platelet transfusion and developed tachypnea hypoxia, rigors. Was diuresed and became hypotensive, Upgraded to SICU. CTA c/a/p on 2/19 showed multiple b/l PE and large bowel obstruction, was started on heparin drip, c/b GIB, then retrialed heparin drip with recurrent c/f GIB. Also with Afib w RVR during admission, resolved w iv metoprolol.   On 2/28, developed SIRS, Blood cx growing yeast. TPN stopped, R chest port removed on 3/2 and pt started on iv caspofungin.    Persistent Abdominal Pain:  Patient presenting with ongoing lower abdominal discomfort, characterized as moderate intensity (5-6/10) stomach ache sensation. started on RIFAXAMIN per GI     # Pancreatic Adenocarcinoma   - presently on Clinical trial with FOLFIRINOX + EMILY inhibitor  - now no plan for treatment while in rehab-  - presently dc planning   - follows with [facility]     #Fungemia- Saccharomyces cerevisiae   - Continue  iv caspofungin plan to complete 14 days course     # Sigmoid colon pneumatosis   - symptomatic managment   - no planned operative intervention  - at this point declinig endoscopic procedures     #Abdominal Pain  - now on Xifaximin for possible SIBO -  modest improvemnt of abdominal symptoms  - Monitor response to antibiotics

## 2025-03-16 NOTE — PROGRESS NOTE ADULT - SUBJECTIVE AND OBJECTIVE BOX
HOSPITALIST ATTENDING PROGRESS NOTE    Chart and meds reviewed.  Patient seen and examined.    CC:  abdominal pain, poor po intake     3/11 - epigastric abdominal pain, No fever. Tolerating some diet. + Bms, denies cp, dyspnea, nausea   3/12 - + periumbilical pain 7/10, dull, non-radiating constant, slightly better from tylenol , afebrile, multiple Bms after laxatives, + early satiety , + nausea    3/13 - abdominal pain better after tramadol, s/p EGD, tolerating some po intake, afebrile, no Bms today, denies nausea, vomiting  3/14 - abdominal pain worse, + BMs, lose, tolerating some po intake, + puffy face, denies HA, + abdominal cramping, plan discussed  3/15 - abdominal pain was good overnight, + worse after oral contrast for CT , + buttock pain from pressure ulcers, denies cp, dyspnea, tolerating some fluids, afebrile + Bms  3/16 - abdominal pain 7/10, constant, not better after BM, reports 4 BMs in last 24 h, tolerating minimal po intake, denies cp, dyspnea     All other systems reviewed and found to be negative with the exception of what has been described above.    Vital sings reviewed for last 24 h  T(C): 37.2 (25 @ 15:40), Max: 37.2 (25 @ 15:40)  T(F): 99 (25 @ 15:40), Max: 99 (25 @ 15:40)  HR: 89 (25 @ 15:40) (89 - 97)  BP: 141/76 (25 @ 15:40) (131/64 - 142/80)  RR: 17 (25 @ 15:40) (17 - 18)  SpO2: 96% (25 @ 15:40) (92% - 96%)  Wt(kg): --  Daily     Daily Weight in k.2 (16 Mar 2025 04:00)  CAPILLARY BLOOD GLUCOSE    Physical exam :   GEN: NAD, Frail   HEENT:  pupils equal and reactive, EOMI, no oropharyngeal lesions, erythema, exudates, oral thrush  NECK:   supple, no carotid bruits  CV:  +S1, +S2, regular, no murmurs  RESP:   lungs clear to auscultation bilaterally, no wheezing, rales, rhonchi, good air entry bilaterally  GI:  abdomen soft,  + diffuse more periumbilical tenderness, with hard umbilical area nodule ( met) , abdomen Distended, normal BS, periumbilical mass  EXT:  no clubbing, no cyanosis, no edema, no calf pain, swelling or erythema  NEURO:  AAOX3, no focal neurological deficits, follows all commands, able to move extremities spontaneously  SKIN:  sacral DTI , under right breast  inframammary:  + 4.0 cm subcutaneous nodule, mildly tender, with drying ulceration of skin surface; not acutely inflamed   : Julian     LABS:  all reviewed     Pro-Brain Natriuretic Peptide: 1721 pg/mL (25 @ 10:22)    C-Reactive Protein: 42.6 mg/mL (03-15-25 @ 07:19)  C-Reactive Protein: 36.9 mg/mL (25 @ 07:23)  C-Reactive Protein: 38.7 mg/mL (25 @ 07:38)  C-Reactive Protein: 38.4 mg/mL (25 @ 09:12)    Pro-Brain Natriuretic Peptide: 1180 pg/mL (25 @ 07:23)                              8.1    4.29  )-----------( 314      ( 16 Mar 2025 10:22 )             25.3     -    138  |  105  |  22  ----------------------------<  81  3.5   |  26  |  1.34[H]    Ca    10.0      16 Mar 2025 10:22  Phos  4.3     -  Mg     1.9     -    TPro  6.3  /  Alb  2.1[L]  /  TBili  0.6  /  DBili  0.2  /  AST  16  /  ALT  20  /  AlkPhos  135[H]  -16        LIVER FUNCTIONS - ( 16 Mar 2025 10:22 )  Alb: 2.1 g/dL / Pro: 6.3 gm/dL / ALK PHOS: 135 U/L / ALT: 20 U/L / AST: 16 U/L / GGT: x             Culture - Blood (25 @ 18:58)    Specimen Source: Blood None   Culture Results:   No growth at 5 days    Culture - Blood (25 @ 11:54)    -  Blood PCR Panel: NEG   Gram Stain:   Growth in aerobic bottle: Yeast like cells   Specimen Source: Blood None   Organism: Blood Culture PCR   Culture Results:   Growth in aerobic bottle: Saccharomyces cerevisiae Sent to [facility]  [address] [address] for  susceptibility  Direct identification is available within approximately 3-5  hours either by Blood Panel Multiplexed PCR or Direct  MALDI-TOF. Details: [email or web address]   Organism Identification: Blood Culture PCR   Method Type: PCR    Fungitell: 478 Abnormal, H     < from: CT Abdomen and Pelvis w/ Oral Cont (03.15.25 @ 09:11) >  LIVER: Within normal limits.  BILE DUCTS: Normal caliber.  GALLBLADDER: Within normal limits.  SPLEEN: Within normal limits.  PANCREAS: Redemonstration of distal pancreatic body mass invasion of the   adjacent stomach.  ADRENALS: Within normal limits.  KIDNEYS/URETERS: No hydronephrosis. Bilateral renal cysts.    BLADDER: Decompressed around a Julian catheter.  REPRODUCTIVE ORGANS: Hysterectomy.    BOWEL: Redemonstration of fistula between small and the transverse colon   within the left hemiabdomen. Mild diffuse distention of small bowel   without definitive evidence of bowel obstruction; evaluation is limited   without IV or positive oral contrast. Colonic diverticulosis.  VESSELS: IVC filter inplace. Atherosclerotic change.  LYMPH NODES: No lymphadenopathy.  ABDOMINAL WALL: Poorly defined soft tissue infiltration about the   umbilicus, unchanged.  BONES: Degenerative changes of the spine.    IMPRESSION:  *  Redemonstration of fistula between small and the transverse colon   within the left hemiabdomen.  *  No definitive evidence of bowel obstruction, although evaluation is   limited without IV or positive oral contrast.      < end of copied text >      < from: Xray Chest 1 View- PORTABLE-Urgent (Xray Chest 1 View- PORTABLE-Urgent .) (25 @ 16:25) >    IMPRESSION:  Bilateral perihilar haziness and bilateral pleural effusions, right   greater than left, compatible with pulmonary edema.    < end of copied text >       CT Abdomen and Pelvis w/ Oral Cont (03.10.25 @ 17:44) >  IMPRESSION:    There is an apparent fistula between the transverse colon and a mid   jejunal loop with enteric contrast in the fistula (2:64). Mottled air   lucency and mild fat infiltration surrounding the jejunal segment (2:62.   Indeterminant as to contained perforation of the transverse colon or the   jejunum.  The finding is new compared with 2025. Recommend surgical   consultation.    Indeterminate 8 mm right middle lobe nodule, either infectious or   malignant. Stable bilateral pleural effusions.    Redemonstrated pancreatic mass, inseparable from the posterior gastric   wall. Unchanged umbilical mass.    Pelvic floor laxity with mild bladder and rectal prolapse.    Ascites unchanged.    Findings were discussed with Dr. Venice Joyner 3/11/2025 9:35 AM by   Dr. Anum Calle with read back confirmation.    A preliminary report was provided by Dr. Adam Robertson     CT Abdomen and Pelvis No Cont (25 @ 16:32) >  IMPRESSION:  Resolution of small and large bowel dilatation.    Mottled lucency involving a short segment of sigmoid colon suspect for   pneumatosis which could be due to benign or ischemic etiology. Please   correlate clinically and with laboratory values.    Minimal increase in amount of ascites.  Unchanged pancreatic body and umbilical masses.      : US Duplex Venous Lower Ext Complete, Bilateral (25 @ 10:23) >  IMPRESSION:  No evidence of deep venous thrombosis in either lower extremity.    MEDICATIONS  (STANDING):  acetaminophen   IVPB .. 1000 milliGRAM(s) IV Intermittent once  albumin human 25% IVPB 100 milliLiter(s) IV Intermittent every 12 hours  aspirin enteric coated 81 milliGRAM(s) Oral daily  chlorhexidine 2% Cloths 1 Application(s) Topical <User Schedule>  cholecalciferol 2000 Unit(s) Oral at bedtime  dextrose 5%. 1000 milliLiter(s) (100 mL/Hr) IV Continuous <Continuous>  dextrose 5%. 1000 milliLiter(s) (50 mL/Hr) IV Continuous <Continuous>  dronabinol 2.5 milliGRAM(s) Oral at bedtime  glucagon  Injectable 1 milliGRAM(s) IntraMuscular once  metoprolol succinate ER 50 milliGRAM(s) Oral daily  mineral oil/petrolatum Hydrophilic Ointment 1 Application(s) Topical daily  pantoprazole    Tablet 40 milliGRAM(s) Oral before breakfast  rifAXIMin 550 milliGRAM(s) Oral three times a day  sucralfate suspension 1 Gram(s) Oral every 12 hours  tamsulosin 0.4 milliGRAM(s) Oral at bedtime  vancomycin    Solution 125 milliGRAM(s) Oral every 6 hours  zinc oxide 40% Paste 1 Application(s) Topical two times a day    MEDICATIONS  (PRN):  acetaminophen     Tablet .. 650 milliGRAM(s) Oral every 6 hours PRN Temp greater or equal to 38C (100.4F), Mild Pain (1 - 3)  aluminum hydroxide/magnesium hydroxide/simethicone Suspension 30 milliLiter(s) Oral every 4 hours PRN Dyspepsia  amLODIPine   Tablet 2.5 milliGRAM(s) Oral daily PRN for SBP > 140  Biotene Dry Mouth Oral Rinse 15 milliLiter(s) Swish and Spit five times a day PRN Mouth Care  lidocaine 4% Cream 1 Application(s) Topical four times a day PRN pain  lidocaine 4% Cream 1 Application(s) Topical four times a day PRN sacral ulcer  morphine  - Injectable 2 milliGRAM(s) IV Push every 4 hours PRN breakthrough pain or very severe pain  ondansetron Injectable 4 milliGRAM(s) IV Push every 8 hours PRN Nausea and/or Vomiting  oxyCODONE    IR 2.5 milliGRAM(s) Oral every 4 hours PRN Moderate Pain (4 - 6)  oxyCODONE    IR 5 milliGRAM(s) Oral every 4 hours PRN Severe Pain (7 - 10)  sodium chloride 0.65% Nasal 1 Spray(s) Both Nostrils two times a day PRN Nasal Congestion

## 2025-03-16 NOTE — PROGRESS NOTE ADULT - SUBJECTIVE AND OBJECTIVE BOX
INTERVAL HPI/OVERNIGHT EVENTS:  Patient S&E at bedside. No o/n events, patient resting comfortably, but raised concern reagarding dull abdominal pain in the lower quadrant. Pain rated at 5 on pain scale. of note, patient also mentioned having skin breakdown and bedsores in the sacral are. Patient denies fever, chills, dizziness, weakness, CP, palpitations, SOB, cough, N/V/D/C, dysuria, changes in bowel movements, LE edema.    VITAL SIGNS:  T(F): 98.1 (03-16-25 @ 07:36)  HR: 92 (03-16-25 @ 07:36)  BP: 142/80 (03-16-25 @ 07:36)  RR: 18 (03-16-25 @ 07:36)  SpO2: 94% (03-16-25 @ 07:36)  Wt(kg): --    PHYSICAL EXAM:    Constitutional: NAD  Skin: + skin breakdown in the sacral area, dressed properly. pain noted  Eyes: EOMI, sclera non-icteric  Neck: supple, no masses, no JVD  Respiratory: CTA b/l, good air entry b/l  Cardiovascular: RRR, no M/R/G  Gastrointestinal: density felt in the lower abdominal area upon palpation, patient also expressed pain on palpation, no masses palpable, + BS, no hepatosplenomegaly  Extremities: no c/c/e  Neurological: AAOx3      MEDICATIONS  (STANDING):  acetaminophen   IVPB .. 1000 milliGRAM(s) IV Intermittent once  aspirin enteric coated 81 milliGRAM(s) Oral daily  caspofungin IVPB 50 milliGRAM(s) IV Intermittent every 24 hours  caspofungin IVPB      chlorhexidine 2% Cloths 1 Application(s) Topical <User Schedule>  cholecalciferol 2000 Unit(s) Oral at bedtime  dextrose 5%. 1000 milliLiter(s) (50 mL/Hr) IV Continuous <Continuous>  dextrose 5%. 1000 milliLiter(s) (100 mL/Hr) IV Continuous <Continuous>  dronabinol 2.5 milliGRAM(s) Oral at bedtime  glucagon  Injectable 1 milliGRAM(s) IntraMuscular once  metoprolol succinate ER 50 milliGRAM(s) Oral daily  mineral oil/petrolatum Hydrophilic Ointment 1 Application(s) Topical daily  pantoprazole    Tablet 40 milliGRAM(s) Oral before breakfast  rifAXIMin 550 milliGRAM(s) Oral three times a day  sucralfate suspension 1 Gram(s) Oral every 12 hours  tamsulosin 0.4 milliGRAM(s) Oral at bedtime  vancomycin    Solution 125 milliGRAM(s) Oral every 6 hours  zinc oxide 40% Paste 1 Application(s) Topical two times a day    MEDICATIONS  (PRN):  acetaminophen     Tablet .. 650 milliGRAM(s) Oral every 6 hours PRN Temp greater or equal to 38C (100.4F), Mild Pain (1 - 3)  aluminum hydroxide/magnesium hydroxide/simethicone Suspension 30 milliLiter(s) Oral every 4 hours PRN Dyspepsia  amLODIPine   Tablet 2.5 milliGRAM(s) Oral daily PRN for SBP > 140  Biotene Dry Mouth Oral Rinse 15 milliLiter(s) Swish and Spit five times a day PRN Mouth Care  lidocaine 4% Cream 1 Application(s) Topical four times a day PRN sacral ulcer  ondansetron Injectable 4 milliGRAM(s) IV Push every 8 hours PRN Nausea and/or Vomiting  sodium chloride 0.65% Nasal 1 Spray(s) Both Nostrils two times a day PRN Nasal Congestion      Allergies    No Known Allergies    Intolerances        LABS:                        8.1    5.15  )-----------( 296      ( 15 Mar 2025 07:19 )             25.5     03-15    139  |  107  |  25[H]  ----------------------------<  96  4.5   |  29  |  1.49[H]    Ca    9.7      15 Mar 2025 07:19  Phos  3.5     03-15  Mg     2.3     03-15    TPro  5.9[L]  /  Alb  2.0[L]  /  TBili  0.5  /  DBili  0.1  /  AST  25  /  ALT  21  /  AlkPhos  126[H]  03-15      Urinalysis Basic - ( 15 Mar 2025 07:19 )    Color: x / Appearance: x / SG: x / pH: x  Gluc: 96 mg/dL / Ketone: x  / Bili: x / Urobili: x   Blood: x / Protein: x / Nitrite: x   Leuk Esterase: x / RBC: x / WBC x   Sq Epi: x / Non Sq Epi: x / Bacteria: x        RADIOLOGY & ADDITIONAL TESTS:  Studies reviewed.    ASSESSMENT & PLAN:  INTERVAL HPI/OVERNIGHT EVENTS:  Patient S&E at bedside. No o/n events, patient resting comfortably, but raised concern about dull abdominal pain in the lower quadrant. Pain rated at 5 on pain scale. Of note, patient also mentioned having skin breakdown and bedsores in the sacral area. Patient denies fever, chills, dizziness, weakness, CP, palpitations, SOB, cough, N/V/D/C, dysuria, changes in bowel movements, LE edema.    VITAL SIGNS:  T(F): 98.1 (03-16-25 @ 07:36)  HR: 92 (03-16-25 @ 07:36)  BP: 142/80 (03-16-25 @ 07:36)  RR: 18 (03-16-25 @ 07:36)  SpO2: 94% (03-16-25 @ 07:36)  Wt(kg): --    PHYSICAL EXAM:    Constitutional: NAD  Skin: + skin breakdown in the sacral area, dressed properly. pain noted  Eyes: EOMI, sclera non-icteric  Neck: supple, no masses, no JVD  Respiratory: CTA b/l, good air entry b/l  Cardiovascular: RRR, no M/R/G  Gastrointestinal: density felt in the lower abdominal area upon palpation, patient also expressed pain on palpation, no masses palpable, + BS, no hepatosplenomegaly  Extremities: no c/c/e  Neurological: AAOx3      MEDICATIONS  (STANDING):  acetaminophen   IVPB .. 1000 milliGRAM(s) IV Intermittent once  aspirin enteric coated 81 milliGRAM(s) Oral daily  caspofungin IVPB 50 milliGRAM(s) IV Intermittent every 24 hours  caspofungin IVPB      chlorhexidine 2% Cloths 1 Application(s) Topical <User Schedule>  cholecalciferol 2000 Unit(s) Oral at bedtime  dextrose 5%. 1000 milliLiter(s) (50 mL/Hr) IV Continuous <Continuous>  dextrose 5%. 1000 milliLiter(s) (100 mL/Hr) IV Continuous <Continuous>  dronabinol 2.5 milliGRAM(s) Oral at bedtime  glucagon  Injectable 1 milliGRAM(s) IntraMuscular once  metoprolol succinate ER 50 milliGRAM(s) Oral daily  mineral oil/petrolatum Hydrophilic Ointment 1 Application(s) Topical daily  pantoprazole    Tablet 40 milliGRAM(s) Oral before breakfast  rifAXIMin 550 milliGRAM(s) Oral three times a day  sucralfate suspension 1 Gram(s) Oral every 12 hours  tamsulosin 0.4 milliGRAM(s) Oral at bedtime  vancomycin    Solution 125 milliGRAM(s) Oral every 6 hours  zinc oxide 40% Paste 1 Application(s) Topical two times a day    MEDICATIONS  (PRN):  acetaminophen     Tablet .. 650 milliGRAM(s) Oral every 6 hours PRN Temp greater or equal to 38C (100.4F), Mild Pain (1 - 3)  aluminum hydroxide/magnesium hydroxide/simethicone Suspension 30 milliLiter(s) Oral every 4 hours PRN Dyspepsia  amLODIPine   Tablet 2.5 milliGRAM(s) Oral daily PRN for SBP > 140  Biotene Dry Mouth Oral Rinse 15 milliLiter(s) Swish and Spit five times a day PRN Mouth Care  lidocaine 4% Cream 1 Application(s) Topical four times a day PRN sacral ulcer  ondansetron Injectable 4 milliGRAM(s) IV Push every 8 hours PRN Nausea and/or Vomiting  sodium chloride 0.65% Nasal 1 Spray(s) Both Nostrils two times a day PRN Nasal Congestion      Allergies    No Known Allergies    Intolerances        LABS:                        8.1    5.15  )-----------( 296      ( 15 Mar 2025 07:19 )             25.5     03-15    139  |  107  |  25[H]  ----------------------------<  96  4.5   |  29  |  1.49[H]    Ca    9.7      15 Mar 2025 07:19  Phos  3.5     03-15  Mg     2.3     03-15    TPro  5.9[L]  /  Alb  2.0[L]  /  TBili  0.5  /  DBili  0.1  /  AST  25  /  ALT  21  /  AlkPhos  126[H]  03-15      Urinalysis Basic - ( 15 Mar 2025 07:19 )    Color: x / Appearance: x / SG: x / pH: x  Gluc: 96 mg/dL / Ketone: x  / Bili: x / Urobili: x   Blood: x / Protein: x / Nitrite: x   Leuk Esterase: x / RBC: x / WBC x   Sq Epi: x / Non Sq Epi: x / Bacteria: x        RADIOLOGY & ADDITIONAL TESTS:  Studies reviewed.    ASSESSMENT & PLAN:  INTERVAL HPI/OVERNIGHT EVENTS:  Patient S&E at bedside. No o/n events, patient resting comfortably, but raised concern about dull abdominal pain in the lower quadrant. Pain rated at 5 on pain scale. Of note, patient also mentioned having pain in the sacral area.     VITAL SIGNS:  T(F): 98.1 (03-16-25 @ 07:36)  HR: 92 (03-16-25 @ 07:36)  BP: 142/80 (03-16-25 @ 07:36)  RR: 18 (03-16-25 @ 07:36)  SpO2: 94% (03-16-25 @ 07:36)  Wt(kg): --    PHYSICAL EXAM:    Constitutional: NAD  Skin: + bandage on sacral region dressed properly. pain noted  Respiratory: CTA b/l, good air entry b/l  Cardiovascular: RRR, no M/R/G  Gastrointestinal: density felt in the lower abdominal area upon palpation, patient also expressed pain on palpation, no masses palpable, + BS, no hepatosplenomegaly  Extremities: no c/c/e  Neurological: AAOx3      MEDICATIONS  (STANDING):  acetaminophen   IVPB .. 1000 milliGRAM(s) IV Intermittent once  aspirin enteric coated 81 milliGRAM(s) Oral daily  caspofungin IVPB 50 milliGRAM(s) IV Intermittent every 24 hours  caspofungin IVPB      chlorhexidine 2% Cloths 1 Application(s) Topical <User Schedule>  cholecalciferol 2000 Unit(s) Oral at bedtime  dextrose 5%. 1000 milliLiter(s) (50 mL/Hr) IV Continuous <Continuous>  dextrose 5%. 1000 milliLiter(s) (100 mL/Hr) IV Continuous <Continuous>  dronabinol 2.5 milliGRAM(s) Oral at bedtime  glucagon  Injectable 1 milliGRAM(s) IntraMuscular once  metoprolol succinate ER 50 milliGRAM(s) Oral daily  mineral oil/petrolatum Hydrophilic Ointment 1 Application(s) Topical daily  pantoprazole    Tablet 40 milliGRAM(s) Oral before breakfast  rifAXIMin 550 milliGRAM(s) Oral three times a day  sucralfate suspension 1 Gram(s) Oral every 12 hours  tamsulosin 0.4 milliGRAM(s) Oral at bedtime  vancomycin    Solution 125 milliGRAM(s) Oral every 6 hours  zinc oxide 40% Paste 1 Application(s) Topical two times a day    MEDICATIONS  (PRN):  acetaminophen     Tablet .. 650 milliGRAM(s) Oral every 6 hours PRN Temp greater or equal to 38C (100.4F), Mild Pain (1 - 3)  aluminum hydroxide/magnesium hydroxide/simethicone Suspension 30 milliLiter(s) Oral every 4 hours PRN Dyspepsia  amLODIPine   Tablet 2.5 milliGRAM(s) Oral daily PRN for SBP > 140  Biotene Dry Mouth Oral Rinse 15 milliLiter(s) Swish and Spit five times a day PRN Mouth Care  lidocaine 4% Cream 1 Application(s) Topical four times a day PRN sacral ulcer  ondansetron Injectable 4 milliGRAM(s) IV Push every 8 hours PRN Nausea and/or Vomiting  sodium chloride 0.65% Nasal 1 Spray(s) Both Nostrils two times a day PRN Nasal Congestion      Allergies    No Known Allergies    Intolerances        LABS:                        8.1    5.15  )-----------( 296      ( 15 Mar 2025 07:19 )             25.5     03-15    139  |  107  |  25[H]  ----------------------------<  96  4.5   |  29  |  1.49[H]    Ca    9.7      15 Mar 2025 07:19  Phos  3.5     03-15  Mg     2.3     03-15    TPro  5.9[L]  /  Alb  2.0[L]  /  TBili  0.5  /  DBili  0.1  /  AST  25  /  ALT  21  /  AlkPhos  126[H]  03-15      Urinalysis Basic - ( 15 Mar 2025 07:19 )    Color: x / Appearance: x / SG: x / pH: x  Gluc: 96 mg/dL / Ketone: x  / Bili: x / Urobili: x   Blood: x / Protein: x / Nitrite: x   Leuk Esterase: x / RBC: x / WBC x   Sq Epi: x / Non Sq Epi: x / Bacteria: x        RADIOLOGY & ADDITIONAL TESTS:  Studies reviewed.    ASSESSMENT & PLAN:

## 2025-03-16 NOTE — PROGRESS NOTE ADULT - SUBJECTIVE AND OBJECTIVE BOX
Subjective:    pat better, sitting in bed, still some abd pain, repeat CT abd unchanged.    Home Medications:  amitriptyline: 10 milligram(s) orally once a day (23 Dec 2024 18:06)  amLODIPine 5 mg oral tablet: 1 tab(s) orally once a day (23 Dec 2024 18:06)  aspirin 81 mg oral tablet, chewable: 1 tab(s) orally once a day (23 Dec 2024 18:06)  atorvastatin 40 mg oral tablet: 1 tab(s) orally once a day (at bedtime) (23 Dec 2024 18:06)  cefepime 2 g intravenous injection: 2 gram(s) intravenous every 12 hours (04 Mar 2025 21:11)  clopidogrel 75 mg oral tablet: 1 tab(s) orally once a day (23 Dec 2024 16:32)  filgrastim: 480 microgram(s) subcutaneous once a day (04 Mar 2025 21:11)  Lactated Ringers Injection intravenous solution: 150 milligram(s) intravenous every 1 to 2 hours (04 Mar 2025 21:11)  loperamide 2 mg oral capsule: 1 cap(s) orally once (04 Mar 2025 21:11)  midodrine 5 mg oral tablet: 1 tab(s) orally every 8 hours (04 Mar 2025 21:11)  Multiple Vitamins with Minerals oral liquid: 1 orally once a day (04 Mar 2025 21:11)  octreotide 2500 mcg/mL subcutaneous solution: 0.04 milliliter(s) subcutaneous 3 times a day (04 Mar 2025 21:11)  Pepcid 40 mg oral tablet: 40 milligram(s) orally once a day as needed for dyspepsia (23 Dec 2024 18:06)  sucralfate 1 g/10 mL oral suspension: 10 milliliter(s) orally 4 times a day (04 Mar 2025 21:11)  trimethobenzamide 100 mg/mL intramuscular solution: 2 milliliter(s) intramuscular every 8 hours As needed Nausea and/or Vomiting (04 Mar 2025 21:11)    MEDICATIONS  (STANDING):  acetaminophen   IVPB .. 1000 milliGRAM(s) IV Intermittent once  albumin human 25% IVPB 100 milliLiter(s) IV Intermittent every 12 hours  aspirin enteric coated 81 milliGRAM(s) Oral daily  chlorhexidine 2% Cloths 1 Application(s) Topical <User Schedule>  cholecalciferol 2000 Unit(s) Oral at bedtime  dextrose 5%. 1000 milliLiter(s) (100 mL/Hr) IV Continuous <Continuous>  dextrose 5%. 1000 milliLiter(s) (50 mL/Hr) IV Continuous <Continuous>  dronabinol 2.5 milliGRAM(s) Oral at bedtime  glucagon  Injectable 1 milliGRAM(s) IntraMuscular once  metoprolol succinate ER 50 milliGRAM(s) Oral daily  mineral oil/petrolatum Hydrophilic Ointment 1 Application(s) Topical daily  opium Tincture 6 milliGRAM(s) Oral every 6 hours  pantoprazole    Tablet 40 milliGRAM(s) Oral before breakfast  rifAXIMin 550 milliGRAM(s) Oral three times a day  sucralfate suspension 1 Gram(s) Oral every 12 hours  tamsulosin 0.4 milliGRAM(s) Oral at bedtime  vancomycin    Solution 125 milliGRAM(s) Oral every 6 hours  zinc oxide 40% Paste 1 Application(s) Topical two times a day    MEDICATIONS  (PRN):  acetaminophen     Tablet .. 650 milliGRAM(s) Oral every 6 hours PRN Temp greater or equal to 38C (100.4F), Mild Pain (1 - 3)  aluminum hydroxide/magnesium hydroxide/simethicone Suspension 30 milliLiter(s) Oral every 4 hours PRN Dyspepsia  amLODIPine   Tablet 2.5 milliGRAM(s) Oral daily PRN for SBP > 140  Biotene Dry Mouth Oral Rinse 15 milliLiter(s) Swish and Spit five times a day PRN Mouth Care  lidocaine 4% Cream 1 Application(s) Topical four times a day PRN sacral ulcer  lidocaine 4% Cream 1 Application(s) Topical four times a day PRN pain  ondansetron Injectable 4 milliGRAM(s) IV Push every 8 hours PRN Nausea and/or Vomiting  sodium chloride 0.65% Nasal 1 Spray(s) Both Nostrils two times a day PRN Nasal Congestion      Allergies    No Known Allergies    Intolerances        Vital Signs Last 24 Hrs  T(C): 36.7 (16 Mar 2025 07:36), Max: 37.1 (16 Mar 2025 00:32)  T(F): 98.1 (16 Mar 2025 07:36), Max: 98.7 (16 Mar 2025 00:32)  HR: 97 (16 Mar 2025 10:50) (81 - 97)  BP: 138/67 (16 Mar 2025 10:50) (131/64 - 145/69)  BP(mean): --  RR: 18 (16 Mar 2025 07:36) (18 - 18)  SpO2: 94% (16 Mar 2025 07:36) (92% - 94%)    Parameters below as of 16 Mar 2025 07:36  Patient On (Oxygen Delivery Method): room air          PHYSICAL EXAMINATION:    NECK:  Supple. No lymphadenopathy. Jugular venous pressure not elevated. Carotids equal.   HEART:   The cardiac impulse has a normal quality. Reg., Nl S1 and S2.  There are no murmurs, rubs or gallops noted  CHEST:  Chest crackles to auscultation. Normal respiratory effort.  ABDOMEN:  Soft and nontender.   EXTREMITIES:  There is no edema.       LABS:                        8.1    4.29  )-----------( 314      ( 16 Mar 2025 10:22 )             25.3     03-16    138  |  105  |  22  ----------------------------<  81  3.5   |  26  |  1.34[H]    Ca    10.0      16 Mar 2025 10:22  Phos  4.3     03-16  Mg     1.9     03-16    TPro  6.3  /  Alb  2.1[L]  /  TBili  0.6  /  DBili  0.2  /  AST  16  /  ALT  20  /  AlkPhos  135[H]  03-16      Urinalysis Basic - ( 16 Mar 2025 10:22 )    Color: x / Appearance: x / SG: x / pH: x  Gluc: 81 mg/dL / Ketone: x  / Bili: x / Urobili: x   Blood: x / Protein: x / Nitrite: x   Leuk Esterase: x / RBC: x / WBC x   Sq Epi: x / Non Sq Epi: x / Bacteria: x

## 2025-03-16 NOTE — PROGRESS NOTE ADULT - ASSESSMENT
69-year-old female recent diagnosis of primary pancreatic adenocarcinoma , pancytopenia and significant PMH of SCC lip, cyclical vomiting syndrome, HTN, HPL, Gout, GERD, CVA w renal evaluation of MALLORY and hypokalemia     MALLORY  Cr improving  , volume ok  -IVF continues   -Trend labs, lytes. Replete Hypokalemia for goal near 4.0  -Monitor UOP   -Avoid nsaids/contrast   - for IVC filter insertion tomorrow - continue IVF hydration to mitigate MAXIMO risk     3/11  Pacreatic CA/ METS  MALLORY slowly improving  Maintaining current IVF  I/O acceptable   Monitor and replace K/ Mg as needed    3/12  Pacreatic CA/ METS  MALLORY slowly improving, down to 1.32 today  - good UO 1850 ml  Maintaining current IVF  I/O acceptable   Monitor and replace K/ Mg as needed  Maintain K near 4 to minimize ileus, pt has poor IV access , await IV team  Good response to bowel preps  D/W Dr YOUSUF Pleitez    3/13  Full note to follow  Creat stable no finding in the EGD to explain pt sxs  May need CT with IVC,   Will hydrate appropriately prior to CT, D/W Dr Pleitez       3/14  Pacreatic CA/ METS  MALLORY slowly improving, , up to 1.64 today may be 3 rd spacing, causing azotemia  - good UO 1700 ml  and 3rd spacing  Maintaining current IVF  I/O acceptable   Monitor and replace K/ Mg as needed  Maintain K near 4 to minimize ileus, pt has poor IV access  Good response to bowel preps  D/W Dr YOUSUF Pleitez and Dr Joyner      3/15  Pacreatic CA/ METS  MALLORY slowly improving, ,creat 1.49  - good UO   On Xifaxan improved sxs   + 3rd spacing  Maintaining current IVF  I/O acceptable   Monitor and replace K/ Mg as needed  Maintain K near 4 to minimize ileus, pt has poor IV access  Good response to bowel preps  D/W Dr YOUSUF Pleitez and Dr Joyner    3/16  Pacreatic CA/ METS  MALLORY slowly improvin  - good UO ~ 1 L , less than prior  On Xifaxan improved sxs   + 3rd spacing  I/O acceptable   Monitor and replace K/ Mg as needed  Maintain K near 4 to minimize ileus  D/W Dr YOUSUF Pleitez and Dr Joyner  IV KCL as needed

## 2025-03-16 NOTE — PROGRESS NOTE ADULT - TIME BILLING
I spend mentioned above total minutes on direct patient care on the date of this encounter . This includes reviewing prior documentation, results and imaging in addition to completing a full history and physical examination on the patient. Further tests, medications, and procedures have been ordered as indicated. Laboratory results and the plan of care were communicated to the patient and/or their family member. Supporting documentation was completed and added to the patient's chart.   .
.
I spend mentioned above total minutes on direct patient care on the date of this encounter . This includes reviewing prior documentation, results and imaging in addition to completing a full history and physical examination on the patient. Further tests, medications, and procedures have been ordered as indicated. Laboratory results and the plan of care were communicated to the patient and/or their family member. Supporting documentation was completed and added to the patient's chart.   .
Time spent  coordinating the patient's care. This includes reviewing documentation pertinent to this admission, results and imaging. Further tests, medications, and procedures have been ordered as indicated. Laboratory results and the plan of care were communicated to the patient and . Discussed care plan with consultants including ID. Supporting documentation was completed and added to the patient's chart.
.
I spend mentioned above total minutes on direct patient care on the date of this encounter . This includes reviewing prior documentation, results and imaging in addition to completing a full history and physical examination on the patient. Further tests, medications, and procedures have been ordered as indicated. Laboratory results and the plan of care were communicated to the patient and/or their family member. Supporting documentation was completed and added to the patient's chart.   .
I spend mentioned above total minutes on direct patient care on the date of this encounter . This includes reviewing prior documentation, results and imaging in addition to completing a full history and physical examination on the patient. Further tests, medications, and procedures have been ordered as indicated. Laboratory results and the plan of care were communicated to the patient and/or their family member. Supporting documentation was completed and added to the patient's chart.   .
Time spent  coordinating the patient's care. This includes reviewing documentation pertinent to this admission, results and imaging. Further tests, medications, and procedures have been ordered as indicated. Laboratory results and the plan of care were communicated to the patient and . Discussed care plan with consultants including CM. Supporting documentation was completed and added to the patient's chart.
.
.
I spend mentioned above total minutes on direct patient care on the date of this encounter . This includes reviewing prior documentation, results and imaging in addition to completing a full history and physical examination on the patient. Further tests, medications, and procedures have been ordered as indicated. Laboratory results and the plan of care were communicated to the patient and/or their family member. Supporting documentation was completed and added to the patient's chart.   .
.
.
Time spent  coordinating the patient's care. This includes reviewing documentation pertinent to this admission, results and imaging. Further tests, medications, and procedures have been ordered as indicated. Laboratory results and the plan of care were communicated to the patient. Discussed care plan with consultants including ID. Supporting documentation was completed and added to the patient's chart.
.

## 2025-03-16 NOTE — PROGRESS NOTE ADULT - SUBJECTIVE AND OBJECTIVE BOX
s/p endoscopy this am  No findings   Supine comfortable    3/14  not talkative today  meds adjusted bt Hospitalist    3/15  Started on Xifaxan  feels better     3/16  Still with abd discomfort  good uo    Allergies    No Known Allergies    Intolerances      MEDICATIONS  (STANDING):  acetaminophen     Tablet .. 650 milliGRAM(s) Oral every 12 hours  acetaminophen   IVPB .. 1000 milliGRAM(s) IV Intermittent once  aspirin enteric coated 81 milliGRAM(s) Oral daily  caspofungin IVPB 50 milliGRAM(s) IV Intermittent every 24 hours  caspofungin IVPB      chlorhexidine 2% Cloths 1 Application(s) Topical <User Schedule>  cholecalciferol 2000 Unit(s) Oral at bedtime  dextrose 5%. 1000 milliLiter(s) (50 mL/Hr) IV Continuous <Continuous>  dextrose 5%. 1000 milliLiter(s) (100 mL/Hr) IV Continuous <Continuous>  dicyclomine 20 milliGRAM(s) Oral four times a day before meals  dronabinol 2.5 milliGRAM(s) Oral at bedtime  glucagon  Injectable 1 milliGRAM(s) IntraMuscular once  metoprolol succinate ER 25 milliGRAM(s) Oral daily  pantoprazole    Tablet 40 milliGRAM(s) Oral before breakfast  simethicone 125 milliGRAM(s) Chew three times a day  sucralfate suspension 1 Gram(s) Oral every 12 hours  tamsulosin 0.4 milliGRAM(s) Oral at bedtime  vancomycin    Solution 125 milliGRAM(s) Oral every 6 hours  zinc oxide 40% Paste 1 Application(s) Topical two times a day    MEDICATIONS  (PRN):  acetaminophen     Tablet .. 650 milliGRAM(s) Oral every 6 hours PRN Temp greater or equal to 38C (100.4F), Mild Pain (1 - 3)  aluminum hydroxide/magnesium hydroxide/simethicone Suspension 30 milliLiter(s) Oral every 4 hours PRN Dyspepsia  I&O's Detail      I&O's Detail    15 Mar 2025 07:01  -  16 Mar 2025 07:00  --------------------------------------------------------  IN:  Total IN: 0 mL    OUT:    Indwelling Catheter - Urethral (mL): 2325 mL  Total OUT: 2325 mL    Total NET: -2325 mL      16 Mar 2025 07:01  -  16 Mar 2025 17:08  --------------------------------------------------------  IN:  Total IN: 0 mL    OUT:    Indwelling Catheter - Urethral (mL): 1000 mL    Stool (mL): 2 mL  Total OUT: 1002 mL    Total NET: -1002 mL            Constitutional: NAD,   HEENT:   MM  Respiratory: CTAB  Cardiovascular: S1 and S2  Gastrointestinal: BS+, soft, NT/ND  Extremities: minimal  peripheral edema  Neurological: A/O  : + coelho      LABS:                                   8.1    4.29  )-----------( 314      ( 16 Mar 2025 10:22 )             25.3                    8.1    5.15  )-----------( 296      ( 15 Mar 2025 07:19 )             25.5                           8.5    8.13  )-----------( 310      ( 14 Mar 2025 07:23 )             26.8                                  8.9    7.46  )-----------( 321      ( 13 Mar 2025 06:21 )             28.3                       03-16    138  |  105  |  22  ----------------------------<  81  3.5   |  26  |  1.34[H]    Ca    10.0      16 Mar 2025 10:22  Phos  4.3     03-16  Mg     1.9     03-16    TPro  6.3  /  Alb  2.1[L]  /  TBili  0.6  /  DBili  0.2  /  AST  16  /  ALT  20  /  AlkPhos  135[H]  03-16      03-15    139  |  107  |  25[H]  ----------------------------<  96  4.5   |  29  |  1.49[H]    Ca    9.7      15 Mar 2025 07:19  Phos  3.5     03-15  Mg     2.3     03-15    TPro  5.9[L]  /  Alb  2.0[L]  /  TBili  0.5  /  DBili  0.1  /  AST  25  /  ALT  21  /  AlkPhos  126[H]  03-15      03-14    140  |  108  |  23  ----------------------------<  123[H]  4.2   |  28  |  1.64[H]    Ca    9.8      14 Mar 2025 07:23  Phos  2.9     03-14  Mg     2.5     03-14    TPro  6.4  /  Alb  2.2[L]  /  TBili  0.5  /  DBili  0.2  /  AST  15  /  ALT  20  /  AlkPhos  133[H]  03-13 03-13    140  |  107  |  24[H]  ----------------------------<  119[H]  4.1   |  28  |  1.62[H]    Ca    9.5      13 Mar 2025 06:21  Phos  3.1     03-13  Mg     2.8     03-13    TPro  6.4  /  Alb  2.2[L]  /  TBili  0.5  /  DBili  0.2  /  AST  15  /  ALT  20  /  AlkPhos  133[H]  03-13 03-12    138  |  105  |  22  ----------------------------<  134[H]  3.4[L]   |  28  |  1.32[H]    Ca    9.9      12 Mar 2025 07:38  Phos  3.6     03-12  Mg     2.5     03-12    TPro  6.2  /  Alb  2.2[L]  /  TBili  0.6  /  DBili  0.2  /  AST  16  /  ALT  20  /  AlkPhos  136[H]  03-12 03-11    141  |  107  |  25[H]  ----------------------------<  123[H]  3.7   |  26  |  1.40[H]    Ca    9.6      11 Mar 2025 09:12  Phos  3.2     03-11  Mg     1.7     03-11    TPro  6.3  /  Alb  2.2[L]  /  TBili  0.7  /  DBili  x   /  AST  19  /  ALT  20  /  AlkPhos  140[H]  03-11      143    |  113    |  31     ----------------------------<  129       09 Mar 2025 07:07  3.9     |  23     |  1.48     144    |  112    |  32     ----------------------------<  126       08 Mar 2025 07:59  3.3     |  25     |  1.61     143    |  112    |  37     ----------------------------<  125       07 Mar 2025 07:22  3.8     |  22     |  1.69     Ca    9.4        09 Mar 2025 07:07  Ca    9.2        08 Mar 2025 07:59      Mg     1.6       09 Mar 2025 07:07  Mg     1.6       08 Mar 2025 07:59    TPro  6.0    /  Alb  2.0    /  TBili  0.6    /        09 Mar 2025 07:07  DBili  x      /  AST  17     /  ALT  21     /  AlkPhos  150      TPro  5.8    /  Alb  1.9    /  TBili  0.6    /        08 Mar 2025 07:59  DBili  x      /  AST  17     /  ALT  21     /  AlkPhos  151          Serum Osmo:   Serum Uric Acid:     Urine Studies:  Urinalysis Basic - ( 09 Mar 2025 07:07 )    Color: x / Appearance: x / SG: x / pH: x  Gluc: 129 mg/dL / Ketone: x  / Bili: x / Urobili: x   Blood: x / Protein: x / Nitrite: x   Leuk Esterase: x / RBC: x / WBC x   Sq Epi: x / Non Sq Epi: x / Bacteria: x        Urine chemistry:   Urine Na:   Urine Creatinine:   Urine Protein/Cr ratio:  Urine K:   Urine Osm:       RADIOLOGY & ADDITIONAL STUDIES:

## 2025-03-16 NOTE — PROGRESS NOTE ADULT - ASSESSMENT
70yo female with PMHx recent diagnosis of primary pancreatic adenocarcinoma of head with tumor in umbilicus, chemo-induced pancytopenia, SCC lip, cyclical vomiting syndrome, HTN, HPL, Gout, GERD, CVA on Aggrenox  Initially admitted to [facility] with septic shock 2/2 + pseudomonas bacteremia likely due to UTI vs colitis on 02/09/2025 Was having nausea, vomiting and diarrhea requiring Levophed drip, was transferred out of ICU to regular floor.  Then transferred to  as per request of . Was getting platelet transfusion and developed tachypnea hypoxia, rigors. Was diuresed and became hypotensive, Upgraded to SICU. CTA c/a/p on 2/19 showed multiple b/l PE and large bowel obstruction, was started on heparin drip, c/b GIB, then retrialed heparin drip with recurrent c/f GIB. Also with Afib w RVR during admission, resolved w iv metoprolol.   On 2/28, developed SIRS, Blood cx growing yeast. TPN stopped, R chest port removed on 3/2 and pt started on iv caspofungin.    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 Mottled air   lucency and mild fat infiltration surrounding the jejunal segment (2:62. 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  - lactate cleared 1.3 3/8   - Tolerated CLD, now on regular diet  - GI and surgery and oncology surgery  on board   - Sp Meropenem  - second opinion GI Dr. Young   - 3/13  -s/p EGD - gastritis , pt refusing colonoscopy    # Urinary retention   - s/p Julian   - stop  bethanechol 10 tid  - can cause abdominal cramping   - voiding trial once more ambulatory  - 3/13 - started  flomax hs  - urology consult    - plan for voiding trial  on Monday     # Periumbilical Abdominal pain due to periumbilical metastasis   - tylenol 650 bid   -  tramadol 25 q6 prn for severe pain  - not effective  -  bentyl tid  20 mg - not effective  - 3/14 - dilaudid 0.2 IV prn  stopped not helpful  - 3/15 - use po and IV prn tylenol  - 3/16 - PO oxycodone 2.5q4h prn and 5 mg q4h prn and IV morphine for breakthrough  - palliative care consult  - lidocaine cream to pressure ulcer, periumbilical met      # Suspected SIBO  # Diarrheal syndrome   - c/w  xifaxan 550 tid d/w Dr. Hernandez  - stop simethicone 125 tid - not effective, can worsen diarrhea   - opium tincture 6 q6h ordered, s/p first dose - no effect on pain , diarrhea slow down  - 3/16 - stool studies - GI PCR, calprotectin, elastase, stool culture     #Acute bilateral PE  # Lung nodules  # Sinus tachycardia, resolved  - Doppler LE negative  - CT chest- multiple PE w/in segmental left lower and right lower, 2 right middle lobe nodules,   -  3/10 s/p IVC filter   - Recurrent GIB on heparin   - serial ekgs  - cardiology consult  - 3/13 - start toprol 25   - 3/14 - toprol 25--> 50 , stop benthyl can cause tachyrhythmias   - CXR , remote tele   - 3/15 - sinus 80 th   - 3/16 - d/c tele    # H/o CVA (cerebrovascular accident).   - Was originally on Plavix per  but because is now taking [facility] 6236,  - was told to start Aggrenox instead to avoid DDIs)  - platelets 33 --> 253   - off AP and AC due to GI bleeds will  d/w  GI team  - need to be on plavix d/w Dr. Clement will start once cleared by GI   - d/w Dr. Hernandez ok to restart ASA 81     # SVT on 02/09/2025: resolved after Valsalva and Carotid massage.  -Norvasc and Lipitor on hold to septic shock.   -Off levophed; s/p  oral midodrine.  -Cardiology consulted Dr. Clement  - off BB  - 3/12 - restart low dose of amlodipine 2.5 for high BP  - 3/13 - cardiology consult, ekgs, start toprol 25  - c/w toprol 50     # GERD/Esophagitis , recurrent GI bleeds while on IV heparin   - Continue protonix iv bid--> po  - Continue carafate bid    #MALLORY, likely ATN  - Stable at 1.4 3/9  - Nephrology on board  - d/c IV fluids  - Creatinine Trend: 1.4 <--1.62<--, 1.32<--, 1.40<--, 1.39<--, 1.48<--, 1.61<--    # Acute blood loss anemia and Malignancy  - SP GIB x 2 after starting heparin  - s/p pRBC x 6u this admission  - Hemoglobin stable 9.0  3/9  - Continue Protonix      #Primary pancreatic adenocarcinoma  - SP folfirinox with reaction to irinotecan  - on a clinical trial now, on hold     #Skin injury Left and Rigth upper Gluteal Intergluteal cleft   - superficial skin necrosis  - Wound care evaluation appreciated  - Low air mattress  - Turning / positioning q2h while in bed    # Cutaneous lesion on RUQ area below right breast,   Ecthyma gangrenosum.  - Ecthyma gangrenosum. life-threatening skin infection caused by Pseudomonas aeruginosa bacteria.   - dermatology consult  -  appears to be resolving, most skin changes appear post inflammatory.  - Please recall if the lesion does not continue to slowly resolve over the next 2-3 weeks.   - wound care - If the surface ulcerates, cover with telfa/ vaseline/ paper tape      # Hypokalemia  # Hypomagnesemia   - replete prn  - keep K close to 4     # HTN   - amlodipine 2.5     #DVT ppx - SCDs    FULL CODE  palliative consulted  complex care   HCP Dr. 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  Then transferred to  as per request of . Was getting platelet transfusion and developed tachypnea hypoxia, rigors. Was diuresed and became hypotensive, Upgraded to SICU. CTA c/a/p on 2/19 showed multiple b/l PE and large bowel obstruction, was started on heparin drip, c/b GIB, then retrialed heparin drip with recurrent c/f GIB. Also with Afib w RVR during admission, resolved w iv metoprolol.   On 2/28, developed SIRS, Blood cx growing yeast. TPN stopped, R chest port removed on 3/2 and pt started on iv caspofungin.    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 Mottled air   lucency and mild fat infiltration surrounding the jejunal segment (2:62. 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  - lactate cleared 1.3 3/8   - Tolerated CLD, now on regular diet  - GI and surgery and oncology surgery  on board   - Sp Meropenem  - second opinion GI Dr. Young   - 3/13  -s/p EGD - gastritis , pt refusing colonoscopy    # Urinary retention   - s/p Julian   - stop  bethanechol 10 tid  - can cause abdominal cramping   - voiding trial once more ambulatory  - 3/13 - started  flomax hs  - urology consult    - plan for voiding trial  on Monday     # Periumbilical Abdominal pain due to periumbilical metastasis   - tylenol 650 bid   -  tramadol 25 q6 prn for severe pain  - not effective  -  bentyl tid  20 mg - not effective  - 3/14 - dilaudid 0.2 IV prn  stopped not helpful  - 3/15 - use po and IV prn tylenol  - 3/16 - PO morphine 7.5 q4h prn moderate and 5 mg q4h prn severe  and IV morphine for breakthrough  - palliative care consult  - lidocaine cream to pressure ulcer, periumbilical met      # Suspected SIBO  # Diarrheal syndrome   - c/w  xifaxan 550 tid d/w Dr. Hernandez  - stop simethicone 125 tid - not effective, can worsen diarrhea   - opium tincture 6 q6h ordered, s/p first dose - no effect on pain , diarrhea slow down  - 3/16 - stool studies - GI PCR, calprotectin, elastase, stool culture     #Acute bilateral PE  # Lung nodules  # Sinus tachycardia, resolved  - Doppler LE negative  - CT chest- multiple PE w/in segmental left lower and right lower, 2 right middle lobe nodules,   -  3/10 s/p IVC filter   - Recurrent GIB on heparin   - serial ekgs  - cardiology consult  - 3/13 - start toprol 25   - 3/14 - toprol 25--> 50 , stop benthyl can cause tachyrhythmias   - CXR , remote tele   - 3/15 - sinus 80 th   - 3/16 - d/c tele    # H/o CVA (cerebrovascular accident).   - Was originally on Plavix per  but because is now taking [facility] 6236,  - was told to start Aggrenox instead to avoid DDIs)  - platelets 33 --> 253   - off AP and AC due to GI bleeds will  d/w  GI team  - need to be on plavix d/w Dr. Clement will start once cleared by GI   - d/w Dr. Hernandez ok to restart ASA 81     # SVT on 02/09/2025: resolved after Valsalva and Carotid massage.  -Norvasc and Lipitor on hold to septic shock.   -Off levophed; s/p  oral midodrine.  -Cardiology consulted Dr. Clement  - off BB  - 3/12 - restart low dose of amlodipine 2.5 for high BP  - 3/13 - cardiology consult, ekgs, start toprol 25  - c/w toprol 50     # GERD/Esophagitis , recurrent GI bleeds while on IV heparin   - Continue protonix iv bid--> po  - Continue carafate bid    #MALLORY, likely ATN  - Stable at 1.4 3/9  - Nephrology on board  - d/c IV fluids  - Creatinine Trend: 1.4 <--1.62<--, 1.32<--, 1.40<--, 1.39<--, 1.48<--, 1.61<--    # Acute blood loss anemia and Malignancy  - SP GIB x 2 after starting heparin  - s/p pRBC x 6u this admission  - Hemoglobin stable 9.0  3/9  - Continue Protonix      #Primary pancreatic adenocarcinoma  - SP folfirinox with reaction to irinotecan  - on a clinical trial now, on hold     #Skin injury Left and Rigth upper Gluteal Intergluteal cleft   - superficial skin necrosis  - Wound care evaluation appreciated  - Low air mattress  - Turning / positioning q2h while in bed    # Cutaneous lesion on RUQ area below right breast,   Ecthyma gangrenosum.  - Ecthyma gangrenosum. life-threatening skin infection caused by Pseudomonas aeruginosa bacteria.   - dermatology consult  -  appears to be resolving, most skin changes appear post inflammatory.  - Please recall if the lesion does not continue to slowly resolve over the next 2-3 weeks.   - wound care - If the surface ulcerates, cover with telfa/ vaseline/ paper tape      # Hypokalemia  # Hypomagnesemia   - replete prn  - keep K close to 4     # HTN   - amlodipine 2.5     #DVT ppx - SCDs    FULL CODE  palliative consulted  complex care   HCP Dr. Pleitez [phone] - GI doctor updated daily     Dispo:  pain management, palliative consult , PT daily,  voiding trial when more mobile , monitor H/H, d/c planning to [facility]

## 2025-03-17 DIAGNOSIS — C25.9 MALIGNANT NEOPLASM OF PANCREAS, UNSPECIFIED: ICD-10-CM

## 2025-03-17 LAB — LACTATE SERPL-SCNC: 0.9 MMOL/L — SIGNIFICANT CHANGE UP (ref 0.7–2)

## 2025-03-17 PROCEDURE — 99233 SBSQ HOSP IP/OBS HIGH 50: CPT

## 2025-03-17 PROCEDURE — 74177 CT ABD & PELVIS W/CONTRAST: CPT | Mod: 26

## 2025-03-17 PROCEDURE — 99232 SBSQ HOSP IP/OBS MODERATE 35: CPT

## 2025-03-17 PROCEDURE — 99222 1ST HOSP IP/OBS MODERATE 55: CPT | Mod: 24

## 2025-03-17 RX ADMIN — Medication 1 GRAM(S): at 10:10

## 2025-03-17 RX ADMIN — Medication 75 MILLILITER(S): at 14:24

## 2025-03-17 RX ADMIN — Medication 81 MILLIGRAM(S): at 10:11

## 2025-03-17 RX ADMIN — ALBUMIN (HUMAN) 50 MILLILITER(S): 12.5 INJECTION, SOLUTION INTRAVENOUS at 22:32

## 2025-03-17 RX ADMIN — Medication 2 MILLIGRAM(S): at 05:45

## 2025-03-17 RX ADMIN — OXYCODONE HYDROCHLORIDE 5 MILLIGRAM(S): 30 TABLET ORAL at 17:33

## 2025-03-17 RX ADMIN — OXYCODONE HYDROCHLORIDE 5 MILLIGRAM(S): 30 TABLET ORAL at 11:58

## 2025-03-17 RX ADMIN — OXYCODONE HYDROCHLORIDE 5 MILLIGRAM(S): 30 TABLET ORAL at 13:00

## 2025-03-17 RX ADMIN — OXYCODONE HYDROCHLORIDE 5 MILLIGRAM(S): 30 TABLET ORAL at 22:31

## 2025-03-17 RX ADMIN — Medication 1 GRAM(S): at 22:31

## 2025-03-17 RX ADMIN — TOUCHLESS CARE ZINC OXIDE PROTECTANT 1 APPLICATION(S): 20; 25 SPRAY TOPICAL at 22:33

## 2025-03-17 RX ADMIN — TAMSULOSIN HYDROCHLORIDE 0.4 MILLIGRAM(S): 0.4 CAPSULE ORAL at 22:32

## 2025-03-17 RX ADMIN — DRONABINOL 2.5 MILLIGRAM(S): 10 CAPSULE ORAL at 22:32

## 2025-03-17 RX ADMIN — Medication 2 MILLIGRAM(S): at 00:53

## 2025-03-17 RX ADMIN — OXYCODONE HYDROCHLORIDE 5 MILLIGRAM(S): 30 TABLET ORAL at 08:46

## 2025-03-17 RX ADMIN — METOPROLOL SUCCINATE 50 MILLIGRAM(S): 50 TABLET, EXTENDED RELEASE ORAL at 10:11

## 2025-03-17 RX ADMIN — Medication 2 MILLIGRAM(S): at 06:00

## 2025-03-17 RX ADMIN — OXYCODONE HYDROCHLORIDE 5 MILLIGRAM(S): 30 TABLET ORAL at 03:53

## 2025-03-17 RX ADMIN — OXYCODONE HYDROCHLORIDE 5 MILLIGRAM(S): 30 TABLET ORAL at 07:46

## 2025-03-17 RX ADMIN — Medication 1 APPLICATION(S): at 10:12

## 2025-03-17 RX ADMIN — Medication 40 MILLIGRAM(S): at 05:40

## 2025-03-17 RX ADMIN — Medication 2 MILLIGRAM(S): at 00:38

## 2025-03-17 RX ADMIN — TOUCHLESS CARE ZINC OXIDE PROTECTANT 1 APPLICATION(S): 20; 25 SPRAY TOPICAL at 10:12

## 2025-03-17 RX ADMIN — OXYCODONE HYDROCHLORIDE 5 MILLIGRAM(S): 30 TABLET ORAL at 04:23

## 2025-03-17 RX ADMIN — Medication 1 APPLICATION(S): at 10:13

## 2025-03-17 RX ADMIN — OXYCODONE HYDROCHLORIDE 5 MILLIGRAM(S): 30 TABLET ORAL at 23:01

## 2025-03-17 RX ADMIN — ALBUMIN (HUMAN) 50 MILLILITER(S): 12.5 INJECTION, SOLUTION INTRAVENOUS at 10:09

## 2025-03-17 RX ADMIN — Medication 2000 UNIT(S): at 22:31

## 2025-03-17 NOTE — CONSULT NOTE ADULT - NS ATTEND AMEND GEN_ALL_CORE FT
case d/w teams and np above  pain management will need to monitor use and 24hour totals   adjust as needed   GOC at this time was deferred as family is very clear they do not want to hold a discussion and want all interventions for life prolonging treatments to continue.

## 2025-03-17 NOTE — CONSULT NOTE ADULT - SUBJECTIVE AND OBJECTIVE BOX
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  GERD (gastroesophageal reflux disease)  Cyclical vomiting syndrome  CVA (cerebrovascular accident)  H/O: hysterectomy  H/O squamous cell carcinoma excision    SOCIAL HX: lives at home with      Hx opiate tolerance ( )YES  (x )NO    Baseline ADLs  (Prior to Admission)  ( x) Independent   ( )Dependent  with assistance     FAMILY HISTORY:  No pertinent family history in first degree relatives    Review of Systems:    Anxiety-  Depression-  Physical Discomfort-  Dyspnea-  Constipation-  Diarrhea-  Nausea-  Vomiting-  Anorexia-  Weight Loss-   Cough-  Secretions-  Fatigue-  Weakness-  Delirium-    All other systems reviewed and negative  Unable to obtain/Limited due to:    PHYSICAL EXAM:    Vital Signs Last 24 Hrs  T(C): 36.9 (17 Mar 2025 08:23), Max: 36.9 (17 Mar 2025 08:23)  T(F): 98.4 (17 Mar 2025 08:23), Max: 98.4 (17 Mar 2025 08:23)  HR: 93 (17 Mar 2025 08:23) (87 - 93)  BP: 141/71 (17 Mar 2025 08:23) (124/65 - 141/71)  RR: 16 (17 Mar 2025 08:23) (16 - 17)  SpO2: 98% (17 Mar 2025 08:23) (92% - 98%)    Parameters below as of 17 Mar 2025 08:23  Patient On (Oxygen Delivery Method): room air    Daily Weight in k.9 (17 Mar 2025 06:07)    PPSV2:   %  FAST:    General:  Mental Status:  HEENT:  Lungs:  Cardiac:  GI:  :  Ext:  Neuro:      LABS:                        8.1    4.29  )-----------( 314      ( 16 Mar 2025 10:22 )             25.3     03-16    138  |  105  |  22  ----------------------------<  81  3.5   |  26  |  1.34[H]    Ca    10.0      16 Mar 2025 10:22  Phos  4.3     03-16  Mg     1.9     -    TPro  6.3  /  Alb  2.1[L]  /  TBili  0.6  /  DBili  0.2  /  AST  16  /  ALT  20  /  AlkPhos  135[H]  03-16      Albumin: Albumin: 2.1 g/dL ( @ 10:22)      Allergies    No Known Allergies    Intolerances      MEDICATIONS  (STANDING):  acetaminophen   IVPB .. 1000 milliGRAM(s) IV Intermittent once  albumin human 25% IVPB 100 milliLiter(s) IV Intermittent every 12 hours  aspirin enteric coated 81 milliGRAM(s) Oral daily  chlorhexidine 2% Cloths 1 Application(s) Topical <User Schedule>  cholecalciferol 2000 Unit(s) Oral at bedtime  dextrose 5%. 1000 milliLiter(s) (100 mL/Hr) IV Continuous <Continuous>  dextrose 5%. 1000 milliLiter(s) (50 mL/Hr) IV Continuous <Continuous>  dronabinol 2.5 milliGRAM(s) Oral at bedtime  glucagon  Injectable 1 milliGRAM(s) IntraMuscular once  metoprolol succinate ER 50 milliGRAM(s) Oral daily  mineral oil/petrolatum Hydrophilic Ointment 1 Application(s) Topical daily  pantoprazole    Tablet 40 milliGRAM(s) Oral before breakfast  rifAXIMin 550 milliGRAM(s) Oral three times a day  sodium chloride 0.9%. 1000 milliLiter(s) (75 mL/Hr) IV Continuous <Continuous>  sucralfate suspension 1 Gram(s) Oral every 12 hours  tamsulosin 0.4 milliGRAM(s) Oral at bedtime  zinc oxide 40% Paste 1 Application(s) Topical two times a day    MEDICATIONS  (PRN):  acetaminophen     Tablet .. 650 milliGRAM(s) Oral every 6 hours PRN Temp greater or equal to 38C (100.4F), Mild Pain (1 - 3)  aluminum hydroxide/magnesium hydroxide/simethicone Suspension 30 milliLiter(s) Oral every 4 hours PRN Dyspepsia  amLODIPine   Tablet 2.5 milliGRAM(s) Oral daily PRN for SBP > 140  Biotene Dry Mouth Oral Rinse 15 milliLiter(s) Swish and Spit five times a day PRN Mouth Care  lidocaine 4% Cream 1 Application(s) Topical four times a day PRN pain  lidocaine 4% Cream 1 Application(s) Topical four times a day PRN sacral ulcer  morphine  - Injectable 2 milliGRAM(s) IV Push every 4 hours PRN breakthrough pain or very severe pain  morphine  IR 7.5 milliGRAM(s) Oral every 4 hours PRN Moderate Pain (4 - 6)  ondansetron Injectable 4 milliGRAM(s) IV Push every 8 hours PRN Nausea and/or Vomiting  oxyCODONE    IR 5 milliGRAM(s) Oral every 4 hours PRN Severe Pain (7 - 10)  sodium chloride 0.65% Nasal 1 Spray(s) Both Nostrils two times a day PRN Nasal Congestion      RADIOLOGY/ADDITIONAL STUDIES: HPI: Pt is a 69y old Female with hx of primary pancreatic adenocarcinoma of head with tumor in umbilicus, chemo-induced pancytopenia, SCC lip, cyclical vomiting syndrome, HTN, HPL, Gout, GERD, CVA on Aggrenox  Initially admitted to [facility] with septic shock  + pseudomonas bacteremia likely due to UTI vs colitis on 2025 Was having nausea, vomiting and diarrhea requiring Levophed drip, was transferred out of ICU to regular floor. Then transferred to  as per request of . Was getting platelet transfusion and developed tachypnea hypoxia, rigors. Was diuresed and became hypotensive, Upgraded to SICU. CTA c/a/p on  showed multiple b/l PE and large bowel obstruction, was started on heparin drip, c/b GIB, then retrialed heparin drip with recurrent c/f GIB. Also with Afib w RVR during admission, resolved w iv metoprolol.   On , developed SIRS, Blood cx growing yeast. TPN stopped, R chest port removed on 3/2 and pt started on iv caspofungin. Palliative medicine consulted to help establish GOC and advance care planning     3/17/2025 pt seen and examined with  at bedside, patient  is GI doctor locally, at this time family is decline GOC but was asking ot speak with palliative to discuss pain management.       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  GERD (gastroesophageal reflux disease)  Cyclical vomiting syndrome  CVA (cerebrovascular accident)  H/O: hysterectomy  H/O squamous cell carcinoma excision    SOCIAL HX: lives at home with      Hx opiate tolerance ( )YES  (x )NO    Baseline ADLs  (Prior to Admission)  ( x) Independent   ( )Dependent  with assistance     FAMILY HISTORY:  No pertinent family history in first degree relatives    Review of Systems:    Anxiety- situational   Depression- denies   Physical Discomfort- yes  Constipation- denies   Nausea- yes   Anorexia- some loss of appetitive   Weight Loss- unsure   Secretions- denies   Fatigue- at times   Weakness- from baseline yes     All other systems reviewed and negative    PHYSICAL EXAM:    Vital Signs Last 24 Hrs  T(C): 36.9 (17 Mar 2025 08:23), Max: 36.9 (17 Mar 2025 08:23)  T(F): 98.4 (17 Mar 2025 08:23), Max: 98.4 (17 Mar 2025 08:23)  HR: 93 (17 Mar 2025 08:23) (87 - 93)  BP: 141/71 (17 Mar 2025 08:23) (124/65 - 141/71)  RR: 16 (17 Mar 2025 08:23) (16 - 17)  SpO2: 98% (17 Mar 2025 08:23) (92% - 98%)    Parameters below as of 17 Mar 2025 08:23  Patient On (Oxygen Delivery Method): room air    Daily Weight in k.9 (17 Mar 2025 06:07)    PPSV2: 40-50  %    General: pleasant female in bed, NAD  Mental Status: alert and oriented   HEENT: MMM   Lungs: diminished b/l   Cardiac: S1S2 +   GI: nontender, nondistended +BS   : no suprapubic tenderness   Ext: GOLDEN   Neuro: intacr      LABS:                        8.1    4.29  )-----------( 314      ( 16 Mar 2025 10:22 )             25.3     -    138  |  105  |  22  ----------------------------<  81  3.5   |  26  |  1.34[H]    Ca    10.0      16 Mar 2025 10:22  Phos  4.3     -  Mg     1.9     -    TPro  6.3  /  Alb  2.1[L]  /  TBili  0.6  /  DBili  0.2  /  AST  16  /  ALT  20  /  AlkPhos  135[H]  -16      Albumin: Albumin: 2.1 g/dL ( @ 10:22)      Allergies    No Known Allergies    Intolerances      MEDICATIONS  (STANDING):  acetaminophen   IVPB .. 1000 milliGRAM(s) IV Intermittent once  albumin human 25% IVPB 100 milliLiter(s) IV Intermittent every 12 hours  aspirin enteric coated 81 milliGRAM(s) Oral daily  chlorhexidine 2% Cloths 1 Application(s) Topical <User Schedule>  cholecalciferol 2000 Unit(s) Oral at bedtime  dextrose 5%. 1000 milliLiter(s) (100 mL/Hr) IV Continuous <Continuous>  dextrose 5%. 1000 milliLiter(s) (50 mL/Hr) IV Continuous <Continuous>  dronabinol 2.5 milliGRAM(s) Oral at bedtime  glucagon  Injectable 1 milliGRAM(s) IntraMuscular once  metoprolol succinate ER 50 milliGRAM(s) Oral daily  mineral oil/petrolatum Hydrophilic Ointment 1 Application(s) Topical daily  pantoprazole    Tablet 40 milliGRAM(s) Oral before breakfast  rifAXIMin 550 milliGRAM(s) Oral three times a day  sodium chloride 0.9%. 1000 milliLiter(s) (75 mL/Hr) IV Continuous <Continuous>  sucralfate suspension 1 Gram(s) Oral every 12 hours  tamsulosin 0.4 milliGRAM(s) Oral at bedtime  zinc oxide 40% Paste 1 Application(s) Topical two times a day    MEDICATIONS  (PRN):  acetaminophen     Tablet .. 650 milliGRAM(s) Oral every 6 hours PRN Temp greater or equal to 38C (100.4F), Mild Pain (1 - 3)  aluminum hydroxide/magnesium hydroxide/simethicone Suspension 30 milliLiter(s) Oral every 4 hours PRN Dyspepsia  amLODIPine   Tablet 2.5 milliGRAM(s) Oral daily PRN for SBP > 140  Biotene Dry Mouth Oral Rinse 15 milliLiter(s) Swish and Spit five times a day PRN Mouth Care  lidocaine 4% Cream 1 Application(s) Topical four times a day PRN pain  lidocaine 4% Cream 1 Application(s) Topical four times a day PRN sacral ulcer  morphine  - Injectable 2 milliGRAM(s) IV Push every 4 hours PRN breakthrough pain or very severe pain  morphine  IR 7.5 milliGRAM(s) Oral every 4 hours PRN Moderate Pain (4 - 6)  ondansetron Injectable 4 milliGRAM(s) IV Push every 8 hours PRN Nausea and/or Vomiting  oxyCODONE    IR 5 milliGRAM(s) Oral every 4 hours PRN Severe Pain (7 - 10)  sodium chloride 0.65% Nasal 1 Spray(s) Both Nostrils two times a day PRN Nasal Congestion      RADIOLOGY/ADDITIONAL STUDIES: reviewed

## 2025-03-17 NOTE — CONSULT NOTE ADULT - SUBJECTIVE AND OBJECTIVE BOX
Attending:    Patient is a 69y old  Female who presents with a chief complaint of Abd pain (17 Mar 2025 11:06)      HPI:   History of Present Illness:    HPI: Patient is a 69-year-old female recent diagnosis of primary pancreatic adenocarcinoma of head with tumor in umbilicus, chemo-induced pancytopenia and significant PMH of SCC lip, cyclical vomiting syndrome, HTN, HPL, Gout, GERD, CVA on Aggrenox and others had been admitted to [facility] with septic shock on 02/09/2025, nausea, vomiting and diarrhea requiring Levophed drip, was transferred out of ICU to regular floor last night.  Patient was being followed by GI, ID, Cardiology and Onc there.  Patient has been transferred to  today on patient's  (GI Dr. Pleitez) request.  At this time, patient c/o non-bloody diarrhea x 3 in last 24 hours.  She denies any abdominal pain, nausea, but has some dry heaves.  She denies any fever, chills, chest pain, palpitation, constipation or dysuria. Initially, she was started on Vancomycin and Zosyn, but now she has been on Cefepime as her blood culture and urine cultures are positive for Pseudomonas aeruginosa. Currently, patient is on Cefepime.  ID had also recommended Flagyl, but patient had refused it, as it causes nausea to her.    Sig labs: WBC 0.45k, N 82%, Hb 7.8, Platelets 59k, Bicarb 19, AG 9, , Cr 0.99, TP 4.6, Alb 1.2, LFTs wnl.  Mg 1.9, PO4 2.4.  Prior C.diff negative.     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 650 milliGRAM(s) Oral every 6 hours PRN  acetaminophen   IVPB .. 1000 milliGRAM(s) IV Intermittent once  albumin human 25% IVPB 100 milliLiter(s) IV Intermittent every 12 hours  aluminum hydroxide/magnesium hydroxide/simethicone Suspension 30 milliLiter(s) Oral every 4 hours PRN  Biotene Dry Mouth Oral Rinse 15 milliLiter(s) Swish and Spit five times a day PRN  chlorhexidine 2% Cloths 1 Application(s) Topical <User Schedule>  cholecalciferol 2000 Unit(s) Oral at bedtime  dextrose 5%. 1000 milliLiter(s) IV Continuous <Continuous>  dextrose 5%. 1000 milliLiter(s) IV Continuous <Continuous>  dronabinol 2.5 milliGRAM(s) Oral at bedtime  glucagon  Injectable 1 milliGRAM(s) IntraMuscular once  lidocaine 4% Cream 1 Application(s) Topical four times a day PRN  lidocaine 4% Cream 1 Application(s) Topical four times a day PRN  mineral oil/petrolatum Hydrophilic Ointment 1 Application(s) Topical daily  morphine  - Injectable 2 milliGRAM(s) IV Push every 4 hours PRN  morphine  IR 7.5 milliGRAM(s) Oral every 4 hours PRN  ondansetron Injectable 4 milliGRAM(s) IV Push every 8 hours PRN  oxyCODONE    IR 5 milliGRAM(s) Oral every 4 hours PRN  pantoprazole    Tablet 40 milliGRAM(s) Oral before breakfast  rifAXIMin 550 milliGRAM(s) Oral three times a day  sodium chloride 0.65% Nasal 1 Spray(s) Both Nostrils two times a day PRN  sodium chloride 0.9%. 1000 milliLiter(s) IV Continuous <Continuous>  sucralfate suspension 1 Gram(s) Oral every 12 hours  tamsulosin 0.4 milliGRAM(s) Oral at bedtime  zinc oxide 40% Paste 1 Application(s) Topical two times a day      Allergies    No Known Allergies    Intolerances        SOCIAL HISTORY: Denies tobacco, social ETOH, denies illicit drug use    FAMILY HISTORY:  No pertinent family history in first degree relatives        Vital Signs Last 24 Hrs  T(C): 36.9 (17 Mar 2025 08:23), Max: 37.2 (16 Mar 2025 15:40)  T(F): 98.4 (17 Mar 2025 08:23), Max: 99 (16 Mar 2025 15:40)  HR: 93 (17 Mar 2025 08:23) (87 - 93)  BP: 141/71 (17 Mar 2025 08:23) (124/65 - 141/76)  BP(mean): --  RR: 16 (17 Mar 2025 08:23) (16 - 17)  SpO2: 98% (17 Mar 2025 08:23) (92% - 98%)    Parameters below as of 17 Mar 2025 08:23  Patient On (Oxygen Delivery Method): room air        PHYSICAL EXAM:    Constitutional: NAD well-developed  HEENT: PERRLA, EOMI  Neck: No JVD  Respiratory: CTAB, Cardiovascular: S1 and S2, RRR, no M/G/R  Gastrointestinal: BS+, soft, NT/ND  Extremities: No peripheral edema  Vascular: 2+ peripheral pulses  Neurological: A/O x 3, no focal deficits  Skin: No rashes    LABS:                        8.1    4.29  )-----------( 314      ( 16 Mar 2025 10:22 )             25.3     03-16    138  |  105  |  22  ----------------------------<  81  3.5   |  26  |  1.34[H]    Ca    10.0      16 Mar 2025 10:22  Phos  4.3     03-16  Mg     1.9     03-16    TPro  6.3  /  Alb  2.1[L]  /  TBili  0.6  /  DBili  0.2  /  AST  16  /  ALT  20  /  AlkPhos  135[H]  03-16      Urinalysis Basic - ( 16 Mar 2025 10:22 )    Color: x / Appearance: x / SG: x / pH: x  Gluc: 81 mg/dL / Ketone: x  / Bili: x / Urobili: x   Blood: x / Protein: x / Nitrite: x   Leuk Esterase: x / RBC: x / WBC x   Sq Epi: x / Non Sq Epi: x / Bacteria: x          RADIOLOGY & ADDITIONAL STUDIES:    ASSESSMENT/PLAN:  Patient is a 69y old  Female who presents with a chief complaint of Abd pain (17 Mar 2025 11:06)      -  -  -  -

## 2025-03-17 NOTE — PROGRESS NOTE ADULT - SUBJECTIVE AND OBJECTIVE BOX
INTERVAL HPI/OVERNIGHT EVENTS:  Patient S&E at bedside.   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      GERD (gastroesophageal reflux disease)      Cyclical vomiting syndrome      CVA (cerebrovascular accident)      H/O: hysterectomy      H/O squamous cell carcinoma excision          FAMILY HISTORY:  No pertinent family history in first degree relatives        VITAL SIGNS:  T(F): 98.4 (03-17-25 @ 08:23)  HR: 93 (03-17-25 @ 08:23)  BP: 141/71 (03-17-25 @ 08:23)  RR: 16 (03-17-25 @ 08:23)  SpO2: 98% (03-17-25 @ 08:23)  Wt(kg): --    PHYSICAL EXAM:    Constitutional: NAD  Eyes: EOMI,   Respiratory: CTA b/l, on RA  Cardiovascular: RRR,   Gastrointestinal: soft, mild tenderness in abd, distension   Extremities: no c/c/e  Neurological: AAOx3      MEDICATIONS  (STANDING):  acetaminophen   IVPB .. 1000 milliGRAM(s) IV Intermittent once  albumin human 25% IVPB 100 milliLiter(s) IV Intermittent every 12 hours  aspirin enteric coated 81 milliGRAM(s) Oral daily  chlorhexidine 2% Cloths 1 Application(s) Topical <User Schedule>  cholecalciferol 2000 Unit(s) Oral at bedtime  dextrose 5%. 1000 milliLiter(s) (100 mL/Hr) IV Continuous <Continuous>  dextrose 5%. 1000 milliLiter(s) (50 mL/Hr) IV Continuous <Continuous>  dronabinol 2.5 milliGRAM(s) Oral at bedtime  glucagon  Injectable 1 milliGRAM(s) IntraMuscular once  metoprolol succinate ER 50 milliGRAM(s) Oral daily  mineral oil/petrolatum Hydrophilic Ointment 1 Application(s) Topical daily  pantoprazole    Tablet 40 milliGRAM(s) Oral before breakfast  rifAXIMin 550 milliGRAM(s) Oral three times a day  sucralfate suspension 1 Gram(s) Oral every 12 hours  tamsulosin 0.4 milliGRAM(s) Oral at bedtime  zinc oxide 40% Paste 1 Application(s) Topical two times a day    MEDICATIONS  (PRN):  acetaminophen     Tablet .. 650 milliGRAM(s) Oral every 6 hours PRN Temp greater or equal to 38C (100.4F), Mild Pain (1 - 3)  aluminum hydroxide/magnesium hydroxide/simethicone Suspension 30 milliLiter(s) Oral every 4 hours PRN Dyspepsia  amLODIPine   Tablet 2.5 milliGRAM(s) Oral daily PRN for SBP > 140  Biotene Dry Mouth Oral Rinse 15 milliLiter(s) Swish and Spit five times a day PRN Mouth Care  lidocaine 4% Cream 1 Application(s) Topical four times a day PRN pain  lidocaine 4% Cream 1 Application(s) Topical four times a day PRN sacral ulcer  morphine  - Injectable 2 milliGRAM(s) IV Push every 4 hours PRN breakthrough pain or very severe pain  morphine  IR 7.5 milliGRAM(s) Oral every 4 hours PRN Moderate Pain (4 - 6)  ondansetron Injectable 4 milliGRAM(s) IV Push every 8 hours PRN Nausea and/or Vomiting  oxyCODONE    IR 5 milliGRAM(s) Oral every 4 hours PRN Severe Pain (7 - 10)  sodium chloride 0.65% Nasal 1 Spray(s) Both Nostrils two times a day PRN Nasal Congestion      Allergies    No Known Allergies    Intolerances        LABS:                        8.1    4.29  )-----------( 314      ( 16 Mar 2025 10:22 )             25.3     03-16    138  |  105  |  22  ----------------------------<  81  3.5   |  26  |  1.34[H]    Ca    10.0      16 Mar 2025 10:22  Phos  4.3     03-16  Mg     1.9     03-16    TPro  6.3  /  Alb  2.1[L]  /  TBili  0.6  /  DBili  0.2  /  AST  16  /  ALT  20  /  AlkPhos  135[H]  03-16      Urinalysis Basic - ( 16 Mar 2025 10:22 )    Color: x / Appearance: x / SG: x / pH: x  Gluc: 81 mg/dL / Ketone: x  / Bili: x / Urobili: x   Blood: x / Protein: x / Nitrite: x   Leuk Esterase: x / RBC: x / WBC x   Sq Epi: x / Non Sq Epi: x / Bacteria: x        RADIOLOGY & ADDITIONAL TESTS:  Studies reviewed.

## 2025-03-17 NOTE — PROGRESS NOTE ADULT - SUBJECTIVE AND OBJECTIVE BOX
Subjective:    pat better, sitting in chair, still abd pain, initially isolated for r/o cdiff, now cancelled, waiting CTA of abd to r/o mesentric thrombus, no respiratory distress.    Home Medications:  amitriptyline: 10 milligram(s) orally once a day (23 Dec 2024 18:06)  amLODIPine 5 mg oral tablet: 1 tab(s) orally once a day (23 Dec 2024 18:06)  aspirin 81 mg oral tablet, chewable: 1 tab(s) orally once a day (23 Dec 2024 18:06)  atorvastatin 40 mg oral tablet: 1 tab(s) orally once a day (at bedtime) (23 Dec 2024 18:06)  cefepime 2 g intravenous injection: 2 gram(s) intravenous every 12 hours (04 Mar 2025 21:11)  clopidogrel 75 mg oral tablet: 1 tab(s) orally once a day (23 Dec 2024 16:32)  filgrastim: 480 microgram(s) subcutaneous once a day (04 Mar 2025 21:11)  Lactated Ringers Injection intravenous solution: 150 milligram(s) intravenous every 1 to 2 hours (04 Mar 2025 21:11)  loperamide 2 mg oral capsule: 1 cap(s) orally once (04 Mar 2025 21:11)  midodrine 5 mg oral tablet: 1 tab(s) orally every 8 hours (04 Mar 2025 21:11)  Multiple Vitamins with Minerals oral liquid: 1 orally once a day (04 Mar 2025 21:11)  octreotide 2500 mcg/mL subcutaneous solution: 0.04 milliliter(s) subcutaneous 3 times a day (04 Mar 2025 21:11)  Pepcid 40 mg oral tablet: 40 milligram(s) orally once a day as needed for dyspepsia (23 Dec 2024 18:06)  sucralfate 1 g/10 mL oral suspension: 10 milliliter(s) orally 4 times a day (04 Mar 2025 21:11)  trimethobenzamide 100 mg/mL intramuscular solution: 2 milliliter(s) intramuscular every 8 hours As needed Nausea and/or Vomiting (04 Mar 2025 21:11)    MEDICATIONS  (STANDING):  acetaminophen   IVPB .. 1000 milliGRAM(s) IV Intermittent once  albumin human 25% IVPB 100 milliLiter(s) IV Intermittent every 12 hours  aspirin enteric coated 81 milliGRAM(s) Oral daily  chlorhexidine 2% Cloths 1 Application(s) Topical <User Schedule>  cholecalciferol 2000 Unit(s) Oral at bedtime  dextrose 5%. 1000 milliLiter(s) (100 mL/Hr) IV Continuous <Continuous>  dextrose 5%. 1000 milliLiter(s) (50 mL/Hr) IV Continuous <Continuous>  dronabinol 2.5 milliGRAM(s) Oral at bedtime  glucagon  Injectable 1 milliGRAM(s) IntraMuscular once  metoprolol succinate ER 50 milliGRAM(s) Oral daily  mineral oil/petrolatum Hydrophilic Ointment 1 Application(s) Topical daily  pantoprazole    Tablet 40 milliGRAM(s) Oral before breakfast  rifAXIMin 550 milliGRAM(s) Oral three times a day  sodium chloride 0.9%. 1000 milliLiter(s) (75 mL/Hr) IV Continuous <Continuous>  sucralfate suspension 1 Gram(s) Oral every 12 hours  tamsulosin 0.4 milliGRAM(s) Oral at bedtime  zinc oxide 40% Paste 1 Application(s) Topical two times a day    MEDICATIONS  (PRN):  acetaminophen     Tablet .. 650 milliGRAM(s) Oral every 6 hours PRN Temp greater or equal to 38C (100.4F), Mild Pain (1 - 3)  aluminum hydroxide/magnesium hydroxide/simethicone Suspension 30 milliLiter(s) Oral every 4 hours PRN Dyspepsia  amLODIPine   Tablet 2.5 milliGRAM(s) Oral daily PRN for SBP > 140  Biotene Dry Mouth Oral Rinse 15 milliLiter(s) Swish and Spit five times a day PRN Mouth Care  lidocaine 4% Cream 1 Application(s) Topical four times a day PRN pain  lidocaine 4% Cream 1 Application(s) Topical four times a day PRN sacral ulcer  morphine  - Injectable 2 milliGRAM(s) IV Push every 4 hours PRN breakthrough pain or very severe pain  morphine  IR 7.5 milliGRAM(s) Oral every 4 hours PRN Moderate Pain (4 - 6)  ondansetron Injectable 4 milliGRAM(s) IV Push every 8 hours PRN Nausea and/or Vomiting  oxyCODONE    IR 5 milliGRAM(s) Oral every 4 hours PRN Severe Pain (7 - 10)  sodium chloride 0.65% Nasal 1 Spray(s) Both Nostrils two times a day PRN Nasal Congestion      Allergies    No Known Allergies    Intolerances        Vital Signs Last 24 Hrs  T(C): 36.9 (17 Mar 2025 08:23), Max: 37.2 (16 Mar 2025 15:40)  T(F): 98.4 (17 Mar 2025 08:23), Max: 99 (16 Mar 2025 15:40)  HR: 93 (17 Mar 2025 08:23) (87 - 93)  BP: 141/71 (17 Mar 2025 08:23) (124/65 - 141/76)  BP(mean): --  RR: 16 (17 Mar 2025 08:23) (16 - 17)  SpO2: 98% (17 Mar 2025 08:23) (92% - 98%)    Parameters below as of 17 Mar 2025 08:23  Patient On (Oxygen Delivery Method): room air          PHYSICAL EXAMINATION:    NECK:  Supple. No lymphadenopathy. Jugular venous pressure not elevated. Carotids equal.   HEART:   The cardiac impulse has a normal quality. Reg., Nl S1 and S2.  There are no murmurs, rubs or gallops noted  CHEST:  Chest crackles to auscultation. Normal respiratory effort.  ABDOMEN:  Soft and nontender.   EXTREMITIES:  There is no edema.       LABS:                        8.1    4.29  )-----------( 314      ( 16 Mar 2025 10:22 )             25.3     03-16    138  |  105  |  22  ----------------------------<  81  3.5   |  26  |  1.34[H]    Ca    10.0      16 Mar 2025 10:22  Phos  4.3     03-16  Mg     1.9     03-16    TPro  6.3  /  Alb  2.1[L]  /  TBili  0.6  /  DBili  0.2  /  AST  16  /  ALT  20  /  AlkPhos  135[H]  03-16      Urinalysis Basic - ( 16 Mar 2025 10:22 )    Color: x / Appearance: x / SG: x / pH: x  Gluc: 81 mg/dL / Ketone: x  / Bili: x / Urobili: x   Blood: x / Protein: x / Nitrite: x   Leuk Esterase: x / RBC: x / WBC x   Sq Epi: x / Non Sq Epi: x / Bacteria: x

## 2025-03-17 NOTE — CONSULT NOTE ADULT - ASSESSMENT
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 Then transferred to  as per request of . Was getting platelet transfusion and developed tachypnea hypoxia, rigors. Was diuresed and became hypotensive, Upgraded to SICU. CTA c/a/p on 2/19 showed multiple b/l PE and large bowel obstruction, was started on heparin drip, c/b GIB, then retrialed heparin drip with recurrent c/f GIB. Also with Afib w RVR during admission, resolved w iv metoprolol.   On 2/28, developed SIRS, Blood cx growing yeast. TPN stopped, R chest port removed on 3/2 and pt started on iv caspofungin. Palliative medicine consulted to help establish GOC and advance care planning       Process of Care  --Reviewed dx/treatment problems and alignment with Goals of Care    Physical Aspects of Care  --Pain  oxycodone 5mg for moderate pain every 4 hours PRN   morphine IV for breakthrough pain q4hrs PRN     ( patient  was inquiring about Fentanyl patch at this time explained that it don't think it would be justified based of amount of medication patient has taken but we can watch and if patient needs more breakthrough pain will add to regimen patient also hopeful that with PT and when up will have decrease in pain also)     --Bowel Regimen  denies constipation  risk for constipation d/t immobility  daily dulcolax  - will add Miralax daily daily patient can refuse due to opoid use      --Dyspnea  No SOB at this time  comfortable and in NAD    --Nausea Vomiting  denies    --Weakness  PT as tolerated     Psychological and Psychiatric Aspects of Care:   --Greif/Bereavment: emotional support provided  -Pastoral Care Available PRN     Social Aspects of Care  -SW involved     Cultural Aspects  -Primary Language: English    Goals of Care:     We discussed Palliative Care team being a supportive team when a patient has ongoing illnesses.  We also discussed that it is not an end of life care service, but can help navigate symptoms and emotional support througout their hospital stay here.      Ethical and Legal Aspects: NA  Capacity- pt appears to have capacity   HCP/Surrogate:    Code Status- Full code  MOLST-   Dispo Plan-    Discussed With: Dr. Norris coordinated with attending and SW and RN     Time Spent: 60 minutes including the care, coordination and counseling of this patient, 50% of which was spent coordinating and counseling.

## 2025-03-17 NOTE — CONSULT NOTE ADULT - CONSULT REQUESTED DATE/TIME
06-Mar-2024 16:34
13-Feb-2025 08:41
15-Feb-2025 07:29
13-Feb-2025 12:07
15-Feb-2025 20:00
17-Feb-2025 13:16
13-Mar-2025 06:03
14-Mar-2025
17-Mar-2025 16:29
02-Mar-2025 13:00
14-Feb-2025 13:29
17-Mar-2025 14:14
06-Mar-2025
11-Mar-2025 15:12
13-Feb-2025 07:47
11-Mar-2025 15:56
13-Feb-2025 08:56
17-Feb-2025 20:05
20-Feb-2025 20:52

## 2025-03-17 NOTE — PROGRESS NOTE ADULT - ASSESSMENT
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  Then transferred to  as per request of . Was getting platelet transfusion and developed tachypnea hypoxia, rigors. Was diuresed and became hypotensive, Upgraded to SICU. CTA c/a/p on 2/19 showed multiple b/l PE and large bowel obstruction, was started on heparin drip, c/b GIB, then retrialed heparin drip with recurrent c/f GIB. Also with Afib w RVR during admission, resolved w iv metoprolol.   On 2/28, developed SIRS, Blood cx growing yeast. TPN stopped, R chest port removed on 3/2 and pt started on iv caspofungin.      # Pancreatic Adenocarcinoma   - presently on Clinical trial with FOLFIRINOX + EMILY inhibitor  - now no plan for treatment while in rehab-  - presently dc planning   - follows with HARRISON in [facility]     #Fungemia- Saccharomyces cerevisiae   - completed caspofungin     # Sigmoid colon pneumatosis   - symptomatic management   - no planned operative intervention  - at this point declining endoscopic procedures     #Abdominal Pain  - now on Xifaximin for possible SIBO -  modest improvement of abdominal symptoms  - Monitor response to antibiotics  - seen by pall care- given oxycodone for pain     PT w/ discharge planning   will follow and communicate with harrison

## 2025-03-17 NOTE — CONSULT NOTE ADULT - PROVIDER SPECIALTY LIST ADULT
Colorectal Surgery
Gastroenterology
Dermatology
Gastroenterology
Intervent Radiology
Surgery
Critical Care
Infectious Disease
Pulmonology
Surgery
Palliative Care
Surgery
Critical Care
Heme/Onc
Cardiology
Nephrology
Physiatry
Urology
Heme/Onc
Pulmonology

## 2025-03-17 NOTE — PROGRESS NOTE ADULT - ASSESSMENT
69-year-old female with stage IV pancreatic adenocarcinoma presenting with neutropenic fever and severe treatment-related toxicities from modified FOLFIRINOX and investigational EMILY inhibitor [id number].    PROBLEMS:    Febrile syndrome. Fungemia. Likely disseminated candidemia.   Dysphagia. Possible candida esophagitis  New febrile syndrome with chills. Possible sepsis. Dysphagia. Possible candida esophagitis, S/p sepsis with PSAE   Acute hypoxic respiratory failure likely secondary to hypervolemia   Bilateral pleural effusions with compressive atelectasis, right greater than left  Neutropenic Fever/Neutropenic Septic shock  Severe Thrombocytopenia-Platelet count critically low at 26k.   Severe diarrhea and inability to tolerate PO intake, likely multifactorial from both FOLFIRINOX (particularly irinotecan component) and study drug [id number].   History of left cephalic vein thrombosis.  MALLORY  CT Angio Chest PE Protocol w/ IV Cont (02.19.25 @ 14:16) >  CHEST:  Multiple pulmonary emboli within the segmental branches of the left lower and right lower lobe pulmonary arteries.  There are two right middle lobe nodules, new since prior. Exact   etiology is unclear. Primary differential diagnostic consideration   includes infection.  CT Abdomen and Pelvis-Resolution of small and large bowel dilatation. Mottled lucency involving a short segment of sigmoid colon suspect for pneumatosis which could be due to benign or ischemic etiology. Please correlate clinically and with laboratory values.  Minimal increase in amount of ascites.  Unchanged pancreatic body and umbilical masses.      PLAN:    Pulmonary stable- s/p GFF 03/10  Dc fluconazole  and d/c cefepime  IV caspofungin 50 mg-off   IV qd/ rifAXIMin 550 milliGRAM(s) Oral three times a day/simethicone 125 milliGRAM(s) Chew three times a day/sucralfate suspension 1 Gram(s) Oral every 12 hours  vancomycin Solution 125 milliGRAM(s) Oral every 6 hours-off  CTA chest + small PEs in RLL and LLL- off IV heparin causing bleeding-S/P GFF 03/10  Family does not want any procedure EGD/Colonscopy  Neutropenic Septic shock- IV meropenem started on 2/15- previously on CEFEPIME/Continue filgrastim 480mcg daily-Today w/ improvement in counts- WBC 1.3, Hb 10.1, plt 37, cmp with k 2.3 and Repleted, Cr 1.40.   Daily CBC monitoring  Maintain strict neutropenic precautions  Supportive care

## 2025-03-17 NOTE — CONSULT NOTE ADULT - NS_MD_PANP_GEN_ALL_CORE
Attending and PA/NP shared services statement (NON-critical care):
Attending and PA/NP shared services statement (NON-critical care):
stated

## 2025-03-17 NOTE — PROGRESS NOTE ADULT - SUBJECTIVE AND OBJECTIVE BOX
s/p endoscopy this am  No findings   Supine comfortable    3/14  not talkative today  meds adjusted bt Hospitalist    3/15  Started on Xifaxan  feels better     3/16  Still with abd discomfort  good uo    3/17  Pt evaluated  Dr Pleitez present  For CTA to r/o Mesenteric vein thrombosis    Allergies    No Known Allergies    Intolerances      MEDICATIONS  (STANDING):  acetaminophen     Tablet .. 650 milliGRAM(s) Oral every 12 hours  acetaminophen   IVPB .. 1000 milliGRAM(s) IV Intermittent once  aspirin enteric coated 81 milliGRAM(s) Oral daily  caspofungin IVPB 50 milliGRAM(s) IV Intermittent every 24 hours  caspofungin IVPB      chlorhexidine 2% Cloths 1 Application(s) Topical <User Schedule>  cholecalciferol 2000 Unit(s) Oral at bedtime  dextrose 5%. 1000 milliLiter(s) (50 mL/Hr) IV Continuous <Continuous>  dextrose 5%. 1000 milliLiter(s) (100 mL/Hr) IV Continuous <Continuous>  dicyclomine 20 milliGRAM(s) Oral four times a day before meals  dronabinol 2.5 milliGRAM(s) Oral at bedtime  glucagon  Injectable 1 milliGRAM(s) IntraMuscular once  metoprolol succinate ER 25 milliGRAM(s) Oral daily  pantoprazole    Tablet 40 milliGRAM(s) Oral before breakfast  simethicone 125 milliGRAM(s) Chew three times a day  sucralfate suspension 1 Gram(s) Oral every 12 hours  tamsulosin 0.4 milliGRAM(s) Oral at bedtime  vancomycin    Solution 125 milliGRAM(s) Oral every 6 hours  zinc oxide 40% Paste 1 Application(s) Topical two times a day    MEDICATIONS  (PRN):  acetaminophen     Tablet .. 650 milliGRAM(s) Oral every 6 hours PRN Temp greater or equal to 38C (100.4F), Mild Pain (1 - 3)  I&O's Detail    16 Mar 2025 07:01  -  17 Mar 2025 07:00  --------------------------------------------------------  IN:  Total IN: 0 mL    OUT:    Indwelling Catheter - Urethral (mL): 2475 mL    Stool (mL): 2 mL  Total OUT: 2477 mL    Total NET: -2477 mL    ICU Vital Signs Last 24 Hrs  T(C): 36.7 (17 Mar 2025 16:29), Max: 36.9 (17 Mar 2025 08:23)  T(F): 98.1 (17 Mar 2025 16:29), Max: 98.4 (17 Mar 2025 08:23)  HR: 95 (17 Mar 2025 16:29) (87 - 95)  BP: 161/82 (17 Mar 2025 16:29) (124/65 - 161/82)  BP(mean): --  ABP: --  ABP(mean): --  RR: 18 (17 Mar 2025 16:29) (16 - 18)  SpO2: 98% (17 Mar 2025 16:29) (92% - 98%)    O2 Parameters below as of 17 Mar 2025 16:29  Patient On (Oxygen Delivery Method): room air        Constitutional: NAD,   HEENT:   MM  Respiratory: CTAB  Cardiovascular: S1 and S2  Gastrointestinal: BS+, soft, NT/ND  Extremities: minimal  peripheral edema  Neurological: A/O  : + coelho      LABS:                 8.1    4.29  )-----------( 314      ( 16 Mar 2025 10:22 )             25.3                    8.1    5.15  )-----------( 296      ( 15 Mar 2025 07:19 )             25.5                           8.5    8.13  )-----------( 310      ( 14 Mar 2025 07:23 )             26.8     03-16    138  |  105  |  22  ----------------------------<  81  3.5   |  26  |  1.34[H]    Ca    10.0      16 Mar 2025 10:22  Phos  4.3     03-16  Mg     1.9     03-16    TPro  6.3  /  Alb  2.1[L]  /  TBili  0.6  /  DBili  0.2  /  AST  16  /  ALT  20  /  AlkPhos  135[H]  03-16                    03-16    138  |  105  |  22  ----------------------------<  81  3.5   |  26  |  1.34[H]    Ca    10.0      16 Mar 2025 10:22  Phos  4.3     03-16  Mg     1.9     03-16    TPro  6.3  /  Alb  2.1[L]  /  TBili  0.6  /  DBili  0.2  /  AST  16  /  ALT  20  /  AlkPhos  135[H]  03-16      03-15    139  |  107  |  25[H]  ----------------------------<  96  4.5   |  29  |  1.49[H]    Ca    9.7      15 Mar 2025 07:19  Phos  3.5     03-15  Mg     2.3     03-15    TPro  5.9[L]  /  Alb  2.0[L]  /  TBili  0.5  /  DBili  0.1  /  AST  25  /  ALT  21  /  AlkPhos  126[H]  03-15      03-14    140  |  108  |  23  ----------------------------<  123[H]  4.2   |  28  |  1.64[H]    Ca    9.8      14 Mar 2025 07:23  Phos  2.9     03-14  Mg     2.5     03-14    TPro  6.4  /  Alb  2.2[L]  /  TBili  0.5  /  DBili  0.2  /  AST  15  /  ALT  20  /  AlkPhos  133[H]  03-13 03-13    140  |  107  |  24[H]  ----------------------------<  119[H]  4.1   |  28  |  1.62[H]    Ca    9.5      13 Mar 2025 06:21  Phos  3.1     03-13  Mg     2.8     03-13    TPro  6.4  /  Alb  2.2[L]  /  TBili  0.5  /  DBili  0.2  /  AST  15  /  ALT  20  /  AlkPhos  133[H]  03-13      03-12    138  |  105  |  22  ----------------------------<  134[H]  3.4[L]   |  28  |  1.32[H]    Ca    9.9      12 Mar 2025 07:38  Phos  3.6     03-12  Mg     2.5     03-12    TPro  6.2  /  Alb  2.2[L]  /  TBili  0.6  /  DBili  0.2  /  AST  16  /  ALT  20  /  AlkPhos  136[H]  03-12        03-11    141  |  107  |  25[H]  ----------------------------<  123[H]  3.7   |  26  |  1.40[H]    Ca    9.6      11 Mar 2025 09:12  Phos  3.2     03-11  Mg     1.7     03-11    TPro  6.3  /  Alb  2.2[L]  /  TBili  0.7  /  DBili  x   /  AST  19  /  ALT  20  /  AlkPhos  140[H]  03-11      143    |  113    |  31     ----------------------------<  129       09 Mar 2025 07:07  3.9     |  23     |  1.48     144    |  112    |  32     ----------------------------<  126       08 Mar 2025 07:59  3.3     |  25     |  1.61     143    |  112    |  37     ----------------------------<  125       07 Mar 2025 07:22  3.8     |  22     |  1.69     Ca    9.4        09 Mar 2025 07:07  Ca    9.2        08 Mar 2025 07:59      Mg     1.6       09 Mar 2025 07:07  Mg     1.6       08 Mar 2025 07:59    TPro  6.0    /  Alb  2.0    /  TBili  0.6    /        09 Mar 2025 07:07  DBili  x      /  AST  17     /  ALT  21     /  AlkPhos  150      TPro  5.8    /  Alb  1.9    /  TBili  0.6    /        08 Mar 2025 07:59  DBili  x      /  AST  17     /  ALT  21     /  AlkPhos  151          Serum Osmo:   Serum Uric Acid:     Urine Studies:  Urinalysis Basic - ( 09 Mar 2025 07:07 )    Color: x / Appearance: x / SG: x / pH: x  Gluc: 129 mg/dL / Ketone: x  / Bili: x / Urobili: x   Blood: x / Protein: x / Nitrite: x   Leuk Esterase: x / RBC: x / WBC x   Sq Epi: x / Non Sq Epi: x / Bacteria: x        Urine chemistry:   Urine Na:   Urine Creatinine:   Urine Protein/Cr ratio:  Urine K:   Urine Osm:       RADIOLOGY & ADDITIONAL STUDIES:

## 2025-03-17 NOTE — PROGRESS NOTE ADULT - ASSESSMENT
Imp:  I don't have specific new recs today    Rec:  If there is a concern for mesenteric venous thrombosis (apparently raised by Dr. Pleitez), can rule this out with MRI as MRI contrast is less nephrotoxic than CT contrast  If abdominal pain is persistent, consider palliative consult to assist with pain control

## 2025-03-17 NOTE — PROGRESS NOTE ADULT - ASSESSMENT
69-year-old female recent diagnosis of primary pancreatic adenocarcinoma , pancytopenia and significant PMH of SCC lip, cyclical vomiting syndrome, HTN, HPL, Gout, GERD, CVA w renal evaluation of MALLORY and hypokalemia     MALLORY  Cr improving  , volume ok  -IVF continues   -Trend labs, lytes. Replete Hypokalemia for goal near 4.0  -Monitor UOP   -Avoid nsaids/contrast   - for IVC filter insertion tomorrow - continue IVF hydration to mitigate MAXIMO risk     3/11  Pacreatic CA/ METS  MALLORY slowly improving  Maintaining current IVF  I/O acceptable   Monitor and replace K/ Mg as needed    3/12  Pacreatic CA/ METS  MALLORY slowly improving, down to 1.32 today  - good UO 1850 ml  Maintaining current IVF  I/O acceptable   Monitor and replace K/ Mg as needed  Maintain K near 4 to minimize ileus, pt has poor IV access , await IV team  Good response to bowel preps  D/W Dr YOUSUF Pleitez    3/13  Full note to follow  Creat stable no finding in the EGD to explain pt sxs  May need CT with IVC,   Will hydrate appropriately prior to CT, D/W Dr Pleitez       3/14  Pacreatic CA/ METS  MALLORY slowly improving, , up to 1.64 today may be 3 rd spacing, causing azotemia  - good UO 1700 ml  and 3rd spacing  Maintaining current IVF  I/O acceptable   Monitor and replace K/ Mg as needed  Maintain K near 4 to minimize ileus, pt has poor IV access  Good response to bowel preps  D/W Dr YOUSUF Pleitez and Dr Joyner      3/15  Pacreatic CA/ METS  MALLORY slowly improving, ,creat 1.49  - good UO   On Xifaxan improved sxs   + 3rd spacing  Maintaining current IVF  I/O acceptable   Monitor and replace K/ Mg as needed  Maintain K near 4 to minimize ileus, pt has poor IV access  Good response to bowel preps  D/W Dr YOUSUF Pleitez and Dr Joyner    3/16  Pacreatic CA/ METS  MALLORY slowly improvin  - good UO ~ 1 L , less than prior  On Xifaxan improved sxs   + 3rd spacing  I/O acceptable   Monitor and replace K/ Mg as needed  Maintain K near 4 to minimize ileus  D/W Dr YOUSUF Pleitez and Dr Joyner  IV KCL as needed    3/17  Pacreatic CA/ METS  MALLORY slowly improving  - good UO  On Xifaxan improved sxs   + 3rd spacing  Monitor and replace K/ Mg as needed  Maintain K near 4 to minimize ileus  D/W Dr YOUSUF Pleitez and Dr Hansen  IV KCL as needed to maintain K near 4  For CTA r/o mesenteric venous thrombosis

## 2025-03-17 NOTE — PROGRESS NOTE ADULT - ASSESSMENT
70yo female with PMHx recent diagnosis of primary pancreatic adenocarcinoma of head with tumor in umbilicus, chemo-induced pancytopenia, SCC lip, cyclical vomiting syndrome, HTN, HPL, Gout, GERD, CVA on Aggrenox  Initially admitted to [facility] with septic shock 2/2 + pseudomonas bacteremia likely due to UTI vs colitis on 02/09/2025 Was having nausea, vomiting and diarrhea requiring Levophed drip, was transferred out of ICU to regular floor.  Then transferred to  as per request of . Was getting platelet transfusion and developed tachypnea hypoxia, rigors. Was diuresed and became hypotensive, Upgraded to SICU. CTA c/a/p on 2/19 showed multiple b/l PE and large bowel obstruction, was started on heparin drip, c/b GIB, then retrialed heparin drip with recurrent c/f GIB. Also with Afib w RVR during admission, resolved w iv metoprolol.   On 2/28, developed SIRS, Blood cx growing yeast. TPN stopped, R chest port removed on 3/2 and pt started on iv caspofungin.    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 Mottled air   lucency and mild fat infiltration surrounding the jejunal segment (2:62. 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  - lactate cleared 1.3 3/8   - Tolerated CLD, now on regular diet  - GI and surgery and oncology surgery  on board   - Sp Meropenem  - second opinion GI Dr. Young   - 3/13  -s/p EGD - gastritis , pt refusing colonoscopy  - 3/17 for CTA r/o Mesenteric vein thrombosis     # Urinary retention   - s/p Julian   - voiding trial once more ambulatory  - 3/13 - started  flomax hs  - urology consult      # Periumbilical Abdominal pain due to periumbilical metastasis   - 3/14 - dilaudid 0.2 IV prn  stopped not helpful  - 3/15 - use po and IV prn tylenol  - 3/16 - PO morphine 7.5 q4h prn moderate and 5 mg q4h prn severe  and IV morphine for breakthroug  - lidocaine cream to pressure ulcer, periumbilical met    # Suspected SIBO  # Diarrheal syndrome   - c/w  xifaxan 550 tid d/w Dr. Hernandez  - stop simethicone 125 tid - not effective, can worsen diarrhea   - opium tincture 6 q6h ordered, s/p first dose - no effect on pain , diarrhea slow down  - 3/16 - stool studies - GI PCR, calprotectin, elastase, stool culture     #Acute bilateral PE  # Lung nodules  # Sinus tachycardia, resolved  - Doppler LE negative  - CT chest- multiple PE w/in segmental left lower and right lower, 2 right middle lobe nodules,   -  3/10 s/p IVC filter   - Recurrent GIB on heparin   - Continue toprol 50    # H/o CVA (cerebrovascular accident).   - Was originally on Plavix per  but because is now taking [facility] 6236  - was told to start Aggrenox instead to avoid DDIs  - off AP and AC due to GI bleeds will  d/w  GI team  - need to be on plavix d/w Dr. Clement will start once cleared by GI   - d/w Dr. Hernandez ok to restart ASA 81     # SVT on 02/09/2025: resolved after Valsalva and Carotid massage.   -Off levophed; s/p  oral midodrine.  -Cardiology consulted Dr. Clement  - 3/12 - restart low dose of amlodipine 2.5 for high BP  - 3/13 - cardiology consult  - continue  toprol 50     # GERD/Esophagitis , recurrent GI bleeds while on IV heparin   - Continue protonix  po  - Continue carafate bid    #MALLORY, likely ATN  - Nephrology on board  - Creatinine Trend: 1.3       # Acute blood loss anemia and Malignancy  - SP GIB x 2 after starting heparin  - s/p pRBC x 6u this admission  - Hemoglobin stable   - Continue Protonix      #Primary pancreatic adenocarcinoma  - SP folfirinox with reaction to irinotecan  - on a clinical trial now, on hold     #Skin injury Left and Rigth upper Gluteal Intergluteal cleft   - superficial skin necrosis  - Wound care evaluation appreciated  - Low air mattress  - Turning / positioning q2h while in bed    # Cutaneous lesion on RUQ area below right breast,  Ecthyma gangrenosum.  - Ecthyma gangrenosum. life-threatening skin infection caused by Pseudomonas aeruginosa bacteria.   - dermatology consult  -  appears to be resolving, most skin changes appear post inflammatory.  - Please recall if the lesion does not continue to slowly resolve over the next 2-3 weeks.   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  Then transferred to  as per request of . Was getting platelet transfusion and developed tachypnea hypoxia, rigors. Was diuresed and became hypotensive, Upgraded to SICU. CTA c/a/p on 2/19 showed multiple b/l PE and large bowel obstruction, was started on heparin drip, c/b GIB, then retrialed heparin drip with recurrent c/f GIB. Also with Afib w RVR during admission, resolved w iv metoprolol.   On 2/28, developed SIRS, Blood cx growing yeast. TPN stopped, R chest port removed on 3/2 and pt started on iv caspofungin.    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 Mottled air   lucency and mild fat infiltration surrounding the jejunal segment (2:62. 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  - lactate cleared 1.3 3/8   - Tolerated CLD, now on regular diet  - GI and surgery and oncology surgery  on board   - Sp Meropenem  - second opinion GI Dr. Young   - 3/13  -s/p EGD - gastritis , pt refusing colonoscopy  - 3/17 for CTA r/o Mesenteric vein thrombosis     # Urinary retention   - s/p Julian   - voiding trial once more ambulatory  - 3/13 - started  flomax hs  - urology consult      # Periumbilical Abdominal pain due to periumbilical metastasis  # Peritoneal carcinomatosis   - 3/14 - dilaudid 0.2 IV prn  stopped not helpful  - 3/15 - use po and IV prn tylenol  - 3/16 - PO morphine 7.5 q4h prn moderate and 5 mg q4h prn severe  and IV morphine for breakthroug  - lidocaine cream to pressure ulcer, periumbilical met    # Suspected SIBO  # Diarrheal syndrome   - c/w  xifaxan 550 tid d/w Dr. Hernandez  - stop simethicone 125 tid - not effective, can worsen diarrhea   - opium tincture 6 q6h ordered, s/p first dose - no effect on pain , diarrhea slow down  - 3/16 - stool studies - GI PCR, calprotectin, elastase, stool culture     #Acute bilateral PE  # Lung nodules  # Sinus tachycardia, resolved  - Doppler LE negative  - CT chest- multiple PE w/in segmental left lower and right lower, 2 right middle lobe nodules,   -  3/10 s/p IVC filter   - Recurrent GIB on heparin   - Continue toprol 50    # H/o CVA (cerebrovascular accident).   - Was originally on Plavix per  but because is now taking [facility] 6236  - was told to start Aggrenox instead to avoid DDIs  - off AP and AC due to GI bleeds will  d/w  GI team  - need to be on plavix d/w Dr. Clement will start once cleared by GI   - d/w Dr. Hernandez ok to restart ASA 81     # SVT on 02/09/2025: resolved after Valsalva and Carotid massage.   -Off levophed; s/p  oral midodrine.  -Cardiology consulted Dr. Clement  - 3/12 - restart low dose of amlodipine 2.5 for high BP  - 3/13 - cardiology consult  - continue  toprol 50     # GERD/Esophagitis , recurrent GI bleeds while on IV heparin   - Continue protonix  po  - Continue carafate bid    #MALLORY, likely ATN  - Nephrology on board  - Creatinine Trend: 1.3       # Acute blood loss anemia and Malignancy  - SP GIB x 2 after starting heparin  - s/p pRBC x 6u this admission  - Hemoglobin stable   - Continue Protonix      #Primary pancreatic adenocarcinoma  - SP folfirinox with reaction to irinotecan  - on a clinical trial now, on hold     #Skin injury Left and Rigth upper Gluteal Intergluteal cleft   - superficial skin necrosis  - Wound care evaluation appreciated  - Low air mattress  - Turning / positioning q2h while in bed    # Cutaneous lesion on RUQ area below right breast,  Ecthyma gangrenosum.  - Ecthyma gangrenosum. life-threatening skin infection caused by Pseudomonas aeruginosa bacteria.   - dermatology consult -  appears to be resolving, most skin changes appear post inflammatory.  - Please recall if the lesion does not continue to slowly resolve over the next 2-3 weeks.   - wound care - If the surface ulcerates, cover with telfa/ vaseline/ paper tape    # Hypokalemia  # Hypomagnesemia   - replete prn  - keep K close to 4     # HTN   - amlodipine 2.5     #DVT ppx   - SCDs

## 2025-03-17 NOTE — PROGRESS NOTE ADULT - SUBJECTIVE AND OBJECTIVE BOX
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 1000 milliGRAM(s) IV Intermittent once  albumin human 25% IVPB 100 milliLiter(s) IV Intermittent every 12 hours  aspirin enteric coated 81 milliGRAM(s) Oral daily  chlorhexidine 2% Cloths 1 Application(s) Topical <User Schedule>  cholecalciferol 2000 Unit(s) Oral at bedtime  dextrose 5%. 1000 milliLiter(s) (100 mL/Hr) IV Continuous <Continuous>  dextrose 5%. 1000 milliLiter(s) (50 mL/Hr) IV Continuous <Continuous>  dronabinol 2.5 milliGRAM(s) Oral at bedtime  glucagon  Injectable 1 milliGRAM(s) IntraMuscular once  metoprolol succinate ER 50 milliGRAM(s) Oral daily  mineral oil/petrolatum Hydrophilic Ointment 1 Application(s) Topical daily  pantoprazole    Tablet 40 milliGRAM(s) Oral before breakfast  rifAXIMin 550 milliGRAM(s) Oral three times a day  sucralfate suspension 1 Gram(s) Oral every 12 hours  tamsulosin 0.4 milliGRAM(s) Oral at bedtime  zinc oxide 40% Paste 1 Application(s) Topical two times a day    MEDICATIONS  (PRN):  acetaminophen     Tablet .. 650 milliGRAM(s) Oral every 6 hours PRN Temp greater or equal to 38C (100.4F), Mild Pain (1 - 3)  aluminum hydroxide/magnesium hydroxide/simethicone Suspension 30 milliLiter(s) Oral every 4 hours PRN Dyspepsia  amLODIPine   Tablet 2.5 milliGRAM(s) Oral daily PRN for SBP > 140  Biotene Dry Mouth Oral Rinse 15 milliLiter(s) Swish and Spit five times a day PRN Mouth Care  lidocaine 4% Cream 1 Application(s) Topical four times a day PRN pain  lidocaine 4% Cream 1 Application(s) Topical four times a day PRN sacral ulcer  morphine  - Injectable 2 milliGRAM(s) IV Push every 4 hours PRN breakthrough pain or very severe pain  morphine  IR 7.5 milliGRAM(s) Oral every 4 hours PRN Moderate Pain (4 - 6)  ondansetron Injectable 4 milliGRAM(s) IV Push every 8 hours PRN Nausea and/or Vomiting  oxyCODONE    IR 5 milliGRAM(s) Oral every 4 hours PRN Severe Pain (7 - 10)  sodium chloride 0.65% Nasal 1 Spray(s) Both Nostrils two times a day PRN Nasal Congestion      REVIEW OF SYSTEMS:    RESPIRATORY: No shortness of breath  CARDIOVASCULAR: No chest pain  All other review of systems is negative unless indicated above.    Vital Signs Last 24 Hrs  T(C): 36.7 (17 Mar 2025 00:34), Max: 37.2 (16 Mar 2025 15:40)  T(F): 98.1 (17 Mar 2025 00:34), Max: 99 (16 Mar 2025 15:40)  HR: 87 (17 Mar 2025 00:34) (87 - 97)  BP: 124/65 (17 Mar 2025 00:34) (124/65 - 142/80)  BP(mean): --  RR: 17 (17 Mar 2025 00:34) (17 - 18)  SpO2: 92% (17 Mar 2025 00:34) (92% - 96%)    Parameters below as of 17 Mar 2025 00:34  Patient On (Oxygen Delivery Method): room air        PHYSICAL EXAM:    Constitutional: NAD,       LABS:                        8.1    4.29  )-----------( 314      ( 16 Mar 2025 10:22 )             25.3     03-16    138  |  105  |  22  ----------------------------<  81  3.5   |  26  |  1.34[H]    Ca    10.0      16 Mar 2025 10:22  Phos  4.3     03-16  Mg     1.9     03-16    TPro  6.3  /  Alb  2.1[L]  /  TBili  0.6  /  DBili  0.2  /  AST  16  /  ALT  20  /  AlkPhos  135[H]  03-16      LIVER FUNCTIONS - ( 16 Mar 2025 10:22 )  Alb: 2.1 g/dL / Pro: 6.3 gm/dL / ALK PHOS: 135 U/L / ALT: 20 U/L / AST: 16 U/L / GGT: x             RADIOLOGY & ADDITIONAL STUDIES:

## 2025-03-17 NOTE — CONSULT NOTE ADULT - CONVERSATION DETAILS
GOC at this time was deferred as family is very clear they do not want to hold a discussion and want all interventions for life prolonging treatments to continue.

## 2025-03-17 NOTE — CONSULT NOTE ADULT - ASSESSMENT
Abd pain related to Pancreatic Ca. + ascites, + enteric fistula. No new abscess seen. Mesh at umbilicus may be responsible for some of her discomfort. Establish baseline narcotic need and then look to support via transdermal patch.  CT reviewed with radiologist - no sign of mesenteric arterial or venous clot.  Will follow.

## 2025-03-18 LAB
ANION GAP SERPL CALC-SCNC: 5 MMOL/L — SIGNIFICANT CHANGE UP (ref 5–17)
ANION GAP SERPL CALC-SCNC: 9 MMOL/L — SIGNIFICANT CHANGE UP (ref 5–17)
BUN SERPL-MCNC: 14 MG/DL — SIGNIFICANT CHANGE UP (ref 7–23)
BUN SERPL-MCNC: 15 MG/DL — SIGNIFICANT CHANGE UP (ref 7–23)
CALCIUM SERPL-MCNC: 9 MG/DL — SIGNIFICANT CHANGE UP (ref 8.5–10.1)
CALCIUM SERPL-MCNC: 9.5 MG/DL — SIGNIFICANT CHANGE UP (ref 8.5–10.1)
CHLORIDE SERPL-SCNC: 104 MMOL/L — SIGNIFICANT CHANGE UP (ref 96–108)
CHLORIDE SERPL-SCNC: 106 MMOL/L — SIGNIFICANT CHANGE UP (ref 96–108)
CO2 SERPL-SCNC: 27 MMOL/L — SIGNIFICANT CHANGE UP (ref 22–31)
CO2 SERPL-SCNC: 29 MMOL/L — SIGNIFICANT CHANGE UP (ref 22–31)
CREAT SERPL-MCNC: 1.22 MG/DL — SIGNIFICANT CHANGE UP (ref 0.5–1.3)
EGFR: 47 ML/MIN/1.73M2 — LOW
EGFR: 47 ML/MIN/1.73M2 — LOW
GLUCOSE SERPL-MCNC: 100 MG/DL — HIGH (ref 70–99)
GLUCOSE SERPL-MCNC: 109 MG/DL — HIGH (ref 70–99)
HCT VFR BLD CALC: 24.1 % — LOW (ref 34.5–45)
HGB BLD-MCNC: 7.7 G/DL — LOW (ref 11.5–15.5)
MAGNESIUM SERPL-MCNC: 1.3 MG/DL — LOW (ref 1.6–2.6)
MAGNESIUM SERPL-MCNC: 1.6 MG/DL — SIGNIFICANT CHANGE UP (ref 1.6–2.6)
MCHC RBC-ENTMCNC: 27.8 PG — SIGNIFICANT CHANGE UP (ref 27–34)
MCHC RBC-ENTMCNC: 32 G/DL — SIGNIFICANT CHANGE UP (ref 32–36)
MCV RBC AUTO: 87 FL — SIGNIFICANT CHANGE UP (ref 80–100)
NRBC # FLD: 0 K/UL — SIGNIFICANT CHANGE UP (ref 0–0)
NRBC BLD AUTO-RTO: 0 /100 WBCS — SIGNIFICANT CHANGE UP (ref 0–0)
PHOSPHATE SERPL-MCNC: 3.6 MG/DL — SIGNIFICANT CHANGE UP (ref 2.5–4.5)
PLATELET # BLD AUTO: 258 K/UL — SIGNIFICANT CHANGE UP (ref 150–400)
PMV BLD: 9.7 FL — SIGNIFICANT CHANGE UP (ref 7–13)
POTASSIUM SERPL-MCNC: 2.8 MMOL/L — CRITICAL LOW (ref 3.5–5.3)
POTASSIUM SERPL-MCNC: 2.9 MMOL/L — CRITICAL LOW (ref 3.5–5.3)
POTASSIUM SERPL-SCNC: 2.8 MMOL/L — CRITICAL LOW (ref 3.5–5.3)
POTASSIUM SERPL-SCNC: 2.9 MMOL/L — CRITICAL LOW (ref 3.5–5.3)
RBC # BLD: 2.77 M/UL — LOW (ref 3.8–5.2)
RBC # FLD: 14.6 % — HIGH (ref 10.3–14.5)
SODIUM SERPL-SCNC: 138 MMOL/L — SIGNIFICANT CHANGE UP (ref 135–145)
SODIUM SERPL-SCNC: 142 MMOL/L — SIGNIFICANT CHANGE UP (ref 135–145)
WBC # BLD: 4.93 K/UL — SIGNIFICANT CHANGE UP (ref 3.8–10.5)
WBC # FLD AUTO: 4.93 K/UL — SIGNIFICANT CHANGE UP (ref 3.8–10.5)

## 2025-03-18 PROCEDURE — 99233 SBSQ HOSP IP/OBS HIGH 50: CPT

## 2025-03-18 PROCEDURE — 99232 SBSQ HOSP IP/OBS MODERATE 35: CPT

## 2025-03-18 RX ORDER — MAGNESIUM SULFATE 500 MG/ML
2 SYRINGE (ML) INJECTION ONCE
Refills: 0 | Status: COMPLETED | OUTPATIENT
Start: 2025-03-18 | End: 2025-03-18

## 2025-03-18 RX ORDER — NALOXEGOL OXALATE 12.5 MG/1
25 TABLET, FILM COATED ORAL DAILY
Refills: 0 | Status: DISCONTINUED | OUTPATIENT
Start: 2025-03-18 | End: 2025-03-19

## 2025-03-18 RX ORDER — POLYETHYLENE GLYCOL 3350 17 G/17G
17 POWDER, FOR SOLUTION ORAL AT BEDTIME
Refills: 0 | Status: DISCONTINUED | OUTPATIENT
Start: 2025-03-18 | End: 2025-03-19

## 2025-03-18 RX ORDER — METOCLOPRAMIDE HCL 10 MG
5 TABLET ORAL THREE TIMES A DAY
Refills: 0 | Status: DISCONTINUED | OUTPATIENT
Start: 2025-03-18 | End: 2025-03-19

## 2025-03-18 RX ADMIN — TOUCHLESS CARE ZINC OXIDE PROTECTANT 1 APPLICATION(S): 20; 25 SPRAY TOPICAL at 22:25

## 2025-03-18 RX ADMIN — Medication 7.5 MILLIGRAM(S): at 12:56

## 2025-03-18 RX ADMIN — OXYCODONE HYDROCHLORIDE 5 MILLIGRAM(S): 30 TABLET ORAL at 07:11

## 2025-03-18 RX ADMIN — Medication 100 MILLIEQUIVALENT(S): at 09:24

## 2025-03-18 RX ADMIN — POLYETHYLENE GLYCOL 3350 17 GRAM(S): 17 POWDER, FOR SOLUTION ORAL at 22:27

## 2025-03-18 RX ADMIN — Medication 7.5 MILLIGRAM(S): at 14:15

## 2025-03-18 RX ADMIN — Medication 5 MILLIGRAM(S): at 14:15

## 2025-03-18 RX ADMIN — Medication 1 APPLICATION(S): at 08:56

## 2025-03-18 RX ADMIN — Medication 1 GRAM(S): at 22:26

## 2025-03-18 RX ADMIN — Medication 100 MILLIEQUIVALENT(S): at 11:20

## 2025-03-18 RX ADMIN — NALOXEGOL OXALATE 25 MILLIGRAM(S): 12.5 TABLET, FILM COATED ORAL at 14:15

## 2025-03-18 RX ADMIN — TOUCHLESS CARE ZINC OXIDE PROTECTANT 1 APPLICATION(S): 20; 25 SPRAY TOPICAL at 09:34

## 2025-03-18 RX ADMIN — Medication 40 MILLIGRAM(S): at 05:29

## 2025-03-18 RX ADMIN — Medication 1 GRAM(S): at 09:24

## 2025-03-18 RX ADMIN — Medication 7.5 MILLIGRAM(S): at 15:15

## 2025-03-18 RX ADMIN — Medication 7.5 MILLIGRAM(S): at 19:39

## 2025-03-18 RX ADMIN — Medication 4 MILLIGRAM(S): at 12:56

## 2025-03-18 RX ADMIN — DRONABINOL 2.5 MILLIGRAM(S): 10 CAPSULE ORAL at 22:26

## 2025-03-18 RX ADMIN — Medication 1 APPLICATION(S): at 09:34

## 2025-03-18 RX ADMIN — Medication 2000 UNIT(S): at 22:26

## 2025-03-18 RX ADMIN — METOPROLOL SUCCINATE 50 MILLIGRAM(S): 50 TABLET, EXTENDED RELEASE ORAL at 09:24

## 2025-03-18 RX ADMIN — Medication 81 MILLIGRAM(S): at 09:24

## 2025-03-18 RX ADMIN — Medication 100 MILLIEQUIVALENT(S): at 14:14

## 2025-03-18 RX ADMIN — TAMSULOSIN HYDROCHLORIDE 0.4 MILLIGRAM(S): 0.4 CAPSULE ORAL at 22:26

## 2025-03-18 NOTE — PROGRESS NOTE ADULT - ASSESSMENT
Imp:  Pancreatic CA with carcinomatosis    Rec:  Add movantik to manage OIC  Reglan for the post-prandial burping

## 2025-03-18 NOTE — PROGRESS NOTE ADULT - ASSESSMENT
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  Then transferred to  as per request of . Was getting platelet transfusion and developed tachypnea hypoxia, rigors. Was diuresed and became hypotensive, Upgraded to SICU. CTA c/a/p on 2/19 showed multiple b/l PE and large bowel obstruction, was started on heparin drip, c/b GIB, then retrialed heparin drip with recurrent c/f GIB. Also with Afib w RVR during admission, resolved w iv metoprolol.   On 2/28, developed SIRS, Blood cx growing yeast. TPN stopped, R chest port removed on 3/2 and pt started on iv caspofungin.      # Pancreatic Adenocarcinoma   - presently on Clinical trial with FOLFIRINOX + EMILY inhibitor  - now no plan for treatment while in rehab-  - follows with [facility] in [facility]   - repeat CT a/p 3/17 - No evidence of portal mesenteric venous thrombosis, as clinically questioned.  Mass in the body of the pancreas measuring up to 4.8 cm abutting the stomach and narrowing the splenic vein with resultant gastroepiploic collaterals. Peritoneal carcinomatosis. Moderate amount of abdominal and pelvic ascites, unchanged.  - mass abutting stomach is likely causing symptoms- seen by surgery - contemplating celiac plexus block for pain control   - wbc 4.9, HB 7.7, plt 258, repleting K     #Fungemia- Saccharomyces cerevisiae   - completed caspofungin     # Sigmoid colon pneumatosis   - symptomatic management   - no planned operative intervention- surgery following- pain control   - at this point declining endoscopic procedures     #Abdominal Pain  - now on Xifaximin for possible SIBO -  modest improvement of abdominal symptoms  - Monitor response to antibiotics  - seen by pall care- given oxycodone/morphine for pain     # hypokalemia- aggressively repleting K     # anemia- plan to transfuse 1u prbc if pending dc given Hb <8      will follow and communicate with [facility]

## 2025-03-18 NOTE — PROGRESS NOTE ADULT - ASSESSMENT
Pt is a 69y old Female with hx of primary pancreatic adenocarcinoma of head with tumor in umbilicus, chemo-induced pancytopenia, SCC lip, cyclical vomiting syndrome, HTN, HPL, Gout, GERD, CVA on Aggrenox  Initially admitted to [facility] with septic shock 2/2 + pseudomonas bacteremia likely due to UTI vs colitis on 02/09/2025 Was having nausea, vomiting and diarrhea requiring Levophed drip, was transferred out of ICU to regular floor. Then transferred to  as per request of . Was getting platelet transfusion and developed tachypnea hypoxia, rigors. Was diuresed and became hypotensive, Upgraded to SICU. CTA c/a/p on 2/19 showed multiple b/l PE and large bowel obstruction, was started on heparin drip, c/b GIB, then retrialed heparin drip with recurrent c/f GIB. Also with Afib w RVR during admission, resolved w iv metoprolol.   On 2/28, developed SIRS, Blood cx growing yeast. TPN stopped, R chest port removed on 3/2 and pt started on iv caspofungin. Palliative medicine consulted to help establish GOC and advance care planning       Process of Care  --Reviewed dx/treatment problems and alignment with Goals of Care    Physical Aspects of Care  --Pain  morphine 7.5mg for moderate pain every 4 hours PRN   morphine 15 mg for severe pain every 4 hours PRN   morphine IV for breakthrough pain q4hrs PRN     ( patient  was inquiring about Fentanyl patch at this time explained that it don't think it would be justified based of amount of medication patient has taken but we can watch and if patient needs more breakthrough pain will add to regimen patient also hopeful that with PT and when up will have decrease in pain also)     --Bowel Regimen  denies constipation  risk for constipation d/t immobility  daily dulcolax  - will add Miralax daily daily patient can refuse due to opoid use      --Dyspnea  No SOB at this time  comfortable and in NAD    --Nausea Vomiting  denies    --Weakness  PT as tolerated     Psychological and Psychiatric Aspects of Care:   --Greif/Bereavment: emotional support provided  -Pastoral Care Available PRN     Social Aspects of Care  -SW involved     Cultural Aspects  -Primary Language: English    Goals of Care:     We discussed Palliative Care team being a supportive team when a patient has ongoing illnesses.  We also discussed that it is not an end of life care service, but can help navigate symptoms and emotional support througout their hospital stay here.      Ethical and Legal Aspects: NA  Capacity- pt appears to have capacity   HCP/Surrogate:    Code Status- Full code  MOLST-   Dispo Plan-    Discussed With: Dr. Norris coordinated with attending and SW and RN     Time Spent: 60 minutes including the care, coordination and counseling of this patient, 50% of which was spent coordinating and counseling.

## 2025-03-18 NOTE — PROGRESS NOTE ADULT - SUBJECTIVE AND OBJECTIVE BOX
INTERVAL HPI/OVERNIGHT EVENTS:  Patient S&E at bedside.   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      GERD (gastroesophageal reflux disease)      Cyclical vomiting syndrome      CVA (cerebrovascular accident)      H/O: hysterectomy      H/O squamous cell carcinoma excision          FAMILY HISTORY:  No pertinent family history in first degree relatives        VITAL SIGNS:  T(F): 99.2 (03-18-25 @ 07:53)  HR: 97 (03-18-25 @ 07:53)  BP: 139/67 (03-18-25 @ 07:53)  RR: 18 (03-18-25 @ 07:53)  SpO2: 94% (03-18-25 @ 07:53)  Wt(kg): --    PHYSICAL EXAM:  Constitutional: NAD  Eyes: EOMI,   Respiratory: CTA b/l, on RA  Cardiovascular: RRR,   Gastrointestinal: soft, mild tenderness in abd, distension   Extremities: no c/c/e  Neurological: AAOx3      MEDICATIONS  (STANDING):  acetaminophen   IVPB .. 1000 milliGRAM(s) IV Intermittent once  aspirin enteric coated 81 milliGRAM(s) Oral daily  chlorhexidine 2% Cloths 1 Application(s) Topical <User Schedule>  cholecalciferol 2000 Unit(s) Oral at bedtime  dextrose 5%. 1000 milliLiter(s) (100 mL/Hr) IV Continuous <Continuous>  dextrose 5%. 1000 milliLiter(s) (50 mL/Hr) IV Continuous <Continuous>  dronabinol 2.5 milliGRAM(s) Oral at bedtime  glucagon  Injectable 1 milliGRAM(s) IntraMuscular once  metoprolol succinate ER 50 milliGRAM(s) Oral daily  mineral oil/petrolatum Hydrophilic Ointment 1 Application(s) Topical daily  pantoprazole    Tablet 40 milliGRAM(s) Oral before breakfast  potassium chloride   Powder 40 milliEquivalent(s) Oral two times a day  potassium chloride  10 mEq/100 mL IVPB 10 milliEquivalent(s) IV Intermittent every 1 hour  rifAXIMin 550 milliGRAM(s) Oral three times a day  sodium chloride 0.9%. 1000 milliLiter(s) (75 mL/Hr) IV Continuous <Continuous>  sucralfate suspension 1 Gram(s) Oral every 12 hours  tamsulosin 0.4 milliGRAM(s) Oral at bedtime  zinc oxide 40% Paste 1 Application(s) Topical two times a day    MEDICATIONS  (PRN):  acetaminophen     Tablet .. 650 milliGRAM(s) Oral every 6 hours PRN Temp greater or equal to 38C (100.4F), Mild Pain (1 - 3)  aluminum hydroxide/magnesium hydroxide/simethicone Suspension 30 milliLiter(s) Oral every 4 hours PRN Dyspepsia  amLODIPine   Tablet 2.5 milliGRAM(s) Oral daily PRN for SBP > 140  Biotene Dry Mouth Oral Rinse 15 milliLiter(s) Swish and Spit five times a day PRN Mouth Care  lidocaine 4% Cream 1 Application(s) Topical four times a day PRN pain  lidocaine 4% Cream 1 Application(s) Topical four times a day PRN sacral ulcer  morphine  - Injectable 2 milliGRAM(s) IV Push every 4 hours PRN breakthrough pain or very severe pain  morphine  IR 7.5 milliGRAM(s) Oral every 4 hours PRN Moderate Pain (4 - 6)  ondansetron Injectable 4 milliGRAM(s) IV Push every 8 hours PRN Nausea and/or Vomiting  oxyCODONE    IR 5 milliGRAM(s) Oral every 4 hours PRN Severe Pain (7 - 10)  sodium chloride 0.65% Nasal 1 Spray(s) Both Nostrils two times a day PRN Nasal Congestion      Allergies    No Known Allergies    Intolerances        LABS:                        7.7    4.93  )-----------( 258      ( 18 Mar 2025 07:15 )             24.1     03-18    142  |  104  |  15  ----------------------------<  100[H]  2.8[LL]   |  29  |  1.23    Ca    9.5      18 Mar 2025 07:15  Phos  4.3     03-16  Mg     1.6     03-18    TPro  6.3  /  Alb  2.1[L]  /  TBili  0.6  /  DBili  0.2  /  AST  16  /  ALT  20  /  AlkPhos  135[H]  03-16      Urinalysis Basic - ( 18 Mar 2025 07:15 )    Color: x / Appearance: x / SG: x / pH: x  Gluc: 100 mg/dL / Ketone: x  / Bili: x / Urobili: x   Blood: x / Protein: x / Nitrite: x   Leuk Esterase: x / RBC: x / WBC x   Sq Epi: x / Non Sq Epi: x / Bacteria: x        RADIOLOGY & ADDITIONAL TESTS:  Studies reviewed.

## 2025-03-18 NOTE — PROVIDER CONTACT NOTE (CRITICAL VALUE NOTIFICATION) - ACTION/TREATMENT ORDERED:
ICU PA made aware, report currently being given to SICU RN for transfer
nothing at this time
Will sign out to day team RN
k tablets
Provider notified. Antibiotic regimen reviewed per provider. Will update antibiotic regimen if needed
maintain zarxio, maintain neutropenic precautions
3 riders potassium ordered

## 2025-03-18 NOTE — PROGRESS NOTE ADULT - SUBJECTIVE AND OBJECTIVE BOX
Did not take pain meds overnight - uncomfortable this morning. Will get meds now. Less tender. May consider celiac plexus block for additional pain control.  Recall as needed.

## 2025-03-18 NOTE — PROGRESS NOTE ADULT - SUBJECTIVE AND OBJECTIVE BOX
HOSPITALIST ATTENDING PROGRESS NOTE    Chart and meds reviewed.  Patient seen and examined.    CC: Sepsis    Subjective: Hemoglobin dropped 7.7, hypokalemia. Still in pain     All other systems reviewed and found to be negative with the exception of what has been described above.    MEDICATIONS  (STANDING):  acetaminophen   IVPB .. 1000 milliGRAM(s) IV Intermittent once  aspirin enteric coated 81 milliGRAM(s) Oral daily  chlorhexidine 2% Cloths 1 Application(s) Topical <User Schedule>  cholecalciferol 2000 Unit(s) Oral at bedtime  dextrose 5%. 1000 milliLiter(s) (100 mL/Hr) IV Continuous <Continuous>  dextrose 5%. 1000 milliLiter(s) (50 mL/Hr) IV Continuous <Continuous>  dronabinol 2.5 milliGRAM(s) Oral at bedtime  glucagon  Injectable 1 milliGRAM(s) IntraMuscular once  metoprolol succinate ER 50 milliGRAM(s) Oral daily  mineral oil/petrolatum Hydrophilic Ointment 1 Application(s) Topical daily  pantoprazole    Tablet 40 milliGRAM(s) Oral before breakfast  potassium chloride   Powder 40 milliEquivalent(s) Oral two times a day  potassium chloride  10 mEq/100 mL IVPB 10 milliEquivalent(s) IV Intermittent every 1 hour  rifAXIMin 550 milliGRAM(s) Oral three times a day  sodium chloride 0.9%. 1000 milliLiter(s) (75 mL/Hr) IV Continuous <Continuous>  sucralfate suspension 1 Gram(s) Oral every 12 hours  tamsulosin 0.4 milliGRAM(s) Oral at bedtime  zinc oxide 40% Paste 1 Application(s) Topical two times a day    MEDICATIONS  (PRN):  acetaminophen     Tablet .. 650 milliGRAM(s) Oral every 6 hours PRN Temp greater or equal to 38C (100.4F), Mild Pain (1 - 3)  aluminum hydroxide/magnesium hydroxide/simethicone Suspension 30 milliLiter(s) Oral every 4 hours PRN Dyspepsia  amLODIPine   Tablet 2.5 milliGRAM(s) Oral daily PRN for SBP > 140  Biotene Dry Mouth Oral Rinse 15 milliLiter(s) Swish and Spit five times a day PRN Mouth Care  lidocaine 4% Cream 1 Application(s) Topical four times a day PRN pain  lidocaine 4% Cream 1 Application(s) Topical four times a day PRN sacral ulcer  morphine  - Injectable 2 milliGRAM(s) IV Push every 4 hours PRN breakthrough pain or very severe pain  morphine  IR 7.5 milliGRAM(s) Oral every 4 hours PRN Moderate Pain (4 - 6)  ondansetron Injectable 4 milliGRAM(s) IV Push every 8 hours PRN Nausea and/or Vomiting  oxyCODONE    IR 5 milliGRAM(s) Oral every 4 hours PRN Severe Pain (7 - 10)  sodium chloride 0.65% Nasal 1 Spray(s) Both Nostrils two times a day PRN Nasal Congestion      Vital Signs Last 24 Hrs  T(C): 37.3 (18 Mar 2025 07:53), Max: 37.6 (18 Mar 2025 00:25)  T(F): 99.2 (18 Mar 2025 07:53), Max: 99.6 (18 Mar 2025 00:25)  HR: 97 (18 Mar 2025 07:53) (95 - 97)  BP: 139/67 (18 Mar 2025 07:53) (139/67 - 161/82)  RR: 18 (18 Mar 2025 07:53) (18 - 18)  SpO2: 94% (18 Mar 2025 07:53) (91% - 98%)    Parameters below as of 18 Mar 2025 07:53  Patient On (Oxygen Delivery Method): room air    GEN: Ill appearing  HEENT:  pupils equal and reactive, EOMI, no oropharyngeal lesions, erythema, exudates, oral thrush  NECK:   supple, no carotid bruits  CV:  +S1, +S2, regular, no murmurs  RESP:   lungs clear to auscultation bilaterally, no wheezing, rales, rhonchi, good air entry bilaterally  GI:  abdomen soft, non-tender, non-distended, normal BS  EXT:  no clubbing, no cyanosis, no edema, no calf pain, swelling or erythema  NEURO:  AAOX3, no focal neurological deficits, follows all commands, able to move extremities spontaneously  SKIN: Sacral DTI  : Julian     LABS:                          7.7    4.93  )-----------( 258      ( 18 Mar 2025 07:15 )             24.1     03-18    142  |  104  |  15  ----------------------------<  100[H]  2.8[LL]   |  29  |  1.23    Ca    9.5      18 Mar 2025 07:15  Mg     1.6     03-18      Urinalysis Basic - ( 18 Mar 2025 07:15 )  Color: x / Appearance: x / SG: x / pH: x  Gluc: 100 mg/dL / Ketone: x  / Bili: x / Urobili: x   Blood: x / Protein: x / Nitrite: x   Leuk Esterase: x / RBC: x / WBC x   Sq Epi: x / Non Sq Epi: x / Bacteria: x    < from: CT Abdomen and Pelvis w/ IV Cont (03.17.25 @ 11:52) >    IMPRESSION:  *  No evidence of portal mesenteric venous thrombosis, as clinically   questioned.  *  Mass in the body of the pancreas measuring up to 4.8 cm abutting the   stomach and narrowing the splenic vein with resultant gastroepiploic   collaterals.  *  Peritoneal carcinomatosis. Moderate amount of abdominal and pelvic   ascites, unchanged.

## 2025-03-18 NOTE — PROGRESS NOTE ADULT - SUBJECTIVE AND OBJECTIVE BOX
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 1000 milliGRAM(s) IV Intermittent once  aspirin enteric coated 81 milliGRAM(s) Oral daily  chlorhexidine 2% Cloths 1 Application(s) Topical <User Schedule>  cholecalciferol 2000 Unit(s) Oral at bedtime  dextrose 5%. 1000 milliLiter(s) (100 mL/Hr) IV Continuous <Continuous>  dextrose 5%. 1000 milliLiter(s) (50 mL/Hr) IV Continuous <Continuous>  dronabinol 2.5 milliGRAM(s) Oral at bedtime  glucagon  Injectable 1 milliGRAM(s) IntraMuscular once  metoclopramide 5 milliGRAM(s) Oral three times a day  metoprolol succinate ER 50 milliGRAM(s) Oral daily  mineral oil/petrolatum Hydrophilic Ointment 1 Application(s) Topical daily  naloxegol 25 milliGRAM(s) Oral daily  pantoprazole    Tablet 40 milliGRAM(s) Oral before breakfast  potassium chloride   Powder 40 milliEquivalent(s) Oral two times a day  potassium chloride  10 mEq/100 mL IVPB 10 milliEquivalent(s) IV Intermittent every 1 hour  rifAXIMin 550 milliGRAM(s) Oral three times a day  sodium chloride 0.9%. 1000 milliLiter(s) (75 mL/Hr) IV Continuous <Continuous>  sucralfate suspension 1 Gram(s) Oral every 12 hours  tamsulosin 0.4 milliGRAM(s) Oral at bedtime  zinc oxide 40% Paste 1 Application(s) Topical two times a day    MEDICATIONS  (PRN):  acetaminophen     Tablet .. 650 milliGRAM(s) Oral every 6 hours PRN Temp greater or equal to 38C (100.4F), Mild Pain (1 - 3)  aluminum hydroxide/magnesium hydroxide/simethicone Suspension 30 milliLiter(s) Oral every 4 hours PRN Dyspepsia  amLODIPine   Tablet 2.5 milliGRAM(s) Oral daily PRN for SBP > 140  Biotene Dry Mouth Oral Rinse 15 milliLiter(s) Swish and Spit five times a day PRN Mouth Care  lidocaine 4% Cream 1 Application(s) Topical four times a day PRN sacral ulcer  lidocaine 4% Cream 1 Application(s) Topical four times a day PRN pain  morphine  - Injectable 2 milliGRAM(s) IV Push every 4 hours PRN breakthrough pain or very severe pain  morphine  IR 7.5 milliGRAM(s) Oral every 4 hours PRN Moderate Pain (4 - 6)  ondansetron Injectable 4 milliGRAM(s) IV Push every 8 hours PRN Nausea and/or Vomiting  oxyCODONE    IR 5 milliGRAM(s) Oral every 4 hours PRN Severe Pain (7 - 10)  sodium chloride 0.65% Nasal 1 Spray(s) Both Nostrils two times a day PRN Nasal Congestion      REVIEW OF SYSTEMS:    RESPIRATORY: No shortness of breath  CARDIOVASCULAR: No chest pain  All other review of systems is negative unless indicated above.    Vital Signs Last 24 Hrs  T(C): 37.3 (18 Mar 2025 07:53), Max: 37.6 (18 Mar 2025 00:25)  T(F): 99.2 (18 Mar 2025 07:53), Max: 99.6 (18 Mar 2025 00:25)  HR: 97 (18 Mar 2025 07:53) (95 - 97)  BP: 139/67 (18 Mar 2025 07:53) (139/67 - 161/82)  BP(mean): --  RR: 18 (18 Mar 2025 07:53) (18 - 18)  SpO2: 94% (18 Mar 2025 07:53) (91% - 98%)    Parameters below as of 18 Mar 2025 07:53  Patient On (Oxygen Delivery Method): room air        PHYSICAL EXAM:    Constitutional: NAD  Respiratory: CTAB  Cardiovascular: S1 and S2, RRR  Gastrointestinal: BS+, soft  Psychiatric: Normal mood, normal affect    LABS:                        7.7    4.93  )-----------( 258      ( 18 Mar 2025 07:15 )             24.1     03-18    142  |  104  |  15  ----------------------------<  100[H]  2.8[LL]   |  29  |  1.23    Ca    9.5      18 Mar 2025 07:15  Mg     1.6     03-18            RADIOLOGY & ADDITIONAL STUDIES:

## 2025-03-18 NOTE — PROVIDER CONTACT NOTE (CRITICAL VALUE NOTIFICATION) - PERSON GIVING RESULT:
[facility]
Bill from laboratory
[facility]
Mandi
Marielena Padilla (Lab)
lindaed
[facility]-lab
Lamont brenner
[facility]
arcenio abreu
Lamont Ford
pt shifts weight independently. digna

## 2025-03-18 NOTE — PROGRESS NOTE ADULT - NUTRITIONAL ASSESSMENT
This patient has been assessed with a concern for Malnutrition and has been determined to have a diagnosis/diagnoses of Severe protein-calorie malnutrition.    This patient is being managed with:   Diet Regular-  Entered: Mar  8 2025 10:04AM  
This patient has been assessed with a concern for Malnutrition and has been determined to have a diagnosis/diagnoses of Severe protein-calorie malnutrition.    This patient is being managed with:   Diet Regular-  Prosource Gelatein Plus     Qty per Day:  2  Liquid Protein Supplement     Qty per Day:  2  Supplement Feeding Modality:  Oral  Ensure Plant-Based Cans or Servings Per Day:  1       Frequency:  Three Times a day  Entered: Mar 14 2025 12:17PM  
This patient has been assessed with a concern for Malnutrition and has been determined to have a diagnosis/diagnoses of Severe protein-calorie malnutrition.    This patient is being managed with:   Diet Regular-  Prosource Gelatein Plus     Qty per Day:  2  Liquid Protein Supplement     Qty per Day:  2  Supplement Feeding Modality:  Oral  Ensure Plus High Protein Cans or Servings Per Day:  1       Frequency:  Three Times a day  Ensure Plant-Based Cans or Servings Per Day:  1       Frequency:  Daily  Entered: Mar 12 2025  2:15PM    Diet NPO after Midnight-     NPO Start Date: 09-Mar-2025   NPO Start Time: 23:59  Entered: Mar  9 2025  5:29PM  
This patient has been assessed with a concern for Malnutrition and has been determined to have a diagnosis/diagnoses of Severe protein-calorie malnutrition.    This patient is being managed with:   Diet Regular-  Prosource Gelatein Plus     Qty per Day:  2  Liquid Protein Supplement     Qty per Day:  2  Supplement Feeding Modality:  Oral  Ensure Plus High Protein Cans or Servings Per Day:  1       Frequency:  Two Times a day  Entered: Mar  3 2025 12:14PM  
This patient has been assessed with a concern for Malnutrition and has been determined to have a diagnosis/diagnoses of Severe protein-calorie malnutrition.    This patient is being managed with:   Parenteral Nutrition - Adult-  Entered: Mar  2 2025  5:00PM    lipid fat emulsion (Fish Oil and Plant Based) 20% Infusion-[Known as SMOFLIPID 20% Infusion]  50 Gram(s) in IV Solution 250 milliLiter(s) infuse at 20.8 mL/Hr  Dose Rate: 0.681 Gm/kG/Day Infuse Over: 12 Hours; Stop After 12 Hours  Administration Instructions: Use 1.2 micron in-line filter  Entered: Mar  1 2025 10:00PM    Parenteral Nutrition - Adult-  Entered: Mar  1 2025 10:00PM    Diet Regular-  Prosource Gelatein 20 Sugar Free     Qty per Day:  2  Supplement Feeding Modality:  Oral  Ensure Plus High Protein Cans or Servings Per Day:  1       Frequency:  Two Times a day  Entered: Feb 24 2025 12:01PM  
This patient has been assessed with a concern for Malnutrition and has been determined to have a diagnosis/diagnoses of Severe protein-calorie malnutrition.    This patient is being managed with:   Parenteral Nutrition - Adult-  Entered: Mar  2 2025  5:00PM    lipid fat emulsion (Fish Oil and Plant Based) 20% Infusion-[Known as SMOFLIPID 20% Infusion]  50 Gram(s) in IV Solution 250 milliLiter(s) infuse at 20.8 mL/Hr  Dose Rate: 0.681 Gm/kG/Day Infuse Over: 12 Hours; Stop After 12 Hours  Administration Instructions: Use 1.2 micron in-line filter  Entered: Mar  1 2025 10:00PM    Parenteral Nutrition - Adult-  Entered: Mar  1 2025 10:00PM    Diet Regular-  Prosource Gelatein 20 Sugar Free     Qty per Day:  2  Supplement Feeding Modality:  Oral  Ensure Plus High Protein Cans or Servings Per Day:  1       Frequency:  Two Times a day  Entered: Feb 24 2025 12:01PM  
This patient has been assessed with a concern for Malnutrition and has been determined to have a diagnosis/diagnoses of Severe protein-calorie malnutrition.    This patient is being managed with:   lipid fat emulsion (Fish Oil and Plant Based) 20% Infusion-[Known as SMOFLIPID 20% Infusion]  50 Gram(s) in IV Solution 250 milliLiter(s) infuse at 20.8 mL/Hr  Dose Rate: 0.681 Gm/kG/Day Infuse Over: 12 Hours; Stop After 12 Hours  Administration Instructions: Use 1.2 micron in-line filter  Entered: Mar  1 2025 10:00PM    Parenteral Nutrition - Adult-  Entered: Mar  1 2025 10:00PM    lipid fat emulsion (Fish Oil and Plant Based) 20% Infusion-[Known as SMOFLIPID 20% Infusion]  50 Gram(s) in IV Solution 250 milliLiter(s) infuse at 20.8 mL/Hr  Dose Rate: 0.6812 Gm/kG/Day Infuse Over: 12 Hours; Stop After 12 Hours  Administration Instructions: Use 1.2 micron in-line filter  Entered: Feb 28 2025 10:00PM    Parenteral Nutrition - Adult-  Entered: Feb 28 2025 10:00PM    Diet Regular-  Prosource Gelatein 20 Sugar Free     Qty per Day:  2  Supplement Feeding Modality:  Oral  Ensure Plus High Protein Cans or Servings Per Day:  1       Frequency:  Two Times a day  Entered: Feb 24 2025 12:01PM  
This patient has been assessed with a concern for Malnutrition and has been determined to have a diagnosis/diagnoses of Severe protein-calorie malnutrition.    This patient is being managed with:   lipid fat emulsion (Fish Oil and Plant Based) 20% Infusion-[Known as SMOFLIPID 20% Infusion]  50 Gram(s) in IV Solution 250 milliLiter(s) infuse at 20.8 mL/Hr  Dose Rate: 0.6812 Gm/kG/Day Infuse Over: 12 Hours; Stop After 12 Hours  Administration Instructions: Use 1.2 micron in-line filter  Entered: Feb 21 2025 10:00PM    Parenteral Nutrition - Adult-  Entered: Feb 21 2025 10:00PM    Diet Low Fiber-  Kosher  Supplement Feeding Modality:  Oral  Ensure Plant-Based Cans or Servings Per Day:  3       Frequency:  Daily  Entered: Feb 21 2025  8:08AM  
This patient has been assessed with a concern for Malnutrition and has been determined to have a diagnosis/diagnoses of Severe protein-calorie malnutrition.    This patient is being managed with:   Diet Full Liquid-  Entered: Mar  2 2025 11:47AM    lipid fat emulsion (Fish Oil and Plant Based) 20% Infusion-[Known as SMOFLIPID 20% Infusion]  50 Gram(s) in IV Solution 250 milliLiter(s) infuse at 20.8 mL/Hr  Dose Rate: 0.681 Gm/kG/Day Infuse Over: 12 Hours; Stop After 12 Hours  Administration Instructions: Use 1.2 micron in-line filter  Entered: Mar  1 2025 10:00PM    Parenteral Nutrition - Adult-  Entered: Mar  1 2025 10:00PM    The following pending diet order is being considered for treatment of Severe protein-calorie malnutrition:  Diet Full Liquid-  Tube Feeding Modality: Nasogastric  [employer] Peptide 1.5 ([id number])  Total Volume for 24 Hours (mL): 1560  Continuous  Starting Tube Feed Rate {mL per Hour}: 20  Increase Tube Feed Rate by (mL): 10     Every 6 hours  Until Goal Tube Feed Rate (mL per Hour): 65  Tube Feed Duration (in Hours): 24  Tube Feed Start Time: 00:00  Free Water Flush  Free Water Flush Instructions:  free water flushes as per MD to maintain hydration  No Carb Prosource TF     Qty per Day:  2  Entered: Mar  2 2025 12:34PM  
This patient has been assessed with a concern for Malnutrition and has been determined to have a diagnosis/diagnoses of Severe protein-calorie malnutrition.    This patient is being managed with:   Diet Regular-  Prosource Gelatein Plus     Qty per Day:  2  Liquid Protein Supplement     Qty per Day:  2  Supplement Feeding Modality:  Oral  Ensure Plant-Based Cans or Servings Per Day:  1       Frequency:  Three Times a day  Entered: Mar 14 2025 12:17PM  
This patient has been assessed with a concern for Malnutrition and has been determined to have a diagnosis/diagnoses of Severe protein-calorie malnutrition.    This patient is being managed with:   Diet Regular-  Prosource Gelatein Plus     Qty per Day:  2  Liquid Protein Supplement     Qty per Day:  2  Supplement Feeding Modality:  Oral  Ensure Plus High Protein Cans or Servings Per Day:  1       Frequency:  Three Times a day  Ensure Plant-Based Cans or Servings Per Day:  1       Frequency:  Daily  Entered: Mar 13 2025 10:22AM  
This patient has been assessed with a concern for Malnutrition and has been determined to have a diagnosis/diagnoses of Severe protein-calorie malnutrition.    This patient is being managed with:   Diet Regular-  Prosource Gelatein Plus     Qty per Day:  2  Liquid Protein Supplement     Qty per Day:  2  Supplement Feeding Modality:  Oral  Ensure Plus High Protein Cans or Servings Per Day:  1       Frequency:  Two Times a day  Entered: Mar  3 2025 12:14PM  
This patient has been assessed with a concern for Malnutrition and has been determined to have a diagnosis/diagnoses of Severe protein-calorie malnutrition.    This patient is being managed with:   Parenteral Nutrition - Adult-  Entered: Feb 15 2025 10:00PM    Diet Low Fiber-  Kosher  Supplement Feeding Modality:  Oral  Ensure Plant-Based Cans or Servings Per Day:  3       Frequency:  Daily  Entered: Feb 15 2025 11:15AM    Parenteral Nutrition - Adult-  Entered: Feb 14 2025 10:00PM  
This patient has been assessed with a concern for Malnutrition and has been determined to have a diagnosis/diagnoses of Severe protein-calorie malnutrition.    This patient is being managed with:   Parenteral Nutrition - Adult-  Entered: Feb 27 2025 10:00PM    Parenteral Nutrition - Adult-  Entered: Feb 26 2025 10:00PM    lipid fat emulsion (Fish Oil and Plant Based) 20% Infusion-[Known as SMOFLIPID 20% Infusion]  50 Gram(s) in IV Solution 250 milliLiter(s) infuse at 20.8 mL/Hr  Dose Rate: 0.681 Gm/kG/Day Infuse Over: 12 Hours; Stop After 12 Hours  Administration Instructions: Use 1.2 micron in-line filter  Entered: Feb 26 2025 10:00PM    Diet Regular-  Prosource Gelatein 20 Sugar Free     Qty per Day:  2  Supplement Feeding Modality:  Oral  Ensure Plus High Protein Cans or Servings Per Day:  1       Frequency:  Two Times a day  Entered: Feb 24 2025 12:01PM  
This patient has been assessed with a concern for Malnutrition and has been determined to have a diagnosis/diagnoses of Severe protein-calorie malnutrition.    This patient is being managed with:   Parenteral Nutrition - Adult-  Entered: Mar  2 2025  5:00PM    lipid fat emulsion (Fish Oil and Plant Based) 20% Infusion-[Known as SMOFLIPID 20% Infusion]  50 Gram(s) in IV Solution 250 milliLiter(s) infuse at 20.8 mL/Hr  Dose Rate: 0.681 Gm/kG/Day Infuse Over: 12 Hours; Stop After 12 Hours  Administration Instructions: Use 1.2 micron in-line filter  Entered: Mar  1 2025 10:00PM    Parenteral Nutrition - Adult-  Entered: Mar  1 2025 10:00PM    Diet Regular-  Prosource Gelatein 20 Sugar Free     Qty per Day:  2  Supplement Feeding Modality:  Oral  Ensure Plus High Protein Cans or Servings Per Day:  1       Frequency:  Two Times a day  Entered: Feb 24 2025 12:01PM  
This patient has been assessed with a concern for Malnutrition and has been determined to have a diagnosis/diagnoses of Severe protein-calorie malnutrition.    This patient is being managed with:   lipid fat emulsion (Fish Oil and Plant Based) 20% Infusion-[Known as SMOFLIPID 20% Infusion]  50 Gram(s) in IV Solution 250 milliLiter(s) infuse at 20.8 mL/Hr  Dose Rate: 0.6812 Gm/kG/Day Infuse Over: 12 Hours; Stop After 12 Hours  Administration Instructions: Use 1.2 micron in-line filter  Entered: Feb 21 2025 10:00PM    Parenteral Nutrition - Adult-  Entered: Feb 21 2025 10:00PM    Diet NPO-  With Ice Chips/Sips of Water  Entered: Feb 21 2025  8:05AM    lipid fat emulsion (Fish Oil and Plant Based) 20% Infusion-[Known as SMOFLIPID 20% Infusion]  50 Gram(s) in IV Solution 250 milliLiter(s) infuse at 20.8 mL/Hr  Dose Rate: 0.681 Gm/kG/Day Infuse Over: 12 Hours; Stop After 12 Hours  Administration Instructions: Use 1.2 micron in-line filter  Entered: Feb 20 2025 10:00PM    Parenteral Nutrition - Adult-  Entered: Feb 20 2025 10:00PM  
This patient has been assessed with a concern for Malnutrition and has been determined to have a diagnosis/diagnoses of Severe protein-calorie malnutrition.    This patient is being managed with:   lipid fat emulsion (Fish Oil and Plant Based) 20% Infusion-[Known as SMOFLIPID 20% Infusion]  50 Gram(s) in IV Solution 250 milliLiter(s) infuse at 20.8 mL/Hr  Dose Rate: 0.6812 Gm/kG/Day Infuse Over: 12 Hours; Stop After 12 Hours  Administration Instructions: Use 1.2 micron in-line filter  Entered: Feb 21 2025 10:00PM    Parenteral Nutrition - Adult-  Entered: Feb 21 2025 10:00PM    Diet NPO-  With Ice Chips/Sips of Water  Entered: Feb 21 2025  8:05AM    lipid fat emulsion (Fish Oil and Plant Based) 20% Infusion-[Known as SMOFLIPID 20% Infusion]  50 Gram(s) in IV Solution 250 milliLiter(s) infuse at 20.8 mL/Hr  Dose Rate: 0.681 Gm/kG/Day Infuse Over: 12 Hours; Stop After 12 Hours  Administration Instructions: Use 1.2 micron in-line filter  Entered: Feb 20 2025 10:00PM    Parenteral Nutrition - Adult-  Entered: Feb 20 2025 10:00PM  
This patient has been assessed with a concern for Malnutrition and has been determined to have a diagnosis/diagnoses of Severe protein-calorie malnutrition.    This patient is being managed with:   Diet Regular-  Entered: Mar  8 2025 10:04AM  
This patient has been assessed with a concern for Malnutrition and has been determined to have a diagnosis/diagnoses of Severe protein-calorie malnutrition.    This patient is being managed with:   Diet Regular-  Prosource Gelatein Plus     Qty per Day:  2  Liquid Protein Supplement     Qty per Day:  2  Supplement Feeding Modality:  Oral  Ensure Plant-Based Cans or Servings Per Day:  1       Frequency:  Three Times a day  Entered: Mar 14 2025 12:17PM  
This patient has been assessed with a concern for Malnutrition and has been determined to have a diagnosis/diagnoses of Severe protein-calorie malnutrition.    This patient is being managed with:   Diet Regular-  Prosource Gelatein Plus     Qty per Day:  2  Liquid Protein Supplement     Qty per Day:  2  Supplement Feeding Modality:  Oral  Ensure Plus High Protein Cans or Servings Per Day:  1       Frequency:  Two Times a day  Entered: Mar  3 2025 12:14PM  
This patient has been assessed with a concern for Malnutrition and has been determined to have a diagnosis/diagnoses of Severe protein-calorie malnutrition.    This patient is being managed with:   Diet Regular-  Prosource Gelatein Plus     Qty per Day:  2  Liquid Protein Supplement     Qty per Day:  2  Supplement Feeding Modality:  Oral  Ensure Plus High Protein Cans or Servings Per Day:  1       Frequency:  Two Times a day  Entered: Mar 11 2025  9:00AM    Diet NPO after Midnight-     NPO Start Date: 09-Mar-2025   NPO Start Time: 23:59  Entered: Mar  9 2025  5:29PM  
This patient has been assessed with a concern for Malnutrition and has been determined to have a diagnosis/diagnoses of Severe protein-calorie malnutrition.    This patient is being managed with:   Parenteral Nutrition - Adult-  Entered: Feb 27 2025 10:00PM    Parenteral Nutrition - Adult-  Entered: Feb 26 2025 10:00PM    lipid fat emulsion (Fish Oil and Plant Based) 20% Infusion-[Known as SMOFLIPID 20% Infusion]  50 Gram(s) in IV Solution 250 milliLiter(s) infuse at 20.8 mL/Hr  Dose Rate: 0.681 Gm/kG/Day Infuse Over: 12 Hours; Stop After 12 Hours  Administration Instructions: Use 1.2 micron in-line filter  Entered: Feb 26 2025 10:00PM    Diet Regular-  Prosource Gelatein 20 Sugar Free     Qty per Day:  2  Supplement Feeding Modality:  Oral  Ensure Plus High Protein Cans or Servings Per Day:  1       Frequency:  Two Times a day  Entered: Feb 24 2025 12:01PM  
This patient has been assessed with a concern for Malnutrition and has been determined to have a diagnosis/diagnoses of Severe protein-calorie malnutrition.    This patient is being managed with:   lipid fat emulsion (Fish Oil and Plant Based) 20% Infusion-[Known as SMOFLIPID 20% Infusion]  50 Gram(s) in IV Solution 250 milliLiter(s) infuse at 20.8 mL/Hr  Dose Rate: 0.6812 Gm/kG/Day Infuse Over: 12 Hours; Stop After 12 Hours  Administration Instructions: Use 1.2 micron in-line filter  Entered: Feb 21 2025 10:00PM    Parenteral Nutrition - Adult-  Entered: Feb 21 2025 10:00PM    Diet NPO-  With Ice Chips/Sips of Water  Entered: Feb 21 2025  8:05AM    lipid fat emulsion (Fish Oil and Plant Based) 20% Infusion-[Known as SMOFLIPID 20% Infusion]  50 Gram(s) in IV Solution 250 milliLiter(s) infuse at 20.8 mL/Hr  Dose Rate: 0.681 Gm/kG/Day Infuse Over: 12 Hours; Stop After 12 Hours  Administration Instructions: Use 1.2 micron in-line filter  Entered: Feb 20 2025 10:00PM    Parenteral Nutrition - Adult-  Entered: Feb 20 2025 10:00PM  
This patient has been assessed with a concern for Malnutrition and has been determined to have a diagnosis/diagnoses of Severe protein-calorie malnutrition.    This patient is being managed with:   lipid fat emulsion (Fish Oil and Plant Based) 20% Infusion-[Known as SMOFLIPID 20% Infusion]  50 Gram(s) in IV Solution 250 milliLiter(s) infuse at 20.83 mL/Hr  Dose Rate: 0.6812 Gm/kG/Day Infuse Over: 12 Hours; Stop After 12 Hours  Administration Instructions: Use 1.2 micron in-line filter  Entered: Feb 22 2025 10:00PM    Parenteral Nutrition - Adult-  Entered: Feb 22 2025 10:00PM    Diet Low Fiber-  Kosher  Supplement Feeding Modality:  Oral  Ensure Plant-Based Cans or Servings Per Day:  3       Frequency:  Daily  Entered: Feb 21 2025  8:08AM  
This patient has been assessed with a concern for Malnutrition and has been determined to have a diagnosis/diagnoses of Severe protein-calorie malnutrition.    This patient is being managed with:   Diet Clear Liquid-  Entered: Mar  7 2025  7:11AM  
This patient has been assessed with a concern for Malnutrition and has been determined to have a diagnosis/diagnoses of Severe protein-calorie malnutrition.    This patient is being managed with:   Diet NPO after Midnight-     NPO Start Date: 09-Mar-2025   NPO Start Time: 23:59  Entered: Mar  9 2025  5:29PM    Diet Regular-  Entered: Mar  9 2025  9:31AM  
This patient has been assessed with a concern for Malnutrition and has been determined to have a diagnosis/diagnoses of Severe protein-calorie malnutrition.    This patient is being managed with:   Diet Regular-  Prosource Gelatein Plus     Qty per Day:  2  Liquid Protein Supplement     Qty per Day:  2  Supplement Feeding Modality:  Oral  Ensure Plant-Based Cans or Servings Per Day:  1       Frequency:  Three Times a day  Entered: Mar 14 2025 12:17PM  
This patient has been assessed with a concern for Malnutrition and has been determined to have a diagnosis/diagnoses of Severe protein-calorie malnutrition.    This patient is being managed with:   Diet Regular-  Prosource Gelatein Plus     Qty per Day:  2  Liquid Protein Supplement     Qty per Day:  2  Supplement Feeding Modality:  Oral  Ensure Plant-Based Cans or Servings Per Day:  1       Frequency:  Three Times a day  Entered: Mar 14 2025 12:17PM  
This patient has been assessed with a concern for Malnutrition and has been determined to have a diagnosis/diagnoses of Severe protein-calorie malnutrition.    This patient is being managed with:   Diet Regular-  Prosource Gelatein Plus     Qty per Day:  2  Liquid Protein Supplement     Qty per Day:  2  Supplement Feeding Modality:  Oral  Ensure Plus High Protein Cans or Servings Per Day:  1       Frequency:  Two Times a day  Entered: Mar  3 2025 12:14PM  
This patient has been assessed with a concern for Malnutrition and has been determined to have a diagnosis/diagnoses of Severe protein-calorie malnutrition.    This patient is being managed with:   Diet Regular-  Prosource Gelatein Plus     Qty per Day:  2  Liquid Protein Supplement     Qty per Day:  2  Supplement Feeding Modality:  Oral  Ensure Plus High Protein Cans or Servings Per Day:  1       Frequency:  Two Times a day  Entered: Mar  3 2025 12:14PM  
This patient has been assessed with a concern for Malnutrition and has been determined to have a diagnosis/diagnoses of Severe protein-calorie malnutrition.    This patient is being managed with:   Diet Regular-  Prosource Gelatein Plus     Qty per Day:  2  Liquid Protein Supplement     Qty per Day:  2  Supplement Feeding Modality:  Oral  Ensure Plus High Protein Cans or Servings Per Day:  1       Frequency:  Two Times a day  Entered: Mar 11 2025  9:00AM    Diet NPO after Midnight-     NPO Start Date: 09-Mar-2025   NPO Start Time: 23:59  Entered: Mar  9 2025  5:29PM  
This patient has been assessed with a concern for Malnutrition and has been determined to have a diagnosis/diagnoses of Severe protein-calorie malnutrition.    This patient is being managed with:   Parenteral Nutrition - Adult-  Entered: Mar  2 2025  5:00PM    lipid fat emulsion (Fish Oil and Plant Based) 20% Infusion-[Known as SMOFLIPID 20% Infusion]  50 Gram(s) in IV Solution 250 milliLiter(s) infuse at 20.8 mL/Hr  Dose Rate: 0.681 Gm/kG/Day Infuse Over: 12 Hours; Stop After 12 Hours  Administration Instructions: Use 1.2 micron in-line filter  Entered: Mar  1 2025 10:00PM    Parenteral Nutrition - Adult-  Entered: Mar  1 2025 10:00PM    Diet Regular-  Prosource Gelatein 20 Sugar Free     Qty per Day:  2  Supplement Feeding Modality:  Oral  Ensure Plus High Protein Cans or Servings Per Day:  1       Frequency:  Two Times a day  Entered: Feb 24 2025 12:01PM  
This patient has been assessed with a concern for Malnutrition and has been determined to have a diagnosis/diagnoses of Severe protein-calorie malnutrition.    This patient is being managed with:   lipid fat emulsion (Fish Oil and Plant Based) 20% Infusion-[Known as SMOFLIPID 20% Infusion]  50 Gram(s) in IV Solution 250 milliLiter(s) infuse at 20.8 mL/Hr  Dose Rate: 0.6812 Gm/kG/Day Infuse Over: 12 Hours; Stop After 12 Hours  Administration Instructions: Use 1.2 micron in-line filter  Entered: Feb 21 2025 10:00PM    Parenteral Nutrition - Adult-  Entered: Feb 21 2025 10:00PM    Diet NPO-  With Ice Chips/Sips of Water  Entered: Feb 21 2025  8:05AM    lipid fat emulsion (Fish Oil and Plant Based) 20% Infusion-[Known as SMOFLIPID 20% Infusion]  50 Gram(s) in IV Solution 250 milliLiter(s) infuse at 20.8 mL/Hr  Dose Rate: 0.681 Gm/kG/Day Infuse Over: 12 Hours; Stop After 12 Hours  Administration Instructions: Use 1.2 micron in-line filter  Entered: Feb 20 2025 10:00PM    Parenteral Nutrition - Adult-  Entered: Feb 20 2025 10:00PM  
This patient has been assessed with a concern for Malnutrition and has been determined to have a diagnosis/diagnoses of Severe protein-calorie malnutrition.    This patient is being managed with:   Diet Regular-  Prosource Gelatein Plus     Qty per Day:  2  Liquid Protein Supplement     Qty per Day:  2  Supplement Feeding Modality:  Oral  Ensure Plant-Based Cans or Servings Per Day:  1       Frequency:  Three Times a day  Entered: Mar 14 2025 12:17PM  
This patient has been assessed with a concern for Malnutrition and has been determined to have a diagnosis/diagnoses of Severe protein-calorie malnutrition.    This patient is being managed with:   Parenteral Nutrition - Adult-  Entered: Feb 14 2025 10:00PM    Diet Low Fiber-  Kosher  Entered: Feb 13 2025  8:59AM  
This patient has been assessed with a concern for Malnutrition and has been determined to have a diagnosis/diagnoses of Severe protein-calorie malnutrition.    This patient is being managed with:   lipid fat emulsion (Fish Oil and Plant Based) 20% Infusion-[Known as SMOFLIPID 20% Infusion]  50 Gram(s) in IV Solution 250 milliLiter(s) infuse at 20.8 mL/Hr  Dose Rate: 0.6812 Gm/kG/Day Infuse Over: 12 Hours; Stop After 12 Hours  Administration Instructions: Use 1.2 micron in-line filter  Entered: Feb 16 2025 10:00PM    Parenteral Nutrition - Adult-  Entered: Feb 16 2025 10:00PM    Parenteral Nutrition - Adult-  Entered: Feb 15 2025 10:00PM    Diet Low Fiber-  1500mL Fluid Restriction ([id number])  Kosher  Supplement Feeding Modality:  Oral  Ensure Plant-Based Cans or Servings Per Day:  3       Frequency:  Daily  Entered: Feb 15 2025  8:35PM  
This patient has been assessed with a concern for Malnutrition and has been determined to have a diagnosis/diagnoses of Severe protein-calorie malnutrition.    This patient is being managed with:   lipid fat emulsion (Fish Oil and Plant Based) 20% Infusion-[Known as SMOFLIPID 20% Infusion]  50 Gram(s) in IV Solution 250 milliLiter(s) infuse at 20.8 mL/Hr  Dose Rate: 0.681 Gm/kG/Day Infuse Over: 12 Hours; Stop After 12 Hours  Administration Instructions: Use 1.2 micron in-line filter  Entered: Mar  1 2025  5:00PM    Parenteral Nutrition - Adult-  Entered: Mar  1 2025  5:00PM    lipid fat emulsion (Fish Oil and Plant Based) 20% Infusion-[Known as SMOFLIPID 20% Infusion]  50 Gram(s) in IV Solution 250 milliLiter(s) infuse at 20.8 mL/Hr  Dose Rate: 0.6812 Gm/kG/Day Infuse Over: 12 Hours; Stop After 12 Hours  Administration Instructions: Use 1.2 micron in-line filter  Entered: Feb 28 2025 10:00PM    Parenteral Nutrition - Adult-  Entered: Feb 28 2025 10:00PM    Diet Regular-  Prosource Gelatein 20 Sugar Free     Qty per Day:  2  Supplement Feeding Modality:  Oral  Ensure Plus High Protein Cans or Servings Per Day:  1       Frequency:  Two Times a day  Entered: Feb 24 2025 12:01PM

## 2025-03-18 NOTE — PROGRESS NOTE ADULT - SUBJECTIVE AND OBJECTIVE BOX
HPI: pt seen and examined with  at bedside, patient states that this AM woke up with pain that was not relieved with oxycodone at this time, patient tried dose of oral morphine which helped relieve some of the pain added another davison to see if more tolerant of pain - will change dose depending on how patient tolerate     PAIN: ( )Yes   (x )No  Level: moderate to severe   Location: abdomen   Intensity:   5-6 /10  Quality: sharp  Aggravating Factors: unsure  Alleviating Factors: medications have taken   Radiation: denies  Duration/Timing: intermittently worse     DYSPNEA: ( ) Yes  ( x) No  Level:    Review of Systems:    Anxiety- situational   Depression- denies   Physical Discomfort- yes  Constipation- denies   Nausea- yes   Anorexia- some loss of appetitive   Weight Loss- unsure   Secretions- denies   Fatigue- at times   Weakness- from baseline yes     All other systems reviewed and negative    PHYSICAL EXAM:    Vital Signs Last 24 Hrs  T(C): 37.3 (18 Mar 2025 07:53), Max: 37.6 (18 Mar 2025 00:25)  T(F): 99.2 (18 Mar 2025 07:53), Max: 99.6 (18 Mar 2025 00:25)  HR: 97 (18 Mar 2025 07:53) (95 - 97)  BP: 139/67 (18 Mar 2025 07:53) (139/67 - 161/82)  RR: 18 (18 Mar 2025 07:53) (18 - 18)  SpO2: 94% (18 Mar 2025 07:53) (91% - 98%)    Parameters below as of 18 Mar 2025 07:53  Patient On (Oxygen Delivery Method): room air    Daily Weight in k.7 (18 Mar 2025 06:08)      PPSV2: 40-50  %    General: pleasant female in bed, NAD  Mental Status: alert and oriented   HEENT: MMM   Lungs: diminished b/l   Cardiac: S1S2 +   GI: nontender, nondistended +BS   : no suprapubic tenderness   Ext: GOLDEN   Neuro: intact      LABS:                        7.7    4.93  )-----------( 258      ( 18 Mar 2025 07:15 )             24.1     03-18    142  |  104  |  15  ----------------------------<  100[H]  2.8[LL]   |  29  |  1.23    Ca    9.5      18 Mar 2025 07:15  Mg     1.6             Albumin: Albumin: 2.1 g/dL ( @ 10:22)      Allergies    No Known Allergies    Intolerances      MEDICATIONS  (STANDING):  acetaminophen   IVPB .. 1000 milliGRAM(s) IV Intermittent once  aspirin enteric coated 81 milliGRAM(s) Oral daily  chlorhexidine 2% Cloths 1 Application(s) Topical <User Schedule>  cholecalciferol 2000 Unit(s) Oral at bedtime  dextrose 5%. 1000 milliLiter(s) (100 mL/Hr) IV Continuous <Continuous>  dextrose 5%. 1000 milliLiter(s) (50 mL/Hr) IV Continuous <Continuous>  dronabinol 2.5 milliGRAM(s) Oral at bedtime  glucagon  Injectable 1 milliGRAM(s) IntraMuscular once  metoclopramide 5 milliGRAM(s) Oral three times a day  metoprolol succinate ER 50 milliGRAM(s) Oral daily  mineral oil/petrolatum Hydrophilic Ointment 1 Application(s) Topical daily  naloxegol 25 milliGRAM(s) Oral daily  pantoprazole    Tablet 40 milliGRAM(s) Oral before breakfast  potassium chloride   Powder 40 milliEquivalent(s) Oral two times a day  rifAXIMin 550 milliGRAM(s) Oral three times a day  sodium chloride 0.9%. 1000 milliLiter(s) (75 mL/Hr) IV Continuous <Continuous>  sucralfate suspension 1 Gram(s) Oral every 12 hours  tamsulosin 0.4 milliGRAM(s) Oral at bedtime  zinc oxide 40% Paste 1 Application(s) Topical two times a day    MEDICATIONS  (PRN):  acetaminophen     Tablet .. 650 milliGRAM(s) Oral every 6 hours PRN Temp greater or equal to 38C (100.4F), Mild Pain (1 - 3)  aluminum hydroxide/magnesium hydroxide/simethicone Suspension 30 milliLiter(s) Oral every 4 hours PRN Dyspepsia  amLODIPine   Tablet 2.5 milliGRAM(s) Oral daily PRN for SBP > 140  Biotene Dry Mouth Oral Rinse 15 milliLiter(s) Swish and Spit five times a day PRN Mouth Care  lidocaine 4% Cream 1 Application(s) Topical four times a day PRN pain  lidocaine 4% Cream 1 Application(s) Topical four times a day PRN sacral ulcer  morphine  - Injectable 2 milliGRAM(s) IV Push every 4 hours PRN breakthrough pain or very severe pain  morphine  IR 7.5 milliGRAM(s) Oral every 4 hours PRN Moderate Pain (4 - 6)  ondansetron Injectable 4 milliGRAM(s) IV Push every 8 hours PRN Nausea and/or Vomiting  oxyCODONE    IR 5 milliGRAM(s) Oral every 4 hours PRN Severe Pain (7 - 10)  sodium chloride 0.65% Nasal 1 Spray(s) Both Nostrils two times a day PRN Nasal Congestion      RADIOLOGY:

## 2025-03-18 NOTE — PROGRESS NOTE ADULT - SUBJECTIVE AND OBJECTIVE BOX
Subjective:    pat better, still abd pain, lying in bed,   CT Abdomen and Pelvis w/ IV Cont (03.17.25 @ 11:52) >  IMPRESSION:  *  No evidence of portal mesenteric venous thrombosis, as clinically   questioned.  *  Mass in the body of the pancreas measuring up to 4.8 cm abutting the   stomach and narrowing the splenic vein with resultant gastroepiploic   collaterals.  *  Peritoneal carcinomatosis. Moderate amount of abdominal and pelvic   ascites, unchanged.      Home Medications:  amitriptyline: 10 milligram(s) orally once a day (23 Dec 2024 18:06)  amLODIPine 5 mg oral tablet: 1 tab(s) orally once a day (23 Dec 2024 18:06)  aspirin 81 mg oral tablet, chewable: 1 tab(s) orally once a day (23 Dec 2024 18:06)  atorvastatin 40 mg oral tablet: 1 tab(s) orally once a day (at bedtime) (23 Dec 2024 18:06)  cefepime 2 g intravenous injection: 2 gram(s) intravenous every 12 hours (04 Mar 2025 21:11)  clopidogrel 75 mg oral tablet: 1 tab(s) orally once a day (23 Dec 2024 16:32)  filgrastim: 480 microgram(s) subcutaneous once a day (04 Mar 2025 21:11)  Lactated Ringers Injection intravenous solution: 150 milligram(s) intravenous every 1 to 2 hours (04 Mar 2025 21:11)  loperamide 2 mg oral capsule: 1 cap(s) orally once (04 Mar 2025 21:11)  midodrine 5 mg oral tablet: 1 tab(s) orally every 8 hours (04 Mar 2025 21:11)  Multiple Vitamins with Minerals oral liquid: 1 orally once a day (04 Mar 2025 21:11)  octreotide 2500 mcg/mL subcutaneous solution: 0.04 milliliter(s) subcutaneous 3 times a day (04 Mar 2025 21:11)  Pepcid 40 mg oral tablet: 40 milligram(s) orally once a day as needed for dyspepsia (23 Dec 2024 18:06)  sucralfate 1 g/10 mL oral suspension: 10 milliliter(s) orally 4 times a day (04 Mar 2025 21:11)  trimethobenzamide 100 mg/mL intramuscular solution: 2 milliliter(s) intramuscular every 8 hours As needed Nausea and/or Vomiting (04 Mar 2025 21:11)    MEDICATIONS  (STANDING):  acetaminophen   IVPB .. 1000 milliGRAM(s) IV Intermittent once  aspirin enteric coated 81 milliGRAM(s) Oral daily  chlorhexidine 2% Cloths 1 Application(s) Topical <User Schedule>  cholecalciferol 2000 Unit(s) Oral at bedtime  dextrose 5%. 1000 milliLiter(s) (100 mL/Hr) IV Continuous <Continuous>  dextrose 5%. 1000 milliLiter(s) (50 mL/Hr) IV Continuous <Continuous>  dronabinol 2.5 milliGRAM(s) Oral at bedtime  glucagon  Injectable 1 milliGRAM(s) IntraMuscular once  metoclopramide 5 milliGRAM(s) Oral three times a day  metoprolol succinate ER 50 milliGRAM(s) Oral daily  mineral oil/petrolatum Hydrophilic Ointment 1 Application(s) Topical daily  naloxegol 25 milliGRAM(s) Oral daily  pantoprazole    Tablet 40 milliGRAM(s) Oral before breakfast  potassium chloride   Powder 40 milliEquivalent(s) Oral two times a day  rifAXIMin 550 milliGRAM(s) Oral three times a day  sodium chloride 0.9%. 1000 milliLiter(s) (75 mL/Hr) IV Continuous <Continuous>  sucralfate suspension 1 Gram(s) Oral every 12 hours  tamsulosin 0.4 milliGRAM(s) Oral at bedtime  zinc oxide 40% Paste 1 Application(s) Topical two times a day    MEDICATIONS  (PRN):  acetaminophen     Tablet .. 650 milliGRAM(s) Oral every 6 hours PRN Temp greater or equal to 38C (100.4F), Mild Pain (1 - 3)  aluminum hydroxide/magnesium hydroxide/simethicone Suspension 30 milliLiter(s) Oral every 4 hours PRN Dyspepsia  amLODIPine   Tablet 2.5 milliGRAM(s) Oral daily PRN for SBP > 140  Biotene Dry Mouth Oral Rinse 15 milliLiter(s) Swish and Spit five times a day PRN Mouth Care  lidocaine 4% Cream 1 Application(s) Topical four times a day PRN pain  lidocaine 4% Cream 1 Application(s) Topical four times a day PRN sacral ulcer  morphine  - Injectable 2 milliGRAM(s) IV Push every 4 hours PRN breakthrough pain or very severe pain  morphine  IR 7.5 milliGRAM(s) Oral every 4 hours PRN Moderate Pain (4 - 6)  ondansetron Injectable 4 milliGRAM(s) IV Push every 8 hours PRN Nausea and/or Vomiting  oxyCODONE    IR 5 milliGRAM(s) Oral every 4 hours PRN Severe Pain (7 - 10)  sodium chloride 0.65% Nasal 1 Spray(s) Both Nostrils two times a day PRN Nasal Congestion      Allergies    No Known Allergies    Intolerances        Vital Signs Last 24 Hrs  T(C): 37.3 (18 Mar 2025 07:53), Max: 37.6 (18 Mar 2025 00:25)  T(F): 99.2 (18 Mar 2025 07:53), Max: 99.6 (18 Mar 2025 00:25)  HR: 97 (18 Mar 2025 07:53) (95 - 97)  BP: 139/67 (18 Mar 2025 07:53) (139/67 - 161/82)  BP(mean): --  RR: 18 (18 Mar 2025 07:53) (18 - 18)  SpO2: 94% (18 Mar 2025 07:53) (91% - 98%)    Parameters below as of 18 Mar 2025 07:53  Patient On (Oxygen Delivery Method): room air          PHYSICAL EXAMINATION:    NECK:  Supple. No lymphadenopathy. Jugular venous pressure not elevated. Carotids equal.   HEART:   The cardiac impulse has a normal quality. Reg., Nl S1 and S2.  There are no murmurs, rubs or gallops noted  CHEST:  Chest is clear to auscultation. Normal respiratory effort.  ABDOMEN:  Soft and nontender.   EXTREMITIES:  There is no edema.       LABS:                        7.7    4.93  )-----------( 258      ( 18 Mar 2025 07:15 )             24.1     03-18    142  |  104  |  15  ----------------------------<  100[H]  2.8[LL]   |  29  |  1.23    Ca    9.5      18 Mar 2025 07:15  Mg     1.6     03-18        Urinalysis Basic - ( 18 Mar 2025 07:15 )    Color: x / Appearance: x / SG: x / pH: x  Gluc: 100 mg/dL / Ketone: x  / Bili: x / Urobili: x   Blood: x / Protein: x / Nitrite: x   Leuk Esterase: x / RBC: x / WBC x   Sq Epi: x / Non Sq Epi: x / Bacteria: x

## 2025-03-18 NOTE — PROGRESS NOTE ADULT - ASSESSMENT
70yo female with PMHx recent diagnosis of primary pancreatic adenocarcinoma of head with tumor in umbilicus, chemo-induced pancytopenia, SCC lip, cyclical vomiting syndrome, HTN, HPL, Gout, GERD, CVA on Aggrenox  Initially admitted to [facility] with septic shock 2/2 + pseudomonas bacteremia likely due to UTI vs colitis on 02/09/2025 Was having nausea, vomiting and diarrhea requiring Levophed drip, was transferred out of ICU to regular floor.  Then transferred to  as per request of . Was getting platelet transfusion and developed tachypnea hypoxia, rigors. Was diuresed and became hypotensive, Upgraded to SICU. CTA c/a/p on 2/19 showed multiple b/l PE and large bowel obstruction, was started on heparin drip, c/b GIB, then retrialed heparin drip with recurrent c/f GIB. Also with Afib w RVR during admission, resolved w iv metoprolol.   On 2/28, developed SIRS, Blood cx growing yeast. TPN stopped, R chest port removed on 3/2 and pt started on iv caspofungin.    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 Mottled air   lucency and mild fat infiltration surrounding the jejunal segment (2:62. 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  - lactate cleared 1.3 3/8   - Tolerated CLD, now on regular diet  - GI and surgery and oncology surgery  on board   - Sp Meropenem  - second opinion GI Dr. Young   - 3/13  -s/p EGD - gastritis , pt refusing colonoscopy  - 3/17 for CTA r/o Mesenteric vein thrombosis     # Urinary retention   - s/p Julian   - voiding trial once more ambulatory  - 3/13 - started  flomax hs  - urology consult      # Periumbilical Abdominal pain due to periumbilical metastasis  # Peritoneal carcinomatosis   - 3/14 - dilaudid 0.2 IV prn  stopped not helpful  - 3/15 - use po and IV prn tylenol  - 3/16 - PO morphine 7.5 q4h prn moderate and 5 mg q4h prn severe  and IV morphine for breakthroug  - lidocaine cream to pressure ulcer, periumbilical met    # Suspected SIBO  # Diarrheal syndrome   - c/w  xifaxan 550 tid d/w Dr. Hernandez  - stop simethicone 125 tid - not effective, can worsen diarrhea   - opium tincture 6 q6h ordered, s/p first dose - no effect on pain , diarrhea slow down  - 3/16 - stool studies -  calprotectin, elastase pending    #Acute bilateral PE  # Lung nodules  # Sinus tachycardia, resolved  - Doppler LE negative  - CT chest- multiple PE w/in segmental left lower and right lower, 2 right middle lobe nodules,   -  3/10 s/p IVC filter   - Recurrent GIB on heparin   - Continue toprol 50    # H/o CVA (cerebrovascular accident).   - Was originally on Plavix per  but because is now taking [facility] [id number]  - was told to start Aggrenox instead to avoid DDIs  - off AP and AC due to GI bleeds will  d/w  GI team  - need to be on plavix d/w Dr. Clement will start once cleared by GI   - d/w Dr. Hernandez ok to restart ASA 81     # SVT on 02/09/2025: resolved after Valsalva and Carotid massage.   -Off levophed; s/p  oral midodrine.  -Cardiology consulted Dr. Clement  - 3/12 - restart low dose of amlodipine 2.5 for high BP  - 3/13 - cardiology consult  - continue  toprol 50     # GERD/Esophagitis , recurrent GI bleeds while on IV heparin   - Continue protonix    - Continue carafate bid    #MALLORY, likely ATN  - Nephrology on board  - Creatinine Trend: 1.2    # Acute blood loss anemia and Malignancy  - SP GIB x 2 after starting heparin  - s/p pRBC x 6u this admission  - H/H 7.7 3/18  - Transfuse 1 unit 3/18  - Continue Protonix      #Primary pancreatic adenocarcinoma  - SP folfirinox with reaction to irinotecan  - on a clinical trial now, on hold     #Skin injury Left and Rigth upper Gluteal Intergluteal cleft   - superficial skin necrosis  - Wound care evaluation appreciated  - Low air mattress  - Turning / positioning q2h while in bed    # Cutaneous lesion on RUQ area below right breast,  Ecthyma gangrenosum.  - Ecthyma gangrenosum. life-threatening skin infection caused by Pseudomonas aeruginosa bacteria.   - dermatology consult -  appears to be resolving, most skin changes appear post inflammatory.  - Please recall if the lesion does not continue to slowly resolve over the next 2-3 weeks.   - wound care - If the surface ulcerates, cover with telfa/ vaseline/ paper tape    # Hypokalemia  # Hypomagnesemia   - replete prn  - keep K close to 4     # HTN   - amlodipine 2.5     #DVT ppx   - SCDs

## 2025-03-19 ENCOUNTER — TRANSCRIPTION ENCOUNTER (OUTPATIENT)
Age: 70
End: 2025-03-19

## 2025-03-19 VITALS
HEART RATE: 99 BPM | DIASTOLIC BLOOD PRESSURE: 72 MMHG | SYSTOLIC BLOOD PRESSURE: 137 MMHG | TEMPERATURE: 98 F | RESPIRATION RATE: 17 BRPM | OXYGEN SATURATION: 97 %

## 2025-03-19 LAB
ALBUMIN SERPL ELPH-MCNC: 2.9 G/DL — LOW (ref 3.3–5)
ALP SERPL-CCNC: 100 U/L — SIGNIFICANT CHANGE UP (ref 40–120)
ALT FLD-CCNC: 16 U/L — SIGNIFICANT CHANGE UP (ref 12–78)
ANION GAP SERPL CALC-SCNC: 6 MMOL/L — SIGNIFICANT CHANGE UP (ref 5–17)
AST SERPL-CCNC: 10 U/L — LOW (ref 15–37)
BILE AC FLD-MCNC: 2.6 UMOL/L — SIGNIFICANT CHANGE UP (ref 0–10)
BILIRUB SERPL-MCNC: 0.9 MG/DL — SIGNIFICANT CHANGE UP (ref 0.2–1.2)
BUN SERPL-MCNC: 14 MG/DL — SIGNIFICANT CHANGE UP (ref 7–23)
CHLORIDE SERPL-SCNC: 106 MMOL/L — SIGNIFICANT CHANGE UP (ref 96–108)
CO2 SERPL-SCNC: 28 MMOL/L — SIGNIFICANT CHANGE UP (ref 22–31)
CREAT SERPL-MCNC: 1.36 MG/DL — HIGH (ref 0.5–1.3)
EGFR: 42 ML/MIN/1.73M2 — LOW
EGFR: 42 ML/MIN/1.73M2 — LOW
GLUCOSE SERPL-MCNC: 108 MG/DL — HIGH (ref 70–99)
HCT VFR BLD CALC: 30.7 % — LOW (ref 34.5–45)
HGB BLD-MCNC: 10.1 G/DL — LOW (ref 11.5–15.5)
MCHC RBC-ENTMCNC: 29.2 PG — SIGNIFICANT CHANGE UP (ref 27–34)
MCHC RBC-ENTMCNC: 32.9 G/DL — SIGNIFICANT CHANGE UP (ref 32–36)
MCV RBC AUTO: 88.7 FL — SIGNIFICANT CHANGE UP (ref 80–100)
NRBC # BLD AUTO: 0 K/UL — SIGNIFICANT CHANGE UP (ref 0–0)
NRBC # FLD: 0 K/UL — SIGNIFICANT CHANGE UP (ref 0–0)
NRBC BLD AUTO-RTO: 0 /100 WBCS — SIGNIFICANT CHANGE UP (ref 0–0)
PLATELET # BLD AUTO: 264 K/UL — SIGNIFICANT CHANGE UP (ref 150–400)
PMV BLD: 9.6 FL — SIGNIFICANT CHANGE UP (ref 7–13)
POTASSIUM SERPL-MCNC: 3.6 MMOL/L — SIGNIFICANT CHANGE UP (ref 3.5–5.3)
POTASSIUM SERPL-SCNC: 3.6 MMOL/L — SIGNIFICANT CHANGE UP (ref 3.5–5.3)
PROT SERPL-MCNC: 6.2 GM/DL — SIGNIFICANT CHANGE UP (ref 6–8.3)
RBC # BLD: 3.46 M/UL — LOW (ref 3.8–5.2)
RBC # FLD: 14.1 % — SIGNIFICANT CHANGE UP (ref 10.3–14.5)
SODIUM SERPL-SCNC: 140 MMOL/L — SIGNIFICANT CHANGE UP (ref 135–145)
WBC # BLD: 5.24 K/UL — SIGNIFICANT CHANGE UP (ref 3.8–10.5)
WBC # FLD AUTO: 5.24 K/UL — SIGNIFICANT CHANGE UP (ref 3.8–10.5)

## 2025-03-19 PROCEDURE — 99233 SBSQ HOSP IP/OBS HIGH 50: CPT

## 2025-03-19 PROCEDURE — 99232 SBSQ HOSP IP/OBS MODERATE 35: CPT

## 2025-03-19 RX ORDER — ACETAMINOPHEN 500 MG/5ML
2 LIQUID (ML) ORAL
Qty: 0 | Refills: 0 | DISCHARGE
Start: 2025-03-19

## 2025-03-19 RX ORDER — LIDOCAINE HYDROCHLORIDE 20 MG/ML
1 JELLY TOPICAL
Qty: 0 | Refills: 0 | DISCHARGE
Start: 2025-03-19

## 2025-03-19 RX ORDER — POLYETHYLENE GLYCOL 3350 17 G/17G
17 POWDER, FOR SOLUTION ORAL
Qty: 0 | Refills: 0 | DISCHARGE
Start: 2025-03-19

## 2025-03-19 RX ORDER — TOUCHLESS CARE ZINC OXIDE PROTECTANT 20; 25 G/100G; G/100G
1 SPRAY TOPICAL
Qty: 0 | Refills: 0 | DISCHARGE
Start: 2025-03-19

## 2025-03-19 RX ORDER — RIFAXIMIN 550 MG/1
1 TABLET ORAL
Qty: 0 | Refills: 0 | DISCHARGE
Start: 2025-03-19

## 2025-03-19 RX ORDER — NALOXEGOL OXALATE 12.5 MG/1
1 TABLET, FILM COATED ORAL
Qty: 0 | Refills: 0 | DISCHARGE
Start: 2025-03-19

## 2025-03-19 RX ORDER — TAMSULOSIN HYDROCHLORIDE 0.4 MG/1
1 CAPSULE ORAL
Qty: 0 | Refills: 0 | DISCHARGE
Start: 2025-03-19

## 2025-03-19 RX ORDER — SUCRALFATE 1 G
10 TABLET ORAL
Qty: 0 | Refills: 0 | DISCHARGE
Start: 2025-03-19

## 2025-03-19 RX ORDER — AMLODIPINE BESYLATE 10 MG/1
1 TABLET ORAL
Qty: 0 | Refills: 0 | DISCHARGE
Start: 2025-03-19

## 2025-03-19 RX ORDER — DRONABINOL 10 MG/1
1 CAPSULE ORAL
Qty: 0 | Refills: 0 | DISCHARGE
Start: 2025-03-19

## 2025-03-19 RX ORDER — METOPROLOL SUCCINATE 50 MG/1
1 TABLET, EXTENDED RELEASE ORAL
Qty: 0 | Refills: 0 | DISCHARGE
Start: 2025-03-19

## 2025-03-19 RX ORDER — METOCLOPRAMIDE HCL 10 MG
1 TABLET ORAL
Qty: 0 | Refills: 0 | DISCHARGE
Start: 2025-03-19

## 2025-03-19 RX ORDER — EMOLLIENT COMBINATION NO.35
1 CREAM (GRAM) TOPICAL
Qty: 0 | Refills: 0 | DISCHARGE
Start: 2025-03-19

## 2025-03-19 RX ORDER — ONDANSETRON HCL/PF 4 MG/2 ML
1 VIAL (ML) INJECTION
Qty: 1 | Refills: 0
Start: 2025-03-19 | End: 2025-04-17

## 2025-03-19 RX ADMIN — METOPROLOL SUCCINATE 50 MILLIGRAM(S): 50 TABLET, EXTENDED RELEASE ORAL at 09:37

## 2025-03-19 RX ADMIN — TOUCHLESS CARE ZINC OXIDE PROTECTANT 1 APPLICATION(S): 20; 25 SPRAY TOPICAL at 09:43

## 2025-03-19 RX ADMIN — Medication 1 APPLICATION(S): at 09:38

## 2025-03-19 RX ADMIN — Medication 40 MILLIGRAM(S): at 09:37

## 2025-03-19 RX ADMIN — Medication 81 MILLIGRAM(S): at 09:36

## 2025-03-19 RX ADMIN — Medication 25 GRAM(S): at 00:10

## 2025-03-19 RX ADMIN — Medication 5 MILLIGRAM(S): at 09:37

## 2025-03-19 RX ADMIN — Medication 1 GRAM(S): at 09:38

## 2025-03-19 RX ADMIN — Medication 100 MILLIEQUIVALENT(S): at 06:02

## 2025-03-19 RX ADMIN — Medication 100 MILLIEQUIVALENT(S): at 02:42

## 2025-03-19 RX ADMIN — Medication 7.5 MILLIGRAM(S): at 09:38

## 2025-03-19 RX ADMIN — NALOXEGOL OXALATE 25 MILLIGRAM(S): 12.5 TABLET, FILM COATED ORAL at 09:37

## 2025-03-19 RX ADMIN — Medication 5 MILLIGRAM(S): at 13:31

## 2025-03-19 RX ADMIN — Medication 7.5 MILLIGRAM(S): at 13:30

## 2025-03-19 RX ADMIN — Medication 100 MILLIEQUIVALENT(S): at 04:11

## 2025-03-19 RX ADMIN — Medication 1 APPLICATION(S): at 09:32

## 2025-03-19 NOTE — DISCHARGE NOTE NURSING/CASE MANAGEMENT/SOCIAL WORK - PATIENT PORTAL LINK FT
You can access the FollowMyHealth Patient Portal offered by [facility] by registering at the following website: [email or web address]. By joining [employer]’s FollowMyHealth portal, you will also be able to view your health information using other applications (apps) compatible with our system.

## 2025-03-19 NOTE — PROGRESS NOTE ADULT - SUBJECTIVE AND OBJECTIVE BOX
s/p endoscopy this am  No findings   Supine comfortable    3/14  not talkative today  meds adjusted bt Hospitalist    3/15  Started on Xifaxan  feels better     3/16  Still with abd discomfort  good uo    3/17  Pt evaluated  Dr Pleitez present  For CTA to r/o Mesenteric vein thrombosis    3/19  Pt feels well in good spirits  for transfer to rehab    Allergies    No Known Allergies    Intolerances      MEDICATIONS  (STANDING):  acetaminophen     Tablet .. 650 milliGRAM(s) Oral every 12 hours  acetaminophen   IVPB .. 1000 milliGRAM(s) IV Intermittent once  aspirin enteric coated 81 milliGRAM(s) Oral daily  caspofungin IVPB 50 milliGRAM(s) IV Intermittent every 24 hours  caspofungin IVPB      chlorhexidine 2% Cloths 1 Application(s) Topical <User Schedule>  cholecalciferol 2000 Unit(s) Oral at bedtime  dextrose 5%. 1000 milliLiter(s) (50 mL/Hr) IV Continuous <Continuous>  dextrose 5%. 1000 milliLiter(s) (100 mL/Hr) IV Continuous <Continuous>  dicyclomine 20 milliGRAM(s) Oral four times a day before meals  dronabinol 2.5 milliGRAM(s) Oral at bedtime  glucagon  Injectable 1 milliGRAM(s) IntraMuscular once  metoprolol succinate ER 25 milliGRAM(s) Oral daily  pantoprazole    Tablet 40 milliGRAM(s) Oral before breakfast  simethicone 125 milliGRAM(s) Chew three times a day  sucralfate suspension 1 Gram(s) Oral every 12 hours  tamsulosin 0.4 milliGRAM(s) Oral at bedtime  vancomycin    Solution 125 milliGRAM(s) Oral every 6 hours  zinc oxide 40% Paste 1 Application(s) Topical two times a day    MEDICATIONS  (PRN):  acetaminophen     Tablet .. 650 milliGRAM(s) Oral every 6 hours PRN Temp greater or equal to 38C (100.4F), Mild Pain (1 - 3)  I&O's Detail    16 Mar 2025 07:01  -  17 Mar 2025 07:00  --------------------------------------------------------  IN:  Total IN: 0 mL    OUT:    Indwelling Catheter - Urethral (mL): 2475 mL    Stool (mL): 2 mL  Total OUT: 2477 mL    Total NET: -2477 mL    Vital Signs Last 24 Hrs  T(C): 36.8 (19 Mar 2025 13:59), Max: 37.3 (19 Mar 2025 00:29)  T(F): 98.3 (19 Mar 2025 13:59), Max: 99.2 (19 Mar 2025 00:29)  HR: 99 (19 Mar 2025 13:59) (87 - 99)  BP: 137/72 (19 Mar 2025 13:59) (136/77 - 158/84)  BP(mean): --  RR: 17 (19 Mar 2025 13:59) (17 - 18)  SpO2: 97% (19 Mar 2025 13:59) (91% - 97%)    Parameters below as of 19 Mar 2025 13:59  Patient On (Oxygen Delivery Method): room air      Constitutional: NAD,   HEENT:   MM  Respiratory: CTAB  Cardiovascular: S1 and S2  Gastrointestinal: BS+, soft, NT/ND  Extremities: minimal  peripheral edema  Neurological: A/O  : + coelho      LABS:                            10.1   5.24  )-----------( 264      ( 19 Mar 2025 06:33 )             30.7                8.1    4.29  )-----------( 314      ( 16 Mar 2025 10:22 )             25.3                    8.1    5.15  )-----------( 296      ( 15 Mar 2025 07:19 )             25.5            03-16    138  |  105  |  22  ----------------------------<  81  3.5   |  26  |  1.34[H]    Ca    10.0      16 Mar 2025 10:22  Phos  4.3     03-16  Mg     1.9     03-16    TPro  6.3  /  Alb  2.1[L]  /  TBili  0.6  /  DBili  0.2  /  AST  16  /  ALT  20  /  AlkPhos  135[H]  03-16 03-16    138  |  105  |  22  ----------------------------<  81  3.5   |  26  |  1.34[H]    Ca    10.0      16 Mar 2025 10:22  Phos  4.3     03-16  Mg     1.9     03-16    TPro  6.3  /  Alb  2.1[L]  /  TBili  0.6  /  DBili  0.2  /  AST  16  /  ALT  20  /  AlkPhos  135[H]  03-16      03-15    139  |  107  |  25[H]  ----------------------------<  96  4.5   |  29  |  1.49[H]    Ca    9.7      15 Mar 2025 07:19  Phos  3.5     03-15  Mg     2.3     03-15    TPro  5.9[L]  /  Alb  2.0[L]  /  TBili  0.5  /  DBili  0.1  /  AST  25  /  ALT  21  /  AlkPhos  126[H]  03-15      03-14    140  |  108  |  23  ----------------------------<  123[H]  4.2   |  28  |  1.64[H]    Ca    9.8      14 Mar 2025 07:23  Phos  2.9     03-14  Mg     2.5     03-14    TPro  6.4  /  Alb  2.2[L]  /  TBili  0.5  /  DBili  0.2  /  AST  15  /  ALT  20  /  AlkPhos  133[H]  03-13 03-13    140  |  107  |  24[H]  ----------------------------<  119[H]  4.1   |  28  |  1.62[H]    Ca    9.5      13 Mar 2025 06:21  Phos  3.1     03-13  Mg     2.8     03-13    TPro  6.4  /  Alb  2.2[L]  /  TBili  0.5  /  DBili  0.2  /  AST  15  /  ALT  20  /  AlkPhos  133[H]  03-13      03-12    138  |  105  |  22  ----------------------------<  134[H]  3.4[L]   |  28  |  1.32[H]    Ca    9.9      12 Mar 2025 07:38  Phos  3.6     03-12  Mg     2.5     03-12    TPro  6.2  /  Alb  2.2[L]  /  TBili  0.6  /  DBili  0.2  /  AST  16  /  ALT  20  /  AlkPhos  136[H]  03-12        03-11    141  |  107  |  25[H]  ----------------------------<  123[H]  3.7   |  26  |  1.40[H]    Ca    9.6      11 Mar 2025 09:12  Phos  3.2     03-11  Mg     1.7     03-11    TPro  6.3  /  Alb  2.2[L]  /  TBili  0.7  /  DBili  x   /  AST  19  /  ALT  20  /  AlkPhos  140[H]  03-11      143    |  113    |  31     ----------------------------<  129       09 Mar 2025 07:07  3.9     |  23     |  1.48     144    |  112    |  32     ----------------------------<  126       08 Mar 2025 07:59  3.3     |  25     |  1.61     143    |  112    |  37     ----------------------------<  125       07 Mar 2025 07:22  3.8     |  22     |  1.69     Ca    9.4        09 Mar 2025 07:07  Ca    9.2        08 Mar 2025 07:59      Mg     1.6       09 Mar 2025 07:07  Mg     1.6       08 Mar 2025 07:59    TPro  6.0    /  Alb  2.0    /  TBili  0.6    /        09 Mar 2025 07:07  DBili  x      /  AST  17     /  ALT  21     /  AlkPhos  150      TPro  5.8    /  Alb  1.9    /  TBili  0.6    /        08 Mar 2025 07:59  DBili  x      /  AST  17     /  ALT  21     /  AlkPhos  151          Serum Osmo:   Serum Uric Acid:     Urine Studies:  Urinalysis Basic - ( 09 Mar 2025 07:07 )    Color: x / Appearance: x / SG: x / pH: x  Gluc: 129 mg/dL / Ketone: x  / Bili: x / Urobili: x   Blood: x / Protein: x / Nitrite: x   Leuk Esterase: x / RBC: x / WBC x   Sq Epi: x / Non Sq Epi: x / Bacteria: x        Urine chemistry:   Urine Na:   Urine Creatinine:   Urine Protein/Cr ratio:  Urine K:   Urine Osm:       RADIOLOGY & ADDITIONAL STUDIES:

## 2025-03-19 NOTE — PROGRESS NOTE ADULT - REASON FOR ADMISSION
Sepsis
sob
Shortness of breath, Pancreatic CA
sepsis
septic shock, Pseudomonas Bacteremia
sob
Pancreatic Ca
Sepsis
sob
Abd pain
Sepsis
sob
sepsis
nausea/vomiting/diarrhea
Sepsis

## 2025-03-19 NOTE — CHART NOTE - NSCHARTNOTEFT_GEN_A_CORE
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  Patient was being followed by GI, ID, Cardiology and Onc there.  Patient has been transferred to  today on patient's  (GI Dr. Pleitez) request.  At this time, patient c/o non-bloody diarrhea x 3 in last 24 hours.  She denies any abdominal pain, nausea, but has some dry heaves.  She denies any fever, chills, chest pain, palpitation, constipation or dysuria. Initially, she was started on Vancomycin and Zosyn, but now she has been on Cefepime as her blood culture and urine cultures are positive for Pseudomonas aeruginosa. Currently, patient is on Cefepime.  ID had also recommended Flagyl, but patient had refused it, as it causes nausea to her.   (12 Feb 2025 20:04)      PERTINENT PMH REVIEWED:  [ x ] YES [ ] NO           Primary Contact:        Ayad, spouse    HCP [ x ] Surrogate [   ] Guardian [   ]    Mental Status: [  x  ] Alert  [  x  ] Oriented [  ] Confused [  ] Lethargic  Concerns of Depression [  ] -denies  Anxiety [   ] -denies  Baseline ADLs (prior to admission):  Independent [ x ] moderately [ ] fully   Dependent   [ ] moderately [ ]fully    Family Meeting attendees: GOC discussed with Palliative NP    Anticipated Grief: Patient[  ] Family [  ]    Caregiver [place] Assessed: Yes [ x ] No [  ]    Synagogue: Jain    Spiritual Concerns: Not identified,  available for support.    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  Patient was being followed by GI, ID, Cardiology and Onc there.  Patient has been transferred to  today on patient's  (GI Dr. Pleitez) request.  At this time, patient c/o non-bloody diarrhea x 3 in last 24 hours.  She denies any abdominal pain, nausea, but has some dry heaves.  She denies any fever, chills, chest pain, palpitation, constipation or dysuria. Initially, she was started on Vancomycin and Zosyn, but now she has been on Cefepime as her blood culture and urine cultures are positive for Pseudomonas aeruginosa. Currently, patient is on Cefepime.  ID had also recommended Flagyl, but patient had refused it, as it causes nausea to her.   (12 Feb 2025 20:04)      PERTINENT PMH REVIEWED:  [ x ] YES [ ] NO           Primary Contact:        Ayad, spouse, [phone]    HCP [ x ] Surrogate [   ] Guardian [   ]    Mental Status: [  x  ] Alert  [  x  ] Oriented [  ] Confused [  ] Lethargic  Concerns of Depression [  ] -denies  Anxiety [   ] -denies  Baseline ADLs (prior to admission):  Independent [ x ] moderately [ ] fully   Dependent   [ ] moderately [ ]fully    Family Meeting attendees: GOC discussed with Palliative NP    Anticipated Grief: Patient[  ] Family [  ]    Caregiver [place] Assessed: Yes [ x ] No [  ]    Temple: Hinduism    Spiritual Concerns: Not identified,  available for support.    Goals of Care: Comfort [  ] Rehabilitation [  ] Curative [  ] Life Prolonging [ x ]    Previous Services: Outpatient oncology     ADVANCE DIRECTIVES:   -Pt has capacity   -HCP on One Content names spouse  -Full code    Anticipated D/C Plan: Lizbeth MEYER                      Summary:  Met with Pt at the bedside to follow up and offer support.  Palliative SW role explained.  Emotional support provided.  Prior to hospitalization, Pt resides at home with spouse.  She states she was independent with ADLs and managing well at home.  Pt. is alert and oriented, able to make needs known.  She denies feelings related to anxiety.  Denies feelings related to depression, denies feeling little interest, feeling down or little energy.  Reports that she is unhappy she is currently hospitalized and desires to get out of the hospital as soon as possible.  She discussed her goals of regaining strength and independence.  Eager to get to [facility].  Pts feelings explored.  Support provided.  Educated Pt of [facility].     Plan for [facility].  Emotional support provided.  Our team to continue to follow.

## 2025-03-19 NOTE — CHART NOTE - NSCHARTNOTESELECT_GEN_ALL_CORE
Dietitian Brief Note
Dietitian F/U w/ TPN
Event Note
Event Note
Gastro/Event Note
med change
Critical Care/Event Note
Dietitian F/U w/ TPN
Dietitian Follow Up
Dietitian Follow Up
Event Note
Event Note
IR/Event Note

## 2025-03-19 NOTE — PROGRESS NOTE ADULT - PROVIDER SPECIALTY LIST ADULT
Cardiology
Cardiology
Critical Care
Critical Care
Gastroenterology
Heme/Onc
Hospitalist
Infectious Disease
Nephrology
Physiatry
Pulmonology
SICU
Surgery
Cardiology
Cardiology
Critical Care
Gastroenterology
Heme/Onc
Hospitalist
Hospitalist
Infectious Disease
Nephrology
Physiatry
Physiatry
Pulmonology
SICU
Surgery
Cardiology
Cardiology
Critical Care
Critical Care
Gastroenterology
Heme/Onc
Hospitalist
Infectious Disease
Nephrology
Pulmonology
SICU
Surgery
Colorectal Surgery
Heme/Onc
Heme/Onc
Hospitalist
Infectious Disease
Palliative Care
Pulmonology
SICU
SICU
Critical Care
Hospitalist
Critical Care
Critical Care

## 2025-03-19 NOTE — PROGRESS NOTE ADULT - ASSESSMENT
69-year-old female with stage IV pancreatic adenocarcinoma presenting with neutropenic fever and severe treatment-related toxicities from modified FOLFIRINOX and investigational EMILY inhibitor [id number].    PROBLEMS:    Febrile syndrome. Fungemia. Likely disseminated candidemia.   Dysphagia. Possible candida esophagitis  New febrile syndrome with chills. Possible sepsis. Dysphagia. Possible candida esophagitis, S/p sepsis with PSAE   Acute hypoxic respiratory failure likely secondary to hypervolemia   Bilateral pleural effusions with compressive atelectasis, right greater than left  Neutropenic Fever/Neutropenic Septic shock  Severe Thrombocytopenia-Platelet count critically low at 26k.   Severe diarrhea and inability to tolerate PO intake, likely multifactorial from both FOLFIRINOX (particularly irinotecan component) and study drug [id number].   History of left cephalic vein thrombosis.  MALLORY  CT Angio Chest PE Protocol w/ IV Cont (02.19.25 @ 14:16) >  CHEST:  Multiple pulmonary emboli within the segmental branches of the left lower and right lower lobe pulmonary arteries.  There are two right middle lobe nodules, new since prior. Exact   etiology is unclear. Primary differential diagnostic consideration   includes infection.  CT Abdomen and Pelvis-Resolution of small and large bowel dilatation. Mottled lucency involving a short segment of sigmoid colon suspect for pneumatosis which could be due to benign or ischemic etiology. Please correlate clinically and with laboratory values.  Minimal increase in amount of ascites.  Unchanged pancreatic body and umbilical masses.      PLAN:    Pulmonary stable-decd planing   S/p GFF 03/10  Dc fluconazole  and d/c cefepime  IV caspofungin 50 mg-off   IV qd/ rifAXIMin 550 milliGRAM(s) Oral three times a day/simethicone 125 milliGRAM(s) Chew three times a day/sucralfate suspension 1 Gram(s) Oral every 12 hours  vancomycin Solution 125 milliGRAM(s) Oral every 6 hours-off  CTA chest + small PEs in RLL and LLL- off IV heparin causing bleeding-S/P GFF 03/10  Family does not want any procedure EGD/Colonscopy  Neutropenic Septic shock- IV meropenem started on 2/15- previously on CEFEPIME/Continue filgrastim 480mcg daily-Today w/ improvement in counts- WBC 1.3, Hb 10.1, plt 37, cmp with k 2.3 and Repleted, Cr 1.40.   Daily CBC monitoring  Maintain strict neutropenic precautions  Supportive care

## 2025-03-19 NOTE — PROGRESS NOTE ADULT - SUBJECTIVE AND OBJECTIVE BOX
Subjective:    pat better, sitting in bed, tolerating po, no new complaint.    Home Medications:  acetaminophen 325 mg oral tablet: 2 tab(s) orally every 6 hours As needed Temp greater or equal to 38C (100.4F), Mild Pain (1 - 3) (19 Mar 2025 11:37)  amLODIPine 2.5 mg oral tablet: 1 tab(s) orally once a day As needed for SBP &gt; 140 (19 Mar 2025 11:37)  aspirin 81 mg oral tablet, chewable: 1 tab(s) orally once a day (23 Dec 2024 18:06)  atorvastatin 40 mg oral tablet: 1 tab(s) orally once a day (at bedtime) (23 Dec 2024 18:06)  cholecalciferol oral tablet: 2000 unit(s) orally once a day (at bedtime) (19 Mar 2025 11:37)  droNABinol 2.5 mg oral capsule: 1 cap(s) orally once a day (at bedtime) (19 Mar 2025 11:37)  emollients, topical ointment: 1 Apply topically to affected area once a day (19 Mar 2025 11:37)  lidocaine 4% topical cream: 1 Apply topically to affected area 4 times a day As needed sacral ulcer (19 Mar 2025 11:37)  lidocaine 4% topical cream: 1 Apply topically to affected area 4 times a day As needed pain (19 Mar 2025 11:37)  metoclopramide 5 mg oral tablet: 1 tab(s) orally 3 times a day (19 Mar 2025 11:37)  metoprolol succinate 50 mg oral tablet, extended release: 1 tab(s) orally once a day (19 Mar 2025 11:37)  morphine 15 mg oral tablet: 1 tab(s) orally every 4 hours As needed Severe Pain (7 - 10) (19 Mar 2025 11:37)  naloxegol 25 mg oral tablet: 1 tab(s) orally once a day (19 Mar 2025 11:37)  pantoprazole 40 mg oral delayed release tablet: 1 tab(s) orally once a day (before a meal) (19 Mar 2025 11:37)  polyethylene glycol 3350 oral powder for reconstitution: 17 gram(s) orally once a day (at bedtime) (19 Mar 2025 11:37)  rifAXIMin 550 mg oral tablet: 1 tab(s) orally 3 times a day (19 Mar 2025 11:37)  saliva substitutes oral solution: 1 application orally 3 times a day (19 Mar 2025 11:37)  sucralfate 1 g/10 mL oral suspension: 10 milliliter(s) orally every 12 hours (19 Mar 2025 11:37)  tamsulosin 0.4 mg oral capsule: 1 cap(s) orally once a day (at bedtime) (19 Mar 2025 11:37)  zinc oxide 40% topical ointment: Apply topically to affected area once a day (19 Mar 2025 11:37)    MEDICATIONS  (STANDING):  acetaminophen   IVPB .. 1000 milliGRAM(s) IV Intermittent once  aspirin enteric coated 81 milliGRAM(s) Oral daily  chlorhexidine 2% Cloths 1 Application(s) Topical <User Schedule>  cholecalciferol 2000 Unit(s) Oral at bedtime  dextrose 5%. 1000 milliLiter(s) (50 mL/Hr) IV Continuous <Continuous>  dextrose 5%. 1000 milliLiter(s) (100 mL/Hr) IV Continuous <Continuous>  dronabinol 2.5 milliGRAM(s) Oral at bedtime  glucagon  Injectable 1 milliGRAM(s) IntraMuscular once  metoclopramide 5 milliGRAM(s) Oral three times a day  metoprolol succinate ER 50 milliGRAM(s) Oral daily  mineral oil/petrolatum Hydrophilic Ointment 1 Application(s) Topical daily  naloxegol 25 milliGRAM(s) Oral daily  pantoprazole    Tablet 40 milliGRAM(s) Oral before breakfast  polyethylene glycol 3350 17 Gram(s) Oral at bedtime  rifAXIMin 550 milliGRAM(s) Oral three times a day  sodium chloride 0.9%. 1000 milliLiter(s) (75 mL/Hr) IV Continuous <Continuous>  sucralfate suspension 1 Gram(s) Oral every 12 hours  tamsulosin 0.4 milliGRAM(s) Oral at bedtime  zinc oxide 40% Paste 1 Application(s) Topical two times a day    MEDICATIONS  (PRN):  acetaminophen     Tablet .. 650 milliGRAM(s) Oral every 6 hours PRN Temp greater or equal to 38C (100.4F), Mild Pain (1 - 3)  aluminum hydroxide/magnesium hydroxide/simethicone Suspension 30 milliLiter(s) Oral every 4 hours PRN Dyspepsia  amLODIPine   Tablet 2.5 milliGRAM(s) Oral daily PRN for SBP > 140  Biotene Dry Mouth Oral Rinse 15 milliLiter(s) Swish and Spit five times a day PRN Mouth Care  lidocaine 4% Cream 1 Application(s) Topical four times a day PRN pain  lidocaine 4% Cream 1 Application(s) Topical four times a day PRN sacral ulcer  morphine  - Injectable 2 milliGRAM(s) IV Push every 4 hours PRN breakthrough pain or very severe pain  morphine  IR 7.5 milliGRAM(s) Oral every 4 hours PRN Moderate Pain (4 - 6)  morphine  IR 15 milliGRAM(s) Oral every 4 hours PRN Severe Pain (7 - 10)  ondansetron Injectable 4 milliGRAM(s) IV Push every 8 hours PRN Nausea and/or Vomiting  sodium chloride 0.65% Nasal 1 Spray(s) Both Nostrils two times a day PRN Nasal Congestion      Allergies    No Known Allergies    Intolerances        Vital Signs Last 24 Hrs  T(C): 37.1 (19 Mar 2025 08:52), Max: 37.3 (19 Mar 2025 00:29)  T(F): 98.8 (19 Mar 2025 08:52), Max: 99.2 (19 Mar 2025 00:29)  HR: 93 (19 Mar 2025 08:52) (87 - 94)  BP: 136/77 (19 Mar 2025 08:52) (125/68 - 158/84)  BP(mean): 81 (18 Mar 2025 16:00) (81 - 81)  RR: 18 (19 Mar 2025 08:52) (18 - 18)  SpO2: 93% (19 Mar 2025 08:52) (91% - 96%)    Parameters below as of 19 Mar 2025 08:52  Patient On (Oxygen Delivery Method): room air          PHYSICAL EXAMINATION:    NECK:  Supple. No lymphadenopathy. Jugular venous pressure not elevated. Carotids equal.   HEART:   The cardiac impulse has a normal quality. Reg., Nl S1 and S2.  There are no murmurs, rubs or gallops noted  CHEST:  Chest crackles to auscultation. Normal respiratory effort.  ABDOMEN:  Soft and nontender.   EXTREMITIES:  There is no edema.       LABS:                        10.1   5.24  )-----------( 264      ( 19 Mar 2025 06:33 )             30.7     03-19    140  |  106  |  14  ----------------------------<  108[H]  3.6   |  28  |  1.36[H]    Ca    9.5      19 Mar 2025 06:33  Phos  3.6     03-18  Mg     1.3     03-18    TPro  6.2  /  Alb  2.9[L]  /  TBili  0.9  /  DBili  x   /  AST  10[L]  /  ALT  16  /  AlkPhos  100  03-19      Urinalysis Basic - ( 19 Mar 2025 06:33 )    Color: x / Appearance: x / SG: x / pH: x  Gluc: 108 mg/dL / Ketone: x  / Bili: x / Urobili: x   Blood: x / Protein: x / Nitrite: x   Leuk Esterase: x / RBC: x / WBC x   Sq Epi: x / Non Sq Epi: x / Bacteria: x

## 2025-03-19 NOTE — DISCHARGE NOTE PROVIDER - HOSPITAL COURSE
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 Initially admitted to [facility] with septic shock 2/2 + pseudomonas bacteremia likely due to UTI vs colitis on 02/09/2025 Was having nausea, vomiting and diarrhea requiring Levophed drip, was transferred out of ICU to regular floor.Then transferred to  as per request of . Was getting platelet transfusion and developed tachypnea hypoxia, rigors. Was diuresed and became hypotensive, Upgraded to SICU. CTA c/a/p on 2/19 showed multiple b/l PE and large bowel obstruction, was started on heparin drip, c/b GIB, then retrialed heparin drip with recurrent c/f GIB. Also with Afib w RVR during admission, resolved w iv metoprolol. On 2/28, developed SIRS, Blood cx growing yeast. TPN stopped, R chest port removed on 3/2 and pt started on iv caspofungin.    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 Mottled air   lucency and mild fat infiltration surrounding the jejunal segment (2:62. 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  - Tolerated CLD, now on regular diet  - No surgical intervention   - 3/13  -s/p EGD - gastritis,  pt refusing colonoscopy  - 3/17 CTA + Peritoneal carcinomatosis     # Urinary retention  - Continue coelho and flomax. For TOV once more ambulatory    # Pancreatic cancer  # Periumbilical Abdominal pain due to periumbilical metastasis  # Peritoneal carcinomatosis   - pain management   - lidocaine cream to pressure ulcer, periumbilical met  - SP Folfirinox with reaction to irinotecan  - On clinical trial on hold for now, fu with [facility] after rehab    # Sibo  - Continue with xifaxan     # Bilateral PE  - SP IVC  - Failed AC 2 x    # History of CVA  - Continue asprin, holding plavix due to recurrent GIB     # SVT stable  - Continue toprol     # Acute blood loss anemia and Malignancy  - s/p pRBC x 7 this admission  - H/H 7.7 3/18  - Transfuse 1 unit 3/18  - Continue Protonix    # HTN  - Continue amlodipine     # Hypokalemia and hypomagnesia   - repelete with magnesium and potassium prn    Please check CBC, CMP Magnesium and Phos upon admission

## 2025-03-19 NOTE — DISCHARGE NOTE PROVIDER - NSDCCPCAREPLAN_GEN_ALL_CORE_FT
PRINCIPAL DISCHARGE DIAGNOSIS  Diagnosis: Sepsis due to Pseudomonas  Assessment and Plan of Treatment: You had a prolonged stay here at [facility] for Sepsis with bacteremia with pseudomonas and saccaromyces,  bilateral PE, recurrent GIB. You were also found to have a fistula between the transverse colon and mid jejunal loop. You have been having abdominal pain for which we started you on morphine for pain. In addition you developed urinary retention for which you are going to rehab with until you are more mobile. You are to continue the current medications as prescribed and fu with [facility] for further treatment of your pancreatic cancer.

## 2025-03-19 NOTE — DISCHARGE NOTE PROVIDER - NSDCMRMEDTOKEN_GEN_ALL_CORE_FT
acetaminophen 325 mg oral tablet: 2 tab(s) orally every 6 hours As needed Temp greater or equal to 38C (100.4F), Mild Pain (1 - 3)  amLODIPine 2.5 mg oral tablet: 1 tab(s) orally once a day As needed for SBP &gt; 140  aspirin 81 mg oral tablet, chewable: 1 tab(s) orally once a day  atorvastatin 40 mg oral tablet: 1 tab(s) orally once a day (at bedtime)  cholecalciferol oral tablet: 2000 unit(s) orally once a day (at bedtime)  droNABinol 2.5 mg oral capsule: 1 cap(s) orally once a day (at bedtime)  emollients, topical ointment: 1 Apply topically to affected area once a day  lidocaine 4% topical cream: 1 Apply topically to affected area 4 times a day As needed sacral ulcer  lidocaine 4% topical cream: 1 Apply topically to affected area 4 times a day As needed pain  metoclopramide 5 mg oral tablet: 1 tab(s) orally 3 times a day  metoprolol succinate 50 mg oral tablet, extended release: 1 tab(s) orally once a day  morphine 15 mg oral tablet: 1 tab(s) orally every 4 hours As needed Severe Pain (7 - 10)  naloxegol 25 mg oral tablet: 1 tab(s) orally once a day  pantoprazole 40 mg oral delayed release tablet: 1 tab(s) orally once a day (before a meal)  polyethylene glycol 3350 oral powder for reconstitution: 17 gram(s) orally once a day (at bedtime)  rifAXIMin 550 mg oral tablet: 1 tab(s) orally 3 times a day  saliva substitutes oral solution: 1 application orally 3 times a day  sucralfate 1 g/10 mL oral suspension: 10 milliliter(s) orally every 12 hours  tamsulosin 0.4 mg oral capsule: 1 cap(s) orally once a day (at bedtime)  zinc oxide 40% topical ointment: Apply topically to affected area once a day

## 2025-03-19 NOTE — DISCHARGE NOTE NURSING/CASE MANAGEMENT/SOCIAL WORK - FINANCIAL ASSISTANCE
[facility] provides services at a reduced cost to those who are determined to be eligible through [facility]’s financial assistance program. Information regarding [facility]’s financial assistance program can be found by going to [email or web address] or by calling [phone].

## 2025-03-19 NOTE — PROGRESS NOTE ADULT - SUBJECTIVE AND OBJECTIVE BOX
INTERVAL HPI/OVERNIGHT EVENTS:  Patient S&E at bedside.   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      GERD (gastroesophageal reflux disease)      Cyclical vomiting syndrome      CVA (cerebrovascular accident)      H/O: hysterectomy      H/O squamous cell carcinoma excision          FAMILY HISTORY:  No pertinent family history in first degree relatives        VITAL SIGNS:  T(F): 98.8 (03-19-25 @ 08:52)  HR: 93 (03-19-25 @ 08:52)  BP: 136/77 (03-19-25 @ 08:52)  RR: 18 (03-19-25 @ 08:52)  SpO2: 93% (03-19-25 @ 08:52)  Wt(kg): --    PHYSICAL EXAM:    Constitutional: NAD  Eyes: EOMI,   Respiratory: CTA b/l, on RA  Cardiovascular: RRR,   Gastrointestinal: soft, no pain this am but with distension    Extremities: no c/c/e  Neurological: AAOx3    MEDICATIONS  (STANDING):  acetaminophen   IVPB .. 1000 milliGRAM(s) IV Intermittent once  aspirin enteric coated 81 milliGRAM(s) Oral daily  chlorhexidine 2% Cloths 1 Application(s) Topical <User Schedule>  cholecalciferol 2000 Unit(s) Oral at bedtime  dextrose 5%. 1000 milliLiter(s) (100 mL/Hr) IV Continuous <Continuous>  dextrose 5%. 1000 milliLiter(s) (50 mL/Hr) IV Continuous <Continuous>  dronabinol 2.5 milliGRAM(s) Oral at bedtime  glucagon  Injectable 1 milliGRAM(s) IntraMuscular once  metoclopramide 5 milliGRAM(s) Oral three times a day  metoprolol succinate ER 50 milliGRAM(s) Oral daily  mineral oil/petrolatum Hydrophilic Ointment 1 Application(s) Topical daily  naloxegol 25 milliGRAM(s) Oral daily  pantoprazole    Tablet 40 milliGRAM(s) Oral before breakfast  polyethylene glycol 3350 17 Gram(s) Oral at bedtime  rifAXIMin 550 milliGRAM(s) Oral three times a day  sodium chloride 0.9%. 1000 milliLiter(s) (75 mL/Hr) IV Continuous <Continuous>  sucralfate suspension 1 Gram(s) Oral every 12 hours  tamsulosin 0.4 milliGRAM(s) Oral at bedtime  zinc oxide 40% Paste 1 Application(s) Topical two times a day    MEDICATIONS  (PRN):  acetaminophen     Tablet .. 650 milliGRAM(s) Oral every 6 hours PRN Temp greater or equal to 38C (100.4F), Mild Pain (1 - 3)  aluminum hydroxide/magnesium hydroxide/simethicone Suspension 30 milliLiter(s) Oral every 4 hours PRN Dyspepsia  amLODIPine   Tablet 2.5 milliGRAM(s) Oral daily PRN for SBP > 140  Biotene Dry Mouth Oral Rinse 15 milliLiter(s) Swish and Spit five times a day PRN Mouth Care  lidocaine 4% Cream 1 Application(s) Topical four times a day PRN pain  lidocaine 4% Cream 1 Application(s) Topical four times a day PRN sacral ulcer  morphine  - Injectable 2 milliGRAM(s) IV Push every 4 hours PRN breakthrough pain or very severe pain  morphine  IR 7.5 milliGRAM(s) Oral every 4 hours PRN Moderate Pain (4 - 6)  morphine  IR 15 milliGRAM(s) Oral every 4 hours PRN Severe Pain (7 - 10)  ondansetron Injectable 4 milliGRAM(s) IV Push every 8 hours PRN Nausea and/or Vomiting  sodium chloride 0.65% Nasal 1 Spray(s) Both Nostrils two times a day PRN Nasal Congestion      Allergies    No Known Allergies    Intolerances        LABS:                        10.1   5.24  )-----------( 264      ( 19 Mar 2025 06:33 )             30.7     03-19    140  |  106  |  14  ----------------------------<  108[H]  3.6   |  28  |  1.36[H]    Ca    9.5      19 Mar 2025 06:33  Phos  3.6     03-18  Mg     1.3     03-18    TPro  6.2  /  Alb  2.9[L]  /  TBili  0.9  /  DBili  x   /  AST  10[L]  /  ALT  16  /  AlkPhos  100  03-19      Urinalysis Basic - ( 19 Mar 2025 06:33 )    Color: x / Appearance: x / SG: x / pH: x  Gluc: 108 mg/dL / Ketone: x  / Bili: x / Urobili: x   Blood: x / Protein: x / Nitrite: x   Leuk Esterase: x / RBC: x / WBC x   Sq Epi: x / Non Sq Epi: x / Bacteria: x        RADIOLOGY & ADDITIONAL TESTS:  Studies reviewed.

## 2025-03-19 NOTE — PROGRESS NOTE ADULT - ASSESSMENT
68yo female with PMHx recent diagnosis of primary pancreatic adenocarcinoma of head with tumor in umbilicus, chemo-induced pancytopenia, SCC lip, cyclical vomiting syndrome, HTN, HPL, Gout, GERD, CVA on Aggrenox  Initially admitted to [facility] with septic shock 2/2 + pseudomonas bacteremia likely due to UTI vs colitis on 02/09/2025 Was having nausea, vomiting and diarrhea requiring Levophed drip, was transferred out of ICU to regular floor.  Then transferred to  as per request of . Was getting platelet transfusion and developed tachypnea hypoxia, rigors. Was diuresed and became hypotensive, Upgraded to SICU. CTA c/a/p on 2/19 showed multiple b/l PE and large bowel obstruction, was started on heparin drip, c/b GIB, then retrialed heparin drip with recurrent c/f GIB. Also with Afib w RVR during admission, resolved w iv metoprolol.   On 2/28, developed SIRS, Blood cx growing yeast. TPN stopped, R chest port removed on 3/2 and pt started on iv caspofungin.      # Pancreatic Adenocarcinoma   - presently on Clinical trial with FOLFIRINOX + EMILY inhibitor  - now no plan for treatment while in rehab-  - follows with [facility] in [facility]   - repeat CT a/p 3/17 - No evidence of portal mesenteric venous thrombosis, as clinically questioned.  Mass in the body of the pancreas measuring up to 4.8 cm abutting the stomach and narrowing the splenic vein with resultant gastroepiploic collaterals. Peritoneal carcinomatosis. Moderate amount of abdominal and pelvic ascites, unchanged.  - mass abutting stomach is likely causing symptoms- seen by surgery - contemplating celiac plexus block for pain control   - labs improved today     #Fungemia- Saccharomyces cerevisiae   - completed caspofungin     # Sigmoid colon pneumatosis   - symptomatic management   - no planned operative intervention- surgery following- pain control   - at this point declining endoscopic procedures     #Abdominal Pain  - now on Xifaximin for possible SIBO -  modest improvement of abdominal symptoms  - Monitor response to antibiotics  - seen by pall care- given oxycodone/morphine for pain     # hypokalemia- aggressively repleting K     # anemia- s/p 1u prbc yesterday- Hb 10.1 today     will follow and communicate with [facility]   dispo planning

## 2025-03-19 NOTE — CHART NOTE - NSCHARTNOTEFT_GEN_A_CORE
Clinical Nutrition Follow Up Note:    *68 y/o F w/ recent diagnosis of primary pancreatic adenocarcinoma of head with tumor in umbilicus, chemo-induced pancytopenia and significant PMH of SCC lip, cyclical vomiting syndrome, HTN, HPL, Gout, GERD, CVA on Aggrenox and others had been admitted to [facility] with septic shock on 2025, nausea, vomiting and diarrhea requiring Levophed drip, was transferred out of ICU to regular floor last night.  Patient was being followed by GI, ID, Cardiology and Onc there.  Patient has been transferred to  today on patient's  (GI Dr. Pleitez) request.  At this time, patient c/o non-bloody diarrhea x 3 in last 24 hours.  She denies any abdominal pain, nausea, but has some dry heaves.  She denies any fever, chills, chest pain, palpitation, constipation or dysuria. Initially, she was started on Vancomycin and Zosyn, but now she has been on Cefepime as her blood culture and urine cultures are positive for Pseudomonas aeruginosa. Currently, patient is on Cefepime.  ID had also recommended Flagyl, but patient had refused it, as it causes nausea to her. Noted with Metabolic acidosis with NAG due to GI loss. Likely chemo-induced diarrhea and vomiting. Better than before. Cutaneous lesion on RUQ area below right breast, unclear etiology, ? Ecthyma gangrenosum. Admitting diagnosis: pancreatic ca  *: Pt transfer from [facility]; seen by RD and met criteria for PCM. Pt reports only being able to tolerate some sips of water and tea at this time. Reports tried eating small bite of mashed banana and felt burning sensation down her throat. Reports #; RD unable to obtain bedscale wt d/t bedscale not working. Admit wt per # note with 1+ and 2+ edema skewing wt. Pt appears overweight, NFPE reveals mild- mod muscle/fat wasting at this time. Recommend to continue with PO diet and encourage intake as tolerated. RD consulted to initiate PN d/t PO intolerance. Will initiate TPN through port.  *2/15: Remains on low fiber diet with minimal PO intake. Still w/ multiple episodes of diarrhea, if persists can consider adding 5mg zinc into PN bag. No other acute events overnight. D/w Dr. Joyner will c/w TPN today.   *: Raised area noted to mid sternum. RCW port intact.- no swelling noted, no redness. Port flushed without resistance. + blood return noted. Assessed with IV team Amy. Per pt no Pain on palpation. CT Chest. TPN on hold until CT read. CT chest from earlier in the day revealed new bilateral pleural effusions with bibasilar consolidated lung and ascites. CXR now shows low lung volumes and bibasilar opacities. Rigors post transfusion - Rapid response called;  Dr. Pleitez requesting ICU consult- concerned for pt.  requesting assessment of patient STAT. Pt transferred to SICU. ? third spacing, and possible PNA. Per pt still drinking small sips of water and tea. D/w Dr. Fortune will keep total volume same in PN bag today, and will c/w TPN.   *:  tx SICU, po remains minimal. On bipap. On additional LR d/t diuresis per critical care PA: "malignancy/severe protein-calorie malnutrition/hypoalbuminemia causing diffuse third spacing, caution with further diuresis as this is not truly a volume overload/acute CHF issue" also on IV albumin  c/w TPN  STRONGLY suggest to Confirm goals of care regarding LONG-TERM nutrition support - However, LONG-TERM Nutrition support is not recommended with advanced cancer as studies show that there is no improvement of dehydration symptoms, quality of life, or survival. It also increases the risk for peripheral edema, ascites, and pleural effusions. Will continue to provide nutr within GOC though  *: plan to c/w TPN for now. RD discussed w/ MD Tong and SICU IRINA Jurado need to confirm long term GOC regarding nutr support. Per GI - Esophageal pain is 2/2 esophagitis but unclear what type -- reflux vs. candida vs CMV or HSV etc. Plan to add imodium standing. ? EGD   *: reports she stills has difficulty swallowing due to pain. feeling weak overall. still having multiple episodes of diarrhea, poor PO intake. Plan to c/w TPN - per GI: "CVM and HSV will be negative which argues that esophagitis is reflux or fungal related"  d/w IRINA Jurado long-term plan regarding nutr support TBD, pending discussion w/ family. ? fluid overload which TPN may be contributing to as some studies show with advanced cancer that nutrition support increases the risk for peripheral edema, ascites, and pleural effusions.   *: PO slighly improving, plan to c/w TPN.  POCT uptrending, d/w IRINA Jurado, steroids to be dc and will hold off in insulin PN bag - will manage w/ insulin outside bag.  Per GI: "Imaging suggests ileus but I personally think that putting the imaging together with history and clinical findings that she just has severe diarrhea illness 2/2 chemo, given that both large and small bowel loops are fluid filled and she has no abd pain or vomiting"  *: PO intake continues to slightly improve, plan to c/w TPN. Hyperglycemia worsening, d/w IRINA Jurado, plan to add regular insulin into PN bag. Will also decrease dextrose to 250g to see if this helps w/ hyperglycemia and can increase again when POCTs better controlled.   *: Abdomen distended, non-tender to palpation - GI rec stat CT w/o contrast for further evaluation and to hold PO intake at this time. POCT remain elevated, per RN  this morning - Lantus was not given last night by RN since insulin was already in PN bag (?). Will hold off on increasing dextrose and will c/w current insulin in PN bag, d/w intensivist and plan to adjust Lantus outside PN bag.   *: Now has (+) GIB,  CTA with improvement in bowel distension, small and lg bowel ileus. Per GI, clears OK.  *: consumed some PO yesterday. Per GI, unsure why GIB but now resolved - ?low level ischemic colitis. plan to c/w TPN for now per sicu IRINA Jurado  STRONGLY suggest palliative care consult to determine LONG-TERM GOC regarding nutr support   *: per SICU RN, pt ate yogurt, bites of oatmeal.  bringing in foods to encourage PO as well. IR consulted for IVC filter.  Wound care RN following.  c/w TPN  *: IVC filter being considered, possible EGD d/t recurrent GIB.  Per GI - corpak is reasonable. RD feels strongly that corpak or even PEG (if able) would be best route of nutr support for this pt at this point. Has been on TPN x 13 days. Will c/w TPN per IRINA Jurado for now. STRONGLY rec'd palliative care team to Confirm goals of care regarding LONG-TERM nutrition support   *: Had maroon BM again,  would like to defer endoscopy/colonoscopy at this time. Plan for IVC filter placement today.   *3/1: Bleeding resolved per GI but febrile, per GI Dr. Hernandez "I think corepak for feeds would be preferable as TPN increases the risk of infection etc but Dr. Pleitez has declined"  NGT was placed for gastric decompression yesterday.  IVC filter deferred at this time  *3/2: Plan to c/w TPN - Persistent ileus, gastric distention on chest x-ray , NGT remains in place for decompression, abdomen softer yesterday per MD. Found to have preliminary findings of "yeast-like cells" in blood culture from , abx regimen to be adjusted prn  -ADDENDUM: plan to pull infusion port d/t new infection. NGT w/ minimal output, plan to stop TPN and start TF.  *3/3: S/p venous port removal (3/2). Pt pulled NGT (3/3). Per SICU PA,  would like to defer corpak placement at this time. GI following, plan to discuss with  about PEG placement. c/w regular diet +ONS.     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 Mottled air   lucency and mild fat infiltration surrounding the jejunal segment (2:62. Indeterminant as to contained perforation.  3/13  -s/p EGD - gastritis , pt refusing colonoscopy. 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 H/H 7.7 3/18- Transfuse 1 unit 3/18. Palliative consult 3/17: family is decline GOC but was asking ot speak with palliative to discuss pain management.   Upon RD visit today 3/19, pt's  at bedside. Reports continued decreased appetite, consumed 1 pancake this morning which  explained is an improvement. Dislikes [email or web address]ivel[employer], has not been consuming them. Pt and  states plan for her to be d/c to [facility] today at 2pm. Per pt request, will send up high-protein berry smoothie (370 kcal, 26g protein) for lunch.     *Labs Reviewed:      140  |  106  |  14  ----------------------------<  108[H]  3.6   |  28  |  1.36[H]    Ca    9.5      19 Mar 2025 06:33  Phos  3.6       Mg     1.3         TPro  6.2  /  Alb  2.9[L]  /  TBili  0.9  /  DBili  x   /  AST  10[L]  /  ALT  16  /  AlkPhos  100      BMI: BMI (kg/m2): 27.8 (25 @ 05:46)  HbA1c: A1C with Estimated Average Glucose Result: 6.6 % (25 @ 05:35)    Glucose: POCT Blood Glucose.: 132 mg/dL (25 @ 05:39)    BP: 136/77 (25 @ 08:52) (124/65 - 161/82)Vital Signs Last 24 Hrs  T(C): 37.1 (25 @ 08:52), Max: 37.3 (25 @ 00:29)  T(F): 98.8 (25 @ 08:52), Max: 99.2 (25 @ 00:29)  HR: 93 (25 @ 08:52) (87 - 94)  BP: 136/77 (25 @ 08:52) (125/68 - 158/84)  BP(mean): 81 (25 @ 16:00) (81 - 81)  RR: 18 (25 @ 08:52) (18 - 18)  SpO2: 93% (25 @ 08:52) (91% - 96%)    Lipid Panel: Date/Time: 25 @ 05:00  Triglycerides, Serum: 341    *pertinent meds: morphine, aspirin, vit D3, marinol, Mg Sulfate IV, reglan, metoprolol succinate, movantik, protonix, miralax, KCl 10 meq, Abx, morphine, zofran    *I and O's:    25 @ 07:01  -  25 @ 07:00  --------------------------------------------------------  IN:    PRBCs (Packed Red Blood Cells): 385 mL  Total IN: 385 mL    OUT:    Indwelling Catheter - Urethral (mL): 1050 mL  Total OUT: 1050 mL    Total NET: -665 mL    *(+) BM on 3/16 -small ; pt on movantik, miralax.    *fiona score of  14: L/R buttocks PI documented.  1+ L/R ankle, 2+ generalized edema documented.    *PO intake, meeting ~ <50 % of estimated nutr needs.    *Malnutrition dx: Pt continues to meet criteria for severe protein calorie malnutrition in context of acute on chronic illness r/t decreased ability to meet increased nutrient needs 2/2 PO intolerance, N/V and diarrhea, on chemo for pancreatic ca AEB meeting <50% ENN > 5 days, mild-mod muscle/fat wasting    Diet, Regular:   Prosource Gelatein Plus     Qty per Day:  2  Liquid Protein Supplement     Qty per Day:  2  Supplement Feeding Modality:  Oral  Ensure Plant-Based Cans or Servings Per Day:  1       Frequency:  Three Times a day (25 @ 12:17) [Active]    Estimated Needs: based on 73.4 Kg (wt from EMR admit wt )  Calories: 1812 - 2175 Kcal (25 - 30 Kcal/Kg)  Protein: 108 - 145 g (1.5 - 2.0 g/Kg)  Fluids: 1450 - 1812 mL (20 - 25 mL/Kg)    *Wt Hx:  Daily Weight in k.8 (19 Mar 2025 06:19)    Recommendations:  1) C/w regular diet. Add high-protein berry smoothie (370 kcal, 26g protein) 1x daily.   2) Encourage protein-rich foods, maximize food preferences.  3) C/w vit D supplementation. MVI w/ minerals daily to ensure 100% RDA met. Consider adding thiamine 100 mg daily 2/2 poor PO intake/ malnutrition.  4) Obtain daily wts 2/2 poor PO intake.   5) Monitor lytes/ min and replete prn.   6) Monitor bowel movements, if no BM for >3 days, consider implementing STRONGER bowel regimen.   7) GOC ongoing. Confirm goals of care regarding nutrition support. Nutrition support is not recommended due to overall declining medical status which evidenced based studies indicate EN is not effective in prolonging survival and improving quality of life. It can also increase risk of aspiration pneumonia as well as other related issues (infection, GI complications, and worsening/ non-healing PI's). However, will provide nutrition/ hydration within GOC.      *will continue to monitor and follow up with adjustments to treatment plan prn*  Serene Reddy, MS, RDN

## 2025-03-19 NOTE — PROGRESS NOTE ADULT - ASSESSMENT
69-year-old female recent diagnosis of primary pancreatic adenocarcinoma , pancytopenia and significant PMH of SCC lip, cyclical vomiting syndrome, HTN, HPL, Gout, GERD, CVA w renal evaluation of MALLORY and hypokalemia     MALLORY  Cr improving  , volume ok  -IVF continues   -Trend labs, lytes. Replete Hypokalemia for goal near 4.0  -Monitor UOP   -Avoid nsaids/contrast   - for IVC filter insertion tomorrow - continue IVF hydration to mitigate MAXIMO risk     3/11  Pacreatic CA/ METS  MALLORY slowly improving  Maintaining current IVF  I/O acceptable   Monitor and replace K/ Mg as needed    3/12  Pacreatic CA/ METS  MALLORY slowly improving, down to 1.32 today  - good UO 1850 ml  Maintaining current IVF  I/O acceptable   Monitor and replace K/ Mg as needed  Maintain K near 4 to minimize ileus, pt has poor IV access , await IV team  Good response to bowel preps  D/W Dr YOUSUF Pleitez    3/13  Full note to follow  Creat stable no finding in the EGD to explain pt sxs  May need CT with IVC,   Will hydrate appropriately prior to CT, D/W Dr Pleitez       3/14  Pacreatic CA/ METS  MALLORY slowly improving, , up to 1.64 today may be 3 rd spacing, causing azotemia  - good UO 1700 ml  and 3rd spacing  Maintaining current IVF  I/O acceptable   Monitor and replace K/ Mg as needed  Maintain K near 4 to minimize ileus, pt has poor IV access  Good response to bowel preps  D/W Dr YOUSUF Pleitez and Dr Joyner      3/15  Pacreatic CA/ METS  MALLORY slowly improving, ,creat 1.49  - good UO   On Xifaxan improved sxs   + 3rd spacing  Maintaining current IVF  I/O acceptable   Monitor and replace K/ Mg as needed  Maintain K near 4 to minimize ileus, pt has poor IV access  Good response to bowel preps  D/W Dr YOUSUF Pleitez and Dr Joyner    3/16  Pacreatic CA/ METS  MALLORY slowly improvin  - good UO ~ 1 L , less than prior  On Xifaxan improved sxs   + 3rd spacing  I/O acceptable   Monitor and replace K/ Mg as needed  Maintain K near 4 to minimize ileus  D/W Dr YOUSUF Pleitez and Dr Joyner  IV KCL as needed    3/17  Pacreatic CA/ METS  MALLORY slowly improving  - good UO  On Xifaxan improved sxs   + 3rd spacing  Monitor and replace K/ Mg as needed  Maintain K near 4 to minimize ileus  D/W Dr YOUSUF Pleitez and Dr Hansen  IV KCL as needed to maintain K near 4  For CTA r/o mesenteric venous thrombosis    3/19  Pacreatic CA/ METS  MALLORY  improving  - good UO  On Xifaxan improved sxs   Monitor and replace K/ Mg as needed  Maintain K near 4 to minimize ileus  D/W Dr YOUSUF Pleitez and Dr Hansen  IV KCL as needed to maintain K near 4  For transfer to Rehab

## 2025-03-19 NOTE — DISCHARGE NOTE PROVIDER - CARE PROVIDERS DIRECT ADDRESSES
[email or web address]

## 2025-03-19 NOTE — DISCHARGE NOTE PROVIDER - CARE PROVIDER_API CALL
Frank Clement  Cardiovascular Disease  [address], [address]  [place] [address]  Phone: [phone]  Fax: [phone]  Follow Up Time:

## 2025-03-20 LAB
-  AMPHOTERICIN B: SIGNIFICANT CHANGE UP
-  ANIDULAFUNGIN: SIGNIFICANT CHANGE UP
-  CASPOFUNGIN: SIGNIFICANT CHANGE UP
-  FLUCONAZOLE: SIGNIFICANT CHANGE UP
-  MICAFUNGIN: SIGNIFICANT CHANGE UP
-  POSACONAZOLE: SIGNIFICANT CHANGE UP
-  VORICONAZOLE: SIGNIFICANT CHANGE UP
CULTURE RESULTS: ABNORMAL
METHOD TYPE: SIGNIFICANT CHANGE UP
ORGANISM # SPEC MICROSCOPIC CNT: ABNORMAL
ORGANISM # SPEC MICROSCOPIC CNT: ABNORMAL
ORGANISM # SPEC MICROSCOPIC CNT: SIGNIFICANT CHANGE UP
SPECIMEN SOURCE: SIGNIFICANT CHANGE UP

## 2025-03-27 DIAGNOSIS — A41.52 SEPSIS DUE TO PSEUDOMONAS: ICD-10-CM

## 2025-03-27 DIAGNOSIS — D69.6 THROMBOCYTOPENIA, UNSPECIFIED: ICD-10-CM

## 2025-03-27 DIAGNOSIS — N17.0 ACUTE KIDNEY FAILURE WITH TUBULAR NECROSIS: ICD-10-CM

## 2025-03-27 DIAGNOSIS — E43 UNSPECIFIED SEVERE PROTEIN-CALORIE MALNUTRITION: ICD-10-CM

## 2025-03-27 DIAGNOSIS — J96.01 ACUTE RESPIRATORY FAILURE WITH HYPOXIA: ICD-10-CM

## 2025-03-27 DIAGNOSIS — N20.0 CALCULUS OF KIDNEY: ICD-10-CM

## 2025-03-27 DIAGNOSIS — E78.5 HYPERLIPIDEMIA, UNSPECIFIED: ICD-10-CM

## 2025-03-27 DIAGNOSIS — E87.20 ACIDOSIS, UNSPECIFIED: ICD-10-CM

## 2025-03-27 DIAGNOSIS — K63.2 FISTULA OF INTESTINE: ICD-10-CM

## 2025-03-27 DIAGNOSIS — E86.0 DEHYDRATION: ICD-10-CM

## 2025-03-27 DIAGNOSIS — K92.2 GASTROINTESTINAL HEMORRHAGE, UNSPECIFIED: ICD-10-CM

## 2025-03-27 DIAGNOSIS — K21.9 GASTRO-ESOPHAGEAL REFLUX DISEASE WITHOUT ESOPHAGITIS: ICD-10-CM

## 2025-03-27 DIAGNOSIS — B37.81 CANDIDAL ESOPHAGITIS: ICD-10-CM

## 2025-03-27 DIAGNOSIS — K63.8219 SMALL INTESTINAL BACTERIAL OVERGROWTH, UNSPECIFIED: ICD-10-CM

## 2025-03-27 DIAGNOSIS — R65.21 SEVERE SEPSIS WITH SEPTIC SHOCK: ICD-10-CM

## 2025-03-27 DIAGNOSIS — N39.0 URINARY TRACT INFECTION, SITE NOT SPECIFIED: ICD-10-CM

## 2025-03-27 DIAGNOSIS — C25.0 MALIGNANT NEOPLASM OF HEAD OF PANCREAS: ICD-10-CM

## 2025-03-27 DIAGNOSIS — Z86.73 PERSONAL HISTORY OF TRANSIENT ISCHEMIC ATTACK (TIA), AND CEREBRAL INFARCTION WITHOUT RESIDUAL DEFICITS: ICD-10-CM

## 2025-03-27 DIAGNOSIS — I10 ESSENTIAL (PRIMARY) HYPERTENSION: ICD-10-CM

## 2025-03-27 DIAGNOSIS — D61.810 ANTINEOPLASTIC CHEMOTHERAPY INDUCED PANCYTOPENIA: ICD-10-CM

## 2025-03-27 DIAGNOSIS — Z79.02 LONG TERM (CURRENT) USE OF ANTITHROMBOTICS/ANTIPLATELETS: ICD-10-CM

## 2025-05-18 PROBLEM — D04.0 CARCINOMA IN SITU OF SKIN OF LIP: Chronic | Status: ACTIVE | Noted: 2025-01-01

## 2025-05-18 PROBLEM — I63.9 CEREBRAL INFARCTION, UNSPECIFIED: Chronic | Status: ACTIVE | Noted: 2025-01-01

## 2025-05-18 PROBLEM — K21.9 GASTRO-ESOPHAGEAL REFLUX DISEASE WITHOUT ESOPHAGITIS: Chronic | Status: ACTIVE | Noted: 2025-01-01

## 2025-05-18 PROBLEM — M10.9 GOUT, UNSPECIFIED: Chronic | Status: ACTIVE | Noted: 2025-01-01

## 2025-05-18 PROBLEM — I10 ESSENTIAL (PRIMARY) HYPERTENSION: Chronic | Status: ACTIVE | Noted: 2025-01-01

## 2025-05-18 PROBLEM — C25.9 MALIGNANT NEOPLASM OF PANCREAS, UNSPECIFIED: Chronic | Status: ACTIVE | Noted: 2025-01-01

## 2025-05-18 PROBLEM — E78.5 HYPERLIPIDEMIA, UNSPECIFIED: Chronic | Status: ACTIVE | Noted: 2025-01-01

## 2025-05-18 PROBLEM — R11.15 CYCLICAL VOMITING SYNDROME UNRELATED TO MIGRAINE: Chronic | Status: ACTIVE | Noted: 2025-01-01

## 2025-05-18 NOTE — PATIENT PROFILE ADULT - NSPROPTRIGHTBILLOFRIGHTS_GEN_A_NUR
Palliative Supportive Care  met with patient/family to continue to provide emotional support and guidance.      Updated biopsychosocial information relevant to support:    The patient was identified by name and date of birth.  Dmoinick was informed that this is a telemedicine visit and that the visit is being conducted through the Epic Embedded platform. They agree to proceed..  My office door was closed. No one else was in the room. They acknowledged consent and understanding of privacy and security of the video platform. The patient has agreed to participate and understands they can discontinue the visit at any time.    Dominick spoke about having a hard time this week with pain and tingling in his legs. He is using a walker because he isn't ready on his feet which discourages him. He is proud to say he has continued lowering his pain medications and also stopped the methadone because it was making him dizzy. LCSW supported Dominick in exploring the positives in his daily life. He said in better news his siblings agreed on a  for their mom's house and have made huge progress dividing up the remaining belongings. He is looking forward to getting all the family documents back to his house to add to his series of family genealogy. He is grateful that they were able to peacefully get through these matters as a family and not allow it to cause drama or cause a divide. He said that he is trying to stay positive and hopes the weather cooling down after today will allow him some time outside which he enjoys.      Identified areas of need include: emotional support    Resources provided:    Areas that need future follow-up include: reduce anxiety/depressed mood, grattitude, positives in his daily life, challenging negative thinking    I have spent 40 minutes with Patient  today in which greater than 50% of this time was spent in counseling/coordination of care     Palliative  will follow-up as  requested by patient, family, and primary team.  Please contact with any specific requests     patient

## 2025-05-18 NOTE — ED PROVIDER NOTE - PHYSICAL EXAMINATION
Gen'l: elderly, obese white female adult, acutely ill, in mild respiratory distress with NRB mask in place  Head: NC/AT  Eyes: PERRL, EOMI  ENT: O/P clear, mm modeately dry  CV: tachycardic, normal radial pulse  Lungs: tachypni, ?LLL rales  GI: distended/ +ascites nontender peritneal drain in place, mid abdomen surgical wound dehiscence without active drainage surrounding firm to palpation (chronic)  : Deferred, no flank nor CVAT  Neck: NT, supple w/o pain  MSK: GOLDEN x 4, no focal extremity swelling nor tenderness, unable to SLR bilaterl  Skin: no tactile warmth, no rash  Neuro: Awake, CN 2 -12 intact, normal speech, no focal motor/sensory deficits Gen'l: elderly, obese white female adult, acutely ill, in mild respiratory distress with NRB mask in place  Head: NC/AT  Eyes: PERRL, EOMI  ENT: O/P clear, mm moderately dry  CV: tachycardic, normal radial pulse  Lungs: tachypni, ?LLL rales  GI: distended/ +ascites, nontender, peritoneal drain in place, mid-abdomen surgical wound dehiscence without active drainage surrounding soft tissues firm to palpation (chronic)  : Deferred, no flank nor CVAT  Neck: NT, supple w/o pain  MSK: GOLDEN x 4, no focal extremity swelling nor tenderness, unable to SLR B/L (chronic)  Skin: no tactile warmth, no rash  Neuro: Awake, CN 2 -12 intact, + B/L proximal LE motor weakness, chronic

## 2025-05-18 NOTE — ED ADULT NURSE REASSESSMENT NOTE - NS ED NURSE REASSESS COMMENT FT1
spoke with [facility] PA will Bipass CT for now and send pt directly to CCU GABBY Schultz in CCU made aware resp and transport called for transfer to CCU pts  at bedside aware of plan of care

## 2025-05-18 NOTE — ED PROVIDER NOTE - NSICDXPASTMEDICALHX_GEN_ALL_CORE_FT
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

## 2025-05-18 NOTE — H&P ADULT - HISTORY OF PRESENT ILLNESS
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 htn, hpl, gout, gerd, CVA, recent admission to [facility]/ for septic shock and now recently tx at [facility] for 10 day course of radiation c/b b/l PNA, renal failure and volume overload than discharged to [facility] 1 week ago, now presents to  for SOB, abd pain, hypotension and hypoxia. In the ED pt was given 1L of IVF and started on levo after for possible sepsis given clinical signs of volume overload and started on NIV, MALLORY w/ metabolic and lactic acidosis. Critical Consulted for admission pt seen and examined at bedside w/  dr. couch at bedside we discussed currently ongoing events and pt is full code per her wish.

## 2025-05-18 NOTE — ED ADULT NURSE NOTE - CAS EDN DISCHARGE ASSESSMENT
Alert and oriented to person, place and time Alert and oriented to person, place and time/Awake/Drowsy

## 2025-05-18 NOTE — H&P ADULT - ASSESSMENT
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 htn, hpl, gout, gerd, CVA, recent admission to [facility] for septic shock and now [place] [facility] [place] c/b b/l PNA, renal failure and volume overload than discharged to [facility] 1 week ago, now presents to  for SOB, abd pain, hypotension and hypoxia. In the ED pt was given 1L of IVF and started on levo after for possible sepsis given clinical signs of volume overload and started on NIV, MALLORY w/ metabolic and lactic acidosis. Critical Consulted for admission.      -Neuro: MS stable at this time and able to protect his airway, plan to monitor closely  -Cardiac Shock state septic possibly started on levo to maintain MAP >65, trend trops to r/o ACS and check TTE,  -Resp: AHRF in the setting of pna/pleural effusion currently on NIV              indwelling Tenckhoff peritoneal drain 70 y/o F w/ pmh of pancreatic adenocarcinoma of the head w/ tumor in umbilicus, on chemo/radiation, SCC of lip. htn, hpl, gout, gerd, CVA, recent admission to [facility] for septic shock and now [place] [facility] [place] course of radiation c/b b/l PNA, renal failure and volume overload than discharged to [facility] 1 week ago, now presents to  for SOB, abd pain, hypotension and hypoxia. In the ED pt was given 1L of IVF and started on levo after for possible sepsis given clinical signs of volume overload and started on NIV, MALLORY w/ metabolic and lactic acidosis. Critical Consulted for admission.      -Neuro: MS stable at this time and able to protect her airway, plan to monitor closely  -Cardiac Shock state septic possibly started on levo to maintain MAP >65, trend trops to r/o ACS and check TTE,  -Resp: AHRF in the setting of pna/pleural effusion/volume overloaded currently on NIV, ABG/CXR obtained and noted, will titrate setting as needed to maintain o2 >90%, pt is at high risk for intubation, will try diuretics one BP is stable  -GI: NPO while on bipap, GI ppx w/ protonix, indwelling tenckhoff peritoneal drain in place will use for drainage of ascites as needed  -Renal: MALLORY w/ metabolic/lactic acidosis pt given x1 amp of bicarb and started on bicarb gtt given acidosis and unstable VS, place coelho and renal consult placed awaiting call back  -ID: sepsis pt given IV vanco and cefepime in the ED, will hold off on IV vanco, and escalate up to IV zosyn, check blood cx, UCX, MRSA swap, procal levels  -Endo: No acute issues, start FS q6h while NPO  -Heme: DVT ppx w/ SCD, will hold off on chemical CBC until repeat CBC is available  -Dispo: Admit to CCU, pt is full code, will bypass CT chest/abd/pelvis for now given pt is not stable for CT, GOC discussed w/ pt/ dr Glanzmann at bedside, case d/w dr vaughan

## 2025-05-18 NOTE — CHART NOTE - NSCHARTNOTEFT_GEN_A_CORE
pt with cyanosis to all 4 ext since arrival to the hospital, +doppler DP pulses to b/l LE, +doppler radial pulse to the RUE, and very faint +doppler radial pulse to the LUE plan to check arterial duplex to r/o occlusion/clots

## 2025-05-18 NOTE — ED PROVIDER NOTE - CARE PLAN
Principal Discharge DX:	Bilateral pneumonia  Secondary Diagnosis:	Septic shock  Secondary Diagnosis:	Acute respiratory failure with hypoxia  Secondary Diagnosis:	Acute renal failure (ARF)   1

## 2025-05-18 NOTE — PHARMACOTHERAPY INTERVENTION NOTE - COMMENTS
Medication list updated in EMR per list provided by [facility] and [facility].

## 2025-05-18 NOTE — ED ADULT NURSE NOTE - NSFALLHARMRISKINTERV_ED_ALL_ED
Assistance OOB with selected safe patient handling equipment if applicable/Assistance with ambulation/Communicate risk of Fall with Harm to all staff, patient, and family/Monitor gait and stability/Monitor for mental status changes and reorient to person, place, and time, as needed/Move patient closer to nursing station/within visual sight of ED staff/Provide visual cue: red socks, yellow wristband, yellow gown, etc/Reinforce activity limits and safety measures with patient and family/Toileting schedule using arm’s reach rule for commode and bathroom/Use of alarms - bed, stretcher, chair and/or video monitoring/Bed in lowest position, wheels locked, appropriate side rails in place/Call bell, personal items and telephone in reach/Instruct patient to call for assistance before getting out of bed/chair/stretcher/Non-slip footwear applied when patient is off stretcher/[place] to call system/Physically safe environment - no spills, clutter or unnecessary equipment/Purposeful Proactive Rounding/Room/bathroom lighting operational, light cord in reach

## 2025-05-18 NOTE — PATIENT PROFILE ADULT - FALL HARM RISK - HARM RISK INTERVENTIONS

## 2025-05-18 NOTE — H&P ADULT - NSHPPHYSICALEXAM_GEN_ALL_CORE
General: acutely ill appearing  Neuro: awake/alert able to follow some commands  HEENT: NC/AT  Heart: +RRR  Lungs: fair air entry b/l  Abd: BSx4, soft, NT, +distention, +indwelling Tenckhoff peritoneal drain  Ext: +edema  Skin: cold/dry, +cyanosis to both hands and feet

## 2025-05-18 NOTE — ED PROVIDER NOTE - OBJECTIVE STATEMENT
69 year old white female with past medical history of pancreatic  adenocarcinoma s/p chemo, prior CVA, GERD, HLD, and HTN. Patient was initially admitted to [facility] in February 2025, with septic shock and pseudomonas bacteremia likely due to UTI vs colitis, requiring initial ICU care and Levophed drip. Subsequent [facility] admission for pneumonia and renal failure. Discharged to [facility] one week ago and received IVH 75mL/hr per picc line at [facility], now BIBA from [facility] c/o SOB, and abdominal discomfort, is hypotensive and hypoxic. Patient has indwelling Tenckhoff peritoneal drain which she has refused to be drained out for the past two days. Patient is hypoxic at scene, 78% hypotensive, 70 systolic reportedly. ED arrival 88-90% on NRB and 90/95 BP. MOLST: full code, PCP is Urbano. Renal: OLGAN. GI[facility] NKDA. 70 yo WF, PMH: pancreatic  adenocarcinoma s/p chemo, prior CVA, GERD, HLD, and HTN; BIBA from [facility] c/o SOB, and abdominal discomfort, is hypotensive and hypoxic. , now BIBA from [facility] c/o SOB, and abdominal discomfort, is hypotensive and hypoxic. Patient has indwelling Tenckhoff peritoneal drain which she has refused to be drained out for the past two days. Patient  hypoxic at scene, 78% hypotensive, 70 sBP reportedly. On ED arrival 88-90% on NRB and 90-95 sBP. MOLST: + full code   [facility] admit February 2025, with septic shock and pseudomonas bacteremia likely due to UTI vs colitis, requiring initial ICU care and Levophed drip. [place] [facility] [place] and [place], discharged to [facility] one week ago and received IVH 75mL/hr per picc line at [facility]  PCP is Urbano.    Renal: Olgan.    GI: Zinkin     Oncology[place] [facility] and [place] [facility]      NKDA.

## 2025-05-18 NOTE — ED ADULT NURSE NOTE - CHIEF COMPLAINT QUOTE
pt brought in by EMS from [facility] c/o shortness of breath. per ems, patient woke up this morning with symptoms. when ems arrived, patient was satting 78% on RA, NRB placed with improvement to 88%. also noted to be hypotensive at [facility] 68/49. hx pancreatic CA and ascites, pt has refused paracentesis x2 days. R PICC in place. pt brought to trauma room, Dr. Garcia at bedside.

## 2025-05-18 NOTE — ED PROVIDER NOTE - INPATIENT RECORD SUMMARY
[facility] admit February 2025, with septic shock and pseudomonas bacteremia likely due to UTI vs colitis, requiring initial ICU care and Levophed drip. Subsequent [facility] admission for pneumonia and renal failure, discharged to [facility] one week ago and received IVH 75mL/hr per picc line at [facility]

## 2025-05-18 NOTE — ED PROVIDER NOTE - CRITICAL CARE ATTENDING CONTRIBUTION TO CARE
Initial medical management of septic shock d/t PNA with respiratory failure requiring NIV management, IV vasopressors for hemodynamic support, including close cardiac & hemodynamic monitoring, ICU consult & admission.      Attending Contribution to Care: Due to a high probability of clinically significant, life threatening deterioration, the patient required my highest level of preparedness to intervene emergently and I personally spent this critical care time directly and personally managing the patient. This critical care time included obtaining a history; examining the patient; pulse oximetry; ordering and review of studies; arranging urgent treatment with development of a management plan; evaluation of patient's response to treatment; frequent reassessment; and, discussions with other providers. This critical care time was performed to assess and manage the high probability of imminent, life-threatening deterioration that could result in multi-organ failure. It was exclusive of separately billable procedures and treating other patients and teaching time.

## 2025-05-18 NOTE — ED ADULT NURSE NOTE - OBJECTIVE STATEMENT
pt brought in by EMS from [facility] c/o shortness of breath. patient was O2 Sat 78% on RA, NRB placed with improvement to 88%. pt  hypotensive at [facility] 68/49. hx pancreatic CA and ascites pt has BL peritoneal drains , pt has refused paracentesis x2 days. R PICC in place. pt has multiple unstageable pressure ulcers on her buttock and sacral area as per  wounds started back in January on previous admission

## 2025-05-18 NOTE — ED PROVIDER NOTE - CLINICAL SUMMARY MEDICAL DECISION MAKING FREE TEXT BOX
Clinical concern includes sepsis, fluid overload, worsening ascites impairing ability to ventilate properly. Acute respiratory failure with hypoxia, rule out pneumonia and CHF. Plan for EKG, chest x-ray, labs, including VBG, PAN culture, lactate, troponin, BNB, viral swab, TSH, UA, IV fluids, bolus. Patient is hypoxic on NRB and required BiPAP trial. ICU consult IV Vancomycin/ Cefepime to start. Consider peritoneal drainage via Tenckhoff catheter. Monitor, observe, and reassess. Patient warrants inpatient management. Clinical concern includes sepsis, fluid overload, worsening ascites impairing ability to ventilate properly. Acute respiratory failure with hypoxia, rule out pneumonia and CHF. Plan for EKG, chest x-ray, labs, including VBG, PAN culture, lactate, troponin, BNB, viral swab, TSH, UA, IV fluids, bolus. Patient is hypoxic on NRB and required BiPAP trial. ICU consult IV Vancomycin/ Cefepime to start. Consider peritoneal drainage via Tenckhoff catheter. Monitor, observe, and reassess. Patient warrants inpatient management.    11;20, CC:   Chest x-ray report: Bilateral lower lobe infiltrates; appears more indicative of infection than CHF by my wet read.  ABG severe acidosis.  Rest of labs pending.  ICU PA aware of ED consult.  IV fluid infusing, patient remains hypotensive, ordering IV Levophed as agreed by ICU PA rather than sepsis fluids, concern for fluid overload given recent poor kidney function/ renal failure. Clinical concern includes sepsis, fluid overload, worsening ascites impairing ability to ventilate properly. Acute respiratory failure with hypoxia, rule out pneumonia and CHF. Plan for EKG, chest x-ray, labs, including VBG, PAN culture, lactate, troponin, BNB, viral swab, TSH, UA, IV fluids, bolus. Patient is hypoxic on NRB and required BiPAP trial. ICU consult IV Vancomycin/ Cefepime to start. Consider peritoneal drainage via Tenckhoff catheter. Monitor, observe, and reassess. Patient warrants inpatient management.    11;20, CC:   Chest x-ray report: Bilateral lower lobe infiltrates; appears more indicative of infection than CHF by my wet read.  ABG severe acidosis.  Rest of labs pending.  ICU PA aware of ED consult.  IV fluid infusing, patient remains hypotensive, ordering IV Levophed as agreed by ICU PA rather than sepsis fluids, concern for fluid overload given recent poor kidney function/ renal failure.    11:57, CC:  CCU admit accepted under Dr. Fortune. 70 yo WF, PMH: pancreatic  adenocarcinoma s/p chemo, prior CVA, GERD, HLD, and HTN; [facility], and abdominal discomfort, is hypotensive and hypoxic. , [facility], and abdominal discomfort, is hypotensive and hypoxic. Patient has indwelling Tenckhoff peritoneal drain which she has refused to be drained out for the past two days. Patient  hypoxic at scene, 78% hypotensive, 70 sBP reportedly. On ED arrival 88-90% on NRB and 90-95 sBP. MOLST: + full code.  Recent prior admits for sepsis.    Clinical concern includes: sepsis, fluid overload, worsening ascites impairing ability to ventilate properly. Acute respiratory failure with hypoxia, rule out pneumonia and CHF.   Plan: EKG, chest x-ray, labs, including VBG, pan-culture, lactate, troponin, BNP, viral swab, TSH, U/A, IV fluids bolus. Patient is hypoxic on NRB and required BiPAP trial. ICU consult. IV Vancomycin/ Cefepime. Consider peritoneal drainage via Tenckhoff catheter. Monitor, observe, and reassess. Patient warrants inpatient management.    11;20, CC:   Chest x-ray report: Bilateral lower lobe infiltrates; appears more indicative of infection than CHF by my wet read.  ABG + severe acidosis.  Rest of labs pending.  ICU PA aware of ED consult.  IV fluid infusing, patient remains hypotensive, ordering IV Levophed as agreed by ICU PA rather than full sepsis fluids, + concern for fluid overload given recent poor kidney function/ renal failure.    11:57, CC:  CCU admit accepted under Dr. Fortune.

## 2025-05-18 NOTE — PROGRESS NOTE ADULT - ASSESSMENT
- On pressors, actively titrating   - Lactate rising   - Hypoalbuminemic, ordered albumin  - Additional 1L Fluid bolus   - Tylenol for Pain management. Per patient and , she experiences severe N/V with opiates     Charting in progress Mrs. 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 HTN, hpl, gout, GERD, CVA, recent hospitalizations for septic shock, now admitted with acute hypoxic respiratory failure, MALLORY, metabolic acidosis, and septic shock.    Plan:  Neuro:  - Ofirmev for analgesia. Patient and her  report that she experiences severe N/V w/ opiates    Cardiology  - On levophed, actively titrating to maintain MAP >65  - Lactate rising overnight  - Given additional 1x fluid bolus with albumin, BP improving  - Mildly elevated troponin, likely demand ischemia. Will monitor.     Pulm:   - Acute hypoxic respiratory failure, requiring BiPAP.   - Adjusted BiPAP settings, transitioned to HFNC for patient comfort. Tolerating well at this time   - May alternate BiPAP/HFNC, Maintain SpO2 92-99%, adjust settings as needed   - B/l pleural effusions and likely PNA on CT     GI:   - Protonix for GI stress ulcer ppx   - Has been NPO due to BiPAP. If she continues to do well, may consider diet     Renal:   - MALLORY, may be ATN in the setting of Sepsis (POA) and renal hypoperfusion due to hypotension   - Avoid nephrotoxins   - Severe metabolic acidosis on arrival, on Bicarb gtt, improving     ID:   - CT w/ likely PNA, B/l pleural effusions. Continue Zosyn     Endo:   - No acute needs     Will discuss w/ CCU attending Dr. Fortune.  Mrs. 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 HTN, hpl, gout, GERD, CVA, recent hospitalizations for septic shock, now admitted with acute hypoxic respiratory failure, MALLORY, metabolic acidosis, and septic shock.    Plan:  Neuro:  - Ofirmev for analgesia. Patient and her  report that she experiences severe N/V w/ opiates    Cardiology  - On levophed, actively titrating to maintain MAP >65  - Lactate rising overnight  - Given additional 1x fluid bolus with albumin, BP improving  - Mildly elevated troponin, likely demand ischemia. Will monitor.     Pulm:   - Acute hypoxic respiratory failure, requiring BiPAP.   - Adjusted BiPAP settings, transitioned to HFNC for patient comfort. Tolerating well at this time   - May alternate BiPAP/HFNC, Maintain SpO2 92-99%, adjust settings as needed   - B/l pleural effusions and likely PNA on CT     GI:   - Protonix for GI stress ulcer ppx   - Has been NPO due to BiPAP. If she continues to do well, may consider diet     Renal:   - MALLORY, may be ATN in the setting of Sepsis (POA) and renal hypoperfusion due to hypotension   - Avoid nephrotoxins   - Severe metabolic acidosis on arrival, on Bicarb gtt, improving     ID:   - CT w/ likely PNA, B/l pleural effusions. Continue Zosyn     Endo:   - No acute needs     Will discuss w/ CCU attending Dr. Fortune.     Critical Care Time (EXCLUSIVE of any non bundled procedures) :  40 minutes were spent assessing the patient's presenting problems of acute illness that pose a high probability of life threatening  deterioration or end organ damage / dysfunction.  Medical decision making includes initiation / continuation of plan or care review data/ labwork/ radiographic study, direct patient care at bedside, discussions with consultants regarding care, evaluation and interpretation of vital signs, any necessary ventilator management, NIV or BIPAP changes or initiation, discussions with multidisciplinary team,  am or pm rounds, discussions of goals of care with patient and family, all non-inclusive of procedures.    Date of entry of this note is equal to the date of services rendered

## 2025-05-18 NOTE — PROGRESS NOTE ADULT - SUBJECTIVE AND OBJECTIVE BOX
Charting in progress, WIll update Patient is a 69y old  Female who presents with a chief complaint of     BRIEF HOSPITAL COURSE:   Mrs. 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 HTN, hpl, gout, GERD, CVA, recent admission to [facility]/ for septic shock, then  [place] [facility] [place] 10 [place] course of radiation c/b b/l PNA, renal failure and volume overload, discharged to [facility] 1 week ago, now presents to  on 5/18 for SOB, abd pain, hypotension and hypoxia.   S/p 1L of IVF in ED w/ signs of volume overload, started on levophed and NIPPV. Found to have MALLORY w/ severe metabolic acidosis, was admitted to CCU for further management.        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 3.375 Gram(s) IV Intermittent every 8 hours    norepinephrine Infusion 0.05 MICROgram(s)/kG/Min IV Continuous <Continuous>            pantoprazole  Injectable 40 milliGRAM(s) IV Push daily        albumin human 25% IVPB 100 milliLiter(s) IV Intermittent every 6 hours  sodium bicarbonate  Infusion 0.167 mEq/kG/Hr IV Continuous <Continuous>      chlorhexidine 2% Cloths 1 Application(s) Topical <User Schedule>            ICU Vital Signs Last 24 Hrs  T(C): 36.1 (19 May 2025 06:17), Max: 36.8 (18 May 2025 10:45)  T(F): 97 (19 May 2025 06:17), Max: 98.2 (18 May 2025 10:45)  HR: 95 (19 May 2025 06:00) (58 - 121)  BP: 92/67 (19 May 2025 06:00) (71/49 - 113/88)  BP(mean): 75 (19 May 2025 06:00) (55 - 97)  ABP: --  ABP(mean): --  RR: 23 (19 May 2025 06:00) (13 - 35)  SpO2: 100% (19 May 2025 06:00) (86% - 100%)    O2 Parameters below as of 19 May 2025 05:10  Patient On (Oxygen Delivery Method): nasal cannula, high flow  O2 Flow (L/min): 50  O2 Concentration (%): 60        ABG - ( 18 May 2025 21:59 )  pH, Arterial: 7.34  pH, Blood: x     /  pCO2: 27    /  pO2: 112   / HCO3: 15    / Base Excess: -9.6  /  SaO2: 99                  I&O's Detail    18 May 2025 07:01  -  19 May 2025 06:23  --------------------------------------------------------  IN:    IV PiggyBack: 2000 mL    Norepinephrine: 403 mL    Sodium Bicarbonate: 1200 mL  Total IN: 3603 mL    OUT:  Total OUT: 0 mL    Total NET: 3603 mL            LABS:                        9.5    6.76  )-----------( 160      ( 18 May 2025 14:30 )             32.6     05-18    140  |  113[H]  |  56[H]  ----------------------------<  170[H]  4.5   |  12[L]  |  1.80[H]    Ca    10.1      18 May 2025 20:30  Phos  4.9     05-18  Mg     1.8     05-18    TPro  5.5[L]  /  Alb  1.4[L]  /  TBili  0.4  /  DBili  x   /  AST  32  /  ALT  10[L]  /  AlkPhos  86  05-18          CAPILLARY BLOOD GLUCOSE      POCT Blood Glucose.: 97 mg/dL (18 May 2025 10:37)    PT/INR - ( 18 May 2025 10:48 )   PT: 10.0 sec;   INR: 0.85 ratio         PTT - ( 18 May 2025 10:48 )  PTT:19.2 sec  Urinalysis Basic - ( 18 May 2025 20:30 )    Color: x / Appearance: x / SG: x / pH: x  Gluc: 170 mg/dL / Ketone: x  / Bili: x / Urobili: x   Blood: x / Protein: x / Nitrite: x   Leuk Esterase: x / RBC: x / WBC x   Sq Epi: x / Non Sq Epi: x / Bacteria: x      CULTURES:

## 2025-05-18 NOTE — ED ADULT TRIAGE NOTE - CHIEF COMPLAINT QUOTE
pt brought in by EMS from [facility] c/o shortness of breath. per ems, patient woke up this morning with symptoms. when ems arrived, patient was satting 78% on RA, NRB placed with improvement to 88%. also noted to be hypotensive at [facility] 68/49. hx pancreatic CA and ascites, pt has refused paracentesis x2 days. R PICC in place. pt brought to trauma room, Dr. Garcia at bedside.

## 2025-05-19 NOTE — DIETITIAN NUTRITION RISK NOTIFICATION - MALNUTRITION EVALUATION AS DEMONSTRATED BY (ADULTS > 20 YEARS OF AGE)
Weight loss.../Loss of subcutaneous fat.../Loss of muscle... Valtrex Pregnancy And Lactation Text: this medication is Pregnancy Category B and is considered safe during pregnancy. This medication is not directly found in breast milk but it's metabolite acyclovir is present.

## 2025-05-19 NOTE — PROGRESS NOTE ADULT - SUBJECTIVE AND OBJECTIVE BOX
Interval History:       Patient remains on lowe dose levoped       on high flow       profoundly weak    HPI:       Mrs. 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 HTN, hpl, gout, GERD, CVA, recent admission to [facility] for septic shock, then  [place] at [facility] [place] 10 day course of radiation c/b b/l PNA, renal failure and volume overload, discharged to [facility] 1 week ago, now presents to  on 5/18 for SOB, abd pain, hypotension and hypoxia.   S/p 1L of IVF in ED w/ signs of volume overload, started on levophed and NIPPV. Found to have MALLORY w/ severe metabolic acidosis,   ct abdomen and pelvix shows carcinomatosis  ABG on admission 7.04/ on admission was on bicardonate drip  on empiric Zosyn  has abdominal drain    FOS cant obtain due to profound weakness     PMH:  as above                      MEDICATIONS  (STANDING):  albumin human 25% IVPB 100 milliLiter(s) IV Intermittent every 6 hours  chlorhexidine 2% Cloths 1 Application(s) Topical <User Schedule>  norepinephrine Infusion 0.05 MICROgram(s)/kG/Min (6.32 mL/Hr) IV Continuous <Continuous>  pantoprazole  Injectable 40 milliGRAM(s) IV Push daily  piperacillin/tazobactam IVPB.. 3.375 Gram(s) IV Intermittent every 8 hours  sodium bicarbonate  Infusion 0.167 mEq/kG/Hr (75 mL/Hr) IV Continuous <Continuous>    MEDICATIONS  (PRN):                  Weight (kg): 67.4 (05-18 @ 11:26)    ICU Vital Signs Last 24 Hrs  T(C): 36.1 (19 May 2025 06:17), Max: 36.8 (18 May 2025 10:45)  T(F): 97 (19 May 2025 06:17), Max: 98.2 (18 May 2025 10:45)  HR: 106 (19 May 2025 08:36) (58 - 121)  BP: 102/62 (19 May 2025 07:00) (71/49 - 113/88)  BP(mean): 74 (19 May 2025 07:00) (55 - 97)  ABP: --  ABP(mean): --  RR: 27 (19 May 2025 08:36) (13 - 35)  SpO2: 100% (19 May 2025 08:36) (86% - 100%)    O2 Parameters below as of 19 May 2025 08:36  Patient On (Oxygen Delivery Method): nasal cannula, high flow  O2 Flow (L/min): 50  O2 Concentration (%): 50            I&O's Summary    18 May 2025 07:01  -  19 May 2025 07:00  --------------------------------------------------------  IN: 3603 mL / OUT: 0 mL / NET: 3603 mL                                       7.6    7.34  )-----------( 89       ( 19 May 2025 06:13 )             26.3       05-19    144  |  109[H]  |  55[H]  ----------------------------<  151[H]  3.3[L]   |  21[L]  |  1.94[H]    Ca    9.3      19 May 2025 06:13  Phos  4.4     05-19  Mg     1.7     05-19    TPro  4.8[L]  /  Alb  2.0[L]  /  TBili  0.3  /  DBili  x   /  AST  22  /  ALT  11[L]  /  AlkPhos  57  05-19            ABG - ( 18 May 2025 21:59 )  pH, Arterial: 7.34  pH, Blood: x     /  pCO2: 27    /  pO2: 112   / HCO3: 15    / Base Excess: -9.6  /  SaO2: 99                  Urinalysis Basic - ( 19 May 2025 06:13 )    Color: x / Appearance: x / SG: x / pH: x  Gluc: 151 mg/dL / Ketone: x  / Bili: x / Urobili: x   Blood: x / Protein: x / Nitrite: x   Leuk Esterase: x / RBC: x / WBC x   Sq Epi: x / Non Sq Epi: x / Bacteria: x        I personally reviewed the CXR:    DVT Prophylaxis:                                                                 Advanced Directives:     Sepsis Criteria Met -     Labs, Notes, Orders, radiologic studies reviewed and care coordinated with multidisciplinary team               Interval History:       Patient remains on lowe dose levoped       on high flow       profoundly weak    HPI:       Mrs. 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 HTN, hpl, gout, GERD, CVA, recent admission to [facility] for septic shock, then  tx at [facility] [place] for 10 day course of radiation c/b b/l PNA, renal failure and volume overload, discharged to [facility] 1 week ago, now presents to  on 5/18 for SOB, abd pain, hypotension and hypoxia.   S/p 1L of IVF in ED w/ signs of volume overload, started on levophed and NIPPV. Found to have MALLORY w/ severe metabolic acidosis,   ct abdomen and pelvix shows carcinomatosis  ABG on admission 7.04/ on admission was on bicardonate drip  on empiric Zosyn  has abdominal drain    FOS cant obtain due to profound weakness     PMH:  as above       MEDICATIONS  (STANDING):  albumin human 25% IVPB 100 milliLiter(s) IV Intermittent every 6 hours  chlorhexidine 2% Cloths 1 Application(s) Topical <User Schedule>  norepinephrine Infusion 0.05 MICROgram(s)/kG/Min (6.32 mL/Hr) IV Continuous <Continuous>  pantoprazole  Injectable 40 milliGRAM(s) IV Push daily  piperacillin/tazobactam IVPB.. 3.375 Gram(s) IV Intermittent every 8 hours  sodium bicarbonate  Infusion 0.167 mEq/kG/Hr (75 mL/Hr) IV Continuous <Continuous>    Weight (kg): 67.4 (05-18 @ 11:26)    ICU Vital Signs Last 24 Hrs  T(C): 36.1 (19 May 2025 06:17), Max: 36.8 (18 May 2025 10:45)  T(F): 97 (19 May 2025 06:17), Max: 98.2 (18 May 2025 10:45)  HR: 106 (19 May 2025 08:36) (58 - 121)  BP: 102/62 (19 May 2025 07:00) (71/49 - 113/88)  BP(mean): 74 (19 May 2025 07:00) (55 - 97)  ABP: --  ABP(mean): --  RR: 27 (19 May 2025 08:36) (13 - 35)  SpO2: 100% (19 May 2025 08:36) (86% - 100%)    O2 Parameters below as of 19 May 2025 08:36  Patient On (Oxygen Delivery Method): nasal cannula, high flow  O2 Flow (L/min): 50  O2 Concentration (%): 50    I&O's Summary    18 May 2025 07:01  -  19 May 2025 07:00  --------------------------------------------------------  IN: 3603 mL / OUT: 0 mL / NET: 3603 mL    Physical Exam    General - cachetic frail, dyspneic  HEENT - nc/at on high flow  Neck supple  lungs dec bs at bases bilaterally  cv rrr    Renal coelho  Extremitiy edematous  euro profoundly weak                      7.6    7.34  )-----------( 89       ( 19 May 2025 06:13 )             26.3       05-19    144  |  109[H]  |  55[H]  ----------------------------<  151[H]  3.3[L]   |  21[L]  |  1.94[H]    Ca    9.3      19 May 2025 06:13  Phos  4.4     05-19  Mg     1.7     05-19    TPro  4.8[L]  /  Alb  2.0[L]  /  TBili  0.3  /  DBili  x   /  AST  22  /  ALT  11[L]  /  AlkPhos  57  05-19    ABG - ( 18 May 2025 21:59 )  pH, Arterial: 7.34  pH, Blood: x     /  pCO2: 27    /  pO2: 112   / HCO3: 15    / Base Excess: -9.6  /  SaO2: 99        Urinalysis Basic - ( 19 May 2025 06:13 )    Color: x / Appearance: x / SG: x / pH: x  Gluc: 151 mg/dL / Ketone: x  / Bili: x / Urobili: x   Blood: x / Protein: x / Nitrite: x   Leuk Esterase: x / RBC: x / WBC x   Sq Epi: x / Non Sq Epi: x / Bacteria: x    I personally reviewed the CXR:  bilateral effusions    DVT Prophylaxis:  Has filter                                                              Advanced Directives: Full code     Sepsis Criteria Met - YEs     Labs, Notes, Orders, radiologic studies reviewed and care coordinated with multidisciplinary team

## 2025-05-19 NOTE — CONSULT NOTE ADULT - SUBJECTIVE AND OBJECTIVE BOX
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 htn, hpl, gout, gerd, CVA, recent admission to [facility] for septic shock and now recently tx at [facility] [place] for 10 day course of radiation c/b b/l PNA, renal failure and volume overload than discharged to [facility] 1 week ago, now presents to  for SOB, abd pain, hypotension and hypoxia.In the ED pt was given 1L of IVF and started on levo after for possible sepsis given clinical signs of volume overload and started on NIV, MALLORY w/ metabolic and lactic acidosis. Called for hypoxia and b/l effusions.   Pt seen on non rebreather, weak, on pressors.  on bedside. Pt  states w increasing weakness, SOB.       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  ETOH use: [ ] Yes  [x ] No              FREQUENCY / QUANTITY:  Ilicit Drug use:  [ ] Yes  [ x] No  bedbound last couple months    MEDICATIONS  (STANDING):  chlorhexidine 2% Cloths 1 Application(s) Topical <User Schedule>  heparin   Injectable 5000 Unit(s) SubCutaneous every 8 hours  norepinephrine Infusion 0.05 MICROgram(s)/kG/Min (6.32 mL/Hr) IV Continuous <Continuous>  pantoprazole  Injectable 40 milliGRAM(s) IV Push daily  piperacillin/tazobactam IVPB.. 3.375 Gram(s) IV Intermittent every 8 hours  sodium bicarbonate  Infusion 0.167 mEq/kG/Hr (75 mL/Hr) IV Continuous <Continuous>    MEDICATIONS  (PRN):  loperamide Liquid 5 milliGRAM(s) Oral every 6 hours PRN Diarrhea      Allergies: opioid-like analgesics (Vomiting; Nausea)                                                            LABS:                        7.6    7.34  )-----------( 89       ( 19 May 2025 06:13 )             26.3     05-19    144  |  109[H]  |  55[H]  ----------------------------<  151[H]  3.3[L]   |  21[L]  |  1.94[H]    Ca    9.3      19 May 2025 06:13  Phos  4.4     05-19  Mg     1.7     05-19    TPro  4.8[L]  /  Alb  2.0[L]  /  TBili  0.3  /  DBili  x   /  AST  22  /  ALT  11[L]  /  AlkPhos  57  05-19    PT/INR - ( 18 May 2025 10:48 )   PT: 10.0 sec;   INR: 0.85 ratio         PTT - ( 18 May 2025 10:48 )  PTT:19.2 sec  Urinalysis Basic - ( 19 May 2025 06:13 )    Color: x / Appearance: x / SG: x / pH: x  Gluc: 151 mg/dL / Ketone: x  / Bili: x / Urobili: x   Blood: x / Protein: x / Nitrite: x   Leuk Esterase: x / RBC: x / WBC x   Sq Epi: x / Non Sq Epi: x / Bacteria: x                Review of Systems         as per HPI    T(C): 36.1 (05-19-25 @ 12:24), Max: 36.3 (05-18-25 @ 21:15)  HR: 109 (05-19-25 @ 12:29) (92 - 121)  BP: 102/62 (05-19-25 @ 07:00) (91/56 - 112/81)  ABP: --  ABP(mean): --  RR: 30 (05-19-25 @ 12:29) (13 - 30)  SpO2: 98% (05-19-25 @ 12:29) (86% - 100%)      Physical Exam  General: weak frail                                                         Neuro: alert, follows some commands, weak                     Eyes: PERRL, EOMI   ENT: Normal exam of nasal/oral mucosa with absence of cyanosis.   Neck: supple, no JVD, trachea midline   Chest: decreased bases, accessory muscle use noted, equal expansion  CV: RRR,   GI: soft, NT, ND,  peritoneal tube in place  Extremities: edema  SKIN: warm, dry, intact

## 2025-05-19 NOTE — DIETITIAN INITIAL EVALUATION ADULT - NSFNSGIIOFT_GEN_A_CORE
I&O's Detail    18 May 2025 07:01  -  19 May 2025 07:00  --------------------------------------------------------  IN:    IV PiggyBack: 2000 mL    Norepinephrine: 403 mL    Sodium Bicarbonate: 1200 mL  Total IN: 3603 mL    OUT:  Total OUT: 0 mL    Total NET: 3603 mL

## 2025-05-19 NOTE — CONSULT NOTE ADULT - NS ATTEND AMEND GEN_ALL_CORE FT
I have personally seen and examined the patient.  I fully participated in the care of this patient.  I have made amendments to the documentation where necessary, and agree with the history, physical exam, and plan as documented above.    b/l pleural effusion in setting of metastatic pancreatic cancer and ascites  abdominal drain in place  plan to attempt to drain ascites and monitor respiratory status, if no improvement will plan for thoracentesis

## 2025-05-19 NOTE — DIETITIAN NUTRITION RISK NOTIFICATION - ADDITIONAL COMMENTS/DIETITIAN RECOMMENDATIONS
1) Consider SLP eval to assess for safest/ least restrictive diet consistency/ texture   2) Advance diet to Regular + SLP recs as soon as medically feasible  3) Add ensure plus high protein TID to optimize PO intake (provides 350 kcal, 20g protein/ shake)   4) Please obtain daily weights  5) consider checking B6, B12, thiamine, folate, carnitine, and copper levels as malnutrition in cause these to be deficient  6) Consider adding thiamine 200 mg daily 2/2 poor PO intake/ malnutrition  7) MVI w/ minerals daily to ensure 100% RDA met  8) Monitor bowel movements, if no BM for >3 days, consider implementing bowel regimen.  9) Consider adding appetite stimulant such as Remeron or Marinol 2/2 chronically poor appetite/ PO intake  10) Monitor closely for refeeding syndrome - monitor and replete lytes PRN  11) Confirm goals of care regarding nutrition support - Nutrition support is not recommended due to overall declining medical status which evidenced based studies indicate EN is not effective in prolonging survival and improving quality of life. It can also increase risk of aspiration pneumonia as well as other related issues (infection, GI complications, and worsening/ non-healing PI's). However, will provide nutrition/ hydration within GOC.   Nutrition recommendations to continue upon discharge. Goal to continue to meet >/= 80% ENN via tolerated route. RD to F/U prn for changes to nutrition dc plan.  RD will continue to monitor PO intake, labs, hydration, and wt prn. 1) Consider SLP eval to assess for safest/ least restrictive diet consistency/ texture   2) Advance diet to Regular + SLP recs as soon as medically feasible  3) Add ensure plus high protein TID to optimize PO intake (provides 350 kcal, 20g protein/ shake)   4) Please obtain daily weights  5) consider checking B6, B12, thiamine, folate, carnitine, and copper levels as malnutrition in cause these to be deficient  6) Consider adding thiamine 200 mg daily 2/2 poor PO intake/ malnutrition  7) Recommend to add MVI w/minerals, Vit C 500 mg BID, add Zinc Sulfate 220 mg x 10 days to promote wound healing.   8) Monitor bowel movements, if no BM for >3 days, consider implementing bowel regimen.  9) Consider adding appetite stimulant such as Remeron or Marinol 2/2 chronically poor appetite/ PO intake  10) Monitor closely for refeeding syndrome - monitor and replete lytes PRN  11) Confirm goals of care regarding nutrition support - Nutrition support is not recommended due to overall declining medical status which evidenced based studies indicate EN is not effective in prolonging survival and improving quality of life. It can also increase risk of aspiration pneumonia as well as other related issues (infection, GI complications, and worsening/ non-healing PI's). However, will provide nutrition/ hydration within GOC.   Nutrition recommendations to continue upon discharge. Goal to continue to meet >/= 80% ENN via tolerated route. RD to F/U prn for changes to nutrition dc plan.  RD will continue to monitor PO intake, labs, hydration, and wt prn.

## 2025-05-19 NOTE — PROGRESS NOTE ADULT - ASSESSMENT
Mrs. 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 HTN, hpl, gout, GERD, CVA, recent hospitalizations for septic shock, now admitted with acute hypoxic respiratory failure, MALLORY, metabolic acidosis, and septic shock.    Plan:  Neuro:- non focal profoundly weak  CV - On levophed, actively titrating to maintain MAP >65 likley sepsis, on abx   Pulm:  - Acute hypoxic respiratory failure, requiring HFNC, bilateral effusions on ct,  will ask thoracic to see re possible drainage  GI: - Protonix for GI stress ulcer ppx , diet if tolerated but doesn eat, will attempt to drain ascites to help with breathing   Renal:  - MALLORY, may be ATN in the setting of Sepsis (POA) and renal hypoperfusion due to hypotension       - Severe metabolic acidosis on arrival, on Bicarb gtt, improving   ID:  - CT w/ likely PNA, B/l pleural effusions. Continue Zosyn   Endo: - No acute needs     Will ask palliateive care to see

## 2025-05-19 NOTE — DIETITIAN INITIAL EVALUATION ADULT - ORAL INTAKE PTA/DIET HISTORY
Very SOB at time of visit, w/ limited information obtained as pt weak appearing. From Lizbeth RG: Regular diet w/ thin liquids and ensure plus high protein TID (princess). Pt noted with poor PO intake pta.

## 2025-05-19 NOTE — CONSULT NOTE ADULT - ASSESSMENT
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 htn, hpl, gout, gerd, CVA, recent admission to [facility]/ for septic shock and [place] [facility] [place] of [place] c/b b/l PNA, renal failure and volume overload than discharged to [facility] 1 week ago, now presents to  for SOB, abd pain, hypotension and hypoxia. In the ED pt was given 1L of IVF and started on levo after for possible sepsis given clinical signs of volume overload and started on NIV, MALLORY w/ metabolic and lactic acidosis. Critical Consulted for admission pt seen and examined at bedside w/  dr. couch at bedside we discussed currently ongoing events and pt is full code per her wish.   (18 May 2025 12:17)    Nephrology:  Pt familiar to me  Above reviewed  pt as is admitted w hypotension, having reent admission for sepsis at [facility] hospital  She is on HF oxygen, dyspneic but NAD  receiving bicarb drip    A/P  Pancreatic [place] Ca  Metastatic  s/p chemo/ radiation  h/o MALLORY/ ATN  Hypotension s/p IVF, on pressors  Mild gradual elevation in creat noted  Metabolic acidosis on bicarb drip  Hypokalemia replace w IV KCl as needed  exacerbated by Bicarb drip  - monitor Magnesium level  Anemia- transfusion as per ICU

## 2025-05-19 NOTE — DIETITIAN INITIAL EVALUATION ADULT - PERTINENT LABORATORY DATA
05-19    144  |  109[H]  |  55[H]  ----------------------------<  151[H]  3.3[L]   |  21[L]  |  1.94[H]    Ca    9.3      19 May 2025 06:13  Phos  4.4     05-19  Mg     1.7     05-19    TPro  4.8[L]  /  Alb  2.0[L]  /  TBili  0.3  /  DBili  x   /  AST  22  /  ALT  11[L]  /  AlkPhos  57  05-19  A1C with Estimated Average Glucose Result: 6.6 % (02-19-25 @ 05:35)    Iron Total: 34 ug/dL (02-13-25 @ 18:46)  Folate, Serum: >20.0 ng/mL (02-13-25 @ 18:46)  Vitamin B12, Serum: 1457 pg/mL (02-13-25 @ 18:46)  Vitamin D, 25-Hydroxy: 23.0 ng/mL (02-13-25 @ 18:46)  Iron Total: 62 ug/dL (12-22-24 @ 07:08)  Vitamin B12, Serum: 752 pg/mL (12-22-24 @ 07:08)  Folate, Serum: >20.0 ng/mL (12-22-24 @ 07:08)  Vitamin D, 25-Hydroxy: 63.7 ng/mL (12-22-24 @ 07:08)

## 2025-05-19 NOTE — CONSULT NOTE ADULT - ASSESSMENT
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 htn, hpl, gout, gerd, CVA, recent admission to [facility] for septic shock and now [place] [facility] [place] of radiation c/b b/l PNA, renal failure and volume overload than discharged to [facility] 1 week ago, now presents to  for SOB, abd pain, hypotension and hypoxia.In the ED pt was given 1L of IVF and started on levo after for possible sepsis given clinical signs of volume overload and started on NIV, MALLORY w/ metabolic and lactic acidosis. Called for hypoxia and b/l effusions.    Plan to drain peritoeal drain this afternoon  CXR in AM  possible right thoracentesis depending on CXR, pt clinically  wean pressors as able  pain control  pt seen w Dr. Jhaveri

## 2025-05-19 NOTE — CONSULT NOTE ADULT - SUBJECTIVE AND OBJECTIVE BOX
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 htn, hpl, gout, gerd, CVA, recent admission to [facility]/ for septic shock and now recently tx at [facility] [place] for 10 day course of radiation c/b b/l PNA, renal failure and volume overload than discharged to [facility] 1 week ago, now presents to  for SOB, abd pain, hypotension and hypoxia. In the ED pt was given 1L of IVF and started on levo after for possible sepsis given clinical signs of volume overload and started on NIV, MALLORY w/ metabolic and lactic acidosis. Critical Consulted for admission pt seen and examined at bedside w/  dr. couch at bedside we discussed currently ongoing events and pt is full code per her wish.   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      GERD (gastroesophageal reflux disease)      Cyclical vomiting syndrome      CVA (cerebrovascular accident)      H/O: hysterectomy      H/O squamous cell carcinoma excision          FAMILY HISTORY:  No pertinent family history in first degree relatives        MEDICATIONS  (STANDING):  albumin human 25% IVPB 100 milliLiter(s) IV Intermittent every 6 hours  chlorhexidine 2% Cloths 1 Application(s) Topical <User Schedule>  heparin   Injectable 5000 Unit(s) SubCutaneous every 8 hours  norepinephrine Infusion 0.05 MICROgram(s)/kG/Min (6.32 mL/Hr) IV Continuous <Continuous>  pantoprazole  Injectable 40 milliGRAM(s) IV Push daily  piperacillin/tazobactam IVPB.. 3.375 Gram(s) IV Intermittent every 8 hours  sodium bicarbonate  Infusion 0.167 mEq/kG/Hr (75 mL/Hr) IV Continuous <Continuous>    MEDICATIONS  (PRN):      Allergies    opioid-like analgesics (Vomiting; Nausea)    Intolerances        I&O's Summary    18 May 2025 07:01  -  19 May 2025 07:00  --------------------------------------------------------  IN: 3603 mL / OUT: 0 mL / NET: 3603 mL          REVIEW OF SYSTEMS:        Vital Signs Last 24 Hrs  T(C): 36.1 (19 May 2025 06:17), Max: 36.8 (18 May 2025 10:45)  T(F): 97 (19 May 2025 06:17), Max: 98.2 (18 May 2025 10:45)  HR: 106 (19 May 2025 08:36) (58 - 121)  BP: 102/62 (19 May 2025 07:00) (71/49 - 113/88)  BP(mean): 74 (19 May 2025 07:00) (55 - 97)  RR: 27 (19 May 2025 08:36) (13 - 35)  SpO2: 100% (19 May 2025 08:36) (86% - 100%)    Parameters below as of 19 May 2025 08:36  Patient On (Oxygen Delivery Method): nasal cannula, high flow  O2 Flow (L/min): 50  O2 Concentration (%): 50    Daily     Daily Weight in k.6 (19 May 2025 06:17)    I&O's Summary    18 May 2025 07:01  -  19 May 2025 07:00  --------------------------------------------------------  IN: 3603 mL / OUT: 0 mL / NET: 3603 mL        PHYSICAL EXAM:    General:  Frail, dyspneic  alert can answer questions    Neuro:  Alert and oriented to person, place,     HEENT:  No JVD, no masses, Eyes anicteric, ,    Cardiovascular:  Regular rate and rhythm, with normal S1 and S2.    Lungs:  clear. no rales, no wheezinganteriorly    Abdomen:  ascitis, mild distention  Skin:  Warm, dry    Extremities:  warm,  no cyanosis  ++ edema    LABS:                        7.6    7.34  )-----------( 89       ( 19 May 2025 06:13 )             26.3     05-19    144  |  109[H]  |  55[H]  ----------------------------<  151[H]  3.3[L]   |  21[L]  |  1.94[H]    Ca    9.3      19 May 2025 06:13  Phos  4.4       Mg     1.7         TPro  4.8[L]  /  Alb  2.0[L]  /  TBili  0.3  /  DBili  x   /  AST  22  /  ALT  11[L]  /  AlkPhos  57      PT/INR - ( 18 May 2025 10:48 )   PT: 10.0 sec;   INR: 0.85 ratio         PTT - ( 18 May 2025 10:48 )  PTT:19.2 sec  Urinalysis Basic - ( 19 May 2025 06:13 )    Color: x / Appearance: x / SG: x / pH: x  Gluc: 151 mg/dL / Ketone: x  / Bili: x / Urobili: x   Blood: x / Protein: x / Nitrite: x   Leuk Esterase: x / RBC: x / WBC x   Sq Epi: x / Non Sq Epi: x / Bacteria: x      Magnesium: 1.7 mg/dL ( @ 06:13)  Phosphorus: 4.4 mg/dL ( @ 06:13)  Magnesium: 1.8 mg/dL ( @ 20:30)  Phosphorus: 4.9 mg/dL ( @ 20:30)  Magnesium: 2.1 mg/dL ( @ 10:48)    ABG - ( 18 May 2025 21:59 )  pH, Arterial: 7.34  pH, Blood: x     /  pCO2: 27    /  pO2: 112   / HCO3: 15    / Base Excess: -9.6  /  SaO2: 99

## 2025-05-19 NOTE — DIETITIAN INITIAL EVALUATION ADULT - OTHER INFO
70 y/o F with a PMHx of pancreatic adenocarcinoma of the head w/ tumor in umbilicus, on chemo/radiation, SCC of lip, HTN, HLD, gout, GERD, CVA, recent admission to [facility]/ for septic shock and now recently tx at [facility] for 10 day course of radiation c/b b/l PNA, renal failure and volume overload than discharged to [facility] 1 week ago, now presented to  for SOB, abd pain, hypotension and hypoxia. In the ED pt was given 1L of IVF and started on levo after for possible sepsis given clinical signs of volume overload and started on NIV, MALLORY w/ metabolic and lactic acidosis. Admitted for acute hypoxic respiratory failure, MALLORY, metabolic acidosis, and septic shock; admitted to CCU. FULL CODE.    Very SOB at time of visit, w/ limited information obtained as pt weak appearing. NPO at time of visit. RD obtained bedscale wt on 5/19 - 146#; 1+ & 2+ edema may be skewing weight. Weight hx reviewed: 160# w/ 1+ edema (admit weight from 2/14/25; met criteria for severe malnutrition); weight loss of 14# (8.8%) x 3 mon - clinsig and severe. Pt frail appearing. NFPE reveals mild to severe muscle/ fat wasting, pt continues to meet criteria for PCM at this time. Pt's diet now on soft & bite-sized. Consider SLP eval to assess for safest/ least restrictive diet consistency/ texture. Recommend to advance diet to Regular + SLP recs as soon as medically feasible. Will add ensure plus high protein TID (princess) in effort to optimize PO intake as pt receives [facility]. Monitor closely for refeeding syndrome - please obtain BMP, Mg, and Phos levels. Monitor and replete K, Phos, Mg prn if begins to downtrend. If nutrition support is initiated, recommend to check refeeding labs BID x 2-3 days upon initiation and then will reevaluate. Consider KCl suppl BID in effort to supplement low lytes prior to initiating nutrition support. Consider adding 200mg of thiamine and MVI w/ minerals daily. Palliative care consult pending. Please see additional recommendations below.  68 y/o F with a PMHx of pancreatic adenocarcinoma of the head w/ tumor in umbilicus, on chemo/radiation, SCC of lip, HTN, HLD, gout, GERD, CVA, recent admission to [facility]/ for septic shock and now recently tx at [facility] [place] for 10 day course of radiation c/b b/l PNA, renal failure and volume overload than discharged to [facility] 1 week ago, now presented to  for SOB, abd pain, hypotension and hypoxia. In the ED pt was given 1L of IVF and started on levo after for possible sepsis given clinical signs of volume overload and started on NIV, MALLORY w/ metabolic and lactic acidosis. Admitted for acute hypoxic respiratory failure, MALLORY, metabolic acidosis, and septic shock; admitted to CCU. FULL CODE.    Pt known to RD from previous admission; had TPN via chemoport in Feb 2025, however developed fungemia and TPN stopped and corpak placed. Very SOB at time of visit, w/ limited information obtained as pt weak appearing. NPO at time of visit. RD obtained bedscale wt on 5/19 - 146#; 1+ & 2+ edema may be skewing weight. Weight hx reviewed: 160# w/ 1+ edema (admit weight from 2/14/25; met criteria for severe malnutrition); weight loss of 14# (8.8%) x 3 mon - clinsig and severe. Pt frail appearing. NFPE reveals mild to severe muscle/ fat wasting, pt continues to meet criteria for PCM at this time. Pt's diet now on soft & bite-sized. Consider SLP eval to assess for safest/ least restrictive diet consistency/ texture. Recommend to advance diet to Regular + SLP recs as soon as medically feasible. Will add ensure plus high protein TID (princess) in effort to optimize PO intake as pt receives at [facility]. Monitor closely for refeeding syndrome - please obtain BMP, Mg, and Phos levels. Monitor and replete K, Phos, Mg prn if begins to downtrend. If nutrition support is initiated, recommend to check refeeding labs BID x 2-3 days upon initiation and then will reevaluate. Consider KCl suppl BID in effort to supplement low lytes prior to initiating nutrition support. Consider adding 200mg of thiamine and MVI w/ minerals daily. Palliative care consult pending. Please see additional recommendations below.

## 2025-05-19 NOTE — DIETITIAN INITIAL EVALUATION ADULT - PERTINENT MEDS FT
MEDICATIONS  (STANDING):  albumin human 25% IVPB 100 milliLiter(s) IV Intermittent every 6 hours  chlorhexidine 2% Cloths 1 Application(s) Topical <User Schedule>  heparin   Injectable 5000 Unit(s) SubCutaneous every 8 hours  norepinephrine Infusion 0.05 MICROgram(s)/kG/Min (6.32 mL/Hr) IV Continuous <Continuous>  pantoprazole  Injectable 40 milliGRAM(s) IV Push daily  piperacillin/tazobactam IVPB.. 3.375 Gram(s) IV Intermittent every 8 hours  sodium bicarbonate  Infusion 0.167 mEq/kG/Hr (75 mL/Hr) IV Continuous <Continuous>    Home Medications:  acetaminophen 325 mg oral tablet: 2 tab(s) orally every 6 hours As needed Temp greater or equal to 38C (100.4F), Mild Pain (1 - 3) (18 May 2025 16:55)  aspirin 81 mg oral tablet, chewable: 1 tab(s) orally once a day (18 May 2025 16:55)  atorvastatin 40 mg oral tablet: 1 tab(s) orally once a day (at bedtime) (18 May 2025 16:55)  bisacodyl 10 mg rectal suppository: 1 suppository(ies) rectally once a day as needed for  constipation (18 May 2025 17:04)  calcium (as carbonate) 500 mg oral tablet: 2 tab(s) orally every 8 hours as needed for  indigestion or heartburn (18 May 2025 17:04)  famotidine 20 mg oral tablet: 1 tab(s) orally once a day (18 May 2025 17:04)  ipratropium 500 mcg/2.5 mL inhalation solution: 2.5 milliliter(s) by nebulizer every 6 hours as needed for  shortness of breath and/or wheezing (18 May 2025 17:04)  levalbuterol 0.63 mg/3 mL inhalation solution: 3 milliliter(s) by nebulizer every 6 hours as needed for  shortness of breath and/or wheezing (18 May 2025 17:04)  lidocaine 4% topical film: Apply topically to affected area every 12 hours for pain (18 May 2025 17:04)  loperamide 2 mg oral capsule: 2 cap(s) orally as needed for  diarrhea (18 May 2025 17:04)  metoprolol succinate 25 mg oral tablet, extended release: 1 tab(s) orally once a day (18 May 2025 17:04)  omeprazole 20 mg oral tablet, disintegrating, delayed release: 2 tab(s) enteral once a day (CRUSH tablets and mix in 10mL apple juice) (18 May 2025 17:04)  ondansetron 8 mg oral tablet: 1 tab(s) orally every 8 hours as needed for  nausea (18 May 2025 17:04)  potassium chloride 20 mEq oral tablet, extended release: 1 tab(s) orally once a day (18 May 2025 17:04)  Santyl 250 units/g topical ointment: Apply to sacrum topically every day shift for pressure injury, cleanse sacrum with NS, then apply Santyl and cover with DCD (18 May 2025 17:04)  sodium bicarbonate 650 mg oral tablet: 3 tab(s) orally 3 times a day for acidosis (18 May 2025 17:04)  traMADol 25 mg oral tablet: 1 tab(s) orally every 12 hours as needed for moderate to severe pain (18 May 2025 17:04)

## 2025-05-19 NOTE — DIETITIAN INITIAL EVALUATION ADULT - ETIOLOGY
r/t decreased ability to meet increased nutrient needs 2/2 pancreatic adenocarcinoma of the head w/ tumor in umbilicus, on chemo/radiation r/t decreased ability to meet increased nutrient needs 2/2 pancreatic adenocarcinoma of the head w/ tumor in umbilicus, on chemo/radiation, wound healing

## 2025-05-19 NOTE — DIETITIAN INITIAL EVALUATION ADULT - ADD RECOMMEND
1) Consider SLP eval to assess for safest/ least restrictive diet consistency/ texture   2) Advance diet to Regular + SLP recs as soon as medically feasible  3) Add ensure plus high protein TID to optimize PO intake (provides 350 kcal, 20g protein/ shake)   4) Please obtain daily weights  5) consider checking B6, B12, thiamine, folate, carnitine, and copper levels as malnutrition in cause these to be deficient  6) Consider adding thiamine 100 mg daily 2/2 poor PO intake/ malnutrition  7) MVI w/ minerals daily to ensure 100% RDA met  8) Monitor bowel movements, if no BM for >3 days, consider implementing bowel regimen.  9) Consider adding appetite stimulant such as Remeron or Marinol 2/2 chronically poor appetite/ PO intake  10) Confirm goals of care regarding nutrition support - Nutrition support is not recommended due to overall declining medical status which evidenced based studies indicate EN is not effective in prolonging survival and improving quality of life. It can also increase risk of aspiration pneumonia as well as other related issues (infection, GI complications, and worsening/ non-healing PI's). However, will provide nutrition/ hydration within GOC.   Nutrition recommendations to continue upon discharge. Goal to continue to meet >/= 80% ENN via tolerated route. RD to F/U prn for changes to nutrition dc plan.  RD will continue to monitor PO intake, labs, hydration, and wt prn.  1) Consider SLP eval to assess for safest/ least restrictive diet consistency/ texture   2) Advance diet to Regular + SLP recs as soon as medically feasible  3) Add ensure plus high protein TID to optimize PO intake (provides 350 kcal, 20g protein/ shake)   4) Please obtain daily weights  5) consider checking B6, B12, thiamine, folate, carnitine, and copper levels as malnutrition in cause these to be deficient  6) Consider adding thiamine 200 mg daily 2/2 poor PO intake/ malnutrition  7) MVI w/ minerals daily to ensure 100% RDA met  8) Monitor bowel movements, if no BM for >3 days, consider implementing bowel regimen.  9) Consider adding appetite stimulant such as Remeron or Marinol 2/2 chronically poor appetite/ PO intake  10) Monitor closely for refeeding syndrome - monitor and replete lytes PRN  11) Confirm goals of care regarding nutrition support - Nutrition support is not recommended due to overall declining medical status which evidenced based studies indicate EN is not effective in prolonging survival and improving quality of life. It can also increase risk of aspiration pneumonia as well as other related issues (infection, GI complications, and worsening/ non-healing PI's). However, will provide nutrition/ hydration within GOC.   Nutrition recommendations to continue upon discharge. Goal to continue to meet >/= 80% ENN via tolerated route. RD to F/U prn for changes to nutrition dc plan.  RD will continue to monitor PO intake, labs, hydration, and wt prn.  1) Consider SLP eval to assess for safest/ least restrictive diet consistency/ texture   2) Advance diet to Regular + SLP recs as soon as medically feasible  3) Add ensure plus high protein TID to optimize PO intake (provides 350 kcal, 20g protein/ shake)   4) Please obtain daily weights  5) consider checking B6, B12, thiamine, folate, carnitine, and copper levels as malnutrition in cause these to be deficient  6) Consider adding thiamine 200 mg daily 2/2 poor PO intake/ malnutrition  7) Recommend to add MVI w/minerals, Vit C 500 mg BID, add Zinc Sulfate 220 mg x 10 days to promote wound healing.   8) Monitor bowel movements, if no BM for >3 days, consider implementing bowel regimen.  9) Consider adding appetite stimulant such as Remeron or Marinol 2/2 chronically poor appetite/ PO intake  10) Monitor closely for refeeding syndrome - monitor and replete lytes PRN  11) Confirm goals of care regarding nutrition support - Nutrition support is not recommended due to overall declining medical status which evidenced based studies indicate EN is not effective in prolonging survival and improving quality of life. It can also increase risk of aspiration pneumonia as well as other related issues (infection, GI complications, and worsening/ non-healing PI's). However, will provide nutrition/ hydration within GOC.   Nutrition recommendations to continue upon discharge. Goal to continue to meet >/= 80% ENN via tolerated route. RD to F/U prn for changes to nutrition dc plan.  RD will continue to monitor PO intake, labs, hydration, and wt prn.

## 2025-05-20 NOTE — CONSULT NOTE ADULT - SUBJECTIVE AND OBJECTIVE BOX
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  GERD (gastroesophageal reflux disease)  Cyclical vomiting syndrome  CVA (cerebrovascular accident)  H/O: hysterectomy  H/O squamous cell carcinoma excision    SOCIAL HX: lives at home with her  - recently has been at [facility] opiate tolerance ( )YES  (x )NO    Baseline ADLs  (Prior to Admission)  ( ) Independent   (x )Dependent    FAMILY HISTORY:  No pertinent family history in first degree relatives    Review of Systems:    Anxiety-  Depression-  Physical Discomfort-  Dyspnea-  Constipation-  Diarrhea-  Nausea-  Vomiting-  Anorexia-  Weight Loss-   Cough-  Secretions-  Fatigue-  Weakness-  Delirium-    All other systems reviewed and negative  Unable to obtain/Limited due to:    PHYSICAL EXAM:    Vital Signs Last 24 Hrs  T(C): 35.7 (20 May 2025 01:15), Max: 36.1 (19 May 2025 12:24)  T(F): 96.3 (20 May 2025 01:15), Max: 97 (19 May 2025 12:24)  HR: 95 (20 May 2025 06:00) (90 - 118)  BP: 84/67 (20 May 2025 06:00) (83/55 - 107/79)  BP(mean): 73 (20 May 2025 06:00) (65 - 88)  RR: 25 (20 May 2025 06:00) (16 - 32)  SpO2: 100% (20 May 2025 06:00) (83% - 100%)    Parameters below as of 20 May 2025 05:00  Patient On (Oxygen Delivery Method): BiPAP/CPAP    PPSV2:   %  FAST:    General:  Mental Status:  HEENT:  Lungs:  Cardiac:  GI:  :  Ext:  Neuro:    LABS:                        7.7    7.74  )-----------( 61       ( 20 May 2025 06:27 )             26.3     05-20    139  |  106  |  56[H]  ----------------------------<  137[H]  3.8   |  21[L]  |  2.07[H]    Ca    9.0      20 May 2025 06:27  Phos  4.0     05-20  Mg     2.8     05-20    TPro  5.2[L]  /  Alb  2.2[L]  /  TBili  0.3  /  DBili  x   /  AST  17  /  ALT  8[L]  /  AlkPhos  74  05-20    PT/INR - ( 18 May 2025 10:48 )   PT: 10.0 sec;   INR: 0.85 ratio    PTT - ( 18 May 2025 10:48 )  PTT:19.2 sec    Albumin: Albumin: 2.2 g/dL (05-20 @ 06:27)      Allergies    opioid-like analgesics (Vomiting; Nausea)    Intolerances      MEDICATIONS  (STANDING):  chlorhexidine 2% Cloths 1 Application(s) Topical <User Schedule>  heparin   Injectable 5000 Unit(s) SubCutaneous every 8 hours  norepinephrine Infusion 0.05 MICROgram(s)/kG/Min (6.32 mL/Hr) IV Continuous <Continuous>  pantoprazole  Injectable 40 milliGRAM(s) IV Push daily  piperacillin/tazobactam IVPB.. 3.375 Gram(s) IV Intermittent every 8 hours  sodium bicarbonate  Infusion 0.167 mEq/kG/Hr (75 mL/Hr) IV Continuous <Continuous>    MEDICATIONS  (PRN):  loperamide Liquid 4 milliGRAM(s) Oral every 6 hours PRN Diarrhea      RADIOLOGY/ADDITIONAL STUDIES:     HPI: Pt is a 69y old Female with hx of pancreatic adenocarcinoma of the head w/ tumor in umbilicus, on chemo/radiation, SCC of lip. HTN, hpl, gout, GERD, CVA, recent admission to [facility] for septic shock, then  tx at [facility] [place] for 10 day course of radiation c/b b/l PNA, renal failure and volume overload, discharged to [facility] 1 week ago, now presents to  on 5/18 for SOB, abd pain, hypotension and hypoxia.  S/p 1L of IVF in ED w/ signs of volume overload, started on levophed and NIPPV. Found to have MALLORY w/ severe metabolic acidosis,  ct abdomen and pelvix shows carcinomatosis ABG on admission 7.04/ on admission was on bicarbonate drip now off bicarbonate 21 on empiric Zosyn has abdominal drain draining daily. Palliative medicine consulted ot help establish GOC and advance care planning     5/20/2005 pt seen and examined with no family at bedside this AM, patient very lethargic, patient remains on HFNC, will reach out to patient  Dr. 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  GERD (gastroesophageal reflux disease)  Cyclical vomiting syndrome  CVA (cerebrovascular accident)  H/O: hysterectomy  H/O squamous cell carcinoma excision    SOCIAL HX: lives at home with her  - recently has been at [facility]    Hx opiate tolerance ( )YES  (x )NO    Baseline ADLs  (Prior to Admission)  ( ) Independent   (x )Dependent    FAMILY HISTORY:  No pertinent family history in first degree relatives    Review of Systems:    Unable to obtain/Limited due to: lethargy    PHYSICAL EXAM:    Vital Signs Last 24 Hrs  T(C): 35.7 (20 May 2025 01:15), Max: 36.1 (19 May 2025 12:24)  T(F): 96.3 (20 May 2025 01:15), Max: 97 (19 May 2025 12:24)  HR: 95 (20 May 2025 06:00) (90 - 118)  BP: 84/67 (20 May 2025 06:00) (83/55 - 107/79)  BP(mean): 73 (20 May 2025 06:00) (65 - 88)  RR: 25 (20 May 2025 06:00) (16 - 32)  SpO2: 100% (20 May 2025 06:00) (83% - 100%)    Parameters below as of 20 May 2025 05:00  Patient On (Oxygen Delivery Method): BiPAP/CPAP    PPSV2: 30  %    General: lethargic female in bed, increased respiratory effort  Mental Status: alert and oriented - lethargic   HEENT: dry oral mucosa, HFNC in place   Lungs: diminished b/l   Cardiac: S1S2 +   GI: distended   : coelho in place  Ext: GOLDEN, + edema   Neuro: intact    LABS:                        7.7    7.74  )-----------( 61       ( 20 May 2025 06:27 )             26.3     05-20    139  |  106  |  56[H]  ----------------------------<  137[H]  3.8   |  21[L]  |  2.07[H]    Ca    9.0      20 May 2025 06:27  Phos  4.0     05-20  Mg     2.8     05-20    TPro  5.2[L]  /  Alb  2.2[L]  /  TBili  0.3  /  DBili  x   /  AST  17  /  ALT  8[L]  /  AlkPhos  74  05-20    PT/INR - ( 18 May 2025 10:48 )   PT: 10.0 sec;   INR: 0.85 ratio    PTT - ( 18 May 2025 10:48 )  PTT:19.2 sec    Albumin: Albumin: 2.2 g/dL (05-20 @ 06:27)      Allergies    opioid-like analgesics (Vomiting; Nausea)    Intolerances      MEDICATIONS  (STANDING):  chlorhexidine 2% Cloths 1 Application(s) Topical <User Schedule>  heparin   Injectable 5000 Unit(s) SubCutaneous every 8 hours  norepinephrine Infusion 0.05 MICROgram(s)/kG/Min (6.32 mL/Hr) IV Continuous <Continuous>  pantoprazole  Injectable 40 milliGRAM(s) IV Push daily  piperacillin/tazobactam IVPB.. 3.375 Gram(s) IV Intermittent every 8 hours  sodium bicarbonate  Infusion 0.167 mEq/kG/Hr (75 mL/Hr) IV Continuous <Continuous>    MEDICATIONS  (PRN):  loperamide Liquid 4 milliGRAM(s) Oral every 6 hours PRN Diarrhea      RADIOLOGY/ADDITIONAL STUDIES:  ACC: [id number] EXAM:  CT ABDOMEN AND PELVIS   ORDERED BY: JONN CAI   ACC: [id number] EXAM:  CT CHEST   ORDERED BY: JONN CAI   PROCEDURE DATE:  05/18/2025    INTERPRETATION:  Prelim: Increased bilateral pleural effusion with   atelectasis. Debris/secretion at the trachea. Nonspecific patchy   opacities scattered in both lungs, which can be seen in noninfectious and   infectious processes. Known pancreatic mass with peritoneal   carcinomatosis. Mild left hydroureteronephrosis. Official report to   follow.  CLINICAL INFORMATION: 69-year-old female with sepsis/hypoxic respiratory   failure. History of pancreatic adenocarcinoma with umbilical implant  COMPARISON: CT scan 03/17/2025 and 02/19/2025  CONTRAST/COMPLICATIONS:  IV Contrast: NONE  Oral Contrast: NONE  PROCEDURE:  CT of the Chest, Abdomen and Pelvis was performed.  Sagittal and coronal reformats were performed.  FINDINGS:  CHEST:  LUNGS AND LARGE AIRWAYS: Patent central airways. Pan lobar patchy   opacities consistent with multifocal pneumonia. Tracheal secretions.   Partial bilateral lower lobe atelectasis  PLEURA: Small bilateral pleural effusions.  VESSELS: Right upper extremity central line ending in SVC.  HEART: Heart size is normal. No pericardial effusion. Coronary artery   calcification.  MEDIASTINUM AND MARTA: No lymphadenopathy.  CHEST WALL AND LOWER NECK: Small bilateral pleural effusions.  ABDOMEN AND PELVIS:  LIVER: Within normal limits.  BILE DUCTS: Normal caliber.  GALLBLADDER:Contracted.  SPLEEN: Within normal limits.  PANCREAS: Pancreatic body tail mass not measurable in the absence of   intravenous contrast.  ADRENALS: Within normal limits.  KIDNEYS/URETERS: Bilateral cysts. Mild to moderate left hydronephrosis   with ureter traced to the urinary bladder.  BLADDER: Collapsed around Coelho catheter.  REPRODUCTIVE ORGANS: Normal-sized uterus.  BOWEL: No bowel obstruction. Appendix not visualized.  PERITONEUM/RETROPERITONEUM: Moderate to large ascites increased since   03/17/2025. Intraperitoneal catheter ending in right subphrenic space   inserted in right flank. Peritoneal carcinomatosis.  VESSELS: Infrarenal IVC filter  LYMPH NODES: No lymphadenopathy.  ABDOMINAL WALL: Umbilical fluid attenuation mass enlarged since   03/17/2025.  BONES: Degenerative change.    IMPRESSION:  Multifocal pneumonia.  Small bilateral pleural effusions.  Pancreatic body tail mass again visualized.  Moderate to large ascites increased with peritoneal carcinomatosis and   indwelling intraperitoneal catheter.  New mild to moderate left hydronephrosis        ACC: [id number] EXAM:  XR CHEST PORTABLE URGENT 1V   ORDERED BY: DENILSON SALDIVAR   ACC: [id number] EXAM:  XR CHEST PORTABLE URGENT 1V   ORDERED BY: JONN CAI   PROCEDURE DATE:  05/20/2025    INTERPRETATION:  TECHNIQUE: A single AP view of the chest was obtained. Ordered time:   5/20/2025 5:21 AM  COMPARISON: 5/18/2025  CLINICAL INFORMATION: Line placement  FINDINGS:  Right subclavian PICC line with its tip in the cavoatrial junction.  The heart is not well assessed on an AP film.  Interval increase of patchy opacities in the right lung and left upper   lobe.  Left pleural effusion, new or increased since prior.  There is no pneumothorax.  IVC filter is partially visualized.    IMPRESSION:  Interval increase of bilateral airspace opacities.  Increased or new left pleural effusion.

## 2025-05-20 NOTE — CONSULT NOTE ADULT - ASSESSMENT
Pt is a 69y old Female with hx of pancreatic adenocarcinoma of the head w/ tumor in umbilicus, on chemo/radiation, SCC of lip. HTN, hpl, gout, GERD, CVA, recent admission to [facility] for septic shock, then  [place] [facility] [place] of [place] c/b b/l PNA, renal failure and volume overload, discharged to [facility] 1 week ago, now presents to  on 5/18 for SOB, abd pain, hypotension and hypoxia.  S/p 1L of IVF in ED w/ signs of volume overload, started on levophed and NIPPV. Found to have MALLORY w/ severe metabolic acidosis,  ct abdomen and pelvix shows carcinomatosis ABG on admission 7.04/ on admission was on bicarbonate drip now off bicarbonate 21 on empiric Zosyn has abdominal drain draining daily. Palliative medicine consulted ot help establish GOC and advance care planning     1) Acute hypoxic respiratory failure   - pleural effusion, multifocal PNA  - ID consult appreciated  - CT surgery consul appreciated   - c/w HFNC PRN     2) Pancreatic Adenocarcinoma   - follows with [facility]  - recently admitted to [facility] where patient has 10 rounds of RT therapy   - s/p chemo     3) MALLORY   - nephrology consult appreciated   - monitor labs       Process of Care  --Reviewed dx/treatment problems and alignment with Goals of Care    Physical Aspects of Care  --Pain  patient denies at this time  c/w current managment    --Bowel Regimen  denies constipation  risk for constipation d/t immobility  daily dulcolax    --Dyspnea  No SOB at this time  comfortable and in NAD    --Nausea Vomiting  denies    --Weakness  PT as tolerated     Psychological and Psychiatric Aspects of Care:   --Greif/Bereavment: emotional support provided  -Pastoral Care Available PRN     Social Aspects of Care  -SW involved     Goals of Care:     We discussed Palliative Care team being a supportive team when a patient has ongoing illnesses.  We also discussed that it is not an end of life care service, but can help navigate symptoms and emotional support througout their hospital stay here.      Ethical and Legal Aspects: NA  Capacity- pt appears to have simple capacity at this time  HCP/Surrogate:  Maik Portillo would be surrogate decision maker   Code Status- full code   MOLST-   Dispo Plan- ongoing discussions     Discussed With: Dr. Norris coordinated with attending and SW and RN     Time Spent: 60 minutes including the care, coordination and counseling of this patient,  This includes chart review, patient assessment, discussion and collaboration with interdisciplinary team members, excluding ACP discussions

## 2025-05-20 NOTE — CONSULT NOTE ADULT - ASSESSMENT
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 htn, hpl, gout, gerd, CVA, recent admission to [facility] for septic shock and now recently tx at [facility] [place] for 10 day course of radiation c/b b/l PNA, renal failure and volume overload than discharged to [facility] 1 week ago, now presents to  for SOB, abd pain, hypotension and hypoxia. In the ED pt was given 1L of IVF and started on levo after for possible sepsis given clinical signs of volume overload and started on NIV, MALLORY w/ metabolic and lactic acidosis. Imaging shows Increased bilateral pleural effusion with atelectasis. Debris/secretion at the trachea. Nonspecific patchy opacities scattered in both lungs, which can be seen in noninfectious and infectious processes. Known pancreatic mass with peritoneal carcinomatosis. Mild left hydroureteronephrosis. Started on empiric IV abx - zosyn.     Acute respiratory failure. Septic shock. Pleural effusions. Multifocal pneumonia. Metastatic pancreatic cancer. Immunocompromised host  - imaging reviewed  - CTS f/u noted s/p thoracentesis 550 cc drained f/u cx   - peritoneal drain in place + 900cc drainage   - agree with IV zosyn [id number] #2  - continue with IV antibiotic coverage  - if able check sputum cx f/u cultures  - if clinical worsening, can broaden abx coverage to meropenem [id number]  - continue with antibiotic coverage  - monitor temps  - tolerating abx well so far; no side effects noted  - reason for abx use and side effects reviewed with patient  - supportive care  - fu cbc    Concern for infection with multi-resistant organisms was raised. Prior cultures reviewed. An epidemiologic assessment was performed. There is a significant risk for resistant microorganisms to spread to family members, and/or healthcare staff. The patient will be placed on isolation according to infection control policy. Will reconsider isolation measures based on new culture results and other clinical data as appropriate. Appropriate cultures collected and an appropriate broad spectrum antibiotic therapy will be considered.    Clinical team may change from intravenous to oral antibiotics when the following criteria are met:   1. Patient is clinically improving/stable       a)	Improved signs and symptoms of infection from initial presentation       b)	Afebrile for 24 hours       c)	Leukocytosis trending towards normal range   2. Patient is tolerating oral intake   3. Initial/repeat blood cultures are negative     When above criteria met -may change iv antibiotics to: po abx regimen

## 2025-05-20 NOTE — PROGRESS NOTE ADULT - ASSESSMENT
Mrs. 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 HTN, hpl, gout, GERD, CVA, recent hospitalizations for septic shock, now admitted with acute hypoxic respiratory failure, MALLORY, metabolic acidosis, and septic shock.    Plan:  Neuro:- non focal profoundly weak  CV - On levophed, actively titrating to maintain MAP >65 likley sepsis, on abx,    Pulm:  - Acute hypoxic respiratory failure, requiring HFNC, bilateral effusions on ct, thoracentesis by Thoracic today, also with signfiicant effusions  GI: - Protonix for GI stress ulcer ppx , diet if tolerated but doesn eat, will attempt to again  drain ascites to help with breathing   Renal:  - MALLORY, may be ATN in the setting of Sepsis (POA) and renal hypoperfusion due to hypotension       - Severe metabolic acidosis on arrival,was on Bicarb gtt, improving   ID:  - CT w/ likely PNA, B/l pleural effusions. Continue Zosyn   Endo: - No acute needs

## 2025-05-20 NOTE — PROGRESS NOTE ADULT - SUBJECTIVE AND OBJECTIVE BOX
Subjective:  Pt seen and examined at bedside Appears more labored     V/S  T(C): 37.3 (05-20-25 @ 10:07), Max: 37.3 (05-20-25 @ 10:07)  HR: 96 (05-20-25 @ 13:00) (90 - 118)  BP: 93/74 (05-20-25 @ 13:00) (83/55 - 107/79)  ABP: --  ABP(mean): --  RR: 21 (05-20-25 @ 13:00) (14 - 32)  SpO2: 99% (05-20-25 @ 13:00) (92% - 100%)  Wt(kg): --  CVP(mm Hg): --  CO: --  CI: --  PA: --                                                Tele:  CHEST TUBES:  Left CT     [  ]LWS  [  ]H2O seal  Right CT   [  ]LWS  [  ]H2O seal    I&O's Detail    19 May 2025 07:01  -  20 May 2025 07:00  --------------------------------------------------------  IN:    IV PiggyBack: 2000 mL    IV PiggyBack: 100 mL    IV PiggyBack: 300 mL    Norepinephrine: 30.5 mL    Sodium Bicarbonate: 1725 mL  Total IN: 4155.5 mL    OUT:    Drain (mL): 1350 mL    Indwelling Catheter - Urethral (mL): 80 mL  Total OUT: 1430 mL    Total NET: 2725.5 mL      20 May 2025 07:01  -  20 May 2025 14:12  --------------------------------------------------------  IN:  Total IN: 0 mL    OUT:    Drain (mL): 900 mL  Total OUT: 900 mL    Total NET: -900 mL          05-19-25 @ 07:01  -  05-20-25 @ 07:00  --------------------------------------------------------  IN: 4155.5 mL / OUT: 1430 mL / NET: 2725.5 mL    05-20-25 @ 07:01  -  05-20-25 @ 14:12  --------------------------------------------------------  IN: 0 mL / OUT: 900 mL / NET: -900 mL      MEDICATIONS  (STANDING):  chlorhexidine 2% Cloths 1 Application(s) Topical <User Schedule>  heparin   Injectable 5000 Unit(s) SubCutaneous every 8 hours  norepinephrine Infusion 0.05 MICROgram(s)/kG/Min (6.32 mL/Hr) IV Continuous <Continuous>  pantoprazole  Injectable 40 milliGRAM(s) IV Push daily  piperacillin/tazobactam IVPB.. 3.375 Gram(s) IV Intermittent every 8 hours  sodium bicarbonate  Infusion 0.167 mEq/kG/Hr (75 mL/Hr) IV Continuous <Continuous>      05-20    139  |  106  |  56[H]  ----------------------------<  137[H]  3.8   |  21[L]  |  2.07[H]    Ca    9.0      20 May 2025 06:27  Phos  4.0     05-20  Mg     2.8     05-20    TPro  5.2[L]  /  Alb  2.2[L]  /  TBili  0.3  /  DBili  x   /  AST  17  /  ALT  8[L]  /  AlkPhos  74  05-20                               7.7    7.74  )-----------( 61       ( 20 May 2025 06:27 )             26.3                 CAPILLARY BLOOD GLUCOSE               CXR:  < from: CT Chest No Cont (05.18.25 @ 23:28) >  INTERPRETATION:  Prelim: Increased bilateral pleural effusion with   atelectasis. Debris/secretion at the trachea. Nonspecific patchy   opacities scattered in both lungs, which can be seen in noninfectious and   infectious processes. Known pancreatic mass with peritoneal   carcinomatosis. Mild left hydroureteronephrosis. Official report to   follow.    CLINICAL INFORMATION:    COMPARISON: None.    CONTRAST/COMPLICATIONS:  IVContrast: NONE  Oral Contrast: NONE  .      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

## 2025-05-20 NOTE — PROGRESS NOTE ADULT - SUBJECTIVE AND OBJECTIVE BOX
Interval History:        abdominal ascites drained this am 1 liter drained        remains on low dose levophed        creatinine 2.0 baseline    HPI:  Mrs. 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 HTN, hpl, gout, GERD, CVA, recent admission to [facility] for septic shock, then  [place] [facility] [place] course of radiation c/b b/l PNA, renal failure and volume overload, discharged to [facility] 1 week ago, now presents to  on 5/18 for SOB, abd pain, hypotension and hypoxia.   S/p 1L of IVF in ED w/ signs of volume overload, started on levophed and NIPPV. Found to have MALLORY w/ severe metabolic acidosis,   ct abdomen and pelvix shows carcinomatosis  ABG on admission 7.04/ on admission was on bicardonate drip now off bicarbonate 21  on empiric Zosyn  has abdominal drain draining daily    ROS cant obtain due to profound weakness        MEDICATIONS  (STANDING):  chlorhexidine 2% Cloths 1 Application(s) Topical <User Schedule>  heparin   Injectable 5000 Unit(s) SubCutaneous every 8 hours  norepinephrine Infusion 0.05 MICROgram(s)/kG/Min (6.32 mL/Hr) IV Continuous <Continuous>  pantoprazole  Injectable 40 milliGRAM(s) IV Push daily  piperacillin/tazobactam IVPB.. 3.375 Gram(s) IV Intermittent every 8 hours  sodium bicarbonate  Infusion 0.167 mEq/kG/Hr (75 mL/Hr) IV Continuous <Continuous>    MEDICATIONS  (PRN):  loperamide Liquid 4 milliGRAM(s) Oral every 6 hours PRN Diarrhea    ICU Vital Signs Last 24 Hrs  T(C): 35.7 (20 May 2025 01:15), Max: 36.1 (19 May 2025 12:24)  T(F): 96.3 (20 May 2025 01:15), Max: 97 (19 May 2025 12:24)  HR: 101 (20 May 2025 10:00) (90 - 118)  BP: 99/67 (20 May 2025 10:00) (83/55 - 107/79)  BP(mean): 78 (20 May 2025 10:00) (65 - 88)  ABP: --  ABP(mean): --  RR: 25 (20 May 2025 10:00) (14 - 32)  SpO2: 99% (20 May 2025 10:00) (92% - 100%)    O2 Parameters below as of 20 May 2025 05:00  Patient On (Oxygen Delivery Method): BiPAP/CPAP    Physical Exam    General - dyspneic, frail cachetic  HEENT nc/at high flow on  neck supple  lungs dec bs at bases, tachypneic  cv rrr  abdomen distended, firm ernie in place  renal coelho  extremtieis edematous    I&O's Summary    19 May 2025 07:01  -  20 May 2025 07:00  --------------------------------------------------------  IN: 4155.5 mL / OUT: 1430 mL / NET: 2725.5 mL    20 May 2025 07:01  -  20 May 2025 10:48  --------------------------------------------------------  IN: 0 mL / OUT: 900 mL / NET: -900 mL                        7.7    7.74  )-----------( 61       ( 20 May 2025 06:27 )             26.3       05-20    139  |  106  |  56[H]  ----------------------------<  137[H]  3.8   |  21[L]  |  2.07[H]    Ca    9.0      20 May 2025 06:27  Phos  4.0     05-20  Mg     2.8     05-20    TPro  5.2[L]  /  Alb  2.2[L]  /  TBili  0.3  /  DBili  x   /  AST  17  /  ALT  8[L]  /  AlkPhos  74  05-20    ABG - ( 18 May 2025 21:59 )  pH, Arterial: 7.34  pH, Blood: x     /  pCO2: 27    /  pO2: 112   / HCO3: 15    / Base Excess: -9.6  /  SaO2: 99        Urinalysis Basic - ( 20 May 2025 06:27 )    Color: x / Appearance: x / SG: x / pH: x  Gluc: 137 mg/dL / Ketone: x  / Bili: x / Urobili: x   Blood: x / Protein: x / Nitrite: x   Leuk Esterase: x / RBC: x / WBC x   Sq Epi: x / Non Sq Epi: x / Bacteria: x    DVT Prophylaxis:  Heparin subq                                                            Advanced Directives: Full Code,     Sepsis Criteria Met - no    Labs, Notes, Orders, radiologic studies reviewed and care coordinated with multidisciplinary team

## 2025-05-20 NOTE — PROGRESS NOTE ADULT - ASSESSMENT
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 htn, hpl, gout, gerd, CVA, recent admission to [facility]/ for septic shock and now [place] [facility] [place] c/b b/l PNA, renal failure and volume overload than discharged to [facility] 1 week ago, now presents to  for SOB, abd pain, hypotension and hypoxia.In the ED pt was given 1L of IVF and started on levo after for possible sepsis given clinical signs of volume overload and started on NIV, MALLORY w/ metabolic and lactic acidosis. Called for hypoxia and b/l effusions.    1. Acute hypoxic resp failure  2. Pleural effusion    Plan  Drained 550cc of serous plural effusion  send for cx, cytology   serial POCUS  wean pressors  cont abx  peritoeal drain as needed   case d/w Dr. Jhaveri   updated  at bedside

## 2025-05-20 NOTE — CONSULT NOTE ADULT - SUBJECTIVE AND OBJECTIVE BOX
HPI:  70 y/o F w/ pmh of pancreatic adenocarcinoma of the head w/ tumor in umbilicus, on chemo/radiation, SCC of lip. htn, hpl, gout, gerd, CVA, recent admission to [facility] for septic shock and now recently tx at [facility] [place] course of radiation c/b b/l PNA, renal failure and volume overload than discharged to [facility] 1 week ago, now presents to  for SOB, abd pain, hypotension and hypoxia. In the ED pt was given 1L of IVF and started on levo after for possible sepsis given clinical signs of volume overload and started on NIV, MALLORY w/ metabolic and lactic acidosis. Critical Consulted for admission pt seen and examined at bedside w/  dr. couch at bedside we discussed currently ongoing events and pt is full code per her wish.   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      GERD (gastroesophageal reflux disease)      Cyclical vomiting syndrome      CVA (cerebrovascular accident)      H/O: hysterectomy      H/O squamous cell carcinoma excision          Home Medications:  acetaminophen 325 mg oral tablet: 2 tab(s) orally every 6 hours As needed Temp greater or equal to 38C (100.4F), Mild Pain (1 - 3) (18 May 2025 16:55)  aspirin 81 mg oral tablet, chewable: 1 tab(s) orally once a day (18 May 2025 16:55)  atorvastatin 40 mg oral tablet: 1 tab(s) orally once a day (at bedtime) (18 May 2025 16:55)  bisacodyl 10 mg rectal suppository: 1 suppository(ies) rectally once a day as needed for  constipation (18 May 2025 17:04)  calcium (as carbonate) 500 mg oral tablet: 2 tab(s) orally every 8 hours as needed for  indigestion or heartburn (18 May 2025 17:04)  famotidine 20 mg oral tablet: 1 tab(s) orally once a day (18 May 2025 17:04)  ipratropium 500 mcg/2.5 mL inhalation solution: 2.5 milliliter(s) by nebulizer every 6 hours as needed for  shortness of breath and/or wheezing (18 May 2025 17:04)  levalbuterol 0.63 mg/3 mL inhalation solution: 3 milliliter(s) by nebulizer every 6 hours as needed for  shortness of breath and/or wheezing (18 May 2025 17:04)  lidocaine 4% topical film: Apply topically to affected area every 12 hours for pain (18 May 2025 17:04)  loperamide 2 mg oral capsule: 2 cap(s) orally as needed for  diarrhea (18 May 2025 17:04)  metoprolol succinate 25 mg oral tablet, extended release: 1 tab(s) orally once a day (18 May 2025 17:04)  omeprazole 20 mg oral tablet, disintegrating, delayed release: 2 tab(s) enteral once a day (CRUSH tablets and mix in 10mL apple juice) (18 May 2025 17:04)  ondansetron 8 mg oral tablet: 1 tab(s) orally every 8 hours as needed for  nausea (18 May 2025 17:04)  potassium chloride 20 mEq oral tablet, extended release: 1 tab(s) orally once a day (18 May 2025 17:04)  Santyl 250 units/g topical ointment: Apply to sacrum topically every day shift for pressure injury, cleanse sacrum with NS, then apply Santyl and cover with DCD (18 May 2025 17:04)  sodium bicarbonate 650 mg oral tablet: 3 tab(s) orally 3 times a day for acidosis (18 May 2025 17:04)  traMADol 25 mg oral tablet: 1 tab(s) orally every 12 hours as needed for moderate to severe pain (18 May 2025 17:04)      MEDICATIONS  (STANDING):  chlorhexidine 2% Cloths 1 Application(s) Topical <User Schedule>  heparin   Injectable 5000 Unit(s) SubCutaneous every 8 hours  norepinephrine Infusion 0.05 MICROgram(s)/kG/Min (6.32 mL/Hr) IV Continuous <Continuous>  pantoprazole  Injectable 40 milliGRAM(s) IV Push daily  piperacillin/tazobactam IVPB.. 3.375 Gram(s) IV Intermittent every 8 hours  sodium bicarbonate  Infusion 0.167 mEq/kG/Hr (75 mL/Hr) IV Continuous <Continuous>    MEDICATIONS  (PRN):  loperamide Liquid 4 milliGRAM(s) Oral every 6 hours PRN Diarrhea      Allergies    opioid-like analgesics (Vomiting; Nausea)    Intolerances        SOCIAL HISTORY: Denies tobacco, etoh abuse or illicit drug use    FAMILY HISTORY:  No pertinent family history in first degree relatives        Vital Signs Last 24 Hrs  T(C): 35.7 (20 May 2025 01:15), Max: 36.1 (19 May 2025 12:24)  T(F): 96.3 (20 May 2025 01:15), Max: 97 (19 May 2025 12:24)  HR: 90 (20 May 2025 09:00) (90 - 118)  BP: 83/59 (20 May 2025 09:00) (83/55 - 107/79)  BP(mean): 68 (20 May 2025 09:00) (65 - 88)  RR: 14 (20 May 2025 09:00) (14 - 32)  SpO2: 100% (20 May 2025 09:00) (83% - 100%)    Parameters below as of 20 May 2025 05:00  Patient On (Oxygen Delivery Method): BiPAP/CPAP            REVIEW OF SYSTEMS:    CONSTITUTIONAL:  As per HPI.  HEENT:  Eyes:  No diplopia or blurred vision. ENT:  No earache, sore throat or runny nose.  CARDIOVASCULAR:  No pressure, squeezing, tightness, heaviness or aching about the chest, neck, axilla or epigastrium.  RESPIRATORY:  No cough, shortness of breath, PND or orthopnea.  GASTROINTESTINAL:  No nausea, vomiting or diarrhea.  GENITOURINARY:  No dysuria, frequency or urgency.  MUSCULOSKELETAL:  As per HPI.  SKIN:  No change in skin, hair or nails.  NEUROLOGIC:  No paresthesias, fasciculations, seizures or weakness.  PSYCHIATRIC:  No disorder of thought or mood.  ENDOCRINE:  No heat or cold intolerance, polyuria or polydipsia.  HEMATOLOGICAL:  No easy bruising or bleedings:  .     PHYSICAL EXAMINATION:    GENERAL APPEARANCE:  Pt. is not currently dyspneic, in no distress. Pt. is alert, oriented, and pleasant.  HEENT:  Pupils are normal and react normally. No icterus. Mucous membranes well colored.  NECK:  Supple. No lymphadenopathy. Jugular venous pressure not elevated. Carotids equal.   HEART:   The cardiac impulse has a normal quality. Regular. Normal S1 and S2. There are no murmurs, rubs or gallops noted  CHEST:  Chest is clear to auscultation. Normal respiratory effort.  ABDOMEN:  Soft and nontender.   EXTREMITIES:  There is no cyanosis, clubbing or edema.   SKIN:  No rash or significant lesions are noted.    LABS:                        7.7    7.74  )-----------( 61       ( 20 May 2025 06:27 )             26.3     05-20    139  |  106  |  56[H]  ----------------------------<  137[H]  3.8   |  21[L]  |  2.07[H]    Ca    9.0      20 May 2025 06:27  Phos  4.0     05-20  Mg     2.8     05-20    TPro  5.2[L]  /  Alb  2.2[L]  /  TBili  0.3  /  DBili  x   /  AST  17  /  ALT  8[L]  /  AlkPhos  74  05-20    LIVER FUNCTIONS - ( 20 May 2025 06:27 )  Alb: 2.2 g/dL / Pro: 5.2 gm/dL / ALK PHOS: 74 U/L / ALT: 8 U/L / AST: 17 U/L / GGT: x           PT/INR - ( 18 May 2025 10:48 )   PT: 10.0 sec;   INR: 0.85 ratio         PTT - ( 18 May 2025 10:48 )  PTT:19.2 sec      Urinalysis Basic - ( 20 May 2025 06:27 )    Color: x / Appearance: x / SG: x / pH: x  Gluc: 137 mg/dL / Ketone: x  / Bili: x / Urobili: x   Blood: x / Protein: x / Nitrite: x   Leuk Esterase: x / RBC: x / WBC x   Sq Epi: x / Non Sq Epi: x / Bacteria: x      ABG - ( 18 May 2025 21:59 )  pH, Arterial: 7.34  pH, Blood: x     /  pCO2: 27    /  pO2: 112   / HCO3: 15    / Base Excess: -9.6  /  SaO2: 99                  Culture - Urine (collected 05-18-25 @ 20:50)  Source: Clean Catch Clean Catch (Midstream)  Preliminary Report (05-20-25 @ 07:19):    Culture positive, 10,000 - 49,000 CFU/mL . Identification to follow.    Urinalysis with Rflx Culture (collected 05-18-25 @ 10:48)    Culture - Blood (collected 05-18-25 @ 10:46)  Source: Blood Blood-Peripheral  Preliminary Report (05-19-25 @ 18:01):    No growth at 24 hours        RADIOLOGY & ADDITIONAL STUDIES:        HPI:    68 y/o F w/ pmh of pancreatic adenocarcinoma of the head w/ tumor in umbilicus, on chemo/radiation, SCC of lip. htn, hpl, gout, gerd, CVA, recent admission to [facility] for septic shock and now recently tx at [facility] [place] 10 day course of radiation c/b b/l PNA, renal failure and volume overload than discharged to [facility] 1 week ago, now presents to  for SOB, abd pain, hypotension and hypoxia. In the ED pt was given 1L of IVF and started on levo after for possible sepsis given clinical signs of volume overload and started on NIV, MALLORY w/ metabolic and lactic acidosis. pat seen for pulmonary eval on HFNC 40/90, CT chest b/L pleural effusion with atelectasis with B/L infiltrates on IV zosyn, & levo for Low BP, CT surgery for thoro today.    CT Chest No Cont (05.18.25 @ 23:28) >  IMPRESSION:  Multifocal pneumonia.  Small bilateral pleural effusions.  Pancreatic body tail mass again visualized.  Moderate to large ascites increased with peritoneal carcinomatosis and   indwelling intraperitoneal catheter.  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      GERD (gastroesophageal reflux disease)      Cyclical vomiting syndrome      CVA (cerebrovascular accident)      H/O: hysterectomy      H/O squamous cell carcinoma excision          Home Medications:  acetaminophen 325 mg oral tablet: 2 tab(s) orally every 6 hours As needed Temp greater or equal to 38C (100.4F), Mild Pain (1 - 3) (18 May 2025 16:55)  aspirin 81 mg oral tablet, chewable: 1 tab(s) orally once a day (18 May 2025 16:55)  atorvastatin 40 mg oral tablet: 1 tab(s) orally once a day (at bedtime) (18 May 2025 16:55)  bisacodyl 10 mg rectal suppository: 1 suppository(ies) rectally once a day as needed for  constipation (18 May 2025 17:04)  calcium (as carbonate) 500 mg oral tablet: 2 tab(s) orally every 8 hours as needed for  indigestion or heartburn (18 May 2025 17:04)  famotidine 20 mg oral tablet: 1 tab(s) orally once a day (18 May 2025 17:04)  ipratropium 500 mcg/2.5 mL inhalation solution: 2.5 milliliter(s) by nebulizer every 6 hours as needed for  shortness of breath and/or wheezing (18 May 2025 17:04)  levalbuterol 0.63 mg/3 mL inhalation solution: 3 milliliter(s) by nebulizer every 6 hours as needed for  shortness of breath and/or wheezing (18 May 2025 17:04)  lidocaine 4% topical film: Apply topically to affected area every 12 hours for pain (18 May 2025 17:04)  loperamide 2 mg oral capsule: 2 cap(s) orally as needed for  diarrhea (18 May 2025 17:04)  metoprolol succinate 25 mg oral tablet, extended release: 1 tab(s) orally once a day (18 May 2025 17:04)  omeprazole 20 mg oral tablet, disintegrating, delayed release: 2 tab(s) enteral once a day (CRUSH tablets and mix in 10mL apple juice) (18 May 2025 17:04)  ondansetron 8 mg oral tablet: 1 tab(s) orally every 8 hours as needed for  nausea (18 May 2025 17:04)  potassium chloride 20 mEq oral tablet, extended release: 1 tab(s) orally once a day (18 May 2025 17:04)  Santyl 250 units/g topical ointment: Apply to sacrum topically every day shift for pressure injury, cleanse sacrum with NS, then apply Santyl and cover with DCD (18 May 2025 17:04)  sodium bicarbonate 650 mg oral tablet: 3 tab(s) orally 3 times a day for acidosis (18 May 2025 17:04)  traMADol 25 mg oral tablet: 1 tab(s) orally every 12 hours as needed for moderate to severe pain (18 May 2025 17:04)      MEDICATIONS  (STANDING):  chlorhexidine 2% Cloths 1 Application(s) Topical <User Schedule>  heparin   Injectable 5000 Unit(s) SubCutaneous every 8 hours  norepinephrine Infusion 0.05 MICROgram(s)/kG/Min (6.32 mL/Hr) IV Continuous <Continuous>  pantoprazole  Injectable 40 milliGRAM(s) IV Push daily  piperacillin/tazobactam IVPB.. 3.375 Gram(s) IV Intermittent every 8 hours  sodium bicarbonate  Infusion 0.167 mEq/kG/Hr (75 mL/Hr) IV Continuous <Continuous>    MEDICATIONS  (PRN):  loperamide Liquid 4 milliGRAM(s) Oral every 6 hours PRN Diarrhea      Allergies    opioid-like analgesics (Vomiting; Nausea)    Intolerances        SOCIAL HISTORY: Denies tobacco, etoh abuse or illicit drug use    FAMILY HISTORY:  No pertinent family history in first degree relatives        Vital Signs Last 24 Hrs  T(C): 35.7 (20 May 2025 01:15), Max: 36.1 (19 May 2025 12:24)  T(F): 96.3 (20 May 2025 01:15), Max: 97 (19 May 2025 12:24)  HR: 90 (20 May 2025 09:00) (90 - 118)  BP: 83/59 (20 May 2025 09:00) (83/55 - 107/79)  BP(mean): 68 (20 May 2025 09:00) (65 - 88)  RR: 14 (20 May 2025 09:00) (14 - 32)  SpO2: 100% (20 May 2025 09:00) (83% - 100%)    Parameters below as of 20 May 2025 05:00  Patient On (Oxygen Delivery Method): BiPAP/CPAP            REVIEW OF SYSTEMS:    CONSTITUTIONAL:  As per HPI.  HEENT:  Eyes:  No diplopia or blurred vision. ENT:  No earache, sore throat or runny nose.  CARDIOVASCULAR:  No pressure, squeezing, tightness, heaviness or aching about the chest, neck, axilla or epigastrium.  RESPIRATORY:  No cough, shortness of breath, PND or orthopnea.  GASTROINTESTINAL:  No nausea, vomiting or diarrhea.  GENITOURINARY:  No dysuria, frequency or urgency.  MUSCULOSKELETAL:  As per HPI.  SKIN:  No change in skin, hair or nails.  NEUROLOGIC:  No paresthesias, fasciculations, seizures or weakness.  PSYCHIATRIC:  No disorder of thought or mood.  ENDOCRINE:  No heat or cold intolerance, polyuria or polydipsia.  HEMATOLOGICAL:  No easy bruising or bleedings:  .     PHYSICAL EXAMINATION:    GENERAL APPEARANCE:  Pt. is not currently dyspneic, in no distress. Pt. is alert, oriented, and pleasant.  HEENT:  Pupils are normal and react normally. No icterus. Mucous membranes well colored.  NECK:  Supple. No lymphadenopathy. Jugular venous pressure not elevated. Carotids equal.   HEART:   The cardiac impulse has a normal quality. Regular. Normal S1 and S2. There are no murmurs, rubs or gallops noted  CHEST:  Chest is clear to auscultation. Normal respiratory effort.  ABDOMEN:  Soft and nontender.   EXTREMITIES:  There is no cyanosis, clubbing or edema.   SKIN:  No rash or significant lesions are noted.    LABS:                        7.7    7.74  )-----------( 61       ( 20 May 2025 06:27 )             26.3     05-20    139  |  106  |  56[H]  ----------------------------<  137[H]  3.8   |  21[L]  |  2.07[H]    Ca    9.0      20 May 2025 06:27  Phos  4.0     05-20  Mg     2.8     05-20    TPro  5.2[L]  /  Alb  2.2[L]  /  TBili  0.3  /  DBili  x   /  AST  17  /  ALT  8[L]  /  AlkPhos  74  05-20    LIVER FUNCTIONS - ( 20 May 2025 06:27 )  Alb: 2.2 g/dL / Pro: 5.2 gm/dL / ALK PHOS: 74 U/L / ALT: 8 U/L / AST: 17 U/L / GGT: x           PT/INR - ( 18 May 2025 10:48 )   PT: 10.0 sec;   INR: 0.85 ratio         PTT - ( 18 May 2025 10:48 )  PTT:19.2 sec      Urinalysis Basic - ( 20 May 2025 06:27 )    Color: x / Appearance: x / SG: x / pH: x  Gluc: 137 mg/dL / Ketone: x  / Bili: x / Urobili: x   Blood: x / Protein: x / Nitrite: x   Leuk Esterase: x / RBC: x / WBC x   Sq Epi: x / Non Sq Epi: x / Bacteria: x      ABG - ( 18 May 2025 21:59 )  pH, Arterial: 7.34  pH, Blood: x     /  pCO2: 27    /  pO2: 112   / HCO3: 15    / Base Excess: -9.6  /  SaO2: 99                  Culture - Urine (collected 05-18-25 @ 20:50)  Source: Clean Catch Clean Catch (Midstream)  Preliminary Report (05-20-25 @ 07:19):    Culture positive, 10,000 - 49,000 CFU/mL . Identification to follow.    Urinalysis with Rflx Culture (collected 05-18-25 @ 10:48)    Culture - Blood (collected 05-18-25 @ 10:46)  Source: Blood Blood-Peripheral  Preliminary Report (05-19-25 @ 18:01):    No growth at 24 hours        RADIOLOGY & ADDITIONAL STUDIES:

## 2025-05-20 NOTE — PROGRESS NOTE ADULT - ASSESSMENT
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 htn, hpl, gout, gerd, CVA, recent admission to [facility]/ for septic shock and now recently tx at [facility] [place] 10 day course of radiation c/b b/l PNA, renal failure and volume overload than discharged to [facility] 1 week ago, now presents to  for SOB, abd pain, hypotension and hypoxia. In the ED pt was given 1L of IVF and started on levo after for possible sepsis given clinical signs of volume overload and started on NIV, MALLORY w/ metabolic and lactic acidosis. Critical Consulted for admission pt seen and examined at bedside w/  dr. couch at bedside we discussed currently ongoing events and pt is full code per her wish.   (18 May 2025 12:17)    Nephrology:  Pt familiar to me  Above reviewed  pt as is admitted w hypotension, having reent admission for sepsis at [facility] hospital  She is on HF oxygen, dyspneic but NAD  receiving bicarb drip    A/P  Pancreatic Adeno Ca  Metastatic  s/p chemo/ radiation  h/o MALLORY/ ATN  Hypotension s/p IVF, on pressors  Mild gradual elevation in creat noted  Metabolic acidosis on bicarb drip  Hypokalemia replace w IV KCl as needed  exacerbated by Bicarb drip  - monitor Magnesium level  Anemia- transfusion as per ICU     5/20  Metastatic Pancreatic Adeno Ca  s/p chemo/ radiation  MALLORY/ ATN creat still trending up  Hypotension s/p IVF, on pressors  Metabolic acidosis on bicarb drip  Hypokalemia replace w IV KCl as needed  exacerbated by Bicarb drip  - monitor Magnesium level  Anemia- transfusion as per ICU   D/w Dr Fortune/ and Dr Couch

## 2025-05-20 NOTE — CONSULT NOTE ADULT - ASSESSMENT
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 htn, hpl, gout, gerd, CVA, recent admission to [facility]/ for septic shock and now recently tx at [facility] for 10 day course of radiation c/b b/l PNA, renal failure and volume overload than discharged to [facility] 1 week ago, now presents to  for SOB, abd pain, hypotension and hypoxia.In the ED pt was given 1L of IVF and started on levo after for possible sepsis given clinical signs of volume overload and started on NIV, MALLORY w/ metabolic and lactic acidosis. Called for hypoxia and b/l effusions.    Mrs. 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 HTN, hpl, gout, GERD, CVA, recent hospitalizations for septic shock, now admitted with acute hypoxic respiratory failure, MALLORY, metabolic acidosis, and septic shock.1. Acute hypoxic resp failure  2. Pleural effusion    Plan  Neuro:- non focal profoundly weak  CV - On levophed, actively titrating to maintain MAP >65 likley sepsis, on abx,    Pulm:  - Acute hypoxic respiratory failure, requiring HFNC, bilateral effusions on ct, thoracentesis by Thoracic today, also with signfiicant effusions  GI: - Protonix for GI stress ulcer ppx , diet if tolerated but doesn eat, will attempt to again  drain ascites to help with breathing   Renal:  - MALLORY, may be ATN in the setting of Sepsis (POA) and renal hypoperfusion due to hypotension       - Severe metabolic acidosis on arrival,was on Bicarb gtt, improving   ID:  - CT w/ likely PNA, B/l pleural effusions. Continue Zosyn   Endo: - No acute needs Drained 550cc of serous plural effusion Mrs. Chahal is a 70 y/o F w/ PMHx of pancreatic adenocarcinoma of the head w/ tumor in umbilicus, on chemo/radiation, SCC of lip. HTN, hpl, gout, GERD, CVA, recent hospitalizations for septic shock, now admitted with acute hypoxic respiratory failure, MALLORY, metabolic acidosis, and septic shock.    PROBLEMS:    Acute hypoxic resp failure  Bilateral pleural effusions with compressive atelectasis, right greater than left  Septic shock/Multifocal pneumonia  MALLORY  H/O PE     PLAN:    IV Zosyn  Levophed, actively titrating to maintain MAP >65 likley sepsis, on abx,   Acute hypoxic respiratory failure, requiring HFNC, bilateral effusions on ct, thoracentesis by Thoracic today, also with signfiicant effusions  MALLORY, may be ATN in the setting of Sepsis (POA)  D/W INTENSIVITIS & DR CHAHAL

## 2025-05-20 NOTE — PROGRESS NOTE ADULT - SUBJECTIVE AND OBJECTIVE BOX
NEPHROLOGY CONSULT  HPI:  68 y/o F w/ pmh of pancreatic adenocarcinoma of the head w/ tumor in umbilicus, on chemo/radiation, SCC of lip. htn, hpl, gout, gerd, CVA, recent admission to [facility]/ for septic shock and now recently tx at [facility] for 10 day course of radiation c/b b/l PNA, renal failure and volume overload than discharged to [facility] 1 week ago, now presents to  for SOB, abd pain, hypotension and hypoxia. In the ED pt was given 1L of IVF and started on levo after for possible sepsis given clinical signs of volume overload and started on NIV, MALLORY w/ metabolic and lactic acidosis. Critical Consulted for admission pt seen and examined at bedside w/  dr. couch at bedside we discussed currently ongoing events and pt is full code per her wish.   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 3.375 Gram(s) IV Intermittent every 8 hours  sodium bicarbonate  Infusion 0.167 mEq/kG/Hr (75 mL/Hr) IV Continuous <Continuous>    MEDICATIONS  (PRN):  loperamide Liquid 4 milliGRAM(s) Oral every 6 hours PRN Diarrhea      Allergies    opioid-like analgesics (Vomiting; Nausea)    Intolerances        REVIEW OF SYSTEMS:        Vital Signs Last 24 Hrs  T(C): 35.6 (20 May 2025 15:43), Max: 37.3 (20 May 2025 10:07)  T(F): 96 (20 May 2025 15:43), Max: 99.1 (20 May 2025 10:07)  HR: 96 (20 May 2025 13:00) (90 - 118)  BP: 93/74 (20 May 2025 13:00) (83/55 - 104/73)  BP(mean): 82 (20 May 2025 13:00) (65 - 83)  RR: 21 (20 May 2025 13:00) (14 - 32)  SpO2: 99% (20 May 2025 13:00) (92% - 100%)    Parameters below as of 20 May 2025 05:00  Patient On (Oxygen Delivery Method): BiPAP/CPAP    I&O's Detail    19 May 2025 07:01  -  20 May 2025 07:00  --------------------------------------------------------  IN:    IV PiggyBack: 2000 mL    IV PiggyBack: 100 mL    IV PiggyBack: 300 mL    Norepinephrine: 30.5 mL    Sodium Bicarbonate: 1725 mL  Total IN: 4155.5 mL    OUT:    Drain (mL): 1350 mL    Indwelling Catheter - Urethral (mL): 80 mL  Total OUT: 1430 mL    Total NET: 2725.5 mL      20 May 2025 07:01  -  20 May 2025 17:47  --------------------------------------------------------  IN:  Total IN: 0 mL    OUT:    Drain (mL): 900 mL  Total OUT: 900 mL    Total NET: -900 mL          PHYSICAL EXAM:    General:  Frail, dyspneic  alert can answer questions    Neuro:  Alert and oriented to person, place,     HEENT:  No JVD, no masses, Eyes anicteric, ,    Cardiovascular:  Regular rate and rhythm, with normal S1 and S2.    Lungs:  clear. no rales, no wheezinganteriorly    Abdomen:  ascitis, mild distention  Skin:  Warm, dry    Extremities:  warm,  no cyanosis  ++ edema    LABS:                          7.7    7.74  )-----------( 61       ( 20 May 2025 06:27 )             26.3                           7.6    7.34  )-----------( 89       ( 19 May 2025 06:13 )             26.3     05-20    139  |  106  |  56[H]  ----------------------------<  137[H]  3.8   |  21[L]  |  2.07[H]    Ca    9.0      20 May 2025 06:27  Phos  4.0     05-20  Mg     2.8     05-20    TPro  5.2[L]  /  Alb  2.2[L]  /  TBili  0.3  /  DBili  x   /  AST  17  /  ALT  8[L]  /  AlkPhos  74  05-20      05-19    144  |  109[H]  |  55[H]  ----------------------------<  151[H]  3.3[L]   |  21[L]  |  1.94[H]    Ca    9.3      19 May 2025 06:13  Phos  4.4     05-19  Mg     1.7     05-19    TPro  4.8[L]  /  Alb  2.0[L]  /  TBili  0.3  /  DBili  x   /  AST  22  /  ALT  11[L]  /  AlkPhos  57  05-19    PT/INR - ( 18 May 2025 10:48 )   PT: 10.0 sec;   INR: 0.85 ratio         PTT - ( 18 May 2025 10:48 )  PTT:19.2 sec  Urinalysis Basic - ( 19 May 2025 06:13 )    Color: x / Appearance: x / SG: x / pH: x  Gluc: 151 mg/dL / Ketone: x  / Bili: x / Urobili: x   Blood: x / Protein: x / Nitrite: x   Leuk Esterase: x / RBC: x / WBC x   Sq Epi: x / Non Sq Epi: x / Bacteria: x      Magnesium: 1.7 mg/dL (05-19 @ 06:13)  Phosphorus: 4.4 mg/dL (05-19 @ 06:13)  Magnesium: 1.8 mg/dL (05-18 @ 20:30)  Phosphorus: 4.9 mg/dL (05-18 @ 20:30)  Magnesium: 2.1 mg/dL (05-18 @ 10:48)    ABG - ( 18 May 2025 21:59 )  pH, Arterial: 7.34  pH, Blood: x     /  pCO2: 27    /  pO2: 112   / HCO3: 15    / Base Excess: -9.6  /  SaO2: 99

## 2025-05-20 NOTE — CONSULT NOTE ADULT - SUBJECTIVE AND OBJECTIVE BOX
Patient is a 69y old  Female who presents with a chief complaint of SOB (20 May 2025 17:38)    HPI:  68 y/o F w/ pmh of pancreatic adenocarcinoma of the head w/ tumor in umbilicus, on chemo/radiation, SCC of lip. htn, hpl, gout, gerd, CVA, recent admission to [facility]/ for septic shock and now recently tx at [facility] for 10 day course of radiation c/b b/l PNA, renal failure and volume overload than discharged to [facility] 1 week ago, now presents to  for SOB, abd pain, hypotension and hypoxia. In the ED pt was given 1L of IVF and started on levo after for possible sepsis given clinical signs of volume overload and started on NIV, MALLORY w/ metabolic and lactic acidosis. Imaging shows Increased bilateral pleural effusion with atelectasis. Debris/secretion at the trachea. Nonspecific patchy opacities scattered in both lungs, which can be seen in noninfectious and infectious processes. Known pancreatic mass with peritoneal carcinomatosis. Mild left hydroureteronephrosis. Started on empiric IV abx - zosyn.     PMH: as above  PSH: as above    Meds: per reconciliation sheet, noted below  MEDICATIONS  (STANDING):  chlorhexidine 2% Cloths 1 Application(s) Topical <User Schedule>  heparin   Injectable 5000 Unit(s) SubCutaneous every 8 hours  norepinephrine Infusion 0.05 MICROgram(s)/kG/Min (6.32 mL/Hr) IV Continuous <Continuous>  pantoprazole  Injectable 40 milliGRAM(s) IV Push daily  piperacillin/tazobactam IVPB.. 3.375 Gram(s) IV Intermittent every 8 hours  sodium bicarbonate  Infusion 0.167 mEq/kG/Hr (75 mL/Hr) IV Continuous <Continuous>      Allergies    opioid-like analgesics (Vomiting; Nausea)    Intolerances      Social: no smoking, no alcohol, no illegal drugs; no recent travel, no exposure to TB  FAMILY HISTORY:  No pertinent family history in first degree relatives       no history of premature cardiovascular disease in first degree relatives    ROS: unable to obtain d/t medical condition    All other systems reviewed and are negative    Vital Signs Last 24 Hrs  T(C): 35.6 (20 May 2025 15:43), Max: 37.3 (20 May 2025 10:07)  T(F): 96 (20 May 2025 15:43), Max: 99.1 (20 May 2025 10:07)  HR: 96 (20 May 2025 13:00) (90 - 118)  BP: 93/74 (20 May 2025 13:00) (83/55 - 104/73)  BP(mean): 82 (20 May 2025 13:00) (65 - 83)  RR: 21 (20 May 2025 13:00) (14 - 32)  SpO2: 99% (20 May 2025 13:00) (92% - 100%)    Parameters below as of 20 May 2025 05:00  Patient On (Oxygen Delivery Method): BiPAP/CPAP      Daily     Daily     PE:  Constitutional: NAD on high flow NC   HEENT: NC/AT, EOMI, PERRLA, conjunctivae clear; ears and nose atraumatic; pharynx benign  Neck: supple; thyroid not palpable  Back: no tenderness  Respiratory: decreased breath sounds rhonchi  Cardiovascular: S1S2 regular, no murmurs  Abdomen: soft, not tender, not distended, positive BS; liver and spleen WNL  Genitourinary: no suprapubic tenderness  Lymphatic: no LN palpable  Musculoskeletal: no muscle tenderness, no joint swelling or tenderness  Extremities: no pedal edema  Neurological/ Psychiatric:  moving all extremities  Skin: no rashes; no palpable lesions    Labs: all available labs reviewed                        7.7    7.74  )-----------( 61       ( 20 May 2025 06:27 )             26.3     05-20    139  |  106  |  56[H]  ----------------------------<  137[H]  3.8   |  21[L]  |  2.07[H]    Ca    9.0      20 May 2025 06:27  Phos  4.0     05-20  Mg     2.8     05-20    TPro  5.2[L]  /  Alb  2.2[L]  /  TBili  0.3  /  DBili  x   /  AST  17  /  ALT  8[L]  /  AlkPhos  74  05-20     LIVER FUNCTIONS - ( 20 May 2025 06:27 )  Alb: 2.2 g/dL / Pro: 5.2 gm/dL / ALK PHOS: 74 U/L / ALT: 8 U/L / AST: 17 U/L / GGT: x           Urinalysis Basic - ( 20 May 2025 06:27 )    Color: x / Appearance: x / SG: x / pH: x  Gluc: 137 mg/dL / Ketone: x  / Bili: x / Urobili: x   Blood: x / Protein: x / Nitrite: x   Leuk Esterase: x / RBC: x / WBC x   Sq Epi: x / Non Sq Epi: x / Bacteria: x          Radiology: all available radiological tests reviewed      INTERPRETATION:  Prelim: Increased bilateral pleural effusion with   atelectasis. Debris/secretion at the trachea. Nonspecific patchy   opacities scattered in both lungs, which can be seen in noninfectious and   infectious processes. Known pancreatic mass with peritoneal   carcinomatosis. Mild left hydroureteronephrosis. Official report to   follow.    CLINICAL INFORMATION:    COMPARISON: None.    CONTRAST/COMPLICATIONS:  IVContrast: NONE  Oral Contrast: NONE    < end of copied text >    Advanced directives addressed: full resuscitation

## 2025-05-21 NOTE — PROGRESS NOTE ADULT - SUBJECTIVE AND OBJECTIVE BOX
Subjective: Pt in bed remains on highflow.  Very ill appearing . s/p Rt Thoracentesis yesterday 550cc drained . Para drain in with intermittent draining daily.     T(C): 36.1 (05-21-25 @ 11:24), Max: 36.1 (05-20-25 @ 18:41)  HR: 106 (05-21-25 @ 13:44) (89 - 109)  BP: 97/74 (05-21-25 @ 13:00) (80/58 - 104/71)  ABP: --  ABP(mean): --  RR: 22 (05-21-25 @ 13:44) (16 - 33)  SpO2: 100% (05-21-25 @ 11:00) (99% - 100%) Highflow 75%   Wt(kg): --  CVP(mm Hg): --  CO: --  CI: --  PA: --                                              Tele: SR           05-21    139  |  103  |  58[H]  ----------------------------<  105[H]  3.4[L]   |  26  |  2.30[H]    Ca    8.9      21 May 2025 06:37  Phos  4.4     05-21  Mg     2.6     05-21    TPro  4.7[L]  /  Alb  1.7[L]  /  TBili  0.3  /  DBili  x   /  AST  13[L]  /  ALT  9[L]  /  AlkPhos  92  05-21                               8.2    8.86  )-----------( 50       ( 21 May 2025 06:37 )             28.0                 CAPILLARY BLOOD GLUCOSE               CXR:  < from: Xray Chest 1 View- PORTABLE-Urgent (Xray Chest 1 View- PORTABLE-Urgent .) (05.20.25 @ 14:26) >  FINDINGS:  Right subclavian PICC line with its tip in the cavoatrial junction.  The heart is not well assessed on an AP film.  Interval increase of patchy opacities in the right lung and left upper   lobe.  Left pleural effusion, new or increased since prior.  There is no pneumothorax.  IVC filter is partially visualized.    IMPRESSION:    Interval increase of bilateral airspace opacities.  Increased or new left pleural effusion.    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  Very ill appearing . s/p Rt Thoracentesis yesterday 550cc drained . Para drain in with intermittent draining daily.     T(C): 36.1 (05-21-25 @ 11:24), Max: 36.1 (05-20-25 @ 18:41)  HR: 106 (05-21-25 @ 13:44) (89 - 109)  BP: 97/74 (05-21-25 @ 13:00) (80/58 - 104/71)  ABP: --  ABP(mean): --  RR: 22 (05-21-25 @ 13:44) (16 - 33)  SpO2: 100% (05-21-25 @ 11:00) (99% - 100%) Highflow 75%   Wt(kg): --  CVP(mm Hg): --  CO: --  CI: --  PA: --                                              Tele: SR           05-21    139  |  103  |  58[H]  ----------------------------<  105[H]  3.4[L]   |  26  |  2.30[H]    Ca    8.9      21 May 2025 06:37  Phos  4.4     05-21  Mg     2.6     05-21    TPro  4.7[L]  /  Alb  1.7[L]  /  TBili  0.3  /  DBili  x   /  AST  13[L]  /  ALT  9[L]  /  AlkPhos  92  05-21                               8.2    8.86  )-----------( 50       ( 21 May 2025 06:37 )             28.0                 CAPILLARY BLOOD GLUCOSE               CXR:  < from: Xray Chest 1 View- PORTABLE-Urgent (Xray Chest 1 View- PORTABLE-Urgent .) (05.20.25 @ 14:26) >  FINDINGS:  Right subclavian PICC line with its tip in the cavoatrial junction.  The heart is not well assessed on an AP film.  Interval increase of patchy opacities in the right lung and left upper   lobe.  Left pleural effusion, new or increased since prior.  There is no pneumothorax.  IVC filter is partially visualized.    IMPRESSION:    Interval increase of bilateral airspace opacities.  Increased or new left pleural effusion.    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

## 2025-05-21 NOTE — PROGRESS NOTE ADULT - ASSESSMENT
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 htn, hpl, gout, gerd, CVA, recent admission to [facility]/ for septic shock and now recently tx at [facility] for 10 day course of radiation c/b b/l PNA, renal failure and volume overload than discharged to [facility] 1 week ago, now presents to  for SOB, abd pain, hypotension and hypoxia. In the ED pt was given 1L of IVF and started on levo after for possible sepsis given clinical signs of volume overload and started on NIV, MALLORY w/ metabolic and lactic acidosis. Imaging shows Increased bilateral pleural effusion with atelectasis. Debris/secretion at the trachea. Nonspecific patchy opacities scattered in both lungs, which can be seen in noninfectious and infectious processes. Known pancreatic mass with peritoneal carcinomatosis. Mild left hydroureteronephrosis. Started on empiric IV abx - zosyn.     Acute respiratory failure. Septic shock. Pleural effusions. Multifocal pneumonia. Metastatic pancreatic cancer. Immunocompromised host  - imaging reviewed  - CTS f/u noted s/p thoracentesis 550 cc drained transudative fluid f/u cx no growth  - peritoneal drain in place   - on IV zosyn [id number] #3  - continue with IV antibiotic coverage  - if able check sputum cx f/u cultures   - if clinical worsening, can broaden abx coverage to meropenem [id number]  - continue with antibiotic coverage  - monitor temps  - tolerating abx well so far; no side effects noted  - reason for abx use and side effects reviewed with patient  - supportive care  - fu cbc    Concern for infection with multi-resistant organisms was raised. Prior cultures reviewed. An epidemiologic assessment was performed. There is a significant risk for resistant microorganisms to spread to family members, and/or healthcare staff. The patient will be placed on isolation according to infection control policy. Will reconsider isolation measures based on new culture results and other clinical data as appropriate. Appropriate cultures collected and an appropriate broad spectrum antibiotic therapy will be considered.    Clinical team may change from intravenous to oral antibiotics when the following criteria are met:   1. Patient is clinically improving/stable       a)	Improved signs and symptoms of infection from initial presentation       b)	Afebrile for 24 hours       c)	Leukocytosis trending towards normal range   2. Patient is tolerating oral intake   3. Initial/repeat blood cultures are negative     When above criteria met -may change iv antibiotics to: po abx regimen

## 2025-05-21 NOTE — GOALS OF CARE CONVERSATION - ADVANCED CARE PLANNING - CONVERSATION DETAILS
Met with the patient and her  at bedside and reviewed the role of palliative medicine, we reviewed the patient's medical course up until this point, which included going to [facility] and receiving radiation when the patient came back to [facility] with increased respiratory effort and requiring admission to CCU and HFNC. We discuss the patient's underlying clinical diagnosis at length. We discussed resuscitation and the risks/benefits of CPR. Risks include broken ribs, laceration, puncture, pain, and discomfort. At this time, due to the patient's underlying irreversible diagnosis of cancer, I would not recommend CPR because it would not reverse the current medical condition. They understand DNR does NOT mean do not treat. It means NO CPR would be attempted if the patient's heart stopped. I would allow it at that stage, natural death. We also discussed mechanical ventilation if they could no longer breathe on their own. The risks and benefits of mechanical ventilation were discussed. At this time, due to the patient's irreversible medical condition of cancer, it is of concern that if the patient were to be intubated, extubation may not be possible. Intubation would also NOT reverse the current medical condition, which, if it progresses naturally, would lead to the patient's death. Patient stated she does not think she would want these interventions, but also felt like there was a lot of information and would like time to process the conversation and speak with her . The patient agrees to meet again, and our team will continue to follow up. No limits are set at this time, and the family would like to discuss this further, as they will have a follow-up GOC.

## 2025-05-21 NOTE — PROGRESS NOTE ADULT - SUBJECTIVE AND OBJECTIVE BOX
Interval History:        Patient had right sided thoracentesis yesterday        had drainage of abdominal fluid        oliguric        creatinine increased to 2.3        remains on high flow on 75%        urine output only 100 cc/24 hours    HPI:        Mrs. 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 HTN, hpl, gout, GERD, CVA, recent admission to [facility]/ for septic shock, then  [place] [facility] [place] 10 day course of radiation c/b b/l PNA, renal failure and volume overload, discharged to [facility] 1 week ago, now presents to  on 5/18 for SOB, abd pain, hypotension and hypoxia.   S/p 1L of IVF in ED w/ signs of volume overload, started on levophed and NIPPV. Found to have MALLORY w/ severe metabolic acidosis,   ct abdomen and pelvix shows carcinomatosis  ABG on admission 7.04/ on admission was on bicardonate drip now off bicarbonate   on empiric Zosyn  has abdominal drain draining daily    ROS cant obtain due to profound weakness     MEDICATIONS  (STANDING):  acetaminophen   IVPB .. 1000 milliGRAM(s) IV Intermittent once  albumin human  5% IVPB 250 milliLiter(s) IV Intermittent every 12 hours  chlorhexidine 2% Cloths 1 Application(s) Topical <User Schedule>  heparin   Injectable 5000 Unit(s) SubCutaneous every 8 hours  norepinephrine Infusion 0.05 MICROgram(s)/kG/Min (6.32 mL/Hr) IV Continuous <Continuous>  pantoprazole  Injectable 40 milliGRAM(s) IV Push daily  piperacillin/tazobactam IVPB.. 3.375 Gram(s) IV Intermittent every 8 hours  sodium chloride 0.9%. 1000 milliLiter(s) (75 mL/Hr) IV Continuous <Continuous>    MEDICATIONS  (PRN):  loperamide Liquid 4 milliGRAM(s) Oral every 6 hours PRN Diarrhea    ICU Vital Signs Last 24 Hrs  T(C): 36.1 (21 May 2025 04:24), Max: 36.1 (20 May 2025 18:41)  T(F): 97 (21 May 2025 04:24), Max: 97 (20 May 2025 18:41)  HR: 99 (21 May 2025 08:33) (89 - 104)  BP: 99/71 (21 May 2025 06:00) (80/58 - 104/72)  BP(mean): 80 (21 May 2025 06:00) (67 - 84)  ABP: --  ABP(mean): --  RR: 18 (21 May 2025 08:33) (16 - 33)  SpO2: 100% (21 May 2025 08:33) (99% - 100%)    O2 Parameters below as of 21 May 2025 08:33  Patient On (Oxygen Delivery Method): nasal cannula, high flow  O2 Flow (L/min): 50  O2 Concentration (%): 75    Physical Exam    General - ill   HEENT alopecia, weak, temporal wasting  NEck supple  lungs dec bs at bases tachypneic  cv rrr  abdomen distended, catheter in place   coelho  extremtieis edematous      I&O's Summary    20 May 2025 07:01  -  21 May 2025 07:00  --------------------------------------------------------  IN: 1335 mL / OUT: 1550 mL / NET: -215 mL                        8.2    8.86  )-----------( 50       ( 21 May 2025 06:37 )             28.0     05-21    139  |  103  |  58[H]  ----------------------------<  105[H]  3.4[L]   |  26  |  2.30[H]    Ca    8.9      21 May 2025 06:37  Phos  4.4     05-21  Mg     2.6     05-21    TPro  4.7[L]  /  Alb  1.7[L]  /  TBili  0.3  /  DBili  x   /  AST  13[L]  /  ALT  9[L]  /  AlkPhos  92  05-21    ABG - ( 20 May 2025 23:35 )  pH, Arterial: 7.37  pH, Blood: x     /  pCO2: 45    /  pO2: 200   / HCO3: 26    / Base Excess: 0.3   /  SaO2: 99        Urinalysis Basic - ( 21 May 2025 06:37 )    Color: x / Appearance: x / SG: x / pH: x  Gluc: 105 mg/dL / Ketone: x  / Bili: x / Urobili: x   Blood: x / Protein: x / Nitrite: x   Leuk Esterase: x / RBC: x / WBC x   Sq Epi: x / Non Sq Epi: x / Bacteria: x    DVT Prophylaxis:   Heparin subq                                                              Advanced Directives: Full Code     Sepsis Criteria Met - no    Labs, Notes, Orders, radiologic studies reviewed and care coordinated with multidisciplinary team

## 2025-05-21 NOTE — PROGRESS NOTE ADULT - ASSESSMENT
Mrs. Chahal is a 70 y/o F w/ PMHx of pancreatic adenocarcinoma of the head w/ tumor in umbilicus, on chemo/radiation, SCC of lip. HTN, hpl, gout, GERD, CVA, recent hospitalizations for septic shock, now admitted with acute hypoxic respiratory failure, MALLORY, metabolic acidosis, and septic shock.    PROBLEMS:    Acute hypoxic resp failure  Bilateral pleural effusions with compressive atelectasis, right greater than left-s/p left thoro-transudative with high neutrophils  Septic shock/Multifocal pneumonia  MALLORY  H/O PE     PLAN:    Titrate Fio2  IV Zosyn  Levophed, actively titrating to maintain MAP >65 [place] sepsis, on abx,   Acute hypoxic respiratory failure, requiring HFNC, bilateral effusions on ct, thoracentesis by Thoracic today, also with signfiicant effusions  MALLORY, may be ATN in the setting of Sepsis (POA)  D/W INTENSIVITIS & DR CHAHAL

## 2025-05-21 NOTE — PROGRESS NOTE ADULT - SUBJECTIVE AND OBJECTIVE BOX
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 htn, hpl, gout, gerd, CVA, recent admission to [facility]/ for septic shock and now recently tx at [facility] for 10 day course of radiation c/b b/l PNA, renal failure and volume overload than discharged to [facility] 1 week ago, now presents to  for SOB, abd pain, hypotension and hypoxia. In the ED pt was given 1L of IVF and started on levo after for possible sepsis given clinical signs of volume overload and started on NIV, MALLORY w/ metabolic and lactic acidosis. Critical Consulted for admission pt seen and examined at bedside w/  dr. couch at bedside we discussed currently ongoing events and pt is full code per her wish.   (18 May 2025 12:17)    Nephrology:  Pt familiar to me  Above reviewed  pt as is admitted w hypotension, having reent admission for sepsis at [facility] hospital  She is on HF oxygen, dyspneic but NAD  receiving bicarb drip  Mild gradual elevation in creat noted    5/20  Pt asking for thoracentesis  states diff breathing    5/21  In bed more comfortable s/p thoracentesis  has abd drain as well, w high output and minimal UO         Nephrology      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      GERD (gastroesophageal reflux disease)      Cyclical vomiting syndrome      CVA (cerebrovascular accident)      H/O: hysterectomy      H/O squamous cell carcinoma excision          FAMILY HISTORY:  No pertinent family history in first degree relatives        MEDICATIONS  (STANDING):  albumin human  5% IVPB 250 milliLiter(s) IV Intermittent every 12 hours  chlorhexidine 2% Cloths 1 Application(s) Topical <User Schedule>  heparin   Injectable 5000 Unit(s) SubCutaneous every 8 hours  norepinephrine Infusion 0.05 MICROgram(s)/kG/Min (6.32 mL/Hr) IV Continuous <Continuous>  pantoprazole  Injectable 40 milliGRAM(s) IV Push daily  piperacillin/tazobactam IVPB.. 3.375 Gram(s) IV Intermittent every 8 hours  sodium chloride 0.9%. 1000 milliLiter(s) (75 mL/Hr) IV Continuous <Continuous>    MEDICATIONS  (PRN):  loperamide Liquid 4 milliGRAM(s) Oral every 6 hours PRN Diarrhea      Allergies    opioid-like analgesics (Vomiting; Nausea)    Intolerances        REVIEW OF SYSTEMS:    I&O's Detail    20 May 2025 07:01  -  21 May 2025 07:00  --------------------------------------------------------  IN:    Norepinephrine: 60 mL    Sodium Bicarbonate: 1275 mL  Total IN: 1335 mL    OUT:    Drain (mL): 900 mL    Drain (mL): 550 mL    Indwelling Catheter - Urethral (mL): 100 mL  Total OUT: 1550 mL    Total NET: -215 mL      ICU Vital Signs Last 24 Hrs  T(C): 36.1 (21 May 2025 11:24), Max: 36.1 (20 May 2025 18:41)  T(F): 97 (21 May 2025 11:24), Max: 97 (20 May 2025 18:41)  HR: 106 (21 May 2025 13:44) (89 - 109)  BP: 97/74 (21 May 2025 13:00) (80/58 - 104/72)  BP(mean): 83 (21 May 2025 13:00) (63 - 85)  ABP: --  ABP(mean): --  RR: 22 (21 May 2025 13:44) (16 - 33)  SpO2: 100% (21 May 2025 11:00) (99% - 100%)    O2 Parameters below as of 21 May 2025 13:44  Patient On (Oxygen Delivery Method): nasal cannula, high flow  O2 Flow (L/min): 50  O2 Concentration (%): 75          PHYSICAL EXAM:    General:  Frail, less dyspneic  alert can answer questions    Neuro:  Alert and oriented to person, place,     HEENT:  No JVD, no masses, Eyes anicteric, ,    Cardiovascular:  Regular rate and rhythm, with normal S1 and S2.    Lungs:  clear. no rales, no wheezing anteriorly    Abdomen:  ascitis, mild distention  Skin:  Warm, dry    Extremities:  warm,  no cyanosis  ++ edema    LABS:                          8.2    8.86  )-----------( 50       ( 21 May 2025 06:37 )             28.0                             7.7    7.74  )-----------( 61       ( 20 May 2025 06:27 )             26.3                           7.6    7.34  )-----------( 89       ( 19 May 2025 06:13 )             26.3     05-21    139  |  103  |  58[H]  ----------------------------<  105[H]  3.4[L]   |  26  |  2.30[H]    Ca    8.9      21 May 2025 06:37  Phos  4.4     05-21  Mg     2.6     05-21    TPro  4.7[L]  /  Alb  1.7[L]  /  TBili  0.3  /  DBili  x   /  AST  13[L]  /  ALT  9[L]  /  AlkPhos  92  05-21      05-20    139  |  106  |  56[H]  ----------------------------<  137[H]  3.8   |  21[L]  |  2.07[H]    Ca    9.0      20 May 2025 06:27  Phos  4.0     05-20  Mg     2.8     05-20    TPro  5.2[L]  /  Alb  2.2[L]  /  TBili  0.3  /  DBili  x   /  AST  17  /  ALT  8[L]  /  AlkPhos  74  05-20      05-19    144  |  109[H]  |  55[H]  ----------------------------<  151[H]  3.3[L]   |  21[L]  |  1.94[H]    Ca    9.3      19 May 2025 06:13  Phos  4.4     05-19  Mg     1.7     05-19    TPro  4.8[L]  /  Alb  2.0[L]  /  TBili  0.3  /  DBili  x   /  AST  22  /  ALT  11[L]  /  AlkPhos  57  05-19    PT/INR - ( 18 May 2025 10:48 )   PT: 10.0 sec;   INR: 0.85 ratio         PTT - ( 18 May 2025 10:48 )  PTT:19.2 sec  Urinalysis Basic - ( 19 May 2025 06:13 )    Color: x / Appearance: x / SG: x / pH: x  Gluc: 151 mg/dL / Ketone: x  / Bili: x / Urobili: x   Blood: x / Protein: x / Nitrite: x   Leuk Esterase: x / RBC: x / WBC x   Sq Epi: x / Non Sq Epi: x / Bacteria: x      Magnesium: 1.7 mg/dL (05-19 @ 06:13)  Phosphorus: 4.4 mg/dL (05-19 @ 06:13)  Magnesium: 1.8 mg/dL (05-18 @ 20:30)  Phosphorus: 4.9 mg/dL (05-18 @ 20:30)  Magnesium: 2.1 mg/dL (05-18 @ 10:48)    ABG - ( 18 May 2025 21:59 )  pH, Arterial: 7.34  pH, Blood: x     /  pCO2: 27    /  pO2: 112   / HCO3: 15    / Base Excess: -9.6  /  SaO2: 99

## 2025-05-21 NOTE — PROGRESS NOTE ADULT - ASSESSMENT
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 htn, hpl, gout, gerd, CVA, recent admission to [facility]/ for septic shock and now recently tx at [facility] for 10 day course of radiation c/b b/l PNA, renal failure and volume overload than discharged to [facility] 1 week ago, now presents to  for SOB, abd pain, hypotension and hypoxia. In the ED pt was given 1L of IVF and started on levo after for possible sepsis given clinical signs of volume overload and started on NIV, MALLORY w/ metabolic and lactic acidosis. Critical Consulted for admission pt seen and examined at bedside w/  dr. couch at bedside we discussed currently ongoing events and pt is full code per her wish.   (18 May 2025 12:17)    Nephrology:  Pt familiar to me  Above reviewed  pt as is admitted w hypotension, having reent admission for sepsis at [facility] hospital  She is on HF oxygen, dyspneic but NAD  receiving bicarb drip    A/P  Pancreatic Adeno Ca  Metastatic  s/p chemo/ radiation  h/o MALLORY/ ATN  Hypotension s/p IVF, on pressors  Mild gradual elevation in creat noted  Metabolic acidosis on bicarb drip  Hypokalemia replace w IV KCl as needed  exacerbated by Bicarb drip  - monitor Magnesium level  Anemia- transfusion as per ICU     5/20  Metastatic Pancreatic Adeno Ca  s/p chemo/ radiation  MALLORY/ ATN creat still trending up  Hypotension s/p IVF, on pressors  Metabolic acidosis on bicarb drip  Hypokalemia replace w IV KCl as needed  exacerbated by Bicarb drip  - monitor Magnesium level  Anemia- transfusion as per ICU   D/w Dr Fortune/ and Dr Couch    5/21  Metastatic Pancreatic Adeno Ca  s/p chemo/ radiation  MALLORY/ ATN creat still trending up,  - mallory due to high output from pts drains and thortacentesis, ~ 1500 ml, vs  ml  Hypotension s/p IVF, on pressors  Metabolic acidosis on bicarb drip  Hypokalemia replace w IV KCl as needed  exacerbated by Bicarb drip  - monitor Magnesium level  Anemia- transfusion as per ICU   Dr Fortune plans on albumin infusion  D/w Dr Fortune/ and Dr Couch     68 y/o F w/ pmh of pancreatic adenocarcinoma of the head w/ tumor in umbilicus, on chemo/radiation, SCC of lip. htn, hpl, gout, gerd, CVA, recent admission to [facility]/ for septic shock and now [place] [facility] [place] course of radiation c/b b/l PNA, renal failure and volume overload than discharged to [facility] 1 week ago, now presents to  for SOB, abd pain, hypotension and hypoxia. In the ED pt was given 1L of IVF and started on levo after for possible sepsis given clinical signs of volume overload and started on NIV, MALLORY w/ metabolic and lactic acidosis. Critical Consulted for admission pt seen and examined at bedside w/  dr. couch at bedside we discussed currently ongoing events and pt is full code per her wish.   (18 May 2025 12:17)    Nephrology:  Pt familiar to me  Above reviewed  pt as is admitted w hypotension, having reent admission for sepsis at [facility] hospital  She is on HF oxygen, dyspneic but NAD  receiving bicarb drip    A/P  Pancreatic Adeno Ca  Metastatic  s/p chemo/ radiation  h/o MALLORY/ ATN  Hypotension s/p IVF, on pressors  Mild gradual elevation in creat noted  Metabolic acidosis on bicarb drip  Hypokalemia replace w IV KCl as needed  exacerbated by Bicarb drip  - monitor Magnesium level  Anemia- transfusion as per ICU     5/20  Metastatic Pancreatic Adeno Ca  s/p chemo/ radiation  MALLORY/ ATN creat still trending up  Hypotension s/p IVF, on pressors  Metabolic acidosis on bicarb drip  Hypokalemia replace w IV KCl as needed  exacerbated by Bicarb drip  - monitor Magnesium level  Anemia- transfusion as per ICU   D/w Dr Fortune/ and Dr Couch    5/21  Metastatic Pancreatic Adeno Ca  s/p chemo/ radiation  MALLORY/ ATN creat still trending up,  - mallory due to high output from pts drains and thoracentesis, ~ 1500 ml, vs  ml  Hypotension s/p IVF, on pressors  Metabolic acidosis on bicarb drip  Hypokalemia replace w IV KCl as needed  exacerbated by Bicarb drip  - monitor Magnesium level  Anemia- transfusion as per ICU   Dr Fortune plans on albumin infusion  D/w Dr Fortune/ and Dr Couch

## 2025-05-21 NOTE — PROGRESS NOTE ADULT - ASSESSMENT
Mrs. 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 HTN, hpl, gout, GERD, CVA, recent hospitalizations for septic shock, now admitted with acute hypoxic respiratory failure, MALLORY, metabolic acidosis, and septic shock.    Plan:  Neuro:- non focal profoundly weak  CV - On levophed, actively titrating to maintain MAP >65 likley sepsis vs. volume losss from asictes ca will try albumin on abx,    Pulm:  - Acute hypoxic respiratory failure, requiring HFNC, bilateral effusions on ct, thoracentesis  s/p right thoracentesis  GI: - Protonix for GI stress ulcer ppx , diet if tolerated but doesn eat,  will drain ascites again today   Renal:  - MALLORY, may be ATN in the setting of Sepsis (POA) and renal hypoperfusion due to hypotension       - Severe metabolic acidosis on arrival,was on Bicarb gtt, acidosis improved, but creatinine improving   ID:  - CT w/ likely PNA, B/l pleural effusions. Continue Zosyn no positive culture  Endo: - No acute needs   GOC full code needs continual discussions palliative care to reevaluate

## 2025-05-21 NOTE — PROGRESS NOTE ADULT - SUBJECTIVE AND OBJECTIVE BOX
Subjective:    pat better, s/p thoro with removal of 500cc of fluid, transudative    Protein Total, Fluid: 2.2pH, Fluid: 7.8Glucose, Fluid: 143Lactate Dehydrogenase, Fluid: 152Albumin, Fluid: 1.3Neutrophils-Body Fluid: 67 %pH, Arterial: 7.37  pCO2, Arterial: 45 mmHg  pO2, Arterial: 200 mmHg  HCO3, Arterial: 26 mmol/L  Base Excess, Arterial: 0.3 mmol/L  Oxygen Saturation, Arterial: 99 %  Blood Gas Comments Arterial: ; Comment: HFNC 50L 90%  Down to HFNC 50/70 sat 94%      Home Medications:  acetaminophen 325 mg oral tablet: 2 tab(s) orally every 6 hours As needed Temp greater or equal to 38C (100.4F), Mild Pain (1 - 3) (18 May 2025 16:55)  aspirin 81 mg oral tablet, chewable: 1 tab(s) orally once a day (18 May 2025 16:55)  atorvastatin 40 mg oral tablet: 1 tab(s) orally once a day (at bedtime) (18 May 2025 16:55)  bisacodyl 10 mg rectal suppository: 1 suppository(ies) rectally once a day as needed for  constipation (18 May 2025 17:04)  calcium (as carbonate) 500 mg oral tablet: 2 tab(s) orally every 8 hours as needed for  indigestion or heartburn (18 May 2025 17:04)  famotidine 20 mg oral tablet: 1 tab(s) orally once a day (18 May 2025 17:04)  ipratropium 500 mcg/2.5 mL inhalation solution: 2.5 milliliter(s) by nebulizer every 6 hours as needed for  shortness of breath and/or wheezing (18 May 2025 17:04)  levalbuterol 0.63 mg/3 mL inhalation solution: 3 milliliter(s) by nebulizer every 6 hours as needed for  shortness of breath and/or wheezing (18 May 2025 17:04)  lidocaine 4% topical film: Apply topically to affected area every 12 hours for pain (18 May 2025 17:04)  loperamide 2 mg oral capsule: 2 cap(s) orally as needed for  diarrhea (18 May 2025 17:04)  metoprolol succinate 25 mg oral tablet, extended release: 1 tab(s) orally once a day (18 May 2025 17:04)  omeprazole 20 mg oral tablet, disintegrating, delayed release: 2 tab(s) enteral once a day (CRUSH tablets and mix in 10mL apple juice) (18 May 2025 17:04)  ondansetron 8 mg oral tablet: 1 tab(s) orally every 8 hours as needed for  nausea (18 May 2025 17:04)  potassium chloride 20 mEq oral tablet, extended release: 1 tab(s) orally once a day (18 May 2025 17:04)  Santyl 250 units/g topical ointment: Apply to sacrum topically every day shift for pressure injury, cleanse sacrum with NS, then apply Santyl and cover with DCD (18 May 2025 17:04)  sodium bicarbonate 650 mg oral tablet: 3 tab(s) orally 3 times a day for acidosis (18 May 2025 17:04)  traMADol 25 mg oral tablet: 1 tab(s) orally every 12 hours as needed for moderate to severe pain (18 May 2025 17:04)    MEDICATIONS  (STANDING):  albumin human  5% IVPB 250 milliLiter(s) IV Intermittent every 12 hours  chlorhexidine 2% Cloths 1 Application(s) Topical <User Schedule>  heparin   Injectable 5000 Unit(s) SubCutaneous every 8 hours  norepinephrine Infusion 0.05 MICROgram(s)/kG/Min (6.32 mL/Hr) IV Continuous <Continuous>  pantoprazole  Injectable 40 milliGRAM(s) IV Push daily  piperacillin/tazobactam IVPB.. 3.375 Gram(s) IV Intermittent every 8 hours  sodium chloride 0.9%. 1000 milliLiter(s) (75 mL/Hr) IV Continuous <Continuous>    MEDICATIONS  (PRN):  loperamide Liquid 4 milliGRAM(s) Oral every 6 hours PRN Diarrhea      Allergies    opioid-like analgesics (Vomiting; Nausea)    Intolerances        Vital Signs Last 24 Hrs  T(C): 36.1 (21 May 2025 11:24), Max: 36.1 (20 May 2025 18:41)  T(F): 97 (21 May 2025 11:24), Max: 97 (20 May 2025 18:41)  HR: 106 (21 May 2025 13:44) (89 - 109)  BP: 97/74 (21 May 2025 13:00) (80/58 - 104/72)  BP(mean): 83 (21 May 2025 13:00) (63 - 85)  RR: 22 (21 May 2025 13:44) (16 - 33)  SpO2: 100% (21 May 2025 11:00) (99% - 100%)    Parameters below as of 21 May 2025 13:44  Patient On (Oxygen Delivery Method): nasal cannula, high flow  O2 Flow (L/min): 50  O2 Concentration (%): 75      PHYSICAL EXAMINATION:    NECK:  Supple. No lymphadenopathy. Jugular venous pressure not elevated. Carotids equal.   HEART:   The cardiac impulse has a normal quality. Reg., Nl S1 and S2.  There are no murmurs, rubs or gallops noted  CHEST:  Chest crackles to auscultation. Normal respiratory effort.  ABDOMEN:  Soft and nontender.   EXTREMITIES:  There is no edema.       LABS:                        8.2    8.86  )-----------( 50       ( 21 May 2025 06:37 )             28.0     05-21    139  |  103  |  58[H]  ----------------------------<  105[H]  3.4[L]   |  26  |  2.30[H]    Ca    8.9      21 May 2025 06:37  Phos  4.4     05-21  Mg     2.6     05-21    TPro  4.7[L]  /  Alb  1.7[L]  /  TBili  0.3  /  DBili  x   /  AST  13[L]  /  ALT  9[L]  /  AlkPhos  92  05-21      Urinalysis Basic - ( 21 May 2025 06:37 )    Color: x / Appearance: x / SG: x / pH: x  Gluc: 105 mg/dL / Ketone: x  / Bili: x / Urobili: x   Blood: x / Protein: x / Nitrite: x   Leuk Esterase: x / RBC: x / WBC x   Sq Epi: x / Non Sq Epi: x / Bacteria: x        Culture - Fungal, Body Fluid (collected 05-20-25 @ 14:00)  Source: Pleural Fl Pleural Fluid  Preliminary Report (05-21-25 @ 07:25):    Testing in progress    Culture - Body Fluid with Gram Stain (collected 05-20-25 @ 14:00)  Source: Pleural Fl Pleural Fluid  Gram Stain (05-20-25 @ 23:18):    polymorphonuclear leukocytes seen    No organisms seen    by cytocentrifuge

## 2025-05-21 NOTE — PROGRESS NOTE ADULT - SUBJECTIVE AND OBJECTIVE BOX
Date of service: 05-21-25 @ 13:41    pt seen and examined  afebrile  on high flow NC 75%  weak appearing   respiratory function frail      ROS: no fever or chills; denies dizziness, no HA, + SOB no abdominal pain, no diarrhea or constipation; no dysuria, no urinary frequency, no legs pain, no rashes    MEDICATIONS  (STANDING):  albumin human  5% IVPB 250 milliLiter(s) IV Intermittent every 12 hours  chlorhexidine 2% Cloths 1 Application(s) Topical <User Schedule>  heparin   Injectable 5000 Unit(s) SubCutaneous every 8 hours  norepinephrine Infusion 0.05 MICROgram(s)/kG/Min (6.32 mL/Hr) IV Continuous <Continuous>  pantoprazole  Injectable 40 milliGRAM(s) IV Push daily  piperacillin/tazobactam IVPB.. 3.375 Gram(s) IV Intermittent every 8 hours  sodium chloride 0.9%. 1000 milliLiter(s) (75 mL/Hr) IV Continuous <Continuous>    Vital Signs Last 24 Hrs  T(C): 36.1 (21 May 2025 11:24), Max: 36.1 (20 May 2025 18:41)  T(F): 97 (21 May 2025 11:24), Max: 97 (20 May 2025 18:41)  HR: 112 (21 May 2025 16:46) (89 - 112)  BP: 97/74 (21 May 2025 13:00) (80/58 - 103/74)  BP(mean): 83 (21 May 2025 13:00) (63 - 85)  RR: 26 (21 May 2025 16:46) (16 - 33)  SpO2: 98% (21 May 2025 16:46) (98% - 100%)    Parameters below as of 21 May 2025 16:46  Patient On (Oxygen Delivery Method): nasal cannula, high flow  O2 Flow (L/min): 50  O2 Concentration (%): 75      PE:  Constitutional: NAD on high flow NC   HEENT: NC/AT, EOMI, PERRLA, conjunctivae clear; ears and nose atraumatic; pharynx benign  Neck: supple; thyroid not palpable  Back: no tenderness  Respiratory: decreased breath sounds rhonchi  Cardiovascular: S1S2 regular, no murmurs  Abdomen: soft, not tender, not distended, positive BS; liver and spleen WNL  Genitourinary: no suprapubic tenderness  Lymphatic: no LN palpable  Musculoskeletal: no muscle tenderness, no joint swelling or tenderness  Extremities: no pedal edema  Neurological/ Psychiatric:  moving all extremities  Skin: no rashes; no palpable lesions    Labs: all available labs reviewed                                   8.2    8.86  )-----------( 50       ( 21 May 2025 06:37 )             28.0     05-21    139  |  103  |  58[H]  ----------------------------<  105[H]  3.4[L]   |  26  |  2.30[H]    Ca    8.9      21 May 2025 06:37  Phos  4.4     05-21  Mg     2.6     05-21    TPro  4.7[L]  /  Alb  1.7[L]  /  TBili  0.3  /  DBili  x   /  AST  13[L]  /  ALT  9[L]  /  AlkPhos  92  05-21                Urinalysis Basic - ( 20 May 2025 06:27 )    Color: x / Appearance: x / SG: x / pH: x  Gluc: 137 mg/dL / Ketone: x  / Bili: x / Urobili: x   Blood: x / Protein: x / Nitrite: x   Leuk Esterase: x / RBC: x / WBC x   Sq Epi: x / Non Sq Epi: x / Bacteria: x    Culture - Body Fluid with Gram Stain (05.20.25 @ 14:00)   Gram Stain:   polymorphonuclear leukocytes seen   No organisms seen   by cytocentrifuge  Specimen Source: Pleural Fl Pleural Fluid  Culture Results:   No growthCulture - Urine (05.18.25 @ 20:50)   Specimen Source: Clean Catch Clean Catch (Midstream)  Culture Results:   Culture positive, 10,000 - 49,000 CFU/mL . Identification to follow.  Culture Results:   Few Pseudomonas aeruginosa (Carbapenem Resistant)   Rare Enterococcus faecalis  Organism Identification: Pseudomonas aeruginosa (Carbapenem Resistant)   Pseudomonas aeruginosa (Carbapenem Resistant)   Enterococcus faecalis      Radiology: all available radiological tests reviewed      INTERPRETATION:  Prelim: Increased bilateral pleural effusion with   atelectasis. Debris/secretion at the trachea. Nonspecific patchy   opacities scattered in both lungs, which can be seen in noninfectious and   infectious processes. Known pancreatic mass with peritoneal   carcinomatosis. Mild left hydroureteronephrosis. Official report to   follow.    CLINICAL INFORMATION:    COMPARISON: None.    CONTRAST/COMPLICATIONS:  IVContrast: NONE  Oral Contrast: NONE    < end of copied text >    Advanced directives addressed: full resuscitation

## 2025-05-21 NOTE — PROGRESS NOTE ADULT - ASSESSMENT
** not complete 69y Female w/ pmh of pancreatic adenocarcinoma of the head w/ tumor in umbilicus, s/p chemo/radiation, SCC of lip. htn, hpl, gout, gerd, CVA, recent admission to [facility]/ for septic shock and now [place] [facility] [place] of [place] c/b b/l PNA, renal failure and volume overload than discharged to [facility] 1 week ago, now presents to  for SOB, abd pain, hypotension and hypoxia.In the ED pt was given 1L of IVF and started on levo after for possible sepsis given clinical signs of volume overload and started on NIV, MALLORY w/ metabolic and lactic acidosis. Called for hypoxia and b/l effusions. 5/20 s/p Rt Thoracentesis 550cc drained     1. Acute hypoxic resp failure  2. Pleural effusion    Plan  Pleural studies P  Noted left pleural effusion - no intervention at this time  Would recommend Palliative care consult for GOC-   wean pressors  cont abx  peritoneal drain as needed  CXR in am    case d/w Dr. Jhaveri   updated  at bedside

## 2025-05-22 NOTE — PROVIDER CONTACT NOTE (OTHER) - NAME OF MD/NP/PA/DO NOTIFIED:
Dr. Oakley/Dr. Walker Bactrim Pregnancy And Lactation Text: This medication is Pregnancy Category D and is known to cause fetal risk.  It is also excreted in breast milk.

## 2025-05-22 NOTE — PROGRESS NOTE ADULT - SUBJECTIVE AND OBJECTIVE BOX
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 htn, hpl, gout, gerd, CVA, recent admission to [facility]/ for septic shock and [place] [facility] [place] c/b b/l PNA, renal failure and volume overload than discharged to [facility] 1 week ago, now presents to  for SOB, abd pain, hypotension and hypoxia. In the ED pt was given 1L of IVF and started on levo after for possible sepsis given clinical signs of volume overload and started on NIV, MALLORY w/ metabolic and lactic acidosis. Critical Consulted for admission pt seen and examined at bedside w/  dr. couch at bedside we discussed currently ongoing events and pt is full code per her wish.   (18 May 2025 12:17)    Nephrology:  Pt familiar to me  Above reviewed  pt as is admitted w hypotension, having reent admission for sepsis at [facility] hospital  She is on HF oxygen, dyspneic but NAD  receiving bicarb drip  Mild gradual elevation in creat noted    5/20  Pt asking for thoracentesis  states diff breathing    5/21  In bed more comfortable s/p thoracentesis  has abd drain as well, w high output and minimal UO     5/22  No New changes  High output from drains  minimal uo  Creat trending up        Nephrology      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 1000 milliLiter(s) (125 mL/Hr) IV Continuous <Continuous>  midodrine 10 milliGRAM(s) Oral every 8 hours  norepinephrine Infusion 0.05 MICROgram(s)/kG/Min (6.32 mL/Hr) IV Continuous <Continuous>  pantoprazole  Injectable 40 milliGRAM(s) IV Push daily  piperacillin/tazobactam IVPB.. 3.375 Gram(s) IV Intermittent every 8 hours    MEDICATIONS  (PRN):  loperamide Liquid 4 milliGRAM(s) Oral every 6 hours PRN Diarrhea  LORazepam   Injectable 0.5 milliGRAM(s) IV Push every 6 hours PRN anxiety back spasm      Allergies    opioid-like analgesics (Vomiting; Nausea)    Intolerances        I&O's Detail    21 May 2025 07:01  -  22 May 2025 07:00  --------------------------------------------------------  IN:    Albumin 5%  - 250 mL: 250 mL    IV PiggyBack: 200 mL    IV PiggyBack: 200 mL    Norepinephrine: 38.4 mL    Oral Fluid: 100 mL    sodium chloride 0.9%: 900 mL  Total IN: 1688.4 mL    OUT:    Drain (mL): 900 mL    Drain (mL): 1050 mL    Indwelling Catheter - Urethral (mL): 350 mL  Total OUT: 2300 mL    Total NET: -611.6 mL        Vital Signs Last 24 Hrs  T(C): 36.7 (22 May 2025 05:53), Max: 37 (22 May 2025 00:47)  T(F): 98 (22 May 2025 05:53), Max: 98.6 (22 May 2025 00:47)  HR: 117 (22 May 2025 09:00) (103 - 132)  BP: 96/67 (22 May 2025 09:00) (82/66 - 107/77)  BP(mean): 75 (22 May 2025 09:00) (73 - 89)  RR: 22 (22 May 2025 09:00) (18 - 34)  SpO2: 100% (22 May 2025 09:00) (98% - 100%)    Parameters below as of 22 May 2025 06:53  Patient On (Oxygen Delivery Method): nasal cannula, high flow  O2 Flow (L/min): 50  O2 Concentration (%): 50        PHYSICAL EXAM:    General:  Frail, less dyspneic  alert can answer questions    Neuro:  Alert and oriented to person, place,     HEENT:  No JVD, no masses, Eyes anicteric, ,    Cardiovascular:  Regular rate and rhythm, with normal S1 and S2.    Lungs:  clear. no rales, no wheezing anteriorly    Abdomen:  ascitis, mild distention  Skin:  Warm, dry    Extremities:  warm,  no cyanosis  ++ edema    LABS:                            8.8    8.66  )-----------( 46       ( 22 May 2025 06:45 )             31.0                           8.2    8.86  )-----------( 50       ( 21 May 2025 06:37 )             28.0                             7.7    7.74  )-----------( 61       ( 20 May 2025 06:27 )             26.3                           7.6    7.34  )-----------( 89       ( 19 May 2025 06:13 )             26.3     05-22    140  |  109[H]  |  60[H]  ----------------------------<  117[H]  3.5   |  18[L]  |  2.48[H]    Ca    8.8      22 May 2025 05:55  Phos  4.4     05-21  Mg     2.6     05-21    TPro  4.8[L]  /  Alb  1.7[L]  /  TBili  0.2  /  DBili  x   /  AST  14[L]  /  ALT  11[L]  /  AlkPhos  105  05-22 05-21    139  |  103  |  58[H]  ----------------------------<  105[H]  3.4[L]   |  26  |  2.30[H]    Ca    8.9      21 May 2025 06:37  Phos  4.4     05-21  Mg     2.6     05-21    TPro  4.7[L]  /  Alb  1.7[L]  /  TBili  0.3  /  DBili  x   /  AST  13[L]  /  ALT  9[L]  /  AlkPhos  92  05-21 05-20    139  |  106  |  56[H]  ----------------------------<  137[H]  3.8   |  21[L]  |  2.07[H]    Ca    9.0      20 May 2025 06:27  Phos  4.0     05-20  Mg     2.8     05-20    TPro  5.2[L]  /  Alb  2.2[L]  /  TBili  0.3  /  DBili  x   /  AST  17  /  ALT  8[L]  /  AlkPhos  74  05-20 05-19    144  |  109[H]  |  55[H]  ----------------------------<  151[H]  3.3[L]   |  21[L]  |  1.94[H]    Ca    9.3      19 May 2025 06:13  Phos  4.4     05-19  Mg     1.7     05-19    TPro  4.8[L]  /  Alb  2.0[L]  /  TBili  0.3  /  DBili  x   /  AST  22  /  ALT  11[L]  /  AlkPhos  57  05-19    PT/INR - ( 18 May 2025 10:48 )   PT: 10.0 sec;   INR: 0.85 ratio         PTT - ( 18 May 2025 10:48 )  PTT:19.2 sec  Urinalysis Basic - ( 19 May 2025 06:13 )    Color: x / Appearance: x / SG: x / pH: x  Gluc: 151 mg/dL / Ketone: x  / Bili: x / Urobili: x   Blood: x / Protein: x / Nitrite: x   Leuk Esterase: x / RBC: x / WBC x   Sq Epi: x / Non Sq Epi: x / Bacteria: x      Magnesium: 1.7 mg/dL (05-19 @ 06:13)  Phosphorus: 4.4 mg/dL (05-19 @ 06:13)  Magnesium: 1.8 mg/dL (05-18 @ 20:30)  Phosphorus: 4.9 mg/dL (05-18 @ 20:30)  Magnesium: 2.1 mg/dL (05-18 @ 10:48)    ABG - ( 18 May 2025 21:59 )  pH, Arterial: 7.34  pH, Blood: x     /  pCO2: 27    /  pO2: 112   / HCO3: 15    / Base Excess: -9.6  /  SaO2: 99

## 2025-05-22 NOTE — PROGRESS NOTE ADULT - ASSESSMENT
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 htn, hpl, gout, gerd, CVA, recent admission to [facility]/ for septic shock and [place] [facility] [place] c/b b/l PNA, renal failure and volume overload than discharged to [facility] 1 week ago, now presents to  for SOB, abd pain, hypotension and hypoxia. In the ED pt was given 1L of IVF and started on levo after for possible sepsis given clinical signs of volume overload and started on NIV, MALLORY w/ metabolic and lactic acidosis. Critical Consulted for admission pt seen and examined at bedside w/  dr. couch at bedside we discussed currently ongoing events and pt is full code per her wish.   (18 May 2025 12:17)    Nephrology:  Pt familiar to me  Above reviewed  pt as is admitted w hypotension, having reent admission for sepsis at [facility] hospital  She is on HF oxygen, dyspneic but NAD  receiving bicarb drip    A/P  Pancreatic Adeno Ca  Metastatic  s/p chemo/ radiation  h/o MALLORY/ ATN  Hypotension s/p IVF, on pressors  Mild gradual elevation in creat noted  Metabolic acidosis on bicarb drip  Hypokalemia replace w IV KCl as needed  exacerbated by Bicarb drip  - monitor Magnesium level  Anemia- transfusion as per ICU     5/20  Metastatic Pancreatic Adeno Ca  s/p chemo/ radiation  MALLORY/ ATN creat still trending up  Hypotension s/p IVF, on pressors  Metabolic acidosis on bicarb drip  Hypokalemia replace w IV KCl as needed  exacerbated by Bicarb drip  - monitor Magnesium level  Anemia- transfusion as per ICU   D/w Dr Fortune/ and Dr Couch    5/21  Metastatic Pancreatic Adeno Ca  s/p chemo/ radiation  MALLORY/ ATN creat still trending up,  - mallory due to high output from pts drains and thoracentesis, ~ 1500 ml, vs  ml  Hypotension s/p IVF, on pressors  Metabolic acidosis on bicarb drip  Hypokalemia replace w IV KCl as needed  exacerbated by Bicarb drip  - monitor Magnesium level  Anemia- transfusion as per ICU   Dr Fortune plans on albumin infusion  D/w Dr Fortune/ and Dr Couch    5/22  Metastatic Pancreatic Adeno Ca  s/p chemo/ radiation  MALLORY/ ATN creat still trending up, high output from drains and minimal UO  hyperchloremic on NS  Will switch to LR to correct the hyperchloremia/ low K   - labs this evening 6 pm  Anemia- transfusion as per ICU   Dr Fortune plans on albumin infusion  D/w Dr Fortune/ and Dr Couch

## 2025-05-22 NOTE — PROGRESS NOTE ADULT - ASSESSMENT
Assessment   Patient with metastatic pancreatic center    1) Septic shock   2) Type 1 RF  3) Multi focal pna   4) pleural effusions   5) MALLORY    Plan     Type 1 RF / PNA / Pleural effusions   - Continue HFNC. Actively titrating for FIO2 for SPO2 >92%  - s/p drainage of right effusion, now with left, will discuss with Thoracic, possible drainage of left   Possible Septic shock - On IV Levophed. vs. hypotension from fluid loses from ascites ca  - Continue 5% albumin   - TTE with preserved BiV function, no significant valvulopathy   - IV zosyn  - Culture data is negative   - ID following   MALLORY - Worsening MALLORY  in setting of sepsis and on going fluid loses , will give fluids back  - Albumin ATC as well as mIVF  Thrombocytopenia, hold hepain

## 2025-05-22 NOTE — PROGRESS NOTE ADULT - SUBJECTIVE AND OBJECTIVE BOX
Subjective:  Pt in bed On highflow, deconditioned and lethargic     T(C): 36.7 (05-22-25 @ 05:53), Max: 37 (05-22-25 @ 00:47)  HR: 117 (05-22-25 @ 09:00) (105 - 132)  BP: 96/67 (05-22-25 @ 09:00) (82/66 - 107/77)  ABP: --  ABP(mean): --  RR: 22 (05-22-25 @ 09:00) (18 - 34)  SpO2: 100% (05-22-25 @ 09:00) (98% - 100%) 50% Highflow   Wt(kg): --  CVP(mm Hg): --  CO: --  CI: --  PA: --                                              Tele: SR             05-22    140  |  109[H]  |  60[H]  ----------------------------<  117[H]  3.5   |  18[L]  |  2.48[H]    Ca    8.8      22 May 2025 05:55  Phos  4.4     05-21  Mg     2.6     05-21    TPro  4.8[L]  /  Alb  1.7[L]  /  TBili  0.2  /  DBili  x   /  AST  14[L]  /  ALT  11[L]  /  AlkPhos  105  05-22                               8.8    8.66  )-----------( 46       ( 22 May 2025 06:45 )             31.0                 CAPILLARY BLOOD GLUCOSE               CXR: worsening Right pleural effusion     exam   Neuro:  lethargic, deconditioned   Pulm: decreased at bases   CV: RRR   Abd: distended + paracentesis drain   Extremities: anasarca              Assessment:  69yFemale    with 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

## 2025-05-22 NOTE — PROGRESS NOTE ADULT - SUBJECTIVE AND OBJECTIVE BOX
Interval History:       Patient weak, lethargic dyspneic       remains on high flow       urine output 350 /24 hours       cxr improved eration onright, large effusion on left       Levophed titrated off this am       poor PO intake  HPI:    Mrs. 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 HTN, hpl, gout, GERD, CVA, recent admission to [facility]/ for septic shock, then  [place] at [facility] [place] 10 day course of radiation c/b b/l PNA, renal failure and volume overload, discharged to [facility] 1 week ago, now presents to  on 5/18 for SOB, abd pain, hypotension and hypoxia.   S/p 1L of IVF in ED w/ signs of volume overload, started on levophed and NIPPV. Found to have MALLORY w/ severe metabolic acidosis,   ct abdomen and pelvix shows carcinomatosis  ABG on admission 7.04/ on admission was on bicardonate drip now off bicarbonate   on empiric Zosyn no positive culture  has abdominal drain draining daily    ROS cant obtain due to profound weakness      Review of Systems:  Unable to obtain due to: Severe illness/injury     MEDICATIONS  (STANDING):  albumin human  5% IVPB 250 milliLiter(s) IV Intermittent every 12 hours  chlorhexidine 2% Cloths 1 Application(s) Topical <User Schedule>  heparin   Injectable 5000 Unit(s) SubCutaneous every 8 hours  lactated ringers. 1000 milliLiter(s) (125 mL/Hr) IV Continuous <Continuous>  midodrine 10 milliGRAM(s) Oral every 8 hours  norepinephrine Infusion 0.05 MICROgram(s)/kG/Min (6.32 mL/Hr) IV Continuous <Continuous>  pantoprazole  Injectable 40 milliGRAM(s) IV Push daily  piperacillin/tazobactam IVPB.. 3.375 Gram(s) IV Intermittent every 8 hours    MEDICATIONS  (PRN):  loperamide Liquid 4 milliGRAM(s) Oral every 6 hours PRN Diarrhea  LORazepam   Injectable 0.5 milliGRAM(s) IV Push every 6 hours PRN anxiety back spasm    ICU Vital Signs Last 24 Hrs  T(C): 36.7 (22 May 2025 05:53), Max: 37 (22 May 2025 00:47)  T(F): 98 (22 May 2025 05:53), Max: 98.6 (22 May 2025 00:47)  HR: 117 (22 May 2025 09:00) (105 - 132)  BP: 96/67 (22 May 2025 09:00) (83/72 - 107/77)  BP(mean): 75 (22 May 2025 09:00) (73 - 89)  ABP: --  ABP(mean): --  RR: 22 (22 May 2025 09:00) (18 - 34)  SpO2: 100% (22 May 2025 09:00) (98% - 100%)    O2 Parameters below as of 22 May 2025 06:53  Patient On (Oxygen Delivery Method): nasal cannula, high flow  O2 Flow (L/min): 50  O2 Concentration (%): 50    I&O's Summary    21 May 2025 07:01  -  22 May 2025 07:00  --------------------------------------------------------  IN: 1688.4 mL / OUT: 2300 mL / NET: -611.6 mL      Physical Exam    General frail, cachetic  HEENT nc/at, high flow on  Neck supple  lungs dec bs on left, dyspneic  cv rrr  abdomen - soft, distended, drain in place   coelho  extremtieies edematous                        8.8    8.66  )-----------( 46       ( 22 May 2025 06:45 )             31.0       05-22    140  |  109[H]  |  60[H]  ----------------------------<  117[H]  3.5   |  18[L]  |  2.48[H]    Ca    8.8      22 May 2025 05:55  Phos  4.4     05-21  Mg     2.6     05-21    TPro  4.8[L]  /  Alb  1.7[L]  /  TBili  0.2  /  DBili  x   /  AST  14[L]  /  ALT  11[L]  /  AlkPhos  105  05-22    ABG - ( 20 May 2025 23:35 )  pH, Arterial: 7.37  pH, Blood: x     /  pCO2: 45    /  pO2: 200   / HCO3: 26    / Base Excess: 0.3   /  SaO2: 99        Urinalysis Basic - ( 22 May 2025 05:55 )    Color: x / Appearance: x / SG: x / pH: x  Gluc: 117 mg/dL / Ketone: x  / Bili: x / Urobili: x   Blood: x / Protein: x / Nitrite: x   Leuk Esterase: x / RBC: x / WBC x   Sq Epi: x / Non Sq Epi: x / Bacteria: x    DVT Prophylaxis:  Held for thrombocytopenia                                                                Advanced Directives: Full COde     Sepsis Criteria Met - No    Labs, Notes, Orders, radiologic studies reviewed and care coordinated with multidisciplinary team

## 2025-05-22 NOTE — PROGRESS NOTE ADULT - SUBJECTIVE AND OBJECTIVE BOX
Date of service: 05-22-25 @ 12:53    pt seen and examined  afebrile  on high flow NC 50%   respiratory function frail    ROS: no fever or chills; denies dizziness, no HA, + SOB no abdominal pain, no diarrhea or constipation; no dysuria, no urinary frequency, no legs pain, no rashes    MEDICATIONS  (STANDING):  albumin human  5% IVPB 250 milliLiter(s) IV Intermittent every 12 hours  chlorhexidine 2% Cloths 1 Application(s) Topical <User Schedule>  heparin   Injectable 5000 Unit(s) SubCutaneous every 8 hours  lactated ringers. 1000 milliLiter(s) (125 mL/Hr) IV Continuous <Continuous>  midodrine 10 milliGRAM(s) Oral every 8 hours  norepinephrine Infusion 0.05 MICROgram(s)/kG/Min (6.32 mL/Hr) IV Continuous <Continuous>  pantoprazole  Injectable 40 milliGRAM(s) IV Push daily  piperacillin/tazobactam IVPB.. 3.375 Gram(s) IV Intermittent every 8 hours    Vital Signs Last 24 Hrs  T(C): 36.3 (22 May 2025 12:38), Max: 37 (22 May 2025 00:47)  T(F): 97.3 (22 May 2025 12:38), Max: 98.6 (22 May 2025 00:47)  HR: 117 (22 May 2025 09:00) (105 - 132)  BP: 96/67 (22 May 2025 09:00) (83/72 - 107/77)  BP(mean): 75 (22 May 2025 09:00) (73 - 89)  RR: 22 (22 May 2025 09:00) (18 - 34)  SpO2: 100% (22 May 2025 09:00) (98% - 100%)    Parameters below as of 22 May 2025 06:53  Patient On (Oxygen Delivery Method): nasal cannula, high flow  O2 Flow (L/min): 50  O2 Concentration (%): 50      PE:  Constitutional: NAD on high flow NC   HEENT: NC/AT, EOMI, PERRLA, conjunctivae clear; ears and nose atraumatic; pharynx benign  Neck: supple; thyroid not palpable  Back: no tenderness  Respiratory: decreased breath sounds rhonchi  Cardiovascular: S1S2 regular, no murmurs  Abdomen: soft, not tender, not distended, positive BS; liver and spleen WNL  Genitourinary: no suprapubic tenderness  Lymphatic: no LN palpable  Musculoskeletal: no muscle tenderness, no joint swelling or tenderness  Extremities: no pedal edema  Neurological/ Psychiatric:  moving all extremities  Skin: no rashes; no palpable lesions    Labs: all available labs reviewed                                              8.8    8.66  )-----------( 46       ( 22 May 2025 06:45 )             31.0     05-22    140  |  109[H]  |  60[H]  ----------------------------<  117[H]  3.5   |  18[L]  |  2.48[H]    Ca    8.8      22 May 2025 05:55  Phos  4.4     05-21  Mg     2.6     05-21    TPro  4.8[L]  /  Alb  1.7[L]  /  TBili  0.2  /  DBili  x   /  AST  14[L]  /  ALT  11[L]  /  AlkPhos  105  05-22          Urinalysis Basic - ( 20 May 2025 06:27 )    Color: x / Appearance: x / SG: x / pH: x  Gluc: 137 mg/dL / Ketone: x  / Bili: x / Urobili: x   Blood: x / Protein: x / Nitrite: x   Leuk Esterase: x / RBC: x / WBC x   Sq Epi: x / Non Sq Epi: x / Bacteria: x    Culture - Body Fluid with Gram Stain (05.20.25 @ 14:00)   Gram Stain:   polymorphonuclear leukocytes seen   No organisms seen   by cytocentrifuge  Specimen Source: Pleural Fl Pleural Fluid  Culture Results:   No growthCulture - Urine (05.18.25 @ 20:50)   Specimen Source: Clean Catch Clean Catch (Midstream)  Culture Results:   Culture positive, 10,000 - 49,000 CFU/mL . Identification to follow.  Culture Results:   Few Pseudomonas aeruginosa (Carbapenem Resistant)   Rare Enterococcus faecalis  Organism Identification: Pseudomonas aeruginosa (Carbapenem Resistant)   Pseudomonas aeruginosa (Carbapenem Resistant)   Enterococcus faecalis      Radiology: all available radiological tests reviewed      INTERPRETATION:  Prelim: Increased bilateral pleural effusion with   atelectasis. Debris/secretion at the trachea. Nonspecific patchy   opacities scattered in both lungs, which can be seen in noninfectious and   infectious processes. Known pancreatic mass with peritoneal   carcinomatosis. Mild left hydroureteronephrosis. Official report to   follow.    CLINICAL INFORMATION:    COMPARISON: None.    CONTRAST/COMPLICATIONS:  IVContrast: NONE  Oral Contrast: NONE    < end of copied text >    Advanced directives addressed: full resuscitation

## 2025-05-22 NOTE — CHART NOTE - NSCHARTNOTEFT_GEN_A_CORE
: Homar Shields PA-C    INDICATION: respiratory failure     PROCEDURE:  [ ] LIMITED ECHO  [ X ] LIMITED CHEST  [ ] LIMITED RETROPERITONEAL  [ ] LIMITED ABDOMINAL  [ ] LIMITED DVT  [ ] NEEDLE GUIDANCE VASCULAR  [ ] NEEDLE GUIDANCE THORACENTESIS  [ ] NEEDLE GUIDANCE PARACENTESIS  [ ] NEEDLE GUIDANCE PERICARDIOCENTESIS  [ ] OTHER    FINDINGS:    small L pleural effusion        INTERPRETATION:     L pleural effusion      **STUDIES STORED ON QPATH**

## 2025-05-22 NOTE — PROGRESS NOTE ADULT - SUBJECTIVE AND OBJECTIVE BOX
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 3.375 Gram(s) IV Intermittent every 8 hours    norepinephrine Infusion 0.05 MICROgram(s)/kG/Min IV Continuous <Continuous>      LORazepam   Injectable 0.5 milliGRAM(s) IV Push every 6 hours PRN      heparin   Injectable 5000 Unit(s) SubCutaneous every 8 hours    loperamide Liquid 4 milliGRAM(s) Oral every 6 hours PRN  pantoprazole  Injectable 40 milliGRAM(s) IV Push daily        albumin human  5% IVPB 250 milliLiter(s) IV Intermittent every 12 hours  sodium chloride 0.9%. 1000 milliLiter(s) IV Continuous <Continuous>      chlorhexidine 2% Cloths 1 Application(s) Topical <User Schedule>            ICU Vital Signs Last 24 Hrs  T(C): 37 (22 May 2025 00:47), Max: 37 (22 May 2025 00:47)  T(F): 98.6 (22 May 2025 00:47), Max: 98.6 (22 May 2025 00:47)  HR: 118 (22 May 2025 00:43) (96 - 120)  BP: 95/74 (21 May 2025 21:00) (80/58 - 107/77)  BP(mean): 80 (21 May 2025 21:00) (63 - 89)  ABP: --  ABP(mean): --  RR: 29 (21 May 2025 21:00) (16 - 31)  SpO2: 100% (22 May 2025 00:43) (98% - 100%)    O2 Parameters below as of 21 May 2025 20:29  Patient On (Oxygen Delivery Method): nasal cannula, high flow  O2 Flow (L/min): 50  O2 Concentration (%): 75        ABG - ( 20 May 2025 23:35 )  pH, Arterial: 7.37  pH, Blood: x     /  pCO2: 45    /  pO2: 200   / HCO3: 26    / Base Excess: 0.3   /  SaO2: 99                  I&O's Detail    20 May 2025 07:01  -  21 May 2025 07:00  --------------------------------------------------------  IN:    Norepinephrine: 60 mL    Sodium Bicarbonate: 1275 mL  Total IN: 1335 mL    OUT:    Drain (mL): 900 mL    Drain (mL): 550 mL    Indwelling Catheter - Urethral (mL): 100 mL  Total OUT: 1550 mL    Total NET: -215 mL      21 May 2025 07:01  -  22 May 2025 02:07  --------------------------------------------------------  IN:    IV PiggyBack: 200 mL    Oral Fluid: 100 mL  Total IN: 300 mL    OUT:    Drain (mL): 900 mL    Indwelling Catheter - Urethral (mL): 125 mL  Total OUT: 1025 mL    Total NET: -725 mL            LABS:                        8.2    8.86  )-----------( 50       ( 21 May 2025 06:37 )             28.0     05-21    139  |  103  |  58[H]  ----------------------------<  105[H]  3.4[L]   |  26  |  2.30[H]    Ca    8.9      21 May 2025 06:37  Phos  4.4     05-21  Mg     2.6     05-21    TPro  4.7[L]  /  Alb  1.7[L]  /  TBili  0.3  /  DBili  x   /  AST  13[L]  /  ALT  9[L]  /  AlkPhos  92  05-21          CAPILLARY BLOOD GLUCOSE          Urinalysis Basic - ( 21 May 2025 06:37 )    Color: x / Appearance: x / SG: x / pH: x  Gluc: 105 mg/dL / Ketone: x  / Bili: x / Urobili: x   Blood: x / Protein: x / Nitrite: x   Leuk Esterase: x / RBC: x / WBC x   Sq Epi: x / Non Sq Epi: x / Bacteria: x      CULTURES:  Culture Results:   No growth (05-20 @ 14:00)  Culture Results:   Culture is being performed. Fungal cultures are held for 4 weeks. (05-20 @ 14:00)  Culture Results:   10,000 - 49,000 CFU/mL Candida albicans  "Susceptibilities not performed" (05-18 @ 20:50)  Culture Results:   Few Pseudomonas aeruginosa (Carbapenem Resistant)  Rare Enterococcus faecalis (05-18 @ 18:45)  Culture Results:   No growth at 72 Hours (05-18 @ 10:46)      Physical Examination:    General: No acute distress.      HEENT: Pupils equal, reactive to light.  Symmetric.    PULM: Clear to auscultation bilaterally, no significant sputum production    NECK: Supple, no lymphadenopathy, trachea midline    CVS: Regular rate and rhythm, no murmurs, rubs, or gallops    ABD: Soft, nondistended, nontender, normoactive bowel sounds, no masses    EXT: No edema, nontender    SKIN: Warm and well perfused, no rashes noted.    NEURO: Alert, oriented, interactive, nonfocal      RADIOLOGY: ***       Date of entry of this note is equal to the date of services rendered    Patient is a 69y old  Female who presents with a chief complaint of sob (21 May 2025 13:52)      Events last 24 hours: Patient remains dependent on HFNC, high fio2 requirements. 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 3.375 Gram(s) IV Intermittent every 8 hours    norepinephrine Infusion 0.05 MICROgram(s)/kG/Min IV Continuous <Continuous>      LORazepam   Injectable 0.5 milliGRAM(s) IV Push every 6 hours PRN      heparin   Injectable 5000 Unit(s) SubCutaneous every 8 hours    loperamide Liquid 4 milliGRAM(s) Oral every 6 hours PRN  pantoprazole  Injectable 40 milliGRAM(s) IV Push daily        albumin human  5% IVPB 250 milliLiter(s) IV Intermittent every 12 hours  sodium chloride 0.9%. 1000 milliLiter(s) IV Continuous <Continuous>      chlorhexidine 2% Cloths 1 Application(s) Topical <User Schedule>            ICU Vital Signs Last 24 Hrs  T(C): 37 (22 May 2025 00:47), Max: 37 (22 May 2025 00:47)  T(F): 98.6 (22 May 2025 00:47), Max: 98.6 (22 May 2025 00:47)  HR: 118 (22 May 2025 00:43) (96 - 120)  BP: 95/74 (21 May 2025 21:00) (80/58 - 107/77)  BP(mean): 80 (21 May 2025 21:00) (63 - 89)  ABP: --  ABP(mean): --  RR: 29 (21 May 2025 21:00) (16 - 31)  SpO2: 100% (22 May 2025 00:43) (98% - 100%)    O2 Parameters below as of 21 May 2025 20:29  Patient On (Oxygen Delivery Method): nasal cannula, high flow  O2 Flow (L/min): 50  O2 Concentration (%): 75        ABG - ( 20 May 2025 23:35 )  pH, Arterial: 7.37  pH, Blood: x     /  pCO2: 45    /  pO2: 200   / HCO3: 26    / Base Excess: 0.3   /  SaO2: 99                  I&O's Detail    20 May 2025 07:01  -  21 May 2025 07:00  --------------------------------------------------------  IN:    Norepinephrine: 60 mL    Sodium Bicarbonate: 1275 mL  Total IN: 1335 mL    OUT:    Drain (mL): 900 mL    Drain (mL): 550 mL    Indwelling Catheter - Urethral (mL): 100 mL  Total OUT: 1550 mL    Total NET: -215 mL      21 May 2025 07:01  -  22 May 2025 02:07  --------------------------------------------------------  IN:    IV PiggyBack: 200 mL    Oral Fluid: 100 mL  Total IN: 300 mL    OUT:    Drain (mL): 900 mL    Indwelling Catheter - Urethral (mL): 125 mL  Total OUT: 1025 mL    Total NET: -725 mL            LABS:                        8.2    8.86  )-----------( 50       ( 21 May 2025 06:37 )             28.0     05-21    139  |  103  |  58[H]  ----------------------------<  105[H]  3.4[L]   |  26  |  2.30[H]    Ca    8.9      21 May 2025 06:37  Phos  4.4     05-21  Mg     2.6     05-21    TPro  4.7[L]  /  Alb  1.7[L]  /  TBili  0.3  /  DBili  x   /  AST  13[L]  /  ALT  9[L]  /  AlkPhos  92  05-21          CAPILLARY BLOOD GLUCOSE          Urinalysis Basic - ( 21 May 2025 06:37 )    Color: x / Appearance: x / SG: x / pH: x  Gluc: 105 mg/dL / Ketone: x  / Bili: x / Urobili: x   Blood: x / Protein: x / Nitrite: x   Leuk Esterase: x / RBC: x / WBC x   Sq Epi: x / Non Sq Epi: x / Bacteria: x      CULTURES:  Culture Results:   No growth (05-20 @ 14:00)  Culture Results:   Culture is being performed. Fungal cultures are held for 4 weeks. (05-20 @ 14:00)  Culture Results:   10,000 - 49,000 CFU/mL Candida albicans  "Susceptibilities not performed" (05-18 @ 20:50)  Culture Results:   Few Pseudomonas aeruginosa (Carbapenem Resistant)  Rare Enterococcus faecalis (05-18 @ 18:45)  Culture Results:   No growth at 72 Hours (05-18 @ 10:46)

## 2025-05-22 NOTE — PROGRESS NOTE ADULT - ASSESSMENT
Assessment     1) Septic shock   2) Type 1 RF  3) Multi focal pna   4) pleural effusions  Assessment     1) Septic shock   2) Type 1 RF  3) Multi focal pna   4) pleural effusions   5) MALLORY    Plan     Type 1 RF / PNA / Pleural effusions     - Continue HFNC. Actively titrating for FIO2 for SPO2 >92%  - ABG reviewed, mildly impaired oxygenation. Adequate ventilation   - s/p thora with Thoracic   - Culture data negative thus far    Septic shock     - On IV Levophed. Activley titrating for MAP >65  - Start midodrine  - Continue 5% albumin   - TTE with preserved BiV function, no significant valvulopathy   - IV zosyn  - Culture data is negative   - ID following     MALLORY    - Worsening MALLORY  in setting of sepsis and on going fluid loses   - Continue IV vasopressors   - Albumin ATC as well as mIVF  - Nephrology following     PPX    - IV PPI  - Chemical dvt ppx with SQH. Monitor with thrombocytopenia   - High risk for development of DVT        __41__ minutes of critical care time spent providing medical care for patient's acute illness/conditions that impairs at least one vital organ system and/or poses a high risk of imminent or life threatening deterioration in the patient's condition. It includes time spent evaluating and treating the patient's acute illness as well as time spent reviewing labs, radiology, discussing goals of care with patient and/or patient's family, and discussing the case with a multidisciplinary team in an effort to prevent further life threatening deterioration or end organ damage. This time is independent of any procedures performed.

## 2025-05-22 NOTE — PROGRESS NOTE ADULT - ASSESSMENT
Annbrandon is a 68 y/o F w/ PMHx of pancreatic adenocarcinoma of the head w/ tumor in umbilicus, on chemo/radiation, SCC of lip. HTN, hpl, gout, GERD, CVA, recent hospitalizations for septic shock, now admitted with acute hypoxic respiratory failure, MALLORY, metabolic acidosis, and septic shock.    PROBLEMS:    Acute hypoxic resp failure  Bilateral pleural effusions with compressive atelectasis, right greater than left-s/p left thoro-transudative with high neutrophils  Septic shock/Multifocal pneumonia  MALLORY  H/O PE     PLAN:    Titrate Fio2-HFNC 50/50-d/w renal & intensivitis  IV Zosyn  Levophed, actively titrating to maintain MAP >65 [place] sepsis, on abx,   Acute hypoxic respiratory failure, requiring HFNC, bilateral effusions on ct, thoracentesis by Thoracic today, also with signfiicant effusions  MALLORY, may be ATN in the setting of Sepsis (POA)  D/W INTENSIVITIS & DR CHAHAL

## 2025-05-22 NOTE — PROGRESS NOTE ADULT - ASSESSMENT
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 htn, hpl, gout, gerd, CVA, recent admission to [facility]/ for septic shock and now recently tx at [facility] [place] for 10 day course of radiation c/b b/l PNA, renal failure and volume overload than discharged to [facility] 1 week ago, now presents to  for SOB, abd pain, hypotension and hypoxia. In the ED pt was given 1L of IVF and started on levo after for possible sepsis given clinical signs of volume overload and started on NIV, MALLORY w/ metabolic and lactic acidosis. Imaging shows Increased bilateral pleural effusion with atelectasis. Debris/secretion at the trachea. Nonspecific patchy opacities scattered in both lungs, which can be seen in noninfectious and infectious processes. Known pancreatic mass with peritoneal carcinomatosis. Mild left hydroureteronephrosis. Started on empiric IV abx - zosyn.     Acute respiratory failure. Septic shock. Pleural effusions. Multifocal pneumonia. Metastatic pancreatic cancer. Immunocompromised host  - imaging reviewed  - CTS f/u noted s/p thoracentesis 550 cc drained transudative fluid f/u cx no growth  - peritoneal drain in place   - on IV zosyn [id number] #4   - continue with IV antibiotic coverage  - if able check sputum cx f/u cultures   - if clinical worsening, can broaden abx coverage to meropenem [id number]  - continue with antibiotic coverage  - monitor temps  - tolerating abx well so far; no side effects noted  - reason for abx use and side effects reviewed with patient  - supportive care  - fu cbc    Concern for infection with multi-resistant organisms was raised. Prior cultures reviewed. An epidemiologic assessment was performed. There is a significant risk for resistant microorganisms to spread to family members, and/or healthcare staff. The patient will be placed on isolation according to infection control policy. Will reconsider isolation measures based on new culture results and other clinical data as appropriate. Appropriate cultures collected and an appropriate broad spectrum antibiotic therapy will be considered.    Clinical team may change from intravenous to oral antibiotics when the following criteria are met:   1. Patient is clinically improving/stable       a)	Improved signs and symptoms of infection from initial presentation       b)	Afebrile for 24 hours       c)	Leukocytosis trending towards normal range   2. Patient is tolerating oral intake   3. Initial/repeat blood cultures are negative     When above criteria met - may change iv antibiotics to: po abx regimen

## 2025-05-22 NOTE — PROGRESS NOTE ADULT - ASSESSMENT
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 htn, hpl, gout, gerd, CVA, recent admission to [facility]/ for septic shock and now recently tx at [facility] [place] for 10 day course of radiation c/b b/l PNA, renal failure and volume overload than discharged to [facility] 1 week ago, now presents to  for SOB, abd pain, hypotension and hypoxia.In the ED pt was given 1L of IVF and started on levo after for possible sepsis given clinical signs of volume overload and started on NIV, MALLORY w/ metabolic and lactic acidosis. Called for hypoxia and b/l effusions. 5/20 s/p Rt Thoracentesis 550cc drained     1. Acute hypoxic resp failure  2. Pleural effusion    Plan  Pleural studies Cyto P, Exudative   Noted left pleural effusion -will speak to  about GOC   Would recommend Palliative care consult for GOC-   wean pressors  cont abx  peritoneal drain as needed  CXR in am    case d/w Dr. Jhaveri

## 2025-05-22 NOTE — PROGRESS NOTE ADULT - SUBJECTIVE AND OBJECTIVE BOX
Subjective:    pat on HFNC 50/50 sat 96%, tachpneic, poor urine outpaut, renal going to give IV fluids.    Home Medications:  acetaminophen 325 mg oral tablet: 2 tab(s) orally every 6 hours As needed Temp greater or equal to 38C (100.4F), Mild Pain (1 - 3) (18 May 2025 16:55)  aspirin 81 mg oral tablet, chewable: 1 tab(s) orally once a day (18 May 2025 16:55)  atorvastatin 40 mg oral tablet: 1 tab(s) orally once a day (at bedtime) (18 May 2025 16:55)  bisacodyl 10 mg rectal suppository: 1 suppository(ies) rectally once a day as needed for  constipation (18 May 2025 17:04)  calcium (as carbonate) 500 mg oral tablet: 2 tab(s) orally every 8 hours as needed for  indigestion or heartburn (18 May 2025 17:04)  famotidine 20 mg oral tablet: 1 tab(s) orally once a day (18 May 2025 17:04)  ipratropium 500 mcg/2.5 mL inhalation solution: 2.5 milliliter(s) by nebulizer every 6 hours as needed for  shortness of breath and/or wheezing (18 May 2025 17:04)  levalbuterol 0.63 mg/3 mL inhalation solution: 3 milliliter(s) by nebulizer every 6 hours as needed for  shortness of breath and/or wheezing (18 May 2025 17:04)  lidocaine 4% topical film: Apply topically to affected area every 12 hours for pain (18 May 2025 17:04)  loperamide 2 mg oral capsule: 2 cap(s) orally as needed for  diarrhea (18 May 2025 17:04)  metoprolol succinate 25 mg oral tablet, extended release: 1 tab(s) orally once a day (18 May 2025 17:04)  omeprazole 20 mg oral tablet, disintegrating, delayed release: 2 tab(s) enteral once a day (CRUSH tablets and mix in 10mL apple juice) (18 May 2025 17:04)  ondansetron 8 mg oral tablet: 1 tab(s) orally every 8 hours as needed for  nausea (18 May 2025 17:04)  potassium chloride 20 mEq oral tablet, extended release: 1 tab(s) orally once a day (18 May 2025 17:04)  Santyl 250 units/g topical ointment: Apply to sacrum topically every day shift for pressure injury, cleanse sacrum with NS, then apply Santyl and cover with DCD (18 May 2025 17:04)  sodium bicarbonate 650 mg oral tablet: 3 tab(s) orally 3 times a day for acidosis (18 May 2025 17:04)  traMADol 25 mg oral tablet: 1 tab(s) orally every 12 hours as needed for moderate to severe pain (18 May 2025 17:04)    MEDICATIONS  (STANDING):  albumin human  5% IVPB 250 milliLiter(s) IV Intermittent every 12 hours  chlorhexidine 2% Cloths 1 Application(s) Topical <User Schedule>  heparin   Injectable 5000 Unit(s) SubCutaneous every 8 hours  lactated ringers Bolus 250 milliLiter(s) IV Bolus once  lactated ringers. 1000 milliLiter(s) (125 mL/Hr) IV Continuous <Continuous>  loperamide 2 milliGRAM(s) Oral daily  midodrine 10 milliGRAM(s) Oral every 8 hours  norepinephrine Infusion 0.05 MICROgram(s)/kG/Min (6.32 mL/Hr) IV Continuous <Continuous>  pantoprazole  Injectable 40 milliGRAM(s) IV Push daily  piperacillin/tazobactam IVPB.. 3.375 Gram(s) IV Intermittent every 8 hours    MEDICATIONS  (PRN):  loperamide Liquid 4 milliGRAM(s) Oral every 6 hours PRN Diarrhea      Allergies    opioid-like analgesics (Vomiting; Nausea)    Intolerances        Vital Signs Last 24 Hrs  T(C): 36.3 (22 May 2025 16:22), Max: 37 (22 May 2025 00:47)  T(F): 97.4 (22 May 2025 16:22), Max: 98.6 (22 May 2025 00:47)  HR: 113 (22 May 2025 17:00) (105 - 132)  BP: 90/61 (22 May 2025 17:00) (83/72 - 109/73)  BP(mean): 71 (22 May 2025 17:00) (71 - 86)  RR: 21 (22 May 2025 17:00) (18 - 34)  SpO2: 100% (22 May 2025 15:00) (98% - 100%)    Parameters below as of 22 May 2025 06:53  Patient On (Oxygen Delivery Method): nasal cannula, high flow  O2 Flow (L/min): 50  O2 Concentration (%): 50      PHYSICAL EXAMINATION:    NECK:  Supple. No lymphadenopathy. Jugular venous pressure not elevated. Carotids equal.   HEART:   The cardiac impulse has a normal quality. Reg., Nl S1 and S2.  There are no murmurs, rubs or gallops noted  CHEST:  Chest crackles to auscultation. Normal respiratory effort.  ABDOMEN:  Soft and nontender.   EXTREMITIES:  There is no edema.       LABS:                        8.8    8.66  )-----------( 46       ( 22 May 2025 06:45 )             31.0     05-22    140  |  109[H]  |  60[H]  ----------------------------<  117[H]  3.5   |  18[L]  |  2.48[H]    Ca    8.8      22 May 2025 05:55  Phos  4.4     05-21  Mg     2.6     05-21    TPro  4.8[L]  /  Alb  1.7[L]  /  TBili  0.2  /  DBili  x   /  AST  14[L]  /  ALT  11[L]  /  AlkPhos  105  05-22      Urinalysis Basic - ( 22 May 2025 05:55 )    Color: x / Appearance: x / SG: x / pH: x  Gluc: 117 mg/dL / Ketone: x  / Bili: x / Urobili: x   Blood: x / Protein: x / Nitrite: x   Leuk Esterase: x / RBC: x / WBC x   Sq Epi: x / Non Sq Epi: x / Bacteria: x

## 2025-05-23 NOTE — PROGRESS NOTE ADULT - SUBJECTIVE AND OBJECTIVE BOX
Subjective:    pat on HFNC 35/35, sitting in bed, tachpneic    Home Medications:  acetaminophen 325 mg oral tablet: 2 tab(s) orally every 6 hours As needed Temp greater or equal to 38C (100.4F), Mild Pain (1 - 3) (18 May 2025 16:55)  aspirin 81 mg oral tablet, chewable: 1 tab(s) orally once a day (18 May 2025 16:55)  atorvastatin 40 mg oral tablet: 1 tab(s) orally once a day (at bedtime) (18 May 2025 16:55)  bisacodyl 10 mg rectal suppository: 1 suppository(ies) rectally once a day as needed for  constipation (18 May 2025 17:04)  calcium (as carbonate) 500 mg oral tablet: 2 tab(s) orally every 8 hours as needed for  indigestion or heartburn (18 May 2025 17:04)  famotidine 20 mg oral tablet: 1 tab(s) orally once a day (18 May 2025 17:04)  ipratropium 500 mcg/2.5 mL inhalation solution: 2.5 milliliter(s) by nebulizer every 6 hours as needed for  shortness of breath and/or wheezing (18 May 2025 17:04)  levalbuterol 0.63 mg/3 mL inhalation solution: 3 milliliter(s) by nebulizer every 6 hours as needed for  shortness of breath and/or wheezing (18 May 2025 17:04)  lidocaine 4% topical film: Apply topically to affected area every 12 hours for pain (18 May 2025 17:04)  loperamide 2 mg oral capsule: 2 cap(s) orally as needed for  diarrhea (18 May 2025 17:04)  metoprolol succinate 25 mg oral tablet, extended release: 1 tab(s) orally once a day (18 May 2025 17:04)  omeprazole 20 mg oral tablet, disintegrating, delayed release: 2 tab(s) enteral once a day (CRUSH tablets and mix in 10mL apple juice) (18 May 2025 17:04)  ondansetron 8 mg oral tablet: 1 tab(s) orally every 8 hours as needed for  nausea (18 May 2025 17:04)  potassium chloride 20 mEq oral tablet, extended release: 1 tab(s) orally once a day (18 May 2025 17:04)  Santyl 250 units/g topical ointment: Apply to sacrum topically every day shift for pressure injury, cleanse sacrum with NS, then apply Santyl and cover with DCD (18 May 2025 17:04)  sodium bicarbonate 650 mg oral tablet: 3 tab(s) orally 3 times a day for acidosis (18 May 2025 17:04)  traMADol 25 mg oral tablet: 1 tab(s) orally every 12 hours as needed for moderate to severe pain (18 May 2025 17:04)    MEDICATIONS  (STANDING):  albumin human  5% IVPB 250 milliLiter(s) IV Intermittent every 8 hours  chlorhexidine 2% Cloths 1 Application(s) Topical <User Schedule>  lactated ringers. 1000 milliLiter(s) (75 mL/Hr) IV Continuous <Continuous>  loperamide 2 milliGRAM(s) Oral daily  meropenem Injectable 500 milliGRAM(s) IV Push every 12 hours  midodrine 10 milliGRAM(s) Oral every 8 hours  norepinephrine Infusion 0.05 MICROgram(s)/kG/Min (6.32 mL/Hr) IV Continuous <Continuous>  pantoprazole  Injectable 40 milliGRAM(s) IV Push daily    MEDICATIONS  (PRN):  loperamide Liquid 4 milliGRAM(s) Oral every 6 hours PRN Diarrhea      Allergies    opioid-like analgesics (Vomiting; Nausea)    Intolerances        Vital Signs Last 24 Hrs  T(C): 36.2 (23 May 2025 08:25), Max: 36.3 (22 May 2025 16:22)  T(F): 97.2 (23 May 2025 08:25), Max: 97.4 (22 May 2025 16:22)  HR: 105 (23 May 2025 13:30) (94 - 129)  BP: 107/77 (23 May 2025 13:30) (51/23 - 124/82)  BP(mean): 87 (23 May 2025 13:30) (29 - 111)  RR: 21 (23 May 2025 13:30) (14 - 34)  SpO2: 100% (23 May 2025 13:30) (86% - 100%)    Parameters below as of 23 May 2025 10:36  Patient On (Oxygen Delivery Method): nasal cannula  O2 Flow (L/min): 4        PHYSICAL EXAMINATION:    NECK:  Supple. No lymphadenopathy. Jugular venous pressure not elevated. Carotids equal.   HEART:   The cardiac impulse has a normal quality. Reg., Nl S1 and S2.  There are no murmurs, rubs or gallops noted  CHEST:  Chest crackles to auscultation. Normal respiratory effort.  ABDOMEN:  Soft and nontender.   EXTREMITIES:  There is no edema.       LABS:                        7.4    4.93  )-----------( 29       ( 23 May 2025 04:51 )             25.7     05-23    143  |  107  |  64[H]  ----------------------------<  111[H]  2.9[LL]   |  23  |  2.39[H]    Ca    9.1      23 May 2025 04:51  Phos  4.8     05-23  Mg     2.2     05-23    TPro  x   /  Alb  2.0[L]  /  TBili  x   /  DBili  x   /  AST  x   /  ALT  x   /  AlkPhos  x   05-22      Urinalysis Basic - ( 23 May 2025 04:51 )    Color: x / Appearance: x / SG: x / pH: x  Gluc: 111 mg/dL / Ketone: x  / Bili: x / Urobili: x   Blood: x / Protein: x / Nitrite: x   Leuk Esterase: x / RBC: x / WBC x   Sq Epi: x / Non Sq Epi: x / Bacteria: x

## 2025-05-23 NOTE — PROGRESS NOTE ADULT - SUBJECTIVE AND OBJECTIVE BOX
Interval History:        Patient on high flow on 40/40        poor PO intake        creatinine slighlty impvoed 2.4 this am but only 100 cc of urine overnight        cxr shows right effusion starting to recur, persistent left effusion        now thrombocytopenia        received fluid yesterday to try to help iwth MALLORY        remains on low dose Levophed    HPI:        Mrs. 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 HTN, hpl, gout, GERD, CVA, recent admission to [facility]/ for septic shock, then  tx at [facility] [place] for 10 day course of radiation c/b b/l PNA, renal failure and volume overload, discharged to [facility] 1 week ago, now presents to  on 5/18 for SOB, abd pain, hypotension and hypoxia.   ct abdomen and pelvix shows carcinomatosis  was on bicarbonate  on empiric Zosyn no positive culture possible infitrates on ct  has abdominal drain was not drained yeterday due to renal failure    ROS cant obtain due to profound weakness  no specific complaints     MEDICATIONS  (STANDING):  albumin human  5% IVPB 250 milliLiter(s) IV Intermittent every 12 hours  chlorhexidine 2% Cloths 1 Application(s) Topical <User Schedule>  lactated ringers. 1000 milliLiter(s) (75 mL/Hr) IV Continuous <Continuous>  loperamide 2 milliGRAM(s) Oral daily  midodrine 10 milliGRAM(s) Oral every 8 hours  norepinephrine Infusion 0.05 MICROgram(s)/kG/Min (6.32 mL/Hr) IV Continuous <Continuous>  pantoprazole  Injectable 40 milliGRAM(s) IV Push daily  piperacillin/tazobactam IVPB.. 3.375 Gram(s) IV Intermittent every 8 hours  potassium chloride  10 mEq/100 mL IVPB 10 milliEquivalent(s) IV Intermittent every 1 hour    MEDICATIONS  (PRN):  loperamide Liquid 4 milliGRAM(s) Oral every 6 hours PRN Diarrhea    ICU Vital Signs Last 24 Hrs  T(C): 36 (23 May 2025 05:27), Max: 36.3 (22 May 2025 12:38)  T(F): 96.8 (23 May 2025 05:27), Max: 97.4 (22 May 2025 16:22)  HR: 109 (23 May 2025 08:00) (94 - 129)  BP: 124/82 (23 May 2025 08:00) (51/23 - 124/82)  BP(mean): 94 (23 May 2025 08:00) (29 - 100)  ABP: --  ABP(mean): --  RR: 21 (23 May 2025 08:00) (14 - 34)  SpO2: 100% (23 May 2025 08:00) (86% - 100%)    O2 Parameters below as of 23 May 2025 06:36  Patient On (Oxygen Delivery Method): nasal cannula, high flow  O2 Flow (L/min): 40  O2 Concentration (%): 40    I&O's Summary    22 May 2025 07:01  -  23 May 2025 07:00  --------------------------------------------------------  IN: 4286 mL / OUT: 100 mL / NET: 4186 mL    Physical Exam    General - profoundly weak,  cachetic  HEENT - nc/at high flow on  neck supple  lungs - dec bs bilateral  cv - rrr  Abdomen - soft, distended, drain in place  extremeities - edamatour  neuro profoundly weak.                       7.4    4.93  )-----------( 29       ( 23 May 2025 04:51 )             25.7       05-23    143  |  107  |  64[H]  ----------------------------<  111[H]  2.9[LL]   |  23  |  2.39[H]    Ca    9.1      23 May 2025 04:51  Phos  4.8     05-23  Mg     2.2     05-23    TPro  x   /  Alb  2.0[L]  /  TBili  x   /  DBili  x   /  AST  x   /  ALT  x   /  AlkPhos  x   05-22    ABG - ( 23 May 2025 06:20 )  pH, Arterial: 7.35  pH, Blood: x     /  pCO2: 40    /  pO2: 114   / HCO3: 22    / Base Excess: -3.3  /  SaO2: 99        Urinalysis Basic - ( 23 May 2025 04:51 )    Color: x / Appearance: x / SG: x / pH: x  Gluc: 111 mg/dL / Ketone: x  / Bili: x / Urobili: x   Blood: x / Protein: x / Nitrite: x   Leuk Esterase: x / RBC: x / WBC x   Sq Epi: x / Non Sq Epi: x / Bacteria: x    I personally reviewed the CXR: bilateral effusions    DVT Prophylaxis: Held for thrombocytopenia                                                                Advanced Directives: Full Code     Sepsis Criteria Met - No    Labs, Notes, Orders, radiologic studies reviewed and care coordinated with multidisciplinary team

## 2025-05-23 NOTE — CONSULT NOTE ADULT - SUBJECTIVE AND OBJECTIVE BOX
Reason for consult:    HPI:  70 y/o F w/ pmh of pancreatic adenocarcinoma of the head w/ tumor in umbilicus, on chemo/radiation, SCC of lip. htn, hpl, gout, gerd, CVA, recent admission to [facility] for septic shock and now recently tx at [facility] [place] for 10 day course of radiation c/b b/l PNA, renal failure and volume overload than discharged to [facility] 1 week ago, now presents to  for SOB, abd pain, hypotension and hypoxia. In the ED pt was given 1L of IVF and started on levo after for possible sepsis given clinical signs of volume overload and started on NIV, MALLORY w/ metabolic and lactic acidosis. Critical Consulted for admission pt seen and examined at bedside w/  dr. couch at bedside we discussed currently ongoing events and pt is full code per her wish.   Hematology/oncology consulted for further recs.      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      GERD (gastroesophageal reflux disease)      Cyclical vomiting syndrome      CVA (cerebrovascular accident)      H/O: hysterectomy      H/O squamous cell carcinoma excision          FAMILY HISTORY:  No pertinent family history in first degree relatives        Alochol: Denied  Smoking: Nonsmoker  Drug Use: Denied  Marital Status:         Allergies    opioid-like analgesics (Vomiting; Nausea)    Intolerances        MEDICATIONS  (STANDING):  albumin human  5% IVPB 250 milliLiter(s) IV Intermittent every 8 hours  chlorhexidine 2% Cloths 1 Application(s) Topical <User Schedule>  lactated ringers. 1000 milliLiter(s) (75 mL/Hr) IV Continuous <Continuous>  loperamide 2 milliGRAM(s) Oral daily  meropenem Injectable 500 milliGRAM(s) IV Push every 12 hours  midodrine 10 milliGRAM(s) Oral every 8 hours  norepinephrine Infusion 0.05 MICROgram(s)/kG/Min (6.32 mL/Hr) IV Continuous <Continuous>  pantoprazole  Injectable 40 milliGRAM(s) IV Push daily    MEDICATIONS  (PRN):  loperamide Liquid 4 milliGRAM(s) Oral every 6 hours PRN Diarrhea      ROS  unable to obtain    T(C): 36.2 (05-23-25 @ 08:25), Max: 36.3 (05-22-25 @ 16:22)  HR: 108 (05-23-25 @ 12:30) (94 - 129)  BP: 102/73 (05-23-25 @ 12:00) (51/23 - 124/82)  RR: 17 (05-23-25 @ 12:30) (14 - 34)  SpO2: 100% (05-23-25 @ 12:00) (86% - 100%)  Wt(kg): --    PE   General - cachectic, weak, on high flow resting        Abdomen - soft, distended, drain in place  extremeities - b/l edema noted  FROM                          7.4    4.93  )-----------( 29       ( 23 May 2025 04:51 )             25.7       05-23    143  |  107  |  64[H]  ----------------------------<  111[H]  2.9[LL]   |  23  |  2.39[H]    Ca    9.1      23 May 2025 04:51  Phos  4.8     05-23  Mg     2.2     05-23    TPro  x   /  Alb  2.0[L]  /  TBili  x   /  DBili  x   /  AST  x   /  ALT  x   /  AlkPhos  x   05-22

## 2025-05-23 NOTE — PROGRESS NOTE ADULT - ASSESSMENT
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 htn, hpl, gout, gerd, CVA, recent admission to [facility] for septic shock and now recently tx at [facility] [place] for 10 day course of radiation c/b b/l PNA, renal failure and volume overload than discharged to [facility] 1 week ago, now presents to  for SOB, abd pain, hypotension and hypoxia.In the ED pt was given 1L of IVF and started on levo after for possible sepsis given clinical signs of volume overload and started on NIV, MALLORY w/ metabolic and lactic acidosis. Called for hypoxia and b/l effusions. 5/20 s/p Rt Thoracentesis 550cc drained       Plan  Pleural studies Cyto malignancy , Exudative   b/l pleural effusions    ongoing Palliative care conversations for GOC-   wean pressors  cont abx  peritoneal drain as needed  Prognosis poor   CXR in am    DW Thoracic team in am rounds  69y Female w/ pmh of pancreatic adenocarcinoma of the head w/ tumor in umbilicus, s/p chemo/radiation, SCC of lip. htn, hpl, gout, gerd, CVA, recent admission to [facility] for septic shock and now recently tx at [facility] [place] for 10 day course of radiation c/b b/l PNA, renal failure and volume overload than discharged to [facility] 1 week ago, now presents to  for SOB, abd pain, hypotension and hypoxia.In the ED pt was given 1L of IVF and started on levo after for possible sepsis given clinical signs of volume overload and started on NIV, MALLORY w/ metabolic and lactic acidosis. Called for hypoxia and b/l effusions. 5/20 s/p Rt Thoracentesis 550cc drained       Plan  Pleural studies Cyto malignancy , Exudative   b/l pleural effusions    ongoing Palliative care conversations for GOC-   wean pressors  cont abx  peritoneal drain as needed  Prognosis poor   CXR in am    unable to offer any thoracic procedures - platelets 29   DW Thoracic team in am rounds

## 2025-05-23 NOTE — PROGRESS NOTE ADULT - ASSESSMENT
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 htn, hpl, gout, gerd, CVA, recent [facility] for [place] and now [place] [facility] of [place] c/b b/l PNA, renal failure and volume overload than discharged to [facility] 1 week ago, now presents to  for SOB, abd pain, hypotension and hypoxia. In the ED pt was given 1L of IVF and started on levo after for possible sepsis given clinical signs of volume overload and started on NIV, MALLORY w/ metabolic and lactic acidosis. Critical Consulted for admission pt seen and examined at bedside w/  dr. couch at bedside we discussed currently ongoing events and pt is full code per her wish.   (18 May 2025 12:17)    Nephrology:  Pt familiar to me  Above reviewed  pt as is admitted w hypotension, having reent admission for sepsis at [facility]  She is on HF oxygen, dyspneic but NAD  receiving bicarb drip    A/P  Pancreatic Adeno Ca  Metastatic  s/p chemo/ radiation  h/o MALLORY/ ATN  Hypotension s/p IVF, on pressors  Mild gradual elevation in creat noted  Metabolic acidosis on bicarb drip  Hypokalemia replace w IV KCl as needed  exacerbated by Bicarb drip  - monitor Magnesium level  Anemia- transfusion as per ICU     5/20  Metastatic Pancreatic Adeno Ca  s/p chemo/ radiation  MALLORY/ ATN creat still trending up  Hypotension s/p IVF, on pressors  Metabolic acidosis on bicarb drip  Hypokalemia replace w IV KCl as needed  exacerbated by Bicarb drip  - monitor Magnesium level  Anemia- transfusion as per ICU   D/w Dr Fortune/ and Dr Couch    5/21  Metastatic Pancreatic Adeno Ca  s/p chemo/ radiation  MALLORY/ ATN creat still trending up,  - mallory due to high output from pts drains and thoracentesis, ~ 1500 ml, vs  ml  Hypotension s/p IVF, on pressors  Metabolic acidosis on bicarb drip  Hypokalemia replace w IV KCl as needed  exacerbated by Bicarb drip  - monitor Magnesium level  Anemia- transfusion as per ICU   Dr Fortune plans on albumin infusion  D/w Dr Fortune/ and Dr Couch    5/22  Metastatic Pancreatic Adeno Ca  s/p chemo/ radiation  MALLORY/ ATN creat still trending up, high output from drains and minimal UO  hyperchloremic on NS  Will switch to LR to correct the hyperchloremia/ low K   - labs this evening 6 pm  Anemia- transfusion as per ICU   Dr Fortune plans on albumin infusion  D/w Dr Fortune/ and Dr Couch    5/22  Metastatic Pancreatic Adeno Ca  s/p chemo/ radiation  MALLORY/ ATN creat still trending up, high output from drains and minimal UO  hyperchloremic on NS  iv switched to LR @ 125 ml/hr and increased to 150 ml/hr  pt also got IV boluses  , uo only 100 ml  over 24 hrs   Anemia- transfusion as per ICU   Dr Fortune plans on albumin infusion  D/w Dr Fortune/ and Dr Couch       70 y/o F w/ pmh of pancreatic adenocarcinoma of the head w/ tumor in umbilicus, on chemo/radiation, SCC of lip. htn, hpl, gout, gerd, CVA, recent admission to [facility]/ for septic shock and now recently tx at [facility] [place] for 10 day course of radiation c/b b/l PNA, renal failure and volume overload than discharged to [facility] 1 week ago, now presents to  for SOB, abd pain, hypotension and hypoxia. In the ED pt was given 1L of IVF and started on levo after for possible sepsis given clinical signs of volume overload and started on NIV, MALLORY w/ metabolic and lactic acidosis. Critical Consulted for admission pt seen and examined at bedside w/  dr. couch at bedside we discussed currently ongoing events and pt is full code per her wish.   (18 May 2025 12:17)    Nephrology:  Pt familiar to me  Above reviewed  pt as is admitted w hypotension, having reent admission for sepsis at [facility] hospital  She is on HF oxygen, dyspneic but NAD  receiving bicarb drip    A/P  Pancreatic Adeno Ca  Metastatic  s/p chemo/ radiation  h/o MALLORY/ ATN  Hypotension s/p IVF, on pressors  Mild gradual elevation in creat noted  Metabolic acidosis on bicarb drip  Hypokalemia replace w IV KCl as needed  exacerbated by Bicarb drip  - monitor Magnesium level  Anemia- transfusion as per ICU     5/20  Metastatic Pancreatic Adeno Ca  s/p chemo/ radiation  MALLORY/ ATN creat still trending up  Hypotension s/p IVF, on pressors  Metabolic acidosis on bicarb drip  Hypokalemia replace w IV KCl as needed  exacerbated by Bicarb drip  - monitor Magnesium level  Anemia- transfusion as per ICU   D/w Dr Fortune/ and Dr Couch    5/21  Metastatic Pancreatic Adeno Ca  s/p chemo/ radiation  MALLORY/ ATN creat still trending up,  - mallory due to high output from pts drains and thoracentesis, ~ 1500 ml, vs  ml  Hypotension s/p IVF, on pressors  Metabolic acidosis on bicarb drip  Hypokalemia replace w IV KCl as needed  exacerbated by Bicarb drip  - monitor Magnesium level  Anemia- transfusion as per ICU   Dr Fortune plans on albumin infusion  D/w Dr Fortune/ and Dr Couch    5/22  Metastatic Pancreatic Adeno Ca  s/p chemo/ radiation  MALLORY/ ATN creat still trending up, high output from drains and minimal UO  hyperchloremic on NS  Will switch to LR to correct the hyperchloremia/ low K   - labs this evening 6 pm  Anemia- transfusion as per ICU   Dr Fortune plans on albumin infusion  D/w Dr Fortune/ and Dr Couch    5/22  Metastatic Pancreatic Adeno Ca  s/p chemo/ radiation  MALLORY/ ATN creat still trending up, high output from drains and minimal UO  hyperchloremic on NS  iv switched to LR @ 125 ml/hr and increased to 150 ml/hr  pt also got IV boluses  , uo only 100 ml  over 24 hrs   Anemia- transfusion as per ICU   Dr Fortune plans on albumin infusion  D/w Dr Fortune/ and Dr Couch    Addendum  Will reduce IVF  CXR personally reviewed may be more PVC today

## 2025-05-23 NOTE — PROGRESS NOTE ADULT - ASSESSMENT
Annbrandon is a 68 y/o F w/ PMHx of pancreatic adenocarcinoma of the head w/ tumor in umbilicus, on chemo/radiation, SCC of lip. HTN, hpl, gout, GERD, CVA, recent hospitalizations for septic shock, now admitted with acute hypoxic respiratory failure, MALLORY, metabolic acidosis, and septic shock.    PROBLEMS:    Acute hypoxic resp failure  Bilateral pleural effusions with compressive atelectasis, right greater than left-s/p left thoro-transudative with high neutrophils  Septic shock/Multifocal pneumonia  MALLORY  H/O PE     PLAN:    Titrate Fio2-HFNC 35/35-d/w renal & intensivitis  IV Zosyn  Levophed, actively titrating to maintain MAP >65 [place] sepsis, on abx,   Acute hypoxic respiratory failure, requiring HFNC, bilateral effusions on ct, thoracentesis by Thoracic today, also with signfiicant effusions  MALLORY, may be ATN in the setting of Sepsis (POA)  D/W INTENSIVITIS & DR CHAHAL

## 2025-05-23 NOTE — PROGRESS NOTE ADULT - SUBJECTIVE AND OBJECTIVE BOX
Date of entry of this note is equal to the date of services rendered    Patient is a 69y old  Female who presents with a chief complaint of Pancreatic ca, respiratory failure (22 May 2025 12:16)      Events last 24 hours: Patient obtunded, elevated PCO2. Started on AVAPS. 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 3.375 Gram(s) IV Intermittent every 8 hours    midodrine 10 milliGRAM(s) Oral every 8 hours  norepinephrine Infusion 0.05 MICROgram(s)/kG/Min IV Continuous <Continuous>          heparin   Injectable 5000 Unit(s) SubCutaneous every 8 hours    loperamide 2 milliGRAM(s) Oral daily  loperamide Liquid 4 milliGRAM(s) Oral every 6 hours PRN  pantoprazole  Injectable 40 milliGRAM(s) IV Push daily        albumin human  5% IVPB 250 milliLiter(s) IV Intermittent every 12 hours  lactated ringers. 1000 milliLiter(s) IV Continuous <Continuous>      chlorhexidine 2% Cloths 1 Application(s) Topical <User Schedule>            ICU Vital Signs Last 24 Hrs  T(C): 36.1 (23 May 2025 00:16), Max: 36.7 (22 May 2025 05:53)  T(F): 96.9 (23 May 2025 00:16), Max: 98 (22 May 2025 05:53)  HR: 100 (23 May 2025 00:30) (94 - 126)  BP: 98/75 (23 May 2025 00:30) (51/23 - 110/77)  BP(mean): 83 (23 May 2025 00:30) (29 - 94)  ABP: --  ABP(mean): --  RR: 26 (23 May 2025 00:30) (14 - 34)  SpO2: 97% (22 May 2025 23:00) (87% - 100%)    O2 Parameters below as of 22 May 2025 23:00  Patient On (Oxygen Delivery Method): BiPAP/CPAP            ABG - ( 22 May 2025 21:28 )  pH, Arterial: 7.22  pH, Blood: x     /  pCO2: 58    /  pO2: 96    / HCO3: 24    / Base Excess: -4.8  /  SaO2: 99                  I&O's Detail    21 May 2025 07:01  -  22 May 2025 07:00  --------------------------------------------------------  IN:    Albumin 5%  - 250 mL: 250 mL    IV PiggyBack: 200 mL    IV PiggyBack: 200 mL    Norepinephrine: 38.4 mL    Oral Fluid: 100 mL    sodium chloride 0.9%: 900 mL  Total IN: 1688.4 mL    OUT:    Drain (mL): 900 mL    Drain (mL): 1050 mL    Indwelling Catheter - Urethral (mL): 350 mL  Total OUT: 2300 mL    Total NET: -611.6 mL      22 May 2025 07:01  -  23 May 2025 01:03  --------------------------------------------------------  IN:    Albumin 5%  - 250 mL: 250 mL    IV PiggyBack: 100 mL    Lactated Ringers: 1250 mL    Lactated Ringers Bolus: 500 mL    Norepinephrine: 40 mL  Total IN: 2140 mL    OUT:  Total OUT: 0 mL    Total NET: 2140 mL            LABS:                        8.8    8.66  )-----------( 46       ( 22 May 2025 06:45 )             31.0     05-22    142  |  108  |  62[H]  ----------------------------<  123[H]  3.1[L]   |  21[L]  |  2.47[H]    Ca    8.7      22 May 2025 18:10  Phos  4.9     05-22  Mg     2.6     05-21    TPro  x   /  Alb  2.0[L]  /  TBili  x   /  DBili  x   /  AST  x   /  ALT  x   /  AlkPhos  x   05-22          CAPILLARY BLOOD GLUCOSE          Urinalysis Basic - ( 22 May 2025 18:10 )    Color: x / Appearance: x / SG: x / pH: x  Gluc: 123 mg/dL / Ketone: x  / Bili: x / Urobili: x   Blood: x / Protein: x / Nitrite: x   Leuk Esterase: x / RBC: x / WBC x   Sq Epi: x / Non Sq Epi: x / Bacteria: x      CULTURES:  Culture Results:   No growth (05-20 @ 14:00)  Culture Results:   Culture is being performed. Fungal cultures are held for 4 weeks. (05-20 @ 14:00)  Culture Results:   10,000 - 49,000 CFU/mL Candida albicans  "Susceptibilities not performed" (05-18 @ 20:50)  Culture Results:   Few Pseudomonas aeruginosa (Carbapenem Resistant)  Rare Enterococcus faecalis (05-18 @ 18:45)  Culture Results:   No growth at 4 days (05-18 @ 10:46)      Physical Examination:    General: chronically ill    HEENT: Pupils equal, reactive to light.  Symmetric.    PULM: Clear to auscultation bilaterally, hypoventilating     NECK: Supple, no lymphadenopathy, trachea midline    CVS: Regular rate and rhythm, no murmurs, rubs, or gallops    ABD: Soft, distended     EXT: anasarca     SKIN: Warm     NEURO: obtunded

## 2025-05-23 NOTE — PROGRESS NOTE ADULT - ASSESSMENT
Assessment     1) Septic shock   2) Type 1&2 RF  3) Multi focal pna   4) pleural effusions   5) MALLORY    Plan     Type 1 and 2 RF / PNA / Pleural effusions     - On HFNC. Actively titrating for FIO2 for SPO2 >92%. She became progressively obtunded. ABG showing hypercapnic RF. Started on BiPAP, Low MV. Start AVAPS. MV improved  - ABG reviewed, mildly impaired oxygenation. Adequate ventilation   - s/p thora with Thoracic   - Culture data negative thus far    Septic shock     - On IV Levophed. Activley titrating for MAP >65  - Start midodrine  - Continue 5% albumin   - TTE with preserved BiV function, no significant valvulopathy   - IV zosyn  - Culture data is negative   - ID following     MALLORY    - Worsening MALLORY  in setting of sepsis and on going fluid loses   - Continue IV vasopressors   - Albumin ATC as well as mIVF  - Nephrology following     PPX    - IV PPI  - Chemical dvt ppx with SQH. PLT now <50k stop SQH  - HIT score 5. Send RODRÍGUEZ and heparin induced antibody        __41__ minutes of critical care time spent providing medical care for patient's acute illness/conditions that impairs at least one vital organ system and/or poses a high risk of imminent or life threatening deterioration in the patient's condition. It includes time spent evaluating and treating the patient's acute illness as well as time spent reviewing labs, radiology, discussing goals of care with patient and/or patient's family, and discussing the case with a multidisciplinary team in an effort to prevent further life threatening deterioration or end organ damage. This time is independent of any procedures performed.

## 2025-05-23 NOTE — PROGRESS NOTE ADULT - SUBJECTIVE AND OBJECTIVE BOX
Subjective:  Pt in bed , on NC mild tachypnea noted on levophed . Platelets 29 today. Notable worsening Rt pleural effusion      T(C): 36.4 (05-23-25 @ 17:00), Max: 36.4 (05-23-25 @ 17:00)  HR: 111 (05-23-25 @ 17:00) (94 - 129)  BP: 90/73 (05-23-25 @ 17:00) (51/23 - 124/82)  ABP: --  ABP(mean): --  RR: 27 (05-23-25 @ 17:00) (14 - 34)  SpO2: 100% (05-23-25 @ 16:00) (86% - 100%) 5  LNC   Wt(kg): --  CVP(mm Hg): --  CO: --  CI: --  PA: --                                              Tele: ST             05-23    143  |  107  |  64[H]  ----------------------------<  111[H]  2.9[LL]   |  23  |  2.39[H]    Ca    9.1      23 May 2025 04:51  Phos  4.8     05-23  Mg     2.2     05-23    TPro  x   /  Alb  2.0[L]  /  TBili  x   /  DBili  x   /  AST  x   /  ALT  x   /  AlkPhos  x   05-22                               7.4    4.93  )-----------( 29       ( 23 May 2025 04:51 )             25.7                 CAPILLARY BLOOD GLUCOSE               CXR:  < from: Xray Chest 1 View- PORTABLE-Urgent (Xray Chest 1 View- PORTABLE-Urgent .) (05.21.25 @ 09:01) >  Heart size difficult to assess. Right PICC line remains.    There is significant thickening of the peripheral left pleural area and   extending down to the left base.    There are persistent bilateral infiltrates. Lung and pleural findings as   is the chest are similar to May 28 year.    IMPRESSION: Stable findings as above.    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

## 2025-05-23 NOTE — PROGRESS NOTE ADULT - ASSESSMENT
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 htn, hpl, gout, gerd, CVA, recent admission to [facility] for septic shock and now recently tx at [facility] for 10 day course of radiation c/b b/l PNA, renal failure and volume overload than discharged to [facility] 1 week ago, now presents to  for SOB, abd pain, hypotension and hypoxia. In the ED pt was given 1L of IVF and started on levo after for possible sepsis given clinical signs of volume overload and started on NIV, MALLORY w/ metabolic and lactic acidosis. Imaging shows Increased bilateral pleural effusion with atelectasis. Debris/secretion at the trachea. Nonspecific patchy opacities scattered in both lungs, which can be seen in noninfectious and infectious processes. Known pancreatic mass with peritoneal carcinomatosis. Mild left hydroureteronephrosis. Started on empiric IV abx - zosyn.     Acute respiratory failure. Septic shock. Pleural effusions. Multifocal pneumonia. Metastatic pancreatic cancer. Immunocompromised host. MALLORY  - imaging reviewed   - CTS f/u noted s/p R thoracentesis 550 cc drained transudative fluid f/u cx no growth  - reaccumulated fluid on R lung and has L lung effusion  - peritoneal drain in place plan for more drainage  - on IV zosyn [id number] #5  - will broaden abx coverage to IV meropenem [id number]   - continue with IV antibiotic coverage  - if able check sputum cx f/u cultures   - continue with antibiotic coverage  - monitor temps  - tolerating abx well so far; no side effects noted  - reason for abx use and side effects reviewed with patient  - supportive care  - fu cbc    Concern for infection with multi-resistant organisms was raised. Prior cultures reviewed. An epidemiologic assessment was performed. There is a significant risk for resistant microorganisms to spread to family members, and/or healthcare staff. The patient will be placed on isolation according to infection control policy. Will reconsider isolation measures based on new culture results and other clinical data as appropriate. Appropriate cultures collected and an appropriate broad spectrum antibiotic therapy will be considered.    Clinical team may change from intravenous to oral antibiotics when the following criteria are met:   1. Patient is clinically improving/stable       a)	Improved signs and symptoms of infection from initial presentation       b)	Afebrile for 24 hours       c)	Leukocytosis trending towards normal range   2. Patient is tolerating oral intake   3. Initial/repeat blood cultures are negative     When above criteria met - may change iv antibiotics to: po abx regimen

## 2025-05-23 NOTE — CONSULT NOTE ADULT - ASSESSMENT
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 She has been dx with acute respiratory failure, septic shock, pleural effusions and multifocal pneumonia. 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 She has been dx with acute respiratory failure, septic shock, pleural effusions and multifocal pneumonia. Hematology/Oncology consulted given her hx of met pancreatic cancer    1) Multifocal PNA/Pleural effusions  seen by CT surgery, post thoracentesis  on IV abx  both pulm and ID following    2) Pancreatic adenocarcinoma  - as per Feb, most recently on treatment with Clinical trial with FOLFIRINOX + EMILY inhibitor at [facility] [place]  -we will obtain most recent records from [facility] [place]  -on this hospitalization with imaging revealing  Multifocal pneumonia, Small bilateral pleural effusions, Pancreatic body tail mass again visualized, Moderate to large ascites increased with peritoneal carcinomatosis and indwelling intraperitoneal catheter, and New mild to moderate left hydronephrosis  -peritoneal fluid + for met adenocarcinoma  -hold anti cancer therapy in hospital     3) Thrombocytopenia  -may be worsening from sepsis, malignancy  -check hemolytic parameters, LDH, hapto, retic, to r/o underlying hemolysis/ttp  -Heparin has been stopped    Matty Hernandez MD  [facility]  Cell: [phone]   70 y/o F w/ pmh of pancreatic adenocarcinoma of the head w/ 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 She has been dx with acute respiratory failure, septic shock, pleural effusions and multifocal pneumonia. Hematology/Oncology consulted given her hx of met pancreatic cancer    1) Multifocal PNA/Pleural effusions  seen by CT surgery, post thoracentesis  on IV abx  both pulm and ID following    2) Pancreatic adenocarcinoma  - as per Feb, most recently on treatment with Clinical trial with FOLFIRINOX + EMILY inhibitor at [facility] [place]  -we will obtain most recent records from [facility] [place]  -on this hospitalization with imaging revealing  Multifocal pneumonia, Small bilateral pleural effusions, Pancreatic body tail mass again visualized, Moderate to large ascites increased with peritoneal carcinomatosis and indwelling intraperitoneal catheter, and New mild to moderate left hydronephrosis  -peritoneal fluid + for met adenocarcinoma  -hold anti cancer therapy in hospital     3) Thrombocytopenia  -may be worsening from sepsis, malignancy  -bilirubin and LDH nml arguing against hemolysis/ttp but will order retic, hapto, ldh for am  -Heparin pf4 antibody neg  -transfuse if plt < 15 K    Matty Hernandez MD  [facility]  Cell: [phone]   68 y/o F w/ pmh of pancreatic adenocarcinoma of the head w/ 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 She has been dx with acute respiratory failure, septic shock, pleural effusions and multifocal pneumonia. Hematology/Oncology consulted given her hx of met pancreatic cancer    1) Multifocal PNA/Pleural effusions  seen by CT surgery, post thoracentesis  on IV abx  both pulm and ID following    2) Pancreatic adenocarcinoma  -  most recently on treatment with Clinical trial with FOLFIRINOX [place] EMILY [place] [facility] [place]  -she has not received chemotherapy since february but did receive recent RT [place] [facility] [place] 10 [place] prior to this admission  -on this hospitalization with imaging revealing  Multifocal pneumonia, Small bilateral pleural effusions, Pancreatic body tail mass again visualized, Moderate to large ascites increased with peritoneal carcinomatosis and indwelling intraperitoneal catheter, and New mild to moderate left hydronephrosis  -peritoneal fluid + for met adenocarcinoma  -hold anti cancer therapy in hospital     3) Thrombocytopenia  -may be worsening from sepsis, malignancy  -bilirubin and LDH nml arguing against hemolysis/ttp but will order retic, hapto, ldh for am  -Heparin pf4 antibody neg  -transfuse if plt < 15 K    Matty Hernandez MD  [facility]  Cell: [phone]

## 2025-05-23 NOTE — CONSULT NOTE ADULT - CONSULT REASON
GOC
met panc ca
Metastatic pancreatic dz, pleural effusions
acute respiratory failure  b/l pleural effusions  pneumonia
respiratory failure
MALLORY CKD hypotension

## 2025-05-23 NOTE — PROGRESS NOTE ADULT - SUBJECTIVE AND OBJECTIVE BOX
pt seen and examined  afebrile  on high flow NC 50%   respiratory function frail    ROS: no fever or chills; denies dizziness, no HA, + SOB no abdominal pain, no diarrhea or constipation; no dysuria, no urinary frequency, no legs pain, no rashes    Date of service: 05-23-25 @ 14:24    pt seen and examined  on high flow nc 40%  afebrile  weak appearing     ROS: no fever or chills; denies dizziness, no HA, no abdominal pain, no diarrhea or constipation; no dysuria, no urinary frequency, no legs pain, no rashes    MEDICATIONS  (STANDING):  albumin human  5% IVPB 250 milliLiter(s) IV Intermittent every 8 hours  chlorhexidine 2% Cloths 1 Application(s) Topical <User Schedule>  lactated ringers. 1000 milliLiter(s) (75 mL/Hr) IV Continuous <Continuous>  loperamide 2 milliGRAM(s) Oral daily  meropenem Injectable 500 milliGRAM(s) IV Push every 12 hours  midodrine 10 milliGRAM(s) Oral every 8 hours  norepinephrine Infusion 0.05 MICROgram(s)/kG/Min (6.32 mL/Hr) IV Continuous <Continuous>  pantoprazole  Injectable 40 milliGRAM(s) IV Push daily    MEDICATIONS  (PRN):  loperamide Liquid 4 milliGRAM(s) Oral every 6 hours PRN Diarrhea      Vital Signs Last 24 Hrs  T(C): 36.2 (23 May 2025 08:25), Max: 36.3 (22 May 2025 16:22)  T(F): 97.2 (23 May 2025 08:25), Max: 97.4 (22 May 2025 16:22)  HR: 105 (23 May 2025 13:30) (94 - 129)  BP: 107/77 (23 May 2025 13:30) (51/23 - 124/82)  BP(mean): 87 (23 May 2025 13:30) (29 - 111)  RR: 21 (23 May 2025 13:30) (14 - 34)  SpO2: 100% (23 May 2025 13:30) (86% - 100%)    Parameters below as of 23 May 2025 10:36  Patient On (Oxygen Delivery Method): nasal cannula  O2 Flow (L/min): 4      PE:  Constitutional: NAD on high flow NC   HEENT: NC/AT, EOMI, PERRLA, conjunctivae clear; ears and nose atraumatic; pharynx benign  Neck: supple; thyroid not palpable  Back: no tenderness  Respiratory: decreased breath sounds rhonchi  Cardiovascular: S1S2 regular, no murmurs  Abdomen: soft, not tender, not distended, positive BS; liver and spleen WNL  Genitourinary: no suprapubic tenderness  Lymphatic: no LN palpable  Musculoskeletal: no muscle tenderness, no joint swelling or tenderness  Extremities: no pedal edema  Neurological/ Psychiatric:  moving all extremities  Skin: no rashes; no palpable lesions    Labs: all available labs reviewed                                              7.4    4.93  )-----------( 29       ( 23 May 2025 04:51 )             25.7     05-23    143  |  107  |  64[H]  ----------------------------<  111[H]  2.9[LL]   |  23  |  2.39[H]    Ca    9.1      23 May 2025 04:51  Phos  4.8     05-23  Mg     2.2     05-23    TPro  x   /  Alb  2.0[L]  /  TBili  x   /  DBili  x   /  AST  x   /  ALT  x   /  AlkPhos  x   05-22              Urinalysis Basic - ( 20 May 2025 06:27 )    Color: x / Appearance: x / SG: x / pH: x  Gluc: 137 mg/dL / Ketone: x  / Bili: x / Urobili: x   Blood: x / Protein: x / Nitrite: x   Leuk Esterase: x / RBC: x / WBC x   Sq Epi: x / Non Sq Epi: x / Bacteria: x    Culture - Body Fluid with Gram Stain (05.20.25 @ 14:00)   Gram Stain:   polymorphonuclear leukocytes seen   No organisms seen   by cytocentrifuge  Specimen Source: Pleural Fl Pleural Fluid  Culture Results:   No growthCulture - Urine (05.18.25 @ 20:50)   Specimen Source: Clean Catch Clean Catch (Midstream)  Culture Results:   Culture positive, 10,000 - 49,000 CFU/mL . Identification to follow.  Culture Results:   Few Pseudomonas aeruginosa (Carbapenem Resistant)   Rare Enterococcus faecalis  Organism Identification: Pseudomonas aeruginosa (Carbapenem Resistant)   Pseudomonas aeruginosa (Carbapenem Resistant)   Enterococcus faecalis      Radiology: all available radiological tests reviewed      INTERPRETATION:  Prelim: Increased bilateral pleural effusion with   atelectasis. Debris/secretion at the trachea. Nonspecific patchy   opacities scattered in both lungs, which can be seen in noninfectious and   infectious processes. Known pancreatic mass with peritoneal   carcinomatosis. Mild left hydroureteronephrosis. Official report to   follow.    CLINICAL INFORMATION:    COMPARISON: None.    CONTRAST/COMPLICATIONS:  IVContrast: NONE  Oral Contrast: NONE    < end of copied text >    Advanced directives addressed: full resuscitation Date of service: 05-23-25 @ 14:24    pt seen and examined  afebrile  on high flow NC   no o/n events  weak appearing     ROS: no fever or chills; denies dizziness, no HA, + SOB no abdominal pain, no diarrhea or constipation; no dysuria, no urinary frequency, no legs pain, no rashes    MEDICATIONS  (STANDING):  albumin human  5% IVPB 250 milliLiter(s) IV Intermittent every 8 hours  chlorhexidine 2% Cloths 1 Application(s) Topical <User Schedule>  lactated ringers. 1000 milliLiter(s) (75 mL/Hr) IV Continuous <Continuous>  loperamide 2 milliGRAM(s) Oral daily  meropenem Injectable 500 milliGRAM(s) IV Push every 12 hours  midodrine 10 milliGRAM(s) Oral every 8 hours  norepinephrine Infusion 0.05 MICROgram(s)/kG/Min (6.32 mL/Hr) IV Continuous <Continuous>  pantoprazole  Injectable 40 milliGRAM(s) IV Push daily    MEDICATIONS  (PRN):  loperamide Liquid 4 milliGRAM(s) Oral every 6 hours PRN Diarrhea      Vital Signs Last 24 Hrs  T(C): 36.2 (23 May 2025 08:25), Max: 36.3 (22 May 2025 16:22)  T(F): 97.2 (23 May 2025 08:25), Max: 97.4 (22 May 2025 16:22)  HR: 105 (23 May 2025 13:30) (94 - 129)  BP: 107/77 (23 May 2025 13:30) (51/23 - 124/82)  BP(mean): 87 (23 May 2025 13:30) (29 - 111)  RR: 21 (23 May 2025 13:30) (14 - 34)  SpO2: 100% (23 May 2025 13:30) (86% - 100%)    Parameters below as of 23 May 2025 10:36  Patient On (Oxygen Delivery Method): nasal cannula  O2 Flow (L/min): 4      PE:  Constitutional: NAD on high flow NC   HEENT: NC/AT, EOMI, PERRLA, conjunctivae clear; ears and nose atraumatic; pharynx benign  Neck: supple; thyroid not palpable  Back: no tenderness  Respiratory: decreased breath sounds rhonchi  Cardiovascular: S1S2 regular, no murmurs  Abdomen: soft, not tender, not distended, positive BS; liver and spleen WNL  Genitourinary: no suprapubic tenderness  Lymphatic: no LN palpable  Musculoskeletal: no muscle tenderness, no joint swelling or tenderness  Extremities: no pedal edema  Neurological/ Psychiatric:  moving all extremities  Skin: no rashes; no palpable lesions    Labs: all available labs reviewed                                              7.4    4.93  )-----------( 29       ( 23 May 2025 04:51 )             25.7     05-23    143  |  107  |  64[H]  ----------------------------<  111[H]  2.9[LL]   |  23  |  2.39[H]    Ca    9.1      23 May 2025 04:51  Phos  4.8     05-23  Mg     2.2     05-23    TPro  x   /  Alb  2.0[L]  /  TBili  x   /  DBili  x   /  AST  x   /  ALT  x   /  AlkPhos  x   05-22              Urinalysis Basic - ( 20 May 2025 06:27 )    Color: x / Appearance: x / SG: x / pH: x  Gluc: 137 mg/dL / Ketone: x  / Bili: x / Urobili: x   Blood: x / Protein: x / Nitrite: x   Leuk Esterase: x / RBC: x / WBC x   Sq Epi: x / Non Sq Epi: x / Bacteria: x    Culture - Body Fluid with Gram Stain (05.20.25 @ 14:00)   Gram Stain:   polymorphonuclear leukocytes seen   No organisms seen   by cytocentrifuge  Specimen Source: Pleural Fl Pleural Fluid  Culture Results:   No growthCulture - Urine (05.18.25 @ 20:50)   Specimen Source: Clean Catch Clean Catch (Midstream)  Culture Results:   Culture positive, 10,000 - 49,000 CFU/mL . Identification to follow.  Culture Results:   Few Pseudomonas aeruginosa (Carbapenem Resistant)   Rare Enterococcus faecalis  Organism Identification: Pseudomonas aeruginosa (Carbapenem Resistant)   Pseudomonas aeruginosa (Carbapenem Resistant)   Enterococcus faecalis      Radiology: all available radiological tests reviewed      INTERPRETATION:  Prelim: Increased bilateral pleural effusion with   atelectasis. Debris/secretion at the trachea. Nonspecific patchy   opacities scattered in both lungs, which can be seen in noninfectious and   infectious processes. Known pancreatic mass with peritoneal   carcinomatosis. Mild left hydroureteronephrosis. Official report to   follow.    CLINICAL INFORMATION:    COMPARISON: None.    CONTRAST/COMPLICATIONS:  IVContrast: NONE  Oral Contrast: NONE    < end of copied text >    Advanced directives addressed: full resuscitation

## 2025-05-23 NOTE — PROGRESS NOTE ADULT - ASSESSMENT
Mrs. 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 HTN, hpl, gout, GERD, CVA, recent admission to [facility] for septic shock, then  tx at [facility] [place] for 10 day course of radiation c/b b/l PNA, renal failure and volume overload, discharged to [facility] 1 week ago, now presents to  on 5/18 for SOB, abd pain, hypotension and hypoxia.      ct abdomen and pelvix shows carcinomatosis  MALLORY  on empiric Zosyn no positive culture  has peritoneal drain  profoundly weak    1. Pancreatic ca - with ascites  2. Acute hypoxic respiratory failure -secondary to bilateral effusion possible pneumonia  3 MALLORY  4. Malnutrition poor PO intake  5. hypotension likley from volume loses, possible sepsis, on levophed    Neuro no focal  - profoundly weak  Respiratory - s/p right thoracentesis, seen by thoracic was not felt to  be amenable to left thoracentesis, continue hiflow, will continue zosyn  cv on low dose levophed, continue midodrine, receiving albumin  Renal ortiz hydration, still oliguric creatinine 2.3 slightly better , will not drain abdomen today  GI diet as tolerated, poor po, hold abdominal drainage today  ID on empiric zosyn  HEme thrombycytopenia, hold heparin subq, sent HIT [place] from [facility], hgb 7.4 will hold transfusion unless less than 7

## 2025-05-23 NOTE — CONSULT NOTE ADULT - CONSULT REQUESTED DATE/TIME
20-May-2025 15:38
23-May-2025 13:16
19-May-2025 15:53
19-May-2025 10:00
20-May-2025 09:26
20-May-2025 09:47

## 2025-05-23 NOTE — PROGRESS NOTE ADULT - SUBJECTIVE AND OBJECTIVE BOX
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 htn, hpl, gout, gerd, CVA, recent admission to [facility]/ for septic shock and now recently [place] [facility] [place] course of [place] c/b b/l PNA, renal failure and volume overload than discharged to [facility] 1 week ago, now presents to  for SOB, abd pain, hypotension and hypoxia. In the ED pt was given 1L of IVF and started on levo after for possible sepsis given clinical signs of volume overload and started on NIV, MALLORY w/ metabolic and lactic acidosis. Critical Consulted for admission pt seen and examined at bedside w/  dr. couch at bedside we discussed currently ongoing events and pt is full code per her wish.   (18 May 2025 12:17)    Nephrology:  Pt familiar to me  Above reviewed  pt as is admitted w hypotension, having reent admission for sepsis at [facility] hospital  She is on HF oxygen, dyspneic but NAD  receiving bicarb drip  Mild gradual elevation in creat noted    5/20  Pt asking for thoracentesis  states diff breathing    5/21  In bed more comfortable s/p thoracentesis  has abd drain as well, w high output and minimal UO     5/22  No New changes  High output from drains  minimal uo  Creat trending up    5/23  more awake today  on HFO        Nephrology      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 1000 milliLiter(s) (75 mL/Hr) IV Continuous <Continuous>  loperamide 2 milliGRAM(s) Oral daily  midodrine 10 milliGRAM(s) Oral every 8 hours  norepinephrine Infusion 0.05 MICROgram(s)/kG/Min (6.32 mL/Hr) IV Continuous <Continuous>  pantoprazole  Injectable 40 milliGRAM(s) IV Push daily  piperacillin/tazobactam IVPB.. 3.375 Gram(s) IV Intermittent every 8 hours  potassium chloride  10 mEq/100 mL IVPB 10 milliEquivalent(s) IV Intermittent every 1 hour    MEDICATIONS  (PRN):  loperamide Liquid 4 milliGRAM(s) Oral every 6 hours PRN Diarrhea      Allergies    opioid-like analgesics (Vomiting; Nausea)    Intolerances            Vital Signs Last 24 Hrs  T(C): 36 (23 May 2025 05:27), Max: 36.3 (22 May 2025 12:38)  T(F): 96.8 (23 May 2025 05:27), Max: 97.4 (22 May 2025 16:22)  HR: 107 (23 May 2025 09:00) (94 - 129)  BP: 117/80 (23 May 2025 09:00) (51/23 - 124/82)  BP(mean): 92 (23 May 2025 09:00) (29 - 100)  RR: 21 (23 May 2025 09:00) (14 - 34)  SpO2: 100% (23 May 2025 09:58) (86% - 100%)    Parameters below as of 23 May 2025 09:58  Patient On (Oxygen Delivery Method): nasal cannula, high flow  O2 Flow (L/min): 25  O2 Concentration (%): 35    I&O's Detail    22 May 2025 07:01  -  23 May 2025 07:00  --------------------------------------------------------  IN:    Albumin 5%  - 250 mL: 500 mL    IV PiggyBack: 100 mL    IV PiggyBack: 200 mL    Lactated Ringers: 2900 mL    Lactated Ringers Bolus: 500 mL    Norepinephrine: 86 mL  Total IN: 4286 mL    OUT:    Indwelling Catheter - Urethral (mL): 100 mL  Total OUT: 100 mL    Total NET: 4186 mL            PHYSICAL EXAM:    General:  Frail, less dyspneic  alert can answer questions    Neuro:  Alert and oriented to person, place,     HEENT:  No JVD, no masses, Eyes anicteric, ,    Cardiovascular:  Regular rate and rhythm, with normal S1 and S2.    Lungs:  clear. no rales, no wheezing anteriorly    Abdomen:  ascitis, mild distention  Skin:  Warm, dry    Extremities:  warm,  no cyanosis  ++ edema    LABS:                          7.4    4.93  )-----------( 29       ( 23 May 2025 04:51 )             25.7                           8.8    8.66  )-----------( 46       ( 22 May 2025 06:45 )             31.0                           8.2    8.86  )-----------( 50       ( 21 May 2025 06:37 )             28.0       05-23    143  |  107  |  64[H]  ----------------------------<  111[H]  2.9[LL]   |  23  |  2.39[H]    Ca    9.1      23 May 2025 04:51  Phos  4.8     05-23  Mg     2.2     05-23    TPro  x   /  Alb  2.0[L]  /  TBili  x   /  DBili  x   /  AST  x   /  ALT  x   /  AlkPhos  x   05-22 05-22    140  |  109[H]  |  60[H]  ----------------------------<  117[H]  3.5   |  18[L]  |  2.48[H]    Ca    8.8      22 May 2025 05:55  Phos  4.4     05-21  Mg     2.6     05-21    TPro  4.8[L]  /  Alb  1.7[L]  /  TBili  0.2  /  DBili  x   /  AST  14[L]  /  ALT  11[L]  /  AlkPhos  105  05-22 05-21    139  |  103  |  58[H]  ----------------------------<  105[H]  3.4[L]   |  26  |  2.30[H]    Ca    8.9      21 May 2025 06:37  Phos  4.4     05-21  Mg     2.6     05-21    TPro  4.7[L]  /  Alb  1.7[L]  /  TBili  0.3  /  DBili  x   /  AST  13[L]  /  ALT  9[L]  /  AlkPhos  92  05-21 05-20    139  |  106  |  56[H]  ----------------------------<  137[H]  3.8   |  21[L]  |  2.07[H]    Ca    9.0      20 May 2025 06:27  Phos  4.0     05-20  Mg     2.8     05-20    TPro  5.2[L]  /  Alb  2.2[L]  /  TBili  0.3  /  DBili  x   /  AST  17  /  ALT  8[L]  /  AlkPhos  74  05-20 05-19    144  |  109[H]  |  55[H]  ----------------------------<  151[H]  3.3[L]   |  21[L]  |  1.94[H]    Ca    9.3      19 May 2025 06:13  Phos  4.4     05-19  Mg     1.7     05-19    TPro  4.8[L]  /  Alb  2.0[L]  /  TBili  0.3  /  DBili  x   /  AST  22  /  ALT  11[L]  /  AlkPhos  57  05-19    PT/INR - ( 18 May 2025 10:48 )   PT: 10.0 sec;   INR: 0.85 ratio         PTT - ( 18 May 2025 10:48 )  PTT:19.2 sec  Urinalysis Basic - ( 19 May 2025 06:13 )    Color: x / Appearance: x / SG: x / pH: x  Gluc: 151 mg/dL / Ketone: x  / Bili: x / Urobili: x   Blood: x / Protein: x / Nitrite: x   Leuk Esterase: x / RBC: x / WBC x   Sq Epi: x / Non Sq Epi: x / Bacteria: x      Magnesium: 1.7 mg/dL (05-19 @ 06:13)  Phosphorus: 4.4 mg/dL (05-19 @ 06:13)  Magnesium: 1.8 mg/dL (05-18 @ 20:30)  Phosphorus: 4.9 mg/dL (05-18 @ 20:30)  Magnesium: 2.1 mg/dL (05-18 @ 10:48)    ABG - ( 18 May 2025 21:59 )  pH, Arterial: 7.34  pH, Blood: x     /  pCO2: 27    /  pO2: 112   / HCO3: 15    / Base Excess: -9.6  /  SaO2: 99

## 2025-05-24 NOTE — PROGRESS NOTE ADULT - ASSESSMENT
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 htn, hpl, gout, gerd, CVA, recent admission to [facility]/ for septic shock and now recently tx at [facility] [place] 10 day course of radiation c/b b/l PNA, renal failure and volume overload than discharged to [facility] 1 week ago, now presents to  for SOB, abd pain, hypotension and hypoxia. In the ED pt was given 1L of IVF and started on levo after for possible sepsis given clinical signs of volume overload and started on NIV, MALLORY w/ metabolic and lactic acidosis. Critical Consulted for admission pt seen and examined at bedside w/  dr. couch at bedside we discussed currently ongoing events and pt is full code per her wish.   (18 May 2025 12:17)    Nephrology:  Pt familiar to me  Above reviewed  pt as is admitted w hypotension, having reent admission for sepsis at [facility] hospital  She is on HF oxygen, dyspneic but NAD  receiving bicarb drip    A/P  Pancreatic Adeno Ca  Metastatic  s/p chemo/ radiation  h/o MALLORY/ ATN  Hypotension s/p IVF, on pressors  Mild gradual elevation in creat noted  Metabolic acidosis on bicarb drip  Hypokalemia replace w IV KCl as needed  exacerbated by Bicarb drip  - monitor Magnesium level  Anemia- transfusion as per ICU     5/20  Metastatic Pancreatic Adeno Ca  s/p chemo/ radiation  MALLORY/ ATN creat still trending up  Hypotension s/p IVF, on pressors  Metabolic acidosis on bicarb drip  Hypokalemia replace w IV KCl as needed  exacerbated by Bicarb drip  - monitor Magnesium level  Anemia- transfusion as per ICU   D/w Dr Fortune/ and Dr Couch    5/21  Metastatic Pancreatic Adeno Ca  s/p chemo/ radiation  MALLORY/ ATN creat still trending up,  - mallory due to high output from pts drains and thoracentesis, ~ 1500 ml, vs  ml  Hypotension s/p IVF, on pressors  Metabolic acidosis on bicarb drip  Hypokalemia replace w IV KCl as needed  exacerbated by Bicarb drip  - monitor Magnesium level  Anemia- transfusion as per ICU   Dr Fortune plans on albumin infusion  D/w Dr Fortune/ and Dr Couch    5/22  Metastatic Pancreatic Adeno Ca  s/p chemo/ radiation  MALLORY/ ATN creat still trending up, high output from drains and minimal UO  hyperchloremic on NS  Will switch to LR to correct the hyperchloremia/ low K   - labs this evening 6 pm  Anemia- transfusion as per ICU   Dr Fortune plans on albumin infusion  D/w Dr Fortune/ and Dr Couch    5/22  Metastatic Pancreatic Adeno Ca  s/p chemo/ radiation  MALLORY/ ATN creat still trending up, high output from drains and minimal UO  hyperchloremic on NS  iv switched to LR @ 125 ml/hr and increased to 150 ml/hr  pt also got IV boluses  , uo only 100 ml  over 24 hrs   Anemia- transfusion as per ICU   Dr Fortune plans on albumin infusion  D/w Dr Fortune/ and Dr Couch    Addendum  Will reduce IVF  CXR personally reviewed may be more PVC today      5/24  Metastatic Pancreatic Adeno Ca  s/p chemo/ radiation  MALLORY/ ATN creat still trending up, high output from drains and minimal UO  hyperchloremic on NS, reduced to 75 ml/hr  Anemia- transfusion as per ICU   CXR 5/23 reviewed personally  Case d/w Ayad Couch yesterday

## 2025-05-24 NOTE — PROGRESS NOTE ADULT - SUBJECTIVE AND OBJECTIVE BOX
Subjective:  Pt seen, more awake and alert. Seen on 3L NC. Denies difficulty breathing, weak. Plt 25 today    Vital Signs:  Vital Signs Last 24 Hrs  T(C): 36.7 (05-24-25 @ 06:00), Max: 36.7 (05-24-25 @ 06:00)  T(F): 98 (05-24-25 @ 06:00), Max: 98 (05-24-25 @ 06:00)  HR: 98 (05-24-25 @ 09:00) (96 - 115)  BP: 120/74 (05-24-25 @ 09:00) (90/73 - 120/74)  RR: 26 (05-24-25 @ 09:00) (16 - 33)  SpO2: 93% (05-24-25 @ 09:00) (91% - 100%) on (O2)    Telemetry/Alarms:    Relevant labs, radiology and Medications reviewed                        7.8    5.08  )-----------( 25       ( 24 May 2025 05:44 )             27.3     05-24    143  |  108  |  62[H]  ----------------------------<  92  3.1[L]   |  23  |  2.41[H]    Ca    8.9      24 May 2025 05:44  Phos  4.8     05-23  Mg     2.2     05-23    TPro  4.9[L]  /  Alb  2.3[L]  /  TBili  0.3  /  DBili  x   /  AST  10[L]  /  ALT  9[L]  /  AlkPhos  93  05-24      MEDICATIONS  (STANDING):  acetaminophen   IVPB .. 1000 milliGRAM(s) IV Intermittent once  albumin human  5% IVPB 250 milliLiter(s) IV Intermittent every 8 hours  chlorhexidine 2% Cloths 1 Application(s) Topical <User Schedule>  lactated ringers. 1000 milliLiter(s) (75 mL/Hr) IV Continuous <Continuous>  loperamide 2 milliGRAM(s) Oral daily  meropenem Injectable 500 milliGRAM(s) IV Push every 12 hours  midodrine 10 milliGRAM(s) Oral every 8 hours  norepinephrine Infusion 0.05 MICROgram(s)/kG/Min (6.32 mL/Hr) IV Continuous <Continuous>  pantoprazole  Injectable 40 milliGRAM(s) IV Push daily  potassium chloride  10 mEq/100 mL IVPB 10 milliEquivalent(s) IV Intermittent every 1 hour    MEDICATIONS  (PRN):  loperamide Liquid 4 milliGRAM(s) Oral every 6 hours PRN Diarrhea      Physical exam  General: frail                                                       Neuro: alert, follows commands                    Eyes: PERRL, EOMI   ENT: Normal exam of nasal/oral mucosa with absence of cyanosis.   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 htn, hpl, gout, gerd, CVA, recent admission to [facility]/ for septic shock and [place] [facility] [place] c/b b/l PNA, renal failure and volume overload than discharged to [facility] 1 week ago, now presents to  for SOB, abd pain, hypotension and hypoxia.In the ED pt was given 1L of IVF and started on levo after for possible sepsis given clinical signs of volume overload and started on NIV, MALLORY w/ metabolic and lactic acidosis. Called for hypoxia and b/l effusions. 5/20 s/p Rt Thoracentesis 550cc drained , malignant effusion, cyto +      Plan  Pleural studies Cyto malignancy , Exudative   b/l pleural effusions    ongoing Palliative care conversations for GOC-   wean pressors  cont abx  peritoneal drain as needed  Prognosis poor   CXR in am    no thoracic procedure today, watch left effusion- platelets 25, no respiratory distress    Discussed with Cardiothoracic Team at AM rounds.

## 2025-05-24 NOTE — PROGRESS NOTE ADULT - ASSESSMENT
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 htn, hpl, gout, gerd, CVA, recent admission to [facility]/ for septic shock and now recently tx at [facility] [place] for 10 day course of radiation c/b b/l PNA, renal failure and volume overload than discharged to [facility] 1 week ago, now presents to  for SOB, abd pain, hypotension and hypoxia. In the ED pt was given 1L of IVF and started on levo after for possible sepsis given clinical signs of volume overload and started on NIV, MALLORY w/ metabolic and lactic acidosis. Imaging shows Increased bilateral pleural effusion with atelectasis. Debris/secretion at the trachea. Nonspecific patchy opacities scattered in both lungs, which can be seen in noninfectious and infectious processes. Known pancreatic mass with peritoneal carcinomatosis. Mild left hydroureteronephrosis. Started on empiric IV abx - zosyn.     Acute respiratory failure. Sepsis. Pleural effusions. Multifocal pneumonia. Metastatic pancreatic cancer. Immunocompromised host. MALLORY  - imaging reviewed   - CTS f/u noted s/p R thoracentesis 5/20 550 cc drained transudative fluid f/u cx no growth  - peritoneal drain in place   - s/p IV zosyn [id number] #5  - on IV meropenem [id number]  #2   - continue with IV antibiotic coverage  - if able check sputum cx f/u cultures   - monitor temps  - tolerating abx well so far; no side effects noted  - reason for abx use and side effects reviewed with patient  - supportive care  - fu cbc    Concern for infection with multi-resistant organisms was raised. Prior cultures reviewed. An epidemiologic assessment was performed. There is a significant risk for resistant microorganisms to spread to family members, and/or healthcare staff. The patient will be placed on isolation according to infection control policy. Will reconsider isolation measures based on new culture results and other clinical data as appropriate. Appropriate cultures collected and an appropriate broad spectrum antibiotic therapy will be considered.    Clinical team may change from intravenous to oral antibiotics when the following criteria are met:   1. Patient is clinically improving/stable       a)	Improved signs and symptoms of infection from initial presentation       b)	Afebrile for 24 hours       c)	Leukocytosis trending towards normal range   2. Patient is tolerating oral intake   3. Initial/repeat blood cultures are negative     When above criteria met - may change iv antibiotics to: po abx regimen

## 2025-05-24 NOTE — PROGRESS NOTE ADULT - SUBJECTIVE AND OBJECTIVE BOX
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 htn, hpl, gout, gerd, CVA, recent admission to [facility]/ for septic shock and now recently [place] at [facility] [place] course of radiation c/b b/l PNA, renal failure and volume overload than discharged to [facility] 1 week ago, now presents to  for SOB, abd pain, hypotension and hypoxia. In the ED pt was given 1L of IVF and started on levo after for possible sepsis given clinical signs of volume overload and started on NIV, MALLORY w/ metabolic and lactic acidosis. Critical Consulted for admission pt seen and examined at bedside w/  dr. couch at bedside we discussed currently ongoing events and pt is full code per her wish.   (18 May 2025 12:17)    Nephrology:  Pt familiar to me  Above reviewed  pt as is admitted w hypotension, having reent admission for sepsis at [facility] hospital  She is on HF oxygen, dyspneic but NAD  receiving bicarb drip  Mild gradual elevation in creat noted    5/20  Pt asking for thoracentesis  states diff breathing    5/21  In bed more comfortable s/p thoracentesis  has abd drain as well, w high output and minimal UO     5/22  No New changes  High output from drains  minimal uo  Creat trending up    5/23  more awake today  on HFO    5/24  Awake alert  off HFO  off pressors        Nephrology      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 1000 milliLiter(s) (75 mL/Hr) IV Continuous <Continuous>  loperamide 2 milliGRAM(s) Oral daily  meropenem Injectable 500 milliGRAM(s) IV Push every 12 hours  midodrine 10 milliGRAM(s) Oral every 8 hours  norepinephrine Infusion 0.05 MICROgram(s)/kG/Min (6.32 mL/Hr) IV Continuous <Continuous>  nystatin Cream 1 Application(s) Topical two times a day  pantoprazole  Injectable 40 milliGRAM(s) IV Push daily    MEDICATIONS  (PRN):  loperamide 2 milliGRAM(s) Oral every 6 hours PRN Diarrhea      Allergies    opioid-like analgesics (Vomiting; Nausea)    Intolerances      Vital Signs Last 24 Hrs  T(C): 36.6 (24 May 2025 10:00), Max: 36.7 (24 May 2025 06:00)  T(F): 97.8 (24 May 2025 10:00), Max: 98 (24 May 2025 06:00)  HR: 87 (24 May 2025 14:00) (87 - 113)  BP: 109/75 (24 May 2025 14:00) (90/73 - 120/74)  BP(mean): 86 (24 May 2025 14:00) (68 - 94)  RR: 24 (24 May 2025 14:00) (16 - 33)  SpO2: 94% (24 May 2025 10:00) (91% - 100%)    Parameters below as of 24 May 2025 08:00  Patient On (Oxygen Delivery Method): nasal cannula, 3L      I&O's Detail    23 May 2025 07:01  -  24 May 2025 07:00  --------------------------------------------------------  IN:    Albumin 5%  - 250 mL: 750 mL    IV PiggyBack: 300 mL    IV PiggyBack: 100 mL    IV PiggyBack: 150 mL    Lactated Ringers: 1650 mL    Norepinephrine: 30 mL    Oral Fluid: 480 mL  Total IN: 3460 mL    OUT:    Indwelling Catheter - Urethral (mL): 325 mL  Total OUT: 325 mL    Total NET: 3135 mL      24 May 2025 07:01  -  24 May 2025 15:03  --------------------------------------------------------  IN:    IV PiggyBack: 200 mL    IV PiggyBack: 100 mL    Lactated Ringers: 300 mL    Oral Fluid: 210 mL  Total IN: 810 mL    OUT:  Total OUT: 0 mL    Total NET: 810 mL            PHYSICAL EXAM:    General:  Frail, less dyspneic  alert can answer questions    Neuro:  Alert and oriented to person, place,     HEENT:  No JVD, no masses, Eyes anicteric, ,    Cardiovascular:  Regular rate and rhythm, with normal S1 and S2.    Lungs:  clear. no rales, no wheezing anteriorly    Abdomen:  ascitis, mild distention  Skin:  Warm, dry    Extremities:  warm,  no cyanosis  ++ edema    LABS:                          7.8    5.08  )-----------( 25       ( 24 May 2025 05:44 )             27.3                             7.4    4.93  )-----------( 29       ( 23 May 2025 04:51 )             25.7                           8.8    8.66  )-----------( 46       ( 22 May 2025 06:45 )             31.0                           8.2    8.86  )-----------( 50       ( 21 May 2025 06:37 )             28.0     05-24    143  |  108  |  62[H]  ----------------------------<  92  3.1[L]   |  23  |  2.41[H]    Ca    8.9      24 May 2025 05:44  Phos  4.8     05-23  Mg     2.2     05-23    TPro  4.9[L]  /  Alb  2.3[L]  /  TBili  0.3  /  DBili  x   /  AST  10[L]  /  ALT  9[L]  /  AlkPhos  93  05-24 05-23    143  |  107  |  64[H]  ----------------------------<  111[H]  2.9[LL]   |  23  |  2.39[H]    Ca    9.1      23 May 2025 04:51  Phos  4.8     05-23  Mg     2.2     05-23    TPro  x   /  Alb  2.0[L]  /  TBili  x   /  DBili  x   /  AST  x   /  ALT  x   /  AlkPhos  x   05-22 05-22    140  |  109[H]  |  60[H]  ----------------------------<  117[H]  3.5   |  18[L]  |  2.48[H]    Ca    8.8      22 May 2025 05:55  Phos  4.4     05-21  Mg     2.6     05-21    TPro  4.8[L]  /  Alb  1.7[L]  /  TBili  0.2  /  DBili  x   /  AST  14[L]  /  ALT  11[L]  /  AlkPhos  105  05-22 05-21    139  |  103  |  58[H]  ----------------------------<  105[H]  3.4[L]   |  26  |  2.30[H]    Ca    8.9      21 May 2025 06:37  Phos  4.4     05-21  Mg     2.6     05-21    TPro  4.7[L]  /  Alb  1.7[L]  /  TBili  0.3  /  DBili  x   /  AST  13[L]  /  ALT  9[L]  /  AlkPhos  92  05-21 05-20    139  |  106  |  56[H]  ----------------------------<  137[H]  3.8   |  21[L]  |  2.07[H]    Ca    9.0      20 May 2025 06:27  Phos  4.0     05-20  Mg     2.8     05-20    TPro  5.2[L]  /  Alb  2.2[L]  /  TBili  0.3  /  DBili  x   /  AST  17  /  ALT  8[L]  /  AlkPhos  74  05-20 05-19    144  |  109[H]  |  55[H]  ----------------------------<  151[H]  3.3[L]   |  21[L]  |  1.94[H]    Ca    9.3      19 May 2025 06:13  Phos  4.4     05-19  Mg     1.7     05-19    TPro  4.8[L]  /  Alb  2.0[L]  /  TBili  0.3  /  DBili  x   /  AST  22  /  ALT  11[L]  /  AlkPhos  57  05-19    PT/INR - ( 18 May 2025 10:48 )   PT: 10.0 sec;   INR: 0.85 ratio         PTT - ( 18 May 2025 10:48 )  PTT:19.2 sec  Urinalysis Basic - ( 19 May 2025 06:13 )    Color: x / Appearance: x / SG: x / pH: x  Gluc: 151 mg/dL / Ketone: x  / Bili: x / Urobili: x   Blood: x / Protein: x / Nitrite: x   Leuk Esterase: x / RBC: x / WBC x   Sq Epi: x / Non Sq Epi: x / Bacteria: x      Magnesium: 1.7 mg/dL (05-19 @ 06:13)  Phosphorus: 4.4 mg/dL (05-19 @ 06:13)  Magnesium: 1.8 mg/dL (05-18 @ 20:30)  Phosphorus: 4.9 mg/dL (05-18 @ 20:30)  Magnesium: 2.1 mg/dL (05-18 @ 10:48)    ABG - ( 18 May 2025 21:59 )  pH, Arterial: 7.34  pH, Blood: x     /  pCO2: 27    /  pO2: 112   / HCO3: 15    / Base Excess: -9.6  /  SaO2: 99

## 2025-05-24 NOTE — PROGRESS NOTE ADULT - SUBJECTIVE AND OBJECTIVE BOX
Date of service: 05-24-25 @ 13:24    pt seen and examined  afebrile  on high flow NC   has MALLORY off pressors     ROS: no fever or chills; denies dizziness, no HA, + SOB no abdominal pain, no diarrhea or constipation; no dysuria, no urinary frequency, no legs pain, no rashes        MEDICATIONS  (STANDING):  chlorhexidine 2% Cloths 1 Application(s) Topical <User Schedule>  lactated ringers. 1000 milliLiter(s) (75 mL/Hr) IV Continuous <Continuous>  loperamide 2 milliGRAM(s) Oral daily  meropenem Injectable 500 milliGRAM(s) IV Push every 12 hours  metoclopramide Injectable 5 milliGRAM(s) IV Push three times a day  midodrine 10 milliGRAM(s) Oral every 8 hours  norepinephrine Infusion 0.05 MICROgram(s)/kG/Min (6.32 mL/Hr) IV Continuous <Continuous>  nystatin Cream 1 Application(s) Topical two times a day  pantoprazole  Injectable 40 milliGRAM(s) IV Push daily  Vonoprazan 20 milliGRAM(s) 20 milliGRAM(s) Oral <User Schedule>      ICU Vital Signs Last 24 Hrs  T(C): 36.7 (24 May 2025 06:00), Max: 36.7 (24 May 2025 06:00)  T(F): 98 (24 May 2025 06:00), Max: 98 (24 May 2025 06:00)  HR: 106 (24 May 2025 06:00) (96 - 115)  BP: 115/82 (24 May 2025 06:00) (90/73 - 117/80)  BP(mean): 94 (24 May 2025 06:00) (68 - 111)  ABP: --  ABP(mean): --  RR: 20 (24 May 2025 06:00) (16 - 33)  SpO2: 91% (24 May 2025 06:00) (91% - 100%)      PE:  Constitutional: NAD on high flow NC   HEENT: NC/AT, EOMI, PERRLA, conjunctivae clear; ears and nose atraumatic; pharynx benign  Neck: supple; thyroid not palpable  Back: no tenderness  Respiratory: decreased breath sounds rhonchi  Cardiovascular: S1S2 regular, no murmurs  Abdomen: soft, not tender, not distended, positive BS; liver and spleen WNL  Genitourinary: no suprapubic tenderness  Lymphatic: no LN palpable  Musculoskeletal: no muscle tenderness, no joint swelling or tenderness  Extremities: no pedal edema  Neurological/ Psychiatric:  moving all extremities  Skin: no rashes; no palpable lesions    Labs: all available labs reviewed                            Urinalysis Basic - ( 20 May 2025 06:27 )    Color: x / Appearance: x / SG: x / pH: x  Gluc: 137 mg/dL / Ketone: x  / Bili: x / Urobili: x   Blood: x / Protein: x / Nitrite: x   Leuk Esterase: x / RBC: x / WBC x   Sq Epi: x / Non Sq Epi: x / Bacteria: x    Culture - Body Fluid with Gram Stain (05.20.25 @ 14:00)   Gram Stain:   polymorphonuclear leukocytes seen   No organisms seen   by cytocentrifuge  Specimen Source: Pleural Fl Pleural Fluid  Culture Results:   No growthCulture - Urine (05.18.25 @ 20:50)   Specimen Source: Clean Catch Clean Catch (Midstream)  Culture Results:   Culture positive, 10,000 - 49,000 CFU/mL . Identification to follow.  Culture Results:   Few Pseudomonas aeruginosa (Carbapenem Resistant)   Rare Enterococcus faecalis  Organism Identification: Pseudomonas aeruginosa (Carbapenem Resistant)   Pseudomonas aeruginosa (Carbapenem Resistant)   Enterococcus faecalis      Radiology: all available radiological tests reviewed      INTERPRETATION:  Prelim: Increased bilateral pleural effusion with   atelectasis. Debris/secretion at the trachea. Nonspecific patchy   opacities scattered in both lungs, which can be seen in noninfectious and   infectious processes. Known pancreatic mass with peritoneal   carcinomatosis. Mild left hydroureteronephrosis. Official report to   follow.    CLINICAL INFORMATION:    COMPARISON: None.    CONTRAST/COMPLICATIONS:  IVContrast: NONE  Oral Contrast: NONE    < end of copied text >    Advanced directives addressed: full resuscitation Date of service: 05-24-25 @ 13:24    pt seen and examined  afebrile  on high flow NC   has MALLORY off pressors     ROS: no fever or chills; denies dizziness, no HA, + SOB no abdominal pain, no diarrhea or constipation; no dysuria, no urinary frequency, no legs pain, no rashes        MEDICATIONS  (STANDING):  chlorhexidine 2% Cloths 1 Application(s) Topical <User Schedule>  lactated ringers. 1000 milliLiter(s) (75 mL/Hr) IV Continuous <Continuous>  loperamide 2 milliGRAM(s) Oral daily  meropenem Injectable 500 milliGRAM(s) IV Push every 12 hours  metoclopramide Injectable 5 milliGRAM(s) IV Push three times a day  midodrine 10 milliGRAM(s) Oral every 8 hours  norepinephrine Infusion 0.05 MICROgram(s)/kG/Min (6.32 mL/Hr) IV Continuous <Continuous>  nystatin Cream 1 Application(s) Topical two times a day  pantoprazole  Injectable 40 milliGRAM(s) IV Push daily  Vonoprazan 20 milliGRAM(s) 20 milliGRAM(s) Oral <User Schedule>      ICU Vital Signs Last 24 Hrs  T(C): 36.7 (24 May 2025 06:00), Max: 36.7 (24 May 2025 06:00)  T(F): 98 (24 May 2025 06:00), Max: 98 (24 May 2025 06:00)  HR: 106 (24 May 2025 06:00) (96 - 115)  BP: 115/82 (24 May 2025 06:00) (90/73 - 117/80)  BP(mean): 94 (24 May 2025 06:00) (68 - 111)  ABP: --  ABP(mean): --  RR: 20 (24 May 2025 06:00) (16 - 33)  SpO2: 91% (24 May 2025 06:00) (91% - 100%)      PE:  Constitutional: NAD on high flow NC   HEENT: NC/AT, EOMI, PERRLA, conjunctivae clear; ears and nose atraumatic; pharynx benign  Neck: supple; thyroid not palpable  Back: no tenderness  Respiratory: decreased breath sounds rhonchi  Cardiovascular: S1S2 regular, no murmurs  Abdomen: soft, not tender, not distended, positive BS; liver and spleen WNL  Genitourinary: no suprapubic tenderness  Lymphatic: no LN palpable  Musculoskeletal: no muscle tenderness, no joint swelling or tenderness  Extremities: no pedal edema  Neurological/ Psychiatric:  moving all extremities  Skin: no rashes; no palpable lesions    Labs: all available labs reviewed                            Urinalysis Basic - ( 20 May 2025 06:27 )    Color: x / Appearance: x / SG: x / pH: x  Gluc: 137 mg/dL / Ketone: x  / Bili: x / Urobili: x   Blood: x / Protein: x / Nitrite: x   Leuk Esterase: x / RBC: x / WBC x   Sq Epi: x / Non Sq Epi: x / Bacteria: x    Culture - Body Fluid with Gram Stain (05.20.25 @ 14:00)   Gram Stain:   polymorphonuclear leukocytes seen   No organisms seen   by cytocentrifuge  Specimen Source: Pleural Fl Pleural Fluid  Culture Results:   No growthCulture - Urine (05.18.25 @ 20:50)   Specimen Source: Clean Catch Clean Catch (Midstream)  Culture Results:   Culture positive, 10,000 - 49,000 CFU/mL . Identification to follow.  Culture Results:   Few Pseudomonas aeruginosa (Carbapenem Resistant)   Rare Enterococcus faecalis  Organism Identification: Pseudomonas aeruginosa (Carbapenem Resistant)   Pseudomonas aeruginosa (Carbapenem Resistant)   Enterococcus faecalis      Radiology: all available radiological tests reviewed      INTERPRETATION:  Prelim: Increased bilateral pleural effusion with   atelectasis. Debris/secretion at the trachea. Nonspecific patchy   opacities scattered in both lungs, which can be seen in noninfectious and   infectious processes. Known pancreatic mass with peritoneal   carcinomatosis. Mild left hydroureteronephrosis. Official report to   follow.    CLINICAL INFORMATION:    COMPARISON: None.    CONTRAST/COMPLICATIONS:  IVContrast: NONE  Oral Contrast: NONE    < end of copied text >    Advanced directives addressed: DNR

## 2025-05-24 NOTE — PROGRESS NOTE ADULT - SUBJECTIVE AND OBJECTIVE BOX
Interval History:       Afebrile       now DNI       breathing a little more comfortable       creatinine 2.4 unchanged       urine output 325 for 24 hours       thrombocytopenia 25, HIT abx negative       off pressors    HPI:  Mrs. 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 HTN, hpl, gout, GERD, CVA, recent admission to [facility] for septic shock, then  [place] [facility] [place] course of radiation c/b b/l PNA, renal failure and volume overload, discharged to [facility] 1 week  PTA now presents to  on 5/18 for SOB, abd pain, hypotension and hypoxia.   ct abdomen and pelvix shows carcinomatosis  ws empiric Zosyn no positive culture possible infitrates on ct abx changed to Meropenem  has abdominal drain was not drained yeterday due to renal failure    ROS cant obtain due to profound weakness  no specific complaints     MEDICATIONS  (STANDING):  albumin human  5% IVPB 250 milliLiter(s) IV Intermittent every 8 hours  chlorhexidine 2% Cloths 1 Application(s) Topical <User Schedule>  lactated ringers. 1000 milliLiter(s) (75 mL/Hr) IV Continuous <Continuous>  loperamide 2 milliGRAM(s) Oral daily  meropenem Injectable 500 milliGRAM(s) IV Push every 12 hours  midodrine 10 milliGRAM(s) Oral every 8 hours  norepinephrine Infusion 0.05 MICROgram(s)/kG/Min (6.32 mL/Hr) IV Continuous <Continuous>  pantoprazole  Injectable 40 milliGRAM(s) IV Push daily  potassium chloride  10 mEq/100 mL IVPB 10 milliEquivalent(s) IV Intermittent every 1 hour    MEDICATIONS  (PRN):  loperamide Liquid 4 milliGRAM(s) Oral every 6 hours PRN Diarrhea    Height (cm): 162.6 (05-23 @ 13:15)    ICU Vital Signs Last 24 Hrs  T(C): 36.7 (24 May 2025 06:00), Max: 36.7 (24 May 2025 06:00)  T(F): 98 (24 May 2025 06:00), Max: 98 (24 May 2025 06:00)  HR: 106 (24 May 2025 06:00) (96 - 115)  BP: 115/82 (24 May 2025 06:00) (90/73 - 117/80)  BP(mean): 94 (24 May 2025 06:00) (68 - 111)  ABP: --  ABP(mean): --  RR: 20 (24 May 2025 06:00) (16 - 33)  SpO2: 91% (24 May 2025 06:00) (91% - 100%)    O2 Parameters below as of 23 May 2025 10:36  Patient On (Oxygen Delivery Method): nasal cannula  O2 Flow (L/min): 4    I&O's Summary    23 May 2025 07:01  -  24 May 2025 07:00  --------------------------------------------------------  IN: 3460 mL / OUT: 325 mL / NET: 3135 mL    Physical Exam    General - weak, frail  Neuro more awake, nasal cannula on   NEck supple  lungs less dyspneic, dec bs at bases  cv rrr  abdomen distended drain in place  gu coelho  extremtieis lower leg edema                        7.8    5.08  )-----------( 25       ( 24 May 2025 05:44 )             27.3       05-24    143  |  108  |  62[H]  ----------------------------<  92  3.1[L]   |  23  |  2.41[H]    Ca    8.9      24 May 2025 05:44  Phos  4.8     05-23  Mg     2.2     05-23    TPro  4.9[L]  /  Alb  2.3[L]  /  TBili  0.3  /  DBili  x   /  AST  10[L]  /  ALT  9[L]  /  AlkPhos  93  05-24    ABG - ( 23 May 2025 06:20 )  pH, Arterial: 7.35  pH, Blood: x     /  pCO2: 40    /  pO2: 114   / HCO3: 22    / Base Excess: -3.3  /  SaO2: 99        Urinalysis Basic - ( 24 May 2025 05:44 )    Color: x / Appearance: x / SG: x / pH: x  Gluc: 92 mg/dL / Ketone: x  / Bili: x / Urobili: x   Blood: x / Protein: x / Nitrite: x   Leuk Esterase: x / RBC: x / WBC x   Sq Epi: x / Non Sq Epi: x / Bacteria: x    I personally reviewed the CXR: 5/23 bilateral effusions    DVT Prophylaxis:  held for thrombocytopenia                                                                Advanced Directives: DNR      Sepsis Criteria Met - No    Labs, Notes, Orders, radiologic studies reviewed and care coordinated with multidisciplinary team

## 2025-05-24 NOTE — PROGRESS NOTE ADULT - ASSESSMENT
Mrs. 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 HTN, hpl, gout, GERD, CVA, recent admission to [facility] for septic shock, then  [place] [facility] [place] of [place] c/b b/l PNA, renal failure and volume overload, discharged to [facility] 1 week ago, now presents to  on 5/18 for SOB, abd pain, hypotension and hypoxia.      ct abdomen and pelvix shows carcinomatosis  MALLORY  on empiric Zosyn no positive culture  has peritoneal drain  profoundly weak    1. Pancreatic ca - with ascites  2. Acute hypoxic respiratory failure -secondary to bilateral effusion possible pneumonia  3 MALLORY  4. Malnutrition poor PO intake  5. hypotension likley from volume loses, possible sepsis, on levophed    Neuro no focal  - profoundly weak  Respiratory - s/p right thoracentesis, seen by thoracic was not felt to  be amenable to left thoracentesis, osygenation better will check cxr tomorrow     as oxygenation better would not tap today  cv off levophed continue midodrine, receiving albumin  Renal ortiz hydration, still oliguric creatinine 2.4  no worse  GI diet as tolerated, poor po, hold abdominal drainage today  ID on empiric zosyn changed to meropenem  HEme thrombycytopenia, hold heparin subq,   HIT abx,negative  likley from ca, hgb 7.4 will hold transfusion unless less than 7

## 2025-05-25 NOTE — PROGRESS NOTE ADULT - ASSESSMENT
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 htn, hpl, gout, gerd, CVA, recent admission to [facility] for septic shock and now recently tx at [facility] [place] for 10 day course of radiation c/b b/l PNA, renal failure and volume overload than discharged to [facility] 1 week ago, now presents to  for SOB, abd pain, hypotension and hypoxia. In the ED pt was given 1L of IVF and started on levo after for possible sepsis given clinical signs of volume overload and started on NIV, MALLORY w/ metabolic and lactic acidosis. Imaging shows Increased bilateral pleural effusion with atelectasis. Debris/secretion at the trachea. Nonspecific patchy opacities scattered in both lungs, which can be seen in noninfectious and infectious processes. Known pancreatic mass with peritoneal carcinomatosis. Mild left hydroureteronephrosis. Started on empiric IV abx - zosyn.     Acute respiratory failure. Septic shock. Pleural effusions. Multifocal pneumonia. Metastatic pancreatic cancer. Immunocompromised host. MALLORY  - imaging reviewed   - CTS f/u noted s/p R thoracentesis 5/20 f/u cx no growth  - reaccumulated fluid on R lung and has L lung effusion  - peritoneal drain in place 550 cc drainage   - s/p IV zosyn [id number] #5  - now on IV meropenem [id number] #3   - continue with IV antibiotic coverage  - if able check sputum cx f/u cultures - blood cx no growth  - pulmonary fu noted   - tolerating abx well so far; no side effects noted  - reason for abx use and side effects reviewed with patient  - supportive care  - fu cbc    Concern for infection with multi-resistant organisms was raised. Prior cultures reviewed. An epidemiologic assessment was performed. There is a significant risk for resistant microorganisms to spread to family members, and/or healthcare staff. The patient will be placed on isolation according to infection control policy. Will reconsider isolation measures based on new culture results and other clinical data as appropriate. Appropriate cultures collected and an appropriate broad spectrum antibiotic therapy will be considered.    Clinical team may change from intravenous to oral antibiotics when the following criteria are met:   1. Patient is clinically improving/stable       a)	Improved signs and symptoms of infection from initial presentation       b)	Afebrile for 24 hours       c)	Leukocytosis trending towards normal range   2. Patient is tolerating oral intake   3. Initial/repeat blood cultures are negative     When above criteria met - may change iv antibiotics to: po abx regimen      70 y/o F w/ pmh of pancreatic adenocarcinoma of the head w/ tumor in umbilicus, on chemo/radiation, SCC of lip. htn, hpl, gout, gerd, CVA, recent admission to [facility] for [place] shock and [place] [facility] [place] of [place] c/b b/l PNA, renal failure and volume overload than discharged to [facility] 1 week ago, now presents to  for SOB, abd pain, hypotension and hypoxia. In the ED pt was given 1L of IVF and started on levo after for possible sepsis given clinical signs of volume overload and started on NIV, MALLORY w/ metabolic and lactic acidosis. Imaging shows Increased bilateral pleural effusion with atelectasis. Debris/secretion at the trachea. Nonspecific patchy opacities scattered in both lungs, which can be seen in noninfectious and infectious processes. Known pancreatic mass with peritoneal carcinomatosis. Mild left hydroureteronephrosis. Started on empiric IV abx - zosyn.     Acute respiratory failure. Sepsis. Pleural effusions. Multifocal pneumonia. Metastatic pancreatic cancer. Immunocompromised host. MALLORY  - imaging reviewed   - CTS f/u noted s/p R thoracentesis 5/20 f/u cx no growth  - reaccumulated fluid on R lung and has L lung effusion  - peritoneal drain in place 550 cc drainage   - s/p IV zosyn [id number] #5  - now on IV meropenem [id number] #3   - continue with IV antibiotic coverage  - if able check sputum cx f/u cultures - blood cx no growth  - pulmonary fu noted   - tolerating abx well so far; no side effects noted  - reason for abx use and side effects reviewed with patient  - supportive care  - fu cbc    Concern for infection with multi-resistant organisms was raised. Prior cultures reviewed. An epidemiologic assessment was performed. There is a significant risk for resistant microorganisms to spread to family members, and/or healthcare staff. The patient will be placed on isolation according to infection control policy. Will reconsider isolation measures based on new culture results and other clinical data as appropriate. Appropriate cultures collected and an appropriate broad spectrum antibiotic therapy will be considered.    Clinical team may change from intravenous to oral antibiotics when the following criteria are met:   1. Patient is clinically improving/stable       a)	Improved signs and symptoms of infection from initial presentation       b)	Afebrile for 24 hours       c)	Leukocytosis trending towards normal range   2. Patient is tolerating oral intake   3. Initial/repeat blood cultures are negative     When above criteria met - may change iv antibiotics to: po abx regimen

## 2025-05-25 NOTE — PROGRESS NOTE ADULT - SUBJECTIVE AND OBJECTIVE BOX
Interval History:       Patient alternating between high flow, and nasal cannula       no fevers        creatinine 2.4        abdomen  slighlty more distended    HPI:        Mrs. 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 HTN, hpl, gout, GERD, CVA, recent admission to [facility] for septic shock, then  tx at [facility] [place] for 10 day course of radiation c/b b/l PNA, renal failure and volume overload, discharged to [facility] 1 week  PTA now presents to  on 5/18 for SOB, abd pain, hypotension and hypoxia.   ct abdomen and pelvix shows carcinomatosis  was on empiric Zosyn no positive culture possible infiltrates on ct abx changed to Meropenem    ROS cant obtain due to profound weakness  no specific complaints     MEDICATIONS  (STANDING):  chlorhexidine 2% Cloths 1 Application(s) Topical <User Schedule>  lactated ringers. 1000 milliLiter(s) (75 mL/Hr) IV Continuous <Continuous>  loperamide 2 milliGRAM(s) Oral daily  meropenem Injectable 500 milliGRAM(s) IV Push every 12 hours  midodrine 10 milliGRAM(s) Oral every 8 hours  norepinephrine Infusion 0.05 MICROgram(s)/kG/Min (6.32 mL/Hr) IV Continuous <Continuous>  nystatin Cream 1 Application(s) Topical two times a day  pantoprazole  Injectable 40 milliGRAM(s) IV Push daily    MEDICATIONS  (PRN):  loperamide 2 milliGRAM(s) Oral every 6 hours PRN Diarrhea    ICU Vital Signs Last 24 Hrs  T(C): 36 (25 May 2025 08:11), Max: 36.2 (25 May 2025 00:00)  T(F): 96.8 (25 May 2025 08:11), Max: 97.2 (25 May 2025 00:00)  HR: 103 (25 May 2025 10:00) (87 - 106)  BP: 124/86 (25 May 2025 10:00) (98/73 - 129/88)  BP(mean): 99 (25 May 2025 10:00) (76 - 101)  ABP: --  ABP(mean): --  RR: 30 (25 May 2025 10:00) (17 - 30)  SpO2: 95% (25 May 2025 10:00) (92% - 100%)    O2 Parameters below as of 25 May 2025 08:30  Patient On (Oxygen Delivery Method): nasal cannula, high flow  O2 Flow (L/min): 35  O2 Concentration (%): 50    Physical Exam    General - frail weak  HEENT - nc/at   neck supple  lungs clear  cv rrr  abdomen - soft, distended  extremities - edematous    I&O's Summary    24 May 2025 07:01  -  25 May 2025 07:00  --------------------------------------------------------  IN: 3060 mL / OUT: 375 mL / NET: 2685 mL                       8.0    7.57  )-----------( 24       ( 25 May 2025 06:15 )             28.0       05-25    142  |  108  |  57[H]  ----------------------------<  102[H]  3.5   |  21[L]  |  2.41[H]    Ca    8.7      25 May 2025 06:15    TPro  5.2[L]  /  Alb  2.7[L]  /  TBili  0.3  /  DBili  x   /  AST  15  /  ALT  11[L]  /  AlkPhos  107  05-25    Urinalysis Basic - ( 25 May 2025 06:15 )    Color: x / Appearance: x / SG: x / pH: x  Gluc: 102 mg/dL / Ketone: x  / Bili: x / Urobili: x   Blood: x / Protein: x / Nitrite: x   Leuk Esterase: x / RBC: x / WBC x   Sq Epi: x / Non Sq Epi: x / Bacteria: x    DVT Prophylaxis:   held for low platelets                                                              Advanced Directives: Full Code     Sepsis Criteria Met - No     Labs, Notes, Orders, radiologic studies reviewed and care coordinated with multidisciplinary team

## 2025-05-25 NOTE — PROGRESS NOTE ADULT - ASSESSMENT
Mrs. 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 HTN, hpl, gout, GERD, CVA, recent admission to [facility] for septic shock, then  tx at [facility] [place] for 10 day course of radiation c/b b/l PNA, renal failure and volume overload, discharged to [facility] 1 week ago, now presents to  on 5/18 for SOB, abd pain, hypotension and hypoxia.      ct abdomen and pelvix shows carcinomatosis  MALLORY  on empiric Zosyn no positive culture  has peritoneal drain  profoundly weak    1. Pancreatic ca - with ascites  2. Acute hypoxic respiratory failure -secondary to bilateral effusion possible pneumonia  3 MALLORY  4. Malnutrition poor PO intake  5. hypotension likley from volume loses, possible sepsis, on levophed    Neuro no focal  - profoundly weak  Respiratory - s/p right thoracentesis, seen by thoracic was not felt to  be amenable to left thoracentesis, cxr with bilateral effusion     goes back and forth from high flow to nasal cannula  cv off levophed continue midodrine,  got albumin  Renal -  ortiz hydration, still oliguric creatinine 2.4  no worse urine output 375 last 24 hours  GI diet as tolerated, poor po,abdomen slightly more distended, will drain up to 500 cc  ID on empiric zosyn which was changed to meropenem to cover infiltrates in lungs, never had wbc or fever  HEme thrombycytopenia, hold heparin subq,   HIT abx,negative      Remains in ccu at risk of respiratory decompenation

## 2025-05-25 NOTE — PROGRESS NOTE ADULT - ASSESSMENT
- cxr and ct scan reviewed  - multifocal infiltrates w bilateral effussions  - cont supplemental O2. Now on nasal canula. High flow O2 as needed.  - cont merepenom  - on levophed

## 2025-05-25 NOTE — PROGRESS NOTE ADULT - SUBJECTIVE AND OBJECTIVE BOX
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    Allergies    opioid-like analgesics (Vomiting; Nausea)    Intolerances          ICU Vital Signs Last 24 Hrs  T(C): 36.2 (25 May 2025 19:59), Max: 36.2 (25 May 2025 00:00)  T(F): 97.1 (25 May 2025 19:59), Max: 97.2 (25 May 2025 00:00)  HR: 108 (25 May 2025 20:00) (91 - 108)  BP: 107/77 (25 May 2025 20:00) (94/68 - 129/88)  BP(mean): 88 (25 May 2025 20:00) (76 - 101)  ABP: --  ABP(mean): --  RR: 33 (25 May 2025 20:00) (17 - 50)  SpO2: 98% (25 May 2025 20:00) (93% - 100%)    O2 Parameters below as of 25 May 2025 20:00  Patient On (Oxygen Delivery Method): nasal cannula, high flow  O2 Flow (L/min): 50  O2 Concentration (%): 100    LABS:                        8.0    7.57  )-----------( 24       ( 25 May 2025 06:15 )             28.0     05-25    142  |  108  |  57[H]  ----------------------------<  102[H]  3.5   |  21[L]  |  2.41[H]    Ca    8.7      25 May 2025 06:15    TPro  5.2[L]  /  Alb  2.7[L]  /  TBili  0.3  /  DBili  x   /  AST  15  /  ALT  11[L]  /  AlkPhos  107  05-25          CAPILLARY BLOOD GLUCOSE          Urinalysis Basic - ( 25 May 2025 06:15 )    Color: x / Appearance: x / SG: x / pH: x  Gluc: 102 mg/dL / Ketone: x  / Bili: x / Urobili: x   Blood: x / Protein: x / Nitrite: x   Leuk Esterase: x / RBC: x / WBC x   Sq Epi: x / Non Sq Epi: x / Bacteria: x      CULTURES:  Culture Results:   No growth at 5 days (05-20 @ 14:00)  Culture Results:   No growth (05-20 @ 14:00)  Culture Results:   10,000 - 49,000 CFU/mL Candida albicans  "Susceptibilities not performed" (05-18 @ 20:50)      Medications:  MEDICATIONS  (STANDING):  chlorhexidine 2% Cloths 1 Application(s) Topical <User Schedule>  lactated ringers. 1000 milliLiter(s) (75 mL/Hr) IV Continuous <Continuous>  loperamide 2 milliGRAM(s) Oral daily  meropenem Injectable 500 milliGRAM(s) IV Push every 12 hours  metoclopramide Injectable 5 milliGRAM(s) IV Push three times a day  midodrine 10 milliGRAM(s) Oral every 8 hours  norepinephrine Infusion 0.05 MICROgram(s)/kG/Min (6.32 mL/Hr) IV Continuous <Continuous>  nystatin Cream 1 Application(s) Topical two times a day  pantoprazole  Injectable 40 milliGRAM(s) IV Push daily  Vonoprazan 20 milliGRAM(s) 20 milliGRAM(s) Oral <User Schedule>    MEDICATIONS  (PRN):  loperamide 2 milliGRAM(s) Oral every 6 hours PRN Diarrhea        05-24 @ 07:01  -  05-25 @ 07:00  --------------------------------------------------------  IN: 3060 mL / OUT: 375 mL / NET: 2685 mL    05-25 @ 07:01  -  05-25 @ 20:22  --------------------------------------------------------  IN: 1025 mL / OUT: 700 mL / NET: 325 mL        RADIOLOGY/IMAGING/ECHO    < from: CT Abdomen and Pelvis No Cont (05.18.25 @ 23:28) >  IMPRESSION:  Multifocal pneumonia.  Small bilateral pleural effusions.  Pancreatic body tail mass again visualized.  Moderate to large ascites increased with peritoneal carcinomatosis and   indwelling intraperitoneal catheter.  New mild to moderate left hydronephrosis            < from: Xray Chest 1 View- PORTABLE-Routine (Xray Chest 1 View- PORTABLE-Routine in AM.) (05.25.25 @ 09:32) >  PULMONARY:  Bilateral pleural effusions and perihilar/lower zone multifocal airspace   consolidations..    HEART/VASCULAR: The heart size difficult to assess due to opacified LEFT   lower zone parenchyma..    BONES: The visualized osseous thorax is intact.    IMPRESSION: No interval change    < from: TTE W or WO Ultrasound Enhancing Agent (05.18.25 @ 13:44) >   1. Technically difficult image quality.   2. Left ventricular cavity is small. Left ventricular wall thickness is normal. Left ventricular systolic function is hyperdynamic with an ejection fraction visually estimated at 65 to 70 %.   3. Normal left ventricular diastolic function.   4. Normal right ventricular cavity size and normal right ventricular systolic function.   5. Normal left and right atrial size.   6. No pericardial effusion seen.   7. Trace tricuspidregurgitation.   8. Large bilateral and bilateral pleural effusion noted.   9. Abdominal ascites is incidentally noted.      Assessment/Plan:    69F hx metastatic pancreatic adenocarcinoma s/p  chemo/XRT  with a peritoneal drain, and peritoneal carcinomatosis  SCC of lip. HTN, HLD, gout, GERD, CVA    Here since 5/18 with:    1. Type 1 and 2 resp failure due to MF pna with fluid overload and metastatic adenocarcinoma  pleural effusions requiring HFNC alternating with NIPPV AVAPS  R thoracentesis 5/20  550 CC  2. MALLORY  stabilized now  L HN on CT imaging    3. Septic shock   Proamatine added pressors tapered off today Zosyn upgraded to meropenum today   4. Thrombocytopenia >Anemic, but no active bleeding,.  No chemical DVT P      Peritoneal cath drained today 550cc    Tachypneic on AVAPS with high TV returns > will adjust the settings   Weak malnourished     DNI not DNR per family request.    PX poor. Continuing abx NIPPV.  Thoracic following for any thoracic drainage needs.          CRITICAL CARE TIME SPENT:    32 minutes assessing presenting problems of acute illness, which pose high probability of life threatening deterioration or end organ damage/dysfunction, as well as medical decision making including initiating plan of care, reviewing data, reviewing radiologic exams, discussing with multidisciplinary team,  discussing goals of care with patient/family, and writing this note.  Non-inclusive of procedures performed.  The date of service for this encounter is the entered date.

## 2025-05-25 NOTE — PROGRESS NOTE ADULT - SUBJECTIVE AND OBJECTIVE BOX
Date of service: 05-25-25 @ 12:28    pt seen and examined  afebrile  on nasal cannula   weak appearing   alert, awake     ROS: no fever or chills; denies dizziness, no HA, + SOB no abdominal pain, no diarrhea or constipation; no dysuria, no urinary frequency, no legs pain, no rashes      MEDICATIONS  (STANDING):  chlorhexidine 2% Cloths 1 Application(s) Topical <User Schedule>  lactated ringers. 1000 milliLiter(s) (75 mL/Hr) IV Continuous <Continuous>  loperamide 2 milliGRAM(s) Oral daily  meropenem Injectable 500 milliGRAM(s) IV Push every 12 hours  metoclopramide Injectable 5 milliGRAM(s) IV Push three times a day  midodrine 10 milliGRAM(s) Oral every 8 hours  norepinephrine Infusion 0.05 MICROgram(s)/kG/Min (6.32 mL/Hr) IV Continuous <Continuous>  nystatin Cream 1 Application(s) Topical two times a day  pantoprazole  Injectable 40 milliGRAM(s) IV Push daily  Vonoprazan 20 milliGRAM(s) 20 milliGRAM(s) Oral <User Schedule>      Vital Signs Last 24 Hrs  T(C): 35.8 (25 May 2025 15:14), Max: 36.2 (25 May 2025 00:00)  T(F): 96.5 (25 May 2025 15:14), Max: 97.2 (25 May 2025 00:00)  HR: 108 (25 May 2025 17:00) (91 - 108)  BP: 100/69 (25 May 2025 17:00) (94/68 - 129/88)  BP(mean): 79 (25 May 2025 17:00) (76 - 101)  RR: 29 (25 May 2025 17:00) (17 - 34)  SpO2: 98% (25 May 2025 17:00) (93% - 100%)    Parameters below as of 25 May 2025 16:00  Patient On (Oxygen Delivery Method): nasal cannula, high flow        PE:  Constitutional: NAD on high flow NC   HEENT: NC/AT, EOMI, PERRLA, conjunctivae clear; ears and nose atraumatic; pharynx benign  Neck: supple; thyroid not palpable  Back: no tenderness  Respiratory: decreased breath sounds rhonchi  Cardiovascular: S1S2 regular, no murmurs  Abdomen: soft, not tender, not distended, positive BS; liver and spleen WNL  Genitourinary: no suprapubic tenderness  Lymphatic: no LN palpable  Musculoskeletal: no muscle tenderness, no joint swelling or tenderness  Extremities: no pedal edema  Neurological/ Psychiatric:  moving all extremities  Skin: no rashes; no palpable lesions    Labs: all available labs reviewed                                   8.0    7.57  )-----------( 24       ( 25 May 2025 06:15 )             28.0     05-25    142  |  108  |  57[H]  ----------------------------<  102[H]  3.5   |  21[L]  |  2.41[H]    Ca    8.7      25 May 2025 06:15    TPro  5.2[L]  /  Alb  2.7[L]  /  TBili  0.3  /  DBili  x   /  AST  15  /  ALT  11[L]  /  AlkPhos  107  05-25              Urinalysis Basic - ( 20 May 2025 06:27 )    Color: x / Appearance: x / SG: x / pH: x  Gluc: 137 mg/dL / Ketone: x  / Bili: x / Urobili: x   Blood: x / Protein: x / Nitrite: x   Leuk Esterase: x / RBC: x / WBC x   Sq Epi: x / Non Sq Epi: x / Bacteria: x    Culture - Body Fluid with Gram Stain (05.20.25 @ 14:00)   Gram Stain:   polymorphonuclear leukocytes seen   No organisms seen   by cytocentrifuge  Specimen Source: Pleural Fl Pleural Fluid  Culture Results:   No growthCulture - Urine (05.18.25 @ 20:50)   Specimen Source: Clean Catch Clean Catch (Midstream)  Culture Results:   Culture positive, 10,000 - 49,000 CFU/mL . Identification to follow.  Culture Results:   Few Pseudomonas aeruginosa (Carbapenem Resistant)   Rare Enterococcus faecalis  Organism Identification: Pseudomonas aeruginosa (Carbapenem Resistant)   Pseudomonas aeruginosa (Carbapenem Resistant)   Enterococcus faecalis      Radiology: all available radiological tests reviewed      INTERPRETATION:  Prelim: Increased bilateral pleural effusion with   atelectasis. Debris/secretion at the trachea. Nonspecific patchy   opacities scattered in both lungs, which can be seen in noninfectious and   infectious processes. Known pancreatic mass with peritoneal   carcinomatosis. Mild left hydroureteronephrosis. Official report to   follow.    CLINICAL INFORMATION:    COMPARISON: None.    CONTRAST/COMPLICATIONS:  IVContrast: NONE  Oral Contrast: NONE    < end of copied text >    Advanced directives addressed: full resuscitation Date of service: 05-25-25 @ 12:28    pt seen and examined  afebrile  on nasal cannula   weak appearing   alert, awake     ROS: no fever or chills; denies dizziness, no HA, + SOB no abdominal pain, no diarrhea or constipation; no dysuria, no urinary frequency, no legs pain, no rashes      MEDICATIONS  (STANDING):  chlorhexidine 2% Cloths 1 Application(s) Topical <User Schedule>  lactated ringers. 1000 milliLiter(s) (75 mL/Hr) IV Continuous <Continuous>  loperamide 2 milliGRAM(s) Oral daily  meropenem Injectable 500 milliGRAM(s) IV Push every 12 hours  metoclopramide Injectable 5 milliGRAM(s) IV Push three times a day  midodrine 10 milliGRAM(s) Oral every 8 hours  norepinephrine Infusion 0.05 MICROgram(s)/kG/Min (6.32 mL/Hr) IV Continuous <Continuous>  nystatin Cream 1 Application(s) Topical two times a day  pantoprazole  Injectable 40 milliGRAM(s) IV Push daily  Vonoprazan 20 milliGRAM(s) 20 milliGRAM(s) Oral <User Schedule>      Vital Signs Last 24 Hrs  T(C): 35.8 (25 May 2025 15:14), Max: 36.2 (25 May 2025 00:00)  T(F): 96.5 (25 May 2025 15:14), Max: 97.2 (25 May 2025 00:00)  HR: 108 (25 May 2025 17:00) (91 - 108)  BP: 100/69 (25 May 2025 17:00) (94/68 - 129/88)  BP(mean): 79 (25 May 2025 17:00) (76 - 101)  RR: 29 (25 May 2025 17:00) (17 - 34)  SpO2: 98% (25 May 2025 17:00) (93% - 100%)    Parameters below as of 25 May 2025 16:00  Patient On (Oxygen Delivery Method): nasal cannula, high flow        PE:  Constitutional: NAD on high flow NC   HEENT: NC/AT, EOMI, PERRLA, conjunctivae clear; ears and nose atraumatic; pharynx benign  Neck: supple; thyroid not palpable  Back: no tenderness  Respiratory: decreased breath sounds rhonchi  Cardiovascular: S1S2 regular, no murmurs  Abdomen: soft, not tender, not distended, positive BS; liver and spleen WNL  Genitourinary: no suprapubic tenderness  Lymphatic: no LN palpable  Musculoskeletal: no muscle tenderness, no joint swelling or tenderness  Extremities: no pedal edema  Neurological/ Psychiatric:  moving all extremities  Skin: no rashes; no palpable lesions    Labs: all available labs reviewed                                   8.0    7.57  )-----------( 24       ( 25 May 2025 06:15 )             28.0     05-25    142  |  108  |  57[H]  ----------------------------<  102[H]  3.5   |  21[L]  |  2.41[H]    Ca    8.7      25 May 2025 06:15    TPro  5.2[L]  /  Alb  2.7[L]  /  TBili  0.3  /  DBili  x   /  AST  15  /  ALT  11[L]  /  AlkPhos  107  05-25              Urinalysis Basic - ( 20 May 2025 06:27 )    Color: x / Appearance: x / SG: x / pH: x  Gluc: 137 mg/dL / Ketone: x  / Bili: x / Urobili: x   Blood: x / Protein: x / Nitrite: x   Leuk Esterase: x / RBC: x / WBC x   Sq Epi: x / Non Sq Epi: x / Bacteria: x    Culture - Body Fluid with Gram Stain (05.20.25 @ 14:00)   Gram Stain:   polymorphonuclear leukocytes seen   No organisms seen   by cytocentrifuge  Specimen Source: Pleural Fl Pleural Fluid  Culture Results:   No growthCulture - Urine (05.18.25 @ 20:50)   Specimen Source: Clean Catch Clean Catch (Midstream)  Culture Results:   Culture positive, 10,000 - 49,000 CFU/mL . Identification to follow.  Culture Results:   Few Pseudomonas aeruginosa (Carbapenem Resistant)   Rare Enterococcus faecalis  Organism Identification: Pseudomonas aeruginosa (Carbapenem Resistant)   Pseudomonas aeruginosa (Carbapenem Resistant)   Enterococcus faecalis      Radiology: all available radiological tests reviewed      INTERPRETATION:  Prelim: Increased bilateral pleural effusion with   atelectasis. Debris/secretion at the trachea. Nonspecific patchy   opacities scattered in both lungs, which can be seen in noninfectious and   infectious processes. Known pancreatic mass with peritoneal   carcinomatosis. Mild left hydroureteronephrosis. Official report to   follow.    CLINICAL INFORMATION:    COMPARISON: None.    CONTRAST/COMPLICATIONS:  IVContrast: NONE  Oral Contrast: NONE    < end of copied text >    Advanced directives addressed: DNR/DNI

## 2025-05-25 NOTE — PROGRESS NOTE ADULT - ASSESSMENT
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 htn, hpl, gout, gerd, CVA, recent admission to [facility]/ for septic shock and [place] [facility] [place] c/b b/l PNA, renal failure and volume overload than discharged to [facility] 1 week ago, now presents to  for SOB, abd pain, hypotension and hypoxia. In the ED pt was given 1L of IVF and started on levo after for possible sepsis given clinical signs of volume overload and started on NIV, MALLORY w/ metabolic and lactic acidosis. Imaging shows Increased bilateral pleural effusion with atelectasis. Debris/secretion at the trachea. Nonspecific patchy opacities scattered in both lungs, which can be seen in noninfectious and infectious processes. Known pancreatic mass with peritoneal carcinomatosis. Mild left hydroureteronephrosis. Started on empiric IV abx - zosyn.     Acute respiratory failure. Septic shock. Pleural effusions. Multifocal pneumonia. Metastatic pancreatic cancer. Immunocompromised host. MALLORY  - imaging reviewed   - CTS f/u noted s/p R thoracentesis 550 cc drained transudative fluid f/u cx no growth  - reaccumulated fluid on R lung and has L lung effusion  - peritoneal drain in place plan for more drainage  - on IV zosyn [id number] #5  - will broaden abx coverage to IV meropenem [id number]   - continue with IV antibiotic coverage  - if able check sputum cx f/u cultures   - continue with antibiotic coverage  - monitor temps  - tolerating abx well so far; no side effects noted  - reason for abx use and side effects reviewed with patient  - supportive care  - fu cbc    Concern for infection with multi-resistant organisms was raised. Prior cultures reviewed. An epidemiologic assessment was performed. There is a significant risk for resistant microorganisms to spread to family members, and/or healthcare staff. The patient will be placed on isolation according to infection control policy. Will reconsider isolation measures based on new culture results and other clinical data as appropriate. Appropriate cultures collected and an appropriate broad spectrum antibiotic therapy will be considered.    Clinical team may change from intravenous to oral antibiotics when the following criteria are met:   1. Patient is clinically improving/stable       a)	Improved signs and symptoms of infection from initial presentation       b)	Afebrile for 24 hours       c)	Leukocytosis trending towards normal range   2. Patient is tolerating oral intake   3. Initial/repeat blood cultures are negative     When above criteria met - may change iv antibiotics to: po abx regimen

## 2025-05-25 NOTE — PROGRESS NOTE ADULT - SUBJECTIVE AND OBJECTIVE BOX
Subjective:  dyspneic  on nasal canula    MEDICATIONS  (STANDING):  chlorhexidine 2% Cloths 1 Application(s) Topical <User Schedule>  lactated ringers. 1000 milliLiter(s) (75 mL/Hr) IV Continuous <Continuous>  loperamide 2 milliGRAM(s) Oral daily  meropenem Injectable 500 milliGRAM(s) IV Push every 12 hours  metoclopramide Injectable 5 milliGRAM(s) IV Push three times a day  midodrine 10 milliGRAM(s) Oral every 8 hours  norepinephrine Infusion 0.05 MICROgram(s)/kG/Min (6.32 mL/Hr) IV Continuous <Continuous>  nystatin Cream 1 Application(s) Topical two times a day  pantoprazole  Injectable 40 milliGRAM(s) IV Push daily    MEDICATIONS  (PRN):  loperamide 2 milliGRAM(s) Oral every 6 hours PRN Diarrhea      Allergies    opioid-like analgesics (Vomiting; Nausea)    Intolerances        REVIEW OF SYSTEMS:    CONSTITUTIONAL:  As per HPI.  HEENT:  Eyes:  No diplopia or blurred vision. ENT:  No earache, sore throat or runny nose.  CARDIOVASCULAR:  No pressure, squeezing, tightness, heaviness or aching about the chest, neck, axilla or epigastrium.  RESPIRATORY:  sob  GASTROINTESTINAL:  No nausea, vomiting or diarrhea.  GENITOURINARY:  No dysuria, frequency or urgency.  MUSCULOSKELETAL:  no joint pain, deformity, tenderness  EXTREMITIES: no clubbing cyanosis,edema  SKIN:  No change in skin, hair or nails.  NEUROLOGIC:  No paresthesias, fasciculations, seizures or weakness.  PSYCHIATRIC:  No disorder of thought or mood.  ENDOCRINE:  No heat or cold intolerance, polyuria or polydipsia.  HEMATOLOGICAL:  No easy bruising or bleedings:    Vital Signs Last 24 Hrs  T(C): 36 (25 May 2025 08:11), Max: 36.2 (25 May 2025 00:00)  T(F): 96.8 (25 May 2025 08:11), Max: 97.2 (25 May 2025 00:00)  HR: 105 (25 May 2025 13:00) (91 - 106)  BP: 114/77 (25 May 2025 13:00) (103/63 - 129/88)  BP(mean): 88 (25 May 2025 13:00) (76 - 101)  RR: 24 (25 May 2025 13:00) (17 - 30)  SpO2: 98% (25 May 2025 12:59) (92% - 100%)    Parameters below as of 25 May 2025 12:59  Patient On (Oxygen Delivery Method): nasal cannula  O2 Flow (L/min): 4      PHYSICAL EXAMINATION:  SKIN: no rashes  HEAD: NC/AT  EYES: PERRLA, EOMI  EARS: TM's intact  NECK:  Supple. No lymphadenopathy. Jugular venous pressure not elevated. Carotids equal.   HEART:   S1S2 reg  CHEST: decreased bs bases bilat w scattered crackles  ABDOMEN:  Soft and nontender.   EXTREMITIES:  no C/C/E  NEURO: AAO x 3, no focal deficts       LABS:                        8.0    7.57  )-----------( 24       ( 25 May 2025 06:15 )             28.0     05-25    142  |  108  |  57[H]  ----------------------------<  102[H]  3.5   |  21[L]  |  2.41[H]    Ca    8.7      25 May 2025 06:15    TPro  5.2[L]  /  Alb  2.7[L]  /  TBili  0.3  /  DBili  x   /  AST  15  /  ALT  11[L]  /  AlkPhos  107  05-25      Urinalysis Basic - ( 25 May 2025 06:15 )    Color: x / Appearance: x / SG: x / pH: x  Gluc: 102 mg/dL / Ketone: x  / Bili: x / Urobili: x   Blood: x / Protein: x / Nitrite: x   Leuk Esterase: x / RBC: x / WBC x   Sq Epi: x / Non Sq Epi: x / Bacteria: x        RADIOLOGY & ADDITIONAL TESTS:

## 2025-05-25 NOTE — PROGRESS NOTE ADULT - SUBJECTIVE AND OBJECTIVE BOX
Subjective:  Pt seen, weak on high flow. Abd peritoneal drain drained 550cc today. Plt remain at 24, 000.     Vital Signs:  Vital Signs Last 24 Hrs  T(C): 35.8 (05-25-25 @ 15:14), Max: 36.2 (05-25-25 @ 00:00)  T(F): 96.5 (05-25-25 @ 15:14), Max: 97.2 (05-25-25 @ 00:00)  HR: 108 (05-25-25 @ 17:00) (91 - 108)  BP: 100/69 (05-25-25 @ 17:00) (94/68 - 129/88)  RR: 29 (05-25-25 @ 17:00) (17 - 34)  SpO2: 98% (05-25-25 @ 17:00) (93% - 100%) on (O2)    Telemetry/Alarms:    Relevant labs, radiology and Medications reviewed                        8.0    7.57  )-----------( 24       ( 25 May 2025 06:15 )             28.0     05-25    142  |  108  |  57[H]  ----------------------------<  102[H]  3.5   |  21[L]  |  2.41[H]    Ca    8.7      25 May 2025 06:15    TPro  5.2[L]  /  Alb  2.7[L]  /  TBili  0.3  /  DBili  x   /  AST  15  /  ALT  11[L]  /  AlkPhos  107  05-25      MEDICATIONS  (STANDING):  chlorhexidine 2% Cloths 1 Application(s) Topical <User Schedule>  lactated ringers. 1000 milliLiter(s) (75 mL/Hr) IV Continuous <Continuous>  loperamide 2 milliGRAM(s) Oral daily  meropenem Injectable 500 milliGRAM(s) IV Push every 12 hours  metoclopramide Injectable 5 milliGRAM(s) IV Push three times a day  midodrine 10 milliGRAM(s) Oral every 8 hours  norepinephrine Infusion 0.05 MICROgram(s)/kG/Min (6.32 mL/Hr) IV Continuous <Continuous>  nystatin Cream 1 Application(s) Topical two times a day  pantoprazole  Injectable 40 milliGRAM(s) IV Push daily  Vonoprazan 20 milliGRAM(s) 20 milliGRAM(s) Oral <User Schedule>    MEDICATIONS  (PRN):  loperamide 2 milliGRAM(s) Oral every 6 hours PRN Diarrhea      Physical exam  General: frail                                                       Neuro: alert, follows commands                    Eyes: PERRL, EOMI   ENT: Normal exam of nasal/oral mucosa with absence of cyanosis.   Neck: supple, no JVD, trachea midline   Chest: decreased bases, equal expansion  CV: RRR, tachycardic  GI: distended, NT,  peritoneal tube in place  Extremities: edema of all extremities  SKIN: warm, dry, intact     I&O's Summary    24 May 2025 07:01  -  25 May 2025 07:00  --------------------------------------------------------  IN: 3060 mL / OUT: 375 mL / NET: 2685 mL    25 May 2025 07:01  -  25 May 2025 18:17  --------------------------------------------------------  IN: 1025 mL / OUT: 700 mL / NET: 325 mL        Assessment  69y Female  w/ 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 htn, hpl, gout, gerd, CVA, recent admission to [facility] for septic shock and now recently tx at [facility] [place] for 10 day course of radiation c/b b/l PNA, renal failure and volume overload than discharged to [facility] 1 week ago, now presents to  for SOB, abd pain, hypotension and hypoxia.In the ED pt was given 1L of IVF and started on levo after for possible sepsis given clinical signs of volume overload and started on NIV, MALLORY w/ metabolic and lactic acidosis. Called for hypoxia and b/l effusions. 5/20 s/p Rt Thoracentesis 550cc drained , malignant effusion, cyto +. Carcinomatosis      Plan  Pleural studies Cyto malignancy , Exudative   b/l pleural effusions, watching    ongoing Palliative care conversations for GOC-   cont abx  peritoneal drained today 550cc  Prognosis poor   CXR in am    no thoracic procedure today, watch left effusion- platelets 25,   POCUS done see note    Discussed with Cardiothoracic Team at AM rounds.

## 2025-05-25 NOTE — CHART NOTE - NSCHARTNOTEFT_GEN_A_CORE
: Rosario Pandey    INDICATION:    PROCEDURE:  [ ] LIMITED ECHO  [x ] LIMITED CHEST  [ ] LIMITED RETROPERITONEAL  [ ] LIMITED ABDOMINAL  [ ] LIMITED DVT  [ ] NEEDLE GUIDANCE VASCULAR  [ ] NEEDLE GUIDANCE THORACENTESIS  [ ] NEEDLE GUIDANCE PARACENTESIS  [ ] NEEDLE GUIDANCE PERICARDIOCENTESIS  [ ] OTHER    FINDINGS:    moderate left pleural effusion      INTERPRETATION:        **STUDIES STORED ON QPATH** : Rosario Pandey    INDICATION:    PROCEDURE:  [ ] LIMITED ECHO  [x ] LIMITED CHEST  [ ] LIMITED RETROPERITONEAL  [ ] LIMITED ABDOMINAL  [ ] LIMITED DVT  [ ] NEEDLE GUIDANCE VASCULAR  [ ] NEEDLE GUIDANCE THORACENTESIS  [ ] NEEDLE GUIDANCE PARACENTESIS  [ ] NEEDLE GUIDANCE PERICARDIOCENTESIS  [ ] OTHER    FINDINGS:    moderate left pleural effusion      * pt difficult to position for US, uncomfortable during US         **STUDIES STORED ON QPATH**

## 2025-05-25 NOTE — PROGRESS NOTE ADULT - SUBJECTIVE AND OBJECTIVE BOX
pt seen and examined  afebrile  on high flow NC 50%   respiratory function frail    ROS: no fever or chills; denies dizziness, no HA, + SOB no abdominal pain, no diarrhea or constipation; no dysuria, no urinary frequency, no legs pain, no rashes    Date of service: 05-23-25 @ 14:24    pt seen and examined  on high flow nc 40%  afebrile  weak appearing     ROS: no fever or chills; denies dizziness, no HA, no abdominal pain, no diarrhea or constipation; no dysuria, no urinary frequency, no legs pain, no rashes    MEDICATIONS  (STANDING):  albumin human  5% IVPB 250 milliLiter(s) IV Intermittent every 8 hours  chlorhexidine 2% Cloths 1 Application(s) Topical <User Schedule>  lactated ringers. 1000 milliLiter(s) (75 mL/Hr) IV Continuous <Continuous>  loperamide 2 milliGRAM(s) Oral daily  meropenem Injectable 500 milliGRAM(s) IV Push every 12 hours  midodrine 10 milliGRAM(s) Oral every 8 hours  norepinephrine Infusion 0.05 MICROgram(s)/kG/Min (6.32 mL/Hr) IV Continuous <Continuous>  pantoprazole  Injectable 40 milliGRAM(s) IV Push daily    MEDICATIONS  (PRN):  loperamide Liquid 4 milliGRAM(s) Oral every 6 hours PRN Diarrhea      Vital Signs Last 24 Hrs  T(C): 36.2 (23 May 2025 08:25), Max: 36.3 (22 May 2025 16:22)  T(F): 97.2 (23 May 2025 08:25), Max: 97.4 (22 May 2025 16:22)  HR: 105 (23 May 2025 13:30) (94 - 129)  BP: 107/77 (23 May 2025 13:30) (51/23 - 124/82)  BP(mean): 87 (23 May 2025 13:30) (29 - 111)  RR: 21 (23 May 2025 13:30) (14 - 34)  SpO2: 100% (23 May 2025 13:30) (86% - 100%)    Parameters below as of 23 May 2025 10:36  Patient On (Oxygen Delivery Method): nasal cannula  O2 Flow (L/min): 4      PE:  Constitutional: NAD on high flow NC   HEENT: NC/AT, EOMI, PERRLA, conjunctivae clear; ears and nose atraumatic; pharynx benign  Neck: supple; thyroid not palpable  Back: no tenderness  Respiratory: decreased breath sounds rhonchi  Cardiovascular: S1S2 regular, no murmurs  Abdomen: soft, not tender, not distended, positive BS; liver and spleen WNL  Genitourinary: no suprapubic tenderness  Lymphatic: no LN palpable  Musculoskeletal: no muscle tenderness, no joint swelling or tenderness  Extremities: no pedal edema  Neurological/ Psychiatric:  moving all extremities  Skin: no rashes; no palpable lesions    Labs: all available labs reviewed                                              7.4    4.93  )-----------( 29       ( 23 May 2025 04:51 )             25.7     05-23    143  |  107  |  64[H]  ----------------------------<  111[H]  2.9[LL]   |  23  |  2.39[H]    Ca    9.1      23 May 2025 04:51  Phos  4.8     05-23  Mg     2.2     05-23    TPro  x   /  Alb  2.0[L]  /  TBili  x   /  DBili  x   /  AST  x   /  ALT  x   /  AlkPhos  x   05-22              Urinalysis Basic - ( 20 May 2025 06:27 )    Color: x / Appearance: x / SG: x / pH: x  Gluc: 137 mg/dL / Ketone: x  / Bili: x / Urobili: x   Blood: x / Protein: x / Nitrite: x   Leuk Esterase: x / RBC: x / WBC x   Sq Epi: x / Non Sq Epi: x / Bacteria: x    Culture - Body Fluid with Gram Stain (05.20.25 @ 14:00)   Gram Stain:   polymorphonuclear leukocytes seen   No organisms seen   by cytocentrifuge  Specimen Source: Pleural Fl Pleural Fluid  Culture Results:   No growthCulture - Urine (05.18.25 @ 20:50)   Specimen Source: Clean Catch Clean Catch (Midstream)  Culture Results:   Culture positive, 10,000 - 49,000 CFU/mL . Identification to follow.  Culture Results:   Few Pseudomonas aeruginosa (Carbapenem Resistant)   Rare Enterococcus faecalis  Organism Identification: Pseudomonas aeruginosa (Carbapenem Resistant)   Pseudomonas aeruginosa (Carbapenem Resistant)   Enterococcus faecalis      Radiology: all available radiological tests reviewed      INTERPRETATION:  Prelim: Increased bilateral pleural effusion with   atelectasis. Debris/secretion at the trachea. Nonspecific patchy   opacities scattered in both lungs, which can be seen in noninfectious and   infectious processes. Known pancreatic mass with peritoneal   carcinomatosis. Mild left hydroureteronephrosis. Official report to   follow.    CLINICAL INFORMATION:    COMPARISON: None.    CONTRAST/COMPLICATIONS:  IVContrast: NONE  Oral Contrast: NONE    < end of copied text >    Advanced directives addressed: full resuscitation

## 2025-05-26 NOTE — PROGRESS NOTE ADULT - ASSESSMENT
Mrs. 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 HTN, hpl, gout, GERD, CVA, recent admission to [facility] for septic shock, then  tx at [facility] [place] for 10 day course of radiation c/b b/l PNA, renal failure and volume overload, discharged to [facility] 1 week ago, now presents to  on 5/18 for SOB, abd pain, hypotension and hypoxia.      ct abdomen and pelvix shows carcinomatosis  MALLORY  on empiric Zosyn no positive culture  has peritoneal drain  profoundly weak    1. Pancreatic ca - with ascites  2. Acute hypoxic respiratory failure -secondary to bilateral effusion possible pneumonia  3 MALLORY  4. Malnutrition poor PO intake  5. hypotension likley from volume loses, possible sepsis    Neuro no focal  - profoundly weak  Respiratory - s/p right thoracentesis, seen by thoracic was not felt to  be amenable to left thoracentesis, cxr with bilateral effusion     goes back and forth from high flow to nasal cannula and NIV  cv off levophed continue midodrine  Renal -  Gentle Hydration  GI diet as tolerated, poor po,abdomen slightly more distended, will drain up to 500 cc again today  ID on empiric zosyn which was changed to meropenem to cover infiltrates in lungs, never had wbc or fever  HEme thrombycytopenia, hold heparin subq,   HIT abx,negative      Remains in ccu at risk of respiratory decompenation    Patient is a DNI but not a DNR

## 2025-05-26 NOTE — PROGRESS NOTE ADULT - SUBJECTIVE AND OBJECTIVE BOX
NEPHROLOGY CONSULT  HPI:  68 y/o F w/ pmh of pancreatic adenocarcinoma of the head w/ tumor in umbilicus, on chemo/radiation, SCC of lip. htn, hpl, gout, gerd, CVA, [facility] for [facility] shock and now recently tx at [facility] [place] for 10 day course of radiation c/b b/l PNA, renal failure and volume overload than discharged to [facility] 1 week ago, now presents to  for SOB, abd pain, hypotension and hypoxia. In the ED pt was given 1L of IVF and started on levo after for possible sepsis given clinical signs of volume overload and started on NIV, MALLORY w/ metabolic and lactic acidosis. Critical Consulted for admission pt seen and examined at bedside w/  dr. couch at bedside we discussed currently ongoing events and pt is full code per her wish.   (18 May 2025 12:17)    Nephrology:  Pt familiar to me  Above reviewed  pt as is admitted w hypotension, having reent admission for sepsis [facility]  She is on HF oxygen, dyspneic but NAD  receiving bicarb drip  Mild gradual elevation in creat noted    5/20  Pt asking for thoracentesis  states diff breathing    5/21  In bed more comfortable s/p thoracentesis  has abd drain as well, w high output and minimal UO     5/22  No New changes  High output from drains  minimal uo  Creat trending up    5/23  more awake today  on HFO    5/24  Awake alert  off HFO  off pressors      5/26  Awake on HFO2  feels worst than yesterday      Nephrology      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 1000 milliLiter(s) (75 mL/Hr) IV Continuous <Continuous>  loperamide 2 milliGRAM(s) Oral daily  meropenem Injectable 500 milliGRAM(s) IV Push every 12 hours  midodrine 10 milliGRAM(s) Oral every 8 hours  norepinephrine Infusion 0.05 MICROgram(s)/kG/Min (6.32 mL/Hr) IV Continuous <Continuous>  nystatin Cream 1 Application(s) Topical two times a day  pantoprazole  Injectable 40 milliGRAM(s) IV Push daily    MEDICATIONS  (PRN):  loperamide 2 milliGRAM(s) Oral every 6 hours PRN Diarrhea      Allergies    opioid-like analgesics (Vomiting; Nausea)    Intolerances      Vital Signs Last 24 Hrs  T(C): 36.1 (26 May 2025 15:20), Max: 36.5 (26 May 2025 00:58)  T(F): 96.9 (26 May 2025 15:20), Max: 97.7 (26 May 2025 00:58)  HR: 96 (26 May 2025 22:00) (87 - 135)  BP: 106/79 (26 May 2025 22:00) (69/49 - 119/78)  BP(mean): 86 (26 May 2025 22:00) (56 - 92)  RR: 25 (26 May 2025 22:00) (16 - 34)  SpO2: 98% (26 May 2025 22:00) (86% - 100%)    Parameters below as of 26 May 2025 22:00  Patient On (Oxygen Delivery Method): nasal cannula, high flow  O2 Flow (L/min): 50  O2 Concentration (%): 80    I&O's Detail    23 May 2025 07:01  -  24 May 2025 07:00  --------------------------------------------------------  IN:    Albumin 5%  - 250 mL: 750 mL    IV PiggyBack: 300 mL    IV PiggyBack: 100 mL    IV PiggyBack: 150 mL    Lactated Ringers: 1650 mL    Norepinephrine: 30 mL    Oral Fluid: 480 mL  Total IN: 3460 mL    OUT:    Indwelling Catheter - Urethral (mL): 325 mL  Total OUT: 325 mL    Total NET: 3135 mL      24 May 2025 07:01  -  24 May 2025 15:03  --------------------------------------------------------  IN:    IV PiggyBack: 200 mL    IV PiggyBack: 100 mL    Lactated Ringers: 300 mL    Oral Fluid: 210 mL  Total IN: 810 mL    OUT:  Total OUT: 0 mL    Total NET: 810 mL            PHYSICAL EXAM:    General:  Frail, less dyspneic  alert can answer questions    Neuro:  Alert and oriented to person, place,     HEENT:  No JVD, no masses, Eyes anicteric, ,    Cardiovascular:  Regular rate and rhythm, with normal S1 and S2.    Lungs:  clear. no rales, no wheezing anteriorly    Abdomen:  ascitis, mild distention  Skin:  Warm, dry    Extremities:  warm,  no cyanosis  ++ edema    LABS:                            8.0    7.57  )-----------( 24       ( 25 May 2025 06:15 )             28.0                           7.8    5.08  )-----------( 25       ( 24 May 2025 05:44 )             27.3                             7.4    4.93  )-----------( 29       ( 23 May 2025 04:51 )             25.7                   05-25    142  |  108  |  57[H]  ----------------------------<  102[H]  3.5   |  21[L]  |  2.41[H]    Ca    8.7      25 May 2025 06:15    TPro  5.2[L]  /  Alb  2.7[L]  /  TBili  0.3  /  DBili  x   /  AST  15  /  ALT  11[L]  /  AlkPhos  107  05-25 05-24    143  |  108  |  62[H]  ----------------------------<  92  3.1[L]   |  23  |  2.41[H]    Ca    8.9      24 May 2025 05:44  Phos  4.8     05-23  Mg     2.2     05-23    TPro  4.9[L]  /  Alb  2.3[L]  /  TBili  0.3  /  DBili  x   /  AST  10[L]  /  ALT  9[L]  /  AlkPhos  93  05-24 05-23    143  |  107  |  64[H]  ----------------------------<  111[H]  2.9[LL]   |  23  |  2.39[H]    Ca    9.1      23 May 2025 04:51  Phos  4.8     05-23  Mg     2.2     05-23    TPro  x   /  Alb  2.0[L]  /  TBili  x   /  DBili  x   /  AST  x   /  ALT  x   /  AlkPhos  x   05-22 05-22    140  |  109[H]  |  60[H]  ----------------------------<  117[H]  3.5   |  18[L]  |  2.48[H]    Ca    8.8      22 May 2025 05:55  Phos  4.4     05-21  Mg     2.6     05-21    TPro  4.8[L]  /  Alb  1.7[L]  /  TBili  0.2  /  DBili  x   /  AST  14[L]  /  ALT  11[L]  /  AlkPhos  105  05-22 05-21    139  |  103  |  58[H]  ----------------------------<  105[H]  3.4[L]   |  26  |  2.30[H]    Ca    8.9      21 May 2025 06:37  Phos  4.4     05-21  Mg     2.6     05-21    TPro  4.7[L]  /  Alb  1.7[L]  /  TBili  0.3  /  DBili  x   /  AST  13[L]  /  ALT  9[L]  /  AlkPhos  92  05-21 05-20    139  |  106  |  56[H]  ----------------------------<  137[H]  3.8   |  21[L]  |  2.07[H]    Ca    9.0      20 May 2025 06:27  Phos  4.0     05-20  Mg     2.8     05-20    TPro  5.2[L]  /  Alb  2.2[L]  /  TBili  0.3  /  DBili  x   /  AST  17  /  ALT  8[L]  /  AlkPhos  74  05-20      05-19    144  |  109[H]  |  55[H]  ----------------------------<  151[H]  3.3[L]   |  21[L]  |  1.94[H]    Ca    9.3      19 May 2025 06:13  Phos  4.4     05-19  Mg     1.7     05-19    TPro  4.8[L]  /  Alb  2.0[L]  /  TBili  0.3  /  DBili  x   /  AST  22  /  ALT  11[L]  /  AlkPhos  57  05-19    PT/INR - ( 18 May 2025 10:48 )   PT: 10.0 sec;   INR: 0.85 ratio         PTT - ( 18 May 2025 10:48 )  PTT:19.2 sec  Urinalysis Basic - ( 19 May 2025 06:13 )    Color: x / Appearance: x / SG: x / pH: x  Gluc: 151 mg/dL / Ketone: x  / Bili: x / Urobili: x   Blood: x / Protein: x / Nitrite: x   Leuk Esterase: x / RBC: x / WBC x   Sq Epi: x / Non Sq Epi: x / Bacteria: x      Magnesium: 1.7 mg/dL (05-19 @ 06:13)  Phosphorus: 4.4 mg/dL (05-19 @ 06:13)  Magnesium: 1.8 mg/dL (05-18 @ 20:30)  Phosphorus: 4.9 mg/dL (05-18 @ 20:30)  Magnesium: 2.1 mg/dL (05-18 @ 10:48)    ABG - ( 18 May 2025 21:59 )  pH, Arterial: 7.34  pH, Blood: x     /  pCO2: 27    /  pO2: 112   / HCO3: 15    / Base Excess: -9.6  /  SaO2: 99

## 2025-05-26 NOTE — PROGRESS NOTE ADULT - SUBJECTIVE AND OBJECTIVE BOX
Pt seen, had respiratory issues overnight, refused labs this am    MEDICATIONS  (STANDING):  chlorhexidine 2% Cloths 1 Application(s) Topical <User Schedule>  dexMEDEtomidine Infusion 0.5 MICROgram(s)/kG/Hr (8.43 mL/Hr) IV Continuous <Continuous>  lactated ringers. 1000 milliLiter(s) (75 mL/Hr) IV Continuous <Continuous>  loperamide 2 milliGRAM(s) Oral daily  meropenem Injectable 500 milliGRAM(s) IV Push every 12 hours  metoclopramide Injectable 5 milliGRAM(s) IV Push three times a day  midodrine 10 milliGRAM(s) Oral every 8 hours  norepinephrine Infusion 0.05 MICROgram(s)/kG/Min (6.32 mL/Hr) IV Continuous <Continuous>  nystatin Cream 1 Application(s) Topical two times a day  pantoprazole  Injectable 40 milliGRAM(s) IV Push daily  Vonoprazan 20 milliGRAM(s) 20 milliGRAM(s) Oral <User Schedule>    MEDICATIONS  (PRN):  loperamide 2 milliGRAM(s) Oral every 6 hours PRN Diarrhea  LORazepam   Injectable 0.25 milliGRAM(s) IV Push every 4 hours PRN Anxiety      ROS  unable to obtain      Vital Signs Last 24 Hrs  T(C): 36.1 (26 May 2025 15:20), Max: 36.5 (26 May 2025 00:58)  T(F): 96.9 (26 May 2025 15:20), Max: 97.7 (26 May 2025 00:58)  HR: 112 (26 May 2025 18:00) (100 - 135)  BP: 95/71 (26 May 2025 18:00) (90/68 - 119/78)  BP(mean): 80 (26 May 2025 18:00) (74 - 92)  RR: 26 (26 May 2025 18:00) (17 - 34)  SpO2: 89% (26 May 2025 16:00) (89% - 100%)    Parameters below as of 26 May 2025 16:00  Patient On (Oxygen Delivery Method): nasal cannula, high flow  O2 Flow (L/min): 50  O2 Concentration (%): 90    PE  lethargic, resting  HEENT on high flow  No rash grossly  FROM                          8.0    7.57  )-----------( 24       ( 25 May 2025 06:15 )             28.0       05-25    142  |  108  |  57[H]  ----------------------------<  102[H]  3.5   |  21[L]  |  2.41[H]    Ca    8.7      25 May 2025 06:15    TPro  5.2[L]  /  Alb  2.7[L]  /  TBili  0.3  /  DBili  x   /  AST  15  /  ALT  11[L]  /  AlkPhos  107  05-25       16-Jul-2018 01:15

## 2025-05-26 NOTE — PROGRESS NOTE ADULT - SUBJECTIVE AND OBJECTIVE BOX
HPI:        Mrs. 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 HTN, hpl, gout, GERD, CVA, recent admission to [facility] for septic shock, then  [place] [facility] [place] of [place] c/b b/l PNA, renal failure and volume overload, discharged to [facility] 1 week  PTA now presents to  on 5/18 for SOB, abd pain, hypotension and hypoxia.   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      GERD (gastroesophageal reflux disease)      Cyclical vomiting syndrome      CVA (cerebrovascular accident)      H/O: hysterectomy      H/O squamous cell carcinoma excision          FAMILY HISTORY:  No pertinent family history in first degree relatives        Social Hx:    Allergies    opioid-like analgesics (Vomiting; Nausea)    Intolerances            ICU Vital Signs Last 24 Hrs  T(C): 36.4 (26 May 2025 06:18), Max: 36.5 (26 May 2025 00:58)  T(F): 97.5 (26 May 2025 06:18), Max: 97.7 (26 May 2025 00:58)  HR: 113 (26 May 2025 09:27) (100 - 118)  BP: 94/66 (26 May 2025 08:00) (90/68 - 119/78)  BP(mean): 76 (26 May 2025 08:00) (76 - 92)  ABP: --  ABP(mean): --  RR: 25 (26 May 2025 09:27) (17 - 50)  SpO2: 100% (26 May 2025 09:27) (95% - 100%)    O2 Parameters below as of 26 May 2025 09:27  Patient On (Oxygen Delivery Method): nasal cannula, high flow  O2 Flow (L/min): 50  O2 Concentration (%): 85            I&O's Summary    25 May 2025 07:01  -  26 May 2025 07:00  --------------------------------------------------------  IN: 1850 mL / OUT: 825 mL / NET: 1025 mL                              8.0    7.57  )-----------( 24       ( 25 May 2025 06:15 )             28.0       05-25    142  |  108  |  57[H]  ----------------------------<  102[H]  3.5   |  21[L]  |  2.41[H]    Ca    8.7      25 May 2025 06:15    TPro  5.2[L]  /  Alb  2.7[L]  /  TBili  0.3  /  DBili  x   /  AST  15  /  ALT  11[L]  /  AlkPhos  107  05-25                Urinalysis Basic - ( 25 May 2025 06:15 )    Color: x / Appearance: x / SG: x / pH: x  Gluc: 102 mg/dL / Ketone: x  / Bili: x / Urobili: x   Blood: x / Protein: x / Nitrite: x   Leuk Esterase: x / RBC: x / WBC x   Sq Epi: x / Non Sq Epi: x / Bacteria: x        MEDICATIONS  (STANDING):  chlorhexidine 2% Cloths 1 Application(s) Topical <User Schedule>  lactated ringers. 1000 milliLiter(s) (75 mL/Hr) IV Continuous <Continuous>  loperamide 2 milliGRAM(s) Oral daily  meropenem Injectable 500 milliGRAM(s) IV Push every 12 hours  metoclopramide Injectable 5 milliGRAM(s) IV Push three times a day  midodrine 10 milliGRAM(s) Oral every 8 hours  norepinephrine Infusion 0.05 MICROgram(s)/kG/Min (6.32 mL/Hr) IV Continuous <Continuous>  nystatin Cream 1 Application(s) Topical two times a day  pantoprazole  Injectable 40 milliGRAM(s) IV Push daily  Vonoprazan 20 milliGRAM(s) 20 milliGRAM(s) Oral <User Schedule>    MEDICATIONS  (PRN):  loperamide 2 milliGRAM(s) Oral every 6 hours PRN Diarrhea      DVT Prophylaxis:    Advanced Directives:  Discussed with:    Visit Information: 30 min    ** Time is exclusive of billed procedures and/or teaching and/or routine family updates.

## 2025-05-26 NOTE — PROGRESS NOTE ADULT - ASSESSMENT
69F hx metastatic pancreatic adenocarcinoma s/p  chemo/XRT  with a peritoneal drain, and peritoneal carcinomatosis  SCC of lip. HTN, HLD, gout, GERD, CVA admitted to the ICU for    # Distributive Shock  # Acute mixed Respiratory Failure  # Multifocal PNA  # Fluid overload, Pleural Effusions  # Metastatic Adenocarcinoma   69F hx metastatic pancreatic adenocarcinoma s/p  chemo/XRT  with a peritoneal drain, and peritoneal carcinomatosis  SCC of lip. HTN, HLD, gout, GERD, CVA admitted to the ICU for    # Distributive Shock  # Acute mixed Respiratory Failure  # Multifocal PNA  # Fluid overload, Pleural Effusions  # Metastatic Adenocarcinoma  # MALLORY  # Thrombocytopenia    Dispo: DNR/DNI, Spoke with  okay with pressors    Neuro:  - Hold Precedex for Sedation in setting of worsening Hypotension  - PRN Ativan    CV:  - Actively titrating levo to maintain MAP > 65, Midodrine 10 mg TID    Pulm:  - HFNC 50 LPM 80% Fio2  - Incentive spirometry                  GI:  - PPI, Immodium  - Diet as tolerated    Renal:  - Even to net negative fluid balance as tolerated by hemodynamics and renal fx.    - Continue to monitor Bun/Cr and UOP  - Replacing electrolytes as needed with Goal K> 4, PO> 3, Mg> 2               - Strict I&O's  - Avoid Nephro toxic medication  - Renally dose meds  - LR @ 75    Heme:  - SCDs for DVT/PE ppx   - Heme Onc Following    ID:  - Microbiology and Radiology reviewed   - trend CBC with diff, CMP  and fever curve  - Addi    Endo:  - Glycemic control to limit FS glucose to < 180mg/dl.    Critical Care Time (EXCLUSIVE of any non bundled procedures) :  35 minutes were spent assessing the patient's presenting problems of acute illness that pose a high probability of life threatening  deterioration or end organ damage / dysfunction.  Medical desicion making includes initiation / continuation of plan or care review data/ labwork/ radiographic study, direct patient care bedside ,  discussions with  consultants regarding care,  evaluation and interpretation of vital signs, any necessary ventilator management,   NIV or BIPAP changes  or initiation,    discussions with multidisipliary team,  am or pm rounds, discussions of goals of care with patient and family all non-inclusive of procedures.    Date of entry of this note is equal to the date of services rendered   69F hx metastatic pancreatic adenocarcinoma s/p  chemo/XRT  with a peritoneal drain, and peritoneal carcinomatosis  SCC of lip. HTN, HLD, gout, GERD, CVA admitted to the ICU for    # Distributive Shock  # Acute mixed Respiratory Failure  # Multifocal PNA  # Fluid overload, Pleural Effusions  # Metastatic Adenocarcinoma  # MALLORY  # Thrombocytopenia    Dispo: DNR/DNI, Spoke with  okay with pressors    Neuro:  - Hold Precedex for Sedation in setting of worsening Hypotension  - PRN Ativan    CV:  - Actively titrating levo to maintain MAP > 65, Midodrine 10 mg TID    Pulm:  - HFNC 50 % Fio2  - Incentive spirometry                  GI:  - PPI, Imodium  - Diet as tolerated    Renal:  - Even to net negative fluid balance as tolerated by hemodynamics and renal fx.    - Continue to monitor Bun/Cr and UOP  - Replacing electrolytes as needed with Goal K> 4, PO> 3, Mg> 2               - Strict I&O's  - Avoid Nephro toxic medication  - Renally dose meds  - LR @ 75    Heme:  - SCDs for DVT/PE ppx   - Heme Onc Following    ID:  - Microbiology and Radiology reviewed   - trend CBC with diff, CMP  and fever curve  - Addi    Endo:  - Glycemic control to limit FS glucose to < 180mg/dl.    Critical Care Time (EXCLUSIVE of any non bundled procedures) :  35 minutes were spent assessing the patient's presenting problems of acute illness that pose a high probability of life threatening  deterioration or end organ damage / dysfunction.  Medical desicion making includes initiation / continuation of plan or care review data/ labwork/ radiographic study, direct patient care bedside ,  discussions with  consultants regarding care,  evaluation and interpretation of vital signs, any necessary ventilator management,   NIV or BIPAP changes  or initiation,    discussions with multidisipliary team,  am or pm rounds, discussions of goals of care with patient and family all non-inclusive of procedures.    Date of entry of this note is equal to the date of services rendered

## 2025-05-26 NOTE — PROGRESS NOTE ADULT - ASSESSMENT
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 She has been dx with acute respiratory failure, septic shock, pleural effusions and multifocal pneumonia. Hematology/Oncology consulted given her hx of met pancreatic cancer    1) Multifocal PNA/Pleural effusions  seen by CT surgery, post thoracentesis  on IV abx, changed to meropenem  both pulm and ID following    2) Pancreatic adenocarcinoma  -  most recently on treatment with Clinical trial with FOLFIRINOX + EMILY inhibitor at [facility]  -she has not received chemotherapy since february but did receive recent RT at [facility] x 10 days prior to this admission  -on this hospitalization with imaging revealing  Multifocal pneumonia, Small bilateral pleural effusions, Pancreatic body tail mass again visualized, Moderate to large ascites increased with peritoneal carcinomatosis and indwelling intraperitoneal catheter, and New mild to moderate left hydronephrosis  -peritoneal fluid + for met adenocarcinoma  -hold anti cancer therapy in hospital     3) Thrombocytopenia  -may be worsening from sepsis, malignancy, DIC, bone marrow suppression  -bilirubin and LDH nml arguing against hemolysis/ttp   -Heparin pf4 antibody neg  -transfuse if plt < 10 K    Matty Hernandez MD  [facility]  Cell: [phone]

## 2025-05-26 NOTE — PROGRESS NOTE ADULT - ASSESSMENT
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 htn, hpl, gout, gerd, CVA, recent admission to [facility]/ for septic shock and now recently tx at [facility] for 10 day course of radiation c/b b/l PNA, renal failure and volume overload than discharged to [facility] 1 week ago, now presents to  for SOB, abd pain, hypotension and hypoxia. In the ED pt was given 1L of IVF and started on levo after for possible sepsis given clinical signs of volume overload and started on NIV, MALLORY w/ metabolic and lactic acidosis. Critical Consulted for admission pt seen and examined at bedside w/  dr. couch at bedside we discussed currently ongoing events and pt is full code per her wish.   (18 May 2025 12:17)    Nephrology:  Pt familiar to me  Above reviewed  pt as is admitted w hypotension, having reent admission for sepsis at [facility] hospital  She is on HF oxygen, dyspneic but NAD  receiving bicarb drip    A/P  Pancreatic Adeno Ca  Metastatic  s/p chemo/ radiation  h/o MALLORY/ ATN  Hypotension s/p IVF, on pressors  Mild gradual elevation in creat noted  Metabolic acidosis on bicarb drip  Hypokalemia replace w IV KCl as needed  exacerbated by Bicarb drip  - monitor Magnesium level  Anemia- transfusion as per ICU     5/20  Metastatic Pancreatic Adeno Ca  s/p chemo/ radiation  MALLORY/ ATN creat still trending up  Hypotension s/p IVF, on pressors  Metabolic acidosis on bicarb drip  Hypokalemia replace w IV KCl as needed  exacerbated by Bicarb drip  - monitor Magnesium level  Anemia- transfusion as per ICU   D/w Dr Fortune/ and Dr Couch    5/21  Metastatic Pancreatic Adeno Ca  s/p chemo/ radiation  MALLORY/ ATN creat still trending up,  - mallory due to high output from pts drains and thoracentesis, ~ 1500 ml, vs  ml  Hypotension s/p IVF, on pressors  Metabolic acidosis on bicarb drip  Hypokalemia replace w IV KCl as needed  exacerbated by Bicarb drip  - monitor Magnesium level  Anemia- transfusion as per ICU   Dr Fortune plans on albumin infusion  D/w Dr Fortune/ and Dr Couch    5/22  Metastatic Pancreatic Adeno Ca  s/p chemo/ radiation  MALLORY/ ATN creat still trending up, high output from drains and minimal UO  hyperchloremic on NS  Will switch to LR to correct the hyperchloremia/ low K   - labs this evening 6 pm  Anemia- transfusion as per ICU   Dr Fortune plans on albumin infusion  D/w Dr Fortune/ and Dr Couch    5/22  Metastatic Pancreatic Adeno Ca  s/p chemo/ radiation  MALLORY/ ATN creat still trending up, high output from drains and minimal UO  hyperchloremic on NS  iv switched to LR @ 125 ml/hr and increased to 150 ml/hr  pt also got IV boluses  , uo only 100 ml  over 24 hrs   Anemia- transfusion as per ICU   Dr Fortune plans on albumin infusion  D/w Dr Fortune/ and Dr Couch    Addendum  Will reduce IVF  CXR personally reviewed may be more PVC today      5/24  Metastatic Pancreatic Adeno Ca  s/p chemo/ radiation  MALLORY/ ATN creat still trending up, high output from drains and minimal UO  hyperchloremic on NS, reduced to 75 ml/hr  Anemia- transfusion as per ICU   CXR 5/23 reviewed personally  Case d/w Ayad Couch yesterday    5/26  MALLORY/ ATN   Metastatic pancreatic ca s/p radiation chemo  Anasarca/ pleural effusion/ anasarca, s/p abd drain  Will monitor on IVF, LR @ 75 ml/hr

## 2025-05-26 NOTE — PROGRESS NOTE ADULT - SUBJECTIVE AND OBJECTIVE BOX
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      GERD (gastroesophageal reflux disease)      Cyclical vomiting syndrome      CVA (cerebrovascular accident)      H/O: hysterectomy      H/O squamous cell carcinoma excision      Medications:  meropenem Injectable 500 milliGRAM(s) IV Push every 12 hours    midodrine 10 milliGRAM(s) Oral every 8 hours  norepinephrine Infusion 0.05 MICROgram(s)/kG/Min IV Continuous <Continuous>      LORazepam   Injectable 0.25 milliGRAM(s) IV Push every 4 hours PRN  metoclopramide Injectable 5 milliGRAM(s) IV Push three times a day        loperamide 2 milliGRAM(s) Oral daily  loperamide 2 milliGRAM(s) Oral every 6 hours PRN  pantoprazole  Injectable 40 milliGRAM(s) IV Push daily        lactated ringers. 1000 milliLiter(s) IV Continuous <Continuous>      chlorhexidine 2% Cloths 1 Application(s) Topical <User Schedule>  nystatin Cream 1 Application(s) Topical two times a day    Vonoprazan 20 milliGRAM(s) 20 milliGRAM(s) Oral <User Schedule>          ICU Vital Signs Last 24 Hrs  T(C): 36.1 (26 May 2025 15:20), Max: 36.5 (26 May 2025 00:58)  T(F): 96.9 (26 May 2025 15:20), Max: 97.7 (26 May 2025 00:58)  HR: 96 (26 May 2025 22:00) (87 - 135)  BP: 106/79 (26 May 2025 22:00) (69/49 - 119/78)  BP(mean): 86 (26 May 2025 22:00) (56 - 92)  ABP: --  ABP(mean): --  RR: 25 (26 May 2025 22:00) (16 - 34)  SpO2: 98% (26 May 2025 22:00) (86% - 100%)    O2 Parameters below as of 26 May 2025 22:00  Patient On (Oxygen Delivery Method): nasal cannula, high flow  O2 Flow (L/min): 50  O2 Concentration (%): 80            I&O's Detail    25 May 2025 07:01  -  26 May 2025 07:00  --------------------------------------------------------  IN:    IV PiggyBack: 200 mL    Lactated Ringers: 1650 mL  Total IN: 1850 mL    OUT:    Drain (mL): 550 mL    Indwelling Catheter - Urethral (mL): 275 mL  Total OUT: 825 mL    Total NET: 1025 mL      26 May 2025 07:01  -  26 May 2025 22:34  --------------------------------------------------------  IN:  Total IN: 0 mL    OUT:    Drain (mL): 500 mL    Indwelling Catheter - Urethral (mL): 125 mL  Total OUT: 625 mL    Total NET: -625 mL          LABS:                        8.0    7.57  )-----------( 24       ( 25 May 2025 06:15 )             28.0     05-25    142  |  108  |  57[H]  ----------------------------<  102[H]  3.5   |  21[L]  |  2.41[H]    Ca    8.7      25 May 2025 06:15    TPro  5.2[L]  /  Alb  2.7[L]  /  TBili  0.3  /  DBili  x   /  AST  15  /  ALT  11[L]  /  AlkPhos  107  05-25          CAPILLARY BLOOD GLUCOSE          Urinalysis Basic - ( 25 May 2025 06:15 )    Color: x / Appearance: x / SG: x / pH: x  Gluc: 102 mg/dL / Ketone: x  / Bili: x / Urobili: x   Blood: x / Protein: x / Nitrite: x   Leuk Esterase: x / RBC: x / WBC x   Sq Epi: x / Non Sq Epi: x / Bacteria: x      CULTURES:  Culture Results:   No growth at 5 days (05-20 @ 14:00)  Culture Results:   No growth (05-20 @ 14:00)      RADIOLOGY: < from: Xray Chest 1 View- PORTABLE-Routine (Xray Chest 1 View- PORTABLE-Routine in AM.) (05.25.25 @ 09:32) >    ACC: [id number] EXAM:  XR CHEST PORTABLE ROUTINE 1V   ORDERED BY: EVA DASH     PROCEDURE DATE:  05/25/2025          INTERPRETATION:  Portable AP chest radiograph    COMPARISON: 5/23/2025 chest x-ray.    CLINICAL INFORMATION: Pneumonia.    FINDINGS:  CATHETERS AND TUBES: RIGHT PICC line catheter tip in distal SVC.    PULMONARY:  Bilateral pleural effusions and perihilar/lower zone multifocal airspace   consolidations..    HEART/VASCULAR: The heart size difficult to assess due to opacified LEFT   lower zone parenchyma..    BONES: The visualized osseous thorax is intact.    IMPRESSION: No interval change    --- End of Report ---            JONN LOMBARDI MD; Attending Radiologist  This document has been electronically signed. May [id number] 12:53PM    < end of copied text >

## 2025-05-27 NOTE — PROGRESS NOTE ADULT - SUBJECTIVE AND OBJECTIVE BOX
Date of service: 05-27-25 @ 11:27    pt seen and examined  afebrile  lethargic, on bipap  weak appearing     ROS: no fever or chills; denies dizziness, no HA, + SOB, no abdominal pain, no diarrhea or constipation; no dysuria, no urinary frequency, no legs pain, no rashes      MEDICATIONS  (STANDING):  chlorhexidine 2% Cloths 1 Application(s) Topical <User Schedule>  lactated ringers. 1000 milliLiter(s) (75 mL/Hr) IV Continuous <Continuous>  loperamide 2 milliGRAM(s) Oral daily  meropenem Injectable 500 milliGRAM(s) IV Push every 12 hours  metoclopramide Injectable 5 milliGRAM(s) IV Push three times a day  midodrine 10 milliGRAM(s) Oral every 8 hours  norepinephrine Infusion 0.05 MICROgram(s)/kG/Min (6.32 mL/Hr) IV Continuous <Continuous>  nystatin Cream 1 Application(s) Topical two times a day  pantoprazole  Injectable 40 milliGRAM(s) IV Push daily  Suzetrigine (Journavx) 100 milliGRAM(s)   Oral once  Suzetrigine (Journavx) 50 milliGRAM(s)   Oral every 12 hours  Vonoprazan 20 milliGRAM(s) 20 milliGRAM(s) Oral <User Schedule>    Vital Signs Last 24 Hrs  T(C): 35.4 (27 May 2025 09:00), Max: 36.3 (26 May 2025 12:05)  T(F): 95.7 (27 May 2025 09:00), Max: 97.3 (26 May 2025 12:05)  HR: 115 (27 May 2025 11:00) (83 - 122)  BP: 92/70 (27 May 2025 11:00) (69/49 - 120/79)  BP(mean): 78 (27 May 2025 11:00) (56 - 93)  RR: 23 (27 May 2025 11:00) (16 - 29)  SpO2: 90% (27 May 2025 11:00) (80% - 100%)    Parameters below as of 27 May 2025 08:00  Patient On (Oxygen Delivery Method): BiPAP/CPAP    PE:  Constitutional: NAD on high flow NC   HEENT: NC/AT, EOMI, PERRLA, conjunctivae clear; ears and nose atraumatic; pharynx benign  Neck: supple; thyroid not palpable  Back: no tenderness  Respiratory: decreased breath sounds rhonchi  Cardiovascular: S1S2 regular, no murmurs  Abdomen: soft, not tender, not distended, positive BS; liver and spleen WNL  Genitourinary: no suprapubic tenderness  Lymphatic: no LN palpable  Musculoskeletal: no muscle tenderness, no joint swelling or tenderness  Extremities: no pedal edema  Neurological/ Psychiatric:  moving all extremities  Skin: no rashes; no palpable lesions    Labs: all available labs reviewed                         Urinalysis Basic - ( 20 May 2025 06:27 )    Color: x / Appearance: x / SG: x / pH: x  Gluc: 137 mg/dL / Ketone: x  / Bili: x / Urobili: x   Blood: x / Protein: x / Nitrite: x   Leuk Esterase: x / RBC: x / WBC x   Sq Epi: x / Non Sq Epi: x / Bacteria: x    Culture - Body Fluid with Gram Stain (05.20.25 @ 14:00)   Gram Stain:   polymorphonuclear leukocytes seen   No organisms seen   by cytocentrifuge  Specimen Source: Pleural Fl Pleural Fluid  Culture Results:   No growthCulture - Urine (05.18.25 @ 20:50)   Specimen Source: Clean Catch Clean Catch (Midstream)  Culture Results:   Culture positive, 10,000 - 49,000 CFU/mL . Identification to follow.  Culture Results:   Few Pseudomonas aeruginosa (Carbapenem Resistant)   Rare Enterococcus faecalis  Organism Identification: Pseudomonas aeruginosa (Carbapenem Resistant)   Pseudomonas aeruginosa (Carbapenem Resistant)   Enterococcus faecalis      Radiology: all available radiological tests reviewed      INTERPRETATION:  Prelim: Increased bilateral pleural effusion with   atelectasis. Debris/secretion at the trachea. Nonspecific patchy   opacities scattered in both lungs, which can be seen in noninfectious and   infectious processes. Known pancreatic mass with peritoneal   carcinomatosis. Mild left hydroureteronephrosis. Official report to   follow.    CLINICAL INFORMATION:    COMPARISON: None.    CONTRAST/COMPLICATIONS:  IVContrast: NONE  Oral Contrast: NONE    < end of copied text >    Advanced directives addressed: DNR/DNI

## 2025-05-27 NOTE — PROGRESS NOTE ADULT - SUBJECTIVE AND OBJECTIVE BOX
Interval History:        Patient lethargic did get 1 dose of low dose ativan yesterday        not eating        lethargic        on and off bipap now on    HPI:       Mrs. 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 HTN, hpl, gout, GERD, CVA, recent admission to [facility]/ for septic shock, then  tx at [facility] for 10 day course of radiation c/b b/l PNA, renal failure and volume overload, discharged to [facility] 1 week  PTA now presents to  on 5/18 for SOB, abd pain, hypotension and hypoxia.   ct abdomen and pelvix shows carcinomatosis  was on empiric Zosyn no positive culture possible infiltrates on ct abx changed to Meropenem    5/28: Patient on HF NC O2 with a FI O2 of 100%, some abdominal discomfort    MEDICATIONS  (STANDING):  chlorhexidine 2% Cloths 1 Application(s) Topical <User Schedule>  lactated ringers. 1000 milliLiter(s) (75 mL/Hr) IV Continuous <Continuous>  loperamide 2 milliGRAM(s) Oral daily  meropenem Injectable 500 milliGRAM(s) IV Push every 12 hours  metoclopramide Injectable 5 milliGRAM(s) IV Push three times a day  midodrine 10 milliGRAM(s) Oral every 8 hours  norepinephrine Infusion 0.05 MICROgram(s)/kG/Min (6.32 mL/Hr) IV Continuous <Continuous>  nystatin Cream 1 Application(s) Topical two times a day  pantoprazole  Injectable 40 milliGRAM(s) IV Push daily  Suzetrigine (Journavx) 100 milliGRAM(s)   Oral once  Suzetrigine (Journavx) 50 milliGRAM(s)   Oral every 12 hours  Vonoprazan 20 milliGRAM(s) 20 milliGRAM(s) Oral <User Schedule>    MEDICATIONS  (PRN):  loperamide 2 milliGRAM(s) Oral every 6 hours PRN Diarrhea  LORazepam   Injectable 0.25 milliGRAM(s) IV Push every 4 hours PRN Anxiety    ICU Vital Signs Last 24 Hrs  T(C): 35.4 (27 May 2025 09:00), Max: 36.3 (26 May 2025 12:05)  T(F): 95.7 (27 May 2025 09:00), Max: 97.3 (26 May 2025 12:05)  HR: 115 (27 May 2025 11:00) (83 - 122)  BP: 92/70 (27 May 2025 11:00) (69/49 - 120/79)  BP(mean): 78 (27 May 2025 11:00) (56 - 93)  ABP: --  ABP(mean): --  RR: 23 (27 May 2025 11:00) (16 - 29)  SpO2: 90% (27 May 2025 11:00) (80% - 100%)    O2 Parameters below as of 27 May 2025 08:00  Patient On (Oxygen Delivery Method): BiPAP/CPAP    General ill  HEENT nc/at, bipap on  Neck supple  lungs dec bs at bases bilaterally  cv rrr  abdomen distended  extremiteis edematous  neuro edematous    I&O's Summary    26 May 2025 07:01  -  27 May 2025 07:00  --------------------------------------------------------  IN: 869.7 mL / OUT: 725 mL / NET: 144.7 mL    DVT Prophylaxis:  held for thrombocytopenia                                                               Advanced Directives: DNR     Sepsis Criteria Met - DNI    Labs, Notes, Orders, radiologic studies reviewed and care coordinated with multidisciplinary team

## 2025-05-27 NOTE — PROGRESS NOTE ADULT - ASSESSMENT
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 htn, hpl, gout, gerd, CVA, recent admission to [facility]/ for septic shock and now [place] [facility] [place] course of [place] c/b b/l PNA, renal failure and volume overload than discharged to [facility] 1 week ago, now presents to  for SOB, abd pain, hypotension and hypoxia. In the ED pt was given 1L of IVF and started on levo after for possible sepsis given clinical signs of volume overload and started on NIV, MALLORY w/ metabolic and lactic acidosis. Imaging shows Increased bilateral pleural effusion with atelectasis. Debris/secretion at the trachea. Nonspecific patchy opacities scattered in both lungs, which can be seen in noninfectious and infectious processes. Known pancreatic mass with peritoneal carcinomatosis. Mild left hydroureteronephrosis. Started on empiric IV abx - zosyn.     Acute respiratory failure. Sepsis. Pleural effusions. Multifocal pneumonia. Metastatic pancreatic cancer. Immunocompromised host. MALLORY  - imaging reviewed   - CTS f/u noted s/p R thoracentesis 5/20 f/u cx no growth cytology + malignant cells  - reaccumulated fluid on R lung and has L lung effusion  - peritoneal drain in place 550 cc drainage   - s/p IV zosyn [id number] #5  - now on IV meropenem [id number] #5  - continue with IV antibiotic coverage  - if able check sputum cx f/u cultures - blood cx no growth  - pulmonary fu noted   - tolerating abx well so far; no side effects noted  - reason for abx use and side effects reviewed with patient  - supportive care  - fu cbc    Concern for infection with multi-resistant organisms was raised. Prior cultures reviewed. An epidemiologic assessment was performed. There is a significant risk for resistant microorganisms to spread to family members, and/or healthcare staff. The patient will be placed on isolation according to infection control policy. Will reconsider isolation measures based on new culture results and other clinical data as appropriate. Appropriate cultures collected and an appropriate broad spectrum antibiotic therapy will be considered.    IV to PO token is not indicated

## 2025-05-27 NOTE — PROGRESS NOTE ADULT - SUBJECTIVE AND OBJECTIVE BOX
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      GERD (gastroesophageal reflux disease)      Cyclical vomiting syndrome      CVA (cerebrovascular accident)      H/O: hysterectomy      H/O squamous cell carcinoma excision          Review of Systems:  unable to obtain       Medications:  meropenem Injectable 500 milliGRAM(s) IV Push every 12 hours    midodrine 10 milliGRAM(s) Oral every 8 hours  norepinephrine Infusion 0.05 MICROgram(s)/kG/Min IV Continuous <Continuous>      LORazepam   Injectable 0.25 milliGRAM(s) IV Push every 4 hours PRN  metoclopramide Injectable 5 milliGRAM(s) IV Push three times a day        loperamide 2 milliGRAM(s) Oral daily  loperamide 2 milliGRAM(s) Oral every 6 hours PRN  pantoprazole  Injectable 40 milliGRAM(s) IV Push daily        lactated ringers. 1000 milliLiter(s) IV Continuous <Continuous>      chlorhexidine 2% Cloths 1 Application(s) Topical <User Schedule>  nystatin Cream 1 Application(s) Topical two times a day    Suzetrigine (Journavx) 50 milliGRAM(s) 1 Tablet(s) Oral every 12 hours  Vonoprazan 20 milliGRAM(s) 20 milliGRAM(s) Oral <User Schedule>          ICU Vital Signs Last 24 Hrs  T(C): 35.4 (27 May 2025 09:00), Max: 35.6 (27 May 2025 00:00)  T(F): 95.7 (27 May 2025 09:00), Max: 96 (27 May 2025 00:00)  HR: 116 (27 May 2025 22:00) (85 - 130)  BP: 116/84 (27 May 2025 22:00) (76/54 - 120/79)  BP(mean): 95 (27 May 2025 22:00) (63 - 95)  ABP: --  ABP(mean): --  RR: 21 (27 May 2025 22:00) (16 - 29)  SpO2: 96% (27 May 2025 22:00) (80% - 100%)    O2 Parameters below as of 27 May 2025 22:00  Patient On (Oxygen Delivery Method): nasal cannula, high flow  O2 Flow (L/min): 50  O2 Concentration (%): 100            I&O's Detail    26 May 2025 07:01  -  27 May 2025 07:00  --------------------------------------------------------  IN:    Lactated Ringers: 825 mL    Norepinephrine: 44.7 mL  Total IN: 869.7 mL    OUT:    Drain (mL): 500 mL    Indwelling Catheter - Urethral (mL): 225 mL  Total OUT: 725 mL    Total NET: 144.7 mL      27 May 2025 07:01  -  27 May 2025 23:17  --------------------------------------------------------  IN:    Lactated Ringers: 825 mL    Norepinephrine: 95 mL  Total IN: 920 mL    OUT:    Indwelling Catheter - Urethral (mL): 100 mL  Total OUT: 100 mL    Total NET: 820 mL            LABS:                CAPILLARY BLOOD GLUCOSE            CULTURES:

## 2025-05-27 NOTE — PROGRESS NOTE ADULT - SUBJECTIVE AND OBJECTIVE BOX
Subjective: Pt seen and examined at bedside. Lethargic on bipap      V/S  T(C): 35.4 (05-27-25 @ 09:00), Max: 36.3 (05-26-25 @ 12:05)  HR: 115 (05-27-25 @ 11:00) (83 - 122)  BP: 92/70 (05-27-25 @ 11:00) (69/49 - 120/79)  ABP: --  ABP(mean): --  RR: 23 (05-27-25 @ 11:00) (16 - 29)  SpO2: 90% (05-27-25 @ 11:00) (80% - 100%)  Wt(kg): --  CVP(mm Hg): --  CO: --  CI: --  PA: --                                                Tele:  CHEST TUBES:  Left CT     [  ]LWS  [  ]H2O seal  Right CT   [  ]LWS  [  ]H2O seal    I&O's Detail    26 May 2025 07:01  -  27 May 2025 07:00  --------------------------------------------------------  IN:    Lactated Ringers: 825 mL    Norepinephrine: 44.7 mL  Total IN: 869.7 mL    OUT:    Drain (mL): 500 mL    Indwelling Catheter - Urethral (mL): 225 mL  Total OUT: 725 mL    Total NET: 144.7 mL          05-26-25 @ 07:01  -  05-27-25 @ 07:00  --------------------------------------------------------  IN: 869.7 mL / OUT: 725 mL / NET: 144.7 mL      MEDICATIONS  (STANDING):  chlorhexidine 2% Cloths 1 Application(s) Topical <User Schedule>  lactated ringers. 1000 milliLiter(s) (75 mL/Hr) IV Continuous <Continuous>  loperamide 2 milliGRAM(s) Oral daily  meropenem Injectable 500 milliGRAM(s) IV Push every 12 hours  metoclopramide Injectable 5 milliGRAM(s) IV Push three times a day  midodrine 10 milliGRAM(s) Oral every 8 hours  norepinephrine Infusion 0.05 MICROgram(s)/kG/Min (6.32 mL/Hr) IV Continuous <Continuous>  nystatin Cream 1 Application(s) Topical two times a day  pantoprazole  Injectable 40 milliGRAM(s) IV Push daily  Suzetrigine (Journavx) 100 milliGRAM(s)   Oral once  Suzetrigine (Journavx) 50 milliGRAM(s)   Oral every 12 hours  Vonoprazan 20 milliGRAM(s) 20 milliGRAM(s) Oral <User Schedule>                               CAPILLARY BLOOD GLUCOSE               CXR:  < from: Xray Chest 1 View- PORTABLE-Routine (Xray Chest 1 View- PORTABLE-Routine in AM.) (05.25.25 @ 09:32) >  INTERPRETATION:  Portable AP chest radiograph    COMPARISON: 5/23/2025 chest x-ray.    CLINICAL INFORMATION: Pneumonia.    FINDINGS:  CATHETERS AND TUBES: RIGHT PICC line catheter tip in distal SVC.    PULMONARY:  Bilateral pleural effusions and perihilar/lower zone multifocal airspace   consolidations..    HEART/VASCULAR: The heart size difficult to assess due to opacified LEFT   lower zone parenchyma..    BONES: The visualized osseous thorax is intact.    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

## 2025-05-27 NOTE — PROGRESS NOTE ADULT - ASSESSMENT
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 htn, hpl, gout, gerd, CVA, recent admission to [facility] for septic shock and now recently tx at [facility] [place] for 10 day course of radiation c/b b/l PNA, renal failure and volume overload than discharged to [facility] 1 week ago, now presents to  for SOB, abd pain, hypotension and hypoxia.In the ED pt was given 1L of IVF and started on levo after for possible sepsis given clinical signs of volume overload and started on NIV, MALLORY w/ metabolic and lactic acidosis. Called for hypoxia and b/l effusions. 5/20 s/p Rt Thoracentesis 550cc drained , malignant effusion, cyto +. Carcinomatosis      Plan  S/p Thora on 5/20 with 550cc. Pleural studies show exudative effusion and Cyto is + malignancy  ongoing Palliative care conversations for GOC  further pleural drainage unlikley to improve resp status and pt remains high risk for a thoracic procedure   Platelets 24k  cont abx  peritoneal drained as tolerated   Prognosis poor   will sign off. Call back with any questions       Discussed with Cardiothoracic Team at AM rounds.

## 2025-05-27 NOTE — PROGRESS NOTE ADULT - ASSESSMENT
Mrs. 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 HTN, hpl, gout, GERD, CVA, recent admission to [facility] for septic shock, then  [place] [facility] [place] c/b b/l PNA, renal failure and volume overload, discharged to [facility] 1 week ago, now presents to  on 5/18 for SOB, abd pain, hypotension and hypoxia.      ct abdomen and pelvix shows carcinomatosis  MALLORY  on empiric Zosyn no positive culture  has peritoneal drain  profoundly weak    1. Pancreatic ca - with ascites  2. Acute hypoxic respiratory failure -secondary to bilateral effusion possible pneumonia  3 MALLORY  4. Malnutrition poor PO intake  5. hypotension likley from volume loses, possible sepsis    Neuro - non  focal  - profoundly weak  Respiratory - s/p right thoracentesis, seen by thoracic was not felt to  be amenable to left thoracentesis, cxr with bilateral effusion     goes back and forth from high flow to nasal cannula and NIV  cv  on low dose levophed continue midodrine  Renal -  Gentle Hydration  GI diet as tolerated, poor po,abdomen    ID on empiric zosyn which was changed to meropenem to cover infiltrates in lungs, never had wbc or fever  HEme thrombycytopenia, hold heparin subq,   HIT abx,negative      Remains in ccu at risk of respiratory decompenation    Patient is a DNI but not a DNR

## 2025-05-27 NOTE — PROGRESS NOTE ADULT - ASSESSMENT
Assessment     1) Septic shock   2) Type 1&2 RF  3) Multi focal pna   4) pleural effusions   5) MALLORY  6) Thrombocytopenia     Plan     Type 1 and 2 RF / PNA / Pleural effusions     - On HFNC 100% 40L. Actively titrating for FIO2 for SPO2 >92%. Unable to titrate due to hypoxia  - Nocturnal AVAPS  - s/p thora with Thoracic   - Culture data negative thus far    Septic shock     - On IV Levophed. Actively titrating for MAP >65  - midodrine  - mIVF  - TTE with preserved BiV function, no significant valvulopathy   - IV zosyn --> Addi  - Culture data is negative   - ID following     MALLORY    - MALLORY  in setting of sepsis and shock  - Continue IV vasopressors and mIVF  - Nephrology following     PPX    - IV PPI  - SCDs for DVT ppx  - PT/OT if able for critical illness polymyopathy         __41__ minutes of critical care time spent providing medical care for patient's acute illness/conditions that impairs at least one vital organ system and/or poses a high risk of imminent or life threatening deterioration in the patient's condition. It includes time spent evaluating and treating the patient's acute illness as well as time spent reviewing labs, radiology, discussing goals of care with patient and/or patient's family, and discussing the case with a multidisciplinary team in an effort to prevent further life threatening deterioration or end organ damage. This time is independent of any procedures performed.

## 2025-05-28 NOTE — PROGRESS NOTE ADULT - ASSESSMENT
Progressive Pancreatic cancer  Carcinomatosis  ? pneumonia  Renal disease    PLAN    Recommend comfort / supportive care  There are mo meaningful therapies for her cancer  Prognosis is poor  She is appropriate for supportive care/ Hospice ( In patient/ out patient)  Comfort should be the primary goal  Discussed with her

## 2025-05-28 NOTE — PROGRESS NOTE ADULT - ASSESSMENT
Mrs. 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 HTN, hpl, gout, GERD, CVA, recent admission to [facility] for septic shock, then  [place] at [facility] [place] 10 day course of radiation c/b b/l PNA, renal failure and volume overload, discharged to [facility] 1 week ago, now presents to  on 5/18 for SOB, abd pain, hypotension and hypoxia.      ct abdomen and pelvix shows carcinomatosis  MALLORY  on empiric Zosyn no positive culture  has peritoneal drain  profoundly weak    1. Pancreatic ca - with ascites  2. Acute hypoxic respiratory failure -secondary to bilateral effusion possible pneumonia  3 MALLORY  4. Malnutrition poor PO intake  5. hypotension likley from volume loses, possible sepsis    Neuro - non  focal  - profoundly weak  Respiratory - s/p right thoracentesis, seen by thoracic was not felt to  be amenable to left thoracentesis, cxr with bilateral effusion     goes back and forth from high flow to nasal cannula and NIV  cv  on low dose levophed continue midodrine  Renal -  Gentle Hydration progressive renal failure  GI diet as tolerated, poor po,abdomen    ID on empiric zosyn which was changed to meropenem to cover infiltrates in lungs, never had wbc or fever  HEme thrombycytopenia, hold heparin subq,   HIT abx,negative      Remains in ccu at risk of respiratory decompenation

## 2025-05-28 NOTE — PROGRESS NOTE ADULT - REASON FOR ADMISSION
Pancreatic ca
Pancreatic cancer
SOB
pancreatic ca hypoxia
sob
SOB
shortness of breathe, metastatic pancreatic ca
sob
Pancreatic ca, respiratory failure
SOB
shortness of breathe, weakness, Metastatic pancreatic ca
weakness, MALLORY
SOB
sob
pancreatic ca
metastatic pancreatic ca
pancreatic ca,   respiratory Failure
pancreatic ca hypoxia

## 2025-05-28 NOTE — PROGRESS NOTE ADULT - NUTRITIONAL ASSESSMENT
This patient has been assessed with a concern for Malnutrition and has been determined to have a diagnosis/diagnoses of Moderate protein-calorie malnutrition.    This patient is being managed with:   Diet Soft and Bite Sized-  Entered: May 19 2025 10:00AM  
This patient has been assessed with a concern for Malnutrition and has been determined to have a diagnosis/diagnoses of Moderate protein-calorie malnutrition.    This patient is being managed with:   Diet NPO after Midnight-     NPO Start Date: 22-May-2025   NPO Start Time: 23:59  Entered: May 22 2025  4:04PM    Diet Soft and Bite Sized-  Entered: May 19 2025 10:00AM  
This patient has been assessed with a concern for Malnutrition and has been determined to have a diagnosis/diagnoses of Moderate protein-calorie malnutrition.    This patient is being managed with:   Diet Soft and Bite Sized-  Entered: May 19 2025 10:00AM  

## 2025-05-28 NOTE — PROGRESS NOTE ADULT - SUBJECTIVE AND OBJECTIVE BOX
INTERVAL HISTORY:    Patient with progressive pancreatic cancer despite multiple modality therapy, including recent targeted RT; Now with pneumonia , carcinomatosis , hypotension , cytopenias, renal disease  poorly responsive      Allergies    opioid-like analgesics (Vomiting; Nausea)    Intolerances        MEDICATIONS  (STANDING):  chlorhexidine 2% Cloths 1 Application(s) Topical <User Schedule>  lactated ringers. 1000 milliLiter(s) (75 mL/Hr) IV Continuous <Continuous>  loperamide 2 milliGRAM(s) Oral daily  meropenem Injectable 500 milliGRAM(s) IV Push every 12 hours  metoclopramide Injectable 5 milliGRAM(s) IV Push three times a day  midodrine 10 milliGRAM(s) Oral every 8 hours  norepinephrine Infusion 0.05 MICROgram(s)/kG/Min (6.32 mL/Hr) IV Continuous <Continuous>  nystatin Cream 1 Application(s) Topical two times a day  pantoprazole  Injectable 40 milliGRAM(s) IV Push daily  Suzetrigine (Journavx) 50 milliGRAM(s) 1 Tablet(s) Oral every 12 hours  Vonoprazan 20 milliGRAM(s) 20 milliGRAM(s) Oral <User Schedule>    MEDICATIONS  (PRN):  loperamide 2 milliGRAM(s) Oral every 6 hours PRN Diarrhea  LORazepam   Injectable 0.25 milliGRAM(s) IV Push every 4 hours PRN Anxiety      Vital Signs Last 24 Hrs  T(C): 36 (28 May 2025 15:27), Max: 36.4 (28 May 2025 07:00)  T(F): 96.8 (28 May 2025 15:27), Max: 97.5 (28 May 2025 07:00)  HR: 107 (28 May 2025 09:00) (107 - 130)  BP: 100/73 (28 May 2025 09:00) (96/71 - 116/84)  BP(mean): 83 (28 May 2025 09:00) (77 - 95)  RR: 23 (28 May 2025 15:17) (16 - 36)  SpO2: 100% (28 May 2025 15:17) (84% - 100%)    Parameters below as of 28 May 2025 15:17  Patient On (Oxygen Delivery Method): nasal cannula, high flow  O2 Flow (L/min): 40  O2 Concentration (%): 80    PHYSICAL EXAM:    GENERAL: appears comfortable   Ventimask  NERVOUS SYSTEM:  poorly responsive  CHEST/LUNG: clear ant  HEART: RRR   ABDOMEN: distension  EXTREMITIES:  += edema:-        RADIOLOGY & ADDITIONAL STUDIES:      < from: CT Abdomen and Pelvis No Cont (05.18.25 @ 23:28) >  ACC: [id number] EXAM:  CT ABDOMEN AND PELVIS   ORDERED BY: JONN CAI     ACC: [id number] EXAM:  CT CHEST   ORDERED BY: JONN CAI     PROCEDURE DATE:  05/18/2025          INTERPRETATION:  Prelim: Increased bilateral pleural effusion with   atelectasis. Debris/secretion at the trachea. Nonspecific patchy   opacities scattered in both lungs, which can be seen in noninfectious and   infectious processes. Known pancreatic mass with peritoneal   carcinomatosis. Mild left hydroureteronephrosis. Official report to   follow.    CLINICAL INFORMATION: 69-year-old female with sepsis/hypoxic respiratory   failure. History of pancreatic adenocarcinoma with umbilical implant    COMPARISON: CT scan 03/17/2025 and 02/19/2025    CONTRAST/COMPLICATIONS:  IV Contrast: NONE  Oral Contrast: NONE  .    PROCEDURE:  CT of the Chest, Abdomen and Pelvis was performed.  Sagittal and coronal reformats were performed.    FINDINGS:  CHEST:  LUNGS AND LARGE AIRWAYS: Patent central airways. Pan lobar patchy   opacities consistent with multifocal pneumonia. Tracheal secretions.   Partial bilateral lower lobe atelectasis  PLEURA: Small bilateral pleural effusions.  VESSELS: Right upper extremity central line ending in SVC.  HEART: Heart size is normal. No pericardial effusion. Coronary artery   calcification.  MEDIASTINUM AND MARTA: No lymphadenopathy.  CHEST WALL AND LOWER NECK: Small bilateral pleural effusions.    ABDOMEN AND PELVIS:  LIVER: Within normal limits.  BILE DUCTS: Normal caliber.  GALLBLADDER:Contracted.  SPLEEN: Within normal limits.  PANCREAS: Pancreatic body tail mass not measurable in the absence of   intravenous contrast.  ADRENALS: Within normal limits.  KIDNEYS/URETERS: Bilateral cysts. Mild to moderate left hydronephrosis   with ureter traced to the urinary bladder.    BLADDER: Collapsed around Julian catheter.  REPRODUCTIVE ORGANS: Normal-sized uterus.    BOWEL: No bowel obstruction. Appendix not visualized.  PERITONEUM/RETROPERITONEUM: Moderate to large ascites increased since   03/17/2025. Intraperitoneal catheter ending in right subphrenic space   inserted in right flank. Peritoneal carcinomatosis.  VESSELS: Infrarenal IVC filter  LYMPH NODES: No lymphadenopathy.  ABDOMINAL WALL: Umbilical fluid attenuation mass enlarged since   03/17/2025.  BONES: Degenerative change.    IMPRESSION:  Multifocal pneumonia.  Small bilateral pleural effusions.  Pancreatic body tail mass again visualized.  Moderate to large ascites increased with peritoneal carcinomatosis and   indwelling intraperitoneal catheter.  New mild to moderate left hydronephrosis        < end of copied text >

## 2025-05-28 NOTE — CHART NOTE - NSCHARTNOTEFT_GEN_A_CORE
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 In the ED pt was given 1L of IVF and started on levo after for possible sepsis given clinical signs of volume overload and started on NIV, MALLORY w/ metabolic and lactic acidosis. Admitted for acute hypoxic respiratory failure, MALLORY, metabolic acidosis, and septic shock; admitted to CCU. FULL CODE.    *current status: +CR-Pseduomonas. CT A/P: carcinomatosis. Abdominal drain draining daily. S/p R thoracentesis (5/20): 550 mL removed. Nephrology following, mallory due to high output from pts drains and thoracentesis. Pleural studies Cyto malignancy , Exudative; worsening R pleural effusion. Made DNI, however  would like CPR if needed. Unable to obtain meaningful information 2/2 pt minimally responsive this AM. Discussed in IDR this morning, pt with VERY poor PO intake since admission; consuming <50% of ENN since admission. RN obtained bedscale wt on 5/26 - 163#; 2+ edema may be skewing weight. Edema now noted to be worsening. Weight gain of 17# since admission d/t worsening edema. Pt likely now meets criteria for sev PCM, however edema likely masking further wasting and weight loss. Confirm goals of care regarding nutrition support - Nutrition support is not recommended due to overall declining medical status which evidenced based studies indicate EN is not effective in prolonging survival and improving quality of life. It can also increase risk of aspiration pneumonia as well as other related issues (infection, GI complications, and worsening/ non-healing PI's). However, will provide nutrition/ hydration within [facility]. Please see additional recommendations below.     *Labs Reviewed: No labs taken since 5/25    BMI: BMI (kg/m2): 25.5 (05-23-25 @ 13:15)  HbA1c: A1C with Estimated Average Glucose Result: 6.6 % (02-19-25 @ 05:35)    Glucose: POCT Blood Glucose.: 97 mg/dL (05-18-25 @ 10:37)    BP: 100/73 (05-28-25 @ 09:00) (69/49 - 120/79)Vital Signs Last 24 Hrs  T(C): 35.7 (05-28-25 @ 08:00), Max: 36.4 (05-28-25 @ 07:00)  T(F): 96.3 (05-28-25 @ 08:00), Max: 97.5 (05-28-25 @ 07:00)  HR: 107 (05-28-25 @ 09:00) (107 - 130)  BP: 100/73 (05-28-25 @ 09:00) (93/62 - 116/84)  BP(mean): 83 (05-28-25 @ 09:00) (74 - 95)  RR: 24 (05-28-25 @ 09:00) (16 - 36)  SpO2: 100% (05-28-25 @ 09:00) (84% - 100%)    Lipid Panel: Date/Time: 03-03-25 @ 05:00  Triglycerides, Serum: 341    *pertinent meds: abx, protonix, ativan, levophed, LR    *I and O's:    05-27-25 @ 07:01  -  05-28-25 @ 07:00  --------------------------------------------------------  IN:    Lactated Ringers: 1575 mL    Norepinephrine: 169 mL  Total IN: 1744 mL    OUT:    Indwelling Catheter - Urethral (mL): 100 mL  Total OUT: 100 mL    Total NET: 1644 mL    *(+) BM on 5/27 - fecal incontinence; pt not on bowel regimen.    *fiona score of 10 : PI documented - sacrum (stage 4). 3+ generalized, L/R foot, L/R arm edema documented.    *PO intake, meeting ~ <50% of estimated nutr needs.    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, on chemo/radiation, wound healing AEB mild to severe muscle/ fat wasting, 8.8% wt loss x 3 mon    Diet, Soft and Bite Sized (05-19-25 @ 10:00) [Active]    Estimated Needs: Based on 66.2Kg   Calories: 5104-7487 Kcal (30-35 Kcal/Kg)  Protein:  g ( 1.5-2 g/Kg)  Fluids: 0043-5421 mL (30-35 mL/Kg)    *Wt Hx:    Height (cm): 162.6 (05-23-25 @ 13:15)  Weight (kg): 67.4 (05-18-25 @ 11:26)  BMI (kg/m2): 25.5 (05-23-25 @ 13:15)  BSA (m2): 1.72 (05-23-25 @ 13:15)    Goal: on-going– Pt will continue to meet >/= 80% ENN via tolerated route    Recommendations:  1) C/w Soft & Bite-Sized diet and ensure plus high protein TID to optimize PO intake (provides 350 kcal, 20g protein/ shake)   2) Please obtain daily weights  3) consider checking B6, B12, thiamine, folate, carnitine, and copper levels as malnutrition in cause these to be deficient  4) Consider adding thiamine 200 mg daily 2/2 poor PO intake/ malnutrition  5) Recommend to add MVI w/minerals, Vit C 500 mg BID, add Zinc Sulfate 220 mg x 10 days to promote wound healing.   6) Monitor bowel movements, if no BM for >3 days, consider implementing bowel regimen.  7) Consider adding appetite stimulant such as Remeron or Marinol 2/2 chronically poor appetite/ PO intake  8) Monitor closely for refeeding syndrome - monitor and replete lytes PRN  9) Confirm goals of care regarding nutrition support - Nutrition support is not recommended due to overall declining medical status which evidenced based studies indicate EN is not effective in prolonging survival and improving quality of life. It can also increase risk of aspiration pneumonia as well as other related issues (infection, GI complications, and worsening/ non-healing PI's). However, will provide nutrition/ hydration within [facility].     Nutrition recommendations to continue upon discharge. Goal to continue to meet >/= 80% ENN via tolerated route. RD to F/U prn for changes to nutrition dc plan.   *will continue to monitor and follow up with adjustments to treatment plan prn*  Sheri Seo MS, RDN, CDN, [facility] [phone]  Certified Nutrition  97.5

## 2025-05-28 NOTE — PROGRESS NOTE ADULT - SUBJECTIVE AND OBJECTIVE BOX
Interval History:           Patient more lethargic           on bipap           prn ativan    HPI:       Mrs. 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 HTN, hpl, gout, GERD, CVA, recent admission to [facility] for septic shock, then  [place] [facility] [place] 10 [place] of [place] c/b b/l PNA, renal failure and volume overload, discharged to [facility] 1 week  PTA now presents to  on 5/18 for SOB, abd pain, hypotension and hypoxia.   ct abdomen and pelvix shows carcinomatosis  was on empiric Zosyn no positive culture possible infiltrates on ct abx changed to Meropenem  5/29 on high flow    Patient now DNR/DNI no labs    ROS cant obtain     MEDICATIONS  (STANDING):  chlorhexidine 2% Cloths 1 Application(s) Topical <User Schedule>  lactated ringers. 1000 milliLiter(s) (75 mL/Hr) IV Continuous <Continuous>  loperamide 2 milliGRAM(s) Oral daily  meropenem Injectable 500 milliGRAM(s) IV Push every 12 hours  metoclopramide Injectable 5 milliGRAM(s) IV Push three times a day  midodrine 10 milliGRAM(s) Oral every 8 hours  norepinephrine Infusion 0.05 MICROgram(s)/kG/Min (6.32 mL/Hr) IV Continuous <Continuous>  nystatin Cream 1 Application(s) Topical two times a day  pantoprazole  Injectable 40 milliGRAM(s) IV Push daily  Suzetrigine (Journavx) 50 milliGRAM(s) 1 Tablet(s) Oral every 12 hours  Vonoprazan 20 milliGRAM(s) 20 milliGRAM(s) Oral <User Schedule>    MEDICATIONS  (PRN):  loperamide 2 milliGRAM(s) Oral every 6 hours PRN Diarrhea  LORazepam   Injectable 0.25 milliGRAM(s) IV Push every 4 hours PRN Anxiety    ICU Vital Signs Last 24 Hrs  T(C): 35.7 (28 May 2025 08:00), Max: 36.4 (28 May 2025 07:00)  T(F): 96.3 (28 May 2025 08:00), Max: 97.5 (28 May 2025 07:00)  HR: 107 (28 May 2025 09:00) (107 - 130)  BP: 100/73 (28 May 2025 09:00) (92/70 - 116/84)  BP(mean): 83 (28 May 2025 09:00) (74 - 95)  ABP: --  ABP(mean): --  RR: 24 (28 May 2025 09:00) (16 - 36)  SpO2: 100% (28 May 2025 09:00) (84% - 100%)    O2 Parameters below as of 28 May 2025 06:00  Patient On (Oxygen Delivery Method): BiPAP/CPAP, avaps    O2 Concentration (%): 50    I&O's Summary    27 May 2025 07:01  -  28 May 2025 07:00  --------------------------------------------------------  IN: 1744 mL / OUT: 100 mL / NET: 1644 mL    Physical Exam    General ill  Neuro minimally responisve  HEENT bipap on  neck supple  lungs dec bs at bases bilaterally  cv rrr  abdomen - distended,  extremities edematous  neuro arousable, minimally responisve    DVT Prophylaxis:   held for prophylaxis                                                              Advanced Directives: DNR/DNI no labs     Sepsis Criteria Met - no    Labs, Notes, Orders, radiologic studies reviewed and care coordinated with multidisciplinary team

## 2025-05-29 NOTE — PROGRESS NOTE ADULT - ASSESSMENT
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 htn, hpl, gout, gerd, CVA, recent admission to [facility] for septic shock and now recently tx at [facility] for 10 day course of radiation c/b b/l PNA, renal failure and volume overload than discharged to [facility] 1 week ago, now presents to  for SOB, abd pain, hypotension and hypoxia. In the ED pt was given 1L of IVF and started on levo after for possible sepsis given clinical signs of volume overload and started on NIV, MALLORY w/ metabolic and lactic acidosis. Imaging shows Increased bilateral pleural effusion with atelectasis. Debris/secretion at the trachea. Nonspecific patchy opacities scattered in both lungs, which can be seen in noninfectious and infectious processes. Known pancreatic mass with peritoneal carcinomatosis. Mild left hydroureteronephrosis. Started on empiric IV abx - zosyn.     Acute respiratory failure. Sepsis. Pleural effusions. Multifocal pneumonia. Metastatic pancreatic cancer. Immunocompromised host. MALLORY  - imaging reviewed   - CTS f/u noted s/p R thoracentesis 5/20 f/u cx no growth cytology + malignant cells  - s/p IV zosyn [id number] #5  - now on IV meropenem [id number] #7, 7 day course   - continue with IV antibiotic coverage  - if able check sputum cx f/u cultures - blood cx no growth  - pulmonary fu noted   - tolerating abx well so far; no side effects noted  - reason for abx use and side effects reviewed with patient  - supportive care  - fu cbc    Concern for infection with multi-resistant organisms was raised. Prior cultures reviewed. An epidemiologic assessment was performed. There is a significant risk for resistant microorganisms to spread to family members, and/or healthcare staff. The patient will be placed on isolation according to infection control policy. Will reconsider isolation measures based on new culture results and other clinical data as appropriate. Appropriate cultures collected and an appropriate broad spectrum antibiotic therapy will be considered.    IV to PO token is not indicated

## 2025-05-29 NOTE — DISCHARGE NOTE FOR THE EXPIRED PATIENT - HOSPITAL COURSE
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 htn, hpl, gout, gerd, CVA, recent admission to [facility] for septic shock and now recently tx at [facility] for 10 day course of radiation c/b b/l PNA, renal failure and volume overload than discharged to [facility] 1 week ago, now presents to  for SOB, abd pain, hypotension and hypoxia. In the ED pt was given 1L of IVF and started on levo after for possible sepsis and started on NIV, MALLORY w/ metabolic and lactic acidosis. Imaging shows Increased bilateral pleural effusion with atelectasis. Known pancreatic mass with peritoneal carcinomatosis. Started on empiric IV abx - zosyn. Pt s/p R thoracentesis, seen by thoracic was not felt to  be amenable to left thoracentesis. Pt with worsening hypoxic respiratory failure despite abx and fluid removal, on an off NIV through hospital course. Pt also with worsening shock state and multiorgan dysfunction. Pt made DNR/DNI by family but continued other medical interventions. Despite maximal medical therapy pt passed away on 5/29 at 1020am.

## 2025-05-29 NOTE — PROVIDER CONTACT NOTE (CHANGE IN STATUS NOTIFICATION) - ACTION/TREATMENT ORDERED:
Will continue to support pt within the parameters  as ordered Will continue to support pt within the parameters  as ordered  1020: Pt  Family notified by MD

## 2025-05-29 NOTE — PROGRESS NOTE ADULT - SUBJECTIVE AND OBJECTIVE BOX
Date of service: 05-29-25 @ 10:23    pt seen and examined   lethargic, on bipap  weak appearing   hypotensive    ROS: unable to obtain d/t medical condition     MEDICATIONS  (STANDING):  chlorhexidine 2% Cloths 1 Application(s) Topical <User Schedule>  lactated ringers. 1000 milliLiter(s) (75 mL/Hr) IV Continuous <Continuous>  loperamide 2 milliGRAM(s) Oral daily  meropenem Injectable 500 milliGRAM(s) IV Push every 12 hours  metoclopramide Injectable 5 milliGRAM(s) IV Push three times a day  midodrine 10 milliGRAM(s) Oral every 8 hours  norepinephrine Infusion 0.05 MICROgram(s)/kG/Min (6.32 mL/Hr) IV Continuous <Continuous>  nystatin Cream 1 Application(s) Topical two times a day  pantoprazole  Injectable 40 milliGRAM(s) IV Push daily  Suzetrigine (Journavx) 50 milliGRAM(s) 1 Tablet(s) Oral every 12 hours  Vonoprazan 20 milliGRAM(s) 20 milliGRAM(s) Oral <User Schedule>      Vital Signs Last 24 Hrs  T(C): 36.1 (29 May 2025 09:15), Max: 36.4 (28 May 2025 19:51)  T(F): 97 (29 May 2025 09:15), Max: 97.5 (28 May 2025 19:51)  HR: 0 (29 May 2025 10:21) (0 - 135)  BP: 0/0 (29 May 2025 10:20) (0/0 - 157/65)  BP(mean): 31 (29 May 2025 09:45) (28 - 96)  RR: 0 (29 May 2025 10:21) (0 - 35)  SpO2: 97% (28 May 2025 23:00) (80% - 100%)    Parameters below as of 28 May 2025 18:14  Patient On (Oxygen Delivery Method): nasal cannula, high flow  O2 Flow (L/min): 40  O2 Concentration (%): 100      PE:  Constitutional: NAD on high flow NC   HEENT: NC/AT, EOMI, PERRLA, conjunctivae clear; ears and nose atraumatic; pharynx benign  Neck: supple; thyroid not palpable  Back: no tenderness  Respiratory: decreased breath sounds rhonchi  Cardiovascular: S1S2 regular, no murmurs  Abdomen: soft, not tender, not distended, positive BS; liver and spleen WNL  Genitourinary: no suprapubic tenderness  Lymphatic: no LN palpable  Musculoskeletal: no muscle tenderness, no joint swelling or tenderness  Extremities: no pedal edema  Neurological/ Psychiatric:  no focal deficits   Skin: no rashes; no palpable lesions    Labs: all available labs reviewed              Urinalysis Basic - ( 20 May 2025 06:27 )    Color: x / Appearance: x / SG: x / pH: x  Gluc: 137 mg/dL / Ketone: x  / Bili: x / Urobili: x   Blood: x / Protein: x / Nitrite: x   Leuk Esterase: x / RBC: x / WBC x   Sq Epi: x / Non Sq Epi: x / Bacteria: x    Culture - Body Fluid with Gram Stain (05.20.25 @ 14:00)   Gram Stain:   polymorphonuclear leukocytes seen   No organisms seen   by cytocentrifuge  Specimen Source: Pleural Fl Pleural Fluid  Culture Results:   No growth   Culture - Urine (05.18.25 @ 20:50)   Specimen Source: Clean Catch Clean Catch (Midstream)  Culture Results:   Culture positive, 10,000 - 49,000 CFU/mL . Identification to follow.  Culture Results:   Few Pseudomonas aeruginosa (Carbapenem Resistant)   Rare Enterococcus faecalis  Organism Identification: Pseudomonas aeruginosa (Carbapenem Resistant)   Pseudomonas aeruginosa (Carbapenem Resistant)   Enterococcus faecalis    Radiology: all available radiological tests reviewed      INTERPRETATION:  Prelim: Increased bilateral pleural effusion with   atelectasis. Debris/secretion at the trachea. Nonspecific patchy   opacities scattered in both lungs, which can be seen in noninfectious and   infectious processes. Known pancreatic mass with peritoneal   carcinomatosis. Mild left hydroureteronephrosis. Official report to   follow.    CLINICAL INFORMATION:    COMPARISON: None.    CONTRAST/COMPLICATIONS:  IVContrast: NONE  Oral Contrast: NONE    < end of copied text >    Advanced directives addressed: DNR/DNI

## 2025-06-02 DIAGNOSIS — C78.6 SECONDARY MALIGNANT NEOPLASM OF RETROPERITONEUM AND PERITONEUM: ICD-10-CM

## 2025-06-02 DIAGNOSIS — J90 PLEURAL EFFUSION, NOT ELSEWHERE CLASSIFIED: ICD-10-CM

## 2025-06-02 DIAGNOSIS — N17.0 ACUTE KIDNEY FAILURE WITH TUBULAR NECROSIS: ICD-10-CM

## 2025-06-02 DIAGNOSIS — D69.6 THROMBOCYTOPENIA, UNSPECIFIED: ICD-10-CM

## 2025-06-02 DIAGNOSIS — D64.9 ANEMIA, UNSPECIFIED: ICD-10-CM

## 2025-06-02 DIAGNOSIS — D84.81 IMMUNODEFICIENCY DUE TO CONDITIONS CLASSIFIED ELSEWHERE: ICD-10-CM

## 2025-06-02 DIAGNOSIS — Z66 DO NOT RESUSCITATE: ICD-10-CM

## 2025-06-02 DIAGNOSIS — I10 ESSENTIAL (PRIMARY) HYPERTENSION: ICD-10-CM

## 2025-06-02 DIAGNOSIS — Z92.3 PERSONAL HISTORY OF IRRADIATION: ICD-10-CM

## 2025-06-02 DIAGNOSIS — E87.20 ACIDOSIS, UNSPECIFIED: ICD-10-CM

## 2025-06-02 DIAGNOSIS — J98.11 ATELECTASIS: ICD-10-CM

## 2025-06-02 DIAGNOSIS — Z92.21 PERSONAL HISTORY OF ANTINEOPLASTIC CHEMOTHERAPY: ICD-10-CM

## 2025-06-02 DIAGNOSIS — Z86.73 PERSONAL HISTORY OF TRANSIENT ISCHEMIC ATTACK (TIA), AND CEREBRAL INFARCTION WITHOUT RESIDUAL DEFICITS: ICD-10-CM

## 2025-06-02 DIAGNOSIS — Z79.82 LONG TERM (CURRENT) USE OF ASPIRIN: ICD-10-CM

## 2025-06-02 DIAGNOSIS — Z90.710 ACQUIRED ABSENCE OF BOTH CERVIX AND UTERUS: ICD-10-CM

## 2025-06-02 DIAGNOSIS — A41.9 SEPSIS, UNSPECIFIED ORGANISM: ICD-10-CM

## 2025-06-02 DIAGNOSIS — R18.8 OTHER ASCITES: ICD-10-CM

## 2025-06-02 DIAGNOSIS — E44.0 MODERATE PROTEIN-CALORIE MALNUTRITION: ICD-10-CM

## 2025-06-02 DIAGNOSIS — M10.9 GOUT, UNSPECIFIED: ICD-10-CM

## 2025-06-02 DIAGNOSIS — J18.9 PNEUMONIA, UNSPECIFIED ORGANISM: ICD-10-CM

## 2025-06-02 DIAGNOSIS — Z85.828 PERSONAL HISTORY OF OTHER MALIGNANT NEOPLASM OF SKIN: ICD-10-CM

## 2025-06-02 DIAGNOSIS — N13.30 UNSPECIFIED HYDRONEPHROSIS: ICD-10-CM

## 2025-06-02 DIAGNOSIS — C25.0 MALIGNANT NEOPLASM OF HEAD OF PANCREAS: ICD-10-CM

## 2025-06-02 DIAGNOSIS — E87.6 HYPOKALEMIA: ICD-10-CM

## 2025-06-02 DIAGNOSIS — K21.9 GASTRO-ESOPHAGEAL REFLUX DISEASE WITHOUT ESOPHAGITIS: ICD-10-CM

## 2025-06-02 DIAGNOSIS — Z51.5 ENCOUNTER FOR PALLIATIVE CARE: ICD-10-CM

## 2025-06-02 DIAGNOSIS — E78.5 HYPERLIPIDEMIA, UNSPECIFIED: ICD-10-CM

## 2025-06-02 DIAGNOSIS — R65.21 SEVERE SEPSIS WITH SEPTIC SHOCK: ICD-10-CM

## 2025-06-02 DIAGNOSIS — J96.01 ACUTE RESPIRATORY FAILURE WITH HYPOXIA: ICD-10-CM

## 2025-06-18 LAB
CULTURE RESULTS: SIGNIFICANT CHANGE UP
SPECIMEN SOURCE: SIGNIFICANT CHANGE UP